# Patient Record
Sex: MALE | Race: WHITE | Employment: OTHER | URBAN - METROPOLITAN AREA
[De-identification: names, ages, dates, MRNs, and addresses within clinical notes are randomized per-mention and may not be internally consistent; named-entity substitution may affect disease eponyms.]

---

## 2020-12-21 ENCOUNTER — OFFICE VISIT (OUTPATIENT)
Dept: FAMILY MEDICINE CLINIC | Age: 45
End: 2020-12-21
Payer: MEDICARE

## 2020-12-21 VITALS
TEMPERATURE: 97.1 F | WEIGHT: 191 LBS | HEART RATE: 66 BPM | SYSTOLIC BLOOD PRESSURE: 122 MMHG | HEIGHT: 68 IN | BODY MASS INDEX: 28.95 KG/M2 | DIASTOLIC BLOOD PRESSURE: 70 MMHG | OXYGEN SATURATION: 99 %

## 2020-12-21 DIAGNOSIS — G89.29 CHRONIC BILATERAL LOW BACK PAIN WITH BILATERAL SCIATICA: ICD-10-CM

## 2020-12-21 DIAGNOSIS — F39 MOOD DISORDER (HCC): ICD-10-CM

## 2020-12-21 DIAGNOSIS — M54.41 CHRONIC BILATERAL LOW BACK PAIN WITH BILATERAL SCIATICA: ICD-10-CM

## 2020-12-21 DIAGNOSIS — E10.69 TYPE 1 DIABETES MELLITUS WITH OTHER SPECIFIED COMPLICATION (HCC): Primary | ICD-10-CM

## 2020-12-21 DIAGNOSIS — Z99.2 ESRD (END STAGE RENAL DISEASE) ON DIALYSIS (HCC): ICD-10-CM

## 2020-12-21 DIAGNOSIS — G47.9 SLEEP DISTURBANCE: ICD-10-CM

## 2020-12-21 DIAGNOSIS — M54.42 CHRONIC BILATERAL LOW BACK PAIN WITH BILATERAL SCIATICA: ICD-10-CM

## 2020-12-21 DIAGNOSIS — I10 ESSENTIAL HYPERTENSION: ICD-10-CM

## 2020-12-21 DIAGNOSIS — M21.372 FOOT DROP, BILATERAL: ICD-10-CM

## 2020-12-21 DIAGNOSIS — M21.371 FOOT DROP, BILATERAL: ICD-10-CM

## 2020-12-21 DIAGNOSIS — N18.6 ESRD (END STAGE RENAL DISEASE) ON DIALYSIS (HCC): ICD-10-CM

## 2020-12-21 DIAGNOSIS — M62.81 MUSCLE WEAKNESS: ICD-10-CM

## 2020-12-21 PROCEDURE — G8427 DOCREV CUR MEDS BY ELIG CLIN: HCPCS | Performed by: INTERNAL MEDICINE

## 2020-12-21 PROCEDURE — 3046F HEMOGLOBIN A1C LEVEL >9.0%: CPT | Performed by: INTERNAL MEDICINE

## 2020-12-21 PROCEDURE — G8419 CALC BMI OUT NRM PARAM NOF/U: HCPCS | Performed by: INTERNAL MEDICINE

## 2020-12-21 PROCEDURE — 1036F TOBACCO NON-USER: CPT | Performed by: INTERNAL MEDICINE

## 2020-12-21 PROCEDURE — G8484 FLU IMMUNIZE NO ADMIN: HCPCS | Performed by: INTERNAL MEDICINE

## 2020-12-21 PROCEDURE — 2022F DILAT RTA XM EVC RTNOPTHY: CPT | Performed by: INTERNAL MEDICINE

## 2020-12-21 PROCEDURE — 99203 OFFICE O/P NEW LOW 30 MIN: CPT | Performed by: INTERNAL MEDICINE

## 2020-12-21 RX ORDER — AMLODIPINE BESYLATE 10 MG/1
10 TABLET ORAL DAILY
Status: ON HOLD | COMMUNITY
End: 2022-05-27 | Stop reason: HOSPADM

## 2020-12-21 RX ORDER — ASPIRIN 81 MG/1
81 TABLET ORAL DAILY
COMMUNITY

## 2020-12-21 RX ORDER — CALCIUM ACETATE 667 MG/1
1334 TABLET ORAL
COMMUNITY
End: 2022-07-26 | Stop reason: SDUPTHER

## 2020-12-21 RX ORDER — BUPROPION HYDROCHLORIDE 150 MG/1
150 TABLET ORAL EVERY MORNING
COMMUNITY
End: 2021-03-25 | Stop reason: SDUPTHER

## 2020-12-21 RX ORDER — OMEPRAZOLE 40 MG/1
40 CAPSULE, DELAYED RELEASE ORAL DAILY
Status: ON HOLD | COMMUNITY
End: 2022-06-02 | Stop reason: HOSPADM

## 2020-12-21 RX ORDER — FUROSEMIDE 20 MG/1
20 TABLET ORAL DAILY
COMMUNITY
End: 2021-04-27 | Stop reason: SDUPTHER

## 2020-12-21 RX ORDER — MAGNESIUM OXIDE 400 MG/1
400 TABLET ORAL DAILY
Status: ON HOLD | COMMUNITY
End: 2022-04-13

## 2020-12-21 RX ORDER — FLUOXETINE HYDROCHLORIDE 20 MG/1
40 CAPSULE ORAL DAILY
COMMUNITY
End: 2021-05-10 | Stop reason: SDUPTHER

## 2020-12-21 RX ORDER — LORATADINE 10 MG/1
10 CAPSULE, LIQUID FILLED ORAL DAILY
COMMUNITY
End: 2022-01-02

## 2020-12-21 RX ORDER — EZETIMIBE 10 MG/1
10 TABLET ORAL DAILY
COMMUNITY

## 2020-12-21 RX ORDER — TRAMADOL HYDROCHLORIDE 50 MG/1
50 TABLET ORAL DAILY
COMMUNITY
End: 2021-03-29

## 2020-12-21 RX ORDER — LISINOPRIL 20 MG/1
10 TABLET ORAL DAILY
Status: ON HOLD | COMMUNITY
End: 2022-04-14 | Stop reason: HOSPADM

## 2020-12-21 SDOH — ECONOMIC STABILITY: FOOD INSECURITY: WITHIN THE PAST 12 MONTHS, THE FOOD YOU BOUGHT JUST DIDN'T LAST AND YOU DIDN'T HAVE MONEY TO GET MORE.: NEVER TRUE

## 2020-12-21 SDOH — ECONOMIC STABILITY: INCOME INSECURITY: HOW HARD IS IT FOR YOU TO PAY FOR THE VERY BASICS LIKE FOOD, HOUSING, MEDICAL CARE, AND HEATING?: NOT HARD AT ALL

## 2020-12-21 SDOH — ECONOMIC STABILITY: TRANSPORTATION INSECURITY
IN THE PAST 12 MONTHS, HAS THE LACK OF TRANSPORTATION KEPT YOU FROM MEDICAL APPOINTMENTS OR FROM GETTING MEDICATIONS?: NOT ASKED

## 2020-12-21 SDOH — ECONOMIC STABILITY: TRANSPORTATION INSECURITY
IN THE PAST 12 MONTHS, HAS LACK OF TRANSPORTATION KEPT YOU FROM MEETINGS, WORK, OR FROM GETTING THINGS NEEDED FOR DAILY LIVING?: NOT ASKED

## 2020-12-21 ASSESSMENT — PATIENT HEALTH QUESTIONNAIRE - PHQ9
SUM OF ALL RESPONSES TO PHQ QUESTIONS 1-9: 0
2. FEELING DOWN, DEPRESSED OR HOPELESS: 0

## 2020-12-21 NOTE — PROGRESS NOTES
Visit Information    Have you changed or started any medications since your last visit including any over-the-counter medicines, vitamins, or herbal medicines? no   Are you having any side effects from any of your medications? -  no  Have you stopped taking any of your medications? Is so, why? -  no    Have you seen any other physician or provider since your last visit? Yes, Dr. Silvestre Benjamin   Have you had any other diagnostic tests since your last visit? No  Have you been seen in the emergency room and/or had an admission to a hospital since we last saw you? No  Have you had your routine dental cleaning in the past 6 months? no    Have you activated your [employer] account? If not, what are your barriers?  No:      Patient Care Team:  Jenny Velasquez DO as PCP - General (Family Medicine)    Medical History Review  Past Medical, Family, and Social History reviewed and does contribute to the patient presenting condition    Health Maintenance   Topic Date Due    HIV screen  05/11/1989    DTaP/Tdap/Td vaccine (1 - Tdap) 05/11/1993    Lipid screen  05/11/2014    Diabetes screen  05/11/2014    Flu vaccine (1) 09/01/2020    Hepatitis A vaccine  Aged Out    Hepatitis B vaccine  Aged Out    Hib vaccine  Aged Out    Meningococcal (ACWY) vaccine  Aged Out    Pneumococcal 0-64 years Vaccine  Aged Out

## 2020-12-31 ENCOUNTER — HOSPITAL ENCOUNTER (EMERGENCY)
Age: 45
Discharge: HOME | End: 2020-12-31
Attending: EMERGENCY MEDICINE
Payer: MEDICARE

## 2020-12-31 VITALS
BODY MASS INDEX: 28.79 KG/M2 | WEIGHT: 190 LBS | OXYGEN SATURATION: 95 % | SYSTOLIC BLOOD PRESSURE: 133 MMHG | RESPIRATION RATE: 16 BRPM | DIASTOLIC BLOOD PRESSURE: 64 MMHG | HEART RATE: 58 BPM | HEIGHT: 68 IN | TEMPERATURE: 98.1 F

## 2020-12-31 DIAGNOSIS — T38.3X1A INSULIN OVERDOSE, ACCIDENTAL OR UNINTENTIONAL, INITIAL ENCOUNTER: Primary | ICD-10-CM

## 2020-12-31 LAB
ABSOLUTE EOS #: 0.2 K/UL (ref 0–0.44)
ABSOLUTE IMMATURE GRANULOCYTE: 0.02 K/UL (ref 0–0.3)
ABSOLUTE LYMPH #: 0.88 K/UL (ref 1.1–3.7)
ABSOLUTE MONO #: 0.56 K/UL (ref 0.1–1.2)
ANION GAP SERPL CALCULATED.3IONS-SCNC: 12 MMOL/L (ref 9–17)
BASOPHILS # BLD: 1 % (ref 0–2)
BASOPHILS ABSOLUTE: 0.05 K/UL (ref 0–0.2)
BUN BLDV-MCNC: 44 MG/DL (ref 6–20)
BUN/CREAT BLD: 7 (ref 9–20)
CALCIUM SERPL-MCNC: 9.6 MG/DL (ref 8.6–10.4)
CHLORIDE BLD-SCNC: 94 MMOL/L (ref 98–107)
CHP ED QC CHECK: NORMAL
CHP ED QC CHECK: YES
CO2: 27 MMOL/L (ref 20–31)
CREAT SERPL-MCNC: 6.64 MG/DL (ref 0.7–1.2)
DIFFERENTIAL TYPE: ABNORMAL
EOSINOPHILS RELATIVE PERCENT: 2 % (ref 1–4)
GFR AFRICAN AMERICAN: 11 ML/MIN
GFR NON-AFRICAN AMERICAN: 9 ML/MIN
GFR SERPL CREATININE-BSD FRML MDRD: ABNORMAL ML/MIN/{1.73_M2}
GFR SERPL CREATININE-BSD FRML MDRD: ABNORMAL ML/MIN/{1.73_M2}
GLUCOSE BLD-MCNC: 114 MG/DL
GLUCOSE BLD-MCNC: 114 MG/DL (ref 75–110)
GLUCOSE BLD-MCNC: 120 MG/DL
GLUCOSE BLD-MCNC: 120 MG/DL (ref 75–110)
GLUCOSE BLD-MCNC: 40 MG/DL
GLUCOSE BLD-MCNC: 40 MG/DL (ref 75–110)
GLUCOSE BLD-MCNC: 78 MG/DL (ref 70–99)
GLUCOSE BLD-MCNC: 94 MG/DL (ref 75–110)
GLUCOSE BLD-MCNC: 97 MG/DL
GLUCOSE BLD-MCNC: 97 MG/DL (ref 75–110)
HCT VFR BLD CALC: 32.7 % (ref 40.7–50.3)
HEMOGLOBIN: 10.5 G/DL (ref 13–17)
IMMATURE GRANULOCYTES: 0 %
LYMPHOCYTES # BLD: 10 % (ref 24–43)
MCH RBC QN AUTO: 27.3 PG (ref 25.2–33.5)
MCHC RBC AUTO-ENTMCNC: 32.1 G/DL (ref 28.4–34.8)
MCV RBC AUTO: 84.9 FL (ref 82.6–102.9)
MONOCYTES # BLD: 7 % (ref 3–12)
NRBC AUTOMATED: 0 PER 100 WBC
PDW BLD-RTO: 13.2 % (ref 11.8–14.4)
PLATELET # BLD: 210 K/UL (ref 138–453)
PLATELET ESTIMATE: ABNORMAL
PMV BLD AUTO: 10.9 FL (ref 8.1–13.5)
POTASSIUM SERPL-SCNC: 3.7 MMOL/L (ref 3.7–5.3)
RBC # BLD: 3.85 M/UL (ref 4.21–5.77)
RBC # BLD: ABNORMAL 10*6/UL
SEG NEUTROPHILS: 80 % (ref 36–65)
SEGMENTED NEUTROPHILS ABSOLUTE COUNT: 6.75 K/UL (ref 1.5–8.1)
SODIUM BLD-SCNC: 133 MMOL/L (ref 135–144)
WBC # BLD: 8.5 K/UL (ref 3.5–11.3)
WBC # BLD: ABNORMAL 10*3/UL

## 2020-12-31 PROCEDURE — 80048 BASIC METABOLIC PNL TOTAL CA: CPT

## 2020-12-31 PROCEDURE — 85025 COMPLETE CBC W/AUTO DIFF WBC: CPT

## 2020-12-31 PROCEDURE — 96374 THER/PROPH/DIAG INJ IV PUSH: CPT

## 2020-12-31 PROCEDURE — 2580000003 HC RX 258: Performed by: EMERGENCY MEDICINE

## 2020-12-31 PROCEDURE — 6360000002 HC RX W HCPCS: Performed by: EMERGENCY MEDICINE

## 2020-12-31 PROCEDURE — 99282 EMERGENCY DEPT VISIT SF MDM: CPT

## 2020-12-31 PROCEDURE — 96375 TX/PRO/DX INJ NEW DRUG ADDON: CPT

## 2020-12-31 PROCEDURE — 2500000003 HC RX 250 WO HCPCS: Performed by: EMERGENCY MEDICINE

## 2020-12-31 PROCEDURE — 96376 TX/PRO/DX INJ SAME DRUG ADON: CPT

## 2020-12-31 PROCEDURE — 82947 ASSAY GLUCOSE BLOOD QUANT: CPT

## 2020-12-31 RX ORDER — DEXTROSE AND SODIUM CHLORIDE 5; .9 G/100ML; G/100ML
INJECTION, SOLUTION INTRAVENOUS CONTINUOUS
Status: DISCONTINUED | OUTPATIENT
Start: 2020-12-31 | End: 2020-12-31 | Stop reason: HOSPADM

## 2020-12-31 RX ORDER — PROMETHAZINE HYDROCHLORIDE 25 MG/ML
12.5 INJECTION, SOLUTION INTRAMUSCULAR; INTRAVENOUS ONCE
Status: COMPLETED | OUTPATIENT
Start: 2020-12-31 | End: 2020-12-31

## 2020-12-31 RX ORDER — DEXTROSE MONOHYDRATE 25 G/50ML
12.5 INJECTION, SOLUTION INTRAVENOUS ONCE
Status: COMPLETED | OUTPATIENT
Start: 2020-12-31 | End: 2020-12-31

## 2020-12-31 RX ORDER — ONDANSETRON 2 MG/ML
4 INJECTION INTRAMUSCULAR; INTRAVENOUS ONCE
Status: COMPLETED | OUTPATIENT
Start: 2020-12-31 | End: 2020-12-31

## 2020-12-31 RX ORDER — INSULIN GLARGINE 100 [IU]/ML
40 INJECTION, SOLUTION SUBCUTANEOUS EVERY MORNING
COMMUNITY
End: 2021-02-17 | Stop reason: SDUPTHER

## 2020-12-31 RX ORDER — DEXTROSE MONOHYDRATE 25 G/50ML
25 INJECTION, SOLUTION INTRAVENOUS ONCE
Status: COMPLETED | OUTPATIENT
Start: 2020-12-31 | End: 2020-12-31

## 2020-12-31 RX ADMIN — PROMETHAZINE HYDROCHLORIDE 12.5 MG: 25 INJECTION INTRAMUSCULAR; INTRAVENOUS at 06:45

## 2020-12-31 RX ADMIN — DEXTROSE MONOHYDRATE 12.5 G: 25 INJECTION, SOLUTION INTRAVENOUS at 06:19

## 2020-12-31 RX ADMIN — ONDANSETRON 4 MG: 2 INJECTION INTRAMUSCULAR; INTRAVENOUS at 06:19

## 2020-12-31 RX ADMIN — DEXTROSE AND SODIUM CHLORIDE: 5; 900 INJECTION, SOLUTION INTRAVENOUS at 06:24

## 2020-12-31 RX ADMIN — DEXTROSE MONOHYDRATE 25 G: 25 INJECTION, SOLUTION INTRAVENOUS at 08:34

## 2020-12-31 RX ADMIN — ONDANSETRON 4 MG: 2 INJECTION INTRAMUSCULAR; INTRAVENOUS at 06:45

## 2020-12-31 RX ADMIN — GLUCAGON HYDROCHLORIDE 1 MG: KIT at 06:19

## 2020-12-31 NOTE — ED NOTES
Spoke with Deveron Nyhan RN at [facility] at this time, states they are unable to get him in for dialysis today, states they are closed tomorrow, and would like the patient's care taker to call her when he gets home to set up for possible dialysis on Saturday. Dr. Emil Ho notified at this time and is okay with the plan.      Marky Henning6, RN  12/31/20 0928

## 2020-12-31 NOTE — ED NOTES
Pt called out to [employer]. Pt taken via w/c. Ameibofety maintained.       Derek Cee RN  12/31/20 2916

## 2020-12-31 NOTE — ED PROVIDER NOTES
[address] [place] [address] ED  eMERGENCY dEPARTMENT eNCOUnter      Pt Name: Claven Schirmer  MRN: [id number]  [place] 1975  Date of evaluation: 12/31/2020  Provider: Jeffery Greco MD    CHIEF COMPLAINT       Chief Complaint   Patient presents with    Fatigue    Medication Problem     took wrong insuliln this morning         HISTORY OF PRESENT ILLNESS  (Location/Symptom, Timing/Onset, Context/Setting, Quality, Duration, Modifying Factors, Severity.)   Claven Schirmer is a 54 y.o. male who presents to the emergency department due to accidental missed dosing of his insulin. Patient took 40 units of Humalog this morning instead of 40 units of Lantus. He presents immediately after dosing. Accu-Chek on arrival is 90. He states he does have mild nausea and fatigue. He has no other complaints      Nursing Notes were reviewed. ALLERGIES     Patient has no known allergies.     CURRENT MEDICATIONS       Previous Medications    AMLODIPINE (NORVASC) 10 MG TABLET    Take 10 mg by mouth daily    ASPIRIN 81 MG EC TABLET    Take 81 mg by mouth daily    BUPROPION (WELLBUTRIN XL) 150 MG EXTENDED RELEASE TABLET    Take 150 mg by mouth every morning    CALCIUM ACETATE 667 MG TABS    Take 3 tablets by mouth daily    EZETIMIBE (ZETIA) 10 MG TABLET    Take 10 mg by mouth daily    FLUOXETINE (PROZAC) 20 MG CAPSULE    Take 40 mg by mouth daily    FUROSEMIDE (LASIX) 20 MG TABLET    Take 20 mg by mouth daily    INSULIN GLARGINE (LANTUS) 100 UNIT/ML INJECTION VIAL    Inject 40 Units into the skin every morning    INSULIN LISPRO (HUMALOG) 100 UNIT/ML INJECTION VIAL    Inject into the skin 3 times daily (before meals)    LISINOPRIL (PRINIVIL;ZESTRIL) 20 MG TABLET    Take 40 mg by mouth daily    LORATADINE (CLARITIN) 10 MG CAPSULE    Take 10 mg by mouth daily    MAGNESIUM OXIDE (MAG-OX) 400 MG TABLET    Take 400 mg by mouth daily    METOPROLOL SUCCINATE 200 MG CS24    Take 200 mg by mouth daily Non-plain film images such as CT, Ultrasound and MRI are read by the radiologist. Plain radiographic images are visualized and preliminarily interpreted by the emergency physician with the below findings:      Interpretation per the Radiologist below, if available at the time of this note:    No orders to display           LABS:  Labs Reviewed   CBC WITH AUTO DIFFERENTIAL - Abnormal; Notable for the following components:       Result Value    RBC 3.85 (*)     Hemoglobin 10.5 (*)     Hematocrit 32.7 (*)     Seg Neutrophils 80 (*)     Lymphocytes 10 (*)     Absolute Lymph # 0.88 (*)     All other components within normal limits   POCT GLUCOSE - Normal   BASIC METABOLIC PANEL   POC GLUCOSE FINGERSTICK   POCT GLUCOSE   POCT GLUCOSE   POCT GLUCOSE   POCT GLUCOSE   POCT GLUCOSE     Pending at shift change    All other labs were within normal range or not returned as of this dictation. EMERGENCY DEPARTMENT COURSE and DIFFERENTIAL DIAGNOSIS/MDM:   Vitals:    Vitals:    12/31/20 0555 12/31/20 0558   BP:  138/66   Pulse:  61   Resp:  16   Temp:  98.1 °F (36.7 °C)   SpO2:  100%   Weight: 190 lb (86.2 kg)    Height: 5' 7.5\" (1.715 m)      Patient is evaluated on arrival.  IV access is established. He presents shortly after taking 40 units of Humalog. In anticipation of drop in blood glucose he is placed on D5 0.9. He did receive half amp of D50 as well as 1 of glucagon. Zofran given for nausea. Serial Accu-Chek will be followed every 30 minutes and supportive treatment given as required. Care is turned over to Dr. Molly Carlton at shift change for reevaluation of patient status, review of investigations, additional care and ultimate disposition    CONSULTS:  None    PROCEDURES:  None    FINAL IMPRESSION      1.  Insulin overdose, accidental or unintentional, initial encounter          DISPOSITION/PLAN   DISPOSITION Ed Observation 12/31/2020 06:39:55 AM  Final Disposition per Dr Cotto Query: No follow-up provider specified.     DISCHARGE MEDICATIONS:     New Prescriptions    No medications on file           (Please note that portions of this note were completed with a voice recognition program.  Efforts were made to edit the dictations but occasionally words are mis-transcribed.)    William Pichardo MD  Attending Emergency Physician          William Pichardo MD  12/31/20 7628

## 2020-12-31 NOTE — ED NOTES
Pt returned room via w/c. Pt c/o being clammy and cold. POCT Glucose 40. Pt given 4oz oj. Dr Vasiliy Fields notified. N.O. 1 amp D50.       Conrad Sanchez RN  12/31/20 8608

## 2021-01-08 ENCOUNTER — HOSPITAL ENCOUNTER (OUTPATIENT)
Age: 46
Setting detail: SPECIMEN
Discharge: HOME | End: 2021-01-08
Payer: COMMERCIAL

## 2021-01-08 DIAGNOSIS — E10.69 TYPE 1 DIABETES MELLITUS WITH OTHER SPECIFIED COMPLICATION (HCC): ICD-10-CM

## 2021-01-08 DIAGNOSIS — N18.6 ESRD (END STAGE RENAL DISEASE) ON DIALYSIS (HCC): ICD-10-CM

## 2021-01-08 DIAGNOSIS — Z99.2 ESRD (END STAGE RENAL DISEASE) ON DIALYSIS (HCC): ICD-10-CM

## 2021-01-08 DIAGNOSIS — G47.9 SLEEP DISTURBANCE: ICD-10-CM

## 2021-01-08 LAB
ABSOLUTE EOS #: 0.14 K/UL (ref 0–0.44)
ABSOLUTE IMMATURE GRANULOCYTE: <0.03 K/UL (ref 0–0.3)
ABSOLUTE LYMPH #: 1.04 K/UL (ref 1.1–3.7)
ABSOLUTE MONO #: 0.63 K/UL (ref 0.1–1.2)
ALBUMIN SERPL-MCNC: 4.1 G/DL (ref 3.5–5.2)
ALBUMIN/GLOBULIN RATIO: 1.7 (ref 1–2.5)
ALP BLD-CCNC: 313 U/L (ref 40–129)
ALT SERPL-CCNC: 135 U/L (ref 5–41)
ANION GAP SERPL CALCULATED.3IONS-SCNC: 18 MMOL/L (ref 9–17)
AST SERPL-CCNC: 127 U/L
BASOPHILS # BLD: 1 % (ref 0–2)
BASOPHILS ABSOLUTE: 0.03 K/UL (ref 0–0.2)
BILIRUB SERPL-MCNC: 0.38 MG/DL (ref 0.3–1.2)
BUN BLDV-MCNC: 27 MG/DL (ref 6–20)
BUN/CREAT BLD: ABNORMAL (ref 9–20)
CALCIUM SERPL-MCNC: 9 MG/DL (ref 8.6–10.4)
CHLORIDE BLD-SCNC: 98 MMOL/L (ref 98–107)
CO2: 24 MMOL/L (ref 20–31)
CREAT SERPL-MCNC: 5.34 MG/DL (ref 0.7–1.2)
DIFFERENTIAL TYPE: ABNORMAL
EOSINOPHILS RELATIVE PERCENT: 2 % (ref 1–4)
ESTIMATED AVERAGE GLUCOSE: 174 MG/DL
FOLATE: 12.7 NG/ML
GFR AFRICAN AMERICAN: 14 ML/MIN
GFR NON-AFRICAN AMERICAN: 12 ML/MIN
GFR SERPL CREATININE-BSD FRML MDRD: ABNORMAL ML/MIN/{1.73_M2}
GFR SERPL CREATININE-BSD FRML MDRD: ABNORMAL ML/MIN/{1.73_M2}
GLUCOSE BLD-MCNC: 139 MG/DL (ref 70–99)
HBA1C MFR BLD: 7.7 % (ref 4–6)
HCT VFR BLD CALC: 33.9 % (ref 40.7–50.3)
HEMOGLOBIN: 10.7 G/DL (ref 13–17)
IMMATURE GRANULOCYTES: 0 %
LYMPHOCYTES # BLD: 18 % (ref 24–43)
MCH RBC QN AUTO: 27.7 PG (ref 25.2–33.5)
MCHC RBC AUTO-ENTMCNC: 31.6 G/DL (ref 28.4–34.8)
MCV RBC AUTO: 87.8 FL (ref 82.6–102.9)
MONOCYTES # BLD: 11 % (ref 3–12)
NRBC AUTOMATED: 0 PER 100 WBC
PDW BLD-RTO: 13.6 % (ref 11.8–14.4)
PLATELET # BLD: 184 K/UL (ref 138–453)
PLATELET ESTIMATE: ABNORMAL
PMV BLD AUTO: 11.5 FL (ref 8.1–13.5)
POTASSIUM SERPL-SCNC: 4.2 MMOL/L (ref 3.7–5.3)
RBC # BLD: 3.86 M/UL (ref 4.21–5.77)
RBC # BLD: ABNORMAL 10*6/UL
SEG NEUTROPHILS: 68 % (ref 36–65)
SEGMENTED NEUTROPHILS ABSOLUTE COUNT: 4.02 K/UL (ref 1.5–8.1)
SODIUM BLD-SCNC: 140 MMOL/L (ref 135–144)
TOTAL PROTEIN: 6.5 G/DL (ref 6.4–8.3)
VITAMIN B-12: 1630 PG/ML (ref 232–1245)
VITAMIN D 25-HYDROXY: 12.3 NG/ML (ref 30–100)
WBC # BLD: 5.9 K/UL (ref 3.5–11.3)
WBC # BLD: ABNORMAL 10*3/UL

## 2021-01-10 ASSESSMENT — ENCOUNTER SYMPTOMS
STRIDOR: 0
BLURRED VISION: 0
SHORTNESS OF BREATH: 0
SORE THROAT: 0
SWOLLEN GLANDS: 0
CHEST TIGHTNESS: 0
NAUSEA: 0
CONSTIPATION: 0
COUGH: 0
ABDOMINAL PAIN: 0
VISUAL CHANGE: 0
WHEEZING: 0
DIARRHEA: 0
BACK PAIN: 0
CHOKING: 0

## 2021-01-10 NOTE — PROGRESS NOTES
[facility]  [address] [address]  Dept: [phone]  Dept Fax: [phone]    Rob Gomez a 54 y.o. male who presents today for his medical conditions/complaints as notedbelow. Muna Aguilar is c/o of   Chief Complaint   Patient presents with    New Patient         HPI:     Here to establish care   [place] live with [place]  Had just recently got out of a rehba up there was well previously   Has hx of poorly controlled type 1 DM /brittle diabetic   ESRD on dialysis   Is going t/th/sat currently to [facility] does not know the exact name and is already set up with nephro   They do labs occasionally there per pt and GF who brought pt today but is okay with us checking as unclear what they have done besides cmp , lytes   Does not need refill on medications right now is set on those  Does carb counting and sliding scale humalog as well as long acting insulin   Will need referral to endo       has chronic weakness related to foot drop  Wears a brace when up and about   Helps with swelling as well   Would like to try some therapy , was gettign this when at rehab as well and did help with overall strength and gait   Seemed to get around better after course of PT/OT        Diabetes  He presents for his follow-up diabetic visit. He has type 2 diabetes mellitus. No MedicAlert identification noted. His disease course has been fluctuating. Hypoglycemia symptoms include nervousness/anxiousness. Pertinent negatives for hypoglycemia include no dizziness, headaches, seizures or speech difficulty. Associated symptoms include fatigue, foot paresthesias and weakness. Pertinent negatives for diabetes include no blurred vision, no chest pain, no foot ulcerations, no polydipsia, no polyphagia, no polyuria and no visual change. There are no hypoglycemic complications.  Risk Shari Oliveira was in the clinic today to see EDEN Mcgee for   Chief Complaint   Patient presents with   • Convey Results   .    Please note, her blood pressures were elevated x2 while in the clinic.    BP Readings from Last 1 Encounters:   06/11/19 0847 148/86   06/11/19 0801 159/89       Please review with PCP and follow up with patient as appropriate.     factors for coronary artery disease include diabetes mellitus, dyslipidemia, family history, sedentary lifestyle, stress, obesity and male sex. Current diabetic treatment includes intensive insulin program. He is compliant with treatment most of the time. He is following a generally healthy diet. Meal planning includes avoidance of concentrated sweets. He has not had a previous visit with a dietitian. He rarely participates in exercise. His home blood glucose trend is fluctuating minimally. His breakfast blood glucose range is generally 180-200 mg/dl. His lunch blood glucose range is generally >200 mg/dl. His dinner blood glucose range is generally 180-200 mg/dl. He does not see a podiatrist.Eye exam is not current. Flank Pain  Associated symptoms include numbness and weakness. Pertinent negatives include no abdominal pain, chest pain, dysuria, fever or headaches. Extremity Weakness  This is a chronic problem. The current episode started more than 1 year ago. The problem occurs constantly. The problem has been gradually worsening. Associated symptoms include arthralgias, fatigue, myalgias, numbness and weakness. Pertinent negatives include no abdominal pain, chest pain, chills, congestion, coughing, diaphoresis, fever, headaches, joint swelling, nausea, neck pain, rash, sore throat, swollen glands or visual change. The symptoms are aggravated by exertion and walking. He has tried sleep, walking, rest and relaxation for the symptoms. The treatment provided moderate relief.        Hemoglobin A1C (%)   Date Value   01/08/2021 7.7 (H)         ( goal A1C is < 7)   No results found for: LABMICR  No results found for: LDLCHOLESTEROL, LDLCALC    (goal LDL is <100)   AST (U/L)   Date Value   01/08/2021 127 (H)     ALT (U/L)   Date Value   01/08/2021 135 (H)     BUN (mg/dL)   Date Value   01/08/2021 27 (H)     BP Readings from Last 3 Encounters:   12/31/20 133/64   12/21/20 122/70          (goal 120/80)    Past Medical History:   Diagnosis Date    Kidney disease     On Dialysis    Type 1 diabetes mellitus ([facility] [address]       History reviewed. No pertinent surgical history. Family History   Problem Relation Age of Onset    Diabetes Mother     Diabetes Father     Heart Disease Maternal Great Grandfather        Social History     Tobacco Use    Smoking status: Never Smoker    Smokeless tobacco: Never Used   Substance Use Topics    Alcohol use: Never     Frequency: Never     Binge frequency: Never      Current Outpatient Medications   Medication Sig Dispense Refill    lisinopril (PRINIVIL;ZESTRIL) 20 MG tablet Take 40 mg by mouth daily      Rosuvastatin Calcium 40 MG CPSP Take 40 mg by mouth daily      ezetimibe (ZETIA) 10 MG tablet Take 10 mg by mouth daily      FLUoxetine (PROZAC) 20 MG capsule Take 40 mg by mouth daily      Metoprolol Succinate 200 MG CS24 Take 200 mg by mouth daily      buPROPion (WELLBUTRIN XL) 150 MG extended release tablet Take 150 mg by mouth every morning      omeprazole (PRILOSEC) 40 MG delayed release capsule Take 40 mg by mouth daily      loratadine (CLARITIN) 10 MG capsule Take 10 mg by mouth daily      furosemide (LASIX) 20 MG tablet Take 20 mg by mouth daily      amLODIPine (NORVASC) 10 MG tablet Take 10 mg by mouth daily      aspirin 81 MG EC tablet Take 81 mg by mouth daily      traMADol (ULTRAM) 50 MG tablet Take 50 mg by mouth daily.  magnesium oxide (MAG-OX) 400 MG tablet Take 400 mg by mouth daily      calcium acetate 667 MG TABS Take 3 tablets by mouth daily      vitamin B-12 (CYANOCOBALAMIN) 500 MCG tablet Take 500 mcg by mouth daily      insulin glargine (LANTUS) 100 UNIT/ML injection vial Inject 40 Units into the skin every morning      insulin lispro (HUMALOG) 100 UNIT/ML injection vial Inject into the skin 3 times daily (before meals)       No current facility-administered medications for this visit.       No Known Allergies       Health Maintenance   Topic Date Due note reviewed. Constitutional:       General: He is not in acute distress. Appearance: He is well-developed. He is ill-appearing. He is not toxic-appearing or diaphoretic. Comments: Chronically ill  Thin    HENT:      Head: Normocephalic and atraumatic. Right Ear: Tympanic membrane, ear canal and external ear normal.      Left Ear: Tympanic membrane, ear canal and external ear normal.      Mouth/Throat:      Mouth: Mucous membranes are moist.   Neck:      Musculoskeletal: Normal range of motion and neck supple. No neck rigidity. Thyroid: No thyroid mass or thyroid tenderness. Vascular: No carotid bruit. Cardiovascular:      Rate and Rhythm: Regular rhythm. Bradycardia present. No extrasystoles are present. Pulses: Normal pulses. Heart sounds: S1 normal and S2 normal. Heart sounds not distant. Murmur present. Pulmonary:      Effort: Pulmonary effort is normal. No bradypnea, accessory muscle usage, prolonged expiration, respiratory distress or retractions. Breath sounds: Normal breath sounds and air entry. No stridor, decreased air movement or transmitted upper airway sounds. No decreased breath sounds, wheezing, rhonchi or rales. Abdominal:      General: Bowel sounds are normal.      Palpations: Abdomen is soft. There is no hepatomegaly or splenomegaly. Tenderness: There is no abdominal tenderness. Musculoskeletal:      Right shoulder: He exhibits no tenderness, no bony tenderness, no pain, no spasm, normal pulse and normal strength. Left knee: He exhibits normal range of motion, no swelling, no effusion, no ecchymosis, no deformity, no laceration and no erythema. No tenderness found. Lumbar back: He exhibits spasm. He exhibits normal range of motion, no tenderness, no bony tenderness, no swelling, no edema, no deformity, no pain and normal pulse. Right lower leg: No edema. Left lower leg: No edema.    Lymphadenopathy:      Cervical: No cervical adenopathy. Skin:     General: Skin is warm and dry. Capillary Refill: Capillary refill takes less than 2 seconds. Coloration: Skin is pale. Findings: No rash. Neurological:      General: No focal deficit present. Mental Status: He is alert and oriented to person, place, and time. Cranial Nerves: Cranial nerves are intact. No cranial nerve deficit. Sensory: Sensory deficit present. Motor: Weakness and atrophy present. No abnormal muscle tone. Coordination: Coordination normal.      Gait: Gait abnormal.      Deep Tendon Reflexes: Reflexes are normal and symmetric. Psychiatric:         Attention and Perception: Attention normal.         Mood and Affect: Mood is anxious and depressed. Speech: Speech is delayed. Behavior: Behavior normal.         Thought Content: Thought content normal.         Cognition and Memory: Cognition normal.         Judgment: Judgment normal.       /70 (Site: Right Upper Arm, Position: Sitting, Cuff Size: Medium Adult)   Pulse 66   Temp 97.1 °F (36.2 °C) (Temporal)   Ht 5' 7.5\" (1.715 m)   Wt 191 lb (86.6 kg)   SpO2 99%   BMI 29.47 kg/m²     Assessment:       Diagnosis Orders   1. Type 1 diabetes mellitus with other specified complication (HCC)  Carmen Kaur MD, Endocrinology, [facility]    Hemoglobin A1C    Comprehensive Metabolic Panel    CBC With Auto Differential    DME Order for Bath/Shower Seat as OP   2. Sleep disturbance  CBC With Auto Differential   3. ESRD (end stage renal disease) on dialysis ([facility] [address]  Vitamin D 25 Hydroxy    Vitamin B12 & Folate    DME Order for Bath/Shower Seat as OP   4. Essential hypertension     5. Mood disorder ([facility] [address]     6. Foot drop, bilateral  [facility]    DME Order for Bath/Shower Seat as OP   7. Muscle weakness  [facility]    DME Order for Bath/Shower Seat as OP   8.  Chronic bilateral low back pain with bilateral sciatica [facility]    DME Order for Bath/Shower Seat as OP             Plan:       Return if symptoms worsen or fail to improve. Orders Placed This Encounter   Procedures    Hemoglobin A1C     Standing Status:   Future     Number of Occurrences:   1     Standing Expiration Date:   12/21/2021    Comprehensive Metabolic Panel     Standing Status:   Future     Number of Occurrences:   1     Standing Expiration Date:   12/21/2021    CBC With Auto Differential     Standing Status:   Future     Number of Occurrences:   1     Standing Expiration Date:   12/21/2021    Vitamin D 25 Hydroxy     Standing Status:   Future     Number of Occurrences:   1     Standing Expiration Date:   12/21/2021    Vitamin B12 & Folate     Standing Status:   Future     Number of Occurrences:   1     Standing Expiration Date:   12/21/2021   Toyin Adorno MD, Endocrinology, [facility]     Referral Priority:   Routine     Referral Type:   Eval and Treat     Referral Reason:   Specialty Services Required     Referred to Provider:   Disha Hearn     Requested Specialty:   Endocrinology     Number of Visits Requested:   [facility]     Referral Priority:   Routine     Referral Type:   Eval and Treat     Referral Reason:   Specialty Services Required     Requested Specialty:   Physical Therapy     Number of Visits Requested:   1    DME Order for Bath/Shower Seat as OP     Bath/Shower Bench with back    Current patient weight: Weight: 191 lb (86.6 kg)   Current patient height: Height: 5' 7.5\" (171.5 cm)  Diagnosis: debility, weakness, foot drop , type 1 diabetes   Duration: Purchase     No orders of the defined types were placed in this encounter. continue with specialists as scheduled ie nephro / dialysis.    Will refer to endo   Denies needing refills at this time  DME order for shower chair , also givne written script for handicap placard     Up date blood work   Seek immediate medical attention for any chest pain , severe trouble breathing and/or visual changes. Referral to PT for strength training , foot drop , debility   Call with q/c   Seek immediate medical attention for any chest pain , severe trouble breathing and/or visual changes. F/u in 6 months or as needed. Patientgiven educational materials - see patient instructions. Discussed use, benefit,and side effects of prescribed medications. All patient questions answered. Ptvoiced understanding. Reviewed health maintenance. Instructed to continue currentmedications, diet and exercise. Patient agreed with treatment plan. Follow up asdirected.      Electronically signed by Rosaura Harada, DO on 1/10/2021 at 3:55 PM

## 2021-01-13 ENCOUNTER — HOSPITAL ENCOUNTER (OUTPATIENT)
Dept: PHYSICAL THERAPY | Facility: CLINIC | Age: 46
Setting detail: THERAPIES SERIES
Discharge: HOME | End: 2021-01-13
Payer: MEDICARE

## 2021-01-13 PROCEDURE — 97110 THERAPEUTIC EXERCISES: CPT

## 2021-01-13 PROCEDURE — 97162 PT EVAL MOD COMPLEX 30 MIN: CPT

## 2021-01-13 NOTE — CONSULTS
[] [facility]) - [facility] &  Therapy  [address]  [phone]  F: [phone] [x] [address]  [facility]   [address]  P: [phone]  F: [phone] [] [facility] &  Therapy  [address] [facility]  P: [phone]  F: [phone] [] [address] [facility]  P: [phone]  F: [phone] [] [address]  [facility]   [address]   [place]: [phone]  F: [phone]      Physical Therapy Lower Extremity Evaluation    Date:  2021  Patient: Hui Rodríguez  : 1975  MRN: [id number]  Physician: Dr. Dennie Sobers,    Insurance: Medicare, [facility] Medicare Supp  Medical Diagnosis: M21.371, M21.372 - Foot drop, bilateral; M62.81 - Muscle weakness; M54.42, M54.41 - Chronic bilateral low back pain with bilateral sciatica    Rehab Codes: M21.371, M21.372, M54.42, M54.41, M62.81, R26.2  Onset date: 2020 referral  Next 's appt.:     Subjective:   CC: Hermes Alejandre is a 54year old male presenting with impaired LE strength, bilateral foot drop and increased instances of falls. HPI: Pt reports impaired LE strength and instances of falls that has increased over the past year. Pt reports insidious onset of bilateral foot drop beginning approximately 3 years ago. Pt reports impaired strength from dialysis that he started approximately 1.5 years ago. Additionally, pt reports low back pain and history of low back pain since feeling a pop during gym class when he was 11years old. Pt reports history of neuropathy and states that diabetes was uncontrolled for a period of time. Constant bilateral foot numbness and loss of sensation to front of lower legs. Pt arrives ambulating with bilateral AFOs and rollator walker. Pt reports that he also has a single point cane, but has been using the rollator more over the past 6 months. Pt reports wearing his AFOs when he is walking for longer periods of times. Pt reports increased instances of falls, approximately 3x/wk, often due to his R knee buckling and giving way. Pt unable to bend forward to pick objects up off the floor without loosing his balance.       PMHx: [x] Diabetes [x] HTN              [x] Refer to full medical chart  In EPIC       Comorbidities:   [] Obesity [x] Dialysis  [] N/A   [] Asthma/COPD [] Dementia [x] Other: Kidney disease   [] Stroke [] Sleep apnea [] Other:   [] Vascular disease [] Rheumatic disease [] Other:     Tests: [x] X-Ray: when fractured leg [x] MRI: back - pt reports that it did not show anything   [x] Other: previously had EMG ordered but never able to get test done     Medications: [x] Refer to full medical record  Allergies: [x] None    Function:  Hand Dominance  [x] Right  Patient lives with: Grandparents   In what type of home [x]  One story    Number of stairs to enter 0   With handrail on the []  Right to enter   [] Left to enter   Bathroom has a [x] Walk in shower       Washing machine is on [x]  Main level   Employer    Job Status [x] Disability   Work activities/duties Fishing ADL/IADL Previous level of function Current level of function Who currently assists the patient with task   Dress/grooming [x] Independent  [] Assist [x] Independent  [] Assist    Transfer/mobility [x] Independent  [] Assist [x] Independent  [] Assist    Driving [x] Independent  [] Assist [] Independent  [x] Assist Has not been driving the past year   Housekeeping [x] Independent  [] Assist [x] Independent  [] Assist Increased time required when vacuuming in certain places   Grocery shop/meal prep [] Independent  [x] Assist [] Independent  [x] Assist Grandparents doing majority of shopping, previously ordering online when living alone     Gait Prior level of function Current level of function    [] Independent  [x] Assist [] Independent  [x] Assist   Device: [] Independent [] Independent    [x] Straight Cane  [x] Straight Cane      [x] 4 wheeled walker - uses more frequently last 6 months     Pain:  [x] Yes  [] No Location: low back, knees Pain Rating: (0-10 scale) 6-7/10  Pain altered Tx:  [] Yes  [x] No  Action:    Symptoms:[x] Worsening  Better: [x] Lying    Worse:   [x] Rise/Sit    [x]Stand    [x] Walk    [x] Bend   [x] Other: ascending stairs  Sleep: [x] Disturbed    Objective:    ROM  ° A/P STRENGTH    Left Right Left Right   Hip Flex   3 3+   Ext       ER       IR       ABD   4 3   ADD       Knee Flex   5 5   Ext   5 5   Ankle DF 0 passive 0 passive 3- 3   PF   4 4-   INV       EVER              Quad strength: 4-/5 R, 4/5 L, significant fatigue and quad lag with repetitive SLRs    Lumbar AROM:  Flexion - WFL, however significant unsteadiness and knee hyperextension  Extension - 10% limited, increased pain and unsteadiness     (-) Slump Test bilaterally  Bilateral calf/HS tightness - R greater than L  Increased pain with hooklying and slight increase in LBP with DKTC    OBSERVATION No Deficit Deficit Not Tested Comments Posture    Standing posture with significant bilateral knee hyperextension, unsteadiness when up on feet and with transfers   Genu Valgus [x] [] []    Genu Varus [] [x] []    Genu Recurvatum [] [x] []    Pronation [x] [] []    Supination [] [x] []    Leg Length Discrp [] [] [x]    Slumped Sitting [] [x] []    Palpation [] [x] [] Tender to palpation with PA mob to R-sided L5, pt reports that it \"feels bruised\"   Sensation [] [x] [] Impaired sensation bilaterally to light touch for anterior and posterior calf region  Pt with minimal sensation bilaterally   Edema [x] [] []    Neurological [] [x] [] Bilateral patellar reflexes 2+, unable to elicit achilles reflex bilaterally   Patellar Mobility [x] [] []    Patellar Orientation [x] [] []    Gait [] [x] [] Analysis: unsteadiness on feet, pt ambulating with rolling walker, pt tends to snap knees into hyperextension during gait (R greater than L)         FUNCTION Normal Difficult Unable   Sitting [] [x] []   Standing [] [x] []   Ambulation [] [x] []   Groom/Dress [] [x] []   Lift/Carry [] [] [x]   Stairs [] [x] []   Bending [] [] [x]   Squat [] [x] []   Kneel [] [x] []     BALANCE/PROPRIOCEPTION              [] Not tested   Single leg stance       R                     L                                PAIN   Eyes open                    0         Sec.        0          Sec                  . []    Eyes closed                          Sec. Sec                  . []    Upper extremity support required with bilateral single leg stance    Functional Test: Oswestry Low Back Pain Scale; LEMA Balance Test Score: Oswestry = 72% functionally impaired  LEMA = 30/56, indicating increased risk of falls     Comments: Assessment:  Pt presents with impaired LE strength, bilateral foot drop and increased instances of falls. Pt reports symptoms of insidious onset of approximately 3 years duration with increasing symptoms over last year. Pt reports that there was a period of time when his diabetes was uncontrolled and this is when increased neuropathy symptoms occurred. Foot drop/LE numbness present and unchanged with repetitive testing throughout today's initial evaluation. Pt does experience increased low back pain that appeared to be more with flexion based activities at this date. Pt reports falling approximately 3x per week, currently ambulating with AFOs and front wheeled walker. Significant hyperextension and unsteadiness during gait. Pt reports that he is unable to bend over to pick objects up off the floor due to balance impairments. Increased pain and difficulty with ambulation and stair climbing. Patient would benefit from skilled physical therapy services in order to: increase LE strength and balance in order to decrease assist required with ADL's and decrease risk of falls. Problems:    [x] ? Pain: low back, knees 6-7/10  [x] ? ROM: impaired LE ROM  [x] ? Strength: global LE weakness  [x] ? Function: Oswestry = 72% functional impairment; LEMA = 30/56  [x] Other: gait abnormalities, balance impairments      STG: (to be met in 8 treatments)  1. ? Pain: Pt will report decreased pain levels to 3-4/10 on average with prolonged walking and standing in order to improve tolerance to ADL's.  2. ? ROM: Pt will increase bilateral DF AROM to 0 degrees in order to improve gait pattern and stair climbing ability. 3. ? Strength: Pt will increase bilateral quad strength to 4+/5 globally in order to decrease hyperextension of LE's during gait. 4. ? Function: Pt will demonstrate decreased risk of falls as evident by an improved score on the LEMA by 7 points. [id number]   [] Biofeedback, first 15 minutes   95844  [] Biofeedback, additional 15 minutes   77561 [] Dry Needling, 3 or more muscles  20561     []  Medication allergies reviewed for use of    Dexamethasone Sodium Phosphate 4mg/ml     with iontophoresis treatments. Pt is not allergic. Frequency:  2 x/week for 16 visits        Todays Treatment:  Modalities:   Precautions: lack of sensation to bilateral calves/anterior shins  Exercises:  Exercise Reps/ Time Weight/ Level Comments   Long sitting quad sets 10x5\" ea  Towel roll under heel and cues to decrease hip compensation   SLR 10x ea  Cues for quad set   Supine HS + DF stretch 3x30\" ea strap    Tandem stance x  Bilateral UE support         Other:    Specific Instructions for next treatment: LE stretching, LE strengthening, balance activities, review falls handout      Evaluation Complexity:  History (Personal factors, comorbidities) [] 0 [] 1-2 [x] 3+   Exam (limitations, restrictions) [] 1-2 [x] 3 [] 4+   Clinical presentation (progression) [] Stable [x] Evolving  [] Unstable   Decision Making [] Low [x] Moderate [] High    [] Low Complexity [x] Moderate Complexity [] High Complexity       Treatment Charges: Mins Units   [x] Evaluation       []  Low       [x]  Moderate       []  High 40 1   []  Modalities     [x]  Ther Exercise 15 1   []  Manual Therapy     []  Ther Activities     []  Aquatics     []  Vasocompression     []  Other     Estimated Medicare cost as of 1/13/2021: $106.04  TOTAL TREATMENT TIME: 55    Time in: 4:00 pm   Time Out: 5:00 pm    Electronically signed by: Lilia Hanson PT        Physician Signature:________________________________Date:__________________  By signing above or cosigning this note, I have reviewed this plan of care and certify a need for medically necessary rehabilitation services.      *PLEASE SIGN ABOVE AND FAX BACK ALL PAGES*

## 2021-01-13 NOTE — FLOWSHEET NOTE
Todd Fall Risk Assessment    Patient Name:  Meir Alvarado  : 1975        Risk Factor Scale  Score   History of Falls [x] Yes  [] No 25  0 25   Secondary Diagnosis [] Yes  [x] No 15  0 0   Ambulatory Aid [] Furniture  [x] Crutches/cane/walker  [] None/bedrest/wheelchair/nurse 30  15  0 15   IV/Heparin Lock [] Yes  [x] No 20  0 0   Gait/Transferring [] Impaired  [x] Weak  [] Normal/bedrest/immobile 20  10  0 10   Mental Status [] Forgets limitations  [] Oriented to own ability 15  0 0      Total: 50     Based on the Assessment score: check the appropriate box.     []  No intervention needed   Low =   Score of 0-24    []  Use standard prevention interventions Moderate =  Score of 24-44   [] Give patient handout and discuss fall prevention strategies   [] Establish goal of education for patient/family RE: fall prevention strategies    [x]  Use high risk prevention interventions High = Score of 45 and higher   [x] Give patient handout and discuss fall prevention strategies   [x] Establish goal of education for patient/family Re: fall prevention strategies   [x] Discuss lifeline / other resources    Electronically signed by:   Cosette Runner, PT  Date: 2021

## 2021-01-20 ENCOUNTER — HOSPITAL ENCOUNTER (OUTPATIENT)
Dept: PHYSICAL THERAPY | Facility: CLINIC | Age: 46
Setting detail: THERAPIES SERIES
Discharge: HOME | End: 2021-01-20
Payer: MEDICARE

## 2021-01-20 PROCEDURE — 97112 NEUROMUSCULAR REEDUCATION: CPT

## 2021-01-20 PROCEDURE — 97110 THERAPEUTIC EXERCISES: CPT

## 2021-01-20 NOTE — FLOWSHEET NOTE
[] [facility]) - [facility] &  Therapy  [address]  [phone]  F: [phone] [x] [address]  [facility]   [address]  P: [phone]  F: [phone] [] [address] &  Therapy  [address]  P: [phone]  F: [phone] [] [address]  P: [phone]  F: [phone] [] [address]  [facility]   [address]   [place]: [phone]  F: [phone]      Physical Therapy Daily Treatment Note    Date:  2021  Patient Name:  Louie Mariano    :  1975  MRN: [id number]  Physician: Dr. Marty Barclay, DO                             Insurance: Medicare, [facility] Medicare Supp  Medical Diagnosis: M21.371, M21.372 - Foot drop, bilateral; M62.81 - Muscle weakness; M54.42, M54.41 - Chronic bilateral low back pain with bilateral sciatica                         Rehab Codes: M21.371, M21.372, M54.42, M54.41, M62.81, R26.2  Onset date: 2020 referral                   Next 's appt. :   Visit# / total visits:     Cancels/No Shows: 0/0    Subjective:    Pain:  [x] Yes  [] No Location: low back, bilateral knees Pain Rating: (0-10 scale) \"a little bit\"/10  Pain altered Tx:  [] No  [] Yes  Action:  Comments: Pt arrives noting he has not fallen over the past week. He reports that he has been performing his HEP. Pt arrives noting some low back and bilateral knee pain.      Objective:  Modalities:   Precautions: lack of sensation to bilateral calves/anterior shins  Exercises:  Exercise Reps/ Time Weight/ Level Comments   Sci Fit 10 min           Long sitting quad sets 10x5\" ea   Towel roll under heel and cues to decrease hip compensation   SLR 10x ea   Cues for quad set   Supine HS + DF stretch 3x30\" ea strap   Transverse abdominus (TrA) activation  10x5\"     Bridge + TrA 10x3\"           LAQ 10x3\"           Slant board stretch 3x30\"     Tandem stance x   Bilateral UE support   3 way hip 10x ea 2# Stationary LE on 2\" step for ext   Marching 10x ea 2#    Mini squats 10x           Tandem stance 2x30\" ea     Single leg stance 4x15\" ea     WBOS  2x30\" foam     Other:     Specific Instructions for next treatment: LE stretching, LE strengthening, balance activities        Treatment Charges: Mins Units   []  Modalities     [x]  Ther Exercise 32 2   []  Manual Therapy     []  Ther Activities     []  Aquatics     []  Vasocompression     [x]  Other: neuro 10 1   Total Treatment time 42 3   Estimated Medicare cost as of 1/20/21: $186.58    Assessment: [x] Progressing toward goals. Initiated treatment session at this date with overall good tolerance. Pt complaining of some L sided low back discomfort by end of treatment session, specifically with single leg stance on L LE. Pt requires upper extremity support in order to perform single leg stance and requires one UE support with tandem standing and standing on foam. Cues provided throughout treatment session to try and improve awareness of patient holding his feet in excess PF and inversion with fair carry over. Slight improvements in gait mechanics since initial evaluation with improved balance and decreased snap into knee extension during midstance phase of gait on stance leg. [] No change. [] Other:  [x] Patient would continue to benefit from skilled physical therapy services in order to: increase LE strength and balance in order to decrease assist required with ADL's and decrease risk of falls. STG: (to be met in 8 treatments)  1. ? Pain: Pt will report decreased pain levels to 3-4/10 on average with prolonged walking and standing in order to improve tolerance to ADL's. 2. ? ROM: Pt will increase bilateral DF AROM to 0 degrees in order to improve gait pattern and stair climbing ability. 3. ? Strength: Pt will increase bilateral quad strength to 4+/5 globally in order to decrease hyperextension of LE's during gait. 4. ? Function: Pt will demonstrate decreased risk of falls as evident by an improved score on the LEMA by 7 points. 5. Patient to be independent with home exercise program as demonstrated by performance with correct form without cues. 6. Demonstrate Knowledge of fall prevention - Handout given 1/13/2021  LTG: (to be met in 16 treatments)  1. Pt will increase bilateral hip flexion strength and DF strength to 4-/5 in order to improve foot clearance ability during stair climbing and gait. 2. Pt will demonstrate improved gait mechanics while ambulating x200ft with least restrictive device and with no loss of balance in order to ensure safe community ambulation. 3. Pt will demonstrate improved functional activity tolerance as evident by an improved score on the Oswestry to less than 50% functional impairment.                    Patient goals: \"to start walking better\"       Pt. Education:  [x]? Yes  []? No  [x]? Reviewed Prior HEP/Ed   Method of Education: [x]? Verbal  [x]? Demo  []? Written  1/13 HEP for charted exercises; [address] [place] [address] = [id number]  Comprehension of Education:  [x]? Verbalizes understanding. [x]? Demonstrates understanding. []? Needs Review. [x]? Demonstrates/verbalizes understanding of HEP/Ed previously given.        Plan: [x] Continue current frequency toward long and short term goals.     [x] Specific Instructions for subsequent treatments: progress as tolerable      Time In: 3:00 pm            Time Out: 3:52 pm    Electronically signed by:  Beverly Rose PT

## 2021-01-22 ENCOUNTER — HOSPITAL ENCOUNTER (OUTPATIENT)
Dept: PHYSICAL THERAPY | Facility: CLINIC | Age: 46
Setting detail: THERAPIES SERIES
Discharge: HOME | End: 2021-01-22
Payer: MEDICARE

## 2021-01-22 PROCEDURE — 97110 THERAPEUTIC EXERCISES: CPT

## 2021-01-22 PROCEDURE — 97112 NEUROMUSCULAR REEDUCATION: CPT

## 2021-01-22 NOTE — FLOWSHEET NOTE
+ TrA 10x3\" x2     Hip add 20x3\" Ball    Hip abd 20x Blue          Sidelying clams 20x lime          LAQ 10x3\"           Slant board stretch 3x30\"     Tandem stance x   Bilateral UE support   3 way hip 15x ea 2# Stationary LE on 2\" step for ext   Marching 15x ea 2#    HS curls 15x ea 2#    Mini squats 10x2           Tandem stance 2x30\" ea     Single leg stance 4x15\" ea     WBOS  2x30\" foam     Reaching for cones on the ground 2x30\"   1 UE A ea   Reaching for cone one 6\" step 1x30\"  No UE. Increased LB pain. Other:     Specific Instructions for next treatment: LE stretching, LE strengthening, balance activities        Treatment Charges: Mins Units   []  Modalities     [x]  Ther Exercise 35 2   []  Manual Therapy     []  Ther Activities     []  Aquatics     []  Vasocompression     [x]  Other: neuro 12 1   Total Treatment time 46 3   Estimated Medicare cost as of 1/22/21: $255.05    Assessment: [x] Progressing toward goals. Continued with charted above with the addition of new interventions. Pt with overall good tolerance to treatment however did have increased LB pain with balance activities that required bending. Pt required sitting rest break due to the LB pain. Able to progress patient with out incident. Pt with intermittent LOB during tandem stance. Will continue to progress as able in upcoming sessions. [] No change. [] Other:  [x] Patient would continue to benefit from skilled physical therapy services in order to: increase LE strength and balance in order to decrease assist required with ADL's and decrease risk of falls. STG: (to be met in 8 treatments)  1. ? Pain: Pt will report decreased pain levels to 3-4/10 on average with prolonged walking and standing in order to improve tolerance to ADL's.  2. ? ROM: Pt will increase bilateral DF AROM to 0 degrees in order to improve gait pattern and stair climbing ability.   3. ? Strength: Pt will increase bilateral quad strength to 4+/5 globally in order to decrease hyperextension of LE's during gait. 4. ? Function: Pt will demonstrate decreased risk of falls as evident by an improved score on the LEMA by 7 points. 5. Patient to be independent with home exercise program as demonstrated by performance with correct form without cues. 6. Demonstrate Knowledge of fall prevention - Handout given 1/13/2021  LTG: (to be met in 16 treatments)  1. Pt will increase bilateral hip flexion strength and DF strength to 4-/5 in order to improve foot clearance ability during stair climbing and gait. 2. Pt will demonstrate improved gait mechanics while ambulating x200ft with least restrictive device and with no loss of balance in order to ensure safe community ambulation. 3. Pt will demonstrate improved functional activity tolerance as evident by an improved score on the Oswestry to less than 50% functional impairment.                    Patient goals: \"to start walking better\"       Pt. Education:  [x]? Yes  []? No  [x]? Reviewed Prior HEP/Ed   Method of Education: [x]? Verbal  [x]? Demo  []? Written  1/13 HEP for charted exercises; [address] [place] [address] = [id number]  Comprehension of Education:  [x]? Verbalizes understanding. [x]? Demonstrates understanding. []? Needs Review. [x]? Demonstrates/verbalizes understanding of HEP/Ed previously given.        Plan: [x] Continue current frequency toward long and short term goals.     [x] Specific Instructions for subsequent treatments: progress as tolerable      Time In: 1002 am            Time Out: 1101 am    Electronically signed by:  Sid Kurtz PTA

## 2021-01-25 ENCOUNTER — HOSPITAL ENCOUNTER (OUTPATIENT)
Dept: PHYSICAL THERAPY | Facility: CLINIC | Age: 46
Setting detail: THERAPIES SERIES
Discharge: HOME | End: 2021-01-25
Payer: MEDICARE

## 2021-01-25 ENCOUNTER — TELEPHONE (OUTPATIENT)
Dept: FAMILY MEDICINE CLINIC | Age: 46
End: 2021-01-25

## 2021-01-25 PROCEDURE — 97112 NEUROMUSCULAR REEDUCATION: CPT

## 2021-01-25 PROCEDURE — 97110 THERAPEUTIC EXERCISES: CPT

## 2021-01-25 NOTE — TELEPHONE ENCOUNTER
If he is established with nephro and endocrinology only has to see me every 6 months unless something comes up

## 2021-01-25 NOTE — FLOWSHEET NOTE
[] [facility]) [facility] &  Therapy  [address]  [phone]  F: [phone] [x] [address]  [facility]   [address]  P: [phone]  F: [phone] [] [address] &  Therapy  [address]  P: [phone]  F: [phone] [] [address]  P: [phone]  F: [phone] [] [address]  [facility]   [address]   [place]: [phone]  F: [phone]      Physical Therapy Daily Treatment Note    Date:  2021  Patient Name:  Jojo Bailey    :  1975  MRN: [id number]  Physician: Dr. Jamila Andres, DO                             Insurance: Medicare, [facility] Medicare Supp  Medical Diagnosis: M21.371, M21.372 - Foot drop, bilateral; M62.81 - Muscle weakness; M54.42, M54.41 - Chronic bilateral low back pain with bilateral sciatica                         Rehab Codes: M21.371, M21.372, M54.42, M54.41, M62.81, R26.2  Onset date: 2020 referral                   Next 's appt. :   Visit# / total visits:     Cancels/No Shows: 0/0    Subjective:    Pain:  [x] Yes  [] No Location: low back, bilateral knees Pain Rating: (0-10 scale) 5/10 L knee  Pain altered Tx:  [x] No  [] Yes  Action:  Comments: Pt arrives noting pain in L Knee 5/10. Denies falls over the weekend.      Objective:  Modalities:   Precautions: lack of sensation to bilateral calves/anterior shins  Exercises:  Exercise Reps/ Time Weight/ Level Comments   Sci Fit 9 min Lv 2          Long sitting quad sets 10x5\" ea   Towel roll under heel and cues to decrease hip compensation   SLR 10x ea   Cues for quad set   Supine HS + DF stretch 3x30\" ea strap     Transverse abdominus (TrA) activation  10x5\"     Bridge + TrA 10x3\" x2 Lime Added resistance  Hip add 20x3\" Ball    Hip abd 20x Blue          Sidelying clams 20x lime          Prone lying 2 min     Prone on elbows 2 min     Prone hip ext      LAQ 10x3\"           Slant board stretch 3x30\"     Tandem stance x   Bilateral UE support   3 way hip 20x ea 2# Stationary LE on 2\" step for ext   Marching 20x ea 2#    HS curls 20x ea 2#    Mini squats 10x2     Side step in // bars 5x ea lime UE assist         Tandem stance 2x30\" ea     Single leg stance 4x15\" ea     WBOS  2x30\" foam     NBOS  2x30\"  foam    Reaching for cones  Outside of JASIEL 2x ea UE 10 cones No UE use    Reaching for cones on the ground 2x30\"   1 UE A ea   Reaching for cone one 6\" step 1x30\"  No UE. Increased LB pain. Other:     Specific Instructions for next treatment: LE stretching, LE strengthening, balance activities        Treatment Charges: Mins Units   []  Modalities     [x]  Ther Exercise 35 2   []  Manual Therapy     []  Ther Activities     []  Aquatics     []  Vasocompression     [x]  Other: neuro 15 1   Total Treatment time 50 3   Estimated Medicare cost as of 1/25/21: $323.52    Assessment: [x] Progressing toward goals. Pt with good tolerance to treatment, able to progress with out incident this date of increased LB pain. Intermittent use of UE during balance activities to prevent LOB. Max cueing of all forms to ensure proper form with squats and prevent anterior tibial translation. Will continue to progress as able in upcoming sessions. [] No change. [] Other:  [x] Patient would continue to benefit from skilled physical therapy services in order to: increase LE strength and balance in order to decrease assist required with ADL's and decrease risk of falls. STG: (to be met in 8 treatments)  1. ? Pain: Pt will report decreased pain levels to 3-4/10 on average with prolonged walking and standing in order to improve tolerance to ADL's. 2. ? ROM: Pt will increase bilateral DF AROM to 0 degrees in order to improve gait pattern and stair climbing ability. 3. ? Strength: Pt will increase bilateral quad strength to 4+/5 globally in order to decrease hyperextension of LE's during gait. 4. ? Function: Pt will demonstrate decreased risk of falls as evident by an improved score on the LEMA by 7 points. 5. Patient to be independent with home exercise program as demonstrated by performance with correct form without cues. 6. Demonstrate Knowledge of fall prevention - Handout given 1/13/2021  LTG: (to be met in 16 treatments)  1. Pt will increase bilateral hip flexion strength and DF strength to 4-/5 in order to improve foot clearance ability during stair climbing and gait. 2. Pt will demonstrate improved gait mechanics while ambulating x200ft with least restrictive device and with no loss of balance in order to ensure safe community ambulation. 3. Pt will demonstrate improved functional activity tolerance as evident by an improved score on the Oswestry to less than 50% functional impairment.                    Patient goals: \"to start walking better\"       Pt. Education:  [x]? Yes  []? No  [x]? Reviewed Prior HEP/Ed   Method of Education: [x]? Verbal  [x]? Demo  []? Written  1/13 HEP for charted exercises; Marc Oliver = [id number]  Comprehension of Education:  [x]? Verbalizes understanding. [x]? Demonstrates understanding. []? Needs Review. [x]? Demonstrates/verbalizes understanding of HEP/Ed previously given.        Plan: [x] Continue current frequency toward long and short term goals.     [x] Specific Instructions for subsequent treatments: progress as tolerable      Time In: 255 pm            Time Out: 400 pm    Electronically signed by:  Asa Bennett PTA

## 2021-01-26 NOTE — TELEPHONE ENCOUNTER
Okay yea that is fine , if he cannot get in even in 6 months no worries as long as he is seeing the specialists

## 2021-01-27 ENCOUNTER — HOSPITAL ENCOUNTER (OUTPATIENT)
Dept: PHYSICAL THERAPY | Facility: CLINIC | Age: 46
Setting detail: THERAPIES SERIES
Discharge: HOME | End: 2021-01-27
Payer: MEDICARE

## 2021-01-27 PROCEDURE — 97110 THERAPEUTIC EXERCISES: CPT

## 2021-01-27 NOTE — FLOWSHEET NOTE
[] [facility]) - [facility] &  Therapy  [address]  [phone]  F: [phone] [x] [address]  [facility]   [address]  P: [phone]  F: [phone] [] [address] &  Therapy  [address]  P: [phone]  F: [phone] [] [address]  P: [phone]  F: [phone] [] [address]  [facility]   [address]   [place]: [phone]  F: [phone]      Physical Therapy Daily Treatment Note    Date:  2021  Patient Name:  [place]    :  1975  MRN: [id number]  Physician: Dr. Shon Temple,                              Insurance: Medicare, [facility] Medicare Supp  Medical Diagnosis: M21.371, M21.372 - Foot drop, bilateral; M62.81 - Muscle weakness; M54.42, M54.41 - Chronic bilateral low back pain with bilateral sciatica                         Rehab Codes: M21.371, M21.372, M54.42, M54.41, M62.81, R26.2  Onset date: 2020 referral                   Next 's appt. :   Visit# / total visits:     Cancels/No Shows: 0/0    Subjective:    Pain:  [] Yes  [x] No Location: low back, bilateral knees Pain Rating: (0-10 scale) 0/10  Pain altered Tx:  [x] No  [] Yes  Action:  Comments: Pt arrives without c/o pain this session. Pt reports that he has been feeling better lately, but continues to have instances of tripping that have not resulted in any falls.      Objective:  Modalities:   Precautions: lack of sensation to bilateral calves/anterior shins  Exercises:  Exercise Reps/ Time Weight/ Level Comments   Sci Fit 8 min Lv 2          Long sitting quad sets 10x5\" ea   Towel roll under heel and cues to decrease hip compensation   SLR 15x ea   Cues for quad set, increased reps  Supine HS + DF stretch 3x30\" ea strap     Transverse abdominus (TrA) activation  10x5\"     Bridge + TrA 10x3\" x2 Blue Increased resistance 1/27   Hip add 20x3\" Ball    Hip abd 20x Blue          Sidelying clams 20x lime          Prone lying 2 min     Prone on elbows 2 min     Prone hip ext      LAQ 10x3\"x2 2# Increased weight, 1/27         Slant board stretch 3x30\"     Tandem stance x   Bilateral UE support   3 way hip 20x ea 2# Stationary LE on 2\" step for ext   Marching 20x ea 2#    HS curls 20x ea 2#    Mini squats 10x2     Side step in // bars 5x ea lime UE assist         Tandem stance 2x30\" ea     Single leg stance 4x15\" ea     WBOS  2x30\" foam     NBOS  2x30\"  foam    Reaching for cones  Outside of JASIEL 2x ea UE 10 cones No UE use    Reaching for cones on the ground 2x30\"   1 UE A ea   Reaching for cone one 6\" step 1x30\"  No UE. Increased LB pain. Other:     Specific Instructions for next treatment: LE stretching, LE strengthening, balance activities        Treatment Charges: Mins Units   []  Modalities     [x]  Ther Exercise 42 3   []  Manual Therapy     []  Ther Activities     []  Aquatics     []  Vasocompression     []  Other: neuro     Total Treatment time 42 3   Estimated Medicare cost as of 1/25/21: $399.68     Assessment: [x] Progressing toward goals. Pt reports general fatigue this session, demonstrating more compensations and need for rest breaks during standing activity. Pt was able to tolerate progressions made during mat table exercises, cues needed to maintain quad control during SLR with visible extensor lag with fatigue. Verbal cues given to take frequent rest breaks to maintain appropriate technique. Pt needed moderate tactile and verbal cues during mini squats with tendency towards excessive anterior tibial translation. Pt was able to perform all exercises without an increase in low back or lower extremity pain. [] No change.      [] Other: [x] Patient would continue to benefit from skilled physical therapy services in order to: increase LE strength and balance in order to decrease assist required with ADL's and decrease risk of falls. STG: (to be met in 8 treatments)  1. ? Pain: Pt will report decreased pain levels to 3-4/10 on average with prolonged walking and standing in order to improve tolerance to ADL's.  2. ? ROM: Pt will increase bilateral DF AROM to 0 degrees in order to improve gait pattern and stair climbing ability. 3. ? Strength: Pt will increase bilateral quad strength to 4+/5 globally in order to decrease hyperextension of LE's during gait. 4. ? Function: Pt will demonstrate decreased risk of falls as evident by an improved score on the LEMA by 7 points. 5. Patient to be independent with home exercise program as demonstrated by performance with correct form without cues. 6. Demonstrate Knowledge of fall prevention - Handout given 1/13/2021  LTG: (to be met in 16 treatments)  1. Pt will increase bilateral hip flexion strength and DF strength to 4-/5 in order to improve foot clearance ability during stair climbing and gait. 2. Pt will demonstrate improved gait mechanics while ambulating x200ft with least restrictive device and with no loss of balance in order to ensure safe community ambulation. 3. Pt will demonstrate improved functional activity tolerance as evident by an improved score on the Oswestry to less than 50% functional impairment.                    Patient goals: \"to start walking better\"       Pt. Education:  [x]? Yes  []? No  [x]? Reviewed Prior HEP/Ed   Method of Education: [x]? Verbal  [x]? Demo  []? Written  1/13 HEP for charted exercises; Luis Migueldina Mourau = [id number]  Comprehension of Education:  [x]? Verbalizes understanding. [x]? Demonstrates understanding. []? Needs Review. [x]?  Demonstrates/verbalizes understanding of HEP/Ed previously given.    Plan: [x] Continue current frequency toward long and short term goals. [x] Specific Instructions for subsequent treatments: progress as tolerable      Time In: 3:00 pm            Time Out: 3:50 pm    Electronically signed by:   Lance KATZ  This documentation has been reviewed and entirety of treatment session with direct supervision by clinical instructor, Beverly Rose PT, DPT

## 2021-02-03 ENCOUNTER — HOSPITAL ENCOUNTER (OUTPATIENT)
Dept: PHYSICAL THERAPY | Facility: CLINIC | Age: 46
Setting detail: THERAPIES SERIES
Discharge: HOME | End: 2021-02-03
Payer: MEDICARE

## 2021-02-03 PROCEDURE — 97110 THERAPEUTIC EXERCISES: CPT

## 2021-02-03 NOTE — FLOWSHEET NOTE
[] [facility]  [address]  [phone]  F: [phone] [x] [address]  [place] [address]   [address]  P: [phone]  F: [phone] [] [place]  [address]  P: [phone]  F: [phone] [] [address]  P: [phone]  F: [phone] [] [address]  [facility]   [address]   [place]: [phone]  F: [phone]      Physical Therapy Daily Treatment Note    Date:  2/3/2021  Patient Name:  Hui Rodríguez    :  1975  MRN: [id number]  Physician: Dr. Dennie Sobers, DO                             Insurance: Medicare, [facility] Medicare Supp  Medical Diagnosis: M21.371, M21.372 - Foot drop, bilateral; M62.81 - Muscle weakness; M54.42, M54.41 - Chronic bilateral low back pain with bilateral sciatica                         Rehab Codes: M21.371, M21.372, M54.42, M54.41, M62.81, R26.2  Onset date: 6020 referral                   Next 's appt. :   Visit# / total visits:     Cancels/No Shows: 0/0    Subjective:    Pain:  [] Yes  [x] No Location: low back, bilateral knees Pain Rating: (0-10 scale) 0/10  Pain altered Tx:  [x] No  [] Yes  Action:  Comments: Pt arrives without c/o pain in low back or knees.      Objective:  Modalities:   Precautions: lack of sensation to bilateral calves/anterior shins  Exercises:  Exercise Reps/ Time Weight/ Level Comments   Sci Fit 8 min Lv 2          Long sitting quad sets 10x5\" ea   Towel roll under heel and cues to decrease hip compensation   SLR 20x ea   Cues for quad set, increased reps 2/3   Supine HS + DF stretch 3x30\" ea strap     Transverse abdominus (TrA) activation  10x5\"     Bridge + TrA 10x3\" x2 Blue Increased resistance  Hip add 20x3\" Ball    Hip abd 20x Blue          Sidelying clams 20x Blue Increased resistance 2/3         Prone lying 2 min     Prone on elbows 2 min     Prone hip ext      LAQ 10x3\"x2 2# Increased weight, 1/27         Slant board stretch 3x30\"     Tandem stance x   Bilateral UE support   3 way hip 20x ea 2# Stationary LE on 2\" step for ext   Marching 20x ea 2#    HS curls 20x ea 2#    Mini squats 10x2     Side step in // bars 5x ea lime UE assist         Tandem stance 2x30\" ea     Single leg stance 4x15\" ea     WBOS  2x30\" foam     NBOS  2x30\"  foam    Reaching for cones  Outside of JASIEL 2x ea UE 10 cones No UE use    Reaching for cones on the ground 2x30\"   1 UE A ea   Reaching for cone one 6\" step 1x30\"  No UE. Increased LB pain. Other:     Specific Instructions for next treatment: LE stretching, LE strengthening, balance activities        Treatment Charges: Mins Units   []  Modalities     [x]  Ther Exercise 42 3   []  Manual Therapy     []  Ther Activities     []  Aquatics     []  Vasocompression     []  Other: neuro     Total Treatment time 42 3   Estimated Medicare cost as of 1/25/21: $464.42     Assessment: [x] Progressing toward goals. Pt family expressed concerns prior to session regarding excessive ankle inversion during gait. Assessed foot position and gait with and without AFOs at beginning of session. Recommended that patient look into getting a wider based, more rigid shoe to help eliminate the excessive inversion while wearing AFOs as well as reaching back out to orthotist to see if it is possible to add varus correcting strap to improve stability at ankle. Pt demonstrates improved tolerance to all exercises and progressions charted above with moderate cueing to maintain quadriceps contraction during SLR and to maintain appropriate technique during mini squats. Pt demonstrates improved fatigue from last session, needing only one sitting rest break between standing exercises. [] No change. [] Other:  [x] Patient would continue to benefit from skilled physical therapy services in order to: increase LE strength and balance in order to decrease assist required with ADL's and decrease risk of falls. STG: (to be met in 8 treatments)  1. ? Pain: Pt will report decreased pain levels to 3-4/10 on average with prolonged walking and standing in order to improve tolerance to ADL's.  2. ? ROM: Pt will increase bilateral DF AROM to 0 degrees in order to improve gait pattern and stair climbing ability. 3. ? Strength: Pt will increase bilateral quad strength to 4+/5 globally in order to decrease hyperextension of LE's during gait. 4. ? Function: Pt will demonstrate decreased risk of falls as evident by an improved score on the LEMA by 7 points. 5. Patient to be independent with home exercise program as demonstrated by performance with correct form without cues. 6. Demonstrate Knowledge of fall prevention - Handout given 1/13/2021  LTG: (to be met in 16 treatments)  1. Pt will increase bilateral hip flexion strength and DF strength to 4-/5 in order to improve foot clearance ability during stair climbing and gait. 2. Pt will demonstrate improved gait mechanics while ambulating x200ft with least restrictive device and with no loss of balance in order to ensure safe community ambulation. 3. Pt will demonstrate improved functional activity tolerance as evident by an improved score on the Oswestry to less than 50% functional impairment.                    Patient goals: \"to start walking better\"       Pt. Education:  [x]? Yes  []? No  [x]? Reviewed Prior HEP/Ed   Method of Education: [x]? Verbal  [x]? Demo  []? Written  1/13 HEP for charted exercises; [address] [place] [address] = [id number]  Comprehension of Education:  [x]? Verbalizes understanding. [x]? Demonstrates understanding. []? Needs Review. [x]?  Demonstrates/verbalizes understanding of HEP/Ed previously given.    Plan: [x] Continue current frequency toward long and short term goals. [x] Specific Instructions for subsequent treatments: progress as tolerable      Time In: 3:00 pm            Time Out: 3:55 pm    Electronically signed by:   Awilda KATZ  This documentation has been reviewed and entirety of treatment session with direct supervision by clinical instructor, Silas Closs PT, DPT

## 2021-02-05 ENCOUNTER — HOSPITAL ENCOUNTER (OUTPATIENT)
Dept: PHYSICAL THERAPY | Facility: CLINIC | Age: 46
Setting detail: THERAPIES SERIES
Discharge: HOME | End: 2021-02-05
Payer: MEDICARE

## 2021-02-05 PROCEDURE — 97110 THERAPEUTIC EXERCISES: CPT

## 2021-02-05 NOTE — FLOWSHEET NOTE
[] [facility]) - [facility] &  Therapy  [address]  [phone]  F: [phone] [x] [address]  [place] [address]   [address]  P: [phone]  F: [phone] [] [address] &  Therapy  [address] [facility] [address] [address]  P: [phone]  F: [phone] [] [address]  P: [phone]  F: [phone] [] [address]  [facility]   [address]   [place]: [phone]  F: [phone]      Physical Therapy Daily Treatment Note    Date:  2021  Patient Name:  Betina Britton    :  1975  MRN: [id number]  Physician: Dr. Kishor Shields, DO                             Insurance: Medicare, [facility] Medicare Supp  Medical Diagnosis: M21.371, M21.372 - Foot drop, bilateral; M62.81 - Muscle weakness; M54.42, M54.41 - Chronic bilateral low back pain with bilateral sciatica                         Rehab Codes: M21.371, M21.372, M54.42, M54.41, M62.81, R26.2  Onset date: 6020 referral                   Next 's appt. :   Visit# / total visits:     Cancels/No Shows: 0/0    Subjective:    Pain:  [] Yes  [x] No Location: low back, bilateral knees Pain Rating: (0-10 scale) 0/10  Pain altered Tx:  [x] No  [] Yes  Action:  Comments: Pt arrives without any complaints this date, no pain noted.      Objective:  Modalities:   Precautions: lack of sensation to bilateral calves/anterior shins  Exercises:  Exercise Reps/ Time Weight/ Level Comments   Sci Fit 8 min Lv 2          Long sitting quad sets 10x5\" ea   Towel roll under heel and cues to decrease hip compensation   SLR 20x ea   Cues for quad set, increased reps 2/3   Supine HS + DF stretch 3x30\" ea strap     Transverse abdominus (TrA) activation  10x5\"     Bridge + TrA 10x3\" x2 Blue Increased resistance 1/27   Hip add 20x3\" Ball    Hip abd 20x Blue          Sidelying clams 20x Blue Increased resistance 2/3         Prone lying 2 min     Prone on elbows 2 min     Prone hip ext      LAQ 10x3\"x2 2# Increased weight, 1/27         Slant board stretch 3x30\"     Tandem stance x   Bilateral UE support   3 way hip 20x ea 2# Stationary LE on 2\" step for ext   Marching 20x ea 2#    HS curls 20x ea 2#    Mini squats 10x2     Side step in // bars 5x ea lime UE assist   Tandem amb in // bars  5x   Added 2/5/21   Tandem stance 2x30\" ea     Single leg stance 4x15\" ea     WBOS  2x30\" foam     NBOS  2x30\"  foam    Reaching for cones  Outside of JASIEL 2x ea UE 10 cones No UE use    Reaching for cones on the ground 2x30\"   1 UE A ea   Reaching for cone one 6\" step 1x30\"  No UE. Increased LB pain. Stepping over round cone in // bars  10x ea  Added 2/5/21   Squat with UE tap to cone on 6\" step in // bars  15x 6\" Added 2/5/21; 1 UE A   Foam walking in // bars  6x  Added 2/5/21   180 turns in // bars  5x ea  Added 2/5/21; gait belt donned   Other:     Specific Instructions for next treatment: LE stretching, LE strengthening, balance activities        Treatment Charges: Mins Units   []  Modalities     [x]  Ther Exercise 50 3   []  Manual Therapy     []  Ther Activities     []  Aquatics     []  Vasocompression     []  Other: neuro     Total Treatment time 50 3   Estimated Medicare cost as of 2/5/21: $529.16    Assessment: [x] Progressing toward goals. Pt with some discomfort in L knee with 2# hs curls this date. Addition of foam walking and tandem walking in // bars to further challenge balance. Attempted stepping over anushka in // bars, however, pt demonstrated increased circumduction regardless of cueing and demo's, so reduced size to smaller round cone with improved hip/knee flexion noted. Pt able to complete UE taps to cone on 6\" step with 1 UE support and required cues to ensure proper squat mechanics, pt with fair carryover.  Added 180 degree turns with gait belt donned for safety. Turns completed slowly and with 1 UE assist to maintain balance. Increased back pain to 7/10 post treatment, pt reports he did not take pain pills prior to therapy this date. Instructed to take pain pill at home following therapy session and apply HP to low back to help alleviate pain. [] No change. [] Other:  [x] Patient would continue to benefit from skilled physical therapy services in order to: increase LE strength and balance in order to decrease assist required with ADL's and decrease risk of falls. STG: (to be met in 8 treatments)  1. ? Pain: Pt will report decreased pain levels to 3-4/10 on average with prolonged walking and standing in order to improve tolerance to ADL's.  2. ? ROM: Pt will increase bilateral DF AROM to 0 degrees in order to improve gait pattern and stair climbing ability. 3. ? Strength: Pt will increase bilateral quad strength to 4+/5 globally in order to decrease hyperextension of LE's during gait. 4. ? Function: Pt will demonstrate decreased risk of falls as evident by an improved score on the LEMA by 7 points. 5. Patient to be independent with home exercise program as demonstrated by performance with correct form without cues. 6. Demonstrate Knowledge of fall prevention - Handout given 1/13/2021  LTG: (to be met in 16 treatments)  1. Pt will increase bilateral hip flexion strength and DF strength to 4-/5 in order to improve foot clearance ability during stair climbing and gait. 2. Pt will demonstrate improved gait mechanics while ambulating x200ft with least restrictive device and with no loss of balance in order to ensure safe community ambulation. 3. Pt will demonstrate improved functional activity tolerance as evident by an improved score on the Oswestry to less than 50% functional impairment.                    Patient goals: \"to start walking better\"       Pt. Education:  [x]? Yes  []? No  [x]?  Reviewed Prior HEP/Ed   Method of Education: [x]? Verbal positioning [x]? Demo'd squats, 180 turns, foam walking, cone stepping, tandem amb  []? Written  1/13 HEP for charted exercises; Rebeka Dieter = [id number]  Comprehension of Education:  [x]? Verbalizes understanding. [x]? Demonstrates understanding. []? Needs Review. [x]? Demonstrates/verbalizes understanding of HEP/Ed previously given.        Plan: [x] Continue current frequency toward long and short term goals.     [x] Specific Instructions for subsequent treatments: progress as tolerable      Time In: 3:00 pm     Time Out: 3:58 pm    Electronically signed by:  Taye Anderson PTA

## 2021-02-08 ENCOUNTER — HOSPITAL ENCOUNTER (OUTPATIENT)
Dept: PHYSICAL THERAPY | Facility: CLINIC | Age: 46
Setting detail: THERAPIES SERIES
Discharge: HOME | End: 2021-02-08
Payer: MEDICARE

## 2021-02-08 NOTE — FLOWSHEET NOTE
[] [facility]) - [facility] &  Therapy  [address]    [phone]  F: [phone]   [x] [address]  [facility]   [address]  P: [phone]  F: [phone]  [] [place]  [address]  P: [phone]  F: [phone] [] [address]  P: [phone]  F: [phone]  [] [address]  [address] [facility] [address]   [address]   [place]: [phone]  F: [phone]   [] [facility]  [address] [facility] [address]  [place]: [phone]   F: [phone]     Physical Therapy Cancel/No Show note    Date: 2021  Patient: Shantelle Olivas  : 1975  MRN: [id number]    Cancels/No Shows to date:     For today's appointment patient:    [x]  Cancelled    [] Rescheduled appointment    [] No-show     Reason given by patient:    []  Patient ill    []  Conflicting appointment    [] No transportation      [] Conflict with work    [] No reason given    [] Weather related    [] COVID-19    [x] Other:      Comments:  Pt complaining of leg swelling.        [x] Next appointment was confirmed    Electronically signed by: ISIDORO Qureshi

## 2021-02-10 ENCOUNTER — HOSPITAL ENCOUNTER (OUTPATIENT)
Dept: PHYSICAL THERAPY | Facility: CLINIC | Age: 46
Setting detail: THERAPIES SERIES
Discharge: HOME | End: 2021-02-10
Payer: MEDICARE

## 2021-02-10 PROCEDURE — 97110 THERAPEUTIC EXERCISES: CPT

## 2021-02-10 NOTE — FLOWSHEET NOTE
[] [facility]) - [facility] &  Therapy  [address]  [phone]  F: [phone] [x] [address]  [employer] [address]   [address]  P: [phone]  F: [phone] [] [address] &  Therapy  [address]  P: [phone]  F: [phone] [] [address]  P: [phone]  F: [phone] [] [address]  [facility]   [address]   [place]: [phone]  F: [phone]      Physical Therapy Daily Treatment Note    Date:  2/10/2021  Patient Name:  Hui Rodríguez    :  1975  MRN: [id number]  Physician: Dr. Dennie Sobers,                              Insurance: Medicare, [employer] Medicare Supp  Medical Diagnosis: M21.371, M21.372 - Foot drop, bilateral; M62.81 - Muscle weakness; M54.42, M54.41 - Chronic bilateral low back pain with bilateral sciatica                         Rehab Codes: M21.371, M21.372, M54.42, M54.41, M62.81, R26.2  Onset date: 6020 referral                   Next 's appt. :   Visit# / total visits:     Cancels/No Shows: 1/0    Subjective:    Pain:  [] Yes  [x] No Location: low back, bilateral knees Pain Rating: (0-10 scale) 0/10  Pain altered Tx:  [x] No  [] Yes  Action:  Comments: Pt reports onset of leg swelling earlier this week that is beginning to resolve, no pain in bilateral knees this date.      Objective:  Modalities:   Precautions: lack of sensation to bilateral calves/anterior shins  Exercises:  Exercise Reps/ Time Weight/ Level Comments   Sci Fit 6 min Lv 2          Long sitting quad sets 10x5\" ea   Towel roll under heel and cues to decrease hip compensation   SLR 20x ea   Cues for quad set, increased reps 2/3   Supine HS + DF stretch 3x30\" ea strap     Transverse abdominus (TrA) activation  10x5\" Bridge + TrA 10x3\" x2 Blue Increased resistance 1/27   Hip add 20x3\" Ball    Hip abd 20x Blue          Sidelying clams 20x Blue Increased resistance 2/3         Prone lying 2 min     Prone on elbows 2 min     Prone hip ext      LAQ 10x3\"x2 2# Increased weight, 1/27         Slant board stretch 3x30\"     Tandem stance x   Bilateral UE support   3 way hip 20x ea 2# Stationary LE on 2\" step for ext   Marching 20x ea 2#    HS curls 20x ea 2#    Mini squats 10x2     Side step in // bars 5x ea lime UE assist   Tandem amb in // bars  5x   Added 2/5/21   Tandem stance 2x30\" ea     Single leg stance 4x15\" ea     WBOS  2x30\" foam     NBOS  2x30\"  foam    Reaching for cones  Outside of JASIEL 2x ea UE 10 cones No UE use    Reaching for cones on the ground 2x30\"   1 UE A ea   Reaching for cone one 6\" step 1x30\"  No UE. Increased LB pain.     Stepping over round cone in // bars  10x ea  Added 2/5/21   Squat with UE tap to cone on 6\" step in // bars  15x 6\" Added 2/5/21; 1 UE A   Foam walking in // bars  6x  Added 2/5/21   180 turns in // bars  5x ea  Added 2/5/21; gait belt donned   Other:  Performed LEMA- score 34/56    Specific Instructions for next treatment: LE stretching, LE strengthening, balance activities    Completed by primary PT 2/10/21 ROM  ° A/P STRENGTH     Left Right Left Right   Hip Flex     3+ 3+   Ext           ER           IR           ABD     4+ 3+   ADD           Knee Flex     5 5   Ext     5 5   Ankle DF 0 passive 0 passive 3+ 3+   PF     4 4   INV           EVER                       Quad strength: improved strength during MMT, quad lag with present with fatigue during repetitive SLRs     Lumbar AROM:  Flexion - WFL, however significant unsteadiness and knee hyperextension  Extension - 10% limited, increased pain and unsteadiness      Treatment Charges: Mins Units   []  Modalities     [x]  Ther Exercise 50 3   []  Manual Therapy     []  Ther Activities     []  Aquatics     []  Vasocompression     []  Other: neuro     Total Treatment time 50 3   Estimated Medicare cost as of 2/5/21: $539.90    Assessment: [x] Progressing toward goals. Began session on SciFit to warm up. Reassessed STGs this session, pt demonstrates improvements in LE strength grossly and function demonstrated by improved LEMA score by 4 points. Pt continued with exercises per chart with reduced need for verbal and tactile cues, and decreased fatigue/need for rest breaks between exercises. [] No change. [] Other:  [x] Patient would continue to benefit from skilled physical therapy services in order to: increase LE strength and balance in order to decrease assist required with ADL's and decrease risk of falls. STG: (to be met in 8 treatments)  1. ? Pain: Pt will report decreased pain levels to 3-4/10 on average with prolonged walking and standing in order to improve tolerance to ADL's. MET- 2/10/21  2. ? ROM: Pt will increase bilateral DF AROM to 0 degrees in order to improve gait pattern and stair climbing ability. Ongoing 2/10/21- active DF lacking 16 degrees R lacking 28  3. ? Strength: Pt will increase bilateral quad strength to 4+/5 globally in order to decrease hyperextension of LE's during gait. MET 2/10/21   4. ? Function: Pt will demonstrate decreased risk of falls as evident by an improved score on the LEMA by 7 points. - Ongoing 2/10/21- score improved by 4 points this date   5. Patient to be independent with home exercise program as demonstrated by performance with correct form without cues. MET- 2/10/21  6. Demonstrate Knowledge of fall prevention - Handout given 1/13/2021  LTG: (to be met in 16 treatments)  1. Pt will increase bilateral hip flexion strength and DF strength to 4-/5 in order to improve foot clearance ability during stair climbing and gait. 2. Pt will demonstrate improved gait mechanics while ambulating x200ft with least restrictive device and with no loss of balance in order to ensure safe community ambulation.   3. Pt will demonstrate improved functional activity tolerance as evident by an improved score on the Oswestry to less than 50% functional impairment.                    Patient goals: \"to start walking better\"       Pt. Education:  [x]? Yes  []? No  [x]? Reviewed Prior HEP/Ed   Method of Education: [x]? Verbal positioning []? Demo'd squats, 180 turns, foam walking, cone stepping, tandem amb  []? Written  1/13 HEP for charted exercises; [address] [place] [address] = [id number]  Comprehension of Education:  [x]? Verbalizes understanding. [x]? Demonstrates understanding. []? Needs Review. [x]? Demonstrates/verbalizes understanding of HEP/Ed previously given.        Plan: [x] Continue current frequency toward long and short term goals. [x] Specific Instructions for subsequent treatments: progress as tolerable      Time In: 3:00 pm     Time Out: 3:56 pm    Electronically signed by:   Roselyn KATZ  This documentation has been reviewed and entirety of treatment session with direct supervision by clinical instructor, Shannan Parker PT, DPT

## 2021-02-15 ENCOUNTER — HOSPITAL ENCOUNTER (OUTPATIENT)
Dept: PHYSICAL THERAPY | Facility: CLINIC | Age: 46
Setting detail: THERAPIES SERIES
Discharge: HOME | End: 2021-02-15
Payer: MEDICARE

## 2021-02-15 ENCOUNTER — TELEPHONE (OUTPATIENT)
Dept: FAMILY MEDICINE CLINIC | Age: 46
End: 2021-02-15

## 2021-02-15 PROCEDURE — 97110 THERAPEUTIC EXERCISES: CPT

## 2021-02-15 PROCEDURE — 97112 NEUROMUSCULAR REEDUCATION: CPT

## 2021-02-15 NOTE — TELEPHONE ENCOUNTER
Patient called asking if Dr. Horacio Landa can place a referral to ortho for both of his ankles. They are still swollen and PT is stating that Horacio Landa can place a referral. Patient is also asking if Horacio Landa can place an order for diabetic shoes as well.      Please advise

## 2021-02-15 NOTE — FLOWSHEET NOTE
Sidelying clams 20x Blue Increased resistance 2/3         Prone lying 2 min     Prone on elbows 2 min     Prone hip ext      LAQ 10x3\"x2 2# Increased weight, 1/27         Slant board stretch 3x30\"     Tandem stance x   Bilateral UE support   3 way hip 20x ea 2# Stationary LE on 2\" step for ext   Marching 20x ea 2#    HS curls 20x ea 2#    Mini squats 10x2     Side step in // bars 5x ea lime UE assist   Tandem amb in // bars  5x   Added 2/5/21   Tandem stance 2x30\" ea     Single leg stance 4x15\" ea     WBOS  2x30\" foam  1x EO 1x EC   NBOS  2x30\"  foam    Reaching for cones  Outside of JASIEL 2x ea UE 10 cones No UE use    Reaching for cones on the ground 2x30\"   1 UE A ea   Reaching for cone one 6\" step 1x30\"  No UE. Increased LB pain.     Stepping over round cone in // bars  10x ea  Added 2/5/21   Squat with UE tap to cone on 6\" step in // bars  15x 6\" Added 2/5/21; 1 UE A   Foam walking in // bars  6x  Added 2/5/21   180 turns in // bars  5x ea  Added 2/5/21; gait belt donned   Other:  Performed LEMA- score 34/56    Specific Instructions for next treatment: LE stretching, LE strengthening, balance activities    Completed by primary PT 2/10/21 ROM  ° A/P STRENGTH     Left Right Left Right   Hip Flex     3+ 3+   Ext           ER           IR           ABD     4+ 3+   ADD           Knee Flex     5 5   Ext     5 5   Ankle DF 0 passive 0 passive 3+ 3+   PF     4 4   INV           EVER                       Quad strength: improved strength during MMT, quad lag with present with fatigue during repetitive SLRs     Lumbar AROM:  Flexion - WFL, however significant unsteadiness and knee hyperextension  Extension - 10% limited, increased pain and unsteadiness      Treatment Charges: Mins Units   []  Modalities     [x]  Ther Exercise 30 2   []  Manual Therapy     []  Ther Activities     []  Aquatics     []  Vasocompression     []  Other: neuro 20 1   Total Treatment time 50 3   Estimated Medicare cost as of 2/5/21: $104.22 Assessment: [x] Progressing toward goals. Pt with overall good tolerance to treatment. Able to resume previously held interventions with out incident. Continues to have increased balance deficits noted when completing dynamic interventions. SBA for all balance interventions for safety. [] No change. [] Other:  [x] Patient would continue to benefit from skilled physical therapy services in order to: increase LE strength and balance in order to decrease assist required with ADL's and decrease risk of falls. STG: (to be met in 8 treatments)  1. ? Pain: Pt will report decreased pain levels to 3-4/10 on average with prolonged walking and standing in order to improve tolerance to ADL's. MET- 2/10/21  2. ? ROM: Pt will increase bilateral DF AROM to 0 degrees in order to improve gait pattern and stair climbing ability. Ongoing 2/10/21- active DF lacking 16 degrees R lacking 28  3. ? Strength: Pt will increase bilateral quad strength to 4+/5 globally in order to decrease hyperextension of LE's during gait. MET 2/10/21   4. ? Function: Pt will demonstrate decreased risk of falls as evident by an improved score on the LEMA by 7 points. - Ongoing 2/10/21- score improved by 4 points this date   5. Patient to be independent with home exercise program as demonstrated by performance with correct form without cues. MET- 2/10/21  6. Demonstrate Knowledge of fall prevention - Handout given 1/13/2021  LTG: (to be met in 16 treatments)  1. Pt will increase bilateral hip flexion strength and DF strength to 4-/5 in order to improve foot clearance ability during stair climbing and gait. 2. Pt will demonstrate improved gait mechanics while ambulating x200ft with least restrictive device and with no loss of balance in order to ensure safe community ambulation.   3. Pt will demonstrate improved functional activity tolerance as evident by an improved score on the Oswestry to less than 50% functional impairment.    Patient goals: \"to start walking better\"       Pt. Education:  [x]? Yes  []? No  [x]? Reviewed Prior HEP/Ed   Method of Education: [x]? Verbal positioning []? Demo'd squats, 180 turns, foam walking, cone stepping, tandem amb  []? Written  1/13 HEP for charted exercises; Brigette Gutierrez = [id number]  Comprehension of Education:  [x]? Verbalizes understanding. [x]? Demonstrates understanding. []? Needs Review. [x]? Demonstrates/verbalizes understanding of HEP/Ed previously given.        Plan: [x] Continue current frequency toward long and short term goals.     [x] Specific Instructions for subsequent treatments: progress as tolerable      Time In: 3:00 pm     Time Out: 400 pm    Electronically signed by:  Irvin Vargas PTA

## 2021-02-17 ENCOUNTER — HOSPITAL ENCOUNTER (OUTPATIENT)
Dept: PHYSICAL THERAPY | Facility: CLINIC | Age: 46
Setting detail: THERAPIES SERIES
Discharge: HOME | End: 2021-02-17
Payer: MEDICARE

## 2021-02-17 ENCOUNTER — TELEPHONE (OUTPATIENT)
Dept: FAMILY MEDICINE CLINIC | Age: 46
End: 2021-02-17

## 2021-02-17 PROCEDURE — 97110 THERAPEUTIC EXERCISES: CPT

## 2021-02-17 PROCEDURE — 97112 NEUROMUSCULAR REEDUCATION: CPT

## 2021-02-17 RX ORDER — METOPROLOL SUCCINATE 200 MG/1
200 TABLET, EXTENDED RELEASE ORAL DAILY
Qty: 90 TABLET | Refills: 1 | Status: SHIPPED | OUTPATIENT
Start: 2021-02-17 | End: 2021-02-18 | Stop reason: SDUPTHER

## 2021-02-17 RX ORDER — INSULIN GLARGINE 100 [IU]/ML
40 INJECTION, SOLUTION SUBCUTANEOUS EVERY MORNING
Qty: 5 VIAL | Refills: 5 | Status: SHIPPED | OUTPATIENT
Start: 2021-02-17 | End: 2021-03-29 | Stop reason: SDUPTHER

## 2021-02-17 NOTE — TELEPHONE ENCOUNTER
Patient called stating that the metoprolo that was sent today is not covered by insurance and that [facility] needs to place a generic brand to get approved.      Please advise   Thank you

## 2021-02-17 NOTE — FLOWSHEET NOTE
[] [facility]) - [facility] &  Therapy  [address]  [phone]  F: [phone] [x] [address]  [employer] [address]   [address]  P: [phone]  F: [phone] [] [address] &  Therapy  [address]  P: [phone]  F: [phone] [] [address]  P: [phone]  F: [phone] [] [address]  [facility]   [address]   [place]: [phone]  F: [phone]      Physical Therapy Daily Treatment Note    Date:  2021  Patient Name:  Natalia Moeller    :  1975  MRN: [id number]  Physician: Dr. Tricia Cottrell,                              Insurance: Medicare, [employer] Medicare Supp  Medical Diagnosis: M21.371, M21.372 - Foot drop, bilateral; M62.81 - Muscle weakness; M54.42, M54.41 - Chronic bilateral low back pain with bilateral sciatica                         Rehab Codes: M21.371, M21.372, M54.42, M54.41, M62.81, R26.2  Onset date: 6020 referral                   Next 's appt. :   Visit# / total visits: 10/16    Cancels/No Shows: 1/0    Subjective:    Pain:  [x] Yes  [] No Location: low back, bilateral knees Pain Rating: (0-10 scale) 7/10  Pain altered Tx:  [x] No  [] Yes  Action:  Comments: Pt arrives with significant pain to R knee today, otherwise has been \"feeling good\". Pt reports insidious onset of knee pain that began yesterday.      Objective:  Modalities:   Precautions: lack of sensation to bilateral calves/anterior shins  Exercises:  Exercise Reps/ Time Weight/ Level Comments   Sci Fit 6 min Lv 2          Long sitting quad sets 10x5\" ea   Towel roll under heel and cues to decrease hip compensation   SLR 20x ea   Cues for quad set, increased reps 2/3   Supine HS + DF stretch 3x30\" ea strap     Transverse abdominus (TrA) activation  10x5\"     Bridge + TrA 10x3\" x2 Blue Increased resistance 1/27   Hip add 20x3\" Ball    Hip abd 20x Blue          Sidelying clams 20x Blue Increased resistance 2/3   SL hip abd 15x  Added 2/17   Prone lying 2 min     Prone on elbows 2 min     Prone hip ext 2x10  Added 2/17   LAQ 10x3\"x2 2# Increased weight, 1/27         Slant board stretch 3x30\"     Tandem stance x   Bilateral UE support   3 way hip 20x ea 2# Stationary LE on 2\" step for ext   Marching 20x ea 2#    HS curls 20x ea 2#    Mini squats 10x2     Step ups  10x ea 2\"    Side step in // bars 5x ea lime UE assist   Tandem amb in // bars  5x   Added 2/5/21   Tandem stance 2x30\" ea     Single leg stance 4x15\" ea     WBOS  2x30\" foam  1x EO 1x EC   NBOS  2x30\"  foam    Reaching for cones  Outside of JASIEL 2x ea UE 10 cones No UE use    Reaching for cones on the ground 2x30\"   1 UE A ea   Reaching for cone one 6\" step 1x30\"  No UE. Increased LB pain.     Stepping over round cone in // bars  10x ea  Added 2/5/21   Squat with UE tap to cone on 6\" step in // bars  15x 6\" Added 2/5/21; 1 UE A   Foam walking in // bars  6x  Added 2/5/21   180 turns in // bars  5x ea  Added 2/5/21; gait belt donned   Other:  Performed LEMA- score 34/56    Specific Instructions for next treatment: LE stretching, LE strengthening, balance activities    Completed by primary PT 2/10/21 ROM  ° A/P STRENGTH     Left Right Left Right   Hip Flex     3+ 3+   Ext           ER           IR           ABD     4+ 3+   ADD           Knee Flex     5 5   Ext     5 5   Ankle DF 0 passive 0 passive 3+ 3+   PF     4 4   INV           EVER                       Quad strength: improved strength during MMT, quad lag with present with fatigue during repetitive SLRs     Lumbar AROM:  Flexion - WFL, however significant unsteadiness and knee hyperextension  Extension - 10% limited, increased pain and unsteadiness      Treatment Charges: Mins Units   []  Modalities     [x]  Ther Exercise 34 2   [] Manual Therapy     []  Ther Activities     []  Aquatics     []  Vasocompression     []  Other: neuro 16 1   Total Treatment time 50 3   Estimated Medicare cost as of 2/5/21: $688.91    Assessment: [x] Progressing toward goals. Initiated session with Nustep to warm up. Progressed mat table exercises this date with prone hip extension and SL hip abduction, cues given to avoid compensating with hip flexors during SL exercises. Pt with difficulty activating L glute muscles with L prone hip extension. Progressed standing/dynamic exercises with step ups using 2\" step. Good tolerance to all other exercises charted above. CGA during all balance exercises this session for safety. [] No change. [] Other:  [x] Patient would continue to benefit from skilled physical therapy services in order to: increase LE strength and balance in order to decrease assist required with ADL's and decrease risk of falls. STG: (to be met in 8 treatments)  1. ? Pain: Pt will report decreased pain levels to 3-4/10 on average with prolonged walking and standing in order to improve tolerance to ADL's. MET- 2/10/21  2. ? ROM: Pt will increase bilateral DF AROM to 0 degrees in order to improve gait pattern and stair climbing ability. Ongoing 2/10/21- active DF lacking 16 degrees R lacking 28  3. ? Strength: Pt will increase bilateral quad strength to 4+/5 globally in order to decrease hyperextension of LE's during gait. MET 2/10/21   4. ? Function: Pt will demonstrate decreased risk of falls as evident by an improved score on the LEMA by 7 points. - Ongoing 2/10/21- score improved by 4 points this date   5. Patient to be independent with home exercise program as demonstrated by performance with correct form without cues. MET- 2/10/21  6. Demonstrate Knowledge of fall prevention - Handout given 1/13/2021  LTG: (to be met in 16 treatments)  1.  Pt will increase bilateral hip flexion strength and DF strength to 4-/5 in order to improve foot

## 2021-02-18 RX ORDER — METOPROLOL SUCCINATE 200 MG/1
200 TABLET, EXTENDED RELEASE ORAL DAILY
Qty: 90 TABLET | Refills: 1 | Status: ON HOLD | OUTPATIENT
Start: 2021-02-18 | End: 2022-04-13 | Stop reason: DRUGHIGH

## 2021-02-22 ENCOUNTER — HOSPITAL ENCOUNTER (OUTPATIENT)
Dept: PHYSICAL THERAPY | Facility: CLINIC | Age: 46
Setting detail: THERAPIES SERIES
Discharge: HOME | End: 2021-02-22
Payer: MEDICARE

## 2021-02-22 PROCEDURE — 97110 THERAPEUTIC EXERCISES: CPT

## 2021-02-22 NOTE — FLOWSHEET NOTE
[] [facility] &  Therapy  [address]  [phone]  F: [phone] [x] [address]  [facility] 36   [address]  P: [phone]  F: [phone] [] [address] &  Therapy  [address]  P: [phone]  F: [phone] [] [address]  P: [phone]  F: [phone] [] [address]  [facility]   [address]   [place]: [phone]  F: [phone]      Physical Therapy Daily Treatment Note    Date:  2021  Patient Name:  Jennifer Lima    :  1975  MRN: [id number]  Physician: Dr. Townsend Cushing, DO                             Insurance: Medicare, [facility] [place] [employer] [place]  Medical Diagnosis: M21.371, M21.372 - Foot drop, bilateral; M62.81 - Muscle weakness; M54.42, M54.41 - Chronic bilateral low back pain with bilateral sciatica                         Rehab Codes: M21.371, M21.372, M54.42, M54.41, M62.81, R26.2  Onset date: 6020 referral                   Next Dr's appt. :   Visit# / total visits:     Cancels/No Shows: 1/0    Subjective:    Pain:  [] Yes  [x] No Location: low back, bilateral knees Pain Rating: (0-10 scale) 0/10  Pain altered Tx:  [x] No  [] Yes  Action:  Comments: Patient reports feeling 50% better since starting therapy, but still experiencing radicular symptoms. Patient denies any recent falls.      Objective:  Modalities:   Precautions: lack of sensation to bilateral calves/anterior shins  Exercises:  Exercise Reps/ Time Weight/ Level Comments   Sci Fit 6 min Lv 2          Long sitting quad sets 10x5\" ea   Towel roll under heel and cues to decrease hip compensation   SLR 20x ea   Cues for quad set, increased reps 2/3   Supine HS + DF stretch 3x30\" robb shorep   Transverse abdominus (TrA) activation  10x5\"     Bridge + TrA 10x3\" x2 Blue Increased resistance 1/27   Hip add 20x3\" Ball    Hip abd 20x Purple  Increased resistance 2/22         Sidelying clams 20x Purple Increased resistance 2/22   SL hip abd 15x Purple  Added 2/17   Prone lying 2 min     Prone on elbows 2 min     Prone hip ext 2x10  Added 2/17   LAQ 10x3\"x2 3# Increased weight, 2/22         Slant board stretch 3x30\"     Tandem stance x   Bilateral UE support Increased weight 2/22   3 way hip 20x ea 3# Stationary LE on 2\" step for ext   Marching 20x ea 3#    HS curls 20x ea 3#    Mini squats 10x2     Step ups  10x ea 2\"    Side step in // bars 5x ea lime UE assist   Tandem amb in // ba0rs  5x   Added 2/5/21   Tandem stance 2x30\" ea     Single leg stance 4x15\" ea     WBOS  2x30\" foam  1x EO 1x EC   NBOS  2x30\"  foam    Reaching for cones  Outside of JASIEL 2x ea UE 10 cones No UE use    Reaching for cones on the ground 2x30\"   1 UE A ea   Reaching for cone one 6\" step 1x30\"  No UE. Increased LB pain.     Stepping over round cone in // bars  10x ea  Added 2/5/21   Squat with UE tap to cone on 6\" step in // bars  15x 6\" Added 2/5/21; 1 UE A   Foam walking in // bars  6x  Added 2/5/21   180 turns in // bars  5x ea  Added 2/5/21; gait belt donned   Other:  Performed LEMA- score 34/56    Specific Instructions for next treatment: LE stretching, LE strengthening, balance activities    Completed by primary PT 2/10/21 ROM  ° A/P STRENGTH     Left Right Left Right   Hip Flex     3+ 3+   Ext           ER           IR           ABD     4+ 3+   ADD           Knee Flex     5 5   Ext     5 5   Ankle DF 0 passive 0 passive 3+ 3+   PF     4 4   INV           EVER                       Quad strength: improved strength during MMT, quad lag with present with fatigue during repetitive SLRs     Lumbar AROM:  Flexion - WFL, however significant unsteadiness and knee hyperextension  Extension - 10% limited, increased pain and unsteadiness Treatment Charges: Mins Units   []  Modalities     [x]  Ther Exercise 50 3   []  Manual Therapy     []  Ther Activities     []  Aquatics     []  Vasocompression     []  Other: neuro     Total Treatment time 50 3   Estimated Medicare cost as of 2/22/21: $753.65      Assessment: [x] Progressing toward goals. Verbal cues during hamstring stretch to fully straighten knee. Progressed to blue theraband for mat LE exercises, fatigue noted at the end of clamshells noted. Patient required tactile cues during SLR into extension in order to stabilize pelvis. 3 way hip performed on 2 inch step so patient would be able to clear the floor. Balance exercise did pose a challenge as patient experienced LOB several times CGA for safety. [] No change. [] Other:  [x] Patient would continue to benefit from skilled physical therapy services in order to: increase LE strength and balance in order to decrease assist required with ADL's and decrease risk of falls. STG: (to be met in 8 treatments)  1. ? Pain: Pt will report decreased pain levels to 3-4/10 on average with prolonged walking and standing in order to improve tolerance to ADL's. MET- 2/10/21  2. ? ROM: Pt will increase bilateral DF AROM to 0 degrees in order to improve gait pattern and stair climbing ability. Ongoing 2/10/21- active DF lacking 16 degrees R lacking 28  3. ? Strength: Pt will increase bilateral quad strength to 4+/5 globally in order to decrease hyperextension of LE's during gait. MET 2/10/21   4. ? Function: Pt will demonstrate decreased risk of falls as evident by an improved score on the LEMA by 7 points. - Ongoing 2/10/21- score improved by 4 points this date   5. Patient to be independent with home exercise program as demonstrated by performance with correct form without cues. MET- 2/10/21  6.  Demonstrate Knowledge of fall prevention - Handout given 1/13/2021  LTG: (to be met in 16 treatments) 1. Pt will increase bilateral hip flexion strength and DF strength to 4-/5 in order to improve foot clearance ability during stair climbing and gait. 2. Pt will demonstrate improved gait mechanics while ambulating x200ft with least restrictive device and with no loss of balance in order to ensure safe community ambulation. 3. Pt will demonstrate improved functional activity tolerance as evident by an improved score on the Oswestry to less than 50% functional impairment.                    Patient goals: \"to start walking better\"       Pt. Education:  []? Yes  [x]? No  [x]? Reviewed Prior HEP/Ed   Method of Education: []? Verbal  []? Demo  []? Written  1/13 HEP for charted exercises; Jenelle Rape = [id number]  Comprehension of Education:  [x]? Verbalizes understanding. [x]? Demonstrates understanding. []? Needs Review. [x]? Demonstrates/verbalizes understanding of HEP/Ed previously given.        Plan: [x] Continue current frequency toward long and short term goals.     [x] Specific Instructions for subsequent treatments: progress as tolerable      Time In: 300 pm     Time Out: 400 pm    Electronically Signed by ISIDORO Leong and directly supervised for entirety of treatment by Sharonda Carver PTA

## 2021-02-24 ENCOUNTER — HOSPITAL ENCOUNTER (OUTPATIENT)
Dept: PHYSICAL THERAPY | Facility: CLINIC | Age: 46
Setting detail: THERAPIES SERIES
Discharge: HOME | End: 2021-02-24
Payer: MEDICARE

## 2021-02-24 PROCEDURE — 97110 THERAPEUTIC EXERCISES: CPT

## 2021-02-24 NOTE — FLOWSHEET NOTE
[] [facility]) - [facility] &  Therapy  [address]  [phone]  F: [phone] [x] [address]  [facility] 36   [address]  P: [phone]  F: [phone] [] [address] &  Therapy  [address]  P: [phone]  F: [phone] [] [address]  P: [phone]  F: [phone] [] [address]  [facility]   [address]   [place]: [phone]  F: [phone]      Physical Therapy Daily Treatment Note    Date:  2021  Patient Name:  Sy Tavares    :  1975  MRN: [id number]  Physician: Dr. Andrea Castellanos DO                             Insurance: Medicare, [facility] [place] [employer] Medicare Supp  Medical Diagnosis: M21.371, M21.372 - Foot drop, bilateral; M62.81 - Muscle weakness; M54.42, M54.41 - Chronic bilateral low back pain with bilateral sciatica                         Rehab Codes: M21.371, M21.372, M54.42, M54.41, M62.81, R26.2  Onset date: 6020 referral                   Next 's appt. :   Visit# / total visits:     Cancels/No Shows: 1/0    Subjective:    Pain:  [] Yes  [x] No Location: low back, bilateral knees Pain Rating: (0-10 scale) 0/10  Pain altered Tx:  [x] No  [] Yes  Action:  Comments: Patient arrives without any pain today, notes that he is \"feeling pretty good\"    Objective:  Modalities:   Precautions: lack of sensation to bilateral calves/anterior shins  Exercises:  Exercise Reps/ Time Weight/ Level Comments   Sci Fit 6 min Lv 2          Long sitting quad sets 10x5\" ea   Towel roll under heel and cues to decrease hip compensation   SLR 20x ea   Cues for quad set, increased reps 2/3   Supine HS + DF stretch 3x30\" ea strap     Transverse abdominus (TrA) activation  10x5\" Bridge + TrA 10x3\" x2 Purple Increased resistance 2/24   Hip add 20x3\" Ball    Hip abd 20x Purple  Increased resistance 2/22         Sidelying clams 20x Purple Increased resistance 2/22   SL hip abd 15x Purple  Added 2/17   Prone lying 2 min     Prone on elbows 2 min     Prone hip ext 2x10  Added 2/17   LAQ 10x3\"x2 3# Increased weight, 2/22         Slant board stretch 3x30\"     Tandem stance x   Bilateral UE support Increased weight 2/22   3 way hip 20x ea 3# Stationary LE on 2\" step for ext   Marching 20x ea 3#    HS curls 20x ea 3#    Mini squats 10x2     Step ups  10x ea 2\"    Side step in // bars 5x ea lime UE assist   Tandem amb in // ba0rs  5x   Added 2/5/21   Tandem stance 2x30\" ea     Single leg stance 4x15\" ea     WBOS  2x30\" foam  1x EO 1x EC   NBOS  2x30\"  foam    Reaching for cones  Outside of JASIEL 2x ea UE 10 cones No UE use    Reaching for cones on the ground 2x30\"   1 UE A ea   Reaching for cone one 6\" step 1x30\"  No UE. Increased LB pain.     Stepping over round cone in // bars  10x ea  Added 2/5/21   Squat with UE tap to cone on 6\" step in // bars  15x 6\" Added 2/5/21; 1 UE A   Foam walking in // bars  6x  Added 2/5/21   180 turns in // bars  5x ea  Added 2/5/21; gait belt donned   Other:      Specific Instructions for next treatment: LE stretching, LE strengthening, balance activities    Completed by primary PT 2/10/21 ROM  ° A/P STRENGTH     Left Right Left Right   Hip Flex     3+ 3+   Ext           ER           IR           ABD     4+ 3+   ADD           Knee Flex     5 5   Ext     5 5   Ankle DF 0 passive 0 passive 3+ 3+   PF     4 4   INV           EVER                       Quad strength: improved strength during MMT, quad lag with present with fatigue during repetitive SLRs     Lumbar AROM:  Flexion - WFL, however significant unsteadiness and knee hyperextension  Extension - 10% limited, increased pain and unsteadiness      Treatment Charges: Mins Units   []  Modalities [x]  Ther Exercise 30 2   []  Manual Therapy     []  Ther Activities     []  Aquatics     []  Vasocompression     []  Other: neuro     Total Treatment time 30 2   Estimated Medicare cost as of 2/22/21: $806.59      Assessment: [x] Progressing toward goals. Initiated session with nustep warm up x 6 minutes. Continued with exercises per chart, verbal and tactile cues needed for quad activation during SLR and glute activation during prone hip extensions. Improvements noted in motor control with mat table exercises this session. Pt reports that he is feeling lightheaded like his sugar is dropping or that he was too hot upon sitting after supine exercises. Pt took his glucose tablets and sat to rest until he felt better, held standing exercises today. [] No change. [] Other:  [x] Patient would continue to benefit from skilled physical therapy services in order to: increase LE strength and balance in order to decrease assist required with ADL's and decrease risk of falls. STG: (to be met in 8 treatments)  1. ? Pain: Pt will report decreased pain levels to 3-4/10 on average with prolonged walking and standing in order to improve tolerance to ADL's. MET- 2/10/21  2. ? ROM: Pt will increase bilateral DF AROM to 0 degrees in order to improve gait pattern and stair climbing ability. Ongoing 2/10/21- active DF lacking 16 degrees R lacking 28  3. ? Strength: Pt will increase bilateral quad strength to 4+/5 globally in order to decrease hyperextension of LE's during gait. MET 2/10/21   4. ? Function: Pt will demonstrate decreased risk of falls as evident by an improved score on the LEMA by 7 points. - Ongoing 2/10/21- score improved by 4 points this date   5. Patient to be independent with home exercise program as demonstrated by performance with correct form without cues. MET- 2/10/21  6.  Demonstrate Knowledge of fall prevention - Handout given 1/13/2021  LTG: (to be met in 16 treatments) 1. Pt will increase bilateral hip flexion strength and DF strength to 4-/5 in order to improve foot clearance ability during stair climbing and gait. 2. Pt will demonstrate improved gait mechanics while ambulating x200ft with least restrictive device and with no loss of balance in order to ensure safe community ambulation. 3. Pt will demonstrate improved functional activity tolerance as evident by an improved score on the Oswestry to less than 50% functional impairment.                    Patient goals: \"to start walking better\"       Pt. Education:  []? Yes  [x]? No  [x]? Reviewed Prior HEP/Ed   Method of Education: []? Verbal  []? Demo  []? Written  1/13 HEP for charted exercises; [address] [place] [address] = [id number]  Comprehension of Education:  [x]? Verbalizes understanding. [x]? Demonstrates understanding. []? Needs Review. [x]? Demonstrates/verbalizes understanding of HEP/Ed previously given.        Plan: [x] Continue current frequency toward long and short term goals.     [x] Specific Instructions for subsequent treatments: progress as tolerable      Time In: 3:00 pm     Time Out:  3:48 pm    Electronically Signed by GIANNA Lucas   This documentation has been reviewed and entirety of treatment session with direct supervision by clinical instructor, Letty Taylor PT, DPT

## 2021-03-01 ENCOUNTER — HOSPITAL ENCOUNTER (OUTPATIENT)
Dept: PHYSICAL THERAPY | Facility: CLINIC | Age: 46
Setting detail: THERAPIES SERIES
Discharge: HOME | End: 2021-03-01
Payer: MEDICARE

## 2021-03-01 PROCEDURE — 97110 THERAPEUTIC EXERCISES: CPT

## 2021-03-01 NOTE — FLOWSHEET NOTE
[] [facility]) [facility] &  Therapy  [address]  [phone]  F: [phone] [x] [address]  [facility]   [address]  P: [phone]  F: [phone] [] [address] &  Therapy  [address]  P: [phone]  F: [phone] [] [address]  P: [phone]  F: [phone] [] [address]  [facility]   [address]   [place]: [phone]  F: [phone]      Physical Therapy Daily Treatment Note    Date:  3/1/2021  Patient Name:  Hermes Anguiano    :  1975  MRN: [id number]  Physician: Dr. Jose L Parker,                              Insurance: Medicare, [facility] Medicare Supp  Medical Diagnosis: M21.371, M21.372 - Foot drop, bilateral; M62.81 - Muscle weakness; M54.42, M54.41 - Chronic bilateral low back pain with bilateral sciatica                         Rehab Codes: M21.371, M21.372, M54.42, M54.41, M62.81, R26.2  Onset date: 6020 referral                   Next 's appt. :   Visit# / total visits:     Cancels/No Shows: 1/0    Subjective:    Pain:  [] Yes  [x] No Location: low back, bilateral knees Pain Rating: (0-10 scale) 0/10  Pain altered Tx:  [x] No  [] Yes  Action:  Comments: Patient arrives amb with rollator, denies any pain this date.      Objective:  Modalities:   Precautions: lack of sensation to bilateral calves/anterior shins  Exercises:  Exercise Reps/ Time Weight/ Level Comments   Sci Fit 6 min Lv 2          Long sitting quad sets 10x5\" ea   Towel roll under heel and cues to decrease hip compensation   SLR 20x ea   Cues for quad set, increased reps 2/3   Supine HS + DF stretch 3x30\" ea strap     Transverse abdominus (TrA) activation  10x5\"     Bridge + TrA 10x3\" x2 Purple Increased resistance  Hip add 20x3\" Ball    Hip abd 20x Purple  Increased resistance 2/22         Sidelying clams 20x Purple Increased resistance 2/22   SL hip abd 20x Purple  Added 2/17; increased resistance 3/1   Prone lying 2 min     Prone on elbows 2 min     Prone press ups on forearms  10x  Added 3/1 cues to maintain hips on mat    Prone hip ext 2x10  Added 2/17   LAQ 10x3\"x2 3# Increased weight, 2/22         Slant board stretch 3x30\"     Tandem stance x   Bilateral UE support Increased weight 2/22   3 way hip 20x ea 3# Stationary LE on 2\" step for ext   Marching 20x ea 3#    HS curls 20x ea 3#    Mini squats 10x2     Step ups  10x ea 2\"    Side step in // bars 5x ea lime UE assist   Tandem amb in // bars  5x   Added 2/5/21   Tandem stance 2x30\" ea     Single leg stance 4x15\" ea     WBOS  2x30\" foam  1x EO 1x EC   NBOS  2x30\"  foam    Reaching for cones  Outside of JASIEL 2x ea UE 10 cones No UE use    Reaching for cones on the ground 2x30\"   1 UE A ea; completed with x5 cones with each UE on 3/1   Reaching for cone one 6\" step 1x30\"  No UE. Increased LB pain.     Stepping over round cone in // bars  10x ea  Added 2/5/21   Squat with UE tap to cone on 6\" step in // bars  15x 6\" Added 2/5/21; 1 UE A   Foam walking in // bars  6x  Added 2/5/21   Foam marches  10x ea  Added 3/1/21   180 turns in // bars  5x ea  Added 2/5/21; gait belt donned   360 degree turns in // bars  5x ea  Added 3/1/21; gait belt donned    Other:      Specific Instructions for next treatment: LE stretching, LE strengthening, balance activities    Completed by primary PT 2/10/21 ROM  ° A/P STRENGTH     Left Right Left Right   Hip Flex     3+ 3+   Ext           ER           IR           ABD     4+ 3+   ADD           Knee Flex     5 5   Ext     5 5   Ankle DF 0 passive 0 passive 3+ 3+   PF     4 4   INV           EVER                       Quad strength: improved strength during MMT, quad lag with present with fatigue during repetitive SLRs     Lumbar AROM: Flexion - WFL, however significant unsteadiness and knee hyperextension  Extension - 10% limited, increased pain and unsteadiness      Treatment Charges: Mins Units   []  Modalities     [x]  Ther Exercise 56 4   []  Manual Therapy     []  Ther Activities     []  Aquatics     []  Vasocompression     []  Other: neuro     Total Treatment time 56 4   Estimated Medicare cost as of 3/1/21: $891.07      Assessment: [x] Progressing toward goals. Addition of prone press ups to forearms, pt with difficulty maintaining hips on mat d/t decreased lumbar extension. Cueing to ensure quad set to start with SLR and for proper glute activation with prone hip extension. Pt tends to bear weight more on heel and lateral aspect of foot with B SLS. Improved hip/knee flexion and increased control following first few reps of stepping over round cone. Added 360 turns in // bars with UE assist for balance as needed. One seated water/rest break between standing exercises d/t fatigue. Completed marches on foam this date. Pt with slight irritation in LB post treatment. [] No change. [] Other:  [x] Patient would continue to benefit from skilled physical therapy services in order to: increase LE strength and balance in order to decrease assist required with ADL's and decrease risk of falls. STG: (to be met in 8 treatments)  1. ? Pain: Pt will report decreased pain levels to 3-4/10 on average with prolonged walking and standing in order to improve tolerance to ADL's. MET- 2/10/21  2. ? ROM: Pt will increase bilateral DF AROM to 0 degrees in order to improve gait pattern and stair climbing ability. Ongoing 2/10/21- active DF lacking 16 degrees R lacking 28  3. ? Strength: Pt will increase bilateral quad strength to 4+/5 globally in order to decrease hyperextension of LE's during gait.  MET 2/10/21 4. ? Function: Pt will demonstrate decreased risk of falls as evident by an improved score on the LEMA by 7 points. - Ongoing 2/10/21- score improved by 4 points this date   5. Patient to be independent with home exercise program as demonstrated by performance with correct form without cues. MET- 2/10/21  6. Demonstrate Knowledge of fall prevention - Handout given 1/13/2021  LTG: (to be met in 16 treatments)  1. Pt will increase bilateral hip flexion strength and DF strength to 4-/5 in order to improve foot clearance ability during stair climbing and gait. 2. Pt will demonstrate improved gait mechanics while ambulating x200ft with least restrictive device and with no loss of balance in order to ensure safe community ambulation. 3. Pt will demonstrate improved functional activity tolerance as evident by an improved score on the Oswestry to less than 50% functional impairment.                    Patient goals: \"to start walking better\"       Pt. Education:  []? Yes  [x]? No  [x]? Reviewed Prior HEP/Ed   Method of Education: [x]? Verbal  [x]? Demo  []? Written  1/13 HEP for charted exercises; [address] [place] [address] = [id number]  Comprehension of Education:  [x]? Verbalizes understanding. [x]? Demonstrates understanding. []? Needs Review. [x]? Demonstrates/verbalizes understanding of HEP/Ed previously given.        Plan: [x] Continue current frequency toward long and short term goals.     [x] Specific Instructions for subsequent treatments: progress as tolerable      Time In: 2:55 pm   Time Out:  3:57 pm      Electronically signed by: Gi Bullock PTA

## 2021-03-03 ENCOUNTER — HOSPITAL ENCOUNTER (OUTPATIENT)
Dept: PHYSICAL THERAPY | Facility: CLINIC | Age: 46
Setting detail: THERAPIES SERIES
Discharge: HOME | End: 2021-03-03
Payer: MEDICARE

## 2021-03-03 PROCEDURE — 97110 THERAPEUTIC EXERCISES: CPT

## 2021-03-03 NOTE — FLOWSHEET NOTE
[] [facility]) - [facility] &  Therapy  [address]  [phone]  F: [phone] [x] [address]  [place] [address]   [address]  P: [phone]  F: [phone] [] [address] &  Therapy  [address]  P: [phone]  F: [phone] [] [address]  P: [phone]  F: [phone] [] [address]  [facility]   [address]   [place]: [phone]  F: [phone]      Physical Therapy Daily Treatment Note    Date:  3/3/2021  Patient Name:  Prudence Francis    :  1975  MRN: [id number]  Physician: Dr. Genoveva Mendoza DO                             Insurance: Medicare, [facility] Medicare Supp  Medical Diagnosis: M21.371, M21.372 - Foot drop, bilateral; M62.81 - Muscle weakness; M54.42, M54.41 - Chronic bilateral low back pain with bilateral sciatica                         Rehab Codes: M21.371, M21.372, M54.42, M54.41, M62.81, R26.2  Onset date: 6020 referral                   Next 's appt. :   Visit# / total visits:     Cancels/No Shows: 1/0    Subjective:    Pain:  [] Yes  [x] No Location: low back, bilateral knees Pain Rating: (0-10 scale) 0/10  Pain altered Tx:  [x] No  [] Yes  Action:  Comments: Patient continues to report improvement, no reports of increased pain. Pt reports that he has not fallen recently but that he does not feel much steadier than previously which he attributes to his ankles. He does note improvement in his knees and the control while walking.      Objective:  Modalities:   Precautions: lack of sensation to bilateral calves/anterior shins  Exercises:  Exercise Reps/ Time Weight/ Level Comments   Sci Fit 6 min Lv 2 Long sitting quad sets 10x5\" ea   Towel roll under heel and cues to decrease hip compensation   SLR 20x ea 1#  Cues for quad set, increased reps 2/3  Added weight 3/3   Supine HS + DF stretch 3x30\" ea strap     Transverse abdominus (TrA) activation  10x5\"     Bridge + TrA 10x3\" x2 Purple Increased resistance 2/24   Hip add 20x3\" Ball    Hip abd 20x Purple  Increased resistance 2/22         Sidelying clams 20x Purple Increased resistance 2/22   SL hip abd 20x Purple  Added 2/17; increased resistance 3/1   Prone lying 2 min     Prone on elbows 2 min     Prone press ups on forearms  10x  Added 3/1 cues to maintain hips on mat    Prone hip ext 2x10  Added 2/17   LAQ 10x3\"x2 3# Increased weight, 2/22         Slant board stretch 3x30\"     Tandem stance x   Bilateral UE support Increased weight 2/22   3 way hip 20x ea 3# Stationary LE on 2\" step for ext   Marching 20x ea 3#    HS curls 20x ea 3#    Mini squats 10x2     Step ups  10x ea Aerobic step Increased step height 3/3   Side step in // bars 5x ea lime UE assist   Tandem amb in // bars  5x   Added 2/5/21   Tandem stance 2x30\" ea     Single leg stance 4x15\" ea     WBOS  2x30\" foam  1x EO 1x EC   NBOS  2x30\"  foam    Reaching for cones  Outside of JASIEL 2x ea UE 10 cones No UE use    Reaching for cones on the ground 2x30\"   1 UE A ea; completed with x5 cones with each UE on 3/1   Reaching for cone one 6\" step 1x30\"  No UE. Increased LB pain.     Stepping over round cone in // bars  10x ea  Added 2/5/21   Squat with UE tap to cone on 6\" step in // bars  15x 6\" Added 2/5/21; 1 UE A   Foam walking in // bars  6x  Added 2/5/21   Foam marches  10x ea  Added 3/1/21   180 turns in // bars  5x ea  Added 2/5/21; gait belt donned   360 degree turns in // bars  5x ea  Added 3/1/21; gait belt donned    Other:      Specific Instructions for next treatment: LE stretching, LE strengthening, balance activities    Completed by primary PT 2/10/21 ROM  ° A/P STRENGTH 4. ? Function: Pt will demonstrate decreased risk of falls as evident by an improved score on the LEMA by 7 points. - Ongoing 2/10/21- score improved by 4 points this date   5. Patient to be independent with home exercise program as demonstrated by performance with correct form without cues. MET- 2/10/21  6. Demonstrate Knowledge of fall prevention - Handout given 1/13/2021  LTG: (to be met in 16 treatments)  1. Pt will increase bilateral hip flexion strength and DF strength to 4-/5 in order to improve foot clearance ability during stair climbing and gait. 2. Pt will demonstrate improved gait mechanics while ambulating x200ft with least restrictive device and with no loss of balance in order to ensure safe community ambulation. 3. Pt will demonstrate improved functional activity tolerance as evident by an improved score on the Oswestry to less than 50% functional impairment.                    Patient goals: \"to start walking better\"       Pt. Education:  []? Yes  [x]? No  [x]? Reviewed Prior HEP/Ed   Method of Education: [x]? Verbal  [x]? Demo  []? Written  1/13 HEP for charted exercises; Yolanda Antu = [id number]  Comprehension of Education:  [x]? Verbalizes understanding. [x]? Demonstrates understanding. []? Needs Review. [x]? Demonstrates/verbalizes understanding of HEP/Ed previously given.        Plan: [x] Continue current frequency toward long and short term goals.     [x] Specific Instructions for subsequent treatments: progress as tolerable      Time In: 2:44 pm   Time Out:  3:47 pm      Electronically signed by: Yvonne Arteaga PT

## 2021-03-08 ENCOUNTER — HOSPITAL ENCOUNTER (OUTPATIENT)
Dept: PHYSICAL THERAPY | Facility: CLINIC | Age: 46
Setting detail: THERAPIES SERIES
Discharge: HOME | End: 2021-03-08
Payer: MEDICARE

## 2021-03-08 PROCEDURE — 97110 THERAPEUTIC EXERCISES: CPT

## 2021-03-08 NOTE — FLOWSHEET NOTE
[] [facility]) - [facility] &  Therapy  [address]  [phone]  F: [phone] [x] [address]  [facility]   [address]  P: [phone]  F: [phone] [] [address] &  Therapy  [address]  P: [phone]  F: [phone] [] [address]  P: [phone]  F: [phone] [] [address]  [facility]   [address]   [place]: [phone]  F: [phone]      Physical Therapy Daily Treatment Note    Date:  3/8/2021  Patient Name:  Kayley Parra    :  1975  MRN: [id number]  Physician: Dr. Rubi Mendes,                              Insurance: Medicare, [employer] Medicare Supp  Medical Diagnosis: M21.371, M21.372 - Foot drop, bilateral; M62.81 - Muscle weakness; M54.42, M54.41 - Chronic bilateral low back pain with bilateral sciatica                         Rehab Codes: M21.371, M21.372, M54.42, M54.41, M62.81, R26.2  Onset date: 6020 referral                   Next 's appt. :   Visit# / total visits: 15    Cancels/No Shows: 1/0    Subjective:    Pain:  [] Yes  [x] No Location: low back, bilateral knees Pain Rating: (0-10 scale) 0/10  Pain altered Tx:  [x] No  [] Yes  Action:  Comments: Patient arrives amb with rollator, no complains this date. Mentions at times he feels comfortable amb with cane, however, increased balance and safety noted with rollator.      Objective:  Modalities:   Precautions: lack of sensation to bilateral calves/anterior shins  Exercises:  Exercise Reps/ Time Weight/ Level Comments   Sci Fit 6 min Lv 2.5          Long sitting quad sets 10x5\" ea   Towel roll under heel and cues to decrease hip compensation   SLR 20x ea 1#  Cues for quad set, increased reps 2/3  Added weight 3/3   Supine HS + DF Flex     3+ 3+   Ext           ER           IR           ABD     4+ 3+   ADD           Knee Flex     5 5   Ext     5 5   Ankle DF 0 passive 0 passive 3+ 3+   PF     4 4   INV           EVER                       Quad strength: improved strength during MMT, quad lag with present with fatigue during repetitive SLRs     Lumbar AROM:  Flexion - WFL, however significant unsteadiness and knee hyperextension  Extension - 10% limited, increased pain and unsteadiness      Treatment Charges: Mins Units   []  Modalities     [x]  Ther Exercise 55 4   []  Manual Therapy     []  Ther Activities     []  Aquatics     []  Vasocompression     []  Other: neuro     Total Treatment time 55 4   Estimated Medicare cost as of 3/8/21: $1051.71      Assessment: [x] Progressing toward goals. Initiated session with increased resistance on sci fit followed by supine mat exercises. Able to complete SLR with 1# weight, decreased range noted following 1st set. Cues with side lying exercises for proper pelvic alignment. Rest/water break care home through standing exercises this date d/t fatigue. Slight dizziness with 360 turns, however, mentions he felt some sx's of dizziness upon arrival. Addition of weaving in/out of cones in // bars with cues to take smaller steps around cone vs stepping over cone, pt with good carryover following cues. Ended with 1 lap around clinic with rollator. [] No change. [] Other:  [x] Patient would continue to benefit from skilled physical therapy services in order to: increase LE strength and balance in order to decrease assist required with ADL's and decrease risk of falls. STG: (to be met in 8 treatments)  1. ? Pain: Pt will report decreased pain levels to 3-4/10 on average with prolonged walking and standing in order to improve tolerance to ADL's. MET- 2/10/21  2. ? ROM: Pt will increase bilateral DF AROM to 0 degrees in order to improve gait pattern and stair climbing ability.  Ongoing 2/10/21- active DF lacking 16 degrees R lacking 28  3. ? Strength: Pt will increase bilateral quad strength to 4+/5 globally in order to decrease hyperextension of LE's during gait. MET 2/10/21   4. ? Function: Pt will demonstrate decreased risk of falls as evident by an improved score on the LEMA by 7 points. - Ongoing 2/10/21- score improved by 4 points this date   5. Patient to be independent with home exercise program as demonstrated by performance with correct form without cues. MET- 2/10/21  6. Demonstrate Knowledge of fall prevention - Handout given 1/13/2021  LTG: (to be met in 16 treatments)  1. Pt will increase bilateral hip flexion strength and DF strength to 4-/5 in order to improve foot clearance ability during stair climbing and gait. 2. Pt will demonstrate improved gait mechanics while ambulating x200ft with least restrictive device and with no loss of balance in order to ensure safe community ambulation. 3. Pt will demonstrate improved functional activity tolerance as evident by an improved score on the Oswestry to less than 50% functional impairment.                    Patient goals: \"to start walking better\"       Pt. Education:  []? Yes  [x]? No  [x]? Reviewed Prior HEP/Ed   Method of Education: [x]? Verbal positioning   [x]? Demo cone weaving  []? Written  1/13 HEP for charted exercises; Izaiah Rebollar = [id number]  Comprehension of Education:  [x]? Verbalizes understanding. [x]? Demonstrates understanding. []? Needs Review. [x]? Demonstrates/verbalizes understanding of HEP/Ed previously given.        Plan: [x] Continue current frequency toward long and short term goals.     [x] Specific Instructions for subsequent treatments: progress as tolerable      Time In: 1:59 pm    Time Out:  3:00 pm      Electronically signed by: Soraya Zurita PTA

## 2021-03-10 ENCOUNTER — HOSPITAL ENCOUNTER (OUTPATIENT)
Dept: PHYSICAL THERAPY | Facility: CLINIC | Age: 46
Setting detail: THERAPIES SERIES
Discharge: HOME | End: 2021-03-10
Payer: MEDICARE

## 2021-03-10 PROCEDURE — 97110 THERAPEUTIC EXERCISES: CPT

## 2021-03-10 NOTE — DISCHARGE SUMMARY
[] [facility]) - [facility] &  Therapy  [address]  [phone]  F: [phone] [x] [address]  [facility]   [address]  P: [phone]  F: [phone] [] [address] &  Therapy  [address]  P: [phone]  F: [phone] [] [address]  P: [phone]  F: [phone] [] [address]  [facility]   [address]   [place]: [phone]  F: [phone]      Physical Therapy Discharge Note    Date: 3/10/2021      Patient: Jojo Bialey  : 1975  MRN: [id number]    Physician: Dr. Jamila Andres,                              Insurance: Medicare, [facility] Medicare Supp  Medical Diagnosis: M21.371, M21.372 - Foot drop, bilateral; M62.81 - Muscle weakness; M54.42, M54.41 - Chronic bilateral low back pain with bilateral sciatica                         Rehab Codes: M21.371, M21.372, M54.42, M54.41, M62.81, R26.2  Onset date: 6020 referral                   Next 's appt. :   Visit# / total visits:     Cancels/No Shows:   Date of initial visit: 21                Date of final visit: 3/10/21    Subjective:    Pain:  [] Yes  [x] No Location: low back, bilateral knees Pain Rating: (0-10 scale) 0/10  Pain altered Tx:  [x] No  [] Yes  Action:  Comments: Patient arrives amb with rollator, no complains this date. Pt reports that he purchased new stability shoes and has been wearing them for a few days. Pt says they feel lighter which has helped with ambulation. Pt demonstrates significant progress towards goals. Pt reports minimal to no pain in low back or LEs with activity. Pt increased bilateral hip strength and DF strength, but continues to be limited in active ankle DF strength and range of motion.  Pt demonstrates improved gait mechanics and increased balance while ambulating with rollator, bilateral AFOs, and stability shoes. Pt to be discharged from physical therapy services due to significant progress towards goals and having met prescribed number of visits. Fair prognosis with continued HEP to further improve BLE strength and mobility. Objective:  Test Measurements:  Completed by primary PT 3/10/21 ROM  ° A/P STRENGTH     Left Right Left Right   Hip Flex     4+ 4+   Ext           ER           IR           ABD     4+ 4+   ADD           Knee Flex     5 5   Ext     5 5   Ankle DF 0 passive 0 passive 4 4   PF     4 4   INV           EVER                         Function:    LEMA- 40/50  Oswestry- 22%    Assessment:     STG: (to be met in 8 treatments)  1. ? Pain: Pt will report decreased pain levels to 3-4/10 on average with prolonged walking and standing in order to improve tolerance to ADL's. MET- 2/10/21  2. ? ROM: Pt will increase bilateral DF AROM to 0 degrees in order to improve gait pattern and stair climbing ability. Ongoing 3/10/21- active DF lacking 16 degrees R lacking 20 L  3. ? Strength: Pt will increase bilateral quad strength to 4+/5 globally in order to decrease hyperextension of LE's during gait. MET 2/10/21   4. ? Function: Pt will demonstrate decreased risk of falls as evident by an improved score on the LEMA by 7 points. - MET 3/10/21 LEMA improved by 10 points this date  5. Patient to be independent with home exercise program as demonstrated by performance with correct form without cues. MET- 2/10/21  6. Demonstrate Knowledge of fall prevention - Handout given 1/13/2021  LTG: (to be met in 16 treatments)  1. Pt will increase bilateral hip flexion strength and DF strength to 4-/5 in order to improve foot clearance ability during stair climbing and gait. MET 3/10/21  2.  Pt will demonstrate improved gait mechanics while ambulating x200ft with least restrictive device and with no loss of balance in order to ensure safe community ambulation. MET 3/10/21  3. Pt will demonstrate improved functional activity tolerance as evident by an improved score on the Oswestry to less than 50% functional impairment. MET 3/10/21- 22% impaired this date                    Patient goals: \"to start walking better\"     Treatment to Date:  [x] Therapeutic Exercise    [] Modalities:  [] Therapeutic Activity    [] Ultrasound  [] Electrical Stimulation  [x] Gait Training     [] Massage       [] Lumbar/Cervical Traction  [x] Neuromuscular Re-education [] Cold/hotpack [] Iontophoresis: 4 mg/mL  [x] Instruction in Home Exercise Program                     Dexamethasone Sodium  [] Manual Therapy             Phosphate 40-80 mAmin  [] Aquatic Therapy                   [] Vasocompression/    [] Other:             Game Ready    Discharge Status:     [] Pt recovered from conditions. Treatment goals were met. [x] Pt received maximum benefit. No further therapy indicated at this time. [x] Pt to continue exercise/home instructions independently. [] Therapy interrupted due to:    [] Pt has 2 or more no shows/cancels, is discontinued per our policy. [x] Pt has completed prescribed number of treatment sessions. [] Other:         Electronically signed by Perez KATZ on 3/10/2021 at 12:02 PM   This documentation has been reviewed and entirety of treatment session with direct supervision by clinical instructor, Lucia Esteban PT DPT      If you have any questions or concerns, please don't hesitate to call.   Thank you for your referral.

## 2021-03-10 NOTE — FLOWSHEET NOTE
[] [facility]) [facility] &  Therapy  [address]  [phone]  F: [phone] [x] [address]  [facility]   [address]  P: [phone]  F: [phone] [] [address] &  Therapy  [address] [facility]  P: [phone]  F: [phone] [] [address]  P: [phone]  F: [phone] [] [address]  [facility]   [address]   [place]: [phone]  F: [phone]      Physical Therapy Daily Treatment Note    Date:  3/10/2021  Patient Name:  Ayden Joy    :  1975  MRN: [id number]  Physician: Dr. Kaleb Turcios, DO                             Insurance: Medicare, [facility] Medicare Supp  Medical Diagnosis: M21.371, M21.372 - Foot drop, bilateral; M62.81 - Muscle weakness; M54.42, M54.41 - Chronic bilateral low back pain with bilateral sciatica                         Rehab Codes: M21.371, M21.372, M54.42, M54.41, M62.81, R26.2  Onset date: 6020 referral                   Next 's appt. :   Visit# / total visits:     Cancels/No Shows: 1/0    Subjective:    Pain:  [] Yes  [x] No Location: low back, bilateral knees Pain Rating: (0-10 scale) 0/10  Pain altered Tx:  [x] No  [] Yes  Action:  Comments: Patient arrives amb with rollator, no complains this date. Pt reports that he purchased new stability shoes and has been wearing them for a few days. Pt says they feel lighter which has helped with ambulation.      Objective:  Modalities:   Precautions: lack of sensation to bilateral calves/anterior shins  Exercises:  Exercise Reps/ Time Weight/ Level Comments   Sci Fit 6 min Lv 2.5          Long sitting quad sets 10x5\" ea   Towel roll under heel and cues to decrease hip compensation   SLR 20x ea 1#  Cues for quad set, increased reps 2/3 Added weight 3/3   Supine HS + DF stretch 3x30\" ea strap     Transverse abdominus (TrA) activation  10x5\"     Bridge + TrA 10x3\" x2 Purple Increased resistance 2/24   Hip add 20x3\" Ball    Hip abd 20x Purple  Increased resistance 2/22         Sidelying clams 20x Purple Increased resistance 2/22   SL hip abd 20x Purple  Added 2/17; increased resistance 3/1   Prone lying 2 min     Prone on elbows 2 min     Prone press ups on forearms  10x  Added 3/1 cues to maintain hips on mat    Prone hip ext 2x10  Added 2/17   LAQ 10x3\"x2 3# Increased weight, 2/22         Slant board stretch 3x30\"     Tandem stance x   Bilateral UE support Increased weight 2/22   3 way hip 20x ea 3# Stationary LE on 2\" step for ext   Marching 20x ea 3#    HS curls 20x ea 3#    Mini squats 10x2     Step ups (fwd, lateral) 10x ea 4\" Increased step height 3/3; completed at 4\" step on 3/8   Side step in // bars 5x ea lime UE assist   Tandem amb in // bars  5x   Added 2/5/21   Tandem stance 2x30\" ea     Single leg stance 3x20\" ea     WBOS  2x30\" foam  1x EO 1x EC   NBOS  2x30\"  foam    Reaching for cones  Outside of JASIEL 2x ea UE 10 cones No UE use    Reaching for cones on the ground 2x30\"   1 UE A ea; completed with x5 cones with each UE on 3/1   Reaching for cone one 6\" step 1x30\"  No UE. Increased LB pain.     Stepping over round cone in // bars  10x ea  Added 2/5/21   Squat with UE tap to cone on 6\" step in // bars  15x 6\" Added 2/5/21; 1 UE A   Foam walking in // bars  6x  Added 2/5/21   Cone weaving   -fwd  x10 // bars  Added 3/8; 3 cones    Foam marches  10x ea  Added 3/1/21   180 turns in // bars  5x ea  Added 2/5/21; gait belt donned   360 degree turns in // bars  5x ea  Added 3/1/21; gait belt donned    Toe raises 5x ea  Completed with seated rest break    Gait training  1 lap   With rollator    Mini lunges  10x  //bars    Other:      Specific Instructions for next treatment: LE stretching, LE strengthening, balance activities    Completed by primary PT 3/10/21 ROM  ° A/P STRENGTH     Left Right Left Right   Hip Flex     4+ 4+   Ext           ER           IR           ABD     4+ 4+   ADD           Knee Flex     5 5   Ext     5 5   Ankle DF 0 passive 0 passive 4 4   PF     4 4   INV           EVER                       Quad strength: improved strength during MMT, quad lag with present with fatigue during repetitive SLRs     Lumbar AROM:  Flexion - WFL, however significant unsteadiness and knee hyperextension  Extension - 10% limited, increased pain and unsteadiness      Treatment Charges: Mins Units   []  Modalities     [x]  Ther Exercise 54 4   []  Manual Therapy     []  Ther Activities     []  Aquatics     []  Vasocompression     []  Other: neuro     Total Treatment time 54 4   Estimated Medicare cost as of 3/8/21: $1,136.17      Assessment: [x] Progressing toward goals. Initiated session with increased resistance on sci fit followed by supine mat exercises. Continued with exercises per chart, added lateral step ups and mini lunges this session. Pt demonstrates improved tolerance to exercises this session without need for sitting rest break. Reassessed goals, pt demonstrates improvements in bilateral LE strength globally and improved balance/function. Pt continues to be limited by bilateral ankle weakness and reduced range of motion. Overall significant progress toward all short term and long term goals. Pt to be discharged from therapy today, pt to continue HEP independently. [] No change. [] Other:  [] Patient would continue to benefit from skilled physical therapy services in order to: increase LE strength and balance in order to decrease assist required with ADL's and decrease risk of falls. STG: (to be met in 8 treatments)  1. ? Pain: Pt will report decreased pain levels to 3-4/10 on average with prolonged walking and standing in order to improve tolerance to ADL's.  MET- 2/10/21  2. ? ROM: Pt will increase bilateral DF AROM to 0 degrees in order to improve gait pattern and stair climbing ability. Ongoing 3/10/21- active DF lacking 16 degrees R lacking 20 L  3. ? Strength: Pt will increase bilateral quad strength to 4+/5 globally in order to decrease hyperextension of LE's during gait. MET 2/10/21   4. ? Function: Pt will demonstrate decreased risk of falls as evident by an improved score on the LEMA by 7 points. - MET 3/10/21 LEMA improved by 10 points this date  5. Patient to be independent with home exercise program as demonstrated by performance with correct form without cues. MET- 2/10/21  6. Demonstrate Knowledge of fall prevention - Handout given 1/13/2021  LTG: (to be met in 16 treatments)  1. Pt will increase bilateral hip flexion strength and DF strength to 4-/5 in order to improve foot clearance ability during stair climbing and gait. MET 3/10/21  2. Pt will demonstrate improved gait mechanics while ambulating x200ft with least restrictive device and with no loss of balance in order to ensure safe community ambulation. MET 3/10/21  3. Pt will demonstrate improved functional activity tolerance as evident by an improved score on the Oswestry to less than 50% functional impairment. MET 3/10/21- 22% impaired this date                     Patient goals: \"to start walking better\"       Pt. Education:  []? Yes  [x]? No  [x]? Reviewed Prior HEP/Ed   Method of Education: [x]? Verbal positioning   [x]? Demo cone weaving  []? Written  1/13 HEP for charted exercises; Katarzyna Jacobson = [id number]  Comprehension of Education:  [x]? Verbalizes understanding. [x]? Demonstrates understanding. []? Needs Review. [x]? Demonstrates/verbalizes understanding of HEP/Ed previously given.        Plan: [] Continue current frequency toward long and short term goals. [x] Specific Instructions for subsequent treatments: pt to be discharged, continue HEP independently. Time In: 12:00 pm    Time Out:  1:00 pm      Electronically signed by:  Qiana KATZ This documentation has been reviewed and entirety of treatment session with direct supervision by clinical instructor, Gina Huddleston PT, DPT

## 2021-03-25 RX ORDER — BUPROPION HYDROCHLORIDE 150 MG/1
150 TABLET ORAL EVERY MORNING
Qty: 15 TABLET | Refills: 1 | Status: SHIPPED | OUTPATIENT
Start: 2021-03-25

## 2021-03-29 ENCOUNTER — OFFICE VISIT (OUTPATIENT)
Dept: FAMILY MEDICINE CLINIC | Age: 46
End: 2021-03-29
Payer: MEDICARE

## 2021-03-29 VITALS
DIASTOLIC BLOOD PRESSURE: 88 MMHG | TEMPERATURE: 97.3 F | RESPIRATION RATE: 20 BRPM | WEIGHT: 193 LBS | SYSTOLIC BLOOD PRESSURE: 150 MMHG | OXYGEN SATURATION: 98 % | HEIGHT: 68 IN | BODY MASS INDEX: 29.25 KG/M2 | HEART RATE: 57 BPM

## 2021-03-29 DIAGNOSIS — M62.81 MUSCLE WEAKNESS: ICD-10-CM

## 2021-03-29 DIAGNOSIS — G47.9 SLEEP DISTURBANCE: ICD-10-CM

## 2021-03-29 DIAGNOSIS — N18.6 ESRD (END STAGE RENAL DISEASE) ON DIALYSIS (HCC): ICD-10-CM

## 2021-03-29 DIAGNOSIS — E10.69 TYPE 1 DIABETES MELLITUS WITH OTHER SPECIFIED COMPLICATION (HCC): Primary | ICD-10-CM

## 2021-03-29 DIAGNOSIS — M21.371 FOOT DROP, BILATERAL: ICD-10-CM

## 2021-03-29 DIAGNOSIS — Z99.2 ESRD (END STAGE RENAL DISEASE) ON DIALYSIS (HCC): ICD-10-CM

## 2021-03-29 DIAGNOSIS — Z13.220 SCREENING FOR HYPERLIPIDEMIA: ICD-10-CM

## 2021-03-29 DIAGNOSIS — M21.372 FOOT DROP, BILATERAL: ICD-10-CM

## 2021-03-29 PROCEDURE — 99213 OFFICE O/P EST LOW 20 MIN: CPT | Performed by: INTERNAL MEDICINE

## 2021-03-29 PROCEDURE — 3051F HG A1C>EQUAL 7.0%<8.0%: CPT | Performed by: INTERNAL MEDICINE

## 2021-03-29 PROCEDURE — 1036F TOBACCO NON-USER: CPT | Performed by: INTERNAL MEDICINE

## 2021-03-29 PROCEDURE — G8484 FLU IMMUNIZE NO ADMIN: HCPCS | Performed by: INTERNAL MEDICINE

## 2021-03-29 PROCEDURE — G8427 DOCREV CUR MEDS BY ELIG CLIN: HCPCS | Performed by: INTERNAL MEDICINE

## 2021-03-29 PROCEDURE — G8419 CALC BMI OUT NRM PARAM NOF/U: HCPCS | Performed by: INTERNAL MEDICINE

## 2021-03-29 PROCEDURE — 2022F DILAT RTA XM EVC RTNOPTHY: CPT | Performed by: INTERNAL MEDICINE

## 2021-03-29 RX ORDER — INSULIN GLARGINE 100 [IU]/ML
INJECTION, SOLUTION SUBCUTANEOUS
Qty: 5 VIAL | Refills: 5 | Status: ON HOLD | OUTPATIENT
Start: 2021-03-29 | End: 2022-04-14 | Stop reason: HOSPADM

## 2021-03-29 RX ORDER — TRAZODONE HYDROCHLORIDE 100 MG/1
100 TABLET ORAL NIGHTLY PRN
Qty: 30 TABLET | Refills: 5 | Status: SHIPPED | OUTPATIENT
Start: 2021-03-29 | End: 2022-01-04 | Stop reason: ALTCHOICE

## 2021-03-29 ASSESSMENT — PATIENT HEALTH QUESTIONNAIRE - PHQ9
SUM OF ALL RESPONSES TO PHQ QUESTIONS 1-9: 0
1. LITTLE INTEREST OR PLEASURE IN DOING THINGS: 0

## 2021-03-29 NOTE — PROGRESS NOTES
Visit Information    Have you changed or started any medications since your last visit including any over-the-counter medicines, vitamins, or herbal medicines? no   Are you having any side effects from any of your medications? -  no  Have you stopped taking any of your medications? Is so, why? -  no    Have you seen any other physician or provider since your last visit? No  Have you had any other diagnostic tests since your last visit? No  Have you been seen in the emergency room and/or had an admission to a hospital since we last saw you? No  Have you had your routine dental cleaning in the past 6 months? no    Have you activated your [employer] account? If not, what are your barriers?  Yes     Patient Care Team:  Jeris Nissen, DO as PCP - General (Family Medicine)  Jeris Nissen, DO as PCP - [facility] Provider    Medical History Review  Past Medical, Family, and Social History reviewed and does contribute to the patient presenting condition    Health Maintenance   Topic Date Due    Hepatitis C screen  Never done    Pneumococcal 0-64 years Vaccine (1 of 3 - PCV13) 05/11/1980    Diabetic foot exam  Never done    Diabetic retinal exam  Never done    Lipid screen  Never done    Diabetic microalbuminuria test  Never done    Hepatitis B vaccine (1 of 3 - Risk Recombivax 3-dose series) Never done    COVID-19 Vaccine (2 - Moderna 2-dose series) 04/13/2021    A1C test (Diabetic or Prediabetic)  01/08/2022    Potassium monitoring  01/08/2022    Creatinine monitoring  01/08/2022    DTaP/Tdap/Td vaccine (2 - Td) 11/21/2030    Flu vaccine  Completed    Hepatitis A vaccine  Aged Out    Hib vaccine  Aged Out    Meningococcal (ACWY) vaccine  Aged Out    HIV screen  Discontinued

## 2021-04-16 DIAGNOSIS — M79.672 BILATERAL FOOT PAIN: Primary | ICD-10-CM

## 2021-04-16 DIAGNOSIS — M79.671 BILATERAL FOOT PAIN: Primary | ICD-10-CM

## 2021-04-19 ASSESSMENT — ENCOUNTER SYMPTOMS
DIARRHEA: 0
CONSTIPATION: 0
ABDOMINAL PAIN: 0

## 2021-04-19 NOTE — PROGRESS NOTES
[address] [facility]  [address] [place] [address]  Dept: [phone]  Dept Fax: [phone]    Linda lAan a 54 y.o. male who presents today for his medical conditions/complaints as notedbelow. Nahum Freeman is c/o of   Chief Complaint   Patient presents with    Diabetes     not due for a1c until 4/8/21         HPI:     HPI    Hemoglobin A1C (%)   Date Value   01/08/2021 7.7 (H)         ( goal A1C is < 7)   No results found for: LABMICR  No results found for: LDLCHOLESTEROL, LDLCALC    (goal LDL is <100)   AST (U/L)   Date Value   01/08/2021 127 (H)     ALT (U/L)   Date Value   01/08/2021 135 (H)     BUN (mg/dL)   Date Value   01/08/2021 27 (H)     BP Readings from Last 3 Encounters:   03/29/21 (!) 150/88   12/31/20 133/64   12/21/20 122/70          (goal 120/80)    Past Medical History:   Diagnosis Date    Kidney disease     On Dialysis    Type 1 diabetes mellitus ([address]       No past surgical history on file.     Family History   Problem Relation Age of Onset    Diabetes Mother     Diabetes Father     Heart Disease Maternal Great Grandfather        Social History     Tobacco Use    Smoking status: Never Smoker    Smokeless tobacco: Never Used   Substance Use Topics    Alcohol use: Never     Frequency: Never     Binge frequency: Never      Current Outpatient Medications   Medication Sig Dispense Refill    insulin glargine (LANTUS) 100 UNIT/ML injection vial Inject 45 units in the morning and 5 units SQ at  night 5 vial 5    traZODone (DESYREL) 100 MG tablet Take 1 tablet by mouth nightly as needed for Sleep 30 tablet 5    buPROPion (WELLBUTRIN XL) 150 MG extended release tablet Take 1 tablet by mouth every morning 15 tablet 1    metoprolol succinate (TOPROL XL) 200 MG extended release tablet Take 1 tablet by mouth daily 90 tablet 1    insulin lispro (HUMALOG) 100 UNIT/ML injection vial Inject into the skin 3 times daily (before meals)      lisinopril (PRINIVIL;ZESTRIL) 20 MG tablet Take 40 mg by mouth daily      Rosuvastatin Calcium 40 MG CPSP Take 40 mg by mouth daily      ezetimibe (ZETIA) 10 MG tablet Take 10 mg by mouth daily      FLUoxetine (PROZAC) 20 MG capsule Take 40 mg by mouth daily      omeprazole (PRILOSEC) 40 MG delayed release capsule Take 40 mg by mouth daily      loratadine (CLARITIN) 10 MG capsule Take 10 mg by mouth daily      furosemide (LASIX) 20 MG tablet Take 20 mg by mouth daily      amLODIPine (NORVASC) 10 MG tablet Take 10 mg by mouth daily      aspirin 81 MG EC tablet Take 81 mg by mouth daily      magnesium oxide (MAG-OX) 400 MG tablet Take 400 mg by mouth daily      calcium acetate 667 MG TABS Take 3 tablets by mouth daily      vitamin B-12 (CYANOCOBALAMIN) 500 MCG tablet Take 500 mcg by mouth daily       No current facility-administered medications for this visit. No Known Allergies       Health Maintenance   Topic Date Due    Hepatitis C screen  Never done    Pneumococcal 0-64 years Vaccine (1 of 3 - PCV13) 05/11/1980    Diabetic retinal exam  Never done    Lipid screen  Never done    Diabetic microalbuminuria test  Never done    Hepatitis B vaccine (1 of 3 - Risk Recombivax 3-dose series) Never done    COVID-19 Vaccine (2 - Moderna 2-dose series) 04/13/2021    A1C test (Diabetic or Prediabetic)  01/08/2022    Potassium monitoring  01/08/2022    Creatinine monitoring  01/08/2022    Diabetic foot exam  03/29/2022    DTaP/Tdap/Td vaccine (2 - Td) 11/21/2030    Flu vaccine  Completed    Hepatitis A vaccine  Aged Out    Hib vaccine  Aged Out    Meningococcal (ACWY) vaccine  Aged Out    HIV screen  Discontinued       Subjective:     Review of Systems   Constitutional: Negative for fatigue, fever and unexpected weight change. Eyes: Negative for visual disturbance. Cardiovascular: Negative for chest pain.    Gastrointestinal: Negative for abdominal pain, constipation and diarrhea. Musculoskeletal: Negative for arthralgias. Skin: Negative for rash. Neurological: Negative for headaches. Psychiatric/Behavioral: Negative for sleep disturbance. Objective:      Physical Exam  Vitals signs and nursing note reviewed. Constitutional:       General: He is not in acute distress. Appearance: Normal appearance. He is well-developed. He is obese. He is not ill-appearing, toxic-appearing or diaphoretic. Comments: Chronically ill  Thin    HENT:      Head: Normocephalic and atraumatic. Right Ear: Tympanic membrane, ear canal and external ear normal.      Left Ear: Tympanic membrane, ear canal and external ear normal.      Mouth/Throat:      Mouth: Mucous membranes are moist.   Neck:      Musculoskeletal: Normal range of motion and neck supple. No neck rigidity. Thyroid: No thyroid mass or thyroid tenderness. Vascular: No carotid bruit. Cardiovascular:      Rate and Rhythm: Normal rate and regular rhythm. No extrasystoles are present. Pulses: Normal pulses. Heart sounds: Normal heart sounds, S1 normal and S2 normal. Heart sounds not distant. No murmur. Pulmonary:      Effort: Pulmonary effort is normal. No bradypnea, accessory muscle usage, prolonged expiration, respiratory distress or retractions. Breath sounds: Normal breath sounds and air entry. No stridor, decreased air movement or transmitted upper airway sounds. No decreased breath sounds, wheezing, rhonchi or rales. Abdominal:      General: Bowel sounds are normal.      Palpations: Abdomen is soft. There is no hepatomegaly or splenomegaly. Tenderness: There is no abdominal tenderness. Musculoskeletal:      Right shoulder: He exhibits no tenderness, no bony tenderness, no pain, no spasm, normal pulse and normal strength. Left knee: He exhibits normal range of motion, no swelling, no effusion, no ecchymosis, no deformity, no laceration and no erythema. No tenderness found.  Lipid, Fasting     Standing Status:   Future     Standing Expiration Date:   3/29/2022    Hemoglobin A1C     Standing Status:   Future     Standing Expiration Date:   3/29/2022   Tim Martinez MD, Orthopedic Surgery, [place]     Referral Priority:   Routine     Referral Type:   Eval and Treat     Referral Reason:   Specialty Services Required     Referred to Provider:   Luh Jara MD     Requested Specialty:   Orthopedic Surgery     Number of Visits Requested:   1     DIABETES FOOT EXAM     Orders Placed This Encounter   Medications    insulin glargine (LANTUS) 100 UNIT/ML injection vial     Sig: Inject 45 units in the morning and 5 units SQ at  night     Dispense:  5 vial     Refill:  5    traZODone (DESYREL) 100 MG tablet     Sig: Take 1 tablet by mouth nightly as needed for Sleep     Dispense:  30 tablet     Refill:  5       Patientgiven educational materials - see patient instructions. Discussed use, benefit,and side effects of prescribed medications. All patient questions answered. Ptvoiced understanding. Reviewed health maintenance. Instructed to continue currentmedications, diet and exercise. Patient agreed with treatment plan. Follow up asdirected.      Electronically signed by Rosa Cifuentes DO on 4/19/2021 at 12:09 PM

## 2021-04-23 ENCOUNTER — OFFICE VISIT (OUTPATIENT)
Dept: ORTHOPEDIC SURGERY | Age: 46
End: 2021-04-23
Payer: MEDICARE

## 2021-04-23 VITALS
RESPIRATION RATE: 12 BRPM | HEART RATE: 59 BPM | BODY MASS INDEX: 29.25 KG/M2 | HEIGHT: 68 IN | WEIGHT: 193 LBS | OXYGEN SATURATION: 99 %

## 2021-04-23 DIAGNOSIS — M21.371 BILATERAL FOOT-DROP: Primary | ICD-10-CM

## 2021-04-23 DIAGNOSIS — M21.372 BILATERAL FOOT-DROP: Primary | ICD-10-CM

## 2021-04-23 PROCEDURE — 1036F TOBACCO NON-USER: CPT | Performed by: ORTHOPAEDIC SURGERY

## 2021-04-23 PROCEDURE — G8419 CALC BMI OUT NRM PARAM NOF/U: HCPCS | Performed by: ORTHOPAEDIC SURGERY

## 2021-04-23 PROCEDURE — G8427 DOCREV CUR MEDS BY ELIG CLIN: HCPCS | Performed by: ORTHOPAEDIC SURGERY

## 2021-04-23 PROCEDURE — 99203 OFFICE O/P NEW LOW 30 MIN: CPT | Performed by: ORTHOPAEDIC SURGERY

## 2021-04-23 NOTE — LETTER
Dr. Maile Walton  [address]  [phone]        4/23/2021     Patient: Homer Braden  YOB: 1975    Dear Sergio Lima DO,    I had the pleasure of seeing one of your patients, Homer Braden, recently in the office. Below are the relevant portions of my assessment and plan of care. ASSESSMENT AND PLAN:  He has bilateral lower extremity weakness with bilateral foot drop. He has bilateral supple feet that get to neutral.    Notably, he has a complex past medical history as above. He has a history of bipolar disorder, coronary artery disease, end-stage renal disease on dialysis, and insulin-dependent diabetes with neuropathy. We had a discussion today about the likely diagnosis and its natural history, physical exam and imaging findings, as well as various treatment options in detail. Surgically, we discussed a possible tendon transfer or ankle fusion, however I did not recommend either of these procedures at this time, as I do not believe they would offer him a benefit. We discussed that he is not a candidate for tendon transfer, and I do not believe he would benefit from an ankle fusion, as his AFOs seem to work well, and he does not have a rigid deformity. Orders/referrals were placed as below at today's visit. The patient was referred to prosthetics and orthotics with an order to replace/obtain replacement parts/materials for his AFOs, as needed. I also believe that the patient may potentially benefit from a neurology consult--we discussed the Dr. Siomara Vela may want to send the patient to a specific neurologist, if not I am happy to provide a referral.    All questions were answered and the above plan was agreed upon. The patient will return to clinic PRN . Thank you for allowing me to participate in the care of this patient.  I look forward to serving you and your patients again in the future. Please don't hesitate to contact me at my mobile number .         Kareem Combs MD  Orthopedic Surgery

## 2021-04-23 NOTE — PROGRESS NOTES
[facility] [address] AND SPORTS MEDICINE  [facility]  [address] [address]  Dept: [phone]    Ambulatory Orthopedic Consult      CHIEF COMPLAINT:    Chief Complaint   Patient presents with    Extremity Weakness     Bilateral lower extremities       HISTORY OF PRESENT ILLNESS:      The patient is a 54 y.o. male who is being seen for evaluation of the above, which began atraumatically about 4 to 5 years ago. The patient reports a progressive course. The patient has tried:      [x]  rest/activity modification          []  NSAIDs      []  opiates      []  orthotics        []  change in shoes   []  home exercises  []  physical therapy      []  CAM boot     [x]  brace:    []  injection:       []  surgery:      He is ambulating today with bilateral AFOs (prescribed by an orthopedic surgeon for diagnosis of foot drop), and a rolling walker. He is seen here today with his grandfather, who also provides history. REVIEW OF SYSTEMS:  Constitutional: Negative for fever. HENT: Negative for tinnitus. Eyes: Negative for pain. Respiratory: Negative for shortness of breath. Cardiovascular: Negative for chest pain. Gastrointestinal: Negative for abdominal pain. Genitourinary: Negative for dysuria. Skin: Negative for rash. Neurological: Negative for headaches. Hematological: Does not bruise/bleed easily. Musculoskeletal: See HPI for pertinent positives     Past Medical History:    He  has a past medical history of Kidney disease and Type 1 diabetes mellitus ([address] Past Surgical History:    He  has no past surgical history on file.      Current Medications:     Current Outpatient Medications:     insulin glargine (LANTUS) 100 UNIT/ML injection vial, Inject 45 units in the morning and 5 units SQ at  night, Disp: 5 vial, Rfl: 5    traZODone (DESYREL) 100 MG tablet, Take 1 tablet by mouth nightly as needed for Sleep, Disp: 30 tablet, Rfl: 5   buPROPion (WELLBUTRIN XL) 150 MG extended release tablet, Take 1 tablet by mouth every morning, Disp: 15 tablet, Rfl: 1    metoprolol succinate (TOPROL XL) 200 MG extended release tablet, Take 1 tablet by mouth daily, Disp: 90 tablet, Rfl: 1    insulin lispro (HUMALOG) 100 UNIT/ML injection vial, Inject into the skin 3 times daily (before meals), Disp: , Rfl:     lisinopril (PRINIVIL;ZESTRIL) 20 MG tablet, Take 40 mg by mouth daily, Disp: , Rfl:     Rosuvastatin Calcium 40 MG CPSP, Take 40 mg by mouth daily, Disp: , Rfl:     ezetimibe (ZETIA) 10 MG tablet, Take 10 mg by mouth daily, Disp: , Rfl:     FLUoxetine (PROZAC) 20 MG capsule, Take 40 mg by mouth daily, Disp: , Rfl:     omeprazole (PRILOSEC) 40 MG delayed release capsule, Take 40 mg by mouth daily, Disp: , Rfl:     loratadine (CLARITIN) 10 MG capsule, Take 10 mg by mouth daily, Disp: , Rfl:     furosemide (LASIX) 20 MG tablet, Take 20 mg by mouth daily, Disp: , Rfl:     amLODIPine (NORVASC) 10 MG tablet, Take 10 mg by mouth daily, Disp: , Rfl:     aspirin 81 MG EC tablet, Take 81 mg by mouth daily, Disp: , Rfl:     magnesium oxide (MAG-OX) 400 MG tablet, Take 400 mg by mouth daily, Disp: , Rfl:     calcium acetate 667 MG TABS, Take 3 tablets by mouth daily, Disp: , Rfl:     vitamin B-12 (CYANOCOBALAMIN) 500 MCG tablet, Take 500 mcg by mouth daily, Disp: , Rfl:      Allergies:    Patient has no known allergies. Family History:  family history includes Diabetes in his father and mother; Heart Disease in his maternal great grandfather.     Social History:   Social History     Occupational History    Not on file   Tobacco Use    Smoking status: Never Smoker    Smokeless tobacco: Never Used   Substance and Sexual Activity    Alcohol use: Never     Frequency: Never     Binge frequency: Never    Drug use: Never    Sexual activity: Not on file     Occupation: On disability    OBJECTIVE:  Pulse 59   Resp 12   Ht 5' 7.5\" (1.715 m)   Wt 193 lb (87.5 kg)   SpO2 99%   BMI 29.78 kg/m²    Psych: alert and oriented to person, time, and place  Cardio:  well perfused extremities  Resp:  normal respiratory effort  Skin:  no cyanosis  Neuro:  sensation to light touch grossly diminished bilaterally throughout the lower extremities  Musculoskeletal:    RLE:  Vascular: Toes warm and well perfused, compartments soft/compressible. No significant swelling of foot. Skin: Intact. No erythema/ulcers. Strength: Decreased globally throughout  Motion: Grossly diminished ankle/foot motion throughout          Tenderness to Palpation: None      LLE:  Vascular: Toes warm and well perfused, compartments soft/compressible. No significant swelling of foot. Skin: Intact. No erythema/ulcers. Strength: Decreased globally throughout  Motion: Grossly diminished ankle/foot motion throughout          Tenderness to Palpation: None    -Mild slap foot gait bilaterally    RADIOLOGY:   4/23/2021 FINDINGS:  Three weightbearing views (AP, Mortise, and Lateral) of the bilateral ankle and three weightbearing views (AP, Oblique, Lateral) of the bilateral foot were obtained in the office today and reviewed, revealing no acute fracture, dislocation, or radioopaque foreign body/tumor. The ankle mortise is maintained with no widening of the clear spaces. Diffuse osteopenia. Calcified vessels. IMPRESSION:  No acute fracture/dislocation. Electronically signed by Kathleen Jamison MD      No results found. ASSESSMENT AND PLAN:  Body mass index is 29.78 kg/m². He has bilateral lower extremity weakness with bilateral foot drop. He has bilateral supple feet that get to neutral.    Notably, he has a complex past medical history as above. He has a history of bipolar disorder, coronary artery disease, end-stage renal disease on dialysis, and insulin-dependent diabetes with neuropathy.     We had a discussion today about the likely diagnosis and its natural history, physical exam and recognition software. While intending to generate a document that actually reflects the content of the visit, the document can still have some errors including those of syntax and sound-a-like substitutions which may escape proof reading. In such instances, actual meaning can be extrapolated by context.

## 2021-04-27 RX ORDER — FUROSEMIDE 20 MG/1
20 TABLET ORAL DAILY
Qty: 60 TABLET | Refills: 5 | Status: ON HOLD | OUTPATIENT
Start: 2021-04-27 | End: 2022-06-02 | Stop reason: HOSPADM

## 2021-05-10 RX ORDER — FLUOXETINE HYDROCHLORIDE 20 MG/1
40 CAPSULE ORAL DAILY
Qty: 30 CAPSULE | Refills: 5 | Status: SHIPPED | OUTPATIENT
Start: 2021-05-10 | End: 2021-12-10

## 2021-06-07 ENCOUNTER — OFFICE VISIT (OUTPATIENT)
Dept: FAMILY MEDICINE CLINIC | Age: 46
End: 2021-06-07
Payer: MEDICARE

## 2021-06-07 VITALS
RESPIRATION RATE: 18 BRPM | OXYGEN SATURATION: 99 % | DIASTOLIC BLOOD PRESSURE: 74 MMHG | HEIGHT: 68 IN | BODY MASS INDEX: 28.64 KG/M2 | HEART RATE: 57 BPM | SYSTOLIC BLOOD PRESSURE: 110 MMHG | WEIGHT: 189 LBS

## 2021-06-07 DIAGNOSIS — E78.5 HYPERLIPIDEMIA, UNSPECIFIED HYPERLIPIDEMIA TYPE: ICD-10-CM

## 2021-06-07 DIAGNOSIS — I10 HYPERTENSION, UNSPECIFIED TYPE: Primary | ICD-10-CM

## 2021-06-07 DIAGNOSIS — Z99.2 ESRD (END STAGE RENAL DISEASE) ON DIALYSIS (HCC): ICD-10-CM

## 2021-06-07 DIAGNOSIS — M25.562 ACUTE PAIN OF LEFT KNEE: ICD-10-CM

## 2021-06-07 DIAGNOSIS — N18.6 ESRD (END STAGE RENAL DISEASE) ON DIALYSIS (HCC): ICD-10-CM

## 2021-06-07 DIAGNOSIS — E10.8 DM (DIABETES MELLITUS), TYPE 1 WITH COMPLICATIONS (HCC): ICD-10-CM

## 2021-06-07 PROCEDURE — 99214 OFFICE O/P EST MOD 30 MIN: CPT | Performed by: NURSE PRACTITIONER

## 2021-06-07 PROCEDURE — G8427 DOCREV CUR MEDS BY ELIG CLIN: HCPCS | Performed by: NURSE PRACTITIONER

## 2021-06-07 PROCEDURE — G8419 CALC BMI OUT NRM PARAM NOF/U: HCPCS | Performed by: NURSE PRACTITIONER

## 2021-06-07 PROCEDURE — 2022F DILAT RTA XM EVC RTNOPTHY: CPT | Performed by: NURSE PRACTITIONER

## 2021-06-07 PROCEDURE — 1036F TOBACCO NON-USER: CPT | Performed by: NURSE PRACTITIONER

## 2021-06-07 PROCEDURE — 3051F HG A1C>EQUAL 7.0%<8.0%: CPT | Performed by: NURSE PRACTITIONER

## 2021-06-07 RX ORDER — HYDRALAZINE HYDROCHLORIDE 50 MG/1
TABLET, FILM COATED ORAL
COMMUNITY
Start: 2021-05-25 | End: 2022-01-04 | Stop reason: ALTCHOICE

## 2021-06-07 RX ORDER — TRAMADOL HYDROCHLORIDE 50 MG/1
50 TABLET ORAL 2 TIMES DAILY
Status: ON HOLD | COMMUNITY
Start: 2021-04-09 | End: 2022-04-13 | Stop reason: ALTCHOICE

## 2021-06-07 ASSESSMENT — ENCOUNTER SYMPTOMS
EYE PAIN: 0
SHORTNESS OF BREATH: 0
VOMITING: 0
ABDOMINAL PAIN: 0
NAUSEA: 0
COUGH: 0
DIARRHEA: 0
BACK PAIN: 0
SINUS PAIN: 0
SORE THROAT: 0

## 2021-06-07 NOTE — PROGRESS NOTES
Visit Information    Have you changed or started any medications since your last visit including any over-the-counter medicines, vitamins, or herbal medicines? no   Are you having any side effects from any of your medications? -  no  Have you stopped taking any of your medications? Is so, why? -  no    Have you seen any other physician or provider since your last visit? No  Have you had any other diagnostic tests since your last visit? No  Have you been seen in the emergency room and/or had an admission to a hospital since we last saw you? No  Have you had your routine dental cleaning in the past 6 months? no    Have you activated your [employer] account? If not, what are your barriers?  No:      Patient Care Team:  DANIEL Beck - CNP as PCP - General (Certified Nurse Practitioner)  Kristie Lam DO as PCP - [facility]    Medical History Review  Past Medical, Family, and Social History reviewed and does contribute to the patient presenting condition    Health Maintenance   Topic Date Due    Hepatitis C screen  Never done    Pneumococcal 0-64 years Vaccine (1 of 4 - PCV13) 05/11/1980    Diabetic retinal exam  Never done    Lipid screen  Never done    Diabetic microalbuminuria test  Never done    Hepatitis B vaccine (1 of 3 - Risk Recombivax 3-dose series) Never done    COVID-19 Vaccine (2 - Moderna 2-dose series) 04/13/2021    A1C test (Diabetic or Prediabetic)  01/08/2022    Potassium monitoring  01/08/2022    Creatinine monitoring  01/08/2022    Diabetic foot exam  03/29/2022    DTaP/Tdap/Td vaccine (2 - Td or Tdap) 11/21/2030    Flu vaccine  Completed    Hepatitis A vaccine  Aged Out    Hib vaccine  Aged Out    Meningococcal (ACWY) vaccine  Aged Out    HIV screen  Discontinued

## 2021-06-07 NOTE — PATIENT INSTRUCTIONS
Patient Education        Joint Pain: Care Instructions  Your Care Instructions     Many people have small aches and pains from overuse or injury to muscles and joints. Joint injuries often happen during sports or recreation, work tasks, or projects around the home. An overuse injury can happen when you put too much stress on a joint or when you do an activity that stresses the joint over and over, such as using the computer or rowing a boat. You can take action at home to help your muscles and joints get better. You should feel better in 1 to 2 weeks, but it can take 3 months or more to heal completely. Follow-up care is a key part of your treatment and safety. Be sure to make and go to all appointments, and call your doctor if you are having problems. It's also a good idea to know your test results and keep a list of the medicines you take. How can you care for yourself at home? · Do not put weight on the injured joint for at least a day or two. · For the first day or two after an injury, do not take hot showers or baths, and do not use hot packs. The heat could make swelling worse. · Put ice or a cold pack on the sore joint for 10 to 20 minutes at a time. Try to do this every 1 to 2 hours for the next 3 days (when you are awake) or until the swelling goes down. Put a thin cloth between the ice and your skin. · Wrap the injury in an elastic bandage. Do not wrap it too tightly because this can cause more swelling. · Prop up the sore joint on a pillow when you ice it or anytime you sit or lie down during the next 3 days. Try to keep it above the level of your heart. This will help reduce swelling. · Take an over-the-counter pain medicine, such as acetaminophen (Tylenol), ibuprofen (Advil, Motrin), or naproxen (Aleve). Read and follow all instructions on the label. · After 1 or 2 days of rest, begin moving the joint gently.  While the joint is still healing, you can begin to exercise using activities that do not strain or hurt the painful joint. When should you call for help? Call your doctor now or seek immediate medical care if:    · You have signs of infection, such as:  ? Increased pain, swelling, warmth, and redness. ? Red streaks leading from the joint. ? A fever. Watch closely for changes in your health, and be sure to contact your doctor if:    · Your movement or symptoms are not getting better after 1 to 2 weeks of home treatment. Where can you learn more? Go to [email or web address] org and sign in to your [employer] account. Enter P205 in the OkCopay box to learn more about \"Joint Pain: Care Instructions. \"     If you do not have an account, please click on the \"Sign Up Now\" link. Current as of: November 16, 2020               Content Version: 12.8  © 9220-9808 Healthwise, Incorporated. Care instructions adapted under license by [facility] ([facility]). If you have questions about a medical condition or this instruction, always ask your healthcare professional. [address] any warranty or liability for your use of this information.

## 2021-06-07 NOTE — PROGRESS NOTES
tablet 5    insulin glargine (LANTUS) 100 UNIT/ML injection vial Inject 45 units in the morning and 5 units SQ at  night 5 vial 5    traZODone (DESYREL) 100 MG tablet Take 1 tablet by mouth nightly as needed for Sleep 30 tablet 5    buPROPion (WELLBUTRIN XL) 150 MG extended release tablet Take 1 tablet by mouth every morning 15 tablet 1    metoprolol succinate (TOPROL XL) 200 MG extended release tablet Take 1 tablet by mouth daily 90 tablet 1    insulin lispro (HUMALOG) 100 UNIT/ML injection vial Inject into the skin 3 times daily (before meals)      lisinopril (PRINIVIL;ZESTRIL) 20 MG tablet Take 40 mg by mouth daily      Rosuvastatin Calcium 40 MG CPSP Take 40 mg by mouth daily      ezetimibe (ZETIA) 10 MG tablet Take 10 mg by mouth daily      omeprazole (PRILOSEC) 40 MG delayed release capsule Take 40 mg by mouth daily      loratadine (CLARITIN) 10 MG capsule Take 10 mg by mouth daily      amLODIPine (NORVASC) 10 MG tablet Take 10 mg by mouth daily      aspirin 81 MG EC tablet Take 81 mg by mouth daily      magnesium oxide (MAG-OX) 400 MG tablet Take 400 mg by mouth daily      calcium acetate 667 MG TABS Take 3 tablets by mouth daily      vitamin B-12 (CYANOCOBALAMIN) 500 MCG tablet Take 500 mcg by mouth daily      hydrALAZINE (APRESOLINE) 50 MG tablet  (Patient not taking: Reported on 6/7/2021)       No current facility-administered medications for this visit. No Known Allergies    Subjective:      Review of Systems   Constitutional: Negative for chills and fatigue. HENT: Negative for congestion, ear pain, sinus pain and sore throat. Eyes: Negative for pain and visual disturbance. Respiratory: Negative for cough and shortness of breath. Cardiovascular: Negative for chest pain and palpitations. Gastrointestinal: Negative for abdominal pain, diarrhea, nausea and vomiting. Genitourinary: Negative for penile pain and testicular pain.    Musculoskeletal: Positive for arthralgias (left knee). Negative for back pain, joint swelling and neck pain. Skin: Negative for rash. Neurological: Negative for dizziness and light-headedness. Hematological: Does not bruise/bleed easily. All other systems reviewed and are negative.      :Objective     Physical Exam  Vitals and nursing note reviewed. Constitutional:       General: He is not in acute distress. Appearance: Normal appearance. He is not toxic-appearing. HENT:      Mouth/Throat:      Mouth: Mucous membranes are moist.   Cardiovascular:      Rate and Rhythm: Normal rate. Pulmonary:      Effort: Pulmonary effort is normal. No respiratory distress. Breath sounds: Normal breath sounds. Musculoskeletal:        Legs:    Skin:     General: Skin is warm and dry. Neurological:      General: No focal deficit present. Mental Status: He is alert and oriented to person, place, and time.        /74   Pulse 57   Resp 18   Ht 5' 7.5\" (1.715 m)   Wt 189 lb (85.7 kg)   SpO2 99%   BMI 29.16 kg/m²     Lab Review   Hospital Outpatient Visit on 01/08/2021   Component Date Value    Vitamin B-12 01/08/2021 1630*    Folate 01/08/2021 12.7     Vit D, 25-Hydroxy 01/08/2021 12.3*    WBC 01/08/2021 5.9     RBC 01/08/2021 3.86*    Hemoglobin 01/08/2021 10.7*    Hematocrit 01/08/2021 33.9*    MCV 01/08/2021 87.8     MCH 01/08/2021 27.7     MCHC 01/08/2021 31.6     RDW 01/08/2021 13.6     Platelets 23/75/7080 184     MPV 01/08/2021 11.5     NRBC Automated 01/08/2021 0.0     Differential Type 01/08/2021 NOT REPORTED     Seg Neutrophils 01/08/2021 68*    Lymphocytes 01/08/2021 18*    Monocytes 01/08/2021 11     Eosinophils % 01/08/2021 2     Basophils 01/08/2021 1     Immature Granulocytes 01/08/2021 0     Segs Absolute 01/08/2021 4.02     Absolute Lymph # 01/08/2021 1.04*    Absolute Mono # 01/08/2021 0.63     Absolute Eos # 01/08/2021 0.14     Basophils Absolute 01/08/2021 0.03     Absolute Immature Granul* 01/08/2021 <0.03     WBC Morphology 01/08/2021 NOT REPORTED     RBC Morphology 01/08/2021 NOT REPORTED     Platelet Estimate 07/52/7934 NOT REPORTED     Glucose 01/08/2021 139*    BUN 01/08/2021 27*    CREATININE 01/08/2021 5.34*    Bun/Cre Ratio 01/08/2021 NOT REPORTED     Calcium 01/08/2021 9.0     Sodium 01/08/2021 140     Potassium 01/08/2021 4.2     Chloride 01/08/2021 98     CO2 01/08/2021 24     Anion Gap 01/08/2021 18*    Alkaline Phosphatase 01/08/2021 313*    ALT 01/08/2021 135*    AST 01/08/2021 127*    Total Bilirubin 01/08/2021 0.38     Total Protein 01/08/2021 6.5     Albumin 01/08/2021 4.1     Albumin/Globulin Ratio 01/08/2021 1.7     GFR Non- 01/08/2021 12*    GFR  01/08/2021 14*    GFR Comment 01/08/2021          GFR Staging 01/08/2021 NOT REPORTED     Hemoglobin A1C 01/08/2021 7.7*    Estimated Avg Glucose 01/08/2021 174    Admission on 12/31/2020, Discharged on 12/31/2020   Component Date Value    POC Glucose 12/31/2020 94     Glucose 12/31/2020 78     BUN 12/31/2020 44*    CREATININE 12/31/2020 6.64*    Bun/Cre Ratio 12/31/2020 7*    Calcium 12/31/2020 9.6     Sodium 12/31/2020 133*    Potassium 12/31/2020 3.7     Chloride 12/31/2020 94*    CO2 12/31/2020 27     Anion Gap 12/31/2020 12     GFR Non- 12/31/2020 9*    GFR  12/31/2020 11*    GFR Comment 12/31/2020          GFR Staging 12/31/2020 NOT REPORTED     WBC 12/31/2020 8.5     RBC 12/31/2020 3.85*    Hemoglobin 12/31/2020 10.5*    Hematocrit 12/31/2020 32.7*    MCV 12/31/2020 84.9     MCH 12/31/2020 27.3     MCHC 12/31/2020 32.1     RDW 12/31/2020 13.2     Platelets 77/20/2180 210     MPV 12/31/2020 10.9     NRBC Automated 12/31/2020 0.0     Differential Type 12/31/2020 NOT REPORTED     Seg Neutrophils 12/31/2020 80*    Lymphocytes 12/31/2020 10*    Monocytes 12/31/2020 7     Eosinophils % 12/31/2020 2     Basophils 12/31/2020 1     Immature Granulocytes 12/31/2020 0     Segs Absolute 12/31/2020 6.75     Absolute Lymph # 12/31/2020 0.88*    Absolute Mono # 12/31/2020 0.56     Absolute Eos # 12/31/2020 0.20     Basophils Absolute 12/31/2020 0.05     Absolute Immature Granul* 12/31/2020 0.02     WBC Morphology 12/31/2020 NOT REPORTED     RBC Morphology 12/31/2020 NOT REPORTED     Platelet Estimate 27/30/8669 NOT REPORTED     Glucose 12/31/2020 120     QC OK? 12/31/2020 ok     Glucose 12/31/2020 40     QC OK? 12/31/2020 yes     Glucose 12/31/2020 97     Glucose 12/31/2020 114     POC Glucose 12/31/2020 120*    POC Glucose 12/31/2020 40*    POC Glucose 12/31/2020 97     POC Glucose 12/31/2020 114*       Assessment and Plan      1. Hypertension, unspecified type  Assessment & Plan:   Well-controlled, continue current medications  Orders:  -     CBC With Auto Differential; Future  2. DM (diabetes mellitus), type 1 with complications ([facility] [address]  Assessment & Plan:  Continue meds, follow with endo  Orders:  -     Hemoglobin A1C; Future  3. ESRD (end stage renal disease) on dialysis [facility])  Assessment & Plan:   Continue current regimen, nephro maintenance  Orders:  -     Comprehensive Metabolic Panel; Future  4. Hyperlipidemia, unspecified hyperlipidemia type  -     Lipid Panel; Future  5. Acute pain of left knee  Assessment & Plan: Will eval results  Possible mri/ ortho  Orders:  -     XR KNEE LEFT (3 VIEWS); Future                 No results found for this visit on 06/07/21. Return in about 3 months (around 9/7/2021), or if symptoms worsen or fail to improve. No orders of the defined types were placed in this encounter. Patient given educational materials - see patient instructions. Discussed use, benefit, and side effects of prescribed medications. All patientquestions answered. Pt voiced understanding. Patient given educational materials - see patient instructions. Discussed use, benefit, and side effects of prescribed medications. All patientquestions answered. Pt voiced understanding. This note was transcribed using dictation with Dragon services. Efforts were made to correct any errors but some words may be misinterpreted.     Electronically signed by DANIEL Snyder CNP on 6/7/2021at 12:48 PM

## 2021-06-08 ENCOUNTER — HOSPITAL ENCOUNTER (OUTPATIENT)
Dept: GENERAL RADIOLOGY | Age: 46
Discharge: HOME | End: 2021-06-10
Payer: MEDICARE

## 2021-06-08 ENCOUNTER — HOSPITAL ENCOUNTER (OUTPATIENT)
Age: 46
Discharge: HOME | End: 2021-06-10
Payer: MEDICARE

## 2021-06-08 DIAGNOSIS — M25.562 ACUTE PAIN OF LEFT KNEE: ICD-10-CM

## 2021-06-08 PROCEDURE — 73562 X-RAY EXAM OF KNEE 3: CPT

## 2021-06-10 ENCOUNTER — TELEPHONE (OUTPATIENT)
Dept: FAMILY MEDICINE CLINIC | Age: 46
End: 2021-06-10

## 2021-06-10 DIAGNOSIS — M25.562 ACUTE PAIN OF LEFT KNEE: Primary | ICD-10-CM

## 2021-06-10 NOTE — TELEPHONE ENCOUNTER
Patient called requesting an MRI to be placed so patient can get that done. He states that it has to be of his knee and below.

## 2021-06-28 ENCOUNTER — TELEPHONE (OUTPATIENT)
Dept: FAMILY MEDICINE CLINIC | Age: 46
End: 2021-06-28

## 2021-06-28 NOTE — TELEPHONE ENCOUNTER
----- Message from Mandasanjuana Mittal sent at 6/28/2021 10:54 AM EDT -----  Subject: Appointment Request    Reason for Call: Urgent Chest Pain    QUESTIONS  Type of Appointment? Established Patient  Reason for appointment request? No appointments available during search  Additional Information for Provider? Patient grandfather who lives with   him called forhim he was sleeping. Khadra Kasper goes to dialysis and they said   bp low cannot go back to dialysis. Heart beat running slow. grandfather   trying to explain but we will need to call back Khadra Kasper about all this. [phone] call them back today to get an appt asap. Grandfather didn't   know alot and pt was sleeping.   ---------------------------------------------------------------------------  --------------  CALL BACK INFO  What is the best way for the office to contact you? OK to leave message on   voicemail  Preferred Call Back Phone Number? [phone]  ---------------------------------------------------------------------------  --------------  SCRIPT ANSWERS  Relationship to Patient? Other  Representative Name? Raffaele Corcoran  Additional information verified (besides Name and Date of Birth)? Address  Appointment reason? Symptomatic  Select script based on patient symptoms? Adult Chest Pain [Angina, [place],   Heart Attack]  Are you currently having chest pain? No  Has your pain started or worsened within the past 3 days? No  Are you having difficulty breathing? No  Have you previously seen a provider for this pain? No  Have you been diagnosed with, awaiting test results for, or told that you   are suspected of having COVID-19 (Coronavirus)? (If patient has tested   negative or was tested as a requirement for work, school, or travel and   not based on symptoms, answer no)? No  Do you currently have flu-like symptoms including fever or chills, cough,   shortness of breath, difficulty breathing, or new loss of taste or smell?    No  Have you had close contact with someone with COVID-19 in the last 14 days? No  (Service Expert  click yes below to proceed with [employer] As Usual   Scheduling)?  Yes

## 2021-07-13 ENCOUNTER — HOSPITAL ENCOUNTER (EMERGENCY)
Age: 46
Discharge: HOME | End: 2021-07-13
Attending: EMERGENCY MEDICINE
Payer: MEDICARE

## 2021-07-13 VITALS
HEIGHT: 67 IN | DIASTOLIC BLOOD PRESSURE: 82 MMHG | HEART RATE: 64 BPM | TEMPERATURE: 98 F | WEIGHT: 187.1 LBS | RESPIRATION RATE: 16 BRPM | OXYGEN SATURATION: 100 % | BODY MASS INDEX: 29.37 KG/M2 | SYSTOLIC BLOOD PRESSURE: 162 MMHG

## 2021-07-13 DIAGNOSIS — E16.2 HYPOGLYCEMIA: Primary | ICD-10-CM

## 2021-07-13 LAB
ABSOLUTE EOS #: 0.49 K/UL (ref 0–0.44)
ABSOLUTE IMMATURE GRANULOCYTE: 0.03 K/UL (ref 0–0.3)
ABSOLUTE LYMPH #: 2.22 K/UL (ref 1.1–3.7)
ABSOLUTE MONO #: 1.08 K/UL (ref 0.1–1.2)
ANION GAP SERPL CALCULATED.3IONS-SCNC: 16 MMOL/L (ref 9–17)
BASOPHILS # BLD: 1 % (ref 0–2)
BASOPHILS ABSOLUTE: 0.08 K/UL (ref 0–0.2)
BUN BLDV-MCNC: 17 MG/DL (ref 6–20)
BUN/CREAT BLD: 3 (ref 9–20)
CALCIUM SERPL-MCNC: 10.2 MG/DL (ref 8.6–10.4)
CHLORIDE BLD-SCNC: 95 MMOL/L (ref 98–107)
CHP ED QC CHECK: YES
CHP ED QC CHECK: YES
CO2: 26 MMOL/L (ref 20–31)
CREAT SERPL-MCNC: 4.96 MG/DL (ref 0.7–1.2)
DIFFERENTIAL TYPE: ABNORMAL
EOSINOPHILS RELATIVE PERCENT: 5 % (ref 1–4)
GFR AFRICAN AMERICAN: 15 ML/MIN
GFR NON-AFRICAN AMERICAN: 13 ML/MIN
GFR SERPL CREATININE-BSD FRML MDRD: ABNORMAL ML/MIN/{1.73_M2}
GFR SERPL CREATININE-BSD FRML MDRD: ABNORMAL ML/MIN/{1.73_M2}
GLUCOSE BLD-MCNC: 125 MG/DL (ref 75–110)
GLUCOSE BLD-MCNC: 135 MG/DL
GLUCOSE BLD-MCNC: 135 MG/DL (ref 75–110)
GLUCOSE BLD-MCNC: 43 MG/DL (ref 70–99)
GLUCOSE BLD-MCNC: 45 MG/DL
GLUCOSE BLD-MCNC: 45 MG/DL (ref 75–110)
HCT VFR BLD CALC: 39.2 % (ref 40.7–50.3)
HEMOGLOBIN: 13.3 G/DL (ref 13–17)
IMMATURE GRANULOCYTES: 0 %
LYMPHOCYTES # BLD: 24 % (ref 24–43)
MCH RBC QN AUTO: 30.1 PG (ref 25.2–33.5)
MCHC RBC AUTO-ENTMCNC: 33.9 G/DL (ref 28.4–34.8)
MCV RBC AUTO: 88.7 FL (ref 82.6–102.9)
MONOCYTES # BLD: 12 % (ref 3–12)
NRBC AUTOMATED: 0 PER 100 WBC
PDW BLD-RTO: 12.1 % (ref 11.8–14.4)
PLATELET # BLD: 249 K/UL (ref 138–453)
PLATELET ESTIMATE: ABNORMAL
PMV BLD AUTO: 9.2 FL (ref 8.1–13.5)
POTASSIUM SERPL-SCNC: 3.9 MMOL/L (ref 3.7–5.3)
RBC # BLD: 4.42 M/UL (ref 4.21–5.77)
RBC # BLD: ABNORMAL 10*6/UL
SEG NEUTROPHILS: 58 % (ref 36–65)
SEGMENTED NEUTROPHILS ABSOLUTE COUNT: 5.25 K/UL (ref 1.5–8.1)
SODIUM BLD-SCNC: 137 MMOL/L (ref 135–144)
WBC # BLD: 9.2 K/UL (ref 3.5–11.3)
WBC # BLD: ABNORMAL 10*3/UL

## 2021-07-13 PROCEDURE — 2500000003 HC RX 250 WO HCPCS: Performed by: NURSE PRACTITIONER

## 2021-07-13 PROCEDURE — 80048 BASIC METABOLIC PNL TOTAL CA: CPT

## 2021-07-13 PROCEDURE — 99282 EMERGENCY DEPT VISIT SF MDM: CPT

## 2021-07-13 PROCEDURE — 82947 ASSAY GLUCOSE BLOOD QUANT: CPT

## 2021-07-13 PROCEDURE — 85025 COMPLETE CBC W/AUTO DIFF WBC: CPT

## 2021-07-13 RX ORDER — DEXTROSE MONOHYDRATE 25 G/50ML
25 INJECTION, SOLUTION INTRAVENOUS ONCE
Status: COMPLETED | OUTPATIENT
Start: 2021-07-13 | End: 2021-07-13

## 2021-07-13 RX ADMIN — DEXTROSE MONOHYDRATE 25 G: 25 INJECTION, SOLUTION INTRAVENOUS at 15:04

## 2021-07-13 ASSESSMENT — ENCOUNTER SYMPTOMS
DIARRHEA: 0
NAUSEA: 0
COLOR CHANGE: 0
VOMITING: 0
COUGH: 0
SHORTNESS OF BREATH: 0
ABDOMINAL PAIN: 0

## 2021-07-13 NOTE — ED NOTES
Pt to room by wheelchair. Pt states he had low blood sugar reading at home today after his dialysis. Pt states his blood sugar was in normal range this morning before dialysis. Pt given lunch box and juice.      Austin Brand RN  07/13/21 [id number]

## 2021-07-13 NOTE — ED PROVIDER NOTES
[facility] ED  eMERGENCY dEPARTMENT eNCOUnter      Pt Name: Evelia Chen  MRN: [id number]  [place] 1975  Date of evaluation: 7/13/2021  Provider: DANIEL Palomares CNP    CHIEF COMPLAINT       Chief Complaint   Patient presents with    Hypoglycemia     states was 80 at home after dialysis, states feels it dropping still         HISTORY OF PRESENT ILLNESS  (Location/Symptom, Timing/Onset, Context/Setting, Quality, Duration, Modifying Factors, Severity.)   Evelia Chen is a 51 y.o. male who presents to the emergency department via private auto for hypoglycemia. Reports he started having issues with his blood sugar during dialysis today. He took his Lantus this morning. Reports eating. Denies fever, chills, cough, wound, N/V/D. Denies pain. Nursing Notes were reviewed. ALLERGIES     Patient has no known allergies.     CURRENT MEDICATIONS       Discharge Medication List as of 7/13/2021  5:00 PM      CONTINUE these medications which have NOT CHANGED    Details   traMADol (ULTRAM) 50 MG tablet Historical Med      hydrALAZINE (APRESOLINE) 50 MG tablet Historical Med      FLUoxetine (PROZAC) 20 MG capsule Take 2 capsules by mouth daily, Disp-30 capsule, R-5Normal      furosemide (LASIX) 20 MG tablet Take 1 tablet by mouth daily, Disp-60 tablet, R-5Normal      insulin glargine (LANTUS) 100 UNIT/ML injection vial Inject 45 units in the morning and 5 units SQ at  night, Disp-5 vial, R-5Normal      traZODone (DESYREL) 100 MG tablet Take 1 tablet by mouth nightly as needed for Sleep, Disp-30 tablet, R-5Normal      buPROPion (WELLBUTRIN XL) 150 MG extended release tablet Take 1 tablet by mouth every morning, Disp-15 tablet, R-1Normal      metoprolol succinate (TOPROL XL) 200 MG extended release tablet Take 1 tablet by mouth daily, Disp-90 tablet, R-1Cancel previous scriptNormal      insulin lispro (HUMALOG) 100 UNIT/ML injection vial Inject into the skin 3 times daily (before meals)Historical Med      lisinopril (PRINIVIL;ZESTRIL) 20 MG tablet Take 40 mg by mouth dailyHistorical Med      Rosuvastatin Calcium 40 MG CPSP Take 40 mg by mouth dailyHistorical Med      ezetimibe (ZETIA) 10 MG tablet Take 10 mg by mouth dailyHistorical Med      omeprazole (PRILOSEC) 40 MG delayed release capsule Take 40 mg by mouth dailyHistorical Med      loratadine (CLARITIN) 10 MG capsule Take 10 mg by mouth dailyHistorical Med      amLODIPine (NORVASC) 10 MG tablet Take 10 mg by mouth dailyHistorical Med      aspirin 81 MG EC tablet Take 81 mg by mouth dailyHistorical Med      magnesium oxide (MAG-OX) 400 MG tablet Take 400 mg by mouth dailyHistorical Med      calcium acetate 667 MG TABS Take 3 tablets by mouth dailyHistorical Med      vitamin B-12 (CYANOCOBALAMIN) 500 MCG tablet Take 500 mcg by mouth dailyHistorical Med             PAST MEDICAL HISTORY         Diagnosis Date    Dialysis patient [facility])     Kidney disease     On Dialysis    Type 1 diabetes mellitus ([facility] [address]        SURGICAL HISTORY     History reviewed. No pertinent surgical history. FAMILY HISTORY           Problem Relation Age of Onset    Diabetes Mother     Diabetes Father     Heart Disease Maternal Great Grandfather      Family Status   Relation Name Status    Mother  (Not Specified)    Father  (Not Specified)    MGGF  (Not Specified)        SOCIAL HISTORY      reports that he has never smoked. He has never used smokeless tobacco. He reports that he does not drink alcohol and does not use drugs. REVIEW OF SYSTEMS    (2-9 systems for level 4, 10 or more for level 5)     Review of Systems   Constitutional: Positive for fatigue. Negative for chills, diaphoresis and fever. Respiratory: Negative for cough and shortness of breath. Cardiovascular: Negative for chest pain. Gastrointestinal: Negative for abdominal pain, diarrhea, nausea and vomiting. Musculoskeletal: Negative for arthralgias and myalgias.    Skin: Negative for color change, rash and wound. Neurological: Negative for dizziness, weakness, light-headedness and headaches. Except as noted above the remainder of the review of systems was reviewed and negative. PHYSICAL EXAM    (up to 7 for level 4, 8 or more for level 5)     ED Triage Vitals [07/13/21 1446]   BP Temp Temp src Pulse Resp SpO2 Height Weight   (!) 167/75 98 °F (36.7 °C) -- 65 16 100 % 5' 6.5\" (1.689 m) 187 lb 1.6 oz (84.9 kg)     Physical Exam  Vitals reviewed. Constitutional:       General: He is not in acute distress. Appearance: He is well-developed. He is not diaphoretic. HENT:      Right Ear: External ear normal.      Left Ear: External ear normal.   Eyes:      General: No scleral icterus. Conjunctiva/sclera: Conjunctivae normal.   Neck:      Vascular: No JVD. Cardiovascular:      Rate and Rhythm: Normal rate. Pulmonary:      Effort: Pulmonary effort is normal. No respiratory distress. Breath sounds: No stridor. Abdominal:      Palpations: Abdomen is soft. Tenderness: There is no abdominal tenderness. Skin:     General: Skin is warm and dry. Findings: No rash. Neurological:      Mental Status: He is alert and oriented to person, place, and time. GCS: GCS eye subscore is 4. GCS verbal subscore is 5. GCS motor subscore is 6. Motor: Motor function is intact. Coordination: Coordination is intact.    Psychiatric:         Behavior: Behavior normal.         DIAGNOSTIC RESULTS     LABS:  Labs Reviewed   CBC WITH AUTO DIFFERENTIAL - Abnormal; Notable for the following components:       Result Value    Hematocrit 39.2 (*)     Eosinophils % 5 (*)     Absolute Eos # 0.49 (*)     All other components within normal limits   BASIC METABOLIC PANEL - Abnormal; Notable for the following components:    Glucose 43 (*)     CREATININE 4.96 (*)     Bun/Cre Ratio 3 (*)     Chloride 95 (*)     GFR Non- 13 (*)     GFR  15 (*)     All other components within normal limits   POC GLUCOSE FINGERSTICK - Abnormal; Notable for the following components:    POC Glucose 45 (*)     All other components within normal limits   POC GLUCOSE FINGERSTICK - Abnormal; Notable for the following components:    POC Glucose 135 (*)     All other components within normal limits   POC GLUCOSE FINGERSTICK - Abnormal; Notable for the following components:    POC Glucose 125 (*)     All other components within normal limits   POCT GLUCOSE - Normal   POCT GLUCOSE - Normal       All other labs were within normal range or not returned as of this dictation. EMERGENCY DEPARTMENT COURSE and DIFFERENTIAL DIAGNOSIS/MDM:   Vitals:    Vitals:    07/13/21 1446 07/13/21 1500 07/13/21 1600 07/13/21 1700   BP: (!) 167/75 (!) 161/78 (!) 169/75 (!) 162/82   Pulse: 65  61 64   Resp: 16      Temp: 98 °F (36.7 °C)      SpO2: 100% 100% 100% 100%   Weight: 187 lb 1.6 oz (84.9 kg)      Height: 5' 6.5\" (1.689 m)            MEDICATIONS GIVEN IN THE ED:  Medications   dextrose 50 % IV solution (25 g Intravenous Given 7/13/21 1504)       CLINICAL DECISION MAKING:  The patient presented alert with a nontoxic appearance. He was given dextrose and a boxed lunch here with improvement. He was observed here for a period of time. Follow up with pcp for further evaluation and treatment, return to ED if condition worsens. FINAL IMPRESSION      1.  Hypoglycemia            Problem List  Patient Active Problem List   Diagnosis Code    DM (diabetes mellitus), type 1 with complications ([facility] [address] F93.2    ESRD (end stage renal disease) on dialysis ([facility] [address] N18.6, Z99.2    Hypertension I10    Hyperlipidemia E78.5    Acute pain of left knee M25.562         DISPOSITION/PLAN   DISPOSITION  DISCHARGE      PATIENT REFERRED TO:   DANIEL Malcolm - CNP  [address]  [phone]    Schedule an appointment as soon as possible for a visit       [facility] ED  [place] [address] [address] [facility]  [phone]    If symptoms worsen, As needed      DISCHARGE MEDICATIONS:     Discharge Medication List as of 7/13/2021  5:00 PM              (Please note that portions of this note were completed with a voice recognition program.  Efforts were made to edit the dictations but occasionally words are mis-transcribed.)    DANIEL Mccallum [facility] [address], APRN - CNP  80/46/00 [id number]       Caitlin White MD  71/16/06 9053

## 2021-07-16 NOTE — ED PROVIDER NOTES
for the following components:    POC Glucose 125 (*)     All other components within normal limits   POCT GLUCOSE - Normal   POCT GLUCOSE - Normal     CONSULTS:  None  FINAL IMPRESSION      1. Hypoglycemia            PASTMEDICAL HISTORY     Past Medical History:   Diagnosis Date    Dialysis patient [facility])     Kidney disease     On Dialysis    Type 1 diabetes mellitus ([facility] [address]      SURGICAL HISTORY     History reviewed. No pertinent surgical history.   CURRENT MEDICATIONS       Discharge Medication List as of 7/13/2021  5:00 PM      CONTINUE these medications which have NOT CHANGED    Details   traMADol (ULTRAM) 50 MG tablet Historical Med      hydrALAZINE (APRESOLINE) 50 MG tablet Historical Med      FLUoxetine (PROZAC) 20 MG capsule Take 2 capsules by mouth daily, Disp-30 capsule, R-5Normal      furosemide (LASIX) 20 MG tablet Take 1 tablet by mouth daily, Disp-60 tablet, R-5Normal      insulin glargine (LANTUS) 100 UNIT/ML injection vial Inject 45 units in the morning and 5 units SQ at  night, Disp-5 vial, R-5Normal      traZODone (DESYREL) 100 MG tablet Take 1 tablet by mouth nightly as needed for Sleep, Disp-30 tablet, R-5Normal      buPROPion (WELLBUTRIN XL) 150 MG extended release tablet Take 1 tablet by mouth every morning, Disp-15 tablet, R-1Normal      metoprolol succinate (TOPROL XL) 200 MG extended release tablet Take 1 tablet by mouth daily, Disp-90 tablet, R-1Cancel previous scriptNormal      insulin lispro (HUMALOG) 100 UNIT/ML injection vial Inject into the skin 3 times daily (before meals)Historical Med      lisinopril (PRINIVIL;ZESTRIL) 20 MG tablet Take 40 mg by mouth dailyHistorical Med      Rosuvastatin Calcium 40 MG CPSP Take 40 mg by mouth dailyHistorical Med      ezetimibe (ZETIA) 10 MG tablet Take 10 mg by mouth dailyHistorical Med      omeprazole (PRILOSEC) 40 MG delayed release capsule Take 40 mg by mouth dailyHistorical Med      loratadine (CLARITIN) 10 MG capsule Take 10 mg by mouth dailyHistorical Med      amLODIPine (NORVASC) 10 MG tablet Take 10 mg by mouth dailyHistorical Med      aspirin 81 MG EC tablet Take 81 mg by mouth dailyHistorical Med      magnesium oxide (MAG-OX) 400 MG tablet Take 400 mg by mouth dailyHistorical Med      calcium acetate 667 MG TABS Take 3 tablets by mouth dailyHistorical Med      vitamin B-12 (CYANOCOBALAMIN) 500 MCG tablet Take 500 mcg by mouth dailyHistorical Med           ALLERGIES     has No Known Allergies. FAMILY HISTORY     He indicated that the status of his mother is unknown. He indicated that the status of his father is unknown. He indicated that the status of his maternal great grandfather is unknown. SOCIAL HISTORY       Social History     Tobacco Use    Smoking status: Never Smoker    Smokeless tobacco: Never Used   Vaping Use    Vaping Use: Never used   Substance Use Topics    Alcohol use: Never    Drug use: Never       I personally evaluated and examined the patient in conjunction with the APC and agree with the assessment, treatment plan, and disposition of the patient as recorded by the APC.    Kirsten Winter MD  Attending Emergency Physician         Kirsten Winter MD  67/67/76 [id number]

## 2021-07-19 ENCOUNTER — HOSPITAL ENCOUNTER (OUTPATIENT)
Age: 46
Setting detail: SPECIMEN
Discharge: HOME | End: 2021-07-19
Payer: MEDICARE

## 2021-07-19 ENCOUNTER — OFFICE VISIT (OUTPATIENT)
Dept: FAMILY MEDICINE CLINIC | Age: 46
End: 2021-07-19
Payer: MEDICARE

## 2021-07-19 VITALS
RESPIRATION RATE: 18 BRPM | HEIGHT: 67 IN | BODY MASS INDEX: 29.82 KG/M2 | SYSTOLIC BLOOD PRESSURE: 144 MMHG | DIASTOLIC BLOOD PRESSURE: 86 MMHG | HEART RATE: 85 BPM | OXYGEN SATURATION: 97 % | WEIGHT: 190 LBS

## 2021-07-19 DIAGNOSIS — Z13.220 LIPID SCREENING: ICD-10-CM

## 2021-07-19 DIAGNOSIS — E10.8 DM (DIABETES MELLITUS), TYPE 1 WITH COMPLICATIONS (HCC): ICD-10-CM

## 2021-07-19 DIAGNOSIS — I10 HYPERTENSION, UNSPECIFIED TYPE: Primary | ICD-10-CM

## 2021-07-19 LAB
CHOLESTEROL/HDL RATIO: 2
CHOLESTEROL: 103 MG/DL
ESTIMATED AVERAGE GLUCOSE: 169 MG/DL
HBA1C MFR BLD: 7.5 % (ref 4–6)
HDLC SERPL-MCNC: 52 MG/DL
LDL CHOLESTEROL: 38 MG/DL (ref 0–130)
TRIGL SERPL-MCNC: 64 MG/DL
VLDLC SERPL CALC-MCNC: NORMAL MG/DL (ref 1–30)

## 2021-07-19 PROCEDURE — 99213 OFFICE O/P EST LOW 20 MIN: CPT | Performed by: NURSE PRACTITIONER

## 2021-07-19 PROCEDURE — 1036F TOBACCO NON-USER: CPT | Performed by: NURSE PRACTITIONER

## 2021-07-19 PROCEDURE — 3051F HG A1C>EQUAL 7.0%<8.0%: CPT | Performed by: NURSE PRACTITIONER

## 2021-07-19 PROCEDURE — G8427 DOCREV CUR MEDS BY ELIG CLIN: HCPCS | Performed by: NURSE PRACTITIONER

## 2021-07-19 PROCEDURE — 2022F DILAT RTA XM EVC RTNOPTHY: CPT | Performed by: NURSE PRACTITIONER

## 2021-07-19 PROCEDURE — G8417 CALC BMI ABV UP PARAM F/U: HCPCS | Performed by: NURSE PRACTITIONER

## 2021-07-19 RX ORDER — ONDANSETRON 4 MG/1
TABLET, ORALLY DISINTEGRATING ORAL
COMMUNITY
Start: 2021-06-18 | End: 2022-01-04

## 2021-07-19 ASSESSMENT — ENCOUNTER SYMPTOMS
NAUSEA: 0
DIARRHEA: 0
SORE THROAT: 0
SHORTNESS OF BREATH: 0
SINUS PAIN: 0
BACK PAIN: 0
COUGH: 0
ABDOMINAL PAIN: 0
VOMITING: 0
EYE PAIN: 0

## 2021-07-19 NOTE — PROGRESS NOTES
[address]  [address]  [address] [facility] [address]  Dept: [phone]  Dept Fax: [phone]    Darrell Fierro is a 51 y.o. male who presents today for his medicalconditions/complaints as noted below. Darrell Fierro is c/o of ED Follow-up (Hypoglycemia), Dizziness, Other (pt is fasting will get labs done when he leaves office ), and Hypertension      HPI:         70-year-old male patient presents with complaint of hospital follow-up. Patient reports that for the past several weeks he has been experiencing lightheadedness. Patient reports that it feels like his blood sugar is low at times and typically is in the 160s. He did have a hypoglycemic episode was seen in the emergency department glucose was 45 given glucose had improvement. Patient did recently have his blood pressure medication adjusted by his nephrologist.  Currently taking metoprolol, lisinopril, amlodipine, hydralazine was discontinued due to getting a low blood pressure dialysis days. Patient describes that the lightheadedness has began around the time of this medication adjustment. Past Medical History:   Diagnosis Date    Dialysis patient [facility])     Kidney disease     On Dialysis    Type 1 diabetes mellitus ([facility] [address]         Current Outpatient Medications   Medication Sig Dispense Refill    traMADol (ULTRAM) 50 MG tablet Take 50 mg by mouth daily.        FLUoxetine (PROZAC) 20 MG capsule Take 2 capsules by mouth daily 30 capsule 5    furosemide (LASIX) 20 MG tablet Take 1 tablet by mouth daily 60 tablet 5    insulin glargine (LANTUS) 100 UNIT/ML injection vial Inject 45 units in the morning and 5 units SQ at  night 5 vial 5    traZODone (DESYREL) 100 MG tablet Take 1 tablet by mouth nightly as needed for Sleep 30 tablet 5    buPROPion (WELLBUTRIN XL) 150 MG extended release tablet Take 1 tablet by mouth every morning 15 tablet 1    metoprolol succinate (TOPROL XL) 200 MG extended release tablet Take 1 tablet by mouth daily 90 tablet 1    insulin lispro (HUMALOG) 100 UNIT/ML injection vial Inject into the skin 3 times daily (before meals)      lisinopril (PRINIVIL;ZESTRIL) 20 MG tablet Take 40 mg by mouth daily      Rosuvastatin Calcium 40 MG CPSP Take 40 mg by mouth daily      ezetimibe (ZETIA) 10 MG tablet Take 10 mg by mouth daily      omeprazole (PRILOSEC) 40 MG delayed release capsule Take 40 mg by mouth daily      loratadine (CLARITIN) 10 MG capsule Take 10 mg by mouth daily      amLODIPine (NORVASC) 10 MG tablet Take 10 mg by mouth daily      aspirin 81 MG EC tablet Take 81 mg by mouth daily      magnesium oxide (MAG-OX) 400 MG tablet Take 400 mg by mouth daily      calcium acetate 667 MG TABS Take 3 tablets by mouth daily      vitamin B-12 (CYANOCOBALAMIN) 500 MCG tablet Take 500 mcg by mouth daily      ondansetron (ZOFRAN-ODT) 4 MG disintegrating tablet  (Patient not taking: Reported on 7/19/2021)      hydrALAZINE (APRESOLINE) 50 MG tablet  (Patient not taking: Reported on 6/7/2021)       No current facility-administered medications for this visit. No Known Allergies    Subjective:      Review of Systems   Constitutional: Negative for chills and fatigue. HENT: Negative for congestion, ear pain, sinus pain and sore throat. Eyes: Negative for pain and visual disturbance. Respiratory: Negative for cough and shortness of breath. Cardiovascular: Negative for chest pain and palpitations. Gastrointestinal: Negative for abdominal pain, diarrhea, nausea and vomiting. Genitourinary: Negative for penile pain and testicular pain. Musculoskeletal: Negative for back pain, joint swelling and neck pain. Skin: Negative for rash. Neurological: Positive for light-headedness. Negative for dizziness. Hematological: Does not bruise/bleed easily.    All other systems reviewed and are negative.      :Objective     Physical Exam  Vitals reviewed. Constitutional:       General: He is not in acute distress. Appearance: Normal appearance. He is not toxic-appearing. HENT:      Mouth/Throat:      Mouth: Mucous membranes are moist.   Cardiovascular:      Rate and Rhythm: Normal rate. Pulmonary:      Effort: Pulmonary effort is normal. No respiratory distress. Breath sounds: Normal breath sounds. Skin:     General: Skin is warm and dry. Neurological:      General: No focal deficit present. Mental Status: He is alert and oriented to person, place, and time.        BP (!) 144/86   Pulse 85   Resp 18   Ht 5' 6.5\" (1.689 m)   Wt 190 lb (86.2 kg)   SpO2 97%   BMI 30.21 kg/m²     Lab Review   Admission on 07/13/2021, Discharged on 07/13/2021   Component Date Value    POC Glucose 07/13/2021 45*    WBC 07/13/2021 9.2     RBC 07/13/2021 4.42     Hemoglobin 07/13/2021 13.3     Hematocrit 07/13/2021 39.2*    MCV 07/13/2021 88.7     MCH 07/13/2021 30.1     MCHC 07/13/2021 33.9     RDW 07/13/2021 12.1     Platelets 95/06/5279 249     MPV 07/13/2021 9.2     NRBC Automated 07/13/2021 0.0     Differential Type 07/13/2021 NOT REPORTED     Seg Neutrophils 07/13/2021 58     Lymphocytes 07/13/2021 24     Monocytes 07/13/2021 12     Eosinophils % 07/13/2021 5*    Basophils 07/13/2021 1     Immature Granulocytes 07/13/2021 0     Segs Absolute 07/13/2021 5.25     Absolute Lymph # 07/13/2021 2.22     Absolute Mono # 07/13/2021 1.08     Absolute Eos # 07/13/2021 0.49*    Basophils Absolute 07/13/2021 0.08     Absolute Immature Granul* 07/13/2021 0.03     WBC Morphology 07/13/2021 NOT REPORTED     RBC Morphology 07/13/2021 NOT REPORTED     Platelet Estimate 30/30/9242 NOT REPORTED     Glucose 07/13/2021 43*    BUN 07/13/2021 17     CREATININE 07/13/2021 4.96*    Bun/Cre Ratio 07/13/2021 3*    Calcium 07/13/2021 10.2     Sodium 07/13/2021 137     Potassium 07/13/2021 3.9     Chloride 07/13/2021 95*    CO2 07/13/2021 26     Anion Gap 07/13/2021 16     GFR Non- 07/13/2021 13*    GFR  07/13/2021 15*    GFR Comment 07/13/2021          GFR Staging 07/13/2021 NOT REPORTED     Glucose 07/13/2021 45     QC OK? 07/13/2021 yes     Glucose 07/13/2021 135     QC OK? 07/13/2021 yes     POC Glucose 07/13/2021 135*    POC Glucose 07/13/2021 125*       Assessment and Plan      1. Hypertension, unspecified type  2. Lipid screening  -     Lipid Panel; Future  3. DM (diabetes mellitus), type 1 with complications (HCC)  -     Hemoglobin A1C; Future      Discussed to resume hydralazine on nondialysis days and monitor for symptom improvement  Lipids and A1c reordered, CBC and BMP from hospitalization reviewed  Follow-up in 6 weeks, recheck sooner as needed. No results found for this visit on 07/19/21. Return if symptoms worsen or fail to improve. No orders of the defined types were placed in this encounter. Patient given educational materials - see patient instructions. Discussed use, benefit, and side effects of prescribed medications. All patientquestions answered. Pt voiced understanding. Patient given educational materials - see patient instructions. Discussed use, benefit, and side effects of prescribed medications. All patientquestions answered. Pt voiced understanding. This note was transcribed using dictation with Dragon services. Efforts were made to correct any errors but some words may be misinterpreted.     Electronically signed by DANIEL Abraham CNP on 7/19/2021at 10:29 AM

## 2021-07-19 NOTE — PROGRESS NOTES
Visit Information    Have you changed or started any medications since your last visit including any over-the-counter medicines, vitamins, or herbal medicines? no   Are you having any side effects from any of your medications? -  no  Have you stopped taking any of your medications? Is so, why? -  no    Have you seen any other physician or provider since your last visit? Yes - Records Obtained  Have you had any other diagnostic tests since your last visit? Yes - Records Obtained  Have you been seen in the emergency room and/or had an admission to a hospital since we last saw you? Yes - Records Obtained  Have you had your routine dental cleaning in the past 6 months? no    Have you activated your [employer] account? If not, what are your barriers?  Yes     Patient Care Team:  DANIEL Bunch CNP as PCP - General (Certified Nurse Practitioner)  DANIEL Bunch CNP as PCP - [facility] Provider    Medical History Review  Past Medical, Family, and Social History reviewed and does contribute to the patient presenting condition    Health Maintenance   Topic Date Due    Hepatitis C screen  Never done    Pneumococcal 0-64 years Vaccine (1 of 4 - PCV13) 05/11/1980    Diabetic retinal exam  Never done    Lipid screen  Never done    Hepatitis B vaccine (1 of 3 - Risk Recombivax 3-dose series) Never done    Colon cancer screen colonoscopy  Never done    COVID-19 Vaccine (2 - Moderna 2-dose series) 04/13/2021    Flu vaccine (1) 09/01/2021    A1C test (Diabetic or Prediabetic)  01/08/2022    Diabetic foot exam  03/29/2022    Potassium monitoring  07/13/2022    Creatinine monitoring  07/13/2022    DTaP/Tdap/Td vaccine (2 - Td or Tdap) 11/21/2030    Hepatitis A vaccine  Aged Out    Hib vaccine  Aged Out    Meningococcal (ACWY) vaccine  Aged Out    HIV screen  Discontinued

## 2021-07-19 NOTE — PATIENT INSTRUCTIONS

## 2021-07-30 ENCOUNTER — HOSPITAL ENCOUNTER (OUTPATIENT)
Dept: MRI IMAGING | Age: 46
Discharge: HOME | End: 2021-08-01
Payer: MEDICARE

## 2021-07-30 DIAGNOSIS — M25.562 ACUTE PAIN OF LEFT KNEE: ICD-10-CM

## 2021-07-30 PROCEDURE — 73721 MRI JNT OF LWR EXTRE W/O DYE: CPT

## 2021-08-02 ENCOUNTER — TELEPHONE (OUTPATIENT)
Dept: FAMILY MEDICINE CLINIC | Age: 46
End: 2021-08-02

## 2021-08-06 ENCOUNTER — OFFICE VISIT (OUTPATIENT)
Dept: FAMILY MEDICINE CLINIC | Age: 46
End: 2021-08-06
Payer: MEDICARE

## 2021-08-06 VITALS
OXYGEN SATURATION: 97 % | SYSTOLIC BLOOD PRESSURE: 132 MMHG | RESPIRATION RATE: 18 BRPM | HEIGHT: 67 IN | HEART RATE: 78 BPM | DIASTOLIC BLOOD PRESSURE: 78 MMHG | BODY MASS INDEX: 29.35 KG/M2 | WEIGHT: 187 LBS

## 2021-08-06 DIAGNOSIS — E10.8 DM (DIABETES MELLITUS), TYPE 1 WITH COMPLICATIONS (HCC): ICD-10-CM

## 2021-08-06 DIAGNOSIS — L84 CALLUS OF FOOT: Primary | ICD-10-CM

## 2021-08-06 PROCEDURE — 3051F HG A1C>EQUAL 7.0%<8.0%: CPT | Performed by: NURSE PRACTITIONER

## 2021-08-06 PROCEDURE — G8417 CALC BMI ABV UP PARAM F/U: HCPCS | Performed by: NURSE PRACTITIONER

## 2021-08-06 PROCEDURE — 99213 OFFICE O/P EST LOW 20 MIN: CPT | Performed by: NURSE PRACTITIONER

## 2021-08-06 PROCEDURE — 1036F TOBACCO NON-USER: CPT | Performed by: NURSE PRACTITIONER

## 2021-08-06 PROCEDURE — 2022F DILAT RTA XM EVC RTNOPTHY: CPT | Performed by: NURSE PRACTITIONER

## 2021-08-06 PROCEDURE — G8427 DOCREV CUR MEDS BY ELIG CLIN: HCPCS | Performed by: NURSE PRACTITIONER

## 2021-08-06 ASSESSMENT — ENCOUNTER SYMPTOMS
DIARRHEA: 0
VOMITING: 0
BACK PAIN: 0
SINUS PAIN: 0
ABDOMINAL PAIN: 0
COUGH: 0
SORE THROAT: 0
EYE PAIN: 0
SHORTNESS OF BREATH: 0
NAUSEA: 0

## 2021-08-06 NOTE — PATIENT INSTRUCTIONS
Patient Education        Diabetes Foot Health: Care Instructions  Your Care Instructions     When you have diabetes, your feet need extra care and attention. Diabetes can damage the nerve endings and blood vessels in your feet, making you less likely to notice when your feet are injured. Diabetes also limits your body's ability to fight infection and get blood to areas that need it. If you get a minor foot injury, it could become an ulcer or a serious infection. With good foot care, you can prevent most of these problems. Caring for your feet can be quick and easy. Most of the care can be done when you are bathing or getting ready for bed. Follow-up care is a key part of your treatment and safety. Be sure to make and go to all appointments, and call your doctor if you are having problems. It's also a good idea to know your test results and keep a list of the medicines you take. How can you care for yourself at home? · Keep your blood sugar close to normal by watching what and how much you eat, monitoring blood sugar, taking medicines if prescribed, and getting regular exercise. · Do not smoke. Smoking affects blood flow and can make foot problems worse. If you need help quitting, talk to your doctor about stop-smoking programs and medicines. These can increase your chances of quitting for good. · Eat a diet that is low in fats. High fat intake can cause fat to build up in your blood vessels and decrease blood flow. · Inspect your feet daily for blisters, cuts, cracks, or sores. If you cannot see well, use a mirror or have someone help you. · Take care of your feet:  ? Wash your feet every day. Use warm (not hot) water. Check the water temperature with your wrists or other part of your body, not your feet. ? Dry your feet well. Pat them dry. Do not rub the skin on your feet too hard. Dry well between your toes.  If the skin on your feet stays moist, bacteria or a fungus can grow, which can lead to infection. ? Keep your skin soft. Use moisturizing skin cream to keep the skin on your feet soft and prevent calluses and cracks. But do not put the cream between your toes, and stop using any cream that causes a rash. ? Clean underneath your toenails carefully. Do not use a sharp object to clean underneath your toenails. Use the blunt end of a nail file or other rounded tool. ? Trim and file your toenails straight across to prevent ingrown toenails. Use a nail clipper, not scissors. Use an emery board to smooth the edges. · Change socks daily. Socks without seams are best, because seams often rub the feet. You can find socks for people with diabetes from specialty catalogs. · Look inside your shoes every day for things like gravel or torn linings, which could cause blisters or sores. · Buy shoes that fit well:  ? Look for shoes that have plenty of space around the toes. This helps prevent bunions and blisters. ? Try on shoes while wearing the kind of socks you will usually wear with the shoes. ? Avoid plastic shoes. They may rub your feet and cause blisters. Good shoes should be made of materials that are flexible and breathable, such as leather or cloth. ? Break in new shoes slowly by wearing them for no more than an hour a day for several days. Take extra time to check your feet for red areas, blisters, or other problems after you wear new shoes. · Do not go barefoot. Do not wear sandals, and do not wear shoes with very thin soles. Thin soles are easy to puncture. They also do not protect your feet from hot pavement or cold weather. · Have your doctor check your feet during each visit. If you have a foot problem, see your doctor. Do not try to treat an early foot problem at home. Home remedies or treatments that you can buy without a prescription (such as corn removers) can be harmful. · Always get early treatment for foot problems.  A minor irritation can lead to a major problem if not properly cared for early. When should you call for help? Call your doctor now or seek immediate medical care if:    · You have a foot sore, an ulcer or break in the skin that is not healing after 4 days, bleeding corns or calluses, or an ingrown toenail.     · You have blue or black areas, which can mean bruising or blood flow problems.     · You have peeling skin or tiny blisters between your toes or cracking or oozing of the skin.     · You have a fever for more than 24 hours and a foot sore.     · You have new numbness or tingling in your feet that does not go away after you move your feet or change positions.     · You have unexplained or unusual swelling of the foot or ankle. Watch closely for changes in your health, and be sure to contact your doctor if:    · You cannot do proper foot care. Where can you learn more? Go to https://[email or web address]. org and sign in to your [employer] account. Enter A739 in the [employer] box to learn more about \"Diabetes Foot Health: Care Instructions. \"     If you do not have an account, please click on the \"Sign Up Now\" link. Current as of: August 31, 2020               Content Version: 12.9  © 2006-2021 Healthwise, Incorporated. Care instructions adapted under license by [facility] ([facility]). If you have questions about a medical condition or this instruction, always ask your healthcare professional. [place] [address] any warranty or liability for your use of this information.

## 2021-08-06 NOTE — PROGRESS NOTES
[facility]  [address] [address]  Dept: [phone]  Dept Fax: [phone]    Merlin Downer is a 51 y.o. male who presents today for his medicalconditions/complaints as noted below. Merlin Downer is c/o of Other (sore on left foot. had spot for years. used to see podiatry for it. mildly painful )      HPI:         54-year-old male patient presents with complaints of a sore to the left foot. Patient known history of type 1 diabetes. Patient was previously following with a podiatrist in [place] however has moved to this area and does not have a podiatrist here. Patient's has orthopedic including bilateral foot drop, chronic left knee fracture. This caused patient to compensates how he walks and has developed calluses and sores to the left foot, base of the 5th toe at the plantar aspect. Today patient has significant skin overgrowth with callus formation to this area. No surrounding erythema or warmth. No drainage from the area. Past Medical History:   Diagnosis Date    Dialysis patient [facility])     Kidney disease     On Dialysis    Type 1 diabetes mellitus ([facility] [address]         Current Outpatient Medications   Medication Sig Dispense Refill    traMADol (ULTRAM) 50 MG tablet Take 50 mg by mouth daily.        FLUoxetine (PROZAC) 20 MG capsule Take 2 capsules by mouth daily 30 capsule 5    furosemide (LASIX) 20 MG tablet Take 1 tablet by mouth daily 60 tablet 5    insulin glargine (LANTUS) 100 UNIT/ML injection vial Inject 45 units in the morning and 5 units SQ at  night 5 vial 5    traZODone (DESYREL) 100 MG tablet Take 1 tablet by mouth nightly as needed for Sleep 30 tablet 5    buPROPion (WELLBUTRIN XL) 150 MG extended release tablet Take 1 tablet by mouth every morning 15 tablet 1    metoprolol succinate (TOPROL XL) 200 MG extended release tablet Take 1 tablet by mouth daily 90 tablet 1    insulin lispro (HUMALOG) 100 UNIT/ML injection vial Inject into the skin 3 times daily (before meals)      lisinopril (PRINIVIL;ZESTRIL) 20 MG tablet Take 40 mg by mouth daily      Rosuvastatin Calcium 40 MG CPSP Take 40 mg by mouth daily      ezetimibe (ZETIA) 10 MG tablet Take 10 mg by mouth daily      omeprazole (PRILOSEC) 40 MG delayed release capsule Take 40 mg by mouth daily      loratadine (CLARITIN) 10 MG capsule Take 10 mg by mouth daily      amLODIPine (NORVASC) 10 MG tablet Take 10 mg by mouth daily      aspirin 81 MG EC tablet Take 81 mg by mouth daily      magnesium oxide (MAG-OX) 400 MG tablet Take 400 mg by mouth daily      calcium acetate 667 MG TABS Take 3 tablets by mouth daily      vitamin B-12 (CYANOCOBALAMIN) 500 MCG tablet Take 500 mcg by mouth daily      ondansetron (ZOFRAN-ODT) 4 MG disintegrating tablet  (Patient not taking: Reported on 8/6/2021)      hydrALAZINE (APRESOLINE) 50 MG tablet  (Patient not taking: Reported on 6/7/2021)       No current facility-administered medications for this visit. No Known Allergies    Subjective:      Review of Systems   Constitutional: Negative for chills and fatigue. HENT: Negative for congestion, ear pain, sinus pain and sore throat. Eyes: Negative for pain and visual disturbance. Respiratory: Negative for cough and shortness of breath. Cardiovascular: Negative for chest pain and palpitations. Gastrointestinal: Negative for abdominal pain, diarrhea, nausea and vomiting. Genitourinary: Negative for penile pain and testicular pain. Musculoskeletal: Negative for back pain, joint swelling and neck pain. Skin: Positive for wound. Negative for rash. Neurological: Negative for dizziness and light-headedness. Hematological: Does not bruise/bleed easily. All other systems reviewed and are negative.      :Objective     Physical Exam  Vitals and nursing note reviewed. Cardiovascular:      Rate and Rhythm: Normal rate.    Pulmonary: Effort: Pulmonary effort is normal. No respiratory distress. Breath sounds: Normal breath sounds. Musculoskeletal:        Feet:    Skin:     General: Skin is warm and dry. Neurological:      General: No focal deficit present. Mental Status: He is oriented to person, place, and time.        /78   Pulse 78   Resp 18   Ht 5' 6.5\" (1.689 m)   Wt 187 lb (84.8 kg)   SpO2 97%   BMI 29.73 kg/m²     Lab Review   Hospital Outpatient Visit on 07/19/2021   Component Date Value    Hemoglobin A1C 07/19/2021 7.5*    Estimated Avg Glucose 07/19/2021 169     Cholesterol 07/19/2021 103     HDL 07/19/2021 52     LDL Cholesterol 07/19/2021 38     Chol/HDL Ratio 07/19/2021 2.0     Triglycerides 07/19/2021 64     VLDL 07/19/2021 NOT REPORTED    Admission on 07/13/2021, Discharged on 07/13/2021   Component Date Value    POC Glucose 07/13/2021 45*    WBC 07/13/2021 9.2     RBC 07/13/2021 4.42     Hemoglobin 07/13/2021 13.3     Hematocrit 07/13/2021 39.2*    MCV 07/13/2021 88.7     MCH 07/13/2021 30.1     MCHC 07/13/2021 33.9     RDW 07/13/2021 12.1     Platelets 69/09/9225 249     MPV 07/13/2021 9.2     NRBC Automated 07/13/2021 0.0     Differential Type 07/13/2021 NOT REPORTED     Seg Neutrophils 07/13/2021 58     Lymphocytes 07/13/2021 24     Monocytes 07/13/2021 12     Eosinophils % 07/13/2021 5*    Basophils 07/13/2021 1     Immature Granulocytes 07/13/2021 0     Segs Absolute 07/13/2021 5.25     Absolute Lymph # 07/13/2021 2.22     Absolute Mono # 07/13/2021 1.08     Absolute Eos # 07/13/2021 0.49*    Basophils Absolute 07/13/2021 0.08     Absolute Immature Granul* 07/13/2021 0.03     WBC Morphology 07/13/2021 NOT REPORTED     RBC Morphology 07/13/2021 NOT REPORTED     Platelet Estimate 76/15/4192 NOT REPORTED     Glucose 07/13/2021 43*    BUN 07/13/2021 17     CREATININE 07/13/2021 4.96*    Bun/Cre Ratio 07/13/2021 3*    Calcium 07/13/2021 10.2     Sodium 07/13/2021 137     Potassium 07/13/2021 3.9     Chloride 07/13/2021 95*    CO2 07/13/2021 26     Anion Gap 07/13/2021 16     GFR Non- 07/13/2021 13*    GFR  07/13/2021 15*    GFR Comment 07/13/2021          GFR Staging 07/13/2021 NOT REPORTED     Glucose 07/13/2021 45     QC OK? 07/13/2021 yes     Glucose 07/13/2021 135     QC OK? 07/13/2021 yes     POC Glucose 07/13/2021 135*    POC Glucose 07/13/2021 125*       Assessment and Plan      1. Callus of foot  -     [facility], [address], Podiatry, [facility]  2. DM (diabetes mellitus), type 1 with complications [facility])  -     [facility], [address], Podiatry, [employer] to podiatry regarding callus management and routine diabetic foot care. Did review results of patient's recent MRI of the left knee, recommend to contact his orthopedic surgeon with whom he has been seen in this area, if needed will place additional referral.                  No results found for this visit on 08/06/21. Return if symptoms worsen or fail to improve. No orders of the defined types were placed in this encounter. Patient given educational materials - see patient instructions. Discussed use, benefit, and side effects of prescribed medications. All patientquestions answered. Pt voiced understanding. Patient given educational materials - see patient instructions. Discussed use, benefit, and side effects of prescribed medications. All patientquestions answered. Pt voiced understanding. This note was transcribed using dictation with Dragon services. Efforts were made to correct any errors but some words may be misinterpreted.     Electronically signed by DANIEL Restrepo CNP on 8/6/2021at 11:28 AM

## 2021-08-06 NOTE — PROGRESS NOTES
Visit Information    Have you changed or started any medications since your last visit including any over-the-counter medicines, vitamins, or herbal medicines? no   Are you having any side effects from any of your medications? -  no  Have you stopped taking any of your medications? Is so, why? -  no    Have you seen any other physician or provider since your last visit? No  Have you had any other diagnostic tests since your last visit? No  Have you been seen in the emergency room and/or had an admission to a hospital since we last saw you? No  Have you had your routine dental cleaning in the past 6 months? no    Have you activated your [employer] account? If not, what are your barriers?  No:      Patient Care Team:  DANIEL Abraham CNP as PCP - General (Certified Nurse Practitioner)  DANIEL Abraham CNP as PCP - [facility] Provider    Medical History Review  Past Medical, Family, and Social History reviewed and does contribute to the patient presenting condition    Health Maintenance   Topic Date Due    Hepatitis C screen  Never done    Pneumococcal 0-64 years Vaccine (1 of 4 - PCV13) 05/11/1980    Diabetic retinal exam  Never done    Hepatitis B vaccine (1 of 3 - Risk Recombivax 3-dose series) Never done    Colon cancer screen colonoscopy  Never done    Annual Wellness Visit (AWV)  Never done    Flu vaccine (1) 09/01/2021    Diabetic foot exam  03/29/2022    Potassium monitoring  07/13/2022    Creatinine monitoring  07/13/2022    A1C test (Diabetic or Prediabetic)  07/19/2022    Lipid screen  07/19/2022    DTaP/Tdap/Td vaccine (2 - Td or Tdap) 11/21/2030    COVID-19 Vaccine  Completed    Hepatitis A vaccine  Aged Out    Hib vaccine  Aged Out    Meningococcal (ACWY) vaccine  Aged Out    HIV screen  Discontinued

## 2021-08-30 ENCOUNTER — OFFICE VISIT (OUTPATIENT)
Dept: PODIATRY | Age: 46
End: 2021-08-30
Payer: MEDICARE

## 2021-08-30 VITALS — HEIGHT: 67 IN | BODY MASS INDEX: 29.35 KG/M2 | WEIGHT: 187 LBS

## 2021-08-30 DIAGNOSIS — M21.371 BILATERAL FOOT-DROP: ICD-10-CM

## 2021-08-30 DIAGNOSIS — M21.372 BILATERAL FOOT-DROP: ICD-10-CM

## 2021-08-30 DIAGNOSIS — I73.9 PERIPHERAL VASCULAR DISORDER (HCC): ICD-10-CM

## 2021-08-30 DIAGNOSIS — D23.72 BENIGN NEOPLASM OF SKIN OF LOWER LIMB, INCLUDING HIP, LEFT: ICD-10-CM

## 2021-08-30 DIAGNOSIS — E10.49 OTHER DIABETIC NEUROLOGICAL COMPLICATION ASSOCIATED WITH TYPE 1 DIABETES MELLITUS (HCC): ICD-10-CM

## 2021-08-30 DIAGNOSIS — M79.604 PAIN IN BOTH LOWER EXTREMITIES: ICD-10-CM

## 2021-08-30 DIAGNOSIS — B35.1 DERMATOPHYTOSIS OF NAIL: Primary | ICD-10-CM

## 2021-08-30 DIAGNOSIS — M79.605 PAIN IN BOTH LOWER EXTREMITIES: ICD-10-CM

## 2021-08-30 PROBLEM — I25.10 CORONARY ATHEROSCLEROSIS: Status: ACTIVE | Noted: 2021-08-30

## 2021-08-30 PROBLEM — F31.9 BIPOLAR AFFECTIVE DISORDER (HCC): Status: ACTIVE | Noted: 2021-08-30

## 2021-08-30 PROCEDURE — G8427 DOCREV CUR MEDS BY ELIG CLIN: HCPCS | Performed by: PODIATRIST

## 2021-08-30 PROCEDURE — 1036F TOBACCO NON-USER: CPT | Performed by: PODIATRIST

## 2021-08-30 PROCEDURE — 17110 DESTRUCTION B9 LES UP TO 14: CPT | Performed by: PODIATRIST

## 2021-08-30 PROCEDURE — G8417 CALC BMI ABV UP PARAM F/U: HCPCS | Performed by: PODIATRIST

## 2021-08-30 PROCEDURE — 3051F HG A1C>EQUAL 7.0%<8.0%: CPT | Performed by: PODIATRIST

## 2021-08-30 PROCEDURE — 2022F DILAT RTA XM EVC RTNOPTHY: CPT | Performed by: PODIATRIST

## 2021-08-30 PROCEDURE — 99204 OFFICE O/P NEW MOD 45 MIN: CPT | Performed by: PODIATRIST

## 2021-08-30 PROCEDURE — 11721 DEBRIDE NAIL 6 OR MORE: CPT | Performed by: PODIATRIST

## 2021-08-30 NOTE — PROGRESS NOTES
[facility] PHYSICIANS  [facility]  [address] [place] [address]  Dept: [phone]  Dept Fax: [phone]    NEW PATIENT DIABETIC PROGRESS NOTE  Date of patient's visit: 8/30/2021  Patient's Name:  Carlos Hsu YOB: 1975            Patient Care Team:  Atha Bumpers, APRN - CNP as PCP - General (Certified Nurse Practitioner)  Atha Bumpers, APRN - CNP as PCP - [facility]  Raoul Mario DPM as Physician (Podiatry)          Chief Complaint   Patient presents with    New Patient    Diabetes     [facility] [place]     Peripheral Neuropathy    Benign Neoplasm     left foot       Subjective:   Carlos Hsu comes to clinic for New Patient, Diabetes [facility] [place] ), Peripheral Neuropathy, and Benign Neoplasm (left foot)    he is a diabetic and states that needs toenails and painful skin lesion, left foot. Pt currently has complaint of thickened, elongated nails that they cannot manage by themselves. Pt's primary care physician is Atha Bumpers, APRN - CNP last seen 8/6/21   Pt's last blood sugar was 164- today. He has history of danielle foot drop. Lab Results   Component Value Date    LABA1C 7.5 (H) 07/19/2021      Complains of numbness in the feet bilat. Past Medical History:   Diagnosis Date    Dialysis patient [facility])     Kidney disease     On Dialysis    Type 1 diabetes mellitus ([facility] [address]        No Known Allergies  Current Outpatient Medications on File Prior to Visit   Medication Sig Dispense Refill    ondansetron (ZOFRAN-ODT) 4 MG disintegrating tablet       traMADol (ULTRAM) 50 MG tablet Take 50 mg by mouth daily.        hydrALAZINE (APRESOLINE) 50 MG tablet       FLUoxetine (PROZAC) 20 MG capsule Take 2 capsules by mouth daily 30 capsule 5    furosemide (LASIX) 20 MG tablet Take 1 tablet by mouth daily 60 tablet 5    insulin glargine (LANTUS) 100 UNIT/ML injection vial Inject 45 units in the morning and 5 units SQ at  night 5 vial 5    traZODone (DESYREL) 100 MG tablet Take 1 tablet by mouth nightly as needed for Sleep 30 tablet 5    buPROPion (WELLBUTRIN XL) 150 MG extended release tablet Take 1 tablet by mouth every morning 15 tablet 1    metoprolol succinate (TOPROL XL) 200 MG extended release tablet Take 1 tablet by mouth daily 90 tablet 1    insulin lispro (HUMALOG) 100 UNIT/ML injection vial Inject into the skin 3 times daily (before meals)      lisinopril (PRINIVIL;ZESTRIL) 20 MG tablet Take 40 mg by mouth daily      Rosuvastatin Calcium 40 MG CPSP Take 40 mg by mouth daily      ezetimibe (ZETIA) 10 MG tablet Take 10 mg by mouth daily      omeprazole (PRILOSEC) 40 MG delayed release capsule Take 40 mg by mouth daily      loratadine (CLARITIN) 10 MG capsule Take 10 mg by mouth daily      amLODIPine (NORVASC) 10 MG tablet Take 10 mg by mouth daily      aspirin 81 MG EC tablet Take 81 mg by mouth daily      magnesium oxide (MAG-OX) 400 MG tablet Take 400 mg by mouth daily      calcium acetate 667 MG TABS Take 3 tablets by mouth daily      vitamin B-12 (CYANOCOBALAMIN) 500 MCG tablet Take 500 mcg by mouth daily       No current facility-administered medications on file prior to visit. Review of Systems    Review of Systems:   History obtained from chart review and the patient  General ROS: negative for - chills, fatigue, fever, night sweats or weight gain  Constitutional: Negative for chills, diaphoresis, fatigue, fever and unexpected weight change. Musculoskeletal: Positive for arthralgias, gait problem and joint swelling. Neurological ROS: negative for - behavioral changes, confusion, headaches or seizures. Negative for weakness and numbness. Dermatological ROS: negative for - mole changes, rash  Cardiovascular: Negative for leg swelling. Gastrointestinal: Negative for constipation, diarrhea, nausea and vomiting.         Objective:  Dermatologic Exam:  Soft tissue lesion to the plantar left sub 5th MTH with central core and petechiae. Pain on palpation of lesion. Skin No rashes or nodules noted. .   Skin is thin, with flaky sloughing skin as well as decreased hair growth to the lower leg  Small red hemosiderin deposits seen dorsal foot   Musculoskeletal:     1st MPJ ROM decreased, Bilateral. Decreased ankle dorsiflexion, danielle  Muscle strength 3/5, Bilateral.  Pain present upon palpation of toenails 1-5, Bilateral. decreased medial longitudinal arch, Bilateral.  Ankle ROM decreased,Bilateral.    Dorsally contracted digits present digits 2, Bilateral.     Vascular: DP pulses 1/4 bilateral.  PT pulses 0/4 bilateral.   CFT <5 seconds, Bilateral.  Hair growth absent to the level of the digits, Bilateral.  Edema present, Bilateral.  Varicosities absent, Bilateral. Erythema absent, Bilateral    Neurological: Sensation diminshed to light touch to level of digits, Bilateral.  Protective sensation intact 6/10 sites via 5.07/10g [place]-Michael Monofilament, Bilateral.  negative Tinel's, Bilateral.  negative Valleix sign, Bilateral.      Integument: Warm, dry, supple, Bilateral.  Open lesion absent, Bilateral.  Interdigital maceration absent to web spaces 4, Bilateral.  Nails 1-5 left and 1-5 right thickened > 3.0 mm, dystrophic and crumbly, discolored with subungual debris. Fissures absent, Bilateral.   General: AAO x 3 in NAD.     Derm  Toenail Description  Sites of Onychomycosis Involvement (Check affected area)  [x] [x] [x] [x] [x] [x] [x] [x] [x] [x]  5 4 3 2 1 1 2 3 4 5                          Right                                        Left    Thickness  [x] [x] [x] [x] [x] [x] [x] [x] [x] [x]  5 4 3 2 1 1 2 3 4 5                         Right                                        Left    Dystrophic Changes   [x] [x] [x] [x] [x] [x] [x] [x] [x] [x]  5 4 3 2 1 1 2 3 4 5                         Right                                        Left    Color   [x] [x] [x] [x] [x] [x] [x] [x] [x] [x]  5 4 3 2 1 1 2 3 4 5 Right                                        Left    Incurvation/Ingrowin   [] [] [] [] [] [] [] [] [] []  5 4 3 2 1 1 2 3 4 5                         Right                                        Left    Inflammation/Pain   [x] [x] [x] [x] [x] [x] [x] [x] [x] [x]  5 4 3 2 1 1 2 3 4 5                         Right                                        Left        Visual inspection:  Deformity: hammertoe deformity danielle feet  amputation: absent  Skin lesions: as above  Edema: right- 2+ pitting edema, left- 2+ pitting edema    Sensory exam:  Monofilament sensation: abnormal - 6/10 via SW 5.07/10g monofilament to the plantar foot bilateral feet    Pulses: abnormal - 1/4 dorsalis pedis pulse and 1/4 Posterior tibial pulse,   Pinprick: Impaired  Proprioception: Impaired  Vibration (128 Hz): Impaired       DM with PVD       [x]Yes    []No      Assessment:  51 y.o. male with:   Diagnosis Orders   1. Dermatophytosis of nail  47974 - [place] DEBRIDEMENT OF NAILS, 6 OR MORE    HM DIABETES FOOT EXAM   2. Peripheral vascular disorder (HCC)  90172 - [place] DEBRIDEMENT OF NAILS, 6 OR MORE    HM DIABETES FOOT EXAM   3. Other diabetic neurological complication associated with type 1 diabetes mellitus ([facility] [address]  80915 - [place] DEBRIDEMENT OF NAILS, 6 OR MORE    HM DIABETES FOOT EXAM   4. Benign neoplasm of skin of lower limb, including hip, left  26665 - [place] DESTRUCTION BENIGN LESIONS UP TO 14    HM DIABETES FOOT EXAM   5. Bilateral foot-drop  HM DIABETES FOOT EXAM   6. Pain in both lower extremities  51500 - [place] DEBRIDEMENT OF NAILS, 6 OR MORE    72093 - [place] DESTRUCTION BENIGN LESIONS UP TO 14    HM DIABETES FOOT EXAM           Q7   []Yes    []No                Q8   [x]Yes    []No                     Q9   []Yes    []No    Plan:   Pt was evaluated and examined. Patient was given personalized discharge instructions. .The lesions were partially excised via 15 blade and silver nitrate was applied under occlusion.   The patient tolerated the procedure well and without complication. Advised patient to use vasoline to the area after tomorrow to prevent surrounding tissue irritation. RX: AFO brace to address danielle foot drop. Nails 1-10 were debrided sharply in length and thickness with a nipper and , without incident. All labs were reviewed and all imagining including the above findings were reviewed PRIOR to the patients arrival and with the patient today. Previous patient encounter was reviewed. Encounters from the patients other medical providers were reviewed and noted. Time was spent educating the patient and their families/caregivers on proper care of the feet and ankles. All the above diagnosis were addressed at todays visit and all questions were answered. A total of 45 minutes was spent with this patients encounter which included charting after the patients visit    Pt will follow up in 9 weeks or sooner if any problems arise. Diagnosis was discussed with the pt and all of their questions were answered in detail. Proper foot hygiene and care was discussed with the pt. Informed patient on proper diabetic foot care and importance of tight glycemic control. Patient to check feet daily and contact the office with any questions/problems/concerns.    Other comorbidity noted and will be managed by PCP.  8/30/2021    Electronically signed by Chris Mohan DPM on 8/30/2021 at 9:15 AM  8/30/2021

## 2021-09-07 ENCOUNTER — TELEPHONE (OUTPATIENT)
Dept: FAMILY MEDICINE CLINIC | Age: 46
End: 2021-09-07

## 2021-09-07 NOTE — TELEPHONE ENCOUNTER
Dr. Rosi Herring office is calling they need office notes sent to them along with surgery clearance he is coming in Friday.

## 2021-09-09 ENCOUNTER — OFFICE VISIT (OUTPATIENT)
Dept: ORTHOPEDIC SURGERY | Age: 46
End: 2021-09-09
Payer: MEDICARE

## 2021-09-09 VITALS — RESPIRATION RATE: 12 BRPM | WEIGHT: 187 LBS | TEMPERATURE: 98.2 F | BODY MASS INDEX: 30.05 KG/M2 | HEIGHT: 66 IN

## 2021-09-09 DIAGNOSIS — S82.102S CLOSED FRACTURE OF PROXIMAL END OF LEFT TIBIA, UNSPECIFIED FRACTURE MORPHOLOGY, SEQUELA: Primary | ICD-10-CM

## 2021-09-09 PROCEDURE — G8427 DOCREV CUR MEDS BY ELIG CLIN: HCPCS | Performed by: ORTHOPAEDIC SURGERY

## 2021-09-09 PROCEDURE — 1036F TOBACCO NON-USER: CPT | Performed by: ORTHOPAEDIC SURGERY

## 2021-09-09 PROCEDURE — 99213 OFFICE O/P EST LOW 20 MIN: CPT | Performed by: ORTHOPAEDIC SURGERY

## 2021-09-09 PROCEDURE — G8417 CALC BMI ABV UP PARAM F/U: HCPCS | Performed by: ORTHOPAEDIC SURGERY

## 2021-09-09 NOTE — PROGRESS NOTES
Medical History:    He  has a past medical history of Closed fracture of bone of right foot (March - April 2020), Dialysis patient [facility]), Kidney disease, and [facility] Past Surgical History:    He  has no past surgical history on file. Current Medications:     Current Outpatient Medications:     ondansetron (ZOFRAN-ODT) 4 MG disintegrating tablet, , Disp: , Rfl:     traMADol (ULTRAM) 50 MG tablet, Take 50 mg by mouth daily.  , Disp: , Rfl:     hydrALAZINE (APRESOLINE) 50 MG tablet, , Disp: , Rfl:     FLUoxetine (PROZAC) 20 MG capsule, Take 2 capsules by mouth daily, Disp: 30 capsule, Rfl: 5    furosemide (LASIX) 20 MG tablet, Take 1 tablet by mouth daily, Disp: 60 tablet, Rfl: 5    insulin glargine (LANTUS) 100 UNIT/ML injection vial, Inject 45 units in the morning and 5 units SQ at  night, Disp: 5 vial, Rfl: 5    traZODone (DESYREL) 100 MG tablet, Take 1 tablet by mouth nightly as needed for Sleep, Disp: 30 tablet, Rfl: 5    buPROPion (WELLBUTRIN XL) 150 MG extended release tablet, Take 1 tablet by mouth every morning, Disp: 15 tablet, Rfl: 1    metoprolol succinate (TOPROL XL) 200 MG extended release tablet, Take 1 tablet by mouth daily, Disp: 90 tablet, Rfl: 1    insulin lispro (HUMALOG) 100 UNIT/ML injection vial, Inject into the skin 3 times daily (before meals), Disp: , Rfl:     lisinopril (PRINIVIL;ZESTRIL) 20 MG tablet, Take 40 mg by mouth daily, Disp: , Rfl:     Rosuvastatin Calcium 40 MG CPSP, Take 40 mg by mouth daily, Disp: , Rfl:     ezetimibe (ZETIA) 10 MG tablet, Take 10 mg by mouth daily, Disp: , Rfl:     omeprazole (PRILOSEC) 40 MG delayed release capsule, Take 40 mg by mouth daily, Disp: , Rfl:     loratadine (CLARITIN) 10 MG capsule, Take 10 mg by mouth daily, Disp: , Rfl:     amLODIPine (NORVASC) 10 MG tablet, Take 10 mg by mouth daily, Disp: , Rfl:     aspirin 81 MG EC tablet, Take 81 mg by mouth daily, Disp: , Rfl:     magnesium oxide (MAG-OX) 400 MG tablet, Take 400 mg by mouth daily, Disp: , Rfl:     calcium acetate 667 MG TABS, Take 3 tablets by mouth daily, Disp: , Rfl:     vitamin B-12 (CYANOCOBALAMIN) 500 MCG tablet, Take 500 mcg by mouth daily, Disp: , Rfl:      Allergies:    Patient has no known allergies. Family History:  family history includes Diabetes in his father and mother; Heart Disease in his maternal great grandfather. Social History:   Social History     Occupational History    Not on file   Tobacco Use    Smoking status: Never Smoker    Smokeless tobacco: Never Used   Vaping Use    Vaping Use: Never used   Substance and Sexual Activity    Alcohol use: Never    Drug use: Never    Sexual activity: Not on file     Occupation: On disability    OBJECTIVE:  Temp 98.2 °F (36.8 °C)   Resp 12   Ht 5' 6\" (1.676 m)   Wt 187 lb (84.8 kg)   BMI 30.18 kg/m²    Psych: alert and oriented to person, time, and place  Cardio:  well perfused extremities  Resp:  normal respiratory effort  Skin:  no cyanosis  Neuro:  sensation to light touch grossly diminished bilaterally throughout the lower extremities  Musculoskeletal:      LLE:  Vascular: Toes warm and well perfused, compartments soft/compressible. No significant swelling of foot. Skin: Intact. No erythema/ulcers. Strength: Decreased globally throughout  Motion: Grossly diminished ankle/foot motion throughout          Tenderness to Palpation: Knee joint line posteriorly  -Negative posterior drawer, stable ligamentous exam    -Mild slap foot gait bilaterally          (Previous exam)  RLE:  Vascular: Toes warm and well perfused, compartments soft/compressible. No significant swelling of foot. Skin: Intact. No erythema/ulcers. Strength: Decreased globally throughout  Motion: Grossly diminished ankle/foot motion throughout          Tenderness to Palpation: None            RADIOLOGY:   9/9/2021 Prior images reviewed for reference. MRI images and radiology report reviewed, as below:    1. Chronic appearing, mildly displaced and comminuted avulsion fracture   involving the posterior margin of the tibia, at the distal PCL insertion.  No   associated marrow edema. 2. The PCL is intact but abnormally thickened and intermediate in signal   intensity compatible with chronic sprain. 3. No evidence of meniscal tear.                 FINDINGS:  Three weightbearing views (AP, Mortise, and Lateral) of the bilateral ankle and three weightbearing views (AP, Oblique, Lateral) of the bilateral foot were obtained in the office today and reviewed, revealing no acute fracture, dislocation, or radioopaque foreign body/tumor. The ankle mortise is maintained with no widening of the clear spaces. Diffuse osteopenia. Calcified vessels. IMPRESSION:  No acute fracture/dislocation. Electronically signed by Maria Del Rosario Novak MD      No results found. ASSESSMENT AND PLAN:  Body mass index is 30.18 kg/m². He has bilateral lower extremity weakness with bilateral foot drop. He has bilateral supple feet that get to neutral.  He also has a history of left knee pain, secondary to a proximal tibia fracture at the PCL insertion, sustained around March 2020. Notably, he has a complex past medical history as above. He has a history of bipolar disorder, coronary artery disease, end-stage renal disease on dialysis, and insulin-dependent diabetes with neuropathy. We had a discussion today about the likely diagnosis and its natural history, physical exam and imaging findings, as well as various treatment options in detail. Surgically, we did discuss possible knee surgery, however after a thorough discussion of surgical and nonsurgical options, he wished to proceed with conservative management at this time. He does report that he can [place] with it\", as the pain does not bother him too significantly, and really only affects him when he is on stairs.   He does not have any clinical laxity noted today on physical exam, but we did discuss that he might have some small amount of laxity that could potentially be compensated for with quadriceps strengthening. Surgically, we have previously discussed a possible tendon transfer or ankle fusion, however I did not recommend either of these procedures at this time, as I do not believe they would offer him a benefit. We discussed that he is not a candidate for tendon transfer, and I do not believe he would benefit from an ankle fusion, as his AFOs seem to work well, and he does not have a rigid deformity. Orders/referrals were placed as below at today's visit. The patient was referred to physical therapy for his knee, specifically to work on quadricep strengthening. Previously, the patient has been referred to prosthetics and orthotics with an order to replace/obtain replacement parts/materials for his AFOs, as needed. I also believe that the patient may potentially benefit from a neurology consult. All questions were answered and the above plan was agreed upon. The patient will return to clinic PRN . At the patient's next visit, depending on how the patient is doing and/or new imaging/labs results, we may consider the following options:    []  Orthotic (OTC)     []  Orthotic (custom)          []  Rocker bottom shoes     []  Brace (OTC)        []  Brace (custom)             []  CAM boot        []  Night splint         []  Heel cups        []  Strap      []  Toe sleeves/splints    []  PT:                     []  Wean out of immobilization   []  Advance activity       []  Topical               []  NSAIDs          []  Rudi         []  Referral:         []  Stress xrays       []  CT         []  MRI        []  Injection:         []  Consider OR      []  Pick OR date    No follow-ups on file. No orders of the defined types were placed in this encounter.     Orders Placed This Encounter   Procedures    Ambulatory referral to Physical Therapy     Referral Priority:   Routine     Referral Type:   Eval and Treat     Referral Reason:   Specialty Services Required     Requested Specialty:   Physical Therapy     Number of Visits Requested:   1         Cristin Marshall MD  Orthopedic Surgery        Please excuse any typos/errors, as this note was created with the assistance of voice recognition software. While intending to generate a document that actually reflects the content of the visit, the document can still have some errors including those of syntax and sound-a-like substitutions which may escape proof reading. In such instances, actual meaning can be extrapolated by context.

## 2021-09-10 ENCOUNTER — OFFICE VISIT (OUTPATIENT)
Dept: FAMILY MEDICINE CLINIC | Age: 46
End: 2021-09-10
Payer: MEDICARE

## 2021-09-10 VITALS
HEART RATE: 81 BPM | RESPIRATION RATE: 18 BRPM | OXYGEN SATURATION: 96 % | HEIGHT: 66 IN | DIASTOLIC BLOOD PRESSURE: 78 MMHG | WEIGHT: 190 LBS | BODY MASS INDEX: 30.53 KG/M2 | SYSTOLIC BLOOD PRESSURE: 138 MMHG

## 2021-09-10 DIAGNOSIS — Z00.00 ROUTINE GENERAL MEDICAL EXAMINATION AT A HEALTH CARE FACILITY: Primary | ICD-10-CM

## 2021-09-10 DIAGNOSIS — E10.8 DM (DIABETES MELLITUS), TYPE 1 WITH COMPLICATIONS (HCC): ICD-10-CM

## 2021-09-10 DIAGNOSIS — Z12.11 COLON CANCER SCREENING: ICD-10-CM

## 2021-09-10 DIAGNOSIS — S82.102S CLOSED FRACTURE OF PROXIMAL END OF LEFT TIBIA, UNSPECIFIED FRACTURE MORPHOLOGY, SEQUELA: ICD-10-CM

## 2021-09-10 DIAGNOSIS — R53.83 FATIGUE, UNSPECIFIED TYPE: ICD-10-CM

## 2021-09-10 PROCEDURE — 3051F HG A1C>EQUAL 7.0%<8.0%: CPT | Performed by: NURSE PRACTITIONER

## 2021-09-10 PROCEDURE — 90674 CCIIV4 VAC NO PRSV 0.5 ML IM: CPT | Performed by: NURSE PRACTITIONER

## 2021-09-10 PROCEDURE — G0008 ADMIN INFLUENZA VIRUS VAC: HCPCS | Performed by: NURSE PRACTITIONER

## 2021-09-10 PROCEDURE — G0438 PPPS, INITIAL VISIT: HCPCS | Performed by: NURSE PRACTITIONER

## 2021-09-10 ASSESSMENT — PATIENT HEALTH QUESTIONNAIRE - PHQ9
1. LITTLE INTEREST OR PLEASURE IN DOING THINGS: 0
SUM OF ALL RESPONSES TO PHQ QUESTIONS 1-9: 0
SUM OF ALL RESPONSES TO PHQ9 QUESTIONS 1 & 2: 0
2. FEELING DOWN, DEPRESSED OR HOPELESS: 0
SUM OF ALL RESPONSES TO PHQ QUESTIONS 1-9: 0
SUM OF ALL RESPONSES TO PHQ QUESTIONS 1-9: 0

## 2021-09-10 ASSESSMENT — ENCOUNTER SYMPTOMS
ABDOMINAL PAIN: 0
COUGH: 0
SHORTNESS OF BREATH: 0
DIARRHEA: 0

## 2021-09-10 ASSESSMENT — LIFESTYLE VARIABLES: HOW OFTEN DO YOU HAVE A DRINK CONTAINING ALCOHOL: 0

## 2021-09-10 NOTE — PATIENT INSTRUCTIONS
Personalized Preventive Plan for Franco Alcazar - 9/10/2021  Medicare offers a range of preventive health benefits. Some of the tests and screenings are paid in full while other may be subject to a deductible, co-insurance, and/or copay. Some of these benefits include a comprehensive review of your medical history including lifestyle, illnesses that may run in your family, and various assessments and screenings as appropriate. After reviewing your medical record and screening and assessments performed today your provider may have ordered immunizations, labs, imaging, and/or referrals for you. A list of these orders (if applicable) as well as your Preventive Care list are included within your After Visit Summary for your review. Other Preventive Recommendations:    · A preventive eye exam performed by an eye specialist is recommended every 1-2 years to screen for glaucoma; cataracts, macular degeneration, and other eye disorders. · A preventive dental visit is recommended every 6 months. · Try to get at least 150 minutes of exercise per week or 10,000 steps per day on a pedometer . · Order or download the FREE \"Exercise & Physical Activity: Your Everyday Guide\" from [employer]. Call 9-896.710.1181 or search [employer] online. · You need 8618-9826 mg of calcium and 4323-4992 IU of vitamin D per day. It is possible to meet your calcium requirement with diet alone, but a vitamin D supplement is usually necessary to meet this goal.  · When exposed to the sun, use a sunscreen that protects against both UVA and UVB radiation with an SPF of 30 or greater. Reapply every 2 to 3 hours or after sweating, drying off with a towel, or swimming. · Always wear a seat belt when traveling in a car. Always wear a helmet when riding a bicycle or motorcycle.

## 2021-09-10 NOTE — PROGRESS NOTES
[facility]  [address] [place] [address]  Dept: [phone]  Dept Fax: [phone]    Hall Skiff is a 51 y.o. male who presents today for his medicalconditions/complaints as noted below. Hall Skiff is c/o of 3 Month Follow-Up, Diabetes (running 88 to 250. not due for a1c), and Medicare AWV      HPI:         58-year-old male patient presents with complaints of follow-up, Medicare annual wellness    Patient had chronic fracture to the left tibia. Was referred to orthopedics. Patient is deemed not a surgical candidate/would not have significant benefit from surgery and recommended management with physical therapy    Type I diabetic reports blood sugars running 88-2 50 intermittently. Does have some concerns with fatigue. Reportedly has not missed any dialysis days. Pt needs a dental f.u    Upcoming vitrectomy regarding vitreous hemorrhage      Past Medical History:   Diagnosis Date    Closed fracture of bone of right foot March - April 2020    Dialysis patient [facility])     Kidney disease     On Dialysis    Type 1 diabetes mellitus (HCC)         Current Outpatient Medications   Medication Sig Dispense Refill    ondansetron (ZOFRAN-ODT) 4 MG disintegrating tablet       traMADol (ULTRAM) 50 MG tablet Take 50 mg by mouth daily.        hydrALAZINE (APRESOLINE) 50 MG tablet       FLUoxetine (PROZAC) 20 MG capsule Take 2 capsules by mouth daily 30 capsule 5    furosemide (LASIX) 20 MG tablet Take 1 tablet by mouth daily 60 tablet 5    insulin glargine (LANTUS) 100 UNIT/ML injection vial Inject 45 units in the morning and 5 units SQ at  night 5 vial 5    traZODone (DESYREL) 100 MG tablet Take 1 tablet by mouth nightly as needed for Sleep 30 tablet 5    buPROPion (WELLBUTRIN XL) 150 MG extended release tablet Take 1 tablet by mouth every morning 15 tablet 1    metoprolol succinate (TOPROL XL) 200 MG extended release tablet Take 1 tablet by mouth daily 90 tablet 1    insulin lispro (HUMALOG) 100 UNIT/ML injection vial Inject into the skin 3 times daily (before meals)      lisinopril (PRINIVIL;ZESTRIL) 20 MG tablet Take 40 mg by mouth daily      Rosuvastatin Calcium 40 MG CPSP Take 40 mg by mouth daily      ezetimibe (ZETIA) 10 MG tablet Take 10 mg by mouth daily      omeprazole (PRILOSEC) 40 MG delayed release capsule Take 40 mg by mouth daily      loratadine (CLARITIN) 10 MG capsule Take 10 mg by mouth daily      amLODIPine (NORVASC) 10 MG tablet Take 10 mg by mouth daily      aspirin 81 MG EC tablet Take 81 mg by mouth daily      magnesium oxide (MAG-OX) 400 MG tablet Take 400 mg by mouth daily      calcium acetate 667 MG TABS Take 3 tablets by mouth daily      vitamin B-12 (CYANOCOBALAMIN) 500 MCG tablet Take 500 mcg by mouth daily       No current facility-administered medications for this visit. No Known Allergies    Subjective:      Review of Systems   Constitutional: Positive for fatigue. Negative for chills and fever. Respiratory: Negative for cough and shortness of breath. Cardiovascular: Negative for chest pain and palpitations. Gastrointestinal: Negative for abdominal pain and diarrhea. All other systems reviewed and are negative.      :Objective     Physical Exam  Vitals and nursing note reviewed. Constitutional:       General: He is not in acute distress. Appearance: Normal appearance. He is not toxic-appearing. HENT:      Mouth/Throat:      Mouth: Mucous membranes are moist.   Cardiovascular:      Rate and Rhythm: Normal rate. Pulmonary:      Effort: Pulmonary effort is normal.      Breath sounds: Normal breath sounds. Skin:     General: Skin is warm and dry. Neurological:      General: No focal deficit present. Mental Status: He is alert and oriented to person, place, and time.        /78   Pulse 81   Resp 18   Ht 5' 6\" (1.676 m)   Wt 190 lb (86.2 kg)   SpO2 96% BMI 30.67 kg/m²     Lab Review   Hospital Outpatient Visit on 07/19/2021   Component Date Value    Hemoglobin A1C 07/19/2021 7.5*    Estimated Avg Glucose 07/19/2021 169     Cholesterol 07/19/2021 103     HDL 07/19/2021 52     LDL Cholesterol 07/19/2021 38     Chol/HDL Ratio 07/19/2021 2.0     Triglycerides 07/19/2021 64     VLDL 07/19/2021 NOT REPORTED    Admission on 07/13/2021, Discharged on 07/13/2021   Component Date Value    POC Glucose 07/13/2021 45*    WBC 07/13/2021 9.2     RBC 07/13/2021 4.42     Hemoglobin 07/13/2021 13.3     Hematocrit 07/13/2021 39.2*    MCV 07/13/2021 88.7     MCH 07/13/2021 30.1     MCHC 07/13/2021 33.9     RDW 07/13/2021 12.1     Platelets 69/31/0938 249     MPV 07/13/2021 9.2     NRBC Automated 07/13/2021 0.0     Differential Type 07/13/2021 NOT REPORTED     Seg Neutrophils 07/13/2021 58     Lymphocytes 07/13/2021 24     Monocytes 07/13/2021 12     Eosinophils % 07/13/2021 5*    Basophils 07/13/2021 1     Immature Granulocytes 07/13/2021 0     Segs Absolute 07/13/2021 5.25     Absolute Lymph # 07/13/2021 2.22     Absolute Mono # 07/13/2021 1.08     Absolute Eos # 07/13/2021 0.49*    Basophils Absolute 07/13/2021 0.08     Absolute Immature Granul* 07/13/2021 0.03     WBC Morphology 07/13/2021 NOT REPORTED     RBC Morphology 07/13/2021 NOT REPORTED     Platelet Estimate 02/35/3925 NOT REPORTED     Glucose 07/13/2021 43*    BUN 07/13/2021 17     CREATININE 07/13/2021 4.96*    Bun/Cre Ratio 07/13/2021 3*    Calcium 07/13/2021 10.2     Sodium 07/13/2021 137     Potassium 07/13/2021 3.9     Chloride 07/13/2021 95*    CO2 07/13/2021 26     Anion Gap 07/13/2021 16     GFR Non- 07/13/2021 13*    GFR  07/13/2021 15*    GFR Comment 07/13/2021          GFR Staging 07/13/2021 NOT REPORTED     Glucose 07/13/2021 45     QC OK? 07/13/2021 yes     Glucose 07/13/2021 135     QC OK? 07/13/2021 yes     POC Glucose 2021 135*    POC Glucose 2021 125*       Assessment and Plan      1. Routine general medical examination at a health care facility  2. Fatigue, unspecified type  -     Vitamin D 25 Hydroxy; Future  -     Vitamin B12; Future  -     TSH With Reflex Ft4; Future  3. Colon cancer screening  -     Cologuard; Future  4. DM (diabetes mellitus), type 1 with complications (HCC)  -     Hemoglobin A1C; Future  5. Closed fracture of proximal end of left tibia, unspecified fracture morphology, sequela    We will check labs to include vitamin D, B12, thyroid    Cologuard sent    A1c recheck after 3 months    Continue plan for PT follow-up    Average risk surgical candidate for eye surgery. Return in 3 months sooner as needed              No results found for this visit on 09/10/21. Return in 6 months (on 3/10/2022), or if symptoms worsen or fail to improve, for Medicare Annual Wellness Visit in 1 year. No orders of the defined types were placed in this encounter. Patient given educational materials - see patient instructions. Discussed use, benefit, and side effects of prescribed medications. All patientquestions answered. Pt voiced understanding. Patient given educational materials - see patient instructions. Discussed use, benefit, and side effects of prescribed medications. All patientquestions answered. Pt voiced understanding. This note was transcribed using dictation with Dragon services. Efforts were made to correct any errors but some words may be misinterpreted. Electronically signed by DANIEL Mcbride CNP on 9/10/2021at 10:03 AM          Medicare Annual Wellness Visit  Name: Dori Watts Date: 9/10/2021   MRN: [id number] Sex: Male   Age: 51 y.o. Ethnicity: Non- / Non    : 1975 Race: White (non-)      Ranjith Kessler is here for 3 Month Follow-Up, Diabetes (running 88 to 250.  not due for a1c), and Medicare AWV    Screenings for behavioral, psychosocial and functional/safety risks, and cognitive dysfunction are all negative except as indicated below. These results, as well as other patient data from the [address] form, are documented in Flowsheets linked to this Encounter. No Known Allergies      Prior to Visit Medications    Medication Sig Taking? Authorizing Provider   ondansetron (ZOFRAN-ODT) 4 MG disintegrating tablet  Yes Historical Provider, MD   traMADol (ULTRAM) 50 MG tablet Take 50 mg by mouth daily.   Yes Historical Provider, MD   hydrALAZINE (APRESOLINE) 50 MG tablet  Yes Historical Provider, MD   FLUoxetine (PROZAC) 20 MG capsule Take 2 capsules by mouth daily Yes Angelito Sun DO   furosemide (LASIX) 20 MG tablet Take 1 tablet by mouth daily Yes Angelito Sun DO   insulin glargine (LANTUS) 100 UNIT/ML injection vial Inject 45 units in the morning and 5 units SQ at  night Yes Angelito Sun DO   traZODone (DESYREL) 100 MG tablet Take 1 tablet by mouth nightly as needed for Sleep Yes Angelito Sun DO   buPROPion (WELLBUTRIN XL) 150 MG extended release tablet Take 1 tablet by mouth every morning Yes Angelito Sun DO   metoprolol succinate (TOPROL XL) 200 MG extended release tablet Take 1 tablet by mouth daily Yes Angelito Sun DO   insulin lispro (HUMALOG) 100 UNIT/ML injection vial Inject into the skin 3 times daily (before meals) Yes Historical Provider, MD   lisinopril (PRINIVIL;ZESTRIL) 20 MG tablet Take 40 mg by mouth daily Yes Historical Provider, MD   Rosuvastatin Calcium 40 MG CPSP Take 40 mg by mouth daily Yes Historical Provider, MD   ezetimibe (ZETIA) 10 MG tablet Take 10 mg by mouth daily Yes Historical Provider, MD   omeprazole (PRILOSEC) 40 MG delayed release capsule Take 40 mg by mouth daily Yes Historical Provider, MD   loratadine (CLARITIN) 10 MG capsule Take 10 mg by mouth daily Yes Historical Provider, MD   amLODIPine (NORVASC) 10 MG tablet Take 10 mg by mouth daily Yes Historical Provider, MD   aspirin 81 MG EC tablet Take 81 mg by mouth daily Yes Historical Provider, MD   magnesium oxide (MAG-OX) 400 MG tablet Take 400 mg by mouth daily Yes Historical Provider, MD   calcium acetate 667 MG TABS Take 3 tablets by mouth daily Yes Historical Provider, MD   vitamin B-12 (CYANOCOBALAMIN) 500 MCG tablet Take 500 mcg by mouth daily Yes Historical Provider, MD         Past Medical History:   Diagnosis Date    Closed fracture of bone of right foot March - April 2020    Dialysis patient [facility])     Kidney disease     On Dialysis    Type 1 diabetes mellitus ([address]        History reviewed. No pertinent surgical history. Family History   Problem Relation Age of Onset    Diabetes Mother     Diabetes Father     Heart Disease Maternal Great Grandfather        CareTeam (Including outside providers/suppliers regularly involved in providing care):   Patient Care Team:  DANIEL Galdamez CNP as PCP - General (Certified Nurse Practitioner)  DANIEL Galdamez CNP as PCP - [facility] Empaneled Provider  Nacho Caldera DPM as Physician (Podiatry)    Wt Readings from Last 3 Encounters:   09/10/21 190 lb (86.2 kg)   09/09/21 187 lb (84.8 kg)   08/30/21 187 lb (84.8 kg)     Vitals:    09/10/21 0913   BP: 138/78   Pulse: 81   Resp: 18   SpO2: 96%   Weight: 190 lb (86.2 kg)   Height: 5' 6\" (1.676 m)     Body mass index is 30.67 kg/m². Based upon direct observation of the patient, evaluation of cognition reveals recent and remote memory intact. Patient's complete Health Risk Assessment and screening values have been reviewed and are found in Flowsheets. The following problems were reviewed today and where indicated follow up appointments were made and/or referrals ordered. Positive Risk Factor Screenings with Interventions:            General Health and ACP:  General  In general, how would you say your health is?: Fair  In the past 7 days, have you experienced any of the following? New or Increased Pain, New or Increased Fatigue, Loneliness, Social Isolation, Stress or Anger?: (!) New or Increased Fatigue  Do you get the social and emotional support that you need?: Yes  Do you have a Living Will?: Yes  Advance Directives     Power of  Living Will ACP-Advance Directive ACP-Power of     Not on File Not on File Not on File Not on File      General Health Risk Interventions:  · No Living Will:      Health Habits/Nutrition:  Health Habits/Nutrition  Do you exercise for at least 20 minutes 2-3 times per week?: Yes  Have you lost any weight without trying in the past 3 months?: No  Do you eat only one meal per day?: No  Have you seen the dentist within the past year?: (!) No  Body mass index: (!) 30.66  Health Habits/Nutrition Interventions:  · Dental exam overdue:  patient encouraged to make appointment with his/her dentist     Safety:  Safety  Do you have working smoke detectors?: Yes  Have all throw rugs been removed or fastened?: (!) No  Do you have non-slip mats or surfaces in all bathtubs/showers?: Yes  Do all of your stairways have a railing or banister?: Yes  Are your doorways, halls and stairs free of clutter?: Yes  Do you always fasten your seatbelt when you are in a car?: Yes  Safety Interventions:  · Home safety tips provided    ADL:  ADLs  In the past 7 days, did you need help from others to take care of any of the following?  Laundry, housekeeping, banking/finances, shopping, telephone use, food preparation, transportation, or taking medications?: (!) Transportation, Shopping  ADL Interventions:  · family assists with these activities    Personalized Preventive Plan   Current Health Maintenance Status  Immunization History   Administered Date(s) Administered    COVID-19, Moderna, PF, 100mcg/0.5mL 03/16/2021, 04/13/2021    Influenza Virus Vaccine 10/05/2020, 10/21/2020    PPD Test 01/12/2021, 01/21/2021    Pneumococcal Vaccine 02/14/2020    Tdap (Boostrix, Adacel) 11/21/2020        Health Maintenance   Topic Date Due    Hepatitis C screen  Never done    Pneumococcal 0-64 years Vaccine (1 of 4 - PCV13) 05/11/1980    Diabetic retinal exam  Never done    Hepatitis B vaccine (1 of 3 - Risk Recombivax 3-dose series) Never done    Colon cancer screen colonoscopy  Never done    Annual Wellness Visit (AWV)  Never done    Flu vaccine (1) 09/01/2021    Potassium monitoring  07/13/2022    Creatinine monitoring  07/13/2022    A1C test (Diabetic or Prediabetic)  07/19/2022    Lipid screen  07/19/2022    Diabetic foot exam  09/02/2022    DTaP/Tdap/Td vaccine (2 - Td or Tdap) 11/21/2030    COVID-19 Vaccine  Completed    Hepatitis A vaccine  Aged Out    Hib vaccine  Aged Out    Meningococcal (ACWY) vaccine  Aged Out    HIV screen  Discontinued     Recommendations for [place] Due: see orders and patient instructions/AVS.  . Recommended screening schedule for the next 5-10 years is provided to the patient in written form: see Patient Instructions/AVS.    Clay Vasquez was seen today for 3 month follow-up, diabetes and medicare awv. Diagnoses and all orders for this visit:    Routine general medical examination at a health care facility    Fatigue, unspecified type  -     Vitamin D 25 Hydroxy; Future  -     Vitamin B12; Future  -     TSH With Reflex Ft4; Future    Colon cancer screening  -     Cologuard;  Future    DM (diabetes mellitus), type 1 with complications (HCC)  -     Hemoglobin A1C; Future    Closed fracture of proximal end of left tibia, unspecified fracture morphology, sequela

## 2021-09-10 NOTE — PROGRESS NOTES
Vaccine Information Sheet, \"Influenza - Inactivated\"  given to [employer], or parent/legal guardian of  [employer] and verbalized understanding. Patient responses:    Have you ever had a reaction to a flu vaccine? No  Are you able to eat eggs without adverse effects? No  Do you have any current illness? No  Have you ever had [place] Syndrome? No    Flu vaccine given per order. Please see immunization tab. After obtaining consent, and per orders of Dr. Trey Lau, injection of Flu given in Left deltoid by Rao Boston MA. Patient instructed to remain in clinic for 20 minutes afterwards, and to report any adverse reaction to me immediately.

## 2021-09-13 ENCOUNTER — TELEPHONE (OUTPATIENT)
Dept: FAMILY MEDICINE CLINIC | Age: 46
End: 2021-09-13

## 2021-09-13 NOTE — TELEPHONE ENCOUNTER
Called and spoke with Vanessa Spann, he is seeing [facility] on [facility]. Called to get paperwork sent. The person I spoke with didn't see anything on their end. She is sending a message and will fax over any paperwork needed if any.

## 2021-09-13 NOTE — TELEPHONE ENCOUNTER
----- Message from [facility] sent at 9/13/2021  1:54 PM EDT -----  Subject: Message to Provider    QUESTIONS  Information for Provider? Chay palmer like to know if the medical   clearance paperwork has been receieved and sent back to eye institute in   [facility].  ---------------------------------------------------------------------------  --------------  [address]  What is the best way for the office to contact you? OK to leave message on   voicemail  Preferred Call Back Phone Number? [phone]  ---------------------------------------------------------------------------  --------------  SCRIPT ANSWERS  Relationship to Patient?  Third Party  Representative Name? Chay allen

## 2021-10-25 ENCOUNTER — HOSPITAL ENCOUNTER (OUTPATIENT)
Dept: PHYSICAL THERAPY | Facility: CLINIC | Age: 46
Setting detail: THERAPIES SERIES
Discharge: HOME | End: 2021-10-25
Payer: MEDICARE

## 2021-10-25 PROCEDURE — 97162 PT EVAL MOD COMPLEX 30 MIN: CPT

## 2021-10-25 NOTE — PLAN OF CARE
[] [facility]) - [facility] &  Therapy  [address]  [phone]  F: [phone] [x] [address]  [address]   [address]  P: [phone]  F: [phone] [] [address] &  Therapy  [address]  P: [phone]  F: [phone] [] [address]  P: [phone]  F: [phone] [] [address]  [facility]   [address]   [place]: [phone]  F: [phone]        Physical Therapy Plan of Care    Date:  10/25/2021  Patient: Bob Conklin  : 1975  MRN: [id number]  Physician: Dr. Sirisha Young: Medicare ([facility])  Medical Diagnosis: Closed fracture of proximal end of left tibia, unspecified fracture morphology, sequela               Rehab Codes: M25.662, R53.1, R26.9, R27.9  Onset date: 21                 Next Dr's appt.: 21    Subjective:   CC: Pt arrives for physical therapy evaluation of L knee pain, anteriorly per pt; onset ~1.5 yrs ago after a fracture after falling onto cement tae while in dialysis. Recent MRI reveals still poor healing and avulsed fracture of PCL with tibia. Pain with any weightbearing but worsening with ascending inclines and standing; laying and sitting improves the pain. Using rollator for ~1 year, then got B AFOs.      HPI: \"Half the muscle in each foot is gone\", numbness in B feet potentially secondary to DM I (pt unsure) - present for ~1 yr, wears B AFOs.  Going to dialysis T/Th/Sat, for the past 3 years.         Prior Level of Function: driving, independence with all ADL/IADLs     Current Level of Function: limited to ~30 min standing/walking time, difficulty bending down to pick things up off the ground, floor transfers very difficult,  Grandparents have to help him get in/out of shower d/t pain/imbalance, difficulty with LE dressing and needs to sit, grandparents driving for him and having to get groceries         Assessment: Micha Handy is a 52 yo male who presents with impaired gait mechanics, standing balance, and L knee pain secondary to non-healing tibal fracture from 1.5 yrs ago with comminuted avulsion fx and chronic PCL strain. Pt case complicated with bilat dropfoot d/t neuropathy. Neurological exam completed and noted to have all symptoms consistent with peripheral neuropathy and no other signs indicating other pathology, pt denies progressing symptoms. Pt will benefit from skilled physical therapy to address B quad/hip strength (L>R), gait train, and balance training to improve safety and access in home/community and less reliance on caregivers.      Problems:    [x]? ? Pain:                      [x]? ? ROM:                     [x]? ? Strength:                [x]? ? Function:                []? ? Balance  []? Edema:  []? Postural Deviations  []? Gait Deviations  []? Other:                             STG: (to be met in 8 treatments)  1. ? Pain: No more than 5/10 max pain noted in L knee with prolonged standing, ADLs  2. ? ROM: R knee ROM to +5 - 130deg to indicate symmetrical ROM and restored joint mobility post chronic pain from injury  3. ? Strength: Pt to demo ability to complete 10x mini squat with even weightbearing demonstrated and no UE assist to indicate improving functional quad strength  4. ? Balance: Pt able to demo appropriate amplitude of stepping strategies in response to posterior perturbations with good balance noted. 5. Function:   a.  Pt able to complete half kneel to stand transfer with unilat UE assist and CGA/min VCs  from therapist  b. Pt able to ambulate 150 ft on level ground with AFOs donned while demonstrating more typical JASIEL vs narrow, improved consistency of stepping, and more appropriate trunk/arm swing movement vs excessive for improved balance to indicate overall improved safety and efficiency of gait. 6. Independent with Home Exercise Programs  7. Demonstrate Knowledge of fall prevention     LTG: (to be met in 16 treatments)  1. 5/5 L knee extension strength to indicate improving joint stability and allow for improved control with walking, stair climbing  2. Able to ambulate 300 ft on level ground with AFOs donned while demonstrating consistently appropriate JASIEL, no more than 10% incidence inconsistent stepping or deviation from straight path to indicate improving gait mechanics and safety  3. Pt able to ascend/descend stairs reciprocally with unilat UE assist and demonstrating good speed/balance  4. Pt able to squat to  objects off of floor without difficulty  5. Pt able to complete full floor<>stand transfer without need for assist and safe technique.   6. At least 40% fxn reported per LEFI to indicate improvement in subjective LLE ability                    Patient goals:  \"be able to do stuff like bending down, going up stairs\"        Outcome Measure on Initial Eval:  LEFI: 20/80, 25% function           Additional Outcome Measures Used: 10MWT - see functional tests above       Treatment Plan:  [x] Therapeutic Exercise   75050  [] Iontophoresis: 4 mg/mL Dexamethasone Sodium Phosphate  mAmin  57678   [x] Therapeutic Activity  54766 [] Vasopneumatic cold with compression  04276    [x] Gait Training   76465 [] Ultrasound   [id number]   [x] Neuromuscular Re-education  92068 [] Electrical Stimulation Unattended  63999   [x] Manual Therapy  60245 [] Electrical Stimulation Attended  49185   [x] Instruction in HEP  [] Lumbar/Cervical Traction  [id number]   [] Aquatic Therapy   69805 [x] Cold/hotpack    [] Massage   22937      [] Dry Needling, 1 or 2 muscles  72339   [] Biofeedback, first 15 minutes   63582  [] Biofeedback, additional 15 minutes   12046 [] Dry Needling, 3 or more muscles  39243     []  Medication allergies reviewed for use of    Dexamethasone Sodium Phosphate 4mg/ml     with iontophoresis treatments. Pt is not allergic. Frequency:  2 x/week for 16 visits    Electronically signed by: Isacc Ingram PT        Physician Signature:________________________________Date:__________________  By signing above or cosigning this note, I have reviewed this plan of care and certify a need for medically necessary rehabilitation services.      *PLEASE SIGN ABOVE AND FAX BACK ALL PAGES*

## 2021-11-01 ENCOUNTER — OFFICE VISIT (OUTPATIENT)
Dept: PODIATRY | Age: 46
End: 2021-11-01
Payer: MEDICARE

## 2021-11-01 VITALS — BODY MASS INDEX: 30.53 KG/M2 | WEIGHT: 190 LBS | HEIGHT: 66 IN | RESPIRATION RATE: 16 BRPM

## 2021-11-01 DIAGNOSIS — B35.1 DERMATOPHYTOSIS OF NAIL: Primary | ICD-10-CM

## 2021-11-01 DIAGNOSIS — I73.9 PERIPHERAL VASCULAR DISORDER (HCC): ICD-10-CM

## 2021-11-01 DIAGNOSIS — E10.49 OTHER DIABETIC NEUROLOGICAL COMPLICATION ASSOCIATED WITH TYPE 1 DIABETES MELLITUS (HCC): ICD-10-CM

## 2021-11-01 DIAGNOSIS — L85.3 XEROSIS CUTIS: ICD-10-CM

## 2021-11-01 PROCEDURE — 99213 OFFICE O/P EST LOW 20 MIN: CPT | Performed by: PODIATRIST

## 2021-11-01 PROCEDURE — 3051F HG A1C>EQUAL 7.0%<8.0%: CPT | Performed by: PODIATRIST

## 2021-11-01 PROCEDURE — G8482 FLU IMMUNIZE ORDER/ADMIN: HCPCS | Performed by: PODIATRIST

## 2021-11-01 PROCEDURE — G8427 DOCREV CUR MEDS BY ELIG CLIN: HCPCS | Performed by: PODIATRIST

## 2021-11-01 PROCEDURE — 11721 DEBRIDE NAIL 6 OR MORE: CPT | Performed by: PODIATRIST

## 2021-11-01 PROCEDURE — G8417 CALC BMI ABV UP PARAM F/U: HCPCS | Performed by: PODIATRIST

## 2021-11-01 PROCEDURE — 1036F TOBACCO NON-USER: CPT | Performed by: PODIATRIST

## 2021-11-01 PROCEDURE — 2022F DILAT RTA XM EVC RTNOPTHY: CPT | Performed by: PODIATRIST

## 2021-11-01 RX ORDER — AMMONIUM LACTATE 12 G/100G
LOTION TOPICAL
Qty: 1 EACH | Refills: 3 | Status: ON HOLD | OUTPATIENT
Start: 2021-11-01 | End: 2022-07-14

## 2021-11-01 NOTE — PROGRESS NOTES
[facility]YSICIANS  [facility]  [address] [address] [address] [place] [address]  Dept: [phone]  Dept Fax: [phone]    DIABETIC PROGRESS NOTE  Date of patient's visit: 11/1/2021  Patient's Name:  Steven Alvarez YOB: 1975            Patient Care Team:  DANIEL Greenberg CNP as PCP - General (Certified Nurse Practitioner)  DANIEL Greenberg CNP as PCP - [facility] Empaneled Provider  Cherie Faulkner DPM as Physician (Podiatry)          Chief Complaint   Patient presents with    Diabetes    Peripheral Neuropathy    Nail Problem    Benign Neoplasm       Subjective:   Steven Alvarez comes to clinic for Diabetes, Peripheral Neuropathy, Nail Problem, and Benign Neoplasm    he is a diabetic and states that he is doing well. Pt currently has complaint of thickened, elongated nails that they cannot manage by themselves. Pt's primary care physician is DANIEL Greenberg CNP last seen 9/1/2021   Pt's last blood sugar was 288 this morning . Pt has a new complaint of increased dry skin to danielle LE. Lab Results   Component Value Date    LABA1C 7.5 (H) 07/19/2021      Complains of numbness in the feet bilat. Past Medical History:   Diagnosis Date    Closed fracture of bone of right foot March - April 2020    Dialysis patient [facility])     Kidney disease     On Dialysis    Type 1 diabetes mellitus ([facility] [address] [facility]        No Known Allergies  Current Outpatient Medications on File Prior to Visit   Medication Sig Dispense Refill    ondansetron (ZOFRAN-ODT) 4 MG disintegrating tablet       traMADol (ULTRAM) 50 MG tablet Take 50 mg by mouth daily.        hydrALAZINE (APRESOLINE) 50 MG tablet       FLUoxetine (PROZAC) 20 MG capsule Take 2 capsules by mouth daily 30 capsule 5    furosemide (LASIX) 20 MG tablet Take 1 tablet by mouth daily 60 tablet 5    insulin glargine (LANTUS) 100 UNIT/ML injection vial Inject 45 units in the morning and 5 units SQ at  night 5 vial 5  traZODone (DESYREL) 100 MG tablet Take 1 tablet by mouth nightly as needed for Sleep 30 tablet 5    buPROPion (WELLBUTRIN XL) 150 MG extended release tablet Take 1 tablet by mouth every morning 15 tablet 1    metoprolol succinate (TOPROL XL) 200 MG extended release tablet Take 1 tablet by mouth daily 90 tablet 1    insulin lispro (HUMALOG) 100 UNIT/ML injection vial Inject into the skin 3 times daily (before meals)      lisinopril (PRINIVIL;ZESTRIL) 20 MG tablet Take 40 mg by mouth daily      Rosuvastatin Calcium 40 MG CPSP Take 40 mg by mouth daily      ezetimibe (ZETIA) 10 MG tablet Take 10 mg by mouth daily      omeprazole (PRILOSEC) 40 MG delayed release capsule Take 40 mg by mouth daily      loratadine (CLARITIN) 10 MG capsule Take 10 mg by mouth daily      amLODIPine (NORVASC) 10 MG tablet Take 10 mg by mouth daily      aspirin 81 MG EC tablet Take 81 mg by mouth daily      magnesium oxide (MAG-OX) 400 MG tablet Take 400 mg by mouth daily      calcium acetate 667 MG TABS Take 3 tablets by mouth daily      vitamin B-12 (CYANOCOBALAMIN) 500 MCG tablet Take 500 mcg by mouth daily       No current facility-administered medications on file prior to visit. Review of Systems    Review of Systems:   History obtained from chart review and the patient  General ROS: negative for - chills, fatigue, fever, night sweats or weight gain  Constitutional: Negative for chills, diaphoresis, fatigue, fever and unexpected weight change. Musculoskeletal: Positive for arthralgias, gait problem and joint swelling. Neurological ROS: negative for - behavioral changes, confusion, headaches or seizures. Negative for weakness and numbness. Dermatological ROS: negative for - mole changes, rash  Cardiovascular: Negative for leg swelling. Gastrointestinal: Negative for constipation, diarrhea, nausea and vomiting. Objective:  Dermatologic Exam:  Skin lesion/ulceration Absent .    Skin No rashes or nodules noted..   Skin is thin, with flaky sloughing skin as well as decreased hair growth to the lower leg  Small red hemosiderin deposits seen dorsal foot   Musculoskeletal:     1st MPJ ROM decreased, Bilateral.  Muscle strength 5/5, Bilateral.  Pain present upon palpation of toenails 1-5, Bilateral. decreased medial longitudinal arch, Bilateral.  Ankle ROM decreased,Bilateral.    Dorsally contracted digits present digits 2, Bilateral.     Vascular: DP pulses 1/4 bilateral.  PT pulses 0/4 bilateral.   CFT <5 seconds, Bilateral.  Hair growth absent to the level of the digits, Bilateral.  Edema present, Bilateral.  Varicosities absent, Bilateral. Erythema absent, Bilateral    Neurological: Sensation diminshed to light touch to level of digits, Bilateral.  Protective sensation intact 6/10 sites via 5.07/10g [place] Monofilament, Bilateral.  negative Tinel's, Bilateral.  negative Valleix sign, Bilateral.      Integument: Warm, dry, supple, Bilateral.  Open lesion absent, Bilateral.  Interdigital maceration absent to web spaces 4, Bilateral.  Nails 1-5 left and 1-5 right thickened > 3.0 mm, dystrophic and crumbly, discolored with subungual debris. Fissures absent, Bilateral.   General: AAO x 3 in NAD.     Derm  Toenail Description  Sites of Onychomycosis Involvement (Check affected area)  [x] [x] [x] [x] [x] [x] [x] [x] [x] [x]  5 4 3 2 1 1 2 3 4 5                          Right                                        Left    Thickness  [x] [x] [x] [x] [x] [x] [x] [x] [x] [x]  5 4 3 2 1 1 2 3 4 5                         Right                                        Left    Dystrophic Changes   [x] [x] [x] [x] [x] [x] [x] [x] [x] [x]  5 4 3 2 1 1 2 3 4 5                         Right                                        Left    Color   [x] [x] [x] [x] [x] [x] [x] [x] [x] [x]  5 4 3 2 1 1 2 3 4 5                          Right                                        Left    Incurvation/Ingrowin [] [] [] [] [] [] [] [] [] []  5 4 3 2 1 1 2 3 4 5                         Right                                        Left    Inflammation/Pain   [x] [x] [x] [x] [x] [x] [x] [x] [x] [x]  5 4 3 2 1 1 2 3 4 5                         Right                                        Left        Visual inspection:  Deformity: hammertoe deformity danielle feet  amputation: absent  Skin lesions: absent  Edema: right- 2+ pitting edema, left- 2+ pitting edema    Sensory exam:  Monofilament sensation: abnormal - 6/10 via SW 5.07/10g monofilament to the plantar foot bilateral feet    Pulses: abnormal - 1/4 dorsalis pedis pulse and 0/4 Posterior tibial pulse,   Pinprick: Impaired  Proprioception: Impaired  Vibration (128 Hz): Impaired       DM with PVD       [x]Yes    []No      Assessment:  51 y.o. male with:   Diagnosis Orders   1. Dermatophytosis of nail  95984 - [place] DEBRIDEMENT OF NAILS, 6 OR MORE   2. Peripheral vascular disorder (HCC)  15590 - [place] DEBRIDEMENT OF NAILS, 6 OR MORE   3. Other diabetic neurological complication associated with type 1 diabetes mellitus ([facility]  43234 - [place] DEBRIDEMENT OF NAILS, 6 OR MORE   4. Xerosis cutis             Q7   []Yes    []No                Q8   [x]Yes    []No                     Q9   []Yes    []No    Plan:   Pt was evaluated and examined. Patient was given personalized discharge instructions. To address xerosis, patient to apply lachydrin cream to feet daily. Pt to monitor for fissures due to dryness. Advised pt to contact office is there are any open lesions. Nails 1-10 were debrided sharply in length and thickness with a nipper and , without incident. Pt will follow up in 10 weeks or sooner if any problems arise. Diagnosis was discussed with the pt and all of their questions were answered in detail. Proper foot hygiene and care was discussed with the pt. Informed patient on proper diabetic foot care and importance of tight glycemic control.   Patient to check feet daily and contact the office with any questions/problems/concerns. Other comorbidity noted and will be managed by PCP.   11/1/2021    Electronically signed by Zurdo Iglesias DPM on 11/1/2021 at 1:01 PM  11/1/2021

## 2021-11-03 ENCOUNTER — HOSPITAL ENCOUNTER (OUTPATIENT)
Dept: PHYSICAL THERAPY | Facility: CLINIC | Age: 46
Setting detail: THERAPIES SERIES
Discharge: HOME | End: 2021-11-03
Payer: MEDICARE

## 2021-11-03 PROCEDURE — 97110 THERAPEUTIC EXERCISES: CPT

## 2021-11-03 NOTE — FLOWSHEET NOTE
stretch, seated knee ext stretch, hamstring stretching  - CKC quad strengthening - LLE biased sit to stands from elevated mat table w/ 2\" block under RLE, Total gym DL/SL squats, leg press  - ankle DF strengthening - AROM to light resistance  - Balance training: SLS holds with UE assist, step taps to 4\" step w/ UE assist      Treatment Charges: Mins Units   []  Modalities     [x]  Ther Exercise 45 3   []  Manual Therapy     []  Ther Activities     []  Aquatics     []  Vasocompression     []  Other     Total Treatment time 45 3     Estimated medicare cost as of 10/15: $172.90    Assessment: [x] Progressing toward goals. Attempted to initiate treatment with recumbent bike but pt had difficulty with maintaining feet inside pedals but was able to tolerate the upright bike this date. Added seated and supine stretches to reduce tightness with mild relief noted after stretches. Pt most challenged by SLR on RLE>LLE this date. Mild numbness reported throughout treatment. Provided written HEP for ankle ROM exercises this date. Will continue to progress as tolerated. [] No change. [] Other:  [x] Patient would continue to benefit from skilled physical therapy services in order to: address B quad/hip strength (L>R), gait train, and balance training to improve safety and access in home/community and less reliance on caregivers. STG/LTG  STG: (to be met in 8 treatments)  1. ? Pain: No more than 5/10 max pain noted in L knee with prolonged standing, ADLs  2. ? ROM: R knee ROM to +5 - 130deg to indicate symmetrical ROM and restored joint mobility post chronic pain from injury  3. ? Strength: Pt to demo ability to complete 10x mini squat with even weightbearing demonstrated and no UE assist to indicate improving functional quad strength  4. ? Balance: Pt able to demo appropriate amplitude of stepping strategies in response to posterior perturbations with good balance noted. 5. Function:   a.  Pt able to complete half kneel to stand transfer with unilat UE assist and CGA/min VCs  from therapist  b. Pt able to ambulate 150 ft on level ground with AFOs donned while demonstrating more typical JASIEL vs narrow, improved consistency of stepping, and more appropriate trunk/arm swing movement vs excessive for improved balance to indicate overall improved safety and efficiency of gait. 6. Independent with Home Exercise Programs  7. Demonstrate Knowledge of fall prevention     LTG: (to be met in 16 treatments)  1. 5/5 L knee extension strength to indicate improving joint stability and allow for improved control with walking, stair climbing  2. Able to ambulate 300 ft on level ground with AFOs donned while demonstrating consistently appropriate JASIEL, no more than 10% incidence inconsistent stepping or deviation from straight path to indicate improving gait mechanics and safety  3. Pt able to ascend/descend stairs reciprocally with unilat UE assist and demonstrating good speed/balance  4. Pt able to squat to  objects off of floor without difficulty  5. Pt able to complete full floor<>stand transfer without need for assist and safe technique. 6. At least 40% fxn reported per LEFI to indicate improvement in subjective LLE ability                    Patient goals:  \"be able to do stuff like bending down, going up stairs\"    Pt. Education:  [x] Yes  [] No  [x] Reviewed Prior HEP/Ed  Method of Education: [x] Verbal  [x] Demo  [x] Written  Comprehension of Education:  [x] Verbalizes understanding. [] Demonstrates understanding. [x] Needs review. [] Demonstrates/verbalizes HEP/Ed previously given. Access Code: [email or web address]  URL: [email or web address]  Date: 11/03/2021  Prepared by: Donna Newman    Exercises  Supine Ankle Inversion Eversion AROM - 1 x daily - 7 x weekly - 3 sets - 10 reps  Supine Ankle Pumps - 1 x daily - 7 x weekly - 3 sets - 10 reps       Plan: [x] Continue current frequency toward long and short term goals. [x] Specific Instructions for subsequent treatments:       Time In: 10:00am            Time Out: 10:50am    Electronically signed by:  Mary Motta PTA

## 2021-11-05 ENCOUNTER — HOSPITAL ENCOUNTER (OUTPATIENT)
Dept: PHYSICAL THERAPY | Facility: CLINIC | Age: 46
Setting detail: THERAPIES SERIES
Discharge: HOME | End: 2021-11-05
Payer: MEDICARE

## 2021-11-05 PROCEDURE — 97110 THERAPEUTIC EXERCISES: CPT

## 2021-11-05 NOTE — FLOWSHEET NOTE
[] [facility]) [facility] &  Therapy  [address]  [phone]  F: [phone] [x] [address]  [facility]   [address]  P: [phone]  F: [phone] [] [address] &  Therapy  [address]  P: [phone]  F: [phone] [] [address]  P: [phone]  F: [phone] [] [address]  [facility]   [address]   [place]: [phone]  F: [phone]      Physical Therapy Daily Treatment Note    Date:  2021  Patient Name:  Shannan Dexter    :  1975  MRN: [id number]  Physician: Dr. Ambriz Cuff: Medicare ([address] [place] [address])  Medical Diagnosis: Closed fracture of proximal end of left tibia, unspecified fracture morphology, sequela               Rehab Codes: M25.662, R53.1, R26.9, R27.9  Onset date: 21                 Next Dr's appt.: 21  Visit# / total visits: 3/16; Progress note for Medicare patient due at visit 10     Cancels/No Shows: 0/0    Subjective:    Pain:  [x] Yes  [] No Location: L knee Pain Rating: (0-10 scale) 0/10  Pain altered Tx:  [x] No  [] Yes  Action:  Comments: Pt arrives to therapy ambulating with rollator and B AFOs - no pain this date.     Objective:  Modalities:   Precautions:  Exercises: Bolded completed 2021:  Exercise Reps/ Time Weight/ Level Comments   SciFit upright bike  5' L4          Mat      Seated hamstring stret 2x30\" ea     Hamstring stretch 3x30\"     SLR 2xq0 ea 3# AA by PTA on RLE  A on LLE   SL bridge 2x6 ea     Prone quad stretch 3x30\" ea     Ankle DF AROM 2x20  First set supine, second set seated with more visual feedback         Standing      Heel raises      marches      SLS    UE support   Step taps 2x10 ea 4in UE support   Sit to stands 3x10 Added to HEP 11/5 - discussed and demod safe set up to complete independently   Squat w/ ball 2x5  Min A to prevent posterior LOB         Other:     seated recumbent bike, prone quad stretch, seated knee ext stretch, hamstring stretching  - CKC quad strengthening - LLE biased sit to stands from elevated mat table w/ 2\" block under RLE, Total gym DL/SL squats, leg press  - ankle DF strengthening - AROM to light resistance  - Balance training: SLS holds with UE assist, step taps to 4\" step w/ UE assist      Treatment Charges: Mins Units   []  Modalities     [x]  Ther Exercise 50 3   []  Manual Therapy     []  Ther Activities     []  Aquatics     []  Vasocompression     []  Other     Total Treatment time 50 3   Estimated medicare cost as of 10/15: $247.79    Assessment: [x] Progressing toward goals. Pt with significant quad weakness in standing, with poor neuromuscular control as evidenced by frequent knee hyperext bilat. Able to improve some with max tactile/verbal cuing throughout session with standing exercise, although should be noted that anterior shell AFO may be impacting this. Will continue to focus on CKC strengthening and anterior weight translation with transfers. [] No change. [] Other:  [x] Patient would continue to benefit from skilled physical therapy services in order to: address B quad/hip strength (L>R), gait train, and balance training to improve safety and access in home/community and less reliance on caregivers.      STG/LTG  STG: (to be met in 8 treatments)  1. ? Pain: No more than 5/10 max pain noted in L knee with prolonged standing, ADLs  2. ? ROM: R knee ROM to +5 - 130deg to indicate symmetrical ROM and restored joint mobility post chronic pain from injury  3. ? Strength: Pt to demo ability to complete 10x mini squat with even weightbearing demonstrated and no UE assist to indicate improving functional quad strength  4. ? Balance: Pt able to demo appropriate amplitude of stepping strategies in response to posterior perturbations with good balance noted. 5. Function:   a. Pt able to complete half kneel to stand transfer with unilat UE assist and CGA/min VCs  from therapist  b. Pt able to ambulate 150 ft on level ground with AFOs donned while demonstrating more typical JASIEL vs narrow, improved consistency of stepping, and more appropriate trunk/arm swing movement vs excessive for improved balance to indicate overall improved safety and efficiency of gait. 6. Independent with Home Exercise Programs  7. Demonstrate Knowledge of fall prevention     LTG: (to be met in 16 treatments)  1. 5/5 L knee extension strength to indicate improving joint stability and allow for improved control with walking, stair climbing  2. Able to ambulate 300 ft on level ground with AFOs donned while demonstrating consistently appropriate JASIEL, no more than 10% incidence inconsistent stepping or deviation from straight path to indicate improving gait mechanics and safety  3. Pt able to ascend/descend stairs reciprocally with unilat UE assist and demonstrating good speed/balance  4. Pt able to squat to  objects off of floor without difficulty  5. Pt able to complete full floor<>stand transfer without need for assist and safe technique. 6. At least 40% fxn reported per LEFI to indicate improvement in subjective LLE ability                    Patient goals:  \"be able to do stuff like bending down, going up stairs\"    Pt. Education:  [x] Yes  [] No  [x] Reviewed Prior HEP/Ed  Method of Education: [x] Verbal  [x] Demo  [x] Written  Comprehension of Education:  [x] Verbalizes understanding. [] Demonstrates understanding. [x] Needs review. [] Demonstrates/verbalizes HEP/Ed previously given. Access Code: [email or web address]  URL: [email or web address]  Date: 11/03/2021  Prepared by: Raymundo Cadena    Exercises  Supine Ankle Inversion Eversion AROM - 1 x daily - 7 x weekly - 3 sets - 10 reps  Supine Ankle Pumps - 1

## 2021-11-08 NOTE — FLOWSHEET NOTE
[] [facility]) - [facility] &  Therapy  [address]    [phone]  F: [phone]   [x] [address]  [facility]   [address]  P: [phone]  F: [phone]  [] [address]  [address]  P: [phone]  F: [phone] [] [address]  P: [phone]  F: [phone]  [] [address]  [facility]   [address]   [place]: [phone]  F: [phone]   [] [place] [address] [facility] [address]  [place]: [phone]   F: [phone]     Physical Therapy Cancel/No Show note    Date: 2021  Patient: Baldo Hernandez  : 1975  MRN: [id number]    Cancels/No Shows to date: 0 - NOT COUNTED AGAINST PT D/T EARLY IN ADVANCE    For 11/10 and  appointments patient:    [x]  Cancelled    [] Rescheduled appointment    [] No-show     Reason given by patient:    []  Patient ill    []  Conflicting appointment    [] No transportation      [] Conflict with work    [] No reason given    [] Weather related    [] COVID-19    [x] Other:      Comments:  Pt cancelled these appts d/t organ transplant orientation being a conflicting appt.       [x] Next appointment at  was confirmed    Electronically signed by: Charles Swanson, PT

## 2021-11-10 ENCOUNTER — HOSPITAL ENCOUNTER (OUTPATIENT)
Dept: PHYSICAL THERAPY | Facility: CLINIC | Age: 46
Setting detail: THERAPIES SERIES
Discharge: HOME | End: 2021-11-10
Payer: MEDICARE

## 2021-11-12 ENCOUNTER — HOSPITAL ENCOUNTER (OUTPATIENT)
Dept: PHYSICAL THERAPY | Facility: CLINIC | Age: 46
Setting detail: THERAPIES SERIES
Discharge: HOME | End: 2021-11-12
Payer: MEDICARE

## 2021-11-12 PROCEDURE — 97110 THERAPEUTIC EXERCISES: CPT

## 2021-11-12 NOTE — FLOWSHEET NOTE
[] [facility]) - [facility]  [facility]  [address]  [phone]  F: [phone] [x] [address]  [place] [address]   [address]  P: [phone]  F: [phone] [] [address] &  Therapy  [address]  P: [phone]  F: [phone] [] [address]  P: [phone]  F: [phone] [] [address]  [facility]   [address]   [place]: [phone]  F: [phone]      Physical Therapy Daily Treatment Note    Date:  2021  Patient Name:  Josiah Ron    :  1975  MRN: [id number]  Physician: Dr. Palafox Jay: Medicare ([address] [place] [address])  Medical Diagnosis: Closed fracture of proximal end of left tibia, unspecified fracture morphology, sequela               Rehab Codes: M25.662, R53.1, R26.9, R27.9  Onset date: 21                 Next Dr's appt.: 21  Visit# / total visits: ; Progress note for Medicare patient due at visit 10     Cancels/No Shows: 0/0    Subjective:    Pain:  [x] Yes  [] No Location: L knee Pain Rating: (0-10 scale) 0/10  Pain altered Tx:  [x] No  [] Yes  Action:  Comments: Pt notes he is to likely be getting a new kidney and pancreas soon within the next 2-4 months - after waiting 3-4 years. Has been meeting with transplant team, getting tests done.      Objective:  Modalities:   Precautions: Dialysis T/Thrs/Sat  Exercises: Bolded completed 2021:  Exercise Reps/ Time Weight/ Level Comments   Nu-step 6' L4          Mat      Hamstring stretch 3x30\"     SLR 2x10 ea 3# AA by PTA on RLE  A on LLE   Single leg bridge 3x8 L  3x10 R     Sidelying hip abd x  Attempted, too much hip flexion even with tactile cuing   Sidelying clams 2x15 ea blue    Prone quad stretch      Ankle DF AROM First set supine, second set seated with more visual feedback   Bilateral ER 3x10 blue    Seated core twist 2x10 8#    Seated overhead press 2x10 8#          Standing      Heel raises      marches      SLS    UE support   Step taps  4in UE support   Sit to stands 3x15  W/ ball for forward to touch rollator lean external cue   Squat w/ ball 2x10  1x3  Min A to prevent posterior LOB               Gait train 100'   50'  Rollator first set, no AD last set  - B AFOs donned throughout         Other:     PLAN: stepping strategies, half kneel to stand transfers      Treatment Charges: Mins Units   []  Modalities     [x]  Ther Exercise 51 4   []  Manual Therapy     []  Ther Activities     []  Aquatics     []  Vasocompression     [x]  Other: Gait 3 --   Total Treatment time 54 4   Estimated medicare cost as of 11/12/2021: $345.52    Assessment: [x] Progressing toward goals. Good sit<>stand mechanics with external cue of reaching ball fwd to rollator to encourage anterior tibial translation - fair carryover of this with other squatting/standing exercises, but still noted to be difficult. Pt reporting shaking in ankles and fatigue in quads limiting completion of these exercises, requires min A by therapist to prevent LOB by end of sets. Intermittent core strengthening for improved postural control, performed in between standing quad strength to allow for appropriate BLE rest - pt notes good challenge with exercises. Pt able to complete short gait distances with short step length, reduced single limb stance time, and genurecurvatum in stance bilat - low endurance d/t prior workout. Will consider gait training earlier in session to avoid fatigue next visit. [] No change. [] Other:  [x] Patient would continue to benefit from skilled physical therapy services in order to: address B quad/hip strength (L>R), gait train, and balance training to improve safety and access in home/community and less reliance on caregivers. STG/LTG  STG: (to be met in 8 treatments)  1. ? Pain: No more than 5/10 max pain noted in L knee with prolonged standing, ADLs  2. ? ROM: R knee ROM to +5 - 130deg to indicate symmetrical ROM and restored joint mobility post chronic pain from injury  3. ? Strength: Pt to demo ability to complete 10x mini squat with even weightbearing demonstrated and no UE assist to indicate improving functional quad strength  4. ? Balance: Pt able to demo appropriate amplitude of stepping strategies in response to posterior perturbations with good balance noted. 5. Function:   a. Pt able to complete half kneel to stand transfer with unilat UE assist and CGA/min VCs  from therapist  b. Pt able to ambulate 150 ft on level ground with AFOs donned while demonstrating more typical JASIEL vs narrow, improved consistency of stepping, and more appropriate trunk/arm swing movement vs excessive for improved balance to indicate overall improved safety and efficiency of gait. 6. Independent with Home Exercise Programs  7. Demonstrate Knowledge of fall prevention     LTG: (to be met in 16 treatments)  1. 5/5 L knee extension strength to indicate improving joint stability and allow for improved control with walking, stair climbing  2. Able to ambulate 300 ft on level ground with AFOs donned while demonstrating consistently appropriate JASIEL, no more than 10% incidence inconsistent stepping or deviation from straight path to indicate improving gait mechanics and safety  3. Pt able to ascend/descend stairs reciprocally with unilat UE assist and demonstrating good speed/balance  4. Pt able to squat to  objects off of floor without difficulty  5. Pt able to complete full floor<>stand transfer without need for assist and safe technique. 6. At least 40% fxn reported per LEFI to indicate improvement in subjective LLE ability                    Patient goals:  \"be able to do stuff like bending down, going up stairs\"    Pt.  Education:  [x] Yes [] No  [x] Reviewed Prior HEP/Ed  Method of Education: [x] Verbal  [x] Demo  [x] Written  Comprehension of Education:  [x] Verbalizes understanding. [] Demonstrates understanding. [x] Needs review. [] Demonstrates/verbalizes HEP/Ed previously given. Access Code: [email or web address]  URL: [email or web address]  Date: 11/03/2021  Prepared by: Ritchie Sun    Exercises  Supine Ankle Inversion Eversion AROM - 1 x daily - 7 x weekly - 3 sets - 10 reps  Supine Ankle Pumps - 1 x daily - 7 x weekly - 3 sets - 10 reps  Sit<>stand with fwd reach - 3x10  Squats w/ chair support - 3x10       Plan: [x] Continue current frequency toward long and short term goals.     [x] Specific Instructions for subsequent treatments: progress standing quad/hip strength with some UE assisted tasks, gait training      Time In: 9:50 am            Time Out: 10:50 am    Electronically signed by:  Tammy Mcfarlane PT

## 2021-11-17 ENCOUNTER — APPOINTMENT (OUTPATIENT)
Dept: PHYSICAL THERAPY | Facility: CLINIC | Age: 46
End: 2021-11-17
Payer: MEDICARE

## 2021-11-19 ENCOUNTER — HOSPITAL ENCOUNTER (OUTPATIENT)
Dept: PHYSICAL THERAPY | Facility: CLINIC | Age: 46
Setting detail: THERAPIES SERIES
Discharge: HOME | End: 2021-11-19
Payer: MEDICARE

## 2021-11-19 PROCEDURE — 97110 THERAPEUTIC EXERCISES: CPT

## 2021-11-19 NOTE — FLOWSHEET NOTE
[] [facility]) - [facility] &  Therapy  [address]  [phone]  F: [phone] [x] [address]  [facility]   [address]  P: [phone]  F: [phone] [] [address] &  Therapy  [address]  P: [phone]  F: [phone] [] [address]  P: [phone]  F: [phone] [] [address]  [facility]   [address]   [place]: [phone]  F: [phone]      Physical Therapy Daily Treatment Note    Date:  2021  Patient Name:  Pablo Robison    :  1975  MRN: [id number]  Physician: Dr. Primo Chowdary: Medicare ([address] [place] [address])  Medical Diagnosis: Closed fracture of proximal end of left tibia, unspecified fracture morphology, sequela               Rehab Codes: M25.662, R53.1, R26.9, R27.9  Onset date: 21                 Next Dr's appt.: 21  Visit# / total visits: ; Progress note for Medicare patient due at visit 10     Cancels/No Shows: 0/0    Subjective:    Pain:  [x] Yes  [] No Location: L knee Pain Rating: (0-10 scale) 0/10  Pain altered Tx:  [x] No  [] Yes  Action:  Comments: Pt notes he's been fighting some mild low back pain on and off since yesterday at dialysis - not present to begin session. Notes he may or may not be getting the pancreas transplant as well as the kidney - potentially more risks with this, per pt.      Objective:  Modalities:   Precautions: Dialysis T/Thrs/Sat  Exercises: Bolded completed 2021:  Exercise Reps/ Time Weight/ Level Comments   Nu-step 6' L4          Mat      Hamstring stretch      SLR  3# AA by PTA on RLE  A on LLE   Single leg bridge      Sidelying hip abd   Attempted, too much hip flexion even with tactile cuing   Sidelying clams  blue Prone quad stretch      Ankle DF AROM   First set supine, second set seated with more visual feedback   Bilateral ER 3x15 blue    Seated core twist 2x10 8#    Seated overhead press 2x10 8#    Armchair push ups 3x10           Standing      Heel raises      marches      SLS    UE support   Step taps  4in UE support   Sit to stands 3x15  W/ ball for forward to touch rollator lean external cue   Squat w/ ball 2x10  1x3  Min A to prevent posterior LOB   Half kneel to stand 2x  Max A x2 to return to stand on second rep d/t fatigue in arms, LLE  - HOLD until improved strength in BUEs         Gait train 2x150 ft  No rollator, AFOs donned; SBA         Other:     PLAN: stepping strategies, half kneel to stand transfers      Treatment Charges: Mins Units   []  Modalities     [x]  Ther Exercise 44 3   []  Manual Therapy     []  Ther Activities     []  Aquatics     []  Vasocompression     [x]  Other: Gait 6 --   Total Treatment time 50 3   Estimated medicare cost as of 11/19/2021: $420.41    Assessment: [x] Progressing toward goals. Still benefits from min-mod therapist blocking at knees to prevent hyperextension with squatting/sit<>stand training for quad strengthening; limited with anterior translation d/t bracing. Better tolerance to gait training with increased distance performed this date - still with inconsistent foot placement and intermittent instability, consistently in genurecurvatum in stance bilat. [] No change. [x] Other: Attempted half kneel to stand transfers to address safety concerns after fall and further leg strengthening - pt able to complete 1 rep with min A from therapist, but next rep unable to return to stand and with attempt to stand, LLE twists slightly underneath him. Requires max A x2 to return to stand after this attempt, with limited to no UE assist from pt.  Reports some pain in L knee from the twisting, but reports this is improved after some gentle movement of knee and after some time later in session. Denies need for heat/ice. [x] Patient would continue to benefit from skilled physical therapy services in order to: address B quad/hip strength (L>R), gait train, and balance training to improve safety and access in home/community and less reliance on caregivers. STG/LTG  STG: (to be met in 8 treatments)  1. ? Pain: No more than 5/10 max pain noted in L knee with prolonged standing, ADLs  2. ? ROM: R knee ROM to +5 - 130deg to indicate symmetrical ROM and restored joint mobility post chronic pain from injury  3. ? Strength: Pt to demo ability to complete 10x mini squat with even weightbearing demonstrated and no UE assist to indicate improving functional quad strength  4. ? Balance: Pt able to demo appropriate amplitude of stepping strategies in response to posterior perturbations with good balance noted. 5. Function:   a. Pt able to complete half kneel to stand transfer with unilat UE assist and CGA/min VCs  from therapist  b. Pt able to ambulate 150 ft on level ground with AFOs donned while demonstrating more typical JASIEL vs narrow, improved consistency of stepping, and more appropriate trunk/arm swing movement vs excessive for improved balance to indicate overall improved safety and efficiency of gait. 6. Independent with Home Exercise Programs  7. Demonstrate Knowledge of fall prevention     LTG: (to be met in 16 treatments)  1. 5/5 L knee extension strength to indicate improving joint stability and allow for improved control with walking, stair climbing  2. Able to ambulate 300 ft on level ground with AFOs donned while demonstrating consistently appropriate JASIEL, no more than 10% incidence inconsistent stepping or deviation from straight path to indicate improving gait mechanics and safety  3. Pt able to ascend/descend stairs reciprocally with unilat UE assist and demonstrating good speed/balance  4. Pt able to squat to  objects off of floor without difficulty  5.  Pt able to complete full floor<>stand transfer without need for assist and safe technique. 6. At least 40% fxn reported per LEFI to indicate improvement in subjective LLE ability                    Patient goals:  \"be able to do stuff like bending down, going up stairs\"    Pt. Education:  [x] Yes  [] No  [x] Reviewed Prior HEP/Ed  Method of Education: [x] Verbal  [x] Demo  [x] Written  Comprehension of Education:  [x] Verbalizes understanding. [] Demonstrates understanding. [x] Needs review. [] Demonstrates/verbalizes HEP/Ed previously given. Access Code: [email or web address]  URL: [email or web address]  Date: 11/03/2021  Prepared by: Jamey Chacon    Exercises  Supine Ankle Inversion Eversion AROM - 1 x daily - 7 x weekly - 3 sets - 10 reps  Supine Ankle Pumps - 1 x daily - 7 x weekly - 3 sets - 10 reps  Sit<>stand with fwd reach - 3x10  Squats w/ chair support - 3x10       Plan: [x] Continue current frequency toward long and short term goals.     [x] Specific Instructions for subsequent treatments: progress standing quad/hip strength with some UE assisted tasks, gait training      Time In: 10:00 am            Time Out: 10:57 am    Electronically signed by:  Gurwinder Cardoso PT

## 2021-12-01 ENCOUNTER — HOSPITAL ENCOUNTER (OUTPATIENT)
Dept: PHYSICAL THERAPY | Facility: CLINIC | Age: 46
Setting detail: THERAPIES SERIES
Discharge: HOME | End: 2021-12-01
Payer: MEDICARE

## 2021-12-01 PROCEDURE — 97110 THERAPEUTIC EXERCISES: CPT

## 2021-12-01 PROCEDURE — 97116 GAIT TRAINING THERAPY: CPT

## 2021-12-01 NOTE — FLOWSHEET NOTE
AA by PTA on RLE  A on LLE   Ball roll up crunch 2x10     Ball oblique crunch 2x10 ea     DKTC crunch 3x10     Bridge 15x  easy   Bridge w/ march 3x6     Sidelying hip abd   Attempted, too much hip flexion even with tactile cuing   Sidelying clams  blue    Prone quad stretch      Ankle DF AROM   First set supine, second set seated with more visual feedback   Bilateral ER 3x15 blue    Seated core twist 2x10 8#    Seated overhead press 2x15 8#    Armchair push ups 3x10           Standing      Heel raises      marches      SLS    UE support   Step taps  4in UE support   Sit to stands 3x15  W/ ball for forward to touch rollator lean external cue   Squat w/ ball 2x10  1x3  Min A to prevent posterior LOB   Half kneel to stand 2x  Max A x2 to return to stand on second rep d/t fatigue in arms, LLE  - HOLD until improved strength in BUEs         Other:         Treatment Charges: Mins Units   []  Modalities     [x]  Ther Exercise 41 2   []  Manual Therapy     []  Ther Activities     []  Aquatics     []  Vasocompression     [x]  Other: Gait 8 1   Total Treatment time 49 3   Estimated medicare cost as of 12/1/2021: $495.40    Assessment: [x] Progressing toward goals. Improving tolerance to increased gait training this date over level ground, no AD - gait mechanics worsen as fatigue evident at ~500 ft, specifically increased genurecurvatum and lateral trunk deviation. Focused on core/BLE/arm strengthening to provide improved strength required for transfers, reaching, and other IADLs with good tolerance, good challenge noted with current progressions above. Mild instability noted with head turns with gait, requires CGA for safety d/t intermittent mild R knee buckling. [] No change.      [] Other:   [x] Patient would continue to benefit from skilled physical therapy services in order to: address B quad/hip strength (L>R), gait train, and balance training to improve safety and access in home/community and less reliance on caregivers. STG/LTG  STG: (to be met in 8 treatments)  1. ? Pain: No more than 5/10 max pain noted in L knee with prolonged standing, ADLs  2. ? ROM: R knee ROM to +5 - 130deg to indicate symmetrical ROM and restored joint mobility post chronic pain from injury  3. ? Strength: Pt to demo ability to complete 10x mini squat with even weightbearing demonstrated and no UE assist to indicate improving functional quad strength  4. ? Balance: Pt able to demo appropriate amplitude of stepping strategies in response to posterior perturbations with good balance noted. 5. Function:   a. Pt able to complete half kneel to stand transfer with unilat UE assist and CGA/min VCs  from therapist  b. Pt able to ambulate 150 ft on level ground with AFOs donned while demonstrating more typical JASIEL vs narrow, improved consistency of stepping, and more appropriate trunk/arm swing movement vs excessive for improved balance to indicate overall improved safety and efficiency of gait. 6. Independent with Home Exercise Programs  7. Demonstrate Knowledge of fall prevention     LTG: (to be met in 16 treatments)  1. 5/5 L knee extension strength to indicate improving joint stability and allow for improved control with walking, stair climbing  2. Able to ambulate 300 ft on level ground with AFOs donned while demonstrating consistently appropriate JASIEL, no more than 10% incidence inconsistent stepping or deviation from straight path to indicate improving gait mechanics and safety  3. Pt able to ascend/descend stairs reciprocally with unilat UE assist and demonstrating good speed/balance  4. Pt able to squat to  objects off of floor without difficulty  5. Pt able to complete full floor<>stand transfer without need for assist and safe technique.   6. At least 40% fxn reported per LEFI to indicate improvement in subjective LLE ability                    Patient goals:  \"be able to do stuff like bending down, going up stairs\"    Pt.

## 2021-12-03 ENCOUNTER — HOSPITAL ENCOUNTER (OUTPATIENT)
Dept: PHYSICAL THERAPY | Facility: CLINIC | Age: 46
Setting detail: THERAPIES SERIES
Discharge: HOME | End: 2021-12-03
Payer: MEDICARE

## 2021-12-03 PROCEDURE — 97110 THERAPEUTIC EXERCISES: CPT

## 2021-12-03 PROCEDURE — 97116 GAIT TRAINING THERAPY: CPT

## 2021-12-03 NOTE — FLOWSHEET NOTE
[] [facility]) - [facility]  [address]  [phone]  F: [phone] [x] [address]  [facility]   [address]  P: [phone]  F: [phone] [] [address] &  Therapy  [address] [facility]  P: [phone]  F: [phone] [] [address]  P: [phone]  F: [phone] [] [address]  [facility]   [address]   [place]: [phone]  F: [phone]      Physical Therapy Daily Treatment Note    Date:  12/3/2021  Patient Name:  Lino Byrne    :  1975  MRN: [id number]  Physician: Dr. Joseph Brower: Medicare ([address] [place] [address])  Medical Diagnosis: Closed fracture of proximal end of left tibia, unspecified fracture morphology, sequela               Rehab Codes: M25.662, R53.1, R26.9, R27.9  Onset date: 21                 Next Dr's appt.: 21  Visit# / total visits: ; Progress note for Medicare patient due at visit 10    Cancels/No Shows: 0/0    Subjective:    Pain:  [x] Yes  [] No Location: R shoulder/arm Pain Rating: (0-10 scale) \"muscle soreness\"/10  Pain altered Tx:  [x] No  [] Yes  Action:  Comments: Pt notes he has some R-sided shoulder/arm soreness, but believes it to be from last session and \"not used to working out as [employer]".      Objective:  Modalities:   Precautions: Dialysis T/Thrs/Sat  Exercises: Bolded completed 12/3/2021:  Exercise Reps/ Time Weight/ Level Comments   Nu-step 6' L4          Gait train no  ft  Cues for increased step length, speed; CGA-SBA   Gait train w/ head turns 150 ft  CGA   Gait w/ carrying 300 ft  Grey box with items in it (~5 lbs)   Gait w/ cog task 300 ft  Verbal fluency tasks          Mat      Hamstring stretch      SLR  3# AA by PTA on RLE  A on LLE   Ball roll up crunch 3x10     Ball oblique crunch 3x10 ea     DKTC crunch 2x15     Table top toe taps 3x20     Bridge 15x  easy   Bridge w/ march 3x6     Sidelying hip abd   Attempted, too much hip flexion even with tactile cuing   Sidelying clams  blue    Prone quad stretch      Ankle DF AROM   First set supine, second set seated with more visual feedback   Bilateral ER 3x15 blue    Seated core twist 2x10 8#    Seated overhead press 2x15 8#    Armchair push ups 3x10           Standing      Heel raises      marches      SLS    UE support   Step taps  4in UE support   Sit to stands 3x15  W/ ball for forward to touch rollator lean external cue   Squat w/ ball 2x10  1x3  Min A to prevent posterior LOB   Half kneel to stand 2x  Max A x2 to return to stand on second rep d/t fatigue in arms, LLE  - HOLD until improved strength in BUEs         Other:         Treatment Charges: Mins Units   []  Modalities     [x]  Ther Exercise 32 2   []  Manual Therapy     []  Ther Activities     []  Aquatics     []  Vasocompression     [x]  Other: Gait 14 1   Total Treatment time 46 3   Estimated medicare cost as of 12/3/2021: $571.40    Assessment: [x] Progressing toward goals. Progressed gait distance and dual task training - again mild instability noted with cognitive task, with more cost noted to success of dual task especially in verbal fluency. Will add more functional reaching/lifting/carrying of objects within gait training in upcoming sessions, as pt tends to have more difficulty with release/lift of carrying box than actually carrying. Continues to be challenged with current core/UE strengthening as well; frequently noted pushing to failure on last reps. [] No change. [] Other:   [x] Patient would continue to benefit from skilled physical therapy services in order to: address B quad/hip strength (L>R), gait train, and balance training to improve safety and access in home/community and less reliance on caregivers. STG/LTG  STG: (to be met in 8 treatments)  1. ? Pain: No more than 5/10 max pain noted in L knee with prolonged standing, ADLs  2. ? ROM: R knee ROM to +5 - 130deg to indicate symmetrical ROM and restored joint mobility post chronic pain from injury  3. ? Strength: Pt to demo ability to complete 10x mini squat with even weightbearing demonstrated and no UE assist to indicate improving functional quad strength  4. ? Balance: Pt able to demo appropriate amplitude of stepping strategies in response to posterior perturbations with good balance noted. 5. Function:   a. Pt able to complete half kneel to stand transfer with unilat UE assist and CGA/min VCs  from therapist  b. Pt able to ambulate 150 ft on level ground with AFOs donned while demonstrating more typical JASIEL vs narrow, improved consistency of stepping, and more appropriate trunk/arm swing movement vs excessive for improved balance to indicate overall improved safety and efficiency of gait. 6. Independent with Home Exercise Programs  7. Demonstrate Knowledge of fall prevention     LTG: (to be met in 16 treatments)  1. 5/5 L knee extension strength to indicate improving joint stability and allow for improved control with walking, stair climbing  2. Able to ambulate 300 ft on level ground with AFOs donned while demonstrating consistently appropriate JASIEL, no more than 10% incidence inconsistent stepping or deviation from straight path to indicate improving gait mechanics and safety  3. Pt able to ascend/descend stairs reciprocally with unilat UE assist and demonstrating good speed/balance  4. Pt able to squat to  objects off of floor without difficulty  5. Pt able to complete full floor<>stand transfer without need for assist and safe technique. 6. At least 40% fxn reported per LEFI to indicate improvement in subjective LLE ability                    Patient goals:  \"be able to do stuff like bending down, going up stairs\"    Pt.  Education:  [x] Yes [] No  [x] Reviewed Prior HEP/Ed  Method of Education: [x] Verbal  [x] Demo  [x] Written  Comprehension of Education:  [x] Verbalizes understanding. [] Demonstrates understanding. [x] Needs review. [] Demonstrates/verbalizes HEP/Ed previously given. Access Code: [email or web address]  URL: [email or web address]  Date: 11/03/2021  Prepared by: Johann Carvajal    Exercises  Supine Ankle Inversion Eversion AROM - 1 x daily - 7 x weekly - 3 sets - 10 reps  Supine Ankle Pumps - 1 x daily - 7 x weekly - 3 sets - 10 reps  Sit<>stand with fwd reach - 3x10  Squats w/ chair support - 3x10       Plan: [x] Continue current frequency toward long and short term goals.     [x] Specific Instructions for subsequent treatments: progress standing quad/hip strength with some UE assisted tasks, gait training      Time In: 10:08 am            Time Out: 11:00 am    Electronically signed by:  Nevin Romero PT

## 2021-12-08 ENCOUNTER — HOSPITAL ENCOUNTER (OUTPATIENT)
Dept: PHYSICAL THERAPY | Facility: CLINIC | Age: 46
Setting detail: THERAPIES SERIES
Discharge: HOME | End: 2021-12-08
Payer: MEDICARE

## 2021-12-08 PROCEDURE — 97110 THERAPEUTIC EXERCISES: CPT

## 2021-12-08 PROCEDURE — 97116 GAIT TRAINING THERAPY: CPT

## 2021-12-08 NOTE — FLOWSHEET NOTE
[] [facility]) - [facility]  [address]  [phone]  F: [phone] [x] [address]  [place] [address]   [address]  P: [phone]  F: [phone] [] [address] &  Therapy  [address]  P: [phone]  F: [phone] [] [address]  P: [phone]  F: [phone] [] [address]  [facility]   [address]   [place]: [phone]  F: [phone]      Physical Therapy Daily Treatment Note    Date:  2021  Patient Name:  Baldo Hernandez    :  1975  MRN: [id number]  Physician: Dr. Johnston Fly: Medicare ([address] [place] [address])  Medical Diagnosis: Closed fracture of proximal end of left tibia, unspecified fracture morphology, sequela               Rehab Codes: M25.662, R53.1, R26.9, R27.9  Onset date: 21                 Next Dr's appt.: 21  Visit# / total visits: ; Progress note for Medicare patient due at visit 10    Cancels/No Shows: 0/0    Subjective:    Pain:  [x] Yes  [] No Location: R shoulder/arm Pain Rating: (0-10 scale) \"muscle soreness\"/10  Pain altered Tx:  [x] No  [] Yes  Action:  Comments: Pt notes he has some teeth pain from recent teeth cleaning, but otherwise no pain. Feels that knee is making good progress.     Objective:  Modalities:   Precautions: Dialysis T/Thrs/Sat  Exercises: Bolded completed 2021:  Exercise Reps/ Time Weight/ Level Comments   Nu-step 6' L4          Gait train no AD 1050 ft  Cues for increased step length, speed; CGA-SBA   Gait train w/ head turns 150 ft  CGA   Gait w/ carrying 300 ft  Grey box with items in it (~5 lbs)   Gait w/ cog task 300 ft  Verbal fluency tasks          Mat      Hamstring stretch      SLR  3# AA by PTA on RLE  A on LLE Fwd reach crunch 3x10     Ball oblique crunch 3x10 ea     DKTC crunch 2x15     Table top toe taps 3x10     Bridge 15x  easy   Bridge w/ march 3x6     Sidelying hip abd   Attempted, too much hip flexion even with tactile cuing   Sidelying clams  blue    Prone quad stretch      Ankle DF AROM   First set supine, second set seated with more visual feedback   Bilateral ER 3x15 blue    Seated core twist 2x10 8#    Seated overhead press 2x15 8#    Armchair push ups 3x10           Standing      Heel raises      marches      SLS    UE support   Step taps  4in UE support   Mini squats 2x15  No UE assist   Sit to stands 3x15  W/ ball for forward to touch rollator lean external cue   Squat w/ ball 2x10  1x3  Min A to prevent posterior LOB   Half kneel to stand 2x  Max A x2 to return to stand on second rep d/t fatigue in arms, LLE  - HOLD until improved strength in BUEs         Other:         Treatment Charges: Mins Units   []  Modalities     [x]  Ther Exercise 33 2   []  Manual Therapy     []  Ther Activities     []  Aquatics     []  Vasocompression     [x]  Other: Gait 7 1   Total Treatment time 44 3   Estimated medicare cost as of 12/8/2021: $646.29    Assessment: [x] Progressing toward goals. STG assessment today - making good progress with L knee ROM but still with pain at end range knee flexion; not present with knee ext. Fxnal mobility tolerance and safety with increasing gait distances and gait challenges - plan to progress to step overs/curb steps in upcoming visits. Working on BLE/core/UE strength required to complete half kneel to stand transfers to allow independence and safety with floor transfers in case of fall, however needs additional balance/standing training before attempting again to improve safety. [] No change.      [] Other:   [x] Patient would continue to benefit from skilled physical therapy services in order to: address B quad/hip strength (L>R), gait train, and balance training to improve safety and access in home/community and less reliance on caregivers. STG/LTG  STG: (to be met in 8 treatments) - Assessed on 12/8 by James Scherer PT:  1. ? Pain: No more than 5/10 max pain noted in L knee with prolonged standing, ADLs - NOT MET, 7-8/10 at worst with prolonged recumbent biking  2. ? ROM: L knee ROM to +5 - 130deg to indicate symmetrical ROM and restored joint mobility post chronic pain from injury - Made progress, 0-125 with pain at end range  3. ? Strength: Pt to demo ability to complete 10x mini squat with even weightbearing demonstrated and no UE assist to indicate improving functional quad strength  4. ? Balance: Pt able to demo appropriate amplitude of stepping strategies in response to posterior perturbations with good balance noted. - Making progress, still decreased amplitude  5. Function:   a. Pt able to complete half kneel to stand transfer with unilat UE assist and CGA/min VCs  from therapist - NOT MET, unsafe at this time d/t LE/UE weakness  b. Pt able to ambulate 150 ft on level ground with AFOs donned while demonstrating more typical JASIEL vs narrow, improved consistency of stepping, and more appropriate trunk/arm swing movement vs excessive for improved balance to indicate overall improved safety and efficiency of gait. - MET, all improving but do demo worsening after ~800-900 ft  6. Independent with Home Exercise Programs - MET  7. Demonstrate Knowledge of fall prevention - MET, issued and discussed with pt on 12/8     LTG: (to be met in 16 treatments)  1. 5/5 L knee extension strength to indicate improving joint stability and allow for improved control with walking, stair climbing  2. Able to ambulate 300 ft on level ground with AFOs donned while demonstrating consistently appropriate JASIEL, no more than 10% incidence inconsistent stepping or deviation from straight path to indicate improving gait mechanics and safety  3.  Pt able to ascend/descend stairs reciprocally with unilat UE assist and demonstrating good speed/balance  4. Pt able to squat to  objects off of floor without difficulty  5. Pt able to complete full floor<>stand transfer without need for assist and safe technique. 6. At least 40% fxn reported per LEFI to indicate improvement in subjective LLE ability                    Patient goals:  \"be able to do stuff like bending down, going up stairs\"    Pt. Education:  [x] Yes  [] No  [x] Reviewed Prior HEP/Ed  Method of Education: [x] Verbal  [x] Demo  [x] Written  Comprehension of Education:  [x] Verbalizes understanding. [] Demonstrates understanding. [x] Needs review. [] Demonstrates/verbalizes HEP/Ed previously given. Access Code: [email or web address]  URL: [email or web address]  Date: 11/03/2021  Prepared by: Dante Bicker    Exercises  Supine Ankle Inversion Eversion AROM - 1 x daily - 7 x weekly - 3 sets - 10 reps  Supine Ankle Pumps - 1 x daily - 7 x weekly - 3 sets - 10 reps  Sit<>stand with fwd reach - 3x10  Squats w/ chair support - 3x10       Plan: [x] Continue current frequency toward long and short term goals.     [x] Specific Instructions for subsequent treatments: progress standing quad/hip strength with some UE assisted tasks, gait training      Time In: 10:05 am            Time Out: 10:55 am    Electronically signed by:  Cheo Bonds PT

## 2021-12-10 ENCOUNTER — HOSPITAL ENCOUNTER (OUTPATIENT)
Dept: PHYSICAL THERAPY | Facility: CLINIC | Age: 46
Setting detail: THERAPIES SERIES
Discharge: HOME | End: 2021-12-10
Payer: MEDICARE

## 2021-12-10 ENCOUNTER — OFFICE VISIT (OUTPATIENT)
Dept: FAMILY MEDICINE CLINIC | Age: 46
End: 2021-12-10
Payer: MEDICARE

## 2021-12-10 VITALS
HEART RATE: 66 BPM | WEIGHT: 190 LBS | OXYGEN SATURATION: 98 % | SYSTOLIC BLOOD PRESSURE: 136 MMHG | RESPIRATION RATE: 18 BRPM | DIASTOLIC BLOOD PRESSURE: 78 MMHG | BODY MASS INDEX: 30.53 KG/M2 | HEIGHT: 66 IN

## 2021-12-10 DIAGNOSIS — G89.29 CHRONIC BILATERAL LOW BACK PAIN WITH BILATERAL SCIATICA: ICD-10-CM

## 2021-12-10 DIAGNOSIS — I10 HYPERTENSION, UNSPECIFIED TYPE: ICD-10-CM

## 2021-12-10 DIAGNOSIS — Z99.2 ESRD (END STAGE RENAL DISEASE) ON DIALYSIS (HCC): ICD-10-CM

## 2021-12-10 DIAGNOSIS — M54.42 CHRONIC BILATERAL LOW BACK PAIN WITH BILATERAL SCIATICA: ICD-10-CM

## 2021-12-10 DIAGNOSIS — N18.6 ESRD (END STAGE RENAL DISEASE) ON DIALYSIS (HCC): ICD-10-CM

## 2021-12-10 DIAGNOSIS — F39 MOOD DISORDER (HCC): ICD-10-CM

## 2021-12-10 DIAGNOSIS — M54.41 CHRONIC BILATERAL LOW BACK PAIN WITH BILATERAL SCIATICA: ICD-10-CM

## 2021-12-10 DIAGNOSIS — E10.8 DM (DIABETES MELLITUS), TYPE 1 WITH COMPLICATIONS (HCC): Primary | ICD-10-CM

## 2021-12-10 DIAGNOSIS — E78.5 HYPERLIPIDEMIA, UNSPECIFIED HYPERLIPIDEMIA TYPE: ICD-10-CM

## 2021-12-10 PROCEDURE — 2022F DILAT RTA XM EVC RTNOPTHY: CPT | Performed by: NURSE PRACTITIONER

## 2021-12-10 PROCEDURE — G8427 DOCREV CUR MEDS BY ELIG CLIN: HCPCS | Performed by: NURSE PRACTITIONER

## 2021-12-10 PROCEDURE — G8482 FLU IMMUNIZE ORDER/ADMIN: HCPCS | Performed by: NURSE PRACTITIONER

## 2021-12-10 PROCEDURE — 99214 OFFICE O/P EST MOD 30 MIN: CPT | Performed by: NURSE PRACTITIONER

## 2021-12-10 PROCEDURE — G8417 CALC BMI ABV UP PARAM F/U: HCPCS | Performed by: NURSE PRACTITIONER

## 2021-12-10 PROCEDURE — 3051F HG A1C>EQUAL 7.0%<8.0%: CPT | Performed by: NURSE PRACTITIONER

## 2021-12-10 PROCEDURE — 1036F TOBACCO NON-USER: CPT | Performed by: NURSE PRACTITIONER

## 2021-12-10 RX ORDER — FLUOXETINE HYDROCHLORIDE 40 MG/1
80 CAPSULE ORAL
COMMUNITY
End: 2022-01-04 | Stop reason: CLARIF

## 2021-12-10 ASSESSMENT — ENCOUNTER SYMPTOMS
ABDOMINAL PAIN: 0
BACK PAIN: 1
NAUSEA: 0
SORE THROAT: 0
COUGH: 0
EYE PAIN: 0
DIARRHEA: 0
SHORTNESS OF BREATH: 0
VOMITING: 0
SINUS PAIN: 0

## 2021-12-10 NOTE — PATIENT INSTRUCTIONS
Patient Education        Learning About Type 2 Diabetes  What is type 2 diabetes? Type 2 diabetes is a condition in which you have too much sugar (glucose) in your blood. Glucose is a type of sugar produced in your body when carbohydrates and other foods are digested. It provides energy to cells throughout the body. Normally, blood sugar levels increase after you eat a meal. When blood sugar rises, cells in the pancreas release insulin, which causes the body to absorb sugar from the blood and lowers the blood sugar level to normal.  When you have type 2 diabetes, sugar stays in the blood rather than entering the body's cells to be used for energy. This results in high blood sugar. It happens when your body can't use insulin the right way. Over time, high blood sugar can harm many parts of the body, such as your eyes, heart, blood vessels, nerves, and kidneys. It can also increase your risk for other health problems (complications). What can you expect with type 2 diabetes? Teo Pickering keep hearing about how important it is to keep your blood sugar within a target range. That's because over time, high blood sugar can lead to serious problems. It can:  · Harm your eyes, nerves, and kidneys. · Damage your blood vessels, leading to heart disease and stroke. · Reduce blood flow and cause nerve damage to parts of your body, especially your feet. This can cause slow healing and pain when you walk. · Make your immune system weak and less able to fight infections. When people hear the word \"diabetes,\" they often think of problems like these. But daily care and treatment can help prevent or delay these problems. The goal is to keep your blood sugar in a target range. That's the best way to reduce your chance of having more problems from diabetes. What are the symptoms? Some people who have type 2 diabetes may not have any symptoms early on.  Many people with the disease don't even know they have it at first. But with time, diabetes starts to cause symptoms. You have most symptoms of type 2 diabetes when your blood sugar is either too high or too low. The most common symptoms of high blood sugar include:  · Thirst.  · Needing to urinate often. · Weight loss. · Blurry vision. The symptoms of low blood sugar include:  · Sweating. · Shakiness. · Weakness. · Hunger. · Confusion. You're not likely to get symptoms of low blood sugar unless you take insulin or use certain diabetes medicines that lower blood sugar. How can you help prevent type 2 diabetes? There are things you can do to help prevent type 2 diabetes. Stay at a healthy weight. Exercise regularly, and eat healthy foods. Even small changes can make a difference. If you have prediabetes, the medicine metformin can help prevent type 2 diabetes. How is type 2 diabetes treated? Treatment for type 2 diabetes will change over time to meet your needs. But the focus of your treatment will usually be to keep your blood sugar levels in your target range. This will help prevent problems such as eye, kidney, heart, blood vessel, and nerve disease. Some people may need medicines to help their bodies make insulin or decrease insulin resistance. Some medicines slow down how quickly the body absorbs carbohydrates. Treatment to manage type 2 diabetes includes:  · Making healthy food choices and being active. · Losing weight, if you need to. · Seeing your doctor regularly. · Keeping your blood sugar in your target range. · Taking medicines, if you need them. · Quitting smoking, if you smoke. · Keeping your blood pressure and cholesterol under control. Follow-up care is a key part of your treatment and safety. Be sure to make and go to all appointments, and call your doctor if you are having problems. It's also a good idea to know your test results and keep a list of the medicines you take. Where can you learn more? Go to [email or web address] and sign

## 2021-12-10 NOTE — PROGRESS NOTES
[address] [facility]  [address] [facility] [address] [address]  Dept: [phone]  Dept Fax: [phone]    Liam Du is a 51 y.o. male who presents today for his medicalconditions/complaints as noted below. Liam Du is c/o of 3 Month Follow-Up      HPI:     51 y.o presents for follow up    Complains of left knee pain. Treatments tried include none. MRI was positive for subacute fracture going to physical therapy, taking tramadol. Chronic low back pain, been taking tramadol. Following with pain management. Had xrays in the past that were negative in [facility], ongoing for 30 yrs.     Significant history of end-stage renal disease, currently on dialysis follows with nephrologist Dr. Andrea Stroud. Does take Lasix 20 mg once daily. Compliant with dialysis therapies. , TRS. Doing ongoing visits with transplant clinic at [facility].      Type 1 diabetes takes Humalog sliding scale, Lantus 45 units in a.m., 5 units nightly follows with endocrinology.     History of hypertension takes amlodipine 10 mg, lisinopril 20 mg, metoprolol 200 mg, BP well controlled today.     History of hyperlipidemia, coronary artery disease takes Crestor, Zetia, last lipids stable.     History of GERD takes omeprazole daily, working well.      History of anxiety depression takes Prozac, Wellbutrin, mood stable.     History of insomnia takes trazodone for sleep, sleep stable      Past Medical History:   Diagnosis Date    Closed fracture of bone of right foot March - April 2020    Dialysis patient [facility])     Kidney disease     On Dialysis    Type 1 diabetes mellitus (HCC)         Current Outpatient Medications   Medication Sig Dispense Refill    FLUoxetine (PROZAC) 40 MG capsule Take 80 mg by mouth      ammonium lactate (LAC-HYDRIN) 12 % lotion Apply topically daily.  1 each 3    ondansetron (ZOFRAN-ODT) 4 MG disintegrating tablet       traMADol (ULTRAM) 50 MG tablet Take 50 mg by mouth daily.  hydrALAZINE (APRESOLINE) 50 MG tablet       furosemide (LASIX) 20 MG tablet Take 1 tablet by mouth daily 60 tablet 5    insulin glargine (LANTUS) 100 UNIT/ML injection vial Inject 45 units in the morning and 5 units SQ at  night 5 vial 5    traZODone (DESYREL) 100 MG tablet Take 1 tablet by mouth nightly as needed for Sleep 30 tablet 5    buPROPion (WELLBUTRIN XL) 150 MG extended release tablet Take 1 tablet by mouth every morning 15 tablet 1    metoprolol succinate (TOPROL XL) 200 MG extended release tablet Take 1 tablet by mouth daily 90 tablet 1    insulin lispro (HUMALOG) 100 UNIT/ML injection vial Inject into the skin 3 times daily (before meals)      lisinopril (PRINIVIL;ZESTRIL) 20 MG tablet Take 40 mg by mouth daily      Rosuvastatin Calcium 40 MG CPSP Take 40 mg by mouth daily      ezetimibe (ZETIA) 10 MG tablet Take 10 mg by mouth daily      omeprazole (PRILOSEC) 40 MG delayed release capsule Take 40 mg by mouth daily      loratadine (CLARITIN) 10 MG capsule Take 10 mg by mouth daily      amLODIPine (NORVASC) 10 MG tablet Take 10 mg by mouth daily      aspirin 81 MG EC tablet Take 81 mg by mouth daily      magnesium oxide (MAG-OX) 400 MG tablet Take 400 mg by mouth daily      calcium acetate 667 MG TABS Take 3 tablets by mouth daily      vitamin B-12 (CYANOCOBALAMIN) 500 MCG tablet Take 500 mcg by mouth daily       No current facility-administered medications for this visit. No Known Allergies    Subjective:      Review of Systems   Constitutional: Negative for chills and fatigue. HENT: Negative for congestion, ear pain, sinus pain and sore throat. Eyes: Negative for pain and visual disturbance. Respiratory: Negative for cough and shortness of breath. Cardiovascular: Negative for chest pain and palpitations. Gastrointestinal: Negative for abdominal pain, diarrhea, nausea and vomiting.    Genitourinary: Negative for penile pain and testicular pain. Musculoskeletal: Positive for arthralgias and back pain. Negative for joint swelling and neck pain. Skin: Negative for rash. Neurological: Negative for dizziness and light-headedness. Hematological: Does not bruise/bleed easily. All other systems reviewed and are negative.      :Objective     Physical Exam  Vitals and nursing note reviewed. Constitutional:       General: He is not in acute distress. Appearance: Normal appearance. He is not toxic-appearing. Cardiovascular:      Rate and Rhythm: Normal rate. Pulmonary:      Effort: Pulmonary effort is normal.      Breath sounds: Normal breath sounds. Musculoskeletal:      Lumbar back: Tenderness present. Skin:     General: Skin is warm and dry. Neurological:      Mental Status: He is alert and oriented to person, place, and time. Mental status is at baseline.        /78   Pulse 66   Resp 18   Ht 5' 6\" (1.676 m)   Wt 190 lb (86.2 kg)   SpO2 98%   BMI 30.67 kg/m²     Lab Review   Hospital Outpatient Visit on 07/19/2021   Component Date Value    Hemoglobin A1C 07/19/2021 7.5*    Estimated Avg Glucose 07/19/2021 169     Cholesterol 07/19/2021 103     HDL 07/19/2021 52     LDL Cholesterol 07/19/2021 38     Chol/HDL Ratio 07/19/2021 2.0     Triglycerides 07/19/2021 64     VLDL 07/19/2021 NOT REPORTED    Admission on 07/13/2021, Discharged on 07/13/2021   Component Date Value    POC Glucose 07/13/2021 45*    WBC 07/13/2021 9.2     RBC 07/13/2021 4.42     Hemoglobin 07/13/2021 13.3     Hematocrit 07/13/2021 39.2*    MCV 07/13/2021 88.7     MCH 07/13/2021 30.1     MCHC 07/13/2021 33.9     RDW 07/13/2021 12.1     Platelets 86/18/0831 249     MPV 07/13/2021 9.2     NRBC Automated 07/13/2021 0.0     Differential Type 07/13/2021 NOT REPORTED     Seg Neutrophils 07/13/2021 58     Lymphocytes 07/13/2021 24     Monocytes 07/13/2021 12     Eosinophils % 07/13/2021 5*    Basophils 07/13/2021 1     Immature Granulocytes 07/13/2021 0     Segs Absolute 07/13/2021 5.25     Absolute Lymph # 07/13/2021 2.22     Absolute Mono # 07/13/2021 1.08     Absolute Eos # 07/13/2021 0.49*    Basophils Absolute 07/13/2021 0.08     Absolute Immature Granul* 07/13/2021 0.03     WBC Morphology 07/13/2021 NOT REPORTED     RBC Morphology 07/13/2021 NOT REPORTED     Platelet Estimate 65/38/3860 NOT REPORTED     Glucose 07/13/2021 43*    BUN 07/13/2021 17     CREATININE 07/13/2021 4.96*    Bun/Cre Ratio 07/13/2021 3*    Calcium 07/13/2021 10.2     Sodium 07/13/2021 137     Potassium 07/13/2021 3.9     Chloride 07/13/2021 95*    CO2 07/13/2021 26     Anion Gap 07/13/2021 16     GFR Non- 07/13/2021 13*    GFR  07/13/2021 15*    GFR Comment 07/13/2021          GFR Staging 07/13/2021 NOT REPORTED     Glucose 07/13/2021 45     QC OK? 07/13/2021 yes     Glucose 07/13/2021 135     QC OK? 07/13/2021 yes     POC Glucose 07/13/2021 135*    POC Glucose 07/13/2021 125*       EXAMINATION:   MRI OF THE LEFT KNEE WITHOUT CONTRAST, 7/30/2021 11:45 am       TECHNIQUE:   Multiplanar multisequence MRI of the left knee was performed without the   administration of intravenous contrast.       COMPARISON:   Radiographs 06/08/2021       HISTORY:   ORDERING SYSTEM PROVIDED HISTORY: Acute pain of left knee   TECHNOLOGIST PROVIDED HISTORY:   x-ray normal   Reason for Exam: L knee injury about a year ago. Pain in the medial L knee   with some instability.    Acuity: Chronic   Type of Exam: Subsequent/Follow-up       FINDINGS:   MENISCI: Medial meniscus and the lateral meniscus are intact.  There is no   evidence of meniscal tear or parameniscal cyst.       CRUCIATE LIGAMENTS: The ACL and the PCL are intact.  The PCL is abnormally   thickened and intermediate in signal intensity. Jolayne Cancel is a chronic   appearing, minimally displaced avulsion fracture involving the posterior   margin of the tibia, at the distal PCL insertion.       EXTENSOR MECHANISM: The extensor mechanism is intact.       LATERAL COLLATERAL LIGAMENT COMPLEX: The iliotibial band and lateral   collateral ligament complex are intact.       MEDIAL COLLATERAL LIGAMENT COMPLEX: The medial collateral ligament is normal.       KNEE JOINT: No significant joint effusion or Baker's cyst.  The joint   alignment and joint spaces are maintained.       The articular cartilage is well maintained.       BONE MARROW: There is no evidence of acute marrow edema.  As noted, there is   a minimally displaced and comminuted fracture involving the posterior margin   of the tibia.  The lack of associated marrow edema is consistent with a   chronic process.       No evidence of osteochondral lesion.           Impression   1. Chronic appearing, mildly displaced and comminuted avulsion fracture   involving the posterior margin of the tibia, at the distal PCL insertion.  No   associated marrow edema. 2. The PCL is intact but abnormally thickened and intermediate in signal   intensity compatible with chronic sprain. 3. No evidence of meniscal tear. Assessment and Plan      1. DM (diabetes mellitus), type 1 with complications ([address]  2. Mood disorder ([address]  3. ESRD (end stage renal disease) on dialysis ([address]  4. Hypertension, unspecified type  5. Hyperlipidemia, unspecified hyperlipidemia type  6. Chronic bilateral low back pain with bilateral sciatica  -     MRI LUMBAR SPINE WO CONTRAST; Future       Labs up to date    Plan for MRI lumbar    F/u in 3 months sooner prn. No results found for this visit on 12/10/21. Return in about 3 months (around 3/10/2022), or if symptoms worsen or fail to improve. No orders of the defined types were placed in this encounter. Patient given educational materials - see patient instructions. Discussed use, benefit, and side effects of prescribed medications. All patientquestions answered.   Pt voiced understanding. Patient given educational materials - see patient instructions. Discussed use, benefit, and side effects of prescribed medications. All patientquestions answered. Pt voiced understanding. This note was transcribed using dictation with Dragon services. Efforts were made to correct any errors but some words may be misinterpreted.     Electronically signed by DANIEL Gomez CNP on 12/10/2021at 2:45 PM

## 2021-12-15 ENCOUNTER — HOSPITAL ENCOUNTER (OUTPATIENT)
Dept: PHYSICAL THERAPY | Facility: CLINIC | Age: 46
Setting detail: THERAPIES SERIES
Discharge: HOME | End: 2021-12-15
Payer: MEDICARE

## 2021-12-15 PROCEDURE — 97116 GAIT TRAINING THERAPY: CPT

## 2021-12-15 PROCEDURE — 97112 NEUROMUSCULAR REEDUCATION: CPT

## 2021-12-15 NOTE — FLOWSHEET NOTE
[] [facility]) - [facility] &  Therapy  [address]  [phone]  F: [phone] [x] [address]  [address]   [address]  P: [phone]  F: [phone] [] [address] &  Therapy  [address] [facility] [address]  P: [phone]  F: [phone] [] [address]  P: [phone]  F: [phone] [] [address]  [facility]   [address]   [place]: [phone]  F: [phone]      Physical Therapy Daily Treatment Note    Date:  12/15/2021  Patient Name:  Barry Hong    :  1975  MRN: [id number]  Physician: Dr. Breanne Eden: Medicare ([address] [place] [address])  Medical Diagnosis: Closed fracture of proximal end of left tibia, unspecified fracture morphology, sequela               Rehab Codes: M25.662, R53.1, R26.9, R27.9  Onset date: 21                 Next Dr's appt.: 21  Visit# / total visits: ; Progress note for Medicare patient due at visit 10    Cancels/No Shows: 0/0    Subjective:    Pain:  [x] Yes  [] No Location: R shoulder/arm Pain Rating: (0-10 scale) \"muscle soreness\"/10  Pain altered Tx:  [x] No  [] Yes  Action:  Comments: Pt notes he's still awaiting calls to schedule more testing for transplant planning. Notes when looking up/down with walking, he feels off balance.     Objective:  Modalities:   Precautions: Dialysis T/Thrs/Sat  Exercises: Bolded completed 12/15/2021:  Exercise Reps/ Time Weight/ Level Comments   Nu-step 6' L4          Gait train no AD 1050 ft  Cues for increased step length, speed; CGA-SBA   Gait train w/ head turns and up/down 3x300   CGA   Gait w/ step overs 4 min  Short hurdles (~1in) and taller hurdles (~4in) - with cane and CGA   Gait w/ step up 6 min  2\" curb step, with cane and CGA - mod A x1 incidence of near fall posteriorly with first attempt   Gait with step up/off of foam 5x30 ft  Short side of foam   Gait w/ walk on foam 4 min total  Long side of foam, walking length         Gait w/ cog task 300 ft  Verbal fluency tasks          Mat      Hamstring stretch      SLR  3# AA by PTA on RLE  A on LLE   Fwd reach crunch 3x10     Ball oblique crunch 3x10 ea     DKTC crunch 2x15     Table top toe taps 3x10     Bridge 15x  easy   Bridge w/ march 3x6     Sidelying hip abd   Attempted, too much hip flexion even with tactile cuing   Sidelying clams  blue    Prone quad stretch      Ankle DF AROM   First set supine, second set seated with more visual feedback   Bilateral ER 3x15 blue    Seated core twist 2x10 8#    Seated overhead press 2x15 8#    Armchair push ups 3x10           Standing      Narrow JASIEL x  Attempted, too difficuklt without UE assist   Wide JASIEL, head turns 2x10 ea     Wide JASIEL, head up/down 2x10 ea     Heel raises      marches      SLS    UE support   Step taps  4in UE support   Mini squats 2x15  No UE assist   Sit to stands 3x15  W/ ball for forward to touch rollator lean external cue   Squat w/ ball 2x10  1x3  Min A to prevent posterior LOB   Half kneel to stand 2x  Max A x2 to return to stand on second rep d/t fatigue in arms, LLE  - HOLD until improved strength in BUEs         Other:         Treatment Charges: Mins Units   []  Modalities     []  Ther Exercise     []  Manual Therapy     []  Ther Activities     []  Aquatics     []  Vasocompression     [x]  Other: Neuro 10 1   [x]  Other: Gait 44 3   Total Treatment time 54 4   Estimated medicare cost as of 12/15/2021: $748.63    Assessment: [x] Progressing toward goals. Added more standing gait challenges this date - requires CGA with all, mod A to prevent fall when first attempting 2\" step up with cane, losing balance posteriorly and unable to unlock knees or elicit hip strategies to correct.  Overall still has extremely poor 12/8     LTG: (to be met in 16 treatments)  1. 5/5 L knee extension strength to indicate improving joint stability and allow for improved control with walking, stair climbing  2. Able to ambulate 300 ft on level ground with AFOs donned while demonstrating consistently appropriate JASIEL, no more than 10% incidence inconsistent stepping or deviation from straight path to indicate improving gait mechanics and safety  3. Pt able to ascend/descend stairs reciprocally with unilat UE assist and demonstrating good speed/balance  4. Pt able to squat to  objects off of floor without difficulty  5. Pt able to complete full floor<>stand transfer without need for assist and safe technique. 6. At least 40% fxn reported per LEFI to indicate improvement in subjective LLE ability                    Patient goals:  \"be able to do stuff like bending down, going up stairs\"    Pt. Education:  [x] Yes  [] No  [x] Reviewed Prior HEP/Ed  Method of Education: [x] Verbal  [x] Demo  [x] Written  Comprehension of Education:  [x] Verbalizes understanding. [] Demonstrates understanding. [x] Needs review. [] Demonstrates/verbalizes HEP/Ed previously given. Access Code: [email or web address]  URL: [email or web address]  Date: 11/03/2021  Prepared by: Kiki Moser    Exercises  Supine Ankle Inversion Eversion AROM - 1 x daily - 7 x weekly - 3 sets - 10 reps  Supine Ankle Pumps - 1 x daily - 7 x weekly - 3 sets - 10 reps  Sit<>stand with fwd reach - 3x10  Squats w/ chair support - 3x10       Plan: [x] Continue current frequency toward long and short term goals.     [x] Specific Instructions for subsequent treatments: progress standing quad/hip strength with some UE assisted tasks, gait training      Time In: 12:00 pm            Time Out: 1:00 am    Electronically signed by:  Ridge Muller PT

## 2021-12-17 ENCOUNTER — HOSPITAL ENCOUNTER (OUTPATIENT)
Dept: PHYSICAL THERAPY | Facility: CLINIC | Age: 46
Setting detail: THERAPIES SERIES
Discharge: HOME | End: 2021-12-17
Payer: MEDICARE

## 2021-12-17 PROCEDURE — 97116 GAIT TRAINING THERAPY: CPT

## 2021-12-17 PROCEDURE — 97112 NEUROMUSCULAR REEDUCATION: CPT

## 2021-12-17 NOTE — FLOWSHEET NOTE
[] [facility]) - [facility]  [address]  [phone]  F: [phone] [x] [address]  [facility]   [address]  P: [phone]  F: [phone] [] [address] &  Therapy  [address] [facility] [facility] [address]  P: [phone]  F: [phone] [] [address]  P: [phone]  F: [phone] [] [address]  [facility]   [address]   [place]: [phone]  F: [phone]      Physical Therapy Daily Treatment Note    Date:  2021  Patient Name:  Baldo Hernandez    :  1975  MRN: [id number]  Physician: Dr. Johnston Fly: Medicare ([address] [place] [address])  Medical Diagnosis: Closed fracture of proximal end of left tibia, unspecified fracture morphology, sequela               Rehab Codes: M25.662, R53.1, R26.9, R27.9  Onset date: 21                 Next Dr's appt.: 21  Visit# / total visits: 10/16; Progress note for Medicare patient due at visit 16   Cancels/No Shows: 0/0    Subjective:    Pain:  [x] Yes  [] No Location: n/a  Pain Rating: (0-10 scale) 0/10  Pain altered Tx:  [x] No  [] Yes  Action:     Comments: Pt denies pain. States he feels \"off\" with his balance, stating he started noticing it last night. Objective:  Modalities:   Precautions: Dialysis T/Thrs/Sat, gait belt   Exercises: Bolded completed 2021:  Exercise Reps/ Time Weight/ Level Comments   Nu-step 6' L4          Gait train no AD 300ft  Cues for increased step length, speed; SBA- distance limited d/t pt fatigue   Gait train w/ head turns and up/down 3x300   CGA   Gait w/ step overs 4 min  Short hurdles (~1in) and taller hurdles (~4in) - with cane and CGA   Gait w/ step up 4 min  2\" curb step, and 4\" step with cane and CGA - mod A x1.  Pt with multiple episodes of loss of balance requiring assistance to recover    Gait with step up/off of foam 6x30 ft  Short side of foam x 2- using cane and CGAx1   Gait w/ walk on foam 4 min total  Long side of foam, walking length- using cane and CGAx1         Gait w/ cog task 300 ft  Verbal fluency tasks          Mat      Hamstring stretch      SLR  3# AA by PTA on RLE  A on LLE   Fwd reach crunch 3x10     Ball oblique crunch 3x10 ea     DKTC crunch 2x15     Table top toe taps 3x10     Bridge 15x  easy   Bridge w/ march 3x6     Sidelying hip abd   Attempted, too much hip flexion even with tactile cuing   Sidelying clams  blue    Prone quad stretch      Ankle DF AROM   First set supine, second set seated with more visual feedback   Bilateral ER 3x15 blue    Seated core twist 2x10 8#    Seated overhead press 2x15 8#    Armchair push ups 3x10           Standing      Narrow JASIEL 2x 30\" No UE assist   Tandem balance  3x ea 15\" hold Added 12/17   Wide JASIEL, head turns 2x10 ea     Wide JASIEL, head up/down 2x10 ea     Heel raises      marches      SLS    UE support   Step taps  4in UE support   Mini squats 2x15  No UE assist   Sit to stands 3x15  W/ ball for forward to touch rollator lean external cue   Squat w/ ball 2x10  1x3  Min A to prevent posterior LOB   Squats on decline wedge 10x  Added 12/17, attempted 2 sets however could not complete second set d/t fatigue   3 way hip bilaterally  10x ea orange Added 12/17   Half kneel to stand 2x  Max A x2 to return to stand on second rep d/t fatigue in arms, LLE  - HOLD until improved strength in BUEs         Other:         Treatment Charges: Mins Units   []  Modalities     []  Ther Exercise     []  Manual Therapy     []  Ther Activities     []  Aquatics     []  Vasocompression     [x]  Other: Neuro 16 1   [x]  Other: Gait 32 2   Total Treatment time 48 3   Estimated medicare cost as of 12/17/2021: $828.13    Assessment: [] Progressing toward goals. [] No change.      [x] Other: pt verbalizes little and gives little feedback throughout session. Started with warm up on [employer] then performed balance exercises in // bars. Pt with improved completion of NBOS, being able to complete without UE assist. Added tandem balance this this date with 1 side finger tip support. Pt was quick to fatigue during gait exercises this date. Started out well with no loss of balance, however once pt became fatigued lost his balance more frequently, often requiring mod assist to recover. Added squats on declined wedge this date for quad strength, pt tolerated fair, but could only complete 1 set d/t fatigue, also added 3 way hip for hip strengthening. [x] Patient would continue to benefit from skilled physical therapy services in order to: address B quad/hip strength (L>R), gait train, and balance training to improve safety and access in home/community and less reliance on caregivers. STG/LTG  STG: (to be met in 8 treatments) - Assessed on 12/8 by Carolyn Varghese, PT:  1. ? Pain: No more than 5/10 max pain noted in L knee with prolonged standing, ADLs - NOT MET, 7-8/10 at worst with prolonged recumbent biking  2. ? ROM: L knee ROM to +5 - 130deg to indicate symmetrical ROM and restored joint mobility post chronic pain from injury - Made progress, 0-125 with pain at end range  3. ? Strength: Pt to demo ability to complete 10x mini squat with even weightbearing demonstrated and no UE assist to indicate improving functional quad strength  4. ? Balance: Pt able to demo appropriate amplitude of stepping strategies in response to posterior perturbations with good balance noted. - Making progress, still decreased amplitude  5. Function:   a. Pt able to complete half kneel to stand transfer with unilat UE assist and CGA/min VCs  from therapist - NOT MET, unsafe at this time d/t LE/UE weakness  b.  Pt able to ambulate 150 ft on level ground with AFOs donned while demonstrating more typical JASIEL vs narrow, improved consistency of stepping, and more appropriate trunk/arm swing movement vs excessive for improved balance to indicate overall improved safety and efficiency of gait. - MET, all improving but do demo worsening after ~800-900 ft  6. Independent with Home Exercise Programs - MET  7. Demonstrate Knowledge of fall prevention - MET, issued and discussed with pt on 12/8     LTG: (to be met in 16 treatments)  1. 5/5 L knee extension strength to indicate improving joint stability and allow for improved control with walking, stair climbing  2. Able to ambulate 300 ft on level ground with AFOs donned while demonstrating consistently appropriate JASIEL, no more than 10% incidence inconsistent stepping or deviation from straight path to indicate improving gait mechanics and safety  3. Pt able to ascend/descend stairs reciprocally with unilat UE assist and demonstrating good speed/balance  4. Pt able to squat to  objects off of floor without difficulty  5. Pt able to complete full floor<>stand transfer without need for assist and safe technique. 6. At least 40% fxn reported per LEFI to indicate improvement in subjective LLE ability                    Patient goals:  \"be able to do stuff like bending down, going up stairs\"    Pt. Education:  [] Yes  [x] No  [] Reviewed Prior HEP/Ed  Method of Education: [] Verbal  [] Demo  [] Written  Comprehension of Education:  [] Verbalizes understanding. [] Demonstrates understanding. [x] Needs review. [] Demonstrates/verbalizes HEP/Ed previously given. Access Code: [email or web address]  URL: [email or web address]  Date: 11/03/2021  Prepared by: Joe Rea    Exercises  Supine Ankle Inversion Eversion AROM - 1 x daily - 7 x weekly - 3 sets - 10 reps  Supine Ankle Pumps - 1 x daily - 7 x weekly - 3 sets - 10 reps  Sit<>stand with fwd reach - 3x10  Squats w/ chair support - 3x10       Plan: [x] Continue current frequency toward long and short term goals.     [x] Specific Instructions for subsequent treatments: progress standing quad/hip strength with some UE assisted tasks, gait training      Time In: 9:37am            Time Out: 10:30am    Electronically signed by:  Rosita Blanchard PTA

## 2021-12-20 ENCOUNTER — HOSPITAL ENCOUNTER (OUTPATIENT)
Dept: PHYSICAL THERAPY | Facility: CLINIC | Age: 46
Setting detail: THERAPIES SERIES
Discharge: HOME | End: 2021-12-20
Payer: MEDICARE

## 2021-12-20 PROCEDURE — 97112 NEUROMUSCULAR REEDUCATION: CPT

## 2021-12-20 PROCEDURE — 97116 GAIT TRAINING THERAPY: CPT

## 2021-12-20 NOTE — FLOWSHEET NOTE
[] [facility]) - [facility] &  Therapy  [address]  [phone]  F: [phone] [x] [address]  [place] [address]   [address]  P: [phone]  F: [phone] [] [address] &  Therapy  [address]  P: [phone]  F: [phone] [] [address]  P: [phone]  F: [phone] [] [address]  [facility]   [address]   [place]: [phone]  F: [phone]      Physical Therapy Daily Treatment Note    Date:  2021  Patient Name:  Baldo Hernandez    :  1975  MRN: [id number]  Physician: Dr. Johnston Fly: Medicare ([address] [place] [address])  Medical Diagnosis: Closed fracture of proximal end of left tibia, unspecified fracture morphology, sequela               Rehab Codes: M25.662, R53.1, R26.9, R27.9  Onset date: 21                 Next Dr's appt.: 21  Visit# / total visits: ; Progress note for Medicare patient due at visit 16   Cancels/No Shows: 0/0    Subjective:    Pain:  [x] Yes  [] No Location: n/a  Pain Rating: (0-10 scale) 0/10  Pain altered Tx:  [x] No  [] Yes  Action:     Comments: Pt denies pain  But reports stubbing his toe really badly. He also states that it makes standing difficult right now. Objective:  Modalities:   Precautions: Dialysis T/Thrs/Sat, gait belt   Exercises: Bolded completed 2021:  Exercise Reps/ Time Weight/ Level Comments   Nu-step 6' L4          Gait train no AD 300ft  Cues for increased step length, speed; SBA- distance limited d/t pt fatigue   Gait train w/ head turns and up/down 3x300   CGA   Gait w/ step overs 4 min  Short hurdles (~1in) and taller hurdles (~4in) - with cane and CGA  See below 12/20-   Gait w/ step up 4 min  2\" curb step with 1 UE. Gait with step up/off of foam 6x30 ft  Short side of foam x 2- using cane and CGAx1   Gait w/ walk on foam 4 min total  Long side of foam, walking length- using cane and CGAx1         Gait w/ cog task 300 ft  Verbal fluency tasks          Mat      Hamstring stretch      SLR  3# AA by PTA on RLE  A on LLE   Fwd reach crunch 3x10     Ball oblique crunch 3x10 ea     DKTC crunch 2x15     Table top toe taps 3x10     Bridge 15x  easy   Bridge w/ march 3x6     Sidelying hip abd   Attempted, too much hip flexion even with tactile cuing   Sidelying clams  blue    Prone quad stretch      Ankle DF AROM   First set supine, second set seated with more visual feedback   Bilateral ER 3x15 blue    Seated core twist 2x10 8#    Seated overhead press 2x15 8#    Armchair push ups 3x10           Standing      Narrow JASIEL 2x 30\" No UE assist   Tandem balance  3x ea 15\" hold Added 12/17   Wide JASIEL, head turns 2x10 ea     Wide JASIEL, head up/down 2x10 ea     Heel raises      marches      SLS    UE support   Step taps  4in UE support   Mini squats 2x15  No UE assist   Sit to stands 3x15  W/ ball for forward to touch rollator lean external cue   Squat w/ ball 2x10  1x3  Min A to prevent posterior LOB   Squats on decline wedge 10x  Added 12/17, attempted 2 sets however could not complete second set d/t fatigue   3 way hip bilaterally  20x ea orange Added 12/17 Added pres 12/20  2\" step used for exyt   Half kneel to stand 2x  Max A x2 to return to stand on second rep d/t fatigue in arms, LLE  - HOLD until improved strength in BUEs         Other:         Treatment Charges: Mins Units   []  Modalities     []  Ther Exercise     []  Manual Therapy     []  Ther Activities     []  Aquatics     []  Vasocompression     [x]  Other: Neuro 17 1   [x]  Other: Gait 31 2   Total Treatment time 48 3   Estimated medicare cost as of 12/20/2021: $903.02    Assessment: [] Progressing toward goals. [] No change.      [x] Other: Patient did well with charted exercises but fatigues with gait training exercises. During step overs patient tended to rush through and PTA encouraged to slow down. Shortly after patient completing step overs again without slowing down he stepped on a anushka. Patient at this time lost his balance but was able to be lowered to the floor without injury with max assist from PTA. At this time assisted patient to chair and helped him into half kneeling and max assist to stand using chair. Patient did state that his left knee hurt a bit after kneeling on it (past fracture reported). At this time, only 3 way hip was completed at remainder of treatment. Pt required a step in order to properly complete hip ext with resisted band due to foot drop causing excessive back ext. [x] Patient would continue to benefit from skilled physical therapy services in order to: address B quad/hip strength (L>R), gait train, and balance training to improve safety and access in home/community and less reliance on caregivers. STG/LTG  STG: (to be met in 8 treatments) - Assessed on 12/8 by Thalia Murray, PT:  1. ? Pain: No more than 5/10 max pain noted in L knee with prolonged standing, ADLs - NOT MET, 7-8/10 at worst with prolonged recumbent biking  2. ? ROM: L knee ROM to +5 - 130deg to indicate symmetrical ROM and restored joint mobility post chronic pain from injury - Made progress, 0-125 with pain at end range  3. ? Strength: Pt to demo ability to complete 10x mini squat with even weightbearing demonstrated and no UE assist to indicate improving functional quad strength  4. ? Balance: Pt able to demo appropriate amplitude of stepping strategies in response to posterior perturbations with good balance noted. - Making progress, still decreased amplitude  5. Function:   a. Pt able to complete half kneel to stand transfer with unilat UE assist and CGA/min VCs  from therapist - NOT MET, unsafe at this time d/t LE/UE weakness  b.  Pt able to ambulate 150 ft on level ground with AFOs donned while demonstrating more typical JASIEL vs narrow, improved consistency of stepping, and more appropriate trunk/arm swing movement vs excessive for improved balance to indicate overall improved safety and efficiency of gait. - MET, all improving but do demo worsening after ~800-900 ft  6. Independent with Home Exercise Programs - MET  7. Demonstrate Knowledge of fall prevention - MET, issued and discussed with pt on 12/8     LTG: (to be met in 16 treatments)  1. 5/5 L knee extension strength to indicate improving joint stability and allow for improved control with walking, stair climbing  2. Able to ambulate 300 ft on level ground with AFOs donned while demonstrating consistently appropriate JASIEL, no more than 10% incidence inconsistent stepping or deviation from straight path to indicate improving gait mechanics and safety  3. Pt able to ascend/descend stairs reciprocally with unilat UE assist and demonstrating good speed/balance  4. Pt able to squat to  objects off of floor without difficulty  5. Pt able to complete full floor<>stand transfer without need for assist and safe technique. 6. At least 40% fxn reported per LEFI to indicate improvement in subjective LLE ability                    Patient goals:  \"be able to do stuff like bending down, going up stairs\"    Pt. Education:  [] Yes  [x] No  [] Reviewed Prior HEP/Ed  Method of Education: [] Verbal  [] Demo  [] Written  Comprehension of Education:  [] Verbalizes understanding. [] Demonstrates understanding. [x] Needs review. [] Demonstrates/verbalizes HEP/Ed previously given. Access Code: [email or web address]  URL: [employer]. com/  Date: 11/03/2021  Prepared by: Donna Newman    Exercises  Supine Ankle Inversion Eversion AROM - 1 x daily - 7 x weekly - 3 sets - 10 reps  Supine Ankle Pumps - 1 x daily - 7 x weekly - 3 sets - 10 reps  Sit<>stand with fwd reach - 3x10  Squats w/ chair support - 3x10       Plan: [x] Continue current frequency toward long and short term goals.     [x] Specific Instructions for subsequent treatments: progress standing quad/hip strength with some UE assisted tasks, gait training      Time In: 9:54 am            Time Out: 10:50 am    Electronically signed by:  Jaxon Horvath PTA

## 2021-12-21 NOTE — FLOWSHEET NOTE
[] [address]  [address]    [phone]  F: [phone]   [] [address]  [facility]   [address]  P: [phone]  F: [phone]  [] [address]  P: [phone]  F: [phone] [] [address]  P: [phone]  F: [phone]  [] [address]  [facility]   [address]   [place]: [phone]  F: [phone]   [] [facility]  [address] [facility] [address]  [place]: [phone]   F: [phone]     Physical Therapy Cancel/No Show note    Date: 2021  Patient: Baldo Hernandez  : 1975  MRN: [id number]    Cancels/No Shows to date: 2cx/0ns >corrected 21. For 2021 appointment patient:    [x]  Cancelled    [] Rescheduled appointment    [] No-show     Reason given by patient:    []  Patient ill    [x]  Conflicting appointment  (post op cataract surgery)    [] No transportation      [] Conflict with work    [] No reason given    [] Weather related    [] [id number]    [] Other:      Comments:        [x] Next appointment was confirmed, 21 @12 noon.     Electronically signed by: Kelly Weeks

## 2021-12-22 ENCOUNTER — HOSPITAL ENCOUNTER (OUTPATIENT)
Dept: PHYSICAL THERAPY | Facility: CLINIC | Age: 46
Setting detail: THERAPIES SERIES
Discharge: HOME | End: 2021-12-22
Payer: MEDICARE

## 2021-12-27 ENCOUNTER — HOSPITAL ENCOUNTER (OUTPATIENT)
Dept: PHYSICAL THERAPY | Facility: CLINIC | Age: 46
Setting detail: THERAPIES SERIES
Discharge: HOME | End: 2021-12-27
Payer: MEDICARE

## 2021-12-27 PROCEDURE — 97110 THERAPEUTIC EXERCISES: CPT

## 2021-12-27 PROCEDURE — 97112 NEUROMUSCULAR REEDUCATION: CPT

## 2021-12-27 PROCEDURE — 97116 GAIT TRAINING THERAPY: CPT

## 2021-12-27 NOTE — FLOWSHEET NOTE
[] [facility]) - [facility] &  Therapy  [address]  [phone]  F: [phone] [x] [address]  [facility]   [address]  P: [phone]  F: [phone] [] [address] &  Therapy  [address] [facility] [address]  P: [phone]  F: [phone] [] [address]  P: [phone]  F: [phone] [] [address]  [facility]   [address]   [place]: [phone]  F: [phone]      Physical Therapy Daily Treatment Note    Date:  2021  Patient Name:  Raj Bowser    :  1975  MRN: [id number]  Physician: Dr. Brianna Marie: Medicare ([address] [place] [address])  Medical Diagnosis: Closed fracture of proximal end of left tibia, unspecified fracture morphology, sequela               Rehab Codes: M25.662, R53.1, R26.9, R27.9  Onset date: 21                 Next 's appt.: 21  Visit# / total visits: ; Progress note for Medicare patient due at visit 16   Cancels/No Shows: 2/0    Subjective:    Pain:  [x] Yes  [] No Location: R knee   Pain Rating: (0-10 scale) 3/10  Pain altered Tx:  [x] No  [] Yes  Action:     Comments: Pt arrives stating he fell on ice yesterday morning landing on his R knee and L elbow. Objective:  Modalities:   Precautions: Dialysis T/Thrs/Sat, gait belt   Exercises: Bolded completed 2021:  Exercise Reps/ Time Weight/ Level Comments   Nu-step 6' L4          Gait train no AD 300ft  Cues for increased step length, speed; SBA- distance limited d/t pt fatigue   Gait train w/ head turns and up/down 3x300   CGA   Gait w/ step overs 4 min  Short hurdles (~1in) and taller hurdles (~4in) - with cane and CGA  See below -   Gait w/ step up 4 min  2\" curb step with 1 UE.    Gait with step up/off of foam 6x30 ft  Short side of foam x 2- using cane and CGAx1   Gait w/ walk on foam 4 min total  Long side of foam, walking length- using cane and CGAx1         Gait w/ cog task 300 ft  Verbal fluency tasks          Mat      Hamstring stretch      SLR  3# AA by PTA on RLE  A on LLE   Fwd reach crunch 3x10     Ball oblique crunch 3x10 ea     DKTC crunch 2x15     Table top toe taps 3x10     Bridge 15x  easy   Bridge w/ march 3x6     Sidelying hip abd   Attempted, too much hip flexion even with tactile cuing   Sidelying clams  blue    Prone quad stretch      Ankle DF AROM   First set supine, second set seated with more visual feedback   Bilateral ER 3x15 blue    Seated core twist 2x10 8#    Seated overhead press 2x15 8#    Armchair push ups 3x10           Standing      Narrow JASIEL 2x 30\" No UE assist   NBOS on foam 2x 30\" Added 12/27   Tandem balance  3x ea 15\" hold Added 12/17   Wide JASIEL, head turns 2x10 ea     Wide JASIEL, head up/down 2x10 ea     Heel raises      Marches on foam 10x2  Added 12/27; 1UE A   Foam step ups  10x ea  Added 12/27; 1 UE A   Foam ambulation  2 min  1 UE A   SLS    UE support   Step taps  4in UE support   Step ups reciprocal pattern 5x  Added 12/27; stationary steps 6\" x2 + 4\" x2; down/back counts as x1   Mini squats x15  No UE assist   Sit to stands 3x15  W/ ball for forward to touch rollator lean external cue   Squats to step w/med ball 2x10 4#  13.5\" Min A to prevent posterior LOB   Squats on decline wedge 10x  Added 12/17, attempted 2 sets however could not complete second set d/t fatigue   3 way hip bilaterally  20x ea orange Added 12/17   2\" step used for ext   Half kneel to stand 2x  Max A x2 to return to stand on second rep d/t fatigue in arms, LLE  - HOLD until improved strength in BUEs   L LE TKE 10x3\" Blue     Other:         Treatment Charges: Mins Units   []  Modalities     [x]  Ther Exercise 20 1   []  Manual Therapy     []  Ther Activities     []  Aquatics     []  Vasocompression [x]  Other: Neuro 20 1   [x]  Other: Gait 10 1   Total Treatment time 50 3   Estimated medicare cost as of 12/27/2021: $982.52    Assessment: [] Progressing toward goals. [] No change. [x] Other: Progressed balance activities with addition of foam to NBOS, marches, and step ups. Cues for reduced UE reliance throughout session to further challenge balance. Some posterior sway noticed with balance activities this date. Addition of stationary stairs using reciprocal stepping pattern and 1 UE A to improve functional strength. Incorporated squats with medicine ball taps to step and resisted TKE to progress LE strengthening. Pt notes feeling dizzy and lightheaded the last 10 minutes of treatment d/t low sugar. Took BP at this time resulting in 150/72. No instances of LOB this date. Will monitor sx's and progress as tolerated. [x] Patient would continue to benefit from skilled physical therapy services in order to: address B quad/hip strength (L>R), gait train, and balance training to improve safety and access in home/community and less reliance on caregivers. STG/LTG  STG: (to be met in 8 treatments) - Assessed on 12/8 by Deepak Randhawa, PT:  1. ? Pain: No more than 5/10 max pain noted in L knee with prolonged standing, ADLs - NOT MET, 7-8/10 at worst with prolonged recumbent biking  2. ? ROM: L knee ROM to +5 - 130deg to indicate symmetrical ROM and restored joint mobility post chronic pain from injury - Made progress, 0-125 with pain at end range  3. ? Strength: Pt to demo ability to complete 10x mini squat with even weightbearing demonstrated and no UE assist to indicate improving functional quad strength  4. ? Balance: Pt able to demo appropriate amplitude of stepping strategies in response to posterior perturbations with good balance noted. - Making progress, still decreased amplitude  5. Function:   a.  Pt able to complete half kneel to stand transfer with unilat UE assist and CGA/min VCs  from - 10 reps  Sit<>stand with fwd reach - 3x10  Squats w/ chair support - 3x10       Plan: [x] Continue current frequency toward long and short term goals.     [x] Specific Instructions for subsequent treatments: progress standing quad/hip strength with some UE assisted tasks, gait training      Time In:  12:04 pm         Time Out: 1:00 pm    Electronically signed by:  Mikael Montanez PTA

## 2021-12-29 ENCOUNTER — HOSPITAL ENCOUNTER (OUTPATIENT)
Dept: PHYSICAL THERAPY | Facility: CLINIC | Age: 46
Setting detail: THERAPIES SERIES
Discharge: HOME | End: 2021-12-29
Payer: MEDICARE

## 2021-12-29 PROCEDURE — 97112 NEUROMUSCULAR REEDUCATION: CPT

## 2021-12-29 PROCEDURE — 97116 GAIT TRAINING THERAPY: CPT

## 2021-12-29 PROCEDURE — 97110 THERAPEUTIC EXERCISES: CPT

## 2021-12-30 ENCOUNTER — HOSPITAL ENCOUNTER (OUTPATIENT)
Dept: MRI IMAGING | Age: 46
Discharge: HOME | End: 2022-01-01
Payer: MEDICARE

## 2021-12-30 DIAGNOSIS — G89.29 CHRONIC BILATERAL LOW BACK PAIN WITH BILATERAL SCIATICA: ICD-10-CM

## 2021-12-30 DIAGNOSIS — M54.41 CHRONIC BILATERAL LOW BACK PAIN WITH BILATERAL SCIATICA: ICD-10-CM

## 2021-12-30 DIAGNOSIS — M54.42 CHRONIC BILATERAL LOW BACK PAIN WITH BILATERAL SCIATICA: ICD-10-CM

## 2021-12-30 PROCEDURE — 72148 MRI LUMBAR SPINE W/O DYE: CPT

## 2022-01-01 DIAGNOSIS — M51.26 LUMBAR HERNIATED DISC: Primary | ICD-10-CM

## 2022-01-02 ENCOUNTER — APPOINTMENT (OUTPATIENT)
Dept: GENERAL RADIOLOGY | Age: 47
DRG: 871 | End: 2022-01-02
Payer: MEDICARE

## 2022-01-02 ENCOUNTER — HOSPITAL ENCOUNTER (EMERGENCY)
Age: 47
Discharge: HOME | DRG: 871 | End: 2022-01-02
Attending: EMERGENCY MEDICINE
Payer: MEDICARE

## 2022-01-02 VITALS
SYSTOLIC BLOOD PRESSURE: 151 MMHG | HEART RATE: 69 BPM | HEIGHT: 68 IN | OXYGEN SATURATION: 100 % | WEIGHT: 194 LBS | TEMPERATURE: 98.6 F | BODY MASS INDEX: 29.4 KG/M2 | RESPIRATION RATE: 16 BRPM | DIASTOLIC BLOOD PRESSURE: 70 MMHG

## 2022-01-02 DIAGNOSIS — J06.9 VIRAL URI WITH COUGH: Primary | ICD-10-CM

## 2022-01-02 LAB
ABSOLUTE EOS #: 0 K/UL (ref 0–0.4)
ABSOLUTE IMMATURE GRANULOCYTE: 0 K/UL (ref 0–0.3)
ABSOLUTE LYMPH #: 0.85 K/UL (ref 1–4.8)
ABSOLUTE MONO #: 0.85 K/UL (ref 0.2–0.8)
ANION GAP SERPL CALCULATED.3IONS-SCNC: 12 MMOL/L (ref 9–17)
BASOPHILS # BLD: 1 %
BASOPHILS ABSOLUTE: 0.05 K/UL (ref 0–0.2)
BUN BLDV-MCNC: 21 MG/DL (ref 6–20)
BUN/CREAT BLD: 4 (ref 9–20)
CALCIUM SERPL-MCNC: 8.6 MG/DL (ref 8.6–10.4)
CHLORIDE BLD-SCNC: 91 MMOL/L (ref 98–107)
CO2: 27 MMOL/L (ref 20–31)
CREAT SERPL-MCNC: 5.81 MG/DL (ref 0.7–1.2)
DIFFERENTIAL TYPE: ABNORMAL
DIRECT EXAM: NORMAL
EOSINOPHILS RELATIVE PERCENT: 0 % (ref 1–4)
GFR AFRICAN AMERICAN: 13 ML/MIN
GFR NON-AFRICAN AMERICAN: 11 ML/MIN
GFR SERPL CREATININE-BSD FRML MDRD: ABNORMAL ML/MIN/{1.73_M2}
GFR SERPL CREATININE-BSD FRML MDRD: ABNORMAL ML/MIN/{1.73_M2}
GLUCOSE BLD-MCNC: 207 MG/DL (ref 70–99)
HCT VFR BLD CALC: 37.1 % (ref 40.7–50.3)
HEMOGLOBIN: 12.5 G/DL (ref 13–17)
IMMATURE GRANULOCYTES: 0 %
LYMPHOCYTES # BLD: 17 % (ref 24–44)
Lab: NORMAL
MCH RBC QN AUTO: 31 PG (ref 25.2–33.5)
MCHC RBC AUTO-ENTMCNC: 33.7 G/DL (ref 28.4–34.8)
MCV RBC AUTO: 92.1 FL (ref 82.6–102.9)
MONOCYTES # BLD: 17 % (ref 1–7)
NRBC AUTOMATED: 0 PER 100 WBC
PDW BLD-RTO: 12.3 % (ref 11.8–14.4)
PLATELET # BLD: 141 K/UL (ref 138–453)
PLATELET ESTIMATE: ABNORMAL
PMV BLD AUTO: 10 FL (ref 8.1–13.5)
POTASSIUM SERPL-SCNC: 4.4 MMOL/L (ref 3.7–5.3)
RBC # BLD: 4.03 M/UL (ref 4.21–5.77)
RBC # BLD: ABNORMAL 10*6/UL
SEG NEUTROPHILS: 65 % (ref 36–66)
SEGMENTED NEUTROPHILS ABSOLUTE COUNT: 3.25 K/UL (ref 1.8–7.7)
SODIUM BLD-SCNC: 130 MMOL/L (ref 135–144)
SPECIMEN DESCRIPTION: NORMAL
WBC # BLD: 5 K/UL (ref 3.5–11.3)
WBC # BLD: ABNORMAL 10*3/UL

## 2022-01-02 PROCEDURE — 71045 X-RAY EXAM CHEST 1 VIEW: CPT

## 2022-01-02 PROCEDURE — 80048 BASIC METABOLIC PNL TOTAL CA: CPT

## 2022-01-02 PROCEDURE — 99282 EMERGENCY DEPT VISIT SF MDM: CPT

## 2022-01-02 PROCEDURE — 85025 COMPLETE CBC W/AUTO DIFF WBC: CPT

## 2022-01-02 PROCEDURE — 87880 STREP A ASSAY W/OPTIC: CPT

## 2022-01-02 RX ORDER — FLUTICASONE PROPIONATE 50 MCG
1 SPRAY, SUSPENSION (ML) NASAL DAILY
Qty: 1 EACH | Refills: 0 | Status: ON HOLD | OUTPATIENT
Start: 2022-01-02 | End: 2022-04-13

## 2022-01-02 RX ORDER — PROMETHAZINE HYDROCHLORIDE 25 MG/1
25 TABLET ORAL EVERY 6 HOURS PRN
Qty: 15 TABLET | Refills: 0 | Status: ON HOLD | OUTPATIENT
Start: 2022-01-02 | End: 2022-04-13 | Stop reason: ALTCHOICE

## 2022-01-02 RX ORDER — BENZONATATE 100 MG/1
100 CAPSULE ORAL 3 TIMES DAILY PRN
Qty: 21 CAPSULE | Refills: 0 | Status: ON HOLD | OUTPATIENT
Start: 2022-01-02 | End: 2022-01-11 | Stop reason: HOSPADM

## 2022-01-02 RX ORDER — LORATADINE 10 MG/1
10 TABLET ORAL DAILY
Qty: 10 TABLET | Refills: 0 | Status: SHIPPED | OUTPATIENT
Start: 2022-01-02 | End: 2022-01-12

## 2022-01-02 ASSESSMENT — ENCOUNTER SYMPTOMS
COUGH: 1
VOMITING: 1
RHINORRHEA: 1
SORE THROAT: 1
DIARRHEA: 0
SHORTNESS OF BREATH: 0
ABDOMINAL PAIN: 0
TROUBLE SWALLOWING: 0
NAUSEA: 1

## 2022-01-03 ENCOUNTER — HOSPITAL ENCOUNTER (OUTPATIENT)
Dept: PHYSICAL THERAPY | Facility: CLINIC | Age: 47
Setting detail: THERAPIES SERIES
Discharge: HOME | End: 2022-01-03
Payer: MEDICARE

## 2022-01-03 ENCOUNTER — TELEPHONE (OUTPATIENT)
Dept: FAMILY MEDICINE CLINIC | Age: 47
End: 2022-01-03

## 2022-01-03 DIAGNOSIS — M51.26 LUMBAR HERNIATED DISC: Primary | ICD-10-CM

## 2022-01-03 NOTE — FLOWSHEET NOTE
[] [facility]) [facility] &  Therapy  [address]    [phone]  F: [phone]   [x] [address] [facility] [address]   [address]  P: [phone]  F: [phone]  [] [place]  [address]  P: [phone]  F: [phone] [] [address]  P: [phone]  F: [phone]  [] [address]  [address] [facility] [address]   [address]   [place]: [phone]  F: [phone]   [] [facility]  [address] [facility] [address]  [place]: [phone]   F: [phone]     Physical Therapy Cancel/No Show note    Date: 1/3/2022  Patient: Walt Mcintyre  : 1975  MRN: [id number]    Cancels/No Shows to date: 3 cx / 0 ns    For today's appointment patient:    [x]  Cancelled    [] Rescheduled appointment    [] No-show     Reason given by patient:    []  Patient ill    []  Conflicting appointment    [] No transportation      [] Conflict with work    [] No reason given    [] Weather related    [] COVID-19    [x] Other:      Comments:  Pt hospitalized. Will call to reschedule.       [] Next appointment was confirmed    Electronically signed by: Seth Calvo PT

## 2022-01-03 NOTE — ED PROVIDER NOTES
[address] [place] [address] ED  EMERGENCY DEPARTMENT ENCOUNTER      Pt Name: Cande Greenberg  MRN: [id number]  [place] 1975  Date of evaluation: 1/2/2022  Provider: DANIEL Singer CNP    CHIEF COMPLAINT       Chief Complaint   Patient presents with    Cough    Emesis         HISTORY OFPRESENT ILLNESS  (Location/Symptom, Timing/Onset, Context/Setting, Quality, Duration, Modifying Factors, Severity.)   Cande Greenberg is a 51 y.o. male who presents to the emergency department by private auto for evaluation of cough, sore throat, rhinorrhea nasal congestion, and several episodes of emesis the past 2 days. Rates his throat pain a 5 out of 10. Described as a sharp sensation. No difficulty swallowing. Patient states he has been around recently have been sick with similar symptoms. Patient denies chest pain, abdominal pain, fever or chills. History of ESRD on dialysis. Patient states he had a full dialysis treatment today. Afebrile upon arrival.  SPO2 100%. Patient states he has had his first 2 Covid vaccinations. Has history of diabetes. No history of asthma or COPD. Nursing Notes were reviewed. PASTMEDICAL HISTORY     Past Medical History:   Diagnosis Date    Closed fracture of bone of right foot March - April 2020    [facility]ialysis patient [facility])     Kidney disease     On Dialysis    Type 1 diabetes mellitus ([facility] [address]          SURGICAL HISTORY     History reviewed. No pertinent surgical history. CURRENT MEDICATIONS     Discharge Medication List as of 1/2/2022  9:06 PM      CONTINUE these medications which have NOT CHANGED    Details   FLUoxetine (PROZAC) 40 MG capsule Take 80 mg by mouthHistorical Med      ammonium lactate (LAC-HYDRIN) 12 % lotion Apply topically daily. , Disp-1 each, R-3, Normal      ondansetron (ZOFRAN-ODT) 4 MG disintegrating tablet Historical Med      traMADol (ULTRAM) 50 MG tablet Take 50 mg by mouth daily.  Historical Med      hydrALAZINE (APRESOLINE) 50 MG tablet Historical Med      furosemide (LASIX) 20 MG tablet Take 1 tablet by mouth daily, Disp-60 tablet, R-5Normal      insulin glargine (LANTUS) 100 UNIT/ML injection vial Inject 45 units in the morning and 5 units SQ at  night, Disp-5 vial, R-5Normal      traZODone (DESYREL) 100 MG tablet Take 1 tablet by mouth nightly as needed for Sleep, Disp-30 tablet, R-5Normal      buPROPion (WELLBUTRIN XL) 150 MG extended release tablet Take 1 tablet by mouth every morning, Disp-15 tablet, R-1Normal      metoprolol succinate (TOPROL XL) 200 MG extended release tablet Take 1 tablet by mouth daily, Disp-90 tablet, R-1Cancel previous scriptNormal      insulin lispro (HUMALOG) 100 UNIT/ML injection vial Inject into the skin 3 times daily (before meals)Historical Med      lisinopril (PRINIVIL;ZESTRIL) 20 MG tablet Take 40 mg by mouth dailyHistorical Med      Rosuvastatin Calcium 40 MG CPSP Take 40 mg by mouth dailyHistorical Med      ezetimibe (ZETIA) 10 MG tablet Take 10 mg by mouth dailyHistorical Med      omeprazole (PRILOSEC) 40 MG delayed release capsule Take 40 mg by mouth dailyHistorical Med      amLODIPine (NORVASC) 10 MG tablet Take 10 mg by mouth dailyHistorical Med      aspirin 81 MG EC tablet Take 81 mg by mouth dailyHistorical Med      magnesium oxide (MAG-OX) 400 MG tablet Take 400 mg by mouth dailyHistorical Med      calcium acetate 667 MG TABS Take 3 tablets by mouth dailyHistorical Med      vitamin B-12 (CYANOCOBALAMIN) 500 MCG tablet Take 500 mcg by mouth dailyHistorical Med             ALLERGIES     Patient has no known allergies.     FAMILY HISTORY       Family History   Problem Relation Age of Onset    Diabetes Mother     Diabetes Father     Heart Disease Maternal Great Grandfather           SOCIAL HISTORY       Social History     Socioeconomic History    Marital status: Single     Spouse name: None    Number of children: None    Years of education: None    Highest education level: None   Occupational History    None Tobacco Use    Smoking status: Never Smoker    Smokeless tobacco: Never Used   Vaping Use    Vaping Use: Never used   Substance and Sexual Activity    Alcohol use: Never    Drug use: Never    Sexual activity: None   Other Topics Concern    None   Social History Narrative    None     Social Determinants of Health     Financial Resource Strain:     Difficulty of Paying Living Expenses: Not on file   Food Insecurity:     Worried About Running Out of Food in the Last Year: Not on file    Dinh of Food in the Last Year: Not on file   Transportation Needs:     Lack of Transportation (Medical): Not on file    Lack of Transportation (Non-Medical): Not on file   Physical Activity:     Days of Exercise per Week: Not on file    Minutes of Exercise per Session: Not on file   Stress:     Feeling of Stress : Not on file   Social Connections:     Frequency of Communication with Friends and Family: Not on file    Frequency of Social Gatherings with Friends and Family: Not on file    Attends [employer] Services: Not on file    Active Member of [address] [place] [address] [employer] or Organizations: Not on file    Attends Club or Organization Meetings: Not on file    Marital Status: Not on file   Intimate Partner Violence:     Fear of Current or Ex-Partner: Not on file    Emotionally Abused: Not on file    Physically Abused: Not on file    Sexually Abused: Not on file   Housing Stability:     Unable to Pay for Housing in the Last Year: Not on file    Number of [place] in the Last Year: Not on file    Unstable Housing in the Last Year: Not on file         REVIEW OF SYSTEMS    (2-9 systems for level 4, 10 or more for level 5)     Review of Systems   Constitutional: Positive for appetite change and fatigue. Negative for activity change. HENT: Positive for congestion, postnasal drip, rhinorrhea and sore throat. Negative for trouble swallowing. Respiratory: Positive for cough. Negative for shortness of breath.     Cardiovascular: Negative for chest pain. Gastrointestinal: Positive for nausea and vomiting. Negative for abdominal pain and diarrhea. Neurological: Negative for dizziness, light-headedness and headaches. All other systems reviewed and are negative. Except as noted above the remainder of the review of systems was reviewed and negative. PHYSICAL EXAM    (up to 7 for level 4, 8 or more for level 5)     ED Triage Vitals [01/02/22 1757]   BP Temp Temp Source Pulse Resp SpO2 Height Weight   (!) 151/70 98.6 °F (37 °C) Oral 69 16 100 % 5' 7.5\" (1.715 m) 194 lb (88 kg)       Physical Exam  Constitutional:       General: He is not in acute distress. Appearance: Normal appearance. He is well-developed, well-groomed and normal weight. He is not toxic-appearing. HENT:      Head: Normocephalic. Right Ear: Hearing, tympanic membrane, ear canal and external ear normal.      Left Ear: Hearing, tympanic membrane, ear canal and external ear normal.      Nose: Congestion and rhinorrhea present. Mouth/Throat:      Lips: Pink. Mouth: Mucous membranes are moist.      Pharynx: Oropharynx is clear. Uvula midline. Posterior oropharyngeal erythema present. No pharyngeal swelling or uvula swelling. Tonsils: No tonsillar exudate. 1+ on the right. 1+ on the left. Eyes:      Extraocular Movements: Extraocular movements intact. Conjunctiva/sclera: Conjunctivae normal.      Pupils: Pupils are equal, round, and reactive to light. Cardiovascular:      Rate and Rhythm: Regular rhythm. Pulses: Normal pulses. Heart sounds: Normal heart sounds. Pulmonary:      Effort: Pulmonary effort is normal.      Breath sounds: Normal breath sounds. No decreased breath sounds, wheezing, rhonchi or rales. Abdominal:      General: Bowel sounds are normal.      Palpations: Abdomen is soft. Tenderness: There is no abdominal tenderness. Musculoskeletal:         General: Normal range of motion.       Cervical back: Normal range of motion. Skin:     General: Skin is warm and dry. Capillary Refill: Capillary refill takes less than 2 seconds. Neurological:      Mental Status: He is alert and oriented to person, place, and time. Psychiatric:         Mood and Affect: Mood normal.         Behavior: Behavior normal.         Thought Content: Thought content normal.         Judgment: Judgment normal.           DIAGNOSTIC RESULTS     EKG:All EKG's are interpreted by the Emergency Department Physician who either signs or Co-signs this chart in the absence of a cardiologist.        RADIOLOGY:   Non-plain film images such as CT, Ultrasound and MRI are read by theradiologist. Plain radiographic images are visualized and preliminarily interpreted by the emergency physician with the below findings:    MRI LUMBAR SPINE WO CONTRAST    Result Date: 12/30/2021  EXAMINATION: MRI OF THE LUMBAR SPINE WITHOUT CONTRAST, 12/30/2021 12:55 pm TECHNIQUE: Multiplanar multisequence MRI of the lumbar spine was performed without the administration of intravenous contrast. COMPARISON: None. HISTORY: ORDERING SYSTEM PROVIDED HISTORY: Chronic bilateral low back pain with bilateral sciatica TECHNOLOGIST PROVIDED HISTORY: 30 yrs pain, prev neg xrays Reason for Exam: chronic low back pain for 30 years Additional signs and symptoms: bilateral leg weakness, right foot drop Relevant Medical/Surgical History: injury as a teenager FINDINGS: BONES/ALIGNMENT: There is normal alignment of the lumbar spine. There is no acute fracture or listhesis. Bone marrow signal intensity is normal.  There is disc desiccation and mild disc space narrowing at L5-S1. Intervertebral disc height and signal is otherwise normal. SPINAL CORD: The conus medullaris is normal in size and signal intensities and terminates normally. SOFT TISSUES: There is no paraspinal mass identified. L1-L2: There is no disc bulge or protrusion present.   There is no significant spinal canal stenosis or neural foraminal narrowing present. L2-L3: There is no disc bulge or protrusion present. There is no significant spinal canal stenosis or neural foraminal narrowing present. L3-L4: There is no disc bulge or protrusion present. There is no significant spinal canal stenosis or neural foraminal narrowing present. L4-L5: There is no disc bulge or protrusion present. There is no significant spinal canal stenosis or neural foraminal narrowing present. There is bilateral facet arthropathy. L5-S1: There is a 3 mm central disc protrusion mildly effacing the lateral recesses. There is mild spinal canal stenosis. No significant neural foraminal narrowing is present. 3 mm central disc protrusion at L5-S1 mildly effacing the lateral recesses and mildly narrowing the spinal canal.     XR CHEST PORTABLE    Result Date: 1/2/2022  EXAMINATION: ONE XRAY VIEW OF THE CHEST 1/2/2022 3:09 pm COMPARISON: None. HISTORY: ORDERING SYSTEM PROVIDED HISTORY: cough TECHNOLOGIST PROVIDED HISTORY: cough Reason for Exam: Pt states cough x 2 days FINDINGS: Marginal inspiration is present. No focal area of consolidation or pneumothorax is present. No pleural effusion is present. Heart size is prominent, exaggerated by the poor inspiratory effort. Vascular markings are distinct. Surgical clips are present within the left upper arm. No acute osseous abnormality is present.      Marginal inspiration, without evidence of acute cardiopulmonary disease     Interpretation per the Radiologist below, if available at the time of this note:    XR CHEST PORTABLE   Final Result   Marginal inspiration, without evidence of acute cardiopulmonary disease               EDBEDSIDE ULTRASOUND:   Performed by EDPhysician - none    LABS:  Labs Reviewed   CBC WITH AUTO DIFFERENTIAL - Abnormal; Notable for the following components:       Result Value    RBC 4.03 (*)     Hemoglobin 12.5 (*)     Hematocrit 37.1 (*)     All other components within normal limits   BASIC METABOLIC PANEL - Abnormal; Notable for the following components:    Glucose 207 (*)     BUN 21 (*)     CREATININE 5.81 (*)     Bun/Cre Ratio 4 (*)     Sodium 130 (*)     Chloride 91 (*)     GFR Non- 11 (*)     GFR  13 (*)     All other components within normal limits   STREP SCREEN GROUP A THROAT       All other labs were within normal range or not returned as of this dictation. EMERGENCY DEPARTMENT COURSE andDIFFERENTIAL DIAGNOSIS/MDM:   Patient evaluated in conjunction attending physician. Labs reviewed. Strep screen negative. Chest x-ray shows no acute cardiopulmonary disease. SPO2 100% on room air. Patient is afebrile. Lung sounds clear to auscultation. No emesis while in the ED. Abdomen soft and nontender. Patient likely has a viral URI. Prescriptions written for Phenergan, Flonase, Claritin, and Tessalon Perles. Instructed to follow-up with his primary care provider. Return precautions provided. Vitals:    Vitals:    01/02/22 1757   BP: (!) 151/70   Pulse: 69   Resp: 16   Temp: 98.6 °F (37 °C)   TempSrc: Oral   SpO2: 100%   Weight: 194 lb (88 kg)   Height: 5' 7.5\" (1.715 m)         CONSULTS:  None    RES:  Procedures    FINAL IMPRESSION      1.  Viral URI with cough          DISPOSITION/PLAN   DISPOSITION Decision To Discharge 01/02/2022 09:03:53 PM      PATIENT REFERRED TO:   DANIEL Stokes CNP  [place] [address]  [phone]    Schedule an appointment as soon as possible for a visit       [facility] ED  [address] [facility]  [phone]    If symptoms worsen      DISCHARGE MEDICATIONS:     Discharge Medication List as of 1/2/2022  9:06 PM      START taking these medications    Details   fluticasone (FLONASE) 50 MCG/ACT nasal spray 1 spray by Each Nostril route daily for 10 days, Disp-1 each, R-0Print      loratadine (CLARITIN) 10 MG tablet Take 1 tablet by mouth daily for 10 days, Disp-10 tablet, R-0Print      benzonatate (TESSALON PERLES) 100 MG capsule Take 1 capsule by mouth 3 times daily as needed for Cough, Disp-21 capsule, R-0Print      promethazine (PHENERGAN) 25 MG tablet Take 1 tablet by mouth every 6 hours as needed for Nausea, Disp-15 tablet, R-0Print           Electronically signed by DANIEL Alvarez 1/2/2022 at 9:58 PM           DANIEL Alvarez CNP  01/02/22 3960

## 2022-01-03 NOTE — ED PROVIDER NOTES
eMERGENCY dEPARTMENT eNCOUnter   [address] [place] Name: Jannet Holland  MRN: [id number]  [place] 1975  Date of evaluation: 1/2/22     Jannet Holland is a 51 y.o. male with CC: Cough and Emesis    This visit was performed by both a physician and an APC. I performed all aspects of the MDM as documented. Based on the medical record the care appears appropriate. I was personally available for consultation in the Emergency Department. The care is provided during an unprecedented national emergency due to the novel coronavirus, COVID 19.     Gabby Dinero MD  Attending Emergency Physician                  Flash Landin MD  01/02/22 1583

## 2022-01-04 ENCOUNTER — APPOINTMENT (OUTPATIENT)
Dept: NUCLEAR MEDICINE | Age: 47
DRG: 871 | End: 2022-01-04
Payer: MEDICARE

## 2022-01-04 ENCOUNTER — APPOINTMENT (OUTPATIENT)
Dept: GENERAL RADIOLOGY | Age: 47
DRG: 871 | End: 2022-01-04
Payer: MEDICARE

## 2022-01-04 ENCOUNTER — HOSPITAL ENCOUNTER (INPATIENT)
Age: 47
LOS: 7 days | Discharge: HOME | DRG: 871 | End: 2022-01-11
Attending: EMERGENCY MEDICINE | Admitting: INTERNAL MEDICINE
Payer: MEDICARE

## 2022-01-04 DIAGNOSIS — E87.1 HYPONATREMIA: ICD-10-CM

## 2022-01-04 DIAGNOSIS — E87.3 RESPIRATORY ALKALOSIS: ICD-10-CM

## 2022-01-04 DIAGNOSIS — J12.82 PNEUMONIA DUE TO COVID-19 VIRUS: ICD-10-CM

## 2022-01-04 DIAGNOSIS — U07.1 COVID-19: Primary | ICD-10-CM

## 2022-01-04 DIAGNOSIS — U07.1 PNEUMONIA DUE TO COVID-19 VIRUS: ICD-10-CM

## 2022-01-04 DIAGNOSIS — J96.01 ACUTE RESPIRATORY FAILURE WITH HYPOXIA (HCC): ICD-10-CM

## 2022-01-04 LAB
-: NORMAL
ABSOLUTE EOS #: <0.03 K/UL (ref 0–0.44)
ABSOLUTE IMMATURE GRANULOCYTE: 0.05 K/UL (ref 0–0.3)
ABSOLUTE LYMPH #: 0.72 K/UL (ref 1.1–3.7)
ABSOLUTE MONO #: 1 K/UL (ref 0.1–1.2)
ALBUMIN SERPL-MCNC: 3.8 G/DL (ref 3.5–5.2)
ALBUMIN/GLOBULIN RATIO: ABNORMAL (ref 1–2.5)
ALLEN TEST: ABNORMAL
ALP BLD-CCNC: 144 U/L (ref 40–129)
ALT SERPL-CCNC: 49 U/L (ref 5–41)
ANION GAP SERPL CALCULATED.3IONS-SCNC: 19 MMOL/L (ref 9–17)
AST SERPL-CCNC: 33 U/L
BASOPHILS # BLD: 0 % (ref 0–2)
BASOPHILS ABSOLUTE: <0.03 K/UL (ref 0–0.2)
BILIRUB SERPL-MCNC: 0.38 MG/DL (ref 0.3–1.2)
BUN BLDV-MCNC: 37 MG/DL (ref 6–20)
BUN/CREAT BLD: 4 (ref 9–20)
C-REACTIVE PROTEIN: 42.2 MG/L (ref 0–5)
CALCIUM SERPL-MCNC: 8.5 MG/DL (ref 8.6–10.4)
CHLORIDE BLD-SCNC: 88 MMOL/L (ref 98–107)
CO2: 21 MMOL/L (ref 20–31)
CREAT SERPL-MCNC: 8.75 MG/DL (ref 0.7–1.2)
D-DIMER QUANTITATIVE: 0.83 MG/L FEU (ref 0–0.59)
DIFFERENTIAL TYPE: ABNORMAL
EOSINOPHILS RELATIVE PERCENT: 0 % (ref 1–4)
FIO2: ABNORMAL
GFR AFRICAN AMERICAN: 8 ML/MIN
GFR NON-AFRICAN AMERICAN: 7 ML/MIN
GFR SERPL CREATININE-BSD FRML MDRD: ABNORMAL ML/MIN/{1.73_M2}
GFR SERPL CREATININE-BSD FRML MDRD: ABNORMAL ML/MIN/{1.73_M2}
GLUCOSE BLD-MCNC: 103 MG/DL (ref 70–99)
GLUCOSE BLD-MCNC: 94 MG/DL (ref 75–110)
HCO3 VENOUS: 19 MMOL/L (ref 22–29)
HCT VFR BLD CALC: 41.8 % (ref 40.7–50.3)
HEMOGLOBIN: 14.2 G/DL (ref 13–17)
IMMATURE GRANULOCYTES: 1 %
LACTATE DEHYDROGENASE: 272 U/L (ref 135–225)
LYMPHOCYTES # BLD: 7 % (ref 24–43)
MCH RBC QN AUTO: 30.5 PG (ref 25.2–33.5)
MCHC RBC AUTO-ENTMCNC: 34 G/DL (ref 28.4–34.8)
MCV RBC AUTO: 89.9 FL (ref 82.6–102.9)
MODE: ABNORMAL
MONOCYTES # BLD: 9 % (ref 3–12)
MYOGLOBIN: 1023 NG/ML (ref 28–72)
NEGATIVE BASE EXCESS, VEN: 2 (ref 0–2)
NRBC AUTOMATED: 0 PER 100 WBC
O2 DEVICE/FLOW/%: ABNORMAL
O2 SAT, VEN: 99 % (ref 60–85)
PATIENT TEMP: ABNORMAL
PCO2, VEN: 22.8 MM HG (ref 41–51)
PDW BLD-RTO: 12.3 % (ref 11.8–14.4)
PH VENOUS: 7.53 (ref 7.32–7.43)
PLATELET # BLD: 141 K/UL (ref 138–453)
PLATELET ESTIMATE: ABNORMAL
PMV BLD AUTO: 10.3 FL (ref 8.1–13.5)
PO2, VEN: 136.2 MM HG (ref 30–50)
POC PCO2 TEMP: ABNORMAL MM HG
POC PH TEMP: ABNORMAL
POC PO2 TEMP: ABNORMAL MM HG
POSITIVE BASE EXCESS, VEN: ABNORMAL (ref 0–3)
POTASSIUM SERPL-SCNC: 5 MMOL/L (ref 3.7–5.3)
PROCALCITONIN: 0.49 NG/ML
RBC # BLD: 4.65 M/UL (ref 4.21–5.77)
RBC # BLD: ABNORMAL 10*6/UL
REASON FOR REJECTION: NORMAL
SAMPLE SITE: ABNORMAL
SARS-COV-2, RAPID: DETECTED
SEDIMENTATION RATE, ERYTHROCYTE: 30 MM (ref 0–15)
SEG NEUTROPHILS: 83 % (ref 36–65)
SEGMENTED NEUTROPHILS ABSOLUTE COUNT: 8.8 K/UL (ref 1.5–8.1)
SODIUM BLD-SCNC: 128 MMOL/L (ref 135–144)
SPECIMEN DESCRIPTION: ABNORMAL
TOTAL CO2, VENOUS: ABNORMAL MMOL/L (ref 23–30)
TOTAL PROTEIN: 6.6 G/DL (ref 6.4–8.3)
TROPONIN INTERP: ABNORMAL
TROPONIN INTERP: ABNORMAL
TROPONIN T: ABNORMAL NG/ML
TROPONIN T: ABNORMAL NG/ML
TROPONIN, HIGH SENSITIVITY: 464 NG/L (ref 0–22)
TROPONIN, HIGH SENSITIVITY: 467 NG/L (ref 0–22)
WBC # BLD: 10.6 K/UL (ref 3.5–11.3)
WBC # BLD: ABNORMAL 10*3/UL
ZZ NTE CLEAN UP: ORDERED TEST: NORMAL
ZZ NTE WITH NAME CLEAN UP: SPECIMEN SOURCE: NORMAL

## 2022-01-04 PROCEDURE — 93005 ELECTROCARDIOGRAM TRACING: CPT | Performed by: EMERGENCY MEDICINE

## 2022-01-04 PROCEDURE — 83615 LACTATE (LD) (LDH) ENZYME: CPT

## 2022-01-04 PROCEDURE — 2700000000 HC OXYGEN THERAPY PER DAY

## 2022-01-04 PROCEDURE — 84484 ASSAY OF TROPONIN QUANT: CPT

## 2022-01-04 PROCEDURE — 6360000002 HC RX W HCPCS: Performed by: EMERGENCY MEDICINE

## 2022-01-04 PROCEDURE — A9540 TC99M MAA: HCPCS | Performed by: PHYSICIAN ASSISTANT

## 2022-01-04 PROCEDURE — 85379 FIBRIN DEGRADATION QUANT: CPT

## 2022-01-04 PROCEDURE — 82803 BLOOD GASES ANY COMBINATION: CPT

## 2022-01-04 PROCEDURE — 82947 ASSAY GLUCOSE BLOOD QUANT: CPT

## 2022-01-04 PROCEDURE — 84145 PROCALCITONIN (PCT): CPT

## 2022-01-04 PROCEDURE — 87635 SARS-COV-2 COVID-19 AMP PRB: CPT

## 2022-01-04 PROCEDURE — 2060000000 HC ICU INTERMEDIATE R&B

## 2022-01-04 PROCEDURE — APPSS45 APP SPLIT SHARED TIME 31-45 MINUTES: Performed by: NURSE PRACTITIONER

## 2022-01-04 PROCEDURE — 6360000002 HC RX W HCPCS: Performed by: NURSE PRACTITIONER

## 2022-01-04 PROCEDURE — 2580000003 HC RX 258: Performed by: NURSE PRACTITIONER

## 2022-01-04 PROCEDURE — 6360000002 HC RX W HCPCS: Performed by: PHYSICIAN ASSISTANT

## 2022-01-04 PROCEDURE — 90935 HEMODIALYSIS ONE EVALUATION: CPT

## 2022-01-04 PROCEDURE — 99284 EMERGENCY DEPT VISIT MOD MDM: CPT

## 2022-01-04 PROCEDURE — 2580000003 HC RX 258: Performed by: PHYSICIAN ASSISTANT

## 2022-01-04 PROCEDURE — 94761 N-INVAS EAR/PLS OXIMETRY MLT: CPT

## 2022-01-04 PROCEDURE — 71045 X-RAY EXAM CHEST 1 VIEW: CPT

## 2022-01-04 PROCEDURE — 6370000000 HC RX 637 (ALT 250 FOR IP): Performed by: NURSE PRACTITIONER

## 2022-01-04 PROCEDURE — 86140 C-REACTIVE PROTEIN: CPT

## 2022-01-04 PROCEDURE — 2580000003 HC RX 258: Performed by: EMERGENCY MEDICINE

## 2022-01-04 PROCEDURE — 3430000000 HC RX DIAGNOSTIC RADIOPHARMACEUTICAL: Performed by: PHYSICIAN ASSISTANT

## 2022-01-04 PROCEDURE — 85652 RBC SED RATE AUTOMATED: CPT

## 2022-01-04 PROCEDURE — 78580 LUNG PERFUSION IMAGING: CPT

## 2022-01-04 PROCEDURE — 87040 BLOOD CULTURE FOR BACTERIA: CPT

## 2022-01-04 PROCEDURE — 99222 1ST HOSP IP/OBS MODERATE 55: CPT | Performed by: NURSE PRACTITIONER

## 2022-01-04 PROCEDURE — 83874 ASSAY OF MYOGLOBIN: CPT

## 2022-01-04 PROCEDURE — 85025 COMPLETE CBC W/AUTO DIFF WBC: CPT

## 2022-01-04 PROCEDURE — 96375 TX/PRO/DX INJ NEW DRUG ADDON: CPT

## 2022-01-04 PROCEDURE — APPNB60 APP NON BILLABLE TIME 46-60 MINS: Performed by: NURSE PRACTITIONER

## 2022-01-04 PROCEDURE — 96365 THER/PROPH/DIAG IV INF INIT: CPT

## 2022-01-04 PROCEDURE — 80053 COMPREHEN METABOLIC PANEL: CPT

## 2022-01-04 RX ORDER — SODIUM CHLORIDE 9 MG/ML
INJECTION, SOLUTION INTRAVENOUS CONTINUOUS
Status: DISCONTINUED | OUTPATIENT
Start: 2022-01-04 | End: 2022-01-04

## 2022-01-04 RX ORDER — HYDRALAZINE HYDROCHLORIDE 50 MG/1
50 TABLET, FILM COATED ORAL EVERY 8 HOURS SCHEDULED
Status: DISCONTINUED | OUTPATIENT
Start: 2022-01-04 | End: 2022-01-04 | Stop reason: CLARIF

## 2022-01-04 RX ORDER — FLUOXETINE 20 MG/1
20 TABLET, FILM COATED ORAL 2 TIMES DAILY
COMMUNITY

## 2022-01-04 RX ORDER — AMLODIPINE BESYLATE 10 MG/1
10 TABLET ORAL DAILY
Status: DISCONTINUED | OUTPATIENT
Start: 2022-01-04 | End: 2022-01-11 | Stop reason: HOSPADM

## 2022-01-04 RX ORDER — LISINOPRIL 20 MG/1
20 TABLET ORAL DAILY
Status: DISCONTINUED | OUTPATIENT
Start: 2022-01-05 | End: 2022-01-11 | Stop reason: HOSPADM

## 2022-01-04 RX ORDER — PANTOPRAZOLE SODIUM 40 MG/1
40 TABLET, DELAYED RELEASE ORAL
Status: DISCONTINUED | OUTPATIENT
Start: 2022-01-05 | End: 2022-01-11 | Stop reason: HOSPADM

## 2022-01-04 RX ORDER — ASPIRIN 81 MG/1
81 TABLET ORAL DAILY
Status: DISCONTINUED | OUTPATIENT
Start: 2022-01-04 | End: 2022-01-11 | Stop reason: HOSPADM

## 2022-01-04 RX ORDER — SODIUM CHLORIDE 0.9 % (FLUSH) 0.9 %
5-40 SYRINGE (ML) INJECTION EVERY 12 HOURS SCHEDULED
Status: DISCONTINUED | OUTPATIENT
Start: 2022-01-04 | End: 2022-01-11 | Stop reason: HOSPADM

## 2022-01-04 RX ORDER — ACETAMINOPHEN 325 MG/1
650 TABLET ORAL EVERY 6 HOURS PRN
Status: DISCONTINUED | OUTPATIENT
Start: 2022-01-04 | End: 2022-01-11 | Stop reason: HOSPADM

## 2022-01-04 RX ORDER — INSULIN GLARGINE 100 [IU]/ML
45 INJECTION, SOLUTION SUBCUTANEOUS EVERY MORNING
Status: DISCONTINUED | OUTPATIENT
Start: 2022-01-05 | End: 2022-01-07

## 2022-01-04 RX ORDER — DEXAMETHASONE SODIUM PHOSPHATE 10 MG/ML
10 INJECTION, SOLUTION INTRAMUSCULAR; INTRAVENOUS EVERY 24 HOURS
Status: DISCONTINUED | OUTPATIENT
Start: 2022-01-09 | End: 2022-01-11

## 2022-01-04 RX ORDER — VITAMIN B COMPLEX
6000 TABLET ORAL DAILY
Status: DISPENSED | OUTPATIENT
Start: 2022-01-04 | End: 2022-01-11

## 2022-01-04 RX ORDER — SODIUM CHLORIDE 0.9 % (FLUSH) 0.9 %
5-40 SYRINGE (ML) INJECTION PRN
Status: DISCONTINUED | OUTPATIENT
Start: 2022-01-04 | End: 2022-01-11 | Stop reason: HOSPADM

## 2022-01-04 RX ORDER — ACETAMINOPHEN 650 MG/1
650 SUPPOSITORY RECTAL EVERY 6 HOURS PRN
Status: DISCONTINUED | OUTPATIENT
Start: 2022-01-04 | End: 2022-01-11 | Stop reason: HOSPADM

## 2022-01-04 RX ORDER — POLYETHYLENE GLYCOL 3350 17 G/17G
17 POWDER, FOR SOLUTION ORAL DAILY PRN
Status: DISCONTINUED | OUTPATIENT
Start: 2022-01-04 | End: 2022-01-11 | Stop reason: HOSPADM

## 2022-01-04 RX ORDER — FLUOXETINE HYDROCHLORIDE 20 MG/1
80 CAPSULE ORAL DAILY
Status: DISCONTINUED | OUTPATIENT
Start: 2022-01-04 | End: 2022-01-04

## 2022-01-04 RX ORDER — VITAMIN B COMPLEX
2000 TABLET ORAL DAILY
Status: DISCONTINUED | OUTPATIENT
Start: 2022-01-11 | End: 2022-01-11 | Stop reason: HOSPADM

## 2022-01-04 RX ORDER — GUAIFENESIN/DEXTROMETHORPHAN 100-10MG/5
5 SYRUP ORAL EVERY 4 HOURS PRN
Status: DISCONTINUED | OUTPATIENT
Start: 2022-01-04 | End: 2022-01-11 | Stop reason: HOSPADM

## 2022-01-04 RX ORDER — LORAZEPAM 1 MG/1
1 TABLET ORAL EVERY 4 HOURS PRN
Status: DISCONTINUED | OUTPATIENT
Start: 2022-01-04 | End: 2022-01-11 | Stop reason: HOSPADM

## 2022-01-04 RX ORDER — TRAMADOL HYDROCHLORIDE 50 MG/1
50 TABLET ORAL DAILY
Status: DISCONTINUED | OUTPATIENT
Start: 2022-01-04 | End: 2022-01-11 | Stop reason: HOSPADM

## 2022-01-04 RX ORDER — METOPROLOL SUCCINATE 50 MG/1
200 TABLET, EXTENDED RELEASE ORAL DAILY
Status: DISCONTINUED | OUTPATIENT
Start: 2022-01-04 | End: 2022-01-11 | Stop reason: HOSPADM

## 2022-01-04 RX ORDER — PROMETHAZINE HYDROCHLORIDE 25 MG/ML
12.5 INJECTION, SOLUTION INTRAMUSCULAR; INTRAVENOUS EVERY 6 HOURS PRN
Status: DISCONTINUED | OUTPATIENT
Start: 2022-01-04 | End: 2022-01-11 | Stop reason: HOSPADM

## 2022-01-04 RX ORDER — PROMETHAZINE HYDROCHLORIDE 25 MG/1
25 TABLET ORAL EVERY 6 HOURS PRN
Status: DISCONTINUED | OUTPATIENT
Start: 2022-01-04 | End: 2022-01-11 | Stop reason: HOSPADM

## 2022-01-04 RX ORDER — EZETIMIBE 10 MG/1
10 TABLET ORAL DAILY
Status: DISCONTINUED | OUTPATIENT
Start: 2022-01-04 | End: 2022-01-11 | Stop reason: HOSPADM

## 2022-01-04 RX ORDER — ONDANSETRON 4 MG/1
4 TABLET, ORALLY DISINTEGRATING ORAL EVERY 8 HOURS PRN
Status: DISCONTINUED | OUTPATIENT
Start: 2022-01-04 | End: 2022-01-11 | Stop reason: HOSPADM

## 2022-01-04 RX ORDER — FUROSEMIDE 20 MG/1
20 TABLET ORAL DAILY
Status: DISCONTINUED | OUTPATIENT
Start: 2022-01-04 | End: 2022-01-11 | Stop reason: HOSPADM

## 2022-01-04 RX ORDER — ONDANSETRON 2 MG/ML
4 INJECTION INTRAMUSCULAR; INTRAVENOUS EVERY 6 HOURS PRN
Status: DISCONTINUED | OUTPATIENT
Start: 2022-01-04 | End: 2022-01-11 | Stop reason: HOSPADM

## 2022-01-04 RX ORDER — HEPARIN SODIUM 5000 [USP'U]/ML
5000 INJECTION, SOLUTION INTRAVENOUS; SUBCUTANEOUS EVERY 8 HOURS SCHEDULED
Status: DISCONTINUED | OUTPATIENT
Start: 2022-01-04 | End: 2022-01-11 | Stop reason: HOSPADM

## 2022-01-04 RX ORDER — SODIUM CHLORIDE 9 MG/ML
25 INJECTION, SOLUTION INTRAVENOUS PRN
Status: DISCONTINUED | OUTPATIENT
Start: 2022-01-04 | End: 2022-01-11 | Stop reason: HOSPADM

## 2022-01-04 RX ORDER — FLUOXETINE HYDROCHLORIDE 20 MG/1
40 CAPSULE ORAL DAILY
Status: DISCONTINUED | OUTPATIENT
Start: 2022-01-05 | End: 2022-01-11 | Stop reason: HOSPADM

## 2022-01-04 RX ORDER — DEXAMETHASONE SODIUM PHOSPHATE 10 MG/ML
10 INJECTION, SOLUTION INTRAMUSCULAR; INTRAVENOUS ONCE
Status: DISCONTINUED | OUTPATIENT
Start: 2022-01-04 | End: 2022-01-04

## 2022-01-04 RX ORDER — BUPROPION HYDROCHLORIDE 150 MG/1
150 TABLET ORAL EVERY MORNING
Status: DISCONTINUED | OUTPATIENT
Start: 2022-01-05 | End: 2022-01-11 | Stop reason: HOSPADM

## 2022-01-04 RX ORDER — LISINOPRIL 10 MG/1
40 TABLET ORAL DAILY
Status: DISCONTINUED | OUTPATIENT
Start: 2022-01-04 | End: 2022-01-04

## 2022-01-04 RX ORDER — DEXAMETHASONE SODIUM PHOSPHATE 10 MG/ML
6 INJECTION, SOLUTION INTRAMUSCULAR; INTRAVENOUS ONCE
Status: COMPLETED | OUTPATIENT
Start: 2022-01-04 | End: 2022-01-04

## 2022-01-04 RX ORDER — CETIRIZINE HYDROCHLORIDE 10 MG/1
10 TABLET ORAL DAILY
Status: DISCONTINUED | OUTPATIENT
Start: 2022-01-04 | End: 2022-01-11 | Stop reason: HOSPADM

## 2022-01-04 RX ORDER — BENZONATATE 100 MG/1
100 CAPSULE ORAL 3 TIMES DAILY PRN
Status: DISCONTINUED | OUTPATIENT
Start: 2022-01-04 | End: 2022-01-11 | Stop reason: HOSPADM

## 2022-01-04 RX ADMIN — HEPARIN SODIUM 5000 UNITS: 5000 INJECTION INTRAVENOUS; SUBCUTANEOUS at 22:49

## 2022-01-04 RX ADMIN — Medication 8.8 MILLICURIE: at 13:35

## 2022-01-04 RX ADMIN — SODIUM CHLORIDE, PRESERVATIVE FREE 10 ML: 5 INJECTION INTRAVENOUS at 22:50

## 2022-01-04 RX ADMIN — DEXAMETHASONE SODIUM PHOSPHATE 6 MG: 10 INJECTION, SOLUTION INTRAMUSCULAR; INTRAVENOUS at 12:19

## 2022-01-04 RX ADMIN — BENZONATATE 100 MG: 100 CAPSULE ORAL at 19:09

## 2022-01-04 RX ADMIN — AZITHROMYCIN MONOHYDRATE 500 MG: 500 INJECTION, POWDER, LYOPHILIZED, FOR SOLUTION INTRAVENOUS at 14:45

## 2022-01-04 RX ADMIN — CEFTRIAXONE SODIUM 1000 MG: 1 INJECTION, POWDER, FOR SOLUTION INTRAMUSCULAR; INTRAVENOUS at 12:22

## 2022-01-04 RX ADMIN — DEXAMETHASONE SODIUM PHOSPHATE 20 MG: 10 INJECTION, SOLUTION INTRAMUSCULAR; INTRAVENOUS at 16:08

## 2022-01-04 RX ADMIN — ONDANSETRON 4 MG: 2 INJECTION INTRAMUSCULAR; INTRAVENOUS at 15:12

## 2022-01-04 RX ADMIN — SODIUM CHLORIDE: 9 INJECTION, SOLUTION INTRAVENOUS at 11:55

## 2022-01-04 ASSESSMENT — ENCOUNTER SYMPTOMS
WHEEZING: 0
RHINORRHEA: 0
PHOTOPHOBIA: 0
CHEST TIGHTNESS: 1
CONSTIPATION: 0
NAUSEA: 1
ABDOMINAL PAIN: 0
ABDOMINAL DISTENTION: 0
BACK PAIN: 0
DIARRHEA: 0
SHORTNESS OF BREATH: 1
COUGH: 1
SINUS PAIN: 0
NAUSEA: 0
VOMITING: 1
BLOOD IN STOOL: 0
SINUS PRESSURE: 0
COLOR CHANGE: 0
SORE THROAT: 0
VOMITING: 0

## 2022-01-04 NOTE — ED NOTES
Pt transported via stretcher to nuclear medicine for perfusion scan.       Aleksander Bruno RN  01/04/22 8657

## 2022-01-04 NOTE — ED NOTES
Pt reports onset of headache, congestion, mild SOB and fever on Friday. States symptoms have worsened since that times and he is certain he is dehydrated now. Pt appears to not feel well - pale, SOB with any exertion, audible adventitious breath sounds present. SpO2 84% on RA - nasal cannula replaced into pt's nares from eye level. SpO2 increases to 89%.       Colletta Rowan, RN  01/04/22 3104

## 2022-01-04 NOTE — ED PROVIDER NOTES
eMERGENCY dEPARTMENT eNCOUnter   [address] [place] Name: Gus Crowder  MRN: [id number]  [place] 1975  Date of evaluation: 1/4/22     Gus Crowder is a 51 y.o. male with CC: Cough and Fever      This visit was performed by both a physician and an APC. I performed all aspects of the MDM as documented. I was personally available for consultation. Per review of the medical chart care appears to be appropriate. The care is provided during an unprecedented national emergency due to the novel coronavirus, COVID 19.     Romario Torres DO  Attending Emergency Physician                    Mesfin Villa DO  01/04/22 5897

## 2022-01-04 NOTE — ED PROVIDER NOTES
[address] [facility]  eMERGENCY dEPARTMENT eNCOUnter      Pt Name: Rufino Ag  MRN: [id number]  [place] 1975  Date of evaluation: 1/4/2022  Provider: Zuly SALDAÑA PA-C    CHIEF COMPLAINT       Chief Complaint   Patient presents with    Cough    Fever         HISTORY OF PRESENT ILLNESS  (Location/Symptom, Timing/Onset, Context/Setting, Quality, Duration, Modifying Factors, Severity.)   Rufino Ag is a 51 y.o. male with history of hypertension, diabetes and asthma who presents to the emergency department with 4 days of sinus congestion, cough, body aches, chills. Patient has had increased shortness of breath without significant chest pain. He denies any nausea vomiting or diarrhea. Nursing Notes were reviewed. ALLERGIES     Patient has no known allergies. CURRENT MEDICATIONS       Current Discharge Medication List      CONTINUE these medications which have NOT CHANGED    Details   FLUoxetine (PROZAC) 20 MG capsule Take 40 mg by mouth daily      fluticasone (FLONASE) 50 MCG/ACT nasal spray 1 spray by Each Nostril route daily for 10 days  Qty: 1 each, Refills: 0      loratadine (CLARITIN) 10 MG tablet Take 1 tablet by mouth daily for 10 days  Qty: 10 tablet, Refills: 0      benzonatate (TESSALON PERLES) 100 MG capsule Take 1 capsule by mouth 3 times daily as needed for Cough  Qty: 21 capsule, Refills: 0      promethazine (PHENERGAN) 25 MG tablet Take 1 tablet by mouth every 6 hours as needed for Nausea  Qty: 15 tablet, Refills: 0      ammonium lactate (LAC-HYDRIN) 12 % lotion Apply topically daily. Qty: 1 each, Refills: 3      traMADol (ULTRAM) 50 MG tablet Take 50 mg by mouth 2 times daily.        furosemide (LASIX) 20 MG tablet Take 1 tablet by mouth daily  Qty: 60 tablet, Refills: 5      insulin glargine (LANTUS) 100 UNIT/ML injection vial Inject 45 units in the morning and 5 units SQ at  night  Qty: 5 vial, Refills: 5      buPROPion (WELLBUTRIN XL) 150 MG extended release tablet Take 1 tablet by mouth every morning  Qty: 15 tablet, Refills: 1      metoprolol succinate (TOPROL XL) 200 MG extended release tablet Take 1 tablet by mouth daily  Qty: 90 tablet, Refills: 1    Comments: Cancel previous script      insulin lispro (HUMALOG) 100 UNIT/ML injection vial Inject into the skin 3 times daily (before meals) Per sliding scale, by 2s      lisinopril (PRINIVIL;ZESTRIL) 20 MG tablet Take 20 mg by mouth daily       Rosuvastatin Calcium 40 MG CPSP Take 40 mg by mouth daily      ezetimibe (ZETIA) 10 MG tablet Take 10 mg by mouth daily      omeprazole (PRILOSEC) 40 MG delayed release capsule Take 40 mg by mouth daily      amLODIPine (NORVASC) 10 MG tablet Take 10 mg by mouth daily      aspirin 81 MG EC tablet Take 81 mg by mouth daily      magnesium oxide (MAG-OX) 400 MG tablet Take 400 mg by mouth daily      calcium acetate 667 MG TABS Take 667 mg by mouth 3 times daily (with meals)       vitamin B-12 (CYANOCOBALAMIN) 500 MCG tablet Take 500 mcg by mouth daily             PAST MEDICAL HISTORY         Diagnosis Date    Closed fracture of bone of right foot March - April 2020    Dialysis patient [facility])     Kidney disease     On Dialysis    Type 1 diabetes mellitus ([facility] [address]        SURGICAL HISTORY     History reviewed. No pertinent surgical history. FAMILY HISTORY           Problem Relation Age of Onset    Diabetes Mother     Diabetes Father     Heart Disease Maternal Great Grandfather      Family Status   Relation Name Status    Mother  (Not Specified)    Father  (Not Specified)    MGGF  (Not Specified)        SOCIAL HISTORY      reports that he has never smoked. He has never used smokeless tobacco. He reports that he does not drink alcohol and does not use drugs. REVIEW OF SYSTEMS    (2-9 systems for level 4, 10 or more for level 5)     Review of Systems   Constitutional: Positive for chills, fatigue and fever.  Negative for appetite change, diaphoresis and unexpected weight change. HENT: Positive for congestion. Negative for ear pain, postnasal drip, rhinorrhea, sinus pressure, sinus pain and sore throat. Respiratory: Positive for cough, chest tightness and shortness of breath. Negative for wheezing. Cardiovascular: Negative for chest pain, palpitations and leg swelling. Gastrointestinal: Negative for abdominal pain, blood in stool, constipation, diarrhea, nausea and vomiting. Genitourinary: Negative for decreased urine volume, difficulty urinating, dysuria, frequency and urgency. Musculoskeletal: Negative for back pain and myalgias. Neurological: Negative for dizziness, syncope, weakness, light-headedness and headaches. Except as noted above the remainder of the review of systems was reviewed and negative. PHYSICAL EXAM    (up to 7 for level 4, 8 or more for level 5)     ED Triage Vitals [01/04/22 0915]   BP Temp Temp Source Pulse Resp SpO2 Height Weight   (!) 149/77 98.5 °F (36.9 °C) Oral 78 20 (!) 89 % 5' 7\" (1.702 m) 194 lb (88 kg)       Physical Exam  Vitals and nursing note reviewed. Constitutional:       General: He is not in acute distress. Appearance: Normal appearance. He is well-developed. He is ill-appearing and diaphoretic. He is not toxic-appearing. HENT:      Head: Normocephalic and atraumatic. Eyes:      General: No scleral icterus. Right eye: No discharge. Left eye: No discharge. Conjunctiva/sclera: Conjunctivae normal.   Neck:      Thyroid: No thyroid mass, thyromegaly or thyroid tenderness. Cardiovascular:      Rate and Rhythm: Normal rate and regular rhythm. Heart sounds: Normal heart sounds. No murmur heard. No friction rub. No gallop. Pulmonary:      Effort: Pulmonary effort is normal. No respiratory distress. Breath sounds: No stridor. Rhonchi present. No wheezing or rales. Chest:      Chest wall: No tenderness. Abdominal:      General: Abdomen is flat.  Bowel sounds are normal. There is no distension. Palpations: Abdomen is soft. Tenderness: There is no abdominal tenderness. There is no guarding or rebound. Musculoskeletal:         General: No tenderness. Normal range of motion. Cervical back: Normal range of motion and neck supple. Skin:     General: Skin is warm. Neurological:      General: No focal deficit present. Mental Status: He is alert and oriented to person, place, and time. Psychiatric:         Attention and Perception: Attention and perception normal.         Mood and Affect: Mood and affect normal.         Speech: Speech normal.         Behavior: Behavior normal. Behavior is cooperative. Thought Content: Thought content normal.         Cognition and Memory: Cognition and memory normal.         Judgment: Judgment normal.           DIAGNOSTIC RESULTS     EKG: All EKG's are interpreted by the Emergency Department Physician who either signs or Co-signs this chart in the absence of a cardiologist.        RADIOLOGY:   Non-plain film images such as CT, Ultrasound and MRI are read by the radiologist. Plain radiographic images are visualized and preliminarily interpreted by the emergency physician with the below findings:    New left perihilar infiltrate as described above.  Stable right perihilar   infiltrate.  Otherwise stable             Interpretation per the Radiologist below, if available at the time of this note:        ED BEDSIDE ULTRASOUND:   Performed by ED Physician - none    LABS:  Results for orders placed or performed during the hospital encounter of 01/04/22   COVID-19, Rapid    Specimen: Nasopharyngeal Swab   Result Value Ref Range    Specimen Description . NASOPHARYNGEAL SWAB     SARS-CoV-2, Rapid DETECTED (A) Not Detected   Culture, Blood 1    Specimen: Blood   Result Value Ref Range    Specimen Description . BLOOD     Special Requests RAC,6ML     Culture NO GROWTH 2 DAYS    Culture, Blood 1    Specimen: Blood   Result Value Ref Range Specimen Description . BLOOD     Special Requests RIGHT WRIST,6ML     Culture NO GROWTH 2 DAYS    Comprehensive Metabolic Panel   Result Value Ref Range    Glucose 103 (H) 70 - 99 mg/dL    BUN 37 (H) 6 - 20 mg/dL    CREATININE 8.75 (HH) 0.70 - 1.20 mg/dL    Bun/Cre Ratio 4 (L) 9 - 20    Calcium 8.5 (L) 8.6 - 10.4 mg/dL    Sodium 128 (L) 135 - 144 mmol/L    Potassium 5.0 3.7 - 5.3 mmol/L    Chloride 88 (L) 98 - 107 mmol/L    CO2 21 20 - 31 mmol/L    Anion Gap 19 (H) 9 - 17 mmol/L    Alkaline Phosphatase 144 (H) 40 - 129 U/L    ALT 49 (H) 5 - 41 U/L    AST 33 <40 U/L    Total Bilirubin 0.38 0.3 - 1.2 mg/dL    Total Protein 6.6 6.4 - 8.3 g/dL    Albumin 3.8 3.5 - 5.2 g/dL    Albumin/Globulin Ratio NOT REPORTED 1.0 - 2.5    GFR Non-African American 7 (L) >60 mL/min    GFR  8 (L) >60 mL/min    GFR Comment          GFR Staging NOT REPORTED    TROP/MYOGLOBIN   Result Value Ref Range    Troponin, High Sensitivity 464 (HH) 0 - 22 ng/L    Troponin T NOT REPORTED <0.03 ng/mL    Troponin Interp NOT REPORTED     Myoglobin 1,023 (H) 28 - 72 ng/mL   C-Reactive Protein   Result Value Ref Range    CRP 42.2 (H) 0.0 - 5.0 mg/L   Procalcitonin   Result Value Ref Range    Procalcitonin 0.49 (H) <0.09 ng/mL   LACTATE DEHYDROGENASE   Result Value Ref Range     (H) 135 - 225 U/L   D-Dimer, Quantitative   Result Value Ref Range    D-Dimer, Quant 0.83 (H) 0.00 - 0.59 mg/L FEU   SPECIMEN REJECTION   Result Value Ref Range    Specimen Source BLOOD     Ordered Test CDP,SED     Reason for Rejection Unable to perform testing: Specimen clotted.      - NOT REPORTED    CBC Auto Differential   Result Value Ref Range    WBC 10.6 3.5 - 11.3 k/uL    RBC 4.65 4.21 - 5.77 m/uL    Hemoglobin 14.2 13.0 - 17.0 g/dL    Hematocrit 41.8 40.7 - 50.3 %    MCV 89.9 82.6 - 102.9 fL    MCH 30.5 25.2 - 33.5 pg    MCHC 34.0 28.4 - 34.8 g/dL    RDW 12.3 11.8 - 14.4 %    Platelets 052 330 - 991 k/uL    MPV 10.3 8.1 - 13.5 fL    NRBC Automated 0.0 0.0 per 100 WBC    Differential Type NOT REPORTED     WBC Morphology NOT REPORTED     RBC Morphology NOT REPORTED     Platelet Estimate NOT REPORTED     Seg Neutrophils 83 (H) 36 - 65 %    Lymphocytes 7 (L) 24 - 43 %    Monocytes 9 3 - 12 %    Eosinophils % 0 (L) 1 - 4 %    Basophils 0 0 - 2 %    Immature Granulocytes 1 (H) 0 %    Segs Absolute 8.80 (H) 1.50 - 8.10 k/uL    Absolute Lymph # 0.72 (L) 1.10 - 3.70 k/uL    Absolute Mono # 1.00 0.10 - 1.20 k/uL    Absolute Eos # <0.03 0.00 - 0.44 k/uL    Basophils Absolute <0.03 0.00 - 0.20 k/uL    Absolute Immature Granulocyte 0.05 0.00 - 0.30 k/uL   Sedimentation Rate   Result Value Ref Range    Sed Rate 30 (H) 0 - 15 mm   Troponin   Result Value Ref Range    Troponin, High Sensitivity 467 (HH) 0 - 22 ng/L    Troponin T NOT REPORTED <0.03 ng/mL    Troponin Interp NOT REPORTED    SPECIMEN REJECTION   Result Value Ref Range    Specimen Source . BLOOD     Ordered Test CDP CMPX LD MG KAUSHAL CRP     Reason for Rejection Unable to perform testing: Specimen clotted.      - NOT REPORTED    CBC Auto Differential   Result Value Ref Range    WBC 11.7 (H) 3.5 - 11.3 k/uL    RBC 4.22 4.21 - 5.77 m/uL    Hemoglobin 12.7 (L) 13.0 - 17.0 g/dL    Hematocrit 41.0 40.7 - 50.3 %    MCV 97.2 82.6 - 102.9 fL    MCH 30.1 25.2 - 33.5 pg    MCHC 31.0 28.0 - 38.0 g/dL    RDW 12.8 11.8 - 14.4 %    Platelets 851 (L) 793 - 453 k/uL    MPV 11.0 8.1 - 13.5 fL    NRBC Automated NOT REPORTED 0.0 per 100 WBC    Differential Type NOT REPORTED     WBC Morphology NOT REPORTED     RBC Morphology NOT REPORTED     Platelet Estimate NOT REPORTED     Seg Neutrophils 88 (H) 36 - 66 %    Lymphocytes 6 (L) 24 - 44 %    Monocytes 6 1 - 7 %    Eosinophils % 0 (L) 1 - 4 %    Basophils 0 %    Immature Granulocytes 0 0 %    Segs Absolute 10.30 (H) 1.8 - 7.7 k/uL    Absolute Lymph # 0.70 (L) 1.0 - 4.8 k/uL    Absolute Mono # 0.70 0.2 - 0.8 k/uL    Absolute Eos # 0.00 0.0 - 0.4 k/uL    Basophils Absolute 0.00 0.0 - 0.2 k/uL Absolute Immature Granulocyte 0.00 0.00 - 0.30 k/uL   Comprehensive Metabolic Panel w/ Reflex to MG   Result Value Ref Range    Glucose 226 (H) 70 - 99 mg/dL    BUN 42 (H) 6 - 20 mg/dL    CREATININE 8.11 (HH) 0.70 - 1.20 mg/dL    Bun/Cre Ratio 5 (L) 9 - 20    Calcium 8.4 (L) 8.6 - 10.4 mg/dL    Sodium 128 (L) 135 - 144 mmol/L    Potassium 5.5 (H) 3.7 - 5.3 mmol/L    Chloride 91 (L) 98 - 107 mmol/L    CO2 16 (L) 20 - 31 mmol/L    Anion Gap 21 (H) 9 - 17 mmol/L    Alkaline Phosphatase 110 40 - 129 U/L    ALT 38 5 - 41 U/L    AST 36 <40 U/L    Total Bilirubin 0.43 0.3 - 1.2 mg/dL    Total Protein 6.0 (L) 6.4 - 8.3 g/dL    Albumin 2.9 (L) 3.5 - 5.2 g/dL    Albumin/Globulin Ratio NOT REPORTED 1.0 - 2.5    GFR Non-African American 7 (L) >60 mL/min    GFR  9 (L) >60 mL/min    GFR Comment          GFR Staging NOT REPORTED    C-Reactive Protein   Result Value Ref Range    .2 (H) 0.0 - 5.0 mg/L   Lactate Dehydrogenase   Result Value Ref Range     (H) 135 - 225 U/L   Magnesium   Result Value Ref Range    Magnesium 2.2 1.6 - 2.6 mg/dL   Phosphorus   Result Value Ref Range    Phosphorus 5.4 (H) 2.5 - 4.5 mg/dL   Basic Metabolic Panel   Result Value Ref Range    Glucose 119 (H) 70 - 99 mg/dL    BUN 37 (H) 6 - 20 mg/dL    CREATININE 5.85 (HH) 0.70 - 1.20 mg/dL    Bun/Cre Ratio 6 (L) 9 - 20    Calcium 8.6 8.6 - 10.4 mg/dL    Sodium 135 135 - 144 mmol/L    Potassium 4.6 3.7 - 5.3 mmol/L    Chloride 95 (L) 98 - 107 mmol/L    CO2 26 20 - 31 mmol/L    Anion Gap 14 9 - 17 mmol/L    GFR Non-African American 10 (L) >60 mL/min    GFR  13 (L) >60 mL/min    GFR Comment          GFR Staging NOT REPORTED    CBC Auto Differential   Result Value Ref Range    WBC 9.8 3.5 - 11.3 k/uL    RBC 4.06 (L) 4.21 - 5.77 m/uL    Hemoglobin 12.3 (L) 13.0 - 17.0 g/dL    Hematocrit 37.7 (L) 40.7 - 50.3 %    MCV 92.9 82.6 - 102.9 fL    MCH 30.3 25.2 - 33.5 pg    MCHC 32.6 28.4 - 34.8 g/dL    RDW 12.7 11.8 - 14.4 % Platelets 569 952 - 793 k/uL    MPV 10.9 8.1 - 13.5 fL    NRBC Automated 0.0 0.0 per 100 WBC    Differential Type NOT REPORTED     WBC Morphology NOT REPORTED     RBC Morphology NOT REPORTED     Platelet Estimate NOT REPORTED     Seg Neutrophils 89 (H) 36 - 65 %    Lymphocytes 6 (L) 24 - 43 %    Monocytes 5 3 - 12 %    Eosinophils % 0 (L) 1 - 4 %    Basophils 0 0 - 2 %    Immature Granulocytes 0 0 %    Segs Absolute 8.72 (H) 1.50 - 8.10 k/uL    Absolute Lymph # 0.59 (L) 1.10 - 3.70 k/uL    Absolute Mono # 0.49 0.10 - 1.20 k/uL    Absolute Eos # 0.00 0.00 - 0.44 k/uL    Basophils Absolute 0.00 0.00 - 0.20 k/uL    Absolute Immature Granulocyte 0.00 0.00 - 0.30 k/uL   Magnesium   Result Value Ref Range    Magnesium 2.2 1.6 - 2.6 mg/dL   Phosphorus   Result Value Ref Range    Phosphorus 5.0 (H) 2.5 - 4.5 mg/dL   C-Reactive Protein   Result Value Ref Range    .8 (H) 0.0 - 5.0 mg/L   Venous Blood Gas, POC   Result Value Ref Range    pH, Kaiser 7.528 (H) 7.320 - 7.430    pCO2, Kaiser 22.8 (L) 41.0 - 51.0 mm Hg    pO2, Kaiser 136.2 (H) 30.0 - 50.0 mm Hg    HCO3, Venous 19.0 (L) 22.0 - 29.0 mmol/L    Total CO2, Venous NOT REPORTED 23.0 - 30.0 mmol/L    Negative Base Excess, Kaiser 2 0.0 - 2.0    Positive Base Excess, Kaiser NOT REPORTED 0.0 - 3.0    O2 Sat, Kaiser 99 (H) 60.0 - 85.0 %    O2 Device/Flow/% NOT REPORTED     Andrew Test NOT REPORTED     Sample Site NOT REPORTED     Mode NOT REPORTED     FIO2 NOT REPORTED     Pt Temp NOT REPORTED     POC pH Temp NOT REPORTED     POC pCO2 Temp NOT REPORTED mm Hg    POC pO2 Temp NOT REPORTED mm Hg   POC Glucose Fingerstick   Result Value Ref Range    POC Glucose 94 75 - 110 mg/dL   POC Glucose Fingerstick   Result Value Ref Range    POC Glucose 373 (H) 75 - 110 mg/dL   POC Glucose Fingerstick   Result Value Ref Range    POC Glucose 187 (H) 75 - 110 mg/dL   POC Glucose Fingerstick   Result Value Ref Range    POC Glucose 114 (H) 75 - 110 mg/dL   EKG 12 Lead   Result Value Ref Range Ventricular Rate 80 BPM    Atrial Rate 80 BPM    P-R Interval 146 ms    QRS Duration 106 ms    Q-T Interval 412 ms    QTc Calculation (Bazett) 475 ms    P Axis 67 degrees    R Axis -16 degrees    T Axis 57 degrees       All other labs were within normal range or not returned as of this dictation. EMERGENCY DEPARTMENT COURSE and DIFFERENTIAL DIAGNOSIS/MDM:   Vitals:    Vitals:    01/06/22 0045 01/06/22 0427 01/06/22 0825 01/06/22 1001   BP:  (!) 117/52 139/67    Pulse:  60 65    Resp:  17 18    Temp:  97.9 °F (36.6 °C) 98.2 °F (36.8 °C)    TempSrc:  Oral Oral    SpO2: 93% 97% 97% 96%   Weight:       Height:               Medical Decision Making patient is a 48-year-old male with multiple medical problems who is COVID-positive and hypoxic. Patient with chest x-ray with infiltrate. Patient placed on nonrebreather due to hypoxia. Dexamethasone 6 mg administered IV. Patient with multiple abnormal labs as noted. Patient admitted to the hospital and serious condition    CONSULTS:  IP CONSULT TO INTERNAL MEDICINE  IP CONSULT TO NEPHROLOGY  IP CONSULT TO CARDIOLOGY  IP CONSULT TO PHARMACY    PROCEDURES:  None    FINAL IMPRESSION      1. COVID-19    2. Pneumonia due to COVID-19 virus    3. Acute respiratory failure with hypoxia (HCC)    4. Respiratory alkalosis    5. Hyponatremia          DISPOSITION/PLAN   DISPOSITION Admitted 01/04/2022 01:50:43 PM      PATIENT REFERRED TO:   No follow-up provider specified.     DISCHARGE MEDICATIONS:     Current Discharge Medication List            (Please note that portions of this note were completed with a voice recognition program.  Efforts were made to edit the dictations but occasionally words are mis-transcribed.)    Keerthi SALDAÑA PA-C  Attending Emergency Physician       Kateryna Randhawa PA-C  01/06/22 5854

## 2022-01-04 NOTE — CONSULTS
Reason for Consult:  End stage renal disease. Requesting Physician:  Nitesh Herrera DO    HISTORY OF PRESENT ILLNESS:    The patient is a 51 y.o. male who normally undergoes dialysis at [facility] on TTS under our care presents with fever, cough, shortness of breath and vomiting over the last 4 days. His chest x ray shows right sided pneumonia. His COVID test is positive. He is on high flow O2. His last hemodialysis was on Sunday. Today his potassium level is 5.0. Review Of Systems:   Unable to obtain. Past Medical History:   Diagnosis Date    Closed fracture of bone of right foot March - April 2020    Dialysis patient [facility])     Kidney disease     On Dialysis    Type 1 diabetes mellitus ([facility] [address]        History reviewed. No pertinent surgical history. Prior to Admission medications    Medication Sig Start Date End Date Taking? Authorizing Provider   fluticasone (FLONASE) 50 MCG/ACT nasal spray 1 spray by Each Nostril route daily for 10 days 1/2/22 1/12/22  Luisa Waggoner APRN - CNP   loratadine (CLARITIN) 10 MG tablet Take 1 tablet by mouth daily for 10 days 1/2/22 1/12/22  Luisa Waggoner, APRN - CNP   benzonatate (TESSALON PERLES) 100 MG capsule Take 1 capsule by mouth 3 times daily as needed for Cough 1/2/22 1/9/22  Luisa Waggoner APRN - CNP   promethazine (PHENERGAN) 25 MG tablet Take 1 tablet by mouth every 6 hours as needed for Nausea 1/2/22   Luisa Waggoner APRN - CNP   FLUoxetine (PROZAC) 40 MG capsule Take 80 mg by mouth    Historical Provider, MD   ammonium lactate (LAC-HYDRIN) 12 % lotion Apply topically daily. 11/1/21   Krunal Navarro DPM   ondansetron (ZOFRAN-ODT) 4 MG disintegrating tablet  6/18/21   Historical Provider, MD   traMADol (ULTRAM) 50 MG tablet Take 50 mg by mouth daily.   4/9/21   Historical Provider, MD   hydrALAZINE (APRESOLINE) 50 MG tablet  5/25/21   Historical Provider, MD   furosemide (LASIX) 20 MG tablet Take 1 tablet by mouth daily 4/27/21   Lamar Lagos DO   insulin glargine (LANTUS) 100 UNIT/ML injection vial Inject 45 units in the morning and 5 units SQ at  night 3/29/21   Dedra Ortiz,    traZODone (DESYREL) 100 MG tablet Take 1 tablet by mouth nightly as needed for Sleep 3/29/21   Dedra Ortiz DO   buPROPion (WELLBUTRIN XL) 150 MG extended release tablet Take 1 tablet by mouth every morning 3/25/21   Dedra Ortiz,    metoprolol succinate (TOPROL XL) 200 MG extended release tablet Take 1 tablet by mouth daily 2/18/21   Dedra Ortiz DO   insulin lispro (HUMALOG) 100 UNIT/ML injection vial Inject into the skin 3 times daily (before meals)    Historical Provider, MD   lisinopril (PRINIVIL;ZESTRIL) 20 MG tablet Take 40 mg by mouth daily    Historical Provider, MD   Rosuvastatin Calcium 40 MG CPSP Take 40 mg by mouth daily    Historical Provider, MD   ezetimibe (ZETIA) 10 MG tablet Take 10 mg by mouth daily    Historical Provider, MD   omeprazole (PRILOSEC) 40 MG delayed release capsule Take 40 mg by mouth daily    Historical Provider, MD   amLODIPine (NORVASC) 10 MG tablet Take 10 mg by mouth daily    Historical Provider, MD   aspirin 81 MG EC tablet Take 81 mg by mouth daily    Historical Provider, MD   magnesium oxide (MAG-OX) 400 MG tablet Take 400 mg by mouth daily    Historical Provider, MD   calcium acetate 667 MG TABS Take 3 tablets by mouth daily    Historical Provider, MD   vitamin B-12 (CYANOCOBALAMIN) 500 MCG tablet Take 500 mcg by mouth daily    Historical Provider, MD       Scheduled Meds:   azithromycin  500 mg IntraVENous Once     Continuous Infusions:  PRN Meds:    No Known Allergies    Social History     Socioeconomic History    Marital status: Single     Spouse name: Not on file    Number of children: Not on file    Years of education: Not on file    Highest education level: Not on file   Occupational History    Not on file   Tobacco Use    Smoking status: Never Smoker    Smokeless tobacco: Never Used   Vaping Use    Vaping Use: Never used   Substance and Sexual Activity    Alcohol use: Never    Drug use: Never    Sexual activity: Not on file   Other Topics Concern    Not on file   Social History Narrative    Not on file     Social Determinants of Health     Financial Resource Strain:     Difficulty of Paying Living Expenses: Not on file   Food Insecurity:     Worried About Running Out of Food in the Last Year: Not on file    Dinh of Food in the Last Year: Not on file   Transportation Needs:     Lack of Transportation (Medical): Not on file    Lack of Transportation (Non-Medical): Not on file   Physical Activity:     Days of Exercise per Week: Not on file    Minutes of Exercise per Session: Not on file   Stress:     Feeling of Stress : Not on file   Social Connections:     Frequency of Communication with Friends and Family: Not on file    Frequency of Social Gatherings with Friends and Family: Not on file    Attends Jainism Services: Not on file    Active Member of [address] [place] [address] [employer] or Organizations: Not on file    Attends Club or Organization Meetings: Not on file    Marital Status: Not on file   Intimate Partner Violence:     Fear of Current or Ex-Partner: Not on file    Emotionally Abused: Not on file    Physically Abused: Not on file    Sexually Abused: Not on file   Housing Stability:     Unable to Pay for Housing in the Last Year: Not on file    Number of [place] in the Last Year: Not on file    Unstable Housing in the Last Year: Not on file       Family History   Problem Relation Age of Onset    Diabetes Mother     Diabetes Father     Heart Disease Maternal Misha Ki          Physical Exam:  Vitals:    01/04/22 0915 01/04/22 1232 01/04/22 1300   BP: (!) 149/77     Pulse: 78 79 79   Resp: 20 26 18   Temp: 98.5 °F (36.9 °C)     TempSrc: Oral     SpO2: (!) 89% 93% 96%   Weight: 194 lb (88 kg)     Height: 5' 7\" (1.702 m)       No intake/output data recorded. Deferred because of COVID status.     Data:  CBC:   Lab Results Component Value Date    WBC 10.6 01/04/2022    HGB 14.2 01/04/2022    HCT 41.8 01/04/2022    MCV 89.9 01/04/2022     01/04/2022     BMP:    Lab Results   Component Value Date     (L) 01/04/2022     (L) 01/02/2022     07/13/2021    K 5.0 01/04/2022    K 4.4 01/02/2022    K 3.9 07/13/2021    CL 88 (L) 01/04/2022    CL 91 (L) 01/02/2022    CL 95 (L) 07/13/2021    CO2 21 01/04/2022    CO2 27 01/02/2022    CO2 26 07/13/2021    BUN 37 (H) 01/04/2022    BUN 21 (H) 01/02/2022    BUN 17 07/13/2021    CREATININE 8.75 (HH) 01/04/2022    CREATININE 5.81 (HH) 01/02/2022    CREATININE 4.96 (H) 07/13/2021    GLUCOSE 103 (H) 01/04/2022    GLUCOSE 207 (H) 01/02/2022    GLUCOSE 135 07/13/2021     CMP:   Lab Results   Component Value Date     01/04/2022    K 5.0 01/04/2022    CL 88 01/04/2022    CO2 21 01/04/2022    BUN 37 01/04/2022    CREATININE 8.75 01/04/2022    GLUCOSE 103 01/04/2022    CALCIUM 8.5 01/04/2022    PROT 6.6 01/04/2022    LABALBU 3.8 01/04/2022    BILITOT 0.38 01/04/2022    ALKPHOS 144 01/04/2022    AST 33 01/04/2022    ALT 49 01/04/2022      Hepatic:   Lab Results   Component Value Date    AST 33 01/04/2022     (H) 01/08/2021    ALT 49 (H) 01/04/2022     (H) 01/08/2021    BILITOT 0.38 01/04/2022    BILITOT 0.38 01/08/2021    ALKPHOS 144 (H) 01/04/2022    ALKPHOS 313 (H) 01/08/2021     BNP: No results found for: BNP  Lipids:   Lab Results   Component Value Date    CHOL 103 07/19/2021    HDL 52 07/19/2021     INR: No results found for: INR  PTH: No results found for: PTH  Phosphorus:  No results found for: PHOS  Ionized Calcium: No results found for: IONCA  Magnesium: No results found for: MG  Albumin:   Lab Results   Component Value Date    LABALBU 3.8 01/04/2022     Last 3 CK, CKMB, Troponin: @LABRCNT(CKTOTAL:3,CKMB:3,TROPONINI:3)       URINE:)No results found for: Yang Rubalcava    Radiology:   Reviewed.     Assessment:  End stage renal disease. Hyperkalemia. Hyponatremia. Hypertension. COVID pneumonia. Plan: Will discontinue IVF for now. We will follow phosphorus level and adjust binders as needed. We will follow H&H and start erythropoeitin stimulating agent as needed. Place on renal diet with fluid restriction of 1000 ml/24 hours once allowed to eat. Acid Base status stable and at target. We will follow up chemistries. Avoid Lovenox and Fleets enema. We will continue dialysis on TTS schedule. Thank you for the consultation. Please do not hesitate to contact us for any further questions/concerns. We will continue to follow along with you. Electronically signed by Vivian Aguiar MD  on 1/4/2022 at 1:47 PM  [facility].   Ph: [phone]

## 2022-01-04 NOTE — H&P
[facility]  Office: [address], DO, Eduardo Chinchilla, DO, Stephanie Bird, DO, Claudia Luciano Emmanuel, DO, Kei Swenson MD, Flor Joya MD, Yulia Yu MD, Mckenzie Guzmán MD, Carmenza Blood MD, Jackie Chandler MD, Skye Dan MD, Sebastian Monroy, DO, Osmany Rincon, DO, Carrillo Fermin MD,  Ok Funez, DO, Romana Begin, MD, Yaima Salmon MD, Charles Collier MD, Patti Aragon MD, Amparo Branham MD, Lita Perdomo MD, Sunita Hanks MD, Cathy Velasquez, [facility], [facility], CNP, Guillermo [facility], CNP, Surjit Souza, CNS, Clem Mcghee, CNP, Gadiel Regan, CNP, Jessica Olmos, CNP, Ana Kaplan, CNP, Jerson Cates, CNP, Otoniel Shay PA-C, Nidia Atkins, [facility], Haydee Tineo, [facility], Nahed Adam, CNP, Jennifer Horton, CNP, Holden Rogers, CNP, Steffi Chu, CNP, Nile Shanks, CNP, [facility]    HISTORY AND PHYSICAL EXAMINATION            Date:   1/4/2022  Patient name:  Edwardo Ball  Date of admission:  1/4/2022  9:25 AM  MRN:   [id number]  Account:  [de-identified]  YOB: 1975  PCP:    DANIEL Donnelly CNP  Room:   [place] [address]  Code Status:    No Order    Chief Complaint:     Chief Complaint   Patient presents with    Cough    Fever       History Obtained From:     patient    History of Present Illness:     Edwardo Ball is a 51 y.o. Non- / non  male who presents with Cough and Fever   and is admitted to the hospital for the management of Acute hypoxemic respiratory failure due to COVID-19 [facility]). Patient reports to the emergency department with fever, chills, cough and exertional dyspnea. Patient has a known history of type 1 diabetes with ESRD. Patient is a TTS dialysis patient and was scheduled for dialysis today. Patient did not make it to his dialysis appointment due to extreme fatigue and severe dyspnea while attempting to ambulate in his own home.   In the emergency FLUoxetine (PROZAC) 40 MG capsule Take 80 mg by mouth    Historical Provider, MD   ammonium lactate (LAC-HYDRIN) 12 % lotion Apply topically daily. 11/1/21   Sebastián Ann DPM   ondansetron (ZOFRAN-ODT) 4 MG disintegrating tablet  6/18/21   Historical Provider, MD   traMADol (ULTRAM) 50 MG tablet Take 50 mg by mouth daily.   4/9/21   Historical Provider, MD   hydrALAZINE (APRESOLINE) 50 MG tablet  5/25/21   Historical Provider, MD   furosemide (LASIX) 20 MG tablet Take 1 tablet by mouth daily 4/27/21   Niko Holland,    insulin glargine (LANTUS) 100 UNIT/ML injection vial Inject 45 units in the morning and 5 units SQ at  night 3/29/21   Niko Holland, DO   traZODone (DESYREL) 100 MG tablet Take 1 tablet by mouth nightly as needed for Sleep 3/29/21   Niko Holland,    buPROPion (WELLBUTRIN XL) 150 MG extended release tablet Take 1 tablet by mouth every morning 3/25/21   Niko Holland, DO   metoprolol succinate (TOPROL XL) 200 MG extended release tablet Take 1 tablet by mouth daily 2/18/21   Niko Holland,    insulin lispro (HUMALOG) 100 UNIT/ML injection vial Inject into the skin 3 times daily (before meals)    Historical Provider, MD   lisinopril (PRINIVIL;ZESTRIL) 20 MG tablet Take 40 mg by mouth daily    Historical Provider, MD   Rosuvastatin Calcium 40 MG CPSP Take 40 mg by mouth daily    Historical Provider, MD   ezetimibe (ZETIA) 10 MG tablet Take 10 mg by mouth daily    Historical Provider, MD   omeprazole (PRILOSEC) 40 MG delayed release capsule Take 40 mg by mouth daily    Historical Provider, MD   amLODIPine (NORVASC) 10 MG tablet Take 10 mg by mouth daily    Historical Provider, MD   aspirin 81 MG EC tablet Take 81 mg by mouth daily    Historical Provider, MD   magnesium oxide (MAG-OX) 400 MG tablet Take 400 mg by mouth daily    Historical Provider, MD   calcium acetate 667 MG TABS Take 3 tablets by mouth daily    Historical Provider, MD   vitamin B-12 (CYANOCOBALAMIN) 500 MCG tablet Take 500 mcg by mouth daily    Historical Provider, MD        Allergies:     Patient has no known allergies. Social History:     Tobacco:    reports that he has never smoked. He has never used smokeless tobacco.  Alcohol:      reports no history of alcohol use. Drug Use:  reports no history of drug use. Family History:     Family History   Problem Relation Age of Onset    Diabetes Mother     Diabetes Father     Heart Disease Maternal Great Grandfather        Review of Systems:     Positive and Negative as described in HPI. Review of Systems   Constitutional: Positive for activity change, fatigue and fever. HENT: Negative for sinus pressure, sinus pain and sore throat. Eyes: Negative for photophobia and visual disturbance. Respiratory: Positive for cough, chest tightness and shortness of breath. Cardiovascular: Negative for chest pain and palpitations. Gastrointestinal: Positive for nausea and vomiting. Negative for abdominal distention and abdominal pain. Endocrine: Negative for cold intolerance and heat intolerance. Genitourinary: Negative for flank pain and urgency. Musculoskeletal: Negative for arthralgias and myalgias. Skin: Positive for pallor. Negative for color change and rash. Allergic/Immunologic: Negative for environmental allergies and immunocompromised state. Neurological: Negative for dizziness, seizures and weakness. Hematological: Negative for adenopathy. Does not bruise/bleed easily. Psychiatric/Behavioral: Negative for agitation, confusion and suicidal ideas. The patient is not hyperactive.         Physical Exam:   BP (!) 146/68   Pulse 78   Temp 98.5 °F (36.9 °C) (Oral)   Resp 24   Ht 5' 7\" (1.702 m)   Wt 194 lb (88 kg)   SpO2 97%   BMI 30.38 kg/m²   Temp (24hrs), Av.5 °F (36.9 °C), Min:98.5 °F (36.9 °C), Max:98.5 °F (36.9 °C)    Recent Labs     22  1405   POCGLU 94       Intake/Output Summary (Last 24 hours) at 2022 8214  Last data filed at 2022 1350  Gross per 24 hour   Intake 56.09 ml   Output --   Net 56.09 ml       Physical Exam  Constitutional:       General: He is in acute distress. Appearance: He is ill-appearing. HENT:      Head: Normocephalic and atraumatic. Nose: Nose normal.      Mouth/Throat:      Mouth: Mucous membranes are moist.   Eyes:      Extraocular Movements: Extraocular movements intact. Pupils: Pupils are equal, round, and reactive to light. Cardiovascular:      Rate and Rhythm: Normal rate and regular rhythm. Pulses: Normal pulses. Heart sounds: Normal heart sounds. No murmur heard. Pulmonary:      Effort: Respiratory distress present. Breath sounds: Rhonchi and rales present. Abdominal:      General: Abdomen is flat. There is no distension. Tenderness: There is abdominal tenderness. There is no guarding. Musculoskeletal:         General: No swelling or deformity. Normal range of motion. Cervical back: Normal range of motion and neck supple. No rigidity. Left lower leg: No edema. Skin:     Capillary Refill: Capillary refill takes 2 to 3 seconds. Coloration: Skin is pale. Skin is not jaundiced. Neurological:      General: No focal deficit present. Mental Status: He is alert and oriented to person, place, and time. Psychiatric:         Mood and Affect: Mood normal.         Behavior: Behavior normal.         Investigations:      Laboratory Testing:  Recent Results (from the past 24 hour(s))   COVID-19, Rapid    Collection Time: 01/04/22  9:53 AM    Specimen: Nasopharyngeal Swab   Result Value Ref Range    Specimen Description . NASOPHARYNGEAL SWAB     SARS-CoV-2, Rapid DETECTED (A) Not Detected   EKG 12 Lead    Collection Time: 01/04/22 10:43 AM   Result Value Ref Range    Ventricular Rate 80 BPM    Atrial Rate 80 BPM    P-R Interval 146 ms    QRS Duration 106 ms    Q-T Interval 412 ms    QTc Calculation (Bazett) 475 ms    P Axis 67 degrees    R Axis -16 degrees POC    Collection Time: 01/04/22 11:49 AM   Result Value Ref Range    pH, Kaiser 7.528 (H) 7.320 - 7.430    pCO2, Kaiser 22.8 (L) 41.0 - 51.0 mm Hg    pO2, Kaiser 136.2 (H) 30.0 - 50.0 mm Hg    HCO3, Venous 19.0 (L) 22.0 - 29.0 mmol/L    Total CO2, Venous NOT REPORTED 23.0 - 30.0 mmol/L    Negative Base Excess, Kaiser 2 0.0 - 2.0    Positive Base Excess, Kaiser NOT REPORTED 0.0 - 3.0    O2 Sat, Kaiser 99 (H) 60.0 - 85.0 %    O2 Device/Flow/% NOT REPORTED     Andrew Test NOT REPORTED     Sample Site NOT REPORTED     Mode NOT REPORTED     FIO2 NOT REPORTED     Pt Temp NOT REPORTED     POC pH Temp NOT REPORTED     POC pCO2 Temp NOT REPORTED mm Hg    POC pO2 Temp NOT REPORTED mm Hg   CBC Auto Differential    Collection Time: 01/04/22 12:19 PM   Result Value Ref Range    WBC 10.6 3.5 - 11.3 k/uL    RBC 4.65 4.21 - 5.77 m/uL    Hemoglobin 14.2 13.0 - 17.0 g/dL    Hematocrit 41.8 40.7 - 50.3 %    MCV 89.9 82.6 - 102.9 fL    MCH 30.5 25.2 - 33.5 pg    MCHC 34.0 28.4 - 34.8 g/dL    RDW 12.3 11.8 - 14.4 %    Platelets 807 111 - 077 k/uL    MPV 10.3 8.1 - 13.5 fL    NRBC Automated 0.0 0.0 per 100 WBC    Differential Type NOT REPORTED     WBC Morphology NOT REPORTED     RBC Morphology NOT REPORTED     Platelet Estimate NOT REPORTED     Seg Neutrophils 83 (H) 36 - 65 %    Lymphocytes 7 (L) 24 - 43 %    Monocytes 9 3 - 12 %    Eosinophils % 0 (L) 1 - 4 %    Basophils 0 0 - 2 %    Immature Granulocytes 1 (H) 0 %    Segs Absolute 8.80 (H) 1.50 - 8.10 k/uL    Absolute Lymph # 0.72 (L) 1.10 - 3.70 k/uL    Absolute Mono # 1.00 0.10 - 1.20 k/uL    Absolute Eos # <0.03 0.00 - 0.44 k/uL    Basophils Absolute <0.03 0.00 - 0.20 k/uL    Absolute Immature Granulocyte 0.05 0.00 - 0.30 k/uL   Sedimentation Rate    Collection Time: 01/04/22 12:19 PM   Result Value Ref Range    Sed Rate 30 (H) 0 - 15 mm   Troponin    Collection Time: 01/04/22  2:00 PM   Result Value Ref Range    Troponin, High Sensitivity 467 (HH) 0 - 22 ng/L    Troponin T NOT REPORTED <0.03 ng/mL Troponin Interp NOT REPORTED    POC Glucose Fingerstick    Collection Time: 01/04/22  2:05 PM   Result Value Ref Range    POC Glucose 94 75 - 110 mg/dL       Imaging/Diagnostics:  MRI LUMBAR SPINE WO CONTRAST    Result Date: 12/30/2021  3 mm central disc protrusion at L5-S1 mildly effacing the lateral recesses and mildly narrowing the spinal canal.     NM LUNG SCAN PERFUSION ONLY    Result Date: 1/4/2022  Very low probability for pulmonary embolism. XR CHEST PORTABLE    Result Date: 1/4/2022  Right lung infiltrate with suggests developing pneumonia in the right clinical setting. Recommend follow-up to resolution. XR CHEST PORTABLE    Result Date: 1/2/2022  Marginal inspiration, without evidence of acute cardiopulmonary disease       Assessment :      Hospital Problems           Last Modified POA    * (Principal) Acute hypoxemic respiratory failure due to COVID-19 [facility]) 1/4/2022 Yes    DM (diabetes mellitus), type 1 with complications ([facility] [address] 6/9/1578 Yes    ESRD (end stage renal disease) on dialysis ([facility] [address] 1/4/2022 Yes    Hypertension 1/4/2022 Yes    Hyperlipidemia 1/4/2022 Yes    Bipolar affective disorder ([facility] [address] 1/4/2022 Yes          Plan:     Patient status inpatient in the  Progressive Unit/Step down    1. Acute hypoxic respiratory failure secondary to COVID-19  1. Initiate heated high flow oxygen support now  2. High intensity steroids of 20 mg Decadron per day for 5 days followed by 10 mg Decadron for 10 days  3. Not a candidate for Barcitinib due to renal failure  4. Await CRP and patient may benefit from Actemra. 2. ESRD on dialysis  1. Nephrology consultation and anticipate/request dialysis today  3. Diabetes type 1  1. Lantus 45 units daily  2. Corrective sliding scale insulin as ordered  4. Hypertension with hyperlipidemia  1. Home medication regiment as ordered  5. Bipolar  1. Home medication regiment  2. Emotional support  3.  As needed Ativan for anxiety      Consultations:   IP CONSULT TO INTERNAL MEDICINE  IP CONSULT TO NEPHROLOGY  IP CONSULT TO CARDIOLOGY  IP CONSULT TO PHARMACY    Patient is admitted as inpatient status because of co-morbidities listed above, severity of signs and symptoms as outlined, requirement for current medical therapies and most importantly because of direct risk to patient if care not provided in a hospital setting. Expected length of stay > 48 hours.     DANIEL Peña NP  1/4/2022  3:14 PM    Copy sent to Dr. Inessa Rossi, APRN - CNP

## 2022-01-04 NOTE — PROGRESS NOTES
Transitions of Care Pharmacy Service   Medication Review    The patient's list of current home medications has been reviewed. Unable to interview patient due to his acute illness. Medications have been verified with [employer] and Epic electronic prescribing records. Source(s) of information: [facility]/ EPIC    Based on information provided by the above source(s), I have updated the patient's home med list as described below. Please review the ACTION REQUESTED section of this note below for any discrepancies on current hospital orders. I changed or updated the following medications on the patient's home medication list:  Removed Zofran ODT 4mg  Hydralazine 50mg (no dose or directions)  Trazodone 100mg qhs prn     Added none     Adjusted   Prozac from 80mg daily to 40mg daily  Lisinopril from 40mg to 20mg daily  Calcium acetate 667mg from 3 daily to 1 TID with meals   Other Notes Unable to ascertain patient use of OTC medications or current dosing of insulins. PROVIDER ACTION REQUESTED  Medications that need to be addressed by a physician/nurse practitioner:    Medication Action Requested   Hydralazine  lisinopril  Prozac   Please DC as not a current home med  Please adjust dose to 20mg daily  Please adjust dose to 40mg daily         Please feel free to call me with any questions about this encounter. Thank you. Kim Kilpatrick [facility]   Transitions of Care Pharmacy Service  Phone:  [phone]  Fax: [phone]      Electronically signed by Kim Kilpatrick [facility] on 1/4/2022 at 5:31 PM         Not in a hospital admission.

## 2022-01-05 ENCOUNTER — APPOINTMENT (OUTPATIENT)
Dept: GENERAL RADIOLOGY | Age: 47
DRG: 871 | End: 2022-01-05
Payer: MEDICARE

## 2022-01-05 LAB
-: NORMAL
ABSOLUTE EOS #: 0 K/UL (ref 0–0.4)
ABSOLUTE IMMATURE GRANULOCYTE: 0 K/UL (ref 0–0.3)
ABSOLUTE LYMPH #: 0.7 K/UL (ref 1–4.8)
ABSOLUTE MONO #: 0.7 K/UL (ref 0.2–0.8)
ALBUMIN SERPL-MCNC: 2.9 G/DL (ref 3.5–5.2)
ALBUMIN/GLOBULIN RATIO: ABNORMAL (ref 1–2.5)
ALP BLD-CCNC: 110 U/L (ref 40–129)
ALT SERPL-CCNC: 38 U/L (ref 5–41)
ANION GAP SERPL CALCULATED.3IONS-SCNC: 21 MMOL/L (ref 9–17)
AST SERPL-CCNC: 36 U/L
BASOPHILS # BLD: 0 %
BASOPHILS ABSOLUTE: 0 K/UL (ref 0–0.2)
BILIRUB SERPL-MCNC: 0.43 MG/DL (ref 0.3–1.2)
BUN BLDV-MCNC: 42 MG/DL (ref 6–20)
BUN/CREAT BLD: 5 (ref 9–20)
C-REACTIVE PROTEIN: 213.2 MG/L (ref 0–5)
CALCIUM SERPL-MCNC: 8.4 MG/DL (ref 8.6–10.4)
CHLORIDE BLD-SCNC: 91 MMOL/L (ref 98–107)
CO2: 16 MMOL/L (ref 20–31)
CREAT SERPL-MCNC: 8.11 MG/DL (ref 0.7–1.2)
DIFFERENTIAL TYPE: ABNORMAL
EKG ATRIAL RATE: 80 BPM
EKG P AXIS: 67 DEGREES
EKG P-R INTERVAL: 146 MS
EKG Q-T INTERVAL: 412 MS
EKG QRS DURATION: 106 MS
EKG QTC CALCULATION (BAZETT): 475 MS
EKG R AXIS: -16 DEGREES
EKG T AXIS: 57 DEGREES
EKG VENTRICULAR RATE: 80 BPM
EOSINOPHILS RELATIVE PERCENT: 0 % (ref 1–4)
GFR AFRICAN AMERICAN: 9 ML/MIN
GFR NON-AFRICAN AMERICAN: 7 ML/MIN
GFR SERPL CREATININE-BSD FRML MDRD: ABNORMAL ML/MIN/{1.73_M2}
GFR SERPL CREATININE-BSD FRML MDRD: ABNORMAL ML/MIN/{1.73_M2}
GLUCOSE BLD-MCNC: 187 MG/DL (ref 75–110)
GLUCOSE BLD-MCNC: 226 MG/DL (ref 70–99)
GLUCOSE BLD-MCNC: 373 MG/DL (ref 75–110)
HCT VFR BLD CALC: 41 % (ref 40.7–50.3)
HEMOGLOBIN: 12.7 G/DL (ref 13–17)
IMMATURE GRANULOCYTES: 0 %
LACTATE DEHYDROGENASE: 330 U/L (ref 135–225)
LYMPHOCYTES # BLD: 6 % (ref 24–44)
MAGNESIUM: 2.2 MG/DL (ref 1.6–2.6)
MCH RBC QN AUTO: 30.1 PG (ref 25.2–33.5)
MCHC RBC AUTO-ENTMCNC: 31 G/DL (ref 28–38)
MCV RBC AUTO: 97.2 FL (ref 82.6–102.9)
MONOCYTES # BLD: 6 % (ref 1–7)
NRBC AUTOMATED: ABNORMAL PER 100 WBC
PDW BLD-RTO: 12.8 % (ref 11.8–14.4)
PHOSPHORUS: 5.4 MG/DL (ref 2.5–4.5)
PLATELET # BLD: 137 K/UL (ref 138–453)
PLATELET ESTIMATE: ABNORMAL
PMV BLD AUTO: 11 FL (ref 8.1–13.5)
POTASSIUM SERPL-SCNC: 5.5 MMOL/L (ref 3.7–5.3)
RBC # BLD: 4.22 M/UL (ref 4.21–5.77)
RBC # BLD: ABNORMAL 10*6/UL
REASON FOR REJECTION: NORMAL
SEG NEUTROPHILS: 88 % (ref 36–66)
SEGMENTED NEUTROPHILS ABSOLUTE COUNT: 10.3 K/UL (ref 1.8–7.7)
SODIUM BLD-SCNC: 128 MMOL/L (ref 135–144)
TOTAL PROTEIN: 6 G/DL (ref 6.4–8.3)
WBC # BLD: 11.7 K/UL (ref 3.5–11.3)
WBC # BLD: ABNORMAL 10*3/UL
ZZ NTE CLEAN UP: ORDERED TEST: NORMAL
ZZ NTE WITH NAME CLEAN UP: SPECIMEN SOURCE: NORMAL

## 2022-01-05 PROCEDURE — APPSS45 APP SPLIT SHARED TIME 31-45 MINUTES: Performed by: NURSE PRACTITIONER

## 2022-01-05 PROCEDURE — 2700000000 HC OXYGEN THERAPY PER DAY

## 2022-01-05 PROCEDURE — 6360000002 HC RX W HCPCS: Performed by: INTERNAL MEDICINE

## 2022-01-05 PROCEDURE — 2580000003 HC RX 258: Performed by: NURSE PRACTITIONER

## 2022-01-05 PROCEDURE — 85025 COMPLETE CBC W/AUTO DIFF WBC: CPT

## 2022-01-05 PROCEDURE — 6370000000 HC RX 637 (ALT 250 FOR IP): Performed by: NURSE PRACTITIONER

## 2022-01-05 PROCEDURE — 97162 PT EVAL MOD COMPLEX 30 MIN: CPT

## 2022-01-05 PROCEDURE — 97166 OT EVAL MOD COMPLEX 45 MIN: CPT

## 2022-01-05 PROCEDURE — 83735 ASSAY OF MAGNESIUM: CPT

## 2022-01-05 PROCEDURE — 2580000003 HC RX 258: Performed by: INTERNAL MEDICINE

## 2022-01-05 PROCEDURE — 93010 ELECTROCARDIOGRAM REPORT: CPT | Performed by: INTERNAL MEDICINE

## 2022-01-05 PROCEDURE — 84100 ASSAY OF PHOSPHORUS: CPT

## 2022-01-05 PROCEDURE — 90935 HEMODIALYSIS ONE EVALUATION: CPT

## 2022-01-05 PROCEDURE — 71045 X-RAY EXAM CHEST 1 VIEW: CPT

## 2022-01-05 PROCEDURE — 94761 N-INVAS EAR/PLS OXIMETRY MLT: CPT

## 2022-01-05 PROCEDURE — 2500000003 HC RX 250 WO HCPCS: Performed by: NURSE PRACTITIONER

## 2022-01-05 PROCEDURE — APPNB60 APP NON BILLABLE TIME 46-60 MINS: Performed by: NURSE PRACTITIONER

## 2022-01-05 PROCEDURE — 97530 THERAPEUTIC ACTIVITIES: CPT

## 2022-01-05 PROCEDURE — 2060000000 HC ICU INTERMEDIATE R&B

## 2022-01-05 PROCEDURE — 6360000002 HC RX W HCPCS: Performed by: NURSE PRACTITIONER

## 2022-01-05 PROCEDURE — 83615 LACTATE (LD) (LDH) ENZYME: CPT

## 2022-01-05 PROCEDURE — 82947 ASSAY GLUCOSE BLOOD QUANT: CPT

## 2022-01-05 PROCEDURE — 86140 C-REACTIVE PROTEIN: CPT

## 2022-01-05 PROCEDURE — 36415 COLL VENOUS BLD VENIPUNCTURE: CPT

## 2022-01-05 PROCEDURE — 99232 SBSQ HOSP IP/OBS MODERATE 35: CPT | Performed by: NURSE PRACTITIONER

## 2022-01-05 PROCEDURE — 80053 COMPREHEN METABOLIC PANEL: CPT

## 2022-01-05 PROCEDURE — 97535 SELF CARE MNGMENT TRAINING: CPT

## 2022-01-05 PROCEDURE — 97116 GAIT TRAINING THERAPY: CPT

## 2022-01-05 RX ORDER — DEXTROSE MONOHYDRATE 25 G/50ML
12.5 INJECTION, SOLUTION INTRAVENOUS PRN
Status: DISCONTINUED | OUTPATIENT
Start: 2022-01-05 | End: 2022-01-11 | Stop reason: HOSPADM

## 2022-01-05 RX ORDER — DEXTROSE MONOHYDRATE 50 MG/ML
100 INJECTION, SOLUTION INTRAVENOUS PRN
Status: DISCONTINUED | OUTPATIENT
Start: 2022-01-05 | End: 2022-01-11 | Stop reason: HOSPADM

## 2022-01-05 RX ORDER — CALCIUM ACETATE 667 MG/1
667 CAPSULE ORAL
Status: DISCONTINUED | OUTPATIENT
Start: 2022-01-05 | End: 2022-01-11

## 2022-01-05 RX ORDER — NICOTINE POLACRILEX 4 MG
15 LOZENGE BUCCAL PRN
Status: DISCONTINUED | OUTPATIENT
Start: 2022-01-05 | End: 2022-01-11 | Stop reason: HOSPADM

## 2022-01-05 RX ADMIN — AMLODIPINE BESYLATE 10 MG: 10 TABLET ORAL at 11:16

## 2022-01-05 RX ADMIN — AMLODIPINE BESYLATE 10 MG: 10 TABLET ORAL at 00:33

## 2022-01-05 RX ADMIN — FLUOXETINE 40 MG: 20 CAPSULE ORAL at 11:19

## 2022-01-05 RX ADMIN — HEPARIN SODIUM 5000 UNITS: 5000 INJECTION INTRAVENOUS; SUBCUTANEOUS at 06:33

## 2022-01-05 RX ADMIN — INSULIN LISPRO 1 UNITS: 100 INJECTION, SOLUTION INTRAVENOUS; SUBCUTANEOUS at 20:14

## 2022-01-05 RX ADMIN — SODIUM CHLORIDE, PRESERVATIVE FREE 10 ML: 5 INJECTION INTRAVENOUS at 10:00

## 2022-01-05 RX ADMIN — HEPARIN SODIUM 5000 UNITS: 5000 INJECTION INTRAVENOUS; SUBCUTANEOUS at 20:22

## 2022-01-05 RX ADMIN — INSULIN LISPRO 10 UNITS: 100 INJECTION, SOLUTION INTRAVENOUS; SUBCUTANEOUS at 17:54

## 2022-01-05 RX ADMIN — ASPIRIN 81 MG: 81 TABLET, COATED ORAL at 11:15

## 2022-01-05 RX ADMIN — PANTOPRAZOLE SODIUM 40 MG: 40 TABLET, DELAYED RELEASE ORAL at 06:34

## 2022-01-05 RX ADMIN — HEPARIN SODIUM 5000 UNITS: 5000 INJECTION INTRAVENOUS; SUBCUTANEOUS at 15:21

## 2022-01-05 RX ADMIN — METOPROLOL SUCCINATE 200 MG: 50 TABLET, EXTENDED RELEASE ORAL at 00:33

## 2022-01-05 RX ADMIN — LISINOPRIL 20 MG: 20 TABLET ORAL at 11:19

## 2022-01-05 RX ADMIN — TOCILIZUMAB 600 MG: 20 INJECTION, SOLUTION, CONCENTRATE INTRAVENOUS at 13:19

## 2022-01-05 RX ADMIN — EZETIMIBE 10 MG: 10 TABLET ORAL at 11:18

## 2022-01-05 RX ADMIN — DEXAMETHASONE SODIUM PHOSPHATE 20 MG: 10 INJECTION, SOLUTION INTRAMUSCULAR; INTRAVENOUS at 15:23

## 2022-01-05 RX ADMIN — INSULIN GLARGINE 45 UNITS: 100 INJECTION, SOLUTION SUBCUTANEOUS at 11:06

## 2022-01-05 RX ADMIN — CETIRIZINE HYDROCHLORIDE 10 MG: 10 TABLET, FILM COATED ORAL at 11:15

## 2022-01-05 RX ADMIN — CALCIUM ACETATE 667 MG: 667 CAPSULE ORAL at 17:54

## 2022-01-05 RX ADMIN — METOPROLOL SUCCINATE 200 MG: 50 TABLET, EXTENDED RELEASE ORAL at 11:20

## 2022-01-05 RX ADMIN — SODIUM CHLORIDE, PRESERVATIVE FREE 10 ML: 5 INJECTION INTRAVENOUS at 20:22

## 2022-01-05 RX ADMIN — FUROSEMIDE 20 MG: 20 TABLET ORAL at 11:14

## 2022-01-05 RX ADMIN — TRAMADOL HYDROCHLORIDE 50 MG: 50 TABLET, COATED ORAL at 13:23

## 2022-01-05 RX ADMIN — BUPROPION HYDROCHLORIDE 150 MG: 150 TABLET, EXTENDED RELEASE ORAL at 11:17

## 2022-01-05 ASSESSMENT — PAIN SCALES - GENERAL
PAINLEVEL_OUTOF10: 0

## 2022-01-05 ASSESSMENT — ENCOUNTER SYMPTOMS
ABDOMINAL PAIN: 0
SINUS PRESSURE: 0
SINUS PAIN: 0
NAUSEA: 1
ABDOMINAL DISTENTION: 0
COUGH: 1
COLOR CHANGE: 0
SHORTNESS OF BREATH: 1
PHOTOPHOBIA: 0

## 2022-01-05 NOTE — PROGRESS NOTES
HEMODIALYSIS PRE-TREATMENT NOTE    Patient Identifiers prior to treatment: Name, , MRN    Isolation Required: Yes                      Isolation Type: Droplet Plus, COVID Positive       (please document if patient is being managed as a PUI/COVID-19 patient)        Hepatitis status:                           Date Drawn                             Result  Hepatitis B Surface Antigen 10/28/21     Negative                     Hepatitis B Surface Antibody 10/28/21 104        Hepatitis B Core Antibody            How was Hepatitis Status verified: 102 [facility] Records. Immune status     Was a copy of the labs you documented provided to facility for the patient's chart: Yes    Hemodialysis orders verified: Yes, Dr. Coco Girard orders    Access Within normal limits ( I.e. s/s of infection,...): AVF to DENIA WNL.  Positive thrill and bruit noted     Pre-Assessment completed: Yes     Pre-dialysis report received from: Eyal Fountain RN                    Time: 0687

## 2022-01-05 NOTE — PROGRESS NOTES
Physical Therapy    Facility/Department: [address] [place] [address] ED  Initial Assessment    NAME: Eric Mccarthy  : 1975  MRN: [id number]    Date of Service: 2022    Discharge Recommendations:  Continue to assess pending progress,Patient would benefit from continued therapy after discharge      PER HPI:  Sree Goldberg a 51 y.o. Non- / non  male who presents with Cough and Fever   and is admitted to the hospital for the management of Acute hypoxemic respiratory failure due to COVID-19 [facility]).    Patient reports to the emergency department with fever, chills, cough and exertional dyspnea.  Patient has a known history of type 1 diabetes with ESRD.  Patient is a TTS dialysis patient and was scheduled for dialysis today.  Patient did not make it to his dialysis appointment due to extreme fatigue and severe dyspnea while attempting to ambulate in his own home.  In the emergency department patient was evaluated and was found to have significant pulmonary infiltrates to the right lung consistent with viral pneumonia.  Patient subsequently tested positive for COVID-19.  Patient is found to be hypoxic and tachycardic and required nonrebreather mask oxygen supplementation at 15 L to stabilize his SPO2 greater than 90%.   Due to the patient's ESRD CTA of the chest was not possible and a VQ scan was completed which was low probability for PE.  At the time my exam patient is resting in bed.  Patient has severe nausea with recurrent vomiting.  Patient has a persistent productive cough with yellow phlegm.  During simple communication with the patient he will desaturate to 83% on 15 L nonrebreather mask.  Patient reports that he is double vaccinated but has not yet received his booster.  Additionally patient is found to have elevated troponin.  Cardiology was notified.  Patient's clinical impression is suspicious for further deterioration                         Assessment   Body structures, Functions, Activity limitations: Decreased functional mobility ; Decreased balance;Decreased endurance;Decreased strength  Assessment: Skilled therapy needed to maximize independence with functional mobility as well as improve endurance, strength and balance. Patient would benefit from further therapy following discharge. Specific instructions for Next Treatment: take COVID education  Prognosis: Good  Decision Making: Medium Complexity  Exam: AROM, strength,endurance, mobility, balance, AM-PAC  Clinical Presentation: evolving  PT Education: Goals;PT Role;Plan of Care;Gait Training;Transfer Training;Disease Specific Education; Energy Conservation  REQUIRES PT FOLLOW UP: Yes  Activity Tolerance  Activity Tolerance: Patient limited by fatigue;Patient limited by endurance  Activity Tolerance: O2 sat remained above 90% entire assessment, did educate on pursed lip breathing       Patient Diagnosis(es): The primary encounter diagnosis was COVID-19. Diagnoses of Pneumonia due to COVID-19 virus, Acute respiratory failure with hypoxia ([facility] [address] Respiratory alkalosis, and Hyponatremia were also pertinent to this visit. has a past medical history of Closed fracture of bone of right foot, Dialysis patient ([facility] [address] Kidney disease, and Type 1 diabetes mellitus ([facility] [address] has no past surgical history on file. Restrictions  Restrictions/Precautions  Restrictions/Precautions: General Precautions,Contact Precautions,Isolation,Fall Risk  Required Braces or Orthoses?: Yes  Required Braces or Orthoses  Right Lower Extremity Brace: Ankle Foot Orthotics  Left Lower Extremity Brace:  Yakelin Foot Orthotics  Position Activity Restriction  Other position/activity restrictions: Covid +, 60L HFNC, up as alejandro, RUE IV  Vision/Hearing  Vision: Impaired (Pt denies any recent visual changes)  Vision Exceptions: Wears glasses for reading  Hearing: Within functional limits     Subjective  General  Chart Reviewed: Yes  Patient assessed for rehabilitation services?: Yes  Family / Caregiver Present: No  General Comment  Comments: RN states patient appropriate for therapy  Subjective  Subjective: pt agreeable to work with therapy  Pain Screening  Patient Currently in Pain: Denies          Orientation  Orientation  Overall Orientation Status: Within Functional Limits  Social/Functional History  Social/Functional History  Lives With: Family (grandparents)  Type of Home: House  Home Layout: One level  Home Access: Ramped entrance  Entrance Stairs - Rails: Both  Bathroom Shower/Tub: Walk-in shower  Bathroom Toilet: Handicap height  Bathroom Equipment: Shower chair,Grab bars in shower  Home Equipment: Rolling walker,Cane (B Leg braces)  ADL Assistance: Independent  Homemaking Assistance: Independent (pts grandparents take care of most household chores)  Homemaking Responsibilities: Yes  Ambulation Assistance: Independent (uses RW for community distances)  Transfer Assistance: Independent  Active : No  Occupation: Retired  Leisure & Hobbies: walking, fishing, steelers fan  Additional Comments: Pt denies any recent falls  Cognition   Cognition  Overall Cognitive Status: Exceptions  Arousal/Alertness: Appropriate responses to stimuli  Following Commands: Follows multistep commands with increased time; Follows multistep commands with repitition  Attention Span: Appears intact  Memory: Appears intact  Safety Judgement: Decreased awareness of need for safety;Decreased awareness of need for assistance  Problem Solving: Decreased awareness of errors  Insights: Decreased awareness of deficits  Initiation: Does not require cues  Sequencing: Does not require cues    Objective     Observation/Palpation  Observation: resting in bed, HF NC 60L, B AFOs on    AROM RLE (degrees)  RLE General AROM: ankle DF to neutral, knee and hip WFL  AROM LLE (degrees)  LLE General AROM: ankle DF to neutral, knee and hip WFL  AROM RUE (degrees)  RUE General AROM: refer to OT assessment  AROM LUE (degrees)  LUE General AROM: refer to OT assessment  Strength RLE  Comment: ankle 2/5, knee and hip 4/5  Strength LLE  Comment: ankle 2/5, knee and hip 4/5  Motor Control  Gross Motor?: WFL  Sensation  Overall Sensation Status: Impaired (neuropathy in B feet)  Bed mobility  Supine to Sit: Moderate assistance;Minimal assistance;2 Person assistance  Sit to Supine: Contact guard assistance  Scooting: Stand by assistance  Comment: cues needed to use bed rail and assist with lines. Patient seemed unaware that he was laying on high flow O2 tubing. Cues needed for patietn to scoot himself up in bed  Transfers  Sit to Stand: Minimal Assistance  Stand to sit: Minimal Assistance  Comment: cues for correct hand placement  Ambulation  Ambulation?: Yes  More Ambulation?: No  Ambulation 1  Surface: level tile  Device: Rolling Walker  Other Apparatus: O2;AFO; Left;Right  Assistance: Minimal assistance  Gait Deviations: Slow Danica;Decreased step length  Distance: 10 steps (laterally and forward/backward)  Comments: decreased distance due to high flow tubing limitation. Patient slightly shaky.      Balance  Posture: Fair  Sitting - Static: Good  Sitting - Dynamic: Fair;+  Standing - Static: Fair  Standing - Dynamic: [address]  Times per week: 1-2x/day for 5-6 days/week  Specific instructions for Next Treatment: take COVID education  Current Treatment Recommendations: Chris Waters Re-education,Patient/Caregiver Education & Training,Balance Training,Home Exercise Program,Gait Training,Safety Education & Training,Positioning  Safety Devices  Type of devices: Gait belt,Call light within reach,Left in bed      AM-PAC Score  AM-PAC Inpatient Mobility Raw Score : 16 (01/05/22 1559)  AM-PAC Inpatient T-Scale Score : 40.78 (01/05/22 1559)  Mobility Inpatient CMS 0-100% Score: 54.16 (01/05/22 1559)  Mobility Inpatient CMS G-Code Modifier : CK (01/05/22 1559)          Goals  Short term goals  Time Frame for Short term goals: 12 visits  Short term goal 1: Independent bed mobility and transfers  Short term goal 2: Patient to ambulate 40 feet with roll walker SBA  Short term goal 3: Patient to perform 25 minutes ther act to facilitate improving strength, endurance and balance  Patient Goals   Patient goals : to go home       Therapy Time   Individual Concurrent Group Co-treatment   Time In 1433         Time Out 1501 (additional 10 minutes for chart review)         Minutes 28+10=38           Co-treatment with OT warranted secondary to decreased safety and independence requiring 2 skilled therapy professionals to address individual discipline's goals. PT addressing transfer training.      Treatment time: 23 minutes    Maria A Edgar, PT

## 2022-01-05 NOTE — PROGRESS NOTES
Occupational Therapy   Occupational Therapy Initial Assessment  Date: 2022   Patient Name: Sadi Campbell  MRN: [id number]     : 1975    RN CHRIS reports patient is medically stable for therapy treatment this date. Chart reviewed prior to treatment and patient is agreeable for therapy. All lines intact and patient positioned comfortably at end of treatment. All patient needs addressed prior to ending therapy session. Date of Service: 2022    Discharge Recommendations:  Patient would benefit from continued therapy after discharge     OT Equipment Recommendations  Equipment Needed: Yes  Mobility Devices: ADL Assistive Devices  ADL Assistive Devices: Long-handled Shoe Horn;Long-handled Sponge;Reacher;Sock-Aid Hard    Assessment   Performance deficits / Impairments: Decreased functional mobility ; Decreased ADL status; Decreased safe awareness;Decreased strength;Decreased cognition;Decreased sensation;Decreased endurance;Decreased high-level IADLs;Decreased fine motor control;Decreased balance  Assessment: Skilled OT services are indicated to increase safety, endurance and Indep during functional tasks to return home at prior level of function as able. Prognosis: Good  Decision Making: Medium Complexity  OT Education: OT Role;Transfer Training;Energy Conservation;Plan of Care;ADL Adaptive Strategies;Precautions  Patient Education: safety in function, fall prevention/call light use, benefits of being oob, pursed lip breathing, recommendations for continued therapy  REQUIRES OT FOLLOW UP: Yes  Activity Tolerance  Activity Tolerance: Patient Tolerated treatment well;Patient limited by fatigue  Activity Tolerance: fair  Safety Devices  Safety Devices in place: Yes  Type of devices: Nurse notified;Gait belt;Bed alarm in place; Patient at risk for falls;Call light within reach; Left in bed           Patient Diagnosis(es): The primary encounter diagnosis was COVID-19.  Diagnoses of Pneumonia due to COVID-19 deterioration      Restrictions  Restrictions/Precautions  Restrictions/Precautions: General Precautions,Contact Precautions,Isolation,Fall Risk  Position Activity Restriction  Other position/activity restrictions: Covid +, 60L HFNC, up as alejandro, RUE IV    Subjective   General  Chart Reviewed: Yes  Patient assessed for rehabilitation services?: Yes  Family / Caregiver Present: No  Subjective  Subjective: \"I'm feeling fine\"  General Comment  Comments: Pt resting supine in bed in ER, agreeable to OT eval  Patient Currently in Pain: Denies    Social/Functional History  Social/Functional History  Lives With: Family (grandparents)  Type of Home: House  Home Layout: One level  Home Access: Ramped entrance  Entrance Stairs - Rails: Both  Bathroom Shower/Tub: Walk-in shower  Bathroom Toilet: Handicap height  Bathroom Equipment: Shower chair,Grab bars in shower  Home Equipment: Rolling walker,Cane (B Leg braces)  ADL Assistance: Independent  Homemaking Assistance: Independent (pts grandparents take care of most household chores)  Homemaking Responsibilities: Yes  Ambulation Assistance: Independent (uses RW for community distances)  Transfer Assistance: Independent  Active : No  Occupation: Retired  Leisure & Hobbies: walking, fishing, steelers fan  Additional Comments: Pt denies any recent falls       Objective   Vision: Impaired (Pt denies any recent visual changes)  Vision Exceptions: Wears glasses for reading  Hearing: Within functional limits    Orientation  Overall Orientation Status: Within Functional Limits  Observation/Palpation  Observation: resting in bed, HF NC 60L, B LE braces on  Edema: none  Balance  Sitting Balance: Stand by assistance  Standing Balance: Minimal assistance (with RW, pt had slight posterior lean)  Standing Balance  Time: standing ~ 1-2 min  Activity: functional mobility  Comment: SpO2 stayed >90% thoughout functional mobility.   Functional Mobility  Functional - Mobility Device: Rolling Walker  Activity:  (3 lateral steps up and down EOB x3 on step forward and backward)  Assist Level: Minimal assistance  Functional Mobility Comments: Pt reporting feeling unsteady and shakey. Pt required Mod verbal cues/tactile assist for upright posture, RW safety, pacing self, pursed lip breathing, and line mgmt all to increase safety. ADL  Feeding: Setup  Grooming: Setup;Stand by assistance (seated)  UE Bathing: Setup;Minimal assistance  LE Bathing: Setup; Moderate assistance  UE Dressing: Setup;Minimal assistance (to tie/adjust hosp gown while seated on EOB)  LE Dressing: Setup; Moderate assistance  Toileting: Moderate assistance  Additional Comments: Pt currently limited by fatigue and respritory status. Tone RUE  RUE Tone: Normotonic  Tone LUE  LUE Tone: Normotonic  Coordination  Movements Are Fluid And Coordinated: Yes  Coordination and Movement description: Fine motor impairments; Left UE;Right UE;Decreased accuracy; Decreased speed (Pt appears to have claw hand deformity in L hand difficulty extending last two digits)        Bed mobility  Supine to Sit: Moderate assistance;Minimal assistance;2 Person assistance  Sit to Supine: Contact guard assistance  Scooting: Stand by assistance  Comment: Pt required Min verbal cues for use of bedrail, proper bed mobility, awareness/assist with lines, pursed lip breathing all to increase ease/safety. Transfers  Sit to stand: Minimal assistance (with RW)  Stand to sit: Minimal assistance  Transfer Comments: Pt given Mod verbal cues for RW safety, upright posture, controlled stand to sit, squaring self/AD up to surface and reaching back prior to sitting, pursed lip breathing and slowing down movements all to increase safety. Cognition  Overall Cognitive Status: Exceptions  Arousal/Alertness: Appropriate responses to stimuli  Following Commands: Follows multistep commands with increased time; Follows multistep commands with repitition  Attention Span: 2 skilled therapy professionals to address individual discipline's goals.         Livan Hawley, OT

## 2022-01-05 NOTE — PROGRESS NOTES
Reason for Consult:  End stage renal disease. Requesting Physician:  Marcello Ralph MD    Interval history:   Had short dialysis treatment yesterday 2 hours and 20-minute  2 L removed  Labs today showed hyperkalemia and metabolic acidosis    HISTORY OF PRESENT ILLNESS:    The patient is a 51 y.o. male who normally undergoes dialysis at [facility] on TTS under our care presents with fever, cough, shortness of breath and vomiting over the last 4 days. His chest x ray shows right sided pneumonia. His COVID test is positive. He is on high flow O2. His last hemodialysis was on Sunday. Today his potassium level is 5.0. Prior to Admission medications    Medication Sig Start Date End Date Taking? Authorizing Provider   FLUoxetine (PROZAC) 20 MG capsule Take 40 mg by mouth daily   Yes Historical Provider, MD   fluticasone (FLONASE) 50 MCG/ACT nasal spray 1 spray by Each Nostril route daily for 10 days 1/2/22 1/12/22  Luisa Waggoner APRN - CNP   loratadine (CLARITIN) 10 MG tablet Take 1 tablet by mouth daily for 10 days 1/2/22 1/12/22  Sidonie Labella, APRN - CNP   benzonatate (TESSALON PERLES) 100 MG capsule Take 1 capsule by mouth 3 times daily as needed for Cough 1/2/22 1/9/22  Erine Rosaline APRN - CNP   promethazine (PHENERGAN) 25 MG tablet Take 1 tablet by mouth every 6 hours as needed for Nausea 1/2/22   Sidonie Rosaline APRN - CNP   ammonium lactate (LAC-HYDRIN) 12 % lotion Apply topically daily. 11/1/21   Krunal Navarro DPM   traMADol (ULTRAM) 50 MG tablet Take 50 mg by mouth 2 times daily.   4/9/21   Historical Provider, MD   furosemide (LASIX) 20 MG tablet Take 1 tablet by mouth daily 4/27/21   Lamar Lagos,    insulin glargine (LANTUS) 100 UNIT/ML injection vial Inject 45 units in the morning and 5 units SQ at  night 3/29/21   Lamar Lagos, DO   buPROPion (WELLBUTRIN XL) 150 MG extended release tablet Take 1 tablet by mouth every morning 3/25/21   Lamar Lagos, DO   metoprolol succinate (TOPROL XL) 200 MG extended release tablet Take 1 tablet by mouth daily 2/18/21   Lamar Lagos DO   insulin lispro (HUMALOG) 100 UNIT/ML injection vial Inject into the skin 3 times daily (before meals)    Historical Provider, MD   lisinopril (PRINIVIL;ZESTRIL) 20 MG tablet Take 20 mg by mouth daily     Historical Provider, MD   Rosuvastatin Calcium 40 MG CPSP Take 40 mg by mouth daily    Historical Provider, MD   ezetimibe (ZETIA) 10 MG tablet Take 10 mg by mouth daily    Historical Provider, MD   omeprazole (PRILOSEC) 40 MG delayed release capsule Take 40 mg by mouth daily    Historical Provider, MD   amLODIPine (NORVASC) 10 MG tablet Take 10 mg by mouth daily    Historical Provider, MD   aspirin 81 MG EC tablet Take 81 mg by mouth daily    Historical Provider, MD   magnesium oxide (MAG-OX) 400 MG tablet Take 400 mg by mouth daily    Historical Provider, MD   calcium acetate 667 MG TABS Take 667 mg by mouth 3 times daily (with meals)     Historical Provider, MD   vitamin B-12 (CYANOCOBALAMIN) 500 MCG tablet Take 500 mcg by mouth daily    Historical Provider, MD       Scheduled Meds:   amLODIPine  10 mg Oral Daily    aspirin  81 mg Oral Daily    buPROPion  150 mg Oral QAM    ezetimibe  10 mg Oral Daily    furosemide  20 mg Oral Daily    insulin glargine  45 Units SubCUTAneous QAM    cetirizine  10 mg Oral Daily    metoprolol succinate  200 mg Oral Daily    pantoprazole  40 mg Oral QAM AC    traMADol  50 mg Oral Daily    sodium chloride flush  5-40 mL IntraVENous 2 times per day    heparin (porcine)  5,000 Units SubCUTAneous 3 times per day    dexamethasone  20 mg IntraVENous Q24H    Followed by   Nubia Edwards ON 1/9/2022] dexamethasone  10 mg IntraVENous Q24H    Vitamin D  6,000 Units Oral Daily    Followed by   Nubia Edwards ON 1/11/2022] Vitamin D  2,000 Units Oral Daily    FLUoxetine  40 mg Oral Daily    lisinopril  20 mg Oral Daily     Continuous Infusions:   sodium chloride        Physical Exam:  Vitals:    01/05/22 0400 01/05/22 0425 01/05/22 0600 01/05/22 0700   BP: (!) 175/92  (!) 144/77    Pulse: 76  72    Resp:       Temp:       TempSrc:       SpO2: 100% 95% 98% 99%   Weight:       Height:         I/O last 3 completed shifts: In: 116.1 [I.V.:66.1; IV Piggyback:50]  Out: -     Deferred because of COVID status.     Data:  CBC:   Lab Results   Component Value Date    WBC 11.7 (H) 01/05/2022    HGB 12.7 (L) 01/05/2022    HCT 41.0 01/05/2022    MCV 97.2 01/05/2022     (L) 01/05/2022     BMP:    Lab Results   Component Value Date     (L) 01/05/2022     (L) 01/04/2022     (L) 01/02/2022    K 5.5 (H) 01/05/2022    K 5.0 01/04/2022    K 4.4 01/02/2022    CL 91 (L) 01/05/2022    CL 88 (L) 01/04/2022    CL 91 (L) 01/02/2022    CO2 16 (L) 01/05/2022    CO2 21 01/04/2022    CO2 27 01/02/2022    BUN 42 (H) 01/05/2022    BUN 37 (H) 01/04/2022    BUN 21 (H) 01/02/2022    CREATININE 8.11 (HH) 01/05/2022    CREATININE 8.75 (HH) 01/04/2022    CREATININE 5.81 (HH) 01/02/2022    GLUCOSE 226 (H) 01/05/2022    GLUCOSE 103 (H) 01/04/2022    GLUCOSE 207 (H) 01/02/2022     CMP:   Lab Results   Component Value Date     01/05/2022    K 5.5 01/05/2022    CL 91 01/05/2022    CO2 16 01/05/2022    BUN 42 01/05/2022    CREATININE 8.11 01/05/2022    GLUCOSE 226 01/05/2022    CALCIUM 8.4 01/05/2022    PROT 6.0 01/05/2022    LABALBU 2.9 01/05/2022    BILITOT 0.43 01/05/2022    ALKPHOS 110 01/05/2022    AST 36 01/05/2022    ALT 38 01/05/2022      Hepatic:   Lab Results   Component Value Date    AST 36 01/05/2022    AST 33 01/04/2022     (H) 01/08/2021    ALT 38 01/05/2022    ALT 49 (H) 01/04/2022     (H) 01/08/2021    BILITOT 0.43 01/05/2022    BILITOT 0.38 01/04/2022    BILITOT 0.38 01/08/2021    ALKPHOS 110 01/05/2022    ALKPHOS 144 (H) 01/04/2022    ALKPHOS 313 (H) 01/08/2021     BNP: No results found for: BNP  Lipids:   Lab Results   Component Value Date    CHOL 103 07/19/2021    HDL 52 07/19/2021     INR: No results found for: INR  PTH: No results found for: PTH  Phosphorus:    Lab Results   Component Value Date    PHOS 5.4 01/05/2022     Ionized Calcium: No results found for: IONCA  Magnesium:   Lab Results   Component Value Date    MG 2.2 01/05/2022     Albumin:   Lab Results   Component Value Date    LABALBU 2.9 01/05/2022     Last 3 CK, CKMB, Troponin: @LABRCNT(CKTOTAL:3,CKMB:3,TROPONINI:3)       URINE:)No results found for: Jonah De La Vega    Radiology:   Reviewed. Assessment:  End stage renal disease. Metabolic acidosis  Hyperkalemia. Hyponatremia. Hypertension. COVID pneumonia. Plan: We will arrange for additional dialysis treatment today , will need to be transferred to special isolation dialysis room during the treatment   Plan next dialysis tomorrow per schedule   we will follow phosphorus level and adjust binders as needed. We will follow H&H and start erythropoeitin stimulating agent as needed. Place on renal diet with fluid restriction of 1000 ml/24 hours once allowed to eat. We will follow up chemistries. Avoid Lovenox and Fleets enema. We will continue dialysis on TTS schedule. Please do not hesitate to contact us for any further questions/concerns. We will continue to follow along with you. Electronically signed by Joanie Montes MD  on 1/5/2022 at [address] [place] [address] [address] [facility].   Ph: [phone]

## 2022-01-05 NOTE — PROGRESS NOTES
Writer spoke to Dr. Falguni Mckeon in regards to hemodialysis orders for today. Orders given for treatment (see Epic) and patient ready to be set up however, patient needs to be moved to ER room 24 to accommodate dialysis machine for treatment. ER staff informed and to return call to dialysis unit about room transfer.

## 2022-01-05 NOTE — PROGRESS NOTES
Writer updated Dr. Thompson Browne regarding drop in O2 sats after arrival to dialysis. New orders received, 2-3 mL fluid removal and will run patient as long as he will tolerate.

## 2022-01-05 NOTE — FLOWSHEET NOTE
Arrived to room 1016 via bed, awake/alert, telemetry applied, vitals obtained, oriented to room, data base completed and admission assessment initiated

## 2022-01-05 NOTE — FLOWSHEET NOTE
Patient is COVID. Writer provided a silent prayer of healing, comfort, and rest during his stay.     Spiritual care will follow up as needed or requested     01/05/22 9327   Encounter Summary   Services provided to: Patient   Referral/Consult From: [facility]   (1/5/2022 COVID-19)   Routine   Type Initial   Intervention Prayer

## 2022-01-05 NOTE — ED NOTES
Pt transported to PCU for dialysis. Pt placed on portable pulse ox.   William Foster RN  01/04/22 2019       William Foster RN  01/04/22 2022

## 2022-01-05 NOTE — PROGRESS NOTES
HEMODIALYSIS POST TREATMENT NOTE    Treatment time ordered: 2-3Hrs AT    Actual treatment time: 2hrs & 20Minutes    UltraFiltration Goal: 2-3Kgs AT  UltraFiltration Removed: 2Kgs      Pre Treatment weight: 88Kgs  Post Treatment weight: 86Kgs  Estimated Dry Weight: 85Kgs    Access used:     Central Venous Catheter:          Tunneled or Non-tunneled: N/A           Site: N/A          Access Flow: N/A      Internal Access:       AV Fistula or AV Graft: AVF         Site: L upper arm       Access Flow: Good       Sign and symptoms of infection: No       If YES: N/A    Medications or blood products given: N/A    Chronic outpatient schedule: TTS    Chronic outpatient unit: [address]    Summary of response to treatment: Tolerated fairly well with patient continually needing to reposition for breathing comfort, at 2hrs & 20 Minutes patient repositioned himself with a small infiltrate to the venous site and also system clotting at the same time. Patient was able to have blood returned through the arterial site and  notified    Explain if orders NOT met, was physician notified:N/A      ACES flowsheet faxed to patient unit/ placed in patient chart: yes    Post assessment completed: yes by Arnol Monroy RN    Report given to: Odin Martinez RN      * Intra-treatment documented Safety Checks include the followin) Access and face visible at all times. 2) All connections and blood lines are secure with no kinks. 3) NVL alarm engaged. 4) Hemosafe device applied (if applicable). 5) No collapse of Arterial or Venous blood chambers. 6) All blood lines / pump segments in the air detectors.

## 2022-01-05 NOTE — PROGRESS NOTES
[facility]  Office: [address], DO, Renée Villarreal, DO, Kg Malhotra, DO, Marlo Kanner [facility], DO, Jorge Raymond MD, Xenia Tran MD, Warren Gooden MD, Lina Sanchez MD, Elsy Guevara MD, Effie Tracy MD, Luis Antonio Torres MD, Pinky Dodson, DO, Cindy Devries, DO, Zacarias Hines MD,  Shea Rodríguez DO, Vi Galdamez MD, John Waterman MD, Pamela Eid MD, Kristel Silveira MD, Cassie Sky MD, Fly Rodriguez MD, Jay Prabhakar MD, Jason Whitfield [facility], [facility], CNP, Jose Alvarado, CNP, David Ames, CNS, Lorena Rowland, CNP, Lisa Harvey, CNP, Mary Patricio, CNP, Benson Partida, CNP, Judson Recinos, CNP, Bosotn Vee PA-C, Noel Alexander, VASILE, Fletcher Pressley DNP, Kenny Mays, CNP, Aline Hanley, CNP, Satinder Marin, CNP, Aniyah Rosen, CNP, Shay Pepe CNP, [facility]    Progress Note    1/5/2022    11:39 AM    Name:   Cande Greenberg  MRN:     [id number]     Acct:      [de-identified]   Room:   [address] Day:  1  Admit Date:  1/4/2022  9:25 AM    PCP:   DANIEL Magallon CNP  Code Status:  No Order    Subjective:     C/C:   Chief Complaint   Patient presents with    Cough    Fever     Interval History Status: not changed. Condition unchanged versus worsening. Patient continues to require 60 L of heated high flow oxygen support. Patient inflammatory markers are rapidly rising and new infiltrates are identified in the left lung. Patient received dialysis yesterday nephrology remains on board. Patient will reportedly receive dialysis again today. Patient will be administered Actemra due to rapidly increasing inflammatory markers and increasing oxygen demand. Brief History:     1/4 - Patient reports to the emergency department with fever, chills, cough and exertional dyspnea. Patient has a known history of type 1 diabetes with ESRD.   Patient is a TTS dialysis patient who missed his dialysis appointment today. In the emergency department patient was evaluated and was found to have significant pulmonary infiltrates to the right lung consistent with viral pneumonia. Patient subsequently tested positive for COVID-19. Patient is found to be hypoxic and tachycardic and required nonrebreather mask oxygen supplementation at 15 L to stabilize his SPO2 greater than 90%. Due to the patient's ESRD CTA of the chest was not possible and a VQ scan was completed which was low probability for PE. Patient has a persistent productive cough with yellow phlegm. During simple communication with the patient he will desaturate to 83% on 15 L nonrebreather mask. Placed on heated high flow oxygen emergently. 1/5 - Patient continues to require 60 L of heated high flow oxygen support. Patient inflammatory markers are rapidly rising and new infiltrates are identified in the left lung. Patient received dialysis yesterday nephrology remains on board. Patient will reportedly receive dialysis again today. Patient will be administered Actemra due to rapidly increasing inflammatory markers and increasing oxygen demand. Review of Systems:     Review of Systems   Constitutional: Positive for activity change, fatigue and fever. HENT: Negative for sinus pressure and sinus pain. Eyes: Negative for photophobia and visual disturbance. Respiratory: Positive for cough and shortness of breath. Cardiovascular: Negative for chest pain and palpitations. Gastrointestinal: Positive for nausea. Negative for abdominal distention and abdominal pain. Endocrine: Negative for polyphagia and polyuria. Genitourinary: Negative for urgency. Musculoskeletal: Negative for arthralgias and myalgias. Skin: Negative for color change, pallor and rash. Neurological: Negative for tremors, syncope and weakness. Hematological: Negative for adenopathy. Does not bruise/bleed easily.    Psychiatric/Behavioral: Negative for agitation and confusion. Medications: Allergies:  No Known Allergies    Current Meds:   Scheduled Meds:    amLODIPine  10 mg Oral Daily    aspirin  81 mg Oral Daily    buPROPion  150 mg Oral QAM    ezetimibe  10 mg Oral Daily    furosemide  20 mg Oral Daily    insulin glargine  45 Units SubCUTAneous QAM    cetirizine  10 mg Oral Daily    metoprolol succinate  200 mg Oral Daily    pantoprazole  40 mg Oral QAM AC    traMADol  50 mg Oral Daily    sodium chloride flush  5-40 mL IntraVENous 2 times per day    heparin (porcine)  5,000 Units SubCUTAneous 3 times per day    dexamethasone  20 mg IntraVENous Q24H    Followed by   Jonny Fay ON 2022] dexamethasone  10 mg IntraVENous Q24H    Vitamin D  6,000 Units Oral Daily    Followed by   Jonny Fay ON 2022] Vitamin D  2,000 Units Oral Daily    FLUoxetine  40 mg Oral Daily    lisinopril  20 mg Oral Daily     Continuous Infusions:    sodium chloride       PRN Meds: benzonatate, promethazine, sodium chloride flush, sodium chloride, ondansetron **OR** ondansetron, polyethylene glycol, acetaminophen **OR** acetaminophen, guaiFENesin-dextromethorphan, promethazine, LORazepam    Data:     Past Medical History:   has a past medical history of Closed fracture of bone of right foot, Dialysis patient ([facility] [address] Kidney disease, and [facility] Social History:   reports that he has never smoked. He has never used smokeless tobacco. He reports that he does not drink alcohol and does not use drugs.      Family History:   Family History   Problem Relation Age of Onset    Diabetes Mother     Diabetes Father     Heart Disease Maternal Great Grandfather        Vitals:  BP (!) 150/104   Pulse 68   Temp 98.4 °F (36.9 °C)   Resp 23   Ht 5' 7\" (1.702 m)   Wt 189 lb 9.5 oz (86 kg)   SpO2 96%   BMI 29.69 kg/m²   Temp (24hrs), Av.5 °F (36.9 °C), Min:98.4 °F (36.9 °C), Max:98.5 °F (36.9 °C)    Recent Labs     22  1405 POCGLU 94       I/O (24Hr):     Intake/Output Summary (Last 24 hours) at 1/5/2022 1139  Last data filed at 1/4/2022 2250  Gross per 24 hour   Intake 116.08 ml   Output --   Net 116.08 ml       Labs:  Hematology:  Recent Labs     01/02/22 2029 01/04/22 1115 01/04/22  1145 01/04/22  1219 01/05/22  0608   WBC 5.0  --   --  10.6 11.7*   RBC 4.03*  --   --  4.65 4.22   HGB 12.5*  --   --  14.2 12.7*   HCT 37.1*  --   --  41.8 41.0   MCV 92.1  --   --  89.9 97.2   MCH 31.0  --   --  30.5 30.1   MCHC 33.7  --   --  34.0 31.0   RDW 12.3  --   --  12.3 12.8     --   --  141 137*   MPV 10.0  --   --  10.3 11.0   SEDRATE  --   --   --  30*  --    CRP  --  42.2*  --   --  213.2*   DDIMER  --   --  0.83*  --   --      Chemistry:  Recent Labs     01/02/22 2029 01/04/22 1115 01/04/22  1400 01/05/22  0608   * 128*  --  128*   K 4.4 5.0  --  5.5*   CL 91* 88*  --  91*   CO2 27 21  --  16*   GLUCOSE 207* 103*  --  226*   BUN 21* 37*  --  42*   CREATININE 5.81* 8.75*  --  8.11*   MG  --   --   --  2.2   ANIONGAP 12 19*  --  21*   LABGLOM 11* 7*  --  7*   GFRAA 13* 8*  --  9*   CALCIUM 8.6 8.5*  --  8.4*   PHOS  --   --   --  5.4*   TROPHS  --  464* 467*  --    MYOGLOBIN  --  1,023*  --   --      Recent Labs     01/04/22 1115 01/04/22  1405 01/05/22  0608   PROT 6.6  --  6.0*   LABALBU 3.8  --  2.9*   AST 33  --  36   ALT 49*  --  38   *  --  330*   ALKPHOS 144*  --  110   BILITOT 0.38  --  0.43   POCGLU  --  94  --      ABG:  Lab Results   Component Value Date    FIO2 NOT REPORTED 01/04/2022     Lab Results   Component Value Date/Time    SPECIAL RAC,6ML 01/04/2022 11:35 AM     Lab Results   Component Value Date/Time    CULTURE NO GROWTH 12 HOURS 01/04/2022 11:35 AM       Radiology:  MRI LUMBAR SPINE WO CONTRAST    Result Date: 12/30/2021  3 mm central disc protrusion at L5-S1 mildly effacing the lateral recesses and mildly narrowing the spinal canal.     NM LUNG SCAN PERFUSION ONLY    Result Date: 1/4/2022  Very low probability for pulmonary embolism. XR CHEST PORTABLE    Result Date: 1/5/2022  New left perihilar infiltrate as described above. Stable right perihilar infiltrate. Otherwise stable     XR CHEST PORTABLE    Result Date: 1/4/2022  Right lung infiltrate with suggests developing pneumonia in the right clinical setting. Recommend follow-up to resolution. XR CHEST PORTABLE    Result Date: 1/2/2022  Marginal inspiration, without evidence of acute cardiopulmonary disease       Physical Examination:        Physical Exam  Constitutional:       General: He is in acute distress. Appearance: He is obese. He is ill-appearing. HENT:      Head: Normocephalic and atraumatic. Nose: Nose normal.      Mouth/Throat:      Mouth: Mucous membranes are dry. Eyes:      Extraocular Movements: Extraocular movements intact. Pupils: Pupils are equal, round, and reactive to light. Cardiovascular:      Rate and Rhythm: Tachycardia present. Pulses: Normal pulses. Heart sounds: No murmur heard. Pulmonary:      Effort: Respiratory distress present. Breath sounds: Rales present. Musculoskeletal:         General: No swelling or tenderness. Normal range of motion. Cervical back: Normal range of motion. No rigidity. Skin:     General: Skin is warm and dry. Capillary Refill: Capillary refill takes 2 to 3 seconds. Coloration: Skin is not jaundiced or pale. Neurological:      General: No focal deficit present. Mental Status: He is alert and oriented to person, place, and time.    Psychiatric:         Mood and Affect: Mood normal.         Behavior: Behavior normal.         Assessment:        Hospital Problems           Last Modified POA    * (Principal) Acute hypoxemic respiratory failure due to COVID-19 ([facility] [address] 1/4/2022 Yes    DM (diabetes mellitus), type 1 with complications ([facility] [address] 9/6/2594 Yes    ESRD (end stage renal disease) on dialysis ([facility] [address] 1/4/2022 Yes Hypertension 1/4/2022 Yes    Hyperlipidemia 1/4/2022 Yes    Bipolar affective disorder ([facility] [address] 1/4/2022 Yes          Plan:        1. Acute hypoxic respiratory failure secondary to COVID-19  1. Continue heated high flow oxygen support, on 60L  2. High intensity steroids of 20 mg Decadron per day for 5 days followed by 10 mg Decadron for 10 days  1. Day 2  3. Not a candidate for Barcitinib due to renal failure  4. Actemra today. 2. ESRD on dialysis  1. Nephrology consultation and anticipate dialysis again today  3. Diabetes type 1  1. Lantus 45 units daily  2. Corrective sliding scale insulin as ordered  4. Hypertension with hyperlipidemia  1.  Home medication regiment as ordered        DANIEL Almeida NP  1/5/2022  11:39 AM

## 2022-01-05 NOTE — PROGRESS NOTES
HEMODIALYSIS PRE-TREATMENT NOTE    Patient Identifiers prior to treatment: Name//MRN    Isolation Required:  Yes                      Isolation Type: Covid19+       (please document if patient is being managed as a PUI/COVID-19 patient)        Hepatitis status:                           Date Drawn                             Result  Hepatitis B Surface Antigen 10/28/2021     NEG                     Hepatitis B Surface Antibody 10/28/2021 POS     104   Hepatitis B Core Antibody 10/28/2021 NEG          How was Hepatitis Status verified: Outpatient labs (Care Everywhere)     Was a copy of the labs you documented provided to facility for the patient's chart: Yes    Hemodialysis orders verified: yes    Access Within normal limits ( I.e. s/s of infection,...): yes     Pre-Assessment completed: yes by Tom Councilman, RN    Pre-dialysis report received from: Hung                      Time: 19:35

## 2022-01-06 LAB
ABSOLUTE EOS #: 0 K/UL (ref 0–0.44)
ABSOLUTE IMMATURE GRANULOCYTE: 0 K/UL (ref 0–0.3)
ABSOLUTE LYMPH #: 0.59 K/UL (ref 1.1–3.7)
ABSOLUTE MONO #: 0.49 K/UL (ref 0.1–1.2)
ANION GAP SERPL CALCULATED.3IONS-SCNC: 14 MMOL/L (ref 9–17)
BASOPHILS # BLD: 0 % (ref 0–2)
BASOPHILS ABSOLUTE: 0 K/UL (ref 0–0.2)
BUN BLDV-MCNC: 37 MG/DL (ref 6–20)
BUN/CREAT BLD: 6 (ref 9–20)
C-REACTIVE PROTEIN: 131.8 MG/L (ref 0–5)
CALCIUM SERPL-MCNC: 8.6 MG/DL (ref 8.6–10.4)
CHLORIDE BLD-SCNC: 95 MMOL/L (ref 98–107)
CO2: 26 MMOL/L (ref 20–31)
CREAT SERPL-MCNC: 5.85 MG/DL (ref 0.7–1.2)
DIFFERENTIAL TYPE: ABNORMAL
EOSINOPHILS RELATIVE PERCENT: 0 % (ref 1–4)
GFR AFRICAN AMERICAN: 13 ML/MIN
GFR NON-AFRICAN AMERICAN: 10 ML/MIN
GFR SERPL CREATININE-BSD FRML MDRD: ABNORMAL ML/MIN/{1.73_M2}
GFR SERPL CREATININE-BSD FRML MDRD: ABNORMAL ML/MIN/{1.73_M2}
GLUCOSE BLD-MCNC: 114 MG/DL (ref 75–110)
GLUCOSE BLD-MCNC: 119 MG/DL (ref 70–99)
GLUCOSE BLD-MCNC: 204 MG/DL (ref 75–110)
GLUCOSE BLD-MCNC: 290 MG/DL (ref 75–110)
GLUCOSE BLD-MCNC: 328 MG/DL (ref 75–110)
HCT VFR BLD CALC: 37.7 % (ref 40.7–50.3)
HEMOGLOBIN: 12.3 G/DL (ref 13–17)
IMMATURE GRANULOCYTES: 0 %
LYMPHOCYTES # BLD: 6 % (ref 24–43)
MAGNESIUM: 2.2 MG/DL (ref 1.6–2.6)
MCH RBC QN AUTO: 30.3 PG (ref 25.2–33.5)
MCHC RBC AUTO-ENTMCNC: 32.6 G/DL (ref 28.4–34.8)
MCV RBC AUTO: 92.9 FL (ref 82.6–102.9)
MONOCYTES # BLD: 5 % (ref 3–12)
NRBC AUTOMATED: 0 PER 100 WBC
PDW BLD-RTO: 12.7 % (ref 11.8–14.4)
PHOSPHORUS: 5 MG/DL (ref 2.5–4.5)
PLATELET # BLD: 167 K/UL (ref 138–453)
PLATELET ESTIMATE: ABNORMAL
PMV BLD AUTO: 10.9 FL (ref 8.1–13.5)
POTASSIUM SERPL-SCNC: 4.6 MMOL/L (ref 3.7–5.3)
RBC # BLD: 4.06 M/UL (ref 4.21–5.77)
RBC # BLD: ABNORMAL 10*6/UL
SEG NEUTROPHILS: 89 % (ref 36–65)
SEGMENTED NEUTROPHILS ABSOLUTE COUNT: 8.72 K/UL (ref 1.5–8.1)
SODIUM BLD-SCNC: 135 MMOL/L (ref 135–144)
WBC # BLD: 9.8 K/UL (ref 3.5–11.3)
WBC # BLD: ABNORMAL 10*3/UL

## 2022-01-06 PROCEDURE — 83735 ASSAY OF MAGNESIUM: CPT

## 2022-01-06 PROCEDURE — 82947 ASSAY GLUCOSE BLOOD QUANT: CPT

## 2022-01-06 PROCEDURE — 6370000000 HC RX 637 (ALT 250 FOR IP): Performed by: NURSE PRACTITIONER

## 2022-01-06 PROCEDURE — 86140 C-REACTIVE PROTEIN: CPT

## 2022-01-06 PROCEDURE — 2060000000 HC ICU INTERMEDIATE R&B

## 2022-01-06 PROCEDURE — 99232 SBSQ HOSP IP/OBS MODERATE 35: CPT | Performed by: INTERNAL MEDICINE

## 2022-01-06 PROCEDURE — 2700000000 HC OXYGEN THERAPY PER DAY

## 2022-01-06 PROCEDURE — 85025 COMPLETE CBC W/AUTO DIFF WBC: CPT

## 2022-01-06 PROCEDURE — 36415 COLL VENOUS BLD VENIPUNCTURE: CPT

## 2022-01-06 PROCEDURE — 6360000002 HC RX W HCPCS: Performed by: NURSE PRACTITIONER

## 2022-01-06 PROCEDURE — 2500000003 HC RX 250 WO HCPCS: Performed by: NURSE PRACTITIONER

## 2022-01-06 PROCEDURE — 2580000003 HC RX 258: Performed by: NURSE PRACTITIONER

## 2022-01-06 PROCEDURE — 80048 BASIC METABOLIC PNL TOTAL CA: CPT

## 2022-01-06 PROCEDURE — 84100 ASSAY OF PHOSPHORUS: CPT

## 2022-01-06 PROCEDURE — 97530 THERAPEUTIC ACTIVITIES: CPT

## 2022-01-06 PROCEDURE — 94761 N-INVAS EAR/PLS OXIMETRY MLT: CPT

## 2022-01-06 RX ADMIN — AMLODIPINE BESYLATE 10 MG: 10 TABLET ORAL at 09:01

## 2022-01-06 RX ADMIN — BUPROPION HYDROCHLORIDE 150 MG: 150 TABLET, EXTENDED RELEASE ORAL at 09:00

## 2022-01-06 RX ADMIN — CALCIUM ACETATE 667 MG: 667 CAPSULE ORAL at 09:00

## 2022-01-06 RX ADMIN — CALCIUM ACETATE 667 MG: 667 CAPSULE ORAL at 15:02

## 2022-01-06 RX ADMIN — EZETIMIBE 10 MG: 10 TABLET ORAL at 09:00

## 2022-01-06 RX ADMIN — INSULIN LISPRO 4 UNITS: 100 INJECTION, SOLUTION INTRAVENOUS; SUBCUTANEOUS at 14:51

## 2022-01-06 RX ADMIN — FUROSEMIDE 20 MG: 20 TABLET ORAL at 09:01

## 2022-01-06 RX ADMIN — HEPARIN SODIUM 5000 UNITS: 5000 INJECTION INTRAVENOUS; SUBCUTANEOUS at 20:39

## 2022-01-06 RX ADMIN — SODIUM CHLORIDE, PRESERVATIVE FREE 10 ML: 5 INJECTION INTRAVENOUS at 09:02

## 2022-01-06 RX ADMIN — METOPROLOL SUCCINATE 200 MG: 50 TABLET, EXTENDED RELEASE ORAL at 08:59

## 2022-01-06 RX ADMIN — HEPARIN SODIUM 5000 UNITS: 5000 INJECTION INTRAVENOUS; SUBCUTANEOUS at 06:10

## 2022-01-06 RX ADMIN — CETIRIZINE HYDROCHLORIDE 10 MG: 10 TABLET, FILM COATED ORAL at 09:00

## 2022-01-06 RX ADMIN — TRAMADOL HYDROCHLORIDE 50 MG: 50 TABLET, COATED ORAL at 09:01

## 2022-01-06 RX ADMIN — PANTOPRAZOLE SODIUM 40 MG: 40 TABLET, DELAYED RELEASE ORAL at 06:10

## 2022-01-06 RX ADMIN — INSULIN LISPRO 3 UNITS: 100 INJECTION, SOLUTION INTRAVENOUS; SUBCUTANEOUS at 20:34

## 2022-01-06 RX ADMIN — INSULIN GLARGINE 45 UNITS: 100 INJECTION, SOLUTION SUBCUTANEOUS at 08:44

## 2022-01-06 RX ADMIN — Medication 6000 UNITS: at 09:00

## 2022-01-06 RX ADMIN — LISINOPRIL 20 MG: 20 TABLET ORAL at 09:01

## 2022-01-06 RX ADMIN — DEXAMETHASONE SODIUM PHOSPHATE 20 MG: 10 INJECTION, SOLUTION INTRAMUSCULAR; INTRAVENOUS at 17:25

## 2022-01-06 RX ADMIN — HEPARIN SODIUM 5000 UNITS: 5000 INJECTION INTRAVENOUS; SUBCUTANEOUS at 15:02

## 2022-01-06 RX ADMIN — INSULIN LISPRO 8 UNITS: 100 INJECTION, SOLUTION INTRAVENOUS; SUBCUTANEOUS at 17:14

## 2022-01-06 RX ADMIN — FLUOXETINE 40 MG: 20 CAPSULE ORAL at 09:00

## 2022-01-06 RX ADMIN — ASPIRIN 81 MG: 81 TABLET, COATED ORAL at 09:01

## 2022-01-06 RX ADMIN — CALCIUM ACETATE 667 MG: 667 CAPSULE ORAL at 17:19

## 2022-01-06 RX ADMIN — SODIUM CHLORIDE, PRESERVATIVE FREE 10 ML: 5 INJECTION INTRAVENOUS at 20:39

## 2022-01-06 ASSESSMENT — ENCOUNTER SYMPTOMS
SHORTNESS OF BREATH: 1
SINUS PAIN: 0
COLOR CHANGE: 0
ABDOMINAL DISTENTION: 0
ABDOMINAL PAIN: 0
SINUS PRESSURE: 0
PHOTOPHOBIA: 0
COUGH: 1
NAUSEA: 1

## 2022-01-06 ASSESSMENT — PAIN SCALES - GENERAL: PAINLEVEL_OUTOF10: 3

## 2022-01-06 NOTE — CARE COORDINATION
Social Work-Patient receives dialysis at [facility]. Patient has tested positive for COVID. Left message for [facility]. Phone call to [facility]. They will need a new referral submitted to them.  Sent referral. Jaskaran Summers

## 2022-01-06 NOTE — PROGRESS NOTES
[facility]  Office: [address], DO, Joanne Garcia, DO, Carissa Dumont, DO, Ash Emmanuel, DO, Raimundo Calero MD, Juan José De La Rosa MD, Ambar Walter MD, Zaid Rodriguez MD, Loy Triana MD, Nataliya Bella MD, Jasson Mantilla MD, Jaelyn Yeung, DO, Manolo Nettles, DO, Maylin Edwards MD,  Kirsten Soria, DO, Nahun Lyn MD, Gaye Sandoval MD, Gabrielle Alfonso MD, Sang Villegas MD, Sarah Daniels MD, Purvi Mcintyre MD, Leon Montejo MD, Bridgette Louise [facility], [facility] Castro, CNP, Ludy Gutierrez, CNP, Ed Su, CNS, Callum Martin, CNP, Titus Sanchez, CNP, Suzette Torres, CNP, Zhanna Coe, CNP, Piter Magallanes, CNP, Jorge Ryan PA-C, Yanet Glasgow, [facility], Rocío Feliz, VASILE, Saroj Calvin, CNP, Brandi Stein, CNP, Garry Nair, CNP, Hayden Ohara, CNP, Adrian Garnica, CNP, Bonita Henderson, [facility]    Progress Note    1/6/2022    11:51 AM    Name:   Riley Duarte  MRN:     [id number]     [place]:      [de-identified]   Room:   [address] [place] [address] Day:  2  Admit Date:  1/4/2022  9:25 AM    PCP:   DANIEL Larson CNP  Code Status:  Full Code    Subjective:     C/C:   Chief Complaint   Patient presents with    Cough    Fever     Interval History Status: not changed. Condition unchanged. Patient continues to require 45 % fio2 at 40 L of heated high flow oxygen support. Patient will be administered Actemra due to rapidly increasing inflammatory markers and increasing oxygen demand. Brief History:     1/4 - Patient reports to the emergency department with fever, chills, cough and exertional dyspnea. Patient has a known history of type 1 diabetes with ESRD. Patient is a TTS dialysis patient who missed his dialysis appointment today. In the emergency department patient was evaluated and was found to have significant pulmonary infiltrates to the right lung consistent with viral pneumonia.   Patient subsequently tested positive for COVID-19. Patient is found to be hypoxic and tachycardic and required nonrebreather mask oxygen supplementation at 15 L to stabilize his SPO2 greater than 90%. Due to the patient's ESRD CTA of the chest was not possible and a VQ scan was completed which was low probability for PE. Patient has a persistent productive cough with yellow phlegm. During simple communication with the patient he will desaturate to 83% on 15 L nonrebreather mask. Placed on heated high flow oxygen emergently. 1/5 - Patient continues to require 60 L of heated high flow oxygen support. Patient inflammatory markers are rapidly rising and new infiltrates are identified in the left lung. Patient received dialysis yesterday nephrology remains on board. Patient will reportedly receive dialysis again today. Patient will be administered Actemra due to rapidly increasing inflammatory markers and increasing oxygen demand. Review of Systems:     Review of Systems   Constitutional: Positive for activity change, fatigue and fever. HENT: Negative for sinus pressure and sinus pain. Eyes: Negative for photophobia and visual disturbance. Respiratory: Positive for cough and shortness of breath. Cardiovascular: Negative for chest pain and palpitations. Gastrointestinal: Positive for nausea. Negative for abdominal distention and abdominal pain. Endocrine: Negative for polyphagia and polyuria. Genitourinary: Negative for urgency. Musculoskeletal: Negative for arthralgias and myalgias. Skin: Negative for color change, pallor and rash. Neurological: Negative for tremors, syncope and weakness. Hematological: Negative for adenopathy. Does not bruise/bleed easily. Psychiatric/Behavioral: Negative for agitation and confusion. Medications:      Allergies:  No Known Allergies    Current Meds:   Scheduled Meds:    insulin lispro  0-12 Units SubCUTAneous TID WC    insulin lispro  0-6 Units SubCUTAneous Nightly  calcium acetate  667 mg Oral TID WC    amLODIPine  10 mg Oral Daily    aspirin  81 mg Oral Daily    buPROPion  150 mg Oral QAM    ezetimibe  10 mg Oral Daily    furosemide  20 mg Oral Daily    insulin glargine  45 Units SubCUTAneous QAM    cetirizine  10 mg Oral Daily    metoprolol succinate  200 mg Oral Daily    pantoprazole  40 mg Oral QAM AC    traMADol  50 mg Oral Daily    sodium chloride flush  5-40 mL IntraVENous 2 times per day    heparin (porcine)  5,000 Units SubCUTAneous 3 times per day    dexamethasone  20 mg IntraVENous Q24H    Followed by   Liv Mae ON 2022] dexamethasone  10 mg IntraVENous Q24H    Vitamin D  6,000 Units Oral Daily    Followed by   Liv Mae ON 2022] Vitamin D  2,000 Units Oral Daily    FLUoxetine  40 mg Oral Daily    lisinopril  20 mg Oral Daily     Continuous Infusions:    dextrose      sodium chloride       PRN Meds: glucose, dextrose, glucagon (rDNA), dextrose, benzonatate, promethazine, sodium chloride flush, sodium chloride, ondansetron **OR** ondansetron, polyethylene glycol, acetaminophen **OR** acetaminophen, guaiFENesin-dextromethorphan, promethazine, LORazepam    Data:     Past Medical History:   has a past medical history of Closed fracture of bone of right foot, Dialysis patient ([facility] [address] Kidney disease, and [facility] Social History:   reports that he has never smoked. He has never used smokeless tobacco. He reports that he does not drink alcohol and does not use drugs.      Family History:   Family History   Problem Relation Age of Onset    Diabetes Mother     Diabetes Father     Heart Disease Maternal Great Grandfather        Vitals:  BP (!) 140/77   Pulse 70   Temp 98.2 °F (36.8 °C) (Oral)   Resp 16   Ht 5' 7\" (1.702 m)   Wt 175 lb 7.8 oz (79.6 kg)   SpO2 97%   BMI 27.49 kg/m²   Temp (24hrs), Av.9 °F (36.6 °C), Min:97.7 °F (36.5 °C), Max:98.2 °F (36.8 °C)    Recent Labs     22  1405 22  1641 01/05/22  1937 01/06/22  0823   POCGLU 94 373* 187* 114*       I/O (24Hr):     Intake/Output Summary (Last 24 hours) at 1/6/2022 1151  Last data filed at 1/5/2022 2035  Gross per 24 hour   Intake 850 ml   Output 3000 ml   Net -2150 ml       Labs:  Hematology:  Recent Labs     01/04/22  1115 01/04/22  1145 01/04/22  1219 01/05/22  0608 01/06/22  0607   WBC  --   --  10.6 11.7* 9.8   RBC  --   --  4.65 4.22 4.06*   HGB  --   --  14.2 12.7* 12.3*   HCT  --   --  41.8 41.0 37.7*   MCV  --   --  89.9 97.2 92.9   MCH  --   --  30.5 30.1 30.3   MCHC  --   --  34.0 31.0 32.6   RDW  --   --  12.3 12.8 12.7   PLT  --   --  141 137* 167   MPV  --   --  10.3 11.0 10.9   SEDRATE  --   --  30*  --   --    CRP 42.2*  --   --  213.2* 131.8*   DDIMER  --  0.83*  --   --   --      Chemistry:  Recent Labs     01/04/22  1115 01/04/22  1400 01/05/22  0608 01/06/22  0607   *  --  128* 135   K 5.0  --  5.5* 4.6   CL 88*  --  91* 95*   CO2 21  --  16* 26   GLUCOSE 103*  --  226* 119*   BUN 37*  --  42* 37*   CREATININE 8.75*  --  8.11* 5.85*   MG  --   --  2.2 2.2   ANIONGAP 19*  --  21* 14   LABGLOM 7*  --  7* 10*   GFRAA 8*  --  9* 13*   CALCIUM 8.5*  --  8.4* 8.6   PHOS  --   --  5.4* 5.0*   TROPHS 464* 467*  --   --    MYOGLOBIN 1,023*  --   --   --      Recent Labs     01/04/22  1115 01/04/22  1405 01/05/22  0608 01/05/22  1641 01/05/22  1937 01/06/22  0823   PROT 6.6  --  6.0*  --   --   --    LABALBU 3.8  --  2.9*  --   --   --    AST 33  --  36  --   --   --    ALT 49*  --  38  --   --   --    *  --  330*  --   --   --    ALKPHOS 144*  --  110  --   --   --    BILITOT 0.38  --  0.43  --   --   --    POCGLU  --  94  --  373* 187* 114*     ABG:  Lab Results   Component Value Date    FIO2 NOT REPORTED 01/04/2022     Lab Results   Component Value Date/Time    SPECIAL RAC,6ML 01/04/2022 11:35 AM     Lab Results   Component Value Date/Time    CULTURE NO GROWTH 2 DAYS 01/04/2022 11:35 AM       Radiology:  MRI LUMBAR SPINE WO CONTRAST    Result Date: 12/30/2021  3 mm central disc protrusion at L5-S1 mildly effacing the lateral recesses and mildly narrowing the spinal canal.     NM LUNG SCAN PERFUSION ONLY    Result Date: 1/4/2022  Very low probability for pulmonary embolism. XR CHEST PORTABLE    Result Date: 1/5/2022  New left perihilar infiltrate as described above. Stable right perihilar infiltrate. Otherwise stable     XR CHEST PORTABLE    Result Date: 1/4/2022  Right lung infiltrate with suggests developing pneumonia in the right clinical setting. Recommend follow-up to resolution. XR CHEST PORTABLE    Result Date: 1/2/2022  Marginal inspiration, without evidence of acute cardiopulmonary disease       Physical Examination:        Physical Exam  Constitutional:       General: He is in acute distress. Appearance: He is obese. He is ill-appearing. HENT:      Head: Normocephalic and atraumatic. Nose: Nose normal.      Mouth/Throat:      Mouth: Mucous membranes are dry. Eyes:      Extraocular Movements: Extraocular movements intact. Pupils: Pupils are equal, round, and reactive to light. Cardiovascular:      Rate and Rhythm: Tachycardia present. Pulses: Normal pulses. Heart sounds: No murmur heard. Pulmonary:      Effort: Respiratory distress present. Breath sounds: Rales present. Musculoskeletal:         General: No swelling or tenderness. Normal range of motion. Cervical back: Normal range of motion. No rigidity. Skin:     General: Skin is warm and dry. Capillary Refill: Capillary refill takes 2 to 3 seconds. Coloration: Skin is not jaundiced or pale. Neurological:      General: No focal deficit present. Mental Status: He is alert and oriented to person, place, and time.    Psychiatric:         Mood and Affect: Mood normal.         Behavior: Behavior normal.         Assessment:        Hospital Problems           Last Modified POA    * (Principal) Acute hypoxemic respiratory failure due to COVID-19 [facility]) 1/4/2022 Yes    DM (diabetes mellitus), type 1 with complications ([facility] [address] 7/0/6578 Yes    ESRD (end stage renal disease) on dialysis ([facility] 1/4/2022 Yes    Hypertension 1/4/2022 Yes    Hyperlipidemia 1/4/2022 Yes    Bipolar affective disorder ([facility] [address] 1/4/2022 Yes          Plan:        1. Acute hypoxic respiratory failure secondary to COVID-19  1. Continue heated high flow oxygen support, on 40L  2. High intensity steroids of 20 mg Decadron per day for 5 days followed by 10 mg Decadron for 10 days  1. Day 2  3. Not a candidate for Barcitinib due to renal failure  4. Status post Actemra  2. ESRD on dialysis  1. Nephrology following  3. Diabetes type 1  1. Lantus 45 units daily  2. Corrective sliding scale insulin as ordered  4. Hypertension with hyperlipidemia  1.  Home medication regiment as ordered        Roz Astudillo MD  1/6/2022  11:51 AM

## 2022-01-06 NOTE — PLAN OF CARE
Problem: Skin Integrity:  Goal: Will show no infection signs and symptoms  Description: Will show no infection signs and symptoms  Outcome: Ongoing  Note: Monitor for signs & symptoms of infection. Utilize standard precautions & proper handwashing. Communicate referral to infection control. Goal: Absence of new skin breakdown  Description: Absence of new skin breakdown  Outcome: Ongoing  Note: Continuing to monitor for skin integrity risks. Patient independent with turning/repositioning. Turning/repositioning encouraged at least once every 2 hrs, and prn basis. Hygiene care being completed independently per patient; assistance provided when deemed necessary. Problem: Airway Clearance - Ineffective  Goal: Achieve or maintain patent airway  Outcome: Ongoing  Note: Assessed ability to cough & breathe without laboring. Assessed need for suctioning if needed. Encouraged cough & deep breathing. Problem: Gas Exchange - Impaired  Goal: Absence of hypoxia  Outcome: Ongoing  Note: Assess breath sounds every shift and as needed. Assess oxygenation level & respiration rate. Encourage coughing & deep breathing. Encourage use of incentive spirometer. Assess cough & sputum. Administer oxygen as needed. Goal: Promote optimal lung function  Outcome: Ongoing     Problem: Breathing Pattern - Ineffective  Goal: Ability to achieve and maintain a regular respiratory rate  Outcome: Ongoing  Note: Assess for adventitious breath sounds. Monitored SaO2 > 90%. Applied 02 per nasal cannula as needed. Elevated HOB to improve breathing as needed. Problem: Body Temperature -  Risk of, Imbalanced  Goal: Ability to maintain a body temperature within defined limits  Outcome: Ongoing  Note: Assessed for fever. Provided appropriate intervention to regulate body temperature.    Goal: Will regain or maintain usual level of consciousness  Outcome: Ongoing  Goal: Complications related to the disease process, condition or treatment will be avoided or minimized  Outcome: Ongoing     Problem: Isolation Precautions - Risk of Spread of Infection  Goal: Prevent transmission of infection  Outcome: Ongoing  Note: Monitor for signs & symptoms of infection. Utilize standard precautions & proper handwashing. Communicate referral to infection control. Problem: Nutrition Deficits  Goal: Optimize nutritional status  Outcome: Ongoing  Note: Review diet daily and as needed with pt. Provide supplement nutrition as ordered by physician & educate pt. Review nutritional guidelines with pt. Problem: Risk for Fluid Volume Deficit  Goal: Maintain normal heart rhythm  Outcome: Ongoing  Note: Monitor vital signs at least every shift & as needed. Monitor intake & output at least every shift. Goal: Maintain absence of muscle cramping  Outcome: Ongoing  Goal: Maintain normal serum potassium, sodium, calcium, phosphorus, and pH  Outcome: Ongoing     Problem: Loneliness or Risk for Loneliness  Goal: Demonstrate positive use of time alone when socialization is not possible  Outcome: Ongoing  Note: Assess mood. Provide support & reassurance, & coping skills to aid with mood. Promote level of activity as pt is tolerable.       Problem: Fatigue  Goal: Verbalize increase energy and improved vitality  Outcome: Ongoing     Problem: Patient Education: Go to Patient Education Activity  Goal: Patient/Family Education  Outcome: Ongoing

## 2022-01-06 NOTE — PROGRESS NOTES
Spoke with Dr. Shelley Condon , patient will dialyze this week Friday 1/8/22 and Sat 1/09/22 and then [facility]

## 2022-01-06 NOTE — PROGRESS NOTES
Physical Therapy  [facility]  Daily Treatment Note  NAME: Walt Mcintyre  : 1975  MRN: [id number]    Date of Service: 2022    Discharge Recommendations:  Continue to assess pending progress,Patient would benefit from continued therapy after discharge      Pt currently functioning below baseline. Would suggest additional therapy at time of discharge to maximize long term outcomes and prevent re-admission. Please refer to AM-PAC score for current level of function. Assessment   Body structures, Functions, Activity limitations: Decreased functional mobility ; Decreased balance;Decreased endurance;Decreased strength  Assessment: Patient fatigues easily and c/o AYDE LE weakness. Specific instructions for Next Treatment: take COVID education  Prognosis: Good  Decision Making: Medium Complexity  Exam: AROM, strength,endurance, mobility, balance, AM-PAC  Clinical Presentation: evolving  PT Education: Goals;PT Role;Plan of Care;Gait Training;Transfer Training;Disease Specific Education; Energy Conservation  REQUIRES PT FOLLOW UP: Yes  Activity Tolerance  Activity Tolerance: Patient limited by fatigue;Patient limited by endurance  Activity Tolerance: O2 sat remained above 90% entire assessment, did educate on pursed lip breathing     Patient Diagnosis(es): The primary encounter diagnosis was COVID-19. Diagnoses of Pneumonia due to COVID-19 virus, Acute respiratory failure with hypoxia ([facility] [address] Respiratory alkalosis, and Hyponatremia were also pertinent to this visit. has a past medical history of Closed fracture of bone of right foot, Dialysis patient ([facility] [address] Kidney disease, and Type 1 diabetes mellitus ([facility] [address] has no past surgical history on file. Restrictions  Restrictions/Precautions  Restrictions/Precautions: General Precautions,Contact Precautions,Isolation,Fall Risk  Required Braces or Orthoses?: Yes  Required Braces or Orthoses  Right Lower Extremity Brace:  Ankle Foot Orthotics  Left Lower Extremity Brace: Yakelin Foot Orthotics  Position Activity Restriction  Other position/activity restrictions: Covid +, 60L HFNC, up as alejandro, RUE IV  Subjective   General  Chart Reviewed: Yes  Family / Caregiver Present: No  Subjective  Subjective: pt agreeable to work with therapy  General Comment  Comments: RN states patient appropriate for therapy          Orientation  Orientation  Overall Orientation Status: Within Functional Limits  Cognition      Objective   Bed mobility  Rolling to Left: Stand by assistance  Rolling to Right: Stand by assistance  Supine to Sit: Stand by assistance  Sit to Supine: Stand by assistance  Scooting: Stand by assistance  Comment: Patient with good bed mobility technique this date. Patient requires MIN assist for line management this date. Patient with SpO2 in High 90's throughout treatment this date. Transfers  Sit to Stand: Contact guard assistance  Stand to sit: Contact guard assistance  Bed to Chair: Contact guard assistance  Lateral Transfers: Contact guard assistance  Comment: Patient requires MIN vc's for hand placement and progression techniques upon initial stance patient requires a seated restbreak in <1 minute due to AYDE LE weakness per patient. Ambulation  Ambulation?: Yes  More Ambulation?: No  Ambulation 1  Surface: level tile  Device: Rolling Walker  Other Apparatus: O2;AFO; Left;Right  Assistance: Minimal assistance  Gait Deviations: Slow Danica;Decreased step length  Distance: 5 feet forward and back  Comments: decreased distance due to high flow tubing limitation. Patient slightly unsteady  Stairs/Curb  Stairs?: No  Neuromuscular Education  NDT Treatment: Gait ;Lower extremity; Sitting;Standing  Balance  Posture: Fair  Sitting - Static: Good  Sitting - Dynamic: Good  Standing - Static: Good;-  Standing - Dynamic: Fair;+  Exercises  Comments: Educated patient on circulation exercises and AROM in seated posture to promote fluid exchange and maintain joint congruency.      AM-PAC Score  AM-PAC Inpatient Mobility Raw Score : 16 (01/06/22 1527)  AM-PAC Inpatient T-Scale Score : 40.78 (01/06/22 1527)  Mobility Inpatient CMS 0-100% Score: 54.16 (01/06/22 1527)  Mobility Inpatient CMS G-Code Modifier : CK (01/06/22 1527)          Goals  Short term goals  Time Frame for Short term goals: 12 visits  Short term goal 1: Independent bed mobility and transfers  Short term goal 2: Patient to ambulate 40 feet with roll walker SBA  Short term goal 3: Patient to perform 25 minutes ther act to facilitate improving strength, endurance and balance  Patient Goals   Patient goals : to go home    Plan    Plan  Times per week: 1-2x/day for 5-6 days/week  Specific instructions for Next Treatment: take COVID education  Current Treatment Recommendations: Mariza [place] Re-education,Patient/Caregiver Education & Training,Balance Training,Home Exercise Program,Gait Training,Safety Education & Training,Positioning  Safety Devices  Type of devices: Gait belt,Call light within reach,Left in bed     Therapy Time   Individual Concurrent Group Co-treatment   Time In 1426         Time Out 1451         Minutes [address], [place]

## 2022-01-06 NOTE — PLAN OF CARE
Problem: Skin Integrity:  Goal: Will show no infection signs and symptoms  Description: Will show no infection signs and symptoms  Outcome: Ongoing  Goal: Absence of new skin breakdown  Description: Absence of new skin breakdown  Outcome: Ongoing     Problem: Airway Clearance - Ineffective  Goal: Achieve or maintain patent airway  Outcome: Ongoing     Problem: Gas Exchange - Impaired  Goal: Absence of hypoxia  Outcome: Ongoing

## 2022-01-06 NOTE — CONSULTS
[facility] Cardiology   Consult Note             Date:   1/6/2022  Patient name: Gayle Enamorado  Date of admission:  1/4/2022  9:25 AM  MRN:   [id number]  YOB: 1975    Reason for Admission: covid 19 pneumonia    CHIEF COMPLAINT:  Shortness of breath     History Obtained From:  electronic medical record    HISTORY OF PRESENT ILLNESS:     Patient reports to the emergency department with fever, chills, cough and exertional dyspnea.  Patient has a known history of type 1 diabetes with ESRD.  Patient is a TTS dialysis patient who missed his dialysis appointment today. In the emergency department patient was evaluated and was found to have significant pulmonary infiltrates to the right lung consistent with viral pneumonia.  Patient subsequently tested positive for COVID-19.  Patient is found to be hypoxic and tachycardic and required nonrebreather mask oxygen supplementation at 15 L to stabilize his SPO2 greater than 90%. Cardiology was consulted to see the patient due to markedly elevated troponin of 400. Patient did not have any chest pain. Since he had a history of chronic renal failure on dialysis, I thought that this could be due to renal failure. Repeat troponin about 3h later was the same. This signified chronic elevation from renal failure and not acute coronary syndrome. Past Medical History:   has a past medical history of Closed fracture of bone of right foot, Dialysis patient ([facility] [address] Kidney disease, and Type 1 diabetes mellitus ([facility] [address] Past Surgical History:   has no past surgical history on file. Home Medications:    Prior to Admission medications    Medication Sig Start Date End Date Taking?  Authorizing Provider   FLUoxetine (PROZAC) 20 MG capsule Take 40 mg by mouth daily   Yes Historical Provider, MD   fluticasone (FLONASE) 50 MCG/ACT nasal spray 1 spray by Each Nostril route daily for 10 days 1/2/22 1/12/22  Michelle García APRN - CNP   loratadine (CLARITIN) 10 MG tablet Take 1 tablet by mouth daily for 10 days 1/2/22 1/12/22  Kushal Webb APRN - CNP   benzonatate (TESSALON PERLES) 100 MG capsule Take 1 capsule by mouth 3 times daily as needed for Cough 1/2/22 1/9/22  Kushal Webb, APRN - CNP   promethazine (PHENERGAN) 25 MG tablet Take 1 tablet by mouth every 6 hours as needed for Nausea 1/2/22   Kushal Webb APRN - CNP   ammonium lactate (LAC-HYDRIN) 12 % lotion Apply topically daily. Patient taking differently: Apply topically daily to BLE 11/1/21   Telly Holm DPM   traMADol (ULTRAM) 50 MG tablet Take 50 mg by mouth 2 times daily.   4/9/21   Historical Provider, MD   furosemide (LASIX) 20 MG tablet Take 1 tablet by mouth daily 4/27/21   Chuck Dress, DO   insulin glargine (LANTUS) 100 UNIT/ML injection vial Inject 45 units in the morning and 5 units SQ at  night 3/29/21   Chuck Dress, DO   buPROPion (WELLBUTRIN XL) 150 MG extended release tablet Take 1 tablet by mouth every morning 3/25/21   Chuck Dress, DO   metoprolol succinate (TOPROL XL) 200 MG extended release tablet Take 1 tablet by mouth daily 2/18/21   Chuck Dress, DO   insulin lispro (HUMALOG) 100 UNIT/ML injection vial Inject into the skin 3 times daily (before meals) Per sliding scale, by 2s    Historical Provider, MD   lisinopril (PRINIVIL;ZESTRIL) 20 MG tablet Take 20 mg by mouth daily     Historical Provider, MD   Rosuvastatin Calcium 40 MG CPSP Take 40 mg by mouth daily    Historical Provider, MD   ezetimibe (ZETIA) 10 MG tablet Take 10 mg by mouth daily    Historical Provider, MD   omeprazole (PRILOSEC) 40 MG delayed release capsule Take 40 mg by mouth daily    Historical Provider, MD   amLODIPine (NORVASC) 10 MG tablet Take 10 mg by mouth daily    Historical Provider, MD   aspirin 81 MG EC tablet Take 81 mg by mouth daily    Historical Provider, MD   magnesium oxide (MAG-OX) 400 MG tablet Take 400 mg by mouth daily    Historical Provider, MD   calcium acetate 667 MG TABS Take 667 mg by mouth 3 times daily (with meals) Historical Provider, MD   vitamin B-12 (CYANOCOBALAMIN) 500 MCG tablet Take 500 mcg by mouth daily    Historical Provider, MD       Allergies:  Patient has no known allergies. Social History:   reports that he has never smoked. He has never used smokeless tobacco. He reports that he does not drink alcohol and does not use drugs. Family History: family history includes Diabetes in his father and mother; Heart Disease in his maternal great grandfather. REVIEW OF SYSTEMS:    · Unable to do review of systems    PHYSICAL EXAM:    Physical Examination:    /67   Pulse 65   Temp 98.2 °F (36.8 °C) (Oral)   Resp 18   Ht 5' 7\" (1.702 m)   Wt 175 lb 7.8 oz (79.6 kg)   SpO2 97%   BMI 27.49 kg/m²    Constitutional and General Appearance: alert, cooperative, no distress and appears stated age  HEENT: PERRL, no cervical lymphadenopathy. No masses palpable. Normal oral mucosa  Respiratory:  · Normal excursion and expansion without use of accessory muscles  · Resp Auscultation: Good respiratory effort. No for increased work of breathing. On auscultation: clear to auscultation bilaterally  Cardiovascular:  · The apical impulse is not displaced  · Heart tones are crisp and normal. regular S1 and S2.  · Jugular venous pulsation Normal  · The carotid upstroke is normal in amplitude and contour without delay or bruit  · Peripheral pulses are symmetrical and full   Abdomen:  · No masses or tenderness  · Bowel sounds present  Extremities:  ·  No Cyanosis or Clubbing  ·  Lower extremity edema: No  ·  Skin: Warm and dry  Neurological:  · Alert and oriented.   · Moves all extremities well  · No abnormalities of mood, affect, memory, mentation, or behavior are noted    DATA:    Diagnostics:      EKG: normal sinus rhythm  none  Labs:     CBC:   Recent Labs     01/05/22  0608 01/06/22  0607   WBC 11.7* 9.8   HGB 12.7* 12.3*   HCT 41.0 37.7*   * 167     BMP:   Recent Labs     01/05/22  0608 01/06/22  0607   * 135 K 5.5* 4.6   CO2 16* 26   BUN 42* 37*   CREATININE 8.11* 5.85*   LABGLOM 7* 10*   GLUCOSE 226* 119*     BNP: No results for input(s): BNP in the last 72 hours. PT/INR: No results for input(s): PROTIME, INR in the last 72 hours. APTT:No results for input(s): APTT in the last 72 hours. CARDIAC ENZYMES:No results for input(s): CKTOTAL, CKMB, CKMBINDEX, TROPONINI in the last 72 hours. FASTING LIPID PANEL:  Lab Results   Component Value Date    HDL 52 07/19/2021    TRIG 64 07/19/2021     LIVER PROFILE:  Recent Labs     01/04/22  1115 01/05/22  0608   AST 33 36   ALT 49* 38   LABALBU 3.8 2.9*         IMPRESSION:    Patient Active Problem List   Diagnosis    DM (diabetes mellitus), type 1 with complications ([facility] [address]    ESRD (end stage renal disease) on dialysis ([facility] [address]    Hypertension    Hyperlipidemia    Acute pain of left knee    Bipolar affective disorder ([facility] [address]    Coronary atherosclerosis    Acute hypoxemic respiratory failure due to COVID-19 [facility])       RECOMMENDATIONS:  1. Abnormal troponin  2. covid 19 pneumonia  3. Chronic renal failure  4. Type 1 diabetes  Patient has abnormal troponin but no signs and symptoms of acute coronary syndrome. Troponin elevation is due to renal failure. I will recommend no further cardiac testing. He has covid pneumonia and is being treated for that. Continue supportive therapy    Discussed with patient and nursing.     Tere Eldridge MD, MD  [facility] cardiology

## 2022-01-06 NOTE — PROGRESS NOTES
Reason for Consult:  End stage renal disease. Requesting Physician:  Tanya Roman MD    Interval history:   No new complaints   Still requiring 50% 40 L high flow oxygen   Labs show significant improvement after dialysis treatment yesterday    HISTORY OF PRESENT ILLNESS:    The patient is a 51 y.o. male who normally undergoes dialysis at [facility] on TTS under our care presents with fever, cough, shortness of breath and vomiting over the last 4 days. His chest x ray shows right sided pneumonia. His COVID test is positive. He is on high flow O2. His last hemodialysis was on Sunday. Today his potassium level is 5.0. Prior to Admission medications    Medication Sig Start Date End Date Taking? Authorizing Provider   FLUoxetine (PROZAC) 20 MG capsule Take 40 mg by mouth daily   Yes Historical Provider, MD   fluticasone (FLONASE) 50 MCG/ACT nasal spray 1 spray by Each Nostril route daily for 10 days 1/2/22 1/12/22  DANIEL Thomas CNP   loratadine (CLARITIN) 10 MG tablet Take 1 tablet by mouth daily for 10 days 1/2/22 1/12/22  DANIEL Thomas CNP   benzonatate (TESSALON PERLES) 100 MG capsule Take 1 capsule by mouth 3 times daily as needed for Cough 1/2/22 1/9/22  DANIEL Thomas CNP   promethazine (PHENERGAN) 25 MG tablet Take 1 tablet by mouth every 6 hours as needed for Nausea 1/2/22   DANIEL Thomas CNP   ammonium lactate (LAC-HYDRIN) 12 % lotion Apply topically daily. Patient taking differently: Apply topically daily to BLE 11/1/21   Cherry Salinas DPM   traMADol (ULTRAM) 50 MG tablet Take 50 mg by mouth 2 times daily.   4/9/21   Historical Provider, MD   furosemide (LASIX) 20 MG tablet Take 1 tablet by mouth daily 4/27/21   Bryce Almaguer DO   insulin glargine (LANTUS) 100 UNIT/ML injection vial Inject 45 units in the morning and 5 units SQ at  night 3/29/21   Bryce Almaguer DO   buPROPion (WELLBUTRIN XL) 150 MG extended release tablet Take 1 tablet by mouth every morning 3/25/21   Jovita Tommy,    metoprolol succinate (TOPROL XL) 200 MG extended release tablet Take 1 tablet by mouth daily 2/18/21   Laya Gonzalez,    insulin lispro (HUMALOG) 100 UNIT/ML injection vial Inject into the skin 3 times daily (before meals) Per sliding scale, by 2s    Historical Provider, MD   lisinopril (PRINIVIL;ZESTRIL) 20 MG tablet Take 20 mg by mouth daily     Historical Provider, MD   Rosuvastatin Calcium 40 MG CPSP Take 40 mg by mouth daily    Historical Provider, MD   ezetimibe (ZETIA) 10 MG tablet Take 10 mg by mouth daily    Historical Provider, MD   omeprazole (PRILOSEC) 40 MG delayed release capsule Take 40 mg by mouth daily    Historical Provider, MD   amLODIPine (NORVASC) 10 MG tablet Take 10 mg by mouth daily    Historical Provider, MD   aspirin 81 MG EC tablet Take 81 mg by mouth daily    Historical Provider, MD   magnesium oxide (MAG-OX) 400 MG tablet Take 400 mg by mouth daily    Historical Provider, MD   calcium acetate 667 MG TABS Take 667 mg by mouth 3 times daily (with meals)     Historical Provider, MD   vitamin B-12 (CYANOCOBALAMIN) 500 MCG tablet Take 500 mcg by mouth daily    Historical Provider, MD       Scheduled Meds:   insulin lispro  0-12 Units SubCUTAneous TID WC    insulin lispro  0-6 Units SubCUTAneous Nightly    calcium acetate  667 mg Oral TID WC    amLODIPine  10 mg Oral Daily    aspirin  81 mg Oral Daily    buPROPion  150 mg Oral QAM    ezetimibe  10 mg Oral Daily    furosemide  20 mg Oral Daily    insulin glargine  45 Units SubCUTAneous QAM    cetirizine  10 mg Oral Daily    metoprolol succinate  200 mg Oral Daily    pantoprazole  40 mg Oral QAM AC    traMADol  50 mg Oral Daily    sodium chloride flush  5-40 mL IntraVENous 2 times per day    heparin (porcine)  5,000 Units SubCUTAneous 3 times per day    dexamethasone  20 mg IntraVENous Q24H    Followed by   Allison Montana ON 1/9/2022] dexamethasone  10 mg IntraVENous Q24H    Vitamin D  6,000 Units Oral Daily    Followed by   Oscar Yan ON 1/11/2022] Vitamin D  2,000 Units Oral Daily    FLUoxetine  40 mg Oral Daily    lisinopril  20 mg Oral Daily     Continuous Infusions:   dextrose      sodium chloride        Physical Exam:  Vitals:    01/06/22 0825 01/06/22 1001 01/06/22 1150 01/06/22 1424   BP: 139/67  (!) 140/77    Pulse: 65  70    Resp: 18  16    Temp: 98.2 °F (36.8 °C)  98.2 °F (36.8 °C)    TempSrc: Oral  Oral    SpO2: 97% 96% 97% 96%   Weight:       Height:         I/O last 3 completed shifts: In: 745 [P.O.:250; I.V.:20; IV Piggyback:100]  Out: 3000     Deferred because of COVID status.     Data:  CBC:   Lab Results   Component Value Date    WBC 9.8 01/06/2022    HGB 12.3 (L) 01/06/2022    HCT 37.7 (L) 01/06/2022    MCV 92.9 01/06/2022     01/06/2022     BMP:    Lab Results   Component Value Date     01/06/2022     (L) 01/05/2022     (L) 01/04/2022    K 4.6 01/06/2022    K 5.5 (H) 01/05/2022    K 5.0 01/04/2022    CL 95 (L) 01/06/2022    CL 91 (L) 01/05/2022    CL 88 (L) 01/04/2022    CO2 26 01/06/2022    CO2 16 (L) 01/05/2022    CO2 21 01/04/2022    BUN 37 (H) 01/06/2022    BUN 42 (H) 01/05/2022    BUN 37 (H) 01/04/2022    CREATININE 5.85 (HH) 01/06/2022    CREATININE 8.11 (HH) 01/05/2022    CREATININE 8.75 (HH) 01/04/2022    GLUCOSE 119 (H) 01/06/2022    GLUCOSE 226 (H) 01/05/2022    GLUCOSE 103 (H) 01/04/2022     CMP:   Lab Results   Component Value Date     01/06/2022    K 4.6 01/06/2022    CL 95 01/06/2022    CO2 26 01/06/2022    BUN 37 01/06/2022    CREATININE 5.85 01/06/2022    GLUCOSE 119 01/06/2022    CALCIUM 8.6 01/06/2022    PROT 6.0 01/05/2022    LABALBU 2.9 01/05/2022    BILITOT 0.43 01/05/2022    ALKPHOS 110 01/05/2022    AST 36 01/05/2022    ALT 38 01/05/2022      Hepatic:   Lab Results   Component Value Date    AST 36 01/05/2022    AST 33 01/04/2022     (H) 01/08/2021    ALT 38 01/05/2022    ALT 49 (H) 01/04/2022     (H) 01/08/2021    BILITOT 0.43 01/05/2022 BILITOT 0.38 01/04/2022    BILITOT 0.38 01/08/2021    ALKPHOS 110 01/05/2022    ALKPHOS 144 (H) 01/04/2022    ALKPHOS 313 (H) 01/08/2021     BNP: No results found for: BNP  Lipids:   Lab Results   Component Value Date    CHOL 103 07/19/2021    HDL 52 07/19/2021     INR: No results found for: INR  PTH: No results found for: PTH  Phosphorus:    Lab Results   Component Value Date    PHOS 5.0 01/06/2022     Ionized Calcium: No results found for: IONCA  Magnesium:   Lab Results   Component Value Date    MG 2.2 01/06/2022     Albumin:   Lab Results   Component Value Date    LABALBU 2.9 01/05/2022     Last 3 CK, CKMB, Troponin: @LABRCNT(CKTOTAL:3,CKMB:3,TROPONINI:3)       URINE:)No results found for: Roberto Colltonny    Radiology:   Reviewed. Assessment:  End stage renal disease. Metabolic acidosis  Hyperkalemia. Hyponatremia. Hypertension. COVID pneumonia. Plan: Will hold dialysis today since the patient did receive 2 treatments in neuro  We will plan dialysis treatment on Friday then on Saturday to maintain the Tuesday Thursday Saturday schedule  we will follow phosphorus level and adjust binders as needed. We will follow H&H and start erythropoeitin stimulating agent as needed. Place on renal diet with fluid restriction of 1000 ml/24 hours once allowed to eat. We will follow up chemistries. Avoid Lovenox and Fleets enema. Please do not hesitate to contact us for any further questions/concerns. We will continue to follow along with you. Electronically signed by Goldy Kiser MD  on 1/6/2022 at 3:04 PM  [facility].   Ph: [phone]

## 2022-01-06 NOTE — CARE COORDINATION
Case Management Initial Discharge Plan  Julia Heladio,         Readmission Risk              Risk of Unplanned Readmission:  27             Phone call to pt's grandfather to discuss discharge plans. Information verified: address, contacts, phone number, , insurance Yes  PCP: DANIEL Vigil CNP  Date of last visit: Dec 2021    Insurance Provider: Medicare/ [employer] Medicare Supplement    Discharge Planning  Current Residence:  Private home  Living Arrangements:  Family Members   Home has 1 stories/ramp in place   Support Systems:  Family Members  Current Services PTA:  Dialysis T-Th-Sat Agency: [address]  Patient able to perform ADL's:Assisted. Pt does not drive  DME in home:  Walker, handicap height toilet, shower chair, cane. Bilateral leg braces. Dexa scan. DME used to aid ambulation prior to admission:   walker  DME used during admission:  Hi flow oxygen    Potential Assistance Needed:  N/A    Pharmacy: [facility] in [place]. Pt lived here one year ago and he is a [place] ([facility]) and gets better coverage through them. Potential Assistance Purchasing Medications:  No  Does patient want to participate in local refill/ meds to beds program?       Patient agreeable to home care: possible need  Freedom of choice provided:  no      Type of Home Care Services:  None  Patient expects to be discharged to:       Prior SNF/Rehab Placement and Facility: yes in [address] to SNF/Rehab: possibly if needed  Freedom of choice provided: no   Evaluation: no    Expected Discharge date:  22  Follow Up Appointment: Best Day/ Time: Monday AM    Transportation provider: grandfather if possible  Transportation arrangements needed for discharge: possible need    Discharge Plan:   Unable to speak to pt at this time due to SOB and being on hi flow oxygen. Phone call to pt's grandfather Tyler Juarez whom he lives with.   Pt lived in [facility] one year ago and was having health problems so his grandfather brought him to [place] to live with them. Pt's grandparents have both tested positive for Covid  Pt does not drive but his grandfather drives him to dialysis T-Th-Sat at [facility] on [address]. Pt was going to outpt PT at [facility] for knee pain. Discussed possible need for SNF, LTAC or home care. Pt is currently on hi flow oxygen. Grandfather can not make any decision yet will need to follow pt's progress. Phone call to Eladio Pedraza to inform her of pt's outpt dialysis and being positive for Covid.         Electronically signed by Adolfo Tellez on 1/6/22 at 3:08 PM EST

## 2022-01-07 LAB
ABSOLUTE EOS #: 0 K/UL (ref 0–0.4)
ABSOLUTE IMMATURE GRANULOCYTE: 0.09 K/UL (ref 0–0.3)
ABSOLUTE LYMPH #: 0.35 K/UL (ref 1–4.8)
ABSOLUTE MONO #: 0.26 K/UL (ref 0.2–0.8)
ANION GAP SERPL CALCULATED.3IONS-SCNC: 16 MMOL/L (ref 9–17)
BASOPHILS # BLD: 0 %
BASOPHILS ABSOLUTE: 0 K/UL (ref 0–0.2)
BUN BLDV-MCNC: 75 MG/DL (ref 6–20)
BUN/CREAT BLD: 9 (ref 9–20)
C-REACTIVE PROTEIN: 64.4 MG/L (ref 0–5)
CALCIUM SERPL-MCNC: 8.3 MG/DL (ref 8.6–10.4)
CHLORIDE BLD-SCNC: 95 MMOL/L (ref 98–107)
CO2: 22 MMOL/L (ref 20–31)
CREAT SERPL-MCNC: 8.29 MG/DL (ref 0.7–1.2)
DIFFERENTIAL TYPE: ABNORMAL
EOSINOPHILS RELATIVE PERCENT: 0 % (ref 1–4)
GFR AFRICAN AMERICAN: 8 ML/MIN
GFR NON-AFRICAN AMERICAN: 7 ML/MIN
GFR SERPL CREATININE-BSD FRML MDRD: ABNORMAL ML/MIN/{1.73_M2}
GFR SERPL CREATININE-BSD FRML MDRD: ABNORMAL ML/MIN/{1.73_M2}
GLUCOSE BLD-MCNC: 206 MG/DL (ref 70–99)
GLUCOSE BLD-MCNC: 211 MG/DL (ref 75–110)
GLUCOSE BLD-MCNC: 212 MG/DL (ref 75–110)
GLUCOSE BLD-MCNC: 239 MG/DL (ref 75–110)
GLUCOSE BLD-MCNC: 312 MG/DL (ref 75–110)
HCT VFR BLD CALC: 35.7 % (ref 40.7–50.3)
HEMOGLOBIN: 11.8 G/DL (ref 13–17)
HEPATITIS B CORE TOTAL ANTIBODY: NONREACTIVE
HEPATITIS B SURFACE ANTIGEN: NONREACTIVE
IMMATURE GRANULOCYTES: 1 %
LYMPHOCYTES # BLD: 4 % (ref 24–44)
MAGNESIUM: 2.5 MG/DL (ref 1.6–2.6)
MCH RBC QN AUTO: 30.5 PG (ref 25.2–33.5)
MCHC RBC AUTO-ENTMCNC: 33.1 G/DL (ref 28.4–34.8)
MCV RBC AUTO: 92.2 FL (ref 82.6–102.9)
MONOCYTES # BLD: 3 % (ref 1–7)
NRBC AUTOMATED: 0 PER 100 WBC
PDW BLD-RTO: 12.2 % (ref 11.8–14.4)
PHOSPHORUS: 5.3 MG/DL (ref 2.5–4.5)
PLATELET # BLD: 207 K/UL (ref 138–453)
PLATELET ESTIMATE: ABNORMAL
PMV BLD AUTO: 10.8 FL (ref 8.1–13.5)
POTASSIUM SERPL-SCNC: 4.5 MMOL/L (ref 3.7–5.3)
RBC # BLD: 3.87 M/UL (ref 4.21–5.77)
RBC # BLD: ABNORMAL 10*6/UL
SEG NEUTROPHILS: 92 % (ref 36–66)
SEGMENTED NEUTROPHILS ABSOLUTE COUNT: 8 K/UL (ref 1.8–7.7)
SODIUM BLD-SCNC: 133 MMOL/L (ref 135–144)
WBC # BLD: 8.7 K/UL (ref 3.5–11.3)
WBC # BLD: ABNORMAL 10*3/UL

## 2022-01-07 PROCEDURE — 86704 HEP B CORE ANTIBODY TOTAL: CPT

## 2022-01-07 PROCEDURE — 85025 COMPLETE CBC W/AUTO DIFF WBC: CPT

## 2022-01-07 PROCEDURE — 6360000002 HC RX W HCPCS: Performed by: NURSE PRACTITIONER

## 2022-01-07 PROCEDURE — 2060000000 HC ICU INTERMEDIATE R&B

## 2022-01-07 PROCEDURE — 87340 HEPATITIS B SURFACE AG IA: CPT

## 2022-01-07 PROCEDURE — 2700000000 HC OXYGEN THERAPY PER DAY

## 2022-01-07 PROCEDURE — 36415 COLL VENOUS BLD VENIPUNCTURE: CPT

## 2022-01-07 PROCEDURE — 94761 N-INVAS EAR/PLS OXIMETRY MLT: CPT

## 2022-01-07 PROCEDURE — 86140 C-REACTIVE PROTEIN: CPT

## 2022-01-07 PROCEDURE — 80048 BASIC METABOLIC PNL TOTAL CA: CPT

## 2022-01-07 PROCEDURE — 82947 ASSAY GLUCOSE BLOOD QUANT: CPT

## 2022-01-07 PROCEDURE — 6370000000 HC RX 637 (ALT 250 FOR IP): Performed by: NURSE PRACTITIONER

## 2022-01-07 PROCEDURE — 2580000003 HC RX 258: Performed by: NURSE PRACTITIONER

## 2022-01-07 PROCEDURE — 99232 SBSQ HOSP IP/OBS MODERATE 35: CPT | Performed by: INTERNAL MEDICINE

## 2022-01-07 PROCEDURE — 84100 ASSAY OF PHOSPHORUS: CPT

## 2022-01-07 PROCEDURE — 83735 ASSAY OF MAGNESIUM: CPT

## 2022-01-07 PROCEDURE — 90935 HEMODIALYSIS ONE EVALUATION: CPT

## 2022-01-07 RX ORDER — INSULIN GLARGINE 100 [IU]/ML
52 INJECTION, SOLUTION SUBCUTANEOUS EVERY MORNING
Status: DISCONTINUED | OUTPATIENT
Start: 2022-01-08 | End: 2022-01-11

## 2022-01-07 RX ADMIN — INSULIN GLARGINE 45 UNITS: 100 INJECTION, SOLUTION SUBCUTANEOUS at 09:40

## 2022-01-07 RX ADMIN — SODIUM CHLORIDE, PRESERVATIVE FREE 10 ML: 5 INJECTION INTRAVENOUS at 21:29

## 2022-01-07 RX ADMIN — INSULIN LISPRO 4 UNITS: 100 INJECTION, SOLUTION INTRAVENOUS; SUBCUTANEOUS at 21:20

## 2022-01-07 RX ADMIN — HEPARIN SODIUM 5000 UNITS: 5000 INJECTION INTRAVENOUS; SUBCUTANEOUS at 06:07

## 2022-01-07 RX ADMIN — BUPROPION HYDROCHLORIDE 150 MG: 150 TABLET, EXTENDED RELEASE ORAL at 10:00

## 2022-01-07 RX ADMIN — PANTOPRAZOLE SODIUM 40 MG: 40 TABLET, DELAYED RELEASE ORAL at 06:07

## 2022-01-07 RX ADMIN — ASPIRIN 81 MG: 81 TABLET, COATED ORAL at 10:01

## 2022-01-07 RX ADMIN — LISINOPRIL 20 MG: 20 TABLET ORAL at 10:01

## 2022-01-07 RX ADMIN — AMLODIPINE BESYLATE 10 MG: 10 TABLET ORAL at 10:01

## 2022-01-07 RX ADMIN — CETIRIZINE HYDROCHLORIDE 10 MG: 10 TABLET, FILM COATED ORAL at 10:01

## 2022-01-07 RX ADMIN — HEPARIN SODIUM 5000 UNITS: 5000 INJECTION INTRAVENOUS; SUBCUTANEOUS at 21:21

## 2022-01-07 RX ADMIN — EZETIMIBE 10 MG: 10 TABLET ORAL at 10:00

## 2022-01-07 RX ADMIN — SODIUM CHLORIDE, PRESERVATIVE FREE 10 ML: 5 INJECTION INTRAVENOUS at 10:01

## 2022-01-07 RX ADMIN — TRAMADOL HYDROCHLORIDE 50 MG: 50 TABLET, COATED ORAL at 10:01

## 2022-01-07 RX ADMIN — INSULIN LISPRO 4 UNITS: 100 INJECTION, SOLUTION INTRAVENOUS; SUBCUTANEOUS at 09:34

## 2022-01-07 RX ADMIN — FLUOXETINE 40 MG: 20 CAPSULE ORAL at 10:00

## 2022-01-07 RX ADMIN — INSULIN LISPRO 4 UNITS: 100 INJECTION, SOLUTION INTRAVENOUS; SUBCUTANEOUS at 13:08

## 2022-01-07 RX ADMIN — Medication 6000 UNITS: at 09:59

## 2022-01-07 RX ADMIN — DEXAMETHASONE SODIUM PHOSPHATE 20 MG: 10 INJECTION, SOLUTION INTRAMUSCULAR; INTRAVENOUS at 21:23

## 2022-01-07 RX ADMIN — FUROSEMIDE 20 MG: 20 TABLET ORAL at 10:01

## 2022-01-07 RX ADMIN — METOPROLOL SUCCINATE 200 MG: 50 TABLET, EXTENDED RELEASE ORAL at 09:59

## 2022-01-07 ASSESSMENT — PAIN SCALES - GENERAL
PAINLEVEL_OUTOF10: 3
PAINLEVEL_OUTOF10: 0

## 2022-01-07 ASSESSMENT — ENCOUNTER SYMPTOMS
ABDOMINAL PAIN: 0
COUGH: 1
SINUS PRESSURE: 0
SHORTNESS OF BREATH: 1
SINUS PAIN: 0
NAUSEA: 1
ABDOMINAL DISTENTION: 0
PHOTOPHOBIA: 0
COLOR CHANGE: 0

## 2022-01-07 NOTE — PROGRESS NOTES
Reason for Consult:  End stage renal disease. Requesting Physician:  Yaima Salmon MD    Interval history:   Receiving hemodialysis treatment   No new complaint    HISTORY OF PRESENT ILLNESS:    The patient is a 51 y.o. male who normally undergoes dialysis at [facility] on TTS under our care presents with fever, cough, shortness of breath and vomiting over the last 4 days. His chest x ray shows right sided pneumonia. His COVID test is positive. He is on high flow O2. His last hemodialysis was on Sunday. Today his potassium level is 5.0. Prior to Admission medications    Medication Sig Start Date End Date Taking? Authorizing Provider   FLUoxetine (PROZAC) 20 MG capsule Take 40 mg by mouth daily   Yes Historical Provider, MD   fluticasone (FLONASE) 50 MCG/ACT nasal spray 1 spray by Each Nostril route daily for 10 days 1/2/22 1/12/22  DANIEL John CNP   loratadine (CLARITIN) 10 MG tablet Take 1 tablet by mouth daily for 10 days 1/2/22 1/12/22  DANIEL John CNP   benzonatate (TESSALON PERLES) 100 MG capsule Take 1 capsule by mouth 3 times daily as needed for Cough 1/2/22 1/9/22  DANIEL John CNP   promethazine (PHENERGAN) 25 MG tablet Take 1 tablet by mouth every 6 hours as needed for Nausea 1/2/22   DANIEL John CNP   ammonium lactate (LAC-HYDRIN) 12 % lotion Apply topically daily. Patient taking differently: Apply topically daily to BLE 11/1/21   Maylin Colbert DPM   traMADol (ULTRAM) 50 MG tablet Take 50 mg by mouth 2 times daily.   4/9/21   Historical Provider, MD   furosemide (LASIX) 20 MG tablet Take 1 tablet by mouth daily 4/27/21   eCm Chew DO   insulin glargine (LANTUS) 100 UNIT/ML injection vial Inject 45 units in the morning and 5 units SQ at  night 3/29/21   Cem Chew DO   buPROPion (WELLBUTRIN XL) 150 MG extended release tablet Take 1 tablet by mouth every morning 3/25/21   Cem Chew DO   metoprolol succinate (TOPROL XL) 200 MG extended release tablet Take 1 tablet by mouth daily 2/18/21   Ekta Sanchez DO   insulin lispro (HUMALOG) 100 UNIT/ML injection vial Inject into the skin 3 times daily (before meals) Per sliding scale, by 2s    Historical Provider, MD   lisinopril (PRINIVIL;ZESTRIL) 20 MG tablet Take 20 mg by mouth daily     Historical Provider, MD   Rosuvastatin Calcium 40 MG CPSP Take 40 mg by mouth daily    Historical Provider, MD   ezetimibe (ZETIA) 10 MG tablet Take 10 mg by mouth daily    Historical Provider, MD   omeprazole (PRILOSEC) 40 MG delayed release capsule Take 40 mg by mouth daily    Historical Provider, MD   amLODIPine (NORVASC) 10 MG tablet Take 10 mg by mouth daily    Historical Provider, MD   aspirin 81 MG EC tablet Take 81 mg by mouth daily    Historical Provider, MD   magnesium oxide (MAG-OX) 400 MG tablet Take 400 mg by mouth daily    Historical Provider, MD   calcium acetate 667 MG TABS Take 667 mg by mouth 3 times daily (with meals)     Historical Provider, MD   vitamin B-12 (CYANOCOBALAMIN) 500 MCG tablet Take 500 mcg by mouth daily    Historical Provider, MD       Scheduled Meds:   insulin lispro  0-12 Units SubCUTAneous TID WC    insulin lispro  0-6 Units SubCUTAneous Nightly    calcium acetate  667 mg Oral TID WC    amLODIPine  10 mg Oral Daily    aspirin  81 mg Oral Daily    buPROPion  150 mg Oral QAM    ezetimibe  10 mg Oral Daily    furosemide  20 mg Oral Daily    insulin glargine  45 Units SubCUTAneous QAM    cetirizine  10 mg Oral Daily    metoprolol succinate  200 mg Oral Daily    pantoprazole  40 mg Oral QAM AC    traMADol  50 mg Oral Daily    sodium chloride flush  5-40 mL IntraVENous 2 times per day    heparin (porcine)  5,000 Units SubCUTAneous 3 times per day    dexamethasone  20 mg IntraVENous Q24H    Followed by   Jonny Aponte ON 1/9/2022] dexamethasone  10 mg IntraVENous Q24H    Vitamin D  6,000 Units Oral Daily    Followed by   Jonny Aponte ON 1/11/2022] Vitamin D  2,000 Units Oral Daily    FLUoxetine  40 mg Oral Daily    lisinopril  20 mg Oral Daily     Continuous Infusions:   dextrose      sodium chloride        Physical Exam:  Vitals:    01/07/22 1100 01/07/22 1130 01/07/22 1141 01/07/22 1200   BP: (!) 153/80 (!) 149/80  (!) 156/86   Pulse: 69 68  68   Resp:       Temp:       TempSrc:       SpO2:   91%    Weight:       Height:         I/O last 3 completed shifts: In: 500   Out: 3000     Deferred because of COVID status.     Data:  CBC:   Lab Results   Component Value Date    WBC 8.7 01/07/2022    HGB 11.8 (L) 01/07/2022    HCT 35.7 (L) 01/07/2022    MCV 92.2 01/07/2022     01/07/2022     BMP:    Lab Results   Component Value Date     (L) 01/07/2022     01/06/2022     (L) 01/05/2022    K 4.5 01/07/2022    K 4.6 01/06/2022    K 5.5 (H) 01/05/2022    CL 95 (L) 01/07/2022    CL 95 (L) 01/06/2022    CL 91 (L) 01/05/2022    CO2 22 01/07/2022    CO2 26 01/06/2022    CO2 16 (L) 01/05/2022    BUN 75 (H) 01/07/2022    BUN 37 (H) 01/06/2022    BUN 42 (H) 01/05/2022    CREATININE 8.29 (HH) 01/07/2022    CREATININE 5.85 (HH) 01/06/2022    CREATININE 8.11 (HH) 01/05/2022    GLUCOSE 206 (H) 01/07/2022    GLUCOSE 119 (H) 01/06/2022    GLUCOSE 226 (H) 01/05/2022     CMP:   Lab Results   Component Value Date     01/07/2022    K 4.5 01/07/2022    CL 95 01/07/2022    CO2 22 01/07/2022    BUN 75 01/07/2022    CREATININE 8.29 01/07/2022    GLUCOSE 206 01/07/2022    CALCIUM 8.3 01/07/2022    PROT 6.0 01/05/2022    LABALBU 2.9 01/05/2022    BILITOT 0.43 01/05/2022    ALKPHOS 110 01/05/2022    AST 36 01/05/2022    ALT 38 01/05/2022      Hepatic:   Lab Results   Component Value Date    AST 36 01/05/2022    AST 33 01/04/2022     (H) 01/08/2021    ALT 38 01/05/2022    ALT 49 (H) 01/04/2022     (H) 01/08/2021    BILITOT 0.43 01/05/2022    BILITOT 0.38 01/04/2022    BILITOT 0.38 01/08/2021    ALKPHOS 110 01/05/2022    ALKPHOS 144 (H) 01/04/2022    ALKPHOS 313 (H) 01/08/2021     BNP: No results found for: BNP  Lipids:   Lab Results   Component Value Date    CHOL 103 07/19/2021    HDL 52 07/19/2021     INR: No results found for: INR  PTH: No results found for: PTH  Phosphorus:    Lab Results   Component Value Date    PHOS 5.3 01/07/2022     Ionized Calcium: No results found for: IONCA  Magnesium:   Lab Results   Component Value Date    MG 2.5 01/07/2022     Albumin:   Lab Results   Component Value Date    LABALBU 2.9 01/05/2022     Last 3 CK, CKMB, Troponin: @LABRCNT(CKTOTAL:3,CKMB:3,TROPONINI:3)       URINE:)No results found for: Shruthi Wells    Radiology:   Reviewed. Assessment:  End stage renal disease. Metabolic acidosis  Hyperkalemia. Hyponatremia. Hypertension. COVID pneumonia. Plan:  Planning dialysis treatment today and tomorrow then will resume Tuesday Thursday Saturday schedule   pwe will follow phosphorus level and adjust binders as needed, most recent was 5.3  We will follow H&H and start erythropoeitin stimulating agent as needed, hemoglobin at goal  Place on renal diet with fluid restriction of 1000 ml/24 hours once allowed to eat. We will follow up chemistries. Avoid Lovenox and Fleets enema. We will continue dialysis on TTS schedule. Please do not hesitate to contact us for any further questions/concerns. We will continue to follow along with you. Electronically signed by Emily Nguyễn MD  on 1/7/2022 at 12:57 PM  [facility].   Ph: [phone]

## 2022-01-07 NOTE — PLAN OF CARE
Problem: Skin Integrity:  Goal: Will show no infection signs and symptoms  Description: Will show no infection signs and symptoms  1/6/2022 2144 by Nadeem Davila RN  Outcome: Ongoing  Note: Monitor for signs & symptoms of infection. Utilize standard precautions & proper handwashing. Communicate referral to infection control. 1/6/2022 1554 by Flako Denton RN  Outcome: Ongoing  Goal: Absence of new skin breakdown  Description: Absence of new skin breakdown  1/6/2022 2144 by Nadeem Davila RN  Outcome: Ongoing  Note: Continuing to monitor for skin integrity risks. Patient independent with turning/repositioning. Turning/repositioning encouraged at least once every 2 hrs, and prn basis. Hygiene care being completed independently per patient; assistance provided when deemed necessary. 1/6/2022 1554 by Flako Denton RN  Outcome: Ongoing     Problem: Airway Clearance - Ineffective  Goal: Achieve or maintain patent airway  1/6/2022 2144 by Nadeem Davila RN  Outcome: Ongoing  Note: Assessed ability to cough & breathe without laboring. Assessed need for suctioning if needed. Encouraged cough & deep breathing. 1/6/2022 1554 by Flako Denton RN  Outcome: Ongoing     Problem: Gas Exchange - Impaired  Goal: Absence of hypoxia  1/6/2022 2144 by Nadeem Davila RN  Outcome: Ongoing  Note: Assess breath sounds every shift and as needed. Assess oxygenation level & respiration rate. Encourage coughing & deep breathing. Encourage use of incentive spirometer. Assess cough & sputum. Administer oxygen as needed. 1/6/2022 1554 by Flako Denton RN  Outcome: Ongoing  Goal: Promote optimal lung function  Outcome: Ongoing     Problem: Breathing Pattern - Ineffective  Goal: Ability to achieve and maintain a regular respiratory rate  Outcome: Ongoing  Note: Assess for adventitious breath sounds. Monitored SaO2 > 90%. Applied 02 per nasal cannula as needed. Elevated HOB to improve breathing as needed. Problem: Body Temperature -  Risk of, Imbalanced  Goal: Ability to maintain a body temperature within defined limits  Outcome: Ongoing  Note: Assessed for fever. Provided appropriate intervention to regulate body temperature. Goal: Will regain or maintain usual level of consciousness  Outcome: Ongoing  Goal: Complications related to the disease process, condition or treatment will be avoided or minimized  Outcome: Ongoing     Problem: Isolation Precautions - Risk of Spread of Infection  Goal: Prevent transmission of infection  Outcome: Ongoing  Note: Monitor for signs & symptoms of infection. Utilize standard precautions & proper handwashing. Communicate referral to infection control. Problem: Nutrition Deficits  Goal: Optimize nutritional status  Outcome: Ongoing  Note: Review diet daily and as needed with pt. Provide supplement nutrition as ordered by physician & educate pt. Review nutritional guidelines with pt. Problem: Risk for Fluid Volume Deficit  Goal: Maintain normal heart rhythm  Outcome: Ongoing  Note: Monitor vital signs at least every shift & as needed. Monitor intake & output at least every shift. Goal: Maintain absence of muscle cramping  Outcome: Ongoing  Goal: Maintain normal serum potassium, sodium, calcium, phosphorus, and pH  Outcome: Ongoing     Problem: Loneliness or Risk for Loneliness  Goal: Demonstrate positive use of time alone when socialization is not possible  Outcome: Ongoing  Note: Assess mood. Provide support & reassurance, & coping skills to aid with mood. Promote level of activity as pt is tolerable. Problem: Fatigue  Goal: Verbalize increase energy and improved vitality  Outcome: Ongoing     Problem: Patient Education: Go to Patient Education Activity  Goal: Patient/Family Education  Outcome: Ongoing  Note: Educated pt regarding reason for admission, treatment, medication. Provided oral and/or written instructions to treatment.   Assessed level of verbal understanding of pt and/or family.

## 2022-01-07 NOTE — PROGRESS NOTES
[facility]   Section of Cardiology  Progress Note      Date:  1/7/2022  Patient: Bernardo Ramey  Admission:  1/4/2022  9:25 AM  Admit DX: Respiratory alkalosis [E87.3]  Hyponatremia [E87.1]  Acute respiratory failure with hypoxia (HCC) [J96.01]  Acute hypoxemic respiratory failure due to COVID-19 (HCC) [U07.1, J96.01]  Pneumonia due to COVID-19 virus [U07.1, J12.82]  COVID-19 [U07.1]  Age:  51 y.o., 1975                           LOS: 3 days     Reason for evaluation:   Abnormal troponin  Renal failure on dialysis  covid 19 pneumonia      SUBJECTIVE:     The patient was seen and examined. Notes and labs reviewed. There were not complications over night. OBJECTIVE:    BP (!) 169/82   Pulse 72   Temp 98.1 °F (36.7 °C) (Oral)   Resp 18   Ht 5' 7\" (1.702 m)   Wt 183 lb 3.2 oz (83.1 kg)   SpO2 95%   BMI 28.69 kg/m²     Intake/Output Summary (Last 24 hours) at 1/7/2022 1743  Last data filed at 1/7/2022 1344  Gross per 24 hour   Intake --   Output 1500 ml   Net -1500 ml       EXAM:   Full physical exam was not done due to active covid 19 infection and to preserve PPE.     Current Inpatient Medications:   [START ON 1/8/2022] insulin glargine  52 Units SubCUTAneous QAM    insulin lispro  0-12 Units SubCUTAneous TID WC    insulin lispro  0-6 Units SubCUTAneous Nightly    calcium acetate  667 mg Oral TID WC    amLODIPine  10 mg Oral Daily    aspirin  81 mg Oral Daily    buPROPion  150 mg Oral QAM    ezetimibe  10 mg Oral Daily    furosemide  20 mg Oral Daily    cetirizine  10 mg Oral Daily    metoprolol succinate  200 mg Oral Daily    pantoprazole  40 mg Oral QAM AC    traMADol  50 mg Oral Daily    sodium chloride flush  5-40 mL IntraVENous 2 times per day    heparin (porcine)  5,000 Units SubCUTAneous 3 times per day    dexamethasone  20 mg IntraVENous Q24H    Followed by   Tala Lopez ON 1/9/2022] dexamethasone  10 mg IntraVENous Q24H    Vitamin D  6,000 Units Oral Daily Followed by   Creola Felty ON 1/11/2022] Vitamin D  2,000 Units Oral Daily    FLUoxetine  40 mg Oral Daily    lisinopril  20 mg Oral Daily       IV Infusions (if any):   dextrose      sodium chloride         Diagnostics:       Labs:   CBC:   Recent Labs     01/06/22  0607 01/07/22  0621   WBC 9.8 8.7   HGB 12.3* 11.8*   HCT 37.7* 35.7*    207     BMP:   Recent Labs     01/06/22  0607 01/07/22  0621    133*   K 4.6 4.5   CO2 26 22   BUN 37* 75*   CREATININE 5.85* 8.29*   LABGLOM 10* 7*   GLUCOSE 119* 206*     BNP: No results for input(s): BNP in the last 72 hours. PT/INR: No results for input(s): PROTIME, INR in the last 72 hours. APTT:No results for input(s): APTT in the last 72 hours. CARDIAC ENZYMES:No results for input(s): CKTOTAL, CKMB, CKMBINDEX, TROPONINI in the last 72 hours. FASTING LIPID PANEL:  Lab Results   Component Value Date    HDL 52 07/19/2021    TRIG 64 07/19/2021     LIVER PROFILE:  Recent Labs     01/05/22  0608   AST 36   ALT 38   LABALBU 2.9*       ASSESSMENT:    Patient Active Problem List   Diagnosis    DM (diabetes mellitus), type 1 with complications ([facility]    ESRD (end stage renal disease) on dialysis ([facility]    Hypertension    Hyperlipidemia    Acute pain of left knee    Bipolar affective disorder ([facility]    Coronary atherosclerosis    Acute hypoxemic respiratory failure due to COVID-19 ([facility]       PLAN:    1. Abnormal troponin  2. Covid 19 pneumonia  3. Chronic kidney disease on hemodialysis  Continue supportive therapy  No aggressive cardiac intervention indicated at this time. Please see orders. Discussed with nursing.     Hira Graham MD, MD

## 2022-01-07 NOTE — PROGRESS NOTES
[facility]  Office: [address], DO, Rinku Marie, DO, Senait Maher, DO, Chemo Horse Blood, DO, Natacha Crawley MD, Anthony Daniels MD, Whitney Hurd MD, Trino Crocker MD, Orion Alexander MD, Rodolfo Herman MD, Raphael Dennis MD, John Kolb, DO, Carlin Landis, DO, Cordelia Barthel, MD,  Grabiel Teresa, DO, Dona Wood MD, Farshad Medellin MD, Juan Cheema MD, Jaye Sesay MD, Kirk Winters MD, Meghana Bella MD, Litzy Rodrigues MD, Tho Olson, [facility], [facility] Castro, CNP, Aditi Ayala, CNP, Katya Manzo, CNS, Zaina Taylor, CNP, Javan Mahan, CNP, Mimi Pires, CNP, Harlan Fuentes, CNP, Chanel Rodriguez, CNP, Kaye Pineda PA-C, [place], [facility], Jatin Jackson, [facility], Rolando Fabian, CNP, Faye Talbot, CNP, Patria Bush, CNP, Kim Hartman, CNP, Jey Estrada, CNP, Elvin Chong    Progress Note    1/7/2022    3:02 PM    Name:   Walt Mcintyre  MRN:     [id number]     [place]:      [de-identified]   Room:   [address] [place] [address] Day:  3  Admit Date:  1/4/2022  9:25 AM    PCP:   DANIEL Vegas CNP  Code Status:  Full Code    Subjective:     C/C:   Chief Complaint   Patient presents with   Nancy Rhodes Cough    Fever     Interval History Status: Improved    Patient was feeling much better this morning. He is on room air. Patient denies any complaints. Brief History:     1/4 - Patient reports to the emergency department with fever, chills, cough and exertional dyspnea. Patient has a known history of type 1 diabetes with ESRD. Patient is a TTS dialysis patient who missed his dialysis appointment today. In the emergency department patient was evaluated and was found to have significant pulmonary infiltrates to the right lung consistent with viral pneumonia. Patient subsequently tested positive for COVID-19.   Patient is found to be hypoxic and tachycardic and required nonrebreather mask oxygen supplementation at QAM    ezetimibe  10 mg Oral Daily    furosemide  20 mg Oral Daily    insulin glargine  45 Units SubCUTAneous QAM    cetirizine  10 mg Oral Daily    metoprolol succinate  200 mg Oral Daily    pantoprazole  40 mg Oral QAM AC    traMADol  50 mg Oral Daily    sodium chloride flush  5-40 mL IntraVENous 2 times per day    heparin (porcine)  5,000 Units SubCUTAneous 3 times per day    dexamethasone  20 mg IntraVENous Q24H    Followed by   Emerson Gates ON 2022] dexamethasone  10 mg IntraVENous Q24H    Vitamin D  6,000 Units Oral Daily    Followed by   Emerson Gates ON 2022] Vitamin D  2,000 Units Oral Daily    FLUoxetine  40 mg Oral Daily    lisinopril  20 mg Oral Daily     Continuous Infusions:    dextrose      sodium chloride       PRN Meds: glucose, dextrose, glucagon (rDNA), dextrose, benzonatate, promethazine, sodium chloride flush, sodium chloride, ondansetron **OR** ondansetron, polyethylene glycol, acetaminophen **OR** acetaminophen, guaiFENesin-dextromethorphan, promethazine, LORazepam    Data:     Past Medical History:   has a past medical history of Closed fracture of bone of right foot, Dialysis patient ([facility] Kidney disease, and [facility] Social History:   reports that he has never smoked. He has never used smokeless tobacco. He reports that he does not drink alcohol and does not use drugs. Family History:   Family History   Problem Relation Age of Onset    Diabetes Mother     Diabetes Father     Heart Disease Maternal Great Grandfather        Vitals:  BP (!) 178/81   Pulse 69   Temp 97.7 °F (36.5 °C)   Resp 16   Ht 5' 7\" (1.702 m)   Wt 183 lb 3.2 oz (83.1 kg)   SpO2 91%   BMI 28.69 kg/m²   Temp (24hrs), Av °F (36.7 °C), Min:97.7 °F (36.5 °C), Max:98.4 °F (36.9 °C)    Recent Labs     22  1641 22  1915 22  0823 22  1221   POCGLU 328* 290* 212* 211*       I/O (24Hr):     Intake/Output Summary (Last 24 hours) at 2022 1502  Last data filed at 1/7/2022 1344  Gross per 24 hour   Intake --   Output 1500 ml   Net -1500 ml       Labs:  Hematology:  Recent Labs     01/05/22 0608 01/06/22 0607 01/07/22 0621   WBC 11.7* 9.8 8.7   RBC 4.22 4.06* 3.87*   HGB 12.7* 12.3* 11.8*   HCT 41.0 37.7* 35.7*   MCV 97.2 92.9 92.2   MCH 30.1 30.3 30.5   MCHC 31.0 32.6 33.1   RDW 12.8 12.7 12.2   * 167 207   MPV 11.0 10.9 10.8   .2* 131.8* 64.4*     Chemistry:  Recent Labs     01/05/22 0608 01/06/22 0607 01/07/22 0621   * 135 133*   K 5.5* 4.6 4.5   CL 91* 95* 95*   CO2 16* 26 22   GLUCOSE 226* 119* 206*   BUN 42* 37* 75*   CREATININE 8.11* 5.85* 8.29*   MG 2.2 2.2 2.5   ANIONGAP 21* 14 16   LABGLOM 7* 10* 7*   GFRAA 9* 13* 8*   CALCIUM 8.4* 8.6 8.3*   PHOS 5.4* 5.0* 5.3*     Recent Labs     01/05/22 0608 01/05/22  1641 01/06/22 0823 01/06/22  1145 01/06/22  1641 01/06/22  1915 01/07/22  0823 01/07/22  1221   PROT 6.0*  --   --   --   --   --   --   --    LABALBU 2.9*  --   --   --   --   --   --   --    AST 36  --   --   --   --   --   --   --    ALT 38  --   --   --   --   --   --   --    *  --   --   --   --   --   --   --    ALKPHOS 110  --   --   --   --   --   --   --    BILITOT 0.43  --   --   --   --   --   --   --    POCGLU  --    < > 114* 204* 328* 290* 212* 211*    < > = values in this interval not displayed. ABG:  Lab Results   Component Value Date    FIO2 NOT REPORTED 01/04/2022     Lab Results   Component Value Date/Time    SPECIAL RAC,6ML 01/04/2022 11:35 AM     Lab Results   Component Value Date/Time    CULTURE NO GROWTH 3 DAYS 01/04/2022 11:35 AM       Radiology:  MRI LUMBAR SPINE WO CONTRAST    Result Date: 12/30/2021  3 mm central disc protrusion at L5-S1 mildly effacing the lateral recesses and mildly narrowing the spinal canal.     NM LUNG SCAN PERFUSION ONLY    Result Date: 1/4/2022  Very low probability for pulmonary embolism.      XR CHEST PORTABLE    Result Date: 1/5/2022  New left perihilar infiltrate as described above. Stable right perihilar infiltrate. Otherwise stable     XR CHEST PORTABLE    Result Date: 1/4/2022  Right lung infiltrate with suggests developing pneumonia in the right clinical setting. Recommend follow-up to resolution. XR CHEST PORTABLE    Result Date: 1/2/2022  Marginal inspiration, without evidence of acute cardiopulmonary disease       Physical Examination:        Physical Exam  Constitutional:       General: He is in acute distress. Appearance: He is obese. He is ill-appearing. HENT:      Head: Normocephalic and atraumatic. Nose: Nose normal.      Mouth/Throat:      Mouth: Mucous membranes are dry. Eyes:      Extraocular Movements: Extraocular movements intact. Pupils: Pupils are equal, round, and reactive to light. Cardiovascular:      Rate and Rhythm: Tachycardia present. Pulses: Normal pulses. Heart sounds: No murmur heard. Pulmonary:      Effort: Respiratory distress present. Breath sounds: Rales present. Musculoskeletal:         General: No swelling or tenderness. Normal range of motion. Cervical back: Normal range of motion. No rigidity. Skin:     General: Skin is warm and dry. Capillary Refill: Capillary refill takes 2 to 3 seconds. Coloration: Skin is not jaundiced or pale. Neurological:      General: No focal deficit present. Mental Status: He is alert and oriented to person, place, and time.    Psychiatric:         Mood and Affect: Mood normal.         Behavior: Behavior normal.         Assessment:        Hospital Problems           Last Modified POA    * (Principal) Acute hypoxemic respiratory failure due to COVID-19 [facility]) 1/4/2022 Yes    DM (diabetes mellitus), type 1 with complications ([facility] 5/4/8468 Yes    ESRD (end stage renal disease) on dialysis ([facility] 1/4/2022 Yes    Hypertension 1/4/2022 Yes    Hyperlipidemia 1/4/2022 Yes    Bipolar affective disorder ([facility] 1/4/2022 Yes          Plan: 1. Acute hypoxic respiratory failure secondary to COVID-19  1. Patient on room air this morning. 2. High intensity steroids of 20 mg Decadron per day for 5 days followed by 10 mg Decadron for 5 days  3. Not a candidate for Barcitinib due to renal failure  4. Status post Actemra  2. ESRD on dialysis  1. Nephrology following  3. Diabetes type 1  1. Increase Lantus to 52 units daily  2. Corrective sliding scale insulin as ordered  4. Hypertension with hyperlipidemia  1.  Home medication regiment as ordered        Rama Hicks MD  1/7/2022  3:02 PM

## 2022-01-07 NOTE — CARE COORDINATION
Social Work-Spoke with [facility]. They are looking for chair time at [facility]. They will need HEP Panel.  Tawnya Knighth

## 2022-01-07 NOTE — CARE COORDINATION
Social Work-Phone call to [facility]. They have chair time available for COVID on T/R/S. Once HEP Panel is completed. It will need to be faxed to [facility] Int[facility] and a time will be assigned.  Lola Hilliard

## 2022-01-07 NOTE — CARE COORDINATION
Discharge planning    Patient chart reviewed. Appreciate prior CM and SS assessment. SS following as patient was at HD at [facility] and now covid +. Referral was faxed to central intake with arina. Therapy did evaluate and will continue to monitor progress. LTAC vs SNF vs HC     Patient lives with grandfather Tavon Ashby. DME Walker, handicap height toilet, shower chair, cane. Bilateral leg braces. Dexa scan.     COVID   HF 40L/40%  Testing 1/4  Admitted as PCU since 1/5

## 2022-01-07 NOTE — PROGRESS NOTES
[facility] Safety Checks:    Patient ID Verified (Y/N) YES     -Hepatitis Test                   Date      Result  Hepatitis B Surface Antigen   21 NEG     Hepatitis B Surface Antibody 21 324     Hepatitis B Core Antibody        -Treatment Initiation  Blood Vol Processed Goal (Liters)  Pump Speed x Treatment Hours x 60 Minutes    Target Fluid Removal 1500     * Intra-treatment documented Safety Checks include the followin) Access and face visible at all times. 2) All connections and blood lines are secure with no kinks. 3) NVL alarm engaged. 4) Hemosafe device applied (if applicable). 5) No collapse of Arterial or Venous blood chambers. 6) All blood lines / pump segments in the air detectors. --------------------------------------------------------------------------------  Denies complaints VSS.

## 2022-01-07 NOTE — PROGRESS NOTES
Dialysis Post Treatment Report:     -Access Assessment  WNL     -Ultrafiltration   UF Goal 2000   Prime (-) 500   NS Flush (-)    Other (-/+)    Total UF Removed 1500     -Medications / Blood Administration  Medications Given (Y/N) NO   Blood Products (Y/N)      Narrative: Tolerated tx well. Denies complaints. VSS.

## 2022-01-08 LAB
ABSOLUTE EOS #: 0 K/UL (ref 0–0.44)
ABSOLUTE IMMATURE GRANULOCYTE: 0.07 K/UL (ref 0–0.3)
ABSOLUTE LYMPH #: 0.5 K/UL (ref 1.1–3.7)
ABSOLUTE MONO #: 0.29 K/UL (ref 0.1–1.2)
ANION GAP SERPL CALCULATED.3IONS-SCNC: 13 MMOL/L (ref 9–17)
BASOPHILS # BLD: 0 % (ref 0–2)
BASOPHILS ABSOLUTE: 0 K/UL (ref 0–0.2)
BUN BLDV-MCNC: 65 MG/DL (ref 6–20)
BUN/CREAT BLD: 10 (ref 9–20)
CALCIUM SERPL-MCNC: 8.6 MG/DL (ref 8.6–10.4)
CHLORIDE BLD-SCNC: 96 MMOL/L (ref 98–107)
CO2: 23 MMOL/L (ref 20–31)
CREAT SERPL-MCNC: 6.69 MG/DL (ref 0.7–1.2)
DIFFERENTIAL TYPE: ABNORMAL
EOSINOPHILS RELATIVE PERCENT: 0 % (ref 1–4)
GFR AFRICAN AMERICAN: 11 ML/MIN
GFR NON-AFRICAN AMERICAN: 9 ML/MIN
GFR SERPL CREATININE-BSD FRML MDRD: ABNORMAL ML/MIN/{1.73_M2}
GFR SERPL CREATININE-BSD FRML MDRD: ABNORMAL ML/MIN/{1.73_M2}
GLUCOSE BLD-MCNC: 104 MG/DL (ref 75–110)
GLUCOSE BLD-MCNC: 345 MG/DL (ref 75–110)
GLUCOSE BLD-MCNC: 352 MG/DL (ref 75–110)
GLUCOSE BLD-MCNC: 357 MG/DL (ref 75–110)
GLUCOSE BLD-MCNC: 360 MG/DL (ref 70–99)
HCT VFR BLD CALC: 35.9 % (ref 40.7–50.3)
HEMOGLOBIN: 11.9 G/DL (ref 13–17)
IMMATURE GRANULOCYTES: 1 %
LYMPHOCYTES # BLD: 7 % (ref 24–43)
MAGNESIUM: 2.3 MG/DL (ref 1.6–2.6)
MCH RBC QN AUTO: 30.2 PG (ref 25.2–33.5)
MCHC RBC AUTO-ENTMCNC: 33.1 G/DL (ref 28.4–34.8)
MCV RBC AUTO: 91.1 FL (ref 82.6–102.9)
MONOCYTES # BLD: 4 % (ref 3–12)
NRBC AUTOMATED: 0 PER 100 WBC
PDW BLD-RTO: 11.9 % (ref 11.8–14.4)
PHOSPHORUS: 4.9 MG/DL (ref 2.5–4.5)
PLATELET # BLD: 203 K/UL (ref 138–453)
PLATELET ESTIMATE: ABNORMAL
PMV BLD AUTO: 10.4 FL (ref 8.1–13.5)
POTASSIUM SERPL-SCNC: 5.1 MMOL/L (ref 3.7–5.3)
RBC # BLD: 3.94 M/UL (ref 4.21–5.77)
RBC # BLD: ABNORMAL 10*6/UL
SEG NEUTROPHILS: 88 % (ref 36–65)
SEGMENTED NEUTROPHILS ABSOLUTE COUNT: 6.34 K/UL (ref 1.5–8.1)
SODIUM BLD-SCNC: 132 MMOL/L (ref 135–144)
WBC # BLD: 7.2 K/UL (ref 3.5–11.3)
WBC # BLD: ABNORMAL 10*3/UL

## 2022-01-08 PROCEDURE — 83735 ASSAY OF MAGNESIUM: CPT

## 2022-01-08 PROCEDURE — 90935 HEMODIALYSIS ONE EVALUATION: CPT

## 2022-01-08 PROCEDURE — 82947 ASSAY GLUCOSE BLOOD QUANT: CPT

## 2022-01-08 PROCEDURE — 2580000003 HC RX 258: Performed by: NURSE PRACTITIONER

## 2022-01-08 PROCEDURE — 6360000002 HC RX W HCPCS: Performed by: NURSE PRACTITIONER

## 2022-01-08 PROCEDURE — 80048 BASIC METABOLIC PNL TOTAL CA: CPT

## 2022-01-08 PROCEDURE — 36415 COLL VENOUS BLD VENIPUNCTURE: CPT

## 2022-01-08 PROCEDURE — 6370000000 HC RX 637 (ALT 250 FOR IP): Performed by: INTERNAL MEDICINE

## 2022-01-08 PROCEDURE — 85025 COMPLETE CBC W/AUTO DIFF WBC: CPT

## 2022-01-08 PROCEDURE — 84100 ASSAY OF PHOSPHORUS: CPT

## 2022-01-08 PROCEDURE — 6370000000 HC RX 637 (ALT 250 FOR IP): Performed by: NURSE PRACTITIONER

## 2022-01-08 PROCEDURE — 2500000003 HC RX 250 WO HCPCS: Performed by: NURSE PRACTITIONER

## 2022-01-08 PROCEDURE — 2060000000 HC ICU INTERMEDIATE R&B

## 2022-01-08 PROCEDURE — 99232 SBSQ HOSP IP/OBS MODERATE 35: CPT | Performed by: INTERNAL MEDICINE

## 2022-01-08 RX ADMIN — METOPROLOL SUCCINATE 200 MG: 50 TABLET, EXTENDED RELEASE ORAL at 09:42

## 2022-01-08 RX ADMIN — INSULIN LISPRO 10 UNITS: 100 INJECTION, SOLUTION INTRAVENOUS; SUBCUTANEOUS at 17:29

## 2022-01-08 RX ADMIN — ASPIRIN 81 MG: 81 TABLET, COATED ORAL at 09:43

## 2022-01-08 RX ADMIN — FLUOXETINE 40 MG: 20 CAPSULE ORAL at 09:43

## 2022-01-08 RX ADMIN — INSULIN LISPRO 10 UNITS: 100 INJECTION, SOLUTION INTRAVENOUS; SUBCUTANEOUS at 09:44

## 2022-01-08 RX ADMIN — CETIRIZINE HYDROCHLORIDE 10 MG: 10 TABLET, FILM COATED ORAL at 09:43

## 2022-01-08 RX ADMIN — BUPROPION HYDROCHLORIDE 150 MG: 150 TABLET, EXTENDED RELEASE ORAL at 09:43

## 2022-01-08 RX ADMIN — SODIUM CHLORIDE, PRESERVATIVE FREE 10 ML: 5 INJECTION INTRAVENOUS at 09:42

## 2022-01-08 RX ADMIN — EZETIMIBE 10 MG: 10 TABLET ORAL at 09:43

## 2022-01-08 RX ADMIN — INSULIN GLARGINE 52 UNITS: 100 INJECTION, SOLUTION SUBCUTANEOUS at 09:44

## 2022-01-08 RX ADMIN — CALCIUM ACETATE 667 MG: 667 CAPSULE ORAL at 09:42

## 2022-01-08 RX ADMIN — SODIUM CHLORIDE 25 ML: 9 INJECTION, SOLUTION INTRAVENOUS at 17:27

## 2022-01-08 RX ADMIN — BENZONATATE 100 MG: 100 CAPSULE ORAL at 09:43

## 2022-01-08 RX ADMIN — FUROSEMIDE 20 MG: 20 TABLET ORAL at 09:43

## 2022-01-08 RX ADMIN — AMLODIPINE BESYLATE 10 MG: 10 TABLET ORAL at 09:43

## 2022-01-08 RX ADMIN — TRAMADOL HYDROCHLORIDE 50 MG: 50 TABLET, COATED ORAL at 09:43

## 2022-01-08 RX ADMIN — HEPARIN SODIUM 5000 UNITS: 5000 INJECTION INTRAVENOUS; SUBCUTANEOUS at 21:47

## 2022-01-08 RX ADMIN — CALCIUM ACETATE 667 MG: 667 CAPSULE ORAL at 17:26

## 2022-01-08 RX ADMIN — CALCIUM ACETATE 667 MG: 667 CAPSULE ORAL at 13:00

## 2022-01-08 RX ADMIN — HEPARIN SODIUM 5000 UNITS: 5000 INJECTION INTRAVENOUS; SUBCUTANEOUS at 05:45

## 2022-01-08 RX ADMIN — HEPARIN SODIUM 5000 UNITS: 5000 INJECTION INTRAVENOUS; SUBCUTANEOUS at 13:00

## 2022-01-08 RX ADMIN — Medication 6000 UNITS: at 09:43

## 2022-01-08 RX ADMIN — LISINOPRIL 20 MG: 20 TABLET ORAL at 09:43

## 2022-01-08 RX ADMIN — INSULIN LISPRO 10 UNITS: 100 INJECTION, SOLUTION INTRAVENOUS; SUBCUTANEOUS at 13:00

## 2022-01-08 RX ADMIN — DEXAMETHASONE SODIUM PHOSPHATE 20 MG: 10 INJECTION, SOLUTION INTRAMUSCULAR; INTRAVENOUS at 17:28

## 2022-01-08 RX ADMIN — PANTOPRAZOLE SODIUM 40 MG: 40 TABLET, DELAYED RELEASE ORAL at 05:45

## 2022-01-08 ASSESSMENT — PAIN DESCRIPTION - DESCRIPTORS: DESCRIPTORS: ACHING

## 2022-01-08 ASSESSMENT — PAIN SCALES - GENERAL
PAINLEVEL_OUTOF10: 0
PAINLEVEL_OUTOF10: 1
PAINLEVEL_OUTOF10: 0
PAINLEVEL_OUTOF10: 4
PAINLEVEL_OUTOF10: 0

## 2022-01-08 ASSESSMENT — ENCOUNTER SYMPTOMS
COLOR CHANGE: 0
PHOTOPHOBIA: 0
SHORTNESS OF BREATH: 1
ABDOMINAL PAIN: 0
COUGH: 1
ABDOMINAL DISTENTION: 0
SINUS PRESSURE: 0
NAUSEA: 1
SINUS PAIN: 0

## 2022-01-08 ASSESSMENT — PAIN DESCRIPTION - LOCATION: LOCATION: GENERALIZED

## 2022-01-08 ASSESSMENT — PAIN DESCRIPTION - FREQUENCY: FREQUENCY: INTERMITTENT

## 2022-01-08 ASSESSMENT — PAIN DESCRIPTION - PAIN TYPE: TYPE: CHRONIC PAIN

## 2022-01-08 NOTE — PROGRESS NOTES
[facility]   Section of Cardiology  Progress Note      Date:  1/8/2022  Patient: Nic Lucero  Admission:  1/4/2022  9:25 AM  Admit DX: Respiratory alkalosis [E87.3]  Hyponatremia [E87.1]  Acute respiratory failure with hypoxia (HCC) [J96.01]  Acute hypoxemic respiratory failure due to COVID-19 (HCC) [U07.1, J96.01]  Pneumonia due to COVID-19 virus [U07.1, J12.82]  COVID-19 [U07.1]  Age:  51 y.o., 1975                           LOS: 4 days     Reason for evaluation:   Abnormal troponin  Renal failure on dialysis  covid 19 pneumonia      SUBJECTIVE:     The patient was seen and examined. Notes and labs reviewed. There were not complications over night. No new issues. OBJECTIVE:    BP (!) 152/76   Pulse 62   Temp 97.7 °F (36.5 °C) (Oral)   Resp 16   Ht 5' 7\" (1.702 m)   Wt 183 lb 3.2 oz (83.1 kg)   SpO2 99%   BMI 28.69 kg/m²     Intake/Output Summary (Last 24 hours) at 1/8/2022 1515  Last data filed at 1/8/2022 1325  Gross per 24 hour   Intake 725 ml   Output --   Net 725 ml       EXAM:   Full physical exam was not done due to active covid 19 infection and to preserve PPE.     Current Inpatient Medications:   insulin glargine  52 Units SubCUTAneous QAM    insulin lispro  0-12 Units SubCUTAneous TID WC    insulin lispro  0-6 Units SubCUTAneous Nightly    calcium acetate  667 mg Oral TID WC    amLODIPine  10 mg Oral Daily    aspirin  81 mg Oral Daily    buPROPion  150 mg Oral QAM    ezetimibe  10 mg Oral Daily    furosemide  20 mg Oral Daily    cetirizine  10 mg Oral Daily    metoprolol succinate  200 mg Oral Daily    pantoprazole  40 mg Oral QAM AC    traMADol  50 mg Oral Daily    sodium chloride flush  5-40 mL IntraVENous 2 times per day    heparin (porcine)  5,000 Units SubCUTAneous 3 times per day    dexamethasone  20 mg IntraVENous Q24H    Followed by   Riley Pichardo ON 1/9/2022] dexamethasone  10 mg IntraVENous Q24H    Vitamin D  6,000 Units Oral Daily    Followed by  [START ON 1/11/2022] Vitamin D  2,000 Units Oral Daily    FLUoxetine  40 mg Oral Daily    lisinopril  20 mg Oral Daily       IV Infusions (if any):   dextrose      sodium chloride         Diagnostics:       Labs:   CBC:   Recent Labs     01/07/22  0621 01/08/22  0618   WBC 8.7 7.2   HGB 11.8* 11.9*   HCT 35.7* 35.9*    203     BMP:   Recent Labs     01/07/22  0621 01/08/22  0618   * 132*   K 4.5 5.1   CO2 22 23   BUN 75* 65*   CREATININE 8.29* 6.69*   LABGLOM 7* 9*   GLUCOSE 206* 360*     BNP: No results for input(s): BNP in the last 72 hours. PT/INR: No results for input(s): PROTIME, INR in the last 72 hours. APTT:No results for input(s): APTT in the last 72 hours. CARDIAC ENZYMES:No results for input(s): CKTOTAL, CKMB, CKMBINDEX, TROPONINI in the last 72 hours. FASTING LIPID PANEL:  Lab Results   Component Value Date    HDL 52 07/19/2021    TRIG 64 07/19/2021     LIVER PROFILE:  No results for input(s): AST, ALT, LABALBU in the last 72 hours. ASSESSMENT:    Patient Active Problem List   Diagnosis    DM [facility]diabetes mellitus), type 1 with complications ([facility] [address]    ESRD (end stage renal disease) on dialysis ([facility] [address] [facility]    Hypertension    Hyperlipidemia    Acute pain of left knee    Bipolar affective disorder ([facility] [address]    Coronary atherosclerosis    Acute hypoxemic respiratory failure due to COVID-19 ([facility] [address]       PLAN:    1. Abnormal troponin  2. Covid 19 pneumonia  3. Chronic kidney disease on hemodialysis  Continue supportive therapy. Abnormal troponin is due to renal failure. No aggressive cardiac intervention indicated at this time. Please see orders. Discussed with nursing.     Nadeem Worrell MD, MD

## 2022-01-08 NOTE — PROGRESS NOTES
Faxed Hepatitis pannel results to [id number] intake per Starr Terrazas, social work request, with face sheet and fax cover.

## 2022-01-08 NOTE — PLAN OF CARE
Problem: Skin Integrity:  Goal: Will show no infection signs and symptoms  Description: Will show no infection signs and symptoms  1/8/2022 0327 by Latasha Lyn RN  Outcome: Ongoing  1/7/2022 1544 by Cristal Garg RN  Outcome: Ongoing     Problem: Skin Integrity:  Goal: Absence of new skin breakdown  Description: Absence of new skin breakdown  1/8/2022 0327 by Latasha Lyn RN  Outcome: Ongoing  1/7/2022 1544 by Cristal Garg RN  Outcome: Ongoing     Problem: Airway Clearance - Ineffective  Goal: Achieve or maintain patent airway  1/8/2022 0327 by Latasha Lyn RN  Outcome: Ongoing  1/7/2022 1544 by Cristal Garg RN  Outcome: Ongoing     Problem: Isolation Precautions - Risk of Spread of Infection  Goal: Prevent transmission of infection  Outcome: Ongoing

## 2022-01-08 NOTE — PROGRESS NOTES
[facility]  Office: [address], DO, Christina Kaleigh, DO, Jacky Carpenter, DO, Lexi Laraapplelisa Emmanuel, DO, Piter Meléndez MD, To Ruiz MD, Meet Traylor MD, Misael Ureña MD, Geneva Rodriguez MD, Luis Fernando Rico MD, Lui Estrada MD, Brayan Hicks, DO, Maria Dolores Marino, DO, Fiona Bauman MD,  Kinjal Lyn, DO, Spenser Jean Baptiste MD, Anibal Melgar MD, Quique oSler MD, He Jo MD, Eleuterio Sharma MD, Turner Krueger MD, Werner Young MD, Delisa Mcfarlane [facility], [facility], CNP, Bri Miranda, CNP, Cindy Almazan, CNS, Kate Walton, CNP, Jasvir Marie, CNP, Blanca Ames, CNP, Ángel Frausto, CNP, Quique Santos, CNP, RICHIE ZhuC, Kevin Velazquez, [facility], Felicitas Muhammad, [facility], Good Scott, CNP, Allison Rodríguez, CNP, Gifty Kilpatrick, CNP, Estefany Bubsy, CNP, Marilin Serna, [facility], Yohannes Basilio [facility]    Progress Note    1/8/2022    5:42 PM    Name:   Fabian Hare  MRN:     [id number]     Acct:      [de-identified]   Room:   [address] [place] [address] Day:  4  Admit Date:  1/4/2022  9:25 AM    PCP:   DANIEL Guillory CNP  Code Status:  Full Code    Subjective:     C/C:   Chief Complaint   Patient presents with   Tomie Grater Cough    Fever     Interval History Status: Improved    Patient was feeling much better this morning. He is on room air. Patient denies any complaints. Brief History:     1/4 - Patient reports to the emergency department with fever, chills, cough and exertional dyspnea. Patient has a known history of type 1 diabetes with ESRD. Patient is a TTS dialysis patient who missed his dialysis appointment today. In the emergency department patient was evaluated and was found to have significant pulmonary infiltrates to the right lung consistent with viral pneumonia. Patient subsequently tested positive for COVID-19.   Patient is found to be hypoxic and tachycardic and required nonrebreather mask oxygen supplementation at 15 L to stabilize his SPO2 greater than 90%. Due to the patient's ESRD CTA of the chest was not possible and a VQ scan was completed which was low probability for PE. Patient has a persistent productive cough with yellow phlegm. During simple communication with the patient he will desaturate to 83% on 15 L nonrebreather mask. Placed on heated high flow oxygen emergently. 1/5 - Patient continues to require 60 L of heated high flow oxygen support. Patient inflammatory markers are rapidly rising and new infiltrates are identified in the left lung. Patient received dialysis yesterday nephrology remains on board. Patient will reportedly receive dialysis again today. Patient will be administered Actemra due to rapidly increasing inflammatory markers and increasing oxygen demand. Review of Systems:     Review of Systems   Constitutional: Positive for activity change, fatigue and fever. HENT: Negative for sinus pressure and sinus pain. Eyes: Negative for photophobia and visual disturbance. Respiratory: Positive for cough and shortness of breath. Cardiovascular: Negative for chest pain and palpitations. Gastrointestinal: Positive for nausea. Negative for abdominal distention and abdominal pain. Endocrine: Negative for polyphagia and polyuria. Genitourinary: Negative for urgency. Musculoskeletal: Negative for arthralgias and myalgias. Skin: Negative for color change, pallor and rash. Neurological: Negative for tremors, syncope and weakness. Hematological: Negative for adenopathy. Does not bruise/bleed easily. Psychiatric/Behavioral: Negative for agitation and confusion. Medications:      Allergies:  No Known Allergies    Current Meds:   Scheduled Meds:    insulin lispro  0-12 Units SubCUTAneous Q4H    insulin glargine  52 Units SubCUTAneous QAM    calcium acetate  667 mg Oral TID     amLODIPine  10 mg Oral Daily    aspirin  81 mg Oral Daily    buPROPion  150 mg Oral QAM  ezetimibe  10 mg Oral Daily    furosemide  20 mg Oral Daily    cetirizine  10 mg Oral Daily    metoprolol succinate  200 mg Oral Daily    pantoprazole  40 mg Oral QAM AC    traMADol  50 mg Oral Daily    sodium chloride flush  5-40 mL IntraVENous 2 times per day    heparin (porcine)  5,000 Units SubCUTAneous 3 times per day    dexamethasone  20 mg IntraVENous Q24H    Followed by   Carlos A Melania ON 2022] dexamethasone  10 mg IntraVENous Q24H    Vitamin D  6,000 Units Oral Daily    Followed by   Carlos A Melania ON 2022] Vitamin D  2,000 Units Oral Daily    FLUoxetine  40 mg Oral Daily    lisinopril  20 mg Oral Daily     Continuous Infusions:    dextrose      sodium chloride 25 mL (22 1727)     PRN Meds: glucose, dextrose, glucagon (rDNA), dextrose, benzonatate, promethazine, sodium chloride flush, sodium chloride, ondansetron **OR** ondansetron, polyethylene glycol, acetaminophen **OR** acetaminophen, guaiFENesin-dextromethorphan, promethazine, LORazepam    Data:     Past Medical History:   has a past medical history of Closed fracture of bone of right foot, Dialysis patient ([facility] [address] Kidney disease, and Type 1 diabetes mellitus ([facility] [address] Social History:   reports that he has never smoked. He has never used smokeless tobacco. He reports that he does not drink alcohol and does not use drugs. Family History:   Family History   Problem Relation Age of Onset    Diabetes Mother     Diabetes Father     Heart Disease Maternal Great Grandfather        Vitals:  /78   Pulse 59   Temp 97.9 °F (36.6 °C)   Resp 16   Ht 5' 7\" (1.702 m)   Wt 184 lb 11.9 oz (83.8 kg)   SpO2 99%   BMI 28.94 kg/m²   Temp (24hrs), Av.9 °F (36.6 °C), Min:97.7 °F (36.5 °C), Max:98.4 °F (36.9 °C)    Recent Labs     22  1936 22  0811 22  1151 22  1604   POCGLU 312* 352* 345* 357*       I/O (24Hr):     Intake/Output Summary (Last 24 hours) at 2022 1742  Last data filed at 2022 1325  Gross per 24 hour   Intake 725 ml   Output --   Net 725 ml       Labs:  Hematology:  Recent Labs     01/06/22  0607 01/07/22 0621 01/08/22 0618   WBC 9.8 8.7 7.2   RBC 4.06* 3.87* 3.94*   HGB 12.3* 11.8* 11.9*   HCT 37.7* 35.7* 35.9*   MCV 92.9 92.2 91.1   MCH 30.3 30.5 30.2   MCHC 32.6 33.1 33.1   RDW 12.7 12.2 11.9    207 203   MPV 10.9 10.8 10.4   .8* 64.4*  --      Chemistry:  Recent Labs     01/06/22  0607 01/07/22  0621 01/08/22 0618    133* 132*   K 4.6 4.5 5.1   CL 95* 95* 96*   CO2 26 22 23   GLUCOSE 119* 206* 360*   BUN 37* 75* 65*   CREATININE 5.85* 8.29* 6.69*   MG 2.2 2.5 2.3   ANIONGAP 14 16 13   LABGLOM 10* 7* 9*   GFRAA 13* 8* 11*   CALCIUM 8.6 8.3* 8.6   PHOS 5.0* 5.3* 4.9*     Recent Labs     01/07/22  1221 01/07/22  1654 01/07/22  1936 01/08/22  0811 01/08/22  1151 01/08/22  1604   POCGLU 211* 239* 312* 352* 345* 357*     ABG:  Lab Results   Component Value Date    FIO2 NOT REPORTED 01/04/2022     Lab Results   Component Value Date/Time    SPECIAL RAC,6ML 01/04/2022 11:35 AM     Lab Results   Component Value Date/Time    CULTURE NO GROWTH 4 DAYS 01/04/2022 11:35 AM       Radiology:  MRI LUMBAR SPINE WO CONTRAST    Result Date: 12/30/2021  3 mm central disc protrusion at L5-S1 mildly effacing the lateral recesses and mildly narrowing the spinal canal.     NM LUNG SCAN PERFUSION ONLY    Result Date: 1/4/2022  Very low probability for pulmonary embolism. XR CHEST PORTABLE    Result Date: 1/5/2022  New left perihilar infiltrate as described above. Stable right perihilar infiltrate. Otherwise stable     XR CHEST PORTABLE    Result Date: 1/4/2022  Right lung infiltrate with suggests developing pneumonia in the right clinical setting. Recommend follow-up to resolution.      XR CHEST PORTABLE    Result Date: 1/2/2022  Marginal inspiration, without evidence of acute cardiopulmonary disease       Physical Examination:        Physical Exam  Constitutional:       General: He is in acute distress. Appearance: He is obese. He is ill-appearing. HENT:      Head: Normocephalic and atraumatic. Nose: Nose normal.      Mouth/Throat:      Mouth: Mucous membranes are dry. Eyes:      Extraocular Movements: Extraocular movements intact. Pupils: Pupils are equal, round, and reactive to light. Cardiovascular:      Rate and Rhythm: Tachycardia present. Pulses: Normal pulses. Heart sounds: No murmur heard. Pulmonary:      Effort: Respiratory distress present. Breath sounds: Rales present. Musculoskeletal:         General: No swelling or tenderness. Normal range of motion. Cervical back: Normal range of motion. No rigidity. Skin:     General: Skin is warm and dry. Capillary Refill: Capillary refill takes 2 to 3 seconds. Coloration: Skin is not jaundiced or pale. Neurological:      General: No focal deficit present. Mental Status: He is alert and oriented to person, place, and time. Psychiatric:         Mood and Affect: Mood normal.         Behavior: Behavior normal.         Assessment:        Hospital Problems           Last Modified POA    * (Principal) Acute hypoxemic respiratory failure due to COVID-19 ([facility] [address] 1/4/2022 Yes    DM (diabetes mellitus), type 1 with complications ([facility] [address] 2/9/2764 Yes    ESRD (end stage renal disease) on dialysis ([facility] [address] 1/4/2022 Yes    Hypertension 1/4/2022 Yes    Hyperlipidemia 1/4/2022 Yes    Bipolar affective disorder ([facility] [address] 1/4/2022 Yes          Plan:        1. Acute hypoxic respiratory failure secondary to COVID-19  1. Patient on room air this morning. 2. High intensity steroids of 20 mg Decadron per day for 5 days followed by 10 mg Decadron for 5 days  3. Not a candidate for Barcitinib due to renal failure  4. Status post Actemra  2. ESRD on dialysis  1. Nephrology following  3. Diabetes type 1  1. Increase Lantus to 52 units daily  2. Corrective sliding scale insulin as ordered  4.  Hypertension with hyperlipidemia  1.  Home medication regiment as ordered        Yaima Salmon MD  1/8/2022  5:42 PM

## 2022-01-08 NOTE — DISCHARGE INSTR - DIET
Good nutrition is important when healing from an illness, injury, or surgery. Follow any nutrition recommendations given to you during your hospital stay. If you were given an oral nutrition supplement while in the hospital, continue to take this supplement at home. You can take it with meals, in-between meals, and/or before bedtime. These supplements can be purchased at most local grocery stores, pharmacies, and chain [employer]-stores. If you have any questions about your diet or nutrition, call the hospital and ask for the dietitian. Carb Control 4, renal diet  1 Liter/day fluid restriction.

## 2022-01-08 NOTE — PROGRESS NOTES
Reason for Consult:  End stage renal disease. Requesting Physician:  Quincy Rico MD    Interval history:   HD yesterday. No issues. AM labs pending. BP is better this am.   Attempted to call into the room. He did not answer the phone. HISTORY OF PRESENT ILLNESS:    The patient is a 51 y.o. male who normally undergoes dialysis at [facility] on TTS under our care presents with fever, cough, shortness of breath and vomiting over the last 4 days. His chest x ray shows right sided pneumonia. His COVID test is positive. He is on high flow O2. His last hemodialysis was on Sunday. Today his potassium level is 5.0. Prior to Admission medications    Medication Sig Start Date End Date Taking? Authorizing Provider   FLUoxetine (PROZAC) 20 MG capsule Take 40 mg by mouth daily   Yes Historical Provider, MD   fluticasone (FLONASE) 50 MCG/ACT nasal spray 1 spray by Each Nostril route daily for 10 days 1/2/22 1/12/22  DANIEL Francisco CNP   loratadine (CLARITIN) 10 MG tablet Take 1 tablet by mouth daily for 10 days 1/2/22 1/12/22  DANIEL Francisco CNP   benzonatate (TESSALON PERLES) 100 MG capsule Take 1 capsule by mouth 3 times daily as needed for Cough 1/2/22 1/9/22  DANIEL Francisco CNP   promethazine (PHENERGAN) 25 MG tablet Take 1 tablet by mouth every 6 hours as needed for Nausea 1/2/22   DANIEL Francisco CNP   ammonium lactate (LAC-HYDRIN) 12 % lotion Apply topically daily. Patient taking differently: Apply topically daily to BLE 11/1/21   John Cameron DPM   traMADol (ULTRAM) 50 MG tablet Take 50 mg by mouth 2 times daily.   4/9/21   Historical Provider, MD   furosemide (LASIX) 20 MG tablet Take 1 tablet by mouth daily 4/27/21   Sd Kurtz DO   insulin glargine (LANTUS) 100 UNIT/ML injection vial Inject 45 units in the morning and 5 units SQ at  night 3/29/21   Sd Kurtz DO   buPROPion (WELLBUTRIN XL) 150 MG extended release tablet Take 1 tablet by mouth every morning 3/25/21   Sd Kurtz DO   metoprolol succinate (TOPROL XL) 200 MG extended release tablet Take 1 tablet by mouth daily 2/18/21   Sd Kurtz,    insulin lispro (HUMALOG) 100 UNIT/ML injection vial Inject into the skin 3 times daily (before meals) Per sliding scale, by 2s    Historical Provider, MD   lisinopril (PRINIVIL;ZESTRIL) 20 MG tablet Take 20 mg by mouth daily     Historical Provider, MD   Rosuvastatin Calcium 40 MG CPSP Take 40 mg by mouth daily    Historical Provider, MD   ezetimibe (ZETIA) 10 MG tablet Take 10 mg by mouth daily    Historical Provider, MD   omeprazole (PRILOSEC) 40 MG delayed release capsule Take 40 mg by mouth daily    Historical Provider, MD   amLODIPine (NORVASC) 10 MG tablet Take 10 mg by mouth daily    Historical Provider, MD   aspirin 81 MG EC tablet Take 81 mg by mouth daily    Historical Provider, MD   magnesium oxide (MAG-OX) 400 MG tablet Take 400 mg by mouth daily    Historical Provider, MD   calcium acetate 667 MG TABS Take 667 mg by mouth 3 times daily (with meals)     Historical Provider, MD   vitamin B-12 (CYANOCOBALAMIN) 500 MCG tablet Take 500 mcg by mouth daily    Historical Provider, MD       Scheduled Meds:   insulin glargine  52 Units SubCUTAneous QAM    insulin lispro  0-12 Units SubCUTAneous TID WC    insulin lispro  0-6 Units SubCUTAneous Nightly    calcium acetate  667 mg Oral TID WC    amLODIPine  10 mg Oral Daily    aspirin  81 mg Oral Daily    buPROPion  150 mg Oral QAM    ezetimibe  10 mg Oral Daily    furosemide  20 mg Oral Daily    cetirizine  10 mg Oral Daily    metoprolol succinate  200 mg Oral Daily    pantoprazole  40 mg Oral QAM AC    traMADol  50 mg Oral Daily    sodium chloride flush  5-40 mL IntraVENous 2 times per day    heparin (porcine)  5,000 Units SubCUTAneous 3 times per day    dexamethasone  20 mg IntraVENous Q24H    Followed by   Jonatan Lee ON 1/9/2022] dexamethasone  10 mg IntraVENous Q24H    Vitamin D  6,000 Units Oral Daily    Followed by   [facility] [START ON 1/11/2022] Vitamin D  2,000 Units Oral Daily    FLUoxetine  40 mg Oral Daily    lisinopril  20 mg Oral Daily     Continuous Infusions:   dextrose      sodium chloride        Physical Exam:  Vitals:    01/07/22 1533 01/07/22 1935 01/08/22 0001 01/08/22 0424   BP: (!) 169/82 138/63 (!) 144/66 (!) 138/56   Pulse: 72 69 61 55   Resp: 18 16 18 16   Temp: 98.1 °F (36.7 °C) 98.4 °F (36.9 °C) 98.2 °F (36.8 °C) 97.7 °F (36.5 °C)   TempSrc: Oral Oral Oral Oral   SpO2: 95% 99% 100% 99%   Weight:       Height:         I/O last 3 completed shifts:  In: -   Out: 1500     Deferred because of COVID status.     Data:  CBC:   Lab Results   Component Value Date    WBC 8.7 01/07/2022    HGB 11.8 (L) 01/07/2022    HCT 35.7 (L) 01/07/2022    MCV 92.2 01/07/2022     01/07/2022     BMP:    Lab Results   Component Value Date     (L) 01/07/2022     01/06/2022     (L) 01/05/2022    K 4.5 01/07/2022    K 4.6 01/06/2022    K 5.5 (H) 01/05/2022    CL 95 (L) 01/07/2022    CL 95 (L) 01/06/2022    CL 91 (L) 01/05/2022    CO2 22 01/07/2022    CO2 26 01/06/2022    CO2 16 (L) 01/05/2022    BUN 75 (H) 01/07/2022    BUN 37 (H) 01/06/2022    BUN 42 (H) 01/05/2022    CREATININE 8.29 (HH) 01/07/2022    CREATININE 5.85 (HH) 01/06/2022    CREATININE 8.11 (HH) 01/05/2022    GLUCOSE 206 (H) 01/07/2022    GLUCOSE 119 (H) 01/06/2022    GLUCOSE 226 (H) 01/05/2022     CMP:   Lab Results   Component Value Date     01/07/2022    K 4.5 01/07/2022    CL 95 01/07/2022    CO2 22 01/07/2022    BUN 75 01/07/2022    CREATININE 8.29 01/07/2022    GLUCOSE 206 01/07/2022    CALCIUM 8.3 01/07/2022    PROT 6.0 01/05/2022    LABALBU 2.9 01/05/2022    BILITOT 0.43 01/05/2022    ALKPHOS 110 01/05/2022    AST 36 01/05/2022    ALT 38 01/05/2022      Hepatic:   Lab Results   Component Value Date    AST 36 01/05/2022    AST 33 01/04/2022     (H) 01/08/2021    ALT 38 01/05/2022    ALT 49 (H) 01/04/2022     (H) 01/08/2021 BILITOT 0.43 01/05/2022    BILITOT 0.38 01/04/2022    BILITOT 0.38 01/08/2021    ALKPHOS 110 01/05/2022    ALKPHOS 144 (H) 01/04/2022    ALKPHOS 313 (H) 01/08/2021     BNP: No results found for: BNP  Lipids:   Lab Results   Component Value Date    CHOL 103 07/19/2021    HDL 52 07/19/2021     INR: No results found for: INR  PTH: No results found for: PTH  Phosphorus:    Lab Results   Component Value Date    PHOS 5.3 01/07/2022     Ionized Calcium: No results found for: IONCA  Magnesium:   Lab Results   Component Value Date    MG 2.5 01/07/2022     Albumin:   Lab Results   Component Value Date    LABALBU 2.9 01/05/2022     Last 3 CK, CKMB, Troponin: @LABRCNT(CKTOTAL:3,CKMB:3,TROPONINI:3)       URINE:)No results found for: Margie Kidd    Radiology:   Reviewed. Assessment:  End stage renal disease. Metabolic acidosis  Hyperkalemia. Hyponatremia. Hypertension. COVID pneumonia. Plan:  HD today. Continue Tuesday Thursday Saturday schedule   Follow up phosphorus level and adjust binders as needed. Phos at goal.  We will follow H&H and start erythropoeitin stimulating agent as needed. Hemoglobin at goal  Renal diet with fluid restriction of 1000 ml/24 hours. We will follow up chemistries. DM per primary. Avoid Lovenox and Fleets enema. Please do not hesitate to contact us for any further questions/concerns. We will continue to follow along with you. Pt seen in collaboration with Dr. Maddi Vines. Electronically signed by DANIEL Reyes CNP  on 1/8/2022 at 6:25 AM  [facility].   Ph: [phone]    Physician Addendum  I evaluated the patient with CNP   Agree with above assessment and plan      Electronically signed by Tammy Hernandez MD on 01/08/22 4:33 PM

## 2022-01-08 NOTE — PROGRESS NOTES
HEMODIALYSIS PRE-TREATMENT NOTE    Patient Identifiers prior to treatment: Name, , MRN    Isolation Required: Yes                      Isolation Type: Droplet Plus, COVID Positive       (please document if patient is being managed as a PUI/COVID-19 patient)        Hepatitis status:                           Date Drawn                             Result  Hepatitis B Surface Antigen 22     Negative                     Hepatitis B Surface Antibody          Hepatitis B Core Antibody 22 Negative          How was Hepatitis Status verified: Epic, Susceptible status     Was a copy of the labs you documented provided to facility for the patient's chart:     Hemodialysis orders verified: Yes, Dr. Jania Mcgrath Within normal limits ( I.e. s/s of infection,...): AVF DENIA WNL.  Positive thrill and bruit noted     Pre-Assessment completed: Yes    Pre-dialysis report received from: Rosy Skinner RN                      Time: 0650

## 2022-01-09 LAB
ABSOLUTE EOS #: 0 K/UL (ref 0–0.44)
ABSOLUTE IMMATURE GRANULOCYTE: 0.1 K/UL (ref 0–0.3)
ABSOLUTE LYMPH #: 0.69 K/UL (ref 1.1–3.7)
ABSOLUTE MONO #: 0.69 K/UL (ref 0.1–1.2)
ANION GAP SERPL CALCULATED.3IONS-SCNC: 14 MMOL/L (ref 9–17)
BASOPHILS # BLD: 0 % (ref 0–2)
BASOPHILS ABSOLUTE: 0 K/UL (ref 0–0.2)
BUN BLDV-MCNC: 48 MG/DL (ref 6–20)
BUN/CREAT BLD: 9 (ref 9–20)
CALCIUM SERPL-MCNC: 8.8 MG/DL (ref 8.6–10.4)
CHLORIDE BLD-SCNC: 100 MMOL/L (ref 98–107)
CO2: 21 MMOL/L (ref 20–31)
CREAT SERPL-MCNC: 5.15 MG/DL (ref 0.7–1.2)
CULTURE: NORMAL
CULTURE: NORMAL
DIFFERENTIAL TYPE: ABNORMAL
EOSINOPHILS RELATIVE PERCENT: 0 % (ref 1–4)
GFR AFRICAN AMERICAN: 15 ML/MIN
GFR NON-AFRICAN AMERICAN: 12 ML/MIN
GFR SERPL CREATININE-BSD FRML MDRD: ABNORMAL ML/MIN/{1.73_M2}
GFR SERPL CREATININE-BSD FRML MDRD: ABNORMAL ML/MIN/{1.73_M2}
GLUCOSE BLD-MCNC: 109 MG/DL (ref 75–110)
GLUCOSE BLD-MCNC: 160 MG/DL (ref 75–110)
GLUCOSE BLD-MCNC: 192 MG/DL (ref 75–110)
GLUCOSE BLD-MCNC: 227 MG/DL (ref 75–110)
GLUCOSE BLD-MCNC: 236 MG/DL (ref 70–99)
GLUCOSE BLD-MCNC: 272 MG/DL (ref 75–110)
GLUCOSE BLD-MCNC: 282 MG/DL (ref 75–110)
HCT VFR BLD CALC: 37.2 % (ref 40.7–50.3)
HEMOGLOBIN: 12.8 G/DL (ref 13–17)
IMMATURE GRANULOCYTES: 1 %
LYMPHOCYTES # BLD: 7 % (ref 24–43)
Lab: NORMAL
Lab: NORMAL
MAGNESIUM: 2.2 MG/DL (ref 1.6–2.6)
MCH RBC QN AUTO: 30.7 PG (ref 25.2–33.5)
MCHC RBC AUTO-ENTMCNC: 34.4 G/DL (ref 28.4–34.8)
MCV RBC AUTO: 89.2 FL (ref 82.6–102.9)
MONOCYTES # BLD: 7 % (ref 3–12)
NRBC AUTOMATED: 0 PER 100 WBC
PDW BLD-RTO: 11.9 % (ref 11.8–14.4)
PHOSPHORUS: 4.2 MG/DL (ref 2.5–4.5)
PLATELET # BLD: 219 K/UL (ref 138–453)
PLATELET ESTIMATE: ABNORMAL
PMV BLD AUTO: 10.4 FL (ref 8.1–13.5)
POTASSIUM SERPL-SCNC: 4.7 MMOL/L (ref 3.7–5.3)
RBC # BLD: 4.17 M/UL (ref 4.21–5.77)
RBC # BLD: ABNORMAL 10*6/UL
SEG NEUTROPHILS: 85 % (ref 36–65)
SEGMENTED NEUTROPHILS ABSOLUTE COUNT: 8.42 K/UL (ref 1.5–8.1)
SODIUM BLD-SCNC: 135 MMOL/L (ref 135–144)
SPECIMEN DESCRIPTION: NORMAL
SPECIMEN DESCRIPTION: NORMAL
WBC # BLD: 9.9 K/UL (ref 3.5–11.3)
WBC # BLD: ABNORMAL 10*3/UL

## 2022-01-09 PROCEDURE — 6370000000 HC RX 637 (ALT 250 FOR IP): Performed by: NURSE PRACTITIONER

## 2022-01-09 PROCEDURE — 97116 GAIT TRAINING THERAPY: CPT

## 2022-01-09 PROCEDURE — 2580000003 HC RX 258: Performed by: NURSE PRACTITIONER

## 2022-01-09 PROCEDURE — 2060000000 HC ICU INTERMEDIATE R&B

## 2022-01-09 PROCEDURE — 85025 COMPLETE CBC W/AUTO DIFF WBC: CPT

## 2022-01-09 PROCEDURE — 6370000000 HC RX 637 (ALT 250 FOR IP): Performed by: INTERNAL MEDICINE

## 2022-01-09 PROCEDURE — 2500000003 HC RX 250 WO HCPCS: Performed by: NURSE PRACTITIONER

## 2022-01-09 PROCEDURE — 6360000002 HC RX W HCPCS: Performed by: NURSE PRACTITIONER

## 2022-01-09 PROCEDURE — 97110 THERAPEUTIC EXERCISES: CPT

## 2022-01-09 PROCEDURE — 80048 BASIC METABOLIC PNL TOTAL CA: CPT

## 2022-01-09 PROCEDURE — 97530 THERAPEUTIC ACTIVITIES: CPT

## 2022-01-09 PROCEDURE — 83735 ASSAY OF MAGNESIUM: CPT

## 2022-01-09 PROCEDURE — 84100 ASSAY OF PHOSPHORUS: CPT

## 2022-01-09 PROCEDURE — 36415 COLL VENOUS BLD VENIPUNCTURE: CPT

## 2022-01-09 PROCEDURE — 99232 SBSQ HOSP IP/OBS MODERATE 35: CPT | Performed by: INTERNAL MEDICINE

## 2022-01-09 PROCEDURE — 82947 ASSAY GLUCOSE BLOOD QUANT: CPT

## 2022-01-09 PROCEDURE — 97112 NEUROMUSCULAR REEDUCATION: CPT

## 2022-01-09 RX ADMIN — SODIUM CHLORIDE, PRESERVATIVE FREE 10 ML: 5 INJECTION INTRAVENOUS at 16:53

## 2022-01-09 RX ADMIN — INSULIN LISPRO 6 UNITS: 100 INJECTION, SOLUTION INTRAVENOUS; SUBCUTANEOUS at 14:07

## 2022-01-09 RX ADMIN — CALCIUM ACETATE 667 MG: 667 CAPSULE ORAL at 16:53

## 2022-01-09 RX ADMIN — INSULIN LISPRO 2 UNITS: 100 INJECTION, SOLUTION INTRAVENOUS; SUBCUTANEOUS at 18:24

## 2022-01-09 RX ADMIN — PANTOPRAZOLE SODIUM 40 MG: 40 TABLET, DELAYED RELEASE ORAL at 05:54

## 2022-01-09 RX ADMIN — HEPARIN SODIUM 5000 UNITS: 5000 INJECTION INTRAVENOUS; SUBCUTANEOUS at 05:54

## 2022-01-09 RX ADMIN — INSULIN LISPRO 6 UNITS: 100 INJECTION, SOLUTION INTRAVENOUS; SUBCUTANEOUS at 01:59

## 2022-01-09 RX ADMIN — CALCIUM ACETATE 667 MG: 667 CAPSULE ORAL at 08:11

## 2022-01-09 RX ADMIN — BUPROPION HYDROCHLORIDE 150 MG: 150 TABLET, EXTENDED RELEASE ORAL at 08:11

## 2022-01-09 RX ADMIN — ASPIRIN 81 MG: 81 TABLET, COATED ORAL at 08:11

## 2022-01-09 RX ADMIN — FUROSEMIDE 20 MG: 20 TABLET ORAL at 08:11

## 2022-01-09 RX ADMIN — INSULIN LISPRO 2 UNITS: 100 INJECTION, SOLUTION INTRAVENOUS; SUBCUTANEOUS at 10:43

## 2022-01-09 RX ADMIN — TRAMADOL HYDROCHLORIDE 50 MG: 50 TABLET, COATED ORAL at 08:11

## 2022-01-09 RX ADMIN — HEPARIN SODIUM 5000 UNITS: 5000 INJECTION INTRAVENOUS; SUBCUTANEOUS at 22:15

## 2022-01-09 RX ADMIN — LISINOPRIL 20 MG: 20 TABLET ORAL at 08:11

## 2022-01-09 RX ADMIN — HEPARIN SODIUM 5000 UNITS: 5000 INJECTION INTRAVENOUS; SUBCUTANEOUS at 14:07

## 2022-01-09 RX ADMIN — INSULIN GLARGINE 52 UNITS: 100 INJECTION, SOLUTION SUBCUTANEOUS at 09:00

## 2022-01-09 RX ADMIN — SODIUM CHLORIDE, PRESERVATIVE FREE 10 ML: 5 INJECTION INTRAVENOUS at 22:15

## 2022-01-09 RX ADMIN — INSULIN LISPRO 4 UNITS: 100 INJECTION, SOLUTION INTRAVENOUS; SUBCUTANEOUS at 05:47

## 2022-01-09 RX ADMIN — AMLODIPINE BESYLATE 10 MG: 10 TABLET ORAL at 08:11

## 2022-01-09 RX ADMIN — FLUOXETINE 40 MG: 20 CAPSULE ORAL at 08:11

## 2022-01-09 RX ADMIN — METOPROLOL SUCCINATE 200 MG: 50 TABLET, EXTENDED RELEASE ORAL at 08:11

## 2022-01-09 RX ADMIN — DEXAMETHASONE SODIUM PHOSPHATE 10 MG: 10 INJECTION, SOLUTION INTRAMUSCULAR; INTRAVENOUS at 16:53

## 2022-01-09 RX ADMIN — CETIRIZINE HYDROCHLORIDE 10 MG: 10 TABLET, FILM COATED ORAL at 08:11

## 2022-01-09 RX ADMIN — EZETIMIBE 10 MG: 10 TABLET ORAL at 08:11

## 2022-01-09 RX ADMIN — Medication 6000 UNITS: at 08:53

## 2022-01-09 RX ADMIN — CALCIUM ACETATE 667 MG: 667 CAPSULE ORAL at 11:44

## 2022-01-09 RX ADMIN — SODIUM CHLORIDE, PRESERVATIVE FREE 10 ML: 5 INJECTION INTRAVENOUS at 08:12

## 2022-01-09 ASSESSMENT — ENCOUNTER SYMPTOMS
COLOR CHANGE: 0
SINUS PAIN: 0
ABDOMINAL PAIN: 0
NAUSEA: 1
PHOTOPHOBIA: 0
COUGH: 1
ABDOMINAL DISTENTION: 0
SHORTNESS OF BREATH: 1
SINUS PRESSURE: 0

## 2022-01-09 ASSESSMENT — PAIN SCALES - WONG BAKER

## 2022-01-09 ASSESSMENT — PAIN SCALES - GENERAL
PAINLEVEL_OUTOF10: 0

## 2022-01-09 NOTE — PROGRESS NOTES
[facility]  Office: [address], DO, Lazaro Kennedynathaniel, DO, Garrick Simpson, DO, Tomasz Emmanuel, DO, Eva Cortez MD, Kashif Hernandez MD, Orly Chilel MD, Jamel Ahuja MD, Deja Shah MD, Rashawn Calvin MD, Zander Blanton MD, Aniket Harry, DO, Rodney Gamez DO, Chesley Saint, MD,  Glynda Fothergill, DO, Ankit Wang MD, Quincy Rico MD, Emperatriz Love MD, Renee Buenrostro MD, Atilio Salcido MD, Samuel Foster MD, Keila Garcia MD, Devika Blakely [facility], [facility], CNP, Dennie Hageman, CNP, Nahed Sandoval, CNS, Preeti Lucero, CNP, Sree Flores, CNP, Catrachito Foster, CNP, Layton Perez, CNP, Margarita Esquivel CNP, Lexx Ramos PA-C, Suresh Tony, VASILE, Amee Mcguire, VASILE, Preethi Cunha, CNP, Misha Andrade, CNP, Jcarlos Aguilar, CNP, Gerald Kessler CNP, Neida Ernst [facility], Elvin Colbert    Progress Note    1/9/2022    11:22 AM    Name:   Patricia Prince  MRN:     [id number]     Acct:      [de-identified]   Room:   [address] [place] [address] Day:  5  Admit Date:  1/4/2022  9:25 AM    PCP:   DANIEL Yusuf CNP  Code Status:  Full Code    Subjective:     C/C:   Chief Complaint   Patient presents with   [facility] Cough    Fever     Interval History Status: Improved    Patient was feeling much better this morning. He is on room air. Patient denies any complaints. Brief History:     1/4 - Patient reports to the emergency department with fever, chills, cough and exertional dyspnea. Patient has a known history of type 1 diabetes with ESRD. Patient is a TTS dialysis patient who missed his dialysis appointment today. In the emergency department patient was evaluated and was found to have significant pulmonary infiltrates to the right lung consistent with viral pneumonia. Patient subsequently tested positive for COVID-19.   Patient is found to be hypoxic and tachycardic and required nonrebreather mask oxygen supplementation at 15 L to stabilize his SPO2 greater than 90%. Due to the patient's ESRD CTA of the chest was not possible and a VQ scan was completed which was low probability for PE. Patient has a persistent productive cough with yellow phlegm. During simple communication with the patient he will desaturate to 83% on 15 L nonrebreather mask. Placed on heated high flow oxygen emergently. 1/5 - Patient continues to require 60 L of heated high flow oxygen support. Patient inflammatory markers are rapidly rising and new infiltrates are identified in the left lung. Patient received dialysis yesterday nephrology remains on board. Patient will reportedly receive dialysis again today. Patient will be administered Actemra due to rapidly increasing inflammatory markers and increasing oxygen demand. Review of Systems:     Review of Systems   Constitutional: Positive for activity change, fatigue and fever. HENT: Negative for sinus pressure and sinus pain. Eyes: Negative for photophobia and visual disturbance. Respiratory: Positive for cough and shortness of breath. Cardiovascular: Negative for chest pain and palpitations. Gastrointestinal: Positive for nausea. Negative for abdominal distention and abdominal pain. Endocrine: Negative for polyphagia and polyuria. Genitourinary: Negative for urgency. Musculoskeletal: Negative for arthralgias and myalgias. Skin: Negative for color change, pallor and rash. Neurological: Negative for tremors, syncope and weakness. Hematological: Negative for adenopathy. Does not bruise/bleed easily. Psychiatric/Behavioral: Negative for agitation and confusion. Medications:      Allergies:  No Known Allergies    Current Meds:   Scheduled Meds:    insulin lispro  0-12 Units SubCUTAneous Q4H    insulin glargine  52 Units SubCUTAneous QAM    calcium acetate  667 mg Oral TID     amLODIPine  10 mg Oral Daily    aspirin  81 mg Oral Daily    buPROPion  150 mg Oral QAM    ezetimibe  10 mg Oral Daily    furosemide  20 mg Oral Daily    cetirizine  10 mg Oral Daily    metoprolol succinate  200 mg Oral Daily    pantoprazole  40 mg Oral QAM AC    traMADol  50 mg Oral Daily    sodium chloride flush  5-40 mL IntraVENous 2 times per day    heparin (porcine)  5,000 Units SubCUTAneous 3 times per day    dexamethasone  10 mg IntraVENous Q24H    Vitamin D  6,000 Units Oral Daily    Followed by   Flako Kelley ON 2022] Vitamin D  2,000 Units Oral Daily    FLUoxetine  40 mg Oral Daily    lisinopril  20 mg Oral Daily     Continuous Infusions:    dextrose      sodium chloride Stopped (22 1805)     PRN Meds: glucose, dextrose, glucagon (rDNA), dextrose, benzonatate, promethazine, sodium chloride flush, sodium chloride, ondansetron **OR** ondansetron, polyethylene glycol, acetaminophen **OR** acetaminophen, guaiFENesin-dextromethorphan, promethazine, LORazepam    Data:     Past Medical History:   has a past medical history of Closed fracture of bone of right foot, Dialysis patient ([facility] [address] Kidney disease, and Type 1 diabetes mellitus ([facility] [address] Social History:   reports that he has never smoked. He has never used smokeless tobacco. He reports that he does not drink alcohol and does not use drugs. Family History:   Family History   Problem Relation Age of Onset    Diabetes Mother     Diabetes Father     Heart Disease Maternal Great Grandfather        Vitals:  BP (!) 142/68   Pulse 56   Temp 97.6 °F (36.4 °C) (Oral)   Resp 17   Ht 5' 7\" (1.702 m)   Wt 180 lb 5.4 oz (81.8 kg)   SpO2 99%   BMI 28.24 kg/m²   Temp (24hrs), Av.8 °F (36.6 °C), Min:97.5 °F (36.4 °C), Max:98.2 °F (36.8 °C)    Recent Labs     22  0155 22  0543 22  0955   POCGLU 104 282* 227* 192*       I/O (24Hr):     Intake/Output Summary (Last 24 hours) at 2022 1122  Last data filed at 2022 1022  Gross per 24 hour   Intake 1427.98 ml   Output 2500 ml   Net -1072.02 ml       Labs:  Hematology:  Recent Labs     01/07/22  0621 01/08/22  0618 01/09/22  0610   WBC 8.7 7.2 9.9   RBC 3.87* 3.94* 4.17*   HGB 11.8* 11.9* 12.8*   HCT 35.7* 35.9* 37.2*   MCV 92.2 91.1 89.2   MCH 30.5 30.2 30.7   MCHC 33.1 33.1 34.4   RDW 12.2 11.9 11.9    203 219   MPV 10.8 10.4 10.4   CRP 64.4*  --   --      Chemistry:  Recent Labs     01/07/22  0621 01/08/22  0618 01/09/22  0610   * 132* 135   K 4.5 5.1 4.7   CL 95* 96* 100   CO2 22 23 21   GLUCOSE 206* 360* 236*   BUN 75* 65* 48*   CREATININE 8.29* 6.69* 5.15*   MG 2.5 2.3 2.2   ANIONGAP 16 13 14   LABGLOM 7* 9* 12*   GFRAA 8* 11* 15*   CALCIUM 8.3* 8.6 8.8   PHOS 5.3* 4.9* 4.2     Recent Labs     01/08/22  1151 01/08/22  1604 01/08/22 2026 01/09/22  0155 01/09/22  0543 01/09/22  0955   POCGLU 345* 357* 104 282* 227* 192*     ABG:  Lab Results   Component Value Date    FIO2 NOT REPORTED 01/04/2022     Lab Results   Component Value Date/Time    SPECIAL RAC,6ML 01/04/2022 11:35 AM     Lab Results   Component Value Date/Time    CULTURE NO GROWTH 4 DAYS 01/04/2022 11:35 AM       Radiology:  MRI LUMBAR SPINE WO CONTRAST    Result Date: 12/30/2021  3 mm central disc protrusion at L5-S1 mildly effacing the lateral recesses and mildly narrowing the spinal canal.     NM LUNG SCAN PERFUSION ONLY    Result Date: 1/4/2022  Very low probability for pulmonary embolism. XR CHEST PORTABLE    Result Date: 1/5/2022  New left perihilar infiltrate as described above. Stable right perihilar infiltrate. Otherwise stable     XR CHEST PORTABLE    Result Date: 1/4/2022  Right lung infiltrate with suggests developing pneumonia in the right clinical setting. Recommend follow-up to resolution. XR CHEST PORTABLE    Result Date: 1/2/2022  Marginal inspiration, without evidence of acute cardiopulmonary disease       Physical Examination:        Physical Exam  Constitutional:       General: He is in acute distress. Appearance: He is obese.  He is ill-appearing. HENT:      Head: Normocephalic and atraumatic. Nose: Nose normal.      Mouth/Throat:      Mouth: Mucous membranes are dry. Eyes:      Extraocular Movements: Extraocular movements intact. Pupils: Pupils are equal, round, and reactive to light. Cardiovascular:      Rate and Rhythm: Tachycardia present. Pulses: Normal pulses. Heart sounds: No murmur heard. Pulmonary:      Effort: Respiratory distress present. Breath sounds: Rales present. Musculoskeletal:         General: No swelling or tenderness. Normal range of motion. Cervical back: Normal range of motion. No rigidity. Skin:     General: Skin is warm and dry. Capillary Refill: Capillary refill takes 2 to 3 seconds. Coloration: Skin is not jaundiced or pale. Neurological:      General: No focal deficit present. Mental Status: He is alert and oriented to person, place, and time. Psychiatric:         Mood and Affect: Mood normal.         Behavior: Behavior normal.         Assessment:        Hospital Problems           Last Modified POA    * (Principal) Acute hypoxemic respiratory failure due to COVID-19 ([facility] [address] 1/4/2022 Yes    DM (diabetes mellitus), type 1 with complications ([facility] [address] 5/7/1717 Yes    ESRD (end stage renal disease) on dialysis ([facility] [address] 1/4/2022 Yes    Hypertension 1/4/2022 Yes    Hyperlipidemia 1/4/2022 Yes    Bipolar affective disorder ([facility] [address] 1/4/2022 Yes          Plan:        1. Acute hypoxic respiratory failure secondary to COVID-19  1. Patient on room air this morning. 2. High intensity steroids of 20 mg Decadron per day for 5 days followed by 10 mg Decadron for 5 days  3. Not a candidate for Barcitinib due to renal failure  4. Status post Actemra  2. ESRD on dialysis  1. Nephrology following  3. Diabetes type 1  1. Increase Lantus to 52 units daily  2. Corrective sliding scale insulin as ordered  4. Hypertension with hyperlipidemia  1.  Home medication regiment as ordered    Awaiting for chair time at dialysis center        Mony Mota MD  1/9/2022  11:22 AM

## 2022-01-09 NOTE — PROGRESS NOTES
[address]  Occupational Therapy Not Seen    DATE: 2022    NAME: Nuris Wilson  MRN: [id number]   : 1975    Patient not seen this date for Occupational Therapy due to:      [] Cancel by RN or physician due to:    [] Hemodialysis    [] Critical Lab Value Level     [] Blood transfusion in progress    [] Acute or unstable cardiovascular status   _MAP < 55 or more than >115  _HR < 40 or > 130    [] Acute or unstable pulmonary status   -FiO2 > 60%   _RR < 5 or >40    _O2 sats < 85%    [] Strict Bedrest    [] Off Unit for surgery or procedure    [] Off Unit for testing       [] Pending imaging to R/O fracture    [x] Refusal by Patient. Pt reported he is too tired and does not want to participate in therapy at this time. [] Other      [] OT being discontinued at this time. Patient independent. No further needs. [] OT being discontinued at this time as the patient has been transferred to hospice care. No further needs.       Keerthi Montejo, DIEGO

## 2022-01-09 NOTE — PROGRESS NOTES
HEMODIALYSIS POST TREATMENT NOTE    Treatment time ordered: 3.5 Hours    Actual treatment time: 3.5 Hours    UltraFiltration Goal: 2000 ml  UltraFiltration Removed: 2000 ml      Pre Treatment weight: 83.8 kg  Post Treatment weight: 81.8 kg  Estimated Dry Weight: 85 kg    Access used:     Central Venous Catheter:      Internal Access:        Access Flow:      Sign and symptoms of infection:        If YES:     Medications or blood products given: None    Regular outpatient schedule: TTS [facility] [employer] however, will be going to [facility] after discharge due to [place]. Summary of response to treatment: Treatment ran uneventful. Patient tolerated well. No interventions required for BP support. Vitals remained stable. Access flow tolerated at prescribed BFR. Target fluid goal met without discomfort voiced by patient. Explain if orders NOT met, was physician notified:      ACES flowsheet faxed to patient unit/ placed in patient chart: Yes    Post assessment completed: Yes    Report given to: Chapo Delgado RN    * Intra-treatment documented Safety Checks include the followin) Access and face visible at all times. 2) All connections and blood lines are secure with no kinks. 3) NVL alarm engaged. 4) Hemosafe device applied (if applicable). 5) No collapse of Arterial or Venous blood chambers. 6) All blood lines / pump segments in the air detectors.

## 2022-01-09 NOTE — PROGRESS NOTES
Reason for Consult:  End stage renal disease. Requesting Physician:  Madeleine Bush MD    Interval history:   HD yesterday. No issues. Decreasing edw as tolerated. SBP trending 130-160s. Attempted to call into the room. He did not answer the phone. HISTORY OF PRESENT ILLNESS:    The patient is a 51 y.o. male who normally undergoes dialysis at [facility] on TTS under our care presents with fever, cough, shortness of breath and vomiting over the last 4 days. His chest x ray shows right sided pneumonia. His COVID test is positive. He is on high flow O2. His last hemodialysis was on Sunday. Today his potassium level is 5.0. Prior to Admission medications    Medication Sig Start Date End Date Taking? Authorizing Provider   FLUoxetine (PROZAC) 20 MG capsule Take 40 mg by mouth daily   Yes Historical Provider, MD   fluticasone (FLONASE) 50 MCG/ACT nasal spray 1 spray by Each Nostril route daily for 10 days 1/2/22 1/12/22  Misty Copper, APRN - CNP   loratadine (CLARITIN) 10 MG tablet Take 1 tablet by mouth daily for 10 days 1/2/22 1/12/22  Misty Copper, APRN - CNP   benzonatate (TESSALON PERLES) 100 MG capsule Take 1 capsule by mouth 3 times daily as needed for Cough 1/2/22 1/9/22  Misty Copper, APRN - CNP   promethazine (PHENERGAN) 25 MG tablet Take 1 tablet by mouth every 6 hours as needed for Nausea 1/2/22   Misty Copper, APRN - CNP   ammonium lactate (LAC-HYDRIN) 12 % lotion Apply topically daily. Patient taking differently: Apply topically daily to BLE 11/1/21   Verneita Lab, DPM   traMADol (ULTRAM) 50 MG tablet Take 50 mg by mouth 2 times daily.   4/9/21   Historical Provider, MD   furosemide (LASIX) 20 MG tablet Take 1 tablet by mouth daily 4/27/21   Jeet Abide, DO   insulin glargine (LANTUS) 100 UNIT/ML injection vial Inject 45 units in the morning and 5 units SQ at  night 3/29/21   Jeet Abide, DO   buPROPion (WELLBUTRIN XL) 150 MG extended release tablet Take 1 tablet by mouth every morning 3/25/21 Adali Walls DO   metoprolol succinate (TOPROL XL) 200 MG extended release tablet Take 1 tablet by mouth daily 2/18/21   Adali Walls DO   insulin lispro (HUMALOG) 100 UNIT/ML injection vial Inject into the skin 3 times daily (before meals) Per sliding scale, by 2s    Historical Provider, MD   lisinopril (PRINIVIL;ZESTRIL) 20 MG tablet Take 20 mg by mouth daily     Historical Provider, MD   Rosuvastatin Calcium 40 MG CPSP Take 40 mg by mouth daily    Historical Provider, MD   ezetimibe (ZETIA) 10 MG tablet Take 10 mg by mouth daily    Historical Provider, MD   omeprazole (PRILOSEC) 40 MG delayed release capsule Take 40 mg by mouth daily    Historical Provider, MD   amLODIPine (NORVASC) 10 MG tablet Take 10 mg by mouth daily    Historical Provider, MD   aspirin 81 MG EC tablet Take 81 mg by mouth daily    Historical Provider, MD   magnesium oxide (MAG-OX) 400 MG tablet Take 400 mg by mouth daily    Historical Provider, MD   calcium acetate 667 MG TABS Take 667 mg by mouth 3 times daily (with meals)     Historical Provider, MD   vitamin B-12 (CYANOCOBALAMIN) 500 MCG tablet Take 500 mcg by mouth daily    Historical Provider, MD       Scheduled Meds:   insulin lispro  0-12 Units SubCUTAneous Q4H    insulin glargine  52 Units SubCUTAneous QAM    calcium acetate  667 mg Oral TID WC    amLODIPine  10 mg Oral Daily    aspirin  81 mg Oral Daily    buPROPion  150 mg Oral QAM    ezetimibe  10 mg Oral Daily    furosemide  20 mg Oral Daily    cetirizine  10 mg Oral Daily    metoprolol succinate  200 mg Oral Daily    pantoprazole  40 mg Oral QAM AC    traMADol  50 mg Oral Daily    sodium chloride flush  5-40 mL IntraVENous 2 times per day    heparin (porcine)  5,000 Units SubCUTAneous 3 times per day    dexamethasone  10 mg IntraVENous Q24H    Vitamin D  6,000 Units Oral Daily    Followed by   Babar Carmichael ON 1/11/2022] Vitamin D  2,000 Units Oral Daily    FLUoxetine  40 mg Oral Daily    lisinopril  20 mg Oral Daily     Continuous Infusions:   dextrose      sodium chloride Stopped (01/08/22 1805)      Physical Exam:  Vitals:    01/08/22 2030 01/08/22 2344 01/09/22 0445 01/09/22 0800   BP: (!) 153/78 138/65 (!) 161/76 (!) 142/68   Pulse: 64 60 57 56   Resp: 20 20 18 17   Temp: 98.2 °F (36.8 °C) 98.1 °F (36.7 °C) 97.5 °F (36.4 °C) 97.6 °F (36.4 °C)   TempSrc: Oral Oral Oral Oral   SpO2: 99% 97% 100% 99%   Weight:       Height:         I/O last 3 completed shifts: In: 5998 [P.O.:975; I.V.:6.4; IV Piggyback:176.6]  Out: 2500     Deferred because of COVID status.     Data:  CBC:   Lab Results   Component Value Date    WBC 9.9 01/09/2022    HGB 12.8 (L) 01/09/2022    HCT 37.2 (L) 01/09/2022    MCV 89.2 01/09/2022     01/09/2022     BMP:    Lab Results   Component Value Date     01/09/2022     (L) 01/08/2022     (L) 01/07/2022    K 4.7 01/09/2022    K 5.1 01/08/2022    K 4.5 01/07/2022     01/09/2022    CL 96 (L) 01/08/2022    CL 95 (L) 01/07/2022    CO2 21 01/09/2022    CO2 23 01/08/2022    CO2 22 01/07/2022    BUN 48 (H) 01/09/2022    BUN 65 (H) 01/08/2022    BUN 75 (H) 01/07/2022    CREATININE 5.15 (HH) 01/09/2022    CREATININE 6.69 (HH) 01/08/2022    CREATININE 8.29 (HH) 01/07/2022    GLUCOSE 236 (H) 01/09/2022    GLUCOSE 360 (H) 01/08/2022    GLUCOSE 206 (H) 01/07/2022     CMP:   Lab Results   Component Value Date     01/09/2022    K 4.7 01/09/2022     01/09/2022    CO2 21 01/09/2022    BUN 48 01/09/2022    CREATININE 5.15 01/09/2022    GLUCOSE 236 01/09/2022    CALCIUM 8.8 01/09/2022    PROT 6.0 01/05/2022    LABALBU 2.9 01/05/2022    BILITOT 0.43 01/05/2022    ALKPHOS 110 01/05/2022    AST 36 01/05/2022    ALT 38 01/05/2022      Hepatic:   Lab Results   Component Value Date    AST 36 01/05/2022    AST 33 01/04/2022     (H) 01/08/2021    ALT 38 01/05/2022    ALT 49 (H) 01/04/2022     (H) 01/08/2021    BILITOT 0.43 01/05/2022    BILITOT 0.38 01/04/2022    BILITOT 0.38 01/08/2021    ALKPHOS 110 01/05/2022    ALKPHOS 144 (H) 01/04/2022    ALKPHOS 313 (H) 01/08/2021     BNP: No results found for: BNP  Lipids:   Lab Results   Component Value Date    CHOL 103 07/19/2021    HDL 52 07/19/2021     INR: No results found for: INR  PTH: No results found for: PTH  Phosphorus:    Lab Results   Component Value Date    PHOS 4.2 01/09/2022     Ionized Calcium: No results found for: IONCA  Magnesium:   Lab Results   Component Value Date    MG 2.2 01/09/2022     Albumin:   Lab Results   Component Value Date    LABALBU 2.9 01/05/2022     Last 3 CK, CKMB, Troponin: @LABRCNT(CKTOTAL:3,CKMB:3,TROPONINI:3)       URINE:)No results found for: Christiano Denney    Radiology:   Reviewed. Assessment:  End stage renal disease. Metabolic acidosis  Hyperkalemia. Hyponatremia. Hypertension. COVID pneumonia. Plan:  HD today. Continue Tuesday Thursday Saturday schedule   Follow up phosphorus level and adjust binders as needed. Phos at goal.  We will follow H&H and start erythropoeitin stimulating agent as needed. Hemoglobin at goal  Renal diet with fluid restriction of 1000 ml/24 hours. We will follow up chemistries. DM per primary. Glucose improving. Avoid Lovenox and Fleets enema. Please do not hesitate to contact us for any further questions/concerns. We will continue to follow along with you. Pt seen in collaboration with Dr. Kel Benitez. Electronically signed by DANIEL Peoples CNP  on 1/9/2022 at 8:34 AM  [facility].   Ph: [phone]     Physician Addendum  I evaluated the patient with CNP   Agree with above assessment and plan      Electronically signed by Ila Gottron MD on 01/09/22 3:34 PM

## 2022-01-09 NOTE — CARE COORDINATION
Discharge planning    Patient pending HD chair time to be finalized  . Have days of T/H/S at [facility] but need actual chair time to be confirmed before they can be discharged. Updated DR Miriam Antony   Patient has been on RA since 1/7 at 0700. sats  last 24 hours.

## 2022-01-09 NOTE — PROGRESS NOTES
[facility]   Section of Cardiology  Progress Note      Date:  1/9/2022  Patient: Nuris Wilson  Admission:  1/4/2022  9:25 AM  Admit DX: Respiratory alkalosis [E87.3]  Hyponatremia [E87.1]  Acute respiratory failure with hypoxia (HCC) [J96.01]  Acute hypoxemic respiratory failure due to COVID-19 (HCC) [U07.1, J96.01]  Pneumonia due to COVID-19 virus [U07.1, J12.82]  COVID-19 [U07.1]  Age:  51 y.o., 1975                           LOS: 5 days     Reason for evaluation:   Abnormal troponin  Renal failure on dialysis  covid 19 pneumonia      SUBJECTIVE:     The patient was seen and examined. Notes and labs reviewed. There were not complications over night. He continues to be on high flow oxygen. No new cardiac issues. OBJECTIVE:    BP (!) 142/68   Pulse 56   Temp 97.6 °F (36.4 °C) (Oral)   Resp 17   Ht 5' 7\" (1.702 m)   Wt 180 lb 5.4 oz (81.8 kg)   SpO2 99%   BMI 28.24 kg/m²     Intake/Output Summary (Last 24 hours) at 1/9/2022 0915  Last data filed at 1/9/2022 0617  Gross per 24 hour   Intake 1657.98 ml   Output 2500 ml   Net -842.02 ml       EXAM:   Full physical exam was not done due to active covid 19 infection and to preserve PPE.     Current Inpatient Medications:   insulin lispro  0-12 Units SubCUTAneous Q4H    insulin glargine  52 Units SubCUTAneous QAM    calcium acetate  667 mg Oral TID WC    amLODIPine  10 mg Oral Daily    aspirin  81 mg Oral Daily    buPROPion  150 mg Oral QAM    ezetimibe  10 mg Oral Daily    furosemide  20 mg Oral Daily    cetirizine  10 mg Oral Daily    metoprolol succinate  200 mg Oral Daily    pantoprazole  40 mg Oral QAM AC    traMADol  50 mg Oral Daily    sodium chloride flush  5-40 mL IntraVENous 2 times per day    heparin (porcine)  5,000 Units SubCUTAneous 3 times per day    dexamethasone  10 mg IntraVENous Q24H    Vitamin D  6,000 Units Oral Daily    Followed by   Oscar Yan ON 1/11/2022] Vitamin D  2,000 Units Oral Daily    FLUoxetine  40 mg Oral Daily    lisinopril  20 mg Oral Daily       IV Infusions (if any):   dextrose      sodium chloride Stopped (01/08/22 1805)       Diagnostics:       Labs:   CBC:   Recent Labs     01/08/22  0618 01/09/22  0610   WBC 7.2 9.9   HGB 11.9* 12.8*   HCT 35.9* 37.2*    219     BMP:   Recent Labs     01/08/22  0618 01/09/22  0610   * 135   K 5.1 4.7   CO2 23 21   BUN 65* 48*   CREATININE 6.69* 5.15*   LABGLOM 9* 12*   GLUCOSE 360* 236*     BNP: No results for input(s): BNP in the last 72 hours. PT/INR: No results for input(s): PROTIME, INR in the last 72 hours. APTT:No results for input(s): APTT in the last 72 hours. CARDIAC ENZYMES:No results for input(s): CKTOTAL, CKMB, CKMBINDEX, TROPONINI in the last 72 hours. FASTING LIPID PANEL:  Lab Results   Component Value Date    HDL 52 07/19/2021    TRIG 64 07/19/2021     LIVER PROFILE:  No results for input(s): AST, ALT, LABALBU in the last 72 hours. ASSESSMENT:    Patient Active Problem List   Diagnosis    DM (diabetes mellitus), type 1 with complications ([facility]    ESRD (end stage renal disease) on dialysis ([facility]    Hypertension    Hyperlipidemia    Acute pain of left knee    Bipolar affective disorder ([facility]    Coronary atherosclerosis    Acute hypoxemic respiratory failure due to COVID-19 ([facility]       PLAN:    1. Abnormal troponin  2. Covid 19 pneumonia  3. Chronic kidney disease on hemodialysis  Continue supportive therapy. Abnormal troponin is due to renal failure. No aggressive cardiac intervention indicated at this time. Cardiology will sign off today. Please call with issues. Please see orders. Discussed with nursing.     Virgen Alva MD, MD

## 2022-01-09 NOTE — PROGRESS NOTES
Physical Therapy  Facility/Department: [facility] PROGRESSIVE CARE  Daily Treatment Note  NAME: Buffy Nascimento  : 1975  MRN: [id number]    Date of Service: 2022    Discharge Recommendations:  Patient would benefit from continued therapy after discharge     Pt currently functioning below baseline. Would suggest additional therapy at time of discharge to maximize long term outcomes and prevent re-admission. Please refer to AM-PAC score for current level of function. Assessment   Body structures, Functions, Activity limitations: Decreased functional mobility ; Decreased balance;Decreased endurance;Decreased strength  Assessment: Patient fatigues easily with c/o AYDE LE weakness. Patient is progressing towards his PLOF but would benefit from continued skilled PT services. Prognosis: Good  Decision Making: Medium Complexity  Exam: AROM, strength,endurance, mobility, balance, AM-PAC  PT Education: Goals;PT Role;Plan of Care;Gait Training;Transfer Training;Disease Specific Education; Energy Conservation  REQUIRES PT FOLLOW UP: Yes  Activity Tolerance  Activity Tolerance: Patient limited by fatigue;Patient limited by endurance  Activity Tolerance: O2 sat remained above 90% entire assessment, did educate on pursed lip breathing     Patient Diagnosis(es): The primary encounter diagnosis was COVID-19. Diagnoses of Pneumonia due to COVID-19 virus, Acute respiratory failure with hypoxia ([facility] [address] Respiratory alkalosis, and Hyponatremia were also pertinent to this visit. has a past medical history of Closed fracture of bone of right foot, Dialysis patient ([facility] [address] Kidney disease, and Type 1 diabetes mellitus ([facility] [address] has no past surgical history on file. Restrictions  Restrictions/Precautions  Restrictions/Precautions: General Precautions,Contact Precautions,Isolation,Fall Risk  Required Braces or Orthoses?: Yes  Required Braces or Orthoses  Right Lower Extremity Brace:  Ankle Foot Orthotics  Left Lower Extremity Brace: Yakelin Foot Orthotics  Position Activity Restriction  Other position/activity restrictions: Covid +, 60L HFNC, up as alejandro, RUE IV  Subjective   General  Chart Reviewed: Yes  Response To Previous Treatment: Patient with no complaints from previous session. Family / Caregiver Present: No  Subjective  Subjective: pt agreeable to work with therapy  General Comment  Comments: RN states patient appropriate for therapy          Orientation     Cognition   Cognition  Overall Cognitive Status: Exceptions  Arousal/Alertness: Appropriate responses to stimuli  Following Commands: Follows multistep commands with increased time; Follows multistep commands with repitition  Attention Span: Appears intact  Memory: Appears intact  Safety Judgement: Decreased awareness of need for safety;Decreased awareness of need for assistance  Problem Solving: Decreased awareness of errors  Insights: Decreased awareness of deficits  Initiation: Does not require cues  Sequencing: Does not require cues  Objective   Bed mobility  Rolling to Left: Stand by assistance  Rolling to Right: Stand by assistance  Supine to Sit: Stand by assistance  Sit to Supine: Stand by assistance  Scooting: Stand by assistance  Comment: required HOB elevated and use of bed rails. Good safety but min assist for line mgmt. Transfers  Sit to Stand: Contact guard assistance  Stand to sit: Contact guard assistance  Bed to Chair: Contact guard assistance  Stand Pivot Transfers: Contact guard assistance  Lateral Transfers: Contact guard assistance  Comment: Patient requires MIN vc's for hand placement and progression techniques upon initial stance patient requires a seated restbreak in <1 minute due to AYDE LE weakness per patient. Ambulation  Ambulation?: Yes  More Ambulation?: No  Ambulation 1  Device: Rolling Walker  Other Apparatus: O2;AFO; Left;Right  Assistance: Minimal assistance  Gait Deviations: Slow Danica;Decreased step length  Distance: 5' x 2  Comments: Limited distance d/t HF tubing and low endurance tolerance. Stairs/Curb  Stairs?: No  Neuromuscular Education  NDT Treatment: Gait ;Lower extremity; Sitting;Standing  Balance  Posture: Fair  Sitting - Static: Good  Sitting - Dynamic: Good  Standing - Static: Good;-  Standing - Dynamic: Fair;+  Comments: w/RW  Exercises  Comments: Educated patient on circulation exercises and AROM in seated posture to promote fluid exchange and maintain joint congruency.      AM-PAC Score  AM-PAC Inpatient Mobility Raw Score : 16 (01/09/22 1655)  AM-PAC Inpatient T-Scale Score : 40.78 (01/09/22 1655)  Mobility Inpatient CMS 0-100% Score: 54.16 (01/09/22 1655)  Mobility Inpatient CMS G-Code Modifier : CK (01/09/22 1655)          Goals  Short term goals  Time Frame for Short term goals: 12 visits  Short term goal 1: Independent bed mobility and transfers  Short term goal 2: Patient to ambulate 40 feet with roll walker SBA  Short term goal 3: Patient to perform 25 minutes ther act to facilitate improving strength, endurance and balance  Patient Goals   Patient goals : to go home    Plan    Plan  Times per week: 1-2x/day for 5-6 days/week  Specific instructions for Next Treatment: take COVID education  Current Treatment Recommendations: Abel Rizvi Re-education,Patient/Caregiver Education & Training,Balance Training,Home Exercise Program,Gait Training,Safety Education & Training,Positioning  Safety Devices  Type of devices: Gait belt,Call light within reach,Left in bed     Therapy Time   Individual Concurrent Group Co-treatment   Time In 1210         Time Out 1306         Minutes [facility] [address] [place] [address]

## 2022-01-10 LAB
ABSOLUTE EOS #: <0.03 K/UL (ref 0–0.44)
ABSOLUTE IMMATURE GRANULOCYTE: 0.09 K/UL (ref 0–0.3)
ABSOLUTE LYMPH #: 1.03 K/UL (ref 1.1–3.7)
ABSOLUTE MONO #: 0.95 K/UL (ref 0.1–1.2)
ANION GAP SERPL CALCULATED.3IONS-SCNC: 16 MMOL/L (ref 9–17)
BASOPHILS # BLD: 0 % (ref 0–2)
BASOPHILS ABSOLUTE: <0.03 K/UL (ref 0–0.2)
BUN BLDV-MCNC: 82 MG/DL (ref 6–20)
BUN/CREAT BLD: 11 (ref 9–20)
CALCIUM SERPL-MCNC: 9 MG/DL (ref 8.6–10.4)
CHLORIDE BLD-SCNC: 100 MMOL/L (ref 98–107)
CO2: 20 MMOL/L (ref 20–31)
CREAT SERPL-MCNC: 7.53 MG/DL (ref 0.7–1.2)
DIFFERENTIAL TYPE: ABNORMAL
EOSINOPHILS RELATIVE PERCENT: 0 % (ref 1–4)
GFR AFRICAN AMERICAN: 9 ML/MIN
GFR NON-AFRICAN AMERICAN: 8 ML/MIN
GFR SERPL CREATININE-BSD FRML MDRD: ABNORMAL ML/MIN/{1.73_M2}
GFR SERPL CREATININE-BSD FRML MDRD: ABNORMAL ML/MIN/{1.73_M2}
GLUCOSE BLD-MCNC: 108 MG/DL (ref 75–110)
GLUCOSE BLD-MCNC: 112 MG/DL (ref 75–110)
GLUCOSE BLD-MCNC: 129 MG/DL (ref 75–110)
GLUCOSE BLD-MCNC: 180 MG/DL (ref 75–110)
GLUCOSE BLD-MCNC: 86 MG/DL (ref 75–110)
GLUCOSE BLD-MCNC: 95 MG/DL (ref 70–99)
GLUCOSE BLD-MCNC: 99 MG/DL (ref 75–110)
HCT VFR BLD CALC: 37 % (ref 40.7–50.3)
HEMOGLOBIN: 12.6 G/DL (ref 13–17)
IMMATURE GRANULOCYTES: 1 %
LYMPHOCYTES # BLD: 7 % (ref 24–43)
MAGNESIUM: 2.3 MG/DL (ref 1.6–2.6)
MCH RBC QN AUTO: 30.3 PG (ref 25.2–33.5)
MCHC RBC AUTO-ENTMCNC: 34.1 G/DL (ref 28.4–34.8)
MCV RBC AUTO: 88.9 FL (ref 82.6–102.9)
MONOCYTES # BLD: 7 % (ref 3–12)
NRBC AUTOMATED: 0 PER 100 WBC
PDW BLD-RTO: 11.9 % (ref 11.8–14.4)
PHOSPHORUS: 5 MG/DL (ref 2.5–4.5)
PLATELET # BLD: 236 K/UL (ref 138–453)
PLATELET ESTIMATE: ABNORMAL
PMV BLD AUTO: 10.3 FL (ref 8.1–13.5)
POTASSIUM SERPL-SCNC: 5.1 MMOL/L (ref 3.7–5.3)
RBC # BLD: 4.16 M/UL (ref 4.21–5.77)
RBC # BLD: ABNORMAL 10*6/UL
SEG NEUTROPHILS: 85 % (ref 36–65)
SEGMENTED NEUTROPHILS ABSOLUTE COUNT: 11.94 K/UL (ref 1.5–8.1)
SODIUM BLD-SCNC: 136 MMOL/L (ref 135–144)
WBC # BLD: 14 K/UL (ref 3.5–11.3)
WBC # BLD: ABNORMAL 10*3/UL

## 2022-01-10 PROCEDURE — 84100 ASSAY OF PHOSPHORUS: CPT

## 2022-01-10 PROCEDURE — 2500000003 HC RX 250 WO HCPCS: Performed by: NURSE PRACTITIONER

## 2022-01-10 PROCEDURE — 82947 ASSAY GLUCOSE BLOOD QUANT: CPT

## 2022-01-10 PROCEDURE — 83735 ASSAY OF MAGNESIUM: CPT

## 2022-01-10 PROCEDURE — 6360000002 HC RX W HCPCS: Performed by: NURSE PRACTITIONER

## 2022-01-10 PROCEDURE — 97110 THERAPEUTIC EXERCISES: CPT

## 2022-01-10 PROCEDURE — 36415 COLL VENOUS BLD VENIPUNCTURE: CPT

## 2022-01-10 PROCEDURE — 2580000003 HC RX 258: Performed by: NURSE PRACTITIONER

## 2022-01-10 PROCEDURE — 97530 THERAPEUTIC ACTIVITIES: CPT

## 2022-01-10 PROCEDURE — 6370000000 HC RX 637 (ALT 250 FOR IP): Performed by: NURSE PRACTITIONER

## 2022-01-10 PROCEDURE — 99232 SBSQ HOSP IP/OBS MODERATE 35: CPT | Performed by: INTERNAL MEDICINE

## 2022-01-10 PROCEDURE — 85025 COMPLETE CBC W/AUTO DIFF WBC: CPT

## 2022-01-10 PROCEDURE — 2060000000 HC ICU INTERMEDIATE R&B

## 2022-01-10 PROCEDURE — 6370000000 HC RX 637 (ALT 250 FOR IP): Performed by: INTERNAL MEDICINE

## 2022-01-10 PROCEDURE — 80048 BASIC METABOLIC PNL TOTAL CA: CPT

## 2022-01-10 PROCEDURE — 5A1D70Z PERFORMANCE OF URINARY FILTRATION, INTERMITTENT, LESS THAN 6 HOURS PER DAY: ICD-10-PCS | Performed by: INTERNAL MEDICINE

## 2022-01-10 RX ADMIN — BUPROPION HYDROCHLORIDE 150 MG: 150 TABLET, EXTENDED RELEASE ORAL at 09:26

## 2022-01-10 RX ADMIN — METOPROLOL SUCCINATE 200 MG: 50 TABLET, EXTENDED RELEASE ORAL at 09:31

## 2022-01-10 RX ADMIN — HEPARIN SODIUM 5000 UNITS: 5000 INJECTION INTRAVENOUS; SUBCUTANEOUS at 06:20

## 2022-01-10 RX ADMIN — Medication 6000 UNITS: at 09:26

## 2022-01-10 RX ADMIN — SODIUM CHLORIDE, PRESERVATIVE FREE 10 ML: 5 INJECTION INTRAVENOUS at 09:29

## 2022-01-10 RX ADMIN — INSULIN GLARGINE 52 UNITS: 100 INJECTION, SOLUTION SUBCUTANEOUS at 10:13

## 2022-01-10 RX ADMIN — SODIUM CHLORIDE, PRESERVATIVE FREE 10 ML: 5 INJECTION INTRAVENOUS at 20:41

## 2022-01-10 RX ADMIN — CALCIUM ACETATE 667 MG: 667 CAPSULE ORAL at 09:26

## 2022-01-10 RX ADMIN — ASPIRIN 81 MG: 81 TABLET, COATED ORAL at 09:27

## 2022-01-10 RX ADMIN — HEPARIN SODIUM 5000 UNITS: 5000 INJECTION INTRAVENOUS; SUBCUTANEOUS at 20:41

## 2022-01-10 RX ADMIN — CALCIUM ACETATE 667 MG: 667 CAPSULE ORAL at 12:10

## 2022-01-10 RX ADMIN — EZETIMIBE 10 MG: 10 TABLET ORAL at 09:26

## 2022-01-10 RX ADMIN — POLYETHYLENE GLYCOL 3350 17 G: 17 POWDER, FOR SOLUTION ORAL at 21:27

## 2022-01-10 RX ADMIN — LISINOPRIL 20 MG: 20 TABLET ORAL at 09:28

## 2022-01-10 RX ADMIN — HEPARIN SODIUM 5000 UNITS: 5000 INJECTION INTRAVENOUS; SUBCUTANEOUS at 14:51

## 2022-01-10 RX ADMIN — CALCIUM ACETATE 667 MG: 667 CAPSULE ORAL at 16:48

## 2022-01-10 RX ADMIN — DEXAMETHASONE SODIUM PHOSPHATE 10 MG: 10 INJECTION, SOLUTION INTRAMUSCULAR; INTRAVENOUS at 16:48

## 2022-01-10 RX ADMIN — INSULIN LISPRO 2 UNITS: 100 INJECTION, SOLUTION INTRAVENOUS; SUBCUTANEOUS at 14:51

## 2022-01-10 RX ADMIN — FUROSEMIDE 20 MG: 20 TABLET ORAL at 09:27

## 2022-01-10 RX ADMIN — TRAMADOL HYDROCHLORIDE 50 MG: 50 TABLET, COATED ORAL at 09:26

## 2022-01-10 RX ADMIN — AMLODIPINE BESYLATE 10 MG: 10 TABLET ORAL at 09:27

## 2022-01-10 RX ADMIN — FLUOXETINE 40 MG: 20 CAPSULE ORAL at 09:25

## 2022-01-10 RX ADMIN — CETIRIZINE HYDROCHLORIDE 10 MG: 10 TABLET, FILM COATED ORAL at 09:28

## 2022-01-10 RX ADMIN — PANTOPRAZOLE SODIUM 40 MG: 40 TABLET, DELAYED RELEASE ORAL at 06:20

## 2022-01-10 ASSESSMENT — PAIN SCALES - WONG BAKER

## 2022-01-10 ASSESSMENT — ENCOUNTER SYMPTOMS
PHOTOPHOBIA: 0
COUGH: 1
NAUSEA: 1
ABDOMINAL PAIN: 0
SHORTNESS OF BREATH: 1
COLOR CHANGE: 0
SINUS PRESSURE: 0
SINUS PAIN: 0
ABDOMINAL DISTENTION: 0

## 2022-01-10 ASSESSMENT — PAIN SCALES - GENERAL
PAINLEVEL_OUTOF10: 0
PAINLEVEL_OUTOF10: 3
PAINLEVEL_OUTOF10: 0

## 2022-01-10 NOTE — PROGRESS NOTES
Occupational Therapy  Facility/Department: [facility] PROGRESSIVE CARE  Daily Treatment Note  NAME: Rashid De La Cruz  : 1975  MRN: [id number]    Date of Service: 1/10/2022   SOFIYA Wray reports patient is medically stable for therapy treatment this date. Chart reviewed prior to treatment and patient is agreeable for therapy. All lines intact and patient positioned comfortably at end of treatment. All patient needs addressed prior to ending therapy session. Discharge Recommendations:  Patient would benefit from continued therapy after discharge  OT Equipment Recommendations  ADL Assistive Devices: Long-handled Shoe Horn;Long-handled Sponge;Reacher;Sock-Aid Hard   Due to recent hospitalization and medical condition, pt would benefit from additional therapy at time of discharge to ensure safety. Please refer to the AM-PAC score for current functional status. Assessment   Performance deficits / Impairments: Decreased functional mobility ; Decreased ADL status; Decreased safe awareness;Decreased strength;Decreased cognition;Decreased sensation;Decreased endurance;Decreased high-level IADLs;Decreased fine motor control;Decreased balance  Assessment: Skilled OT services are indicated to increase safety, endurance and Indep during functional tasks to return home at prior level of function as able. Prognosis: Good  OT Education: OT Role;Transfer Training;Energy Conservation;Plan of Care;ADL Adaptive Strategies;Precautions  Patient Education: safety in function, fall prevention/call light use, benefits of being oob, pursed lip breathing, recommendations for continued therapy, benefits of UB HEP and completion on own  REQUIRES OT FOLLOW UP: Yes  Activity Tolerance  Activity Tolerance: Patient Tolerated treatment well;Patient limited by fatigue  Activity Tolerance: fair  Safety Devices  Safety Devices in place: Yes  Type of devices: Nurse notified;Gait belt;Patient at risk for falls;Call light within reach; Left in assist with SpO2 monitor for increased safety/decreased risk of falls. Bed mobility  Comment: N/T pt sitting EOB upon arrival  Transfers  Sit to stand: Contact guard assistance  Stand to sit: Contact guard assistance  Transfer Comments: Min vc's for pushing up from surface when standing, upright posture, squaring self/AD and reaching back prior to sitting, pursed lip breathing, slow/controlled sit/stand, and RW safety/mgmt for increased safety/decreased risk of falls. Cognition  Overall Cognitive Status: Exceptions  Arousal/Alertness: Appropriate responses to stimuli  Following Commands: Follows multistep commands with increased time; Follows multistep commands with repitition  Attention Span: Appears intact  Memory: Appears intact  Safety Judgement: Decreased awareness of need for safety;Decreased awareness of need for assistance  Problem Solving: Decreased awareness of errors  Insights: Decreased awareness of deficits  Initiation: Does not require cues  Sequencing: Does not require cues                    Type of ROM/Therapeutic Exercise  Type of ROM/Therapeutic Exercise: Resistive Bands  Comment: Pt introduced to theraband HEP to maintain/increase strength needed for increased IND in ADLs. Mod vc's for proper tech, pacing, and pursed lip breathing.   Exercises  Scapular Protraction: x10  Scapular Retraction: x10  Shoulder Flexion: x10  Shoulder Extension: x10  Horizontal ABduction: x10  Horizontal ADduction: x10  Elbow Flexion: x10  Elbow Extension: x10  Other: PNF D2 x10                    Plan   Plan  Times per week: 4-5x/wk 1x/day as alejandro  Current Treatment Recommendations: Strengthening,Endurance Training,Balance Training,Functional Mobility Training,Safety Education & Training,Home Management Training,Self-Care / ADL,Equipment Evaluation, Education, & procurement                                                  AM-PAC Score        AM-PAC Inpatient Daily Activity Raw Score: 16 (01/10/22 1401)  AM-PAC Inpatient ADL T-Scale Score : 35.96 (01/10/22 1401)  ADL Inpatient CMS 0-100% Score: 53.32 (01/10/22 1401)  ADL Inpatient CMS G-Code Modifier : CK (01/10/22 1401)    Goals  Short term goals  Time Frame for Short term goals: By discharge, pt to demo  Short term goal 1: ADL transfers and functional mobility to SBA with use of AD as needed. Short term goal 2: increased B UE strength by 1/2 grade to assist with functional tasks/I with B UE HEP with use of handouts as needed. Short term goal 3: toileting to SBA with use of AD/BSC/grab bars as needed. Short term goal 4: bed mobility to SBA with use of bedrails as needed. Short term goal 5: UB ADLs to Set up and LB ADLs to SBA with use of AD/AE as needed. Long term goals  Long term goal 1: Pt to be I with fall prevention education, EC/WS tech, recommendations for AE, and covid specific education with use of handouts as needed. Patient Goals   Patient goals : To go home! Therapy Time   Individual Concurrent Group Co-treatment   Time In 1230         Time Out 6332         Minutes 25                 Upon writer exit, call light within reach, pt retired to chair. All lines intact and patient positioned comfortably. All patient needs addressed prior to ending therapy session. Chart reviewed prior to treatment and patient is agreeable for therapy. RN reports patient is medically stable for therapy treatment this date.       DIEGO Humphries

## 2022-01-10 NOTE — CARE COORDINATION
Social Work-Faxed Hep Panel ot [facility].  [facility]

## 2022-01-10 NOTE — PLAN OF CARE
Problem: Falls - Risk of:  Goal: Will remain free from falls  Description: Will remain free from falls  Outcome: Ongoing  Note: Bed in lowest position, call light within reach, side rail x 2, bed alarm, hourly rounding, patient instructed to call out

## 2022-01-10 NOTE — PLAN OF CARE
Problem: Discharge Planning:  Goal: Discharged to appropriate level of care  Description: Discharged to appropriate level of care  Outcome: Ongoing     Problem: Serum Glucose Level - Abnormal:  Goal: Ability to maintain appropriate glucose levels will improve  Description: Ability to maintain appropriate glucose levels will improve  Outcome: Ongoing     BS every 4 hour/Type 1 DM with ERSD

## 2022-01-10 NOTE — PROGRESS NOTES
Reason for Consult:  End stage renal disease. Requesting Physician:  Dalia Rubalcava MD    Interval history:   HD Saturday. Decreasing edw as tolerated. SBP trending 110-130s. Attempted to call into the room. He did not answer the phone. HISTORY OF PRESENT ILLNESS:    The patient is a 51 y.o. male who normally undergoes dialysis at [facility] on TTS under our care presents with fever, cough, shortness of breath and vomiting over the last 4 days. His chest x ray shows right sided pneumonia. His COVID test is positive. He is on high flow O2. His last hemodialysis was on Sunday. Today his potassium level is 5.0. Prior to Admission medications    Medication Sig Start Date End Date Taking? Authorizing Provider   FLUoxetine (PROZAC) 20 MG capsule Take 40 mg by mouth daily   Yes Historical Provider, MD   fluticasone (FLONASE) 50 MCG/ACT nasal spray 1 spray by Each Nostril route daily for 10 days 1/2/22 1/12/22  DANIEL Mauricio CNP   loratadine (CLARITIN) 10 MG tablet Take 1 tablet by mouth daily for 10 days 1/2/22 1/12/22  DANIEL Mauricio CNP   promethazine (PHENERGAN) 25 MG tablet Take 1 tablet by mouth every 6 hours as needed for Nausea 1/2/22   DANIEL Mauricio CNP   ammonium lactate (LAC-HYDRIN) 12 % lotion Apply topically daily. Patient taking differently: Apply topically daily to BLE 11/1/21   Mickey Head, DPM   traMADol (ULTRAM) 50 MG tablet Take 50 mg by mouth 2 times daily.   4/9/21   Historical Provider, MD   furosemide (LASIX) 20 MG tablet Take 1 tablet by mouth daily 4/27/21   Laya Ace, DO   insulin glargine (LANTUS) 100 UNIT/ML injection vial Inject 45 units in the morning and 5 units SQ at  night 3/29/21   Laya Ace, DO   buPROPion (WELLBUTRIN XL) 150 MG extended release tablet Take 1 tablet by mouth every morning 3/25/21   Laya Ace, DO   metoprolol succinate (TOPROL XL) 200 MG extended release tablet Take 1 tablet by mouth daily 2/18/21   Laya Ace, DO   insulin lispro (HUMALOG) 100 UNIT/ML injection vial Inject into the skin 3 times daily (before meals) Per sliding scale, by 2s    Historical Provider, MD   lisinopril (PRINIVIL;ZESTRIL) 20 MG tablet Take 20 mg by mouth daily     Historical Provider, MD   Rosuvastatin Calcium 40 MG CPSP Take 40 mg by mouth daily    Historical Provider, MD   ezetimibe (ZETIA) 10 MG tablet Take 10 mg by mouth daily    Historical Provider, MD   omeprazole (PRILOSEC) 40 MG delayed release capsule Take 40 mg by mouth daily    Historical Provider, MD   amLODIPine (NORVASC) 10 MG tablet Take 10 mg by mouth daily    Historical Provider, MD   aspirin 81 MG EC tablet Take 81 mg by mouth daily    Historical Provider, MD   magnesium oxide (MAG-OX) 400 MG tablet Take 400 mg by mouth daily    Historical Provider, MD   calcium acetate 667 MG TABS Take 667 mg by mouth 3 times daily (with meals)     Historical Provider, MD   vitamin B-12 (CYANOCOBALAMIN) 500 MCG tablet Take 500 mcg by mouth daily    Historical Provider, MD       Scheduled Meds:   insulin lispro  0-12 Units SubCUTAneous Q4H    insulin glargine  52 Units SubCUTAneous QAM    calcium acetate  667 mg Oral TID WC    amLODIPine  10 mg Oral Daily    aspirin  81 mg Oral Daily    buPROPion  150 mg Oral QAM    ezetimibe  10 mg Oral Daily    furosemide  20 mg Oral Daily    cetirizine  10 mg Oral Daily    metoprolol succinate  200 mg Oral Daily    pantoprazole  40 mg Oral QAM AC    traMADol  50 mg Oral Daily    sodium chloride flush  5-40 mL IntraVENous 2 times per day    heparin (porcine)  5,000 Units SubCUTAneous 3 times per day    dexamethasone  10 mg IntraVENous Q24H    Vitamin D  6,000 Units Oral Daily    Followed by   Liv Mae ON 1/11/2022] Vitamin D  2,000 Units Oral Daily    FLUoxetine  40 mg Oral Daily    lisinopril  20 mg Oral Daily     Continuous Infusions:   dextrose      sodium chloride Stopped (01/08/22 1805)      Physical Exam:  Vitals:    01/09/22 2015 01/10/22 0000 01/10/22 0750 01/10/22 0931   BP: (!) 147/62 119/62 134/60    Pulse: 56 62 54 61   Resp: 18 18 18    Temp: 97.8 °F (36.6 °C) 97.7 °F (36.5 °C) 97.5 °F (36.4 °C)    TempSrc: Oral Oral Oral    SpO2: 99%  97%    Weight:       Height:         I/O last 3 completed shifts: In: 46 [P.O.:820; I.V.:6.4; IV Piggyback:176.6]  Out: 2600 [Urine:100]    Deferred because of COVID status.     Data:  CBC:   Lab Results   Component Value Date    WBC 14.0 (H) 01/10/2022    HGB 12.6 (L) 01/10/2022    HCT 37.0 (L) 01/10/2022    MCV 88.9 01/10/2022     01/10/2022     BMP:    Lab Results   Component Value Date     01/10/2022     01/09/2022     (L) 01/08/2022    K 5.1 01/10/2022    K 4.7 01/09/2022    K 5.1 01/08/2022     01/10/2022     01/09/2022    CL 96 (L) 01/08/2022    CO2 20 01/10/2022    CO2 21 01/09/2022    CO2 23 01/08/2022    BUN 82 (H) 01/10/2022    BUN 48 (H) 01/09/2022    BUN 65 (H) 01/08/2022    CREATININE 7.53 (HH) 01/10/2022    CREATININE 5.15 (HH) 01/09/2022    CREATININE 6.69 (HH) 01/08/2022    GLUCOSE 95 01/10/2022    GLUCOSE 236 (H) 01/09/2022    GLUCOSE 360 (H) 01/08/2022     CMP:   Lab Results   Component Value Date     01/10/2022    K 5.1 01/10/2022     01/10/2022    CO2 20 01/10/2022    BUN 82 01/10/2022    CREATININE 7.53 01/10/2022    GLUCOSE 95 01/10/2022    CALCIUM 9.0 01/10/2022    PROT 6.0 01/05/2022    LABALBU 2.9 01/05/2022    BILITOT 0.43 01/05/2022    ALKPHOS 110 01/05/2022    AST 36 01/05/2022    ALT 38 01/05/2022      Hepatic:   Lab Results   Component Value Date    AST 36 01/05/2022    AST 33 01/04/2022     (H) 01/08/2021    ALT 38 01/05/2022    ALT 49 (H) 01/04/2022     (H) 01/08/2021    BILITOT 0.43 01/05/2022    BILITOT 0.38 01/04/2022    BILITOT 0.38 01/08/2021    ALKPHOS 110 01/05/2022    ALKPHOS 144 (H) 01/04/2022    ALKPHOS 313 (H) 01/08/2021     BNP: No results found for: BNP  Lipids:   Lab Results   Component Value Date    CHOL 103 07/19/2021    HDL 52 07/19/2021     INR: No results found for: INR  PTH: No results found for: PTH  Phosphorus:    Lab Results   Component Value Date    PHOS 5.0 01/10/2022     Ionized Calcium: No results found for: IONCA  Magnesium:   Lab Results   Component Value Date    MG 2.3 01/10/2022     Albumin:   Lab Results   Component Value Date    LABALBU 2.9 01/05/2022     Last 3 CK, CKMB, Troponin: @LABRCNT(CKTOTAL:3,CKMB:3,TROPONINI:3)       URINE:)No results found for: Natividad Baumgarten    Radiology:   Reviewed. Assessment:  End stage renal disease. Metabolic acidosis. Hyperkalemia. Hyponatremia. Hypertension. COVID pneumonia. Plan:  HD on a Tuesday, Thursday and Saturday schedule   Follow up phosphorus level and adjust binders as needed. Phos at goal.  We will follow H&H and start erythropoeitin stimulating agent as needed. Hemoglobin at goal  Renal diet with fluid restriction of 1000 ml/24 hours. We will follow up chemistries. DM per primary. Glucose improving. Avoid Lovenox and Fleets enema. Please do not hesitate to contact us for any further questions/concerns. We will continue to follow along with you. Electronically signed by Krishan Power MD  on 1/10/2022 at 9:44 AM  [facility].   Ph: [phone]

## 2022-01-10 NOTE — PROGRESS NOTES
[facility]  Office: [address], DO, Aidee Reyes, DO, Tati Vail, DO, Alex Ramon Blood, DO, Leodan Campbell MD, Kaitlyn Schafer MD, Keenan Quan MD, Thania Guerra MD, Bonnie Hodgkin, MD, Alva Ba MD, González Hernandez MD, Rox Cardoso, DO, Pascual Holley, DO, Kushal Edgar MD,  Gen Morris, DO, Kvng Flores MD, Monse Santana MD, Itzel Johnston MD, Vaibhav Hidalgo MD, Argenis Ramos MD, Marcos Kaplan MD, Anton Lambert MD, Nick Boles [facility], [facility], CNP, Loida Hussein, CNP, Georgette Wells, CNS, Khloe Monson, CNP, Kirt Skinner, CNP, Giuliano Sharma, CNP, Fatuma Rojas, CNP, Garry Cervantes CNP, Radonna Aschoff, PA-C, Concepción Guzman [facility], Nam Gonzales [facility], Delfina Blakely, CNP, Colleen Colón CNP, True Almendarez CNP, Héctor Bella CNP, Rivka Pardo CNP, Carmelo Ambrose, [place]    Progress Note    1/10/2022    2:20 PM    Name:   Jhonnie Rubinstein  MRN:     [id number]     [place]:      [de-identified]   Room:   [address] [place] [address] Day:  6  Admit Date:  1/4/2022  9:25 AM    PCP:   DANIEL Lei CNP  Code Status:  Full Code    Subjective:     C/C:   Chief Complaint   Patient presents with   [place] Al[place] Cough    Fever     Interval History Status: Improved    Patient was feeling much better this morning. He is on room air. Patient denies any complaints. Brief History:     1/4 - Patient reports to the emergency department with fever, chills, cough and exertional dyspnea. Patient has a known history of type 1 diabetes with ESRD. Patient is a TTS dialysis patient who missed his dialysis appointment today. In the emergency department patient was evaluated and was found to have significant pulmonary infiltrates to the right lung consistent with viral pneumonia. Patient subsequently tested positive for COVID-19.   Patient is found to be hypoxic and tachycardic and required nonrebreather mask oxygen supplementation at 15 L to stabilize his SPO2 greater than 90%. Due to the patient's ESRD CTA of the chest was not possible and a VQ scan was completed which was low probability for PE. Patient has a persistent productive cough with yellow phlegm. During simple communication with the patient he will desaturate to 83% on 15 L nonrebreather mask. Placed on heated high flow oxygen emergently. 1/5 - Patient continues to require 60 L of heated high flow oxygen support. Patient inflammatory markers are rapidly rising and new infiltrates are identified in the left lung. Patient received dialysis yesterday nephrology remains on board. Patient will reportedly receive dialysis again today. Patient will be administered Actemra due to rapidly increasing inflammatory markers and increasing oxygen demand. Review of Systems:     Review of Systems   Constitutional: Positive for activity change, fatigue and fever. HENT: Negative for sinus pressure and sinus pain. Eyes: Negative for photophobia and visual disturbance. Respiratory: Positive for cough and shortness of breath. Cardiovascular: Negative for chest pain and palpitations. Gastrointestinal: Positive for nausea. Negative for abdominal distention and abdominal pain. Endocrine: Negative for polyphagia and polyuria. Genitourinary: Negative for urgency. Musculoskeletal: Negative for arthralgias and myalgias. Skin: Negative for color change, pallor and rash. Neurological: Negative for tremors, syncope and weakness. Hematological: Negative for adenopathy. Does not bruise/bleed easily. Psychiatric/Behavioral: Negative for agitation and confusion. Medications:      Allergies:  No Known Allergies    Current Meds:   Scheduled Meds:    insulin lispro  0-12 Units SubCUTAneous Q4H    insulin glargine  52 Units SubCUTAneous QAM    calcium acetate  667 mg Oral TID     amLODIPine  10 mg Oral Daily    aspirin  81 mg Oral Daily    buPROPion  150 mg Oral QAM    ezetimibe  10 mg Oral Daily    furosemide  20 mg Oral Daily    cetirizine  10 mg Oral Daily    metoprolol succinate  200 mg Oral Daily    pantoprazole  40 mg Oral QAM AC    traMADol  50 mg Oral Daily    sodium chloride flush  5-40 mL IntraVENous 2 times per day    heparin (porcine)  5,000 Units SubCUTAneous 3 times per day    dexamethasone  10 mg IntraVENous Q24H    Vitamin D  6,000 Units Oral Daily    Followed by   Pawan Mccullough ON 2022] Vitamin D  2,000 Units Oral Daily    FLUoxetine  40 mg Oral Daily    lisinopril  20 mg Oral Daily     Continuous Infusions:    dextrose      sodium chloride Stopped (22 1805)     PRN Meds: glucose, dextrose, glucagon (rDNA), dextrose, benzonatate, promethazine, sodium chloride flush, sodium chloride, ondansetron **OR** ondansetron, polyethylene glycol, acetaminophen **OR** acetaminophen, guaiFENesin-dextromethorphan, promethazine, LORazepam    Data:     Past Medical History:   has a past medical history of Closed fracture of bone of right foot, Dialysis patient ([facility] [address] Kidney disease, and Type 1 diabetes mellitus ([facility] [address] Social History:   reports that he has never smoked. He has never used smokeless tobacco. He reports that he does not drink alcohol and does not use drugs. Family History:   Family History   Problem Relation Age of Onset    Diabetes Mother     Diabetes Father     Heart Disease Maternal Great Grandfather        Vitals:  BP (!) 158/82   Pulse 63   Temp 97.7 °F (36.5 °C)   Resp 18   Ht 5' 7\" (1.702 m)   Wt 180 lb 5.4 oz (81.8 kg)   SpO2 98%   BMI 28.24 kg/m²   Temp (24hrs), Av.8 °F (36.6 °C), Min:97.5 °F (36.4 °C), Max:98.1 °F (36.7 °C)    Recent Labs     22  2156 01/10/22  0239 01/10/22  0611 01/10/22  1001   POCGLU 109 99 86 129*       I/O (24Hr):     Intake/Output Summary (Last 24 hours) at 1/10/2022 1420  Last data filed at 1/10/2022 0931  Gross per 24 hour   Intake 820 ml   Output 150 ml   Net 670 ml Labs:  Hematology:  Recent Labs     01/08/22  0618 01/09/22  0610 01/10/22  0520   WBC 7.2 9.9 14.0*   RBC 3.94* 4.17* 4.16*   HGB 11.9* 12.8* 12.6*   HCT 35.9* 37.2* 37.0*   MCV 91.1 89.2 88.9   MCH 30.2 30.7 30.3   MCHC 33.1 34.4 34.1   RDW 11.9 11.9 11.9    219 236   MPV 10.4 10.4 10.3     Chemistry:  Recent Labs     01/08/22  0618 01/09/22  0610 01/10/22  0520   * 135 136   K 5.1 4.7 5.1   CL 96* 100 100   CO2 23 21 20   GLUCOSE 360* 236* 95   BUN 65* 48* 82*   CREATININE 6.69* 5.15* 7.53*   MG 2.3 2.2 2.3   ANIONGAP 13 14 16   LABGLOM 9* 12* 8*   GFRAA 11* 15* 9*   CALCIUM 8.6 8.8 9.0   PHOS 4.9* 4.2 5.0*     Recent Labs     01/09/22  1403 01/09/22  1823 01/09/22  2156 01/10/22  0239 01/10/22  0611 01/10/22  1001   POCGLU 272* 160* 109 99 86 129*     ABG:  Lab Results   Component Value Date    FIO2 NOT REPORTED 01/04/2022     Lab Results   Component Value Date/Time    SPECIAL RAC,6ML 01/04/2022 11:35 AM     Lab Results   Component Value Date/Time    CULTURE NO GROWTH 5 DAYS 01/04/2022 11:35 AM       Radiology:  MRI LUMBAR SPINE WO CONTRAST    Result Date: 12/30/2021  3 mm central disc protrusion at L5-S1 mildly effacing the lateral recesses and mildly narrowing the spinal canal.     NM LUNG SCAN PERFUSION ONLY    Result Date: 1/4/2022  Very low probability for pulmonary embolism. XR CHEST PORTABLE    Result Date: 1/5/2022  New left perihilar infiltrate as described above. Stable right perihilar infiltrate. Otherwise stable     XR CHEST PORTABLE    Result Date: 1/4/2022  Right lung infiltrate with suggests developing pneumonia in the right clinical setting. Recommend follow-up to resolution. XR CHEST PORTABLE    Result Date: 1/2/2022  Marginal inspiration, without evidence of acute cardiopulmonary disease       Physical Examination:        Physical Exam  Constitutional:       General: He is in acute distress. Appearance: He is obese. He is ill-appearing.    HENT:      Head: Normocephalic and atraumatic. Nose: Nose normal.      Mouth/Throat:      Mouth: Mucous membranes are dry. Eyes:      Extraocular Movements: Extraocular movements intact. Pupils: Pupils are equal, round, and reactive to light. Cardiovascular:      Rate and Rhythm: Tachycardia present. Pulses: Normal pulses. Heart sounds: No murmur heard. Pulmonary:      Effort: Respiratory distress present. Breath sounds: Rales present. Musculoskeletal:         General: No swelling or tenderness. Normal range of motion. Cervical back: Normal range of motion. No rigidity. Skin:     General: Skin is warm and dry. Capillary Refill: Capillary refill takes 2 to 3 seconds. Coloration: Skin is not jaundiced or pale. Neurological:      General: No focal deficit present. Mental Status: He is alert and oriented to person, place, and time. Psychiatric:         Mood and Affect: Mood normal.         Behavior: Behavior normal.         Assessment:        Hospital Problems           Last Modified POA    * (Principal) Acute hypoxemic respiratory failure due to COVID-19 ([facility] [address] 1/4/2022 Yes    DM (diabetes mellitus), type 1 with complications ([facility] [address] 2/9/9243 Yes    ESRD (end stage renal disease) on dialysis ([facility] [address] 1/4/2022 Yes    Hypertension 1/4/2022 Yes    Hyperlipidemia 1/4/2022 Yes    Bipolar affective disorder ([facility] [address] 1/4/2022 Yes          Plan:        1. Acute hypoxic respiratory failure secondary to COVID-19  1. Patient on room air this morning. 2. High intensity steroids of 20 mg Decadron per day for 5 days followed by 10 mg Decadron for 5 days  3. Not a candidate for Barcitinib due to renal failure  4. Status post Actemra  2. ESRD on dialysis  1. Nephrology following  3. Diabetes type 1  1. Increase Lantus to 52 units daily  2. Corrective sliding scale insulin as ordered  4. Hypertension with hyperlipidemia  1.  Home medication regiment as ordered    Awaiting for chair time at dialysis [facility]        Staci Cherry MD  1/10/2022  2:20 PM

## 2022-01-11 VITALS
SYSTOLIC BLOOD PRESSURE: 134 MMHG | DIASTOLIC BLOOD PRESSURE: 77 MMHG | HEIGHT: 67 IN | OXYGEN SATURATION: 97 % | BODY MASS INDEX: 27.96 KG/M2 | HEART RATE: 74 BPM | TEMPERATURE: 98.1 F | WEIGHT: 178.13 LBS | RESPIRATION RATE: 16 BRPM

## 2022-01-11 LAB
ABSOLUTE EOS #: <0.03 K/UL (ref 0–0.44)
ABSOLUTE IMMATURE GRANULOCYTE: 0.14 K/UL (ref 0–0.3)
ABSOLUTE LYMPH #: 0.96 K/UL (ref 1.1–3.7)
ABSOLUTE MONO #: 0.54 K/UL (ref 0.1–1.2)
ANION GAP SERPL CALCULATED.3IONS-SCNC: 18 MMOL/L (ref 9–17)
BASOPHILS # BLD: 0 % (ref 0–2)
BASOPHILS ABSOLUTE: <0.03 K/UL (ref 0–0.2)
BUN BLDV-MCNC: 114 MG/DL (ref 6–20)
BUN/CREAT BLD: 12 (ref 9–20)
CALCIUM SERPL-MCNC: 8.5 MG/DL (ref 8.6–10.4)
CHLORIDE BLD-SCNC: 91 MMOL/L (ref 98–107)
CO2: 18 MMOL/L (ref 20–31)
CREAT SERPL-MCNC: 9.24 MG/DL (ref 0.7–1.2)
DIFFERENTIAL TYPE: ABNORMAL
EOSINOPHILS RELATIVE PERCENT: 0 % (ref 1–4)
GFR AFRICAN AMERICAN: 7 ML/MIN
GFR NON-AFRICAN AMERICAN: 6 ML/MIN
GFR SERPL CREATININE-BSD FRML MDRD: ABNORMAL ML/MIN/{1.73_M2}
GFR SERPL CREATININE-BSD FRML MDRD: ABNORMAL ML/MIN/{1.73_M2}
GLUCOSE BLD-MCNC: 158 MG/DL (ref 75–110)
GLUCOSE BLD-MCNC: 284 MG/DL (ref 75–110)
GLUCOSE BLD-MCNC: 91 MG/DL (ref 70–99)
GLUCOSE BLD-MCNC: 92 MG/DL (ref 75–110)
GLUCOSE BLD-MCNC: 93 MG/DL (ref 75–110)
HCT VFR BLD CALC: 35.8 % (ref 40.7–50.3)
HEMOGLOBIN: 12.2 G/DL (ref 13–17)
IMMATURE GRANULOCYTES: 1 %
LYMPHOCYTES # BLD: 7 % (ref 24–43)
MAGNESIUM: 2.2 MG/DL (ref 1.6–2.6)
MCH RBC QN AUTO: 30.2 PG (ref 25.2–33.5)
MCHC RBC AUTO-ENTMCNC: 34.1 G/DL (ref 28.4–34.8)
MCV RBC AUTO: 88.6 FL (ref 82.6–102.9)
MONOCYTES # BLD: 4 % (ref 3–12)
NRBC AUTOMATED: 0 PER 100 WBC
PDW BLD-RTO: 11.8 % (ref 11.8–14.4)
PHOSPHORUS: 6.1 MG/DL (ref 2.5–4.5)
PLATELET # BLD: 238 K/UL (ref 138–453)
PLATELET ESTIMATE: ABNORMAL
PMV BLD AUTO: 10.3 FL (ref 8.1–13.5)
POTASSIUM SERPL-SCNC: 5.7 MMOL/L (ref 3.7–5.3)
RBC # BLD: 4.04 M/UL (ref 4.21–5.77)
RBC # BLD: ABNORMAL 10*6/UL
SEG NEUTROPHILS: 88 % (ref 36–65)
SEGMENTED NEUTROPHILS ABSOLUTE COUNT: 11.29 K/UL (ref 1.5–8.1)
SODIUM BLD-SCNC: 127 MMOL/L (ref 135–144)
WBC # BLD: 12.9 K/UL (ref 3.5–11.3)
WBC # BLD: ABNORMAL 10*3/UL

## 2022-01-11 PROCEDURE — 99239 HOSP IP/OBS DSCHRG MGMT >30: CPT | Performed by: INTERNAL MEDICINE

## 2022-01-11 PROCEDURE — 36415 COLL VENOUS BLD VENIPUNCTURE: CPT

## 2022-01-11 PROCEDURE — 2580000003 HC RX 258: Performed by: NURSE PRACTITIONER

## 2022-01-11 PROCEDURE — 82947 ASSAY GLUCOSE BLOOD QUANT: CPT

## 2022-01-11 PROCEDURE — 84100 ASSAY OF PHOSPHORUS: CPT

## 2022-01-11 PROCEDURE — 90935 HEMODIALYSIS ONE EVALUATION: CPT

## 2022-01-11 PROCEDURE — 6360000002 HC RX W HCPCS: Performed by: NURSE PRACTITIONER

## 2022-01-11 PROCEDURE — 83735 ASSAY OF MAGNESIUM: CPT

## 2022-01-11 PROCEDURE — 85025 COMPLETE CBC W/AUTO DIFF WBC: CPT

## 2022-01-11 PROCEDURE — 6370000000 HC RX 637 (ALT 250 FOR IP): Performed by: NURSE PRACTITIONER

## 2022-01-11 PROCEDURE — 80048 BASIC METABOLIC PNL TOTAL CA: CPT

## 2022-01-11 RX ORDER — DEXAMETHASONE 6 MG/1
6 TABLET ORAL
Qty: 2 TABLET | Refills: 0 | Status: SHIPPED | OUTPATIENT
Start: 2022-01-11 | End: 2022-01-13

## 2022-01-11 RX ORDER — INSULIN GLARGINE 100 [IU]/ML
45 INJECTION, SOLUTION SUBCUTANEOUS EVERY MORNING
Status: DISCONTINUED | OUTPATIENT
Start: 2022-01-12 | End: 2022-01-11 | Stop reason: HOSPADM

## 2022-01-11 RX ORDER — CALCIUM ACETATE 667 MG/1
2 CAPSULE ORAL
Status: DISCONTINUED | OUTPATIENT
Start: 2022-01-11 | End: 2022-01-11 | Stop reason: HOSPADM

## 2022-01-11 RX ORDER — DEXAMETHASONE SODIUM PHOSPHATE 10 MG/ML
6 INJECTION, SOLUTION INTRAMUSCULAR; INTRAVENOUS DAILY
Status: DISCONTINUED | OUTPATIENT
Start: 2022-01-12 | End: 2022-01-11 | Stop reason: HOSPADM

## 2022-01-11 RX ADMIN — PANTOPRAZOLE SODIUM 40 MG: 40 TABLET, DELAYED RELEASE ORAL at 06:11

## 2022-01-11 RX ADMIN — HEPARIN SODIUM 5000 UNITS: 5000 INJECTION INTRAVENOUS; SUBCUTANEOUS at 15:32

## 2022-01-11 RX ADMIN — METOPROLOL SUCCINATE 200 MG: 50 TABLET, EXTENDED RELEASE ORAL at 15:31

## 2022-01-11 RX ADMIN — FUROSEMIDE 20 MG: 20 TABLET ORAL at 15:31

## 2022-01-11 RX ADMIN — HEPARIN SODIUM 5000 UNITS: 5000 INJECTION INTRAVENOUS; SUBCUTANEOUS at 06:11

## 2022-01-11 RX ADMIN — Medication 2000 UNITS: at 15:30

## 2022-01-11 RX ADMIN — TRAMADOL HYDROCHLORIDE 50 MG: 50 TABLET, COATED ORAL at 15:31

## 2022-01-11 RX ADMIN — ASPIRIN 81 MG: 81 TABLET, COATED ORAL at 15:30

## 2022-01-11 RX ADMIN — BUPROPION HYDROCHLORIDE 150 MG: 150 TABLET, EXTENDED RELEASE ORAL at 15:30

## 2022-01-11 RX ADMIN — LISINOPRIL 20 MG: 20 TABLET ORAL at 15:31

## 2022-01-11 RX ADMIN — FLUOXETINE 40 MG: 20 CAPSULE ORAL at 15:31

## 2022-01-11 RX ADMIN — SODIUM CHLORIDE, PRESERVATIVE FREE 10 ML: 5 INJECTION INTRAVENOUS at 09:06

## 2022-01-11 RX ADMIN — EZETIMIBE 10 MG: 10 TABLET ORAL at 15:30

## 2022-01-11 RX ADMIN — AMLODIPINE BESYLATE 10 MG: 10 TABLET ORAL at 15:30

## 2022-01-11 RX ADMIN — CETIRIZINE HYDROCHLORIDE 10 MG: 10 TABLET, FILM COATED ORAL at 15:31

## 2022-01-11 ASSESSMENT — PAIN SCALES - WONG BAKER

## 2022-01-11 ASSESSMENT — ENCOUNTER SYMPTOMS
ABDOMINAL DISTENTION: 0
NAUSEA: 1
COUGH: 1
PHOTOPHOBIA: 0
ABDOMINAL PAIN: 0
SINUS PRESSURE: 0
COLOR CHANGE: 0
SHORTNESS OF BREATH: 1
SINUS PAIN: 0

## 2022-01-11 ASSESSMENT — PAIN SCALES - GENERAL: PAINLEVEL_OUTOF10: 3

## 2022-01-11 NOTE — DISCHARGE SUMMARY
[facility]  Office: [address], , Ena Garcia, DO, Luciana Matson DO, [place] [facility], , Cris Armijo MD, Carlitos Puentes MD, Colin Roman MD, Seema Grace MD, Veronica Dodson MD, Laurel Brink MD, Ana Rosa Perez MD, Charley Donahue DO, Allison Art DO, Amarilis Grady MD,  Marissa George DO, Lindsey Aquino MD, Vivian Parker MD, Chadd Rand MD, Chapo Pollard MD, Liliana Zendejas MD, Daleen Lennox, MD, Chintan Juarez MD, Nicolas Yuen [facility], [facility], [facility], Peri Nuñez, CNP, Paulino Graham, CNS, Nighat Purdy, CNP, Neela Miner, CNP, Byron Ivey CNP, Danica House CNP, Hildy Shone, [facility], [place] ZULEIKA Smith, Tiffany Hinson, [facility], Partha Lopez, [facility], Tete Byrne, CNP, Irvin Fuentes, CNP, Jeni Govea, CNP, Marquis Melgar, CNP, Faisal Whitten, [facility], Keila Camacho, [address] [place] [address]    Discharge Summary     Patient ID: Gwynda Smooth  :  1975   MRN: [id number]     ACCOUNT:  [de-identified]   Patient's PCP: DANIEL Love CNP  Admit Date: 2022   Discharge Date: 2022     Length of Stay: 7  Code Status:  Full Code  Admitting Physician: Vivian Parker MD  Discharge Physician: Allison Art DO     Active Discharge Diagnoses:     Hospital Problem Lists:  Principal Problem:    Acute hypoxemic respiratory failure due to COVID-19 [facility])  Active Problems:    DM (diabetes mellitus), type 1 with complications ([facility]    ESRD (end stage renal disease) [facility]    Hypertension    Hyperlipidemia    Bipolar affective disorder ([facility]  Resolved Problems:    * No resolved hospital problems.  *      Admission Condition:  good     Discharged Condition: good    Hospital Stay:     Hospital Course:   - Patient reports to the emergency department with fever, chills, cough and exertional dyspnea.  Patient has a known history of type 1 diabetes with ESRD.  Patient is a TTS dialysis patient who missed his dialysis appointment today. In the emergency department patient was evaluated and was found to have significant pulmonary infiltrates to the right lung consistent with viral pneumonia.  Patient subsequently tested positive for COVID-19.  Patient is found to be hypoxic and tachycardic and required nonrebreather mask oxygen supplementation at 15 L to stabilize his SPO2 greater than 90%.  Due to the patient's ESRD CTA of the chest was not possible and a VQ scan was completed which was low probability for PE.  Patient has a persistent productive cough with yellow phlegm.  During simple communication with the patient he will desaturate to 83% on 15 L nonrebreather mask.    Placed on heated high flow oxygen emergently.     1/5 - Patient continues to require 60 L of heated high flow oxygen support. Patient inflammatory markers are rapidly rising and new infiltrates are identified in the left lung. Patient received dialysis yesterday nephrology remains on board. Patient will reportedly receive dialysis again today. Patient will be administered Actemra due to rapidly increasing inflammatory markers and increasing oxygen demand. Patient tolerated therapy well, after dialysis patient is oxygen requirements quickly decreased. Patient was eventually weaned to room air, completed dialysis and patient was discharged home to follow-up outpatient with PCP. Patient's blood pressures medications were adjusted, will continue care with PCP.       Significant therapeutic interventions: none    Significant Diagnostic Studies:   Labs / Micro:  CBC:   Lab Results   Component Value Date    WBC 12.9 01/11/2022    RBC 4.04 01/11/2022    HGB 12.2 01/11/2022    HCT 35.8 01/11/2022    MCV 88.6 01/11/2022    MCH 30.2 01/11/2022    MCHC 34.1 01/11/2022    RDW 11.8 01/11/2022     01/11/2022     BMP:    Lab Results   Component Value Date    GLUCOSE 91 01/11/2022     01/11/2022    K 5.7 01/11/2022    CL 91 01/11/2022 CO2 18 01/11/2022    ANIONGAP 18 01/11/2022     01/11/2022    CREATININE 9.24 01/11/2022    BUNCRER 12 01/11/2022    CALCIUM 8.5 01/11/2022    LABGLOM 6 01/11/2022    GFRAA 7 01/11/2022    GFR      01/11/2022    GFR NOT REPORTED 01/11/2022     HFP:    Lab Results   Component Value Date    PROT 6.0 01/05/2022     CMP:    Lab Results   Component Value Date    GLUCOSE 91 01/11/2022     01/11/2022    K 5.7 01/11/2022    CL 91 01/11/2022    CO2 18 01/11/2022     01/11/2022    CREATININE 9.24 01/11/2022    ANIONGAP 18 01/11/2022    ALKPHOS 110 01/05/2022    ALT 38 01/05/2022    AST 36 01/05/2022    BILITOT 0.43 01/05/2022    LABALBU 2.9 01/05/2022    ALBUMIN NOT REPORTED 01/05/2022    LABGLOM 6 01/11/2022    GFRAA 7 01/11/2022    GFR      01/11/2022    GFR NOT REPORTED 01/11/2022    PROT 6.0 01/05/2022    CALCIUM 8.5 01/11/2022     PT/INR:  No results found for: PTINR, PROTIME, INR  PTT: No results found for: APTT     Radiology:  NM LUNG SCAN PERFUSION ONLY    Result Date: 1/4/2022  Very low probability for pulmonary embolism. XR CHEST PORTABLE    Result Date: 1/5/2022  New left perihilar infiltrate as described above. Stable right perihilar infiltrate. Otherwise stable       Consultations:    Consults:     Final Specialist Recommendations/Findings:   IP CONSULT TO INTERNAL MEDICINE  IP CONSULT TO NEPHROLOGY  IP CONSULT TO CARDIOLOGY  IP CONSULT TO PHARMACY      The patient was seen and examined on day of discharge and this discharge summary is in conjunction with any daily progress note from day of discharge. Discharge plan:     Disposition: Home    Physician Follow Up:     DANIEL Powell CNP [phone] [employer] [address] [place] [address], [place]    In 1 week      [facility]  [address]  [address] [address] [phone]    M/W/F at 430 PM.Arrive 1/12/21 at 415.        Requiring Further Evaluation/Follow Up POST HOSPITALIZATION/Incidental Findings: none    Diet: renal diet    Activity: As tolerated    Instructions to Patient: none    Discharge Medications:      Medication List      START taking these medications    dexamethasone 6 MG tablet  Commonly known as: DECADRON  Take 1 tablet by mouth daily (with breakfast) for 2 days        CHANGE how you take these medications    ammonium lactate 12 % lotion  Commonly known as: Lac-Hydrin  Apply topically daily.   What changed: additional instructions        CONTINUE taking these medications    amLODIPine 10 MG tablet  Commonly known as: NORVASC     aspirin 81 MG EC tablet     buPROPion 150 MG extended release tablet  Commonly known as: WELLBUTRIN XL  Take 1 tablet by mouth every morning     calcium acetate 667 MG Tabs     ezetimibe 10 MG tablet  Commonly known as: ZETIA     FLUoxetine 20 MG capsule  Commonly known as: PROZAC     fluticasone 50 MCG/ACT nasal spray  Commonly known as: Flonase  1 spray by Each Nostril route daily for 10 days     furosemide 20 MG tablet  Commonly known as: LASIX  Take 1 tablet by mouth daily     insulin glargine 100 UNIT/ML injection vial  Commonly known as: LANTUS  Inject 45 units in the morning and 5 units SQ at  night     insulin lispro 100 UNIT/ML injection vial  Commonly known as: HUMALOG     lisinopril 20 MG tablet  Commonly known as: PRINIVIL;ZESTRIL     loratadine 10 MG tablet  Commonly known as: Claritin  Take 1 tablet by mouth daily for 10 days     magnesium oxide 400 MG tablet  Commonly known as: MAG-OX     metoprolol succinate 200 MG extended release tablet  Commonly known as: TOPROL XL  Take 1 tablet by mouth daily     omeprazole 40 MG delayed release capsule  Commonly known as: PRILOSEC     promethazine 25 MG tablet  Commonly known as: PHENERGAN  Take 1 tablet by mouth every 6 hours as needed for Nausea     Rosuvastatin Calcium 40 MG Cpsp     traMADol 50 MG tablet  Commonly known as: ULTRAM     vitamin B-12 500 MCG tablet  Commonly known as: CYANOCOBALAMIN        STOP

## 2022-01-11 NOTE — PROGRESS NOTES
Pt blood sugar 92 at 0600. Orders for 52 units of Lantus to be given. Dr. Apolinar Narvaez notified, orders discontinued for Lantus.

## 2022-01-11 NOTE — PLAN OF CARE
Nutrition Problem #1: Increased nutrient needs  Intervention: Food and/or Nutrient Delivery: Continue Current Diet  Nutritional Goals: PO intakes to meet >75% of estimated nutritional needs

## 2022-01-11 NOTE — PROGRESS NOTES
HEMODIALYSIS POST TREATMENT NOTE    Treatment time ordered: 3.5Hrs    Actual treatment time: 3.5Hrs    UltraFiltration Goal: .5-1Kgs  UltraFiltration Removed: 1Kg      Pre Treatment weight: 81.8kgs  Post Treatment weight: 80.8kgs  Estimated Dry Weight: 85Kgs    Access used:     Central Venous Catheter:          Tunneled or Non-tunneled: N/A           Site: N/A          Access Flow: N/A      Internal Access:       AV Fistula or AV Graft: AVF         Site: L upper arm       Access Flow: Good       Sign and symptoms of infection: No       If YES: N/A    Medications or blood products given: N/A    Chronic outpatient schedule: [facility]    Chronic outpatient unit: [facility] starting 2022 @ 16:15    Summary of response to treatment: Tolerated well with no issues throughout trx and bleed time less than 10minutes per site    Explain if orders NOT met, was physician notified:N/A      ACES flowsheet faxed to patient unit/ placed in patient chart: yes    Post assessment completed: yes by Areli May RN    Report given to: Cindy Henry documented Safety Checks include the followin) Access and face visible at all times. 2) All connections and blood lines are secure with no kinks. 3) NVL alarm engaged. 4) Hemosafe device applied (if applicable). 5) No collapse of Arterial or Venous blood chambers. 6) All blood lines / pump segments in the air detectors.

## 2022-01-11 NOTE — PROGRESS NOTES
[facility]  Office: [address], DO, Joannemonty Garcia, DO, Carissa Dumont, DO, Ash Malin Cholo, DO, Raimundo Calero MD, Juan José De La Rosa MD, Ambar Walter MD, Zaid Rodriguez MD, Loy Triana MD, Nataliya Bella MD, Jasson Mantilla MD, Jaelyn Yeung, DO, Manolo Nettles, DO, Maylin Edwards MD,  Kirsten Soria, DO, Nahun Lyn MD, Gaye Sandoval MD, Gabrielle Alfonso MD, Sang Villegas MD, Sarah Daniels MD, Purvi Mcintyre MD, Leon Montejo MD, [facility], [facility], CNP, Ludy Gutierrez, CNP, Ed Su, CNS, Callum Martin, CNP, Titus Sanchez, CNP, Suzette Torres, CNP, Zhanna Coe, CNP, Piter Magallanes CNP, Jorge Ryan PA-C, Yanet Glasgow, VASILE, Rocío Feliz DNP, Saroj Calvin, CNP, Brandi Stein, CNP, Garry Nair, CNP, Hayden Ohara, CNP, Adrian Garnica, CNP, Bonita Henderson, [place] [facility]    Progress Note    1/11/2022    11:28 AM    Name:   Riley Duarte  MRN:     [id number]     [place]:      [de-identified]   Room:   [address] [place] [address] Day:  7  Admit Date:  1/4/2022  9:25 AM    PCP:   DANIEL Larson CNP  Code Status:  Full Code    Subjective:     C/C:   Chief Complaint   Patient presents with    Cough    Fever     Interval History Status: Improved    Patient was feeling much better this morning. He is on room air. Patient denies any complaints. Brief History:     1/4 - Patient reports to the emergency department with fever, chills, cough and exertional dyspnea. Patient has a known history of type 1 diabetes with ESRD. Patient is a TTS dialysis patient who missed his dialysis appointment today. In the emergency department patient was evaluated and was found to have significant pulmonary infiltrates to the right lung consistent with viral pneumonia. Patient subsequently tested positive for COVID-19.   Patient is found to be hypoxic and tachycardic and required nonrebreather mask oxygen supplementation at 15 L to stabilize his SPO2 greater than 90%. Due to the patient's ESRD CTA of the chest was not possible and a VQ scan was completed which was low probability for PE. Patient has a persistent productive cough with yellow phlegm. During simple communication with the patient he will desaturate to 83% on 15 L nonrebreather mask. Placed on heated high flow oxygen emergently. 1/5 - Patient continues to require 60 L of heated high flow oxygen support. Patient inflammatory markers are rapidly rising and new infiltrates are identified in the left lung. Patient received dialysis yesterday nephrology remains on board. Patient will reportedly receive dialysis again today. Patient will be administered Actemra due to rapidly increasing inflammatory markers and increasing oxygen demand. Review of Systems:     Review of Systems   Constitutional: Positive for activity change, fatigue and fever. HENT: Negative for sinus pressure and sinus pain. Eyes: Negative for photophobia and visual disturbance. Respiratory: Positive for cough and shortness of breath. Cardiovascular: Negative for chest pain and palpitations. Gastrointestinal: Positive for nausea. Negative for abdominal distention and abdominal pain. Endocrine: Negative for polyphagia and polyuria. Genitourinary: Negative for urgency. Musculoskeletal: Negative for arthralgias and myalgias. Skin: Negative for color change, pallor and rash. Neurological: Negative for tremors, syncope and weakness. Hematological: Negative for adenopathy. Does not bruise/bleed easily. Psychiatric/Behavioral: Negative for agitation and confusion. Medications:      Allergies:  No Known Allergies    Current Meds:   Scheduled Meds:    calcium acetate  2 capsule Oral TID WC    [START ON 1/12/2022] insulin glargine  45 Units SubCUTAneous QAM    [START ON 1/12/2022] dexamethasone  6 mg IntraVENous Daily    insulin lispro  0-12 Units SubCUTAneous Q4H    amLODIPine  10 mg Oral Daily    aspirin  81 mg Oral Daily    buPROPion  150 mg Oral QAM    ezetimibe  10 mg Oral Daily    furosemide  20 mg Oral Daily    cetirizine  10 mg Oral Daily    metoprolol succinate  200 mg Oral Daily    pantoprazole  40 mg Oral QAM AC    traMADol  50 mg Oral Daily    sodium chloride flush  5-40 mL IntraVENous 2 times per day    heparin (porcine)  5,000 Units SubCUTAneous 3 times per day    Vitamin D  2,000 Units Oral Daily    FLUoxetine  40 mg Oral Daily    lisinopril  20 mg Oral Daily     Continuous Infusions:    dextrose      sodium chloride Stopped (22 1805)     PRN Meds: glucose, dextrose, glucagon (rDNA), dextrose, benzonatate, promethazine, sodium chloride flush, sodium chloride, ondansetron **OR** ondansetron, polyethylene glycol, acetaminophen **OR** acetaminophen, guaiFENesin-dextromethorphan, promethazine, LORazepam    Data:     Past Medical History:   has a past medical history of Closed fracture of bone of right foot, Dialysis patient ([facility] Kidney disease, and [facility] mellitus [facility] Social History:   reports that he has never smoked. He has never used smokeless tobacco. He reports that he does not drink alcohol and does not use drugs. Family History:   Family History   Problem Relation Age of Onset    Diabetes Mother     Diabetes Father     Heart Disease Maternal Great Grandfather        Vitals:  /64   Pulse 52   Temp 97.9 °F (36.6 °C) (Oral)   Resp 14   Ht 5' 7\" (1.702 m)   Wt 180 lb 5.4 oz (81.8 kg)   SpO2 98%   BMI 28.24 kg/m²   Temp (24hrs), Av °F (36.7 °C), Min:97.7 °F (36.5 °C), Max:98.2 °F (36.8 °C)    Recent Labs     01/10/22  2150 22  0205 22  0546 22  1024   POCGLU 108 93 92 158*       I/O (24Hr):     Intake/Output Summary (Last 24 hours) at 2022 1128  Last data filed at 1/10/2022 1853  Gross per 24 hour   Intake 600 ml   Output --   Net 600 ml       Labs:  Hematology:  Recent Labs 01/09/22  0610 01/10/22  0520 01/11/22  0521   WBC 9.9 14.0* 12.9*   RBC 4.17* 4.16* 4.04*   HGB 12.8* 12.6* 12.2*   HCT 37.2* 37.0* 35.8*   MCV 89.2 88.9 88.6   MCH 30.7 30.3 30.2   MCHC 34.4 34.1 34.1   RDW 11.9 11.9 11.8    236 238   MPV 10.4 10.3 10.3     Chemistry:  Recent Labs     01/09/22  0610 01/10/22  0520 01/11/22  0521    136 127*   K 4.7 5.1 5.7*    100 91*   CO2 21 20 18*   GLUCOSE 236* 95 91   BUN 48* 82* 114*   CREATININE 5.15* 7.53* 9.24*   MG 2.2 2.3 2.2   ANIONGAP 14 16 18*   LABGLOM 12* 8* 6*   GFRAA 15* 9* 7*   CALCIUM 8.8 9.0 8.5*   PHOS 4.2 5.0* 6.1*     Recent Labs     01/10/22  1443 01/10/22  1853 01/10/22  2150 01/11/22  0205 01/11/22  0546 01/11/22  1024   POCGLU 180* 112* 108 93 92 158*     ABG:  Lab Results   Component Value Date    FIO2 NOT REPORTED 01/04/2022     Lab Results   Component Value Date/Time    SPECIAL RAC,6ML 01/04/2022 11:35 AM     Lab Results   Component Value Date/Time    CULTURE NO GROWTH 5 DAYS 01/04/2022 11:35 AM       Radiology:  MRI LUMBAR SPINE WO CONTRAST    Result Date: 12/30/2021  3 mm central disc protrusion at L5-S1 mildly effacing the lateral recesses and mildly narrowing the spinal canal.     NM LUNG SCAN PERFUSION ONLY    Result Date: 1/4/2022  Very low probability for pulmonary embolism. XR CHEST PORTABLE    Result Date: 1/5/2022  New left perihilar infiltrate as described above. Stable right perihilar infiltrate. Otherwise stable     XR CHEST PORTABLE    Result Date: 1/4/2022  Right lung infiltrate with suggests developing pneumonia in the right clinical setting. Recommend follow-up to resolution. XR CHEST PORTABLE    Result Date: 1/2/2022  Marginal inspiration, without evidence of acute cardiopulmonary disease       Physical Examination:        Physical Exam  Constitutional:       General: He is in acute distress. Appearance: He is obese. He is ill-appearing. HENT:      Head: Normocephalic and atraumatic.       Nose: Nose normal.      Mouth/Throat:      Mouth: Mucous membranes are dry. Eyes:      Extraocular Movements: Extraocular movements intact. Pupils: Pupils are equal, round, and reactive to light. Cardiovascular:      Rate and Rhythm: Tachycardia present. Pulses: Normal pulses. Heart sounds: No murmur heard. Pulmonary:      Effort: Respiratory distress present. Breath sounds: Rales present. Musculoskeletal:         General: No swelling or tenderness. Normal range of motion. Cervical back: Normal range of motion. No rigidity. Skin:     General: Skin is warm and dry. Capillary Refill: Capillary refill takes 2 to 3 seconds. Coloration: Skin is not jaundiced or pale. Neurological:      General: No focal deficit present. Mental Status: He is alert and oriented to person, place, and time. Psychiatric:         Mood and Affect: Mood normal.         Behavior: Behavior normal.         Assessment:        Hospital Problems           Last Modified POA    * (Principal) Acute hypoxemic respiratory failure due to COVID-19 ([facility] 1/4/2022 Yes    DM (diabetes mellitus), type 1 with complications ([facility] 4/1/2629 Yes    ESRD (end stage renal disease) on dialysis ([facility] 1/4/2022 Yes    Hypertension 1/4/2022 Yes    Hyperlipidemia 1/4/2022 Yes    Bipolar affective disorder ([facility] 1/4/2022 Yes          Plan:        1. Acute hypoxic respiratory failure secondary to COVID-19  1. Patient on room air this morning. 2. High intensity steroids of 20 mg Decadron per day for 5 days followed by 10 mg Decadron for 5 days  3. Not a candidate for Barcitinib due to renal failure  4. Status post Actemra  2. ESRD on dialysis  1. Nephrology following  3. Diabetes type 1  1. Increase Lantus to 52 units daily  2. Corrective sliding scale insulin as ordered  4. Hypertension with hyperlipidemia  1. Home medication regiment as ordered    Discharge home today, adjusted bp medications. Adjusted decadron.  DC home with 45 units lantus.          Maria Dolores Marino DO  1/11/2022  11:28 AM

## 2022-01-11 NOTE — PROGRESS NOTES
Physical Therapy  DATE: 2022    NAME: Eric Mccarthy  MRN: [id number]   : 1975    Patient not seen this date for Physical Therapy due to:      [] Cancel by RN or physician due to:    [x] Hemodialysis    [] Critical Lab Value Level     [] Blood transfusion in progress    [] Acute or unstable cardiovascular status   _MAP < 55 or more than >115  _HR < 40 or > 130    [] Acute or unstable pulmonary status   -FiO2 > 60%   _RR < 5 or >40    _O2 sats < 85%    [] Strict Bedrest    [] Off Unit for surgery or procedure    [] Off Unit for testing       [] Pending imaging to R/O fracture    [] Refusal by Patient      [] Other      [] PT being discontinued at this time. Patient independent. No further needs. [] PT being discontinued at this time as the patient has been transferred to hospice care. No further needs.       Aury Hopkins, PT  10:12

## 2022-01-11 NOTE — CARE COORDINATION
Discharge planning    Call into patient room up to discuss discharge plan of care. Patient verbalized understanding of his new HD locations , days and time. He declines need for any further DME and home care.

## 2022-01-11 NOTE — PLAN OF CARE
Problem: Discharge Planning:  Goal: Discharged to appropriate level of care  Description: Discharged to appropriate level of care  Outcome: Ongoing     Isabel castaneda pt.  Referral and should be d/c tomorrow

## 2022-01-11 NOTE — FLOWSHEET NOTE
Isolated patient.  attempts to make contact by phone. No contact was made. Silent prayer shared . Follow up as needed.      01/11/22 0054   Encounter Summary   Services provided to: Patient not available   Referral/Consult From: Rounding   Continue Visiting   (1-11-22 isolated pt, no contact made by phone)   Complexity of Encounter Low   Length of Encounter 15 minutes   Routine   Type Follow up   Assessment Unable to respond   Intervention Prayer   Outcome Did not respond

## 2022-01-11 NOTE — PROGRESS NOTES
[address] [employer]  Occupational Therapy Not Seen    DATE: 2022    NAME: Dora Moser  MRN: [id number]   : 1975    Patient not seen this date for Occupational Therapy due to:      [] Cancel by RN or physician due to:    [x] Hemodialysis    [] Critical Lab Value Level     [] Blood transfusion in progress    [] Acute or unstable cardiovascular status   _MAP < 55 or more than >115  _HR < 40 or > 130    [] Acute or unstable pulmonary status   -FiO2 > 60%   _RR < 5 or >40    _O2 sats < 85%    [] Strict Bedrest    [] Off Unit for surgery or procedure    [] Off Unit for testing       [] Pending imaging to R/O fracture    [] Refusal by Patient      [] Other      [] OT being discontinued at this time. Patient independent. No further needs. [] OT being discontinued at this time as the patient has been transferred to hospice care. No further needs.       DIEGO Farfan

## 2022-01-11 NOTE — PROGRESS NOTES
HEMODIALYSIS PRE-TREATMENT NOTE    Patient Identifiers prior to treatment: Name//MRN    Isolation Required:  Yes                      Isolation Type: Droplet+       (please document if patient is being managed as a PUI/COVID-19 patient)        Hepatitis status:                           Date Drawn                             Result  Hepatitis B Surface Antigen 2021     NEG                     Hepatitis B Surface Antibody N/A N/A        Hepatitis B Core Antibody 2021 NEG          How was Hepatitis Status verified: Epic chart     Was a copy of the labs you documented provided to facility for the patient's chart: yes    Hemodialysis orders verified: yes    Access Within normal limits ( I.e. s/s of infection,...): yes     Pre-Assessment completed: yes by Marcelino Smith RN    Pre-dialysis report received from: [facility]                      Time: 10:10

## 2022-01-11 NOTE — PROGRESS NOTES
Comprehensive Nutrition Assessment    Type and Reason for Visit:  Initial,RD Nutrition Re-Screen/LOS    Nutrition Recommendations/Plan:   1. Continue current ADULT DIET; Regular; 4 carb choices (60 gm/meal); Low Potassium (Less than 3000 mg/day); Low Phosphorus (Less than 1000 mg); 1000 ml  2. Monitor po intakes, need for ONS, and labs    Nutrition Assessment:  Patient admitted for +COVID-19 virus and placed in droplet plus precautions. Hx: T1DM with ESRD on dialysis M/W/F. UZIEL weights due to fluid fluctuation from dialysis during admission-- wts per EHR show 4.8% wt loss x 6 months- non significant at this time. PO intakes: %. Malnutrition Assessment:  Malnutrition Status:  Insufficient data    Context:  Acute Illness     Findings of the 6 clinical characteristics of malnutrition:  Energy Intake:  Mild decrease in energy intake (Comment)  Weight Loss:  No significant weight loss     Body Fat Loss:  Unable to assess     Muscle Mass Loss:  Unable to assess    Fluid Accumulation:  No significant fluid accumulation     Strength:  Not Performed    Estimated Daily Nutrient Needs:  Energy (kcal):  9238-2109 kcal (23-25 kcal/kg); Weight Used for Energy Requirements:        Protein (g):   gm protein (1.3-1.5 g/kg); Weight Used for Protein Requirements:  Ideal        Fluid (ml/day):  1000 mL fluid restriction per physician; Method Used for Fluid Requirements:  Other (Comment)      Nutrition Related Findings:  Hemodialysis. Constipation. T1DM. Wounds:  None       Current Nutrition Therapies:    ADULT DIET; Regular; 4 carb choices (60 gm/meal); Low Potassium (Less than 3000 mg/day);  Low Phosphorus (Less than 1000 mg); 1000 ml    Anthropometric Measures:  · Height: 5' 7\" (170.2 cm)  · Current Body Weight: 178 lb 2.1 oz (80.8 kg)   · Admission Body Weight: 194 lb (88 kg) (stated)    · Usual Body Weight: 187 lb 1.6 oz (84.9 kg) (7/13/21 Actual weight)     · Ideal Body Weight: 148 lbs; % Ideal Body Weight 120.4 %   · BMI: 27.9  · Adjusted Body Weight:  ; No Adjustment   · BMI Categories: Overweight (BMI 25.0-29. 9)       Nutrition Diagnosis:   · Increased nutrient needs related to increase demand for energy/nutrients as evidenced by dialysis    Nutrition Interventions:   Food and/or Nutrient Delivery:  Continue Current Diet  Nutrition Education/Counseling:  Education not indicated   Coordination of Nutrition Care:  Continue to monitor while inpatient    Goals:  PO intakes to meet >75% of estimated nutritional needs       Nutrition Monitoring and Evaluation:   Behavioral-Environmental Outcomes:  None Identified   Food/Nutrient Intake Outcomes:  Food and Nutrient Intake  Physical Signs/Symptoms Outcomes:  Biochemical Data,Constipation,Fluid Status or Edema,Skin,Weight     Discharge Planning:    Continue current diet     Alexei Fountain, [address] [place] [address]  Office Number: [phone]

## 2022-01-11 NOTE — PROGRESS NOTES
Reason for Consult:  End stage renal disease. Requesting Physician:  Héctor Medina DO    Interval history:   HD planned for today. Decreasing edw as tolerated. SBP trending 110-130s. Attempted to call into the room. He did not answer the phone. HISTORY OF PRESENT ILLNESS:    The patient is a 51 y.o. male who normally undergoes dialysis at [facility] on TTS under our care presents with fever, cough, shortness of breath and vomiting over the last 4 days. His chest x ray shows right sided pneumonia. His COVID test is positive. He is on high flow O2. His last hemodialysis was on Sunday. Today his potassium level is 5.0. Prior to Admission medications    Medication Sig Start Date End Date Taking? Authorizing Provider   FLUoxetine (PROZAC) 20 MG capsule Take 40 mg by mouth daily   Yes Historical Provider, MD   fluticasone (FLONASE) 50 MCG/ACT nasal spray 1 spray by Each Nostril route daily for 10 days 1/2/22 1/12/22  DANIEL Barragan CNP   loratadine (CLARITIN) 10 MG tablet Take 1 tablet by mouth daily for 10 days 1/2/22 1/12/22  DANIEL Barragan CNP   promethazine (PHENERGAN) 25 MG tablet Take 1 tablet by mouth every 6 hours as needed for Nausea 1/2/22   DANIEL Barragan CNP   ammonium lactate (LAC-HYDRIN) 12 % lotion Apply topically daily. Patient taking differently: Apply topically daily to BLE 11/1/21   Cas Chin DPM   traMADol (ULTRAM) 50 MG tablet Take 50 mg by mouth 2 times daily.   4/9/21   Historical Provider, MD   furosemide (LASIX) 20 MG tablet Take 1 tablet by mouth daily 4/27/21   Tricia Tate,    insulin glargine (LANTUS) 100 UNIT/ML injection vial Inject 45 units in the morning and 5 units SQ at  night 3/29/21   Tricia Tate DO   buPROPion (WELLBUTRIN XL) 150 MG extended release tablet Take 1 tablet by mouth every morning 3/25/21   Tricia Tate DO   metoprolol succinate (TOPROL XL) 200 MG extended release tablet Take 1 tablet by mouth daily 2/18/21   Tricia Tate DO   insulin lispro (HUMALOG) 100 UNIT/ML injection vial Inject into the skin 3 times daily (before meals) Per sliding scale, by 2s    Historical Provider, MD   lisinopril (PRINIVIL;ZESTRIL) 20 MG tablet Take 20 mg by mouth daily     Historical Provider, MD   Rosuvastatin Calcium 40 MG CPSP Take 40 mg by mouth daily    Historical Provider, MD   ezetimibe (ZETIA) 10 MG tablet Take 10 mg by mouth daily    Historical Provider, MD   omeprazole (PRILOSEC) 40 MG delayed release capsule Take 40 mg by mouth daily    Historical Provider, MD   amLODIPine (NORVASC) 10 MG tablet Take 10 mg by mouth daily    Historical Provider, MD   aspirin 81 MG EC tablet Take 81 mg by mouth daily    Historical Provider, MD   magnesium oxide (MAG-OX) 400 MG tablet Take 400 mg by mouth daily    Historical Provider, MD   calcium acetate 667 MG TABS Take 667 mg by mouth 3 times daily (with meals)     Historical Provider, MD   vitamin B-12 (CYANOCOBALAMIN) 500 MCG tablet Take 500 mcg by mouth daily    Historical Provider, MD       Scheduled Meds:   insulin lispro  0-12 Units SubCUTAneous Q4H    insulin glargine  52 Units SubCUTAneous QAM    calcium acetate  667 mg Oral TID WC    amLODIPine  10 mg Oral Daily    aspirin  81 mg Oral Daily    buPROPion  150 mg Oral QAM    ezetimibe  10 mg Oral Daily    furosemide  20 mg Oral Daily    cetirizine  10 mg Oral Daily    metoprolol succinate  200 mg Oral Daily    pantoprazole  40 mg Oral QAM AC    traMADol  50 mg Oral Daily    sodium chloride flush  5-40 mL IntraVENous 2 times per day    heparin (porcine)  5,000 Units SubCUTAneous 3 times per day    dexamethasone  10 mg IntraVENous Q24H    Vitamin D  2,000 Units Oral Daily    FLUoxetine  40 mg Oral Daily    lisinopril  20 mg Oral Daily     Continuous Infusions:   dextrose      sodium chloride Stopped (01/08/22 1805)      Physical Exam:  Vitals:    01/10/22 1927 01/10/22 2321 01/11/22 0348 01/11/22 0739   BP: (!) 156/76 (!) 144/86 (!) 144/78 126/64 Pulse: 65 62 59 52   Resp: 16 16 16 14   Temp: 98.2 °F (36.8 °C) 98.2 °F (36.8 °C) 98.1 °F (36.7 °C) 97.9 °F (36.6 °C)   TempSrc: Oral Oral Oral Oral   SpO2: 99% 97% 98% 98%   Weight:       Height:         I/O last 3 completed shifts: In: 1520 [P.O.:1520]  Out: 150 [Urine:150]    Deferred because of COVID status.     Data:  CBC:   Lab Results   Component Value Date    WBC 12.9 (H) 01/11/2022    HGB 12.2 (L) 01/11/2022    HCT 35.8 (L) 01/11/2022    MCV 88.6 01/11/2022     01/11/2022     BMP:    Lab Results   Component Value Date     (L) 01/11/2022     01/10/2022     01/09/2022    K 5.7 (H) 01/11/2022    K 5.1 01/10/2022    K 4.7 01/09/2022    CL 91 (L) 01/11/2022     01/10/2022     01/09/2022    CO2 18 (L) 01/11/2022    CO2 20 01/10/2022    CO2 21 01/09/2022     (HH) 01/11/2022    BUN 82 (H) 01/10/2022    BUN 48 (H) 01/09/2022    CREATININE 9.24 (HH) 01/11/2022    CREATININE 7.53 (HH) 01/10/2022    CREATININE 5.15 (HH) 01/09/2022    GLUCOSE 91 01/11/2022    GLUCOSE 95 01/10/2022    GLUCOSE 236 (H) 01/09/2022     CMP:   Lab Results   Component Value Date     01/11/2022    K 5.7 01/11/2022    CL 91 01/11/2022    CO2 18 01/11/2022     01/11/2022    CREATININE 9.24 01/11/2022    GLUCOSE 91 01/11/2022    CALCIUM 8.5 01/11/2022    PROT 6.0 01/05/2022    LABALBU 2.9 01/05/2022    BILITOT 0.43 01/05/2022    ALKPHOS 110 01/05/2022    AST 36 01/05/2022    ALT 38 01/05/2022      Hepatic:   Lab Results   Component Value Date    AST 36 01/05/2022    AST 33 01/04/2022     (H) 01/08/2021    ALT 38 01/05/2022    ALT 49 (H) 01/04/2022     (H) 01/08/2021    BILITOT 0.43 01/05/2022    BILITOT 0.38 01/04/2022    BILITOT 0.38 01/08/2021    ALKPHOS 110 01/05/2022    ALKPHOS 144 (H) 01/04/2022    ALKPHOS 313 (H) 01/08/2021     BNP: No results found for: BNP  Lipids:   Lab Results   Component Value Date    CHOL 103 07/19/2021    HDL 52 07/19/2021     INR: No results found for: INR  PTH: No results found for: PTH  Phosphorus:    Lab Results   Component Value Date    PHOS 6.1 01/11/2022     Ionized Calcium: No results found for: IONCA  Magnesium:   Lab Results   Component Value Date    MG 2.2 01/11/2022     Albumin:   Lab Results   Component Value Date    LABALBU 2.9 01/05/2022     Last 3 CK, CKMB, Troponin: @LABRCNT(CKTOTAL:3,CKMB:3,TROPONINI:3)       URINE:)No results found for: Lucila Luna    Radiology:   Reviewed. Assessment:  End stage renal disease. Azotemia. Metabolic acidosis. Hyperkalemia. Hyponatremia. Hypertension. COVID pneumonia. Plan:  HD today, then MWF as per new outpatient schedule. Will increase Phoslo to 2 tab TID with meals. Follow up phosphorus level and adjust binders as needed. We will follow H&H and start erythropoeitin stimulating agent as needed. Hemoglobin at goal  Renal diet with fluid restriction of 1000 ml/24 hours. We will follow up chemistries. DM per primary. Glucose improving. Avoid Lovenox and Fleets enema. Please do not hesitate to contact us for any further questions/concerns. We will continue to follow along with you. Electronically signed by Preston Lott MD  on 1/11/2022 at 10:54 AM  [facility].   Ph: [phone]

## 2022-01-12 ENCOUNTER — CARE COORDINATION (OUTPATIENT)
Dept: CASE MANAGEMENT | Age: 47
End: 2022-01-12

## 2022-01-14 ENCOUNTER — VIRTUAL VISIT (OUTPATIENT)
Dept: FAMILY MEDICINE CLINIC | Age: 47
End: 2022-01-14
Payer: MEDICARE

## 2022-01-14 DIAGNOSIS — Z99.2 ESRD (END STAGE RENAL DISEASE) ON DIALYSIS (HCC): ICD-10-CM

## 2022-01-14 DIAGNOSIS — N18.6 ESRD (END STAGE RENAL DISEASE) ON DIALYSIS (HCC): ICD-10-CM

## 2022-01-14 DIAGNOSIS — U07.1 COVID-19: Primary | ICD-10-CM

## 2022-01-14 PROBLEM — E10.8 DM (DIABETES MELLITUS), TYPE 1 WITH COMPLICATIONS (HCC): Status: RESOLVED | Noted: 2021-06-07 | Resolved: 2022-01-14

## 2022-01-14 PROCEDURE — 1111F DSCHRG MED/CURRENT MED MERGE: CPT | Performed by: NURSE PRACTITIONER

## 2022-01-14 PROCEDURE — G8427 DOCREV CUR MEDS BY ELIG CLIN: HCPCS | Performed by: NURSE PRACTITIONER

## 2022-01-14 PROCEDURE — 99213 OFFICE O/P EST LOW 20 MIN: CPT | Performed by: NURSE PRACTITIONER

## 2022-01-14 NOTE — PROGRESS NOTES
Visit Information    Have you changed or started any medications since your last visit including any over-the-counter medicines, vitamins, or herbal medicines? no   Have you stopped taking any of your medications? Is so, why? -  no  Are you having any side effects from any of your medications? - no    Have you seen any other physician or provider since your last visit?  no   Have you had any other diagnostic tests since your last visit?  no   Have you been seen in the emergency room and/or had an admission in a hospital since we last saw you?  no   Have you had your routine dental cleaning in the past 6 months?  no     Do you have an active MyChart account? If no, what is the barrier?   No: na    Patient Care Team:  DANIEL Love CNP as PCP - General (Certified Nurse Practitioner)  DANIEL Love CNP as PCP - [facility] Empaneled Provider  Omkar Kerns DPM as Physician (Podiatry)  Sofi Morales RN as Care Transitions Nurse    Medical History Review  Past Medical, Family, and Social History reviewed and  contribute to the patient presenting condition    Health Maintenance   Topic Date Due    Hepatitis C screen  Never done    Pneumococcal 0-64 years Vaccine (1 of 4 - PCV13) 05/11/1980    Diabetic retinal exam  Never done    Hepatitis B vaccine (1 of 3 - Risk Recombivax 3-dose series) Never done    Colon cancer screen colonoscopy  Never done    COVID-19 Vaccine (3 - Booster for Moderna series) 10/13/2021    A1C test (Diabetic or Prediabetic)  07/19/2022    Lipid screen  07/19/2022    Diabetic foot exam  09/02/2022    Depression Screen  09/10/2022    Potassium monitoring  01/11/2023    Creatinine monitoring  01/11/2023    DTaP/Tdap/Td vaccine (2 - Td or Tdap) 11/21/2030    Flu vaccine  Completed    Hepatitis A vaccine  Aged Out    Hib vaccine  Aged Out    Meningococcal (ACWY) vaccine  Aged Out    HIV screen  Discontinued

## 2022-01-14 NOTE — PROGRESS NOTES
[address] (:  1975) is a 51 y.o. male,Established patient, here for evaluation of the following chief complaint(s): Follow-Up from Hospital ( [facility]) and Breathing Problem (covid,feeling fine. father states pt was at dialysis yesterday and they heard a wheeze in rt lung)         ASSESSMENT/PLAN:  1. COVID-19  Mostly resolved, only has lingering cough, ok for mucinex if ok with renal MD and tessalon rx already given  Cough and deep breathing exercises encouraged  Continue tummy time if able  Fluids as able    2. ESRD (end stage renal disease) on dialysis [facility])  Continue with renal and dialysis    Chemistry        Component Value Date/Time     (L) 2022    K 5.7 (H) 2022    CL 91 (L) 2022    CO2 18 (L) 2022     (HH) 2022    CREATININE 9.24 (HH) 2022        Component Value Date/Time    CALCIUM 8.5 (L) 202221    ALKPHOS 110 2022 0608    AST 36 2022 0608    ALT 38 2022 0608    BILITOT 0.43 2022 0608        Declines colon cancer screening    No follow-ups on file. SUBJECTIVE/OBJECTIVE:  HPI  Patient calling in today for hospital follow-up for recent COVID-19 pneumonia. States he developed symptoms on 21 was sent to the hospital by squad on 1/3/2021. States he was just discharged 3 days ago to home. States he is doing much better and overall feels good except for a mild lingering cough with clear mucus production. States he does have Tessalon Perles at home and wonders if he can take Mucinex. Denies fever, chills, nausea vomiting or diarrhea or shortness of breath. States he thinks he got this from his dialysis center as multiple other people are out with this. He has not had his COVID booster at this time. He completed his series of Decadron tablets. Nurse at dialysis center yesterday said she noticed a slight wheeze in his lung.   He denies any chest tightness, chest pain at this time. Denies any needs for me today        Review of Systems    Patient-Reported Vitals 1/14/2022   Patient-Reported Weight 178   Patient-Reported Height 5'7        Physical Exam    [INSTRUCTIONS:  \"[x]\" Indicates a positive item  \"[]\" Indicates a negative item  -- DELETE ALL ITEMS NOT EXAMINED]    Constitutional: [x] Appears well-developed and well-nourished [x] No apparent distress      [] Abnormal -     Mental status: [x] Alert and awake  [x] Oriented to person/place/time [x] Able to follow commands    [] Abnormal -     Eyes:   EOM    [x]  Normal    [] Abnormal -   Sclera  [x]  Normal    [] Abnormal -          Discharge [x]  None visible   [] Abnormal -     HENT: [x] Normocephalic, atraumatic  [] Abnormal -   [x] Mouth/Throat: Mucous membranes are moist    External Ears [x] Normal  [] Abnormal -    Neck: [x] No visualized mass [] Abnormal -     Pulmonary/Chest: [x] Respiratory effort normal   [x] No visualized signs of difficulty breathing or respiratory distress        [] Abnormal -      Musculoskeletal:   [x] Normal gait with no signs of ataxia         [x] Normal range of motion of neck        [] Abnormal -     Neurological:        [x] No Facial Asymmetry (Cranial nerve 7 motor function) (limited exam due to video visit)          [x] No gaze palsy        [] Abnormal -          Skin:        [x] No significant exanthematous lesions or discoloration noted on facial skin         [] Abnormal -            Psychiatric:       [x] Normal Affect [] Abnormal -        [x] No Hallucinations    Other pertinent observable physical exam findings:-      Bernardo Ramey, was evaluated through a synchronous (real-time) audio-video encounter. The patient (or guardian if applicable) is aware that this is a billable service. Verbal consent to proceed has been obtained within the past 12 months.  The visit was conducted pursuant to the emergency declaration under the [address], [address] waiver authority and the [employer] General Act. Patient identification was verified, and a caregiver was present when appropriate. The patient was located in a state where the provider was credentialed to provide care. An electronic signature was used to authenticate this note.     --DANIEL Vargas - CNP

## 2022-01-19 ENCOUNTER — CARE COORDINATION (OUTPATIENT)
Dept: CASE MANAGEMENT | Age: 47
End: 2022-01-19

## 2022-01-19 NOTE — CARE COORDINATION
[facility] Transitions Follow Up Call    2022    Patient: Dami Braden  Patient : 1975   MRN: [id number]  Reason for Admission:  Covid-19 +  Discharge Date: 22 RARS: Readmission Risk Score: 20.6 ( )         Attempt to reach patient for Covid-19 monitoring. [facility] requesting return call. Contact information provided. [phone]    Care Transitions Subsequent and Final Call    Subsequent and Final Calls  Care Transitions Interventions  Other Interventions:            Follow Up  Future Appointments   Date Time Provider Gary Carcamo   [facility]   3/14/2022  2:15 PM DANIEL Espinal - SHIMA Pollock [facility]   3/23/2022  1:30 PM Ann Lo DPM [facility] Toya Gibbons RN

## 2022-01-20 ENCOUNTER — HOSPITAL ENCOUNTER (OUTPATIENT)
Dept: PHYSICAL THERAPY | Facility: CLINIC | Age: 47
Setting detail: THERAPIES SERIES
Discharge: HOME | End: 2022-01-20
Payer: MEDICARE

## 2022-01-20 PROCEDURE — 97112 NEUROMUSCULAR REEDUCATION: CPT

## 2022-01-20 PROCEDURE — 97110 THERAPEUTIC EXERCISES: CPT

## 2022-01-20 NOTE — FLOWSHEET NOTE
[] [facility]) - [facility]  [address]  [phone]  F: [phone] [x] [address]  [facility]   [address]  P: [phone]  F: [phone] [] [address] &  Therapy  [address]  P: [phone]  F: [phone] [] [address]  P: [phone]  F: [phone] [] [address]  [facility]   [address]   [place]: [phone]  F: [phone]      Physical Therapy Daily Treatment Note    Date:  2022  Patient Name:  Bertha Jara    :  1975  MRN: [id number]  Physician: Dr. Anthony Prabhakar: Medicare ([address] [place] [address])  Medical Diagnosis: Closed fracture of proximal end of left tibia, unspecified fracture morphology, sequela               Rehab Codes: M25.662, R53.1, R26.9, R27.9  Onset date: 21                 Next Dr's appt.: 21  Visit# / total visits: 15/16; Progress note for Medicare patient due at visit 16   Cancels/No Shows: 3/0    Subjective:    Pain:  [x] Yes  [] No Location: R knee   Pain Rating: (0-10 scale) 2/10  Pain altered Tx:  [x] No  [] Yes  Action:     Comments: Pt first treatment back from being sick with Covid. Pt states he had fallen yesterday in his home when his R knee \"gave out\"  and fell forward onto his L LE. Pt believes to not have hurt himself significantly.              Objective:  Modalities:   Precautions: Dialysis T/Thrs/Sat, gait belt   Exercises: Bolded completed 2022:  Exercise Reps/ Time Weight/ Level Comments   Nu-step 6' L4          Gait train no AD 300ft  Cues for increased step length, speed; SBA- distance limited d/t pt fatigue   Gait train w/ head turns and up/down 3x300   CGA   Gait w/ step overs 4 min  Short hurdles (~1in) and taller hurdles (~4in) - with cane and CGA  See below 12/20-   Gait w/ step up 4 min  2\" curb step with 1 UE.    Gait with step up/off of foam 6x30 ft  Short side of foam x 2- using cane and CGAx1   Gait w/ walk on foam 4 min total  Long side of foam, walking length- using cane and CGAx1         Gait w/ cog task 300 ft  Verbal fluency tasks          Mat      Hamstring stretch      SLR  3# AA by PTA on RLE  A on LLE   Fwd reach crunch 3x10     Ball oblique crunch 3x10 ea     DKTC crunch 2x15     Table top toe taps 3x10     Bridge 15x  easy   Bridge w/ march 3x6     Sidelying hip abd   Attempted, too much hip flexion even with tactile cuing   Sidelying clams  blue    Prone quad stretch      Ankle DF AROM   First set supine, second set seated with more visual feedback   Bilateral ER 3x15 blue    Seated core twist 2x10 8#    Seated overhead press 2x15 8#/3 (LUE)    Armchair push ups 3x10     Seated core with press out 10x2 8# Added 12/29   HS curls  20xea  Blue  Added 1/20    LAQ  20xea  2# Added 1/20         Standing      Narrow JASIEL 2x 30\" No UE assist   NBOS on foam 2x 30\" Added 12/27   Tandem balance  3x ea 15\" hold Added 12/17   Wide JASIEL, head turns 2x10 ea     Wide JASIEL, head up/down 2x10 ea     Heel raises      Marches on foam 10x2  Added 12/27; 1UE A   Foam step ups  10x ea  Added 12/27; 1 UE A   Foam ambulation  2 min  1 UE A   SLS    UE support   Step taps  4in UE support   Step ups reciprocal pattern 5x  Added 12/27; stationary steps 6\" x2 + 4\" x2; down/back counts as x1   Mini squats x15  No UE assist   Sit to stands 3x15  W/ ball for forward to touch rollator lean external cue   Squats to step w/med ball 2x10 4#  13.5\" Min A to prevent posterior LOB   Squats on decline wedge 10x  Added 12/17   3 way hip bilaterally  20x ea orange Added 12/17   2\" step used for ext     Half kneel to stand 2x  Max A x2 to return to stand on second rep d/t fatigue in arms, LLE  - HOLD until improved strength in BUEs   L LE TKE 20x3\" Blue Added reps 12/29   Other:         Treatment Charges: Mins Units   []  Modalities     [x]  Ther Exercise 25 2   []  Manual Therapy     []  Ther Activities     []  Aquatics     []  Vasocompression     [x]  Other: Neuro 15 1   []  Other: Gait     Total Treatment time 40 3   Estimated medicare cost as of 1/20/2022: $1136.91    Assessment: [] Progressing toward goals. [] No change. [x] Other: Initiated treatment with Nustep warm up with some dicomfort in the R knee noted. Pt's R LE \"gave out\" during ambulation requiring UE assist to regain stability and to prevent a fall. Pt reports he is not experiencing pain however, more \"weakness\". Implemented seated HS curls and LAQ's to work on leg strength with moderate muscular fatigue noted. Able to progress balance exercises with NBOS on foam with eyes closed requiring intermittent UE assist. Pt states his MD sent a referral for therapy regarding LB pain. Pt's last scheduled appointment is next visit. Primary PT Lang to check goals and address referral from MD.    [x] Patient would continue to benefit from skilled physical therapy services in order to: address B quad/hip strength (L>R), gait train, and balance training to improve safety and access in home/community and less reliance on caregivers.      STG/LTG  STG: (to be met in 8 treatments) - Assessed on 12/8 by Helena Benton PT:  1. ? Pain: No more than 5/10 max pain noted in L knee with prolonged standing, ADLs - NOT MET, 7-8/10 at worst with prolonged recumbent biking  2. ? ROM: L knee ROM to +5 - 130deg to indicate symmetrical ROM and restored joint mobility post chronic pain from injury - Made progress, 0-125 with pain at end range  3. ? Strength: Pt to demo ability to complete 10x mini squat with even weightbearing demonstrated and no UE assist to indicate improving functional quad strength  4. ? Balance: Pt able to demo appropriate amplitude of stepping strategies in response to posterior perturbations with good balance noted. - Making progress, still decreased amplitude  5. Function:   a. Pt able to complete half kneel to stand transfer with unilat UE assist and CGA/min VCs  from therapist - NOT MET, unsafe at this time d/t LE/UE weakness  b. Pt able to ambulate 150 ft on level ground with AFOs donned while demonstrating more typical JASIEL vs narrow, improved consistency of stepping, and more appropriate trunk/arm swing movement vs excessive for improved balance to indicate overall improved safety and efficiency of gait. - MET, all improving but do demo worsening after ~800-900 ft  6. Independent with Home Exercise Programs - MET  7. Demonstrate Knowledge of fall prevention - MET, issued and discussed with pt on 12/8     LTG: (to be met in 16 treatments)  1. 5/5 L knee extension strength to indicate improving joint stability and allow for improved control with walking, stair climbing  2. Able to ambulate 300 ft on level ground with AFOs donned while demonstrating consistently appropriate JASIEL, no more than 10% incidence inconsistent stepping or deviation from straight path to indicate improving gait mechanics and safety  3. Pt able to ascend/descend stairs reciprocally with unilat UE assist and demonstrating good speed/balance  4. Pt able to squat to  objects off of floor without difficulty  5. Pt able to complete full floor<>stand transfer without need for assist and safe technique. 6. At least 40% fxn reported per LEFI to indicate improvement in subjective LLE ability                    Patient goals:  \"be able to do stuff like bending down, going up stairs\"    Pt. Education:  [] Yes  [x] No  [] Reviewed Prior HEP/Ed  Method of Education: [] Verbal  [] Demo  [] Written  Comprehension of Education:  [] Verbalizes understanding. [] Demonstrates understanding. [x] Needs review. [] Demonstrates/verbalizes HEP/Ed previously given. Access Code: [email or web address]  URL: [email or web address]  Date: 11/03/2021  Prepared by: Sherif Otto    Exercises  Supine Ankle Inversion Eversion AROM - 1 x daily - 7 x weekly - 3 sets - 10 reps  Supine Ankle Pumps - 1 x daily - 7 x weekly - 3 sets - 10 reps  Sit<>stand with fwd reach - 3x10  Squats w/ chair support - 3x10       Plan: [x] Continue current frequency toward long and short term goals.     [x] Specific Instructions for subsequent treatments: progress standing quad/hip strength with some UE assisted tasks, gait training      Time In:  2:00 pm          Time Out: 2:45 pm    Electronically signed by:  Tosin Ricketts PTA

## 2022-01-25 ENCOUNTER — CARE COORDINATION (OUTPATIENT)
Dept: CASE MANAGEMENT | Age: 47
End: 2022-01-25

## 2022-01-25 NOTE — CARE COORDINATION
[facility] Transitions Follow Up Call    2022    Patient: Anastasiia Shoulder Severance  Patient : 1975   MRN: [id number]  Reason for Admission: Covid-19 +  Discharge Date: 22 RARS: Readmission Risk Score: 20.6 ( )         Spoke with: Aniyah Morales    Attempted to contact Barry Morrissey, but his father, Aniyah Morales answered the phone. He stated that Barry Morrissey continues with the cough, especially at night and is still fatigues. He said that his strength was returning, is eating more and is not short of breath. Aniyah Morales was concerned about the cough and explained that it will take some time, but if no improvement after a while, to contact PCP. JAMES. He had no further questions or concerns. Informed of final outreach. Follow Up Call    Challenges to be reviewed by the provider   Additional needs identified to be addressed with provider: No  none                 Encounter was not routed to provider for escalation. Method of communication with provider:  none. Contacted the family by telephone to follow up after hospital visit. Status: significantly improved  Interventions to address identified needs: Obtained and reviewed discharge summary and/or continuity of care documents    [facility] follow up appointment(s):   Future Appointments   Date Time Provider Gary Carcamo   2022  3:00 PM Silvia Horowitz, PT [facility]   2022 10:30 AM Bryan Triana, PTA [facility]   2022  3:00 PM Silvia Horowitz, PT [facility]   2022  3:00 PM Louie Alexis, PTA [facility]   3/14/2022  2:15 PM DANIEL Treviño - SHIMA Argueta [facility]   3/23/2022  1:30 PM ANTHONY [facility]     Non-[facility] follow up appointment(s):    Follow up appointment completed? Yes. Provided contact information for future needs. No further follow-up call indicated based on severity of symptoms and risk factors.   Plan for next call: final call, symptoms improving    KAELYN SERRATO Laurie Lovelace RN            Care Transitions Subsequent and Final Call    Subsequent and Final Calls  Care Transitions Interventions  Other Interventions:            Follow Up  Future Appointments   Date Time Provider Gary Carcamo   1/26/2022  3:00 PM Concepcion Craig, PT [facility]   1/28/2022 10:30 AM Amrik Atwood, PTA [facility]   1/31/2022  3:00 PM Concepcion Craig, PT [facility] PT [facility]   2/2/2022  3:00 PM Casey English, JOSE MIGUEL [facility] PT [facility]   3/14/2022  2:15 PM [facility]   3/23/2022  1:30 PM Telly Holm DPM [facility] Rosey Jeffries RN

## 2022-01-26 ENCOUNTER — HOSPITAL ENCOUNTER (OUTPATIENT)
Dept: PHYSICAL THERAPY | Facility: CLINIC | Age: 47
Setting detail: THERAPIES SERIES
Discharge: HOME | End: 2022-01-26
Payer: MEDICARE

## 2022-01-26 PROCEDURE — 97110 THERAPEUTIC EXERCISES: CPT

## 2022-01-26 PROCEDURE — 97116 GAIT TRAINING THERAPY: CPT

## 2022-01-26 NOTE — PROGRESS NOTES
[] [facility]) - [facility]  [facility]  [address]  [phone]  F: [phone] [x] [address]  [employer] [address]   [address]  P: [phone]  F: [phone] [] [address] &  Therapy  [address]  P: [phone]  F: [phone] [] [address]  P: [phone]  F: [phone] [] [address]  [facility]   [address]   [place]: [phone]  F: [phone]      Physical Therapy Progress Note    Date: 2022      Patient: [address]  : 1975  MRN: [id number]    Physician: Dr. Claudene Inches: Medicare ([facility])  Medical Diagnosis: Closed fracture of proximal end of left tibia, unspecified fracture morphology, sequela               Rehab Codes: M25.662, R53.1, R26.9, R27.9  Onset date: 21                 Next Dr's appt.: 21  Visit# / total visits: 16; Progress note for Medicare patient due at visit 16          Cancels/No Shows: 3/0  Date range of services: 10/25/21 to 22      Subjective:  Pain:  [x]? Yes  []? No   Location: R knee                     Pain Rating: (0-10 scale) 2/10  Pain altered Tx:  [x]? No  []? Yes  Action:   Comments: Pt notes he feels that his overall endurance is below his baseline. Reports decreased strength in UEs>LEs lately, noting he fell out of a rolling chair recently and felt unable to push himself back up to stand through his arms. Assessment:  [x]? Progressing toward goals. []? No change. [x]? Other: LTGs assessed today - pt demos still difficulty with gait stability but more so functional mobility tolerance.  Reactive balance strategies very minimal and remains a high fall risk - this entire therapy POC has been limited by pt going through COVID-related hospitalization in the past two weeks. Pt would benefit from additional skilled therapy visits to progress safety with functional mobility, increase functional mobility tolerance, and allow improved independence/safety in home and in community. [x]? Patient would continue to benefit from skilled physical therapy services in order to: address B quad/hip strength (L>R), gait train, and balance training to improve safety and access in home/community and less reliance on caregivers.      STG/LTG  STG: (to be met in 8 treatments) - Assessed on 12/8 by Simone Heimlich, PT:  1. ? Pain: No more than 5/10 max pain noted in L knee with prolonged standing, ADLs - NOT MET, 7-8/10 at worst with prolonged recumbent biking  2. ? ROM: L knee ROM to +5 - 130deg to indicate symmetrical ROM and restored joint mobility post chronic pain from injury - Made progress, 0-125 with pain at end range  3. ? Strength: Pt to demo ability to complete 10x mini squat with even weightbearing demonstrated and no UE assist to indicate improving functional quad strength  4. ? Balance: Pt able to demo appropriate amplitude of stepping strategies in response to posterior perturbations with good balance noted. - Making progress, still decreased amplitude  5. Function:   a. Pt able to complete half kneel to stand transfer with unilat UE assist and CGA/min VCs  from therapist - NOT MET, unsafe at this time d/t LE/UE weakness  b. Pt able to ambulate 150 ft on level ground with AFOs donned while demonstrating more typical JASIEL vs narrow, improved consistency of stepping, and more appropriate trunk/arm swing movement vs excessive for improved balance to indicate overall improved safety and efficiency of gait.  - MET, all improving but do demo worsening after ~800-900 ft  6. Independent with Home Exercise Programs - MET  7.  Demonstrate Knowledge of fall prevention - MET, issued and discussed with pt on 12/8     LTG: (to be met in 16 treatments) - Assessed on 1/26 by Fernando Bhardwaj, PT:  1. 5/5 L knee extension strength to indicate improving joint stability and allow for improved control with walking, stair climbing - Partially met, 5/5 L, 5-/5 R  2. Able to ambulate 300 ft on level ground with AFOs donned while demonstrating consistently appropriate JASIEL, no more than 10% incidence inconsistent stepping or deviation from straight path to indicate improving gait mechanics and safety - MET though does need AD to achieve this and is now very fatigued after 300 ft which is new since COVID   3. Pt able to ascend/descend stairs reciprocally with unilat UE assist and demonstrating good speed/balance - NOT MET, lack of eccentric control thus instability with descent when not having BUE assist  4. Pt able to squat to  objects off of floor without difficulty - Making progress, able to complete but needs CGA for safety  5. Pt able to complete full floor<>stand transfer without need for assist and safe technique.- NOT MET, unsafe at this time  6. At least 40% fxn reported per LEFI to indicate improvement in subjective LLE ability - MET, 54% fxn reported        New goals to be met at visit 24, created 1/26/22:             1. Pt to demo gait speed of at least .66m/s to indicate clinically significant difference in gait speed and progress towards reduced fall risk. 2. Pt to demo ability to complete half kneel to stand transfer with BUE assist and no therapist assist to improve ability to get up off of the floor. 3. 5/5 B knee strength with no more than 1-2x minor buckling noted with ambulation of 600 ft.       4. Pt able to demo at least 750ft ambulation consecutively without needing seated rest to indicate improving functional mobility tolerance and increased community access. 5. Pt to report at least 67% function per LEFI.                      Patient goals:  \"be able to do stuff like bending down, going up stairs\" - Making progress    Treatment Plan:  [x] Therapeutic Exercise   23972  [] Iontophoresis: 4 mg/mL Dexamethasone Sodium Phosphate  mAmin  35513   [x] Therapeutic Activity  76898 [] Vasopneumatic cold with compression  99337    [x] Gait Training   01062 [] Ultrasound   61741   [x] Neuromuscular Re-education  32653 [] Electrical Stimulation Unattended  43333   [x] Manual Therapy  62080 [] Electrical Stimulation Attended  12693   [x] Instruction in HEP  [] Lumbar/Cervical Traction  68548   [] Aquatic Therapy   64565 [x] Cold/hotpack    [] Massage   54797      [] Dry Needling, 1 or 2 muscles  02381   [] Biofeedback, first 15 minutes   88512  [] Biofeedback, additional 15 minutes   80842 [] Dry Needling, 3 or more muscles  02028       Patient Status:     [] Continue per initial plan of care. [x] Additional visits necessary. [x] Other: Pt recently released from hospital with COVID, has worsened functional mobility tolerance, continued impaired balance reactions impacting gait safety and stability, and overall LE/UE reduced strength. Pt would benefit from additional skilled therapy visits to progress safety with functional mobility, increase functional mobility tolerance, and allow improved independence/safety in home and in community. Requested Frequency/Duration: 2 times per week for 8 treatments. Electronically signed by Marianela Shi PT on 1/26/2022 at 4:06 PM      If you have any questions or concerns, please don't hesitate to call. Thank you for your referral.    Physician Signature:________________________________Date:__________________  By signing above or cosigning this note, I have reviewed this plan of care and certify a need for medically necessary rehabilitation services.      *PLEASE SIGN ABOVE AND FAX BACK ALL PAGES*

## 2022-01-26 NOTE — FLOWSHEET NOTE
[] [facility]) - [facility] &  Therapy  [address]  [phone]  F: [phone] [x] [address]  [facility]   [address]  P: [phone]  F: [phone] [] [address] &  Therapy  [address] [facility]  P: [phone]  F: [phone] [] [address]  P: [phone]  F: [phone] [] [address]  [facility]   [address]   [place]: [phone]  F: [phone]      Physical Therapy Daily Treatment Note    Date:  2022  Patient Name:  Patricia Prince    :  1975  MRN: [id number]  Physician: Dr. Edie Bernheim: Medicare ([address] [place] [address])  Medical Diagnosis: Closed fracture of proximal end of left tibia, unspecified fracture morphology, sequela               Rehab Codes: M25.662, R53.1, R26.9, R27.9  Onset date: 21                 Next Dr's appt.: 21  Visit# / total visits: ; Progress note for Medicare patient due at visit 16   Cancels/No Shows: 3/0    Subjective:    Pain:  [x] Yes  [] No Location: R knee   Pain Rating: (0-10 scale) 2/10  Pain altered Tx:  [x] No  [] Yes  Action:   Comments: Pt notes he feels that his overall endurance is below his baseline. Reports decreased strength in UEs>LEs lately, noting he fell out of a rolling chair recently and felt unable to push himself back up to stand through his arms.             Objective:  Modalities:   Precautions: Dialysis T/Thrs/Sat, gait belt   Exercises: Bolded completed 2022:  Exercise Reps/ Time Weight/ Level Comments   Nu-step 8' L3          Gait train with AD 300ft  Cues for increased step length, speed; SBA- distance limited d/t pt fatigue  - regressed to with AD d/t low level endurance and intermittent buckling reported   Gait train w/ head turns and up/down 3x300   CGA   Gait w/ step overs 4 min  Short hurdles (~1in) and taller hurdles (~4in) - with cane and CGA  See below 12/20-   Gait w/ step up 4 min  2\" curb step with 1 UE.    Gait with step up/off of foam 6x30 ft  Short side of foam x 2- using cane and CGAx1   Gait w/ walk on foam 4 min total  Long side of foam, walking length- using cane and CGAx1         Gait w/ cog task 300 ft  Verbal fluency tasks          Mat      Hamstring stretch      SLR  3# AA by PTA on RLE  A on LLE   Fwd reach crunch 3x10     Ball oblique crunch 3x10 ea     DKTC crunch 2x15     Table top toe taps 3x10     Bridge 15x  easy   Bridge w/ march 3x6     Sidelying hip abd   Attempted, too much hip flexion even with tactile cuing   Sidelying clams  blue    Prone quad stretch      Ankle DF AROM   First set supine, second set seated with more visual feedback   Bilateral ER 3x15 blue    Seated core twist 2x10 8#    Seated overhead press 3x15 5# ea    Armchair push ups 3x10     Seated reclined chest press 3x10 5# ea    Seated core with press out 10x2 8# Added 12/29   HS curls  20xea  Blue  Added 1/20    LAQ  20xea  2# Added 1/20         Standing      Narrow JASIEL 2x 30\" No UE assist   NBOS on foam 2x 30\" Added 12/27   Fwd/retro lean 15x ea     Fwd/retro perturbations 2 min ea     Tandem balance  3x ea 15\" hold Added 12/17   Wide JASIEL, head turns 2x10 ea     Wide JASIEL, head up/down 2x10 ea     Heel raises      Marches on foam 10x2  Added 12/27; 1UE A   Foam step ups  10x ea  Added 12/27; 1 UE A   Foam ambulation  2 min  1 UE A   SLS    UE support   Step taps  4in UE support   Step ups reciprocal pattern 5x  Added 12/27; stationary steps 6\" x2 + 4\" x2; down/back counts as x1   Mini squats x15  No UE assist   Sit to stands 3x15  W/ ball for forward to touch rollator lean external cue   Squats to step w/med ball 2x10 4#  13.5\" Min A to prevent posterior LOB   Squats on decline wedge 10x  Added 12/17   3 way hip bilaterally  20x ea orange Added 12/17   2\" step used for ext     Half kneel to stand 2x  Max A x2 to return to stand on second rep d/t fatigue in arms, LLE  - HOLD until improved strength in BUEs   L LE TKE 20x3\" Blue  Added reps 12/29   Other:         Treatment Charges: Mins Units   []  Modalities     [x]  Ther Exercise 30 2   []  Manual Therapy     []  Ther Activities     []  Aquatics     []  Vasocompression     [x]  Other: Neuro 7 --   [x]  Other: Gait 10 1   Total Treatment time 47 3   Estimated medicare cost as of 1/26/2022: $159.00    Assessment: [x] Progressing toward goals. [] No change. [x] Other: STGs assessed today - pt demos still difficulty with gait stability but more so functional mobility tolerance. Reactive balance strategies very minimal and remains a high fall risk - this entire therapy POC has been limited by pt going through COVID-related hospitalization in the past two weeks. Pt would benefit from additional skilled therapy visits to progress safety with functional mobility, increase functional mobility tolerance, and allow improved independence/safety in home and in community. [x] Patient would continue to benefit from skilled physical therapy services in order to: address B quad/hip strength (L>R), gait train, and balance training to improve safety and access in home/community and less reliance on caregivers.      STG/LTG  STG: (to be met in 8 treatments) - Assessed on 12/8 by Joy Jane, PT:  1. ? Pain: No more than 5/10 max pain noted in L knee with prolonged standing, ADLs - NOT MET, 7-8/10 at worst with prolonged recumbent biking  2. ? ROM: L knee ROM to +5 - 130deg to indicate symmetrical ROM and restored joint mobility post chronic pain from injury - Made progress, 0-125 with pain at end range  3. ? Strength: Pt to demo ability to complete 10x mini squat with even weightbearing demonstrated and no UE assist to indicate improving functional quad strength  4. ? Balance: Pt able to demo appropriate amplitude of stepping strategies in response to posterior perturbations with good balance noted. - Making progress, still decreased amplitude  5. Function:   a. Pt able to complete half kneel to stand transfer with unilat UE assist and CGA/min VCs  from therapist - NOT MET, unsafe at this time d/t LE/UE weakness  b. Pt able to ambulate 150 ft on level ground with AFOs donned while demonstrating more typical JASIEL vs narrow, improved consistency of stepping, and more appropriate trunk/arm swing movement vs excessive for improved balance to indicate overall improved safety and efficiency of gait. - MET, all improving but do demo worsening after ~800-900 ft  6. Independent with Home Exercise Programs - MET  7. Demonstrate Knowledge of fall prevention - MET, issued and discussed with pt on 12/8     LTG: (to be met in 16 treatments) - Assessed on 1/26 by Helena Benton, PT:  1. 5/5 L knee extension strength to indicate improving joint stability and allow for improved control with walking, stair climbing - Partially met, 5/5 L, 5-/5 R  2. Able to ambulate 300 ft on level ground with AFOs donned while demonstrating consistently appropriate JASIEL, no more than 10% incidence inconsistent stepping or deviation from straight path to indicate improving gait mechanics and safety - MET though does need AD to achieve this and is now very fatigued after 300 ft which is new since COVID   3. Pt able to ascend/descend stairs reciprocally with unilat UE assist and demonstrating good speed/balance - NOT MET, lack of eccentric control thus instability with descent when not having BUE assist  4. Pt able to squat to  objects off of floor without difficulty - Making progress, able to complete but needs CGA for safety  5. Pt able to complete full floor<>stand transfer without need for assist and safe technique.- NOT MET, unsafe at this time  6.  At least 40% fxn reported per LEFI to indicate improvement in subjective LLE ability - MET, 54% fxn reported      New goals to be met at visit 24, created 1/26/22:    1. Pt to demo gait speed of at least .66m/s to indicate clinically significant difference in gait speed and progress towards reduced fall risk. 2. Pt to demo ability to complete half kneel to stand transfer with BUE assist and no therapist assist to improve ability to get up off of the floor. 3. 5/5 B knee strength with no more than 1-2x minor buckling noted with ambulation of 600 ft.   4. Pt able to demo at least 750ft ambulation consecutively without needing seated rest to indicate improving functional mobility tolerance and increased community access. 5. Pt to report at least 67% function per LEFI.                    Patient goals:  Pamela Arroyo able to do stuff like bending down, going up stairs\" - Making progress    Pt. Education:  [x] Yes  [] No  [] Reviewed Prior HEP/Ed  Method of Education: [x] Verbal  [] Demo  [] Written  Comprehension of Education:  [x] Verbalizes understanding. [] Demonstrates understanding. [] Needs review. [] Demonstrates/verbalizes HEP/Ed previously given. Access Code: [email or web address]  URL: [email or web address]  Date: 11/03/2021  Prepared by: Case Mae    Exercises  Supine Ankle Inversion Eversion AROM - 1 x daily - 7 x weekly - 3 sets - 10 reps  Supine Ankle Pumps - 1 x daily - 7 x weekly - 3 sets - 10 reps  Sit<>stand with fwd reach - 3x10  Squats w/ chair support - 3x10       Plan: [x] Continue current frequency toward long and short term goals.     [x] Specific Instructions for subsequent treatments: progress standing quad/hip strength with some UE assisted tasks, gait training      Time In:  3:04 pm          Time Out: 4:00 pm    Electronically signed by:  Silvia Horowitz PT

## 2022-01-28 ENCOUNTER — HOSPITAL ENCOUNTER (OUTPATIENT)
Dept: PHYSICAL THERAPY | Facility: CLINIC | Age: 47
Setting detail: THERAPIES SERIES
Discharge: HOME | End: 2022-01-28
Payer: MEDICARE

## 2022-01-28 NOTE — FLOWSHEET NOTE
[] [facility]) [facility] &  Therapy  [address]    [phone]  F: [phone]   [x] [address]  [facility]   [address]  P: [phone]  F: [phone]  [] [address]  [address]  P: [phone]  F: [phone] [] [email or web address]  P: [phone]  F: [phone]  [] [address]  [address] [facility] [address]   [address]   [place]: [phone]  F: [phone]   [] [facility]  [address] [facility] [address]  [place]: [phone]   F: [phone]     Physical Therapy Cancel/No Show note    Date: 2022  Patient: Sadi Campbell  : 1975  MRN: [id number]    Cancels/No Shows to date: 0    For today's appointment patient:    [x]  Cancelled    [] Rescheduled appointment    [] No-show     Reason given by patient:    []  Patient ill    []  Conflicting appointment    [] No transportation      [] Conflict with work    [] No reason given    [] Weather related    [] COVID-19    [x] Other: dialysis     Comments:        [x] Next appointment was confirmed    Electronically signed by: Reginaldo Vidal PTA

## 2022-01-31 ENCOUNTER — HOSPITAL ENCOUNTER (OUTPATIENT)
Dept: PHYSICAL THERAPY | Facility: CLINIC | Age: 47
Setting detail: THERAPIES SERIES
Discharge: HOME | End: 2022-01-31
Payer: MEDICARE

## 2022-01-31 NOTE — FLOWSHEET NOTE
[] [facility]) [facility] &  Therapy  [address]    [phone]  F: [phone]   [x] [address]  [facility]   [address]  P: [phone]  F: [phone]  [] [address]  [address]  P: [phone]  F: [phone] [] [address]  P: [phone]  F: [phone]  [] [address]  [address] [facility] [address]   [address]   [place]: [phone]  F: [phone]   [] [facility]  [address] [facility] [address]  [place]: [phone]   F: [phone]     Physical Therapy Cancel/No Show note    Date: 2022  Patient: Dami Braden  : 1975  MRN: [id number]    Cancels/No Shows to date:     For today's appointment patient:    [x]  Cancelled    [] Rescheduled appointment    [] No-show     Reason given by patient:    []  Patient ill    []  Conflicting appointment    [] No transportation      [] Conflict with work    [] No reason given    [] Weather related    [] COVID-19    [x] Other:   Comments:  Went to ER and was told to hold off on therapy today.       [x] Next appointment was confirmed    Electronically signed by: Jamal Cooper PT

## 2022-02-02 ENCOUNTER — HOSPITAL ENCOUNTER (OUTPATIENT)
Dept: PHYSICAL THERAPY | Facility: CLINIC | Age: 47
Setting detail: THERAPIES SERIES
Discharge: HOME | End: 2022-02-02
Payer: MEDICARE

## 2022-02-02 PROCEDURE — 97116 GAIT TRAINING THERAPY: CPT

## 2022-02-02 PROCEDURE — 97110 THERAPEUTIC EXERCISES: CPT

## 2022-02-02 NOTE — FLOWSHEET NOTE
[] [facility]) - [facility] &  Therapy  [address]  [phone]  F: [phone] [x] [address]  [facility]   [address]  P: [phone]  F: [phone] [] [address] &  Therapy  [address] [facility] [address]  P: [phone]  F: [phone] [] [address]  P: [phone]  F: [phone] [] [address]  [facility]   [address]   [place]: [phone]  F: [phone]      Physical Therapy Daily Treatment Note    Date:  2022  Patient Name:  Werner Rebollar    :  1975  MRN: [id number]  Physician: Dr. Kasi Gorman: Medicare ([address] [place] [address])  Medical Diagnosis: Closed fracture of proximal end of left tibia, unspecified fracture morphology, sequela               Rehab Codes: M25.662, R53.1, R26.9, R27.9  Onset date: 21                 Next Dr's appt.: 21  Visit# / total visits: ; Progress note for Medicare patient due at visit 24  (signed script received for 22 appt)   Cancels/No Shows: 5/0    Subjective:    Pain:  [x] Yes  [] No Location: R knee   Pain Rating: (0-10 scale) 2/10  Pain altered Tx:  [x] No  [] Yes  Action:   Comments: pt had spine injections in lumbar spine (6 total) on 21. Pt was sore the first couple of days after the injection but now his back feels good.            Objective:  Modalities:   Precautions: Dialysis T/Thrs/Sat, gait belt   Exercises: Bolded completed 2022:  Exercise Reps/ Time Weight/ Level Comments   Nu-step 8' L3          Gait train with AD 300ft  Cues for increased step length, speed; SBA- distance limited d/t pt fatigue  - regressed to with AD d/t low level endurance and intermittent buckling reported   Gait train w/ head turns and up/down 3x300   CGA Gait w/ step overs 4 min  Short hurdles (~1in) and taller hurdles (~4in) - with cane and CGA  See below 12/20-   Gait w/ step up 4 min  2\" curb step with 1 UE.    Gait with step up/off of foam 6x30 ft  Short side of foam x 2- using cane and CGAx1               Gait w/ cog task 300 ft  Verbal fluency tasks          Mat      Hamstring stretch      SLR  3# AA by PTA on RLE  A on LLE   Fwd reach crunch 3x10     Ball oblique crunch 3x10 ea     DKTC crunch 2x15     Table top toe taps 3x10     Bridge 15x  easy   Bridge w/ march 3x6     Sidelying hip abd   Attempted, too much hip flexion even with tactile cuing   Sidelying clams  blue    Prone quad stretch      Ankle DF AROM   First set supine, second set seated with more visual feedback   Bilateral ER 3x15 blue Did 1.5 sets only 2/2/22   Seated core twist 2x10 8#    Seated overhead press 3x15 5# ea Able to do 2x15,5 on 2/2/22   Armchair push ups 3x10  Only 2 sets on 2/2/22   Seated reclined chest press 3x10 5# ea    Seated core with press out 10x2 8# Added 12/29   HS curls  20xea  Blue  Added 1/20    LAQ B 20xea  2# Added 1/20         Standing      Narrow JASIEL 2x 30\" No UE assist   NBOS on foam 2x 30\" Added 12/27   Fwd/retro lean 15x ea     Fwd/retro perturbations 2 min ea     Tandem balance  1 1 min total Progressed 2/2/22   Wide JASIEL, head turns 2x10 ea  Added arm reach at same time 2/2/22   Wide JASIEL, head up/down 2x10 ea     B shoulder flexion and look up at ceiling 10 A Started 2/2/22   HS curls 10 ea A Hold with 1 UE; started 2/2/22   Heel raises      Marches on foam 10x2  Added 12/27; 1UE A   Foam step ups  10x ea  Added 12/27; 1 UE A   Foam ambulation  2 min  1 UE A   SLS  1 20 sec UE support   Step taps  4in UE support   Step ups reciprocal pattern 5x  Added 12/27; stationary steps 6\" x2 + 4\" x2; down/back counts as x1   Mini squats x15  No UE assist; needed arm support on 2/2/22   Sit to stands 3x15  W/ ball for forward to touch rollator lean external cue   Squats to step w/med ball 2x10 4#  13.5\" Min A to prevent posterior LOB   Squats on decline wedge 10x  Added 12/17   3 way hip bilaterally  20x ea orange Added 12/17   2\" step used for ext     Half kneel to stand 2x  Max A x2 to return to stand on second rep d/t fatigue in arms, LLE  - HOLD until improved strength in BUEs   L LE TKE 20x3\" Blue  Added reps 12/29   Other:         Treatment Charges: Mins Units   []  Modalities     [x]  Ther Exercise 30 2   []  Manual Therapy     []  Ther Activities     []  Aquatics     []  Vasocompression     [x]  Other: Neuro 7 --   [x]  Other: Gait 7 1   Total Treatment time 44 3   Estimated medicare cost as of 2/2/2022: $233.89    Assessment: [x] Progressing toward goals. [] No change. [x] Other: pt with LOB with standing activities but is able to self correct with UE or body. The patient also has hyperextension R knee during exercises and reports it has been doing this the last few weeks. He still has limited physical endurance, requiring short breaks. The patient has good cognitive recall of his exercises and ability to do counting/word search during walking activities. [x] Patient would continue to benefit from skilled physical therapy services in order to: address B quad/hip strength (L>R), gait train, and balance training to improve safety and access in home/community and less reliance on caregivers.      STG/LTG  STG: (to be met in 8 treatments) - Assessed on 12/8 by Fernando Bhardwaj, PT:  1. ? Pain: No more than 5/10 max pain noted in L knee with prolonged standing, ADLs - NOT MET, 7-8/10 at worst with prolonged recumbent biking  2. ? ROM: L knee ROM to +5 - 130deg to indicate symmetrical ROM and restored joint mobility post chronic pain from injury - Made progress, 0-125 with pain at end range  3. ? Strength: Pt to demo ability to complete 10x mini squat with even weightbearing demonstrated and no UE assist to indicate improving functional quad strength  4. ? Balance: Pt able to demo appropriate amplitude of stepping strategies in response to posterior perturbations with good balance noted. - Making progress, still decreased amplitude  5. Function:   a. Pt able to complete half kneel to stand transfer with unilat UE assist and CGA/min VCs  from therapist - NOT MET, unsafe at this time d/t LE/UE weakness  b. Pt able to ambulate 150 ft on level ground with AFOs donned while demonstrating more typical JASIEL vs narrow, improved consistency of stepping, and more appropriate trunk/arm swing movement vs excessive for improved balance to indicate overall improved safety and efficiency of gait. - MET, all improving but do demo worsening after ~800-900 ft  6. Independent with Home Exercise Programs - MET  7. Demonstrate Knowledge of fall prevention - MET, issued and discussed with pt on 12/8     LTG: (to be met in 16 treatments) - Assessed on 1/26 by Missael Pitt, PT:  1. 5/5 L knee extension strength to indicate improving joint stability and allow for improved control with walking, stair climbing - Partially met, 5/5 L, 5-/5 R  2. Able to ambulate 300 ft on level ground with AFOs donned while demonstrating consistently appropriate JASIEL, no more than 10% incidence inconsistent stepping or deviation from straight path to indicate improving gait mechanics and safety - MET though does need AD to achieve this and is now very fatigued after 300 ft which is new since COVID   3. Pt able to ascend/descend stairs reciprocally with unilat UE assist and demonstrating good speed/balance - NOT MET, lack of eccentric control thus instability with descent when not having BUE assist  4. Pt able to squat to  objects off of floor without difficulty - Making progress, able to complete but needs CGA for safety  5. Pt able to complete full floor<>stand transfer without need for assist and safe technique.- NOT MET, unsafe at this time  6.  At least 40% fxn reported per LEFI to indicate improvement in subjective LLE ability - MET, 54% fxn reported      New goals to be met at visit 24, created 1/26/22:    1. Pt to demo gait speed of at least .66m/s to indicate clinically significant difference in gait speed and progress towards reduced fall risk. 2. Pt to demo ability to complete half kneel to stand transfer with BUE assist and no therapist assist to improve ability to get up off of the floor. 3. 5/5 B knee strength with no more than 1-2x minor buckling noted with ambulation of 600 ft.   4. Pt able to demo at least 750ft ambulation consecutively without needing seated rest to indicate improving functional mobility tolerance and increased community access. 5. Pt to report at least 67% function per LEFI.                    Patient goals:  Sabinespencer Danker able to do stuff like bending down, going up stairs\" - Making progress    Pt. Education:  [] Yes  [] No  [x] Reviewed Prior HEP/Ed  Method of Education: [x] Verbal  [x] Demo: 2/2/22: clinic ex progression  [] Written  Comprehension of Education:  [x] Verbalizes understanding. [x] Demonstrates understanding. [x] Needs review. [] Demonstrates/verbalizes HEP/Ed previously given. Access Code: [email or web address]  URL: [email or web address]  Date: 11/03/2021  Prepared by: Jonathon Parkinson    Exercises  Supine Ankle Inversion Eversion AROM - 1 x daily - 7 x weekly - 3 sets - 10 reps  Supine Ankle Pumps - 1 x daily - 7 x weekly - 3 sets - 10 reps  Sit<>stand with fwd reach - 3x10  Squats w/ chair support - 3x10       Plan: [x] Continue current frequency toward long and short term goals.     [x] Specific Instructions for subsequent treatments: progress standing quad/hip strength with some UE assisted tasks, gait training      Time In:  3:01 pm          Time Out: 3:57 pm    Electronically signed by:  Jeanine Clark PT

## 2022-02-04 ENCOUNTER — HOSPITAL ENCOUNTER (OUTPATIENT)
Dept: PHYSICAL THERAPY | Facility: CLINIC | Age: 47
Setting detail: THERAPIES SERIES
Discharge: HOME | End: 2022-02-04
Payer: MEDICARE

## 2022-02-07 ENCOUNTER — HOSPITAL ENCOUNTER (OUTPATIENT)
Dept: PHYSICAL THERAPY | Facility: CLINIC | Age: 47
Setting detail: THERAPIES SERIES
Discharge: HOME | End: 2022-02-07
Payer: MEDICARE

## 2022-02-07 PROCEDURE — 97110 THERAPEUTIC EXERCISES: CPT

## 2022-02-07 PROCEDURE — 97116 GAIT TRAINING THERAPY: CPT

## 2022-02-07 NOTE — FLOWSHEET NOTE
[] [facility]) - [facility] &  Therapy  [address]  [phone]  F: [phone] [x] [address]  [employer] [address]   [address]  P: [phone]  F: [phone] [] [address] &  Therapy  [address]  P: [phone]  F: [phone] [] [address]  P: [phone]  F: [phone] [] [address]  [facility]   [address]   [place]: [phone]  F: [phone]      Physical Therapy Daily Treatment Note    Date:  2022  Patient Name:  Robina Sainz    :  1975  MRN: [id number]  Physician: Dr. Sheryle [place]: Medicare ([address] [place] [address])  Medical Diagnosis: Closed fracture of proximal end of left tibia, unspecified fracture morphology, sequela               Rehab Codes: M25.662, R53.1, R26.9, R27.9  Onset date: 21                 Next Dr's appt.: 21  Visit# / total visits: ; Progress note for Medicare patient due at visit 24  (signed script received for 22 appt)   Cancels/No Shows: 5/0    Subjective:    Pain:  [x] Yes  [] No Location: R knee   Pain Rating: (0-10 scale) 2/10  Pain altered Tx:  [x] No  [] Yes  Action:   Comments: pt continues to note minimal relief from spinal injections.          Objective:  Modalities:   Precautions: Dialysis T/Thrs/Sat, gait belt   Exercises: Bolded completed 2022:  Exercise Reps/ Time Weight/ Level Comments   Nu-step 8' L3          Gait train with AD 300ft  Cues for increased step length, speed; SBA- distance limited d/t pt fatigue  - regressed to with AD d/t low level endurance and intermittent buckling reported   Gait train w/ head turns and up/down 3x300   CGA   Gait w/ step overs 4 min  Short hurdles (~1in) and taller hurdles (~4in) - with cane and CGA  See below 12/20-   Gait w/ step up 4 min  2\" curb step with 1 UE.    Gait with step up/off of foam 6x30 ft  Short side of foam x 2- using cane and CGAx1         Gait with out  ft     Gait w/ cog task with out  ft  Verbal fluency tasks all things with letter B         Mat      Hamstring stretch      SLR  3# AA by PTA on RLE  A on LLE   Fwd reach crunch 3x10     Ball oblique crunch 3x10 ea     DKTC crunch 2x15     Table top toe taps 3x10     Bridge 15x  easy   Bridge w/ march 3x6     Sidelying hip abd   Attempted, too much hip flexion even with tactile cuing   Sidelying clams  blue    Prone quad stretch      Ankle DF AROM   First set supine, second set seated with more visual feedback   Bilateral ER 3x15 blue Did 1.5 sets only 2/2/22   Seated core twist 2x10 8#    Seated overhead press 3x15 5# ea Able to do 2x15,5 on 2/2/22   Armchair push ups 3x10  Only 2 sets on 2/2/22   Seated reclined chest press 3x10 5# ea    Seated core with press out 10x2 8# Added 12/29   HS curls  20xea  Blue  Added 1/20    LAQ B 20xea  2# Added 1/20         Standing      Narrow JASIEL 2x 30\" No UE assist   NBOS on foam 2x 30\" Added 12/27   Fwd/retro lean 15x ea     Fwd/retro perturbations 2 min ea     Tandem balance  1 1 min total Progressed 2/2/22   Wide JASIEL, head turns 2x10 ea  Added arm reach at same time 2/2/22   Wide JASIEL, head up/down 2x10 ea     B shoulder flexion and look up at ceiling 10 A Started 2/2/22   HS curls 10 ea A Hold with 1 UE; started 2/2/22   Heel raises      Marches on foam 10x2  Added 12/27; 1UE A   Foam step ups  10x ea  Added 12/27; 1 UE A   Foam ambulation  2 min  1 UE A   SLS  x2 20 sec UE support   Step taps  4in UE support   Step ups reciprocal pattern 5x  Added 12/27; stationary steps 6\" x2 + 4\" x2; down/back counts as x1   Mini squats x15  No UE assist; needed arm support on 2/2/22   Sit to stands 3x15  W/ ball for forward to touch rollator lean external cue   Squats to step w/med ball 2x10 4#  13.5\" Min A to prevent posterior LOB   Squats on decline wedge 10x  Added 12/17   3 way hip bilaterally  20x ea orange Added 12/17   2\" step used for ext     Half kneel to stand 2x  Max A x2 to return to stand on second rep d/t fatigue in arms, LLE  - HOLD until improved strength in BUEs   L LE TKE 20x3\" Blue  Added reps 12/29   Other:         Treatment Charges: Mins Units   []  Modalities     [x]  Ther Exercise 30 2   []  Manual Therapy     []  Ther Activities     []  Aquatics     []  Vasocompression     [x]  Other: Neuro 7 --   [x]  Other: Gait 15 1   Total Treatment time 52 3   Estimated medicare cost as of 2/7/2022: $336.23    Assessment: [x] Progressing toward goals. Pt with only one incident of LOB with ambulation with out an AD. Pt was able to self correct with out assistance from clinician. Continued to implement dual tasking with out incident but required cueing to improve step length. [] No change. [x] Other:   [x] Patient would continue to benefit from skilled physical therapy services in order to: address B quad/hip strength (L>R), gait train, and balance training to improve safety and access in home/community and less reliance on caregivers. STG/LTG  STG: (to be met in 8 treatments) - Assessed on 12/8 by Shanti Mortensen, PT:  1. ? Pain: No more than 5/10 max pain noted in L knee with prolonged standing, ADLs - NOT MET, 7-8/10 at worst with prolonged recumbent biking  2. ? ROM: L knee ROM to +5 - 130deg to indicate symmetrical ROM and restored joint mobility post chronic pain from injury - Made progress, 0-125 with pain at end range  3. ? Strength: Pt to demo ability to complete 10x mini squat with even weightbearing demonstrated and no UE assist to indicate improving functional quad strength  4. ? Balance: Pt able to demo appropriate amplitude of stepping strategies in response to posterior perturbations with good balance noted. - Making progress, still decreased amplitude  5.  Function: a. Pt able to complete half kneel to stand transfer with unilat UE assist and CGA/min VCs  from therapist - NOT MET, unsafe at this time d/t LE/UE weakness  b. Pt able to ambulate 150 ft on level ground with AFOs donned while demonstrating more typical JASIEL vs narrow, improved consistency of stepping, and more appropriate trunk/arm swing movement vs excessive for improved balance to indicate overall improved safety and efficiency of gait. - MET, all improving but do demo worsening after ~800-900 ft  6. Independent with Home Exercise Programs - MET  7. Demonstrate Knowledge of fall prevention - MET, issued and discussed with pt on 12/8     LTG: (to be met in 16 treatments) - Assessed on 1/26 by Jimmy Hartman, PT:  1. 5/5 L knee extension strength to indicate improving joint stability and allow for improved control with walking, stair climbing - Partially met, 5/5 L, 5-/5 R  2. Able to ambulate 300 ft on level ground with AFOs donned while demonstrating consistently appropriate JASIEL, no more than 10% incidence inconsistent stepping or deviation from straight path to indicate improving gait mechanics and safety - MET though does need AD to achieve this and is now very fatigued after 300 ft which is new since COVID   3. Pt able to ascend/descend stairs reciprocally with unilat UE assist and demonstrating good speed/balance - NOT MET, lack of eccentric control thus instability with descent when not having BUE assist  4. Pt able to squat to  objects off of floor without difficulty - Making progress, able to complete but needs CGA for safety  5. Pt able to complete full floor<>stand transfer without need for assist and safe technique.- NOT MET, unsafe at this time  6. At least 40% fxn reported per LEFI to indicate improvement in subjective LLE ability - MET, 54% fxn reported      New goals to be met at visit 24, created 1/26/22:    1.  Pt to demo gait speed of at least .66m/s to indicate clinically significant difference in gait speed and progress towards reduced fall risk. 2. Pt to demo ability to complete half kneel to stand transfer with BUE assist and no therapist assist to improve ability to get up off of the floor. 3. 5/5 B knee strength with no more than 1-2x minor buckling noted with ambulation of 600 ft.   4. Pt able to demo at least 750ft ambulation consecutively without needing seated rest to indicate improving functional mobility tolerance and increased community access. 5. Pt to report at least 67% function per LEFI.                    Patient goals:  Amber Turcios able to do stuff like bending down, going up stairs\" - Making progress    Pt. Education:  [] Yes  [] No  [x] Reviewed Prior HEP/Ed  Method of Education: [x] Verbal  [x] Demo: 2/2/22: clinic ex progression  [] Written  Comprehension of Education:  [x] Verbalizes understanding. [x] Demonstrates understanding. [x] Needs review. [] Demonstrates/verbalizes HEP/Ed previously given. Access Code: [email or web address]  URL: [email or web address]  Date: 11/03/2021  Prepared by: Roberto Cleaning    Exercises  Supine Ankle Inversion Eversion AROM - 1 x daily - 7 x weekly - 3 sets - 10 reps  Supine Ankle Pumps - 1 x daily - 7 x weekly - 3 sets - 10 reps  Sit<>stand with fwd reach - 3x10  Squats w/ chair support - 3x10       Plan: [x] Continue current frequency toward long and short term goals.     [x] Specific Instructions for subsequent treatments: progress standing quad/hip strength with some UE assisted tasks, gait training      Time In:  200 pm          Time Out: 300 pm    Electronically signed by:  Mai Nichols PTA

## 2022-02-09 ENCOUNTER — HOSPITAL ENCOUNTER (OUTPATIENT)
Dept: PHYSICAL THERAPY | Facility: CLINIC | Age: 47
Setting detail: THERAPIES SERIES
Discharge: HOME | End: 2022-02-09
Payer: MEDICARE

## 2022-02-09 PROCEDURE — 97112 NEUROMUSCULAR REEDUCATION: CPT

## 2022-02-09 PROCEDURE — 97110 THERAPEUTIC EXERCISES: CPT

## 2022-02-09 NOTE — FLOWSHEET NOTE
[] [address] [facility]EL[facility] &  Therapy  [address]  [phone]  F: [phone] [x] [address]  [employer]   [address]  P: [phone]  F: [phone] [] [address] &  Therapy  [address]  P: [phone]  F: [phone] [] [address]  P: [phone]  F: [phone] [] [address]  [facility]   [address]   [place]: [phone]  F: [phone]      Physical Therapy Daily Treatment Note    Date:  2022  Patient Name:  Sadi Campbell    :  1975  MRN: [id number]  Physician: Dr. Delatorre Dus: Medicare ([address] [place] [address])  Medical Diagnosis: Closed fracture of proximal end of left tibia, unspecified fracture morphology, sequela               Rehab Codes: M25.662, R53.1, R26.9, R27.9  Onset date: 21                 Next Dr's appt.: 21  Visit# / total visits: ; Progress note for Medicare patient due at visit 24  (signed script received for 22 appt)   Cancels/No Shows: 5/0    Subjective:    Pain:  [x] Yes  [] No Location: R knee   Pain Rating: (0-10 scale) 2/10  Pain altered Tx:  [x] No  [] Yes  Action:   Comments: Pt notes he has been awake since 4am so is very tired today. Objective:  Modalities:   Precautions: Dialysis T/Thrs/Sat, gait belt   Exercises: Bolded completed 2022:  Exercise Reps/ Time Weight/ Level Comments   Nu-step 6' L3          Gait train with AD 300ft  Cues for increased step length, speed; SBA- distance limited d/t pt fatigue   Gait train w/ head turns and up/down 3x300   CGA   Gait w/ step overs 4 min  Short hurdles (~1in) and taller hurdles (~4in) - with cane and CGA  See below 12/20-   Gait w/ step up 4 min  2\" curb step with 1 UE.    Gait with step up/off of foam 6x30 ft  Short side of foam x 2- using cane and CGAx1         Gait with out  ft  CGA   Gait w/ cog task with out  ft  Verbal fluency tasks all things with letter B         Mat      Hamstring stretch      SLR  3# AA by PTA on RLE  A on LLE   Fwd reach crunch 3x10     Ball oblique crunch 3x10 ea     DKTC crunch 2x15     Table top toe taps 3x10     Bridge 15x  easy   Bridge w/ march 3x6     Sidelying hip abd   Attempted, too much hip flexion even with tactile cuing   Sidelying clams  blue    Prone quad stretch      Ankle DF AROM   First set supine, second set seated with more visual feedback   Bilateral ER 3x15 blue Did 1.5 sets only 2/2/22   Seated core twist 2x10 8#    Seated overhead press 3x15 5# ea Able to do 2x15,5 on 2/2/22   Armchair push ups 3x10  Only 2 sets on 2/2/22   Seated reclined chest press 3x10 5# ea    Seated core with press out 10x2 8# Added 12/29   HS curls  20xea  Blue  Added 1/20    LAQ B 20xea  2# Added 1/20         Standing      Narrow JASIEL 2x 30\" No UE assist   NBOS on foam 2x 30\" Added 12/27   Fwd/retro lean 15x ea     Fwd/retro perturbations 2 min ea     Tandem balance  1x 1 min total Progressed 2/2/22   Wide JASIEL, head turns 2x10 ea  Added arm reach at same time 2/2/22   Wide JASIEL, head up/down 2x10 ea     B shoulder flexion and look up at ceiling 10 A Started 2/2/22   HS curls 10 ea A Hold with 1 UE; started 2/2/22   Heel raises      Marches on foam 10x2  Added 12/27; 1UE A   Foam step ups  10x ea  Added 12/27; 1 UE A   Foam ambulation  2 min  1 UE A   SLS  x2 20 sec UE support   Step taps  4in UE support   Step ups  10x ea 4\"    Mini squats x15  No UE assist; needed arm support on 2/2/22   Lunge 2x10     Sit to stands 3x15  W/ ball for forward to touch rollator lean external cue   Squats to step w/med ball 2x10 4#  13.5\" Min A to prevent posterior LOB   Squats on decline wedge 10x  Added 12/17   3 way hip bilaterally  20x ea orange Added 12/17   2\" step used for ext     Half kneel to stand 2x  Max A x2 to return to stand on second rep d/t fatigue in arms, LLE  - HOLD until improved strength in BUEs   L LE TKE 20x3\" Blue  Added reps 12/29   Other:         Treatment Charges: Mins Units   []  Modalities     [x]  Ther Exercise 22 2   []  Manual Therapy     []  Ther Activities     []  Aquatics     []  Vasocompression     [x]  Other: Neuro 18 1   [x]  Other: Gait 5 --   Total Treatment time 45 3   Estimated medicare cost as of 2/9/2022: $415.73    Assessment: [x] Progressing toward goals. Able to ambulate 300 ft without AD today, CGA and rollator follow by clinician - does demo increasing instability (RLE>LLE) as progressing after ~200 ft). Progressed tandem stance to only a few finger support, does need min A x3 to prevent LOB laterally. Continues to demo reduced standing balance/endurance and would benefit from additional PT. UE noted to be improving, which will assist with transfers and ADLs, however LUE still much weaker than RUE. Had to hold exercise in last few minutes of treatment d/t pt \"feeling weird\" - possible low blood sugar issue. Pt denies any snack in clinic, prefers to go home and have meal. Ended session. [] No change. [x] Other:   [x] Patient would continue to benefit from skilled physical therapy services in order to: address B quad/hip strength (L>R), gait train, and balance training to improve safety and access in home/community and less reliance on caregivers.      STG/LTG  STG: (to be met in 8 treatments) - Assessed on 12/8 by Dinesh Moore, PT:  1. ? Pain: No more than 5/10 max pain noted in L knee with prolonged standing, ADLs - NOT MET, 7-8/10 at worst with prolonged recumbent biking  2. ? ROM: L knee ROM to +5 - 130deg to indicate symmetrical ROM and restored joint mobility post chronic pain from injury - Made progress, 0-125 with pain at end range  3. ? Strength: Pt to demo ability to complete 10x mini squat with even weightbearing demonstrated and no UE assist to indicate improving functional quad strength  4. ? Balance: Pt able to demo appropriate amplitude of stepping strategies in response to posterior perturbations with good balance noted. - Making progress, still decreased amplitude  5. Function:   a. Pt able to complete half kneel to stand transfer with unilat UE assist and CGA/min VCs  from therapist - NOT MET, unsafe at this time d/t LE/UE weakness  b. Pt able to ambulate 150 ft on level ground with AFOs donned while demonstrating more typical JASIEL vs narrow, improved consistency of stepping, and more appropriate trunk/arm swing movement vs excessive for improved balance to indicate overall improved safety and efficiency of gait. - MET, all improving but do demo worsening after ~800-900 ft  6. Independent with Home Exercise Programs - MET  7. Demonstrate Knowledge of fall prevention - MET, issued and discussed with pt on 12/8     LTG: (to be met in 16 treatments) - Assessed on 1/26 by Urmila Souza, PT:  1. 5/5 L knee extension strength to indicate improving joint stability and allow for improved control with walking, stair climbing - Partially met, 5/5 L, 5-/5 R  2. Able to ambulate 300 ft on level ground with AFOs donned while demonstrating consistently appropriate JASIEL, no more than 10% incidence inconsistent stepping or deviation from straight path to indicate improving gait mechanics and safety - MET though does need AD to achieve this and is now very fatigued after 300 ft which is new since COVID   3. Pt able to ascend/descend stairs reciprocally with unilat UE assist and demonstrating good speed/balance - NOT MET, lack of eccentric control thus instability with descent when not having BUE assist  4. Pt able to squat to  objects off of floor without difficulty - Making progress, able to complete but needs CGA for safety  5.  Pt able to complete full floor<>stand transfer without need for assist and safe technique.- NOT

## 2022-02-14 ENCOUNTER — HOSPITAL ENCOUNTER (OUTPATIENT)
Dept: PHYSICAL THERAPY | Facility: CLINIC | Age: 47
Setting detail: THERAPIES SERIES
Discharge: HOME | End: 2022-02-14
Payer: MEDICARE

## 2022-02-16 ENCOUNTER — HOSPITAL ENCOUNTER (OUTPATIENT)
Dept: PHYSICAL THERAPY | Facility: CLINIC | Age: 47
Setting detail: THERAPIES SERIES
Discharge: HOME | End: 2022-02-16
Payer: MEDICARE

## 2022-02-16 PROCEDURE — 97116 GAIT TRAINING THERAPY: CPT

## 2022-02-16 PROCEDURE — 97110 THERAPEUTIC EXERCISES: CPT

## 2022-02-16 NOTE — FLOWSHEET NOTE
[] [facility]) - [facility] &  Therapy  [address]  [phone]  F: [phone] [x] [address]  [facility]   [address]  P: [phone]  F: [phone] [] [address] &  Therapy  [address] [facility]  P: [phone]  F: [phone] [] [address]  P: [phone]  F: [phone] [] [address]  [facility]   [address]   [place]: [phone]  F: [phone]      Physical Therapy Daily Treatment Note    Date:  2022  Patient Name:  Darin Slaughter    :  1975  MRN: [id number]  Physician: Dr. Partha Mock: Medicare ([address] [place] [address])  Medical Diagnosis: Closed fracture of proximal end of left tibia, unspecified fracture morphology, sequela               Rehab Codes: M25.662, R53.1, R26.9, R27.9  Onset date: 21                 Next Dr's appt.: 21  Visit# / total visits: ; Progress note for Medicare patient due at visit 24  (signed script received for 22 appt)   Cancels/No Shows: 5/0    Subjective:    Pain:  [x] Yes  [] No Location: R knee   Pain Rating: (0-10 scale) 2/10  Pain altered Tx:  [x] No  [] Yes  Action:   Comments: Pt notes he had cataract surgery yesterday for L eye - notes his vision is already significantly improved and feels that it's helped his walking as well.          Objective:  Modalities:   Precautions: Dialysis T/Thrs/Sat, gait belt   Exercises: Bolded completed 2022:  Exercise Reps/ Time Weight/ Level Comments   Nu-step 6' L3          Gait train with AD 6 min walk test  750 ft (norms for 61yo are ~1800 ft)   Gait train w/ head turns and up/down 3x300   CGA   Gait w/ step overs 4 min  Short hurdles (~1in) and taller hurdles (~4in) - with cane and CGA  See below 12/20-   Gait w/ step up 4 min  2\" curb step with 1 UE.    Gait with step up/off of foam 6x30 ft  Short side of foam x 2- using cane and CGAx1         Gait with out  ft  CGA   Gait w/ cog task with out  ft  Verbal fluency tasks all things with letter B         Mat      Hamstring stretch      SLR  3# AA by PTA on RLE  A on LLE   Fwd reach crunch 3x10     Ball oblique crunch 3x10 ea     DKTC crunch 2x15     Table top toe taps 3x10     Bridge 15x  easy   Bridge w/ march 3x6     Sidelying hip abd   Attempted, too much hip flexion even with tactile cuing   Sidelying clams  blue    Prone quad stretch      Ankle DF AROM   First set supine, second set seated with more visual feedback   Bilateral ER 3x15 blue Did 1.5 sets only 2/2/22   Seated core twist 3x10 8#    Seated overhead press 3x15 5# ea Able to do 2x15,5 on 2/2/22   Armchair push ups 3x10  Only 2 sets on 2/2/22   Seated reclined chest press 3x10 5# ea    Seated core with press out 3x10 8# Added 12/29   HS curls  20xea  Blue  Added 1/20    LAQ B 20xea  2# Added 1/20         Standing      Narrow JASIEL 2x 30\" No UE assist   NBOS on foam 2x 30\" Added 12/27   Fwd/retro lean 15x ea     Fwd/retro perturbations 2 min ea     Tandem balance  1x 1 min total Progressed 2/2/22   Wide JASIEL, head turns 2x10 ea  Added arm reach at same time 2/2/22   Wide JASIEL, head up/down 2x10 ea     B shoulder flexion and look up at ceiling 10 A Started 2/2/22   HS curls 10 ea A Hold with 1 UE; started 2/2/22   Heel raises      Marches on foam 10x2  Added 12/27; 1UE A   Foam step ups  10x ea  Added 12/27; 1 UE A   Foam ambulation  2 min  1 UE A   SLS  x2 20 sec UE support   Step taps  4in UE support   Step ups  10x ea 4\"    Mini squats x15  BUE assist on rollator and min A from therapist to increase depth   Eccentric controlled sit 2x10     Lunge 2x10     Sit to stands 3x15  W/ ball for forward to touch rollator lean external cue   Squats to step w/med ball 2x10 4#  13.5\" Min A to prevent posterior LOB   Squats on decline wedge 10x  Added 12/17   3 way hip bilaterally  20x ea orange Added 12/17   2\" step used for ext     Half kneel to stand 2x  Max A x2 to return to stand on second rep d/t fatigue in arms, LLE  - HOLD until improved strength in BUEs   L LE TKE 20x3\" Blue  Added reps 12/29   Other:         Treatment Charges: Mins Units   []  Modalities     [x]  Ther Exercise 38 2   []  Manual Therapy     []  Ther Activities     []  Aquatics     []  Vasocompression     []  Other: Neuro     [x]  Other: Gait 10 1   Total Treatment time 48 3   Estimated medicare cost as of 2/16/2022: $486.63    Assessment: [x] Progressing toward goals. Assessed functional mobility tolerance with 6-minute walk test today - requires 1 seated rest at ~400 ft, but able to continue after to full 6 min. Min cues/CGA for safety near 300-400ft d/t getting fatigued and walker getting too far out in front of pt, excessive trunk fwd lean. Continued to work on UE/LE strengthening to improve functional mobility tolerance and standing ADL completion at home with less rest breaks/fatigue - pt does well with all weight/resistance levels, muscular fatigue noted especially with overhead strengthening. [] No change. [] Other:   [x] Patient would continue to benefit from skilled physical therapy services in order to: address B quad/hip strength (L>R), gait train, and balance training to improve safety and access in home/community and less reliance on caregivers.      STG/LTG  STG: (to be met in 8 treatments) - Assessed on 12/8 by Antonio Mosley, PT:  1. ? Pain: No more than 5/10 max pain noted in L knee with prolonged standing, ADLs - NOT MET, 7-8/10 at worst with prolonged recumbent biking  2. ? ROM: L knee ROM to +5 - 130deg to indicate symmetrical ROM and restored joint mobility post chronic pain from injury - Made progress, 0-125 with pain at end range  3. ? Strength: Pt to demo ability to complete 10x mini squat with even weightbearing demonstrated and no UE assist to indicate improving functional quad strength  4. ? Balance: Pt able to demo appropriate amplitude of stepping strategies in response to posterior perturbations with good balance noted. - Making progress, still decreased amplitude  5. Function:   a. Pt able to complete half kneel to stand transfer with unilat UE assist and CGA/min VCs  from therapist - NOT MET, unsafe at this time d/t LE/UE weakness  b. Pt able to ambulate 150 ft on level ground with AFOs donned while demonstrating more typical JASIEL vs narrow, improved consistency of stepping, and more appropriate trunk/arm swing movement vs excessive for improved balance to indicate overall improved safety and efficiency of gait. - MET, all improving but do demo worsening after ~800-900 ft  6. Independent with Home Exercise Programs - MET  7. Demonstrate Knowledge of fall prevention - MET, issued and discussed with pt on 12/8     LTG: (to be met in 16 treatments) - Assessed on 1/26 by Prakash Escalante, PT:  1. 5/5 L knee extension strength to indicate improving joint stability and allow for improved control with walking, stair climbing - Partially met, 5/5 L, 5-/5 R  2. Able to ambulate 300 ft on level ground with AFOs donned while demonstrating consistently appropriate JASIEL, no more than 10% incidence inconsistent stepping or deviation from straight path to indicate improving gait mechanics and safety - MET though does need AD to achieve this and is now very fatigued after 300 ft which is new since COVID   3. Pt able to ascend/descend stairs reciprocally with unilat UE assist and demonstrating good speed/balance - NOT MET, lack of eccentric control thus instability with descent when not having BUE assist  4. Pt able to squat to  objects off of floor without difficulty - Making progress, able to complete but needs CGA for safety  5.  Pt able to complete full floor<>stand transfer without need for assist and safe technique.- NOT MET, unsafe at this time  6. At least 40% fxn reported per LEFI to indicate improvement in subjective LLE ability - MET, 54% fxn reported      New goals to be met at visit 24, created 1/26/22:    1. Pt to demo gait speed of at least .66m/s to indicate clinically significant difference in gait speed and progress towards reduced fall risk. 2. Pt to demo ability to complete half kneel to stand transfer with BUE assist and no therapist assist to improve ability to get up off of the floor. 3. 5/5 B knee strength with no more than 1-2x minor buckling noted with ambulation of 600 ft.   4. Pt able to demo at least 750ft ambulation consecutively without needing seated rest to indicate improving functional mobility tolerance and increased community access. 5. Pt to report at least 67% function per LEFI.                    Patient goals:  Paul Wheatley able to do stuff like bending down, going up stairs\" - Making progress    Pt. Education:  [] Yes  [] No  [x] Reviewed Prior HEP/Ed  Method of Education: [x] Verbal  [x] Demo: 2/2/22: clinic ex progression  [] Written  Comprehension of Education:  [x] Verbalizes understanding. [x] Demonstrates understanding. [x] Needs review. [] Demonstrates/verbalizes HEP/Ed previously given. Access Code: [email or web address]  URL: [email or web address]  Date: 11/03/2021  Prepared by: Deidra Yanez    Exercises  Supine Ankle Inversion Eversion AROM - 1 x daily - 7 x weekly - 3 sets - 10 reps  Supine Ankle Pumps - 1 x daily - 7 x weekly - 3 sets - 10 reps  Sit<>stand with fwd reach - 3x10  Squats w/ chair support - 3x10       Plan: [x] Continue current frequency toward long and short term goals.     [x] Specific Instructions for subsequent treatments: progress standing quad/hip strength with some UE assisted tasks, gait training      Time In:  2:02 pm          Time Out: 3:00 pm    Electronically signed by:  Karsten Maravilla, PT

## 2022-02-21 ENCOUNTER — HOSPITAL ENCOUNTER (OUTPATIENT)
Dept: PHYSICAL THERAPY | Facility: CLINIC | Age: 47
Setting detail: THERAPIES SERIES
Discharge: HOME | End: 2022-02-21
Payer: MEDICARE

## 2022-02-21 NOTE — FLOWSHEET NOTE
[] [facility]) - [facility] &  Therapy  [address]    [phone]  F: [phone]   [x] [address]  [facility] 36   [address]  P: [phone]  F: [phone]  [] [address] &  Therapy  [address] [facility]  P: [phone]  F: [phone] [] [address]  P: [phone]  F: [phone]  [] [address]  [address] [address] [facility]   [address]   [place]: [phone]  F: [phone]   [] [place] [address] [facility] [address]  [place]: [phone]   F: [phone]     Physical Therapy Cancel/No Show note    Date: 2022  Patient: Dora Moser  : 1975  MRN: [id number]    Cancels/No Shows to date:     For today's appointment patient:    [x]  Cancelled    [] Rescheduled appointment    [] No-show     Reason given by patient:    []  Patient ill    [x]  Conflicting appointment    [] No transportation      [] Conflict with work    [] No reason given    [] Weather related    [] [id number]    [] Other:      Comments:        [x] Next appointment was confirmed    Electronically signed by: Ambar Montana PTA

## 2022-02-23 ENCOUNTER — HOSPITAL ENCOUNTER (OUTPATIENT)
Dept: PHYSICAL THERAPY | Facility: CLINIC | Age: 47
Setting detail: THERAPIES SERIES
Discharge: HOME | End: 2022-02-23
Payer: MEDICARE

## 2022-02-23 PROCEDURE — 97116 GAIT TRAINING THERAPY: CPT

## 2022-02-23 PROCEDURE — 97110 THERAPEUTIC EXERCISES: CPT

## 2022-02-23 NOTE — FLOWSHEET NOTE
[] [facility]) - [facility] &  Therapy  [address]  [phone]  F: [phone] [x] [address]  [facility]"" 36   [address]  P: [phone]  F: [phone] [] [address] &  Therapy  [address] [facility] [address]  P: [phone]  F: [phone] [] [address]  P: [phone]  F: [phone] [] [address]  [facility]   [address]   [place]: [phone]  F: [phone]      Physical Therapy Daily Treatment Note    Date:  2022  Patient Name:  Jannet Holland    :  1975  MRN: [id number]  Physician: Dr. Radha Blood: Medicare ([address] [place] [address])  Medical Diagnosis: Closed fracture of proximal end of left tibia, unspecified fracture morphology, sequela               Rehab Codes: M25.662, R53.1, R26.9, R27.9  Onset date: 21                 Next Dr's appt.: 21  Visit# / total visits: ; Progress note for Medicare patient due at visit 24  (signed script received for 22 appt)   Cancels/No Shows: 7/0    Subjective:    Pain:  [x] Yes  [] No Location: R knee   Pain Rating: (0-10 scale) 2/10  Pain altered Tx:  [x] No  [] Yes  Action:   Comments: Pt notes he feels good today. Objective:  Modalities:   Precautions: Dialysis T/Thrs/Sat, gait belt   Exercises: Bolded completed 2022:  Exercise Reps/ Time Weight/ Level Comments   Nu-step 6' L3          Gait train with rollator 6 min walk test  750 ft best (norms for 59yo are ~1800 ft)  - break at ~400 ft, reached 700 ft this date   Gait train w/ head turns and up/down 3x300   CGA   Gait w/ step overs 4 min  Short hurdles (~1in) and taller hurdles (~4in) - with cane and CGA  See below -   Gait w/ step up 4 min  2\" curb step with 1 UE.    Gait with step up/off of foam 6x30 ft  Short side of foam x 2- using cane and CGAx1         Gait with out  ft  CGA   Gait w/ cog task with out  ft  Verbal fluency tasks all things with letter B   Retro gait w/ rollator 120 ft  CGA         Mat      Hamstring stretch      SLR  3# AA by PTA on RLE  A on LLE   Fwd reach crunch 3x10     Ball oblique crunch 3x10 ea     DKTC crunch 2x15     Table top toe taps 3x10     Bridge 15x  easy   Bridge w/ march 3x6     Sidelying hip abd   Attempted, too much hip flexion even with tactile cuing   Sidelying clams  blue    Prone quad stretch      Ankle DF AROM   First set supine, second set seated with more visual feedback   Bilateral ER 3x15 blue Did 1.5 sets only 2/2/22   Seated core twist 3x10 8#    Seated overhead press 3x15 5# ea Able to do 2x15,5 on 2/2/22   Armchair push ups 3x10  Only 2 sets on 2/2/22   Seated reclined chest press 3x10 5# ea    Seated core with press out 3x15 8# Added 12/29   HS curls  20xea  Blue  Added 1/20    LAQ B 20xea  2# Added 1/20         Standing      Narrow JASIEL 2x 30\" No UE assist   NBOS on foam 2x 30\" Added 12/27   Fwd/retro lean 15x ea     Fwd/retro perturbations 2 min ea     Retro stepping, no AD 2x10     Retro step w/ posterior perturbation 2x10  Expected/count down perturbations   Tandem balance  1x 1 min total Progressed 2/2/22   Wide JASIEL, head turns 2x10 ea  Added arm reach at same time 2/2/22   Wide JASIEL, head up/down 2x10 ea     B shoulder flexion and look up at ceiling 10 A Started 2/2/22   HS curls 10 ea A Hold with 1 UE; started 2/2/22   Heel raises      Marches on foam 10x2  Added 12/27; 1UE A   Foam step ups  10x ea  Added 12/27; 1 UE A   Foam ambulation  2 min  1 UE A   SLS  x2 20 sec UE support   Step taps  4in UE support   Step ups  10x ea 4\"    Mini squats x15  BUE assist on rollator and min A from therapist to increase depth   Eccentric controlled sit 2x10     Lunge 2x10     Sit to stands 3x15  W/ ball for forward to touch rollator lean external cue   Squats to step w/med ball 2x10 4#  13.5\" Min A to prevent posterior LOB   Squats on decline wedge 10x  Added 12/17   3 way hip bilaterally  20x ea orange Added 12/17   2\" step used for ext     Half kneel to stand 2x  Max A x2 to return to stand on second rep d/t fatigue in arms, LLE  - HOLD until improved strength in BUEs   L LE TKE 20x3\" Blue  Added reps 12/29   Other:         Treatment Charges: Mins Units   []  Modalities     [x]  Ther Exercise 38 2   []  Manual Therapy     []  Ther Activities     []  Aquatics     []  Vasocompression     []  Other: Neuro     [x]  Other: Gait 10 1   Total Treatment time 48 3   Estimated medicare cost as of 2/23/2022: $561.52    Assessment: [x] Progressing toward goals. Still with difficulty with longer gait functional mobility - requires seated rest still at ~400 ft evidenced by excessive UE use on walker and inability to keep walker close to feet/trunk. Still with very poor reactive postural strategies with posterior perturbations with all verbal/tactile cues and requires min A. Eccentric sitting control still very difficult - external cue to keep hands on rollator seat as descending allowed improved flexed fwd trunk and thus increased demand on quads. Fatigue evident with this method, encouraged pt to complete this at home instead of general sit to stands for further quad strengthening. Pt verbalizes and demos understanding. [] No change. [] Other:   [x] Patient would continue to benefit from skilled physical therapy services in order to: address B quad/hip strength (L>R), gait train, and balance training to improve safety and access in home/community and less reliance on caregivers.      STG/LTG  STG: (to be met in 8 treatments) - Assessed on 12/8 by Fernando Bhardwaj, PT:  1. ? Pain: No more than 5/10 max pain noted in L knee with prolonged standing, ADLs - NOT MET, 7-8/10 at worst with prolonged recumbent biking  2. ? ROM: L knee ROM to +5 - 130deg to indicate symmetrical ROM and restored joint mobility post chronic pain from injury - Made progress, 0-125 with pain at end range  3. ? Strength: Pt to demo ability to complete 10x mini squat with even weightbearing demonstrated and no UE assist to indicate improving functional quad strength  4. ? Balance: Pt able to demo appropriate amplitude of stepping strategies in response to posterior perturbations with good balance noted. - Making progress, still decreased amplitude  5. Function:   a. Pt able to complete half kneel to stand transfer with unilat UE assist and CGA/min VCs  from therapist - NOT MET, unsafe at this time d/t LE/UE weakness  b. Pt able to ambulate 150 ft on level ground with AFOs donned while demonstrating more typical JASIEL vs narrow, improved consistency of stepping, and more appropriate trunk/arm swing movement vs excessive for improved balance to indicate overall improved safety and efficiency of gait. - MET, all improving but do demo worsening after ~800-900 ft  6. Independent with Home Exercise Programs - MET  7. Demonstrate Knowledge of fall prevention - MET, issued and discussed with pt on 12/8     LTG: (to be met in 16 treatments) - Assessed on 1/26 by Porsha Whittington, PT:  1. 5/5 L knee extension strength to indicate improving joint stability and allow for improved control with walking, stair climbing - Partially met, 5/5 L, 5-/5 R  2. Able to ambulate 300 ft on level ground with AFOs donned while demonstrating consistently appropriate JASIEL, no more than 10% incidence inconsistent stepping or deviation from straight path to indicate improving gait mechanics and safety - MET though does need AD to achieve this and is now very fatigued after 300 ft which is new since COVID   3. Pt able to ascend/descend stairs reciprocally with unilat UE assist and demonstrating good speed/balance - NOT MET, lack of eccentric control thus instability with descent when not having BUE assist  4.  Pt able to squat to  objects off of floor without difficulty - Making progress, able to complete but needs CGA for safety  5. Pt able to complete full floor<>stand transfer without need for assist and safe technique.- NOT MET, unsafe at this time  6. At least 40% fxn reported per LEFI to indicate improvement in subjective LLE ability - MET, 54% fxn reported      New goals to be met at visit 24, created 1/26/22:    1. Pt to demo gait speed of at least .66m/s to indicate clinically significant difference in gait speed and progress towards reduced fall risk. 2. Pt to demo ability to complete half kneel to stand transfer with BUE assist and no therapist assist to improve ability to get up off of the floor. 3. 5/5 B knee strength with no more than 1-2x minor buckling noted with ambulation of 600 ft.   4. Pt able to demo at least 750ft ambulation consecutively without needing seated rest to indicate improving functional mobility tolerance and increased community access. 5. Pt to report at least 67% function per LEFI.                    Patient goals:  Grace Nunez able to do stuff like bending down, going up stairs\" - Making progress    Pt. Education:  [] Yes  [] No  [x] Reviewed Prior HEP/Ed  Method of Education: [x] Verbal  [x] Demo: 2/2/22: clinic ex progression  [] Written  Comprehension of Education:  [x] Verbalizes understanding. [x] Demonstrates understanding. [x] Needs review. [] Demonstrates/verbalizes HEP/Ed previously given. Access Code: [email or web address]  URL: [email or web address]  Date: 11/03/2021  Prepared by: Pollo Obregon    Exercises  Supine Ankle Inversion Eversion AROM - 1 x daily - 7 x weekly - 3 sets - 10 reps  Supine Ankle Pumps - 1 x daily - 7 x weekly - 3 sets - 10 reps  Sit<>stand with fwd reach - 3x10  Squats w/ chair support - 3x10       Plan: [x] Continue current frequency toward long and short term goals.     [x] Specific Instructions for subsequent treatments: progress standing quad/hip strength with some UE assisted tasks, gait training      Time In:  2:00 pm          Time Out: 2:56 pm    Electronically signed by:  Zonia Aquino PT

## 2022-02-28 ENCOUNTER — HOSPITAL ENCOUNTER (OUTPATIENT)
Dept: PHYSICAL THERAPY | Facility: CLINIC | Age: 47
Setting detail: THERAPIES SERIES
Discharge: HOME | End: 2022-02-28
Payer: MEDICARE

## 2022-02-28 PROCEDURE — 97110 THERAPEUTIC EXERCISES: CPT

## 2022-02-28 PROCEDURE — 97116 GAIT TRAINING THERAPY: CPT

## 2022-02-28 NOTE — FLOWSHEET NOTE
[] [facility]) - [facility] &  Therapy  [address]  [phone]  F: [phone] [x] [address]  [facility]   [address]  P: [phone]  F: [phone] [] [address] &  Therapy  [address]  P: [phone]  F: [phone] [] [address]  P: [phone]  F: [phone] [] [address]  [facility]   [address]   [place]: [phone]  F: [phone]      Physical Therapy Daily Treatment Note    Date:  2022  Patient Name:  Bernardo Ramey    :  1975  MRN: [id number]  Physician: Dr. Lara Mays: Medicare ([address] [place] [address])  Medical Diagnosis: Closed fracture of proximal end of left tibia, unspecified fracture morphology, sequela               Rehab Codes: M25.662, R53.1, R26.9, R27.9  Onset date: 21                 Next Dr's appt.: 21  Visit# / total visits: ; Progress note for Medicare patient due at visit 24  (signed script received for 22 appt)   Cancels/No Shows: 7/0    Subjective:    Pain:  [x] Yes  [] No Location: R knee   Pain Rating: (0-10 scale) 2/10  Pain altered Tx:  [x] No  [] Yes  Action:   Comments: Pt arrive denying changes    Pt arrives 20 min late        Objective:  Modalities:   Precautions: Dialysis T/Thrs/Sat, gait belt   Exercises: Bolded completed 2022:  Exercise Reps/ Time Weight/ Level Comments   Nu-step 5' L3          Gait train with rollator 2.5 laps  750 ft best (norms for 59yo are ~1800 ft)  - break at ~400 ft, reached 700 ft this date   Gait train w/ head turns and up/down 3x300   CGA   Gait w/ step overs 4 min  Short hurdles (~1in) and taller hurdles (~4in) - with cane and CGA  See below 12/20-   Gait w/ step up 4 min  2\" curb step with 1 UE. increase depth   Eccentric controlled sit 2x10     Lunge 2x10     Sit to stands 3x15  W/ ball for forward to touch rollator lean external cue   Squats to step w/med ball 2x10 4#  13.5\" Min A to prevent posterior LOB   Squats on decline wedge 10x  Added 12/17   3 way hip bilaterally  20x ea orange Added 12/17   2\" step used for ext     Half kneel to stand 2x  Max A x2 to return to stand on second rep d/t fatigue in arms, LLE  - HOLD until improved strength in BUEs   L LE TKE 20x3\" Blue  Added reps 12/29   Other:         Treatment Charges: Mins Units   []  Modalities     [x]  Ther Exercise 20 1   []  Manual Therapy     []  Ther Activities     []  Aquatics     []  Vasocompression     []  Other: Neuro 5 -   [x]  Other: Gait 10 1   Total Treatment time 35 2   Estimated medicare cost as of 2/28/2022: $613.57    Assessment: [x] Progressing toward goals. Due to pt late arrival decreased interventions completed this date. Pt continues to require rest breaks with longer ambulation. Completed core interventions to start treatment with out incident however rest breaks again provided for fatigue recovery. Continues to lack eccentric control of bilateral quads. Will continue to progress as able in up coming sessions. [] No change. [] Other:   [x] Patient would continue to benefit from skilled physical therapy services in order to: address B quad/hip strength (L>R), gait train, and balance training to improve safety and access in home/community and less reliance on caregivers.      STG/LTG  STG: (to be met in 8 treatments) - Assessed on 12/8 by Jenna Beltre, PT:  1. ? Pain: No more than 5/10 max pain noted in L knee with prolonged standing, ADLs - NOT MET, 7-8/10 at worst with prolonged recumbent biking  2. ? ROM: L knee ROM to +5 - 130deg to indicate symmetrical ROM and restored joint mobility post chronic pain from injury - Made progress, 0-125 with pain at end range  3. ? Strength: Pt to demo ability to complete 10x mini squat with even weightbearing demonstrated and no UE assist to indicate improving functional quad strength  4. ? Balance: Pt able to demo appropriate amplitude of stepping strategies in response to posterior perturbations with good balance noted. - Making progress, still decreased amplitude  5. Function:   a. Pt able to complete half kneel to stand transfer with unilat UE assist and CGA/min VCs  from therapist - NOT MET, unsafe at this time d/t LE/UE weakness  b. Pt able to ambulate 150 ft on level ground with AFOs donned while demonstrating more typical JASIEL vs narrow, improved consistency of stepping, and more appropriate trunk/arm swing movement vs excessive for improved balance to indicate overall improved safety and efficiency of gait. - MET, all improving but do demo worsening after ~800-900 ft  6. Independent with Home Exercise Programs - MET  7. Demonstrate Knowledge of fall prevention - MET, issued and discussed with pt on 12/8     LTG: (to be met in 16 treatments) - Assessed on 1/26 by Prakash Escalante, PT:  1. 5/5 L knee extension strength to indicate improving joint stability and allow for improved control with walking, stair climbing - Partially met, 5/5 L, 5-/5 R  2. Able to ambulate 300 ft on level ground with AFOs donned while demonstrating consistently appropriate JASIEL, no more than 10% incidence inconsistent stepping or deviation from straight path to indicate improving gait mechanics and safety - MET though does need AD to achieve this and is now very fatigued after 300 ft which is new since COVID   3. Pt able to ascend/descend stairs reciprocally with unilat UE assist and demonstrating good speed/balance - NOT MET, lack of eccentric control thus instability with descent when not having BUE assist  4. Pt able to squat to  objects off of floor without difficulty - Making progress, able to complete but needs CGA for safety  5.  Pt able to complete full floor<>stand transfer without need for assist and safe technique.- NOT MET, unsafe at this time  6. At least 40% fxn reported per LEFI to indicate improvement in subjective LLE ability - MET, 54% fxn reported      New goals to be met at visit 24, created 1/26/22:    1. Pt to demo gait speed of at least .66m/s to indicate clinically significant difference in gait speed and progress towards reduced fall risk. 2. Pt to demo ability to complete half kneel to stand transfer with BUE assist and no therapist assist to improve ability to get up off of the floor. 3. 5/5 B knee strength with no more than 1-2x minor buckling noted with ambulation of 600 ft.   4. Pt able to demo at least 750ft ambulation consecutively without needing seated rest to indicate improving functional mobility tolerance and increased community access. 5. Pt to report at least 67% function per LEFI.                    Patient goals:  Alex Hawley able to do stuff like bending down, going up stairs\" - Making progress    Pt. Education:  [] Yes  [] No  [x] Reviewed Prior HEP/Ed  Method of Education: [x] Verbal  [] Demo: [] Written  Comprehension of Education:  [x] Verbalizes understanding. [x] Demonstrates understanding. [x] Needs review. [] Demonstrates/verbalizes HEP/Ed previously given. Access Code: [email or web address]  URL: [email or web address]  Date: 11/03/2021  Prepared by: Brittnee Mcconnell    Exercises  Supine Ankle Inversion Eversion AROM - 1 x daily - 7 x weekly - 3 sets - 10 reps  Supine Ankle Pumps - 1 x daily - 7 x weekly - 3 sets - 10 reps  Sit<>stand with fwd reach - 3x10  Squats w/ chair support - 3x10       Plan: [x] Continue current frequency toward long and short term goals.     [x] Specific Instructions for subsequent treatments: progress standing quad/hip strength with some UE assisted tasks, gait training      Time In:  2:19 pm          Time Out: 305 pm    Electronically signed by:  Rajinder Mayfield PTA

## 2022-03-02 ENCOUNTER — HOSPITAL ENCOUNTER (OUTPATIENT)
Dept: PHYSICAL THERAPY | Facility: CLINIC | Age: 47
Setting detail: THERAPIES SERIES
Discharge: HOME | End: 2022-03-02
Payer: MEDICARE

## 2022-03-02 ENCOUNTER — OFFICE VISIT (OUTPATIENT)
Dept: PODIATRY | Age: 47
End: 2022-03-02
Payer: MEDICARE

## 2022-03-02 VITALS — WEIGHT: 178 LBS | RESPIRATION RATE: 16 BRPM | BODY MASS INDEX: 27.94 KG/M2 | HEIGHT: 67 IN

## 2022-03-02 DIAGNOSIS — B35.1 DERMATOPHYTOSIS OF NAIL: Primary | ICD-10-CM

## 2022-03-02 DIAGNOSIS — L97.522 ULCER OF LEFT FOOT, WITH FAT LAYER EXPOSED (HCC): ICD-10-CM

## 2022-03-02 DIAGNOSIS — M79.605 PAIN IN BOTH LOWER EXTREMITIES: ICD-10-CM

## 2022-03-02 DIAGNOSIS — M79.604 PAIN IN BOTH LOWER EXTREMITIES: ICD-10-CM

## 2022-03-02 DIAGNOSIS — E10.49 OTHER DIABETIC NEUROLOGICAL COMPLICATION ASSOCIATED WITH TYPE 1 DIABETES MELLITUS (HCC): ICD-10-CM

## 2022-03-02 DIAGNOSIS — I73.9 PERIPHERAL VASCULAR DISORDER (HCC): ICD-10-CM

## 2022-03-02 PROCEDURE — 2022F DILAT RTA XM EVC RTNOPTHY: CPT | Performed by: PODIATRIST

## 2022-03-02 PROCEDURE — 97112 NEUROMUSCULAR REEDUCATION: CPT

## 2022-03-02 PROCEDURE — 97110 THERAPEUTIC EXERCISES: CPT

## 2022-03-02 PROCEDURE — 97116 GAIT TRAINING THERAPY: CPT

## 2022-03-02 PROCEDURE — 11721 DEBRIDE NAIL 6 OR MORE: CPT | Performed by: PODIATRIST

## 2022-03-02 PROCEDURE — G8417 CALC BMI ABV UP PARAM F/U: HCPCS | Performed by: PODIATRIST

## 2022-03-02 PROCEDURE — 3046F HEMOGLOBIN A1C LEVEL >9.0%: CPT | Performed by: PODIATRIST

## 2022-03-02 PROCEDURE — 1036F TOBACCO NON-USER: CPT | Performed by: PODIATRIST

## 2022-03-02 PROCEDURE — 99213 OFFICE O/P EST LOW 20 MIN: CPT | Performed by: PODIATRIST

## 2022-03-02 PROCEDURE — G8482 FLU IMMUNIZE ORDER/ADMIN: HCPCS | Performed by: PODIATRIST

## 2022-03-02 PROCEDURE — G8427 DOCREV CUR MEDS BY ELIG CLIN: HCPCS | Performed by: PODIATRIST

## 2022-03-02 PROCEDURE — 11042 DBRDMT SUBQ TIS 1ST 20SQCM/<: CPT | Performed by: PODIATRIST

## 2022-03-02 RX ORDER — DEXAMETHASONE 2 MG/1
TABLET ORAL
Status: ON HOLD | COMMUNITY
End: 2022-04-13 | Stop reason: ALTCHOICE

## 2022-03-02 RX ORDER — GABAPENTIN 300 MG/1
300 CAPSULE ORAL 3 TIMES DAILY
Status: ON HOLD | COMMUNITY
End: 2022-05-27

## 2022-03-02 NOTE — FLOWSHEET NOTE
[] [facility]) - [facility] &  Therapy  [address]  [phone]  F: [phone] [x] [address]  [facility]   [address]  P: [phone]  F: [phone] [] [address] &  Therapy  [address] [facility]  P: [phone]  F: [phone] [] [address]  P: [phone]  F: [phone] [] [address]  [facility]   [address]   [place]: [phone]  F: [phone]      Physical Therapy Daily Treatment Note    Date:  3/2/2022  Patient Name:  Patricia Prince    :  1975  MRN: [id number]  Physician: Dr. Edie Bernheim: Medicare ([address] [place] [address])  Medical Diagnosis: Closed fracture of proximal end of left tibia, unspecified fracture morphology, sequela               Rehab Codes: M25.662, R53.1, R26.9, R27.9  Onset date: 21                 Next 's appt.: 21  Visit# / total visits: ; Progress note for Medicare patient due at visit 24  (signed script received for 22 appt)   Cancels/No Shows: 7/0    Subjective:    Pain:  [x] Yes  [] No Location: R knee   Pain Rating: (0-10 scale) 7-8/10  Pain altered Tx:  [x] No  [] Yes  Action:   Comments: Pt arrives reporting neck pain this date with no known cause.       Objective:  Modalities:   Precautions: Dialysis T/Thrs/Sat, gait belt   Exercises: Bolded completed 3/2/2022:  Exercise Reps/ Time Weight/ Level Comments   Nu-step 5' L3          Gait train with rollator 3 laps  750 ft best (norms for 61yo are ~1800 ft)  - break at ~400 ft, reached 700 ft this date   Gait train w/ head turns and up/down 3x300   CGA   Gait w/ step overs 4 min  Short hurdles (~1in) and taller hurdles (~4in) - with cane and CGA  See below -   Gait w/ step up 4 min  2\" curb step with 1 UE.   Gait with step up/off of foam 6x30 ft  Short side of foam x 2- using cane and CGAx1         Gait with out  ft  CGA   Gait w/ cog task with out  ft  Verbal fluency tasks all things with letter B   Retro gait w/ rollator 160 ft  CGA increased distance 3/2         Mat      Hamstring stretch      SLR  3# AA by PTA on RLE  A on LLE   Fwd reach crunch 3x10     Ball oblique crunch 3x10 ea     DKTC crunch 2x15     Table top toe taps 3x10     Bridge 15x  easy   Bridge w/ march 3x6     Sidelying hip abd   Attempted, too much hip flexion even with tactile cuing   Sidelying clams  blue    Prone quad stretch      Ankle DF AROM   First set supine, second set seated with more visual feedback   Bilateral ER 1x15 blue    Seated core twist 3x10 8#    Seated overhead press 3x15 5# ea Able to do 2x15,5 on 2/2/22   Armchair push ups 3x10  Only 2 sets on 2/2/22   Seated reclined chest press 3x10 5# ea    Seated core with press out 3x15 8# Added 12/29   HS curls  20xea  Blue  Added 1/20    LAQ B 20xea  2# Added 1/20         Standing      Reactive color taps with cones and pods 3x1 min ea 5 pods Added 2/28  2 time on level ground  1 time on foam   Narrow JASIEL 2x 30\" No UE assist   NBOS on foam 2x 30\" Added 12/27   Fwd/retro lean 15x ea     Fwd/retro perturbations 2 min ea     Retro stepping, no AD 2x10     Retro step w/ posterior perturbation 2x10  Expected/count down perturbations   Tandem balance  1x 1 min total Progressed 2/2/22   Wide JASIEL, head turns 2x10 ea  Added arm reach at same time 2/2/22   Wide JASIEL, head up/down 2x10 ea     B shoulder flexion and look up at ceiling 10 A Started 2/2/22   HS curls 10 ea A Hold with 1 UE; started 2/2/22   Heel raises      Marches on foam 10x2  Added 12/27; 1UE A   Foam step ups  10x 2 ea  Added 12/27; 1 UE A   Foam ambulation  2 min  1 UE A   SLS  x2 20 sec UE support   Step taps  4in UE support   Step ups  10x ea 4\"    Mini squats x15  BUE assist on rollator and min A from therapist to increase depth   Eccentric controlled sit 2x10     Lunge 2x10     Sit to stands 2x15  W/ ball for forward to touch rollator lean external cue   Squats to step w/med ball 2x10 4#  13.5\" Min A to prevent posterior LOB   Squats on decline wedge 10x  Added 12/17   3 way hip bilaterally  20x ea orange Added 12/17   2\" step used for ext     Half kneel to stand 2x  Max A x2 to return to stand on second rep d/t fatigue in arms, LLE  - HOLD until improved strength in BUEs   L LE TKE 20x3\" Blue  Added reps 12/29   Other:         Treatment Charges: Mins Units   []  Modalities     [x]  Ther Exercise 30 2   []  Manual Therapy     []  Ther Activities     []  Aquatics     []  Vasocompression     [x]  Other: Neuro 15 1   [x]  Other: Gait 10 1   Total Treatment time 55 4   Estimated medicare cost as of 3/2/2022: $702.67    Assessment: [x] Progressing toward goals: With sit to stands a foam pad was required to be added to the chair to assist in this exercises. Continued struggle with sit to stands this date despite multiple attempts and different techniques. Loss of balance continues to be posteriorly with min assist for manual correction during palma exercises. [] No change. [] Other:   [x] Patient would continue to benefit from skilled physical therapy services in order to: address B quad/hip strength (L>R), gait train, and balance training to improve safety and access in home/community and less reliance on caregivers.      STG/LTG  STG: (to be met in 8 treatments) - Assessed on 12/8 by Helena Benton, PT:  1. ? Pain: No more than 5/10 max pain noted in L knee with prolonged standing, ADLs - NOT MET, 7-8/10 at worst with prolonged recumbent biking  2. ? ROM: L knee ROM to +5 - 130deg to indicate symmetrical ROM and restored joint mobility post chronic pain from injury - Made progress, 0-125 with pain at end range  3. ? Strength: Pt to demo ability to complete 10x mini squat with even weightbearing demonstrated and no UE assist to indicate improving functional quad strength  4. ? Balance: Pt able to demo appropriate amplitude of stepping strategies in response to posterior perturbations with good balance noted. - Making progress, still decreased amplitude  5. Function:   a. Pt able to complete half kneel to stand transfer with unilat UE assist and CGA/min VCs  from therapist - NOT MET, unsafe at this time d/t LE/UE weakness  b. Pt able to ambulate 150 ft on level ground with AFOs donned while demonstrating more typical JASIEL vs narrow, improved consistency of stepping, and more appropriate trunk/arm swing movement vs excessive for improved balance to indicate overall improved safety and efficiency of gait. - MET, all improving but do demo worsening after ~800-900 ft  6. Independent with Home Exercise Programs - MET  7. Demonstrate Knowledge of fall prevention - MET, issued and discussed with pt on 12/8     LTG: (to be met in 16 treatments) - Assessed on 1/26 by Pham Mccall, PT:  1. 5/5 L knee extension strength to indicate improving joint stability and allow for improved control with walking, stair climbing - Partially met, 5/5 L, 5-/5 R  2. Able to ambulate 300 ft on level ground with AFOs donned while demonstrating consistently appropriate JASIEL, no more than 10% incidence inconsistent stepping or deviation from straight path to indicate improving gait mechanics and safety - MET though does need AD to achieve this and is now very fatigued after 300 ft which is new since COVID   3. Pt able to ascend/descend stairs reciprocally with unilat UE assist and demonstrating good speed/balance - NOT MET, lack of eccentric control thus instability with descent when not having BUE assist  4. Pt able to squat to  objects off of floor without difficulty - Making progress, able to complete but needs CGA for safety  5.  Pt able to complete full floor<>stand transfer without need for assist and safe technique.- NOT MET, unsafe at this time  6. At least 40% fxn reported per LEFI to indicate improvement in subjective LLE ability - MET, 54% fxn reported      New goals to be met at visit 24, created 1/26/22:    1. Pt to demo gait speed of at least .66m/s to indicate clinically significant difference in gait speed and progress towards reduced fall risk. 2. Pt to demo ability to complete half kneel to stand transfer with BUE assist and no therapist assist to improve ability to get up off of the floor. 3. 5/5 B knee strength with no more than 1-2x minor buckling noted with ambulation of 600 ft.   4. Pt able to demo at least 750ft ambulation consecutively without needing seated rest to indicate improving functional mobility tolerance and increased community access. 5. Pt to report at least 67% function per LEFI.                    Patient goals:  Areta Andreas able to do stuff like bending down, going up stairs\" - Making progress    Pt. Education:  [] Yes  [] No  [x] Reviewed Prior HEP/Ed  Method of Education: [x] Verbal  [] Demo: [] Written  Comprehension of Education:  [x] Verbalizes understanding. [x] Demonstrates understanding. [x] Needs review. [] Demonstrates/verbalizes HEP/Ed previously given. Access Code: [email or web address]  URL: [email or web address]  Date: 11/03/2021  Prepared by: Danie Romano    Exercises  Supine Ankle Inversion Eversion AROM - 1 x daily - 7 x weekly - 3 sets - 10 reps  Supine Ankle Pumps - 1 x daily - 7 x weekly - 3 sets - 10 reps  Sit<>stand with fwd reach - 3x10  Squats w/ chair support - 3x10       Plan: [x] Continue current frequency toward long and short term goals.     [x] Specific Instructions for subsequent treatments: progress standing quad/hip strength with some UE assisted tasks, gait training      Time In:  2:00 pm          Time Out: 300 pm    Electronically signed by:  Ana Paula Mota PTA

## 2022-03-02 NOTE — PROGRESS NOTES
[address] [facility] PODIATRY [facility]  [address] [facility] [address] [facility] [address]  Dept: [phone]  Dept Fax: [phone]    DIABETIC PROGRESS NOTE  Date of patient's visit: 3/2/2022  Patient's Name:  Robina Sainz YOB: 1975            Patient Care Team:  DANIEL Ervin CNP as PCP - General (Certified Nurse Practitioner)  DANIEL Ervin CNP as PCP - [facility]  Denise Mirza DPM as Physician (Podiatry)          Chief Complaint   Patient presents with    Diabetes    Peripheral Neuropathy    Nail Problem    Benign Neoplasm       Subjective:   Robina Sainz comes to clinic for Diabetes, Peripheral Neuropathy, Nail Problem, and Benign Neoplasm    he is a diabetic and states that he is doing well. Pt currently has complaint of thickened, elongated nails that they cannot manage by themselves. Pt's primary care physician is DANIEL Ervin CNP last seen 01/14/2022   Pt's last blood sugar was 153 this morning. Pt has a new complaint of open sore to bottom of his left foot. Pt wears danielle AFO braces. Lab Results   Component Value Date    LABA1C 7.5 (H) 07/19/2021      Complains of numbness in the feet bilat.   Past Medical History:   Diagnosis Date    Closed fracture of bone of right foot March - April 2020    Dialysis patient [facility])     Kidney disease     On Dialysis    Type 1 diabetes mellitus ([facility] [address]        No Known Allergies  Current Outpatient Medications on File Prior to Visit   Medication Sig Dispense Refill    dexamethasone (DECADRON) 2 MG tablet dexamethasone 2 mg tablet      gabapentin (NEURONTIN) 300 MG capsule gabapentin 300 mg capsule      FLUoxetine (PROZAC) 20 MG capsule Take 40 mg by mouth daily      promethazine (PHENERGAN) 25 MG tablet Take 1 tablet by mouth every 6 hours as needed for Nausea 15 tablet 0    ammonium lactate (LAC-HYDRIN) 12 % lotion Apply topically daily. (Patient taking differently: Apply topically daily to BLE) 1 each 3    traMADol (ULTRAM) 50 MG tablet Take 50 mg by mouth 2 times daily.  furosemide (LASIX) 20 MG tablet Take 1 tablet by mouth daily 60 tablet 5    insulin glargine (LANTUS) 100 UNIT/ML injection vial Inject 45 units in the morning and 5 units SQ at  night 5 vial 5    buPROPion (WELLBUTRIN XL) 150 MG extended release tablet Take 1 tablet by mouth every morning 15 tablet 1    metoprolol succinate (TOPROL XL) 200 MG extended release tablet Take 1 tablet by mouth daily 90 tablet 1    insulin lispro (HUMALOG) 100 UNIT/ML injection vial Inject into the skin 3 times daily (before meals) Per sliding scale, by 2s      lisinopril (PRINIVIL;ZESTRIL) 20 MG tablet Take 10 mg by mouth daily       Rosuvastatin Calcium 40 MG CPSP Take 40 mg by mouth daily      ezetimibe (ZETIA) 10 MG tablet Take 10 mg by mouth daily      omeprazole (PRILOSEC) 40 MG delayed release capsule Take 40 mg by mouth daily      amLODIPine (NORVASC) 10 MG tablet Take 10 mg by mouth daily      aspirin 81 MG EC tablet Take 81 mg by mouth daily      magnesium oxide (MAG-OX) 400 MG tablet Take 400 mg by mouth daily      calcium acetate 667 MG TABS Take 667 mg by mouth 3 times daily (with meals)       vitamin B-12 (CYANOCOBALAMIN) 500 MCG tablet Take 500 mcg by mouth daily      fluticasone (FLONASE) 50 MCG/ACT nasal spray 1 spray by Each Nostril route daily for 10 days 1 each 0     No current facility-administered medications on file prior to visit. Review of Systems    Review of Systems:   History obtained from chart review and the patient  General ROS: negative for - chills, fatigue, fever, night sweats or weight gain  Constitutional: Negative for chills, diaphoresis, fatigue, fever and unexpected weight change. Musculoskeletal: Positive for arthralgias, gait problem and joint swelling.   Neurological ROS: negative for - behavioral changes, confusion, headaches Right                                        Left    Dystrophic Changes   [x] [x] [x] [x] [x] [x] [x] [x] [x] [x]  5 4 3 2 1 1 2 3 4 5                         Right                                        Left    Color   [x] [x] [x] [x] [x] [x] [x] [x] [x] [x]  5 4 3 2 1 1 2 3 4 5                          Right                                        Left    Incurvation/Ingrowin   [] [] [] [] [] [] [] [] [] []  5 4 3 2 1 1 2 3 4 5                         Right                                        Left    Inflammation/Pain   [x] [x] [x] [x] [x] [x] [x] [x] [x] [x]  5 4 3 2 1 1 2 3 4 5                         Right                                        Left        Visual inspection:  Deformity: hammertoe deformity danielle feet  amputation: absent  Skin lesions: as above  Edema: right- 2+ pitting edema, left- 2+ pitting edema    Sensory exam:  Monofilament sensation: abnormal - 6/10 via SW 5.07/10g monofilament to the plantar foot bilateral feet    Pulses: abnormal - 1/4 dorsalis pedis pulse and 0/4 Posterior tibial pulse,   Pinprick: Impaired  Proprioception: Impaired  Vibration (128 Hz): Impaired       DM with PVD       [x]Yes    []No      Assessment:  51 y.o. male with:   Diagnosis Orders   1. Dermatophytosis of nail   DIABETES FOOT EXAM    07447 - IN DEBRIDEMENT OF NAILS, 6 OR MORE   2. Peripheral vascular disorder (HCC)   DIABETES FOOT EXAM    68224 - IN DEBRIDEMENT OF NAILS, 6 OR MORE   3. Other diabetic neurological complication associated with type 1 diabetes mellitus (HCC)   DIABETES FOOT EXAM    45841 - IN DEBRIDEMENT OF NAILS, 6 OR MORE   4. Pain in both lower extremities   DIABETES FOOT EXAM    74137 - IN DEBRIDEMENT OF NAILS, 6 OR MORE    IN DEBRIDEMENT, SKIN, SUB-Q TISSUE,=<20 SQ CM   5.  Ulcer of left foot, with fat layer exposed ([facility] [address]   DIABETES FOOT EXAM    IN DEBRIDEMENT, SKIN, SUB-Q TISSUE,=<20 SQ CM           Q7   []Yes    []No                Q8   [x]Yes    []No                     Q9 []Yes    []No    Plan:   Pt was evaluated and examined. Patient was given personalized discharge instructions. Sharp excisional debridement down thru and including subcutaneous tissue was done after topical EMLA cream was applied with a currett and tissue nippers. .  All necrotic tissue was removed. Hemostasis was achieved via direct pressure. Sterile dressings were placed on the patient. 0/10 post procedural pain. Keep ulcer clean and dry. Apply bandages once daily and offload. Nails 1-10 were debrided sharply in length and thickness with a nipper and , without incident. Pt will follow up in 9 weeks or sooner if any problems arise. Diagnosis was discussed with the pt and all of their questions were answered in detail. Proper foot hygiene and care was discussed with the pt. Informed patient on proper diabetic foot care and importance of tight glycemic control. Patient to check feet daily and contact the office with any questions/problems/concerns.    Other comorbidity noted and will be managed by PCP.  3/2/2022    Electronically signed by Melina Muro DPM on 3/2/2022 at 9:36 AM  3/2/2022

## 2022-03-03 ENCOUNTER — APPOINTMENT (OUTPATIENT)
Dept: PHYSICAL THERAPY | Facility: CLINIC | Age: 47
End: 2022-03-03
Payer: MEDICARE

## 2022-03-07 ENCOUNTER — HOSPITAL ENCOUNTER (OUTPATIENT)
Dept: PHYSICAL THERAPY | Facility: CLINIC | Age: 47
Setting detail: THERAPIES SERIES
Discharge: HOME | End: 2022-03-07
Payer: MEDICARE

## 2022-03-07 PROCEDURE — 97112 NEUROMUSCULAR REEDUCATION: CPT

## 2022-03-07 PROCEDURE — 97110 THERAPEUTIC EXERCISES: CPT

## 2022-03-07 NOTE — FLOWSHEET NOTE
[] [facility]) [facility] &  Therapy  [address]  [phone]  F: [phone] [x] [email or web address]  [facility] [address]   [address]  P: [phone]  F: [phone] [] [address] &  Therapy  [address]  P: [phone]  F: [phone] [] [address]  P: [phone]  F: [phone] [] [address]  [facility]   [address]   [place]: [phone]  F: [phone]      Physical Therapy Daily Treatment Note    Date:  3/7/2022  Patient Name:  Fabian Hare    :  1975  MRN: [id number]  Physician: Dr. Willy Reed: Medicare ([address] [place] [address])  Medical Diagnosis: Closed fracture of proximal end of left tibia, unspecified fracture morphology, sequela               Rehab Codes: M25.662, R53.1, R26.9, R27.9  Onset date: 21                 Next 's appt.: 21  Visit# / total visits: ; Progress note for Medicare patient due at visit 24  (signed script received for 22 appt)   Cancels/No Shows: 7/0    Subjective:    Pain:  [x] Yes  [] No Location: R knee   Pain Rating: (0-10 scale) 0/10  Pain altered Tx:  [x] No  [] Yes  Action:   Comments: Pt arrives stating his R knee will \"give out\" 1-2 times a week due to weakness. Pt states he has not experienced a fall from the knee \"giving out\".             Objective:  Modalities:   Precautions: Dialysis T/Thrs/Sat, gait belt   Exercises: Bolded completed 3/7/2022:  Exercise Reps/ Time Weight/ Level Comments   Nu-step 5' L3          Gait train with rollator 3 laps  750 ft best (norms for 58yo are ~1800 ft)  - break at ~400 ft, reached 700 ft this date   Gait train w/ head turns and up/down 3x300   CGA   Gait w/ step overs 4 min  Short hurdles (~1in) and taller hurdles (~4in) - with cane and CGA  See below 12/20-   Gait w/ step up 4 min  2\" curb step with 1 UE.    Gait with step up/off of foam 6x30 ft  Short side of foam x 2- using cane and CGAx1         Gait with out  ft  CGA   Gait w/ cog task with out  ft  Verbal fluency tasks all things with letter B   Retro gait w/ rollator 160 ft  CGA increased distance 3/2         Mat      Hamstring stretch      SLR  3# AA by PTA on RLE  A on LLE   Fwd reach crunch 3x10     Ball oblique crunch 3x10 ea     DKTC crunch 2x15     Table top toe taps 3x10     Bridge 15x  easy   Bridge w/ march 3x6     Sidelying hip abd   Attempted, too much hip flexion even with tactile cuing   Sidelying clams  blue    Prone quad stretch      Ankle DF AROM   First set supine, second set seated with more visual feedback   Bilateral ER 1x15 blue    Seated core twist 3x10 8#    Seated overhead press 3x15 5# ea Able to do 2x15,5 on 2/2/22    25x total 3/7    Armchair push ups 3x10  Only 2 sets on 2/2/22   Seated reclined chest press 3x10 5# ea    Seated core with press out 3x15 8# Added 12/29   HS curls  20xea  Blue  Added 1/20    LAQ B 20xea  2# Added 1/20         Standing      Reactive color taps with cones and pods 3x1 min ea 5 pods Added 2/28  2 time on level ground  1 time on foam   Narrow JASIEL 2x 30\" No UE assist   NBOS on foam 2x 30\" Added 12/27   Fwd/retro lean 15x ea     Fwd/retro perturbations 2 min ea     Retro stepping, no AD 2x10     Retro step w/ posterior perturbation 2x10  Expected/count down perturbations   Tandem balance  1x 1 min total Progressed 2/2/22   Wide JASIEL, head turns 2x10 ea  Added arm reach at same time 2/2/22   Wide JASIEL, head up/down 2x10 ea     B shoulder flexion and look up at ceiling 10 A Started 2/2/22   HS curls 10 ea A Hold with 1 UE; started 2/2/22   Heel raises      Marches on foam 10x2  Added 12/27; 1UE A   Foam step ups  10x 2 ea  Added 12/27; 1 UE A   Foam ambulation  2 min  1 UE A   SLS  x2 20 sec UE support   Step taps  4in UE support   Step ups  10x ea 4\"    Mini squats x15  BUE assist on rollator and min A from therapist to increase depth   Eccentric controlled sit 2x10  Challenging 3/7    Lunge 2x10     Sit to stands 2x15  W/ ball for forward to touch rollator lean external cue   Squats to step w/med ball 2x10 4#  13.5\" Min A to prevent posterior LOB   Squats on decline wedge 10x  Added 12/17   3 way hip bilaterally  20x ea orange Added 12/17   2\" step used for ext    No resistance 2/7      Half kneel to stand 2x  Max A x2 to return to stand on second rep d/t fatigue in arms, LLE  - HOLD until improved strength in BUEs   L LE TKE 20x3\" Blue  Added reps 12/29   Other:         Treatment Charges: Mins Units   []  Modalities     [x]  Ther Exercise 35 2   []  Manual Therapy     []  Ther Activities     []  Aquatics     []  Vasocompression     [x]  Other: Neuro 15 1   []  Other: Gait     Total Treatment time 50 3   Estimated medicare cost as of 3/7/2022: $782.17    Assessment: [] Progressing toward goals:     [x] No change. Initiated treatment with Nustep warm up followed seated core exercises. Pt presents with rapid muscular fatigue requiring intermittent seated rest breaks this date. Time spent practicing eccentric control from sit to stand with mod- max challenge. Pt is only able to complete 25 reps of overhead press due to UE fatigue. Pt also reports increase back pain with standing interventions. Pt is scheduled with primary PT next visit to assess LTG's.      [] Other:   [x] Patient would continue to benefit from skilled physical therapy services in order to: address B quad/hip strength (L>R), gait train, and balance training to improve safety and access in home/community and less reliance on caregivers.      STG/LTG  STG: (to be met in 8 treatments) - Assessed on 12/8 by Gilmer Pastrana, PT:  1. ? Pain: No more than 5/10 max pain noted in L knee with prolonged standing, ADLs - NOT MET, 7-8/10 at worst with prolonged recumbent biking  2. ? ROM: L knee ROM to +5 - 130deg to indicate symmetrical ROM and restored joint mobility post chronic pain from injury - Made progress, 0-125 with pain at end range  3. ? Strength: Pt to demo ability to complete 10x mini squat with even weightbearing demonstrated and no UE assist to indicate improving functional quad strength  4. ? Balance: Pt able to demo appropriate amplitude of stepping strategies in response to posterior perturbations with good balance noted. - Making progress, still decreased amplitude  5. Function:   a. Pt able to complete half kneel to stand transfer with unilat UE assist and CGA/min VCs  from therapist - NOT MET, unsafe at this time d/t LE/UE weakness  b. Pt able to ambulate 150 ft on level ground with AFOs donned while demonstrating more typical JASIEL vs narrow, improved consistency of stepping, and more appropriate trunk/arm swing movement vs excessive for improved balance to indicate overall improved safety and efficiency of gait. - MET, all improving but do demo worsening after ~800-900 ft  6. Independent with Home Exercise Programs - MET  7. Demonstrate Knowledge of fall prevention - MET, issued and discussed with pt on 12/8     LTG: (to be met in 16 treatments) - Assessed on 1/26 by Goyo Pena, PT:  1. 5/5 L knee extension strength to indicate improving joint stability and allow for improved control with walking, stair climbing - Partially met, 5/5 L, 5-/5 R  2. Able to ambulate 300 ft on level ground with AFOs donned while demonstrating consistently appropriate JASIEL, no more than 10% incidence inconsistent stepping or deviation from straight path to indicate improving gait mechanics and safety - MET though does need AD to achieve this and is now very fatigued after 300 ft which is new since COVID   3.  Pt able to ascend/descend stairs reciprocally with unilat UE assist and demonstrating good speed/balance - NOT MET, lack of eccentric control thus instability with descent when not having BUE assist  4. Pt able to squat to  objects off of floor without difficulty - Making progress, able to complete but needs CGA for safety  5. Pt able to complete full floor<>stand transfer without need for assist and safe technique.- NOT MET, unsafe at this time  6. At least 40% fxn reported per LEFI to indicate improvement in subjective LLE ability - MET, 54% fxn reported      New goals to be met at visit 24, created 1/26/22:    1. Pt to demo gait speed of at least .66m/s to indicate clinically significant difference in gait speed and progress towards reduced fall risk. 2. Pt to demo ability to complete half kneel to stand transfer with BUE assist and no therapist assist to improve ability to get up off of the floor. 3. 5/5 B knee strength with no more than 1-2x minor buckling noted with ambulation of 600 ft.   4. Pt able to demo at least 750ft ambulation consecutively without needing seated rest to indicate improving functional mobility tolerance and increased community access. 5. Pt to report at least 67% function per LEFI.                    Patient goals:  Estefanía Amaya able to do stuff like bending down, going up stairs\" - Making progress    Pt. Education:  [] Yes  [x] No  [] Reviewed Prior HEP/Ed  Method of Education: [] Verbal  [] Demo: [] Written  Comprehension of Education:  [] Verbalizes understanding. [] Demonstrates understanding. [] Needs review. [] Demonstrates/verbalizes HEP/Ed previously given. Access Code: [email or web address]  URL: [email or web address]  Date: 11/03/2021  Prepared by: Judyth Gitelman    Exercises  Supine Ankle Inversion Eversion AROM - 1 x daily - 7 x weekly - 3 sets - 10 reps  Supine Ankle Pumps - 1 x daily - 7 x weekly - 3 sets - 10 reps  Sit<>stand with fwd reach - 3x10  Squats w/ chair support - 3x10       Plan: [x] Continue current frequency toward long and short term goals.     [x] Specific Instructions for subsequent treatments: progress standing quad/hip strength with some UE assisted tasks, gait training      Time In:  2:00 pm          Time Out: 2:55 pm    Electronically signed by:  Cyril Cunha PTA

## 2022-03-09 ENCOUNTER — HOSPITAL ENCOUNTER (OUTPATIENT)
Dept: PHYSICAL THERAPY | Facility: CLINIC | Age: 47
Setting detail: THERAPIES SERIES
Discharge: HOME | End: 2022-03-09
Payer: MEDICARE

## 2022-03-09 PROCEDURE — 97110 THERAPEUTIC EXERCISES: CPT

## 2022-03-09 PROCEDURE — 97112 NEUROMUSCULAR REEDUCATION: CPT

## 2022-03-09 NOTE — DISCHARGE SUMMARY
Funcitonal mobility tolerance is dependent on knee pain and buckling, which continues to occur, but often requires seated rest before 500 ft. Pt is independent in current exercise program and good challenge is noted - appropriate for d/c at this time. Provided pt with thorough written HEP with current program updates, and pt demos understanding and good form. Recommend f/u with ortho d/t continued R knee buckling. [x]? Discharge         STG: (to be met in 8 treatments) - Assessed on 12/8 by Rico Lazo, PT:  1. ? Pain: No more than 5/10 max pain noted in L knee with prolonged standing, ADLs - NOT MET, 7-8/10 at worst with prolonged recumbent biking  2. ? ROM: L knee ROM to +5 - 130deg to indicate symmetrical ROM and restored joint mobility post chronic pain from injury - Made progress, 0-125 with pain at end range  3. ? Strength: Pt to demo ability to complete 10x mini squat with even weightbearing demonstrated and no UE assist to indicate improving functional quad strength  4. ? Balance: Pt able to demo appropriate amplitude of stepping strategies in response to posterior perturbations with good balance noted. - Making progress, still decreased amplitude  5. Function:   a. Pt able to complete half kneel to stand transfer with unilat UE assist and CGA/min VCs  from therapist - NOT MET, unsafe at this time d/t LE/UE weakness  b. Pt able to ambulate 150 ft on level ground with AFOs donned while demonstrating more typical JASIEL vs narrow, improved consistency of stepping, and more appropriate trunk/arm swing movement vs excessive for improved balance to indicate overall improved safety and efficiency of gait.  - MET, all improving but do demo worsening after ~800-900 ft  6. Independent with Home Exercise Programs - MET  7.  Demonstrate Knowledge of fall prevention - MET, issued and discussed with pt on 12/8     LTG: (to be met in 16 treatments) - Assessed on 1/26 by Rico Lazo, PT:  1. 5/5 L knee extension strength to indicate improving joint stability and allow for improved control with walking, stair climbing - Partially met, 5/5 L, 5-/5 R  2. Able to ambulate 300 ft on level ground with AFOs donned while demonstrating consistently appropriate JASIEL, no more than 10% incidence inconsistent stepping or deviation from straight path to indicate improving gait mechanics and safety - MET though does need AD to achieve this and is now very fatigued after 300 ft which is new since COVID   3. Pt able to ascend/descend stairs reciprocally with unilat UE assist and demonstrating good speed/balance - NOT MET, lack of eccentric control thus instability with descent when not having BUE assist  4. Pt able to squat to  objects off of floor without difficulty - Making progress, able to complete but needs CGA for safety  5. Pt able to complete full floor<>stand transfer without need for assist and safe technique.- NOT MET, unsafe at this time  6. At least 40% fxn reported per LEFI to indicate improvement in subjective LLE ability - MET, 54% fxn reported        New goals to be met at visit 24, created 1/26/22: - Assessed on 3/9/22 by Pham Mccall, PT                  1. Pt to demo gait speed of at least .66m/s to indicate clinically significant difference in gait speed and progress towards reduced fall risk - MET. .81 m/s rollator, .92m/s rollator fast; .79 m/s no AD, . 85 m/s fast no AD       2. Pt to demo ability to complete half kneel to stand transfer with BUE assist and no therapist assist to improve ability to get up off of the floor. - HELD d/t significant quad weakness making transfer practice unsafe        3. 5/5 B knee strength with no more than 1-2x minor buckling noted with ambulation of 600 ft. - Making progress, 5-/5 bilat knee ext, 5/5 knee flexion       4.  Pt able to demo at least 750ft ambulation consecutively without needing seated rest to indicate improving functional mobility tolerance and increased community access. - NOT MET, needs seated rest after ~450 ft       5. Pt to report at least 67% function per LEFI. - NOT MET, 56%                     Patient goals:  \"be able to do stuff like bending down, going up stairs\" - Making progress       Treatment to Date:  [x] Therapeutic Exercise    [] Modalities:  [x] Therapeutic Activity    [] Ultrasound  [] Electrical Stimulation  [x] Gait Training     [] Massage       [] Lumbar/Cervical Traction  [x] Neuromuscular Re-education [] Cold/hotpack [] Iontophoresis: 4 mg/mL  [x] Instruction in Home Exercise Program                     Dexamethasone Sodium  [x] Manual Therapy             Phosphate 40-80 mAmin  [] Aquatic Therapy                   [] Vasocompression/    [] Other:             Game Ready    Discharge Status:     [] Pt recovered from conditions. Treatment goals were met. [x] Pt received maximum benefit. No further therapy indicated at this time. [x] Pt to continue exercise/home instructions independently. [] Therapy interrupted due to:    [] Pt has 2 or more no shows/cancels, is discontinued per our policy. [x] Pt has completed prescribed number of treatment sessions. [] Other:         Electronically signed by Karmen Perez PT on 3/9/2022 at 4:06 PM      If you have any questions or concerns, please don't hesitate to call.   Thank you for your referral.

## 2022-03-09 NOTE — FLOWSHEET NOTE
[] [address] - [address] TWELVE-STEP [facility] &  Therapy  [address]  [phone]  F: [phone] [x] [address]  [facility] [address]   [address]  P: [phone]  F: [phone] [] [address] &  Therapy  [address]  P: [phone]  F: [phone] [] [address]  P: [phone]  F: [phone] [] [address]  [facility]   [address]   [place]: [phone]  F: [phone]      Physical Therapy Daily Treatment Note    Date:  3/9/2022  Patient Name:  Sultana Mace    :  1975  MRN: [id number]  Physician: Dr. Brown Plane: Medicare ([address] [place] [address])  Medical Diagnosis: Closed fracture of proximal end of left tibia, unspecified fracture morphology, sequela               Rehab Codes: M25.662, R53.1, R26.9, R27.9  Onset date: 21                 Next Dr's appt.: 21  Visit# / total visits: ; Progress note for Medicare patient due at visit 24  (signed script received for 22 appt)   Cancels/No Shows: 7/0    Subjective:    Pain:  [x] Yes  [] No Location: R knee   Pain Rating: (0-10 scale) 0/10  Pain altered Tx:  [x] No  [] Yes  Action:   Comments: Pt arrives noting R knee continues to give out, almost seemingly moreso per pt. Objective:  Modalities:   Precautions: Dialysis T/Thrs/Sat, gait belt   Exercises: Bolded completed 3/9/2022:  HELD MOST TREATMENT D/T LTG ASSESS, HEP REVIEW:  Exercise Reps/ Time Weight/ Level Comments   Nu-step 5' L3          Gait train with rollator 2x3 laps  750 ft best (norms for 60yo are ~1800 ft)  - break at ~400 ft, reached 700 ft this date   Gait train w/ head turns and up/down 3x300   CGA   Gait w/ step overs 4 min  Short hurdles (~1in) and taller hurdles (~4in) - with cane and Mini squats 2x15  BUE assist on rollator and min A from therapist to increase depth   Eccentric controlled sit 2x10  Challenging 3/7    Lunge 2x10     Sit to stands 2x15  W/ ball for forward to touch rollator lean external cue   Squats to step w/med ball 2x10 4#  13.5\" Min A to prevent posterior LOB   Squats on decline wedge 10x  Added 12/17   3 way hip bilaterally  20x ea orange Added 12/17   2\" step used for ext    No resistance 2/7      Half kneel to stand 2x  Max A x2 to return to stand on second rep d/t fatigue in arms, LLE  - HOLD until improved strength in BUEs   L LE TKE 20x3\" Blue  Added reps 12/29   Other: Time spent in LTG assessment, HEP review with updates and pt teach-back demo, education re: continued activity levels to maintain gains made in therapy - billed with therex        Treatment Charges: Mins Units   []  Modalities     [x]  Ther Exercise 38 2   []  Manual Therapy     []  Ther Activities     []  Aquatics     []  Vasocompression     [x]  Other: Neuro 12 1   []  Other: Gait     Total Treatment time 50 3   Estimated medicare cost as of 3/9/2022: $782.17    Assessment: [] Progressing toward goals:     []      [x] Other: Pt has made fair progress with LTGs including improved gait speed, though continued poor anticipatory/reactive balance deficits have persisted and unable to improve with therapy. Funcitonal mobility tolerance is dependent on knee pain and buckling, which continues to occur, but often requires seated rest before 500 ft. Pt is independent in current exercise program and good challenge is noted - appropriate for d/c at this time. Provided pt with thorough written HEP with current program updates, and pt demos understanding and good form. Recommend f/u with ortho d/t continued R knee buckling.   [x] Discharge    STG/LTG  STG: (to be met in 8 treatments) - Assessed on 12/8 by Shanti Mortensen, PT:  1. ? Pain: No more than 5/10 max pain noted in L knee with prolonged standing, ADLs - NOT MET, 7-8/10 at worst with prolonged recumbent biking  2. ? ROM: L knee ROM to +5 - 130deg to indicate symmetrical ROM and restored joint mobility post chronic pain from injury - Made progress, 0-125 with pain at end range  3. ? Strength: Pt to demo ability to complete 10x mini squat with even weightbearing demonstrated and no UE assist to indicate improving functional quad strength  4. ? Balance: Pt able to demo appropriate amplitude of stepping strategies in response to posterior perturbations with good balance noted. - Making progress, still decreased amplitude  5. Function:   a. Pt able to complete half kneel to stand transfer with unilat UE assist and CGA/min VCs  from therapist - NOT MET, unsafe at this time d/t LE/UE weakness  b. Pt able to ambulate 150 ft on level ground with AFOs donned while demonstrating more typical JASIEL vs narrow, improved consistency of stepping, and more appropriate trunk/arm swing movement vs excessive for improved balance to indicate overall improved safety and efficiency of gait. - MET, all improving but do demo worsening after ~800-900 ft  6. Independent with Home Exercise Programs - MET  7. Demonstrate Knowledge of fall prevention - MET, issued and discussed with pt on 12/8     LTG: (to be met in 16 treatments) - Assessed on 1/26 by Sulma Holloway, PT:  1. 5/5 L knee extension strength to indicate improving joint stability and allow for improved control with walking, stair climbing - Partially met, 5/5 L, 5-/5 R  2. Able to ambulate 300 ft on level ground with AFOs donned while demonstrating consistently appropriate JASIEL, no more than 10% incidence inconsistent stepping or deviation from straight path to indicate improving gait mechanics and safety - MET though does need AD to achieve this and is now very fatigued after 300 ft which is new since COVID   3.  Pt able to ascend/descend stairs reciprocally with unilat UE assist and demonstrating good speed/balance - NOT MET, lack of eccentric control thus instability with descent when not having BUE assist  4. Pt able to squat to  objects off of floor without difficulty - Making progress, able to complete but needs CGA for safety  5. Pt able to complete full floor<>stand transfer without need for assist and safe technique.- NOT MET, unsafe at this time  6. At least 40% fxn reported per LEFI to indicate improvement in subjective LLE ability - MET, 54% fxn reported      New goals to be met at visit 24, created 1/26/22: - Assessed on 3/9/22 by Devin Mayfield PT    1. Pt to demo gait speed of at least .66m/s to indicate clinically significant difference in gait speed and progress towards reduced fall risk - MET. .81 m/s rollator, .92m/s rollator fast; .79 m/s no AD, . 85 m/s fast no AD   2. Pt to demo ability to complete half kneel to stand transfer with BUE assist and no therapist assist to improve ability to get up off of the floor. - HELD d/t significant quad weakness making transfer practice unsafe    3. 5/5 B knee strength with no more than 1-2x minor buckling noted with ambulation of 600 ft. - Making progress, 5-/5 bilat knee ext, 5/5 knee flexion   4. Pt able to demo at least 750ft ambulation consecutively without needing seated rest to indicate improving functional mobility tolerance and increased community access. - NOT MET, needs seated rest after ~450 ft   5. Pt to report at least 67% function per LEFI. - NOT MET, 56%                     Patient goals:  \"be able to do stuff like bending down, going up stairs\" - Making progress    Pt. Education:  [x] Yes  [] No  [x] Reviewed Prior HEP/Ed  Method of Education: [x] Verbal  [x] Demo: [x] Written  Comprehension of Education:  [x] Verbalizes understanding. [x] Demonstrates understanding. [] Needs review. [] Demonstrates/verbalizes HEP/Ed previously given. Access Code: [email or web address]  URL: [email or web address]  Date: 03/09/2022  Prepared by: Liv Rodriguez Bonamigo    Exercises  Mini Squat - 1 x daily - 7 x weekly - 3 sets - 15 reps  Single Leg Stance with Support - 1 x daily - 7 x weekly - 5 reps - 30 sec hold  Standing Tandem Balance with Counter Support - 1 x daily - 7 x weekly - 5 reps - 30 sec hold  Forward Backward Weight Shift with Counter Support - 1 x daily - 7 x weekly - 3 sets - 10 reps  Shoulder Overhead Press in Abduction with Dumbbells - 1 x daily - 3-4 x weekly - 3 sets - 10-15 reps  Shoulder External Rotation and Scapular Retraction with Resistance - 1 x daily - 7 x weekly - 3 sets - 10-20 reps  Seated Chair Push Ups - 1 x daily - 4-5 x weekly - 3 sets - 10 reps         Plan: [x] Discharge      Time In:  3:00 pm          Time Out: 3:58 pm    Electronically signed by:  Seth Calvo PT

## 2022-03-10 ENCOUNTER — APPOINTMENT (OUTPATIENT)
Dept: PHYSICAL THERAPY | Facility: CLINIC | Age: 47
End: 2022-03-10
Payer: MEDICARE

## 2022-04-06 ENCOUNTER — OFFICE VISIT (OUTPATIENT)
Dept: PODIATRY | Age: 47
End: 2022-04-06
Payer: MEDICARE

## 2022-04-06 VITALS — HEIGHT: 70 IN | BODY MASS INDEX: 25.48 KG/M2 | RESPIRATION RATE: 16 BRPM | WEIGHT: 178 LBS

## 2022-04-06 DIAGNOSIS — M79.605 PAIN IN BOTH LOWER EXTREMITIES: ICD-10-CM

## 2022-04-06 DIAGNOSIS — M79.604 PAIN IN BOTH LOWER EXTREMITIES: ICD-10-CM

## 2022-04-06 DIAGNOSIS — D23.72 BENIGN NEOPLASM OF SKIN OF LOWER LIMB, INCLUDING HIP, LEFT: Primary | ICD-10-CM

## 2022-04-06 PROCEDURE — 17110 DESTRUCTION B9 LES UP TO 14: CPT | Performed by: PODIATRIST

## 2022-04-06 RX ORDER — POLYETHYLENE GLYCOL 3350, SODIUM SULFATE ANHYDROUS, SODIUM BICARBONATE, SODIUM CHLORIDE, POTASSIUM CHLORIDE 236; 22.74; 6.74; 5.86; 2.97 G/4L; G/4L; G/4L; G/4L; G/4L
POWDER, FOR SOLUTION ORAL
Status: ON HOLD | COMMUNITY
Start: 2022-03-10 | End: 2022-04-13

## 2022-04-06 NOTE — PROGRESS NOTES
[address] [facility] PODIATRY [facility]  [address] [address] [address]  Dept: [phone]  Dept Fax: [phone]     FOOT PAIN & BENIGN NEOPLASM PROGRESS NOTE  Date of patient's visit: 4/6/2022  Patient's Name:  Ranjith Kessler YOB: 1975            Patient Care Team:  DANIEL Mcbride CNP as PCP - General (Certified Nurse Practitioner)  DANIEL Mcbride CNP as PCP - [facility] Empaneled Provider  Ирина Savage DPM as Physician (Podiatry)          Chief Complaint   Patient presents with    Benign Neoplasm    Foot Swelling    Foot Pain       Subjective: This Ranjith Kessler comes to clinic for foot and nail care. Pt's primary care physician is DANIEL Mcbride CNPlasmichelle seen 01/14/2022. Past Medical History:   Diagnosis Date    Closed fracture of bone of right foot March - April 2020    Dialysis patient [facility])     Kidney disease     On Dialysis    Type 1 diabetes mellitus ([facility] [address]      Pt has a new complaint of increased painful skin lesion to left foot. Pt has tried changing shoes but it has not helped the pain      No Known Allergies  Current Outpatient Medications on File Prior to Visit   Medication Sig Dispense Refill    PEG 3350-KCl-NaBcb-NaCl-NaSulf (PEG-3350/ELECTROLYTES) 236 g SOLR       dexamethasone (DECADRON) 2 MG tablet dexamethasone 2 mg tablet      gabapentin (NEURONTIN) 300 MG capsule gabapentin 300 mg capsule      FLUoxetine (PROZAC) 20 MG capsule Take 40 mg by mouth daily      promethazine (PHENERGAN) 25 MG tablet Take 1 tablet by mouth every 6 hours as needed for Nausea 15 tablet 0    ammonium lactate (LAC-HYDRIN) 12 % lotion Apply topically daily. (Patient taking differently: Apply topically daily to BLE) 1 each 3    traMADol (ULTRAM) 50 MG tablet Take 50 mg by mouth 2 times daily.        furosemide (LASIX) 20 MG tablet Take 1 tablet by mouth daily 60 tablet 5    insulin glargine (LANTUS) 100 UNIT/ML injection vial Inject 45 units in the morning and 5 units SQ at  night 5 vial 5    buPROPion (WELLBUTRIN XL) 150 MG extended release tablet Take 1 tablet by mouth every morning 15 tablet 1    metoprolol succinate (TOPROL XL) 200 MG extended release tablet Take 1 tablet by mouth daily 90 tablet 1    insulin lispro (HUMALOG) 100 UNIT/ML injection vial Inject into the skin 3 times daily (before meals) Per sliding scale, by 2s      lisinopril (PRINIVIL;ZESTRIL) 20 MG tablet Take 10 mg by mouth daily       Rosuvastatin Calcium 40 MG CPSP Take 40 mg by mouth daily      ezetimibe (ZETIA) 10 MG tablet Take 10 mg by mouth daily      omeprazole (PRILOSEC) 40 MG delayed release capsule Take 40 mg by mouth daily      amLODIPine (NORVASC) 10 MG tablet Take 10 mg by mouth daily      aspirin 81 MG EC tablet Take 81 mg by mouth daily      magnesium oxide (MAG-OX) 400 MG tablet Take 400 mg by mouth daily      calcium acetate 667 MG TABS Take 667 mg by mouth 3 times daily (with meals)       vitamin B-12 (CYANOCOBALAMIN) 500 MCG tablet Take 500 mcg by mouth daily      fluticasone (FLONASE) 50 MCG/ACT nasal spray 1 spray by Each Nostril route daily for 10 days 1 each 0     No current facility-administered medications on file prior to visit. Review of Systems    Review of Systems:   History obtained from chart review and the patient  General ROS: negative for - chills, fatigue, fever, night sweats or weight gain  Constitutional: Negative for chills, diaphoresis, fatigue, fever and unexpected weight change. Musculoskeletal: Positive for arthralgias, gait problem and joint swelling. Neurological ROS: negative for - behavioral changes, confusion, headaches or seizures. Negative for weakness and numbness. Dermatological ROS: negative for - mole changes, rash  Cardiovascular: Negative for leg swelling. Gastrointestinal: Negative for constipation, diarrhea, nausea and vomiting. Objective:  Dermatologic Exam:  Soft tissue lesion to the plantar left foot with central core and petechiae. Pain on palpation of lesion. Skin No rashes or nodules noted. .   Skin is thin, with flaky sloughing skin as well as decreased hair growth to the lower leg  Small red hemosiderin deposits seen dorsal foot   Musculoskeletal:     1st MPJ ROM decreased, Bilateral.  Muscle strength 5/5, Bilateral.  Pain present upon palpation of toenails 1-5, Bilateral. decreased medial longitudinal arch, Bilateral.  Ankle ROM decreased,Bilateral.    Dorsally contracted digits present digits 2, Bilateral.     Vascular: DP pulses 1/4 bilateral.  PT pulses 0/4 bilateral.   CFT <5 seconds, Bilateral.  Hair growth absent to the level of the digits, Bilateral.  Edema present, Bilateral.  Varicosities absent, Bilateral. Erythema absent, Bilateral    Neurological: Sensation diminshed to light touch to level of digits, Bilateral.  Protective sensation intact 6/10 sites via 5.07/10g [place]-Michael Monofilament, Bilateral.  negative Tinel's, Bilateral.  negative Valleix sign, Bilateral.      Integument: Warm, dry, supple, Bilateral.  Open lesion absent, Bilateral.  Interdigital maceration absent to web spaces 4, Bilateral.  Nails 1-5 left and 1-5 right thickened > 3.0 mm, dystrophic and crumbly, discolored with subungual debris. Fissures absent, Bilateral.   General: AAO x 3 in NAD.     Derm  Toenail Description  Sites of Onychomycosis Involvement (Check affected area)  [x] [x] [x] [x] [x] [x] [x] [x] [x] [x]  5 4 3 2 1 1 2 3 4 5                          Right                                        Left    Thickness  [x] [x] [x] [x] [x] [x] [x] [x] [x] [x]  5 4 3 2 1 1 2 3 4 5                         Right                                        Left    Dystrophic Changes   [x] [x] [x] [x] [x] [x] [x] [x] [x] [x]  5 4 3 2 1 1 2 3 4 5                         Right Left    Color  [x] [x] [x] [x] [x] [x] [x] [x] [x] [x]  5 4 3 2 1 1 2 3 4 5                          Right                                        Left    Incurvation/Ingrowin   [] [] [] [] [] [] [] [] [] []  5 4 3 2 1 1 2 3 4 5                         Right                                        Left    Inflammation/Pain   [x] [x] [x] [x] [x] [x] [x] [x] [x] [x]  5 4 3  2 1 1 2 3 4 5                         Right                                        Left       Nails are painful 1-10 with direct palpation. Q7   []Yes  []No                Q8   [x]Yes  []No                     Q9   []Yes    []No    Assessment:  51 y.o. male with:    Diagnosis Orders   1. Benign neoplasm of skin of lower limb, including hip, left  65368 - NC DESTRUCTION BENIGN LESIONS UP TO 14   2. Pain in both lower extremities  54181 - NC DESTRUCTION BENIGN LESIONS UP TO 14         Plan:   Pt was evaluated and examined. Patient was given personalized discharge instructions. Diagnosis was discussed with the pt and all of their questions were answered in detail. Proper foot hygiene and care was discussed with the pt. Patient to check feet daily and contact the office with any questions/problems/concerns. Other comorbidity noted and will be managed by PCP. Pain waiver discussed with patient and confirmed. 4/6/2022    The lesion was partially debrided and silver nitrate was applied with an apperature pad under occlusion. The patient will leave in place for 24-48 hours and than remove. The patient tolerated the procedure well and without complication.    Pt will follow up in 9 weeks         Electronically signed by Magdaleno Lopez DPM on 4/6/2022 at 9:43 AM  4/6/2022

## 2022-04-12 ENCOUNTER — HOSPITAL ENCOUNTER (INPATIENT)
Age: 47
LOS: 2 days | Discharge: NURSING, REHAB OR LONG-TERM CARE | DRG: 064 | End: 2022-04-14
Attending: EMERGENCY MEDICINE | Admitting: FAMILY MEDICINE
Payer: MEDICARE

## 2022-04-12 ENCOUNTER — APPOINTMENT (OUTPATIENT)
Dept: CT IMAGING | Age: 47
DRG: 064 | End: 2022-04-12
Payer: MEDICARE

## 2022-04-12 ENCOUNTER — APPOINTMENT (OUTPATIENT)
Dept: MRI IMAGING | Age: 47
DRG: 064 | End: 2022-04-12
Payer: MEDICARE

## 2022-04-12 ENCOUNTER — NURSE TRIAGE (OUTPATIENT)
Dept: OTHER | Facility: CLINIC | Age: 47
End: 2022-04-12

## 2022-04-12 DIAGNOSIS — I63.9 CEREBROVASCULAR ACCIDENT (CVA), UNSPECIFIED MECHANISM (HCC): Primary | ICD-10-CM

## 2022-04-12 DIAGNOSIS — R53.1 RIGHT SIDED WEAKNESS: ICD-10-CM

## 2022-04-12 PROBLEM — E11.29 TYPE 2 DIABETES MELLITUS WITH KIDNEY COMPLICATION, WITH LONG-TERM CURRENT USE OF INSULIN (HCC): Status: ACTIVE | Noted: 2022-04-12

## 2022-04-12 PROBLEM — K21.9 GERD (GASTROESOPHAGEAL REFLUX DISEASE): Status: ACTIVE | Noted: 2022-04-12

## 2022-04-12 PROBLEM — G57.93 NEUROPATHY OF BOTH FEET: Status: ACTIVE | Noted: 2022-04-12

## 2022-04-12 PROBLEM — Z79.4 TYPE 2 DIABETES MELLITUS WITH KIDNEY COMPLICATION, WITH LONG-TERM CURRENT USE OF INSULIN (HCC): Status: ACTIVE | Noted: 2022-04-12

## 2022-04-12 LAB
ABSOLUTE EOS #: 0.43 K/UL (ref 0–0.44)
ABSOLUTE IMMATURE GRANULOCYTE: 0.03 K/UL (ref 0–0.3)
ABSOLUTE LYMPH #: 1.07 K/UL (ref 1.1–3.7)
ABSOLUTE MONO #: 0.89 K/UL (ref 0.1–1.2)
ANION GAP SERPL CALCULATED.3IONS-SCNC: 14 MMOL/L (ref 9–17)
BASOPHILS # BLD: 1 % (ref 0–2)
BASOPHILS ABSOLUTE: 0.06 K/UL (ref 0–0.2)
BUN BLDV-MCNC: 19 MG/DL (ref 6–20)
BUN/CREAT BLD: 4 (ref 9–20)
CALCIUM SERPL-MCNC: 9.4 MG/DL (ref 8.6–10.4)
CHLORIDE BLD-SCNC: 102 MMOL/L (ref 98–107)
CHOLESTEROL/HDL RATIO: 1.9
CHOLESTEROL: 118 MG/DL
CHP ED QC CHECK: YES
CO2: 24 MMOL/L (ref 20–31)
CREAT SERPL-MCNC: 4.25 MG/DL (ref 0.7–1.2)
EOSINOPHILS RELATIVE PERCENT: 5 % (ref 1–4)
GFR AFRICAN AMERICAN: 18 ML/MIN
GFR NON-AFRICAN AMERICAN: 15 ML/MIN
GFR SERPL CREATININE-BSD FRML MDRD: ABNORMAL ML/MIN/{1.73_M2}
GLUCOSE BLD-MCNC: 136 MG/DL (ref 70–99)
GLUCOSE BLD-MCNC: 61 MG/DL
GLUCOSE BLD-MCNC: 61 MG/DL (ref 75–110)
HCT VFR BLD CALC: 36.8 % (ref 40.7–50.3)
HDLC SERPL-MCNC: 61 MG/DL
HEMOGLOBIN: 12.1 G/DL (ref 13–17)
IMMATURE GRANULOCYTES: 0 %
INR BLD: 0.9
LDL CHOLESTEROL: 44 MG/DL (ref 0–130)
LYMPHOCYTES # BLD: 12 % (ref 24–43)
MAGNESIUM: 2.1 MG/DL (ref 1.6–2.6)
MCH RBC QN AUTO: 30.6 PG (ref 25.2–33.5)
MCHC RBC AUTO-ENTMCNC: 32.9 G/DL (ref 28.4–34.8)
MCV RBC AUTO: 92.9 FL (ref 82.6–102.9)
MONOCYTES # BLD: 10 % (ref 3–12)
NRBC AUTOMATED: 0 PER 100 WBC
PARTIAL THROMBOPLASTIN TIME: 31.7 SEC (ref 23.9–33.8)
PDW BLD-RTO: 12.6 % (ref 11.8–14.4)
PHOSPHORUS: 2.9 MG/DL (ref 2.5–4.5)
PLATELET # BLD: 231 K/UL (ref 138–453)
PMV BLD AUTO: 9.9 FL (ref 8.1–13.5)
POTASSIUM SERPL-SCNC: 3.5 MMOL/L (ref 3.7–5.3)
PROTHROMBIN TIME: 12.6 SEC (ref 11.5–14.2)
RBC # BLD: 3.96 M/UL (ref 4.21–5.77)
SEG NEUTROPHILS: 72 % (ref 36–65)
SEGMENTED NEUTROPHILS ABSOLUTE COUNT: 6.44 K/UL (ref 1.5–8.1)
SODIUM BLD-SCNC: 140 MMOL/L (ref 135–144)
TRIGL SERPL-MCNC: 66 MG/DL
WBC # BLD: 8.9 K/UL (ref 3.5–11.3)

## 2022-04-12 PROCEDURE — 85025 COMPLETE CBC W/AUTO DIFF WBC: CPT

## 2022-04-12 PROCEDURE — 83735 ASSAY OF MAGNESIUM: CPT

## 2022-04-12 PROCEDURE — 6370000000 HC RX 637 (ALT 250 FOR IP): Performed by: PSYCHIATRY & NEUROLOGY

## 2022-04-12 PROCEDURE — 84100 ASSAY OF PHOSPHORUS: CPT

## 2022-04-12 PROCEDURE — 70544 MR ANGIOGRAPHY HEAD W/O DYE: CPT

## 2022-04-12 PROCEDURE — 82947 ASSAY GLUCOSE BLOOD QUANT: CPT

## 2022-04-12 PROCEDURE — 80048 BASIC METABOLIC PNL TOTAL CA: CPT

## 2022-04-12 PROCEDURE — APPSS60 APP SPLIT SHARED TIME 46-60 MINUTES: Performed by: NURSE PRACTITIONER

## 2022-04-12 PROCEDURE — 99223 1ST HOSP IP/OBS HIGH 75: CPT | Performed by: NURSE PRACTITIONER

## 2022-04-12 PROCEDURE — 2580000003 HC RX 258: Performed by: NURSE PRACTITIONER

## 2022-04-12 PROCEDURE — 85610 PROTHROMBIN TIME: CPT

## 2022-04-12 PROCEDURE — 80061 LIPID PANEL: CPT

## 2022-04-12 PROCEDURE — 83036 HEMOGLOBIN GLYCOSYLATED A1C: CPT

## 2022-04-12 PROCEDURE — 85730 THROMBOPLASTIN TIME PARTIAL: CPT

## 2022-04-12 PROCEDURE — 70547 MR ANGIOGRAPHY NECK W/O DYE: CPT

## 2022-04-12 PROCEDURE — 93005 ELECTROCARDIOGRAM TRACING: CPT | Performed by: EMERGENCY MEDICINE

## 2022-04-12 PROCEDURE — 70450 CT HEAD/BRAIN W/O DYE: CPT

## 2022-04-12 PROCEDURE — 6370000000 HC RX 637 (ALT 250 FOR IP): Performed by: EMERGENCY MEDICINE

## 2022-04-12 PROCEDURE — 99285 EMERGENCY DEPT VISIT HI MDM: CPT

## 2022-04-12 PROCEDURE — APPSS180 APP SPLIT SHARED TIME > 60 MINUTES: Performed by: FAMILY MEDICINE

## 2022-04-12 PROCEDURE — 70551 MRI BRAIN STEM W/O DYE: CPT

## 2022-04-12 PROCEDURE — 1200000000 HC SEMI PRIVATE

## 2022-04-12 PROCEDURE — 6370000000 HC RX 637 (ALT 250 FOR IP): Performed by: NURSE PRACTITIONER

## 2022-04-12 PROCEDURE — G0426 INPT/ED TELECONSULT50: HCPCS | Performed by: PSYCHIATRY & NEUROLOGY

## 2022-04-12 PROCEDURE — 6360000002 HC RX W HCPCS: Performed by: NURSE PRACTITIONER

## 2022-04-12 RX ORDER — ONDANSETRON 2 MG/ML
4 INJECTION INTRAMUSCULAR; INTRAVENOUS EVERY 6 HOURS PRN
Status: DISCONTINUED | OUTPATIENT
Start: 2022-04-12 | End: 2022-04-14 | Stop reason: HOSPADM

## 2022-04-12 RX ORDER — ASPIRIN 300 MG/1
300 SUPPOSITORY RECTAL DAILY
Status: DISCONTINUED | OUTPATIENT
Start: 2022-04-13 | End: 2022-04-13

## 2022-04-12 RX ORDER — ONDANSETRON 4 MG/1
4 TABLET, ORALLY DISINTEGRATING ORAL EVERY 8 HOURS PRN
Status: DISCONTINUED | OUTPATIENT
Start: 2022-04-12 | End: 2022-04-14 | Stop reason: HOSPADM

## 2022-04-12 RX ORDER — SODIUM CHLORIDE 9 MG/ML
INJECTION, SOLUTION INTRAVENOUS PRN
Status: DISCONTINUED | OUTPATIENT
Start: 2022-04-12 | End: 2022-04-14 | Stop reason: HOSPADM

## 2022-04-12 RX ORDER — FLUOXETINE HYDROCHLORIDE 20 MG/1
40 CAPSULE ORAL DAILY
Status: DISCONTINUED | OUTPATIENT
Start: 2022-04-13 | End: 2022-04-14 | Stop reason: HOSPADM

## 2022-04-12 RX ORDER — LISINOPRIL 10 MG/1
10 TABLET ORAL DAILY
Status: DISCONTINUED | OUTPATIENT
Start: 2022-04-12 | End: 2022-04-14

## 2022-04-12 RX ORDER — CALCIUM ACETATE 667 MG/1
667 CAPSULE ORAL
Status: DISCONTINUED | OUTPATIENT
Start: 2022-04-13 | End: 2022-04-14 | Stop reason: HOSPADM

## 2022-04-12 RX ORDER — ASPIRIN 81 MG/1
81 TABLET ORAL DAILY
Status: DISCONTINUED | OUTPATIENT
Start: 2022-04-13 | End: 2022-04-14 | Stop reason: HOSPADM

## 2022-04-12 RX ORDER — CLOPIDOGREL BISULFATE 75 MG/1
75 TABLET ORAL DAILY
Status: CANCELLED | OUTPATIENT
Start: 2022-04-13

## 2022-04-12 RX ORDER — CLOPIDOGREL BISULFATE 75 MG/1
300 TABLET ORAL ONCE
Status: COMPLETED | OUTPATIENT
Start: 2022-04-12 | End: 2022-04-12

## 2022-04-12 RX ORDER — INSULIN GLARGINE 100 [IU]/ML
45 INJECTION, SOLUTION SUBCUTANEOUS EVERY MORNING
Status: DISCONTINUED | OUTPATIENT
Start: 2022-04-13 | End: 2022-04-13

## 2022-04-12 RX ORDER — AMMONIUM LACTATE 12 G/100G
LOTION TOPICAL DAILY
Status: DISCONTINUED | OUTPATIENT
Start: 2022-04-13 | End: 2022-04-14 | Stop reason: HOSPADM

## 2022-04-12 RX ORDER — BUPROPION HYDROCHLORIDE 150 MG/1
150 TABLET ORAL EVERY MORNING
Status: DISCONTINUED | OUTPATIENT
Start: 2022-04-13 | End: 2022-04-14 | Stop reason: HOSPADM

## 2022-04-12 RX ORDER — HEPARIN SODIUM 5000 [USP'U]/ML
5000 INJECTION, SOLUTION INTRAVENOUS; SUBCUTANEOUS EVERY 8 HOURS SCHEDULED
Status: DISCONTINUED | OUTPATIENT
Start: 2022-04-12 | End: 2022-04-14 | Stop reason: HOSPADM

## 2022-04-12 RX ORDER — CLOPIDOGREL BISULFATE 75 MG/1
75 TABLET ORAL DAILY
Status: DISCONTINUED | OUTPATIENT
Start: 2022-04-12 | End: 2022-04-12

## 2022-04-12 RX ORDER — EZETIMIBE 10 MG/1
10 TABLET ORAL DAILY
Status: DISCONTINUED | OUTPATIENT
Start: 2022-04-13 | End: 2022-04-14 | Stop reason: HOSPADM

## 2022-04-12 RX ORDER — ASPIRIN 81 MG/1
324 TABLET, CHEWABLE ORAL ONCE
Status: COMPLETED | OUTPATIENT
Start: 2022-04-12 | End: 2022-04-12

## 2022-04-12 RX ORDER — ROSUVASTATIN CALCIUM 40 MG/1
40 TABLET, COATED ORAL DAILY
Status: DISCONTINUED | OUTPATIENT
Start: 2022-04-13 | End: 2022-04-14 | Stop reason: HOSPADM

## 2022-04-12 RX ORDER — ATORVASTATIN CALCIUM 80 MG/1
80 TABLET, FILM COATED ORAL NIGHTLY
Status: DISCONTINUED | OUTPATIENT
Start: 2022-04-12 | End: 2022-04-12

## 2022-04-12 RX ORDER — SODIUM CHLORIDE 0.9 % (FLUSH) 0.9 %
10 SYRINGE (ML) INJECTION PRN
Status: DISCONTINUED | OUTPATIENT
Start: 2022-04-12 | End: 2022-04-14 | Stop reason: HOSPADM

## 2022-04-12 RX ORDER — SODIUM CHLORIDE 0.9 % (FLUSH) 0.9 %
10 SYRINGE (ML) INJECTION EVERY 12 HOURS SCHEDULED
Status: DISCONTINUED | OUTPATIENT
Start: 2022-04-12 | End: 2022-04-14 | Stop reason: HOSPADM

## 2022-04-12 RX ORDER — TRAMADOL HYDROCHLORIDE 50 MG/1
50 TABLET ORAL 2 TIMES DAILY
Status: DISCONTINUED | OUTPATIENT
Start: 2022-04-12 | End: 2022-04-14 | Stop reason: HOSPADM

## 2022-04-12 RX ADMIN — HEPARIN SODIUM 5000 UNITS: 5000 INJECTION INTRAVENOUS; SUBCUTANEOUS at 21:31

## 2022-04-12 RX ADMIN — LISINOPRIL 10 MG: 10 TABLET ORAL at 18:55

## 2022-04-12 RX ADMIN — TRAMADOL HYDROCHLORIDE 50 MG: 50 TABLET, COATED ORAL at 21:31

## 2022-04-12 RX ADMIN — SODIUM CHLORIDE, PRESERVATIVE FREE 10 ML: 5 INJECTION INTRAVENOUS at 22:00

## 2022-04-12 RX ADMIN — CLOPIDOGREL BISULFATE 300 MG: 75 TABLET ORAL at 15:06

## 2022-04-12 RX ADMIN — ASPIRIN 81 MG 324 MG: 81 TABLET ORAL at 15:06

## 2022-04-12 ASSESSMENT — PAIN SCALES - GENERAL
PAINLEVEL_OUTOF10: 2
PAINLEVEL_OUTOF10: 4

## 2022-04-12 ASSESSMENT — ENCOUNTER SYMPTOMS
COLOR CHANGE: 0
EYE DISCHARGE: 0
EYE REDNESS: 0
VOMITING: 0
SORE THROAT: 0
COUGH: 0
RHINORRHEA: 0
SHORTNESS OF BREATH: 0
DIARRHEA: 0
NAUSEA: 0

## 2022-04-12 ASSESSMENT — PAIN - FUNCTIONAL ASSESSMENT: PAIN_FUNCTIONAL_ASSESSMENT: 0-10

## 2022-04-12 NOTE — VIRTUAL HEALTH
Consults  Patient Location:  [facility] ED    Provider Location ([facility]/State): [place], [place]    This virtual visit was conducted via interactive/real-time audio/video. [facility] Stroke and Vascular Neurology Consult for  [facility] Stroke Alert through [address] @ 2:28pm  4/12/2022 2:44 PM  Pt Name: Sayda Ralph  MRN: [id number]  YOB: 1975  Date of evaluation: 4/12/2022  Primary Care Physician: DANIEL Shay CNP  Reason for Evaluation: Stroke Evaluation with Discussion with Ed or primary team with Telemedicine and stroke evaluation with Review of imaging and labs    Sayda Ralph is a 51 y.o. male with ESRD on HD, uncontrolled HTN, woke up yesterday morning c/o right side weakness and numbness, he didn't think it was a stroke and waited. Today, patient didn't get any better and decided to come in. In the ED, CT head shows a small left corona radiata stroke. LKW: 2 days ago  NIH:  3  - right arm drift 1  - right leg drift 1  - right side numbness 1      Allergies  has No Known Allergies. Medications  Prior to Admission medications    Medication Sig Start Date End Date Taking? Authorizing Provider   PEG 3350-KCl-NaBcb-NaCl-NaSulf (PEG-3350/ELECTROLYTES) 236 g SOLR  3/10/22   Historical Provider, MD   dexamethasone (DECADRON) 2 MG tablet dexamethasone 2 mg tablet    Historical Provider, MD   gabapentin (NEURONTIN) 300 MG capsule gabapentin 300 mg capsule    Historical Provider, MD   FLUoxetine (PROZAC) 20 MG capsule Take 40 mg by mouth daily    Historical Provider, MD   fluticasone (FLONASE) 50 MCG/ACT nasal spray 1 spray by Each Nostril route daily for 10 days 1/2/22 1/12/22  DANIEL Eubanks CNP   promethazine (PHENERGAN) 25 MG tablet Take 1 tablet by mouth every 6 hours as needed for Nausea 1/2/22   DANIEL Eubanks CNP   ammonium lactate (LAC-HYDRIN) 12 % lotion Apply topically daily.   Patient taking differently: Apply topically daily to BLE 11/1/21   Kalina French, ELDONM   traMADol (ULTRAM) 50 MG tablet Take 50 mg by mouth 2 times daily. 4/9/21   Historical Provider, MD   furosemide (LASIX) 20 MG tablet Take 1 tablet by mouth daily 4/27/21   Siva Creed, DO   insulin glargine (LANTUS) 100 UNIT/ML injection vial Inject 45 units in the morning and 5 units SQ at  night 3/29/21   Siva Creed, DO   buPROPion (WELLBUTRIN XL) 150 MG extended release tablet Take 1 tablet by mouth every morning 3/25/21   Siva Creed, DO   metoprolol succinate (TOPROL XL) 200 MG extended release tablet Take 1 tablet by mouth daily 2/18/21   Siva Creed, DO   insulin lispro (HUMALOG) 100 UNIT/ML injection vial Inject into the skin 3 times daily (before meals) Per sliding scale, by 2s    Historical Provider, MD   lisinopril (PRINIVIL;ZESTRIL) 20 MG tablet Take 10 mg by mouth daily     Historical Provider, MD   Rosuvastatin Calcium 40 MG CPSP Take 40 mg by mouth daily    Historical Provider, MD   ezetimibe (ZETIA) 10 MG tablet Take 10 mg by mouth daily    Historical Provider, MD   omeprazole (PRILOSEC) 40 MG delayed release capsule Take 40 mg by mouth daily    Historical Provider, MD   amLODIPine (NORVASC) 10 MG tablet Take 10 mg by mouth daily    Historical Provider, MD   aspirin 81 MG EC tablet Take 81 mg by mouth daily    Historical Provider, MD   magnesium oxide (MAG-OX) 400 MG tablet Take 400 mg by mouth daily    Historical Provider, MD   calcium acetate 667 MG TABS Take 667 mg by mouth 3 times daily (with meals)     Historical Provider, MD   vitamin B-12 (CYANOCOBALAMIN) 500 MCG tablet Take 500 mcg by mouth daily    Historical Provider, MD    Scheduled Meds:   clopidogrel  75 mg Oral Daily    aspirin  324 mg Oral Once     Continuous Infusions:  PRN Meds:.  Past Medical History   has a past medical history of Closed fracture of bone of right foot, Dialysis patient ([facility] [address] Kidney disease, and Type 1 diabetes mellitus ([facility] [address]   Social History  Social History     Socioeconomic History    Marital status: Single     Spouse name: Not on file    Number of children: Not on file    Years of education: Not on file    Highest education level: Not on file   Occupational History    Not on file   Tobacco Use    Smoking status: Never Smoker    Smokeless tobacco: Never Used   Vaping Use    Vaping Use: Never used   Substance and Sexual Activity    Alcohol use: Never    Drug use: Never    Sexual activity: Not on file   Other Topics Concern    Not on file   Social History Narrative    Not on file     Social Determinants of Health     Financial Resource Strain:     Difficulty of Paying Living Expenses: Not on file   Food Insecurity:     Worried About [address] [employer] in the Last Year: Not on file    Dinh of Food in the Last Year: Not on file   Transportation Needs:     Lack of Transportation (Medical): Not on file    Lack of Transportation (Non-Medical):  Not on file   Physical Activity:     Days of Exercise per Week: Not on file    Minutes of Exercise per Session: Not on file   Stress:     Feeling of Stress : Not on file   Social Connections:     Frequency of Communication with Friends and Family: Not on file    Frequency of Social Gatherings with Friends and Family: Not on file    Attends Tenriism Services: Not on file    Active Member of [address] [place] [address] [employer] or Organizations: Not on file    Attends Club or Organization Meetings: Not on file    Marital Status: Not on file   Intimate Partner Violence:     Fear of Current or Ex-Partner: Not on file    Emotionally Abused: Not on file    Physically Abused: Not on file    Sexually Abused: Not on file   Housing Stability:     Unable to Pay for Housing in the Last Year: Not on file    Number of [place] in the Last Year: Not on file    Unstable Housing in the Last Year: Not on file     Family History      Problem Relation Age of Onset    Diabetes Mother     Diabetes Father     Heart Disease Maternal Great Grandfather        OBJECTIVE  BP (!) 161/70   Pulse 59   Temp 97.9 °F (36.6 °C) (Oral)   Resp 14   Ht 5' 6.5\" (1.689 m)   SpO2 100%   BMI 28.30 kg/m²        Pre-Morbid mRS:     Imaging:  Images were personally reviewed with KEY PERRY used to review images including:  CT brain without contrast: left CR acute stroke  CTA imaging: n/a    Assessment    51year old man with ESRD on HD, HTN, c/o 2 days right hemiparesis, CT head showed an acute right corona radiata stroke      Recommendations:  1. NIH 3  2. Recommend Inpatient Neurology Consult for further assessment and evaluation   3.  consider . [id number]  4. Not a tPA candidate due to out of window  5. Load with aspirin 325mg x 1 follow by 81mg daily lifelong  6. Load with plavix 300mg x 1 follow by 75mg daily for 21 days only  7. lipitor 80mg daily with goal LDL less than 70  8. Due to ESRD, will get him MRA head and neck w/o contrast while getting MRI brain w/o contrast  9. PT/OT and rehab consult        Discussed with ED Physician Dr Darling Ends    At least 54 min of Telemedicine and time in conversation directly with ED staff and physician for the patient who is in imminent and life threatening deterioration without further treatment and evaluation. This Virtual Visit was conducted with patient's (and/or legal guardian's) consent, to provide telestroke consultation and necessary medical care.   Time spent examining patient, reviewing the images personally, reviewing the chart, perform high complexity decision making and speaking with the nursing staff regarding recommendations        Salud Art MD   Stroke, Neurocritical Care And/or [address] [place] [address] Stroke [facility]   Electronically signed 4/12/2022 at 2:44 PM

## 2022-04-12 NOTE — ED NOTES
Pt reports onset yesterday morning of R arm and R leg numbness/tingling. Reports hx of neuropathy, but denies hx of neuropathy pain. ROM present to BUE and BLE. Denies injury. No obvious trauma present. No obvious abnormality present to either extremity. Also c/o midsternal chest pain which began approx same time as extremity numbness/tingling. Family at bedside reports pt was riding his stationary bicycle last evening with no apparent difficulty. Pt reports his right leg intermittently is not functioning properly since the numbness/tingling began. Pt speaking clearly in full sentences without difficulty. Denies expressive or receptive aphasia. Family denies abnormal behavior at home.       Ezequiel Simon RN  04/12/22 0054

## 2022-04-12 NOTE — ED NOTES
Pt medicated as ordered and documented. Aware of pending admission. Eating boxed lunch without difficulty. Awaiting bed placement; duration unknown.       Herminia Clark RN  04/12/22 3459

## 2022-04-12 NOTE — ED NOTES
Pt transported to CT scan via stretcher. Accompanied by CT tech.       Noel Bolton RN  04/12/22 1300

## 2022-04-12 NOTE — ED NOTES
Pt returned to room 18 via stretcher from MRI. Accompanied by transport ryley.       Miguel Grier RN  04/12/22 5098

## 2022-04-12 NOTE — TELEPHONE ENCOUNTER
Received call from LYDIA ANTON at [facility] with [employer]. Subjective: Caller states \"yesterday morning he said he was just setting on a sandy and he started writing and his right arm went numb and then his right leg he doesn't have control of his right leg \"     Current Symptoms: R sided weakness, slurred speech (yesterday, believed slurred speech has subsided now)      Onset: 1 day ago; sudden    Associated Symptoms: NA    Pain Severity: Denies     Temperature: n/a     What has been tried: Nothing     Recommended disposition: Call  Now, father o the phone, sates he lives down the street from the hospital and he can take him, writer explained the benefits of calling 911 and encouraged/ reinforced to call ems, father gave verbal understanding and wishes to transport himself. Care advice provided, patient verbalizes understanding; denies any other questions or concerns; instructed to call back for any new or worsening symptoms. Patient/caller agrees to proceed to [facility]  Emergency Department     Attention Provider: Thank you for allowing me to participate in the care of your patient. The patient was connected to triage in response to information provided to the ECC/PSC. Please do not respond through this encounter as the response is not directed to a shared pool.     Reason for Disposition   New neurologic deficit that is present NOW, sudden onset of ANY of the following: * Weakness of the face, arm, or leg on one side of the body* Numbness of the face, arm, or leg on one side of the body* Loss of speech or garbled speech    Protocols used: NEUROLOGIC DEFICIT-ADULT-OH

## 2022-04-12 NOTE — H&P
[facility]  Office: [address], DO, Levellida Ebbs, DO, Julio List, DO, Edwin Branch Blood, DO, Rakesh Slaughter MD, Gary Rios MD, Segundo Rogers MD, Any Kelly MD, Jacobo Cobos MD, Alva Best MD, Jennifer Dukes MD, Jose Barber, DO, Zane Mejia, DO, Brooke Buckley MD,  Star Jeffries, DO, Kait Mendoza MD, Raenette Felty, MD, Ofelia Sun MD, Eve Roblero DO, Zane Shine MD, Maryellen Yuan MD, Erlinda Sal, [facility], [facility], [facility], Ziyad Mckeon, CNP, Sd Coffey, CNP, Rick Butts, CNP, Ayaka Dawkins, CNP, Faustino Salmon, PA-C, Yin Reese, DNP, Alcon Asters, CNP, Dagmar Spray, CNP, Quyen Garcia, CNP, Sher Addison, CNS, Amy Noova, [facility], Dalia Montgomery, CNP, Jadon Limb, CNP, Loy Vail, [facility]    HISTORY AND PHYSICAL EXAMINATION            Date:   4/12/2022  Patient name:  Darrell Fierro  Date of admission:  4/12/2022 11:14 AM  MRN:   [id number]  Account:  [de-identified]  YOB: 1975  PCP:    DANIEL Escamilla CNP  Room:   [place] [address]  Code Status:    Full    Chief Complaint:     Chief Complaint   Patient presents with    Numbness     Right side leg and arm numbness since yesterday morning at 0530     History Obtained From:     patient, family member - Grandfather    History of Present Illness:     Patient presents to the emergency room today following his morning dialysis treatment with complaints of right hand, arm (up to the elbow), and leg numbness and tingling with slight midsternal chest pressure. The patient states that these symptoms started yesterday morning at 0530. The weakness, numbness and tingling continue, however the chest pressure has subsided. He states that he has never experienced these symptoms before yesterday.  The patient will use a walker and cane to assist with ambulation, and his right leg was causing him to become more unstable. Patient takes all of his medications regularaly and continues to follow with [address] of [address] [place] [address] and is being evaluated for a kidney transplant. The patient has been on HD (T, Th, S schedule) for the past 3 years using his left upper arm fistula. Patient denies any fever, chills, cough, shortness of breath, nausea and vomiting. CT Head:   There is a small hypodensity involving the left corona radiata, and extending   inferiorly into the left basal ganglia.  This could wrist present a infarct. No evidence of any hemorrhage or mass-effect. Creat 4.25    Past Medical History:     Past Medical History:   Diagnosis Date    Closed fracture of bone of right foot March - April 2020    Dialysis patient [facility])     Kidney disease     On Dialysis    Type 1 diabetes mellitus ([facility] [address]         Past Surgical History:     Past Surgical History:   Procedure Laterality Date    AV FISTULA CREATION Left         Medications Prior to Admission:     Prior to Admission medications    Medication Sig Start Date End Date Taking? Authorizing Provider   PEG 3350-KCl-NaBcb-NaCl-NaSulf (PEG-3350/ELECTROLYTES) 236 g SOLR  3/10/22   Historical Provider, MD   dexamethasone (DECADRON) 2 MG tablet dexamethasone 2 mg tablet    Historical Provider, MD   gabapentin (NEURONTIN) 300 MG capsule gabapentin 300 mg capsule    Historical Provider, MD   FLUoxetine (PROZAC) 20 MG capsule Take 40 mg by mouth daily    Historical Provider, MD   fluticasone (FLONASE) 50 MCG/ACT nasal spray 1 spray by Each Nostril route daily for 10 days 1/2/22 1/12/22  DANIEL Anguiano CNP   promethazine (PHENERGAN) 25 MG tablet Take 1 tablet by mouth every 6 hours as needed for Nausea 1/2/22   DANIEL Anguiano - CNP   ammonium lactate (LAC-HYDRIN) 12 % lotion Apply topically daily.   Patient taking differently: Apply topically daily to BLE 11/1/21   Daniella Ro DPM   traMADol (ULTRAM) 50 MG tablet Take 50 mg by mouth 2 times daily. 4/9/21   Historical Provider, MD   furosemide (LASIX) 20 MG tablet Take 1 tablet by mouth daily 4/27/21   Vineet Peguero,    insulin glargine (LANTUS) 100 UNIT/ML injection vial Inject 45 units in the morning and 5 units SQ at  night 3/29/21   Vineet Peguero, DO   buPROPion (WELLBUTRIN XL) 150 MG extended release tablet Take 1 tablet by mouth every morning 3/25/21   Vineet Peguero, DO   metoprolol succinate (TOPROL XL) 200 MG extended release tablet Take 1 tablet by mouth daily 2/18/21   Vineet Peguero, DO   insulin lispro (HUMALOG) 100 UNIT/ML injection vial Inject into the skin 3 times daily (before meals) Per sliding scale, by 2s    Historical Provider, MD   lisinopril (PRINIVIL;ZESTRIL) 20 MG tablet Take 10 mg by mouth daily     Historical Provider, MD   Rosuvastatin Calcium 40 MG CPSP Take 40 mg by mouth daily    Historical Provider, MD   ezetimibe (ZETIA) 10 MG tablet Take 10 mg by mouth daily    Historical Provider, MD   omeprazole (PRILOSEC) 40 MG delayed release capsule Take 40 mg by mouth daily    Historical Provider, MD   amLODIPine (NORVASC) 10 MG tablet Take 10 mg by mouth daily    Historical Provider, MD   aspirin 81 MG EC tablet Take 81 mg by mouth daily    Historical Provider, MD   magnesium oxide (MAG-OX) 400 MG tablet Take 400 mg by mouth daily    Historical Provider, MD   calcium acetate 667 MG TABS Take 667 mg by mouth 3 times daily (with meals)     Historical Provider, MD   vitamin B-12 (CYANOCOBALAMIN) 500 MCG tablet Take 500 mcg by mouth daily    Historical Provider, MD        Allergies:     Patient has no known allergies. Social History:     Tobacco:    reports that he has never smoked. He has never used smokeless tobacco.  Alcohol:      reports no history of alcohol use. Drug Use:  reports no history of drug use.     Family History:     Family History   Problem Relation Age of Onset    Diabetes Mother     Heart Disease Father     Diabetes Father     Cancer Paternal Grandmother     Heart Disease Maternal Great Grandfather        Review of Systems:     Positive and Negative as described in HPI. CONSTITUTIONAL:  negative for fevers, chills, sweats, fatigue, weight loss  HEENT:  negative for vision, hearing changes, runny nose, throat pain  RESPIRATORY:  negative for shortness of breath, cough, congestion, wheezing  CARDIOVASCULAR:  negative for chest pain, palpitations  GASTROINTESTINAL:  negative for nausea, vomiting, diarrhea, constipation, change in bowel habits, abdominal pain   GENITOURINARY:  negative for difficulty of urination, burning with urination, frequency. Oliguric- Urinating 1-2/ daily   INTEGUMENT:  negative for rash, skin lesions, easy bruising   HEMATOLOGIC/LYMPHATIC:  negative for swelling/edema   ALLERGIC/IMMUNOLOGIC:  negative for urticaria , itching  ENDOCRINE:  negative increase in drinking, increase in urination, hot or cold intolerance  MUSCULOSKELETAL:  negative joint pains, muscle aches, swelling of joints  NEUROLOGICAL:  negative for headaches, dizziness, lightheadedness, pain.  Positive for weakness, numbness, tingling extremities  BEHAVIOR/PSYCH:  negative for anxiety Positive for depression    Physical Exam:   BP (!) 161/70   Pulse 59   Temp 97.9 °F (36.6 °C) (Oral)   Resp 14   Ht 5' 6.5\" (1.689 m)   SpO2 100%   BMI 28.30 kg/m²   Temp (24hrs), Av.9 °F (36.6 °C), Min:97.9 °F (36.6 °C), Max:97.9 °F (36.6 °C)    Recent Labs     22  1439   POCGLU 61*     No intake or output data in the 24 hours ending 22 1558    General Appearance:  alert, well appearing, and in no acute distress  Mental status: oriented to person, place, and time  Head:  normocephalic, atraumatic  Eye: no icterus, redness, pupils equal and reactive, extraocular eye movements intact, conjunctiva clear  Ear: normal external ear, no discharge, hearing intact  Nose:  no drainage noted  Mouth: mucous membranes moist  Neck: supple, no carotid bruits, thyroid not palpable  Lungs: Bilateral equal air entry, clear to auscultation, no wheezing, rales or rhonchi, normal effort  Cardiovascular: normal rate, regular rhythm, no murmur, gallop, rub. Abdomen: Soft, nontender, nondistended, normal bowel sounds, no hepatomegaly or splenomegaly  Neurologic: Mild weakness of right arm, hand and leg.  normal muscle tone and bulk, no abnormal sensation, normal speech, cranial nerves II through XII grossly intact  Skin: No gross lesions, rashes, bruising or bleeding on exposed skin area  Extremities:  peripheral pulses palpable, no pedal edema or calf pain with palpation  Psych: normal affect     Investigations:      Laboratory Testing:  Recent Results (from the past 24 hour(s))   Basic Metabolic Panel    Collection Time: 04/12/22 12:17 PM   Result Value Ref Range    Glucose 136 (H) 70 - 99 mg/dL    BUN 19 6 - 20 mg/dL    CREATININE 4.25 (H) 0.70 - 1.20 mg/dL    Bun/Cre Ratio 4 (L) 9 - 20    Calcium 9.4 8.6 - 10.4 mg/dL    Sodium 140 135 - 144 mmol/L    Potassium 3.5 (L) 3.7 - 5.3 mmol/L    Chloride 102 98 - 107 mmol/L    CO2 24 20 - 31 mmol/L    Anion Gap 14 9 - 17 mmol/L    GFR Non-African American 15 (L) >60 mL/min    GFR  18 (L) >60 mL/min    GFR Comment         CBC with Auto Differential    Collection Time: 04/12/22 12:17 PM   Result Value Ref Range    WBC 8.9 3.5 - 11.3 k/uL    RBC 3.96 (L) 4.21 - 5.77 m/uL    Hemoglobin 12.1 (L) 13.0 - 17.0 g/dL    Hematocrit 36.8 (L) 40.7 - 50.3 %    MCV 92.9 82.6 - 102.9 fL    MCH 30.6 25.2 - 33.5 pg    MCHC 32.9 28.4 - 34.8 g/dL    RDW 12.6 11.8 - 14.4 %    Platelets 909 690 - 202 k/uL    MPV 9.9 8.1 - 13.5 fL    NRBC Automated 0.0 0.0 per 100 WBC    Seg Neutrophils 72 (H) 36 - 65 %    Lymphocytes 12 (L) 24 - 43 %    Monocytes 10 3 - 12 %    Eosinophils % 5 (H) 1 - 4 %    Basophils 1 0 - 2 %    Immature Granulocytes 0 0 %    Segs Absolute 6.44 1.50 - 8.10 k/uL    Absolute Lymph # 1.07 (L) 1.10 - 3.70 k/uL    Absolute Mono # 0.89 0.10 - 1.20 k/uL    Absolute Eos # 0.43 0.00 - 0.44 k/uL    Basophils Absolute 0.06 0.00 - 0.20 k/uL    Absolute Immature Granulocyte 0.03 0.00 - 0.30 k/uL   Protime-INR    Collection Time: 04/12/22 12:17 PM   Result Value Ref Range    Protime 12.6 11.5 - 14.2 sec    INR 0.9    APTT    Collection Time: 04/12/22 12:17 PM   Result Value Ref Range    PTT 31.7 23.9 - 33.8 sec   Magnesium    Collection Time: 04/12/22 12:17 PM   Result Value Ref Range    Magnesium 2.1 1.6 - 2.6 mg/dL   Phosphorus    Collection Time: 04/12/22 12:17 PM   Result Value Ref Range    Phosphorus 2.9 2.5 - 4.5 mg/dL   EKG 12 Lead    Collection Time: 04/12/22 12:30 PM   Result Value Ref Range    Ventricular Rate 58 BPM    Atrial Rate 58 BPM    P-R Interval 156 ms    QRS Duration 108 ms    Q-T Interval 464 ms    QTc Calculation (Bazett) 455 ms    P Axis 30 degrees    R Axis 2 degrees    T Axis 2 degrees   POC Glucose Fingerstick    Collection Time: 04/12/22  2:39 PM   Result Value Ref Range    POC Glucose 61 (L) 75 - 110 mg/dL   POCT Glucose    Collection Time: 04/12/22  2:40 PM   Result Value Ref Range    Glucose 61 mg/dL    QC OK? yes        Imaging/Diagnostics:  CT HEAD WO CONTRAST    Result Date: 4/12/2022  There is a small hypodensity involving the left corona radiata, and extending inferiorly into the left basal ganglia. This could wrist present a infarct. No evidence of any hemorrhage or mass-effect.        Assessment :      Hospital Problems           Last Modified POA    * (Principal) Acute cerebrovascular accident (CVA) ([facility] [address] 4/12/2022 Yes    ESRD (end stage renal disease) on dialysis ([address] 4/12/2022 Yes    Hypertension 4/12/2022 Yes    Hyperlipidemia 4/12/2022 Yes    Bipolar affective disorder ([facility] [address] 4/12/2022 Yes    Type 2 diabetes mellitus with kidney complication, with long-term current use of insulin ([facility] [address] 4/12/2022 Yes    Neuropathy of both feet 4/12/2022 Yes    GERD (gastroesophageal reflux disease) 4/12/2022 Yes          Plan: Patient status inpatient in the Med/Surge    1. Acute cerebrovascular accident  1. Consult neurology  2. MRI brain  3. MRA Head/Neck  4. ASA/Plavix  5. Passed swallow study- will start diet  6. NIHSS assessments to continue  7. ECHO ordered  8. Stroke education  2. ESRD  1. HD T, Th, Sat  2. Consult nephrology  3. HTN  1. Allow for permissive hypertension for 48 hours of symptom onset. Will start Lisinopril now and restart other antihypertensives slowly, Keep SBP > or = 160 unless otherwise indicated by Neurology  4. Hyperlipidemia  1. Continue home medicaitons  5. Bipolar affective disorder  1. Continue home medicaitons  6. Diabetes mellitus  1. Monitor glucose AC/HS  2. Restarting home insulin  7. Neuropathy  1. Continue home medicaitons  8. GERD  1. Continue home medications  9. PT/OT/Speech  10. DVT prophylaxis  1. Heparin SQ Q 8 hours  11. Monitor AM labs  12. Continuous telemetry  13. Plan discussed with patient and staff     Consultations:   IP CONSULT TO NEUROLOGY  IP CONSULT TO HOSPITALIST  IP CONSULT TO NEPHROLOGY    Patient is admitted as inpatient status because of co-morbidities listed above, severity of signs and symptoms as outlined, requirement for current medical therapies and most importantly because of direct risk to patient if care not provided in a hospital setting. Expected length of stay > 48 hours.     DANIEL Yee CNP  4/12/2022  3:58 PM    Copy sent to DANIEL Gabriel - CNP

## 2022-04-13 LAB
ALBUMIN SERPL-MCNC: 3.4 G/DL (ref 3.5–5.2)
ALP BLD-CCNC: 142 U/L (ref 40–129)
ALT SERPL-CCNC: 42 U/L (ref 5–41)
ANION GAP SERPL CALCULATED.3IONS-SCNC: 12 MMOL/L (ref 9–17)
AST SERPL-CCNC: 27 U/L
BILIRUB SERPL-MCNC: 0.28 MG/DL (ref 0.3–1.2)
BUN BLDV-MCNC: 27 MG/DL (ref 6–20)
BUN/CREAT BLD: 5 (ref 9–20)
CALCIUM SERPL-MCNC: 8.6 MG/DL (ref 8.6–10.4)
CHLORIDE BLD-SCNC: 102 MMOL/L (ref 98–107)
CO2: 24 MMOL/L (ref 20–31)
CREAT SERPL-MCNC: 5.91 MG/DL (ref 0.7–1.2)
EKG ATRIAL RATE: 58 BPM
EKG P AXIS: 30 DEGREES
EKG P-R INTERVAL: 156 MS
EKG Q-T INTERVAL: 464 MS
EKG QRS DURATION: 108 MS
EKG QTC CALCULATION (BAZETT): 455 MS
EKG R AXIS: 2 DEGREES
EKG T AXIS: 2 DEGREES
EKG VENTRICULAR RATE: 58 BPM
ESTIMATED AVERAGE GLUCOSE: 183 MG/DL
GFR AFRICAN AMERICAN: 12 ML/MIN
GFR NON-AFRICAN AMERICAN: 10 ML/MIN
GFR SERPL CREATININE-BSD FRML MDRD: ABNORMAL ML/MIN/{1.73_M2}
GLUCOSE BLD-MCNC: 129 MG/DL (ref 75–110)
GLUCOSE BLD-MCNC: 162 MG/DL (ref 70–99)
GLUCOSE BLD-MCNC: 180 MG/DL (ref 75–110)
GLUCOSE BLD-MCNC: 233 MG/DL (ref 75–110)
GLUCOSE BLD-MCNC: 87 MG/DL (ref 75–110)
HBA1C MFR BLD: 8 % (ref 4–6)
HCT VFR BLD CALC: 34.9 % (ref 40.7–50.3)
HEMOGLOBIN: 11.4 G/DL (ref 13–17)
LV EF: 65 %
LVEF MODALITY: NORMAL
MAGNESIUM: 2 MG/DL (ref 1.6–2.6)
MCH RBC QN AUTO: 30.8 PG (ref 25.2–33.5)
MCHC RBC AUTO-ENTMCNC: 32.7 G/DL (ref 28.4–34.8)
MCV RBC AUTO: 94.3 FL (ref 82.6–102.9)
NRBC AUTOMATED: 0 PER 100 WBC
PDW BLD-RTO: 12.5 % (ref 11.8–14.4)
PLATELET # BLD: 182 K/UL (ref 138–453)
PMV BLD AUTO: 9.9 FL (ref 8.1–13.5)
POTASSIUM SERPL-SCNC: 3.4 MMOL/L (ref 3.7–5.3)
RBC # BLD: 3.7 M/UL (ref 4.21–5.77)
SODIUM BLD-SCNC: 138 MMOL/L (ref 135–144)
TOTAL PROTEIN: 5.5 G/DL (ref 6.4–8.3)
WBC # BLD: 6.6 K/UL (ref 3.5–11.3)

## 2022-04-13 PROCEDURE — 97116 GAIT TRAINING THERAPY: CPT

## 2022-04-13 PROCEDURE — 93010 ELECTROCARDIOGRAM REPORT: CPT | Performed by: INTERNAL MEDICINE

## 2022-04-13 PROCEDURE — 6370000000 HC RX 637 (ALT 250 FOR IP): Performed by: NURSE PRACTITIONER

## 2022-04-13 PROCEDURE — 2580000003 HC RX 258: Performed by: NURSE PRACTITIONER

## 2022-04-13 PROCEDURE — 2500000003 HC RX 250 WO HCPCS: Performed by: NURSE PRACTITIONER

## 2022-04-13 PROCEDURE — 97530 THERAPEUTIC ACTIVITIES: CPT

## 2022-04-13 PROCEDURE — 99222 1ST HOSP IP/OBS MODERATE 55: CPT | Performed by: PSYCHIATRY & NEUROLOGY

## 2022-04-13 PROCEDURE — 93306 TTE W/DOPPLER COMPLETE: CPT

## 2022-04-13 PROCEDURE — 6370000000 HC RX 637 (ALT 250 FOR IP): Performed by: INTERNAL MEDICINE

## 2022-04-13 PROCEDURE — 97162 PT EVAL MOD COMPLEX 30 MIN: CPT

## 2022-04-13 PROCEDURE — 6370000000 HC RX 637 (ALT 250 FOR IP): Performed by: PSYCHIATRY & NEUROLOGY

## 2022-04-13 PROCEDURE — 99222 1ST HOSP IP/OBS MODERATE 55: CPT | Performed by: PHYSICAL MEDICINE & REHABILITATION

## 2022-04-13 PROCEDURE — 36415 COLL VENOUS BLD VENIPUNCTURE: CPT

## 2022-04-13 PROCEDURE — 80053 COMPREHEN METABOLIC PANEL: CPT

## 2022-04-13 PROCEDURE — 6370000000 HC RX 637 (ALT 250 FOR IP): Performed by: FAMILY MEDICINE

## 2022-04-13 PROCEDURE — 1200000000 HC SEMI PRIVATE

## 2022-04-13 PROCEDURE — 85027 COMPLETE CBC AUTOMATED: CPT

## 2022-04-13 PROCEDURE — 83735 ASSAY OF MAGNESIUM: CPT

## 2022-04-13 PROCEDURE — 6360000002 HC RX W HCPCS: Performed by: NURSE PRACTITIONER

## 2022-04-13 PROCEDURE — 99232 SBSQ HOSP IP/OBS MODERATE 35: CPT | Performed by: FAMILY MEDICINE

## 2022-04-13 PROCEDURE — 82947 ASSAY GLUCOSE BLOOD QUANT: CPT

## 2022-04-13 PROCEDURE — 92523 SPEECH SOUND LANG COMPREHEN: CPT

## 2022-04-13 RX ORDER — INSULIN GLARGINE 100 [IU]/ML
40 INJECTION, SOLUTION SUBCUTANEOUS EVERY MORNING
Status: DISCONTINUED | OUTPATIENT
Start: 2022-04-13 | End: 2022-04-14

## 2022-04-13 RX ORDER — METOPROLOL SUCCINATE 50 MG/1
50 TABLET, EXTENDED RELEASE ORAL DAILY
Status: ON HOLD | COMMUNITY
End: 2022-04-14 | Stop reason: HOSPADM

## 2022-04-13 RX ORDER — AMLODIPINE BESYLATE 10 MG/1
10 TABLET ORAL DAILY
Status: DISCONTINUED | OUTPATIENT
Start: 2022-04-13 | End: 2022-04-14 | Stop reason: HOSPADM

## 2022-04-13 RX ORDER — CLOPIDOGREL BISULFATE 75 MG/1
75 TABLET ORAL DAILY
Status: DISCONTINUED | OUTPATIENT
Start: 2022-04-13 | End: 2022-04-14 | Stop reason: HOSPADM

## 2022-04-13 RX ORDER — POTASSIUM CHLORIDE 20 MEQ/1
10 TABLET, EXTENDED RELEASE ORAL ONCE
Status: COMPLETED | OUTPATIENT
Start: 2022-04-13 | End: 2022-04-13

## 2022-04-13 RX ORDER — DEXTROSE MONOHYDRATE 25 G/50ML
12.5 INJECTION, SOLUTION INTRAVENOUS PRN
Status: DISCONTINUED | OUTPATIENT
Start: 2022-04-13 | End: 2022-04-13 | Stop reason: RX

## 2022-04-13 RX ORDER — DEXTROSE MONOHYDRATE 50 MG/ML
100 INJECTION, SOLUTION INTRAVENOUS PRN
Status: DISCONTINUED | OUTPATIENT
Start: 2022-04-13 | End: 2022-04-14 | Stop reason: HOSPADM

## 2022-04-13 RX ORDER — INSULIN GLARGINE 100 [IU]/ML
40 INJECTION, SOLUTION SUBCUTANEOUS EVERY MORNING
Status: DISCONTINUED | OUTPATIENT
Start: 2022-04-14 | End: 2022-04-13

## 2022-04-13 RX ORDER — MAGNESIUM OXIDE 400 MG/1
400 TABLET ORAL DAILY
Status: ON HOLD | COMMUNITY
End: 2022-07-14

## 2022-04-13 RX ADMIN — ASPIRIN 81 MG: 81 TABLET, COATED ORAL at 10:08

## 2022-04-13 RX ADMIN — BUPROPION HYDROCHLORIDE 150 MG: 150 TABLET, EXTENDED RELEASE ORAL at 10:09

## 2022-04-13 RX ADMIN — EZETIMIBE 10 MG: 10 TABLET ORAL at 10:10

## 2022-04-13 RX ADMIN — SODIUM CHLORIDE, PRESERVATIVE FREE 10 ML: 5 INJECTION INTRAVENOUS at 21:56

## 2022-04-13 RX ADMIN — BUPROPION HYDROCHLORIDE 150 MG: 150 TABLET, EXTENDED RELEASE ORAL at 10:08

## 2022-04-13 RX ADMIN — CLOPIDOGREL BISULFATE 75 MG: 75 TABLET ORAL at 12:38

## 2022-04-13 RX ADMIN — LISINOPRIL 10 MG: 10 TABLET ORAL at 10:10

## 2022-04-13 RX ADMIN — FLUOXETINE 40 MG: 20 CAPSULE ORAL at 10:10

## 2022-04-13 RX ADMIN — CALCIUM ACETATE 667 MG: 667 CAPSULE ORAL at 18:37

## 2022-04-13 RX ADMIN — SODIUM CHLORIDE, PRESERVATIVE FREE 10 ML: 5 INJECTION INTRAVENOUS at 10:11

## 2022-04-13 RX ADMIN — POTASSIUM CHLORIDE 10 MEQ: 20 TABLET, EXTENDED RELEASE ORAL at 18:37

## 2022-04-13 RX ADMIN — CALCIUM ACETATE 667 MG: 667 CAPSULE ORAL at 12:38

## 2022-04-13 RX ADMIN — HEPARIN SODIUM 5000 UNITS: 5000 INJECTION INTRAVENOUS; SUBCUTANEOUS at 16:02

## 2022-04-13 RX ADMIN — AMLODIPINE BESYLATE 10 MG: 10 TABLET ORAL at 10:10

## 2022-04-13 RX ADMIN — HEPARIN SODIUM 5000 UNITS: 5000 INJECTION INTRAVENOUS; SUBCUTANEOUS at 21:56

## 2022-04-13 RX ADMIN — TRAMADOL HYDROCHLORIDE 50 MG: 50 TABLET, COATED ORAL at 10:09

## 2022-04-13 RX ADMIN — TRAMADOL HYDROCHLORIDE 50 MG: 50 TABLET, COATED ORAL at 19:56

## 2022-04-13 RX ADMIN — HEPARIN SODIUM 5000 UNITS: 5000 INJECTION INTRAVENOUS; SUBCUTANEOUS at 06:17

## 2022-04-13 RX ADMIN — Medication: at 10:11

## 2022-04-13 RX ADMIN — INSULIN GLARGINE 40 UNITS: 100 INJECTION, SOLUTION SUBCUTANEOUS at 10:21

## 2022-04-13 RX ADMIN — ROSUVASTATIN 40 MG: 40 TABLET, FILM COATED ORAL at 10:10

## 2022-04-13 ASSESSMENT — ENCOUNTER SYMPTOMS
BLOOD IN STOOL: 0
DIARRHEA: 0
SHORTNESS OF BREATH: 0
COUGH: 0
NAUSEA: 0
CHEST TIGHTNESS: 0
ABDOMINAL PAIN: 0
VOMITING: 0
CONSTIPATION: 0
WHEEZING: 0

## 2022-04-13 ASSESSMENT — PAIN SCALES - GENERAL
PAINLEVEL_OUTOF10: 5
PAINLEVEL_OUTOF10: 5
PAINLEVEL_OUTOF10: 0
PAINLEVEL_OUTOF10: 5

## 2022-04-13 NOTE — PLAN OF CARE
Problem: Falls - Risk of:  Goal: Will remain free from falls  Description: Will remain free from falls  Outcome: Ongoing     Patient is fall risk per fall scale. Falling star on door. Fall sticker on armband. Hourly rounding performed. Personal belongings and call light within reach. Bed in low position.

## 2022-04-13 NOTE — PROGRESS NOTES
Message sent to med rec pharmacist to verify patient's home meds. Morning meds reviewed with Eric Goodrich Pharmacist via telephone to see which meds to give and if he's recently had filled in the meantime. Also Dr. Jazmin Goldberg updated and on lower blood sugars. 87 this a.m. See order of decreased Lantus dose.  Patient ate 100% of breakfast.

## 2022-04-13 NOTE — FLOWSHEET NOTE
[facility]  PROGRESS NOTE    Room # 1021/1021-01   Name: Jessica Ramhan            Adventism: Unknown     Reason for visit: Routine    I visited the patient. Admit Date & Time: 4/12/2022 11:14 AM    Assessment:  Jessica Rahman is a 51 y.o. male. Upon entering the (dark) room patient was laying in bed sleep but was awaken by writer. No family members were present. Intervention:  I introduced myself and my title as spiritual care provider and apologized for waking the patient. Writer stated he will allow the patient to continue to rest, and will pray for continued healing, comfort, and rest during his stay. Outcome: The patient was thankful for the visit and continued prayers. Plan:  Chaplains will remain available to offer spiritual and emotional support as needed. Electronically signed by Lizz Lopez on 4/13/2022 at [address] [place] [address]     04/13/22 0870   Encounter Summary   Services provided to: Patient   Referral/Consult From: Carter   Continue Visiting   (4/13/2022)   Complexity of Encounter Low   Length of Encounter 15 minutes   Spiritual Assessment Completed Yes   Routine   Type Initial   Assessment Calm; Approachable;Coping   Intervention Active listening;Sustaining presence/ Ministry of presence;Nurtured hope   Outcome Expressed gratitude;Engaged in conversation

## 2022-04-13 NOTE — PROGRESS NOTES
Notified SA  via vm that Dr Simone Mohan has initiated chart review, and will complete complete full consult after therapy evals.

## 2022-04-13 NOTE — PROGRESS NOTES
[facility]  Office: [address], DO, Jessica Ornelas, DO, Nito Casiee, DO, Maren Haleynathaniel Blood, DO, Tay Bauer MD, Deborah Leyva MD, Antonio Gaspar MD, Penny Mayer MD, North Will MD, Kenya Victor MD, Rico Antoine MD, Jane Redding, DO, Cherise Garland, DO, Pepito Payne MD,  Dee Rota, DO, Laura Owen MD, Feliciano Sneed MD, Anton Lutz MD, Ricci Lundborg, DO, Sanya Flannery MD, Ivan Mcgrath MD, Jaskaran Murguia, CNP, [facility] Center[facility]IRLEN[facility] Valera, CNP, Long Avilez CNP, Hortensia Gunter, CNP, Jono Yee, CNP, Aicha Bustos, CNP, RICHIE DuC, Jennifer Cantrell, DNP, Navjot Farrell, CNP, Dania Watkins, CNP, Cris Li, CNP, Yoli Carrasco, CNS, Misti Fink, VASILE, Shelley Alvarez, CNP, Brianda Feliciano, CNP, Marianne Cuevas, CNP         [facility]    Progress Note    4/13/2022    7:53 AM    Name:   Dana Polanco  MRN:     [id number]     [place]:      [de-identified]   Room:   [address] [place] [address] Day:  1  Admit Date:  4/12/2022 11:14 AM    PCP:   DANIEL Sanchez CNP  Code Status:  Full Code    Subjective:     C/C:   Chief Complaint   Patient presents with    Numbness     Right side leg and arm numbness since yesterday morning at 0530     Interval History Status: not changed. Patient seen and examined at bedside, no acute events overnight. Continue to improve overall with better movement in his R sided exremeties  Worked with PT  Patient denies any chest pain, shortness of breath, chills, fevers, nausea or vomiting.   Patient vitals, labs and all providers notes were reviewed,from overnight shift and morning updates were noted and discussed with the nurse    Brief History:        Patient with known H/O DM and ESRD on HD for 3 years who is actively working on getting on transplant list who started to have R sided weakness more than 24 hours prior to presentation that persisted and he felt it might be due to needing dialysis ,, he had HD today and was directed to go to ER to get evaluated in ER . In the ER he had CT head and was found to have acute stroke ( small hypodensity involving the left corona radiata, and extending inferiorly into the left basal ganglia) . Patient was evaluated by tele stroke , given ASA and Plavix   Plan for MRI brain and MRA H&N  Restart home meds  Restart Ace and monitor BP to avoid Hypotension especially the patient had issues with hypotension recently. Continue to monitor very closely    Review of Systems:     Review of Systems   Constitutional: Positive for activity change and fatigue. Negative for chills, diaphoresis and fever. HENT: Negative for congestion. Eyes: Negative for visual disturbance. Respiratory: Negative for cough, chest tightness, shortness of breath and wheezing. Cardiovascular: Negative for chest pain, palpitations and leg swelling. Gastrointestinal: Negative for abdominal pain, blood in stool, constipation, diarrhea, nausea and vomiting. Genitourinary: Positive for decreased urine volume. Negative for difficulty urinating. Neurological: Positive for weakness (improving). Negative for dizziness, light-headedness, numbness and headaches. All other systems reviewed and are negative. Medications:      Allergies:  No Known Allergies    Current Meds:   Scheduled Meds:    sodium chloride flush  10 mL IntraVENous 2 times per day    aspirin  81 mg Oral Daily    Or    aspirin  300 mg Rectal Daily    heparin (porcine)  5,000 Units SubCUTAneous 3 times per day    ammonium lactate   Topical Daily    buPROPion  150 mg Oral QAM    calcium acetate  667 mg Oral TID WC    ezetimibe  10 mg Oral Daily    FLUoxetine  40 mg Oral Daily    insulin glargine  45 Units SubCUTAneous QAM    rosuvastatin  40 mg Oral Daily    traMADol  50 mg Oral BID    lisinopril  10 mg Oral Daily     Continuous Infusions:    sodium chloride       PRN Meds: sodium chloride flush, sodium chloride, ondansetron **OR** ondansetron, perflutren lipid microspheres    Data:     Past Medical History:   has a past medical history of Closed fracture of bone of right foot, Dialysis patient ([facility] [address] Kidney disease, and Type 1 diabetes mellitus ([facility] Social History:   reports that he has never smoked. He has never used smokeless tobacco. He reports that he does not drink alcohol and does not use drugs. Family History:   Family History   Problem Relation Age of Onset    Diabetes Mother     Heart Disease Father     Diabetes Father     Cancer Paternal Grandmother     Heart Disease Maternal Great Grandfather        Vitals:  BP (!) 174/79   Pulse 63   Temp 97.7 °F (36.5 °C) (Oral)   Resp 18   Ht 5' 6.5\" (1.689 m)   Wt 178 lb (80.7 kg)   SpO2 98%   BMI 28.30 kg/m²   Temp (24hrs), Av.8 °F (36.6 °C), Min:97.7 °F (36.5 °C), Max:97.9 °F (36.6 °C)    Recent Labs     22  1439   POCGLU 61*       I/O (24Hr):     Intake/Output Summary (Last 24 hours) at 2022 0753  Last data filed at 2022 2200  Gross per 24 hour   Intake 10 ml   Output --   Net 10 ml       Labs:  Hematology:  Recent Labs     227 22  0530   WBC 8.9 6.6   RBC 3.96* 3.70*   HGB 12.1* 11.4*   HCT 36.8* 34.9*   MCV 92.9 94.3   MCH 30.6 30.8   MCHC 32.9 32.7   RDW 12.6 12.5    182   MPV 9.9 9.9   INR 0.9  --      Chemistry:  Recent Labs     22  1217 22  1440 22  0530     --  138   K 3.5*  --  3.4*     --  102   CO2 24  --  24   GLUCOSE 136* 61 162*   BUN 19  --  27*   CREATININE 4.25*  --  5.91*   MG 2.1  --  2.0   ANIONGAP 14  --  12   LABGLOM 15*  --  10*   GFRAA 18*  --  12*   CALCIUM 9.4  --  8.6   PHOS 2.9  --   --      Recent Labs     22  1217 22  1439 22  0530   PROT  --   --  5.5*   LABALBU  --   --  3.4*   AST  --   --  27   ALT  --   --  42*   ALKPHOS  --   --  142*   BILITOT  --   --  0.28*   CHOL 118  --   --    HDL 61  --   -- LDLCHOLESTEROL 44  --   --    CHOLHDLRATIO 1.9  --   --    TRIG 66  --   --    POCGLU  --  61*  --      ABG:  Lab Results   Component Value Date    FIO2 NOT REPORTED 01/04/2022     Lab Results   Component Value Date/Time    SPECIAL JULIEN6ML 01/04/2022 11:35 AM     Lab Results   Component Value Date/Time    CULTURE NO GROWTH 5 DAYS 01/04/2022 11:35 AM       Radiology:  CT HEAD WO CONTRAST    Result Date: 4/12/2022  There is a small hypodensity involving the left corona radiata, and extending inferiorly into the left basal ganglia. This could wrist present a infarct. No evidence of any hemorrhage or mass-effect. MRA head without contrast    Result Date: 4/12/2022  1. Small acute lacunar type infarct in the posterior left basal ganglia. No acute intracranial hemorrhage. 2.  Chronic small vessel ischemic disease. Small bilateral old lacunar infarcts. 3.  Asymmetric signal in the left transverse and sigmoid dural venous sinuses may relate to slow flow. Underlying thrombosis is difficult to exclude. 4.  Unremarkable MRA head. 5.  Unremarkable MRA neck. MRA neck without contrast    Result Date: 4/12/2022  1. Small acute lacunar type infarct in the posterior left basal ganglia. No acute intracranial hemorrhage. 2.  Chronic small vessel ischemic disease. Small bilateral old lacunar infarcts. 3.  Asymmetric signal in the left transverse and sigmoid dural venous sinuses may relate to slow flow. Underlying thrombosis is difficult to exclude. 4.  Unremarkable MRA head. 5.  Unremarkable MRA neck. MRI brain without contrast    Result Date: 4/12/2022  1. Small acute lacunar type infarct in the posterior left basal ganglia. No acute intracranial hemorrhage. 2.  Chronic small vessel ischemic disease. Small bilateral old lacunar infarcts. 3.  Asymmetric signal in the left transverse and sigmoid dural venous sinuses may relate to slow flow. Underlying thrombosis is difficult to exclude.  4.  Unremarkable MRA plavix, continue both, can stop lavix after total 21 days     ESRD on HD MWF : nephrology managing     HTN: continue home medications    HPL: continue home medications    DM2: Continue diabetes home medications , insulin sliding scale, hypoglycemia protocol    DVT prophylaxis    PT/OT, consult PM&R    Discussed with the patient and the nurse and with BETTYE Blackburn MD  4/13/2022  7:53 AM

## 2022-04-13 NOTE — PROGRESS NOTES
Physical Therapy    Facility/Department: [facility]  Initial Assessment    NAME: Tommy Heath  : 1975  MRN: [id number]    Date of Service: 2022    Discharge Recommendations:  Patient would benefit from continued therapy after discharge        Assessment   Body structures, Functions, Activity limitations: Decreased functional mobility ; Decreased endurance;Decreased strength;Decreased balance;Decreased safe awareness; Increased pain  Assessment: Skilled therapy needed to maximize independence with functional mobility as well as improve endurance, strength, balance and overall safety to ensure safe discharge. Recommend further therapy following discharge. Prognosis: Good  Decision Making: Medium Complexity  Exam: AROM, strength, endurance, balance, mobility, AM-PAC  Clinical Presentation: evolving  PT Education: Goals;Transfer Training;PT Role;Energy Conservation;Plan of Care;General Safety;Gait Training  REQUIRES PT FOLLOW UP: Yes  Activity Tolerance  Activity Tolerance: Patient limited by fatigue;Patient limited by endurance       Patient Diagnosis(es): The primary encounter diagnosis was Cerebrovascular accident (CVA), unspecified mechanism ([facility] [address] A diagnosis of Right sided weakness was also pertinent to this visit. has a past medical history of Closed fracture of bone of right foot, Dialysis patient ([facility] [address] Kidney disease, and Type 1 diabetes mellitus ([facility] [address] has a past surgical history that includes AV fistula creation (Left). Restrictions  Restrictions/Precautions  Restrictions/Precautions: General Precautions,Up as Tolerated  Required Braces or Orthoses?: Yes  Required Braces or Orthoses  Right Lower Extremity Brace: Ankle Foot Orthotics  Left Lower Extremity Brace:  Yakelin Foot Orthotics  Vision/Hearing  Vision: Impaired  Vision Exceptions: Wears glasses for reading  Hearing: Within functional limits     Subjective  General  Chart Reviewed: Yes  Patient assessed for rehabilitation services?: Yes  Family / Caregiver Present: No  General Comment  Comments: RN states patient appropriate for therapy  Subjective  Subjective: patient pleasant and agreeable to work with therapy  Pain Screening  Patient Currently in Pain: No          Orientation  Orientation  Overall Orientation Status: Within Functional Limits  Social/Functional History  Social/Functional History  Lives With: Family (grandparents)  Type of Home: House  Home Layout: One level  Home Access: Ramped entrance  Bathroom Shower/Tub: Walk-in shower  Bathroom Toilet: Handicap height  Bathroom Equipment: Shower chair,Grab bars in [address] [place]: Cane,4 wheeled walker  ADL Assistance: Independent  Homemaking Responsibilities: No (grandparents)  Ambulation Assistance: Independent (no device inside the house,  longer distance use walker, wears B AFOs)  Transfer Assistance: Independent  Active : No  Patient's  Info: grandparents take patient  Occupation: On disability  Type of occupation: [employer]  Leisure & Hobbies: video emiliano, board games  Additional Comments: no falls  Cognition   Cognition  Overall Cognitive Status: WFL    Objective     Observation/Palpation  Posture: Fair  Observation: resting in bed    AROM RLE (degrees)  RLE General AROM: ankle DF unable to get to neutral, knee and hip WFL  AROM LLE (degrees)  LLE General AROM: ankle DF unable to get to neutral, knee and hip WFL  AROM RUE (degrees)  RUE General AROM: refer to OT assessment  AROM LUE (degrees)  LUE General AROM: refer to OT assessment  Strength RLE  Comment: ankle 2-/5, knee 4-/5, hip 4/5  Strength LLE  Comment: ankle 2-/5, knee/hip 4/5  Strength RUE  Comment: refer to OT assessment  Strength LUE  Comment: refer to OT assessment  Coordination  Finger to Nose: Dysmetric (difficulty controlling R UE, hit self in face)  Heel to Hill: Abnormal (due to foot drop decreased coordination)  Sensation  Overall Sensation Status: Impaired (R hand tingling, neuropathy B LEs)  Bed mobility  Supine to Sit: Stand by assistance (with increased effort)  Comment: sat EOB with SBA, increased effort needed to push self up to sitting. Min A to don AFOs/shoes  Transfers  Sit to Stand: Contact guard assistance  Stand to sit: Minimal Assistance  Comment: cues for correct hand placement, poor walker safety with stand>sit, did not back up all the way to chair and demonstrated poor eccentric control when lowering self, per patient this is how he always sits down  Ambulation  Ambulation?: Yes  More Ambulation?: No  Ambulation 1  Surface: level tile  Device: Rolling Walker  Other Apparatus: AFO; Left;Right  Assistance: Minimal assistance  Quality of Gait: R steppage gait  Gait Deviations: Slow Danica  Distance: 50' x1, 20'x1  Comments: patient ambulated with AFOs on, R LE weakness more evident with walking, demonstrates steppage gait. Cues for walker safety. Balance  Sitting - Static: Good  Sitting - Dynamic: Fair;+  Standing - Static: Fair;+  Standing - Dynamic: Fair  Comments: standing walker assessed with roll walker        Plan   Plan  Times per week: 1-2x/day for 5-6 days/week  Current Treatment Recommendations: Theora Hammock Training,Patient/Caregiver Education & Training,Balance Training,Gait Training,Safety Education & Training  Safety Devices  Type of devices: Left in chair,Chair alarm in place,Gait belt,Call light within reach      AM-PAC Score  AM-PAC Inpatient Mobility Raw Score : 18 (04/13/22 1113)  AM-PAC Inpatient T-Scale Score : 43.63 (04/13/22 1113)  Mobility Inpatient CMS 0-100% Score: 46.58 (04/13/22 1113)  Mobility Inpatient CMS G-Code Modifier : CK (04/13/22 1113)          Goals  Short term goals  Time Frame for Short term goals: 12 visits  Short term goal 1: Independent bed mobility  Short term goal 2:  Independent sit<>stand with improved eccentric control  Short term goal 3: Patient to ambulate 100 feet with roll walker modified independent  Short term goal 4: Patient to perform 25 minutes ther act to facilitate improving strength, balance, and endurance  Patient Goals   Patient goals : to get stronger       Therapy Time   Individual Concurrent Group Co-treatment   Time In 0844         Time Out 0913 (additional 10 minutes for chart review)         Minutes 29+10=39             Treatment time: 24 minutes    Katarina Dennis, PT

## 2022-04-13 NOTE — PROGRESS NOTES
Pt admitted to room 1021. Patient is alert and oriented. Bedside table within reach. Bed in lowest position and bed alarm on. Patient skin assessment done with two RNs. Patient knows to call out appropriately.

## 2022-04-13 NOTE — PLAN OF CARE
Nutrition Problem #1: Increased nutrient needs  Intervention: Food and/or Nutrient Delivery: Continue Current Diet,Start Oral Nutrition Supplement  Nutritional Goals: PO intakes are greater than 75% at meals

## 2022-04-13 NOTE — ED NOTES
Pt reports numbness and tingling on right side is starting to subside. Pt reports tingling in the right hand.       Faustino Rendon RN  04/12/22 2201

## 2022-04-13 NOTE — CARE COORDINATION
Case Management Initial Discharge Plan  Kalpesh Charlton,         Readmission Risk              Risk of Unplanned Readmission:  21             Met with:patient to discuss discharge plans. Information verified: address, contacts, phone number, , insurance Yes  PCP: DANIEL Ramos CNP  Date of last visit: virtual visit few months ago ( epic shows )    Insurance Provider: medicare and Jaime Gallagher     Discharge Planning  Current Residence:  Private home   Living Arrangements:  Family Members (grandparents)       Home has 1 stories/0 stairs to climb. Patient has ramp   Support Systems:  Family Members. Both grandparents     Current Services PTA:     Hemodialysis [facility] at [facility]      Patient able to perform ADL's:Independent  DME in home:  [place], handicap, shower chair, cane. B/l leg braces. sexa scan   DME used to aid ambulation prior to admission:   Braces and walker outside the home. Inside the home does not need anything   DME used during admission:  Walker     Potential Assistance Needed:  Outpatient PT/OT,Home Care    Pharmacy: [facility] [address] Medications:  No  Does patient want to participate in local refill/ meds to beds program?  Yes    Patient agreeable to home care: not sure  Freedom of choice not yet     Type of Home Care Services:  None  Patient expects to be discharged to:   [facility] vs home     Prior SNF/Rehab Placement and Facility: in [place] unsure of where   Agreeable to SNF/Rehab: unsure   Check of choice provided: yes   Evaluation: yes    Expected Discharge date:  04/15/22  Follow Up Appointment: Best Day/ Time: Tuesday AM    Transportation provider: per family  Transportation arrangements needed for discharge: Yes    Discharge Plan:   Met with patient to complete discharge assessment. Patient lives at home with his grandparents. His grandfather is very supportive and drives him to his hemo appointments .  His chair time is 0600 on TThS.     Patient is admitted with right sided weakness and MRI is showing + CVA. Patient to have therapy evaluation and neurology to see. Will follow. Patient does want to go home and go to outpatient therapy if possible.  He has gone to [facility] in past.     Electronically signed by Jamil French RN on 4/13/22 at 2:36 PM EDT

## 2022-04-13 NOTE — FLOWSHEET NOTE
04/13/22 0200   Cardiac Monitor   Telemetry Monitor On Yes   Telemetry Audible Yes   Telemetry Alarms Set Yes   Treatment Team Notification   Reason for Communication Abnormal vitals  (/84)   Team Member Name Dr. Heavenly Pace Provider   Method of Communication Call   Response No new orders  (notify of systolic greater than 385)   Notification Time 6375

## 2022-04-13 NOTE — PROGRESS NOTES
Comprehensive Nutrition Assessment    Type and Reason for Visit:  Positive Nutrition Screen (Pressure ulcer or non-healing wound)    Nutrition Recommendations/Plan:   1. Continue ADULT DIET; Regular; 4 carb choices (60 gm/meal); Low Potassium (Less than 3000 mg/day); Low Phosphorus (Less than 1000 mg); 1800 ml  2. Start Ensure High Protein 2x/ay  3. Monitor p.o intakes and labs    Nutrition Assessment:  Patient admission is related to right sided weakness and acute CVA. Patient has a medical history for ESRD and receives hemodialysis on Monday, Wednesday and Friday. Patient has an abrasion and callous on left foot. Patient is eating well at meals. Patient ate 100% of breakfast. Will start Ensure High Protein 2x/day. Will monitor p.o intaks and labs. Malnutrition Assessment:  Malnutrition Status: At risk for malnutrition (Comment)      Estimated Daily Nutrient Needs:  Energy (kcal):  4865-2186 kcal (20-23 kcal/kg); Weight Used for Energy Requirements:  Current     Protein (g):  79-92 gm (1.2-1.4 gm/kg); Weight Used for Protein Requirements:  Ideal            Nutrition Related Findings:  No edema. ESRD; HD- M,W,F      Wounds:   (left foot callous)       Current Nutrition Therapies:    ADULT DIET; Regular; 4 carb choices (60 gm/meal); Low Potassium (Less than 3000 mg/day); Low Phosphorus (Less than 1000 mg); 1800 ml  ADULT ORAL NUTRITION SUPPLEMENT; Breakfast, Dinner; Low Calorie/High Protein Oral Supplement    Anthropometric Measures:  · Height: 5' 6.5\" (168.9 cm)  · Current Body Weight: 178 lb (80.7 kg)   · Admission Body Weight: 178 lb (80.7 kg)     · Ideal Body Weight: 145 lbs; % Ideal Body Weight 122.8 %   · BMI: 28.3  · BMI Categories: Overweight (BMI 25.0-29. 9)       Nutrition Diagnosis:   · Increased nutrient needs related to renal dysfunction,increase demand for energy/nutrients as evidenced by wounds,dialysis      Nutrition Interventions:   Food and/or Nutrient Delivery:  Continue Current Diet,Start

## 2022-04-13 NOTE — CONSULTS
Reason for Consult:  End stage renal disease. Requesting Physician:  Any Kelly MD    HISTORY OF PRESENT ILLNESS:    The patient is a 51 y.o. male who normally undergoes dialysis at [facility] place per Tuesday Thursday Saturday schedule under the care of Dr. Eros Keating presented with right-sided weakness, work-up was remarkable for small acute lacunar type infarct in the posterior left basal ganglia. Symptoms started Monday morning  Reported some improvement in his leg weakness. Patient has a history of COVID infection back and January    Review Of Systems:   Constitutional: No fever, chills, lethargy, weakness or wt loss. HEENT:  No headache, nasal discharge or sore throat. Cardiac:  No chest pain, dyspnea, orthopnea or PND. Chest:              No cough, phlegm or wheezing. Abdomen:  No abdominal pain, nausea, vomiting or diarrhea. Neuro:   Right-sided upper and lower extremities weakness  Skin:   No rashes or itching. :   No hematuria, pyuria, dysuria or flank pain. Extremities:  No swelling or joint pains. Psych:             No depression or anxiety     Past Medical History:   Diagnosis Date    Closed fracture of bone of right foot March - April 2020    [facility]ialysis patient [facility])     Kidney disease     On Dialysis    Type 1 diabetes mellitus ([facility] [address]        Past Surgical History:   Procedure Laterality Date    AV FISTULA CREATION Left        Prior to Admission medications    Medication Sig Start Date End Date Taking?  Authorizing Provider   metoprolol succinate (TOPROL XL) 50 MG extended release tablet Take 50 mg by mouth daily   Yes Historical Provider, MD   magnesium oxide (MAG-OX) 400 MG tablet Take 400 mg by mouth daily   Yes Historical Provider, MD   magnesium hydroxide (MILK OF MAGNESIA) 400 MG/5ML suspension Take 15 mLs by mouth daily as needed for Constipation   Yes Historical Provider, MD   insulin aspart (NOVOLOG) 100 UNIT/ML injection vial Inject into the skin 3 times daily (before meals) SLIDING SCALE   Yes Historical Provider, MD   gabapentin (NEURONTIN) 300 MG capsule Take 300 mg by mouth daily. Historical Provider, MD   FLUoxetine (PROZAC) 20 MG capsule Take 40 mg by mouth daily    Historical Provider, MD   ammonium lactate (LAC-HYDRIN) 12 % lotion Apply topically daily.   Patient taking differently: Apply topically daily to BLE 11/1/21   Pedrito Bell DPM   furosemide (LASIX) 20 MG tablet Take 1 tablet by mouth daily 4/27/21   Marbin Rubalcavaa, DO   insulin glargine (LANTUS) 100 UNIT/ML injection vial Inject 45 units in the morning and 5 units SQ at  night  Patient taking differently: Inject 45 Units into the skin every morning (before breakfast)  3/29/21   Marbin Rubalcavaa, DO   buPROPion (WELLBUTRIN XL) 150 MG extended release tablet Take 1 tablet by mouth every morning 3/25/21   Eboniola Coca, DO   lisinopril (PRINIVIL;ZESTRIL) 20 MG tablet Take 10 mg by mouth daily     Historical Provider, MD   Rosuvastatin Calcium 40 MG CPSP Take 40 mg by mouth daily    Historical Provider, MD   ezetimibe (ZETIA) 10 MG tablet Take 10 mg by mouth daily    Historical Provider, MD   omeprazole (PRILOSEC) 40 MG delayed release capsule Take 40 mg by mouth daily    Historical Provider, MD   amLODIPine (NORVASC) 10 MG tablet Take 10 mg by mouth daily    Historical Provider, MD   aspirin 81 MG EC tablet Take 81 mg by mouth daily    Historical Provider, MD   calcium acetate 667 MG TABS Take 667 mg by mouth 3 times daily (with meals)     Historical Provider, MD   vitamin B-12 (CYANOCOBALAMIN) 500 MCG tablet Take 500 mcg by mouth daily    Historical Provider, MD       Scheduled Meds:   amLODIPine  10 mg Oral Daily    insulin glargine  40 Units SubCUTAneous QAM    clopidogrel  75 mg Oral Daily    potassium chloride  10 mEq Oral Once    sodium chloride flush  10 mL IntraVENous 2 times per day    aspirin  81 mg Oral Daily    heparin (porcine)  5,000 Units SubCUTAneous 3 times per day    ammonium lactate   Topical Daily    buPROPion  150 mg Oral QAM    calcium acetate  667 mg Oral TID WC    ezetimibe  10 mg Oral Daily    FLUoxetine  40 mg Oral Daily    rosuvastatin  40 mg Oral Daily    traMADol  50 mg Oral BID    lisinopril  10 mg Oral Daily     Continuous Infusions:   dextrose      sodium chloride       PRN Meds:glucose, glucagon (rDNA), dextrose, dextrose bolus (hypoglycemia) **OR** dextrose bolus (hypoglycemia), sodium chloride flush, sodium chloride, ondansetron **OR** ondansetron, perflutren lipid microspheres    No Known Allergies    Social History     Socioeconomic History    Marital status: Single     Spouse name: Not on file    Number of children: Not on file    Years of education: Not on file    Highest education level: Not on file   Occupational History    Not on file   Tobacco Use    Smoking status: Never Smoker    Smokeless tobacco: Never Used   Vaping Use    Vaping Use: Never used   Substance and Sexual Activity    Alcohol use: Never    Drug use: Never    Sexual activity: Not on file   Other Topics Concern    Not on file   Social History Narrative    Not on file     Social Determinants of Health     Financial Resource Strain:     Difficulty of Paying Living Expenses: Not on file   Food Insecurity:     Worried About Running Out of Food in the Last Year: Not on file    Dinh of Food in the Last Year: Not on file   Transportation Needs:     Lack of Transportation (Medical): Not on file    Lack of Transportation (Non-Medical):  Not on file   Physical Activity:     Days of Exercise per Week: Not on file    Minutes of Exercise per Session: Not on file   Stress:     Feeling of Stress : Not on file   Social Connections:     Frequency of Communication with Friends and Family: Not on file    Frequency of Social Gatherings with Friends and Family: Not on file    Attends Zoroastrian Services: Not on file    Active Member of Clubs or Organizations: Not on file    Attends Club or Organization Meetings: Not on file    Marital Status: Not on file   Intimate Partner Violence:     Fear of Current or Ex-Partner: Not on file    Emotionally Abused: Not on file    Physically Abused: Not on file    Sexually Abused: Not on file   Housing Stability:     Unable to Pay for Housing in the Last Year: Not on file    Number of [place] in the Last Year: Not on file    Unstable Housing in the Last Year: Not on file       Family History   Problem Relation Age of Onset    Diabetes Mother     Heart Disease Father     Diabetes Father     Cancer Paternal Blake Arriaza Heart Disease Maternal Great Grandfather          Physical Exam:  Vitals:    04/13/22 0827 04/13/22 1145 04/13/22 1348 04/13/22 1551   BP: (!) 172/81 (!) 155/63  (!) 141/59   Pulse: 70 63  66   Resp: 16 16  16   Temp: 98.1 °F (36.7 °C) 97.7 °F (36.5 °C)  98.2 °F (36.8 °C)   TempSrc: Oral Oral  Oral   SpO2: 100% 99%  97%   Weight:       Height:   5' 6.5\" (1.689 m)      I/O last 3 completed shifts: In: 10 [I.V.:10]  Out: -     General:  Awake, alert, not in distress. Appears to be stated age. HEENT: Atraumatic, normocephalic. Anicteric sclera. Pink and moist oral mucosa. Neck supple. No JVD. Chest: Bilateral air entry, clear to auscultation, no wheezing, rhonchi or rales. Cardiovascular: RRR, S1S2, no murmur, rub or gallop. No lower extremity edema. Abdomen: Soft, non tender to palpation. Musculoskeletal: No cyanosis or clubbing. Integumentary: Pink, warm and dry. Free from rash or lesions. CNS: Oriented to person, place and time. Speech clear. Right upper and lower extremities weakness  Psych:  Answering questions appropriately, appropriate mood and affect      Data:  CBC:   Lab Results   Component Value Date    WBC 6.6 04/13/2022    HGB 11.4 (L) 04/13/2022    HCT 34.9 (L) 04/13/2022    MCV 94.3 04/13/2022     04/13/2022     BMP:    Lab Results   Component Value Date     04/13/2022     04/12/2022     (L) 01/11/2022    K 3.4 (L) 04/13/2022    K 3.5 (L) 04/12/2022    K 5.7 (H) 01/11/2022     04/13/2022     04/12/2022    CL 91 (L) 01/11/2022    CO2 24 04/13/2022    CO2 24 04/12/2022    CO2 18 (L) 01/11/2022    BUN 27 (H) 04/13/2022    BUN 19 04/12/2022     (HH) 01/11/2022    CREATININE 5.91 (HH) 04/13/2022    CREATININE 4.25 (H) 04/12/2022    CREATININE 9.24 (HH) 01/11/2022    GLUCOSE 162 (H) 04/13/2022    GLUCOSE 61 04/12/2022    GLUCOSE 136 (H) 04/12/2022     CMP:   Lab Results   Component Value Date     04/13/2022    K 3.4 04/13/2022     04/13/2022    CO2 24 04/13/2022    BUN 27 04/13/2022    CREATININE 5.91 04/13/2022    GLUCOSE 162 04/13/2022    CALCIUM 8.6 04/13/2022    PROT 5.5 04/13/2022    LABALBU 3.4 04/13/2022    BILITOT 0.28 04/13/2022    ALKPHOS 142 04/13/2022    AST 27 04/13/2022    ALT 42 04/13/2022      Hepatic:   Lab Results   Component Value Date    AST 27 04/13/2022    AST 36 01/05/2022    AST 33 01/04/2022    ALT 42 (H) 04/13/2022    ALT 38 01/05/2022    ALT 49 (H) 01/04/2022    BILITOT 0.28 (L) 04/13/2022    BILITOT 0.43 01/05/2022    BILITOT 0.38 01/04/2022    ALKPHOS 142 (H) 04/13/2022    ALKPHOS 110 01/05/2022    ALKPHOS 144 (H) 01/04/2022     BNP: No results found for: BNP  Lipids:   Lab Results   Component Value Date    CHOL 118 04/12/2022    HDL 61 04/12/2022     INR:   Lab Results   Component Value Date    INR 0.9 04/12/2022     PTH: No results found for: PTH  Phosphorus:    Lab Results   Component Value Date    PHOS 2.9 04/12/2022     Ionized Calcium: No results found for: IONCA  Magnesium:   Lab Results   Component Value Date    MG 2.0 04/13/2022     Albumin:   Lab Results   Component Value Date    LABALBU 3.4 04/13/2022       Radiology:   Brain MRI  1.  Small acute lacunar type infarct in the posterior left basal ganglia.  No   acute intracranial hemorrhage.       2.  Chronic small vessel ischemic disease.  Small bilateral old lacunar   infarcts.       3.  Asymmetric signal in the left transverse and sigmoid dural venous sinuses   may relate to slow flow.  Underlying thrombosis is difficult to exclude.       4.  Unremarkable MRA head.       5.  Unremarkable MRA neck.           Assessment:  1. End stage renal disease. 2.  Anemia of ESRD. 3.  Small acute lacunar infarct involving the posterior left basal ganglia  4. Hypertension  5. Hypokalemia      Plan:  Plan hemodialysis treatment tomorrow per schedule  Avoid overcorrection of blood pressure  Metoprolol is currently on hold  Replace potassium with 10 mEq of potassium chloride  Hemoglobin at goal  Continue dialysis on TTS schedule. Thank you for the consultation. Please do not hesitate to contact us for any further questions/concerns. We will continue to follow along with you.        Electronically signed by Denise Fuentes MD  on 4/13/2022 at 5:19 PM

## 2022-04-13 NOTE — PROGRESS NOTES
units SQ at  night (Patient taking differently: Inject 45 Units into the skin every morning (before breakfast) )  buPROPion (WELLBUTRIN XL) 150 MG extended release tablet, Take 1 tablet by mouth every morning  lisinopril (PRINIVIL;ZESTRIL) 20 MG tablet, Take 10 mg by mouth daily   Rosuvastatin Calcium 40 MG CPSP, Take 40 mg by mouth daily  ezetimibe (ZETIA) 10 MG tablet, Take 10 mg by mouth daily  omeprazole (PRILOSEC) 40 MG delayed release capsule, Take 40 mg by mouth daily  amLODIPine (NORVASC) 10 MG tablet, Take 10 mg by mouth daily  aspirin 81 MG EC tablet, Take 81 mg by mouth daily  calcium acetate 667 MG TABS, Take 667 mg by mouth 3 times daily (with meals)   vitamin B-12 (CYANOCOBALAMIN) 500 MCG tablet, Take 500 mcg by mouth daily

## 2022-04-13 NOTE — CONSULTS
Physical Medicine & Rehabilitation  Consult Note      Admitting Physician:   Taryn Saenz MD    Primary Care Provider:   DANIEL Chakraborty CNP     Reason for Consult:  Acute Inpatient Rehabilitation    Chief Complaint: Numbness R leg and arm    History of Present Illness:  Referring Provider is requesting an evaluation for appropriate placement upon discharge from acute care. History from chart review and patient. Debo Cummins is a 51 y.o. RHD male admitted to [address] [place] [address],[address] on 4/12/2022. Patient was admitted to the ED after dialysis with c/o R arm and leg numbness/tingling and chest pressure. Symptoms began the day prior. He has ESRD and receives dialysis T, TH, SA but is being evaluated for renal transplant. Diagnostic studies confirmed small L corona radiata infarct. Initial NIHSS 3.     Persistent R sided weakness today. Review of Systems:  Constitutional: negative for anorexia, chills, fatigue, fevers, sweats and weight loss  Eyes: negative for redness and visual disturbance  Ears, nose, mouth, throat, and face: negative for earaches, sore throat and tinnitus  Respiratory: negative for cough and shortness of breath  Cardiovascular: negative for chest pain, dyspnea and palpitations  Gastrointestinal: negative for abdominal pain, change in bowel habits, constipation, nausea and vomiting  Genitourinary:negative for dysuria, frequency, hesitancy and urinary incontinence  Integument/breast: negative for pruritus and rash  Musculoskeletal:negative for stiff joints  Neurological: negative for dizziness, headaches   Behavioral/Psych: negative for decreased appetite, depression        Premorbid function:  Modified Independent    Current function:    PT:  Restrictions/Precautions: General Precautions,Up as Tolerated  Required Braces or Orthoses  Right Lower Extremity Brace: Ankle Foot Orthotics  Left Lower Extremity Brace:  Yakelin Foot Orthotics   Transfers  Sit to Stand: Contact guard assistance  Stand to sit: Minimal Assistance  Comment: cues for correct hand placement, poor walker safety with stand>sit, did not back up all the way to chair and demonstrated poor eccentric control when lowering self, per patient this is how he always sits down  Ambulation 1  Surface: level tile  Device: Rolling Walker  Other Apparatus: AFO,Left,Right  Assistance: Minimal assistance  Quality of Gait: R steppage gait  Gait Deviations: Slow Danica  Distance: 50' x1, 20'x1  Comments: patient ambulated with AFOs on, R LE weakness more evident with walking, demonstrates steppage gait. Cues for walker safety. Transfers  Sit to Stand: Contact guard assistance  Stand to sit: Minimal Assistance  Comment: cues for correct hand placement, poor walker safety with stand>sit, did not back up all the way to chair and demonstrated poor eccentric control when lowering self, per patient this is how he always sits down  Ambulation  Ambulation?: Yes  More Ambulation?: No  Ambulation 1  Surface: level tile  Device: Rolling Walker  Other Apparatus: AFO,Left,Right  Assistance: Minimal assistance  Quality of Gait: R steppage gait  Gait Deviations: Slow Danica  Distance: 50' x1, 20'x1  Comments: patient ambulated with AFOs on, R LE weakness more evident with walking, demonstrates steppage gait. Cues for walker safety. Surface: level tile  Ambulation 1  Surface: level tile  Device: Rolling Walker  Other Apparatus: AFO,Left,Right  Assistance: Minimal assistance  Quality of Gait: R steppage gait  Gait Deviations: Slow Danica  Distance: 50' x1, 20'x1  Comments: patient ambulated with AFOs on, R LE weakness more evident with walking, demonstrates steppage gait. Cues for walker safety. OT:                          Bed mobility  Supine to Sit: Stand by assistance (with increased effort)  Comment: sat EOB with SBA, increased effort needed to push self up to sitting.  Min A to don AFOs/shoes                    SLP:  Pt presents with mild cognitive deficits characterized by difficulties with inductive reasoning and immediate recall of 5 units. Pt. Presents with no dysarthria, no O/M deficits at this time. Pt reports cognition at baseline, no concerns w/ memory/cognition at this time. No further ST is recommended at this time. Verbal education provided.       Past Medical History:        Diagnosis Date    Closed fracture of bone of right foot March - April 2020    Dialysis patient [facility])     Kidney disease     On Dialysis    Type 1 diabetes mellitus ([address]        Past Surgical History:        Procedure Laterality Date    AV FISTULA CREATION Left        Allergies:    No Known Allergies     Current Medications:   Current Facility-Administered Medications: amLODIPine (NORVASC) tablet 10 mg, 10 mg, Oral, Daily  glucose chewable tablet 4 each, 4 tablet, Oral, PRN  glucagon (rDNA) injection 1 mg, 1 mg, IntraMUSCular, PRN  dextrose 5 % solution, 100 mL/hr, IntraVENous, PRN  dextrose bolus (hypoglycemia) 10% 125 mL, 125 mL, IntraVENous, PRN **OR** dextrose bolus (hypoglycemia) 10% 250 mL, 250 mL, IntraVENous, PRN  insulin glargine (LANTUS) injection vial 40 Units, 40 Units, SubCUTAneous, QAM  clopidogrel (PLAVIX) tablet 75 mg, 75 mg, Oral, Daily  potassium chloride (KLOR-CON M) extended release tablet 10 mEq, 10 mEq, Oral, Once  sodium chloride flush 0.9 % injection 10 mL, 10 mL, IntraVENous, 2 times per day  sodium chloride flush 0.9 % injection 10 mL, 10 mL, IntraVENous, PRN  0.9 % sodium chloride infusion, , IntraVENous, PRN  ondansetron (ZOFRAN-ODT) disintegrating tablet 4 mg, 4 mg, Oral, Q8H PRN **OR** ondansetron (ZOFRAN) injection 4 mg, 4 mg, IntraVENous, Q6H PRN  aspirin EC tablet 81 mg, 81 mg, Oral, Daily **OR** [DISCONTINUED] aspirin suppository 300 mg, 300 mg, Rectal, Daily  perflutren lipid microspheres (DEFINITY) injection 1.65 mg, 1.5 mL, IntraVENous, ONCE PRN  heparin (porcine) injection 5,000 Units, 5,000 Units, SubCUTAneous, 3 times per day  ammonium lactate (LAC-HYDRIN) 12 % lotion, , Topical, Daily  buPROPion (WELLBUTRIN XL) extended release tablet 150 mg, 150 mg, Oral, QAM  calcium acetate (PHOSLO) capsule 667 mg, 667 mg, Oral, TID WC  ezetimibe (ZETIA) tablet 10 mg, 10 mg, Oral, Daily  FLUoxetine (PROZAC) capsule 40 mg, 40 mg, Oral, Daily  rosuvastatin (CRESTOR) tablet 40 mg, 40 mg, Oral, Daily  traMADol (ULTRAM) tablet 50 mg, 50 mg, Oral, BID  lisinopril (PRINIVIL;ZESTRIL) tablet 10 mg, 10 mg, Oral, Daily    Social History:  Lives With: Family (grandparents)  Type of Home: House  Home Layout: One level  Home Access: Ramped entrance  Bathroom Shower/Tub: Walk-in shower  Bathroom Toilet: Handicap height  Bathroom Equipment: Shower chair,Grab bars in [address] [place] [address] [place]: Cane,4 wheeled walker  ADL Assistance: [facility] Responsibilities: No (grandparents)  Ambulation Assistance: Independent (no device inside the house,  longer distance use walker, wears B AFOs)  Transfer Assistance: Independent  Active : No  Patient's  Info: grandparents take patient  Occupation: On disability  Type of occupation: [employer] medical [employer]  Leisure & Hobbies: video emiliano, board games  Additional Comments: no falls  Social History     Socioeconomic History    Marital status: Single     Spouse name: None    Number of children: None    Years of education: None    Highest education level: None   Occupational History    None   Tobacco Use    Smoking status: Never Smoker    Smokeless tobacco: Never Used   Vaping Use    Vaping Use: Never used   Substance and Sexual Activity    Alcohol use: Never    Drug use: Never    Sexual activity: None   Other Topics Concern    None   Social History Narrative    None     Social Determinants of Health     Financial Resource Strain:     Difficulty of Paying Living Expenses: Not on file   Food Insecurity:     Worried About Running Out of Food in the Last Year: Not on file    Dinh schmidt Food in the Last Year: Not on file   Transportation Needs:     Lack of Transportation (Medical): Not on file    Lack of Transportation (Non-Medical): Not on file   Physical Activity:     Days of Exercise per Week: Not on file    Minutes of Exercise per Session: Not on file   Stress:     Feeling of Stress : Not on file   Social Connections:     Frequency of Communication with Friends and Family: Not on file    Frequency of Social Gatherings with Friends and Family: Not on file    Attends Jew Services: Not on file    Active Member of Clubs or Organizations: Not on file    Attends Club or Organization Meetings: Not on file    Marital Status: Not on file   Intimate Partner Violence:     Fear of Current or Ex-Partner: Not on file    Emotionally Abused: Not on file    Physically Abused: Not on file    Sexually Abused: Not on file   Housing Stability:     Unable to Pay for Housing in the Last Year: Not on file    Number of Places Lived in the Last Year: Not on file    Unstable Housing in the Last Year: Not on file       Family History:       Problem Relation Age of Onset    Diabetes Mother     Heart Disease Father     Diabetes Father     Cancer Paternal Grandmother     Heart Disease Maternal Great Grandfather        Diagnostics:    CBC:   Recent Labs     04/12/22  1217 04/13/22  0530   WBC 8.9 6.6   RBC 3.96* 3.70*   HGB 12.1* 11.4*   HCT 36.8* 34.9*   MCV 92.9 94.3   RDW 12.6 12.5    182     BMP:    Recent Labs     04/12/22  1217 04/12/22  1440 04/13/22  0530   GLUCOSE 136* 61 162*   BUN 19  --  27*   CREATININE 4.25*  --  5.91*   CALCIUM 9.4  --  8.6     --  138   K 3.5*  --  3.4*     --  102   CO2 24  --  24   ANIONGAP 14  --  12   LABGLOM 15*  --  10*   GFRAA 18*  --  12*   GFR       --           HbA1c:   Lab Results   Component Value Date    LABA1C 8.0 (H) 04/12/2022     BNP: No results for input(s): BNP in the last 72 hours.   PT/INR:   Recent Labs     04/12/22  1217 PROTIME 12.6   INR 0.9     APTT:   Recent Labs     04/12/22  1217   APTT 31.7     CARDIAC ENZYMES: No results for input(s): CKMB, CKMBINDEX, TROPONINT, TROPHS, TROPII in the last 72 hours. Invalid input(s): CKTOTAL;3   FASTING LIPID PANEL:  Lab Results   Component Value Date    CHOL 118 04/12/2022    HDL 61 04/12/2022    TRIG 66 04/12/2022     LIVER PROFILE:   Recent Labs     04/13/22  0530   AST 27   ALT 42*   BILITOT 0.28*   ALKPHOS 142*        Radiology:    CT HEAD 4/12/22  FINDINGS:   BRAIN/VENTRICLES: There is a small hypodensity noted in the the left corona   radiata, and extending inferiorly into the left basal ganglia.  This could be   a acute infarct. Zada Jensen is no acute intracranial hemorrhage, mass effect or   midline shift.  No abnormal extra-axial fluid collection.  The gray-white   differentiation is maintained.  There is no evidence of hydrocephalus.       ORBITS: The visualized portion of the orbits demonstrate no acute abnormality.       SINUSES: The visualized paranasal sinuses and mastoid air cells demonstrate   no acute abnormality.       SOFT TISSUES/SKULL:  No acute abnormality of the visualized skull or soft   tissues.           Impression   There is a small hypodensity involving the left corona radiata, and extending   inferiorly into the left basal ganglia.  This could wrist present a infarct. No evidence of any hemorrhage or mass-effect. MRI MRA HEAD 4/13/22  MRI BRAIN:       INTRACRANIAL STRUCTURES/VENTRICLES: Small acute lacunar type infarct in the   posterior left basal ganglia.  No mass effect or midline shift.  No evidence   of an acute intracranial hemorrhage.  Symmetric generalized cerebral and   cerebellar volume loss.  No anisha hydrocephalus.  The sellar/suprasellar   regions appear unremarkable.  Asymmetric signal in the left transverse and   sigmoid dural venous sinuses may relate to slow flow.  The axial FLAIR images   demonstrate pontine, bilateral periventricular and deep white matter signal   hyperintensities, commonly seen in the setting of chronic small vessel   ischemic disease.  Small old lacunar-type infarcts in the bilateral thalami   and left central octaviano.  Punctate focus of gradient blooming in the just left   of midline central octaviano may represent remote chronic microhemorrhage.       ORBITS: The visualized portion of the orbits demonstrate no acute abnormality.       SINUSES: The visualized paranasal sinuses and mastoid air cells are well   aerated.       BONES/SOFT TISSUES: The bone marrow signal intensity appears normal. The soft   tissues demonstrate no acute abnormality.           MRA NECK:       Image quality is degraded by motion artifact.       AORTIC ARCH/ARCH VESSELS: No dissection or arterial injury.  No significant   stenosis of the brachiocephalic or subclavian arteries.       CAROTID ARTERIES: No dissection, arterial injury, or hemodynamically   significant stenosis by NASCET criteria.       VERTEBRAL ARTERIES: No dissection, arterial injury, or significant stenosis.           MRA HEAD:       ANTERIOR CIRCULATION: No significant stenosis of the intracranial internal   carotid, anterior cerebral, or middle cerebral arteries.       POSTERIOR CIRCULATION: No significant stenosis of the vertebral, basilar, or   posterior cerebral arteries.           Impression   1.  Small acute lacunar type infarct in the posterior left basal ganglia.  No   acute intracranial hemorrhage.       2.  Chronic small vessel ischemic disease.  Small bilateral old lacunar   infarcts.       3.  Asymmetric signal in the left transverse and sigmoid dural venous sinuses   may relate to slow flow.  Underlying thrombosis is difficult to exclude.       4.  Unremarkable MRA head.       5.  Unremarkable MRA neck.          Physical Exam:  BP (!) 141/59   Pulse 66   Temp 98.2 °F (36.8 °C) (Oral)   Resp 16   Ht 5' 6.5\" (1.689 m)   Wt 178 lb (80.7 kg)   SpO2 97%   BMI 28.30 kg/m² GEN: Well developed, well nourished, no acute distress. HEENT: NCAT, PERRL, EOMI, mucous membranes pink and moist.  RESP: Lungs clear to auscultation. No rales or rhonchi. Respirations WNL and unlabored. CV: Regular rate rhythm. No murmurs, rubs, or gallops. ABD: soft, non-distended, non-tender. BS+ and equal.  NEURO: A&O x 3. Sensation intact to light touch. DTRs 2+  MSK: Functional ROM. Muscle tone and bulk are normal bilaterally. Strength RUE key muscles 4-/5 with some ataxia of RUE and impaired coordination. RLE key muscles 4/5. Key muscles LUE and LLE 5/5. EXT: No calf tenderness to palpation or edema BLEs. SKIN: Warm dry and intact with good turgor. PSYCH: Mood WNL. Affect WNL. Appropriately interactive. Impression:  1. Ischemic CVA with R dominant paresthesia  2. HTN/HLD  3. ESRD  4. DM II with nephropathy and neuropathy  5. Bipolar affective disorder  6. GERD    Recommendations:    1. Diagnosis:  Ischemic CVA  2. Therapy: Has PT/SLP needs. Await full OT eval  3. Medical Necessity: As above  4. Support: Has supportive grandparents  11. Rehab Recommendation: The patient will benefit from acute inpatient rehabilitation once medically stable per primary and consulting services. Anticipate he will be able to tolerate 3 hours of therapy per day or 900 minutes per week in rehabilitation. The patient requires multidisciplinary rehabilitation treatment including medical management by a PM&R physician, 24 hour rehabilitation nursing, Physical/Occupational therapy, speech therapy, rehabilitation social work, and nutrition services. Patient and family also require education in post-hospital precautions and home exercise routine, adaptive techniques and deficit compensation strategies, strengthening and conditioning, equipment prescription and instructions in use. 6. DVT Prophylaxis: on heparin TID    It was my pleasure to evaluate [facility] today. Please call with questions.     LAI RAMOS, MD        This note is created with the assistance of a speech recognition program.  While intending to generate a document that actually reflects the content of the visit, the document can still have some errors including those of syntax and sound a like substitutions which may escape proof reading. In such instances, actual meaning can be extrapolated by contextual diversion.

## 2022-04-13 NOTE — CONSULTS
[facility]   IN-PATIENT SERVICE      NEUROLOGY CONSULT  NOTE            Date:   4/13/2022  Patient name:  Ramona Dumont  Date of admission:  4/12/2022  YOB: 1975      Chief Complaint:     Chief Complaint   Patient presents with    Numbness     Right side leg and arm numbness since yesterday morning at 0530       Reason for Consult:      Acute stroke left posterior basal ganglia    History of Present Illness: The patient is a 51 y.o. male who presents with Numbness (Right side leg and arm numbness since yesterday morning at 0530)  . The patient was seen and examined and the chart was reviewed. Patient is right handed. Information obtained from patient. Patient's grandfather is in the room. Also chart review. Patient noted onset of right arm and leg weakness and numbness. This was noted approximately 3 days ago or 2 days prior to admission. He presented to the emergency room for evaluation. He delayed being evaluated because he did not think that he had had a stroke. Telestroke evaluation on 4/12/2022 at 2:44 PM indicated NIH 3 level stroke based on positive CT, positive MRI and MRA posterior left basal ganglia infarct. .  Additional impression on MRA suggest asymmetric flow in the left transverse and sigmoid dural venous sinuses thought to be perhaps related to slow flow. Underlying thrombus was difficult to exclude. Should be noted clinical presentation does not support underlying thrombus at this time. Patient is a diabetic and has end-stage renal disease. He is on hemodialysis. He has known hypertension. MRI also demonstrated multiple small old lacunar infarcts and small vessel cerebrovascular disease. Patient was loaded with Plavix and aspirin. He is currently taking Crestor. Patient also notes that he has diabetic peripheral neuropathy.       Past Medical History:     Past Medical History:   Diagnosis Date    Closed fracture of bone of right foot March - April 2020    Dialysis patient [facility])     Kidney disease     On Dialysis    Type 1 diabetes mellitus ([facility] [address]         Past Surgical History:     Past Surgical History:   Procedure Laterality Date    AV FISTULA CREATION Left         Medications Prior to Admission:     Prior to Admission medications    Medication Sig Start Date End Date Taking? Authorizing Provider   PEG 3350-KCl-NaBcb-NaCl-NaSulf (PEG-3350/ELECTROLYTES) 236 g SOLR  3/10/22   Historical Provider, MD   dexamethasone (DECADRON) 2 MG tablet dexamethasone 2 mg tablet    Historical Provider, MD   gabapentin (NEURONTIN) 300 MG capsule gabapentin 300 mg capsule    Historical Provider, MD   FLUoxetine (PROZAC) 20 MG capsule Take 40 mg by mouth daily    Historical Provider, MD   fluticasone (FLONASE) 50 MCG/ACT nasal spray 1 spray by Each Nostril route daily for 10 days 1/2/22 1/12/22  DANIEL Marley CNP   promethazine (PHENERGAN) 25 MG tablet Take 1 tablet by mouth every 6 hours as needed for Nausea 1/2/22   DANIEL Marley CNP   ammonium lactate (LAC-HYDRIN) 12 % lotion Apply topically daily. Patient taking differently: Apply topically daily to BLE 11/1/21   Azucena Thomason, DPM   traMADol (ULTRAM) 50 MG tablet Take 50 mg by mouth 2 times daily.   4/9/21   Historical Provider, MD   furosemide (LASIX) 20 MG tablet Take 1 tablet by mouth daily 4/27/21   Vineet Peguero,    insulin glargine (LANTUS) 100 UNIT/ML injection vial Inject 45 units in the morning and 5 units SQ at  night 3/29/21   Vineet Peguero, DO   buPROPion (WELLBUTRIN XL) 150 MG extended release tablet Take 1 tablet by mouth every morning 3/25/21   Vineet Peguero, DO   metoprolol succinate (TOPROL XL) 200 MG extended release tablet Take 1 tablet by mouth daily 2/18/21   Vineet Peguero, DO   insulin lispro (HUMALOG) 100 UNIT/ML injection vial Inject into the skin 3 times daily (before meals) Per sliding scale, by 2s    Historical Provider, MD   lisinopril (PRINIVIL;ZESTRIL) 20 MG tablet Take 10 mg by mouth daily     Historical Provider, MD   Rosuvastatin Calcium 40 MG CPSP Take 40 mg by mouth daily    Historical Provider, MD   ezetimibe (ZETIA) 10 MG tablet Take 10 mg by mouth daily    Historical Provider, MD   omeprazole (PRILOSEC) 40 MG delayed release capsule Take 40 mg by mouth daily    Historical Provider, MD   amLODIPine (NORVASC) 10 MG tablet Take 10 mg by mouth daily    Historical Provider, MD   aspirin 81 MG EC tablet Take 81 mg by mouth daily    Historical Provider, MD   magnesium oxide (MAG-OX) 400 MG tablet Take 400 mg by mouth daily  Patient not taking: Reported on 4/13/2022    Historical Provider, MD   calcium acetate 667 MG TABS Take 667 mg by mouth 3 times daily (with meals)     Historical Provider, MD   vitamin B-12 (CYANOCOBALAMIN) 500 MCG tablet Take 500 mcg by mouth daily    Historical Provider, MD        Allergies:     Patient has no known allergies. Social History:     Tobacco:    reports that he has never smoked. He has never used smokeless tobacco.  Alcohol:      reports no history of alcohol use. Drug Use:  reports no history of drug use. Family History:     Family History   Problem Relation Age of Onset    Diabetes Mother     Heart Disease Father     Diabetes Father     Cancer Paternal Grandmother     Heart Disease Maternal Great Grandfather        Review of Systems:       Constitutional Negative for fever and chills   HEENT Negative for ear discharge, ear pain, nosebleed. Negative for photophobia, headache. Musculoskeletal Negative for joint pain, negative for myalgia   Respiratory Negative for cough, dyspnea. Negative for hemoptysis and sputum. Cardiovascular Negative for palpitations, chest pain. Negative for orthopnea, claudication. Gastrointestinal Negative for nausea, vomiting. Negative for abdominal pain, diarrhea, blood in stool   Genitourinary  Negative for dysuria, hematuria. Negative for suprapubic pain. Negative for bladder incontinence.     Skin Negative for rash or itching   Hematology Negative for ecchymosis, anemia   Psychiatric Negative for anxiety, depression. Negative for suicidal ideation, hallucinations         Physical Exam:   BP (!) 172/81   Pulse 70   Temp 98.1 °F (36.7 °C) (Oral)   Resp 16   Ht 5' 6.5\" (1.689 m)   Wt 178 lb (80.7 kg)   SpO2 100%   BMI 28.30 kg/m²   Temp (24hrs), Av.8 °F (36.6 °C), Min:97.7 °F (36.5 °C), Max:98.1 °F (36.7 °C)        General examination:      General Appearance:  alert and in no acute distress  HEENT: Normocephalic, atraumatic  Neck: supple  Lungs:  Respirations unlabored, chest wall no deformity  Cardiovascular: normal rate, regular rhythm  Abdomen: Soft, nontender, nondistended  Skin: No gross lesions, rashes, bruising or bleeding on exposed skin area  Extremities:  peripheral pulses palpable, no cyanosis, clubbing or edema  Psych: normal affect      Neurological examination:    Mental status   Alert and oriented x 3; following all commands; speech is fluent, no dysarthria, aphasia. Memory is 1/3. Insight and judgment appears to be adequate. Cranial nerves   II - visual fields intact to confrontation; pupils reactive. Pupil 4 mm  III, IV, VI - extraocular muscles intact; no MEDINA; no nystagmus; no ptosis   V - normal facial sensation                                                               VII -mild right central 7th nerve facial palsy with drooping corner of mouth. VIII - intact hearing                                                                             IX, X -normal phonation                                              XI -normal head turning                                                       XII - midline tongue      Motor function  Strength: 4/5 RUE, 4/5 RLE, 5/5 LUE, 5/5  LLE   marked atrophy of left first dorsal interossei which is old.   Patient does indicate that he has had some neck pain in the past.  He did not particularly indicate an ulnar nerve problem at this time. No sensory changes associated with changes. Additional findings include the fact that the patient has bilateral foot drop and wears bilateral AFOs for that. This is a longstanding problem related to diabetic peripheral neuropathy. Sensory function Intact to touch vibration in upper extremities. Lower extremities have decreased tactile vibratory sense. Cerebellar Intact finger-nose-finger testing. No ataxia noted   Reflex function  suppressed symmetric throughout . Neutral plantar response bilaterally. Gait                   right leg weakness evident. Diagnostics:      Laboratory Testing:  CBC:   Recent Labs     04/12/22  1217 04/13/22  0530   WBC 8.9 6.6   HGB 12.1* 11.4*    182     BMP:    Recent Labs     04/12/22  1217 04/12/22  1440 04/13/22  0530     --  138   K 3.5*  --  3.4*     --  102   CO2 24  --  24   BUN 19  --  27*   CREATININE 4.25*  --  5.91*   GLUCOSE 136* 61 162*         Lab Results   Component Value Date    CHOL 118 04/12/2022    LDLCHOLESTEROL 44 04/12/2022    HDL 61 04/12/2022    TRIG 66 04/12/2022    ALT 42 (H) 04/13/2022    AST 27 04/13/2022    INR 0.9 04/12/2022    LABA1C 7.5 (H) 07/19/2021    [id number] 1630 (H) 01/08/2021       No results found for: PHENYTOIN, PHENYTOIN, VALPROATE, CBMZ      Impression:      1. Acute stroke, MCA distribution, posterior left basal ganglia. 2. Multiple old small lacunar strokes also noted. 3. Cerebral microvascular disease noted. 4. Clinical manifestation is right mild hemiparesis face arm leg. 5. Diabetic peripheral neuropathy, sensory changes in feet and patient wears bilateral AFOs for bilateral foot drop. 6. Atrophy of left first dorsal interosseous muscle. Possible cervical DJD versus left ulnar nerve compromise.   7. Patient may be a fall risk due to right parapsoas    Plan:      Continue aspirin 81 mg p.o. daily   Plavix 75 mg daily for 21 days.  Patient on Crestor already.  PT OT to assist in gait rehab. Already in progress.  I will follow this case with you. Thank you for this very interesting consultation.       Electronically signed by Mariah Woodson MD on 4/13/2022 at 10:27 AM      Mariah Woodson MD  [facility]  Neurology

## 2022-04-13 NOTE — CARE COORDINATION
Discharge planning    Spoke with patient regarding therapy evaluation of ARU and he is declining both ARU and SNF> explained if changes his mind that both [facility] and [facility] can accommodate his HD    1400  Father out to desk inquiring about the ARU. Explained difference between ARU and SNF. Father will speak with son and asked writer to follow up with patient tomorrow. They discuss outpatient therapy at [facility] vs ARU.

## 2022-04-13 NOTE — PROGRESS NOTES
Speech Language Pathology  Facility/Department: [facility] PROGRESSIVE CARE  Initial Speech/Language/Cognitive Assessment    NAME: Colletta Flavors  : 1975   MRN: [id number]  ADMISSION DATE: 2022  ADMITTING DIAGNOSIS: has ESRD (end stage renal disease) on dialysis ([facility] [address] Hypertension; Hyperlipidemia; Acute pain of left knee; Bipolar affective disorder ([facility] [address] Coronary atherosclerosis; COVID-19; Cerebrovascular accident (CVA) ([facility] Type 2 diabetes mellitus with kidney complication, with long[facility]term [facility] Neuropathy of both feet; GERD (gastroesophageal reflux disease); and Right sided weakness on their problem list.    Date of Eval: 2022   Evaluating Therapist: NICK Izquierdo    RECENT RESULTS  CT OF HEAD/MRI: 2022    Impression   1.  Small acute lacunar type infarct in the posterior left basal ganglia.  No   acute intracranial hemorrhage.       2.  Chronic small vessel ischemic disease.  Small bilateral old lacunar   infarcts.       3.  Asymmetric signal in the left transverse and sigmoid dural venous sinuses   may relate to slow flow.  Underlying thrombosis is difficult to exclude.       4.  Unremarkable MRA head.       5.  Unremarkable MRA neck. Primary Complaint: Patient presents to the emergency room today following his morning dialysis treatment with complaints of right hand, arm (up to the elbow), and leg numbness and tingling with slight midsternal chest pressure. The patient states that these symptoms started yesterday morning at 0530. The weakness, numbness and tingling continue, however the chest pressure has subsided. He states that he has never experienced these symptoms before yesterday. The patient will use a walker and cane to assist with ambulation, and his right leg was causing him to become more unstable.   Patient takes all of his medications regularaly and continues to follow with Lazar[facility]on of [facility] and is being evaluated for a kidney transplant. The patient has been on HD (T, Th, S schedule) for the past 3 years using his left upper arm fistula. Patient denies any fever, chills, cough, shortness of breath, nausea and vomiting. Pain:  Pain Assessment  Pain Assessment: 0-10  Pain Level: 5    Assessment:  Pt presents with mild cognitive deficits characterized by difficulties with inductive reasoning and immediate recall of 5 units. Pt. Presents with no dysarthria, no O/M deficits at this time. Pt reports cognition at baseline, no concerns w/ memory/cognition at this time. No further ST is recommended at this time. Verbal education provided. Recommendations:  Requires SLP Intervention: No  D/C Recommendations: No therapy recommended at discharge. Patient/family involved in developing goals and treatment plan: yes    Subjective:   Previous level of function and limitations:   General  Chart Reviewed: Yes  Family / Caregiver Present: Yes     Vision  Vision: Impaired  Vision Exceptions: Wears glasses at all times  Hearing  Hearing: Within functional limits    Objective:  Oral/Motor  Oral Motor: Within functional limits    Expression  Primary Mode of Expression: Verbal    Motor Speech  Motor Speech: Within Functional Limits    Cognition:   Orientation  Overall Orientation Status: Within Normal Limits  Memory  Memory: Exceptions to [facility]  Short-term Memory:  (Recall w/ Distractions 3 units: 3/3, 3/3)  Working Memory: Mild (Immediate recall 3 units: 3/3, 3/3; 5 units: 4/5)  Problem Solving  Problem Solving: Exceptions to [facility]  Verbal Reasoning Skills: Mild (Inductive reasonin/6; Compare/Contrast: 4/4; Deductive reasonin/4)  Abstract Reasoning  Abstract Reasoning: Within Functional Limits  Safety/Judgement  Safety/Judgement: Within Functional Limits  Verbal Sequencing: WFL  Word Associations: WFL    Prognosis:  Individuals consulted  Consulted and agree with results and recommendations: Patient; Family member    Education:  Patient Education: yes  Patient Education Response: Verbalizes understanding          Therapy Time:   Individual Concurrent Group Co-treatment   Time In [address] [place] [address]         Time Out 0953         Minutes [facility], [facility]  4/13/2022 9:52 AM

## 2022-04-13 NOTE — PLAN OF CARE
Problem: ACTIVITY INTOLERANCE/IMPAIRED MOBILITY  Goal: Mobility/activity is maintained at optimum level for patient  Outcome: Ongoing   Patient admitted with CVA. PT/OT on board. Some right sided deficit post stroke. Speech clear. No issues noted swallowing. Neuro consulted. Patient has chronic danielle foot drop (not new) braces BLE in place.

## 2022-04-14 VITALS
SYSTOLIC BLOOD PRESSURE: 170 MMHG | TEMPERATURE: 97.3 F | HEART RATE: 75 BPM | BODY MASS INDEX: 31.86 KG/M2 | RESPIRATION RATE: 18 BRPM | WEIGHT: 203 LBS | OXYGEN SATURATION: 100 % | HEIGHT: 67 IN | DIASTOLIC BLOOD PRESSURE: 88 MMHG

## 2022-04-14 LAB
ALBUMIN SERPL-MCNC: 3.4 G/DL (ref 3.5–5.2)
ALP BLD-CCNC: 137 U/L (ref 40–129)
ALT SERPL-CCNC: 44 U/L (ref 5–41)
ANION GAP SERPL CALCULATED.3IONS-SCNC: 15 MMOL/L (ref 9–17)
AST SERPL-CCNC: 38 U/L
BILIRUB SERPL-MCNC: 0.34 MG/DL (ref 0.3–1.2)
BUN BLDV-MCNC: 40 MG/DL (ref 6–20)
BUN/CREAT BLD: 5 (ref 9–20)
CALCIUM SERPL-MCNC: 8.5 MG/DL (ref 8.6–10.4)
CHLORIDE BLD-SCNC: 101 MMOL/L (ref 98–107)
CO2: 21 MMOL/L (ref 20–31)
CREAT SERPL-MCNC: 7.64 MG/DL (ref 0.7–1.2)
GFR AFRICAN AMERICAN: 9 ML/MIN
GFR NON-AFRICAN AMERICAN: 8 ML/MIN
GFR SERPL CREATININE-BSD FRML MDRD: ABNORMAL ML/MIN/{1.73_M2}
GLUCOSE BLD-MCNC: 121 MG/DL (ref 75–110)
GLUCOSE BLD-MCNC: 150 MG/DL (ref 70–99)
GLUCOSE BLD-MCNC: 170 MG/DL (ref 75–110)
GLUCOSE BLD-MCNC: 245 MG/DL (ref 75–110)
GLUCOSE BLD-MCNC: 60 MG/DL (ref 75–110)
GLUCOSE BLD-MCNC: 69 MG/DL (ref 75–110)
HCT VFR BLD CALC: 33.3 % (ref 40.7–50.3)
HEMOGLOBIN: 10.8 G/DL (ref 13–17)
MCH RBC QN AUTO: 30.8 PG (ref 25.2–33.5)
MCHC RBC AUTO-ENTMCNC: 32.4 G/DL (ref 28.4–34.8)
MCV RBC AUTO: 94.9 FL (ref 82.6–102.9)
NRBC AUTOMATED: 0 PER 100 WBC
PDW BLD-RTO: 12.4 % (ref 11.8–14.4)
PLATELET # BLD: 208 K/UL (ref 138–453)
PMV BLD AUTO: 10 FL (ref 8.1–13.5)
POTASSIUM SERPL-SCNC: 4.1 MMOL/L (ref 3.7–5.3)
RBC # BLD: 3.51 M/UL (ref 4.21–5.77)
SODIUM BLD-SCNC: 137 MMOL/L (ref 135–144)
TOTAL PROTEIN: 5.3 G/DL (ref 6.4–8.3)
WBC # BLD: 7.4 K/UL (ref 3.5–11.3)

## 2022-04-14 PROCEDURE — 36415 COLL VENOUS BLD VENIPUNCTURE: CPT

## 2022-04-14 PROCEDURE — 2500000003 HC RX 250 WO HCPCS: Performed by: NURSE PRACTITIONER

## 2022-04-14 PROCEDURE — 6370000000 HC RX 637 (ALT 250 FOR IP): Performed by: PSYCHIATRY & NEUROLOGY

## 2022-04-14 PROCEDURE — 6370000000 HC RX 637 (ALT 250 FOR IP): Performed by: NURSE PRACTITIONER

## 2022-04-14 PROCEDURE — 5A1D70Z PERFORMANCE OF URINARY FILTRATION, INTERMITTENT, LESS THAN 6 HOURS PER DAY: ICD-10-PCS | Performed by: INTERNAL MEDICINE

## 2022-04-14 PROCEDURE — 80053 COMPREHEN METABOLIC PANEL: CPT

## 2022-04-14 PROCEDURE — 6360000002 HC RX W HCPCS: Performed by: NURSE PRACTITIONER

## 2022-04-14 PROCEDURE — 82947 ASSAY GLUCOSE BLOOD QUANT: CPT

## 2022-04-14 PROCEDURE — 2580000003 HC RX 258: Performed by: NURSE PRACTITIONER

## 2022-04-14 PROCEDURE — 6370000000 HC RX 637 (ALT 250 FOR IP): Performed by: FAMILY MEDICINE

## 2022-04-14 PROCEDURE — 85027 COMPLETE CBC AUTOMATED: CPT

## 2022-04-14 PROCEDURE — 90935 HEMODIALYSIS ONE EVALUATION: CPT

## 2022-04-14 PROCEDURE — 99239 HOSP IP/OBS DSCHRG MGMT >30: CPT | Performed by: FAMILY MEDICINE

## 2022-04-14 PROCEDURE — 99232 SBSQ HOSP IP/OBS MODERATE 35: CPT | Performed by: PSYCHIATRY & NEUROLOGY

## 2022-04-14 RX ORDER — INSULIN GLARGINE 100 [IU]/ML
30 INJECTION, SOLUTION SUBCUTANEOUS EVERY MORNING
Qty: 10 ML | Refills: 3 | Status: ON HOLD | DISCHARGE
Start: 2022-04-15 | End: 2022-05-26 | Stop reason: DRUGHIGH

## 2022-04-14 RX ORDER — LISINOPRIL 20 MG/1
20 TABLET ORAL DAILY
Qty: 30 TABLET | Refills: 3 | Status: ON HOLD | DISCHARGE
Start: 2022-04-15 | End: 2022-05-26 | Stop reason: DRUGHIGH

## 2022-04-14 RX ORDER — LISINOPRIL 20 MG/1
20 TABLET ORAL DAILY
Status: DISCONTINUED | OUTPATIENT
Start: 2022-04-14 | End: 2022-04-14 | Stop reason: HOSPADM

## 2022-04-14 RX ORDER — INSULIN GLARGINE 100 [IU]/ML
30 INJECTION, SOLUTION SUBCUTANEOUS EVERY MORNING
Status: DISCONTINUED | OUTPATIENT
Start: 2022-04-14 | End: 2022-04-14 | Stop reason: HOSPADM

## 2022-04-14 RX ORDER — CLOPIDOGREL BISULFATE 75 MG/1
75 TABLET ORAL DAILY
Qty: 30 TABLET | Refills: 3 | Status: ON HOLD
Start: 2022-04-15 | End: 2022-05-27 | Stop reason: HOSPADM

## 2022-04-14 RX ADMIN — ASPIRIN 81 MG: 81 TABLET, COATED ORAL at 09:30

## 2022-04-14 RX ADMIN — LISINOPRIL 20 MG: 20 TABLET ORAL at 15:58

## 2022-04-14 RX ADMIN — FLUOXETINE 40 MG: 20 CAPSULE ORAL at 09:30

## 2022-04-14 RX ADMIN — EZETIMIBE 10 MG: 10 TABLET ORAL at 09:30

## 2022-04-14 RX ADMIN — INSULIN GLARGINE 30 UNITS: 100 INJECTION, SOLUTION SUBCUTANEOUS at 09:32

## 2022-04-14 RX ADMIN — AMLODIPINE BESYLATE 10 MG: 10 TABLET ORAL at 15:57

## 2022-04-14 RX ADMIN — ROSUVASTATIN 40 MG: 40 TABLET, FILM COATED ORAL at 09:30

## 2022-04-14 RX ADMIN — HEPARIN SODIUM 5000 UNITS: 5000 INJECTION INTRAVENOUS; SUBCUTANEOUS at 05:19

## 2022-04-14 RX ADMIN — CALCIUM ACETATE 667 MG: 667 CAPSULE ORAL at 09:30

## 2022-04-14 RX ADMIN — Medication: at 09:29

## 2022-04-14 RX ADMIN — TRAMADOL HYDROCHLORIDE 50 MG: 50 TABLET, COATED ORAL at 09:30

## 2022-04-14 RX ADMIN — Medication 4 EACH: at 03:22

## 2022-04-14 RX ADMIN — CLOPIDOGREL BISULFATE 75 MG: 75 TABLET ORAL at 09:30

## 2022-04-14 RX ADMIN — BUPROPION HYDROCHLORIDE 150 MG: 150 TABLET, EXTENDED RELEASE ORAL at 09:30

## 2022-04-14 RX ADMIN — SODIUM CHLORIDE, PRESERVATIVE FREE 10 ML: 5 INJECTION INTRAVENOUS at 09:31

## 2022-04-14 ASSESSMENT — ENCOUNTER SYMPTOMS
BLOOD IN STOOL: 0
SHORTNESS OF BREATH: 0
ABDOMINAL PAIN: 0
NAUSEA: 0
COUGH: 0
DIARRHEA: 0
CHEST TIGHTNESS: 0
CONSTIPATION: 0
WHEEZING: 0
VOMITING: 0

## 2022-04-14 ASSESSMENT — PAIN SCALES - GENERAL: PAINLEVEL_OUTOF10: 1

## 2022-04-14 NOTE — PROGRESS NOTES
Treatment time: 210 minutes    Net UF: 1500 mL    Pre weight: 92.1 kg  Post weight: n/a  EDW: n/a    Access used: AVF L UE  Access function: good    Medications or blood products given: none    Regular outpatient schedule: [facility]    Summary of response to treatment: Patient tolerated treatment well, blood returned to patient, bleeding time <10 minutes, Dr. Jennifer Christy evaluated patient during dialysis, no signs or symptoms of distress noted during treatment, report given to [facility]. Copy of dialysis treatment record placed in chart, to be scanned into EMR.

## 2022-04-14 NOTE — PLAN OF CARE
Problem: Fluid Volume - Imbalance:  Goal: Absence of imbalanced fluid volume signs and symptoms  Description: Absence of imbalanced fluid volume signs and symptoms  Outcome: Ongoing   Plans for dialysis today. Vitals as charted. Labs ordered. I&O's. Fluid restriction in place. Nephrology on board.

## 2022-04-14 NOTE — CARE COORDINATION
Social work: Patient to [employer] to [facility] via [facility] at 4:00pm.  # for RN report: [phone]. Completed CARISSA faxed to [phone]. Informed RN, pt, and facility of dc time, agreeable to plan.

## 2022-04-14 NOTE — PROGRESS NOTES
Reason for Consult:  End stage renal disease. Requesting Physician:  Yoli Sierra MD    Seen while receiving HD treatment   BP medication held before treatment   No new complaints    HISTORY OF PRESENT ILLNESS:    The patient is a 51 y.o. male who normally undergoes dialysis at [facility] place per Tuesday Thursday Saturday schedule under the care of Dr. Gallo Baker presented with right-sided weakness, work-up was remarkable for small acute lacunar type infarct in the posterior left basal ganglia. Symptoms started Monday morning  Reported some improvement in his leg weakness. Patient has a history of COVID infection back and January       Prior to Admission medications    Medication Sig Start Date End Date Taking? Authorizing Provider   insulin glargine (LANTUS) 100 UNIT/ML injection vial Inject 30 Units into the skin every morning 4/15/22  Yes Yoli Sierra MD   lisinopril (PRINIVIL;ZESTRIL) 20 MG tablet Take 1 tablet by mouth daily 4/15/22  Yes Yoli Sierra MD   clopidogrel (PLAVIX) 75 MG tablet Take 1 tablet by mouth daily for 19 doses 4/15/22 5/4/22 Yes Yoli Sierra MD   magnesium oxide (MAG-OX) 400 MG tablet Take 400 mg by mouth daily   Yes Historical Provider, MD   magnesium hydroxide (MILK OF MAGNESIA) 400 MG/5ML suspension Take 15 mLs by mouth daily as needed for Constipation   Yes Historical Provider, MD   insulin aspart (NOVOLOG) 100 UNIT/ML injection vial Inject into the skin 3 times daily (before meals) SLIDING SCALE   Yes Historical Provider, MD   gabapentin (NEURONTIN) 300 MG capsule Take 300 mg by mouth daily. Historical Provider, MD   FLUoxetine (PROZAC) 20 MG capsule Take 40 mg by mouth daily    Historical Provider, MD   ammonium lactate (LAC-HYDRIN) 12 % lotion Apply topically daily.   Patient taking differently: Apply topically daily to BLE 11/1/21   Raymondo Galeazzi, DPM   furosemide (LASIX) 20 MG tablet Take 1 tablet by mouth daily 4/27/21   Angelito Sun DO   buPROPion (WELLBUTRIN XL) 150 MG extended release tablet Take 1 tablet by mouth every morning 3/25/21   Warden Aase,    Rosuvastatin Calcium 40 MG CPSP Take 40 mg by mouth daily    Historical Provider, MD   ezetimibe (ZETIA) 10 MG tablet Take 10 mg by mouth daily    Historical Provider, MD   omeprazole (PRILOSEC) 40 MG delayed release capsule Take 40 mg by mouth daily    Historical Provider, MD   amLODIPine (NORVASC) 10 MG tablet Take 10 mg by mouth daily    Historical Provider, MD   aspirin 81 MG EC tablet Take 81 mg by mouth daily    Historical Provider, MD   calcium acetate 667 MG TABS Take 667 mg by mouth 3 times daily (with meals)     Historical Provider, MD   vitamin B-12 (CYANOCOBALAMIN) 500 MCG tablet Take 500 mcg by mouth daily    Historical Provider, MD       Scheduled Meds:   lisinopril  20 mg Oral Daily    insulin glargine  30 Units SubCUTAneous QAM    amLODIPine  10 mg Oral Daily    clopidogrel  75 mg Oral Daily    sodium chloride flush  10 mL IntraVENous 2 times per day    aspirin  81 mg Oral Daily    heparin (porcine)  5,000 Units SubCUTAneous 3 times per day    ammonium lactate   Topical Daily    buPROPion  150 mg Oral QAM    calcium acetate  667 mg Oral TID WC    ezetimibe  10 mg Oral Daily    FLUoxetine  40 mg Oral Daily    rosuvastatin  40 mg Oral Daily    traMADol  50 mg Oral BID     Continuous Infusions:   dextrose      sodium chloride          Physical Exam:  Vitals:    04/14/22 1230 04/14/22 1300 04/14/22 1330 04/14/22 1400   BP: (!) 166/86 (!) 164/85 (!) 170/85 (!) 171/83   Pulse: 69 69 70 72   Resp:       Temp:       TempSrc:       SpO2:       Weight:       Height:         I/O last 3 completed shifts: In: 746 [P.O.:965; I.V.:10]  Out: -     General:  Awake, alert, not in distress. Appears to be stated age. HEENT: Atraumatic, normocephalic. Anicteric sclera. Pink and moist oral mucosa. Neck supple. No JVD.   Chest: Bilateral air entry, clear to auscultation, no wheezing, rhonchi or rales. Cardiovascular: RRR, S1S2, no murmur, rub or gallop. No lower extremity edema. Abdomen: Soft, non tender to palpation. Musculoskeletal: No cyanosis or clubbing. Integumentary: Pink, warm and dry. Free from rash or lesions. CNS: Oriented to person, place and time. Speech clear. Right upper and lower extremities weakness  Psych:  Answering questions appropriately, appropriate mood and affect      Data:  CBC:   Lab Results   Component Value Date    WBC 7.4 04/14/2022    HGB 10.8 (L) 04/14/2022    HCT 33.3 (L) 04/14/2022    MCV 94.9 04/14/2022     04/14/2022     BMP:    Lab Results   Component Value Date     04/14/2022     04/13/2022     04/12/2022    K 4.1 04/14/2022    K 3.4 (L) 04/13/2022    K 3.5 (L) 04/12/2022     04/14/2022     04/13/2022     04/12/2022    CO2 21 04/14/2022    CO2 24 04/13/2022    CO2 24 04/12/2022    BUN 40 (H) 04/14/2022    BUN 27 (H) 04/13/2022    BUN 19 04/12/2022    CREATININE 7.64 (HH) 04/14/2022    CREATININE 5.91 (HH) 04/13/2022    CREATININE 4.25 (H) 04/12/2022    GLUCOSE 150 (H) 04/14/2022    GLUCOSE 162 (H) 04/13/2022    GLUCOSE 61 04/12/2022     CMP:   Lab Results   Component Value Date     04/14/2022    K 4.1 04/14/2022     04/14/2022    CO2 21 04/14/2022    BUN 40 04/14/2022    CREATININE 7.64 04/14/2022    GLUCOSE 150 04/14/2022    CALCIUM 8.5 04/14/2022    PROT 5.3 04/14/2022    LABALBU 3.4 04/14/2022    BILITOT 0.34 04/14/2022    ALKPHOS 137 04/14/2022    AST 38 04/14/2022    ALT 44 04/14/2022      Hepatic:   Lab Results   Component Value Date    AST 38 04/14/2022    AST 27 04/13/2022    AST 36 01/05/2022    ALT 44 (H) 04/14/2022    ALT 42 (H) 04/13/2022    ALT 38 01/05/2022    BILITOT 0.34 04/14/2022    BILITOT 0.28 (L) 04/13/2022    BILITOT 0.43 01/05/2022    ALKPHOS 137 (H) 04/14/2022    ALKPHOS 142 (H) 04/13/2022    ALKPHOS 110 01/05/2022     BNP: No results found for: BNP  Lipids:   Lab Results   Component Value Date    CHOL 118 04/12/2022    HDL 61 04/12/2022     INR:   Lab Results   Component Value Date    INR 0.9 04/12/2022     PTH: No results found for: PTH  Phosphorus:    Lab Results   Component Value Date    PHOS 2.9 04/12/2022     Ionized Calcium: No results found for: IONCA  Magnesium:   Lab Results   Component Value Date    MG 2.0 04/13/2022     Albumin:   Lab Results   Component Value Date    LABALBU 3.4 04/14/2022       Radiology:   Brain MRI  1.  Small acute lacunar type infarct in the posterior left basal ganglia.  No   acute intracranial hemorrhage.       2.  Chronic small vessel ischemic disease.  Small bilateral old lacunar   infarcts.       3.  Asymmetric signal in the left transverse and sigmoid dural venous sinuses   may relate to slow flow.  Underlying thrombosis is difficult to exclude.       4.  Unremarkable MRA head.       5.  Unremarkable MRA neck.           Assessment:  1. End stage renal disease. 2.  Anemia of ESRD. 3.  Small acute lacunar infarct involving the posterior left basal ganglia  4. Hypertension  5. Hypokalemia      Plan:  Seen while receiving HD treatment   Tolerating HD treatment, maintaining good BP during treatment  Continue holding BP meds before dialysis   Avoid overcorrection of blood pressure  Hemoglobin at goal  Will be receiving HD per MWF at the facility  Please do not hesitate to contact us for any further questions/concerns. We will continue to follow along with you.        Electronically signed by Jodie Ng MD  on 4/14/2022 at 2:21 PM

## 2022-04-14 NOTE — PROGRESS NOTES
Dr. Eva Walter rounded and okay with discharge after dialysis. If SBP greater than 160 give a.m. bp meds if not hold off and give tomorrow.

## 2022-04-14 NOTE — PROGRESS NOTES
HEMODIALYSIS PRE-TREATMENT NOTE    Patient Identifiers prior to treatment: Name,     Isolation Required: No                      Isolation Type: N/A       (please document if patient is being managed as a PUI/COVID-19 patient)        Hepatitis status:                           Date Drawn                             Result  Hepatitis B Surface Antigen 22     neg                     Hepatitis B Surface Antibody 22 Immune        Hepatitis B Core Antibody 22 neg          How was Hepatitis Status verified: [address]     Was a copy of the labs you documented provided to facility for the patient's chart: Epic    Hemodialysis orders verified: Yes, with Dr. Milagros Monk Within normal limits ( I.e. s/s of infection,...): WNL, jordon garrison present     Pre-Assessment completed: Yes    Pre-dialysis report received from: Rosy Higginbotham RN                      Time: 11:30

## 2022-04-14 NOTE — DISCHARGE INSTR - COC
Continuity of Care Form    Patient Name: Meli Mcintyre   :  1975  MRN:  [id number]    [facility] date:  2022  Discharge date:  2022    Code Status Order: Full Code   Advance Directives:      Admitting Physician:  Karon Alcaraz MD  PCP: DANIEL Caba CNP    Discharging Nurse: [facility] Unit/Room#: 1021/1021-01  Discharging Unit Phone Number: [phone]    Emergency Contact:   Extended Emergency Contact Information  Primary Emergency Contact: Greg Huitron  Home Phone: [phone]  Mobile Phone: [phone]  Relation: Grandparent    Past Surgical History:  Past Surgical History:   Procedure Laterality Date    AV FISTULA CREATION Left        Immunization History:   Immunization History   Administered Date(s) Administered    COVID-19, Clem Swartz, Primary or Immunocompromised, PF, 100mcg/0.5mL 2021, 2021    Influenza Virus Vaccine 10/05/2020, 10/21/2020, 09/10/2021    Influenza, MDCK Quadv, IM, PF (Flucelvax 2 yrs and older) 09/10/2021    PPD Test 2021, 2021, 06/15/2021    Pneumococcal Vaccine 2020    Tdap (Boostrix, Adacel) 2020       Active Problems:  Patient Active Problem List   Diagnosis Code    ESRD (end stage renal disease) on dialysis ([facility] [address] N18.6, Z99.2    Hypertension I10    Hyperlipidemia E78.5    Acute pain of left knee M25.562    Bipolar affective disorder (HCC) F31.9    Coronary atherosclerosis I25.10    COVID-19 U07.1    Cerebrovascular accident (CVA) ([facility] [address] I63.9    Type 2 diabetes mellitus with kidney complication, with long-term current use of insulin (HCC) E11.29, Z79.4    Neuropathy of both feet G57.93    GERD (gastroesophageal reflux disease) K21.9    Right sided weakness R53.1       Isolation/Infection:   Isolation            No Isolation          Patient Infection Status       Infection Onset Added Last Indicated Last Indicated By Review Planned Expiration Resolved Resolved By    None active    Resolved    COVID-19 22 COVID-19, Rapid   22     S/s     COVID-19 (Rule Out) 22 COVID-19, Rapid (Ordered)   22 Rule-Out Test Resulted            Nurse Assessment:  Last Vital Signs: BP (!) 172/80   Pulse 70   Temp 98.2 °F (36.8 °C) (Oral)   Resp 16   Ht 5' 6.5\" (1.689 m)   Wt 203 lb (92.1 kg)   SpO2 96%   BMI 32.27 kg/m²     Last documented pain score (0-10 scale): Pain Level: 0  Last Weight:   Wt Readings from Last 1 Encounters:   22 203 lb (92.1 kg)     Mental Status:  oriented and alert    IV Access:  - None    Nursing Mobility/ADLs:  Walking   Assisted  Transfer  Assisted  Bathing  Assisted  Dressing  Assisted  Toileting  Assisted  Feeding  Independent  Med Admin  Independent  Med Delivery   whole    Wound Care Documentation and Therapy:        Elimination:  Continence: Bowel: Yes  Bladder: Yes  Urinary Catheter: None   Colostomy/Ileostomy/Ileal Conduit: No       Date of Last BM: 2022    Intake/Output Summary (Last 24 hours) at 2022 0917  Last data filed at 2022 0118  Gross per 24 hour   Intake 965 ml   Output --   Net 965 ml     I/O last 3 completed shifts: In: 685 [P.O.:965; I.V.:10]  Out: -     Safety Concerns: At Risk for Falls    Impairments/Disabilities:      Right sided weakness post stroke  Chronic bilateral foot drop (braces)  Nutrition Therapy:  Current Nutrition Therapy:   - Oral Diet:  Carb Control 4 carbs/meal (1800kcals/day) and Renal    Routes of Feeding: Oral  Liquids: No Restrictions  Daily Fluid Restriction: yes - amount 1800 ml  Last Modified Barium Swallow with Video (Video Swallowing Test): not done    Treatments at the Time of Hospital Discharge:   Respiratory Treatments: none  Oxygen Therapy:  is not on home oxygen therapy.   Ventilator:    - No ventilator support    Rehab Therapies: Physical Therapy and Occupational Therapy, Speech therapy  Weight Bearing Status/Restrictions: No weight bearing restrictions  Other Medical Equipment (for information only, NOT a DME order):  cane, walker, and braces   Other Treatments: Skilled nurse assessment. HD as ordered. HD T/TH/SAT    Patient's personal belongings (please select all that are sent with patient):  Glasses    RN SIGNATURE:  Electronically signed by Tara Buerger, RN on 4/14/22 at 2:11 PM EDT    CASE MANAGEMENT/SOCIAL WORK SECTION    Inpatient Status Date: ***    Readmission Risk Assessment Score:  Readmission Risk              Risk of Unplanned Readmission:  24           Discharging to Facility/ Agency   Name: Name: [facility]  Address: [address] [place] [address], [address] [place] [address]  Phone: [phone]/DGYUQT: [phone]  Fax: [phone]       / signature: Electronically signed by MACKENZIE Monreal on 4/14/22 at 9:17 AM EDT    PHYSICIAN SECTION    Prognosis: Fair    Condition at Discharge: Stable    Rehab Potential (if transferring to Rehab): Fair    Recommended Labs or Other Treatments After Discharge:     Continue PT/OT    Physician Certification: I certify the above information and transfer of Eleazar Mejia  is necessary for the continuing treatment of the diagnosis listed and that he requires Acute Rehab for greater 30 days.      Update Admission H&P: Changes in H&P as follows - as in DC summary     PHYSICIAN SIGNATURE:  Electronically signed by Chiqui Falcon MD on 4/14/22 at 2:21 PM EDT

## 2022-04-14 NOTE — PLAN OF CARE
Problem: Skin Integrity:  Goal: Will show no infection signs and symptoms  Outcome: Ongoing     Problem: Falls - Risk of:  Goal: Will remain free from falls  Outcome: Ongoing  Note: Pt is a fall risk. Bed alarm on, call light in reach, bedside table and personal belongings in reach. Hourly rounding continues.       Problem: Pain:  Goal: Pain level will decrease  Outcome: Ongoing     Problem: HEMODYNAMIC STATUS  Goal: Patient has stable vital signs and fluid balance  Outcome: Ongoing

## 2022-04-14 NOTE — CARE COORDINATION
Discharge planning    Met with patient, attending and patient grandfather. Attending did discuss ARU and patient is now agreeable. [facility].     Notified ss and they will put in referral.

## 2022-04-14 NOTE — DISCHARGE SUMMARY
[facility]  Office: [address], DO, Ash Israelr, DO, Guero Narrow, DO, Callahan Pea Blood, DO, Robi Govea MD, Alessio Roque MD, Kurt Hunt MD, Marsha Fletcher MD, Pawel Chaidez MD, Vignesh Nguyen MD, Yuliana Grider MD, Penny aTvarez, DO, Jalen Srinivasan, DO, Ziyad Berkowitz MD,  Alex Tapia, DO, Nahomi Elizondo MD, Leslie Torres MD, Eduarda Kurtz MD, Haris Coffey, DO, Gunner Baker MD, Jenny Nuñez MD, Eusebia Salinas, [facility], [facility] Castro, CNP, Jimy Harley, CNP, Sussy Pickett, CNP, Noah Junior, CNP, Guzman Beavers, CNP, Darci Brown PA-C, Kermit Rubin, [facility], Rickey Moreno, CNP, Elisa Hayes, CNP, Jennifer Mckeon, CNP, Laura Councilman, CNS, Stacey Child, [facility], Kiran Muller, CNP, Johann Jefferson, CNP, Isabel Tavares, [facility]    Discharge Summary     Patient ID: Amada Feliz  :  1975   MRN: [id number]     ACCOUNT:  [de-identified]   Patient's PCP: DANIEL Doss CNP  Admit Date: 2022   Discharge Date: 2022     Length of Stay: 2  Code Status:  Full Code  Admitting Physician: Marsha Fletcher MD  Discharge Physician: Marsha Fletcher MD     Active Discharge Diagnoses:     Hospital Problem Lists:  Principal Problem:    Cerebrovascular accident (CVA) ([facility] [address]  Active Problems:    ESRD (end stage renal disease) on dialysis ([facility] [address]    Hypertension    Hyperlipidemia    Bipolar affective disorder ([facility] [address]    Type 2 diabetes mellitus with kidney complication, with long-term current use of insulin ([facility] [address]    Neuropathy of both feet    GERD (gastroesophageal reflux disease)  Resolved Problems:    * No resolved hospital problems.  *      Admission Condition:  poor     Discharged Condition: fair    Hospital Stay:     Hospital Course:    Patient with known H/O DM and ESRD on HD for 3 years who is actively working on getting on transplant list who started to have R sided weakness more than 24 hours prior to presentation that persisted and he felt it might be due to needing dialysis ,, he had HD today and was directed to go to ER to get evaluated in ER .   In the ER he had CT head and was found to have acute stroke ( small hypodensity involving the left corona radiata, and extending inferiorly into the left basal ganglia) .   Patient was evaluated by tele stroke , given ASA and Plavix   Plan for MRI brain and MRA H&N  Restart home meds  Restart Ace and monitor BP to avoid Hypotension especially the patient had issues with hypotension recently.   Continue to monitor very closely      During the admission patient was managed as follow    Acute CVA: L post basal ganglia , received ASA and plavix, continue both, can stop plavix after total 21 days      ESRD on HD MWF : nephrology managing      HTN: continue home medications     HPL: continue home medications     DM2: Continue diabetes home medications , insulin sliding scale, hypoglycemia protocol     DVT prophylaxis     PT/OT, accepted to acute rehab     Med rec done  Scripts added   CARISSA signed  30+ minutes spent       Significant therapeutic interventions:     Significant Diagnostic Studies:   Labs / Micro:  CBC:   Lab Results   Component Value Date    WBC 7.4 04/14/2022    RBC 3.51 04/14/2022    HGB 10.8 04/14/2022    HCT 33.3 04/14/2022    MCV 94.9 04/14/2022    MCH 30.8 04/14/2022    MCHC 32.4 04/14/2022    RDW 12.4 04/14/2022     04/14/2022     BMP:    Lab Results   Component Value Date    GLUCOSE 150 04/14/2022     04/14/2022    K 4.1 04/14/2022     04/14/2022    CO2 21 04/14/2022    ANIONGAP 15 04/14/2022    BUN 40 04/14/2022    CREATININE 7.64 04/14/2022    BUNCRER 5 04/14/2022    CALCIUM 8.5 04/14/2022    LABGLOM 8 04/14/2022    GFRAA 9 04/14/2022    GFR      04/14/2022     HFP:    Lab Results   Component Value Date    PROT 5.3 04/14/2022     CMP:    Lab Results   Component Value Date    GLUCOSE 150 04/14/2022     04/14/2022    K 4.1 04/14/2022     04/14/2022    CO2 21 04/14/2022    BUN 40 04/14/2022    CREATININE 7.64 04/14/2022    ANIONGAP 15 04/14/2022    ALKPHOS 137 04/14/2022    ALT 44 04/14/2022    AST 38 04/14/2022    BILITOT 0.34 04/14/2022    LABALBU 3.4 04/14/2022    ALBUMIN NOT REPORTED 01/05/2022    LABGLOM 8 04/14/2022    GFRAA 9 04/14/2022    GFR      04/14/2022    PROT 5.3 04/14/2022    CALCIUM 8.5 04/14/2022     PT/INR:    Lab Results   Component Value Date    PROTIME 12.6 04/12/2022    INR 0.9 04/12/2022     PTT:   Lab Results   Component Value Date    APTT 31.7 04/12/2022     FLP:    Lab Results   Component Value Date    CHOL 118 04/12/2022    TRIG 66 04/12/2022    HDL 61 04/12/2022     U/A:  No results found for: Peggye Canavan, SPECGRAV, HGBUR, PHUR, PROTEINU, GLUCOSEU, KETUA, BILIRUBINUR, UROBILINOGEN, NITRU, LEUKOCYTESUR  TSH:  No results found for: TSH    Radiology:  CT HEAD WO CONTRAST    Result Date: 4/12/2022  There is a small hypodensity involving the left corona radiata, and extending inferiorly into the left basal ganglia. This could wrist present a infarct. No evidence of any hemorrhage or mass-effect. MRA head without contrast    Result Date: 4/12/2022  1. Small acute lacunar type infarct in the posterior left basal ganglia. No acute intracranial hemorrhage. 2.  Chronic small vessel ischemic disease. Small bilateral old lacunar infarcts. 3.  Asymmetric signal in the left transverse and sigmoid dural venous sinuses may relate to slow flow. Underlying thrombosis is difficult to exclude. 4.  Unremarkable MRA head. 5.  Unremarkable MRA neck. MRA neck without contrast    Result Date: 4/12/2022  1. Small acute lacunar type infarct in the posterior left basal ganglia. No acute intracranial hemorrhage. 2.  Chronic small vessel ischemic disease. Small bilateral old lacunar infarcts. 3.  Asymmetric signal in the left transverse and sigmoid dural venous sinuses may relate to slow flow.   Underlying thrombosis is difficult to exclude. 4.  Unremarkable MRA head. 5.  Unremarkable MRA neck. MRI brain without contrast    Result Date: 4/12/2022  1. Small acute lacunar type infarct in the posterior left basal ganglia. No acute intracranial hemorrhage. 2.  Chronic small vessel ischemic disease. Small bilateral old lacunar infarcts. 3.  Asymmetric signal in the left transverse and sigmoid dural venous sinuses may relate to slow flow. Underlying thrombosis is difficult to exclude. 4.  Unremarkable MRA head. 5.  Unremarkable MRA neck. Consultations:    Consults:     Final Specialist Recommendations/Findings:   IP CONSULT TO NEUROLOGY  IP CONSULT TO HOSPITALIST  IP CONSULT TO NEPHROLOGY  IP CONSULT TO PHYSICAL MEDICINE REHAB      The patient was seen and examined on day of discharge and this discharge summary is in conjunction with any daily progress note from day of discharge. Discharge plan:     Disposition: IP rehab    Physician Follow Up:     Sebastián Galindo APRN - CNP  [id number] [address]  [phone]    In 1 week      Jeancarlos Pratt MD  [address]  [phone]    In 4 weeks         Requiring Further Evaluation/Follow Up POST HOSPITALIZATION/Incidental Findings:     Diet: diabetic diet and renal diet    Activity: As tolerated    Instructions to Patient:     Discharge Medications:      Medication List      START taking these medications    clopidogrel 75 MG tablet  Commonly known as: PLAVIX  Take 1 tablet by mouth daily for 19 doses  Start taking on: April 15, 2022        CHANGE how you take these medications    ammonium lactate 12 % lotion  Commonly known as: Lac-Hydrin  Apply topically daily.   What changed: additional instructions     insulin glargine 100 UNIT/ML injection vial  Commonly known as: LANTUS  Inject 30 Units into the skin every morning  Start taking on: April 15, 2022  What changed:   · how much to take  · how to take this  · when to take this  · additional instructions     lisinopril 20 MG tablet  Commonly known as: PRINIVIL;ZESTRIL  Take 1 tablet by mouth daily  Start taking on: April 15, 2022  What changed: how much to take        CONTINUE taking these medications    amLODIPine 10 MG tablet  Commonly known as: NORVASC     aspirin 81 MG EC tablet     buPROPion 150 MG extended release tablet  Commonly known as: WELLBUTRIN XL  Take 1 tablet by mouth every morning     calcium acetate 667 MG Tabs     ezetimibe 10 MG tablet  Commonly known as: ZETIA     FLUoxetine 20 MG capsule  Commonly known as: PROZAC     furosemide 20 MG tablet  Commonly known as: LASIX  Take 1 tablet by mouth daily     gabapentin 300 MG capsule  Commonly known as: NEURONTIN     magnesium hydroxide 400 MG/5ML suspension  Commonly known as: MILK OF MAGNESIA     magnesium oxide 400 MG tablet  Commonly known as: MAG-OX     NovoLOG 100 UNIT/ML injection vial  Generic drug: insulin aspart     omeprazole 40 MG delayed release capsule  Commonly known as: PRILOSEC     Rosuvastatin Calcium 40 MG Cpsp     vitamin B-12 500 MCG tablet  Commonly known as: CYANOCOBALAMIN        STOP taking these medications    dexamethasone 2 MG tablet  Commonly known as: DECADRON     metoprolol succinate 50 MG extended release tablet  Commonly known as: TOPROL XL     promethazine 25 MG tablet  Commonly known as: PHENERGAN     traMADol 50 MG tablet  Commonly known as: ULTRAM           Where to Get Your Medications      These medications were sent to [facility] [address] [place] [address]    Phone: [phone]   · clopidogrel 75 MG tablet     Information about where to get these medications is not yet available    Ask your nurse or doctor about these medications  · insulin glargine 100 UNIT/ML injection vial  · lisinopril 20 MG tablet         No discharge procedures on file.     Time Spent on discharge is  35 mins in patient examination, evaluation, counseling as well as medication reconciliation, prescriptions for required medications, discharge plan and follow up. Electronically signed by   Marcus Art MD  4/14/2022  12:23 PM      Thank you DANIEL Tong - SHIMA for the opportunity to be involved in this patient's care.

## 2022-04-14 NOTE — PROGRESS NOTES
[facility] Neurology   IN-PATIENT SERVICE      NEUROLOGY PROGRESS  NOTE            Date:   4/14/2022  Patient name:  Heavenly Briceno  Date of admission:  4/12/2022  YOB: 1975      Interval History:     Since yesterday patient has been improving. He states that he was able to stand. He has better strength in his right leg. Facial droop is less obvious. History of Present Illness: The patient is a 51 y.o. male who presents with Numbness (Right side leg and arm numbness since yesterday morning at 0530)  . The patient was seen and examined and the chart was reviewed. Grandfather is present in room for evaluation. This patient was admitted 4/12/2022 with acute stroke left MCA with right face arm and leg weakness and numbness. Patient also has multiple peripheral neuropathy findings which include bilateral foot drop requiring AFOs and what appears to be probable left ulnar nerve neuropathy with atrophy of intrinsic muscles of hands and presence of a mild Dupuytren's contracture left palm. Patient indicates that strength in the left hand is \"normal\". He states that his right hand is weak and he has difficulty with grasp.     Past Medical History:     Past Medical History:   Diagnosis Date    Closed fracture of bone of right foot March - April 2020    Dialysis patient [facility])     Kidney disease     On Dialysis    Type 1 diabetes mellitus ([address]         Past Surgical History:     Past Surgical History:   Procedure Laterality Date    AV FISTULA CREATION Left         Medications during admission:      lisinopril  20 mg Oral Daily    amLODIPine  10 mg Oral Daily    insulin glargine  40 Units SubCUTAneous QAM    clopidogrel  75 mg Oral Daily    sodium chloride flush  10 mL IntraVENous 2 times per day    aspirin  81 mg Oral Daily    heparin (porcine)  5,000 Units SubCUTAneous 3 times per day    ammonium lactate   Topical Daily    buPROPion  150 mg Oral QAM    calcium acetate  667 mg Oral TID WC    ezetimibe  10 mg Oral Daily    FLUoxetine  40 mg Oral Daily    rosuvastatin  40 mg Oral Daily    traMADol  50 mg Oral BID         Physical Exam:   BP (!) 172/80   Pulse 70   Temp 98.2 °F (36.8 °C) (Oral)   Resp 16   Ht 5' 6.5\" (1.689 m)   Wt 203 lb (92.1 kg)   SpO2 96%   BMI 32.27 kg/m²   Temp (24hrs), Av.1 °F (36.7 °C), Min:97.7 °F (36.5 °C), Max:98.2 °F (36.8 °C)    Patient is resting comfortably in room. His grandfather is present at this time. Neurological examination:    Mental status   Alert and oriented x 3; following all commands; speech is fluent, no dysarthria, aphasia. Cranial nerves   II - visual fields intact; pupils reactive  III, IV, VI - extraocular muscles intact;   V - normal facial sensation                                                               VII -very subtle droop to right corner of mouth                                     VIII - intact hearing                                                                             IX, X -normal phonation                                               XI -normal head turning                                                     XII - midline tongue      Motor function  Strength: 4/5 RUE, 4/5 RLE, 5/5 LUE, 5/5  LLE this is in regard to lifting arms and legs off bed. With regard to his bilateral foot drop he has 0 foot dorsiflexion and 0 extensor houses longus extension. This is bilateral.  With regard to his hands he does have a left hand  \"ulnar nerve weakness\" including atrophy of first dorsal interossei and atrophy of many of the interossei of the hand and mild Dupuytren contracture of the left palm. With regard to the right hand he does have decreased grasp and has difficulty manipulating cups and forks/knife. He is thinly built  No tremors                      Sensory function Intact to touch,vibration except for distal right lower extremity from about mid shin to toes consistent with a stroke.   In spite of his multiple neuropathies I have some difficulty verifying sensory loss due to the neuropathies. His right hand and face have normal sensation     Cerebellar  no cogwheeling rigidity or tremor. Reflex function 0/4 symmetric throughout .  0 response to plantar stimulation   Gait                   gait safety is currently being evaluated by PT             Diagnostics:      Laboratory Testing:  CBC:   Recent Labs     04/12/22  1217 04/13/22  0530 04/14/22  0532   WBC 8.9 6.6 7.4   HGB 12.1* 11.4* 10.8*    182 208     BMP:    Recent Labs     04/12/22  1217 04/12/22  1217 04/12/22  1440 04/13/22  0530 04/14/22  0532     --   --  138 137   K 3.5*  --   --  3.4* 4.1     --   --  102 101   CO2 24  --   --  24 21   BUN 19  --   --  27* 40*   CREATININE 4.25*  --   --  5.91* 7.64*   GLUCOSE 136*   < > 61 162* 150*    < > = values in this interval not displayed. Lab Results   Component Value Date    CHOL 118 04/12/2022    LDLCHOLESTEROL 44 04/12/2022    HDL 61 04/12/2022    TRIG 66 04/12/2022    ALT 44 (H) 04/14/2022    AST 38 04/14/2022    INR 0.9 04/12/2022    LABA1C 8.0 (H) 04/12/2022    [id number] 1630 (H) 01/08/2021       No results found for: PHENYTOIN, PHENYTOIN, VALPROATE, CBMZ      Impression:      1. Acute small vessel stroke left posterior basal ganglia. 2. Right face arm and leg weakness with numbness of the distal leg attributable to patient stroke  3. Diabetic peripheral neuropathy involving left hand ulnar nerve and both feet with bilateral foot drop  4. Gait abnormality  5. Small vessel cerebrovascular disease    Plan:      Continue current course of aspirin and Plavix. Patient currently on Crestor.  Monitor blood pressure. Patient is due for dialysis. I will reevaluate him tomorrow to note his tolerance of dialysis with respect to his cerebrovascular disease.         Electronically signed by Matilde Madison MD on 4/14/2022 at 8:40 AM      Matilde Madison MD [address]  Neurology

## 2022-04-14 NOTE — PROGRESS NOTES
Occupational Therapy    DATE: 2022    NAME: Estrellita Kline  MRN: [id number]   : 1975    Patient not seen this date for Occupational Therapy due to:      [] Cancel by RN or physician due to:    [x] Hemodialysis. SOFIYA Salazar reports pt is in HD and will be discharging from the hospital after HD. Will continue to follow.      [] Critical Lab Value Level     [] Blood transfusion in progress    [] Acute or unstable cardiovascular status   _MAP < 55 or more than >115  _HR < 40 or > 130    [] Acute or unstable pulmonary status   -FiO2 > 60%   _RR < 5 or >40    _O2 sats < 85%    [] Strict Bedrest    [] Off Unit for surgery or procedure    [] Off Unit for testing       [] Pending imaging to R/O fracture    [] Refusal by Patient      [] Other      Breanne Souza OTR/L

## 2022-04-14 NOTE — DISCHARGE INSTR - DIET
Good nutrition is important when healing from an illness, injury, or surgery. Follow any nutrition recommendations given to you during your hospital stay. If you were given an oral nutrition supplement while in the hospital, continue to take this supplement at home. You can take it with meals, in-between meals, and/or before bedtime. These supplements can be purchased at most local grocery stores, pharmacies, and chain [employer]-stores. If you have any questions about your diet or nutrition, call the hospital and ask for the dietitian. Carb control/renal diet. Fluid restriction 1800 ml.

## 2022-04-14 NOTE — PROGRESS NOTES
Patient discharged via ambulete to [facility]. Patient's grandfather at bedside. Patient finished dialysis treatment with no issues noted. No acute distress noted. /88 post dialysis and Norvasc and Lisinopril given per Dr. Nani Adams orders.

## 2022-04-14 NOTE — PROGRESS NOTES
[facility]  Office: [address], DO, Remykaitlin Toribio, DO, Apurva Roach, DO, Nasir Lala Blood, DO, Tabby Stewart MD, Jose Luis Colbert MD, Ann Michaud MD, Zak Marie MD, Susie Elizondo MD, Rhys Francois MD, Jaleel Caldwell MD, Demetria Rivas, DO, Erasmo Morrissey, DO, Katheryn Shirley MD,  Maxi Vallejo, DO, Kristen Cerda MD, Kimberly Herrera MD, Deni Rodriguez MD, Carlene Bazan DO, Kirt Guadalupe MD, Pamela Caceres MD, Yariel Bright, [facility], [facility], [facility], Guero Bazzi, CNP, Mary Kirkpatrick, CNP, Dominga Mock, CNP, Esteban Alcaraz, CNP, RICHIE DodsonC, Etta Crowder, DNP, Ines Scott, CNP, Steve Craig, CNP, Con Distance, CNP, Bianca Carrillo, CNS, Lieutenant Gunn, [facility], Salena Giles, [facility], [place], CNP, Christa Cabrera, [facility]    Progress Note    4/14/2022    8:04 AM    Name:   Merlin Downer  MRN:     [id number]     [place]:      [de-identified]   Room:   [address] [place] [address] Day:  2  Admit Date:  4/12/2022 11:14 AM    PCP:   DANIEL Galdamez CNP  Code Status:  Full Code    Subjective:     C/C:   Chief Complaint   Patient presents with    Numbness     Right side leg and arm numbness since yesterday morning at 0530     Interval History Status: not changed. Patient seen and examined at bedside, no acute events overnight. Continue to improve overall with better movement in his R side  Worked with PT   Agreed to acute rehab  Patient denies any chest pain, shortness of breath, chills, fevers, nausea or vomiting.   Patient vitals, labs and all providers notes were reviewed,from overnight shift and morning updates were noted and discussed with the nurse    Brief History:        Patient with known H/O DM and ESRD on HD for 3 years who is actively working on getting on transplant list who started to have R sided weakness more than 24 hours prior to presentation that persisted and he felt it might be due to needing dialysis ,, he had HD today and was directed to go to ER to get evaluated in ER . In the ER he had CT head and was found to have acute stroke ( small hypodensity involving the left corona radiata, and extending inferiorly into the left basal ganglia) . Patient was evaluated by tele stroke , given ASA and Plavix   Plan for MRI brain and MRA H&N  Restart home meds  Restart Ace and monitor BP to avoid Hypotension especially the patient had issues with hypotension recently. Continue to monitor very closely    Review of Systems:     Review of Systems   Constitutional: Positive for activity change and fatigue. Negative for chills, diaphoresis and fever. HENT: Negative for congestion. Eyes: Negative for visual disturbance. Respiratory: Negative for cough, chest tightness, shortness of breath and wheezing. Cardiovascular: Negative for chest pain, palpitations and leg swelling. Gastrointestinal: Negative for abdominal pain, blood in stool, constipation, diarrhea, nausea and vomiting. Genitourinary: Positive for decreased urine volume. Negative for difficulty urinating. Neurological: Positive for weakness (improving). Negative for dizziness, light-headedness, numbness and headaches. All other systems reviewed and are negative. Medications:      Allergies:  No Known Allergies    Current Meds:   Scheduled Meds:    lisinopril  20 mg Oral Daily    amLODIPine  10 mg Oral Daily    insulin glargine  40 Units SubCUTAneous QAM    clopidogrel  75 mg Oral Daily    sodium chloride flush  10 mL IntraVENous 2 times per day    aspirin  81 mg Oral Daily    heparin (porcine)  5,000 Units SubCUTAneous 3 times per day    ammonium lactate   Topical Daily    buPROPion  150 mg Oral QAM    calcium acetate  667 mg Oral TID WC    ezetimibe  10 mg Oral Daily    FLUoxetine  40 mg Oral Daily    rosuvastatin  40 mg Oral Daily    traMADol  50 mg Oral BID     Continuous Infusions:    dextrose      sodium chloride       PRN Meds: glucose, glucagon (rDNA), dextrose, dextrose bolus (hypoglycemia) **OR** dextrose bolus (hypoglycemia), sodium chloride flush, sodium chloride, ondansetron **OR** ondansetron, perflutren lipid microspheres    Data:     Past Medical History:   has a past medical history of Closed fracture of bone of right foot, Dialysis patient ([facility] [address] Kidney disease, and [facility] Social History:   reports that he has never smoked. He has never used smokeless tobacco. He reports that he does not drink alcohol and does not use drugs. Family History:   Family History   Problem Relation Age of Onset    Diabetes Mother     Heart Disease Father     Diabetes Father     Cancer Paternal Grandmother     Heart Disease Maternal Great Grandfather        Vitals:  BP (!) 172/80   Pulse 70   Temp 98.2 °F (36.8 °C) (Oral)   Resp 16   Ht 5' 6.5\" (1.689 m)   Wt 203 lb (92.1 kg)   SpO2 96%   BMI 32.27 kg/m²   Temp (24hrs), Av.1 °F (36.7 °C), Min:97.7 °F (36.5 °C), Max:98.2 °F (36.8 °C)    Recent Labs     22  0316 22  0353 22  0429 22  0716   POCGLU 60* 69* 121* 170*       I/O (24Hr):     Intake/Output Summary (Last 24 hours) at 2022 0804  Last data filed at 2022 0118  Gross per 24 hour   Intake 965 ml   Output --   Net 965 ml       Labs:  Hematology:  Recent Labs     22  1217 22  0530 22  0532   WBC 8.9 6.6 7.4   RBC 3.96* 3.70* 3.51*   HGB 12.1* 11.4* 10.8*   HCT 36.8* 34.9* 33.3*   MCV 92.9 94.3 94.9   MCH 30.6 30.8 30.8   MCHC 32.9 32.7 32.4   RDW 12.6 12.5 12.4    182 208   MPV 9.9 9.9 10.0   INR 0.9  --   --      Chemistry:  Recent Labs     22  1217 22  1217 22  1440 22  0530 22  0532     --   --  138 137   K 3.5*  --   --  3.4* 4.1     --   --  102 101   CO2 24  --   --  24 21   GLUCOSE 136*   < > 61 162* 150*   BUN 19  --   --  27* 40*   CREATININE 4.25*  --   --  5.91* 7.64*   MG 2.1  --   --  2.0  --    ANIONGAP 14  --   --  12 15   LABGLOM 15*  --   --  10* 8*   GFRAA 18*  --   --  12* 9*   CALCIUM 9.4  --   --  8.6 8.5*   PHOS 2.9  --   --   --   --     < > = values in this interval not displayed. Recent Labs     04/12/22  1217 04/12/22  1439 04/13/22  0530 04/13/22  0904 04/13/22  1711 04/13/22 2017 04/14/22  0316 04/14/22  0353 04/14/22  0429 04/14/22  0532 04/14/22  0716   PROT  --   --  5.5*  --   --   --   --   --   --  5.3*  --    LABALBU  --   --  3.4*  --   --   --   --   --   --  3.4*  --    LABA1C 8.0*  --   --   --   --   --   --   --   --   --   --    AST  --   --  27  --   --   --   --   --   --  38  --    ALT  --   --  42*  --   --   --   --   --   --  44*  --    ALKPHOS  --   --  142*  --   --   --   --   --   --  137*  --    BILITOT  --   --  0.28*  --   --   --   --   --   --  0.34  --    CHOL 118  --   --   --   --   --   --   --   --   --   --    HDL 61  --   --   --   --   --   --   --   --   --   --    LDLCHOLESTEROL 44  --   --   --   --   --   --   --   --   --   --    CHOLHDLRATIO 1.9  --   --   --   --   --   --   --   --   --   --    TRIG 66  --   --   --   --   --   --   --   --   --   --    POCGLU  --    < >  --    < > 180* 129* 60* 69* 121*  --  170*    < > = values in this interval not displayed. ABG:  Lab Results   Component Value Date    FIO2 NOT REPORTED 01/04/2022     Lab Results   Component Value Date/Time    SPECIAL RAC,6ML 01/04/2022 11:35 AM     Lab Results   Component Value Date/Time    CULTURE NO GROWTH 5 DAYS 01/04/2022 11:35 AM       Radiology:  CT HEAD WO CONTRAST    Result Date: 4/12/2022  There is a small hypodensity involving the left corona radiata, and extending inferiorly into the left basal ganglia. This could wrist present a infarct. No evidence of any hemorrhage or mass-effect. MRA head without contrast    Result Date: 4/12/2022  1. Small acute lacunar type infarct in the posterior left basal ganglia.   No acute intracranial hemorrhage. 2.  Chronic small vessel ischemic disease. Small bilateral old lacunar infarcts. 3.  Asymmetric signal in the left transverse and sigmoid dural venous sinuses may relate to slow flow. Underlying thrombosis is difficult to exclude. 4.  Unremarkable MRA head. 5.  Unremarkable MRA neck. MRA neck without contrast    Result Date: 4/12/2022  1. Small acute lacunar type infarct in the posterior left basal ganglia. No acute intracranial hemorrhage. 2.  Chronic small vessel ischemic disease. Small bilateral old lacunar infarcts. 3.  Asymmetric signal in the left transverse and sigmoid dural venous sinuses may relate to slow flow. Underlying thrombosis is difficult to exclude. 4.  Unremarkable MRA head. 5.  Unremarkable MRA neck. MRI brain without contrast    Result Date: 4/12/2022  1. Small acute lacunar type infarct in the posterior left basal ganglia. No acute intracranial hemorrhage. 2.  Chronic small vessel ischemic disease. Small bilateral old lacunar infarcts. 3.  Asymmetric signal in the left transverse and sigmoid dural venous sinuses may relate to slow flow. Underlying thrombosis is difficult to exclude. 4.  Unremarkable MRA head. 5.  Unremarkable MRA neck. Physical Examination:        Physical Exam  Vitals and nursing note reviewed. Constitutional:       General: He is not in acute distress. HENT:      Head: Normocephalic and atraumatic. Eyes:      Conjunctiva/sclera: Conjunctivae normal.      Pupils: Pupils are equal, round, and reactive to light. Cardiovascular:      Rate and Rhythm: Normal rate and regular rhythm. Heart sounds: No murmur heard. Pulmonary:      Effort: Pulmonary effort is normal. No accessory muscle usage or respiratory distress. Breath sounds: No stridor. Decreased breath sounds present. No wheezing, rhonchi or rales. Abdominal:      General: Bowel sounds are normal. There is no distension. Palpations: Abdomen is soft. Abdomen is not rigid. Tenderness: There is no abdominal tenderness. There is no guarding. Musculoskeletal:         General: No tenderness. Comments: LUE fistula   Skin:     General: Skin is warm and dry. Findings: No erythema, lesion or rash. Neurological:      Mental Status: He is alert and oriented to person, place, and time. Cranial Nerves: No cranial nerve deficit. Motor: Weakness present. No seizure activity. Gait: Gait abnormal.   Psychiatric:         Speech: Speech normal.         Behavior: Behavior normal. Behavior is cooperative.          Assessment:        Hospital Problems           Last Modified POA    * (Principal) Cerebrovascular accident (CVA) ([facility] [address] 4/12/2022 Yes    ESRD (end stage renal disease) on dialysis ([facility] [address] 4/12/2022 Yes    Hypertension 4/12/2022 Yes    Hyperlipidemia 4/12/2022 Yes    Bipolar affective disorder ([facility] [address] 4/12/2022 Yes    Type 2 diabetes mellitus with kidney complication, with long-term current use of insulin ([facility] [address] 4/12/2022 Yes    Neuropathy of both feet 4/12/2022 Yes    GERD (gastroesophageal reflux disease) 4/12/2022 Yes          Plan:        Acute CVA: L post basal ganglia , received ASA and plavix, continue both, can stop lavix after total 21 days     ESRD on HD MWF : nephrology managing     HTN: continue home medications    HPL: continue home medications    DM2: Continue diabetes home medications , insulin sliding scale, hypoglycemia protocol    DVT prophylaxis    PT/OT, accepted to acute rehab    Med rec done  Scripts added   CARISSA signed  30+ minutes spent      Discussed with the patient and the nurse and with BETTYE Phillips MD  4/14/2022  8:04 AM

## 2022-04-14 NOTE — CARE COORDINATION
Social work: Writer informed patient is agreeable to ARU. [facility] is able to accept. Anticipate dc today- 4pm transport requested.

## 2022-04-15 ENCOUNTER — HOSPITAL ENCOUNTER (OUTPATIENT)
Age: 47
Setting detail: SPECIMEN
Discharge: HOME | End: 2022-04-15

## 2022-04-15 LAB
ANION GAP SERPL CALCULATED.3IONS-SCNC: 14 MMOL/L (ref 9–17)
BUN BLDV-MCNC: 21 MG/DL (ref 6–20)
CALCIUM SERPL-MCNC: 9.2 MG/DL (ref 8.6–10.4)
CHLORIDE BLD-SCNC: 98 MMOL/L (ref 98–107)
CO2: 22 MMOL/L (ref 20–31)
CREAT SERPL-MCNC: 5.33 MG/DL (ref 0.7–1.2)
GFR AFRICAN AMERICAN: 14 ML/MIN
GFR NON-AFRICAN AMERICAN: 12 ML/MIN
GFR SERPL CREATININE-BSD FRML MDRD: ABNORMAL ML/MIN/{1.73_M2}
GLUCOSE BLD-MCNC: 111 MG/DL (ref 70–99)
HBV SURFACE AB TITR SER: 21.08 MIU/ML
HCT VFR BLD CALC: 34 % (ref 40.7–50.3)
HEMOGLOBIN: 11.2 G/DL (ref 13–17)
MAGNESIUM: 1.8 MG/DL (ref 1.6–2.6)
MCH RBC QN AUTO: 30.8 PG (ref 25.2–33.5)
MCHC RBC AUTO-ENTMCNC: 32.9 G/DL (ref 28.4–34.8)
MCV RBC AUTO: 93.4 FL (ref 82.6–102.9)
NRBC AUTOMATED: 0 PER 100 WBC
PDW BLD-RTO: 12.4 % (ref 11.8–14.4)
PHOSPHORUS: 3.7 MG/DL (ref 2.5–4.5)
PLATELET # BLD: 222 K/UL (ref 138–453)
PMV BLD AUTO: 9.9 FL (ref 8.1–13.5)
POTASSIUM SERPL-SCNC: 4.2 MMOL/L (ref 3.7–5.3)
RBC # BLD: 3.64 M/UL (ref 4.21–5.77)
SODIUM BLD-SCNC: 134 MMOL/L (ref 135–144)
WBC # BLD: 6.1 K/UL (ref 3.5–11.3)

## 2022-04-15 PROCEDURE — 36415 COLL VENOUS BLD VENIPUNCTURE: CPT

## 2022-04-15 PROCEDURE — 80048 BASIC METABOLIC PNL TOTAL CA: CPT

## 2022-04-15 PROCEDURE — 83735 ASSAY OF MAGNESIUM: CPT

## 2022-04-15 PROCEDURE — P9603 ONE-WAY ALLOW PRORATED MILES: HCPCS

## 2022-04-15 PROCEDURE — 84100 ASSAY OF PHOSPHORUS: CPT

## 2022-04-15 PROCEDURE — 85027 COMPLETE CBC AUTOMATED: CPT

## 2022-04-15 PROCEDURE — 86317 IMMUNOASSAY INFECTIOUS AGENT: CPT

## 2022-04-18 ENCOUNTER — HOSPITAL ENCOUNTER (OUTPATIENT)
Age: 47
Setting detail: SPECIMEN
Discharge: HOME | End: 2022-04-18

## 2022-04-18 LAB
ANION GAP SERPL CALCULATED.3IONS-SCNC: 14 MMOL/L (ref 9–17)
BUN BLDV-MCNC: 36 MG/DL (ref 6–20)
CALCIUM SERPL-MCNC: 9.8 MG/DL (ref 8.6–10.4)
CHLORIDE BLD-SCNC: 93 MMOL/L (ref 98–107)
CO2: 23 MMOL/L (ref 20–31)
CREAT SERPL-MCNC: 8.14 MG/DL (ref 0.7–1.2)
GFR AFRICAN AMERICAN: 9 ML/MIN
GFR NON-AFRICAN AMERICAN: 7 ML/MIN
GFR SERPL CREATININE-BSD FRML MDRD: ABNORMAL ML/MIN/{1.73_M2}
GLUCOSE BLD-MCNC: 148 MG/DL (ref 70–99)
HCT VFR BLD CALC: 35.6 % (ref 40.7–50.3)
HEMOGLOBIN: 11.8 G/DL (ref 13–17)
HEPATITIS B SURFACE ANTIGEN: NONREACTIVE
MAGNESIUM: 2 MG/DL (ref 1.6–2.6)
MCH RBC QN AUTO: 30.6 PG (ref 25.2–33.5)
MCHC RBC AUTO-ENTMCNC: 33.1 G/DL (ref 28.4–34.8)
MCV RBC AUTO: 92.2 FL (ref 82.6–102.9)
NRBC AUTOMATED: 0 PER 100 WBC
PDW BLD-RTO: 12.5 % (ref 11.8–14.4)
PHOSPHORUS: 3.8 MG/DL (ref 2.5–4.5)
PLATELET # BLD: 218 K/UL (ref 138–453)
PMV BLD AUTO: 9.8 FL (ref 8.1–13.5)
POTASSIUM SERPL-SCNC: 4.6 MMOL/L (ref 3.7–5.3)
RBC # BLD: 3.86 M/UL (ref 4.21–5.77)
SODIUM BLD-SCNC: 130 MMOL/L (ref 135–144)
WBC # BLD: 8.2 K/UL (ref 3.5–11.3)

## 2022-04-18 PROCEDURE — 84100 ASSAY OF PHOSPHORUS: CPT

## 2022-04-18 PROCEDURE — 80048 BASIC METABOLIC PNL TOTAL CA: CPT

## 2022-04-18 PROCEDURE — P9603 ONE-WAY ALLOW PRORATED MILES: HCPCS

## 2022-04-18 PROCEDURE — 85027 COMPLETE CBC AUTOMATED: CPT

## 2022-04-18 PROCEDURE — 83735 ASSAY OF MAGNESIUM: CPT

## 2022-04-18 PROCEDURE — 36415 COLL VENOUS BLD VENIPUNCTURE: CPT

## 2022-04-18 PROCEDURE — 87340 HEPATITIS B SURFACE AG IA: CPT

## 2022-04-20 ENCOUNTER — HOSPITAL ENCOUNTER (OUTPATIENT)
Age: 47
Setting detail: SPECIMEN
Discharge: HOME | End: 2022-04-20

## 2022-04-20 LAB
ANION GAP SERPL CALCULATED.3IONS-SCNC: 18 MMOL/L (ref 9–17)
BUN BLDV-MCNC: 42 MG/DL (ref 6–20)
CALCIUM SERPL-MCNC: 10.1 MG/DL (ref 8.6–10.4)
CHLORIDE BLD-SCNC: 96 MMOL/L (ref 98–107)
CO2: 22 MMOL/L (ref 20–31)
CREAT SERPL-MCNC: 7.82 MG/DL (ref 0.7–1.2)
GFR AFRICAN AMERICAN: 9 ML/MIN
GFR NON-AFRICAN AMERICAN: 7 ML/MIN
GFR SERPL CREATININE-BSD FRML MDRD: ABNORMAL ML/MIN/{1.73_M2}
GLUCOSE BLD-MCNC: 189 MG/DL (ref 70–99)
HCT VFR BLD CALC: 37.2 % (ref 40.7–50.3)
HEMOGLOBIN: 11.9 G/DL (ref 13–17)
MAGNESIUM: 1.9 MG/DL (ref 1.6–2.6)
MCH RBC QN AUTO: 30.4 PG (ref 25.2–33.5)
MCHC RBC AUTO-ENTMCNC: 32 G/DL (ref 28.4–34.8)
MCV RBC AUTO: 95.1 FL (ref 82.6–102.9)
NRBC AUTOMATED: 0 PER 100 WBC
PDW BLD-RTO: 12.5 % (ref 11.8–14.4)
PHOSPHORUS: 4.2 MG/DL (ref 2.5–4.5)
PLATELET # BLD: 230 K/UL (ref 138–453)
PMV BLD AUTO: 10.3 FL (ref 8.1–13.5)
POTASSIUM SERPL-SCNC: 4.9 MMOL/L (ref 3.7–5.3)
RBC # BLD: 3.91 M/UL (ref 4.21–5.77)
SODIUM BLD-SCNC: 136 MMOL/L (ref 135–144)
WBC # BLD: 6.5 K/UL (ref 3.5–11.3)

## 2022-04-20 PROCEDURE — 80048 BASIC METABOLIC PNL TOTAL CA: CPT

## 2022-04-20 PROCEDURE — 85027 COMPLETE CBC AUTOMATED: CPT

## 2022-04-20 PROCEDURE — P9603 ONE-WAY ALLOW PRORATED MILES: HCPCS

## 2022-04-20 PROCEDURE — 36415 COLL VENOUS BLD VENIPUNCTURE: CPT

## 2022-04-20 PROCEDURE — 83735 ASSAY OF MAGNESIUM: CPT

## 2022-04-20 PROCEDURE — 84100 ASSAY OF PHOSPHORUS: CPT

## 2022-04-22 ENCOUNTER — HOSPITAL ENCOUNTER (OUTPATIENT)
Age: 47
Setting detail: SPECIMEN
Discharge: HOME | End: 2022-04-22

## 2022-04-22 LAB
ANION GAP SERPL CALCULATED.3IONS-SCNC: 15 MMOL/L (ref 9–17)
BUN BLDV-MCNC: 38 MG/DL (ref 6–20)
CALCIUM SERPL-MCNC: 9.7 MG/DL (ref 8.6–10.4)
CHLORIDE BLD-SCNC: 95 MMOL/L (ref 98–107)
CO2: 26 MMOL/L (ref 20–31)
CREAT SERPL-MCNC: 7.3 MG/DL (ref 0.7–1.2)
GFR AFRICAN AMERICAN: 10 ML/MIN
GFR NON-AFRICAN AMERICAN: 8 ML/MIN
GFR SERPL CREATININE-BSD FRML MDRD: ABNORMAL ML/MIN/{1.73_M2}
GLUCOSE BLD-MCNC: 120 MG/DL (ref 70–99)
HCT VFR BLD CALC: 34.9 % (ref 40.7–50.3)
HEMOGLOBIN: 11 G/DL (ref 13–17)
MAGNESIUM: 1.8 MG/DL (ref 1.6–2.6)
MCH RBC QN AUTO: 30.7 PG (ref 25.2–33.5)
MCHC RBC AUTO-ENTMCNC: 31.5 G/DL (ref 28.4–34.8)
MCV RBC AUTO: 97.5 FL (ref 82.6–102.9)
NRBC AUTOMATED: 0 PER 100 WBC
PDW BLD-RTO: 12.1 % (ref 11.8–14.4)
PHOSPHORUS: 3.7 MG/DL (ref 2.5–4.5)
PLATELET # BLD: 210 K/UL (ref 138–453)
PMV BLD AUTO: 10.2 FL (ref 8.1–13.5)
POTASSIUM SERPL-SCNC: 4.2 MMOL/L (ref 3.7–5.3)
RBC # BLD: 3.58 M/UL (ref 4.21–5.77)
SODIUM BLD-SCNC: 136 MMOL/L (ref 135–144)
WBC # BLD: 7.1 K/UL (ref 3.5–11.3)

## 2022-04-22 PROCEDURE — 36415 COLL VENOUS BLD VENIPUNCTURE: CPT

## 2022-04-22 PROCEDURE — 83735 ASSAY OF MAGNESIUM: CPT

## 2022-04-22 PROCEDURE — 85027 COMPLETE CBC AUTOMATED: CPT

## 2022-04-22 PROCEDURE — 84100 ASSAY OF PHOSPHORUS: CPT

## 2022-04-22 PROCEDURE — P9603 ONE-WAY ALLOW PRORATED MILES: HCPCS

## 2022-04-22 PROCEDURE — 80048 BASIC METABOLIC PNL TOTAL CA: CPT

## 2022-04-25 ENCOUNTER — TELEPHONE (OUTPATIENT)
Dept: FAMILY MEDICINE CLINIC | Age: 47
End: 2022-04-25

## 2022-04-25 ENCOUNTER — HOSPITAL ENCOUNTER (OUTPATIENT)
Age: 47
Setting detail: SPECIMEN
Discharge: HOME | End: 2022-04-25

## 2022-04-25 DIAGNOSIS — I63.9 CEREBROVASCULAR ACCIDENT (CVA), UNSPECIFIED MECHANISM (HCC): Primary | ICD-10-CM

## 2022-04-25 LAB
ANION GAP SERPL CALCULATED.3IONS-SCNC: 14 MMOL/L (ref 9–17)
BUN BLDV-MCNC: 50 MG/DL (ref 6–20)
CALCIUM SERPL-MCNC: 9.5 MG/DL (ref 8.6–10.4)
CHLORIDE BLD-SCNC: 94 MMOL/L (ref 98–107)
CO2: 25 MMOL/L (ref 20–31)
CREAT SERPL-MCNC: 9.26 MG/DL (ref 0.7–1.2)
GFR AFRICAN AMERICAN: 7 ML/MIN
GFR NON-AFRICAN AMERICAN: 6 ML/MIN
GFR SERPL CREATININE-BSD FRML MDRD: ABNORMAL ML/MIN/{1.73_M2}
GLUCOSE BLD-MCNC: 109 MG/DL (ref 70–99)
HCT VFR BLD CALC: 32.4 % (ref 40.7–50.3)
HEMOGLOBIN: 10.5 G/DL (ref 13–17)
MAGNESIUM: 1.8 MG/DL (ref 1.6–2.6)
MCH RBC QN AUTO: 30.6 PG (ref 25.2–33.5)
MCHC RBC AUTO-ENTMCNC: 32.4 G/DL (ref 28.4–34.8)
MCV RBC AUTO: 94.5 FL (ref 82.6–102.9)
NRBC AUTOMATED: 0 PER 100 WBC
PDW BLD-RTO: 11.9 % (ref 11.8–14.4)
PHOSPHORUS: 3.9 MG/DL (ref 2.5–4.5)
PLATELET # BLD: 222 K/UL (ref 138–453)
PMV BLD AUTO: 10.1 FL (ref 8.1–13.5)
POTASSIUM SERPL-SCNC: 4.8 MMOL/L (ref 3.7–5.3)
RBC # BLD: 3.43 M/UL (ref 4.21–5.77)
SODIUM BLD-SCNC: 133 MMOL/L (ref 135–144)
WBC # BLD: 5.5 K/UL (ref 3.5–11.3)

## 2022-04-25 PROCEDURE — P9603 ONE-WAY ALLOW PRORATED MILES: HCPCS

## 2022-04-25 PROCEDURE — 36415 COLL VENOUS BLD VENIPUNCTURE: CPT

## 2022-04-25 PROCEDURE — 84100 ASSAY OF PHOSPHORUS: CPT

## 2022-04-25 PROCEDURE — 80048 BASIC METABOLIC PNL TOTAL CA: CPT

## 2022-04-25 PROCEDURE — 85027 COMPLETE CBC AUTOMATED: CPT

## 2022-04-25 PROCEDURE — 83735 ASSAY OF MAGNESIUM: CPT

## 2022-04-25 NOTE — TELEPHONE ENCOUNTER
Patient's grandfather called stating that the patient had a mild stroke a couple weeks ago and was seen at [facility]. Patient is currently at [facility] for rehabilitation to help him to walk and use his arm again. Patient's grandfather states that he is being released from there on Wednesday 4/27/22 and that the patient will need further rehabilitation and wanted to know if Atilio Bianchi could place a referral for the patient for the [facility] rehab at [facility]. .     Please advise.

## 2022-04-27 ENCOUNTER — HOSPITAL ENCOUNTER (OUTPATIENT)
Age: 47
Setting detail: SPECIMEN
Discharge: HOME | End: 2022-04-27

## 2022-04-27 LAB
ANION GAP SERPL CALCULATED.3IONS-SCNC: 14 MMOL/L (ref 9–17)
BUN BLDV-MCNC: 48 MG/DL (ref 6–20)
BUN/CREAT BLD: 6 (ref 9–20)
CALCIUM SERPL-MCNC: 9.3 MG/DL (ref 8.6–10.4)
CHLORIDE BLD-SCNC: 93 MMOL/L (ref 98–107)
CO2: 27 MMOL/L (ref 20–31)
CREAT SERPL-MCNC: 8.44 MG/DL (ref 0.7–1.2)
GFR AFRICAN AMERICAN: 8 ML/MIN
GFR NON-AFRICAN AMERICAN: 7 ML/MIN
GFR SERPL CREATININE-BSD FRML MDRD: ABNORMAL ML/MIN/{1.73_M2}
GLUCOSE BLD-MCNC: 120 MG/DL (ref 70–99)
HCT VFR BLD CALC: 32.8 % (ref 40.7–50.3)
HEMOGLOBIN: 10.6 G/DL (ref 13–17)
MAGNESIUM: 1.8 MG/DL (ref 1.6–2.6)
MCH RBC QN AUTO: 30.3 PG (ref 25.2–33.5)
MCHC RBC AUTO-ENTMCNC: 32.3 G/DL (ref 28.4–34.8)
MCV RBC AUTO: 93.7 FL (ref 82.6–102.9)
NRBC AUTOMATED: 0 PER 100 WBC
PDW BLD-RTO: 11.6 % (ref 11.8–14.4)
PHOSPHORUS: 4.1 MG/DL (ref 2.5–4.5)
PLATELET # BLD: 213 K/UL (ref 138–453)
PMV BLD AUTO: 9.7 FL (ref 8.1–13.5)
POTASSIUM SERPL-SCNC: 5 MMOL/L (ref 3.7–5.3)
RBC # BLD: 3.5 M/UL (ref 4.21–5.77)
SODIUM BLD-SCNC: 134 MMOL/L (ref 135–144)
WBC # BLD: 5.3 K/UL (ref 3.5–11.3)

## 2022-04-27 PROCEDURE — 80048 BASIC METABOLIC PNL TOTAL CA: CPT

## 2022-04-27 PROCEDURE — 83735 ASSAY OF MAGNESIUM: CPT

## 2022-04-27 PROCEDURE — 84100 ASSAY OF PHOSPHORUS: CPT

## 2022-04-27 PROCEDURE — 36415 COLL VENOUS BLD VENIPUNCTURE: CPT

## 2022-04-27 PROCEDURE — P9603 ONE-WAY ALLOW PRORATED MILES: HCPCS

## 2022-04-27 PROCEDURE — 85027 COMPLETE CBC AUTOMATED: CPT

## 2022-04-29 ENCOUNTER — OFFICE VISIT (OUTPATIENT)
Dept: FAMILY MEDICINE CLINIC | Age: 47
End: 2022-04-29
Payer: MEDICARE

## 2022-04-29 VITALS
TEMPERATURE: 97.7 F | HEIGHT: 67 IN | DIASTOLIC BLOOD PRESSURE: 78 MMHG | HEART RATE: 88 BPM | SYSTOLIC BLOOD PRESSURE: 138 MMHG | WEIGHT: 188 LBS | OXYGEN SATURATION: 97 % | BODY MASS INDEX: 29.51 KG/M2

## 2022-04-29 DIAGNOSIS — Z99.2 ESRD (END STAGE RENAL DISEASE) ON DIALYSIS (HCC): ICD-10-CM

## 2022-04-29 DIAGNOSIS — Z09 HOSPITAL DISCHARGE FOLLOW-UP: ICD-10-CM

## 2022-04-29 DIAGNOSIS — E10.8 DM (DIABETES MELLITUS), TYPE 1 WITH COMPLICATIONS (HCC): ICD-10-CM

## 2022-04-29 DIAGNOSIS — F39 MOOD DISORDER (HCC): ICD-10-CM

## 2022-04-29 DIAGNOSIS — M54.42 CHRONIC BILATERAL LOW BACK PAIN WITH BILATERAL SCIATICA: ICD-10-CM

## 2022-04-29 DIAGNOSIS — N18.6 ESRD (END STAGE RENAL DISEASE) ON DIALYSIS (HCC): ICD-10-CM

## 2022-04-29 DIAGNOSIS — R11.0 NAUSEA: ICD-10-CM

## 2022-04-29 DIAGNOSIS — I10 HYPERTENSION, UNSPECIFIED TYPE: ICD-10-CM

## 2022-04-29 DIAGNOSIS — M51.26 LUMBAR HERNIATED DISC: ICD-10-CM

## 2022-04-29 DIAGNOSIS — E78.5 HYPERLIPIDEMIA, UNSPECIFIED HYPERLIPIDEMIA TYPE: ICD-10-CM

## 2022-04-29 DIAGNOSIS — G89.29 CHRONIC BILATERAL LOW BACK PAIN WITH BILATERAL SCIATICA: ICD-10-CM

## 2022-04-29 DIAGNOSIS — G57.93 NEUROPATHY OF BOTH FEET: ICD-10-CM

## 2022-04-29 DIAGNOSIS — I63.9 CEREBROVASCULAR ACCIDENT (CVA), UNSPECIFIED MECHANISM (HCC): Primary | ICD-10-CM

## 2022-04-29 DIAGNOSIS — M54.41 CHRONIC BILATERAL LOW BACK PAIN WITH BILATERAL SCIATICA: ICD-10-CM

## 2022-04-29 PROCEDURE — 1111F DSCHRG MED/CURRENT MED MERGE: CPT | Performed by: NURSE PRACTITIONER

## 2022-04-29 PROCEDURE — 99495 TRANSJ CARE MGMT MOD F2F 14D: CPT | Performed by: NURSE PRACTITIONER

## 2022-04-29 RX ORDER — ONDANSETRON 4 MG/1
4 TABLET, FILM COATED ORAL 3 TIMES DAILY PRN
Qty: 30 TABLET | Refills: 1 | Status: ON HOLD | OUTPATIENT
Start: 2022-04-29 | End: 2022-07-14

## 2022-04-29 ASSESSMENT — PATIENT HEALTH QUESTIONNAIRE - PHQ9
SUM OF ALL RESPONSES TO PHQ QUESTIONS 1-9: 0
5. POOR APPETITE OR OVEREATING: 0
10. IF YOU CHECKED OFF ANY PROBLEMS, HOW DIFFICULT HAVE THESE PROBLEMS MADE IT FOR YOU TO DO YOUR WORK, TAKE CARE OF THINGS AT HOME, OR GET ALONG WITH OTHER PEOPLE: 0
4. FEELING TIRED OR HAVING LITTLE ENERGY: 0
7. TROUBLE CONCENTRATING ON THINGS, SUCH AS READING THE NEWSPAPER OR WATCHING TELEVISION: 0
SUM OF ALL RESPONSES TO PHQ QUESTIONS 1-9: 0
1. LITTLE INTEREST OR PLEASURE IN DOING THINGS: 0
9. THOUGHTS THAT YOU WOULD BE BETTER OFF DEAD, OR OF HURTING YOURSELF: 0
SUM OF ALL RESPONSES TO PHQ9 QUESTIONS 1 & 2: 0
6. FEELING BAD ABOUT YOURSELF - OR THAT YOU ARE A FAILURE OR HAVE LET YOURSELF OR YOUR FAMILY DOWN: 0
2. FEELING DOWN, DEPRESSED OR HOPELESS: 0
3. TROUBLE FALLING OR STAYING ASLEEP: 0
8. MOVING OR SPEAKING SO SLOWLY THAT OTHER PEOPLE COULD HAVE NOTICED. OR THE OPPOSITE, BEING SO FIGETY OR RESTLESS THAT YOU HAVE BEEN MOVING AROUND A LOT MORE THAN USUAL: 0

## 2022-04-29 ASSESSMENT — ENCOUNTER SYMPTOMS
EYE PAIN: 0
SORE THROAT: 0
BACK PAIN: 0
COUGH: 0
VOMITING: 0
DIARRHEA: 0
NAUSEA: 0
SINUS PAIN: 0
SHORTNESS OF BREATH: 0
ABDOMINAL PAIN: 0

## 2022-04-29 NOTE — PROGRESS NOTES
[address] [address] [facility]  [address]  [address] [facility] [address]  Dept: [phone]  Dept Fax: [phone]    Ranjith Kessler is a 51 y.o. male who presents today for his medicalconditions/complaints as noted below. Ranjith Kessler is c/o of Heat Exposure (pt was at [facility] for a stroke. )      HPI:     51 y.o presents for follow up    Hospitalzied from covid [place], was on high dose oxygen, never needed bipap or intubation. Stroke hospitaliztion [facility] 12-14, [facility], doing physical therapy [facility], right upper and lower extremity deficits, plavix and aspirin initiated.      Complains of left knee pain. Treatments tried include none. MRI was positive for subacute fracture going to physical therapy, tramadol stopped.      Chronic low back pain, been taking tramadol. Following with pain management. Had xrays in the past that were negative in [facility], ongoing for 30 yrs. Did see pain management and was started on gabapentin. Has seen podiatry regarding foot lesions, ongoing neuropathy and DM foot exams. Does have callus and dark lesion to left lateral plantar aspect.      Significant history of end-stage renal disease, currently on dialysis follows with nephrologist Dr. Ronaldo Couch. Does take Lasix 20 mg once daily, still producing urine. Compliant with dialysis therapies. , TRS. Doing ongoing visits with transplant clinic at [facility].      Type 1 diabetes takes Humalog sliding scale, Lantus 45 units in a.m., 5 units nightly follows with endocrinology.  8.0     History of hypertension takes amlodipine 10 mg, lisinopril 20 mg, BP well controlled today.     History of hyperlipidemia, coronary artery disease takes Crestor 40, Zetia 10, last lipids stable.      History of GERD takes omeprazole daily, working well.      History of anxiety depression takes Prozac, Wellbutrin, mood stable.     History of insomnia takes trazodone for sleep, sleep stable      Past Medical History:   Diagnosis Date    Closed fracture of bone of right foot March - April 2020    Dialysis patient [facility])     Kidney disease     On Dialysis    Type 1 diabetes mellitus (HCC)         Current Outpatient Medications   Medication Sig Dispense Refill    ondansetron (ZOFRAN) 4 MG tablet Take 1 tablet by mouth 3 times daily as needed for Nausea or Vomiting 30 tablet 1    insulin glargine (LANTUS) 100 UNIT/ML injection vial Inject 30 Units into the skin every morning 10 mL 3    lisinopril (PRINIVIL;ZESTRIL) 20 MG tablet Take 1 tablet by mouth daily 30 tablet 3    clopidogrel (PLAVIX) 75 MG tablet Take 1 tablet by mouth daily for 19 doses 30 tablet 3    magnesium oxide (MAG-OX) 400 MG tablet Take 400 mg by mouth daily      magnesium hydroxide (MILK OF MAGNESIA) 400 MG/5ML suspension Take 15 mLs by mouth daily as needed for Constipation      insulin aspart (NOVOLOG) 100 UNIT/ML injection vial Inject into the skin 3 times daily (before meals) SLIDING SCALE      gabapentin (NEURONTIN) 300 MG capsule Take 300 mg by mouth daily.  FLUoxetine (PROZAC) 20 MG capsule Take 40 mg by mouth daily      ammonium lactate (LAC-HYDRIN) 12 % lotion Apply topically daily.  (Patient taking differently: Apply topically daily to BLE) 1 each 3    furosemide (LASIX) 20 MG tablet Take 1 tablet by mouth daily 60 tablet 5    buPROPion (WELLBUTRIN XL) 150 MG extended release tablet Take 1 tablet by mouth every morning 15 tablet 1    Rosuvastatin Calcium 40 MG CPSP Take 40 mg by mouth daily      ezetimibe (ZETIA) 10 MG tablet Take 10 mg by mouth daily      omeprazole (PRILOSEC) 40 MG delayed release capsule Take 40 mg by mouth daily      amLODIPine (NORVASC) 10 MG tablet Take 10 mg by mouth daily      aspirin 81 MG EC tablet Take 81 mg by mouth daily      calcium acetate 667 MG TABS Take 667 mg by mouth 3 times daily (with meals)       vitamin B-12 (CYANOCOBALAMIN) 500 MCG tablet Take 500 mcg by mouth daily       No current facility-administered medications for this visit. No Known Allergies    Subjective:      Review of Systems   Constitutional: Negative for chills and fatigue. HENT: Negative for congestion, ear pain, sinus pain and sore throat. Eyes: Negative for pain and visual disturbance. Respiratory: Negative for cough and shortness of breath. Cardiovascular: Negative for chest pain and palpitations. Gastrointestinal: Negative for abdominal pain, diarrhea, nausea and vomiting. Genitourinary: Negative for penile pain and testicular pain. Musculoskeletal: Negative for back pain, joint swelling and neck pain. Skin: Positive for rash. Neurological: Positive for weakness. Negative for dizziness and light-headedness. Hematological: Does not bruise/bleed easily. All other systems reviewed and are negative.      :Objective     Physical Exam  Vitals and nursing note reviewed. Constitutional:       General: He is not in acute distress. Appearance: Normal appearance. He is not toxic-appearing. HENT:      Mouth/Throat:      Mouth: Mucous membranes are moist.   Cardiovascular:      Rate and Rhythm: Normal rate. Pulmonary:      Effort: Pulmonary effort is normal.      Breath sounds: Normal breath sounds. Musculoskeletal:        Feet:    Neurological:      Mental Status: He is alert and oriented to person, place, and time. Sensory: Sensory deficit (right sided) present. Motor: Weakness present.       Gait: Gait abnormal.   Psychiatric:         Mood and Affect: Mood normal.       /78 (Site: Right Upper Arm, Position: Sitting, Cuff Size: Medium Adult)   Pulse 88   Temp 97.7 °F (36.5 °C) (Tympanic)   Ht 5' 6.5\" (1.689 m)   Wt 188 lb (85.3 kg)   SpO2 97%   BMI 29.89 kg/m²     Lab Review   Hospital Outpatient Visit on 04/27/2022   Component Date Value    Glucose 04/27/2022 120*    BUN 04/27/2022 48*    CREATININE 04/27/2022 8.44*    Bun/Cre Ratio 04/27/2022 6*    Calcium 04/27/2022 9.3     Sodium 04/27/2022 134*    Potassium 04/27/2022 5.0     Chloride 04/27/2022 93*    CO2 04/27/2022 27     Anion Gap 04/27/2022 14     GFR Non- 04/27/2022 7*    GFR  04/27/2022 8*    GFR Comment 04/27/2022          WBC 04/27/2022 5.3     RBC 04/27/2022 3.50*    Hemoglobin 04/27/2022 10.6*    Hematocrit 04/27/2022 32.8*    MCV 04/27/2022 93.7     MCH 04/27/2022 30.3     MCHC 04/27/2022 32.3     RDW 04/27/2022 11.6*    Platelets 96/14/2556 213     MPV 04/27/2022 9.7     NRBC Automated 04/27/2022 0.0     Magnesium 04/27/2022 1.8     Phosphorus 04/27/2022 4.1    Hospital Outpatient Visit on 04/25/2022   Component Date Value    Glucose 04/25/2022 109*    BUN 04/25/2022 50*    CREATININE 04/25/2022 9.26*    Calcium 04/25/2022 9.5     Sodium 04/25/2022 133*    Potassium 04/25/2022 4.8     Chloride 04/25/2022 94*    CO2 04/25/2022 25     Anion Gap 04/25/2022 14     GFR Non- 04/25/2022 6*    GFR  04/25/2022 7*    GFR Comment 04/25/2022          WBC 04/25/2022 5.5     RBC 04/25/2022 3.43*    Hemoglobin 04/25/2022 10.5*    Hematocrit 04/25/2022 32.4*    MCV 04/25/2022 94.5     MCH 04/25/2022 30.6     MCHC 04/25/2022 32.4     RDW 04/25/2022 11.9     Platelets 18/54/5933 222     MPV 04/25/2022 10.1     NRBC Automated 04/25/2022 0.0     Magnesium 04/25/2022 1.8     Phosphorus 04/25/2022 3.9    Hospital Outpatient Visit on 04/22/2022   Component Date Value    Glucose 04/22/2022 120*    BUN 04/22/2022 38*    CREATININE 04/22/2022 7.30*    Calcium 04/22/2022 9.7     Sodium 04/22/2022 136     Potassium 04/22/2022 4.2     Chloride 04/22/2022 95*    CO2 04/22/2022 26     Anion Gap 04/22/2022 15     GFR Non- 04/22/2022 8*    GFR  04/22/2022 10*    GFR Comment 04/22/2022          WBC 04/22/2022 7.1     RBC 04/22/2022 3.58*    Hemoglobin 04/22/2022 11.0*    Hematocrit 04/22/2022 34.9*    MCV 04/22/2022 97.5     MCH 04/22/2022 30.7     MCHC 04/22/2022 31.5     RDW 04/22/2022 12.1     Platelets 09/48/0349 210     MPV 04/22/2022 10.2     NRBC Automated 04/22/2022 0.0     Magnesium 04/22/2022 1.8     Phosphorus 04/22/2022 3.7    Hospital Outpatient Visit on 04/20/2022   Component Date Value    Glucose 04/20/2022 189*    BUN 04/20/2022 42*    CREATININE 04/20/2022 7.82*    Calcium 04/20/2022 10.1     Sodium 04/20/2022 136     Potassium 04/20/2022 4.9     Chloride 04/20/2022 96*    CO2 04/20/2022 22     Anion Gap 04/20/2022 18*    GFR Non- 04/20/2022 7*    GFR  04/20/2022 9*    GFR Comment 04/20/2022          WBC 04/20/2022 6.5     RBC 04/20/2022 3.91*    Hemoglobin 04/20/2022 11.9*    Hematocrit 04/20/2022 37.2*    MCV 04/20/2022 95.1     MCH 04/20/2022 30.4     MCHC 04/20/2022 32.0     RDW 04/20/2022 12.5     Platelets 95/11/4522 230     MPV 04/20/2022 10.3     NRBC Automated 04/20/2022 0.0     Magnesium 04/20/2022 1.9     Phosphorus 04/20/2022 4.2    Hospital Outpatient Visit on 04/18/2022   Component Date Value    Glucose 04/18/2022 148*    BUN 04/18/2022 36*    CREATININE 04/18/2022 8.14*    Calcium 04/18/2022 9.8     Sodium 04/18/2022 130*    Potassium 04/18/2022 4.6     Chloride 04/18/2022 93*    CO2 04/18/2022 23     Anion Gap 04/18/2022 14     GFR Non- 04/18/2022 7*    GFR  04/18/2022 9*    GFR Comment 04/18/2022          WBC 04/18/2022 8.2     RBC 04/18/2022 3.86*    Hemoglobin 04/18/2022 11.8*    Hematocrit 04/18/2022 35.6*    MCV 04/18/2022 92.2     MCH 04/18/2022 30.6     MCHC 04/18/2022 33.1     RDW 04/18/2022 12.5     Platelets 84/84/2343 218     MPV 04/18/2022 9.8     NRBC Automated 04/18/2022 0.0     Hepatitis B Surface Ag 04/18/2022 NONREACTIVE     Magnesium 04/18/2022 2.0     Phosphorus 04/18/2022 3.8    Hospital Outpatient Visit on 04/15/2022   Component Date Value    Hep B S Ab 04/15/2022 21.08*    Glucose 04/15/2022 111*    BUN 04/15/2022 21*    CREATININE 04/15/2022 5.33*    Calcium 04/15/2022 9.2     Sodium 04/15/2022 134*    Potassium 04/15/2022 4.2     Chloride 04/15/2022 98     CO2 04/15/2022 22     Anion Gap 04/15/2022 14     GFR Non- 04/15/2022 12*    GFR  04/15/2022 14*    GFR Comment 04/15/2022          WBC 04/15/2022 6.1     RBC 04/15/2022 3.64*    Hemoglobin 04/15/2022 11.2*    Hematocrit 04/15/2022 34.0*    MCV 04/15/2022 93.4     MCH 04/15/2022 30.8     MCHC 04/15/2022 32.9     RDW 04/15/2022 12.4     Platelets 83/62/7790 222     MPV 04/15/2022 9.9     NRBC Automated 04/15/2022 0.0     Magnesium 04/15/2022 1.8     Phosphorus 04/15/2022 3.7    Admission on 04/12/2022, Discharged on 04/14/2022   Component Date Value    Glucose 04/12/2022 136*    BUN 04/12/2022 19     CREATININE 04/12/2022 4.25*    Bun/Cre Ratio 04/12/2022 4*    Calcium 04/12/2022 9.4     Sodium 04/12/2022 140     Potassium 04/12/2022 3.5*    Chloride 04/12/2022 102     CO2 04/12/2022 24     Anion Gap 04/12/2022 14     GFR Non- 04/12/2022 15*    GFR  04/12/2022 18*    GFR Comment 04/12/2022          WBC 04/12/2022 8.9     RBC 04/12/2022 3.96*    Hemoglobin 04/12/2022 12.1*    Hematocrit 04/12/2022 36.8*    MCV 04/12/2022 92.9     MCH 04/12/2022 30.6     MCHC 04/12/2022 32.9     RDW 04/12/2022 12.6     Platelets 78/12/8585 231     MPV 04/12/2022 9.9     NRBC Automated 04/12/2022 0.0     Seg Neutrophils 04/12/2022 72*    Lymphocytes 04/12/2022 12*    Monocytes 04/12/2022 10     Eosinophils % 04/12/2022 5*    Basophils 04/12/2022 1     Immature Granulocytes 04/12/2022 0     Segs Absolute 04/12/2022 6.44     Absolute Lymph # 04/12/2022 1.07*    Absolute Mono # 04/12/2022 0.89     Absolute Eos # 04/12/2022 0.43     Basophils Absolute 04/12/2022 0.06     Absolute Immature Granul* 04/12/2022 0.03     Ventricular Rate 04/12/2022 58     Atrial Rate 04/12/2022 58     P-R Interval 04/12/2022 156     QRS Duration 04/12/2022 108     Q-T Interval 04/12/2022 464     QTc Calculation (Bazett) 04/12/2022 455     P Axis 04/12/2022 30     R Axis 04/12/2022 2     T Axis 04/12/2022 2     Protime 04/12/2022 12.6     INR 04/12/2022 0.9     PTT 04/12/2022 31.7     Magnesium 04/12/2022 2.1     Phosphorus 04/12/2022 2.9     Glucose 04/12/2022 61     QC OK? 04/12/2022 yes     POC Glucose 04/12/2022 61*    Hemoglobin A1C 04/12/2022 8.0*    Estimated Avg Glucose 04/12/2022 183     Cholesterol 04/12/2022 118     HDL 04/12/2022 61     LDL Cholesterol 04/12/2022 44     Chol/HDL Ratio 04/12/2022 1.9     Triglycerides 04/12/2022 66     Glucose 04/13/2022 162*    BUN 04/13/2022 27*    CREATININE 04/13/2022 5.91*    Bun/Cre Ratio 04/13/2022 5*    Calcium 04/13/2022 8.6     Sodium 04/13/2022 138     Potassium 04/13/2022 3.4*    Chloride 04/13/2022 102     CO2 04/13/2022 24     Anion Gap 04/13/2022 12     Alkaline Phosphatase 04/13/2022 142*    ALT 04/13/2022 42*    AST 04/13/2022 27     Total Bilirubin 04/13/2022 0.28*    Total Protein 04/13/2022 5.5*    Albumin 04/13/2022 3.4*    GFR Non- 04/13/2022 10*    GFR  04/13/2022 12*    GFR Comment 04/13/2022          WBC 04/13/2022 6.6     RBC 04/13/2022 3.70*    Hemoglobin 04/13/2022 11.4*    Hematocrit 04/13/2022 34.9*    MCV 04/13/2022 94.3     MCH 04/13/2022 30.8     MCHC 04/13/2022 32.7     RDW 04/13/2022 12.5     Platelets 83/10/7546 182     MPV 04/13/2022 9.9     NRBC Automated 04/13/2022 0.0     Magnesium 04/13/2022 2.0     Left Ventricular Ejectio* 04/13/2022 65     LVEF MODALITY 04/13/2022 ECHO     POC Glucose 04/13/2022 87     POC Glucose 04/13/2022 233*    POC Glucose 04/13/2022 180*    Glucose 04/14/2022 150*    BUN 04/14/2022 40*    CREATININE 04/14/2022 7.64*    Bun/Cre Ratio 04/14/2022 5*    Calcium 04/14/2022 8.5*    Sodium 04/14/2022 137     Potassium 04/14/2022 4.1     Chloride 04/14/2022 101     CO2 04/14/2022 21     Anion Gap 04/14/2022 15     Alkaline Phosphatase 04/14/2022 137*    ALT 04/14/2022 44*    AST 04/14/2022 38     Total Bilirubin 04/14/2022 0.34     Total Protein 04/14/2022 5.3*    Albumin 04/14/2022 3.4*    GFR Non- 04/14/2022 8*    GFR  04/14/2022 9*    GFR Comment 04/14/2022          WBC 04/14/2022 7.4     RBC 04/14/2022 3.51*    Hemoglobin 04/14/2022 10.8*    Hematocrit 04/14/2022 33.3*    MCV 04/14/2022 94.9     MCH 04/14/2022 30.8     MCHC 04/14/2022 32.4     RDW 04/14/2022 12.4     Platelets 99/01/4265 208     MPV 04/14/2022 10.0     NRBC Automated 04/14/2022 0.0     POC Glucose 04/13/2022 129*    POC Glucose 04/14/2022 60*    POC Glucose 04/14/2022 69*    POC Glucose 04/14/2022 121*    POC Glucose 04/14/2022 170*    POC Glucose 04/14/2022 245*   No results displayed because visit has over 200 results. Admission on 01/02/2022, Discharged on 01/02/2022   Component Date Value    Specimen Description 01/02/2022 . THROAT     Special Requests 01/02/2022 NOT REPORTED     Direct Exam 01/02/2022 Rapid Strep A negative. A negative Rapid Group A Strep Screen result does not rule out the possibility of Group A Streptococci in the specimen.  A Group A Strep DNA test is available upon request.     WBC 01/02/2022 5.0     RBC 01/02/2022 4.03*    Hemoglobin 01/02/2022 12.5*    Hematocrit 01/02/2022 37.1*    MCV 01/02/2022 92.1     MCH 01/02/2022 31.0     MCHC 01/02/2022 33.7     RDW 01/02/2022 12.3     Platelets 41/54/2035 141     MPV 01/02/2022 10.0     NRBC Automated 01/02/2022 0.0     Differential Type 01/02/2022 NOT REPORTED     WBC Morphology 01/02/2022 NOT REPORTED     RBC Morphology 01/02/2022 NOT REPORTED     Platelet Estimate 01/02/2022 NOT REPORTED     Seg Neutrophils 01/02/2022 65     Lymphocytes 01/02/2022 17*    Monocytes 01/02/2022 17*    Eosinophils % 01/02/2022 0*    Basophils 01/02/2022 1     Immature Granulocytes 01/02/2022 0     Segs Absolute 01/02/2022 3.25     Absolute Lymph # 01/02/2022 0.85*    Absolute Mono # 01/02/2022 0.85*    Absolute Eos # 01/02/2022 0.00     Basophils Absolute 01/02/2022 0.05     Absolute Immature Granul* 01/02/2022 0.00     Glucose 01/02/2022 207*    BUN 01/02/2022 21*    CREATININE 01/02/2022 5.81*    Bun/Cre Ratio 01/02/2022 4*    Calcium 01/02/2022 8.6     Sodium 01/02/2022 130*    Potassium 01/02/2022 4.4     Chloride 01/02/2022 91*    CO2 01/02/2022 27     Anion Gap 01/02/2022 12     GFR Non- 01/02/2022 11*    GFR  01/02/2022 13*    GFR Comment 01/02/2022          GFR Staging 01/02/2022 NOT REPORTED        EXAMINATION:   MRA OF THE HEAD WITHOUT CONTRAST; MRA OF THE NECK WITHOUT CONTRAST; MRI OF   THE BRAIN WITHOUT CONTRAST 4/12/2022 5:04 pm; 4/12/2022 5:05 pm:       TECHNIQUE:   MRA of the head was performed utilizing time-of-flight imaging with MIP   images.  No intravenous contrast was administered.; Multiplanar multisequence   MRA of the neck was performed without the administration of intravenous   contrast. Stenosis of the internal carotid arteries measured using NASCET   criteria.; Multiplanar multisequence MRI of the brain was performed without   the administration of intravenous contrast.       COMPARISON:   Noncontrast CT head done earlier same day.       HISTORY:   ORDERING SYSTEM PROVIDED HISTORY: CVA   TECHNOLOGIST PROVIDED HISTORY:   CVA   What is the sedation requirement?->None   Decision Support Exception - unselect if not a suspected or confirmed   emergency medical condition->Emergency Medical Condition (MA)   Reason for Exam: Pt to ER today for RUE and RLE weakness since yesterday   morning with a headache. ; ORDERING SYSTEM PROVIDED HISTORY: cva   TECHNOLOGIST PROVIDED HISTORY:   cva   What is the sedation requirement?->None   Decision Support Exception - unselect if not a suspected or confirmed   emergency medical condition->Emergency Medical Condition (MA)   Reason for Exam: Pt to ER today for RUE and RLE weakness since yesterday   morning with a headache. ; ORDERING SYSTEM PROVIDED HISTORY: CVA   TECHNOLOGIST PROVIDED HISTORY:   CVA   Reason for Exam: Pt to ER today for RUE and RLE weakness since yesterday   morning with a headache.       FINDINGS:       MRI BRAIN:       INTRACRANIAL STRUCTURES/VENTRICLES: Small acute lacunar type infarct in the   posterior left basal ganglia.  No mass effect or midline shift.  No evidence   of an acute intracranial hemorrhage.  Symmetric generalized cerebral and   cerebellar volume loss.  No anisha hydrocephalus.  The sellar/suprasellar   regions appear unremarkable.  Asymmetric signal in the left transverse and   sigmoid dural venous sinuses may relate to slow flow.  The axial FLAIR images   demonstrate pontine, bilateral periventricular and deep white matter signal   hyperintensities, commonly seen in the setting of chronic small vessel   ischemic disease.  Small old lacunar-type infarcts in the bilateral thalami   and left central octaviano.  Punctate focus of gradient blooming in the just left   of midline central octaviano may represent remote chronic microhemorrhage.       ORBITS: The visualized portion of the orbits demonstrate no acute abnormality.       SINUSES: The visualized paranasal sinuses and mastoid air cells are well   aerated.       BONES/SOFT TISSUES: The bone marrow signal intensity appears normal. The soft   tissues demonstrate no acute abnormality.           MRA NECK:       Image quality is degraded by motion artifact.       AORTIC ARCH/ARCH VESSELS: No dissection or arterial injury.  No significant   stenosis of the brachiocephalic or subclavian arteries.       CAROTID ARTERIES: No dissection, arterial injury, or hemodynamically   significant stenosis by NASCET criteria.       VERTEBRAL ARTERIES: No dissection, arterial injury, or significant stenosis.           MRA HEAD:       ANTERIOR CIRCULATION: No significant stenosis of the intracranial internal   carotid, anterior cerebral, or middle cerebral arteries.       POSTERIOR CIRCULATION: No significant stenosis of the vertebral, basilar, or   posterior cerebral arteries.           Impression   1.  Small acute lacunar type infarct in the posterior left basal ganglia.  No   acute intracranial hemorrhage.       2.  Chronic small vessel ischemic disease.  Small bilateral old lacunar   infarcts.       3.  Asymmetric signal in the left transverse and sigmoid dural venous sinuses   may relate to slow flow.  Underlying thrombosis is difficult to exclude.       4.  Unremarkable MRA head.       5.  Unremarkable MRA neck. EXAMINATION:   CT OF THE HEAD WITHOUT CONTRAST  4/12/2022 1:00 pm       TECHNIQUE:   CT of the head was performed without the administration of intravenous   contrast. Dose modulation, iterative reconstruction, and/or weight based   adjustment of the mA/kV was utilized to reduce the radiation dose to as low   as reasonably achievable.       COMPARISON:   None.       HISTORY:   ORDERING SYSTEM PROVIDED HISTORY: Stroke   TECHNOLOGIST PROVIDED HISTORY:       Stroke   Decision Support Exception - unselect if not a suspected or confirmed   emergency medical condition->Emergency Medical Condition (MA)   Reason for Exam: Right arm and leg weakness since yesterday, slight headache   yesterday.  No prior CVA       FINDINGS:   BRAIN/VENTRICLES: There is a small hypodensity noted in the the left corona   radiata, and extending inferiorly into the left basal ganglia.  This could be   a acute infarct. [place] is no acute intracranial hemorrhage, mass effect or   midline shift.  No abnormal extra-axial fluid collection.  The gray-white   differentiation is maintained.  There is no evidence of hydrocephalus.       ORBITS: The visualized portion of the orbits demonstrate no acute abnormality.       SINUSES: The visualized paranasal sinuses and mastoid air cells demonstrate   no acute abnormality.       SOFT TISSUES/SKULL:  No acute abnormality of the visualized skull or soft   tissues.           Impression   There is a small hypodensity involving the left corona radiata, and extending   inferiorly into the left basal ganglia.  This could wrist present a infarct.    No evidence of any hemorrhage or mass-effect.         EXAMINATION:   MRI OF THE LUMBAR SPINE WITHOUT CONTRAST, 12/30/2021 12:55 pm       TECHNIQUE:   Multiplanar multisequence MRI of the lumbar spine was performed without the   administration of intravenous contrast.       COMPARISON:   None.       HISTORY:   ORDERING SYSTEM PROVIDED HISTORY: Chronic bilateral low back pain with   bilateral sciatica   TECHNOLOGIST PROVIDED HISTORY:   30 yrs pain, prev neg xrays   Reason for Exam: chronic low back pain for 30 years   Additional signs and symptoms: bilateral leg weakness, right foot drop   Relevant Medical/Surgical History: injury as a teenager       FINDINGS:   BONES/ALIGNMENT: There is normal alignment of the lumbar spine.  There is no   acute fracture or listhesis.  Bone marrow signal intensity is normal.  There   is disc desiccation and mild disc space narrowing at L5-S1.  Intervertebral   disc height and signal is otherwise normal.       SPINAL CORD: The conus medullaris is normal in size and signal intensities   and terminates normally.       SOFT TISSUES: There is no paraspinal mass identified.       L1-L2: There is no disc bulge or protrusion present.  There is no significant   spinal canal stenosis or neural foraminal narrowing present.       L2-L3: There is no disc bulge or protrusion present.  There is no significant   spinal canal stenosis or neural foraminal narrowing present.       L3-L4: There is no disc bulge or protrusion present.  There is no significant   spinal canal stenosis or neural foraminal narrowing present.       L4-L5: There is no disc bulge or protrusion present.  There is no significant   spinal canal stenosis or neural foraminal narrowing present.  There is   bilateral facet arthropathy.       L5-S1: There is a 3 mm central disc protrusion mildly effacing the lateral   recesses.  There is mild spinal canal stenosis.  No significant neural   foraminal narrowing is present.           Impression   3 mm central disc protrusion at L5-S1 mildly effacing the lateral recesses   and mildly narrowing the spinal canal.           Assessment and Plan      1. Cerebrovascular accident (CVA), unspecified mechanism ([address]  -     Jessica Nguyen MD, Neurology, [facility] [address]  2. Neuropathy of both feet  -     XR FOOT LEFT (MIN 3 VIEWS); Future  3. ESRD (end stage renal disease) on dialysis ([address]  4. Lumbar herniated disc  5. DM (diabetes mellitus), type 1 with complications ([address]  6. Mood disorder ([address]  7. Hypertension, unspecified type  8. Hyperlipidemia, unspecified hyperlipidemia type  9. Chronic bilateral low back pain with bilateral sciatica  10. Nausea  -     ondansetron (ZOFRAN) 4 MG tablet; Take 1 tablet by mouth 3 times daily as needed for Nausea or Vomiting, Disp-30 tablet, R-1Normal  11. Hospital discharge follow-up  -     MS DISCHARGE MEDS RECONCILED W/ CURRENT OUTPATIENT MED LIST         referral to neuro  zofran prn  Xray left foot  Continue PT  Continued follow up with endo as last a1c 8.0  Encouraged to follow up with pain specialist, podiatrist, nephrologist, neuro, transplant team as well  F/u 3 months      No results found for this visit on 04/29/22. Return in about 3 months (around 7/29/2022), or if symptoms worsen or fail to improve.         Orders Placed This Encounter   Medications    ondansetron (ZOFRAN) 4 MG tablet     Sig: Take 1 tablet by mouth 3 times daily as needed for Nausea or Vomiting     Dispense:  30 tablet     Refill:  1        Patient given educational materials - see patient instructions. Discussed use, benefit, and side effects of prescribed medications. All patientquestions answered. Pt voiced understanding. Patient given educational materials - see patient instructions. Discussed use, benefit, and side effects of prescribed medications. All patientquestions answered. Pt voiced understanding. This note was transcribed using dictation with Dragon services. Efforts were made to correct any errors but some words may be misinterpreted.     Patient assumes risks associated with failure to complete recommended testing and treatments in a timely manner    Electronically signed by DANIEL Mcbride CNP on 4/29/2022at 2:26 PM

## 2022-04-29 NOTE — PATIENT INSTRUCTIONS
Patient Education        High Blood Pressure: Care Instructions  Overview     It's normal for blood pressure to go up and down throughout the day. But if it stays up, you have high blood pressure. Another name for high blood pressure ishypertension. Despite what a lot of people think, high blood pressure usually doesn't cause headaches or make you feel dizzy or lightheaded. It usually has no symptoms. But it does increase your risk of stroke, heart attack, and other problems. You and your doctor will talk about your risks of these problems based on yourblood pressure. Your doctor will give you a goal for your blood pressure. Your goal will bebased on your health and your age. Lifestyle changes, such as eating healthy and being active, are always important to help lower blood pressure. You might also take medicine to reachyour blood pressure goal.  Follow-up care is a key part of your treatment and safety. Be sure to make and go to all appointments, and call your doctor if you are having problems. It's also a good idea to know your test results and keep alist of the medicines you take. How can you care for yourself at home? Medical treatment   If you stop taking your medicine, your blood pressure will go back up. You may take one or more types of medicine to lower your blood pressure. Be safe with medicines. Take your medicine exactly as prescribed. Call your doctor if you think you are having a problem with your medicine.  Talk to your doctor before you start taking aspirin every day. Aspirin can help certain people lower their risk of a heart attack or stroke. But taking aspirin isn't right for everyone, because it can cause serious bleeding.  See your doctor regularly. You may need to see the doctor more often at first or until your blood pressure comes down.  If you are taking blood pressure medicine, talk to your doctor before you take decongestants or anti-inflammatory medicine, such as ibuprofen. heartbeat.      You have symptoms of a stroke. These may include:  ? Sudden numbness, tingling, weakness, or loss of movement in your face, arm, or leg, especially on only one side of your body. ? Sudden vision changes. ? Sudden trouble speaking. ? Sudden confusion or trouble understanding simple statements. ? Sudden problems with walking or balance. ? A sudden, severe headache that is different from past headaches.      You have severe back or belly pain. Do not wait until your blood pressure comes down on its own. Get help right away. Call your doctor now or seek immediate care if:     Your blood pressure is much higher than normal (such as 180/120 or higher), but you don't have symptoms.      You think high blood pressure is causing symptoms, such as:  ? Severe headache.  ? Blurry vision. Watch closely for changes in your health, and be sure to contact your doctor if:     Your blood pressure measures higher than your doctor recommends at least 2 times. That means the top number is higher or the bottom number is higher, or both.      You think you may be having side effects from your blood pressure medicine. Where can you learn more? Go to [email or web address] org and sign in to your [employer] account. Enter G696 in the [employer] box to learn more about \"High Blood Pressure: Care Instructions. \"     If you do not have an account, please click on the \"Sign Up Now\" link. Current as of: January 10, 2022               Content Version: 13.2  © 3874-9911 [employer], [employer]. Care instructions adapted under license by [facility] ([facility]). If you have questions about a medical condition or this instruction, always ask your healthcare professional. [place] [address] any warranty or liability for your use of this information.

## 2022-05-04 ENCOUNTER — TELEPHONE (OUTPATIENT)
Dept: FAMILY MEDICINE CLINIC | Age: 47
End: 2022-05-04

## 2022-05-04 DIAGNOSIS — M54.12 CERVICAL RADICULOPATHY: Primary | ICD-10-CM

## 2022-05-04 NOTE — TELEPHONE ENCOUNTER
[facility] contacted the office for Femi Gutiérrez to inform office that patient is having left shoulder pain and issues moving fingers. Patient was complaining of this in hospital as well and they told him to request MRI of the neck. Patient believes that he has a pinched nerve in neck causing this. Please advise.

## 2022-05-06 ENCOUNTER — NURSE ONLY (OUTPATIENT)
Dept: PRIMARY CARE CLINIC | Age: 47
End: 2022-05-06

## 2022-05-06 DIAGNOSIS — G57.93 NEUROPATHY OF BOTH FEET: Primary | ICD-10-CM

## 2022-05-09 ENCOUNTER — TELEPHONE (OUTPATIENT)
Dept: FAMILY MEDICINE CLINIC | Age: 47
End: 2022-05-09

## 2022-05-09 ENCOUNTER — HOSPITAL ENCOUNTER (OUTPATIENT)
Dept: PHYSICAL THERAPY | Facility: CLINIC | Age: 47
Setting detail: THERAPIES SERIES
Discharge: HOME | End: 2022-05-09
Payer: MEDICARE

## 2022-05-09 DIAGNOSIS — G57.93 NEUROPATHY OF BOTH FEET: Primary | ICD-10-CM

## 2022-05-09 DIAGNOSIS — E10.8 DM (DIABETES MELLITUS), TYPE 1 WITH COMPLICATIONS (HCC): ICD-10-CM

## 2022-05-09 PROCEDURE — 97162 PT EVAL MOD COMPLEX 30 MIN: CPT

## 2022-05-09 NOTE — FLOWSHEET NOTE
LEMA BALANCE SCALE 14-Item Long Form Original Version    Patient Name:  Tommy Heath  Date:  5/9/2022    1. SITTING TO STANDING  INSTRUCTIONS: Please stand up. Try not to use your hands for support. (4) able to stand without using hands and stabilize independently  (3) able to stand independently using hands  (2) able to stand using hands after several tries  (1) needs minimal aid to stand or to stabilize  (0) needs moderate or maximal assist to stand  Score: 3    2. STANDING UNSUPPORTED  INSTRUCTIONS: Please stand for two minutes without holding. (4) able to stand safely 2 minutes  (3) able to stand 2 minutes with supervision  (2) able to stand 30 seconds unsupported  (1) needs several tries to stand 30 seconds unsupported  (0) unable to stand 30 seconds unassisted If a subject is able to stand 2  minutes unsupported, score full points for sitting unsupported. Proceed to  item #4. Score: 2    3. SITTING WITH BACK UNSUPPORTED BUT FEET SUPPORTED  ON FLOOR OR ON A STOOL  INSTRUCTIONS: Please sit with arms folded for 2 minutes. (4) able to sit safely and securely 2 minutes  (3) able to sit 2 minutes under supervision  (2) able to sit 30 seconds  (1) able to sit 10 seconds  (0) unable to sit without support 10 seconds  Score: 4    4. STANDING TO SITTING  INSTRUCTIONS: Please sit down. (4) sits safely with minimal use of hands  (3) controls descent by using hands  (2) uses back of legs against chair to control descent  (1) sits independently but has uncontrolled descent  (0) needs assistance to sit  Score: 3    5. TRANSFERS  INSTRUCTIONS: Arrange chairs(s) for a pivot transfer. Ask subject to  transfer one way toward a seat with armrests and one way toward a seat  without armrests. You may use two chairs (one with and one without  armrests) or a bed and a chair.   (4) able to transfer safely with minor use of hands  (3) able to transfer safely definite need of hands  (2) able to transfer with verbal cueing and/or supervision  (1) needs one person to assist  (0) needs two people to assist or supervise to be safe  Score: 3    6. STANDING UNSUPPORTED WITH EYES CLOSED  INSTRUCTIONS: Please close your eyes and stand still for 10 seconds. (4) able to stand 10 seconds safely  (3) able to stand 10 seconds with supervision  (2) able to stand 3 seconds  (1) unable to keep eyes closed 3 seconds but stays steady  (0) needs help to keep from falling  Score: 3    7. STANDING UNSUPPORTED WITH FEET TOGETHER  INSTRUCTIONS: Place your feet together and stand without holding. (4) able to place feet together independently and stand 1 minute safely  (3) able to place feet together independently and stand for 1 minute with  supervision  (2) able to place feet together independently but unable to hold for 30 seconds  (1) needs help to attain position but able to stand 15 seconds feet together  (0) needs help to attain position and unable to hold for 15 seconds  Score: 0    8. REACHING FORWARD WITH OUTSTRETCHED ARM WHILE  STANDING  INSTRUCTIONS: Lift arm to 90 degrees. Stretch out your fingers and reach  forward as far as you can. (Examiner places a ruler at end of fingertips when  arm is at 90 degrees. Fingers should not touch the ruler while reaching  forward. The recorded measure is the distance forward that the finger reaches  while the subject is in the most forward lean position. When possible, ask  subject to use both arms when reaching to avoid rotation of the trunk.). (4) can reach forward confidently >25 cm (10 inches)  (3) can reach forward >12 cm safely (5 inches)  (2) can reach forward >5 cm safely (2 inches)  (1) reaches forward but needs supervision  (0) loses balance while trying/requires external support  Score: 1    9.  OBJECT FROM FLOOR FROM A STANDING POSITION  INSTRUCTIONS:  shoe/slipper which is placed in front of your feet.   (4) able to  slipper safely and easily  (3) able to  slipper but needs supervision  (2) unable to  but reaches 2-5cm (1-2 inches) from slipper and keeps  balance independently  (1) unable to  and needs supervision while trying  (0) unable to try/needs assist to keep from losing balance or falling  Score: 0    10. TURNING TO LOOK BEHIND OVER LEFT AND RIGHT  SHOULDERS WHILE STANDING  INSTRUCTIONS: Turn to look directly behind you over toward left shoulder. Repeat to the right. Examiner may pick an object to look at directly behind the  subject to encourage a better twist turn. (4) looks behind from both sides and weight shifts well  (3) looks behind one side only other side shows less weight shift  (2) turns sideways only but maintains balance  (1) needs supervision when turning  (0) needs assist to keep from losing balance or falling  Score: 1    11. TURN 360 DEGREES  INSTRUCTIONS: Turn completely around in a full [place]. Pause. Then turn a  full [place] in the other direction. (4) able to turn 360 degrees safely in 4 seconds or less  (3) able to turn 360 degrees safely one side only in 4 seconds or less  (2) able to turn 360 degrees safely but slowly  (1) needs close supervision or verbal cueing  (0) needs assistance while turning  Score: 1    12. PLACING ALTERNATE FOOT ON STEP OR STOOL WHILE  STANDING UNSUPPORTED  INSTRUCTIONS: Place each foot alternately on the step/stool. Continue until  each foot has touched the step/stool four times. (4) able to stand independently and safely and complete 8 steps in 20 seconds  (3) able to stand independently and complete 8 steps >20 seconds  (2) able to complete 4 steps without aid with supervision  (1) able to complete >2 steps needs minimal assist  (0) needs assistance to keep from falling/unable to try  Score: 0     13. STANDING UNSUPPORTED ONE FOOT IN FRONT  INSTRUCTIONS: (DEMONSTRATE TO SUBJECT) Place one foot directly in  front of the other.  If you feel that you cannot place your foot directly in front,  try to step far enough ahead that the heel of your forward foot is ahead of the  toes of the other foot. (To score 3 points, the length of the step should exceed  the length of the other foot and the width of the stance should approximate the  subject's normal stride width). (4) able to place foot tandem independently and hold 30 seconds  (3) able to place foot ahead of other independently and hold 30 seconds  (2) able to take small step independently and hold 30 seconds  (1) needs help to step but can hold 15 seconds  (0) loses balance while stepping or standing  Score: 0    14. STANDING ON ONE LEG  INSTRUCTIONS: Stand on one leg as long as you can without holding. (4) able to lift leg independently and hold >10 seconds  (3) able to lift leg independently and hold 5-10 seconds  (2) able to lift leg independently and hold = or >3 seconds  (1) tries to lift leg unable to hold 3 seconds but remains standing  independently  (0) unable to try or needs assist to prevent fall  Score:  0    Total Score:  21/56  Max score 56,a person scoring below 45 is considered to be at risk for falling.     Completed by: Ange Skinner, PT

## 2022-05-09 NOTE — FLOWSHEET NOTE
[facility] Fall Risk Assessment    Patient Name:  Hall Skiff  : 1975    Risk Factor Scale  Score   History of Falls [x] Yes  [] No 25  0 25   Secondary Diagnosis [x] Yes  [] No 15  0 15   Ambulatory Aid [] Furniture  [x] Crutches/cane/walker  [] None/bedrest/wheelchair/nurse 30  15  0 15   IV/Heparin Lock [] Yes  [x] No 20  0 0   Gait/Transferring [] Impaired  [x] Weak  [] Normal/bedrest/immobile 20  10  0 10   Mental Status [] Forgets limitations  [x] Oriented to own ability 15  0 0      Total:65     Based on the Assessment score: check the appropriate box.     []  No intervention needed   Low =   Score of 0-24    []  Use standard prevention interventions Moderate =  Score of 24-44   [] Give patient handout and discuss fall prevention strategies   [] Establish goal of education for patient/family RE: fall prevention strategies    [x]  Use high risk prevention interventions High = Score of 45 and higher   [x] Give patient handout and discuss fall prevention strategies - WILL PROVIDE IN UPCOMING SESSION   [x] Establish goal of education for patient/family Re: fall prevention strategies   [x] Discuss lifeline / other resources    Electronically signed by:   Edson Garland PT  Date: 2022

## 2022-05-09 NOTE — CONSULTS
[] [facility]  [address]    [phone]  F: [phone] [x] [address]  [facility]   [address]  P: [phone]  F: [phone] [] [address]  [address] [facility]  P: [phone]  F: [phone] [] [address] [facility]   [address]   [place]: [phone]  F: [phone] [] [facility]) - [facility] & Therapy  [address] [facility] [address]  [place]: [phone]   F: [phone]        Physical Therapy Neurological Evaluation    Date:  2022  Patient: Triston Mercer  : 1975  MRN: [id number]  Physician: RACHAEL Pérez   Insurance: Medicare ([address] [place] [address])  Medical Diagnosis: I63.9 (ICD-10-CM) - Cerebrovascular accident (CVA), unspecified mechanism ([facility] [address]  Rehab Codes: R26.89, R53.1, M54.59, M54.2, R29.3,   Next 's appt.: 8/10 with neuro  Date of symptom onset: 22     Subjective:   CC: Pt arrives for physical therapy evaluation of impairments secondary to sustaining a left lacunar stroke in 2022 affecting the basal ganglia - resultant in R-sided weakness in UE/LE. Notes his initial symptoms were significant difficulty moving RLE/RUE - thought it was d/t excess water on his leg at first. Derrill Limbo to dialysis next day, symptoms didn't improve, so he went to [facility] [address] for treatment. Was noted to have slurred speech and dragging of LLE with gait. Did go to [facility] for 2 weeks getting intensive therapy from all disciplines. Notes the slurred speech improved, as well as the R foot drag and RLE numbness, but walking \"still off\" per pt. Notes his cognition seems to be \"same as usual\", no difficulty with vision. Notes increased sialorrhea at night.  Denies any falls since stroke onset; notes the biggest change since returning home is issues with hip/knee strength and with use of hand for daily activities. Pertinent medical hx: Numbness in B feet possibly secondary to DM I (pt unsure), wears B AFOs. Hx L knee avulsed PCL fracture with tibia d/t fall at dialysis in 2019        PLOF     Use of rollator for past several years in community and intermittently at home, intermittent balance issues/stumbles; lives with grandparents and they assist with laundry and household heavy activities   Summary of current limitations:     Unable to use right hand for writing, taking caps off bottles; slower walk with feelings of more weakness/difficulty with walking both when standing on right leg or bringing right leg forward, some difficulty with ascending ramp into house using walker, limited standing without UE assist >10 min, decreased walking tolerance this date and requires walker for all household/community ambulation   Previous physical therapy? []No       [x]Yes, date of most recent, facility: March 2022 for generalized balance/L knee pain        PMHx: [] Unremarkable [x] Diabetes [] HTN  [] Pacemaker   [] MI/Heart Problems [] Cancer [] Arthritis [x] Other: Kidney disease, on dialysis w/ left AV fistula              [x] Refer to full medical chart  In EPIC     Comorbidities:   [] Obesity [x] Dialysis  [] Other:   [] Asthma/COPD [] Dementia [] Other:   [x] Stroke [] Sleep apnea [] Other:   [] Vascular disease [] Rheumatic disease [] Other:     Tests: [] X-Ray: [x] MRI: 4/12/22:       Impression   1.  Small acute lacunar type infarct in the posterior left basal ganglia.  No   acute intracranial hemorrhage.       2.  Chronic small vessel ischemic disease.  Small bilateral old lacunar   infarcts.       3.  Asymmetric signal in the left transverse and sigmoid dural venous sinuses   may relate to slow flow.  Underlying thrombosis is difficult to exclude.       4.  Unremarkable MRA head.       5.  Unremarkable MRA neck.        [] Other:     Medications: [x] Refer to full medical record [] None [] Other:  Allergies:       [x] Refer to full medical record [] None [] Other:    Function:  Hand Dominance  [] Right  [] Left  Patient lives with: grandparents   In what type of home [x]  One story, 1st floor bedroom/bathroom   [] Two story   [] Split level   Number of stairs to enter ramps   With handrail on the []  Right to enter   [] Left to enter   Bathroom has a []  Tub only  [] Tub/shower combo   [] Walk in shower    []  Grab bars   Washing machine is on [x]  Main level - done by grandparents   [] Second level   [] Basement   Employer Disability   Job Status    [x] Disability     Work activities/duties N/A       Durable Medical Equipment:  Current DME available: JUDITH purdy      ADL/IADL Previous level of function Current level of function Who currently assists the patient with task   Bathing  [x] Independent  [] Assist [] Independent  [] Assist    Dress/grooming [x] Independent  [] Assist [] Independent  [] Assist    Transfer/mobility [x] Independent  [] Assist [] Independent  [] Assist    Feeding [x] Independent  [] Assist [] Independent  [] Assist    Toileting [x] Independent  [] Assist [] Independent  [] Assist    Driving [] Independent  [x] Assist [] Independent  [] Assist    Housekeeping [] Independent  [x] Assist [] Independent  [] Assist grandparents   Grocery shop/meal prep [] Independent  [x] Assist [] Independent  [] Assist grandparents     Gait Prior level of function Current level of function    [x] Independent  [] Assist [x] Independent  [] Assist   Device: [] Independent [] Independent    [] Straight Cane [] Quad cane [] Straight Cane [] Quad cane    [] Standard walker [] Rolling walker   [x] 4 wheeled walker [] Standard walker [] Rolling walker   [x] 4 wheeled walker    [] Wheelchair [] Wheelchair       Pain:  [x] Yes  [] No Location: posterior neck/low back Pain Rating: (0-10 scale) 8/10  Pain altered Tx:  [] Yes  [] No Action:    Symptoms:  [] Improving [] Worsening [x] Same  Better:  Sitting straight on chair, sleeping, cod/hot packs, walking  Worse: standing up straight and bending neck and laying back on dialysis chair  Sleep: [] OK    [x] Disturbed           Objective:    POSTURE No deficit Deficit Not Tested Comments   Kyphosis [x] [] []    Scoliosis [x] [] []    Forward Head [] [x] []    Rounded Shldrs [] [x] []    Slumped Sitting [] [x] []    Skin Integrity [] [x] []           NEUROLOGICAL       Reflexes [] [] [x] [] 0: absent  [] 1+: diminished  [] 2+: brisk, normal   [] 3+: brisker than average, slight hyperactive  [] 4+: very brisk, hyperactive, clonus    Special reflexes:   Babinski:   [] negative [] positive  Hoffmans: []negative  [] positive   Cranial Nerves [] [] [x]    Sensation [] [x] [] Decreased throughout B feet   Bladder/Bowel [x] [] []    Coordination [] [] [x]                                  Present         Absent                                         UE/LE           UE/LE  Dysdiadochokinesia:       [] []            [] []  Dysmetria:                     [] []            [] []   Tone [] [x] [] [] 0: no increase in muscle tone  [] 1: slight increase in muscle tone, manifested by a catch and release or by minimal resistance at end ROM  [x] 1+: slight increase in muscle tone, manifested by a catch and release or by minimal resistance throughout remainder (less than half) of ROM - R plantarflexors (but does have hx of B PF contracture)  [] 2: more marked increase in muscle tone throughout most of ROM, but affected part easily moved  [] 3: considerable increase in tone, passive movement difficult  [] 4:  rigid in flexion or extension   Clonus [x] [] []    Tremors [] [x] [] R hand   Comments:         ROM  °A/P STRENGTH    Left Right Left Right   Shoulder Flex  WNL 5- 5-   Abd  WNL 5- 5-   Elbow Flex   5- 5-   Ext   4- 4-   Wrist Flex   4+ 4+   Ext   4+ 4+   Hand    5- 5-   Hip Flex   4+ 4   Ext   4+ 4   Abd 4+ 4   Knee Flex   5- 5-   Ext   5- 4+   Ankle DF +2 +5 3+ 3+   PF WNL WNL 4+ 4-   Inv   3+ 3+   Ev   3+ 3+             FUNCTION INDEP MIN ASSIST MOD ASSIST MAX ASSIST NOT TESTED   Bed Mobility        -rolling [x] [] [] [] []   -sit to supine [x] [] [] [] []   -supine to sit [x] [] [] [] []   Transfers        -sit to stand [x] [] [] [] []   -sliding board [] [] [] [] [x]   -pivot [] [] [] [] [x]   Balance        -sitting static [x] [] [] [] []   -dynamic [x] [] [] [] []   -standing static [] [x] [] [] []   -dynamic [] [x] [] [] []   Ambulation [x] [] [] [] []   Distance/Device: rolling walker, 100 ft   Analysis: decreased single limb stance time bilat, decreased step length bilat (R>L), genurecurvatum in stance bilaterally, some intermittent difficulty with foot drag on RLE     W/C Mobility [] [] [] [] [x]   Groom/Dress [] [] [] [] [x]     Core set neurological outcome measures: [x] Hernandez Balance Scale: 21/56 (<45 indicates risk for falls)  [x] 10mWT: .50 m/s self selected; .73m/s (both with rollator) (<1.0m/s indicates fall risk, <.8m/s indicates limited community ambulator)        [] FGA:  [x] 5x STS: 0 (unable to complete without UE assist) - 26.02s with UE assist, CGA  [] 6MWT:       Activity-Specific Balance   Confidence Scale  Score: 70% self confidence in balance tasks, 30% impaired     Comments:    Assessment: Kate Oneal is a 52yo male who presents s/p left lacunar stroke on 4/12/22 post 2-week IPR stay - demonstrating continued mild R hemiparesis affecting gait and balance - pt will benefit from skilled physical therapy to address R hemibody weakness, improve gait mechanics and efficiency, and increase standing static/dynamic balance to allow improved household ADL/IADL completion and reduce fall risk. Problems:    [x] ? Pain: neck and low back  [x] ? ROM: B ankle mobility (R>L)  [x] ? Strength:  [x] ?  Function:  [x] Other: balance/gait deficits       STG: (to be met in 8 treatments)  1. ? Pain: No more than 4/10 pain reported in low back or neck post therapy sessions to indicate improving tolerance to increased activity and exercise  2. ? ROM: at least 0deg DF PROM with knee extended to indicate improving mobility in ankles to increase heel strike and reduce toe drag with prolonged ambulation  3. ? Balance:   a. Pt able to fully turn to look behind himself without instability in either direction to indicate improving postural stability  b. Pt able to complete standing reaching tasks without UE assist and no more than mild instability to indicate improving dynamic balance for safety with household IADLs  4. ? Strength:   a. At least 4+/5 grossly in R hip to allow improved pelvic stability in standing and improved swing phase control/speed during prolonged gait  b. Pt able to utilize RUE for fine grasp and release tasks with no more than minimally slowed speed evident  5. ? Function: Pt able to ambulate 450 ft with least restrictive device with no evidence of lagging RLE swing or abnormal stance time noted, with no more than 11 on RPE scale  6. Patient to be independent with home exercise program as demonstrated by performance with correct form without cues. 7. Demonstrate Knowledge of fall prevention    LTG: (to be met in 16 treatments)  8. ? Pain: No more than 2/10 pain reported in low back or neck post therapy sessions to indicate improving tolerance to increased activity and exercise  9. ? ROM: at least 5 deg DF PROM with knee extended to indicate improving mobility in ankles to increase heel strike and reduce toe drag with prolonged ambulation  10. ? Balance:   a. At least 30/56 on Hernandez to indicate significant improvement in standing static/dynamic balance and progress towards reduced fall risk  b. Able to negotiate gait obstacles using rollator without increased instability or excessively slowed gait  11. ? Strength:   a.  At least 5-/5 grossly in R hip to allow further improved pelvic stability in standing and improved swing phase control/speed during prolonged gait  b. At least 5/5 B knee ext to prevent excessive buckling with prolonged standing  12. ? Function:   a. Pt able to ambulate 600 ft with least restrictive device with appropriate RLE swing/stance phase timing, no evidence of toe drag, and no more than minimal fatigue by end of ambulation  b. Pt able to ambulate 100 ft with intermittent turning, stopping, etc without assistive device and demonstrating good stability and no evidence of B knee buckling to indicate improving functional strength and safety with household ambulation distances for ADL/IADLs  c. No more than 14% impaired on ABC scale to indicate improving subjective balance confidence        Patient goals: \"to strengthen right side back as much as I can\"    Rehab Potential:  [x] Good  [] Fair  [] Poor   Suggested Professional Referral:  [x] No  [] Yes:  Barriers to Goal Achievement[de-identified]  [] No  [x] Yes: chronic balance/walking impairment  Domestic Concerns:  [x] No  [] Yes:    Pt. Education:  [x] Plans/Goals, Risks/Benefits discussed  [] Home exercise program  Method of Education: [x] Verbal  [] Demo  [] Written  Comprehension of Education:  [x] Verbalizes understanding. [] Demonstrates understanding. [] Needs Review. [] Demonstrates/verbalizes understanding of HEP/Ed previously given.     Treatment Plan:  [x] Therapeutic Exercise   31585  [] Iontophoresis: 4 mg/mL Dexamethasone Sodium Phosphate  mAmin  12968   [x] Therapeutic Activity  52183 [] Vasopneumatic cold with compression  56402    [x] Gait Training   02766 [] Ultrasound   72804   [x] Neuromuscular Re-education  76751 [] Electrical Stimulation Unattended  36604   [x] Manual Therapy  58698 [] Electrical Stimulation Attended  74148   [x] Instruction in HEP  [] Dry Needling   [] Aquatic Therapy   27024 [] Cold/hotpack    [] Massage   44791      [] Lumbar/Cervical Traction  57810     []  Medication allergies reviewed for use of Dexamethasone Sodium Phosphate 4mg/ml     with iontophoresis treatments. Pt is not allergic. Frequency: 2 x/weeks for 16 visits    Todays Treatment:  Modalities:   Exercises:  Exercise Reps/ Time Weight/ Level Comments                                 Other: HELD treatment to allow thorough neurological evaluation    Specific Instructions for next treatment:  FALL PREVENTION HANDOUT  - resisted RLE swing phase of gait, resisted RLE step overs  - standing balance: reaching, turning to look around, stoop to  objects off step, heel taps to step, etc  - calf stretching    Evaluation Complexity:  History (Personal factors, comorbidities) [] 0 [] 1-2 [] 3+   Exam (limitations, restrictions) [] 1-2 [] 3 [] 4+   Clinical presentation (progression) [] Stable [] Evolving  [] Unstable   Decision Making [] Low [] Moderate [] High    [] Low Complexity [] Moderate Complexity [] High Complexity       Treatment Charges: Mins Units   [x] Evaluation       []  Low       [x]  Moderate       []  High 50 1   []  Modalities     []  Ther Exercise     []  Manual Therapy     []  Ther Activities     []  Aquatics     []  Vasocompression     []  Other       TOTAL TREATMENT TIME: 50 min    Time in:11:10 am      Time out: 12:00 pm    Electronically signed by: Ramila Otero PT        Physician Signature:________________________________Date:__________________  By signing above or cosigning this note, I have reviewed this plan of care and certify a need for medically necessary rehabilitation services.      *PLEASE SIGN ABOVE AND FAX BACK ALL PAGES*

## 2022-05-09 NOTE — PLAN OF CARE
[] [facility]  [address]  [phone]  F: [phone] [x] [address]  [address]   [address]  P: [phone]  F: [phone] [] [address] &  Therapy  [address]  P: [phone]  F: [phone] [] [address]  P: [phone]  F: [phone] [] [address]  [facility]   [address]   [place]: [phone]  F: [phone]        Physical Therapy Plan of Care    Date:  2022  Patient: Dony Palomino  : 1975  MRN: [id number]  Physician: DANIEL Ferrer-SHIMA                           Insurance: Medicare ([address] [place] [address])  Medical Diagnosis: I63.9 (ICD-10-CM) - Cerebrovascular accident (CVA), unspecified mechanism ([facility] [address]  Rehab Codes: R26.89, R53.1, M54.59, M54.2, R29.3,   Next 's appt.: 8/10 with neuro  Date of symptom onset: 22     Subjective:   CC: Pt arrives for physical therapy evaluation of impairments secondary to sustaining a left lacunar stroke in 2022 affecting the basal ganglia - resultant in R-sided weakness in UE/LE. Notes his initial symptoms were significant difficulty moving RLE/RUE - thought it was d/t excess water on his leg at first. Amber Ruts to dialysis next day, symptoms didn't improve, so he went [address] for treatment. Was noted to have slurred speech and dragging of LLE with gait. Did go to [facility] for 2 weeks getting intensive therapy from all disciplines. Notes the slurred speech improved, as well as the R foot drag and RLE numbness, but walking \"still off\" per pt. Notes his cognition seems to be \"same as usual\", no difficulty with vision. Notes increased sialorrhea at night.  Denies any falls since stroke onset; notes the biggest change since returning home is issues with hip/knee strength and with use of hand for daily activities.      Pertinent medical hx: Numbness in B feet possibly secondary to DM I (pt unsure), wears B AFOs. Hx L knee avulsed PCL fracture with tibia d/t fall at dialysis in 2019           PLOF       Use of rollator for past several years in community and intermittently at home, intermittent balance issues/stumbles; lives with grandparents and they assist with laundry and household heavy activities   Summary of current limitations:       Unable to use right hand for writing, taking caps off bottles; slower walk with feelings of more weakness/difficulty with walking both when standing on right leg or bringing right leg forward, some difficulty with ascending ramp into house using walker, limited standing without UE assist >10 min, decreased walking tolerance this date and requires walker for all household/community ambulation   Previous physical therapy? []? No       [x]? Yes, date of most recent, facility: March 2022 for generalized balance/L knee pain           Activity-Specific Balance   Confidence Scale  Score: 70% self confidence in balance tasks, 30% impaired      Comments:     Assessment: Tati Briceno is a 54yo male who presents s/p left lacunar stroke on 4/12/22 post 2-week IPR stay - demonstrating continued mild R hemiparesis affecting gait and balance - pt will benefit from skilled physical therapy to address R hemibody weakness, improve gait mechanics and efficiency, and increase standing static/dynamic balance to allow improved household ADL/IADL completion and reduce fall risk.     Problems:    [x]? ? Pain: neck and low back  [x]? ? ROM: B ankle mobility (R>L)  [x]? ? Strength:  [x]? ? Function:  [x]?  Other: balance/gait deficits                 STG: (to be met in 8 treatments)  1. ? Pain: No more than 4/10 pain reported in low back or neck post therapy sessions to indicate improving tolerance to increased activity and exercise  2. ? ROM: at least 0deg DF PROM with knee extended to indicate improving mobility in ankles to increase heel strike and reduce toe drag with prolonged ambulation  3. ? Balance:   a. Pt able to fully turn to look behind himself without instability in either direction to indicate improving postural stability  b. Pt able to complete standing reaching tasks without UE assist and no more than mild instability to indicate improving dynamic balance for safety with household IADLs  4. ? Strength:   a. At least 4+/5 grossly in R hip to allow improved pelvic stability in standing and improved swing phase control/speed during prolonged gait  b. Pt able to utilize RUE for fine grasp and release tasks with no more than minimally slowed speed evident  5. ? Function: Pt able to ambulate 450 ft with least restrictive device with no evidence of lagging RLE swing or abnormal stance time noted, with no more than 11 on RPE scale  6. Patient to be independent with home exercise program as demonstrated by performance with correct form without cues. 7. Demonstrate Knowledge of fall prevention     LTG: (to be met in 16 treatments)  8. ? Pain: No more than 2/10 pain reported in low back or neck post therapy sessions to indicate improving tolerance to increased activity and exercise  9. ? ROM: at least 5 deg DF PROM with knee extended to indicate improving mobility in ankles to increase heel strike and reduce toe drag with prolonged ambulation  10. ? Balance:   a. At least 30/56 on Hernandez to indicate significant improvement in standing static/dynamic balance and progress towards reduced fall risk  b. Able to negotiate gait obstacles using rollator without increased instability or excessively slowed gait  11. ? Strength:   a. At least 5-/5 grossly in R hip to allow further improved pelvic stability in standing and improved swing phase control/speed during prolonged gait  b.  At least 5/5 B knee ext to prevent excessive buckling with prolonged standing  12. ? Function:   a. Pt able to ambulate 600 ft with least restrictive device with appropriate RLE swing/stance phase timing, no evidence of toe drag, and no more than minimal fatigue by end of ambulation  b. Pt able to ambulate 100 ft with intermittent turning, stopping, etc without assistive device and demonstrating good stability and no evidence of B knee buckling to indicate improving functional strength and safety with household ambulation distances for ADL/IADLs  c. No more than 14% impaired on ABC scale to indicate improving subjective balance confidence           Patient goals: \"to strengthen right side back as much as I can\"      Treatment Plan:  [x] Therapeutic Exercise   51587  [] Iontophoresis: 4 mg/mL Dexamethasone Sodium Phosphate  mAmin  58181   [x] Therapeutic Activity  32653 [] Vasopneumatic cold with compression  65638    [x] Gait Training   59971 [] Ultrasound   34382   [x] Neuromuscular Re-education  01580 [] Electrical Stimulation Unattended  00389   [x] Manual Therapy  77826 [] Electrical Stimulation Attended  23961   [x] Instruction in HEP  [] Lumbar/Cervical Traction  46404   [] Aquatic Therapy   75675 [] Cold/hotpack    [] Massage   68344      [] Dry Needling, 1 or 2 muscles  96273   [] Biofeedback, first 15 minutes   54088  [] Biofeedback, additional 15 minutes   92246 [] Dry Needling, 3 or more muscles  19404     []  Medication allergies reviewed for use of    Dexamethasone Sodium Phosphate 4mg/ml     with iontophoresis treatments. Pt is not allergic. Frequency:  2 x/week for 16 visits    Electronically signed by: Markell Kwok PT        Physician Signature:________________________________Date:__________________  By signing above or cosigning this note, I have reviewed this plan of care and certify a need for medically necessary rehabilitation services.      *PLEASE SIGN ABOVE AND FAX BACK ALL PAGES*

## 2022-05-09 NOTE — TELEPHONE ENCOUNTER
Gracy Isaac contacted the office in regards to patient. He states that patient is having issues with his shoes cutting into the tops of feet. Patient was at therapy today and the nurse recommended patient get an order for diabetic shoes. Gracy Isaac is requesting order for diabetic shoes be sent to [place] [address].

## 2022-05-11 ENCOUNTER — OFFICE VISIT (OUTPATIENT)
Dept: PODIATRY | Age: 47
End: 2022-05-11
Payer: MEDICARE

## 2022-05-11 VITALS — RESPIRATION RATE: 16 BRPM | WEIGHT: 188 LBS | HEIGHT: 66 IN | BODY MASS INDEX: 30.22 KG/M2

## 2022-05-11 DIAGNOSIS — M79.604 PAIN IN BOTH LOWER EXTREMITIES: ICD-10-CM

## 2022-05-11 DIAGNOSIS — S90.822A BLISTER OF LEFT FOOT, INITIAL ENCOUNTER: ICD-10-CM

## 2022-05-11 DIAGNOSIS — B35.1 DERMATOPHYTOSIS OF NAIL: ICD-10-CM

## 2022-05-11 DIAGNOSIS — M21.372 BILATERAL FOOT-DROP: ICD-10-CM

## 2022-05-11 DIAGNOSIS — L97.522 ULCER OF LEFT FOOT, WITH FAT LAYER EXPOSED (HCC): ICD-10-CM

## 2022-05-11 DIAGNOSIS — I73.9 PERIPHERAL VASCULAR DISORDER (HCC): ICD-10-CM

## 2022-05-11 DIAGNOSIS — M21.371 BILATERAL FOOT-DROP: ICD-10-CM

## 2022-05-11 DIAGNOSIS — D23.72 BENIGN NEOPLASM OF SKIN OF LOWER LIMB, INCLUDING HIP, LEFT: Primary | ICD-10-CM

## 2022-05-11 DIAGNOSIS — M79.605 PAIN IN BOTH LOWER EXTREMITIES: ICD-10-CM

## 2022-05-11 PROCEDURE — 1111F DSCHRG MED/CURRENT MED MERGE: CPT | Performed by: PODIATRIST

## 2022-05-11 PROCEDURE — 1036F TOBACCO NON-USER: CPT | Performed by: PODIATRIST

## 2022-05-11 PROCEDURE — G8417 CALC BMI ABV UP PARAM F/U: HCPCS | Performed by: PODIATRIST

## 2022-05-11 PROCEDURE — 11721 DEBRIDE NAIL 6 OR MORE: CPT | Performed by: PODIATRIST

## 2022-05-11 PROCEDURE — 17110 DESTRUCTION B9 LES UP TO 14: CPT | Performed by: PODIATRIST

## 2022-05-11 PROCEDURE — 99213 OFFICE O/P EST LOW 20 MIN: CPT | Performed by: PODIATRIST

## 2022-05-11 PROCEDURE — G8427 DOCREV CUR MEDS BY ELIG CLIN: HCPCS | Performed by: PODIATRIST

## 2022-05-11 RX ORDER — TRAMADOL HYDROCHLORIDE 50 MG/1
50 TABLET ORAL 2 TIMES DAILY PRN
Status: ON HOLD | COMMUNITY
Start: 2022-05-06 | End: 2022-06-08 | Stop reason: HOSPADM

## 2022-05-11 NOTE — PROGRESS NOTES
[address] [facility] PODIATRY [facility]  [address] [address] [address]  Dept: [phone]  Dept Fax: [phone]     PAIN PROGRESS NOTE  Date of patient's visit: 5/11/2022  Patient's Name:  Julia Berrios YOB: 1975            Patient Care Team:  DANIEL Zacarias CNP as PCP - General (Certified Nurse Practitioner)  DANIEL Zacarias CNP as PCP - [facility] Empaneled Provider  Santosh Isaacs DPM as Physician (Podiatry)      Chief Complaint   Patient presents with    Benign Neoplasm     left foot    Nail Problem     bilaterally       Subjective: This Julia Berrios comes to clinic for foot and nail care. Pt currently has complaint of thickened, painful, elongated nails that he/she cannot manage by themselves. Pt. Relates pain to nails with shoe gear. Pt's primary care physician is DANIEL Zacarias CNP last seen 04/29/2022  Pt has a new complaint of blister to left foot and painful skin lesion to bottom of foot. Pt has tried changing shoes but it has not helped the pain. He believes AFO brace is causing blister.   Past Medical History:   Diagnosis Date    Closed fracture of bone of right foot March - April 2020    Dialysis patient [facility])     Kidney disease     On Dialysis    Type 1 diabetes mellitus ([facility] [address]        No Known Allergies  Current Outpatient Medications on File Prior to Visit   Medication Sig Dispense Refill    traMADol (ULTRAM) 50 MG tablet       ondansetron (ZOFRAN) 4 MG tablet Take 1 tablet by mouth 3 times daily as needed for Nausea or Vomiting 30 tablet 1    insulin glargine (LANTUS) 100 UNIT/ML injection vial Inject 30 Units into the skin every morning 10 mL 3    lisinopril (PRINIVIL;ZESTRIL) 20 MG tablet Take 1 tablet by mouth daily 30 tablet 3    magnesium oxide (MAG-OX) 400 MG tablet Take 400 mg by mouth daily      magnesium hydroxide (MILK OF MAGNESIA) 400 MG/5ML suspension Take 15 mLs by mouth daily as needed for Constipation      insulin aspart (NOVOLOG) 100 UNIT/ML injection vial Inject into the skin 3 times daily (before meals) SLIDING SCALE      gabapentin (NEURONTIN) 300 MG capsule Take 300 mg by mouth daily.  FLUoxetine (PROZAC) 20 MG capsule Take 40 mg by mouth daily      ammonium lactate (LAC-HYDRIN) 12 % lotion Apply topically daily. (Patient taking differently: Apply topically daily to BLE) 1 each 3    furosemide (LASIX) 20 MG tablet Take 1 tablet by mouth daily 60 tablet 5    buPROPion (WELLBUTRIN XL) 150 MG extended release tablet Take 1 tablet by mouth every morning 15 tablet 1    Rosuvastatin Calcium 40 MG CPSP Take 40 mg by mouth daily      ezetimibe (ZETIA) 10 MG tablet Take 10 mg by mouth daily      omeprazole (PRILOSEC) 40 MG delayed release capsule Take 40 mg by mouth daily      amLODIPine (NORVASC) 10 MG tablet Take 10 mg by mouth daily      aspirin 81 MG EC tablet Take 81 mg by mouth daily      calcium acetate 667 MG TABS Take 667 mg by mouth 3 times daily (with meals)       vitamin B-12 (CYANOCOBALAMIN) 500 MCG tablet Take 500 mcg by mouth daily      clopidogrel (PLAVIX) 75 MG tablet Take 1 tablet by mouth daily for 19 doses 30 tablet 3     No current facility-administered medications on file prior to visit. Review of Systems. Review of Systems:   History obtained from chart review and the patient  General ROS: negative for - chills, fatigue, fever, night sweats or weight gain  Constitutional: Negative for chills, diaphoresis, fatigue, fever and unexpected weight change. Musculoskeletal: Positive for arthralgias, gait problem and joint swelling. Neurological ROS: negative for - behavioral changes, confusion, headaches or seizures. Negative for weakness and numbness. Dermatological ROS: negative for - mole changes, rash  Cardiovascular: Negative for leg swelling. Gastrointestinal: Negative for constipation, diarrhea, nausea and vomiting. Objective:  Dermatologic Exam:  Soft tissue lesion to the plantar left foot with central core and petechiae. Pain on palpation of lesion. Blister noted to medial navicular, left foot. Skin No rashes or nodules noted. .   Skin is thin, with flaky sloughing skin as well as decreased hair growth to the lower leg  Small red hemosiderin deposits seen dorsal foot   Musculoskeletal:     1st MPJ ROM decreased, Bilateral.  Muscle strength 5/5, Bilateral.  Pain present upon palpation of toenails 1-5, Bilateral. decreased medial longitudinal arch, Bilateral.  Ankle ROM decreased,Bilateral.    Dorsally contracted digits present digits 2, Bilateral.     Vascular: DP pulses 1/4 bilateral.  PT pulses 0/4 bilateral.   CFT <5 seconds, Bilateral.  Hair growth absent to the level of the digits, Bilateral.  Edema present, Bilateral.  Varicosities absent, Bilateral. Erythema absent, Bilateral    Neurological: Sensation diminshed to light touch to level of digits, Bilateral.  Protective sensation intact 6/10 sites via 5.07/10g [place] Monofilament, Bilateral.  negative Tinel's, Bilateral.  negative Valleix sign, Bilateral.       Integument: Warm, dry, supple, Bilateral.  Interdigital maceration absent to web spaces 4, Bilateral.  Nails 1-5 left and 1-5 right thickened > 3.0 mm, dystrophic and crumbly, discolored with subungual debris. Fissures absent, Bilateral.   General: AAO x 3 in NAD.     Derm  Toenail Description  Sites of Onychomycosis Involvement (Check affected area)  [x] [x] [x] [x] [x] [x] [x] [x] [x] [x]  5 4 3 2 1 1 2 3 4 5                          Right                                        Left    Thickness  [x] [x] [x] [x] [x] [x] [x] [x] [x] [x]  5 4 3 2 1 1 2 3 4 5                         Right                                        Left    Dystrophic Changes   [x] [x] [x] [x] [x] [x] [x] [x] [x] [x]  5 4 3 2 1 1 2 3 4 5                         Right Left    Color  [x] [x] [x] [x] [x] [x] [x] [x] [x] [x]  5 4 3 2 1 1 2 3 4 5                          Right                                        Left    Incurvation/Ingrowin   [] [] [] [] [] [] [] [] [] []  5 4 3 2 1 1 2 3 4 5                         Right                                        Left    Inflammation/Pain   [x] [x] [x] [x] [x] [x] [x] [x] [x] [x]  5 4 3  2 1 1 2 3 4 5                         Right                                        Left       Nails are painful 1-10 with direct palpation. Q7   []Yes  []No                Q8   [x]Yes  []No                     Q9   []Yes    []No  Assessment:  49 y.o. male with:    Diagnosis Orders   1. Benign neoplasm of skin of lower limb, including hip, left  42364 - ID DESTRUCTION BENIGN LESIONS UP TO 14   2. Dermatophytosis of nail  37798 - ID DEBRIDEMENT OF NAILS, 6 OR MORE   3. Blister of left foot, initial encounter  02629 - ID DESTRUCTION BENIGN LESIONS UP TO 14   4. Pain in both lower extremities  46916 - ID DEBRIDEMENT OF NAILS, 6 OR MORE    25259 - ID DESTRUCTION BENIGN LESIONS UP TO 14   5. Ulcer of left foot, with fat layer exposed ([address]     6. Bilateral foot-drop  71261 - ID DEBRIDEMENT OF NAILS, 6 OR MORE   7. Peripheral vascular disorder ([address]  39938 - ID DEBRIDEMENT OF NAILS, 6 OR MORE         Plan:   Pt was evaluated and examined. Patient was given personalized discharge instructions. The lesions were partially excised via 15 blade and silver nitrate was applied under occlusion. The patient tolerated the procedure well and without complication. Advised patient to use vasoline to the area after tomorrow to prevent surrounding tissue irritation. Nails 1-10 were debrided in length and thickness sharply with a nail nipper and  without incident. Pt will follow up in 9 weeks or sooner if any problems arise. Diagnosis was discussed with the pt and all of their questions were answered in detail.  Proper foot hygiene and care was discussed with the pt. Patient to check feet daily and contact the office with any questions/problems/concerns. Other comorbidity noted and will be managed by PCP. Pain waiver discussed with patient and confirmed.    5/11/2022      Electronically signed by Azucena Araiza DPM on 5/11/2022 at 10:08 AM  5/11/2022

## 2022-05-13 ENCOUNTER — HOSPITAL ENCOUNTER (OUTPATIENT)
Dept: PHYSICAL THERAPY | Facility: CLINIC | Age: 47
Setting detail: THERAPIES SERIES
Discharge: HOME | End: 2022-05-13
Payer: MEDICARE

## 2022-05-13 PROCEDURE — 97112 NEUROMUSCULAR REEDUCATION: CPT

## 2022-05-13 NOTE — FLOWSHEET NOTE
[] [facility] [address]  [address]  [phone]  F: [phone] [x] [address]  [facility] [place] [facility]   [address]  P: [phone]  F: [phone] [] [address]  [facility]  P: [phone]  F: [phone] [] [address]  P: [phone]  F: [phone] [] [address]  [facility]   [address]   [place]: [phone]  F: [phone]      Physical Therapy Daily Treatment Note    Date:  2022  Patient Name:  Evelia Chen    :  1975  MRN: [id number]  Physician: RACHAEL Vital                           Insurance: Medicare ([address] [place] [address])  Medical Diagnosis: I63.9 (ICD-10-CM) - Cerebrovascular accident (CVA), unspecified mechanism ([facility] [address]  Rehab Codes: R26.89, R53.1, M54.59, M54.2, R29.3,   Next 's appt.: 8/10 with neuro  Date of symptom onset: 22   Visit# / total visits:     Cancels/No Shows: 0/0    Subjective:    Pain:  [x] Yes  [] No Location: bilateral shoulders  Pain Rating: (0-10 scale) none given/10  Pain altered Tx:  [x] No  [] Yes  Action:  Comments: Pt arrives ambulating with rollator reporting mild pain in bilateral shoulders this date. States his R knee does occasionally \"buckle\" but denies any recent falls. Objective:   Modalities:   Precautions:  Exercises:  All exercises performed with gait belt donned  Exercise Reps/ Time Weight/ Level Comments   Standing    Parallel bars   Gastroc stretch 3x30\"  UE support   Marches 15x  UE support   Weight shifting towards R 10x  UE support         Balance   Parallel bars   Trunk rotation with ball 5x ea     Shoulder press with ball 10x     NBOS 1x30\"     NBOS with EC 2x20\"     Tandem stance 2x20\"     Cone  off ground 2x5 5 cones Cone reach with LUE across body 2x5 5 cones    Cone reach with LUE to L side 2x5 5 cones    Heel taps 10x ea 4in With 1 hand for UE support   Dynamic march with retro hamstring curl 3L  No UE support   Side stepping 3L  No UE support   360 turn 2x  Clockwise more challenging         Ambulation with rollator 2L     Other:    Specific Instructions for next treatment:  FALL PREVENTION HANDOUT  - resisted RLE swing phase of gait, resisted RLE step overs  - standing balance: reaching, turning to look around, stoop to  objects off step, heel taps to step, etc  - calf stretching    Treatment Charges: Mins Units   []  Modalities     [x]  Ther Exercise 5 0   []  Manual Therapy     []  Ther Activities     []  Aquatics     []  Vasocompression     [x]  Other: Neuro-Jason 40 3   Total Treatment time 45 3       Assessment: [x] Progressing toward goals. Initiated treatment with ambulation with rollator with fair tolerance and mild fatigue noted. Focused treatment on balance interventions with overall fair tolerance and no report of increasing pain throughout treatment however fatigued noted- allowing additional time for active recovery. Added multidirectional cone reach within base of support, outside of base of support, and across midline with good tolerance and no loss of balance experienced however instability noted. Added dynamic walking in parallel bars without UE support with pt most challenged by retro hamstring curls. Also initiated trunk rotation while hold ball to challenge postural stability with fair tolerance and only 1 minor loss of balance required 1 hand on parallel bar to regain balance. [] No change. [] Other:  [x] Patient would continue to benefit from skilled physical therapy services in order to: address R hemibody weakness, improve gait mechanics and efficiency, and increase standing static/dynamic balance to allow improved household ADL/IADL completion and reduce fall risk.     STG: (to be met in 8 treatments)  1. ? Pain: No more than 4/10 pain reported in low back or neck post therapy sessions to indicate improving tolerance to increased activity and exercise  2. ? ROM: at least 0deg DF PROM with knee extended to indicate improving mobility in ankles to increase heel strike and reduce toe drag with prolonged ambulation  3. ? Balance:   a. Pt able to fully turn to look behind himself without instability in either direction to indicate improving postural stability  b. Pt able to complete standing reaching tasks without UE assist and no more than mild instability to indicate improving dynamic balance for safety with household IADLs  4. ? Strength:   a. At least 4+/5 grossly in R hip to allow improved pelvic stability in standing and improved swing phase control/speed during prolonged gait  b. Pt able to utilize RUE for fine grasp and release tasks with no more than minimally slowed speed evident  5. ? Function: Pt able to ambulate 450 ft with least restrictive device with no evidence of lagging RLE swing or abnormal stance time noted, with no more than 11 on RPE scale  6. Patient to be independent with home exercise program as demonstrated by performance with correct form without cues. 7. Demonstrate Knowledge of fall prevention     LTG: (to be met in 16 treatments)  8. ? Pain: No more than 2/10 pain reported in low back or neck post therapy sessions to indicate improving tolerance to increased activity and exercise  9. ? ROM: at least 5 deg DF PROM with knee extended to indicate improving mobility in ankles to increase heel strike and reduce toe drag with prolonged ambulation  10. ? Balance:   a. At least 30/56 on Hernandez to indicate significant improvement in standing static/dynamic balance and progress towards reduced fall risk  b. Able to negotiate gait obstacles using rollator without increased instability or excessively slowed gait  11. ? Strength:   a.  At least 5-/5 grossly in R hip to allow further improved pelvic stability in standing and improved swing phase control/speed during prolonged gait  b. At least 5/5 B knee ext to prevent excessive buckling with prolonged standing  12. ? Function:   a. Pt able to ambulate 600 ft with least restrictive device with appropriate RLE swing/stance phase timing, no evidence of toe drag, and no more than minimal fatigue by end of ambulation  b. Pt able to ambulate 100 ft with intermittent turning, stopping, etc without assistive device and demonstrating good stability and no evidence of B knee buckling to indicate improving functional strength and safety with household ambulation distances for ADL/IADLs  c. No more than 14% impaired on ABC scale to indicate improving subjective balance confidence           Patient goals: \"to strengthen right side back as much as I can\"    Pt. Education:  [x] Yes  [] No  [] Reviewed Prior HEP/Ed  Method of Education: [x] Verbal  [x] Demo  [] Written  Comprehension of Education:  [x] Verbalizes understanding. [] Demonstrates understanding. [] Needs review. [] Demonstrates/verbalizes HEP/Ed previously given. Plan: [x] Continue current frequency toward long and short term goals.     [x] Specific Instructions for subsequent treatments:  FALL PREVENTION HANDOUT, resisted RLE swing phase of gait, resisted RLE step overs, standing balance        Time In: 9:01am            Time Out: 9:48am    Electronically signed by:  Amie Hunter PTA

## 2022-05-16 ENCOUNTER — HOSPITAL ENCOUNTER (OUTPATIENT)
Dept: MRI IMAGING | Age: 47
Discharge: HOME | End: 2022-05-18
Payer: MEDICARE

## 2022-05-16 DIAGNOSIS — M54.12 CERVICAL RADICULOPATHY: ICD-10-CM

## 2022-05-16 PROCEDURE — 72141 MRI NECK SPINE W/O DYE: CPT

## 2022-05-17 DIAGNOSIS — R20.0 NUMBNESS: Primary | ICD-10-CM

## 2022-05-18 ENCOUNTER — APPOINTMENT (OUTPATIENT)
Dept: PHYSICAL THERAPY | Facility: CLINIC | Age: 47
End: 2022-05-18
Payer: MEDICARE

## 2022-05-20 ENCOUNTER — APPOINTMENT (OUTPATIENT)
Dept: PHYSICAL THERAPY | Facility: CLINIC | Age: 47
End: 2022-05-20
Payer: MEDICARE

## 2022-05-23 ENCOUNTER — APPOINTMENT (OUTPATIENT)
Dept: PHYSICAL THERAPY | Facility: CLINIC | Age: 47
End: 2022-05-23
Payer: MEDICARE

## 2022-05-25 ENCOUNTER — APPOINTMENT (OUTPATIENT)
Dept: PHYSICAL THERAPY | Facility: CLINIC | Age: 47
End: 2022-05-25
Payer: MEDICARE

## 2022-05-26 ENCOUNTER — APPOINTMENT (OUTPATIENT)
Dept: GENERAL RADIOLOGY | Age: 47
DRG: 252 | End: 2022-05-26
Payer: MEDICARE

## 2022-05-26 ENCOUNTER — HOSPITAL ENCOUNTER (OUTPATIENT)
Age: 47
Setting detail: OBSERVATION
Discharge: HOME | DRG: 252 | End: 2022-05-27
Attending: EMERGENCY MEDICINE | Admitting: INTERNAL MEDICINE
Payer: MEDICARE

## 2022-05-26 DIAGNOSIS — N18.6 ESRD (END STAGE RENAL DISEASE) (HCC): ICD-10-CM

## 2022-05-26 DIAGNOSIS — R29.6 FREQUENT FALLS: Primary | ICD-10-CM
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
## 2022-05-26 PROBLEM — R29.898 LEG WEAKNESS, BILATERAL: Status: ACTIVE | Noted: 2022-05-26

## 2022-05-26 LAB
ABSOLUTE EOS #: 0.44 K/UL (ref 0–0.44)
ABSOLUTE IMMATURE GRANULOCYTE: 0.03 K/UL (ref 0–0.3)
ABSOLUTE LYMPH #: 1.27 K/UL (ref 1.1–3.7)
ABSOLUTE MONO #: 1.24 K/UL (ref 0.1–1.2)
ANION GAP SERPL CALCULATED.3IONS-SCNC: 15 MMOL/L (ref 9–17)
BASOPHILS # BLD: 1 % (ref 0–2)
BASOPHILS ABSOLUTE: 0.07 K/UL (ref 0–0.2)
BUN BLDV-MCNC: 57 MG/DL (ref 6–20)
BUN/CREAT BLD: 5 (ref 9–20)
CALCIUM SERPL-MCNC: 9.8 MG/DL (ref 8.6–10.4)
CHLORIDE BLD-SCNC: 95 MMOL/L (ref 98–107)
CO2: 26 MMOL/L (ref 20–31)
CREAT SERPL-MCNC: 11.69 MG/DL (ref 0.7–1.2)
EOSINOPHILS RELATIVE PERCENT: 4 % (ref 1–4)
GFR AFRICAN AMERICAN: 6 ML/MIN
GFR NON-AFRICAN AMERICAN: 5 ML/MIN
GFR SERPL CREATININE-BSD FRML MDRD: ABNORMAL ML/MIN/{1.73_M2}
GLUCOSE BLD-MCNC: 107 MG/DL (ref 70–99)
GLUCOSE BLD-MCNC: 145 MG/DL (ref 75–110)
GLUCOSE BLD-MCNC: 167 MG/DL (ref 75–110)
GLUCOSE BLD-MCNC: 196 MG/DL (ref 75–110)
HCT VFR BLD CALC: 36.6 % (ref 40.7–50.3)
HEMOGLOBIN: 11.6 G/DL (ref 13–17)
IMMATURE GRANULOCYTES: 0 %
LYMPHOCYTES # BLD: 13 % (ref 24–43)
MCH RBC QN AUTO: 29.9 PG (ref 25.2–33.5)
MCHC RBC AUTO-ENTMCNC: 31.7 G/DL (ref 28.4–34.8)
MCV RBC AUTO: 94.3 FL (ref 82.6–102.9)
MONOCYTES # BLD: 13 % (ref 3–12)
NRBC AUTOMATED: 0 PER 100 WBC
PDW BLD-RTO: 11.9 % (ref 11.8–14.4)
PLATELET # BLD: 231 K/UL (ref 138–453)
PMV BLD AUTO: 9.6 FL (ref 8.1–13.5)
POTASSIUM SERPL-SCNC: 4.7 MMOL/L (ref 3.7–5.3)
RBC # BLD: 3.88 M/UL (ref 4.21–5.77)
SEG NEUTROPHILS: 69 % (ref 36–65)
SEGMENTED NEUTROPHILS ABSOLUTE COUNT: 6.84 K/UL (ref 1.5–8.1)
SODIUM BLD-SCNC: 136 MMOL/L (ref 135–144)
WBC # BLD: 9.9 K/UL (ref 3.5–11.3)

## 2022-05-26 PROCEDURE — 99222 1ST HOSP IP/OBS MODERATE 55: CPT | Performed by: INTERNAL MEDICINE

## 2022-05-26 PROCEDURE — 90935 HEMODIALYSIS ONE EVALUATION: CPT

## 2022-05-26 PROCEDURE — APPSS45 APP SPLIT SHARED TIME 31-45 MINUTES: Performed by: NURSE PRACTITIONER

## 2022-05-26 PROCEDURE — 82947 ASSAY GLUCOSE BLOOD QUANT: CPT

## 2022-05-26 PROCEDURE — 80048 BASIC METABOLIC PNL TOTAL CA: CPT

## 2022-05-26 PROCEDURE — 2580000003 HC RX 258: Performed by: NURSE PRACTITIONER

## 2022-05-26 PROCEDURE — 73562 X-RAY EXAM OF KNEE 3: CPT

## 2022-05-26 PROCEDURE — 6370000000 HC RX 637 (ALT 250 FOR IP): Performed by: INTERNAL MEDICINE

## 2022-05-26 PROCEDURE — G0257 UNSCHED DIALYSIS ESRD PT HOS: HCPCS

## 2022-05-26 PROCEDURE — G0378 HOSPITAL OBSERVATION PER HR: HCPCS

## 2022-05-26 PROCEDURE — 6360000002 HC RX W HCPCS: Performed by: NURSE PRACTITIONER

## 2022-05-26 PROCEDURE — 99285 EMERGENCY DEPT VISIT HI MDM: CPT

## 2022-05-26 PROCEDURE — 85025 COMPLETE CBC W/AUTO DIFF WBC: CPT

## 2022-05-26 PROCEDURE — 96372 THER/PROPH/DIAG INJ SC/IM: CPT

## 2022-05-26 PROCEDURE — 6370000000 HC RX 637 (ALT 250 FOR IP): Performed by: NURSE PRACTITIONER

## 2022-05-26 RX ORDER — LANOLIN ALCOHOL/MO/W.PET/CERES
400 CREAM (GRAM) TOPICAL DAILY
Status: DISCONTINUED | OUTPATIENT
Start: 2022-05-26 | End: 2022-05-27 | Stop reason: HOSPADM

## 2022-05-26 RX ORDER — DEXTROSE MONOHYDRATE 50 MG/ML
100 INJECTION, SOLUTION INTRAVENOUS PRN
Status: DISCONTINUED | OUTPATIENT
Start: 2022-05-26 | End: 2022-05-27 | Stop reason: HOSPADM

## 2022-05-26 RX ORDER — INSULIN LISPRO 100 [IU]/ML
0-12 INJECTION, SOLUTION INTRAVENOUS; SUBCUTANEOUS
Status: DISCONTINUED | OUTPATIENT
Start: 2022-05-26 | End: 2022-05-27 | Stop reason: HOSPADM

## 2022-05-26 RX ORDER — HEPARIN SODIUM 5000 [USP'U]/ML
5000 INJECTION, SOLUTION INTRAVENOUS; SUBCUTANEOUS EVERY 8 HOURS SCHEDULED
Status: DISCONTINUED | OUTPATIENT
Start: 2022-05-26 | End: 2022-05-27 | Stop reason: HOSPADM

## 2022-05-26 RX ORDER — SODIUM CHLORIDE 0.9 % (FLUSH) 0.9 %
5-40 SYRINGE (ML) INJECTION EVERY 12 HOURS SCHEDULED
Status: DISCONTINUED | OUTPATIENT
Start: 2022-05-26 | End: 2022-05-27 | Stop reason: HOSPADM

## 2022-05-26 RX ORDER — FUROSEMIDE 20 MG/1
20 TABLET ORAL DAILY
Status: DISCONTINUED | OUTPATIENT
Start: 2022-05-26 | End: 2022-05-27

## 2022-05-26 RX ORDER — CLOPIDOGREL BISULFATE 75 MG/1
75 TABLET ORAL DAILY
Status: DISCONTINUED | OUTPATIENT
Start: 2022-05-26 | End: 2022-05-26

## 2022-05-26 RX ORDER — ASPIRIN 81 MG/1
81 TABLET ORAL DAILY
Status: DISCONTINUED | OUTPATIENT
Start: 2022-05-26 | End: 2022-05-27 | Stop reason: HOSPADM

## 2022-05-26 RX ORDER — ONDANSETRON 2 MG/ML
4 INJECTION INTRAMUSCULAR; INTRAVENOUS EVERY 6 HOURS PRN
Status: DISCONTINUED | OUTPATIENT
Start: 2022-05-26 | End: 2022-05-27 | Stop reason: HOSPADM

## 2022-05-26 RX ORDER — BUPROPION HYDROCHLORIDE 150 MG/1
150 TABLET ORAL EVERY MORNING
Status: DISCONTINUED | OUTPATIENT
Start: 2022-05-26 | End: 2022-05-27 | Stop reason: HOSPADM

## 2022-05-26 RX ORDER — CALCIUM ACETATE 667 MG/1
667 CAPSULE ORAL
Status: DISCONTINUED | OUTPATIENT
Start: 2022-05-26 | End: 2022-05-27 | Stop reason: HOSPADM

## 2022-05-26 RX ORDER — POLYETHYLENE GLYCOL 3350 17 G/17G
17 POWDER, FOR SOLUTION ORAL DAILY PRN
Status: DISCONTINUED | OUTPATIENT
Start: 2022-05-26 | End: 2022-05-27 | Stop reason: HOSPADM

## 2022-05-26 RX ORDER — LISINOPRIL 40 MG/1
20 TABLET ORAL DAILY
Status: DISCONTINUED | OUTPATIENT
Start: 2022-05-26 | End: 2022-05-27 | Stop reason: HOSPADM

## 2022-05-26 RX ORDER — ACETAMINOPHEN 325 MG/1
650 TABLET ORAL EVERY 6 HOURS PRN
Status: DISCONTINUED | OUTPATIENT
Start: 2022-05-26 | End: 2022-05-27 | Stop reason: HOSPADM

## 2022-05-26 RX ORDER — AMLODIPINE BESYLATE 10 MG/1
10 TABLET ORAL DAILY
Status: DISCONTINUED | OUTPATIENT
Start: 2022-05-26 | End: 2022-05-27

## 2022-05-26 RX ORDER — EZETIMIBE 10 MG/1
10 TABLET ORAL DAILY
Status: DISCONTINUED | OUTPATIENT
Start: 2022-05-26 | End: 2022-05-27 | Stop reason: HOSPADM

## 2022-05-26 RX ORDER — INSULIN GLARGINE 100 [IU]/ML
10 INJECTION, SOLUTION SUBCUTANEOUS NIGHTLY
Status: ON HOLD | COMMUNITY
End: 2022-05-27 | Stop reason: HOSPADM

## 2022-05-26 RX ORDER — FLUOXETINE HYDROCHLORIDE 20 MG/1
40 CAPSULE ORAL DAILY
Status: DISCONTINUED | OUTPATIENT
Start: 2022-05-26 | End: 2022-05-27 | Stop reason: HOSPADM

## 2022-05-26 RX ORDER — ROSUVASTATIN CALCIUM 40 MG/1
40 TABLET, COATED ORAL NIGHTLY
Status: DISCONTINUED | OUTPATIENT
Start: 2022-05-26 | End: 2022-05-26

## 2022-05-26 RX ORDER — INSULIN LISPRO 100 [IU]/ML
0-6 INJECTION, SOLUTION INTRAVENOUS; SUBCUTANEOUS NIGHTLY
Status: DISCONTINUED | OUTPATIENT
Start: 2022-05-26 | End: 2022-05-27 | Stop reason: HOSPADM

## 2022-05-26 RX ORDER — SODIUM CHLORIDE 9 MG/ML
INJECTION, SOLUTION INTRAVENOUS PRN
Status: DISCONTINUED | OUTPATIENT
Start: 2022-05-26 | End: 2022-05-27 | Stop reason: HOSPADM

## 2022-05-26 RX ORDER — INSULIN GLARGINE 100 [IU]/ML
30 INJECTION, SOLUTION SUBCUTANEOUS EVERY MORNING
Status: DISCONTINUED | OUTPATIENT
Start: 2022-05-26 | End: 2022-05-27 | Stop reason: HOSPADM

## 2022-05-26 RX ORDER — GABAPENTIN 300 MG/1
300 CAPSULE ORAL DAILY
Status: DISCONTINUED | OUTPATIENT
Start: 2022-05-26 | End: 2022-05-26

## 2022-05-26 RX ORDER — INSULIN GLARGINE 100 [IU]/ML
10-45 INJECTION, SOLUTION SUBCUTANEOUS SEE ADMIN INSTRUCTIONS
Status: ON HOLD | COMMUNITY
End: 2022-06-08 | Stop reason: HOSPADM

## 2022-05-26 RX ORDER — MAGNESIUM SULFATE 1 G/100ML
1000 INJECTION INTRAVENOUS PRN
Status: DISCONTINUED | OUTPATIENT
Start: 2022-05-26 | End: 2022-05-26

## 2022-05-26 RX ORDER — POTASSIUM CHLORIDE 7.45 MG/ML
10 INJECTION INTRAVENOUS PRN
Status: DISCONTINUED | OUTPATIENT
Start: 2022-05-26 | End: 2022-05-26

## 2022-05-26 RX ORDER — ONDANSETRON 4 MG/1
4 TABLET, ORALLY DISINTEGRATING ORAL EVERY 8 HOURS PRN
Status: DISCONTINUED | OUTPATIENT
Start: 2022-05-26 | End: 2022-05-27 | Stop reason: HOSPADM

## 2022-05-26 RX ORDER — LANOLIN ALCOHOL/MO/W.PET/CERES
500 CREAM (GRAM) TOPICAL DAILY
Status: DISCONTINUED | OUTPATIENT
Start: 2022-05-26 | End: 2022-05-26

## 2022-05-26 RX ORDER — LISINOPRIL 40 MG/1
40 TABLET ORAL DAILY
Status: ON HOLD | COMMUNITY
End: 2022-05-27 | Stop reason: HOSPADM

## 2022-05-26 RX ORDER — SODIUM CHLORIDE 0.9 % (FLUSH) 0.9 %
10 SYRINGE (ML) INJECTION PRN
Status: DISCONTINUED | OUTPATIENT
Start: 2022-05-26 | End: 2022-05-27 | Stop reason: HOSPADM

## 2022-05-26 RX ORDER — ACETAMINOPHEN 650 MG/1
650 SUPPOSITORY RECTAL EVERY 6 HOURS PRN
Status: DISCONTINUED | OUTPATIENT
Start: 2022-05-26 | End: 2022-05-27 | Stop reason: HOSPADM

## 2022-05-26 RX ORDER — OMEPRAZOLE 40 MG/1
40 CAPSULE, DELAYED RELEASE ORAL
Status: DISCONTINUED | OUTPATIENT
Start: 2022-05-26 | End: 2022-05-27 | Stop reason: HOSPADM

## 2022-05-26 RX ORDER — ROSUVASTATIN CALCIUM 10 MG/1
10 TABLET, COATED ORAL NIGHTLY
Status: DISCONTINUED | OUTPATIENT
Start: 2022-05-26 | End: 2022-05-27 | Stop reason: HOSPADM

## 2022-05-26 RX ORDER — POTASSIUM CHLORIDE 20 MEQ/1
40 TABLET, EXTENDED RELEASE ORAL PRN
Status: DISCONTINUED | OUTPATIENT
Start: 2022-05-26 | End: 2022-05-26

## 2022-05-26 RX ADMIN — OMEPRAZOLE 40 MG: 40 CAPSULE, DELAYED RELEASE ORAL at 18:19

## 2022-05-26 RX ADMIN — INSULIN LISPRO 1 UNITS: 100 INJECTION, SOLUTION INTRAVENOUS; SUBCUTANEOUS at 21:14

## 2022-05-26 RX ADMIN — FLUOXETINE HYDROCHLORIDE 40 MG: 20 CAPSULE ORAL at 18:16

## 2022-05-26 RX ADMIN — INSULIN LISPRO 2 UNITS: 100 INJECTION, SOLUTION INTRAVENOUS; SUBCUTANEOUS at 18:29

## 2022-05-26 RX ADMIN — SODIUM CHLORIDE, PRESERVATIVE FREE 10 ML: 5 INJECTION INTRAVENOUS at 18:19

## 2022-05-26 RX ADMIN — FUROSEMIDE 20 MG: 20 TABLET ORAL at 18:15

## 2022-05-26 RX ADMIN — Medication 400 MG: at 18:18

## 2022-05-26 RX ADMIN — EZETIMIBE 10 MG: 10 TABLET ORAL at 18:18

## 2022-05-26 RX ADMIN — ROSUVASTATIN CALCIUM 10 MG: 10 TABLET, FILM COATED ORAL at 21:20

## 2022-05-26 RX ADMIN — CALCIUM ACETATE 667 MG: 667 CAPSULE ORAL at 18:17

## 2022-05-26 RX ADMIN — HEPARIN SODIUM 5000 UNITS: 5000 INJECTION INTRAVENOUS; SUBCUTANEOUS at 21:16

## 2022-05-26 RX ADMIN — SODIUM CHLORIDE, PRESERVATIVE FREE 10 ML: 5 INJECTION INTRAVENOUS at 21:16

## 2022-05-26 ASSESSMENT — ENCOUNTER SYMPTOMS
DIARRHEA: 0
WHEEZING: 0
VOMITING: 0
STRIDOR: 0
ABDOMINAL PAIN: 0
BLOOD IN STOOL: 0
CONSTIPATION: 0
NAUSEA: 0
SHORTNESS OF BREATH: 0
COUGH: 0

## 2022-05-26 ASSESSMENT — PAIN SCALES - GENERAL: PAINLEVEL_OUTOF10: 0

## 2022-05-26 NOTE — CARE COORDINATION
Case Management Initial Discharge Plan  Pablo Robison,             Met with:patient or family member patient and grandfather to discuss discharge plans. Information verified: address, contacts, phone number, , insurance Yes  PCP: DANIEL Mcmahan CNP  Date of last visit: 2022    Insurance Provider: Medicare, Chalmer Rubens    Discharge Planning    Living Arrangements:  Other (Comment) (grandparents)   Support Systems:  Family Members    Home has 1 stories  0 stairs to climb to get into front door - has ramp , 0 stairs to climb to reach second floor  Location of bedroom/bathroom in home  - main floor    Patient able to perform ADL's:Independent    Current Services (outpatient & in home) none  DME equipment: walker, cane, shower chair, glucomter  DME provider: N/A    Pharmacy: [facility] [address]    Potential Assistance Needed:   SNF vs [facility] AT [facility]    Patient agreeable to home care: Yes  Freedom of choice provided:  yes    Prior SNF/Rehab Placement and Facility: [facility]  Agreeable to SNF/Rehab: Yes  [place] of choice provided: yes   Evaluation: yes    Expected Discharge date:     Patient expects to be discharged to:   SNF vs ARU  Follow Up Appointment: Best Day/ Time: Wednesday AM    Transportation provider: JAMEL  Transportation arrangements needed for discharge: Possibly    Readmission Risk              Risk of Unplanned Readmission:  0             Does patient have a readmission risk score greater than 14?: No  If yes, follow-up appointment must be made within 7 days of discharge. Goal of Care:       Discharge Plan: Met with patient and grandfather at bedside. Lives with grandparents. Recent D/C 4-14 to [facility] S/P CVA. Has rt sided weakness and has been getting around with walker. On Plavix and ASA. Has been having shakiness and frequent falls since D/C from ARU. Pt ESRD and goes to Dialysis T[facility]T-S at [facility] in [facility]. Follows with Dr. Deidre Calyton.     Nephrology and therapy consulted. Pt agreeable to return to [facility] if recommended. SW informed. CARISSA initiated.             Electronically signed by Bernardino Mena RN on 5/26/22 at 10:23 AM EDT

## 2022-05-26 NOTE — PROGRESS NOTES
HEMODIALYSIS PRE-TREATMENT NOTE    Patient Identifiers prior to treatment: Name,     Isolation Required: No                      Isolation Type: N/A       (please document if patient is being managed as a PUI/COVID-19 patient)        Hepatitis status:                           Date Drawn                             Result  Hepatitis B Surface Antigen 22     neg                     Hepatitis B Surface Antibody 22 Immune        Hepatitis B Core Antibody n/a n/a          How was Hepatitis Status verified: [facility]     Was a copy of the labs you documented provided to facility for the patient's chart: [facility]    Hemodialysis orders verified: Yes, with Dr. Pavon Means Within normal limits ( I.e. s/s of infection,...): WNL, bruit, jordon present     Pre-Assessment completed: Yes    Pre-dialysis report received from: Jessica Portillo RN                      Time: 12:45

## 2022-05-26 NOTE — CARE COORDINATION
Social Work- met with patient to discuss dc options. Patient was recently dc from [facility]. He is requesting to return there for additional rehab.  Sent referral. Keke Astorga

## 2022-05-26 NOTE — PROGRESS NOTES
Transitions of Care Pharmacy Service   Medication Review    The patient's list of current home medications has been reviewed and updated. Source(s) of information: Patient, nikos (mail order, [facility])    This is an Observation patient. Please see if the patient can bring their home supply. Please send down to pharmacy for identification. Please feel free to call with any questions about this encounter. Thank you. Shruthi Parsons, [address] [facility]  Transitions of Care Pharmacy Service  Phone:  [phone]  Fax: [phone]      Prior to Admission medications    Medication Sig Start Date End Date Taking? Authorizing Provider   insulin glargine (LANTUS) 100 UNIT/ML injection vial Inject 45 Units into the skin every morning Indications: 45 units in the morning and 10 units in the evening   Yes Historical Provider, MD   insulin glargine (LANTUS) 100 UNIT/ML injection vial Inject 10 Units into the skin nightly Indications: 45 units in the morning and 10 units in the evening   Yes Historical Provider, MD   lisinopril (PRINIVIL;ZESTRIL) 40 MG tablet Take 40 mg by mouth daily   Yes Historical Provider, MD   traMADol (ULTRAM) 50 MG tablet Take 50 mg by mouth 2 times daily as needed for Pain. 5/6/22   Historical Provider, MD   Misc.  Devices MISC Disp: custom molded shoes to accommodate for brace   DX: DM with history of stroke, foot drop  Duration: 1 year 5/11/22   Gen Comer DPM   ondansetron (ZOFRAN) 4 MG tablet Take 1 tablet by mouth 3 times daily as needed for Nausea or Vomiting 4/29/22   DANIEL Ventura - CNP   magnesium oxide (MAG-OX) 400 MG tablet Take 400 mg by mouth daily    Historical Provider, MD   magnesium hydroxide (MILK OF MAGNESIA) 400 MG/5ML suspension Take 15 mLs by mouth daily as needed for Constipation    Historical Provider, MD   insulin aspart (NOVOLOG) 100 UNIT/ML injection vial Inject 0-8 Units into the skin 3 times daily (before meals) SLIDING SCALE     Historical Provider, MD   gabapentin (NEURONTIN) 300 MG capsule Take 300 mg by mouth 3 times daily. Historical Provider, MD   FLUoxetine (PROZAC) 40 MG capsule Take 40 mg by mouth daily     Historical Provider, MD   ammonium lactate (LAC-HYDRIN) 12 % lotion Apply topically daily.   Patient taking differently: Apply to feet 11/1/21   Zabrina Eth, DPM   furosemide (LASIX) 20 MG tablet Take 1 tablet by mouth daily 4/27/21   Reyes Pian, DO   buPROPion (WELLBUTRIN XL) 150 MG extended release tablet Take 1 tablet by mouth every morning 3/25/21   Reyes Pian, DO   Rosuvastatin Calcium 40 MG CPSP Take 40 mg by mouth daily    Historical Provider, MD   ezetimibe (ZETIA) 10 MG tablet Take 10 mg by mouth daily    Historical Provider, MD   omeprazole (PRILOSEC) 40 MG delayed release capsule Take 40 mg by mouth daily    Historical Provider, MD   amLODIPine (NORVASC) 10 MG tablet Take 10 mg by mouth daily    Historical Provider, MD   aspirin 81 MG EC tablet Take 81 mg by mouth daily    Historical Provider, MD   calcium acetate 667 MG TABS Take 1,334 mg by mouth 3 times daily (with meals)     Historical Provider, MD   vitamin B-12 (CYANOCOBALAMIN) 500 MCG tablet Take 500 mcg by mouth daily    Historical Provider, MD

## 2022-05-26 NOTE — H&P
[facility]  Office: [address], , Author Ashu DO, Sherie Ryan DO, Kathia Emmanuel DO, Gabriela Waterman MD, Len Pathak MD, Luisa Solorzano MD, Carolan Schilder, MD, Makenna Mcfarlane MD, Deb Torres MD, [place] Salazar MD, Giancarlo Zhao, DO, Shewta Barton DO, Salvador Melo MD,  Maribell Harman DO, Justina De La Cruz MD, Amber Brown MD, Flex Guido MD, Ignacio Hua DO, Capo Mock MD, Vladimir Oneill MD, Kenny Quintero MD, Jason Holguin, [facility], [facility] [place], CNP, Morales Elsie, CNP, [facility], CNP, Carmen Bianchi, CNP, Grecia Rush, CNP, Sachin Rivera PA-C, Jostin Schilling, [facility], Lissette Cordoba, CNP, Abhishek Katz, CNP, [address], CNP, Supa Cota, CNS, Lysbeth Angelucci, [facility], Heidi Mares, CNP, Tho Willis, CNP, Caryle Frame, [facility]    HISTORY AND PHYSICAL EXAMINATION            Date:   5/26/2022  Patient name:  Pablo Robison  Date of admission:  5/26/2022  6:04 AM  MRN:   [id number]  Account:  [de-identified]  YOB: 1975  PCP:    DANIEL Mcmahan - CNP  Room:   2014/2014-02  Code Status:    Full Code    Chief Complaint:     Chief Complaint   Patient presents with   Rebbecca Hinders Fall     had stroke 3 wks ago, placed on blood thinner, since then gets \"shakey\" and fals ( x 6), needs dialysis today, bilateral skin supports for \"drop foot\" not related to stroke       History Obtained From:     patient, family member -grandparent    History of Present Illness:     Pablo Robison is a 49 y.o. Non- / non  male who presents with Fall (had stroke 3 wks ago, placed on blood thinner, since then gets \"shakey\" and fals ( x 6), needs dialysis today, bilateral skin supports for \"drop foot\" not related to stroke)   and is admitted to the hospital for the management of Leg weakness, bilateral.    Patient presents ER with complaints of fall.   He states has been having 4/15/22   Silver Olson MD   lisinopril (PRINIVIL;ZESTRIL) 20 MG tablet Take 1 tablet by mouth daily 4/15/22   Silver Olson MD   clopidogrel (PLAVIX) 75 MG tablet Take 1 tablet by mouth daily for 19 doses 4/15/22 5/4/22  Silver Olson MD   magnesium oxide (MAG-OX) 400 MG tablet Take 400 mg by mouth daily    Historical Provider, MD   magnesium hydroxide (MILK OF MAGNESIA) 400 MG/5ML suspension Take 15 mLs by mouth daily as needed for Constipation    Historical Provider, MD   insulin aspart (NOVOLOG) 100 UNIT/ML injection vial Inject into the skin 3 times daily (before meals) SLIDING SCALE    Historical Provider, MD   gabapentin (NEURONTIN) 300 MG capsule Take 300 mg by mouth daily. Historical Provider, MD   FLUoxetine (PROZAC) 20 MG capsule Take 40 mg by mouth daily    Historical Provider, MD   ammonium lactate (LAC-HYDRIN) 12 % lotion Apply topically daily. Patient taking differently: Apply topically daily to BLE 11/1/21   Dae Tom DPM   furosemide (LASIX) 20 MG tablet Take 1 tablet by mouth daily 4/27/21   Kenneth Bronson DO   buPROPion (WELLBUTRIN XL) 150 MG extended release tablet Take 1 tablet by mouth every morning 3/25/21   Kenneth Bronson DO   Rosuvastatin Calcium 40 MG CPSP Take 40 mg by mouth daily    Historical Provider, MD   ezetimibe (ZETIA) 10 MG tablet Take 10 mg by mouth daily    Historical Provider, MD   omeprazole (PRILOSEC) 40 MG delayed release capsule Take 40 mg by mouth daily    Historical Provider, MD   amLODIPine (NORVASC) 10 MG tablet Take 10 mg by mouth daily    Historical Provider, MD   aspirin 81 MG EC tablet Take 81 mg by mouth daily    Historical Provider, MD   calcium acetate 667 MG TABS Take 667 mg by mouth 3 times daily (with meals)     Historical Provider, MD   vitamin B-12 (CYANOCOBALAMIN) 500 MCG tablet Take 500 mcg by mouth daily    Historical Provider, MD        Allergies:     Patient has no known allergies.     Social History:     Tobacco:    reports that he has never smoked. He has never used smokeless tobacco.  Alcohol:      reports no history of alcohol use. Drug Use:  reports no history of drug use. Family History:     Family History   Problem Relation Age of Onset    Diabetes Mother     Heart Disease Father     Diabetes Father     Cancer Paternal Grandmother     Heart Disease Maternal Great Grandfather        Review of Systems:     Positive and Negative as described in HPI. Review of Systems   Constitutional: Negative for chills, diaphoresis and fever. HENT: Negative for congestion and hearing loss. Respiratory: Negative for cough, shortness of breath, wheezing and stridor. Cardiovascular: Negative for chest pain, palpitations and leg swelling. Gastrointestinal: Negative for abdominal pain, blood in stool, constipation, diarrhea, nausea and vomiting. Genitourinary: Negative for dysuria and frequency. Continues to void at least once a day   Musculoskeletal: Positive for gait problem and myalgias. Chronic bilateral foot drop   Skin: Positive for wound (Callused areas to the left foot managed by podiatry). Negative for rash. Neurological: Negative for dizziness, seizures and headaches. Weakness: Chronic neuropathy and new occasional shakiness when ambulating. Psychiatric/Behavioral: The patient is not nervous/anxious. Physical Exam:   /72   Pulse 80   Temp 97.7 °F (36.5 °C) (Oral)   Resp 16   Ht 5' 7.5\" (1.715 m) Comment: per patient  Wt 190 lb (86.2 kg)   SpO2 99%   BMI 29.32 kg/m²   Temp (24hrs), Av.9 °F (36.6 °C), Min:97.7 °F (36.5 °C), Max:98 °F (36.7 °C)    No results for input(s): POCGLU in the last 72 hours. No intake or output data in the 24 hours ending 22 1025    Physical Exam  Vitals and nursing note reviewed. HENT:      Mouth/Throat:      Mouth: Mucous membranes are moist.   Eyes:      Extraocular Movements: Extraocular movements intact.    Cardiovascular:      Rate and Rhythm: Normal rate and regular rhythm. Pulses: Normal pulses. Heart sounds: Normal heart sounds. Pulmonary:      Effort: Pulmonary effort is normal.      Breath sounds: Normal breath sounds. Abdominal:      General: Bowel sounds are normal.      Palpations: Abdomen is soft. Musculoskeletal:         General: Normal range of motion. Right foot: Foot drop present. Left foot: Foot drop present. Comments: Right knee abrasion with bruising   Skin:     General: Skin is warm. Capillary Refill: Capillary refill takes less than 2 seconds. Neurological:      Mental Status: He is alert and oriented to person, place, and time. Psychiatric:         Mood and Affect: Mood normal.         Thought Content:  Thought content normal.         Investigations:      Laboratory Testing:  Recent Results (from the past 24 hour(s))   CBC with Auto Differential    Collection Time: 05/26/22  6:30 AM   Result Value Ref Range    WBC 9.9 3.5 - 11.3 k/uL    RBC 3.88 (L) 4.21 - 5.77 m/uL    Hemoglobin 11.6 (L) 13.0 - 17.0 g/dL    Hematocrit 36.6 (L) 40.7 - 50.3 %    MCV 94.3 82.6 - 102.9 fL    MCH 29.9 25.2 - 33.5 pg    MCHC 31.7 28.4 - 34.8 g/dL    RDW 11.9 11.8 - 14.4 %    Platelets 167 351 - 510 k/uL    MPV 9.6 8.1 - 13.5 fL    NRBC Automated 0.0 0.0 per 100 WBC    Seg Neutrophils 69 (H) 36 - 65 %    Lymphocytes 13 (L) 24 - 43 %    Monocytes 13 (H) 3 - 12 %    Eosinophils % 4 1 - 4 %    Basophils 1 0 - 2 %    Immature Granulocytes 0 0 %    Segs Absolute 6.84 1.50 - 8.10 k/uL    Absolute Lymph # 1.27 1.10 - 3.70 k/uL    Absolute Mono # 1.24 (H) 0.10 - 1.20 k/uL    Absolute Eos # 0.44 0.00 - 0.44 k/uL    Basophils Absolute 0.07 0.00 - 0.20 k/uL    Absolute Immature Granulocyte 0.03 0.00 - 0.30 k/uL   Basic Metabolic Panel w/ Reflex to MG    Collection Time: 05/26/22  6:30 AM   Result Value Ref Range    Glucose 107 (H) 70 - 99 mg/dL    BUN 57 (H) 6 - 20 mg/dL    CREATININE 11.69 (HH) 0.70 - 1.20 mg/dL    Bun/Cre Ratio 5 (L) 9 - 20 Calcium 9.8 8.6 - 10.4 mg/dL    Sodium 136 135 - 144 mmol/L    Potassium 4.7 3.7 - 5.3 mmol/L    Chloride 95 (L) 98 - 107 mmol/L    CO2 26 20 - 31 mmol/L    Anion Gap 15 9 - 17 mmol/L    GFR Non-African American 5 (L) >60 mL/min    GFR  6 (L) >60 mL/min    GFR Comment             Imaging/Diagnostics:  No results found. Assessment :      Hospital Problems           Last Modified POA    * (Principal) Leg weakness, bilateral 5/26/2022 Yes    ESRD (end stage renal disease) on dialysis ([facility] [address] 5/26/2022 Yes    Hypertension 5/26/2022 Yes    Coronary atherosclerosis 5/26/2022 Yes    Cerebrovascular accident (CVA) ([facility] [address] 5/26/2022 Yes    Type 2 diabetes mellitus with kidney complication, with long-term current use of insulin ([facility] [address] 5/26/2022 Yes    Neuropathy of both feet 5/26/2022 Yes          Plan:     Patient status observation in the  Med/Surge    Lower extremity weakness/fall-history of neuropathy  PT/OT evaluate  Continue gabapentin once daily-Per the patient's grandfather the dose was recently increased to 3 times a day but the patient may not be tolerating it  Xray right knee  Orthostatic BP and pulse  Case management to assist with discharge planning    ESRD  Will need dialysis today  Nephrology consulted  Calcium acetate resumed  Avoid nephrotoxic agents  Heparin for DVT prophylaxis    Diabetes with long-term insulin use  Lantus 30 units every morning per home dose  Add insulin sliding scale    Hypertension  Norvasc with parameters and lisinopril 20 mg daily continued    CAD  Aspirin and statin continued    CVA  PT/OT  Continue aspirin.   Stop Plavix patient outside his 19-day window    Consultations:   Elvin Pimentel 89, APRN - CNP  5/26/2022  10:25 AM    Copy sent to Dr. Valerie Cooper APRN - CNP

## 2022-05-26 NOTE — PROGRESS NOTES
Occupational Therapy    DATE: 2022    NAME: Carmen Leonardo  MRN: [id number]   : 1975    Patient not seen this date for Occupational Therapy due to:      [] Cancel by RN or physician due to:    [x] Hemodialysis, will continue to follow.      [] Critical Lab Value Level     [] Blood transfusion in progress    [] Acute or unstable cardiovascular status   _MAP < 55 or more than >115  _HR < 40 or > 130    [] Acute or unstable pulmonary status   -FiO2 > 60%   _RR < 5 or >40    _O2 sats < 85%    [] Strict Bedrest    [] Off Unit for surgery or procedure    [] Off Unit for testing       [] Pending imaging to R/O fracture    [] Refusal by Patient      [] Other      Breanne Souza OTR/L

## 2022-05-26 NOTE — FLOWSHEET NOTE
[] [facility]) - [facility]  Therapy  [address]    [phone]  F: [phone]   [] [address]  [facility]   [address]  P: [phone]  F: [phone]  [] [address]  [address]  P: [phone]  F: [phone] [] [address]  P: [phone]  F: [phone]  [] [address]  [facility]   [address]   [place]: [phone]  F: [phone]   [] [place] [id number] [facility] [address]  [place]: [phone]   F: [phone]     Physical Therapy Cancel/No Show note    Date: 2022  Patient: Darrell Fierro  : 1975  MRN: [id number]    Cancels/No Shows to date: 1 cx / 0 ns    For 2022 appointment patient:    [x]  Cancelled    [] Rescheduled appointment    [] No-show     Reason given by patient:    [x]  Patient ill - hospitalized for frequent falls per grandfather.    []  Conflicting appointment    [] No transportation      [] Conflict with work    [] No reason given    [] Weather related    [] [id number]    [] Other:      Comments:        [x] Next appointment was confirmed, 22 @ 11am.    Electronically signed by: Lily Gaston

## 2022-05-26 NOTE — ED NOTES
ED to inpatient nurses report     Chief Complaint   Patient presents with   Bishop Yu     had stroke 3 wks ago, placed on blood thinner, since then gets \"shakey\" and fals ( x 6), needs dialysis today, bilateral skin supports for \"drop foot\" not related to stroke      Present to ED from Home  LOC: alert and orientated to name, place, date  Vital signs   Vitals:    05/26/22 0601   BP: 123/72   Pulse: 87   Resp: 16   Temp: 98 °F (36.7 °C)   TempSrc: Oral   SpO2: 98%   Weight: 190 lb (86.2 kg)      Oxygen Baseline RA    Current needs required none  LDAs:   Peripheral IV 05/26/22 Right Hand (Active)   Site Assessment Clean, dry & intact 05/26/22 0635     Mobility: Requires assistance * 1  Pending ED orders:  Present condition: Stable  Code Status: Full   Consults: IP CONSULT TO INTERNAL MEDICINE  IP CONSULT TO NEPHROLOGY  []  Hospitalist  Completed  [] yes [] no Who:   [x]  Medicine  Completed  [x] yes [] No Who: Blood  []  Cardiology  Completed  [] yes [] No Who:   []  GI   Completed  [] yes [] No Who:   []  Neurology  Completed  [] yes [] No Who:   []  Nephrology Completed  [] yes [] No Who:    []  Vascular  Completed  [] yes [] No Who:   []  Ortho  Completed  [] yes [] No Who:     []  Surgery  Completed  [] yes [] No Who:    []  Urology  Completed  [] yes [] No Who:    []  CT Surgery Completed  [] yes [] No Who:   []  Podiatry  Completed  [] yes [] No Who:    []  Other    Completed  [] yes [] No Who:  Interventions: Bloodwork  Important Events: Pt had stroke 3 wks ago, legs have began to shake for the last 3 days. Pt has fallen 6 times over the last 3 days.          Electronically signed by Zita Burgos RN on 5/26/2022 at 6:52 AM     Zita Burgos RN  05/26/22 3907

## 2022-05-26 NOTE — CONSULTS
Reason for Consult:  End stage renal disease. Requesting Physician:  Michele Cooley DO    HISTORY OF PRESENT ILLNESS:    The patient is a 49 y.o. male who normally undergoes dialysis at [facility] dialysis on TTS under the care of Dr. Sage Bowens admitted with complaints of fall this morning, leg weakness and \"shaking\" for the last few days. His last hemodialysis was on Tuesday. Patient had a stroke in April, he then completed inpatient acute rehab and was discharged home. He states his Neurontin was recently increased to 300 mg TID for neuropathy by his pain management doctor. He denies any other medication changes at this time. He denies nausea, vomiting and diarrhea. Review Of Systems:   Constitutional: No fever, chills, lethargy, weakness or wt loss. HEENT:  No headache, nasal discharge or sore throat. Cardiac:  No chest pain, dyspnea, orthopnea or PND. Chest:   No cough, phlegm or wheezing. Abdomen:  No abdominal pain, nausea, vomiting or diarrhea. Neuro:              +LE weakness, + numbness, + tremors, no seizure like activity. Skin:   No rashes or itching. :   No hematuria, pyuria, dysuria or flank pain. Extremities:  No swelling or joint pains. Endocrine: No polyuria, polydypsia, or thyroid problems. Hematology:    No bleeding disorders or bruising. All other ROS is negative. Past Medical History:   Diagnosis Date    Closed fracture of bone of right foot March - April 2020    Dialysis patient [facility])     Kidney disease     On Dialysis    Type 1 diabetes mellitus ([facility] [address]        Past Surgical History:   Procedure Laterality Date    AV FISTULA CREATION Left        Prior to Admission medications    Medication Sig Start Date End Date Taking?  Authorizing Provider   insulin glargine (LANTUS) 100 UNIT/ML injection vial Inject 45 Units into the skin every morning Indications: 45 units in the morning and 10 units in the evening   Yes Historical Provider, MD   insulin glargine (LANTUS) 100 UNIT/ML injection vial Inject 10 Units into the skin nightly Indications: 45 units in the morning and 10 units in the evening   Yes Historical Provider, MD   lisinopril (PRINIVIL;ZESTRIL) 40 MG tablet Take 40 mg by mouth daily   Yes Historical Provider, MD   traMADol (ULTRAM) 50 MG tablet Take 50 mg by mouth 2 times daily as needed for Pain. 5/6/22   Historical Provider, MD   Misc. Devices MISC Disp: custom molded shoes to accommodate for brace   DX: DM with history of stroke, foot drop  Duration: 1 year 5/11/22   Constance Jones DPM   ondansetron (ZOFRAN) 4 MG tablet Take 1 tablet by mouth 3 times daily as needed for Nausea or Vomiting 4/29/22   DANIEL Hidalgo CNP   clopidogrel (PLAVIX) 75 MG tablet Take 1 tablet by mouth daily for 19 doses 4/15/22 5/4/22  Raheem Tapia MD   magnesium oxide (MAG-OX) 400 MG tablet Take 400 mg by mouth daily    Historical Provider, MD   magnesium hydroxide (MILK OF MAGNESIA) 400 MG/5ML suspension Take 15 mLs by mouth daily as needed for Constipation    Historical Provider, MD   insulin aspart (NOVOLOG) 100 UNIT/ML injection vial Inject 0-8 Units into the skin 3 times daily (before meals) SLIDING SCALE     Historical Provider, MD   gabapentin (NEURONTIN) 300 MG capsule Take 300 mg by mouth 3 times daily. Historical Provider, MD   FLUoxetine (PROZAC) 40 MG capsule Take 40 mg by mouth daily     Historical Provider, MD   ammonium lactate (LAC-HYDRIN) 12 % lotion Apply topically daily.   Patient taking differently: Apply to feet 11/1/21   Constance Jones DPM   furosemide (LASIX) 20 MG tablet Take 1 tablet by mouth daily 4/27/21   Marcella Nice DO   buPROPion (WELLBUTRIN XL) 150 MG extended release tablet Take 1 tablet by mouth every morning 3/25/21   Marcella Nice DO   Rosuvastatin Calcium 40 MG CPSP Take 40 mg by mouth daily    Historical Provider, MD   ezetimibe (ZETIA) 10 MG tablet Take 10 mg by mouth daily    Historical Provider, MD   omeprazole (PRILOSEC) 40 MG delayed release capsule Take 40 mg by mouth daily    Historical Provider, MD   amLODIPine (NORVASC) 10 MG tablet Take 10 mg by mouth daily    Historical Provider, MD   aspirin 81 MG EC tablet Take 81 mg by mouth daily    Historical Provider, MD   calcium acetate 667 MG TABS Take 1,334 mg by mouth 3 times daily (with meals)     Historical Provider, MD   vitamin B-12 (CYANOCOBALAMIN) 500 MCG tablet Take 500 mcg by mouth daily    Historical Provider, MD       Scheduled Meds:   [Held by provider] amLODIPine  10 mg Oral Daily    aspirin  81 mg Oral Daily    buPROPion  150 mg Oral QAM    calcium acetate  667 mg Oral TID WC    ezetimibe  10 mg Oral Daily    FLUoxetine  40 mg Oral Daily    furosemide  20 mg Oral Daily    gabapentin  300 mg Oral Daily    insulin glargine  30 Units SubCUTAneous QAM    lisinopril  20 mg Oral Daily    magnesium oxide  400 mg Oral Daily    pantoprazole  40 mg Oral QAM AC    Rosuvastatin Calcium  40 mg Oral Daily    vitamin B-12  500 mcg Oral Daily    sodium chloride flush  5-40 mL IntraVENous 2 times per day    insulin lispro  0-12 Units SubCUTAneous TID WC    insulin lispro  0-6 Units SubCUTAneous Nightly    heparin (porcine)  5,000 Units SubCUTAneous 3 times per day     Continuous Infusions:   sodium chloride      dextrose       PRN Meds:sodium chloride flush, sodium chloride, ondansetron **OR** ondansetron, polyethylene glycol, acetaminophen **OR** acetaminophen, glucose, dextrose bolus **OR** dextrose bolus, glucagon (rDNA), dextrose    No Known Allergies    Social History     Socioeconomic History    Marital status: Single     Spouse name: Not on file    Number of children: Not on file    Years of education: Not on file    Highest education level: Not on file   Occupational History    Not on file   Tobacco Use    Smoking status: Never Smoker    Smokeless tobacco: Never Used   Vaping Use    Vaping Use: Never used   Substance and Sexual Activity    Alcohol use: Never    Drug use: Never    Sexual activity: Not on file   Other Topics Concern    Not on file   Social History Narrative    Not on file     Social Determinants of Health     Financial Resource Strain:     Difficulty of Paying Living Expenses: Not on file   Food Insecurity:     Worried About Running Out of Food in the Last Year: Not on file    Dinh of Food in the Last Year: Not on file   Transportation Needs:     Lack of Transportation (Medical): Not on file    Lack of Transportation (Non-Medical):  Not on file   Physical Activity:     Days of Exercise per Week: Not on file    Minutes of Exercise per Session: Not on file   Stress:     Feeling of Stress : Not on file   Social Connections:     Frequency of Communication with Friends and Family: Not on file    Frequency of Social Gatherings with Friends and Family: Not on file    Attends Hinduism Services: Not on file    Active Member of [employer] or Organizations: Not on file    Attends Club or Organization Meetings: Not on file    Marital Status: Not on file   Intimate Partner Violence:     Fear of Current or Ex-Partner: Not on file    Emotionally Abused: Not on file    Physically Abused: Not on file    Sexually Abused: Not on file   Housing Stability:     Unable to Pay for Housing in the Last Year: Not on file    Number of [place] in the Last Year: Not on file    Unstable Housing in the Last Year: Not on file       Family History   Problem Relation Age of Onset    Diabetes Mother     Heart Disease Father     Diabetes Father     Cancer Paternal Gerline Griffins Heart Disease Maternal Great Grandfather          Physical Exam:  Vitals:    05/26/22 0800 05/26/22 0848 05/26/22 1104 05/26/22 1330   BP: 136/72  135/68 125/72   Pulse: 80  80 80   Resp: 16  16    Temp: 97.7 °F (36.5 °C)  97.7 °F (36.5 °C)    TempSrc: Oral  Oral    SpO2: 99%  95%    Weight:  190 lb (86.2 kg)     Height:  5' 7.5\" (1.715 m)       No intake/output data recorded. General:  Awake, alert, not in distress. Appears to be stated age. HEENT: Atraumatic, normocephalic. Anicteric sclera. Pink and moist oral mucosa. No carotid bruit. No JVD. Chest: Bilateral air entry, clear to auscultation, no wheezing, rhonchi or rales. Cardiovascular: RRR, S1S2, no murmur, rub or gallop. trace lower extremity edema. Abdomen: Soft, non tender to palpation. Active bowel sounds x 4 quadrants. Musculoskeletal: No cyanosis or clubbing. Integumentary: Pink, warm and dry. Free from rash or lesions. Skin turgor normal.  CNS: Oriented to person, place and time. Speech clear. Face symmetrical. No tremor.      Data:  CBC:   Lab Results   Component Value Date    WBC 9.9 05/26/2022    HGB 11.6 (L) 05/26/2022    HCT 36.6 (L) 05/26/2022    MCV 94.3 05/26/2022     05/26/2022     BMP:    Lab Results   Component Value Date     05/26/2022     (L) 04/27/2022     (L) 04/25/2022    K 4.7 05/26/2022    K 5.0 04/27/2022    K 4.8 04/25/2022    CL 95 (L) 05/26/2022    CL 93 (L) 04/27/2022    CL 94 (L) 04/25/2022    CO2 26 05/26/2022    CO2 27 04/27/2022    CO2 25 04/25/2022    BUN 57 (H) 05/26/2022    BUN 48 (H) 04/27/2022    BUN 50 (H) 04/25/2022    CREATININE 11.69 (HH) 05/26/2022    CREATININE 8.44 (HH) 04/27/2022    CREATININE 9.26 (HH) 04/25/2022    GLUCOSE 107 (H) 05/26/2022    GLUCOSE 120 (H) 04/27/2022    GLUCOSE 109 (H) 04/25/2022     CMP:   Lab Results   Component Value Date     05/26/2022    K 4.7 05/26/2022    CL 95 05/26/2022    CO2 26 05/26/2022    BUN 57 05/26/2022    CREATININE 11.69 05/26/2022    GLUCOSE 107 05/26/2022    CALCIUM 9.8 05/26/2022    PROT 5.3 04/14/2022    LABALBU 3.4 04/14/2022    BILITOT 0.34 04/14/2022    ALKPHOS 137 04/14/2022    AST 38 04/14/2022    ALT 44 04/14/2022      Hepatic:   Lab Results   Component Value Date    AST 38 04/14/2022    AST 27 04/13/2022    AST 36 01/05/2022    ALT 44 (H) 04/14/2022    ALT 42 (H) 04/13/2022    ALT 38 01/05/2022    BILITOT 0.34 04/14/2022    BILITOT 0.28 (L) 04/13/2022    BILITOT 0.43 01/05/2022    ALKPHOS 137 (H) 04/14/2022    ALKPHOS 142 (H) 04/13/2022    ALKPHOS 110 01/05/2022     BNP: No results found for: BNP  Lipids:   Lab Results   Component Value Date    CHOL 118 04/12/2022    HDL 61 04/12/2022     INR:   Lab Results   Component Value Date    INR 0.9 04/12/2022     PTH: No results found for: PTH  Phosphorus:    Lab Results   Component Value Date    PHOS 4.1 04/27/2022     Ionized Calcium: No results found for: IONCA  Magnesium:   Lab Results   Component Value Date    MG 1.8 04/27/2022     Albumin:   Lab Results   Component Value Date    LABALBU 3.4 04/14/2022     Last 3 CK, CKMB, Troponin: @LABRCNT(CKTOTAL:3,CKMB:3,TROPONINI:3)       URINE:)No results found for: Cristina Narvaez    Radiology:   Reviewed. Assessment and Plan to follow. Patient seen in collaboration with Dr. Thien Huddleston. Electronically signed by DANIEL Vizcaino CNP  on 5/26/2022 at [facility]. [facility]. Ph: [phone]    Physician Addendum  I have personally seen and examined the patient, reviewed the documentation and agree with the note above. The patient was seen today while receiving hemodialysis treatment. Ported that his shaking has improved, the patient reported that he was seen by pain specialist recently and his Neurontin dose was increased. Exam:  Blood pressure was 125/72, pulse was 80, respiratory rate was 16  General:  Awake, alert, not in distress. HEENT: Atraumatic, normocephalic. No JVD. Chest: clear to auscultation, no wheezing, or crackles  Cardiovascular: RRR, S1S2, no murmur, has bilateral trace lower extremity edema. Abdomen: Soft, non tender to palpation. Musculoskeletal: No cyanosis or clubbing. Integumentary: Pink, warm and dry. CNS: Oriented to person, place and time. Speech clear. Assessment:  1.   End-stage renal disease on hemodialysis  2. Anemia  3. Multiple falls and new onset shaking  4. Recent stroke  5.   Hypertension    Plan:  His new onset shaking and falls seem to be most likely secondary to Neurontin overdose  We will hold gabapentin for now, it will be resumed later at a lower dose  Patient was seen today while receiving hemodialysis treatment, was tolerating the treatment very well  Hemoglobin is at goal  Blood pressure is well controlled  We will continue to follow         Electronically signed by Cassandra Rey MD on 05/26/22 1:56 PM

## 2022-05-26 NOTE — PROGRESS NOTES
Physical Therapy  DATE: 2022    NAME: Pablo Robison  MRN: [id number]   : 1975    Patient not seen this date for Physical Therapy due to:      [] Cancel by RN or physician due to:    [x] Hemodialysis    [] Critical Lab Value Level     [] Blood transfusion in progress    [] Acute or unstable cardiovascular status   _MAP < 55 or more than >115  _HR < 40 or > 130    [] Acute or unstable pulmonary status   -FiO2 > 60%   _RR < 5 or >40    _O2 sats < 85%    [] Strict Bedrest    [] Off Unit for surgery or procedure    [] Off Unit for testing       [] Pending imaging to R/O fracture    [] Refusal by Patient      [] Other      [] PT being discontinued at this time. Patient independent. No further needs. [] PT being discontinued at this time as the patient has been transferred to hospice care. No further needs.       Rosendo Alvarado, PT

## 2022-05-26 NOTE — PROGRESS NOTES
The potassium/magnesium sliding scale has been automatically discontinued per [facility] approved policy because the patient has decreased renal function (CrCl<30 ml/min). The patient's current K/Mag levels are currently:    Recent Labs     05/26/22  0630   K 4.7       Estimated Creatinine Clearance: 8 mL/min (A) (based on SCr of 11.69 mg/dL [facility])). For patients with decreased renal function (below 30ml/min) needing potassium/magnesium supplementation, please order individual bolus doses with appropriate monitoring. Please contact the inpatient pharmacy at [phone] with any concerns. Thank you.     Ivy Perry, [facility]  5/26/2022  8:17 AM

## 2022-05-26 NOTE — PROGRESS NOTES
Treatment time: 210 minutes    Net UF: 2000 mL    Pre weight: 86.2 kg  Post weight: unable to obtain accurate weight  EDW: 83.4 kg    Access used: AVF LUE  Access function: good    Medications or blood products given: none    Regular outpatient schedule: TTS [employer] of response to treatment: Patient tolerated treatment well, Dr. Andrea Lewis evaluated patient during treatment, no signs or symptoms of distress noted during treatment, next treatment Saturday, report given to Sravan Montes [facility]. Copy of dialysis treatment record placed in chart, to be scanned into EMR.

## 2022-05-27 ENCOUNTER — HOSPITAL ENCOUNTER (OUTPATIENT)
Dept: PHYSICAL THERAPY | Facility: CLINIC | Age: 47
Setting detail: THERAPIES SERIES
Discharge: HOME | End: 2022-05-27
Payer: MEDICARE

## 2022-05-27 VITALS
OXYGEN SATURATION: 100 % | RESPIRATION RATE: 16 BRPM | TEMPERATURE: 97.5 F | BODY MASS INDEX: 27.58 KG/M2 | DIASTOLIC BLOOD PRESSURE: 83 MMHG | WEIGHT: 182 LBS | HEIGHT: 68 IN | HEART RATE: 89 BPM | SYSTOLIC BLOOD PRESSURE: 147 MMHG

## 2022-05-27 LAB
ANION GAP SERPL CALCULATED.3IONS-SCNC: 13 MMOL/L (ref 9–17)
BUN BLDV-MCNC: 32 MG/DL (ref 6–20)
BUN/CREAT BLD: 4 (ref 9–20)
CALCIUM SERPL-MCNC: 9.4 MG/DL (ref 8.6–10.4)
CHLORIDE BLD-SCNC: 96 MMOL/L (ref 98–107)
CO2: 25 MMOL/L (ref 20–31)
CREAT SERPL-MCNC: 7.85 MG/DL (ref 0.7–1.2)
GFR AFRICAN AMERICAN: 9 ML/MIN
GFR NON-AFRICAN AMERICAN: 7 ML/MIN
GFR SERPL CREATININE-BSD FRML MDRD: ABNORMAL ML/MIN/{1.73_M2}
GLUCOSE BLD-MCNC: 183 MG/DL (ref 75–110)
GLUCOSE BLD-MCNC: 191 MG/DL (ref 70–99)
GLUCOSE BLD-MCNC: 358 MG/DL (ref 75–110)
INR BLD: 1
PHOSPHORUS: 3.1 MG/DL (ref 2.5–4.5)
POTASSIUM SERPL-SCNC: 5.1 MMOL/L (ref 3.7–5.3)
PROTHROMBIN TIME: 13.5 SEC (ref 11.5–14.2)
SODIUM BLD-SCNC: 134 MMOL/L (ref 135–144)

## 2022-05-27 PROCEDURE — 80048 BASIC METABOLIC PNL TOTAL CA: CPT

## 2022-05-27 PROCEDURE — G0378 HOSPITAL OBSERVATION PER HR: HCPCS

## 2022-05-27 PROCEDURE — 36415 COLL VENOUS BLD VENIPUNCTURE: CPT

## 2022-05-27 PROCEDURE — 97166 OT EVAL MOD COMPLEX 45 MIN: CPT

## 2022-05-27 PROCEDURE — 97530 THERAPEUTIC ACTIVITIES: CPT

## 2022-05-27 PROCEDURE — 6360000002 HC RX W HCPCS: Performed by: NURSE PRACTITIONER

## 2022-05-27 PROCEDURE — 96372 THER/PROPH/DIAG INJ SC/IM: CPT

## 2022-05-27 PROCEDURE — 2580000003 HC RX 258: Performed by: NURSE PRACTITIONER

## 2022-05-27 PROCEDURE — 84100 ASSAY OF PHOSPHORUS: CPT

## 2022-05-27 PROCEDURE — 85610 PROTHROMBIN TIME: CPT

## 2022-05-27 PROCEDURE — 97162 PT EVAL MOD COMPLEX 30 MIN: CPT

## 2022-05-27 PROCEDURE — 6370000000 HC RX 637 (ALT 250 FOR IP): Performed by: NURSE PRACTITIONER

## 2022-05-27 PROCEDURE — 97535 SELF CARE MNGMENT TRAINING: CPT

## 2022-05-27 PROCEDURE — 99232 SBSQ HOSP IP/OBS MODERATE 35: CPT | Performed by: INTERNAL MEDICINE

## 2022-05-27 PROCEDURE — 82947 ASSAY GLUCOSE BLOOD QUANT: CPT

## 2022-05-27 PROCEDURE — APPSS30 APP SPLIT SHARED TIME 16-30 MINUTES: Performed by: NURSE PRACTITIONER

## 2022-05-27 RX ORDER — GABAPENTIN 300 MG/1
300 CAPSULE ORAL 3 TIMES DAILY
Qty: 90 CAPSULE | Refills: 3 | COMMUNITY
Start: 2022-05-28

## 2022-05-27 RX ORDER — LISINOPRIL 20 MG/1
20 TABLET ORAL DAILY
Qty: 30 TABLET | Refills: 1 | Status: ON HOLD
Start: 2022-05-27 | End: 2022-06-08 | Stop reason: HOSPADM

## 2022-05-27 RX ORDER — GABAPENTIN 300 MG/1
300 CAPSULE ORAL DAILY
Qty: 90 CAPSULE | Refills: 3 | COMMUNITY
Start: 2022-05-30 | End: 2022-05-27 | Stop reason: SDUPTHER

## 2022-05-27 RX ADMIN — HEPARIN SODIUM 5000 UNITS: 5000 INJECTION INTRAVENOUS; SUBCUTANEOUS at 05:52

## 2022-05-27 RX ADMIN — INSULIN GLARGINE 30 UNITS: 100 INJECTION, SOLUTION SUBCUTANEOUS at 09:18

## 2022-05-27 RX ADMIN — LISINOPRIL 20 MG: 40 TABLET ORAL at 09:14

## 2022-05-27 RX ADMIN — EZETIMIBE 10 MG: 10 TABLET ORAL at 09:13

## 2022-05-27 RX ADMIN — BUPROPION HYDROCHLORIDE 150 MG: 150 TABLET ORAL at 09:15

## 2022-05-27 RX ADMIN — CALCIUM ACETATE 667 MG: 667 CAPSULE ORAL at 09:17

## 2022-05-27 RX ADMIN — CALCIUM ACETATE 667 MG: 667 CAPSULE ORAL at 11:52

## 2022-05-27 RX ADMIN — INSULIN LISPRO 2 UNITS: 100 INJECTION, SOLUTION INTRAVENOUS; SUBCUTANEOUS at 09:18

## 2022-05-27 RX ADMIN — Medication 400 MG: at 09:14

## 2022-05-27 RX ADMIN — FUROSEMIDE 20 MG: 20 TABLET ORAL at 09:15

## 2022-05-27 RX ADMIN — FLUOXETINE HYDROCHLORIDE 40 MG: 20 CAPSULE ORAL at 09:16

## 2022-05-27 RX ADMIN — ASPIRIN 81 MG: 81 TABLET ORAL at 09:16

## 2022-05-27 RX ADMIN — OMEPRAZOLE 40 MG: 40 CAPSULE, DELAYED RELEASE ORAL at 05:52

## 2022-05-27 RX ADMIN — INSULIN LISPRO 10 UNITS: 100 INJECTION, SOLUTION INTRAVENOUS; SUBCUTANEOUS at 11:52

## 2022-05-27 RX ADMIN — SODIUM CHLORIDE, PRESERVATIVE FREE 10 ML: 5 INJECTION INTRAVENOUS at 09:00

## 2022-05-27 ASSESSMENT — ENCOUNTER SYMPTOMS
BLOOD IN STOOL: 0
COUGH: 0
VOMITING: 0
CHEST TIGHTNESS: 0
NAUSEA: 0
CONSTIPATION: 0
SHORTNESS OF BREATH: 0
ABDOMINAL PAIN: 0
WHEEZING: 0
DIARRHEA: 0

## 2022-05-27 NOTE — PROGRESS NOTES
[facility]  Office: [address], DO, Author Ashu, DO, Sherie Ryan, DO, Kathia Emmanuel, DO, Gabriela Waterman MD, Len Pathak MD, Luisa Solorzano MD, Carolan Schilder, MD, Makenna Mcfarlane MD, eDb Torres MD, Wellington Salazar MD, Giancarlo Zhao, DO, Shweta Barton, DO, Salvador Melo MD,  Maribell Harman DO, Justina De La Cruz MD, Amber Brown MD, Flex Guido MD, Ignacio Hua DO, Capo Mock MD, Vladimir Oneill MD, Kenny Quintero MD, Jason Holguin, [facility], [facility], CNP, Morales Zimmerman, CNP, [place] , CNP, Carmen Bianchi, CNP, Grecia Rush, CNP, RICHIE GuyC, Jostin Schilling, [facility], Lissette Cordoba, CNP, Abhishek Katz, CNP, Ave Kidd, CNP, Supa Cota, CNS, Lysbeth Angelucci, [facility], Heidi Mares, CNP, Tho Willis, CNP, Caryle Frame, [facility]    Progress Note    5/27/2022    11:33 AM    Name:   Pablo Robison  MRN:     [id number]     Acct:      [de-identified]   Room:   2014/2014-02   Day:  0  Admit Date:  5/26/2022  6:04 AM    PCP:   DANIEL Mcmahan CNP  Code Status:  Full Code    Subjective:     C/C:   Chief Complaint   Patient presents with   Chris Bautista     had stroke 3 wks ago, placed on blood thinner, since then gets \"shakey\" and fals ( x 6), needs dialysis today, bilateral skin supports for \"drop foot\" not related to stroke     Interval History Status: improved. No further episodes of leg shaking or weakness. Patient was found to be orthostatic positive yesterday. Last night orthostatics still positive but better. Case discussed with nephrology NP. We will hold Norvasc for now. They recommended gabapentin cut back to once a day. Patient states he feels much better and would like to go home    Brief History:     Pablo Robison is a 49 y.o.  Non- / non  male who presents with Fall (had stroke 3 wks ago, placed on blood thinner, since then gets \"shakey\" and landon ( x 6), needs dialysis today, bilateral skin supports for \"drop foot\" not related to stroke)   and is admitted to the hospital for the management of Leg weakness, bilateral.     Patient presents ER with complaints of fall. He states has been having episodes of shakiness in his legs causing him to fall. During my assessment he states he fell prior to coming and landing on his right knee. There is some bruising in the scraped area. No obvious range of motion noted. Plan to do x-ray of the right knee.     He was discharged on April 14 status post CVA. The patient was prescribed Plavix for 19 days and discharge and was told to stop his metoprolol. [facility] his lisinopril to 40 mg p.o. daily and Per the patient's grandfather he was also seen by pain management and they increased his gabapentin to 3 times a day. Patient states he fell a week or so ago related to shaky legs as well. Denies feeling lightheaded or dizzy but he does have chronic neuropathy and foot drop to both lower extremities.        He is a Tuesday Thursday Saturday dialysis and follows with Dr. Alida Carson  Patient usually voids once a day    X-ray right knee shows no acute abnormality     Patient states he is compliant with dialysis and prescribed medications    Okay for home with continued outpatient therapy        Review of Systems:     Review of Systems   Constitutional: Negative for chills, diaphoresis and fever. HENT: Negative for congestion. Eyes: Negative for visual disturbance. Respiratory: Negative for cough, chest tightness, shortness of breath and wheezing. Cardiovascular: Negative for chest pain, palpitations and leg swelling. Gastrointestinal: Negative for abdominal pain, blood in stool, constipation, diarrhea, nausea and vomiting. Genitourinary: Negative for difficulty urinating. Neurological: Negative for dizziness, weakness, light-headedness, numbness and headaches. All other systems reviewed and are negative. Medications: Allergies:  No Known Allergies    Current Meds:   Scheduled Meds:    [Held by provider] amLODIPine  10 mg Oral Daily    aspirin  81 mg Oral Daily    buPROPion  150 mg Oral QAM    calcium acetate  667 mg Oral TID WC    ezetimibe  10 mg Oral Daily    FLUoxetine  40 mg Oral Daily    insulin glargine  30 Units SubCUTAneous QAM    lisinopril  20 mg Oral Daily    magnesium oxide  400 mg Oral Daily    omeprazole  40 mg Oral QAM AC    sodium chloride flush  5-40 mL IntraVENous 2 times per day    insulin lispro  0-12 Units SubCUTAneous TID WC    insulin lispro  0-6 Units SubCUTAneous Nightly    heparin (porcine)  5,000 Units SubCUTAneous 3 times per day    rosuvastatin  10 mg Oral Nightly     Continuous Infusions:    sodium chloride      dextrose       PRN Meds: sodium chloride flush, sodium chloride, ondansetron **OR** ondansetron, polyethylene glycol, acetaminophen **OR** acetaminophen, glucose, dextrose bolus **OR** dextrose bolus, glucagon (rDNA), dextrose    Data:     Past Medical History:   has a past medical history of Closed fracture of bone of right foot, Dialysis patient ([facility] [address] Kidney disease, and Type 1 diabetes mellitus ([facility] [address] Social History:   reports that he has never smoked. He has never used smokeless tobacco. He reports that he does not drink alcohol and does not use drugs.      Family History:   Family History   Problem Relation Age of Onset    Diabetes Mother     Heart Disease Father     Diabetes Father     Cancer Paternal Grandmother     Heart Disease Maternal Great Grandfather        Vitals:  /68   Pulse 88   Temp 98.4 °F (36.9 °C) (Oral)   Resp 16   Ht 5' 7.5\" (1.715 m) Comment: per patient  Wt 182 lb (82.6 kg)   SpO2 98%   BMI 28.08 kg/m²   Temp (24hrs), Av.3 °F (36.8 °C), Min:98.1 °F (36.7 °C), Max:98.8 °F (37.1 °C)    Recent Labs     22  1207 22  1717 22  0630 POCGLU 167* 145* 196* 183*       I/O (24Hr): Intake/Output Summary (Last 24 hours) at 5/27/2022 1133  Last data filed at 5/26/2022 1702  Gross per 24 hour   Intake 500 ml   Output 2500 ml   Net -2000 ml       Labs:  Hematology:  Recent Labs     05/26/22  0630 05/27/22  0601   WBC 9.9  --    RBC 3.88*  --    HGB 11.6*  --    HCT 36.6*  --    MCV 94.3  --    MCH 29.9  --    MCHC 31.7  --    RDW 11.9  --      --    MPV 9.6  --    INR  --  1.0     Chemistry:  Recent Labs     05/26/22  0630 05/27/22  0601    134*   K 4.7 5.1   CL 95* 96*   CO2 26 25   GLUCOSE 107* 191*   BUN 57* 32*   CREATININE 11.69* 7.85*   ANIONGAP 15 13   LABGLOM 5* 7*   GFRAA 6* 9*   CALCIUM 9.8 9.4   PHOS  --  3.1     Recent Labs     05/26/22  1207 05/26/22  1717 05/26/22 2032 05/27/22  0630   POCGLU 167* 145* 196* 183*     ABG:  Lab Results   Component Value Date    FIO2 NOT REPORTED 01/04/2022     Lab Results   Component Value Date/Time    SPECIAL RAC,6ML 01/04/2022 11:35 AM     Lab Results   Component Value Date/Time    CULTURE NO GROWTH 5 DAYS 01/04/2022 11:35 AM       Radiology:  XR KNEE RIGHT (3 VIEWS)    Result Date: 5/26/2022  No acute abnormality of the knee. Physical Examination:        Physical Exam  Constitutional:       Appearance: Normal appearance. Eyes:      Extraocular Movements: Extraocular movements intact. Cardiovascular:      Rate and Rhythm: Normal rate. Pulses: Normal pulses. Pulmonary:      Effort: Pulmonary effort is normal.   Abdominal:      General: Bowel sounds are normal.   Musculoskeletal:         General: Normal range of motion. Skin:     General: Skin is warm. Capillary Refill: Capillary refill takes less than 2 seconds. Neurological:      Mental Status: He is alert and oriented to person, place, and time.          Assessment:        Hospital Problems           Last Modified POA    * (Principal) Leg weakness, bilateral 5/26/2022 Yes    Recurrent falls 5/26/2022 Yes    ESRD (end stage renal disease) on dialysis ([facility] [address] 5/26/2022 Yes    Primary hypertension 5/26/2022 Yes    Coronary atherosclerosis 5/26/2022 Yes    Cerebrovascular accident (CVA) ([facility] [address] 5/26/2022 Yes    Type 2 diabetes mellitus with kidney complication, with long-term current use of insulin ([facility] [address] (Chronic) 5/26/2022 Yes    Neuropathy of both feet 5/26/2022 Yes          Plan:        Lower extremity weakness/fall-history of neuropathy  PT/OT evaluate  Continue gabapentin once daily-Per the patient's grandfather the dose was recently increased to 3 times a day but the patient may not be tolerating it  Xray right knee  + Orthostatic BP and pulse-plan to stop Norvasc-Case discussed with nephrology  Case management to assist with discharge planning     ESRD  Resume outpatient schedule for dialysis-Saturday, Tuesday Thursday  Nephrology okay with discharge. Agree with continuing lisinopril 20 mg p.o. daily and stopping Norvasc. They would also like the gabapentin dose decreased to once daily  Calcium acetate resumed  Avoid nephrotoxic agents  Heparin for DVT prophylaxis     Diabetes with long-term insulin use  Continue home insulin regimen     Hypertension  Lisinopril     CAD  Aspirin and statin continued     CVA  PT/OT  Continue aspirin. Stop Plavix patient outside his 19-day window    Patient states he feels much better and he would like to go home.   Patient will benefit from continued outpatient PT/OT       DANIEL Baires - CNP  5/27/2022  11:33 AM

## 2022-05-27 NOTE — CARE COORDINATION
Spoke to patient and he declines rehab at [facility]. SW informed. Wants to continue OP therapy at [facility].   Has appointment 6-1 at 11am.

## 2022-05-27 NOTE — DISCHARGE SUMMARY
[facility]  Office: [address], , Chrissy Mcrae, DO, Jimy Marlow, DO, Nahomy Emmanuel, DO, Lazara Fox MD, Andrés Terrazas MD, Gagan Pardo MD, Raúl Aquino MD, Kranthi Tony MD, Porsha German MD, Neal Aquino MD, Mi Dorman, DO, Diana Reynolds DO, Nevin Eid MD,  Mic Mancuso, DO, Yesenia Evans MD, Sunshine Marlow MD, Luz Jose MD, Rory López DO, Sandie Tamez MD, Deysi Berumen MD, Severiano Border, MD, Walter Womack [facility], [facility], [facility], Matilde Reddy, CNP, Amy Levi, CNP, Laisha Watts, CNP, Aydee Perez, CNP, Katiuska Xavier PA-C, Rafael Handy [facility], Adelfo Huber, CNP, Toma Johnson, CNP, Eugenio Jefferson, CNP, Ivana Chang, CNS, oMira Peacock, [facility], Claudell Belfast, CNP, Misha Lafleur, CNP, Agustin Riggs, [facility]    Discharge Summary     Patient ID: Bob Conklin  :  1975   MRN: [id number]     ACCOUNT:  [de-identified]   Patient's PCP: DANIEL Waterman CNP  Admit Date: 2022   Discharge Date: 2022     Length of Stay: 0  Code Status:  Full Code  Admitting Physician: Angeles Segundo DO  Discharge Physician: DANIEL Braxton CNP     Active Discharge Diagnoses:     Hospital Problem Lists:  Principal Problem:    Leg weakness, bilateral  Active Problems:    Recurrent falls    ESRD (end stage renal disease) on dialysis [facility])    Primary hypertension    Coronary atherosclerosis    Cerebrovascular accident (CVA) ([facility]    Type 2 diabetes mellitus with kidney complication, with long-term current use of insulin ([facility]    Neuropathy of both feet  Resolved Problems:    * No resolved hospital problems.  *      Admission Condition:  fair     Discharged Condition: good    Hospital Stay:     Hospital Course:  Velma Lynne a 49 y.o. Non- / non  male who presents with Fall (had stroke 3 wks ago, placed on blood thinner, since then gets \"shakey\" and fals ( x 6), needs dialysis today, bilateral skin supports for \"drop foot\" not related to stroke)   and is admitted to the hospital for the management of Leg weakness, bilateral.     Patient presents ER with complaints of fall.  He states has been having episodes of shakiness in his legs causing him to fall. During my assessment he states he fell prior to coming and landing on his right knee. Gibran Daniels is some bruising in the scraped area.  No obvious range of motion noted.  Plan to do x-ray of the right knee.     He was discharged on April 14 status post CVA.  The patient was prescribed Plavix for 19 days and discharge and was told to stop his metoprolol.  [facility] his lisinopril to 40 mg p.o. daily and Per the patient's grandfather he was also seen by pain management and they increased his gabapentin to 3 times a day.  Patient states he fell a week or so ago related to shaky legs as well.  Denies feeling lightheaded or dizzy but he does have chronic neuropathy and foot drop to both lower extremities.         He is a Tuesday Thursday Saturday dialysis and follows with Dr. Maksim Monreal  Patient usually voids once a day     X-ray right knee shows no acute abnormality    She denies further shakiness or gait difficulties. Home meds discussed with nephrology.   They recommend holding gabapentin for couple of days then resuming it once daily, holding Norvasc, continue lisinopril 20 mg daily and Lasix 20 mg daily         Significant therapeutic interventions:   Dialysis  Gabapentin on hold  Norvasc discontinued  Lisinopril dose adjusted  X-ray right knee    Significant Diagnostic Studies:   Labs / Micro:  CBC:   Lab Results   Component Value Date    WBC 9.9 05/26/2022    RBC 3.88 05/26/2022    HGB 11.6 05/26/2022    HCT 36.6 05/26/2022    MCV 94.3 05/26/2022    MCH 29.9 05/26/2022    MCHC 31.7 05/26/2022    RDW 11.9 05/26/2022     05/26/2022     BMP:    Lab Results   Component Value Date    GLUCOSE 191 05/27/2022     05/27/2022    K 5.1 05/27/2022    CL 96 05/27/2022    CO2 25 05/27/2022    ANIONGAP 13 05/27/2022    BUN 32 05/27/2022    CREATININE 7.85 05/27/2022    BUNCRER 4 05/27/2022    CALCIUM 9.4 05/27/2022    LABGLOM 7 05/27/2022    GFRAA 9 05/27/2022    GFR      05/27/2022     HFP:    Lab Results   Component Value Date    PROT 5.3 04/14/2022     CMP:    Lab Results   Component Value Date    GLUCOSE 191 05/27/2022     05/27/2022    K 5.1 05/27/2022    CL 96 05/27/2022    CO2 25 05/27/2022    BUN 32 05/27/2022    CREATININE 7.85 05/27/2022    ANIONGAP 13 05/27/2022    ALKPHOS 137 04/14/2022    ALT 44 04/14/2022    AST 38 04/14/2022    BILITOT 0.34 04/14/2022    LABALBU 3.4 04/14/2022    ALBUMIN NOT REPORTED 01/05/2022    LABGLOM 7 05/27/2022    GFRAA 9 05/27/2022    GFR      05/27/2022    PROT 5.3 04/14/2022    CALCIUM 9.4 05/27/2022        Radiology:  XR KNEE RIGHT (3 VIEWS)    Result Date: 5/26/2022  No acute abnormality of the knee. Consultations:    Consults:     Final Specialist Recommendations/Findings:   IP CONSULT TO INTERNAL MEDICINE  IP CONSULT TO NEPHROLOGY  IP CONSULT TO NEPHROLOGY      The patient was seen and examined on day of discharge and this discharge summary is in conjunction with any daily progress note from day of discharge.     Discharge plan:     Disposition: Home    Physician Follow Up:     Igor Buchanan MD  [address] [place] [address]  [phone]      August 10th 4 pm--new patient appointment    [facility]ADYS JOS[facility]  [address] [facility] [address]  [phone]  Go on 6/1/2022  Appointment 6-1-2022 at 11am           Diet: diabetic diet, low fat, low cholesterol diet and renal diet    Activity: As tolerated    Instructions to Patient:   Follow up with PCP in one week  Take medications as instructed  Call 911 or return to the ER if you experience a recurrence of your symptoms or start having fever, chills, chest pain or shortness of breath. Discharge Medications:      Medication List      CHANGE how you take these medications    ammonium lactate 12 % lotion  Commonly known as: Lac-Hydrin  Apply topically daily. What changed: additional instructions     gabapentin 300 MG capsule  Commonly known as: NEURONTIN  Start taking on: May 30, 2022  What changed:   · when to take this  · These instructions start on May 30, 2022. If you are unsure what to do until then, ask your doctor or other care provider. insulin glargine 100 UNIT/ML injection vial  Commonly known as: LANTUS  What changed: Another medication with the same name was removed. Continue taking this medication, and follow the directions you see here. lisinopril 20 MG tablet  Commonly known as: PRINIVIL;ZESTRIL  Take 1 tablet by mouth daily  What changed:   · medication strength  · how much to take        CONTINUE taking these medications    aspirin 81 MG EC tablet     buPROPion 150 MG extended release tablet  Commonly known as: WELLBUTRIN XL  Take 1 tablet by mouth every morning     calcium acetate 667 MG Tabs     ezetimibe 10 mg tablet  Commonly known as: ZETIA     FLUoxetine 40 MG capsule  Commonly known as: PROZAC     furosemide 20 MG tablet  Commonly known as: LASIX  Take 1 tablet by mouth daily     magnesium hydroxide 400 MG/5ML suspension  Commonly known as: MILK OF MAGNESIA     magnesium oxide 400 MG tablet  Commonly known as: MAG-OX     Misc.  Devices Misc  Disp: custom molded shoes to accommodate for brace   DX: DM with history of stroke, foot drop  Duration: 1 year     NovoLOG 100 UNIT/ML injection vial  Generic drug: insulin aspart     omeprazole 40 MG delayed release capsule  Commonly known as: PRILOSEC     ondansetron 4 MG tablet  Commonly known as: ZOFRAN  Take 1 tablet by mouth 3 times daily as needed for Nausea or Vomiting     Rosuvastatin Calcium 40 MG Cpsp     traMADol 50 MG tablet  Commonly known as: Rosio Collins vitamin B-12 500 MCG tablet  Commonly known as: CYANOCOBALAMIN        STOP taking these medications    amLODIPine 10 MG tablet  Commonly known as: NORVASC     clopidogrel 75 MG tablet  Commonly known as: PLAVIX           Where to Get Your Medications      These medications were sent to [place] [id number] [address]  [facility], [address]    Phone: [phone]   · lisinopril 20 MG tablet         No discharge procedures on file. Time Spent on discharge is  31 mins in patient examination, evaluation, counseling as well as medication reconciliation, prescriptions for required medications, discharge plan and follow up. Electronically signed by   DANIEL Baires CNP  5/27/2022  11:49 AM      Thank you DANIEL Avery CNP for the opportunity to be involved in this patient's care.

## 2022-05-27 NOTE — DISCHARGE INSTR - COC
Continuity of Care Form    Patient Name: Khushbu Maya   :  1975  MRN:  [id number]    [facility] date:  2022  Discharge date:  ***    Code Status Order: Full Code   Advance Directives:      Admitting Physician:  Clarence Adorno DO  PCP: DANIEL Arias CNP    Discharging Nurse: [facility] Unit/Room#:   Discharging Unit Phone Number: ***    Emergency Contact:   Extended Emergency Contact Information  Primary Emergency Contact: Vashti Chavez  Home Phone: [phone]  Mobile Phone: [phone]  Relation: Grandparent    Past Surgical History:  Past Surgical History:   Procedure Laterality Date    AV FISTULA CREATION Left        Immunization History:   Immunization History   Administered Date(s) Administered    COVID-19, Moderna, Booster, PF, 50mcg/0.25ml 2022    COVID-19, Jannette Anaid, Primary or Immunocompromised, PF, 100mcg/0.5mL 2021, 2021, 2022    Influenza Virus Vaccine 2016, 2017, 10/05/2020, 10/21/2020, 09/10/2021    Influenza, MDCK Quadv, IM, PF (Flucelvax 2 yrs and older) 09/10/2021    PPD Test 2021, 2021, 06/15/2021, 2022    Pneumococcal Vaccine 2020    Tdap (Boostrix, Adacel) 10/21/2011, 2020       Active Problems:  Patient Active Problem List   Diagnosis Code    ESRD (end stage renal disease) on dialysis ([facility] N18.6, Z99.2    Primary hypertension I10    Hyperlipidemia E78.5    Acute pain of left knee M25.562    Bipolar affective disorder (HCC) F31.9    Coronary atherosclerosis I25.10    COVID-19 U07.1    Cerebrovascular accident (CVA) ([facility] I63.9    Type 2 diabetes mellitus with kidney complication, with long-term current use of insulin (HCC) E11.29, Z79.4    Neuropathy of both feet G57.93    GERD (gastroesophageal reflux disease) K21.9    Right sided weakness R53.1    Leg weakness, bilateral R29.898    Recurrent falls R29.6       Isolation/Infection:   Isolation            No Isolation Patient Infection Status       Infection Onset Added Last Indicated Last Indicated By Review Planned Expiration Resolved Resolved By    None active    Resolved    COVID-19 22 COVID-19, Rapid   22     S/s     COVID-19 (Rule Out) 22 COVID-19, Rapid (Ordered)   22 Rule-Out Test Resulted            Nurse Assessment:  Last Vital Signs: BP (!) 147/83   Pulse 89   Temp 97.5 °F (36.4 °C) (Axillary)   Resp 16   Ht 5' 7.5\" (1.715 m) Comment: per patient  Wt 182 lb (82.6 kg)   SpO2 100%   BMI 28.08 kg/m²     Last documented pain score (0-10 scale): Pain Level: 0  Last Weight:   Wt Readings from Last 1 Encounters:   22 182 lb (82.6 kg)     Mental Status:  {IP PT MENTAL STATUS:}    IV Access:  { CARISSA IV ACCESS:433919447}    Nursing Mobility/ADLs:  Walking   {CHP DME QOKR:379150265}  Transfer  {CHP DME YPRM:715001163}  Bathing  {CHP DME HGMW:460460845}  Dressing  {CHP DME DDFW:952524692}  Toileting  {CHP DME KDLK:045349981}  Feeding  {P DME WNPY:258262764}  Med Admin  {P DME ALFREDO:943298753}  Med Delivery   { CARISSA MED Delivery:228436503}    Wound Care Documentation and Therapy:        Elimination:  Continence: Bowel: {YES / FD:24667}  Bladder: {YES / ZZ:87600}  Urinary Catheter: {Urinary Catheter:207112875}   Colostomy/Ileostomy/Ileal Conduit: {YES / IY:80927}       Date of Last BM: ***    Intake/Output Summary (Last 24 hours) at 2022 1345  Last data filed at 2022 1702  Gross per 24 hour   Intake 500 ml   Output 2500 ml   Net -2000 ml     I/O last 3 completed shifts:   In: 500   Out: 2500     Safety Concerns:     [address] CARISSA Safety Concerns:054878768}    Impairments/Disabilities:      [address] FERRER Impairments/Disabilities:195509284}    Nutrition Therapy:  Current Nutrition Therapy:   [address] FERRER Diet List:205055569}    Routes of Feeding: {CHP DME Other Feedings:320669763}  Liquids: {Slp liquid thickness:21233}  Daily Fluid Restriction: {CHP DME Yes amt example:003656198}  Last Modified Barium Swallow with Video (Video Swallowing Test): {Done Not Done VPZU:420578190}    Treatments at the Time of Hospital Discharge:   Respiratory Treatments: ***  Oxygen Therapy:  {Therapy; copd oxygen:12373}  Ventilator:    {[facility] Vent QGVO:969085138}    Rehab Therapies: {THERAPEUTIC INTERVENTION:0637676997}  Weight Bearing Status/Restrictions: {[facility] Weight Bearin}  Other Medical Equipment (for information only, NOT a DME order):  {EQUIPMENT:789437821}  Other Treatments: ***    Patient's personal belongings (please select all that are sent with patient):  {[facility] DME Belongings:356512858}    RN SIGNATURE:  {Esignature:435742763}    CASE MANAGEMENT/SOCIAL WORK SECTION    Inpatient Status Date: ***    Readmission Risk Assessment Score:  Readmission Risk              Risk of Unplanned Readmission:  0           Discharging to Facility/ Agency   Name:   Address:  Phone:  Fax:    Dialysis Facility (if applicable)   Name:  Address:  Dialysis Schedule:  Phone:  Fax:    / signature: {Esignature:178011178}    PHYSICIAN SECTION    Prognosis: {Prognosis:3423645204}    Condition at Discharge: [address] [place] [address] Patient Condition:256893057}    Rehab Potential (if transferring to Rehab): {Prognosis:5041859959}    Recommended Labs or Other Treatments After Discharge: ***    Physician Certification: I certify the above information and transfer of Lucas Zuluaga  is necessary for the continuing treatment of the diagnosis listed and that he requires {Admit to Appropriate Level of Care:27766} for {GREATER/LESS:734482562} 30 days.      Update Admission H&P: {CHP DME Changes in BDBFK:566392148}    PHYSICIAN SIGNATURE:  {Esignature:634134844}

## 2022-05-27 NOTE — PROGRESS NOTES
Pt discharged to home in stable condition with belongings  Discharge instructions given  Pt denies having any further questions at this time  Personal items given to patient at discharge  Patient/family state they have everything they were admitted with.   Home meds sent home with patient

## 2022-05-27 NOTE — PROGRESS NOTES
Reason for Follow up: ESRD    HISTORY:    Patient seen and examined sitting up in chair. Patient states he has not had tremors today. K 5.1 today. SBP ranging 120s to 140s. He had positive orthostatics yesterday. Norvasc was held yesterday and this AM.     Review Of Systems:   Constitutional: No fever, chills, lethargy, weakness or wt loss. Cardiac:  No chest pain, dyspnea, orthopnea or PND. Pulmonary:  No cough, phlegm or wheezing. Abdomen:  No abdominal pain, nausea, vomiting or diarrhea. :   No hematuria, pyuria, dysuria or flank pain. Extremities:  No swelling or joint pains. All other ROS is negative. Scheduled Meds:   [Held by provider] amLODIPine  10 mg Oral Daily    aspirin  81 mg Oral Daily    buPROPion  150 mg Oral QAM    calcium acetate  667 mg Oral TID WC    ezetimibe  10 mg Oral Daily    FLUoxetine  40 mg Oral Daily    furosemide  20 mg Oral Daily    insulin glargine  30 Units SubCUTAneous QAM    lisinopril  20 mg Oral Daily    magnesium oxide  400 mg Oral Daily    omeprazole  40 mg Oral QAM AC    sodium chloride flush  5-40 mL IntraVENous 2 times per day    insulin lispro  0-12 Units SubCUTAneous TID WC    insulin lispro  0-6 Units SubCUTAneous Nightly    heparin (porcine)  5,000 Units SubCUTAneous 3 times per day    rosuvastatin  10 mg Oral Nightly   Continuous Infusions:   sodium chloride      dextrose       PRN Meds:sodium chloride flush, sodium chloride, ondansetron **OR** ondansetron, polyethylene glycol, acetaminophen **OR** acetaminophen, glucose, dextrose bolus **OR** dextrose bolus, glucagon (rDNA), dextrose    No Known Allergies    Physical Exam:  Blood pressure 127/68, pulse 88, temperature 98.4 °F (36.9 °C), temperature source Oral, resp. rate 16, height 5' 7.5\" (1.715 m), weight 182 lb (82.6 kg), SpO2 98 %. In: 500   Out: 2500   In: 500   Out: 2500     General:  Awake, alert, not in distress. Appears to be stated age. HEENT: Atraumatic, normocephalic. Anicteric sclera. Pink and moist oral mucosa. No carotid bruit. No JVD. Chest: Bilateral air entry, clear to auscultation, no wheezing, rhonchi or rales. Cardiovascular: RRR, S1S2, no murmur, rub or gallop. Has no lower extremity edema. Abdomen: Soft, non tender to palpation. Active bowel sounds x 4 quadrants. Musculoskeletal: No cyanosis or clubbing. Integumentary: Pink, warm and dry. Free from rash or lesions. Skin turgor normal.  CNS: Oriented to person, place and time. Speech clear. Face symmetrical. No tremor.      Data:  CBC:   Lab Results   Component Value Date    WBC 9.9 05/26/2022    HGB 11.6 (L) 05/26/2022    HCT 36.6 (L) 05/26/2022    MCV 94.3 05/26/2022     05/26/2022     BMP:    Lab Results   Component Value Date     (L) 05/27/2022    K 5.1 05/27/2022    CL 96 (L) 05/27/2022    CO2 25 05/27/2022    BUN 32 (H) 05/27/2022    CREATININE 7.85 (HH) 05/27/2022    GLUCOSE 191 (H) 05/27/2022     CMP:   Lab Results   Component Value Date     (L) 05/27/2022    K 5.1 05/27/2022    CL 96 (L) 05/27/2022    CO2 25 05/27/2022    BUN 32 (H) 05/27/2022    CREATININE 7.85 (HH) 05/27/2022    GLUCOSE 191 (H) 05/27/2022    CALCIUM 9.4 05/27/2022    PROT 5.3 (L) 04/14/2022    LABALBU 3.4 (L) 04/14/2022    BILITOT 0.34 04/14/2022    ALKPHOS 137 (H) 04/14/2022    AST 38 04/14/2022    ALT 44 (H) 04/14/2022      Hepatic:   Lab Results   Component Value Date    AST 38 04/14/2022    ALT 44 (H) 04/14/2022    BILITOT 0.34 04/14/2022    ALKPHOS 137 (H) 04/14/2022     BNP: No results found for: BNP  Lipids:   Lab Results   Component Value Date    CHOL 118 04/12/2022    HDL 61 04/12/2022     INR:   Lab Results   Component Value Date    INR 1.0 05/27/2022     PTH: No results found for: PTH  Phosphorus:    Lab Results   Component Value Date    PHOS 3.1 05/27/2022     Ionized Calcium: No results found for: IONCA  Magnesium:   Lab Results   Component Value Date    MG 1.8 04/27/2022     Albumin:   Lab Results Component Value Date    LABALBU 3.4 (L) 04/14/2022     Last 3 CK, CKMB, Troponin: @LABRCNT(CKTOTAL:3,CKMB:3,TROPONINI:3)       URINE:)No results found for: Aldo Kocher    Radiology:   Reviewed. Assessment and Plan to follow    Will discontinue Lasix. Continue holding Norvasc on discharge. OK to continue lisinopril 20 mg daily. Low K diet    Patient seen in collaboration with Dr. Yara Womack. Electronically signed by DANIEL Sims CNP  on 5/27/2022 at 10:03 AM  [facility]. Ph: [phone]      Physician Addendum  I have personally seen and examined the patient, reviewed the documentation and agree with the note above. Tolerated hemodialysis treatment yesterday  Continues to be orthostatic   No more issues with shaking     exam:  Blood pressure was 125/72, pulse was 80, respiratory rate was 16  General:  Awake, alert, not in distress. HEENT: Atraumatic, normocephalic. No JVD. Chest: clear to auscultation, no wheezing, or crackles  Cardiovascular: RRR, S1S2, no murmur, has bilateral trace lower extremity edema. Abdomen: Soft, non tender to palpation. Musculoskeletal: No cyanosis or clubbing. Integumentary: Pink, warm and dry. CNS: Oriented to person, place and time. Speech clear.     Assessment:  1. End-stage renal disease on hemodialysis  2. Anemia  3. Multiple falls and new onset shaking  4. Recent stroke  5. Hypertension  6.   Static hypotension     Plan:  Okay for discharge from nephrology perspective   Resume Neurontin 300 mg p.o. daily starting tomorrow, should not be placed back on 3 times daily dosing  Change lisinopril to 20 mg p.o. daily   Continue Norvasc   Plan Next dialysis tomorrow per schedule

## 2022-05-27 NOTE — PLAN OF CARE
Problem: Discharge Planning  Goal: Discharge to home or other facility with appropriate resources  Outcome: Progressing     Problem: Chronic Conditions and Co-morbidities  Goal: Patient's chronic conditions and co-morbidity symptoms are monitored and maintained or improved  Outcome: Progressing     Problem: Safety - Adult  Goal: Free from fall injury  Outcome: Progressing     Problem: ABCDS Injury Assessment  Goal: Absence of physical injury  Outcome: Progressing     Problem: Pain  Goal: Verbalizes/displays adequate comfort level or baseline comfort level  Outcome: Progressing

## 2022-05-27 NOTE — PROGRESS NOTES
lower extremities. OT Equipment Recommendations  Equipment Needed: Yes  Mobility Devices: ADL Assistive Devices  ADL Assistive Devices: Sock-Aid Hard;Reacher;Long-handled Shoe Horn       Patient Diagnosis(es): The primary encounter diagnosis was Frequent falls. A diagnosis of ESRD (end stage renal disease) ([facility] was also pertinent to this visit. Past Medical History:  has a past medical history of Closed fracture of bone of right foot, Dialysis patient ([facility] Kidney disease, and Type 1 diabetes mellitus ([facility] Past Surgical History:  has a past surgical history that includes AV fistula creation (Left). Assessment    Skilled OT is indicated to increase overall ROM, strength, balance, act alejandro as well as I/safety awareness in function to return home with assist as needed. Performance deficits / Impairments: Decreased functional mobility ; Decreased ADL status; Decreased strength;Decreased safe awareness;Decreased balance;Decreased sensation;Decreased endurance;Decreased coordination;Decreased fine motor control  Prognosis: Good  Decision Making: Medium Complexity  REQUIRES OT FOLLOW-UP: Yes  Activity Tolerance  Activity Tolerance: Patient Tolerated treatment well  Activity Tolerance Comments: Pt tolerated >5 mins of standing. Plan   Plan  Times per Week: 4-5x a week, 1-2x a day  Current Treatment Recommendations: Strengthening,Balance training,Functional mobility training,Endurance training,Positioning,Equipment evaluation, education, & procurement,Safety education & training,Patient/Caregiver education & training,Self-Care / ADL,Coordination training     Restrictions  Restrictions/Precautions  Restrictions/Precautions: General Precautions,Fall Risk  Required Braces or Orthoses?: Yes  Required Braces or Orthoses  Right Lower Extremity Brace: Ankle Foot Orthotics  Left Lower Extremity Brace:  Yakelin Foot Orthotics  Position Activity Restriction  Other position/activity restrictions: Up w/ assist, AMEENA IV, B foot drop    Subjective   General  Chart Reviewed: Yes  Patient assessed for rehabilitation services?: Yes  Family / Caregiver Present: No     Social/Functional History  Social/Functional History  Lives With: Family (grandparents)  Type of Home: House  Home Layout: One level  Home Access: Ramped entrance  Bathroom Shower/Tub: Walk-in shower  Bathroom Toilet: Handicap height  Bathroom Equipment: Grab bars in shower,Shower chair (Pt reports vanity is close to toilet for support)  Home Equipment: Cane,Rollator (B AFOs)  Has the patient had two or more falls in the past year or any fall with injury in the past year?: Yes (Pt reports 6 falls within last year (4 within the last 4-5 days); reports his legs get weak and shaky - also later states he did not have his RW w/ him when he has fallen)  Receives Help From: Outpatient therapy (Pt has been going to OP PT 3x/week at [facility] since D/C from [facility])  ADL Assistance: Needs assistance (Pt reports getting assistance w/ donning/doffing socks)  Homemaking Assistance: Needs assistance (Pt reports \"they won't let me help\")  Ambulation Assistance: Independent (w/ rollator)  Transfer Assistance: Needs assistance (Pt reports difficulty standing from low surfaces)  Active : No  Patient's  Info: grandparents  Occupation: On disability  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  Leisure & Hobbies: \"not as much anymore\" prev- fishing/hunting, video games, tv, card games  Additional Comments: Pt reports residual R sided weakness from CVA       Objective   Heart Rate: 88  Heart Rate Source: Monitor  BP: 127/68  MAP (Calculated): 87.67  Resp: 16  SpO2: 98 %  O2 Device: None (Room air)  Vision Exceptions: Wears glasses for reading  Hearing: Within functional limits (\"it's alright\")       Observation/Palpation  Posture: Good  Observation: B foot orthotics; supine in bed, agreeable to therapy  Safety Devices  Type of Devices: Call light within reach; Chair alarm in place; Left in chair;Gait belt;Nurse notified  Restraints  Restraints Initially in Place: No  Bed Mobility Training  Bed Mobility Training: Yes  Balance  Sitting: Intact  Standing: High guard  Transfer Training  Transfer Training: Yes  Gait  Overall Level of Assistance: Contact-guard assistance (Pt walked from bed to door, then to bathroom to stand at sink for grooming task, then back to bedside chair, all with RW and B AFOs. MIN VCs for pacing and assist/mgmt of lines.)  Wheelchair Management  Wheelchair Management: No     AROM: Within functional limits  Strength: Within functional limits  Coordination: Generally decreased, functional  Tone: Normal  Sensation: Impaired (BLE neuropathy)     ADL  Feeding: Independent;Setup  Grooming: Stand by assistance;Setup (Pt stood at sink with RW to brush teeth. IND with managing toothpaste.)  UE Bathing: Setup;Stand by assistance  LE Bathing: Setup;Minimal assistance;Contact guard assistance  UE Dressing: Stand by assistance;Setup  LE Dressing: Setup;Minimal assistance;Contact guard assistance (Pt can bend over to tonia B AFOs with MIN A. Pt states he often needs assist with donning B socks.)  Toileting: Minimal assistance;Contact guard assistance  Additional Comments: Pt requires MIN A with RW for standing portions of ADLs. Activity Tolerance  Activity Tolerance: Patient tolerated evaluation without incident  Bed mobility  Supine to Sit: Minimal assistance  Sit to Supine: Unable to assess  Scooting: Stand by assistance  Bed Mobility Comments: Pt with some difficulty with sitting up this date during supine to sit. MIN/MOD verbal/tactile cues for use of bed rails for BUE to assist with sitting up with good return demo. Pt with some lightheadedness in standing that imporved over a short time.   Transfers  Sit to stand: Minimal assistance  Stand to sit: Minimal assistance  Transfer Comments: MIN/MOD VCs for B hand placement on bed to push up to stand, reaching B hands back for chair arms to sit, squaring self/AD up to surface prior to sitting, pacing and assist/mgmt of lines with good return demo. Cognition  Overall Cognitive Status: [facility]  Safety Judgement: Decreased awareness of need for safety  Problem Solving: Decreased awareness of errors;Assistance required to identify errors made;Assistance required to correct errors made  Insights: Decreased awareness of deficits  Initiation: Does not require cues  Sequencing: Does not require cues  Perception  Overall Perceptual Status: [facility]     Sensation  Overall Sensation Status: Impaired (Pt reports numbness/tingling in first three digits of R hand & numbness in B feet)         Education Given To: Patient  Education Provided: Role of Therapy;Plan of Care;Equipment; Energy Conservation;Transfer Training  Education Method: Demonstration;Verbal  Barriers to Learning: None  Education Outcome: Verbalized understanding;Demonstrated understanding;Continued education needed     Educated patient on not leaning on walker during transfers and safe place for hand placement during transfers. Also keeping walker close to patient and using walker bag vs placing items onto walker or attempting to unsafely carry items while using walker. Patient verbalize and/or demonstrate good engagement and understanding of edu provided. LUE AROM (degrees)  LUE AROM : WFL  RUE AROM (degrees)  RUE AROM : WFL       AM-PAC Score        AM-PAC Inpatient Daily Activity Raw Score: 19 (05/27/22 1150)  AM-PAC Inpatient ADL T-Scale Score : 40.22 (05/27/22 1150)  ADL Inpatient CMS 0-100% Score: 42.8 (05/27/22 1150)  ADL Inpatient CMS G-Code Modifier : CK (05/27/22 1150)    Goals  Short Term Goals  Time Frame for Short term goals: By discharge, pt to demo  Short Term Goal 1: Sup with functional mob with good safety, pacing and use of AD. Short Term Goal 2: Sup with ADL transfers with good safety, pacing and use of AD/DME as needed.   Short Term Goal 3: Sup with toileting tasks with good safety, pacing and use of AD/DME as needed. Short Term Goal 4: I with UB ADLs and Sup with LB ADLs with good safety, pacing and use of AD/AE as needed. Short Term Goal 5: I with BUE HEP to assist with functional tasks. Additional Goals?: No  Long Term Goals  Long Term Goal 1: and verb good understanding of edu with ADL compensatory techs, possible equip needs, fall prevention techs, RW safety/mgmt, BUE HEP, EC/WS techs, and discharge recommendations.   Patient Goals   Patient goals : to go home       Therapy Time   Individual Concurrent Group Co-treatment   Time In 0815         Time Out 0851         Minutes 36          Treatment mins: [id number] [place], [place]

## 2022-05-27 NOTE — PROGRESS NOTES
Physical Therapy  Facility/Department: [facility] MED SURG  Physical Therapy Initial Assessment    Name: Wilfred Graves  : 1975  MRN: [id number]  Date of Service: 2022   RN Amarilys Carson reports patient is medically stable for therapy treatment this date. Chart reviewed prior to treatment and patient is agreeable for therapy. All lines intact and patient positioned comfortably at end of treatment. All patient needs addressed prior to ending therapy session. Per H&P:Ernesto Tarn is a 49 y.o. Non- / non  male who presents with Fall (had stroke 3 wks ago, placed on blood thinner, since then gets \"shakey\" and fals ( x 6), needs dialysis today, bilateral skin supports for \"drop foot\" not related to stroke) and is admitted to the hospital for the management of Leg weakness, bilateral.  Patient presents ER with complaints of fall. He states has been having episodes of shakiness in his legs causing him to fall. During my assessment he states he fell prior to coming and landing on his right knee. There is some bruising in the scraped area. No obvious range of motion noted. Plan to do x-ray of the right knee. He was discharged on  status post CVA. The patient was prescribed Plavix for 19 days and discharge and was told to stop his metoprolol. Patient states [facility] increased his lisinopril to 40 mg p.o. daily and Per the patient's grandfather he was also seen by pain management and they increased his gabapentin to 3 times a day. Patient states he fell a week or so ago related to shaky legs as well. Denies feeling lightheaded or dizzy but he does have chronic neuropathy and foot drop to both lower extremities. Discharge Recommendations:  Pt presenting with new musculoskeletal dysfunction and would benefit from additional therapy at time of discharge. Please refer to the AM-PAC score for current functional status.    Patient would benefit from continued therapy after discharge     PT Equipment Recommendations  Equipment Needed: Yes  Mobility Devices: Nicolas Rear: Rolling (If pt were to return home this date, pt would require a RW for safe ambulation.)      Patient Diagnosis(es): The primary encounter diagnosis was Frequent falls. A diagnosis of ESRD (end stage renal disease) ([facility] was also pertinent to this visit. Past Medical History:  has a past medical history of Closed fracture of bone of right foot, Dialysis patient ([facility] Kidney disease, and Type 1 diabetes mellitus ([facility] Past Surgical History:  has a past surgical history that includes AV fistula creation (Left). Assessment   Body Structures, Functions, Activity Limitations Requiring Skilled Therapeutic Intervention: Decreased functional mobility ; Decreased ROM; Decreased balance;Decreased sensation;Decreased safe awareness;Decreased strength  Assessment: Pt tolerated PT eval well. Pt currently presenting w/ ROM, strength, balance, and gait deficits. Pt is a HIGH fall risk given mobility deficits, hx of falls, and decreased safety awareness. Recommending continued use of RW for all ambulation at this time. Pt would benefit from continued skilled PT to address deficits in order to return to PLOF. Therapy Prognosis: Excellent  Decision Making: Medium Complexity  Requires PT Follow-Up: Yes  Activity Tolerance  Activity Tolerance: Patient tolerated evaluation without incident    Education: Pt educated on: purpose of acute PT eval, importance of continued mobility throughout admission, general safety awareness, importance of continued use of RW for safety, \"move it or lose it\" principle, fall risk prevention, pursed lip breathing, benefits of continued PT, and PT POC. Pt verbalized fair understanding.     Plan   Plan  Plan: 5-7 times per week  Current Treatment Recommendations: Strengthening,ROM,Balance training,Functional mobility training,Transfer training,Neuromuscular re-education,Gait training,Endurance training,Home exercise program,Safety education & training,Patient/Caregiver education & training,Equipment evaluation, education, & procurement,Therapeutic activities  Safety Devices  Type of Devices: Call light within reach,Chair alarm in place,Left in chair,Gait belt,Nurse notified  Restraints  Restraints Initially in Place: No     Restrictions  Restrictions/Precautions  Restrictions/Precautions: General Precautions,Fall Risk  Required Braces or Orthoses?: Yes  Required Braces or Orthoses  Right Lower Extremity Brace: Ankle Foot Orthotics  Left Lower Extremity Brace: Yakelin Foot Orthotics  Position Activity Restriction  Other position/activity restrictions: Up w/ assist, RUE IV, B foot drop     Subjective   General  Patient assessed for rehabilitation services?: Yes  Response To Previous Treatment: Not applicable  Family / Caregiver Present: No  Follows Commands: Within Functional Limits  General Comment  Comments: RN and pt agreeable to therapy. Pt supine in bed upon arrival.  Pt pleasant and cooperative throughout.   Subjective  Subjective: Pt reporting he didn't sleep well last night, otherwise feeling \"okay\" at time of PT eval.         Social/Functional History  Social/Functional History  Lives With: Family (grandparents)  Type of Home: House  Home Layout: One level  Home Access: Ramped entrance  Bathroom Shower/Tub: Walk-in shower  Bathroom Toilet: Handicap height  Bathroom Equipment: Grab bars in shower,Shower chair (Pt reports vanity is close to toilet for support)  Home Equipment: Cane,Rollator (B AFOs)  Has the patient had two or more falls in the past year or any fall with injury in the past year?: Yes (Pt reports 6 falls within last year (4 within the last 4-5 days); reports his legs get weak and shaky - also later states he did not have his RW w/ him when he has fallen)  Receives Help From: Outpatient therapy (Pt has been going to OP PT 3x/week at [facility] since D[facility] from [facility])  ADL Assistance: Needs assistance (Pt reports getting assistance w/ donning/doffing socks)  Homemaking Assistance: Needs assistance (Pt reports \"they won't let me help\")  Ambulation Assistance: Independent (w/ rollator)  Transfer Assistance: Needs assistance (Pt reports difficulty standing from low surfaces)  Active : No  Patient's  Info: grandparents  Occupation: On disability  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  Leisure & Hobbies: \"not as much anymore\" prev- fishing/hunting, video games, tv, card games  Additional Comments: Pt reports residual R sided weakness from CVA  Vision/Hearing  Vision Exceptions: Wears glasses for reading  Hearing: Within functional limits (\"it's alright\")    Cognition   Orientation  Overall Orientation Status: Within Functional Limits  Cognition  Overall Cognitive Status: WFL  Safety Judgement: Decreased awareness of need for safety  Problem Solving: Decreased awareness of errors;Assistance required to identify errors made;Assistance required to correct errors made     Objective      Observation/Palpation  Posture: Good (w/ RW)        AROM RLE (degrees)  RLE General AROM: WFL at hip & knee, unable to get neutral ankle DF  AROM LLE (degrees)  LLE General AROM: WFL at hip & knee, unable to get neutral ankle DF  AROM RUE (degrees)  RUE General AROM: See OT assessment for detail  AROM LUE (degrees)  LUE General AROM: See OT assessment for detail  Strength RLE  Strength RLE: WFL  Comment: Grossly 4/5 except hip flex 3+/5 and ankle DF 2/5  Strength LLE  Strength LLE: WFL  Comment: Grossly 4+/5 except ankle DF 2/5  Strength RUE  Comment: See OT assessment for detail  Strength LUE  Comment: See OT assessment for detail  Tone RLE  RLE Tone: Normotonic  Tone LLE  LLE Tone: Normotonic  Motor Control  Gross Motor?: WFL  Sensation  Overall Sensation Status: Impaired (Pt reports numbness/tingling in first three digits of R hand & numbness in B feet)     Bed mobility  Supine to Sit: Minimal assistance  Sit to Supine:  (Not assessed this date. Pt retired in bedside chair at end of session.)  Scooting: Stand by assistance  Bed Mobility Comments: Pt demonstrating mild difficulty throughout sup>sit mobility this date requiring mod verbal and tactile cueing for use of bedrails for UE assist throughout. Upon sitting EOB, pt reporting mild lightheadedness which resolved quickly. While sitting EOB, pt donned bilateral AFOs prior to initiating OOB mobility. Transfers  Sit to Stand: Minimal Assistance  Stand to sit: Minimal Assistance  Comment: Pt demonstrating fair steadiness throughout STS transfers this date w/ RW. Pt requiring min verbal and tactile cueing for proper hand placement throughout transfers w/ RW w/ good return demo. Pt denying any dizziness/lightheadedness upon standing. Pt also demonstrating fair eccentric quad control throughout stand>sit transfers w/ min verbal cueing. Ambulation  Surface: level tile  Device: Rolling Walker  Other Apparatus: AFO; Left;Right  Assistance: Contact guard assistance  Quality of Gait: fair steadiness, quick pace, narrow JASIEL  Gait Deviations: Decreased step length;Decreased step height  Distance: 40ft  Comments: Pt ambulating within room demonstrating fair steadiness throughout. Pt requiring min verbal cueing to maintain JASIEL within RW w/ fair return demo. Pt also requiring min verbal cueing for pacing throughout w/ fair return demo.   More Ambulation?: No  Stairs/Curb  Stairs?: No     Balance  Posture: Good  Sitting - Static: Good  Sitting - Dynamic: Good;-  Standing - Static: Good;-  Standing - Dynamic: Fair;+  Single Leg Stance R Le  Single Leg Stance L Le  Comments: Standing balance assessed w/ RW           AM-PAC Score  AM-PAC Inpatient Mobility Raw Score : 17 (22 1101)  AM-PAC Inpatient T-Scale Score : 42.13 (22 1101)  Mobility Inpatient CMS 0-100% Score: 50.57 (22 1101)  Mobility Inpatient CMS G-Code Modifier : CK (05/27/22 1101)         Functional Outcome Measure-   Single Leg Stance Test:  0 sec. (<5 sec.= fall risk)    Goals  Short Term Goals  Time Frame for Short term goals: 10 visits  Short term goal 1: Pt to demonstrate bed mobility independently  Short term goal 2: Pt to perform STS transfers w/ RW Joy  Short term goal 3: Pt to ambulate at least 200ft w/ RW Joy  Short term goal 4: Pt to actively participate in at least 30 minutes of physical therapy for ther act, ther ex, balance, gait, and strength training  Patient Goals   Patient goals :  To go home & continue OP PT       Therapy Time   Individual Concurrent Group Co-treatment   Time In 0803         Time Out 0841         Minutes 38           Treatment time: 28 minutes        Chadd Duggan PT

## 2022-05-28 ENCOUNTER — APPOINTMENT (OUTPATIENT)
Dept: GENERAL RADIOLOGY | Age: 47
DRG: 252 | End: 2022-05-28
Payer: MEDICARE

## 2022-05-28 ENCOUNTER — HOSPITAL ENCOUNTER (EMERGENCY)
Age: 47
Discharge: HOME | DRG: 252 | End: 2022-05-28
Attending: EMERGENCY MEDICINE
Payer: MEDICARE

## 2022-05-28 VITALS
DIASTOLIC BLOOD PRESSURE: 81 MMHG | HEART RATE: 104 BPM | SYSTOLIC BLOOD PRESSURE: 119 MMHG | BODY MASS INDEX: 28.49 KG/M2 | TEMPERATURE: 99.4 F | RESPIRATION RATE: 16 BRPM | HEIGHT: 68 IN | WEIGHT: 188 LBS | OXYGEN SATURATION: 99 %

## 2022-05-28 DIAGNOSIS — G89.29 CHRONIC LEFT SHOULDER PAIN: Primary | ICD-10-CM

## 2022-05-28 DIAGNOSIS — M25.512 CHRONIC LEFT SHOULDER PAIN: Primary | ICD-10-CM

## 2022-05-28 PROCEDURE — 99283 EMERGENCY DEPT VISIT LOW MDM: CPT

## 2022-05-28 PROCEDURE — 73030 X-RAY EXAM OF SHOULDER: CPT

## 2022-05-28 RX ORDER — ACETAMINOPHEN AND CODEINE PHOSPHATE 300; 30 MG/1; MG/1
1 TABLET ORAL EVERY 6 HOURS PRN
Qty: 12 TABLET | Refills: 0 | Status: ON HOLD
Start: 2022-05-28 | End: 2022-06-02 | Stop reason: HOSPADM

## 2022-05-28 ASSESSMENT — ENCOUNTER SYMPTOMS
ABDOMINAL PAIN: 0
CONSTIPATION: 0
SHORTNESS OF BREATH: 0
EYE REDNESS: 0
DIARRHEA: 0
COLOR CHANGE: 0
VOMITING: 0
EYE DISCHARGE: 0
FACIAL SWELLING: 0
COUGH: 0

## 2022-05-28 NOTE — ED PROVIDER NOTES
[address] [place] [address] ED  EMERGENCY DEPARTMENT ENCOUNTER      Pt Name: Micha Handy  MRN: [id number]  [place] 1975  Date of evaluation: 5/28/2022  Provider: Castillo Sow MD    CHIEF COMPLAINT       Chief Complaint   Patient presents with    Shoulder Pain     left for three months         HISTORY OF PRESENT ILLNESS  (Location/Symptom, Timing/Onset, Context/Setting, Quality, Duration, Modifying Factors, Severity.)   Micha Handy is a 49 y.o. male who presents to the emergency department for pain in his left shoulder. He has had it for 3 months. There was no trauma but he is recently had frequent falls. He was admitted to this hospital for that issue and was discharged yesterday. He points to the top of the shoulder to indicate the area of pain. He is on tramadol at home and the pain is moderate and worse when he moves it. Nursing Notes were reviewed. ALLERGIES     Patient has no known allergies. CURRENT MEDICATIONS       Previous Medications    AMMONIUM LACTATE (LAC-HYDRIN) 12 % LOTION    Apply topically daily. ASPIRIN 81 MG EC TABLET    Take 81 mg by mouth daily    BUPROPION (WELLBUTRIN XL) 150 MG EXTENDED RELEASE TABLET    Take 1 tablet by mouth every morning    CALCIUM ACETATE 667 MG TABS    Take 1,334 mg by mouth 3 times daily (with meals)     EZETIMIBE (ZETIA) 10 MG TABLET    Take 10 mg by mouth daily    FLUOXETINE (PROZAC) 40 MG CAPSULE    Take 40 mg by mouth daily     FUROSEMIDE (LASIX) 20 MG TABLET    Take 1 tablet by mouth daily    GABAPENTIN (NEURONTIN) 300 MG CAPSULE    Take 1 capsule by mouth daily.     INSULIN ASPART (NOVOLOG) 100 UNIT/ML INJECTION VIAL    Inject 0-8 Units into the skin 3 times daily (before meals) SLIDING SCALE     INSULIN GLARGINE (LANTUS) 100 UNIT/ML INJECTION VIAL    Inject 45 Units into the skin every morning Indications: 45 units in the morning and 10 units in the evening    LISINOPRIL (PRINIVIL;ZESTRIL) 20 MG TABLET    Take 1 tablet by mouth daily MAGNESIUM HYDROXIDE (MILK OF MAGNESIA) 400 MG/5ML SUSPENSION    Take 15 mLs by mouth daily as needed for Constipation    MAGNESIUM OXIDE (MAG-OX) 400 MG TABLET    Take 400 mg by mouth daily    MISC. DEVICES MISC    Disp: custom molded shoes to accommodate for brace   DX: DM with history of stroke, foot drop  Duration: 1 year    OMEPRAZOLE (PRILOSEC) 40 MG DELAYED RELEASE CAPSULE    Take 40 mg by mouth daily    ONDANSETRON (ZOFRAN) 4 MG TABLET    Take 1 tablet by mouth 3 times daily as needed for Nausea or Vomiting    ROSUVASTATIN CALCIUM 40 MG CPSP    Take 40 mg by mouth daily    TRAMADOL (ULTRAM) 50 MG TABLET    Take 50 mg by mouth 2 times daily as needed for Pain. VITAMIN B-12 (CYANOCOBALAMIN) 500 MCG TABLET    Take 500 mcg by mouth daily       PAST MEDICAL HISTORY         Diagnosis Date    Closed fracture of bone of right foot March - 2020    Dialysis patient [facility])     Kidney disease     On Dialysis    Type 1 diabetes mellitus ([facility] [address]        SURGICAL HISTORY           Procedure Laterality Date    AV FISTULA CREATION Left          FAMILY HISTORY           Problem Relation Age of Onset    Diabetes Mother     Heart Disease Father     Diabetes Father     Cancer Paternal Grandmother     Heart Disease Maternal Great Grandfather      Family Status   Relation Name Status    Mother  (Not Specified)    Father      PGM  (Not Specified)    MGGF  (Not Specified)        SOCIAL HISTORY      reports that he has never smoked. He has never used smokeless tobacco. He reports that he does not drink alcohol and does not use drugs. REVIEW OF SYSTEMS    (2-9 systems for level 4, 10 or more for level 5)     Review of Systems   Constitutional: Negative for chills, fatigue and fever. HENT: Negative for congestion, ear discharge and facial swelling. Eyes: Negative for discharge and redness. Respiratory: Negative for cough and shortness of breath. Cardiovascular: Negative for chest pain. Psychiatric:         Behavior: Behavior normal.             DIAGNOSTIC RESULTS     EKG: All EKG's are interpreted by the Emergency Department Physician who either signs or Co-signs this chart in the absence of a cardiologist.    RADIOLOGY:   Non-plain film images such as CT, Ultrasound and MRI are read by the radiologist. Plain radiographic images are visualized and preliminarily interpreted by the emergency physician with the below findings:    Interpretation per the Radiologist below, if available at the time of this note:    XR SHOULDER LEFT (MIN 2 VIEWS)    Result Date: 5/28/2022  EXAMINATION: XRAY VIEWS OF THE LEFT SHOULDER 5/28/2022 4:48 pm COMPARISON: None. HISTORY: ORDERING SYSTEM PROVIDED HISTORY: Atraumatic pain TECHNOLOGIST PROVIDED HISTORY: Atraumatic pain Reason for Exam: pain Additional signs and symptoms: pain in superior lt shoulder FINDINGS: There is no evidence of acute fracture. There is normal alignment. No acute joint abnormality. No focal osseous lesion. No focal soft tissue abnormality. Surgical clips in the soft tissues of the left upper. Visualized cardiac silhouette appears mildly enlarged     No acute osseous abnormality. LABS:  Labs Reviewed - No data to display    All other labs were within normal range or not returned as of this dictation. EMERGENCY DEPARTMENT COURSE and DIFFERENTIAL DIAGNOSIS/MDM:   Vitals:    Vitals:    05/28/22 1853 05/28/22 1900   BP: 119/81    Pulse: (!) 104    Resp: 16    Temp: 99.4 °F (37.4 °C)    SpO2: 99%    Weight:  188 lb (85.3 kg)   Height:  5' 7.5\" (1.715 m)       Orders Placed This Encounter   Medications    acetaminophen-codeine (TYLENOL/CODEINE #3) 300-30 MG per tablet     Sig: Take 1 tablet by mouth every 6 hours as needed for Pain for up to 3 days. Dispense:  12 tablet     Refill:  0       Medical Decision Making: X-rays negative. He is given a prescription for 12 Tylenol 3.   He has seen an orthopedist previously, Dr. Scarlet Parikh, and he will follow-up with him in regard to this shoulder issue that he has had for 3 months. Treatment diagnosis and follow-up were discussed with the patient and his family. CONSULTS:  None    PROCEDURES:  None    FINAL IMPRESSION      1. Chronic left shoulder pain          DISPOSITION/PLAN   DISPOSITION Decision To Discharge 05/28/2022 08:07:32 PM      PATIENT REFERRED TO:   DANIEL Love - CNP  [address] [facility] [address]  [phone]      As needed    [place] [facility]  [address] [facility]  [phone]    If symptoms worsen    Ankush Gonzalez MD  [place] [address]  [phone]    Call in 2 days        DISCHARGE MEDICATIONS:     New Prescriptions    ACETAMINOPHEN-CODEINE (TYLENOL/CODEINE #3) 300-30 MG PER TABLET    Take 1 tablet by mouth every 6 hours as needed for Pain for up to 3 days. The care is provided during an unprecedented national emergency due to the novel coronavirus, COVID-19.     (Please note that portions of this note were completed with a voice recognition program.  Efforts were made to edit the dictations but occasionally words are mis-transcribed.)    Royce Salcido MD  Attending Emergency Physician            Royce Salcido MD  05/28/22 2010

## 2022-05-29 ENCOUNTER — HOSPITAL ENCOUNTER (INPATIENT)
Age: 47
LOS: 9 days | Discharge: OTHER INSTITUTION | DRG: 252 | End: 2022-06-08
Attending: EMERGENCY MEDICINE | Admitting: INTERNAL MEDICINE
Payer: MEDICARE

## 2022-05-29 DIAGNOSIS — R07.9 CHEST PAIN, UNSPECIFIED TYPE: Primary | ICD-10-CM

## 2022-05-29 PROCEDURE — 99285 EMERGENCY DEPT VISIT HI MDM: CPT

## 2022-05-29 PROCEDURE — 93005 ELECTROCARDIOGRAM TRACING: CPT | Performed by: EMERGENCY MEDICINE

## 2022-05-30 ENCOUNTER — APPOINTMENT (OUTPATIENT)
Dept: GENERAL RADIOLOGY | Age: 47
DRG: 252 | End: 2022-05-30
Payer: MEDICARE

## 2022-05-30 ENCOUNTER — APPOINTMENT (OUTPATIENT)
Dept: CT IMAGING | Age: 47
DRG: 252 | End: 2022-05-30
Payer: MEDICARE

## 2022-05-30 PROBLEM — R07.9 CHEST PAIN: Status: ACTIVE | Noted: 2022-05-30

## 2022-05-30 PROBLEM — I31.9 PERICARDITIS: Status: ACTIVE | Noted: 2022-05-30

## 2022-05-30 PROBLEM — I70.0 ATHEROSCLEROSIS OF AORTA (HCC): Status: ACTIVE | Noted: 2021-08-30

## 2022-05-30 PROBLEM — E87.5 HYPERKALEMIA: Status: ACTIVE | Noted: 2022-05-30

## 2022-05-30 PROBLEM — J98.6 DISORDERS OF DIAPHRAGM: Status: ACTIVE | Noted: 2021-11-10

## 2022-05-30 PROBLEM — R10.32 LEFT LOWER QUADRANT ABDOMINAL PAIN: Status: ACTIVE | Noted: 2022-05-30

## 2022-05-30 PROBLEM — E87.1 HYPONATREMIA: Status: ACTIVE | Noted: 2022-05-30

## 2022-05-30 PROBLEM — I30.0 ACUTE IDIOPATHIC PERICARDITIS: Status: ACTIVE | Noted: 2022-05-30

## 2022-05-30 PROBLEM — R79.89 ELEVATED TROPONIN: Status: ACTIVE | Noted: 2022-05-30

## 2022-05-30 PROBLEM — D64.9 ANEMIA: Status: ACTIVE | Noted: 2022-05-30

## 2022-05-30 PROBLEM — K42.9 UMBILICAL HERNIA WITHOUT OBSTRUCTION OR GANGRENE: Status: ACTIVE | Noted: 2021-11-10

## 2022-05-30 PROBLEM — I70.8 ATHEROSCLEROSIS OF OTHER ARTERIES: Status: ACTIVE | Noted: 2021-11-10

## 2022-05-30 PROBLEM — R77.8 ELEVATED TROPONIN: Status: ACTIVE | Noted: 2022-05-30

## 2022-05-30 PROBLEM — I95.9 HYPOTENSION: Status: ACTIVE | Noted: 2022-05-30

## 2022-05-30 PROBLEM — N27.1 SMALL KIDNEY, BILATERAL: Status: ACTIVE | Noted: 2021-11-10

## 2022-05-30 LAB
ABSOLUTE EOS #: 0.05 K/UL (ref 0–0.44)
ABSOLUTE IMMATURE GRANULOCYTE: 0.05 K/UL (ref 0–0.3)
ABSOLUTE LYMPH #: 1.2 K/UL (ref 1.1–3.7)
ABSOLUTE MONO #: 1.18 K/UL (ref 0.1–1.2)
ANION GAP SERPL CALCULATED.3IONS-SCNC: 14 MMOL/L (ref 9–17)
ANION GAP SERPL CALCULATED.3IONS-SCNC: 17 MMOL/L (ref 9–17)
ANTI-XA UNFRAC HEPARIN: 0.26 IU/L (ref 0.3–0.7)
ANTI-XA UNFRAC HEPARIN: 0.32 IU/L (ref 0.3–0.7)
BASOPHILS # BLD: 0 % (ref 0–2)
BASOPHILS ABSOLUTE: 0.05 K/UL (ref 0–0.2)
BUN BLDV-MCNC: 43 MG/DL (ref 6–20)
BUN BLDV-MCNC: 45 MG/DL (ref 6–20)
BUN/CREAT BLD: 5 (ref 9–20)
BUN/CREAT BLD: 5 (ref 9–20)
C-REACTIVE PROTEIN: 231.8 MG/L (ref 0–5)
CALCIUM SERPL-MCNC: 9.3 MG/DL (ref 8.6–10.4)
CALCIUM SERPL-MCNC: 9.6 MG/DL (ref 8.6–10.4)
CHLORIDE BLD-SCNC: 89 MMOL/L (ref 98–107)
CHLORIDE BLD-SCNC: 91 MMOL/L (ref 98–107)
CHP ED QC CHECK: NORMAL
CO2: 20 MMOL/L (ref 20–31)
CO2: 22 MMOL/L (ref 20–31)
CREAT SERPL-MCNC: 8.15 MG/DL (ref 0.7–1.2)
CREAT SERPL-MCNC: 8.98 MG/DL (ref 0.7–1.2)
EOSINOPHILS RELATIVE PERCENT: 0 % (ref 1–4)
FERRITIN: 3028 NG/ML (ref 30–400)
FOLATE: >20 NG/ML
GFR AFRICAN AMERICAN: 8 ML/MIN
GFR AFRICAN AMERICAN: 9 ML/MIN
GFR NON-AFRICAN AMERICAN: 6 ML/MIN
GFR NON-AFRICAN AMERICAN: 7 ML/MIN
GFR SERPL CREATININE-BSD FRML MDRD: ABNORMAL ML/MIN/{1.73_M2}
GFR SERPL CREATININE-BSD FRML MDRD: ABNORMAL ML/MIN/{1.73_M2}
GLUCOSE BLD-MCNC: 277 MG/DL (ref 70–99)
GLUCOSE BLD-MCNC: 282 MG/DL
GLUCOSE BLD-MCNC: 282 MG/DL (ref 75–110)
GLUCOSE BLD-MCNC: 304 MG/DL (ref 75–110)
GLUCOSE BLD-MCNC: 305 MG/DL (ref 75–110)
GLUCOSE BLD-MCNC: 341 MG/DL (ref 75–110)
GLUCOSE BLD-MCNC: 370 MG/DL (ref 75–110)
GLUCOSE BLD-MCNC: 385 MG/DL (ref 75–110)
GLUCOSE BLD-MCNC: 535 MG/DL (ref 70–99)
HCT VFR BLD CALC: 30.5 % (ref 40.7–50.3)
HCT VFR BLD CALC: 33.8 % (ref 40.7–50.3)
HEMOGLOBIN: 10.1 G/DL (ref 13–17)
HEMOGLOBIN: 9.4 G/DL (ref 13–17)
IMMATURE GRANULOCYTES: 0 %
IRON SATURATION: 23 % (ref 20–55)
IRON: 35 UG/DL (ref 59–158)
LACTIC ACID: 2.4 MMOL/L (ref 0.5–2.2)
LV EF: 48 %
LV EF: 55 %
LVEF MODALITY: NORMAL
LVEF MODALITY: NORMAL
LYMPHOCYTES # BLD: 10 % (ref 24–43)
MCH RBC QN AUTO: 29.8 PG (ref 25.2–33.5)
MCHC RBC AUTO-ENTMCNC: 30.8 G/DL (ref 28.4–34.8)
MCV RBC AUTO: 96.8 FL (ref 82.6–102.9)
MONOCYTES # BLD: 10 % (ref 3–12)
MYOGLOBIN: 496 NG/ML (ref 28–72)
MYOGLOBIN: 516 NG/ML (ref 28–72)
MYOGLOBIN: 529 NG/ML (ref 28–72)
NRBC AUTOMATED: 0 PER 100 WBC
PARTIAL THROMBOPLASTIN TIME: 36.6 SEC (ref 23.9–33.8)
PDW BLD-RTO: 12.5 % (ref 11.8–14.4)
PLATELET # BLD: 257 K/UL (ref 138–453)
PMV BLD AUTO: 10.3 FL (ref 8.1–13.5)
POTASSIUM SERPL-SCNC: 5.5 MMOL/L (ref 3.7–5.3)
POTASSIUM SERPL-SCNC: 5.9 MMOL/L (ref 3.7–5.3)
POTASSIUM SERPL-SCNC: 6 MMOL/L (ref 3.7–5.3)
PROCALCITONIN: 0.59 NG/ML
RBC # BLD: 3.15 M/UL (ref 4.21–5.77)
SEG NEUTROPHILS: 80 % (ref 36–65)
SEGMENTED NEUTROPHILS ABSOLUTE COUNT: 9.43 K/UL (ref 1.5–8.1)
SERUM OSMOLALITY: 301 MOSM/KG (ref 282–298)
SODIUM BLD-SCNC: 124 MMOL/L (ref 135–144)
SODIUM BLD-SCNC: 125 MMOL/L (ref 135–144)
SODIUM BLD-SCNC: 126 MMOL/L (ref 135–144)
SODIUM BLD-SCNC: 128 MMOL/L (ref 135–144)
TOTAL IRON BINDING CAPACITY: 149 UG/DL (ref 250–450)
TROPONIN, HIGH SENSITIVITY: 561 NG/L (ref 0–22)
TROPONIN, HIGH SENSITIVITY: 709 NG/L (ref 0–22)
TROPONIN, HIGH SENSITIVITY: 731 NG/L (ref 0–22)
TROPONIN, HIGH SENSITIVITY: 750 NG/L (ref 0–22)
UNSATURATED IRON BINDING CAPACITY: 114 UG/DL (ref 112–347)
VITAMIN B-12: >2000 PG/ML (ref 232–1245)
WBC # BLD: 12 K/UL (ref 3.5–11.3)

## 2022-05-30 PROCEDURE — 6370000000 HC RX 637 (ALT 250 FOR IP): Performed by: NURSE PRACTITIONER

## 2022-05-30 PROCEDURE — 71045 X-RAY EXAM CHEST 1 VIEW: CPT

## 2022-05-30 PROCEDURE — 2700000000 HC OXYGEN THERAPY PER DAY

## 2022-05-30 PROCEDURE — 80048 BASIC METABOLIC PNL TOTAL CA: CPT

## 2022-05-30 PROCEDURE — 96366 THER/PROPH/DIAG IV INF ADDON: CPT

## 2022-05-30 PROCEDURE — 6360000002 HC RX W HCPCS: Performed by: NURSE PRACTITIONER

## 2022-05-30 PROCEDURE — 0W9D3ZZ DRAINAGE OF PERICARDIAL CAVITY, PERCUTANEOUS APPROACH: ICD-10-PCS | Performed by: INTERNAL MEDICINE

## 2022-05-30 PROCEDURE — 93005 ELECTROCARDIOGRAM TRACING: CPT | Performed by: EMERGENCY MEDICINE

## 2022-05-30 PROCEDURE — 99223 1ST HOSP IP/OBS HIGH 75: CPT | Performed by: INTERNAL MEDICINE

## 2022-05-30 PROCEDURE — 86140 C-REACTIVE PROTEIN: CPT

## 2022-05-30 PROCEDURE — 85014 HEMATOCRIT: CPT

## 2022-05-30 PROCEDURE — 6360000002 HC RX W HCPCS

## 2022-05-30 PROCEDURE — 84132 ASSAY OF SERUM POTASSIUM: CPT

## 2022-05-30 PROCEDURE — 94761 N-INVAS EAR/PLS OXIMETRY MLT: CPT

## 2022-05-30 PROCEDURE — C1769 GUIDE WIRE: HCPCS

## 2022-05-30 PROCEDURE — 83986 ASSAY PH BODY FLUID NOS: CPT

## 2022-05-30 PROCEDURE — 83550 IRON BINDING TEST: CPT

## 2022-05-30 PROCEDURE — 83930 ASSAY OF BLOOD OSMOLALITY: CPT

## 2022-05-30 PROCEDURE — 84295 ASSAY OF SERUM SODIUM: CPT

## 2022-05-30 PROCEDURE — 84157 ASSAY OF PROTEIN OTHER: CPT

## 2022-05-30 PROCEDURE — 83036 HEMOGLOBIN GLYCOSYLATED A1C: CPT

## 2022-05-30 PROCEDURE — 82945 GLUCOSE OTHER FLUID: CPT

## 2022-05-30 PROCEDURE — 83874 ASSAY OF MYOGLOBIN: CPT

## 2022-05-30 PROCEDURE — 2580000003 HC RX 258: Performed by: NURSE PRACTITIONER

## 2022-05-30 PROCEDURE — 71250 CT THORAX DX C-: CPT

## 2022-05-30 PROCEDURE — 6360000002 HC RX W HCPCS: Performed by: EMERGENCY MEDICINE

## 2022-05-30 PROCEDURE — APPSS60 APP SPLIT SHARED TIME 46-60 MINUTES: Performed by: NURSE PRACTITIONER

## 2022-05-30 PROCEDURE — 2500000003 HC RX 250 WO HCPCS: Performed by: NURSE PRACTITIONER

## 2022-05-30 PROCEDURE — 82746 ASSAY OF FOLIC ACID SERUM: CPT

## 2022-05-30 PROCEDURE — 87040 BLOOD CULTURE FOR BACTERIA: CPT

## 2022-05-30 PROCEDURE — 89051 BODY FLUID CELL COUNT: CPT

## 2022-05-30 PROCEDURE — 96375 TX/PRO/DX INJ NEW DRUG ADDON: CPT

## 2022-05-30 PROCEDURE — 85520 HEPARIN ASSAY: CPT

## 2022-05-30 PROCEDURE — 82042 OTHER SOURCE ALBUMIN QUAN EA: CPT

## 2022-05-30 PROCEDURE — 74176 CT ABD & PELVIS W/O CONTRAST: CPT

## 2022-05-30 PROCEDURE — 82728 ASSAY OF FERRITIN: CPT

## 2022-05-30 PROCEDURE — 87070 CULTURE OTHR SPECIMN AEROBIC: CPT

## 2022-05-30 PROCEDURE — 2000000000 HC ICU R&B

## 2022-05-30 PROCEDURE — C1894 INTRO/SHEATH, NON-LASER: HCPCS

## 2022-05-30 PROCEDURE — 93308 TTE F-UP OR LMTD: CPT

## 2022-05-30 PROCEDURE — 88112 CYTOPATH CELL ENHANCE TECH: CPT

## 2022-05-30 PROCEDURE — 85730 THROMBOPLASTIN TIME PARTIAL: CPT

## 2022-05-30 PROCEDURE — 83605 ASSAY OF LACTIC ACID: CPT

## 2022-05-30 PROCEDURE — 84484 ASSAY OF TROPONIN QUANT: CPT

## 2022-05-30 PROCEDURE — 87075 CULTR BACTERIA EXCEPT BLOOD: CPT

## 2022-05-30 PROCEDURE — 84145 PROCALCITONIN (PCT): CPT

## 2022-05-30 PROCEDURE — 96365 THER/PROPH/DIAG IV INF INIT: CPT

## 2022-05-30 PROCEDURE — 36415 COLL VENOUS BLD VENIPUNCTURE: CPT

## 2022-05-30 PROCEDURE — 83540 ASSAY OF IRON: CPT

## 2022-05-30 PROCEDURE — 6370000000 HC RX 637 (ALT 250 FOR IP): Performed by: EMERGENCY MEDICINE

## 2022-05-30 PROCEDURE — 85018 HEMOGLOBIN: CPT

## 2022-05-30 PROCEDURE — 82947 ASSAY GLUCOSE BLOOD QUANT: CPT

## 2022-05-30 PROCEDURE — 88305 TISSUE EXAM BY PATHOLOGIST: CPT

## 2022-05-30 PROCEDURE — 96376 TX/PRO/DX INJ SAME DRUG ADON: CPT

## 2022-05-30 PROCEDURE — 2500000003 HC RX 250 WO HCPCS

## 2022-05-30 PROCEDURE — 87205 SMEAR GRAM STAIN: CPT

## 2022-05-30 PROCEDURE — 82607 VITAMIN B-12: CPT

## 2022-05-30 PROCEDURE — 83615 LACTATE (LD) (LDH) ENZYME: CPT

## 2022-05-30 PROCEDURE — 93306 TTE W/DOPPLER COMPLETE: CPT

## 2022-05-30 PROCEDURE — 85025 COMPLETE CBC W/AUTO DIFF WBC: CPT

## 2022-05-30 PROCEDURE — 93005 ELECTROCARDIOGRAM TRACING: CPT | Performed by: INTERNAL MEDICINE

## 2022-05-30 RX ORDER — POLYETHYLENE GLYCOL 3350 17 G/17G
17 POWDER, FOR SOLUTION ORAL DAILY
Status: DISCONTINUED | OUTPATIENT
Start: 2022-05-30 | End: 2022-06-03 | Stop reason: SDUPTHER

## 2022-05-30 RX ORDER — CALCIUM ACETATE 667 MG/1
1334 CAPSULE ORAL
Status: DISCONTINUED | OUTPATIENT
Start: 2022-05-30 | End: 2022-06-03

## 2022-05-30 RX ORDER — INSULIN LISPRO 100 [IU]/ML
0-12 INJECTION, SOLUTION INTRAVENOUS; SUBCUTANEOUS
Status: DISCONTINUED | OUTPATIENT
Start: 2022-05-30 | End: 2022-06-01

## 2022-05-30 RX ORDER — SODIUM CHLORIDE 9 MG/ML
INJECTION, SOLUTION INTRAVENOUS PRN
Status: DISCONTINUED | OUTPATIENT
Start: 2022-05-30 | End: 2022-06-08 | Stop reason: HOSPADM

## 2022-05-30 RX ORDER — MORPHINE SULFATE 4 MG/ML
4 INJECTION, SOLUTION INTRAMUSCULAR; INTRAVENOUS ONCE
Status: COMPLETED | OUTPATIENT
Start: 2022-05-30 | End: 2022-05-30

## 2022-05-30 RX ORDER — UBIDECARENONE 75 MG
500 CAPSULE ORAL DAILY
Status: DISCONTINUED | OUTPATIENT
Start: 2022-05-30 | End: 2022-06-08 | Stop reason: HOSPADM

## 2022-05-30 RX ORDER — FLUOXETINE HYDROCHLORIDE 20 MG/1
40 CAPSULE ORAL DAILY
Status: DISCONTINUED | OUTPATIENT
Start: 2022-05-30 | End: 2022-06-08 | Stop reason: HOSPADM

## 2022-05-30 RX ORDER — NITROGLYCERIN 0.4 MG/1
0.4 TABLET SUBLINGUAL EVERY 5 MIN PRN
Status: DISCONTINUED | OUTPATIENT
Start: 2022-05-30 | End: 2022-05-30 | Stop reason: SDUPTHER

## 2022-05-30 RX ORDER — ACETAMINOPHEN 650 MG/1
650 SUPPOSITORY RECTAL EVERY 6 HOURS PRN
Status: DISCONTINUED | OUTPATIENT
Start: 2022-05-30 | End: 2022-06-08 | Stop reason: HOSPADM

## 2022-05-30 RX ORDER — HEPARIN SODIUM 10000 [USP'U]/100ML
5-30 INJECTION, SOLUTION INTRAVENOUS CONTINUOUS
Status: DISCONTINUED | OUTPATIENT
Start: 2022-05-30 | End: 2022-05-31

## 2022-05-30 RX ORDER — ONDANSETRON 2 MG/ML
INJECTION INTRAMUSCULAR; INTRAVENOUS
Status: COMPLETED
Start: 2022-05-30 | End: 2022-05-30

## 2022-05-30 RX ORDER — DEXTROSE MONOHYDRATE 50 MG/ML
100 INJECTION, SOLUTION INTRAVENOUS PRN
Status: DISCONTINUED | OUTPATIENT
Start: 2022-05-30 | End: 2022-06-08 | Stop reason: HOSPADM

## 2022-05-30 RX ORDER — SODIUM CHLORIDE 0.9 % (FLUSH) 0.9 %
5-40 SYRINGE (ML) INJECTION EVERY 12 HOURS SCHEDULED
Status: DISCONTINUED | OUTPATIENT
Start: 2022-05-30 | End: 2022-06-08 | Stop reason: HOSPADM

## 2022-05-30 RX ORDER — EZETIMIBE 10 MG/1
10 TABLET ORAL DAILY
Status: DISCONTINUED | OUTPATIENT
Start: 2022-05-30 | End: 2022-06-08 | Stop reason: HOSPADM

## 2022-05-30 RX ORDER — SODIUM CHLORIDE 0.9 % (FLUSH) 0.9 %
5-40 SYRINGE (ML) INJECTION PRN
Status: DISCONTINUED | OUTPATIENT
Start: 2022-05-30 | End: 2022-06-08 | Stop reason: HOSPADM

## 2022-05-30 RX ORDER — HEPARIN SODIUM 1000 [USP'U]/ML
4000 INJECTION, SOLUTION INTRAVENOUS; SUBCUTANEOUS PRN
Status: DISCONTINUED | OUTPATIENT
Start: 2022-05-30 | End: 2022-05-31 | Stop reason: ALTCHOICE

## 2022-05-30 RX ORDER — BUPROPION HYDROCHLORIDE 150 MG/1
150 TABLET ORAL EVERY MORNING
Status: DISCONTINUED | OUTPATIENT
Start: 2022-05-30 | End: 2022-06-08 | Stop reason: HOSPADM

## 2022-05-30 RX ORDER — ROSUVASTATIN CALCIUM 10 MG/1
10 TABLET, COATED ORAL NIGHTLY
Status: DISCONTINUED | OUTPATIENT
Start: 2022-05-30 | End: 2022-06-08 | Stop reason: HOSPADM

## 2022-05-30 RX ORDER — HEPARIN SODIUM 10000 [USP'U]/100ML
5-30 INJECTION, SOLUTION INTRAVENOUS CONTINUOUS
Status: DISCONTINUED | OUTPATIENT
Start: 2022-05-30 | End: 2022-05-30 | Stop reason: SDUPTHER

## 2022-05-30 RX ORDER — PANTOPRAZOLE SODIUM 40 MG/1
40 TABLET, DELAYED RELEASE ORAL
Status: DISCONTINUED | OUTPATIENT
Start: 2022-05-30 | End: 2022-06-08 | Stop reason: HOSPADM

## 2022-05-30 RX ORDER — ASPIRIN 81 MG/1
81 TABLET, CHEWABLE ORAL DAILY
Status: DISCONTINUED | OUTPATIENT
Start: 2022-05-31 | End: 2022-05-30

## 2022-05-30 RX ORDER — ONDANSETRON 4 MG/1
4 TABLET, ORALLY DISINTEGRATING ORAL EVERY 8 HOURS PRN
Status: DISCONTINUED | OUTPATIENT
Start: 2022-05-30 | End: 2022-06-08 | Stop reason: HOSPADM

## 2022-05-30 RX ORDER — ONDANSETRON 2 MG/ML
4 INJECTION INTRAMUSCULAR; INTRAVENOUS EVERY 6 HOURS PRN
Status: DISCONTINUED | OUTPATIENT
Start: 2022-05-30 | End: 2022-06-08 | Stop reason: HOSPADM

## 2022-05-30 RX ORDER — ASPIRIN 81 MG/1
81 TABLET ORAL DAILY
Status: DISCONTINUED | OUTPATIENT
Start: 2022-05-30 | End: 2022-06-08 | Stop reason: HOSPADM

## 2022-05-30 RX ORDER — NITROGLYCERIN 0.4 MG/1
0.4 TABLET SUBLINGUAL EVERY 5 MIN PRN
Status: DISCONTINUED | OUTPATIENT
Start: 2022-05-30 | End: 2022-06-08 | Stop reason: HOSPADM

## 2022-05-30 RX ORDER — ACETAMINOPHEN 325 MG/1
650 TABLET ORAL EVERY 6 HOURS PRN
Status: DISCONTINUED | OUTPATIENT
Start: 2022-05-30 | End: 2022-06-08 | Stop reason: HOSPADM

## 2022-05-30 RX ORDER — MORPHINE SULFATE 4 MG/ML
4 INJECTION, SOLUTION INTRAMUSCULAR; INTRAVENOUS ONCE
Status: DISCONTINUED | OUTPATIENT
Start: 2022-05-30 | End: 2022-06-03

## 2022-05-30 RX ORDER — POLYETHYLENE GLYCOL 3350 17 G/17G
17 POWDER, FOR SOLUTION ORAL DAILY PRN
Status: DISCONTINUED | OUTPATIENT
Start: 2022-05-30 | End: 2022-05-30

## 2022-05-30 RX ORDER — INSULIN LISPRO 100 [IU]/ML
0-6 INJECTION, SOLUTION INTRAVENOUS; SUBCUTANEOUS NIGHTLY
Status: DISCONTINUED | OUTPATIENT
Start: 2022-05-30 | End: 2022-06-01

## 2022-05-30 RX ORDER — GABAPENTIN 300 MG/1
300 CAPSULE ORAL DAILY
Status: DISCONTINUED | OUTPATIENT
Start: 2022-05-30 | End: 2022-06-08 | Stop reason: HOSPADM

## 2022-05-30 RX ORDER — ATORVASTATIN CALCIUM 80 MG/1
80 TABLET, FILM COATED ORAL NIGHTLY
Status: DISCONTINUED | OUTPATIENT
Start: 2022-05-30 | End: 2022-05-30

## 2022-05-30 RX ORDER — 0.9 % SODIUM CHLORIDE 0.9 %
250 INTRAVENOUS SOLUTION INTRAVENOUS ONCE
Status: COMPLETED | OUTPATIENT
Start: 2022-05-30 | End: 2022-05-30

## 2022-05-30 RX ORDER — PREDNISONE 20 MG/1
40 TABLET ORAL DAILY
Status: DISCONTINUED | OUTPATIENT
Start: 2022-05-30 | End: 2022-06-08 | Stop reason: HOSPADM

## 2022-05-30 RX ORDER — HEPARIN SODIUM 1000 [USP'U]/ML
4000 INJECTION, SOLUTION INTRAVENOUS; SUBCUTANEOUS ONCE
Status: COMPLETED | OUTPATIENT
Start: 2022-05-30 | End: 2022-05-30

## 2022-05-30 RX ORDER — LISINOPRIL 10 MG/1
20 TABLET ORAL DAILY
Status: DISCONTINUED | OUTPATIENT
Start: 2022-05-30 | End: 2022-06-08 | Stop reason: HOSPADM

## 2022-05-30 RX ORDER — ONDANSETRON 2 MG/ML
4 INJECTION INTRAMUSCULAR; INTRAVENOUS ONCE
Status: COMPLETED | OUTPATIENT
Start: 2022-05-30 | End: 2022-05-30

## 2022-05-30 RX ORDER — MORPHINE SULFATE 2 MG/ML
2 INJECTION, SOLUTION INTRAMUSCULAR; INTRAVENOUS EVERY 6 HOURS PRN
Status: DISCONTINUED | OUTPATIENT
Start: 2022-05-30 | End: 2022-05-31

## 2022-05-30 RX ORDER — MIDODRINE HYDROCHLORIDE 10 MG/1
10 TABLET ORAL ONCE
Status: COMPLETED | OUTPATIENT
Start: 2022-05-30 | End: 2022-05-30

## 2022-05-30 RX ORDER — HEPARIN SODIUM 1000 [USP'U]/ML
2000 INJECTION, SOLUTION INTRAVENOUS; SUBCUTANEOUS PRN
Status: DISCONTINUED | OUTPATIENT
Start: 2022-05-30 | End: 2022-05-31 | Stop reason: ALTCHOICE

## 2022-05-30 RX ADMIN — ROSUVASTATIN CALCIUM 10 MG: 10 TABLET, FILM COATED ORAL at 20:34

## 2022-05-30 RX ADMIN — HEPARIN SODIUM 4000 UNITS: 1000 INJECTION INTRAVENOUS; SUBCUTANEOUS at 02:03

## 2022-05-30 RX ADMIN — ONDANSETRON 4 MG: 2 INJECTION INTRAMUSCULAR; INTRAVENOUS at 01:39

## 2022-05-30 RX ADMIN — CALCIUM ACETATE 1334 MG: 667 CAPSULE ORAL at 08:12

## 2022-05-30 RX ADMIN — INSULIN LISPRO 8 UNITS: 100 INJECTION, SOLUTION INTRAVENOUS; SUBCUTANEOUS at 08:23

## 2022-05-30 RX ADMIN — HEPARIN SODIUM AND DEXTROSE 12 UNITS/KG/HR: 10000; 5 INJECTION INTRAVENOUS at 02:11

## 2022-05-30 RX ADMIN — ONDANSETRON 4 MG: 2 INJECTION INTRAMUSCULAR; INTRAVENOUS at 05:56

## 2022-05-30 RX ADMIN — SODIUM CHLORIDE 250 ML: 9 INJECTION, SOLUTION INTRAVENOUS at 05:26

## 2022-05-30 RX ADMIN — ASPIRIN 81 MG: 81 TABLET, COATED ORAL at 08:09

## 2022-05-30 RX ADMIN — GABAPENTIN 300 MG: 300 CAPSULE ORAL at 08:09

## 2022-05-30 RX ADMIN — INSULIN LISPRO 10 UNITS: 100 INJECTION, SOLUTION INTRAVENOUS; SUBCUTANEOUS at 11:40

## 2022-05-30 RX ADMIN — INSULIN LISPRO 4 UNITS: 100 INJECTION, SOLUTION INTRAVENOUS; SUBCUTANEOUS at 20:34

## 2022-05-30 RX ADMIN — MORPHINE SULFATE 4 MG: 4 INJECTION, SOLUTION INTRAMUSCULAR; INTRAVENOUS at 01:40

## 2022-05-30 RX ADMIN — EZETIMIBE 10 MG: 10 TABLET ORAL at 08:11

## 2022-05-30 RX ADMIN — SODIUM ZIRCONIUM CYCLOSILICATE 5 G: 5 POWDER, FOR SUSPENSION ORAL at 08:12

## 2022-05-30 RX ADMIN — PANTOPRAZOLE SODIUM 40 MG: 40 TABLET, DELAYED RELEASE ORAL at 08:18

## 2022-05-30 RX ADMIN — MORPHINE SULFATE 2 MG: 2 INJECTION, SOLUTION INTRAMUSCULAR; INTRAVENOUS at 09:39

## 2022-05-30 RX ADMIN — SODIUM CHLORIDE, PRESERVATIVE FREE 10 ML: 5 INJECTION INTRAVENOUS at 08:13

## 2022-05-30 RX ADMIN — Medication 10 UNITS: at 01:40

## 2022-05-30 RX ADMIN — BUPROPION HYDROCHLORIDE 150 MG: 150 TABLET, EXTENDED RELEASE ORAL at 08:09

## 2022-05-30 RX ADMIN — ONDANSETRON 4 MG: 2 INJECTION INTRAMUSCULAR; INTRAVENOUS at 10:38

## 2022-05-30 RX ADMIN — PREDNISONE 40 MG: 20 TABLET ORAL at 06:28

## 2022-05-30 RX ADMIN — VITAM B12 500 MCG: 100 TAB at 08:18

## 2022-05-30 RX ADMIN — SODIUM ZIRCONIUM CYCLOSILICATE 5 G: 5 POWDER, FOR SUSPENSION ORAL at 05:28

## 2022-05-30 RX ADMIN — MIDODRINE HYDROCHLORIDE 10 MG: 10 TABLET ORAL at 02:31

## 2022-05-30 ASSESSMENT — LIFESTYLE VARIABLES: HOW OFTEN DO YOU HAVE A DRINK CONTAINING ALCOHOL: NEVER

## 2022-05-30 ASSESSMENT — PAIN DESCRIPTION - DESCRIPTORS
DESCRIPTORS: PRESSURE
DESCRIPTORS: STABBING

## 2022-05-30 ASSESSMENT — PAIN - FUNCTIONAL ASSESSMENT
PAIN_FUNCTIONAL_ASSESSMENT: PREVENTS OR INTERFERES SOME ACTIVE ACTIVITIES AND ADLS
PAIN_FUNCTIONAL_ASSESSMENT: PREVENTS OR INTERFERES SOME ACTIVE ACTIVITIES AND ADLS
PAIN_FUNCTIONAL_ASSESSMENT: 0-10

## 2022-05-30 ASSESSMENT — ENCOUNTER SYMPTOMS
COUGH: 0
COLOR CHANGE: 0
DIARRHEA: 0
RHINORRHEA: 0
VOMITING: 0
NAUSEA: 1
SORE THROAT: 0
VOMITING: 1
CONSTIPATION: 1
EYE REDNESS: 0
FACIAL SWELLING: 0
EYE DISCHARGE: 0
ABDOMINAL PAIN: 0
EYES NEGATIVE: 1
CONSTIPATION: 0
SHORTNESS OF BREATH: 0
ABDOMINAL PAIN: 1
RESPIRATORY NEGATIVE: 1

## 2022-05-30 ASSESSMENT — PAIN DESCRIPTION - ONSET: ONSET: PROGRESSIVE

## 2022-05-30 ASSESSMENT — PAIN DESCRIPTION - ORIENTATION
ORIENTATION: LEFT

## 2022-05-30 ASSESSMENT — PAIN DESCRIPTION - LOCATION
LOCATION: ABDOMEN
LOCATION: CHEST

## 2022-05-30 ASSESSMENT — PAIN SCALES - GENERAL
PAINLEVEL_OUTOF10: 7
PAINLEVEL_OUTOF10: 9
PAINLEVEL_OUTOF10: 7
PAINLEVEL_OUTOF10: 10
PAINLEVEL_OUTOF10: 3
PAINLEVEL_OUTOF10: 6

## 2022-05-30 ASSESSMENT — PAIN DESCRIPTION - FREQUENCY
FREQUENCY: CONTINUOUS
FREQUENCY: CONTINUOUS

## 2022-05-30 ASSESSMENT — PAIN DESCRIPTION - PAIN TYPE
TYPE: ACUTE PAIN
TYPE: ACUTE PAIN

## 2022-05-30 NOTE — ED NOTES
Pt presented to ED via EMS. Pt came from home where he lives with his grandfather. Pt reports that he uses a walker due to weakness and increase in falls recently. Grandfather reports that pt has been sick for the past few days, having episodes of emesis frequently to the point where the pt cannot eat anything. Pt is reporting chest pain/pressure in his left chest that radiates up into his jaw.  Pt is reporting nausea at this time     Manjinder Torrez RN  05/30/22 [id number]

## 2022-05-30 NOTE — PROGRESS NOTES
Consult Note    Reason for Consult:  ESRD    Requesting Physician:  Johnanna Shone, DO    HISTORY OF PRESENT ILLNESS:    The patient is a 49 y.o. male who presents with complaint of chest pain. EKG showed ST elevation. Cardiology has been consulted. Possible pericarditis. He is on heparin drip. Cardiac cath planned tomorrow. CT chest/abd/pelvis results pending. SBP trending 80s to 100s. Lisinopril does decreased and norvasc dc'd last admission (last week). He states he made these adjustments to home meds. On admission, glucose 535 with sodium 125 corrected sodium 134, potassium 6, BUN 43, creatinine 8.15. He was initiated on Lokelma and insulin. This a.m, sodium was 128, corrected sodium 132 with potassium 5.5, bicarb 20, BUN 45, creatinine 8.98, glucose improved to 277, lactic acid 2.4. Most recent sodium at 846 was 126. Glucose not checked at that time. Hemoglobin 9.4 with WBC 12. Ferritin 3028. Patient was admitted last week for weakness and falls. At that time Neurontin dose was decreased. Hx of ESRD on HD MWF via AV[facility] at [facility]. Review Of Systems:   Constitutional: No fever, chills, lethargy, weakness or wt loss  HEENT:  No headache, nasal discharge or sore throat. Cardiac:  + chest pain, no dyspnea  Chest:               No cough, phlegm or wheezing. Abdomen:  + abdominal pain, no nausea, vomiting or diarrhea. Neuro:              No gross focal weakness, numbness, abnormal movements or seizure like activity. Skin:   No rashes or itching. :   No hematuria, pyuria, dysuria or flank pain. Extremities:  No swelling or joint pains. Endocrine: No polyuria, polydypsia, or thyroid problems. Hematology:    No bleeding disorders, bruising or anemia. All other ROS is negative.     Past Medical History:   Diagnosis Date    Cerebral artery occlusion with cerebral infarction [facility])     Closed fracture of bone of right foot March - April 2020    Dialysis patient [facility])     Hemodialysis patient ([facility] [address]     Hyperlipidemia     Hypertension     Kidney disease     On Dialysis    Type 1 diabetes mellitus ([facility] [address]        Past Surgical History:   Procedure Laterality Date    AV FISTULA CREATION Left     WRIST SURGERY  1995       Prior to Admission medications    Medication Sig Start Date End Date Taking? Authorizing Provider   acetaminophen-codeine (TYLENOL/CODEINE #3) 300-30 MG per tablet Take 1 tablet by mouth every 6 hours as needed for Pain for up to 3 days. 5/28/22 5/31/22  Kayla Bernal MD   lisinopril (PRINIVIL;ZESTRIL) 20 MG tablet Take 1 tablet by mouth daily 5/27/22   DANIEL Reyes CNP   gabapentin (NEURONTIN) 300 MG capsule Take 1 capsule by mouth daily. 5/28/22   DANIEL Reyes CNP   insulin glargine (LANTUS) 100 UNIT/ML injection vial Inject 45 Units into the skin every morning Indications: 45 units in the morning and 10 units in the evening    Historical Provider, MD   traMADol (ULTRAM) 50 MG tablet Take 50 mg by mouth 2 times daily as needed for Pain. 5/6/22   Historical Provider, MD   Misc.  Devices MISC Disp: custom molded shoes to accommodate for brace   DX: DM with history of stroke, foot drop  Duration: 1 year 5/11/22   Howie Watson DPM   ondansetron (ZOFRAN) 4 MG tablet Take 1 tablet by mouth 3 times daily as needed for Nausea or Vomiting 4/29/22   DANIEL Vasques CNP   magnesium oxide (MAG-OX) 400 MG tablet Take 400 mg by mouth daily  Patient not taking: Reported on 5/28/2022    Historical Provider, MD   magnesium hydroxide (MILK OF MAGNESIA) 400 MG/5ML suspension Take 15 mLs by mouth daily as needed for Constipation    Historical Provider, MD   insulin aspart (NOVOLOG) 100 UNIT/ML injection vial Inject 0-8 Units into the skin 3 times daily (before meals) SLIDING SCALE     Historical Provider, MD   FLUoxetine (PROZAC) 40 MG capsule Take 40 mg by mouth daily     Historical Provider, MD   ammonium lactate (LAC-HYDRIN) 12 % lotion Apply topically daily.   Patient taking differently: Apply to feet 11/1/21   Yadira Olguin DPM   furosemide (LASIX) 20 MG tablet Take 1 tablet by mouth daily  Patient not taking: Reported on 5/28/2022 4/27/21   Orly Rodney DO   buPROPion (WELLBUTRIN XL) 150 MG extended release tablet Take 1 tablet by mouth every morning 3/25/21   Orly Rodney DO   Rosuvastatin Calcium 40 MG CPSP Take 40 mg by mouth daily    Historical Provider, MD   ezetimibe (ZETIA) 10 MG tablet Take 10 mg by mouth daily    Historical Provider, MD   omeprazole (PRILOSEC) 40 MG delayed release capsule Take 40 mg by mouth daily    Historical Provider, MD   aspirin 81 MG EC tablet Take 81 mg by mouth daily    Historical Provider, MD   calcium acetate 667 MG TABS Take 1,334 mg by mouth 3 times daily (with meals)     Historical Provider, MD   vitamin B-12 (CYANOCOBALAMIN) 500 MCG tablet Take 500 mcg by mouth daily    Historical Provider, MD       Scheduled Meds:   morphine  4 mg IntraVENous Once    aspirin  81 mg Oral Daily    buPROPion  150 mg Oral QAM    calcium acetate  1,334 mg Oral TID WC    ezetimibe  10 mg Oral Daily    [Held by provider] FLUoxetine  40 mg Oral Daily    gabapentin  300 mg Oral Daily    [Held by provider] lisinopril  20 mg Oral Daily    vitamin B-12  500 mcg Oral Daily    insulin lispro  0-12 Units SubCUTAneous TID WC    insulin lispro  0-6 Units SubCUTAneous Nightly    sodium chloride flush  5-40 mL IntraVENous 2 times per day    rosuvastatin  10 mg Oral Nightly    sodium zirconium cyclosilicate  5 g Oral TID    predniSONE  40 mg Oral Daily    polyethylene glycol  17 g Oral Daily    pantoprazole  40 mg Oral QAM AC     Continuous Infusions:   heparin (PORCINE) Infusion 12 Units/kg/hr (05/30/22 0211)    dextrose      sodium chloride       PRN Meds:heparin (porcine), heparin (porcine), glucose, dextrose bolus **OR** dextrose bolus, glucagon (rDNA), dextrose, sodium chloride flush, sodium chloride, ondansetron **OR** ondansetron, acetaminophen **OR** acetaminophen, nitroGLYCERIN, morphine    No Known Allergies    Social History     Socioeconomic History    Marital status: Single     Spouse name: Not on file    Number of children: Not on file    Years of education: Not on file    Highest education level: Not on file   Occupational History    Not on file   Tobacco Use    Smoking status: Never Smoker    Smokeless tobacco: Never Used   Vaping Use    Vaping Use: Never used   Substance and Sexual Activity    Alcohol use: Never    Drug use: Never    Sexual activity: Not on file   Other Topics Concern    Not on file   Social History Narrative    Not on file     Social Determinants of Health     Financial Resource Strain:     Difficulty of Paying Living Expenses: Not on file   Food Insecurity:     Worried About [address] in the Last Year: Not on file    [address] Incentive Targeting in the Last Year: Not on file   Transportation Needs:     Lack of Transportation (Medical): Not on file    Lack of Transportation (Non-Medical):  Not on file   Physical Activity:     Days of Exercise per Week: Not on file    Minutes of Exercise per Session: Not on file   Stress:     Feeling of Stress : Not on file   Social Connections:     Frequency of Communication with Friends and Family: Not on file    Frequency of Social Gatherings with Friends and Family: Not on file    Attends [employer] Services: Not on file    Active Member of [address] [place] [address] or Organizations: Not on file    Attends Club or Organization Meetings: Not on file    Marital Status: Not on file   Intimate Partner Violence:     Fear of Current or Ex-Partner: Not on file    Emotionally Abused: Not on file    Physically Abused: Not on file    Sexually Abused: Not on file   Housing Stability:     Unable to Pay for Housing in the Last Year: Not on file    Number of [place] in the Last Year: Not on file    Unstable Housing in the Last Year: Not on file       Family History   Problem Relation Age of Onset    Diabetes Mother     Heart Disease Father     Diabetes Father     Cancer Paternal Grandmother     Heart Disease Maternal Great Grandfather          Physical Exam:  Vitals:    05/30/22 0330 05/30/22 0400 05/30/22 0445 05/30/22 0630   BP: 100/61 89/71 101/73 (!) 102/91   Pulse: 72 72 68 82   Resp: 15 16 19 15   Temp:       TempSrc:       SpO2:       Weight:       Height:         No intake/output data recorded. General:  Awake, alert, not in distress. Appears to be stated age. HEENT: Atraumatic, normocephalic. Anicteric sclera. Pink and moist oral mucosa. Neck supple. Chest: Bilateral air entry, clear to auscultation, no wheezing, rhonchi or rales. Cardiovascular: RRR, S1S2, no murmur, rub or gallop. No lower extremity edema. Abdomen: Soft, non tender to palpation. Active bowel sounds x 4 quadrants. Musculoskeletal: Active ROM x 4 extremities. No cyanosis or clubbing. Integumentary: Pink, warm and dry. Free from rash or lesions. Skin turgor normal.  CNS: Oriented to person, place and time. Cranial nerves grossly intact. Speech clear. Face symmetrical. No tremor.      Data:    CBC:   Lab Results   Component Value Date    WBC 12.0 (H) 05/30/2022    HGB 9.4 (L) 05/30/2022    HCT 30.5 (L) 05/30/2022    MCV 96.8 05/30/2022     05/30/2022     BMP:    Lab Results   Component Value Date     (L) 05/30/2022     (L) 05/30/2022     (L) 05/30/2022    K 5.5 (H) 05/30/2022    K 6.0 (HH) 05/30/2022    K 5.1 05/27/2022    CL 91 (L) 05/30/2022    CL 89 (L) 05/30/2022    CL 96 (L) 05/27/2022    CO2 20 05/30/2022    CO2 22 05/30/2022    CO2 25 05/27/2022    BUN 45 (H) 05/30/2022    BUN 43 (H) 05/30/2022    BUN 32 (H) 05/27/2022    CREATININE 8.98 (HH) 05/30/2022    CREATININE 8.15 (HH) 05/30/2022    CREATININE 7.85 () 05/27/2022    GLUCOSE 277 (H) 05/30/2022    GLUCOSE 282 05/30/2022    GLUCOSE 535 (HH) 05/30/2022     CMP:   Lab Results   Component Value Date     05/30/2022    K 5.5 05/30/2022    CL 91 05/30/2022    CO2 20 05/30/2022    BUN 45 05/30/2022    CREATININE 8.98 05/30/2022    GLUCOSE 277 05/30/2022    CALCIUM 9.6 05/30/2022    PROT 5.3 04/14/2022    LABALBU 3.4 04/14/2022    BILITOT 0.34 04/14/2022    ALKPHOS 137 04/14/2022    AST 38 04/14/2022    ALT 44 04/14/2022      Hepatic:   Lab Results   Component Value Date    AST 38 04/14/2022    AST 27 04/13/2022    AST 36 01/05/2022    ALT 44 (H) 04/14/2022    ALT 42 (H) 04/13/2022    ALT 38 01/05/2022    BILITOT 0.34 04/14/2022    BILITOT 0.28 (L) 04/13/2022    BILITOT 0.43 01/05/2022    ALKPHOS 137 (H) 04/14/2022    ALKPHOS 142 (H) 04/13/2022    ALKPHOS 110 01/05/2022     BNP: No results found for: BNP  Lipids:   Lab Results   Component Value Date    CHOL 118 04/12/2022    HDL 61 04/12/2022     INR:   Lab Results   Component Value Date    INR 1.0 05/27/2022    INR 0.9 04/12/2022     PTH: No results found for: PTH  Phosphorus:    Lab Results   Component Value Date    PHOS 3.1 05/27/2022     Ionized Calcium: No results found for: IONCA  Magnesium:   Lab Results   Component Value Date    MG 1.8 04/27/2022     Albumin:   Lab Results   Component Value Date    LABALBU 3.4 04/14/2022     Last 3 CK, CKMB, Troponin: @LABRCNT(CKTOTAL:3,CKMB:3,TROPONINI:3)       URINE:)No results found for: [place]    Radiology:   Reviewed. Assessment/Plan: To follow. Pt seen in collaboration with Dr. Estiven Blake.       Electronically signed by DANIEL Rondon CNP  on 5/30/2022 at 9:42 AM

## 2022-05-30 NOTE — H&P
[facility]  Office: [address], DO, Rodolfo Cuellar, DO, Mercer Base, DO, Babak Stevens Blood, DO, Suzie King MD, Henry Husain MD, Marlon Alberto MD, Farrah Cody MD, Matty Jeffery MD, Ari Gray MD, Soraya Evans MD, Santos Timmons, DO, Narendra Davis, DO, Mouna Gaona MD,  Israel Augustine DO, Kristine Martins MD, Melani Araya MD, Cathy Coffey MD, Stanley Ochoa, DO, Charlene House MD, Heather Salinas MD, Radha Dixon MD, Garrick Chavez [facility], [facility], [facility], Elmer Stoll, CNP, Pa Young, CNP, Sam Barber, [facility], Demi Aquino, CNP, Nicole Morejon PA-C, [place] Parks, DNP, Nilam Lynch, CNP, Elio Layton, CNP, Valdemar Cat, CNP, Suresh Tamez, CNS, Billy Robb, [facility], Orion Smyth, CNP, Giancarlo Olmos, CNP, Aric Delaney, [facility]    HISTORY AND PHYSICAL EXAMINATION            Date:   5/30/2022  Patient name:  Chintan Villagran  Date of admission:  5/29/2022 11:56 PM  MRN:   [id number]  Account:  [de-identified]  YOB: 1975  PCP:    DANIEL Vargas CNP  Room:   [place] [address]  Code Status:    Prior    Chief Complaint:     Chief Complaint   Patient presents with    Chest Pain    Shortness of Breath       History Obtained From:     patient    History of Present Illness:     Chintan Villagran is a 49 y.o. Non- / non  male who presents with Chest Pain and Shortness of Breath   and is admitted to the hospital for the management of Chest pain. Patient started having chest pain a couple of days ago that started while he was watching TV. He says it was mostly left sided that radiated up his neck. He also complains of hearing loss in the left ear. He also has left sided abdominal pain, nausea, and vomiting. He isn't sure he had a fever, but he often feels cold. He feels lightheaded when sitting up or standing. Nothing made his chest pain better or worse.  He has a known PMH of DM, ESRD on HD (T, Thurs, Sat). He denies any sick contacts. He did go to and complete his HD treatment on Saturday. While in the ED, EKG showed some ST elevation in many leads. He received IV morphine and he says this helped his pain. WBC is sl elevated at 12, CRP elevated at 238.1. Na was 125. Hgb 9.4- was 11.6 May 26, 2022. Trop were in the 700s. Cardiology was consulted from the ED and plans for poss cardiac cath today. He was started on a heparin gtt. He was noted to be hypotensive in the 80s and given midodrine. K+ 6- received 10 units IV insulin. Monster ordered for 3 doses. He is being admitted for further management of chest pain. Past Medical History:     Past Medical History:   Diagnosis Date    Closed fracture of bone of right foot March - April 2020    Dialysis patient [facility])     Kidney disease     On Dialysis    Type 1 diabetes mellitus ([facility] [address]         Past Surgical History:     Past Surgical History:   Procedure Laterality Date    AV FISTULA CREATION Left         Medications Prior to Admission:     Prior to Admission medications    Medication Sig Start Date End Date Taking? Authorizing Provider   acetaminophen-codeine (TYLENOL/CODEINE #3) 300-30 MG per tablet Take 1 tablet by mouth every 6 hours as needed for Pain for up to 3 days. 5/28/22 5/31/22  Charlotet Araya MD   lisinopril (PRINIVIL;ZESTRIL) 20 MG tablet Take 1 tablet by mouth daily 5/27/22   DANIEL Lane CNP   gabapentin (NEURONTIN) 300 MG capsule Take 1 capsule by mouth daily. 5/28/22   DANIEL Lane CNP   insulin glargine (LANTUS) 100 UNIT/ML injection vial Inject 45 Units into the skin every morning Indications: 45 units in the morning and 10 units in the evening    Historical Provider, MD   traMADol (ULTRAM) 50 MG tablet Take 50 mg by mouth 2 times daily as needed for Pain. 5/6/22   Historical Provider, MD   Misc.  Devices MISC Disp: custom molded shoes to accommodate for brace   DX: DM with history of stroke, foot drop  Duration: 1 year 5/11/22   Sammuel Code, DPM   ondansetron (ZOFRAN) 4 MG tablet Take 1 tablet by mouth 3 times daily as needed for Nausea or Vomiting 4/29/22   Franck Mae APRN - CNP   magnesium oxide (MAG-OX) 400 MG tablet Take 400 mg by mouth daily  Patient not taking: Reported on 5/28/2022    Historical Provider, MD   magnesium hydroxide (MILK OF MAGNESIA) 400 MG/5ML suspension Take 15 mLs by mouth daily as needed for Constipation    Historical Provider, MD   insulin aspart (NOVOLOG) 100 UNIT/ML injection vial Inject 0-8 Units into the skin 3 times daily (before meals) SLIDING SCALE     Historical Provider, MD   FLUoxetine (PROZAC) 40 MG capsule Take 40 mg by mouth daily     Historical Provider, MD   ammonium lactate (LAC-HYDRIN) 12 % lotion Apply topically daily. Patient taking differently: Apply to feet 11/1/21   Yohannesmuel Code, DPM   furosemide (LASIX) 20 MG tablet Take 1 tablet by mouth daily  Patient not taking: Reported on 5/28/2022 4/27/21   Jaelyn Labrum, DO   buPROPion (WELLBUTRIN XL) 150 MG extended release tablet Take 1 tablet by mouth every morning 3/25/21   Jaelyn Labrum, DO   Rosuvastatin Calcium 40 MG CPSP Take 40 mg by mouth daily    Historical Provider, MD   ezetimibe (ZETIA) 10 MG tablet Take 10 mg by mouth daily    Historical Provider, MD   omeprazole (PRILOSEC) 40 MG delayed release capsule Take 40 mg by mouth daily    Historical Provider, MD   aspirin 81 MG EC tablet Take 81 mg by mouth daily    Historical Provider, MD   calcium acetate 667 MG TABS Take 1,334 mg by mouth 3 times daily (with meals)     Historical Provider, MD   vitamin B-12 (CYANOCOBALAMIN) 500 MCG tablet Take 500 mcg by mouth daily    Historical Provider, MD        Allergies:     Patient has no known allergies. Social History:     Tobacco:    reports that he has never smoked. He has never used smokeless tobacco.  Alcohol:      reports no history of alcohol use.   Drug Use:  reports no history of drug use. Family History:     Family History   Problem Relation Age of Onset    Diabetes Mother     Heart Disease Father     Diabetes Father     Cancer Paternal Grandmother     Heart Disease Maternal Great Grandfather        Review of Systems:     Positive and Negative as described in HPI. Review of Systems   Constitutional: Positive for appetite change and chills. Negative for fatigue and fever. HENT: Positive for hearing loss. Negative for ear discharge, ear pain, rhinorrhea and sore throat. Hearing loss left ear   Eyes: Negative. Respiratory: Negative. Cardiovascular: Positive for chest pain. Negative for palpitations and leg swelling. Gastrointestinal: Positive for abdominal pain, constipation, nausea and vomiting. Genitourinary: Negative. Musculoskeletal: Negative. Skin: Negative. Neurological: Positive for light-headedness. Negative for dizziness, syncope and headaches. Psychiatric/Behavioral: Negative. Physical Exam:   /73   Pulse 68   Temp 98.1 °F (36.7 °C) (Oral)   Resp 19   Ht 5' 7\" (1.702 m)   Wt 182 lb 15.7 oz (83 kg)   SpO2 95%   BMI 28.66 kg/m²   Temp (24hrs), Av.1 °F (36.7 °C), Min:98.1 °F (36.7 °C), Max:98.1 °F (36.7 °C)    Recent Labs     22  0630 22  1130 22  0246   POCGLU 183* 358* 282*     No intake or output data in the 24 hours ending 22 0622    Physical Exam  Vitals and nursing note reviewed. Constitutional:       General: He is in acute distress. Appearance: He is ill-appearing. He is not toxic-appearing or diaphoretic. HENT:      Head: Normocephalic and atraumatic. Right Ear: External ear normal.      Left Ear: External ear normal.      Nose: Nose normal. No rhinorrhea. Mouth/Throat:      Mouth: Mucous membranes are moist.   Eyes:      General: No scleral icterus. Right eye: No discharge. Left eye: No discharge.       Extraocular Movements: Extraocular movements intact. Conjunctiva/sclera: Conjunctivae normal.      Pupils: Pupils are equal, round, and reactive to light. Neck:      Comments: No JVD  Cardiovascular:      Rate and Rhythm: Normal rate and regular rhythm. Pulses: Normal pulses. Heart sounds: Normal heart sounds. No murmur heard. No friction rub. No gallop. Pulmonary:      Effort: Pulmonary effort is normal. No respiratory distress. Breath sounds: Normal breath sounds. No wheezing, rhonchi or rales. Abdominal:      General: There is no distension. Palpations: Abdomen is soft. Tenderness: There is no abdominal tenderness. There is no guarding. Hernia: No hernia is present. Comments: Bowel sounds hypoactive   Musculoskeletal:         General: Normal range of motion. Cervical back: Normal range of motion and neck supple. Right lower leg: No edema. Left lower leg: No edema. Skin:     General: Skin is warm. Coloration: Skin is pale. Skin is not jaundiced. Findings: No erythema or lesion. Neurological:      General: No focal deficit present. Mental Status: He is alert and oriented to person, place, and time. Sensory: Sensory deficit present. Comments: [facility]   Psychiatric:         Attention and Perception: Attention normal.         Mood and Affect: Mood is anxious. Affect is flat. Speech: Speech normal.         Behavior: Behavior is slowed. Behavior is cooperative.          Cognition and Memory: Cognition and memory normal.         Judgment: Judgment normal.         Investigations:      Laboratory Testing:  Recent Results (from the past 24 hour(s))   CBC with Auto Differential    Collection Time: 05/30/22 12:11 AM   Result Value Ref Range    WBC 12.0 (H) 3.5 - 11.3 k/uL    RBC 3.15 (L) 4.21 - 5.77 m/uL    Hemoglobin 9.4 (L) 13.0 - 17.0 g/dL    Hematocrit 30.5 (L) 40.7 - 50.3 %    MCV 96.8 82.6 - 102.9 fL    MCH 29.8 25.2 - 33.5 pg    MCHC 30.8 28.4 - 34.8 g/dL    RDW 12.5 11.8 - 14.4 %    Platelets 569 471 - 815 k/uL    MPV 10.3 8.1 - 13.5 fL    NRBC Automated 0.0 0.0 per 100 WBC    Seg Neutrophils 80 (H) 36 - 65 %    Lymphocytes 10 (L) 24 - 43 %    Monocytes 10 3 - 12 %    Eosinophils % 0 (L) 1 - 4 %    Basophils 0 0 - 2 %    Immature Granulocytes 0 0 %    Segs Absolute 9.43 (H) 1.50 - 8.10 k/uL    Absolute Lymph # 1.20 1.10 - 3.70 k/uL    Absolute Mono # 1.18 0.10 - 1.20 k/uL    Absolute Eos # 0.05 0.00 - 0.44 k/uL    Basophils Absolute 0.05 0.00 - 0.20 k/uL    Absolute Immature Granulocyte 0.05 0.00 - 0.30 k/uL   Basic Metabolic Panel    Collection Time: 05/30/22 12:11 AM   Result Value Ref Range    Glucose 535 (HH) 70 - 99 mg/dL    BUN 43 (H) 6 - 20 mg/dL    CREATININE 8.15 (HH) 0.70 - 1.20 mg/dL    Bun/Cre Ratio 5 (L) 9 - 20    Calcium 9.3 8.6 - 10.4 mg/dL    Sodium 125 (L) 135 - 144 mmol/L    Potassium 6.0 (HH) 3.7 - 5.3 mmol/L    Chloride 89 (L) 98 - 107 mmol/L    CO2 22 20 - 31 mmol/L    Anion Gap 14 9 - 17 mmol/L    GFR Non-African American 7 (L) >60 mL/min    GFR  9 (L) >60 mL/min    GFR Comment         TROP/MYOGLOBIN    Collection Time: 05/30/22 12:11 AM   Result Value Ref Range    Troponin, High Sensitivity 750 (HH) 0 - 22 ng/L    Myoglobin 529 (H) 28 - 72 ng/mL   APTT    Collection Time: 05/30/22 12:11 AM   Result Value Ref Range    PTT 36.6 (H) 23.9 - 33.8 sec   TROP/MYOGLOBIN    Collection Time: 05/30/22  1:11 AM   Result Value Ref Range    Troponin, High Sensitivity 709 (HH) 0 - 22 ng/L    Myoglobin 496 (H) 28 - 72 ng/mL   EKG 12 Lead    Collection Time: 05/30/22  1:17 AM   Result Value Ref Range    Ventricular Rate 84 BPM    Atrial Rate 84 BPM    P-R Interval 108 ms    QRS Duration 96 ms    Q-T Interval 376 ms    QTc Calculation (Bazett) 444 ms    P Axis 43 degrees    R Axis 9 degrees    T Axis 24 degrees   EKG 12 Lead    Collection Time: 05/30/22  1:55 AM   Result Value Ref Range    Ventricular Rate 81 BPM    Atrial Rate 81 BPM    P-R Interval 136 ms QRS Duration 100 ms    Q-T Interval 394 ms    QTc Calculation (Bazett) 457 ms    P Axis 28 degrees    R Axis 7 degrees    T Axis 39 degrees   TROP/MYOGLOBIN    Collection Time: 05/30/22  2:21 AM   Result Value Ref Range    Troponin, High Sensitivity 731 (HH) 0 - 22 ng/L    Myoglobin 516 (H) 28 - 72 ng/mL   C-Reactive Protein    Collection Time: 05/30/22  2:21 AM   Result Value Ref Range    .8 (H) 0.0 - 5.0 mg/L   POC Glucose Fingerstick    Collection Time: 05/30/22  2:46 AM   Result Value Ref Range    POC Glucose 282 (H) 75 - 110 mg/dL   POCT Glucose    Collection Time: 05/30/22  2:47 AM   Result Value Ref Range    Glucose 282 mg/dL    QC OK? ok        Imaging/Diagnostics:  XR KNEE RIGHT (3 VIEWS)    Result Date: 5/26/2022  No acute abnormality of the knee. XR SHOULDER LEFT (MIN 2 VIEWS)    Result Date: 5/28/2022  No acute osseous abnormality. XR CHEST PORTABLE    Result Date: 5/30/2022  Retrocardiac, right perihilar and basilar airspace disease which may represent pneumonia or atelectasis. Assessment :      Hospital Problems           Last Modified POA    * (Principal) Chest pain 5/30/2022 Yes    Hypotension 5/30/2022 Yes    Elevated troponin 5/30/2022 Yes    Hyponatremia 5/30/2022 Yes    Anemia 5/30/2022 Yes    Hyperkalemia 5/30/2022 Yes    ESRD (end stage renal disease) on dialysis ([address] 5/30/2022 Yes    Primary hypertension 5/30/2022 Yes    Hyperlipidemia 5/30/2022 Yes    Type 2 diabetes mellitus with kidney complication, with long-term current use of insulin ([address] (Chronic) 5/30/2022 Yes    GERD (gastroesophageal reflux disease) 5/30/2022 Yes          Plan:     Patient status inpatient in the  Cardiovascular ICU    Chest pain: eKGs reviewed. Heparin gtt per cardiology recommendations. CRP elevated- ? Pericarditis. Prednisone 40 mg PO daily. Labs reviewed. Morphine IV as needed. NPO for now for poss cardiac cath- await further recommendations from cardiology. Obtain echo.   Hypotension: 250 mL NS bolus x1. Midodrine given while in ED. Continue to closely monitor. Elevated trop: Cont to trend. Cardiology consulted. Cont telemetry- monitor for rhythm changes. Hyponatremia: Serial Na q 6 hours, check urine osmo, serum osmo, urin Na. Nephrology consult. 250 mL NS fluid bolus x1. Anemia: Iron studies, check B12 & folate. Hold Prozac for noow. ESRD: Patient usually dialyzes Tues, Thurs, Sat. Dr Ta Young consulted. Daily BMP. HD per nephro  Hyperkalemia: Insulin given in ED- [facility] ordered. Nephrology consult. Cont telemetry. BMP this AM.  HTN: Hold lisinopril. Patient noted to be hypotensive at this time  HLD: Cont statin  DM II: Monitor BG AC & HS. Medium correction SSI. Check A1c  GERD: Protonix PO daily. Consultations:   IP CONSULT TO HOSPITALIST  IP CONSULT TO NEPHROLOGY  IP CONSULT TO CARDIOLOGY     Patient is admitted as inpatient status because of co-morbidities listed above, severity of signs and symptoms as outlined, requirement for current medical therapies and most importantly because of direct risk to patient if care not provided in a hospital setting. Expected length of stay > 48 hours.     Total 50 mins spent in the completion of this H&P    DANIEL Vogt NP  5/30/2022  6:22 AM    Copy sent to DANIEL Márquez - CNP

## 2022-05-30 NOTE — CARE COORDINATION
Attempted to see pt twice for discharge planning assessment but both times sleeping soundly and not disturbed.

## 2022-05-30 NOTE — CONSULTS
Consult Note    Reason for Consult:  ESRD    Requesting Physician:  Bridger Vegas DO    HISTORY OF PRESENT ILLNESS:      Pt seen and examined at the bed side  Hx obtained from pt, EMR, CNP    The patient is a 49 y.o. male who presents with complaint of chest pain. EKG showed ST elevation. Cardiology has been consulted. Possible pericarditis. He is on heparin drip. Cardiac cath planned tomorrow. CT chest/abd/pelvis results pending. SBP trending 80s to 100s. Lisinopril does decreased and norvasc dc'd last admission (last week). He states he made these adjustments to home meds. On admission, glucose 535 with sodium 125 corrected sodium 134, potassium 6, BUN 43, creatinine 8.15. He was initiated on Lokelma and insulin. This a.m, sodium was 128, corrected sodium 132 with potassium 5.5, bicarb 20, BUN 45, creatinine 8.98, glucose improved to 277, lactic acid 2.4. Most recent sodium at 846 was 126. Glucose not checked at that time. Hemoglobin 9.4 with WBC 12. Ferritin 3028. Patient was admitted last week for weakness and falls. At that time Neurontin dose was decreased. Hx of ESRD on HD MWF via AVF at [facility]. Review Of Systems:   Constitutional: No fever, chills, lethargy, weakness or wt loss  HEENT:  No headache, nasal discharge or sore throat. Cardiac:  + chest pain, no dyspnea  Chest:               No cough, phlegm or wheezing. Abdomen:  + abdominal pain, no nausea, vomiting or diarrhea. Neuro:              No gross focal weakness, numbness, abnormal movements or seizure like activity. Skin:   No rashes or itching. :   No hematuria, pyuria, dysuria or flank pain. Extremities:  No swelling or joint pains. Endocrine: No polyuria, polydypsia, or thyroid problems. Hematology:    No bleeding disorders, bruising or anemia. All other ROS is negative.     Past Medical History:   Diagnosis Date    Cerebral artery occlusion with cerebral infarction [facility] [address]     Closed fracture of bone of right foot March - April 2020    Dialysis patient [facility])     Hemodialysis patient [facility])     Hyperlipidemia     Hypertension     Kidney disease     On Dialysis    Type 1 diabetes mellitus ([facility] [address]        Past Surgical History:   Procedure Laterality Date    AV FISTULA CREATION Left     WRIST SURGERY  1995       Prior to Admission medications    Medication Sig Start Date End Date Taking? Authorizing Provider   acetaminophen-codeine (TYLENOL/CODEINE #3) 300-30 MG per tablet Take 1 tablet by mouth every 6 hours as needed for Pain for up to 3 days. 5/28/22 5/31/22  Castillo Sow MD   lisinopril (PRINIVIL;ZESTRIL) 20 MG tablet Take 1 tablet by mouth daily 5/27/22   DANIEL Sheppard CNP   gabapentin (NEURONTIN) 300 MG capsule Take 1 capsule by mouth daily. 5/28/22   DANIEL Sheppard CNP   insulin glargine (LANTUS) 100 UNIT/ML injection vial Inject 45 Units into the skin every morning Indications: 45 units in the morning and 10 units in the evening    Historical Provider, MD   traMADol (ULTRAM) 50 MG tablet Take 50 mg by mouth 2 times daily as needed for Pain. 5/6/22   Historical Provider, MD   Misc.  Devices MISC Disp: custom molded shoes to accommodate for brace   DX: DM with history of stroke, foot drop  Duration: 1 year 5/11/22   Js Wise DPM   ondansetron (ZOFRAN) 4 MG tablet Take 1 tablet by mouth 3 times daily as needed for Nausea or Vomiting 4/29/22   DANIEL Tony CNP   magnesium oxide (MAG-OX) 400 MG tablet Take 400 mg by mouth daily  Patient not taking: Reported on 5/28/2022    Historical Provider, MD   magnesium hydroxide (MILK OF MAGNESIA) 400 MG/5ML suspension Take 15 mLs by mouth daily as needed for Constipation    Historical Provider, MD   insulin aspart (NOVOLOG) 100 UNIT/ML injection vial Inject 0-8 Units into the skin 3 times daily (before meals) SLIDING SCALE     Historical Provider, MD   FLUoxetine (PROZAC) 40 MG capsule Take 40 mg by mouth daily     Historical Provider, MD   ammonium lactate (LAC-HYDRIN) 12 % lotion Apply topically daily.   Patient taking differently: Apply to feet 11/1/21   Kyleigh Anguiano DPM   furosemide (LASIX) 20 MG tablet Take 1 tablet by mouth daily  Patient not taking: Reported on 5/28/2022 4/27/21   Amparo Rashid DO   buPROPion (WELLBUTRIN XL) 150 MG extended release tablet Take 1 tablet by mouth every morning 3/25/21   Amparo Rashid DO   Rosuvastatin Calcium 40 MG CPSP Take 40 mg by mouth daily    Historical Provider, MD   ezetimibe (ZETIA) 10 MG tablet Take 10 mg by mouth daily    Historical Provider, MD   omeprazole (PRILOSEC) 40 MG delayed release capsule Take 40 mg by mouth daily    Historical Provider, MD   aspirin 81 MG EC tablet Take 81 mg by mouth daily    Historical Provider, MD   calcium acetate 667 MG TABS Take 1,334 mg by mouth 3 times daily (with meals)     Historical Provider, MD   vitamin B-12 (CYANOCOBALAMIN) 500 MCG tablet Take 500 mcg by mouth daily    Historical Provider, MD       Scheduled Meds:   morphine  4 mg IntraVENous Once    aspirin  81 mg Oral Daily    buPROPion  150 mg Oral QAM    calcium acetate  1,334 mg Oral TID WC    ezetimibe  10 mg Oral Daily    [Held by provider] FLUoxetine  40 mg Oral Daily    gabapentin  300 mg Oral Daily    [Held by provider] lisinopril  20 mg Oral Daily    vitamin B-12  500 mcg Oral Daily    insulin lispro  0-12 Units SubCUTAneous TID WC    insulin lispro  0-6 Units SubCUTAneous Nightly    sodium chloride flush  5-40 mL IntraVENous 2 times per day    rosuvastatin  10 mg Oral Nightly    sodium zirconium cyclosilicate  5 g Oral TID    predniSONE  40 mg Oral Daily    polyethylene glycol  17 g Oral Daily    pantoprazole  40 mg Oral QAM AC     Continuous Infusions:   heparin (PORCINE) Infusion 12 Units/kg/hr (05/30/22 0211)    dextrose      sodium chloride       PRN Meds:heparin (porcine), heparin (porcine), glucose, dextrose bolus **OR** dextrose bolus, glucagon (rDNA), dextrose, sodium chloride flush, sodium chloride, ondansetron **OR** ondansetron, acetaminophen **OR** acetaminophen, nitroGLYCERIN, morphine    No Known Allergies    Social History     Socioeconomic History    Marital status: Single     Spouse name: Not on file    Number of children: Not on file    Years of education: Not on file    Highest education level: Not on file   Occupational History    Not on file   Tobacco Use    Smoking status: Never Smoker    Smokeless tobacco: Never Used   Vaping Use    Vaping Use: Never used   Substance and Sexual Activity    Alcohol use: Never    Drug use: Never    Sexual activity: Not on file   Other Topics Concern    Not on file   Social History Narrative    Not on file     Social Determinants of Health     Financial Resource Strain:     Difficulty of Paying Living Expenses: Not on file   Food Insecurity:     Worried About [address] in the Last Year: Not on file    [address] [employer] in the Last Year: Not on file   Transportation Needs:     Lack of Transportation (Medical): Not on file    Lack of Transportation (Non-Medical):  Not on file   Physical Activity:     Days of Exercise per Week: Not on file    Minutes of Exercise per Session: Not on file   Stress:     Feeling of Stress : Not on file   Social Connections:     Frequency of Communication with Friends and Family: Not on file    Frequency of Social Gatherings with Friends and Family: Not on file    Attends Shinto Services: Not on file    Active Member of Clubs or Organizations: Not on file    Attends Club or Organization Meetings: Not on file    Marital Status: Not on file   Intimate Partner Violence:     Fear of Current or Ex-Partner: Not on file    Emotionally Abused: Not on file    Physically Abused: Not on file    Sexually Abused: Not on file   Housing Stability:     Unable to Pay for Housing in the Last Year: Not on file    Number of [place] in the Last Year: Not on file    Unstable Housing in the Last Year: Not on file       Family History   Problem Relation Age of Onset    Diabetes Mother     Heart Disease Father     Diabetes Father     Cancer Paternal Grandmother     Heart Disease Maternal Great Grandfather          Physical Exam:  Vitals:    05/30/22 0400 05/30/22 0445 05/30/22 0630 05/30/22 1200   BP: 89/71 101/73 (!) 102/91    Pulse: 72 68 82    Resp: 16 19 15    Temp:    97.5 °F (36.4 °C)   TempSrc:    Temporal   SpO2:       Weight:       Height:         No intake/output data recorded. General:  Awake, alert, not in distress. Appears to be stated age. HEENT: Atraumatic, normocephalic. Anicteric sclera. Pink and moist oral mucosa. Neck supple. Chest: Bilateral air entry, clear to auscultation, no wheezing, rhonchi or rales. Cardiovascular: RRR, S1S2, no murmur, rub or gallop. No lower extremity edema. Abdomen: Soft, non tender to palpation. Active bowel sounds x 4 quadrants. Musculoskeletal: Active ROM x 4 extremities. No cyanosis or clubbing. Integumentary: Pink, warm and dry. Free from rash or lesions. Skin turgor normal.  CNS: Oriented to person, place and time. Cranial nerves grossly intact. Speech clear. Face symmetrical. No tremor.      Data:    CBC:   Lab Results   Component Value Date    WBC 12.0 (H) 05/30/2022    HGB 9.4 (L) 05/30/2022    HCT 30.5 (L) 05/30/2022    MCV 96.8 05/30/2022     05/30/2022     BMP:    Lab Results   Component Value Date     (L) 05/30/2022     (L) 05/30/2022     (L) 05/30/2022    K 5.5 (H) 05/30/2022    K 6.0 (HH) 05/30/2022    K 5.1 05/27/2022    CL 91 (L) 05/30/2022    CL 89 (L) 05/30/2022    CL 96 (L) 05/27/2022    CO2 20 05/30/2022    CO2 22 05/30/2022    CO2 25 05/27/2022    BUN 45 (H) 05/30/2022    BUN 43 (H) 05/30/2022    BUN 32 (H) 05/27/2022    CREATININE 8.98 (HH) 05/30/2022    CREATININE 8.15 () 05/30/2022    CREATININE 7.85 () 05/27/2022    GLUCOSE 277 (H) 05/30/2022    GLUCOSE 282 05/30/2022    GLUCOSE 535 (HH) 05/30/2022     CMP:   Lab Results   Component Value Date     05/30/2022    K 5.5 05/30/2022    CL 91 05/30/2022    CO2 20 05/30/2022    BUN 45 05/30/2022    CREATININE 8.98 05/30/2022    GLUCOSE 277 05/30/2022    CALCIUM 9.6 05/30/2022    PROT 5.3 04/14/2022    LABALBU 3.4 04/14/2022    BILITOT 0.34 04/14/2022    ALKPHOS 137 04/14/2022    AST 38 04/14/2022    ALT 44 04/14/2022      Hepatic:   Lab Results   Component Value Date    AST 38 04/14/2022    AST 27 04/13/2022    AST 36 01/05/2022    ALT 44 (H) 04/14/2022    ALT 42 (H) 04/13/2022    ALT 38 01/05/2022    BILITOT 0.34 04/14/2022    BILITOT 0.28 (L) 04/13/2022    BILITOT 0.43 01/05/2022    ALKPHOS 137 (H) 04/14/2022    ALKPHOS 142 (H) 04/13/2022    ALKPHOS 110 01/05/2022     BNP: No results found for: BNP  Lipids:   Lab Results   Component Value Date    CHOL 118 04/12/2022    HDL 61 04/12/2022     INR:   Lab Results   Component Value Date    INR 1.0 05/27/2022    INR 0.9 04/12/2022     PTH: No results found for: PTH  Phosphorus:    Lab Results   Component Value Date    PHOS 3.1 05/27/2022     Ionized Calcium: No results found for: IONCA  Magnesium:   Lab Results   Component Value Date    MG 1.8 04/27/2022     Albumin:   Lab Results   Component Value Date    LABALBU 3.4 04/14/2022     Last 3 CK, CKMB, Troponin: @LABRCNT(CKTOTAL:3,CKMB:3,TROPONINI:3)       URINE:)No results found for: [place]    Radiology:   Reviewed. Assessment/Plan:     ESRD  Anemia  MBD    Principal Problem:    Acute idiopathic pericarditis  Active Problems:    Hypotension    Elevated troponin    Hyponatremia    Anemia    Hyperkalemia    Left lower quadrant abdominal pain    ESRD (end stage renal disease) on dialysis ([address]    Primary hypertension    Hyperlipidemia    Type 2 diabetes mellitus with kidney complication, with long-term current use of insulin (HCC)    GERD (gastroesophageal reflux disease)  Resolved Problems:    * No resolved hospital problems. *    Plan:    HD today due to hyperkalemia, hyponatremia and metabolic acidosis. C.Cath in am  Will plan HD after C. Cath as well  NEDA  Renal diet  FU labs    Electronically signed by Abebe Moran MD  on 5/30/2022 at 12:27 PM

## 2022-05-30 NOTE — PROCEDURES
[facility]  Pericaiocentesis        Date:   5/30/2022  Patient name: Bob Conklin  Date of admission:  5/29/2022 11:56 PM  MRN:   [id number]  YOB: 1975    CARDIAC CATHETERIZATION    : Cher Dickinson MD     Indications for Pericardiocentesis: Large effusion with tamponade physiology     Pre Procedure Conscious Sedation Data:    ASA Class:    [] I [x] II [] III [] IV    Mallampati Class:  [] I [] II [x] III [] IV    Procedure:  A local anesthetic was applied locally below the xyphoid area  7 F needle was then advanced under fluoro and echo guidance followed by wire and pigtail catheter which was left in place with drainage bag     A Bloody 860 cc fluid was removed with resolution of pericardial effusion on echo and improvement of BP to 160's from low 778'R systolic.      Transthoracic echocardiogram was done to confirm the process and the success of drainage    Total amount of fluid: 860 cc    Type of fluid: Hemorraghic     Complications: Nonne     EBL: 20 cc       Plan:    Keep the pigtail catheter in overnight  Limited echocardiogram in AM  Serial Hb  Resume heparin drip if hb remains stable     Nargis Moore MD [facility] [place]

## 2022-05-30 NOTE — PROGRESS NOTES
Pt admitted from ER. After assessment, pt states that he was having left abdominal pain. Pt was given 2mg morphine. States pain increased after. 12 lead EKG obtained- showed pericarditis. Dr. Jose M Flores ordered abdominal and chest CT. Pt taken immediately.

## 2022-05-30 NOTE — SIGNIFICANT EVENT
Called by Dr Duane Hof for moderate to large pericardial effusion on CT chest. Patient had increasing hypoxia and dyspnea since afternoon. SBP dropped to 85-90. No tachycardia. Stat echo was obtained, shows large anterior and moderate posterior effusion, new as compared to last echo in 04/2022. Given borderline hypotension, hypoxia and new effusion will proceed with emergent pericardiocentesis. Risks and complication discussed with patient, he understands and willing to proceed.

## 2022-05-30 NOTE — ED NOTES
Pt resting with eye closed in bed, respirations even and unlabored. Call light in reach.       Rickie Gordillo RN  05/30/22 7971

## 2022-05-30 NOTE — ED PROVIDER NOTES
[address] [place] [address] ED  EMERGENCY DEPARTMENT ENCOUNTER      Pt Name: Lui Reagan  MRN: [id number]  [place] 1975  Date of evaluation: 5/29/2022  Provider: Carl Brenner MD    CHIEF COMPLAINT       Chief Complaint   Patient presents with    Chest Pain    Shortness of Breath         HISTORY OF PRESENT ILLNESS  (Location/Symptom, Timing/Onset, Context/Setting, Quality, Duration, Modifying Factors, Severity.)   Lui Reagan is a 49 y.o. male who presents to the emergency department chest pain. It started 16 or 18 hours ago and its been intermittent. He points to the left upper part of his chest to indicate area of pain. No injury or known fever he was given aspirin by paramedics and nitroglycerin and the patient states that the nitroglycerin made it better. He has no history of heart problems and had a stress test about 10 years ago. He was here last night for left shoulder pain that he had had for 3 months. At that time he was not having chest pain. Nursing Notes were reviewed. ALLERGIES     Patient has no known allergies. CURRENT MEDICATIONS       Previous Medications    ACETAMINOPHEN-CODEINE (TYLENOL/CODEINE #3) 300-30 MG PER TABLET    Take 1 tablet by mouth every 6 hours as needed for Pain for up to 3 days. AMMONIUM LACTATE (LAC-HYDRIN) 12 % LOTION    Apply topically daily. ASPIRIN 81 MG EC TABLET    Take 81 mg by mouth daily    BUPROPION (WELLBUTRIN XL) 150 MG EXTENDED RELEASE TABLET    Take 1 tablet by mouth every morning    CALCIUM ACETATE 667 MG TABS    Take 1,334 mg by mouth 3 times daily (with meals)     EZETIMIBE (ZETIA) 10 MG TABLET    Take 10 mg by mouth daily    FLUOXETINE (PROZAC) 40 MG CAPSULE    Take 40 mg by mouth daily     FUROSEMIDE (LASIX) 20 MG TABLET    Take 1 tablet by mouth daily    GABAPENTIN (NEURONTIN) 300 MG CAPSULE    Take 1 capsule by mouth daily.     INSULIN ASPART (NOVOLOG) 100 UNIT/ML INJECTION VIAL    Inject 0-8 Units into the skin 3 times daily (before meals) SLIDING SCALE     INSULIN GLARGINE (LANTUS) 100 UNIT/ML INJECTION VIAL    Inject 45 Units into the skin every morning Indications: 45 units in the morning and 10 units in the evening    LISINOPRIL (PRINIVIL;ZESTRIL) 20 MG TABLET    Take 1 tablet by mouth daily    MAGNESIUM HYDROXIDE (MILK OF MAGNESIA) 400 MG/5ML SUSPENSION    Take 15 mLs by mouth daily as needed for Constipation    MAGNESIUM OXIDE (MAG-OX) 400 MG TABLET    Take 400 mg by mouth daily    MISC. DEVICES MISC    Disp: custom molded shoes to accommodate for brace   DX: DM with history of stroke, foot drop  Duration: 1 year    OMEPRAZOLE (PRILOSEC) 40 MG DELAYED RELEASE CAPSULE    Take 40 mg by mouth daily    ONDANSETRON (ZOFRAN) 4 MG TABLET    Take 1 tablet by mouth 3 times daily as needed for Nausea or Vomiting    ROSUVASTATIN CALCIUM 40 MG CPSP    Take 40 mg by mouth daily    TRAMADOL (ULTRAM) 50 MG TABLET    Take 50 mg by mouth 2 times daily as needed for Pain. VITAMIN B-12 (CYANOCOBALAMIN) 500 MCG TABLET    Take 500 mcg by mouth daily       PAST MEDICAL HISTORY         Diagnosis Date    Closed fracture of bone of right foot March - 2020    Dialysis patient [facility])     Kidney disease     On Dialysis    Type 1 diabetes mellitus ([facility] [address]        SURGICAL HISTORY           Procedure Laterality Date    AV FISTULA CREATION Left          FAMILY HISTORY           Problem Relation Age of Onset    Diabetes Mother     Heart Disease Father     Diabetes Father     Cancer Paternal Grandmother     Heart Disease Maternal Great Grandfather      Family Status   Relation Name Status    Mother  (Not Specified)    Father      PGM  (Not Specified)    MGGF  (Not Specified)        SOCIAL HISTORY      reports that he has never smoked. He has never used smokeless tobacco. He reports that he does not drink alcohol and does not use drugs.     REVIEW OF SYSTEMS    (2-9 systems for level 4, 10 or more for level 5)     Review of Systems erythema or rash. Neurological:      Mental Status: He is alert and oriented to person, place, and time. Psychiatric:         Behavior: Behavior normal.             DIAGNOSTIC RESULTS     EKG: All EKG's are interpreted by the Emergency Department Physician who either signs or Co-signs this chart in the absence of a cardiologist.    EKG number 1 on my interpretation shows normal sinus rhythm. EKG #2 on my interpretation shows some questionable ST segment elevation. This was sent to Dr. Matthew Blanchard who did not feel that this was a STEMI. The third EKG was no different than the second 1 and that was sent to him as well. RADIOLOGY:   Non-plain film images such as CT, Ultrasound and MRI are read by the radiologist. Plain radiographic images are visualized and preliminarily interpreted by the emergency physician with the below findings:    Interpretation per the Radiologist below, if available at the time of this note:    XR CHEST PORTABLE    Result Date: 5/30/2022  EXAMINATION: [address] 5/30/2022 12:23 am COMPARISON: 01/05/2022 HISTORY: ORDERING SYSTEM PROVIDED HISTORY: Chest Pain TECHNOLOGIST PROVIDED HISTORY: Chest Pain FINDINGS: Cardiomegaly. Retrocardiac and right basilar as well as perihilar airspace disease. No pneumothorax. No acute osseous abnormality is identified. Retrocardiac, right perihilar and basilar airspace disease which may represent pneumonia or atelectasis.        LABS:  Labs Reviewed   CBC WITH AUTO DIFFERENTIAL - Abnormal; Notable for the following components:       Result Value    WBC 12.0 (*)     RBC 3.15 (*)     Hemoglobin 9.4 (*)     Hematocrit 30.5 (*)     Seg Neutrophils 80 (*)     Lymphocytes 10 (*)     Eosinophils % 0 (*)     Segs Absolute 9.43 (*)     All other components within normal limits   BASIC METABOLIC PANEL - Abnormal; Notable for the following components:    Glucose 535 (*)     BUN 43 (*)     CREATININE 8.15 (*)     Bun/Cre Ratio 5 (*) Sodium 125 (*)     Potassium 6.0 (*)     Chloride 89 (*)     GFR Non- 7 (*)     GFR  9 (*)     All other components within normal limits   TROP/MYOGLOBIN - Abnormal; Notable for the following components:    Troponin, High Sensitivity 750 (*)     Myoglobin 529 (*)     All other components within normal limits   TROP/MYOGLOBIN - Abnormal; Notable for the following components:    Troponin, High Sensitivity 731 (*)     Myoglobin 516 (*)     All other components within normal limits   TROP/MYOGLOBIN - Abnormal; Notable for the following components:    Troponin, High Sensitivity 709 (*)     Myoglobin 496 (*)     All other components within normal limits   APTT - Abnormal; Notable for the following components:    PTT 36.6 (*)     All other components within normal limits   POC GLUCOSE FINGERSTICK - Abnormal; Notable for the following components:    POC Glucose 282 (*)     All other components within normal limits   POCT GLUCOSE - Normal   APTT   APTT   ANTI-XA, UNFRACTIONATED HEPARIN   ANTI-XA, UNFRACTIONATED HEPARIN       All other labs were within normal range or not returned as of this dictation.     EMERGENCY DEPARTMENT COURSE and DIFFERENTIAL DIAGNOSIS/MDM:   Vitals:    Vitals:    05/30/22 0237 05/30/22 0241 05/30/22 0245 05/30/22 0315   BP: 96/63  86/68 87/64   Pulse: 82 76 76 74   Resp: 18 14 22 15   Temp:       TempSrc:       SpO2:       Weight:       Height:           Orders Placed This Encounter   Medications    nitroGLYCERIN (NITROSTAT) SL tablet 0.4 mg    insulin regular (HUMULIN R;NOVOLIN R) injection 10 Units    morphine sulfate (PF) injection 4 mg    heparin (porcine) injection 4,000 Units    heparin (porcine) injection 4,000 Units    heparin (porcine) injection 2,000 Units    DISCONTD: heparin 25,000 units in dextrose 5% 250 mL (premix) infusion    ondansetron (ZOFRAN) injection 4 mg    ondansetron (ZOFRAN) 4 MG/2ML injection     Love Wade C: cabinet override    heparin 25,000 units in dextrose 5% 250 mL (premix) infusion    midodrine (PROAMATINE) tablet 10 mg       Medical Decision Making: The case was discussed thoroughly with Dr. Ellie Garcia who does not feel that this is a STEMI. He request heparin drip and the plan is for heart catheterization the next day. Initial troponin 750 and the repeat was 709 and the third troponin was 731. He is a dialysis patient. Potassium 6.0 and his blood sugar was high so he was given IV insulin. He is being admitted and findings are discussed with the patient and his family. CRITICAL CARE TIME     Due to the high probability of sudden and clinically significant deterioration in the patient's condition he required highest level of my preparedness to intervene urgently. I provided critical care time including documentation time, medication orders and management, reevaluation, vital sign assessment, ordering and reviewing of of lab tests ordering and reviewing of x-ray studies, and admission orders. Aggregate critical care time is 40 minutes including only time during which I was engaged in work directly related to his care and did not include time spent treating other patients simultaneously. CONSULTS:  IP CONSULT TO HOSPITALIST  IP CONSULT TO PULMONOLOGY    PROCEDURES:  None    FINAL IMPRESSION      1. Chest pain, unspecified type          DISPOSITION/PLAN   DISPOSITION Decision To Admit 05/30/2022 03:34:12 AM      PATIENT REFERRED TO:   No follow-up provider specified. DISCHARGE MEDICATIONS:     New Prescriptions    No medications on file       The care is provided during an unprecedented national emergency due to the novel coronavirus, COVID-19.     (Please note that portions of this note were completed with a voice recognition program.  Efforts were made to edit the dictations but occasionally words are mis-transcribed.)    Zeinab Mendoza MD  Attending Emergency Physician           Zeinab Mendoza MD  05/30/22 [id number]

## 2022-05-30 NOTE — PLAN OF CARE
Problem: Pain  Goal: Verbalizes/displays adequate comfort level or baseline comfort level  Outcome: Progressing  Flowsheets (Taken 5/30/2022 0800)  Verbalizes/displays adequate comfort level or baseline comfort level: Encourage patient to monitor pain and request assistance   Pt to report pain  Problem: Chronic Conditions and Co-morbidities  Goal: Patient's chronic conditions and co-morbidity symptoms are monitored and maintained or improved  Outcome: Progressing  Flowsheets (Taken 5/30/2022 0730)  Care Plan - Patient's Chronic Conditions and Co-Morbidity Symptoms are Monitored and Maintained or Improved: Monitor and assess patient's chronic conditions and comorbid symptoms for stability, deterioration, or improvement     Problem: Safety - Adult  Goal: Free from fall injury  Outcome: Progressing   Remains free from falls this shift.

## 2022-05-30 NOTE — CONSULTS
[facility]  Inpatient Cardiology Consult             Date:   5/30/2022  Patient name: Flor Herron  Date of admission:  5/29/2022 11:56 PM  MRN:   [id number]  YOB: 1975      Reason for Admission:  Chest pain    CHIEF COMPLAINT:  Chest pain    History Obtained From:  Patient and medical record    HISTORY OF PRESENT ILLNESS:    The patient is a 49 y.o gentleman with h/o DM, HTN, HLP, CVA and ESRD who presented to the ER last evening with chest pain. He had recent left shoulder discomfort attributed to tendonitis, but last evening developed an intermittent sharp left-sided chest pain, He had some improvement of pain with a SL NTG administered by EMS. Serial EKG's overnight showed sinus rhythm with 0.5-1.0 mm concave ST elevations in the limb and precordial leads, with no evolution over 4 hrs, but appearing new c/w EKG from January 2022. He has chronic troponin elevation in the range of 400 ng/L, with current troponins stable at 750, 709 and 731. He was started on IV heparin and is comfortable this morning, describing pain as 6/10 intensity, worsening with movement. He has no SOB, palpitation or nausea. Past Medical History:   has a past medical history of Cerebral artery occlusion with cerebral infarction [facility]), Closed fracture of bone of right foot, Dialysis patient [facility]), Hemodialysis patient ([facility] [address] Hyperlipidemia, Hypertension, Kidney disease, and Type 1 diabetes mellitus ([facility] [address] Past Surgical History:   has a past surgical history that includes AV fistula creation (Left) and Wrist surgery (1995). Home Medications:    Prior to Admission medications    Medication Sig Start Date End Date Taking? Authorizing Provider   acetaminophen-codeine (TYLENOL/CODEINE #3) 300-30 MG per tablet Take 1 tablet by mouth every 6 hours as needed for Pain for up to 3 days.  5/28/22 5/31/22  Rebekah Roman MD   lisinopril (PRINIVIL;ZESTRIL) 20 MG tablet Take 1 tablet by mouth daily 5/27/22   DANIEL Coelho CNP   gabapentin (NEURONTIN) 300 MG capsule Take 1 capsule by mouth daily. 5/28/22   DANIEL Coelho CNP   insulin glargine (LANTUS) 100 UNIT/ML injection vial Inject 45 Units into the skin every morning Indications: 45 units in the morning and 10 units in the evening    Historical Provider, MD   traMADol (ULTRAM) 50 MG tablet Take 50 mg by mouth 2 times daily as needed for Pain. 5/6/22   Historical Provider, MD   Misc. Devices MISC Disp: custom molded shoes to accommodate for brace   DX: DM with history of stroke, foot drop  Duration: 1 year 5/11/22   Jayde Yanez DPM   ondansetron (ZOFRAN) 4 MG tablet Take 1 tablet by mouth 3 times daily as needed for Nausea or Vomiting 4/29/22   DANIEL Torres CNP   magnesium oxide (MAG-OX) 400 MG tablet Take 400 mg by mouth daily  Patient not taking: Reported on 5/28/2022    Historical Provider, MD   magnesium hydroxide (MILK OF MAGNESIA) 400 MG/5ML suspension Take 15 mLs by mouth daily as needed for Constipation    Historical Provider, MD   insulin aspart (NOVOLOG) 100 UNIT/ML injection vial Inject 0-8 Units into the skin 3 times daily (before meals) SLIDING SCALE     Historical Provider, MD   FLUoxetine (PROZAC) 40 MG capsule Take 40 mg by mouth daily     Historical Provider, MD   ammonium lactate (LAC-HYDRIN) 12 % lotion Apply topically daily.   Patient taking differently: Apply to feet 11/1/21   Jayde Yanez DPM   furosemide (LASIX) 20 MG tablet Take 1 tablet by mouth daily  Patient not taking: Reported on 5/28/2022 4/27/21   Carol Phillips DO   buPROPion (WELLBUTRIN XL) 150 MG extended release tablet Take 1 tablet by mouth every morning 3/25/21   Carol Phillips DO   Rosuvastatin Calcium 40 MG CPSP Take 40 mg by mouth daily    Historical Provider, MD   ezetimibe (ZETIA) 10 MG tablet Take 10 mg by mouth daily    Historical Provider, MD   omeprazole (PRILOSEC) 40 MG delayed release capsule Take 40 mg by mouth daily    Historical input(s): BNP in the last 72 hours. PT/INR: No results for input(s): PROTIME, INR in the last 72 hours. APTT:  Recent Labs     05/30/22  0011   APTT 36.6*     CARDIAC ENZYMES:No results for input(s): CKTOTAL, CKMB, CKMBINDEX, TROPONINI in the last 72 hours. FASTING LIPID PANEL:  Lab Results   Component Value Date    HDL 61 04/12/2022    TRIG 66 04/12/2022     LIVER PROFILE:No results for input(s): AST, ALT, LABALBU in the last 72 hours. IMPRESSION:    1. Atypical chest pain, reproducible on exam, however with diffuse ST elevation on EKG suggesting possible pericarditis. High sensitivity troponins are elevated above 700, higher than his previous baseline above 400 ng/L, but remain stable. 2. Diabetes mellitus  3. Essential HTN  4. Hyperlipidemia  5. ESRD, on chronic Tues-Thus-Sat HD  6. S/p CVA April 2022  7. Hyponatremia, stable  8. Chronic hypotension, on midodrine    Patient Active Problem List   Diagnosis    ESRD (end stage renal disease) on dialysis ([address]    Primary hypertension    Hyperlipidemia    Acute pain of left knee    Bipolar affective disorder ([address]    Atherosclerosis of aorta ([facility] [address]    COVID-19    Cerebrovascular accident (CVA) ([facility] [address]    Type 2 diabetes mellitus with kidney complication, with long-term current use of insulin (HCC)    Neuropathy of both feet    GERD (gastroesophageal reflux disease)    Right sided weakness    Leg weakness, bilateral    Recurrent falls    Chest pain    Small kidney, bilateral    Umbilical hernia without obstruction or gangrene    Disorders of diaphragm    Atherosclerosis of other arteries    Hypotension    Elevated troponin    Hyponatremia    Anemia    Hyperkalemia       RECOMMENDATIONS:  1. Continue IV heparin  2. Continue ASA, rosuvastatin and ezetimibe  3. 2D echo ordered  4. Case discussed with Dr. Mario Rogers; will plan for cardiac catheterization Tues 5/31.   The nature of the problems, procedure, risks and benefits were explained to the patient, and he is agreeable to proceed. Discussed with patient and nursing.     Electronically signed by Estuardo Quiñonez MD on 5/30/2022 at 10:27 AM.  [place] [facility]

## 2022-05-30 NOTE — PROGRESS NOTES
Spoke with Dr Reyes Eldridge via telephone regarding Heparin gtt. States to please turn off. On way for pericardiocentesis. Cath lab on way in as well. Will monitor patient.

## 2022-05-31 LAB
ALBUMIN FLUID: 3.3 G/DL
ANION GAP SERPL CALCULATED.3IONS-SCNC: 17 MMOL/L (ref 9–17)
BUN BLDV-MCNC: 63 MG/DL (ref 6–20)
BUN/CREAT BLD: 6 (ref 9–20)
CALCIUM SERPL-MCNC: 8.9 MG/DL (ref 8.6–10.4)
CASE NUMBER:: NORMAL
CHLORIDE BLD-SCNC: 89 MMOL/L (ref 98–107)
CO2: 21 MMOL/L (ref 20–31)
CREAT SERPL-MCNC: 10.28 MG/DL (ref 0.7–1.2)
EKG ATRIAL RATE: 82 BPM
EKG P AXIS: 62 DEGREES
EKG P-R INTERVAL: 154 MS
EKG Q-T INTERVAL: 412 MS
EKG QRS DURATION: 112 MS
EKG QTC CALCULATION (BAZETT): 481 MS
EKG R AXIS: 10 DEGREES
EKG T AXIS: 44 DEGREES
EKG VENTRICULAR RATE: 82 BPM
ESTIMATED AVERAGE GLUCOSE: 194 MG/DL
GFR AFRICAN AMERICAN: 7 ML/MIN
GFR NON-AFRICAN AMERICAN: 5 ML/MIN
GFR SERPL CREATININE-BSD FRML MDRD: ABNORMAL ML/MIN/{1.73_M2}
GLUCOSE BLD-MCNC: 168 MG/DL (ref 75–110)
GLUCOSE BLD-MCNC: 268 MG/DL (ref 75–110)
GLUCOSE BLD-MCNC: 289 MG/DL (ref 75–110)
GLUCOSE BLD-MCNC: 296 MG/DL (ref 75–110)
GLUCOSE BLD-MCNC: 312 MG/DL (ref 70–99)
GLUCOSE, FLUID: <2 MG/DL
HBA1C MFR BLD: 8.4 % (ref 4–6)
HCT VFR BLD CALC: 28.7 % (ref 40.7–50.3)
HCT VFR BLD CALC: 30.3 % (ref 40.7–50.3)
HEMOGLOBIN: 8.8 G/DL (ref 13–17)
HEMOGLOBIN: 9.2 G/DL (ref 13–17)
LACTATE DEHYDROGENASE, FLUID: <10 U/L
LYMPHOCYTES, BODY FLUID: 6 %
MAGNESIUM: 2.2 MG/DL (ref 1.6–2.6)
MCH RBC QN AUTO: 29.8 PG (ref 25.2–33.5)
MCHC RBC AUTO-ENTMCNC: 30.7 G/DL (ref 28.4–34.8)
MCV RBC AUTO: 97.3 FL (ref 82.6–102.9)
NEUTROPHIL, FLUID: 86 %
NRBC AUTOMATED: 0 PER 100 WBC
OTHER CELLS FLUID: NORMAL %
PDW BLD-RTO: 12.5 % (ref 11.8–14.4)
PH FLUID: 7.5
PHOSPHORUS: 4.9 MG/DL (ref 2.5–4.5)
PLATELET # BLD: 326 K/UL (ref 138–453)
PMV BLD AUTO: 10.1 FL (ref 8.1–13.5)
POTASSIUM SERPL-SCNC: 6.1 MMOL/L (ref 3.7–5.3)
RBC # BLD: 2.95 M/UL (ref 4.21–5.77)
RBC FLUID: NORMAL /MM3
SODIUM BLD-SCNC: 127 MMOL/L (ref 135–144)
SODIUM BLD-SCNC: 127 MMOL/L (ref 135–144)
SODIUM BLD-SCNC: 128 MMOL/L (ref 135–144)
SPECIMEN DESCRIPTION: NORMAL
SPECIMEN TYPE: NORMAL
TOTAL PROTEIN, BODY FLUID: <1 G/DL
TROPONIN, HIGH SENSITIVITY: 631 NG/L (ref 0–22)
TROPONIN, HIGH SENSITIVITY: 635 NG/L (ref 0–22)
TROPONIN, HIGH SENSITIVITY: 673 NG/L (ref 0–22)
WBC # BLD: 16.7 K/UL (ref 3.5–11.3)
WBC FLUID: 461 /MM3

## 2022-05-31 PROCEDURE — 93005 ELECTROCARDIOGRAM TRACING: CPT | Performed by: NURSE PRACTITIONER

## 2022-05-31 PROCEDURE — 83735 ASSAY OF MAGNESIUM: CPT

## 2022-05-31 PROCEDURE — 82947 ASSAY GLUCOSE BLOOD QUANT: CPT

## 2022-05-31 PROCEDURE — 6360000002 HC RX W HCPCS: Performed by: FAMILY MEDICINE

## 2022-05-31 PROCEDURE — 84484 ASSAY OF TROPONIN QUANT: CPT

## 2022-05-31 PROCEDURE — 84295 ASSAY OF SERUM SODIUM: CPT

## 2022-05-31 PROCEDURE — 85014 HEMATOCRIT: CPT

## 2022-05-31 PROCEDURE — 2700000000 HC OXYGEN THERAPY PER DAY

## 2022-05-31 PROCEDURE — 36415 COLL VENOUS BLD VENIPUNCTURE: CPT

## 2022-05-31 PROCEDURE — 84100 ASSAY OF PHOSPHORUS: CPT

## 2022-05-31 PROCEDURE — 2580000003 HC RX 258: Performed by: NURSE PRACTITIONER

## 2022-05-31 PROCEDURE — 99232 SBSQ HOSP IP/OBS MODERATE 35: CPT | Performed by: FAMILY MEDICINE

## 2022-05-31 PROCEDURE — 6370000000 HC RX 637 (ALT 250 FOR IP): Performed by: FAMILY MEDICINE

## 2022-05-31 PROCEDURE — 5A1D70Z PERFORMANCE OF URINARY FILTRATION, INTERMITTENT, LESS THAN 6 HOURS PER DAY: ICD-10-PCS | Performed by: SPECIALIST

## 2022-05-31 PROCEDURE — 6360000002 HC RX W HCPCS: Performed by: INTERNAL MEDICINE

## 2022-05-31 PROCEDURE — 6370000000 HC RX 637 (ALT 250 FOR IP): Performed by: NURSE PRACTITIONER

## 2022-05-31 PROCEDURE — 6360000002 HC RX W HCPCS: Performed by: NURSE PRACTITIONER

## 2022-05-31 PROCEDURE — 93308 TTE F-UP OR LMTD: CPT

## 2022-05-31 PROCEDURE — 90935 HEMODIALYSIS ONE EVALUATION: CPT

## 2022-05-31 PROCEDURE — 80048 BASIC METABOLIC PNL TOTAL CA: CPT

## 2022-05-31 PROCEDURE — 85018 HEMOGLOBIN: CPT

## 2022-05-31 PROCEDURE — 2000000000 HC ICU R&B

## 2022-05-31 PROCEDURE — 94761 N-INVAS EAR/PLS OXIMETRY MLT: CPT

## 2022-05-31 PROCEDURE — 85027 COMPLETE CBC AUTOMATED: CPT

## 2022-05-31 PROCEDURE — 2500000003 HC RX 250 WO HCPCS: Performed by: NURSE PRACTITIONER

## 2022-05-31 RX ORDER — KETOROLAC TROMETHAMINE 30 MG/ML
30 INJECTION, SOLUTION INTRAMUSCULAR; INTRAVENOUS ONCE
Status: COMPLETED | OUTPATIENT
Start: 2022-05-31 | End: 2022-05-31

## 2022-05-31 RX ORDER — MORPHINE SULFATE 2 MG/ML
2 INJECTION, SOLUTION INTRAMUSCULAR; INTRAVENOUS EVERY 4 HOURS PRN
Status: DISCONTINUED | OUTPATIENT
Start: 2022-05-31 | End: 2022-05-31

## 2022-05-31 RX ORDER — MORPHINE SULFATE 2 MG/ML
2 INJECTION, SOLUTION INTRAMUSCULAR; INTRAVENOUS
Status: DISCONTINUED | OUTPATIENT
Start: 2022-05-31 | End: 2022-06-03

## 2022-05-31 RX ORDER — COLCHICINE 0.6 MG/1
0.6 TABLET ORAL EVERY OTHER DAY
Status: COMPLETED | OUTPATIENT
Start: 2022-05-31 | End: 2022-06-04

## 2022-05-31 RX ADMIN — VITAM B12 500 MCG: 100 TAB at 08:50

## 2022-05-31 RX ADMIN — COLCHICINE 0.6 MG: 0.6 TABLET ORAL at 14:49

## 2022-05-31 RX ADMIN — EZETIMIBE 10 MG: 10 TABLET ORAL at 08:50

## 2022-05-31 RX ADMIN — MORPHINE SULFATE 2 MG: 2 INJECTION, SOLUTION INTRAMUSCULAR; INTRAVENOUS at 13:31

## 2022-05-31 RX ADMIN — CALCIUM ACETATE 1334 MG: 667 CAPSULE ORAL at 11:50

## 2022-05-31 RX ADMIN — MORPHINE SULFATE 2 MG: 2 INJECTION, SOLUTION INTRAMUSCULAR; INTRAVENOUS at 11:51

## 2022-05-31 RX ADMIN — BUPROPION HYDROCHLORIDE 150 MG: 150 TABLET, EXTENDED RELEASE ORAL at 08:50

## 2022-05-31 RX ADMIN — ROSUVASTATIN CALCIUM 10 MG: 10 TABLET, FILM COATED ORAL at 21:04

## 2022-05-31 RX ADMIN — CALCIUM ACETATE 1334 MG: 667 CAPSULE ORAL at 08:49

## 2022-05-31 RX ADMIN — PREDNISONE 40 MG: 20 TABLET ORAL at 08:51

## 2022-05-31 RX ADMIN — Medication 2 MG: at 21:04

## 2022-05-31 RX ADMIN — INSULIN LISPRO 3 UNITS: 100 INJECTION, SOLUTION INTRAVENOUS; SUBCUTANEOUS at 21:04

## 2022-05-31 RX ADMIN — INSULIN LISPRO 6 UNITS: 100 INJECTION, SOLUTION INTRAVENOUS; SUBCUTANEOUS at 08:44

## 2022-05-31 RX ADMIN — INSULIN LISPRO 6 UNITS: 100 INJECTION, SOLUTION INTRAVENOUS; SUBCUTANEOUS at 11:50

## 2022-05-31 RX ADMIN — ASPIRIN 81 MG: 81 TABLET, COATED ORAL at 08:50

## 2022-05-31 RX ADMIN — Medication 2 MG: at 15:59

## 2022-05-31 RX ADMIN — PANTOPRAZOLE SODIUM 40 MG: 40 TABLET, DELAYED RELEASE ORAL at 05:32

## 2022-05-31 RX ADMIN — INSULIN LISPRO 2 UNITS: 100 INJECTION, SOLUTION INTRAVENOUS; SUBCUTANEOUS at 18:24

## 2022-05-31 RX ADMIN — GABAPENTIN 300 MG: 300 CAPSULE ORAL at 08:50

## 2022-05-31 RX ADMIN — KETOROLAC TROMETHAMINE 30 MG: 30 INJECTION, SOLUTION INTRAMUSCULAR at 18:24

## 2022-05-31 RX ADMIN — SODIUM CHLORIDE, PRESERVATIVE FREE 10 ML: 5 INJECTION INTRAVENOUS at 21:04

## 2022-05-31 RX ADMIN — CALCIUM ACETATE 1334 MG: 667 CAPSULE ORAL at 18:24

## 2022-05-31 ASSESSMENT — ENCOUNTER SYMPTOMS
WHEEZING: 0
CHEST TIGHTNESS: 0
COUGH: 0
RHINORRHEA: 0
ABDOMINAL PAIN: 0
SHORTNESS OF BREATH: 1
CONSTIPATION: 0
SINUS PRESSURE: 0
NAUSEA: 0
VOICE CHANGE: 0
DIARRHEA: 0
CHOKING: 0
BACK PAIN: 0
VOMITING: 0

## 2022-05-31 ASSESSMENT — PAIN SCALES - GENERAL
PAINLEVEL_OUTOF10: 5
PAINLEVEL_OUTOF10: 3
PAINLEVEL_OUTOF10: 6
PAINLEVEL_OUTOF10: 8
PAINLEVEL_OUTOF10: 10
PAINLEVEL_OUTOF10: 6
PAINLEVEL_OUTOF10: 8
PAINLEVEL_OUTOF10: 6

## 2022-05-31 ASSESSMENT — PAIN DESCRIPTION - LOCATION
LOCATION: CHEST

## 2022-05-31 ASSESSMENT — PAIN DESCRIPTION - ORIENTATION
ORIENTATION: LEFT

## 2022-05-31 ASSESSMENT — PAIN DESCRIPTION - DESCRIPTORS
DESCRIPTORS: ACHING
DESCRIPTORS: STABBING
DESCRIPTORS: STABBING

## 2022-05-31 ASSESSMENT — PAIN DESCRIPTION - PAIN TYPE: TYPE: ACUTE PAIN

## 2022-05-31 NOTE — PROGRESS NOTES
[facility] with H&H and trop results. Awaiting return call.

## 2022-05-31 NOTE — PROGRESS NOTES
Date End Date Taking? Authorizing Provider   acetaminophen-codeine (TYLENOL/CODEINE #3) 300-30 MG per tablet Take 1 tablet by mouth every 6 hours as needed for Pain for up to 3 days. 5/28/22 5/31/22  Michelle Gutierrez MD   lisinopril (PRINIVIL;ZESTRIL) 20 MG tablet Take 1 tablet by mouth daily 5/27/22   DANIEL James CNP   gabapentin (NEURONTIN) 300 MG capsule Take 1 capsule by mouth daily. 5/28/22   DANIEL James CNP   insulin glargine (LANTUS) 100 UNIT/ML injection vial Inject 45 Units into the skin every morning Indications: 45 units in the morning and 10 units in the evening    Historical Provider, MD   traMADol (ULTRAM) 50 MG tablet Take 50 mg by mouth 2 times daily as needed for Pain. 5/6/22   Historical Provider, MD   Misc. Devices MISC Disp: custom molded shoes to accommodate for brace   DX: DM with history of stroke, foot drop  Duration: 1 year 5/11/22   Miles Cruz DPM   ondansetron (ZOFRAN) 4 MG tablet Take 1 tablet by mouth 3 times daily as needed for Nausea or Vomiting 4/29/22   DANIEL Gurrola CNP   magnesium oxide (MAG-OX) 400 MG tablet Take 400 mg by mouth daily  Patient not taking: Reported on 5/28/2022    Historical Provider, MD   magnesium hydroxide (MILK OF MAGNESIA) 400 MG/5ML suspension Take 15 mLs by mouth daily as needed for Constipation    Historical Provider, MD   insulin aspart (NOVOLOG) 100 UNIT/ML injection vial Inject 0-8 Units into the skin 3 times daily (before meals) SLIDING SCALE     Historical Provider, MD   FLUoxetine (PROZAC) 40 MG capsule Take 40 mg by mouth daily     Historical Provider, MD   ammonium lactate (LAC-HYDRIN) 12 % lotion Apply topically daily.   Patient taking differently: Apply to feet 11/1/21   Miles Cruz DPM   furosemide (LASIX) 20 MG tablet Take 1 tablet by mouth daily  Patient not taking: Reported on 5/28/2022 4/27/21   Zeinab Bella,    buPROPion (WELLBUTRIN XL) 150 MG extended release tablet Take 1 tablet by mouth every morning 3/25/21   Pedro Craven DO   Rosuvastatin Calcium 40 MG CPSP Take 40 mg by mouth daily    Historical Provider, MD   ezetimibe (ZETIA) 10 MG tablet Take 10 mg by mouth daily    Historical Provider, MD   omeprazole (PRILOSEC) 40 MG delayed release capsule Take 40 mg by mouth daily    Historical Provider, MD   aspirin 81 MG EC tablet Take 81 mg by mouth daily    Historical Provider, MD   calcium acetate 667 MG TABS Take 1,334 mg by mouth 3 times daily (with meals)     Historical Provider, MD   vitamin B-12 (CYANOCOBALAMIN) 500 MCG tablet Take 500 mcg by mouth daily    Historical Provider, MD       Scheduled Meds:   morphine  4 mg IntraVENous Once    aspirin  81 mg Oral Daily    buPROPion  150 mg Oral QAM    calcium acetate  1,334 mg Oral TID WC    ezetimibe  10 mg Oral Daily    [Held by provider] FLUoxetine  40 mg Oral Daily    gabapentin  300 mg Oral Daily    [Held by provider] lisinopril  20 mg Oral Daily    vitamin B-12  500 mcg Oral Daily    insulin lispro  0-12 Units SubCUTAneous TID WC    insulin lispro  0-6 Units SubCUTAneous Nightly    sodium chloride flush  5-40 mL IntraVENous 2 times per day    rosuvastatin  10 mg Oral Nightly    predniSONE  40 mg Oral Daily    polyethylene glycol  17 g Oral Daily    pantoprazole  40 mg Oral QAM AC     Continuous Infusions:   dextrose      sodium chloride       PRN Meds:glucose, dextrose bolus **OR** dextrose bolus, glucagon (rDNA), dextrose, sodium chloride flush, sodium chloride, ondansetron **OR** ondansetron, acetaminophen **OR** acetaminophen, nitroGLYCERIN, morphine, perflutren lipid microspheres    No Known Allergies    Social History     Socioeconomic History    Marital status: Single     Spouse name: Not on file    Number of children: Not on file    Years of education: Not on file    Highest education level: Not on file   Occupational History    Not on file   Tobacco Use    Smoking status: Never Smoker    Smokeless tobacco: Never Used Vaping Use    Vaping Use: Never used   Substance and Sexual Activity    Alcohol use: Never    Drug use: Never    Sexual activity: Not on file   Other Topics Concern    Not on file   Social History Narrative    Not on file     Social Determinants of Health     Financial Resource Strain:     Difficulty of Paying Living Expenses: Not on file   Food Insecurity:     Worried About Running Out of Food in the Last Year: Not on file    Dinh of Food in the Last Year: Not on file   Transportation Needs:     Lack of Transportation (Medical): Not on file    Lack of Transportation (Non-Medical):  Not on file   Physical Activity:     Days of Exercise per Week: Not on file    Minutes of Exercise per Session: Not on file   Stress:     Feeling of Stress : Not on file   Social Connections:     Frequency of Communication with Friends and Family: Not on file    Frequency of Social Gatherings with Friends and Family: Not on file    Attends Confucianist Services: Not on file    Active Member of [address] [place] [address] or Organizations: Not on file    Attends Club or Organization Meetings: Not on file    Marital Status: Not on file   Intimate Partner Violence:     Fear of Current or Ex-Partner: Not on file    Emotionally Abused: Not on file    Physically Abused: Not on file    Sexually Abused: Not on file   Housing Stability:     Unable to Pay for Housing in the Last Year: Not on file    Number of [place] in the Last Year: Not on file    Unstable Housing in the Last Year: Not on file       Family History   Problem Relation Age of Onset    Diabetes Mother     Heart Disease Father     Diabetes Father     Cancer Paternal Grandmother     Heart Disease Maternal Great Grandfather          Physical Exam:  Vitals:    05/31/22 0708 05/31/22 0800 05/31/22 0900 05/31/22 1000   BP:  (!) 113/52 (!) 124/50 (!) 109/45   Pulse: 84 89 82 84   Resp: 12 24 16 15   Temp:  98.7 °F (37.1 °C)     TempSrc:  Temporal     SpO2: 97% 96% 95% 95% Weight:       Height:         I/O last 3 completed shifts: In: 200 [P.O.:400; I.V.:30]  Out: 760 [Drains:760]    General:  Awake, alert, not in distress. Appears to be stated age. HEENT: Atraumatic, normocephalic. Anicteric sclera. Pink and moist oral mucosa. Neck supple. Chest: Bilateral air entry, clear to auscultation, no wheezing, rhonchi or rales. Cardiovascular: RRR, S1S2, no murmur, rub or gallop. No lower extremity edema. +pericaridial drain. Abdomen: Soft, non tender to palpation. Active bowel sounds x 4 quadrants. Musculoskeletal: Active ROM. No cyanosis or clubbing. Integumentary: Pink, warm and dry. Free from rash or lesions. Skin turgor normal.  CNS: Oriented to person, place and time. Cranial nerves grossly intact. Speech clear. Face symmetrical. No tremor.      Data:    CBC:   Lab Results   Component Value Date    WBC 16.7 (H) 05/31/2022    HGB 8.8 (L) 05/31/2022    HCT 28.7 (L) 05/31/2022    MCV 97.3 05/31/2022     05/31/2022     BMP:    Lab Results   Component Value Date     (L) 05/31/2022     (L) 05/31/2022     (L) 05/30/2022    K 6.1 (HH) 05/31/2022    K 5.9 (H) 05/30/2022    K 5.5 (H) 05/30/2022    CL 89 (L) 05/31/2022    CL 91 (L) 05/30/2022    CL 89 (L) 05/30/2022    CO2 21 05/31/2022    CO2 20 05/30/2022    CO2 22 05/30/2022    BUN 63 (H) 05/31/2022    BUN 45 (H) 05/30/2022    BUN 43 (H) 05/30/2022    CREATININE 10.28 (HH) 05/31/2022    CREATININE 8.98 (HH) 05/30/2022    CREATININE 8.15 (HH) 05/30/2022    GLUCOSE 312 (H) 05/31/2022    GLUCOSE 277 (H) 05/30/2022    GLUCOSE 282 05/30/2022     CMP:   Lab Results   Component Value Date     05/31/2022    K 6.1 05/31/2022    CL 89 05/31/2022    CO2 21 05/31/2022    BUN 63 05/31/2022    CREATININE 10.28 05/31/2022    GLUCOSE 312 05/31/2022    CALCIUM 8.9 05/31/2022    PROT 5.3 04/14/2022    LABALBU 3.4 04/14/2022    BILITOT 0.34 04/14/2022    ALKPHOS 137 04/14/2022    AST 38 04/14/2022    ALT 44 04/14/2022 Hepatic:   Lab Results   Component Value Date    AST 38 04/14/2022    AST 27 04/13/2022    AST 36 01/05/2022    ALT 44 (H) 04/14/2022    ALT 42 (H) 04/13/2022    ALT 38 01/05/2022    BILITOT 0.34 04/14/2022    BILITOT 0.28 (L) 04/13/2022    BILITOT 0.43 01/05/2022    ALKPHOS 137 (H) 04/14/2022    ALKPHOS 142 (H) 04/13/2022    ALKPHOS 110 01/05/2022     BNP: No results found for: BNP  Lipids:   Lab Results   Component Value Date    CHOL 118 04/12/2022    HDL 61 04/12/2022     INR:   Lab Results   Component Value Date    INR 1.0 05/27/2022    INR 0.9 04/12/2022     PTH: No results found for: PTH  Phosphorus:    Lab Results   Component Value Date    PHOS 4.9 05/31/2022     Ionized Calcium: No results found for: IONCA  Magnesium:   Lab Results   Component Value Date    MG 2.2 05/31/2022     Albumin:   Lab Results   Component Value Date    LABALBU 3.4 04/14/2022     Last 3 CK, CKMB, Troponin: @LABRCNT(CKTOTAL:3,CKMB:3,TROPONINI:3)       URINE:)No results found for: Neno Salmon    Radiology:   Reviewed. Assessment/Plan: To follow. Pt seen in collaboration with Dr. Priscilla Salas.     Electronically signed by DANIEL Cast CNP  on 5/31/2022 at 10:31 AM

## 2022-05-31 NOTE — PROGRESS NOTES
Patient has been complaining of chest pain from 8-10/10. Describes pain as sharp, stabbing, aching. Slightly worse when takes a deep breath. Pain upon light palpation above and below pericardial drainage tube. Spoke with Roosevelt Smiley NP with cardiology. Order received from EKG and Troponin. EKG completed and revealed normal EKG. ECHO reviewed with Roosevelt Smiley. No other changes at this time. Will continue to monitor patient and will update Roosevelt Smiley once Troponin level results. 5 Spoke with Dr Ellen Astorga with cardiology. Updated on patient's status (pain 6/10), EKG, and troponin results. Orders received for one time dose of toradol and repeat troponin in 1 hour to make sure it remains stable. Will continue to monitor patient.

## 2022-05-31 NOTE — FLOWSHEET NOTE
Patient was resting as writer entered the room; no family members present. Patient engaged in conversation and shares he is in pain. Writer decided to allow the patient to continue resting and was able to leave a prayer card for spiritual support. Writer stated he will pray for continued healing, comfort, and rest. The patient was thankful for the visit and prayers. Spiritual care will follow up as needed or requested.      05/31/22 1245   Encounter Summary   Service Provided For: Patient   Referral/Consult From: Rounding   Last Encounter  05/31/22   Complexity of Encounter Low   Begin Time 1200   End Time  1210   Total Time Calculated 10 min   Encounter    Type Initial Screen/Assessment   Assessment/Intervention/Outcome   Assessment Calm;Coping   Intervention Active listening;Discussed illness injury and its impact;Nurtured Hope;Read/Provided Scripture;Sustaining Presence/Ministry of presence   Outcome Engaged in conversation;Expressed Gratitude   Plan and Referrals   Plan/Referrals Continue to visit, (comment)

## 2022-05-31 NOTE — PROGRESS NOTES
[facility]  Office: [address], DO, Nina John, DO, Petey Ken, DO, Octavianotonny Waldener Blood, DO, Deirdre Aguilar MD, Corey De Souza MD, Janice Roland MD, Silver Olson MD, Jyoti West MD, Chris Martini MD, Shari Downs MD, Heather Rashid, DO, Lizzie Brice, DO, Sophia Carmona MD,  Renée Cavanaugh, DO, Maria Dolores Layne MD, Mayte Vasquez MD, Maria G Santo MD, Brent Arana DO, Myla Watkins MD, Michael Diop MD, Kevin Gray MD, Nina Massey, [facility], [facility], CNP, Ronan Tsang, CNP, Ari Alvarado, CNP, Eleazar Simon, CNP, Kavita France, CNP, Homer Luna PA-C, Geo Naranjo, DNP, Charlie Kaur, CNP, Liam Sanchez, CNP, Javid Corcoran, CNP, Chely Gamez, CNS, Farheen Flanagan, [facility], Sammy Anna, CNP, Clinton Pittman, CNP, [facility], [facility]      Daily Progress Note     Admit Date: 5/29/2022  Bed/Room No.  2029/2029-01  Admitting Physician : Janice Roland MD  Code Status :[facility] Day:  LOS: 1 day   Chief Complaint:     Chief Complaint   Patient presents with    Chest Pain    Shortness of Breath     Principal Problem:    Acute idiopathic pericarditis  Active Problems:    Hypotension    Elevated troponin    Hyponatremia    Anemia    Hyperkalemia    Left lower quadrant abdominal pain    ESRD (end stage renal disease) on dialysis [facility])    Primary hypertension    Hyperlipidemia    Type 2 diabetes mellitus with kidney complication, with long-term current use of insulin ([facility])    GERD (gastroesophageal reflux disease)  Resolved Problems:    * No resolved hospital problems. *    Subjective : Interval History/Significant events :  05/31/22    Patient continues to complain of moderate left-sided chest pain. He still has pericardial drain in place . Patient had 760 ml out from pericardial drain overnight. He is on supplemental oxygen via high flow at 50 LPM 40% FiO2. Patient is alert, oriented.   Patient remains on Daily  pantoprazole, 40 mg, Oral, QAM AC        Review of Systems   Review of Systems   Constitutional: Positive for activity change and appetite change. Negative for fatigue, fever and unexpected weight change. HENT: Negative for congestion, nosebleeds, rhinorrhea, sinus pressure, sneezing and voice change. Respiratory: Positive for shortness of breath. Negative for cough, choking, chest tightness and wheezing. Cardiovascular: Positive for chest pain. Negative for palpitations and leg swelling. Gastrointestinal: Negative for abdominal pain, constipation, diarrhea, nausea and vomiting. Genitourinary: Negative for difficulty urinating, dysuria, frequency, penile discharge and testicular pain. Musculoskeletal: Negative for back pain. Skin: Negative for rash. Neurological: Positive for weakness. Negative for dizziness, light-headedness and headaches. Hematological: Does not bruise/bleed easily. Psychiatric/Behavioral: Negative for agitation, behavioral problems, confusion, self-injury, sleep disturbance and suicidal ideas.      Objective :      Current Vitals : Temp: 98.7 °F (37.1 °C),  Heart Rate: 84, Resp: 12, BP: (!) 116/47, SpO2: 97 %  Last 24 Hrs Vitals   Patient Vitals for the past 24 hrs:   BP Temp Temp src Pulse Resp SpO2 Weight   05/31/22 0800 -- 98.7 °F (37.1 °C) Temporal -- -- -- --   05/31/22 0708 -- -- -- 84 12 97 % --   05/31/22 0600 (!) 116/47 -- -- 82 11 -- --   05/31/22 0500 (!) 124/48 -- -- 88 13 96 % --   05/31/22 0400 (!) 124/47 97.6 °F (36.4 °C) Temporal 84 12 95 % 182 lb (82.6 kg)   05/31/22 0349 -- -- -- 83 (!) 9 96 % --   05/31/22 0300 (!) 130/49 -- -- 82 12 96 % --   05/31/22 0235 -- -- -- 87 13 96 % --   05/31/22 0200 -- -- -- 86 11 96 % --   05/31/22 0100 -- -- -- 86 12 96 % --   05/31/22 0000 (!) 123/56 96.8 °F (36 °C) Temporal 86 12 97 % --   05/30/22 2310 -- -- -- 87 12 96 % --   05/30/22 2300 (!) 134/50 -- -- 88 10 96 % --   05/30/22 2200 (!) 148/63 -- -- 94 14 94 % --   05/30/22 2100 (!) 144/53 -- -- 88 13 -- --   05/30/22 2015 -- -- -- 92 14 91 % --   05/30/22 2000 (!) 164/75 96.9 °F (36.1 °C) Temporal 87 15 98 % --   05/30/22 1900 (!) 152/68 -- -- 83 (!) 9 93 % --   05/30/22 1830 (!) 149/70 -- -- 84 (!) 0 97 % --   05/30/22 1630 108/67 -- -- 91 11 -- --   05/30/22 1615 101/72 -- -- 92 14 -- --   05/30/22 1600 -- 97.4 °F (36.3 °C) Temporal -- -- -- --   05/30/22 1530 85/67 -- -- 91 13 100 % --   05/30/22 1515 101/73 -- -- 93 11 100 % --   05/30/22 1500 96/82 -- -- 93 16 100 % --   05/30/22 1445 101/72 -- -- 90 12 98 % --   05/30/22 1440 102/68 -- -- 90 13 -- --   05/30/22 1430 -- -- -- 89 12 99 % --   05/30/22 1420 -- -- -- 90 (!) 8 -- --   05/30/22 1415 -- -- -- -- -- (!) 86 % --   05/30/22 1410 -- -- -- 90 17 -- --   05/30/22 1400 116/63 -- -- 90 19 (!) 89 % --   05/30/22 1350 -- -- -- 95 20 -- --   05/30/22 1340 -- -- -- 89 14 -- --   05/30/22 1330 -- -- -- 86 16 -- --   05/30/22 1320 -- -- -- 86 29 -- --   05/30/22 1310 -- -- -- 80 15 -- --   05/30/22 1300 97/75 -- -- 85 16 95 % --   05/30/22 1245 -- -- -- -- -- (!) 83 % --   05/30/22 1230 -- -- -- -- -- 91 % --   05/30/22 1215 -- -- -- -- -- (!) 83 % --   05/30/22 1200 88/78 97.5 °F (36.4 °C) Temporal 86 12 -- --   05/30/22 1145 -- -- -- -- -- 92 % --   05/30/22 1130 -- -- -- -- -- 93 % --   05/30/22 1115 -- -- -- -- -- 95 % --   05/30/22 1100 101/78 -- -- 90 12 98 % --   05/30/22 1045 -- -- -- -- -- 93 % --   05/30/22 1000 111/86 -- -- 93 20 98 % --   05/30/22 0945 -- -- -- -- -- 94 % --   05/30/22 0930 -- -- -- -- -- 97 % --     Intake / output   05/29 1901 - 05/31 0700  In: 430 [P.O.:400; I.V.:30]  Out: 760 [Drains:760]  Physical Exam:  Physical Exam  Vitals and nursing note reviewed. Constitutional:       General: He is not in acute distress. Appearance: He is not diaphoretic. HENT:      Head: Normocephalic and atraumatic. Nose:      Right Sinus: No maxillary sinus tenderness or frontal sinus tenderness. Left Sinus: No maxillary sinus tenderness or frontal sinus tenderness. Mouth/Throat:      Pharynx: No oropharyngeal exudate. Eyes:      General: No scleral icterus. Conjunctiva/sclera: Conjunctivae normal.      Pupils: Pupils are equal, round, and reactive to light. Neck:      Thyroid: No thyromegaly. Vascular: No JVD. Cardiovascular:      Rate and Rhythm: Normal rate and regular rhythm. Pulses:           Dorsalis pedis pulses are 2+ on the right side and 2+ on the left side. Heart sounds: Normal heart sounds. No murmur heard. Pulmonary:      Effort: Pulmonary effort is normal.      Breath sounds: Normal breath sounds. No wheezing or rales. Abdominal:      Palpations: Abdomen is soft. There is no mass. Tenderness: There is no abdominal tenderness. Musculoskeletal:      Cervical back: Full passive range of motion without pain and neck supple. Comments: Wasting of thenar eminence bilaterally    Lymphadenopathy:      Head:      Right side of head: No submandibular adenopathy. Left side of head: No submandibular adenopathy. Cervical: No cervical adenopathy. Skin:     General: Skin is warm. Neurological:      Mental Status: He is alert and oriented to person, place, and time. Motor: No tremor. Comments: Bilateral foot drop, loss of bilateral thenar eminences intrinsic hand muscles    Psychiatric:         Behavior: Behavior is cooperative. Laboratory findings:    Recent Labs     05/30/22  0011 05/30/22  0011 05/30/22  1859 05/31/22  0007 05/31/22  0551   WBC 12.0*  --   --   --  16.7*   HGB 9.4*   < > 10.1* 9.2* 8.8*   HCT 30.5*   < > 33.8* 30.3* 28.7*     --   --   --  326    < > = values in this interval not displayed.      Recent Labs     05/30/22  0011 05/30/22  0011 05/30/22  0247 05/30/22  0555 05/30/22  0846 05/30/22  1411 05/30/22  1859 05/31/22  0007 05/31/22  0551   *   < >  --  128*   < >  --  124* 128* 127*   K 6.0*   < >  --  5.5*  --  5.9*  --   --  6.1*   CL 89*  --   --  91*  --   --   --   --  89*   CO2 22  --   --  20  --   --   --   --  21   GLUCOSE 535*   < > 282 277*  --   --   --   --  312*   BUN 43*  --   --  45*  --   --   --   --  63*   CREATININE 8.15*  --   --  8.98*  --   --   --   --  10.28*   MG  --   --   --   --   --   --   --   --  2.2   CALCIUM 9.3  --   --  9.6  --   --   --   --  8.9   PHOS  --   --   --   --   --   --   --   --  4.9*    < > = values in this interval not displayed. No results for input(s): PROT, LABALBU, LABA1C, I0EJRFQ, D2OSJTT, FT4, TSH, AST, ALT, LDH, GGT, ALKPHOS, BILITOT, BILIDIR, AMMONIA, AMYLASE, LIPASE, LACTATE, CHOL, HDL, LDLCHOLESTEROL, CHOLHDLRATIO, TRIG, VLDL, BNP, TROPONINI, CKTOTAL, CKMB, CKMBINDEX, RF, ECTOR in the last 72 hours. No results found for: [place], RBCUA, BLOODU, BACTERIA, NITRU, [address], LEUKOCYTESUR    Imaging / Clinical Data :-   CT ABDOMEN PELVIS WO CONTRAST Additional Contrast? Radiologist Recommendation    Result Date: 5/30/2022  Chest: Moderate to large pericardial effusion, which is likely related to the patient's history of pericarditis. Trace bilateral pleural effusions with associated atelectasis. Abdomen/pelvis: Findings suggestive of anasarca, including periportal edema, mesenteric edema, small volume of free fluid, and body wall edema. This may be due in part to volume resuscitation. More focal stranding along the ascending colon may be related to anasarca, or could represent mild colitis. A few prominent upper abdominal lymph nodes are favored reactive in etiology. XR KNEE RIGHT (3 VIEWS)    Result Date: 5/26/2022  No acute abnormality of the knee. CT CHEST WO CONTRAST    Result Date: 5/30/2022  Chest: Moderate to large pericardial effusion, which is likely related to the patient's history of pericarditis. Trace bilateral pleural effusions with associated atelectasis.  Abdomen/pelvis: Findings suggestive of anasarca, including periportal edema, mesenteric edema, small volume of free fluid, and body wall edema. This may be due in part to volume resuscitation. More focal stranding along the ascending colon may be related to anasarca, or could represent mild colitis. A few prominent upper abdominal lymph nodes are favored reactive in etiology. XR SHOULDER LEFT (MIN 2 VIEWS)    Result Date: 5/28/2022  No acute osseous abnormality. XR CHEST PORTABLE    Result Date: 5/30/2022  Retrocardiac, right perihilar and basilar airspace disease which may represent pneumonia or atelectasis. Clinical Course : unchanged  Assessment and Plan  :        Acute pericarditis -colchicine every other day for 3 doses. Pain controlled with morphine, prednisone 40 mg daily. Pericardial effusion with tamponade - s/p  pericardiocentesis -cardiology following. ESRD on hemodialysis -dialysis per nephrology. Diabetes mellitus with neuropathy -on insulin -   Essential hypertension -lisinopril on hold  Weakness / Myopathy - outpatient follow up with neurology , has referral and waiting for appointment       Continue to monitor vitals , Intake / output ,  Cell count , HGB , Kidney function, oxygenation  as indicated . Plan and updates discussed with patient ,  answers  explained to satisfaction.    Plan discussed with Staff Theresa Frazier RN     (Please note that portions of this note were completed with a voice recognition program. Efforts were made to edit the dictations but occasionally words are mis-transcribed.)      John Barron MD  5/31/2022

## 2022-05-31 NOTE — CARE COORDINATION
Case Management Initial Discharge Plan  Dawson Ceballos,             Met with:patient to discuss discharge plans. Information verified: address, contacts, phone number, , insurance Yes  Insurance Provider: medicare and BCBS  [place]: No    Emergency Contact/Next of Kin name & number: Levar/grandfather    [phone]  Who are involved in patient's support system? family    PCP: DANIEL Richards CNP  Date of last visit: 1 week ago      Discharge Planning    Living Arrangements:  Other (Comment) (grandparents)     Home has 1 stories  0 stairs to climb to get into front door, 0stairs to climb to reach second floor  Location of bedroom/bathroom in home main    Patient able to perform ADL's:Independent    Current Services (outpatient & in home) none  DME equipment: walker, cane, and shower chair  DME provider:     Is patient receiving oral anticoagulation therapy? No    If indicated:   Physician managing anticoagulation treatment:   Where does patient obtain lab work for ATC treatment? Does patient have any issues/concerns obtaining medications? No  If yes, what are patient's concerns? Is there a preferred Pharmacy after hours or on weekends? Yes    If yes, which pharmacy? [facility] on [address] Needed:  Outpatient PT/OT    Patient agreeable to home care: Yes  Freedom of choice provided:  yes    Prior SNF/Rehab Placement and Facility[facility]  Agreeable to SNF/Rehab: No  [place] of choice provided: n/a     Evaluation: no    Expected Discharge date:       Patient expects to be discharged to:        If home: is the family and/or caregiver wiling & able to provide support at home? yes  Who will be providing this support? family    Follow Up Appointment: Best Day/ Time:      Transportation provider: family  Transportation arrangements needed for discharge: No    Readmission Risk              Risk of Unplanned Readmission:  36             Does patient have a readmission risk score greater than 14?: Yes  If yes, follow-up appointment must be made within 7 days of discharge. Goals of Care:       Educated patient on transitional options, provided freedom of choice and are agreeable with plan      Discharge Plan: Chest pain  Patient lives with grandfather in a 1 story home with 0 steps to enter. Uses and walker, cane and SC.  PT/OT to eval when appropriate. [facility]. [facility]. Continue to follow. Plan is echo and dialysis today.           Electronically signed by Irena Recinos RN on 5/31/22 at 11:37 AM EDT

## 2022-05-31 NOTE — FLOWSHEET NOTE
05/31/22 1800   Vital Signs   BP (!) 144/82   Temp 97.2 °F (36.2 °C)   Heart Rate 92   Resp (!) 9   SpO2 100 %   Weight 182 lb 1.6 oz (82.6 kg)   Weight Method Bed scale   Percent Weight Change -2.94   Post-Hemodialysis Assessment   Machine Disinfection Process Acid/Vinegar Clean;Heat Disinfect; Exterior Machine Disinfection   Rinseback Volume (ml) 250 ml   Dialyzer Clearance Lightly streaked   Duration of Treatment (minutes) 210 minutes   Hemodialysis Intake (ml) 500 ml   Hemodialysis Output (ml) 3000 ml   NET Removed (ml) 2500   Tolerated Treatment Fair   Bilateral Breath Sounds Diminished   Edema None   Time Off 1754   3.5 hour tx completed, 2.5 liter  removed, avf needles removed sites held 10 min each x2, dressing dry and intact, pt had chest pain 8/10 Dr Lizet Torres notified wants dialysis continued,report given to primary rn

## 2022-05-31 NOTE — FLOWSHEET NOTE
05/31/22 1400   Vital Signs   BP (!) 141/63   Temp 97.7 °F (36.5 °C)   Heart Rate 82   Resp 12   SpO2 96 %   Weight 187 lb 9.8 oz (85.1 kg)   Weight Method Bed scale   Percent Weight Change 3.08   Observations & Evaluations   Level of Consciousness Alert (0)   Oriented X x3   Heart Rhythm Regular   Respiratory Quality/Effort Unlabored   Bilateral Breath Sounds Diminished   Skin Condition/Temp Dry; Warm   Edema None   Technical Checks   Dialysis Machine No. [id number]   RO Machine Number [id number]   Dialyzer Lot No. [id number]   All Connections Secure Yes   Saline Line Double Clamped Yes   Dialyzer F-180   RO Machine Log Sheet Completed Yes   Air Foam Detector Tested;Proper Function   Extracorporeal Circuit Tested for Integrity Yes   Machine Conductivity 13.3   Manual Conductivity 13.2   Manual Ph 7.4   Bleach Test (Neg) Yes   Treatment Initiation   Dialyze Hours 3.5   Treatment  Initiation Universal Precautions maintained;Lines secured to patient; Connections secured;Prime given;Venous Parameters set; Arterial Parameters set; Air foam detector engaged;Dialysate;Saline line double clamped;F180   Dialysis Bath   K+ (Potassium) 2   Ca+ (Calcium) 2.5   Na+ (Sodium) 136   HCO3 (Bicarb) 35   Hemodialysis Fistula/Graft Arteriovenous fistula Left Arm   Placement Date/Time: (c) 01/04/22 0700   Pre-existing: Yes  Access Type: Arteriovenous fistula  Orientation: Left  Access Location: Arm   Site Assessment Clean, dry & intact   Thrill Present   Bruit Present   Venous Needle Size 15 G   Arterial Needle Size 15 G   Accessed Ricarda Seymour rn   Access Attempts  1

## 2022-05-31 NOTE — PLAN OF CARE
breakdown  2. Assess vascular access sites hourly  3. Every 4-6 hours minimum:  Change oxygen saturation probe site  4. Every 4-6 hours:  If on nasal continuous positive airway pressure, respiratory therapy assess nares and determine need for appliance change or resting period.   5/31/2022 1730 by Artur Swanson RN  Outcome: Progressing  5/31/2022 0641 by Gregory Moulton RN  Outcome: Progressing

## 2022-05-31 NOTE — PROGRESS NOTES
Current Diet  Nutrition Education/Counseling: Education not indicated  Coordination of Nutrition Care: Continue to monitor while inpatient       Goals:     Goals: PO intake 50% or greater       Nutrition Monitoring and Evaluation:      Food/Nutrient Intake Outcomes: Supplement Intake,Food and Nutrient Intake,Diet Advancement/Tolerance  Physical Signs/Symptoms Outcomes: Biochemical Data,Fluid Status or Edema,Weight,GI Status,Skin    Discharge Planning:     Too soon to determine           Orvis Saad  MFN, RDN, LDN  Lead Clinical Dietitian  RD Office Phone [phone]

## 2022-05-31 NOTE — PROGRESS NOTES
Dr Chente Deras notified patient having chest pain 8/10 that primary rn giving morphine 2mg and doing ekg.  Wants to continue current orders and to do dialysis tomorrow

## 2022-05-31 NOTE — PROGRESS NOTES
[facility]   Progress Note                   Date:   5/31/2022  Patient name: Chintan Due  Date of admission:  5/29/2022 11:56 PM  MRN:   [id number]  YOB: 1975  PCP: DANIEL Vargas CNP    Reason for Admission: Chest pain [R07.9]  Chest pain, unspecified type [R07.9]    Subjective:       Clinical Changes / Abnormalities: Pt seen and examined in the room. Pericardial drain remains in place. Pt denies any CP. Pt on high flow 02 today and continues to have sob. RN at bedside.      -700 out from pericardial drain         Medications:   Scheduled Meds:   morphine  4 mg IntraVENous Once    aspirin  81 mg Oral Daily    buPROPion  150 mg Oral QAM    calcium acetate  1,334 mg Oral TID WC    ezetimibe  10 mg Oral Daily    [Held by provider] FLUoxetine  40 mg Oral Daily    gabapentin  300 mg Oral Daily    [Held by provider] lisinopril  20 mg Oral Daily    vitamin B-12  500 mcg Oral Daily    insulin lispro  0-12 Units SubCUTAneous TID WC    insulin lispro  0-6 Units SubCUTAneous Nightly    sodium chloride flush  5-40 mL IntraVENous 2 times per day    rosuvastatin  10 mg Oral Nightly    predniSONE  40 mg Oral Daily    polyethylene glycol  17 g Oral Daily    pantoprazole  40 mg Oral QAM AC     Continuous Infusions:   dextrose      sodium chloride       CBC:   Recent Labs     05/30/22  0011 05/30/22  0011 05/30/22  1859 05/31/22  0007 05/31/22  0551   WBC 12.0*  --   --   --  16.7*   HGB 9.4*   < > 10.1* 9.2* 8.8*     --   --   --  326    < > = values in this interval not displayed.      BMP:    Recent Labs     05/30/22  0011 05/30/22  0011 05/30/22  0247 05/30/22  0555 05/30/22  0846 05/30/22  1411 05/30/22  1859 05/31/22  0007 05/31/22  0551   *   < >  --  128*   < >  --  124* 128* 127*   K 6.0*   < >  --  5.5*  --  5.9*  --   --  6.1*   CL 89*  --   --  91*  --   --   --   --  89*   CO2 22  --   --  20  --   --   --   --  21   BUN 43*  --   -- 45*  --   --   --   --  63*   CREATININE 8.15*  --   --  8.98*  --   --   --   --  10.28*   GLUCOSE 535*   < > 282 277*  --   --   --   --  312*    < > = values in this interval not displayed. Hepatic: No results for input(s): AST, ALT, ALB, BILITOT, ALKPHOS in the last 72 hours. Troponin:   Recent Labs     05/30/22  0221 05/30/22  1859 05/31/22  0551   TROPHS 731* 561* 635*     BNP: No results for input(s): BNP in the last 72 hours. Lipids: No results for input(s): CHOL, HDL in the last 72 hours. Invalid input(s): LDLCALCU  INR: No results for input(s): INR in the last 72 hours. Procedure: 5/30/22  A local anesthetic was applied locally below the xyphoid area  7 F needle was then advanced under fluoro and echo guidance followed by wire and pigtail catheter which was left in place with drainage bag      A Bloody 860 cc fluid was removed with resolution of pericardial effusion on echo and improvement of BP to 160's from low 738'Z systolic.      Transthoracic echocardiogram was done to confirm the process and the success of drainage     Total amount of fluid: 860 cc     Type of fluid: Hemorraghic      Complications: Nonne      EBL: 20 cc         Plan:     Keep the pigtail catheter in overnight  Limited echocardiogram in AM  Serial Hb  Resume heparin drip if hb remains stable       EKG: Sinus rhythm, 81 bpm; non-specific ST and T wave abnormality with 0.5-1.0 concave ST elevations in limb and precordial leads. ECHO 5/30/22   Overall Global left ventricular systolic function is normal. Estimated  ejection fraction is 55 % . Large pericardial effusion was noted pre-pericardiocentesis with early  diastolic collapse of RV. No significant residual effusion left after the pericardiocentesis with  complete expansion of right heart. ECHO 5/30/22  Summary  Mild left ventricular hypertrophy  Global left ventricular systolic function is low normal  Estimated ejection fraction is 45-50 % .   Large anterior and moderate size posterior pericardial effusion, ear;y  diastolic collapse of right atrium and ventricle noted, suggesting early  cardiac tamponade. No mitral regurgitation. No tricuspid regurgitation was seen. 2D ECHO ( 4/13/22)  Summary  Mild left ventricular hypertrophy  Global left ventricular systolic function is normal  Estimated ejection fraction is 65 % . Left atrium is normal in size. Negative bubble study, no shunt noted. No significant valvular regurgitation or stenosis seen. No pericardial effusion seen. Normal aortic root dimension. Objective:   Vitals: BP (!) 109/45   Pulse 84   Temp 98.7 °F (37.1 °C) (Temporal)   Resp 15   Ht 5' 7\" (1.702 m)   Wt 182 lb (82.6 kg)   SpO2 95%   BMI 28.51 kg/m²   General appearance: alert and cooperative with exam  HEENT: Head: Normocephalic, no lesions, without obvious abnormality. Neck: no JVD, trachea midline, no adenopathy  Lungs: Clear to auscultation  Heart: Regular rate and rhythm, s1/s2 auscultated, no murmurs. Pericardial drain in place   Abdomen: soft, non-tender, bowel sounds active  Extremities: no edema  Neurologic: not done        Assessment / Acute Cardiac Problems:   1. Large pericardial effusion s/p pericardiocentesis   1. Atypical chest pain, reproducible on exam, however with diffuse ST elevation on EKG suggesting possible pericarditis. High sensitivity troponins are elevated above 700, higher than his previous baseline above 400 ng/L, but remain stable. 2. Diabetes mellitus  3. Essential HTN  4. Hyperlipidemia  5. ESRD, on chronic Tues-Thus-Sat HD  6. S/p CVA April 2022  7. Hyponatremia, stable  8.  Chronic hypotension, on midodrine      Patient Active Problem List:     ESRD (end stage renal disease) on dialysis ([address]     Primary hypertension     Hyperlipidemia     Acute pain of left knee     Bipolar affective disorder ([address]     Atherosclerosis of aorta ([address]     COVID-19     Cerebrovascular accident (CVA) ([address]     Type 2

## 2022-05-31 NOTE — PLAN OF CARE
Problem: Chronic Conditions and Co-morbidities  Goal: Patient's chronic conditions and co-morbidity symptoms are monitored and maintained or improved  Outcome: Progressing  Flowsheets (Taken 5/30/2022 2000)  Care Plan - Patient's Chronic Conditions and Co-Morbidity Symptoms are Monitored and Maintained or Improved: Monitor and assess patient's chronic conditions and comorbid symptoms for stability, deterioration, or improvement     Problem: Discharge Planning  Goal: Discharge to home or other facility with appropriate resources  Outcome: Progressing  Flowsheets (Taken 5/30/2022 2000)  Discharge to home or other facility with appropriate resources: Refer to discharge planning if patient needs post-hospital services based on physician order or complex needs related to functional status, cognitive ability or social support system     Problem: Pain  Goal: Verbalizes/displays adequate comfort level or baseline comfort level  Outcome: Progressing     Problem: Safety - Adult  Goal: Free from fall injury  Outcome: Progressing  Flowsheets (Taken 5/31/2022 0432)  Free From Fall Injury: Instruct family/caregiver on patient safety     Problem: ABCDS Injury Assessment  Goal: Absence of physical injury  Outcome: Progressing  Flowsheets (Taken 5/31/2022 0432)  Absence of Physical Injury: Implement safety measures based on patient assessment     Problem: Skin/Tissue Integrity  Goal: Absence of new skin breakdown  Description: 1. Monitor for areas of redness and/or skin breakdown  2. Assess vascular access sites hourly  3. Every 4-6 hours minimum:  Change oxygen saturation probe site  4. Every 4-6 hours:  If on nasal continuous positive airway pressure, respiratory therapy assess nares and determine need for appliance change or resting period.   Outcome: Progressing

## 2022-05-31 NOTE — PROGRESS NOTES
Progress Note    Reason for follow up: ESRD    INTERVAL HISTORY:   Feeling better. Had pericardiocentesis with 860mL. Drain in place. K 6.1 today. Corrected . Feels much better after procedure. HISTORY OF PRESENT ILLNESS:    The patient is a 49 y.o. male who presents with complaint of chest pain. EKG showed ST elevation. Cardiology has been consulted. Possible pericarditis. He is on heparin drip. Cardiac cath planned tomorrow. CT chest/abd/pelvis results pending. SBP trending 80s to 100s. Lisinopril does decreased and norvasc dc'd last admission (last week). He states he made these adjustments to home meds. On admission, glucose 535 with sodium 125 corrected sodium 134, potassium 6, BUN 43, creatinine 8.15. He was initiated on Lokelma and insulin. This a.m, sodium was 128, corrected sodium 132 with potassium 5.5, bicarb 20, BUN 45, creatinine 8.98, glucose improved to 277, lactic acid 2.4. Most recent sodium at 846 was 126. Glucose not checked at that time. Hemoglobin 9.4 with WBC 12. Ferritin 3028. Patient was admitted last week for weakness and falls. At that time Neurontin dose was decreased. Hx of ESRD on HD MWF via AV[facility] at [facility]. Review Of Systems:   Constitutional: No fever, chills  Cardiac:  No chest pain currently, no dyspnea  Chest:               No cough, phlegm or wheezing. Abdomen:  + abdominal pain, no nausea, vomiting        Past Medical History:   Diagnosis Date    Cerebral artery occlusion with cerebral infarction [facility])     Closed fracture of bone of right foot March - April 2020    Dialysis patient [facility])     Hemod[facility] ([facility]     Hyperlipidemia     Hypertension     Kidney disease     On Dialysis    Type 1 diabetes mellitus ([facility]        Past Surgical History:   Procedure Laterality Date    AV FISTULA CREATION Left     WRIST SURGERY  1995       Prior to Admission medications    Medication Sig Start Date End Date Taking?  Authorizing Provider   acetaminophen-codeine (TYLENOL/CODEINE #3) 300-30 MG per tablet Take 1 tablet by mouth every 6 hours as needed for Pain for up to 3 days. 5/28/22 5/31/22  Matti Garcia MD   lisinopril (PRINIVIL;ZESTRIL) 20 MG tablet Take 1 tablet by mouth daily 5/27/22   DANIEL Braxton CNP   gabapentin (NEURONTIN) 300 MG capsule Take 1 capsule by mouth daily. 5/28/22   DANIEL Braxton CNP   insulin glargine (LANTUS) 100 UNIT/ML injection vial Inject 45 Units into the skin every morning Indications: 45 units in the morning and 10 units in the evening    Historical Provider, MD   traMADol (ULTRAM) 50 MG tablet Take 50 mg by mouth 2 times daily as needed for Pain. 5/6/22   Historical Provider, MD   Misc. Devices MISC Disp: custom molded shoes to accommodate for brace   DX: DM with history of stroke, foot drop  Duration: 1 year 5/11/22   Paris Parker DPM   ondansetron (ZOFRAN) 4 MG tablet Take 1 tablet by mouth 3 times daily as needed for Nausea or Vomiting 4/29/22   DANIEL Waterman CNP   magnesium oxide (MAG-OX) 400 MG tablet Take 400 mg by mouth daily  Patient not taking: Reported on 5/28/2022    Historical Provider, MD   magnesium hydroxide (MILK OF MAGNESIA) 400 MG/5ML suspension Take 15 mLs by mouth daily as needed for Constipation    Historical Provider, MD   insulin aspart (NOVOLOG) 100 UNIT/ML injection vial Inject 0-8 Units into the skin 3 times daily (before meals) SLIDING SCALE     Historical Provider, MD   FLUoxetine (PROZAC) 40 MG capsule Take 40 mg by mouth daily     Historical Provider, MD   ammonium lactate (LAC-HYDRIN) 12 % lotion Apply topically daily.   Patient taking differently: Apply to feet 11/1/21   Paris Parker DPM   furosemide (LASIX) 20 MG tablet Take 1 tablet by mouth daily  Patient not taking: Reported on 5/28/2022 4/27/21   Cora Retana DO   buPROPion (WELLBUTRIN XL) 150 MG extended release tablet Take 1 tablet by mouth every morning 3/25/21   Cora Retana DO Rosuvastatin Calcium 40 MG CPSP Take 40 mg by mouth daily    Historical Provider, MD   ezetimibe (ZETIA) 10 MG tablet Take 10 mg by mouth daily    Historical Provider, MD   omeprazole (PRILOSEC) 40 MG delayed release capsule Take 40 mg by mouth daily    Historical Provider, MD   aspirin 81 MG EC tablet Take 81 mg by mouth daily    Historical Provider, MD   calcium acetate 667 MG TABS Take 1,334 mg by mouth 3 times daily (with meals)     Historical Provider, MD   vitamin B-12 (CYANOCOBALAMIN) 500 MCG tablet Take 500 mcg by mouth daily    Historical Provider, MD       Scheduled Meds:   morphine  4 mg IntraVENous Once    aspirin  81 mg Oral Daily    buPROPion  150 mg Oral QAM    calcium acetate  1,334 mg Oral TID WC    ezetimibe  10 mg Oral Daily    [Held by provider] FLUoxetine  40 mg Oral Daily    gabapentin  300 mg Oral Daily    [Held by provider] lisinopril  20 mg Oral Daily    vitamin B-12  500 mcg Oral Daily    insulin lispro  0-12 Units SubCUTAneous TID WC    insulin lispro  0-6 Units SubCUTAneous Nightly    sodium chloride flush  5-40 mL IntraVENous 2 times per day    rosuvastatin  10 mg Oral Nightly    predniSONE  40 mg Oral Daily    polyethylene glycol  17 g Oral Daily    pantoprazole  40 mg Oral QAM AC     Continuous Infusions:   dextrose      sodium chloride       PRN Meds:glucose, dextrose bolus **OR** dextrose bolus, glucagon (rDNA), dextrose, sodium chloride flush, sodium chloride, ondansetron **OR** ondansetron, acetaminophen **OR** acetaminophen, nitroGLYCERIN, morphine, perflutren lipid microspheres    No Known Allergies    Social History     Socioeconomic History    Marital status: Single     Spouse name: Not on file    Number of children: Not on file    Years of education: Not on file    Highest education level: Not on file   Occupational History    Not on file   Tobacco Use    Smoking status: Never Smoker    Smokeless tobacco: Never Used   Vaping Use    Vaping Use: Never used   Substance and Sexual Activity    Alcohol use: Never    Drug use: Never    Sexual activity: Not on file   Other Topics Concern    Not on file   Social History Narrative    Not on file     Social Determinants of Health     Financial Resource Strain:     Difficulty of Paying Living Expenses: Not on file   Food Insecurity:     Worried About Running Out of Food in the Last Year: Not on file    [address] of Food in the Last Year: Not on file   Transportation Needs:     Lack of Transportation (Medical): Not on file    Lack of Transportation (Non-Medical):  Not on file   Physical Activity:     Days of Exercise per Week: Not on file    Minutes of Exercise per Session: Not on file   Stress:     Feeling of Stress : Not on file   Social Connections:     Frequency of Communication with Friends and Family: Not on file    Frequency of Social Gatherings with Friends and Family: Not on file    Attends [employer] Services: Not on file    Active Member of [employer] or Organizations: Not on file    Attends Club or Organization Meetings: Not on file    Marital Status: Not on file   Intimate Partner Violence:     Fear of Current or Ex-Partner: Not on file    Emotionally Abused: Not on file    Physically Abused: Not on file    Sexually Abused: Not on file   Housing Stability:     Unable to Pay for Housing in the Last Year: Not on file    Number of [place] in the Last Year: Not on file    Unstable Housing in the Last Year: Not on file       Family History   Problem Relation Age of Onset    Diabetes Mother     Heart Disease Father     Diabetes Father     Cancer Paternal Grandmother     Heart Disease Maternal Great Grandfather          Physical Exam:  Vitals:    05/31/22 1000 05/31/22 1150 05/31/22 1156 05/31/22 1200   BP: (!) 109/45 (!) 145/62     Pulse: 84 87 86    Resp: 15 14 17    Temp:    98.6 °F (37 °C)   TempSrc:    Temporal   SpO2: 95% 98% 97%    Weight:       Height:         I/O last 3 completed shifts: In: 200 [P.O.:400; I.V.:30]  Out: 760 [Drains:760]    General:  Awake, alert, not in distress. Appears to be stated age. HEENT: Atraumatic, normocephalic. Anicteric sclera. Pink and moist oral mucosa. Neck supple. Chest: Bilateral air entry, clear to auscultation, no wheezing, rhonchi or rales. Cardiovascular: RRR, S1S2, no murmur, rub or gallop. No lower extremity edema. +pericaridial drain. Abdomen: Soft, non tender to palpation. Active bowel sounds x 4 quadrants. Integumentary: Pink, warm and dry. Free from rash or lesions. Skin turgor normal.  CNS: Oriented to person, place and time. Cranial nerves grossly intact. Speech clear. Face symmetrical. No tremor.      Data:    CBC:   Lab Results   Component Value Date    WBC 16.7 (H) 05/31/2022    HGB 8.8 (L) 05/31/2022    HCT 28.7 (L) 05/31/2022    MCV 97.3 05/31/2022     05/31/2022     BMP:    Lab Results   Component Value Date     (L) 05/31/2022     (L) 05/31/2022     (L) 05/30/2022    K 6.1 (HH) 05/31/2022    K 5.9 (H) 05/30/2022    K 5.5 (H) 05/30/2022    CL 89 (L) 05/31/2022    CL 91 (L) 05/30/2022    CL 89 (L) 05/30/2022    CO2 21 05/31/2022    CO2 20 05/30/2022    CO2 22 05/30/2022    BUN 63 (H) 05/31/2022    BUN 45 (H) 05/30/2022    BUN 43 (H) 05/30/2022    CREATININE 10.28 (HH) 05/31/2022    CREATININE 8.98 (HH) 05/30/2022    CREATININE 8.15 (HH) 05/30/2022    GLUCOSE 312 (H) 05/31/2022    GLUCOSE 277 (H) 05/30/2022    GLUCOSE 282 05/30/2022     CMP:   Lab Results   Component Value Date     05/31/2022    K 6.1 05/31/2022    CL 89 05/31/2022    CO2 21 05/31/2022    BUN 63 05/31/2022    CREATININE 10.28 05/31/2022    GLUCOSE 312 05/31/2022    CALCIUM 8.9 05/31/2022    PROT 5.3 04/14/2022    LABALBU 3.4 04/14/2022    BILITOT 0.34 04/14/2022    ALKPHOS 137 04/14/2022    AST 38 04/14/2022    ALT 44 04/14/2022      Hepatic:   Lab Results   Component Value Date    AST 38 04/14/2022    AST 27 04/13/2022    AST 36 01/05/2022    ALT 44 (H) 04/14/2022    ALT 42 (H) 04/13/2022    ALT 38 01/05/2022    BILITOT 0.34 04/14/2022    BILITOT 0.28 (L) 04/13/2022    BILITOT 0.43 01/05/2022    ALKPHOS 137 (H) 04/14/2022    ALKPHOS 142 (H) 04/13/2022    ALKPHOS 110 01/05/2022     BNP: No results found for: BNP  Lipids:   Lab Results   Component Value Date    CHOL 118 04/12/2022    HDL 61 04/12/2022     INR:   Lab Results   Component Value Date    INR 1.0 05/27/2022    INR 0.9 04/12/2022     PTH: No results found for: PTH  Phosphorus:    Lab Results   Component Value Date    PHOS 4.9 05/31/2022     Ionized Calcium: No results found for: IONCA  Magnesium:   Lab Results   Component Value Date    MG 2.2 05/31/2022     Albumin:   Lab Results   Component Value Date    LABALBU 3.4 04/14/2022     Last 3 CK, CKMB, Troponin: @LABRCNT(CKTOTAL:3,CKMB:3,TROPONINI:3)       URINE:)No results found for: Pat Gamez    Radiology:   Reviewed. Assessment:    ESRD  Anemia  MBD     Principal Problem:    Acute idiopathic pericarditis  Active Problems:    Hypotension    Elevated troponin    Hyponatremia    Anemia    Hyperkalemia    Left lower quadrant abdominal pain    ESRD (end stage renal disease) on dialysis ([address]    Primary hypertension    Hyperlipidemia    Type 2 diabetes mellitus with kidney complication, with long-term current use of insulin ([facility])    GERD (gastroesophageal reflux disease)  Resolved Problems:    * No resolved hospital problems.  *    Plan:    HD today  See orders  US as tolerated with HD  BP improved post pericardiocentesis  FU labs  Renal diet    Electronically signed by Gwen Mcgrath MD  on 5/31/2022 at 12:04 PM

## 2022-05-31 NOTE — FLOWSHEET NOTE
[] [facility] ([facility]) - [facility] &  Therapy  [address]    [phone]  F: [phone]   [] [address]  [facility]   [address]  P: [phone]  F: [phone]  [] [address] &  Therapy  [address]  P: [phone]  F: [phone] [] [address]  P: [phone]  F: [phone]  [] [address]  [facility]   [address]   [place]: [phone]  F: [phone]   [] [place] [address] [facility] [address]  [place]: [phone]   F: [phone]     Physical Therapy Cancel/No Show note    Date: 2022  Patient: Tommy Heath  : 1975  MRN: [id number]    Cancels/No Shows to date: 8cx/0ns    For 2022 appointment patient:    [x]  Cancelled    [] Rescheduled appointment    [] No-show     Reason given by patient:    [x]  Patient ill    []  Conflicting appointment    [] No transportation      [] Conflict with work    [] No reason given    [] Weather related    [] COVID-19    [] Other:      Comments: hospitalized with\"heart condition\" may be d/c to rehab facility per grandfather. I informed he would need a new order to restart. LS       [] Next appointment was confirmed    Electronically signed by: Juanita Reyes

## 2022-05-31 NOTE — PROGRESS NOTES
Paged placed to cardiology to request orders for patient's pericardial drain as there are no orders for care/management of tube and drain. Awaiting return call.

## 2022-06-01 ENCOUNTER — HOSPITAL ENCOUNTER (OUTPATIENT)
Dept: PHYSICAL THERAPY | Facility: CLINIC | Age: 47
Setting detail: THERAPIES SERIES
Discharge: HOME | End: 2022-06-01
Payer: MEDICARE

## 2022-06-01 LAB
ANION GAP SERPL CALCULATED.3IONS-SCNC: 14 MMOL/L (ref 9–17)
ANION GAP SERPL CALCULATED.3IONS-SCNC: 19 MMOL/L (ref 9–17)
BUN BLDV-MCNC: 43 MG/DL (ref 6–20)
BUN BLDV-MCNC: 52 MG/DL (ref 6–20)
BUN/CREAT BLD: 6 (ref 9–20)
BUN/CREAT BLD: 7 (ref 9–20)
CALCIUM SERPL-MCNC: 8.9 MG/DL (ref 8.6–10.4)
CALCIUM SERPL-MCNC: 9 MG/DL (ref 8.6–10.4)
CHLORIDE BLD-SCNC: 81 MMOL/L (ref 98–107)
CHLORIDE BLD-SCNC: 84 MMOL/L (ref 98–107)
CO2: 22 MMOL/L (ref 20–31)
CO2: 25 MMOL/L (ref 20–31)
CREAT SERPL-MCNC: 6.85 MG/DL (ref 0.7–1.2)
CREAT SERPL-MCNC: 7.03 MG/DL (ref 0.7–1.2)
EKG ATRIAL RATE: 81 BPM
EKG ATRIAL RATE: 82 BPM
EKG ATRIAL RATE: 84 BPM
EKG ATRIAL RATE: 89 BPM
EKG P AXIS: 28 DEGREES
EKG P AXIS: 57 DEGREES
EKG P AXIS: 60 DEGREES
EKG P AXIS: 67 DEGREES
EKG P-R INTERVAL: 136 MS
EKG P-R INTERVAL: 148 MS
EKG P-R INTERVAL: 156 MS
EKG P-R INTERVAL: 162 MS
EKG Q-T INTERVAL: 372 MS
EKG Q-T INTERVAL: 376 MS
EKG Q-T INTERVAL: 394 MS
EKG Q-T INTERVAL: 400 MS
EKG QRS DURATION: 100 MS
EKG QRS DURATION: 100 MS
EKG QRS DURATION: 102 MS
EKG QRS DURATION: 92 MS
EKG QTC CALCULATION (BAZETT): 439 MS
EKG QTC CALCULATION (BAZETT): 457 MS
EKG QTC CALCULATION (BAZETT): 457 MS
EKG QTC CALCULATION (BAZETT): 467 MS
EKG R AXIS: 12 DEGREES
EKG R AXIS: 14 DEGREES
EKG R AXIS: 23 DEGREES
EKG R AXIS: 7 DEGREES
EKG T AXIS: 39 DEGREES
EKG T AXIS: 41 DEGREES
EKG T AXIS: 46 DEGREES
EKG T AXIS: 52 DEGREES
EKG VENTRICULAR RATE: 81 BPM
EKG VENTRICULAR RATE: 82 BPM
EKG VENTRICULAR RATE: 84 BPM
EKG VENTRICULAR RATE: 89 BPM
GFR AFRICAN AMERICAN: 10 ML/MIN
GFR AFRICAN AMERICAN: 10 ML/MIN
GFR NON-AFRICAN AMERICAN: 8 ML/MIN
GFR NON-AFRICAN AMERICAN: 9 ML/MIN
GFR SERPL CREATININE-BSD FRML MDRD: ABNORMAL ML/MIN/{1.73_M2}
GFR SERPL CREATININE-BSD FRML MDRD: ABNORMAL ML/MIN/{1.73_M2}
GLUCOSE BLD-MCNC: 392 MG/DL (ref 75–110)
GLUCOSE BLD-MCNC: 398 MG/DL (ref 75–110)
GLUCOSE BLD-MCNC: 464 MG/DL (ref 75–110)
GLUCOSE BLD-MCNC: 464 MG/DL (ref 75–110)
GLUCOSE BLD-MCNC: 496 MG/DL (ref 70–99)
GLUCOSE BLD-MCNC: 511 MG/DL (ref 75–110)
GLUCOSE BLD-MCNC: 529 MG/DL (ref 75–110)
GLUCOSE BLD-MCNC: 585 MG/DL (ref 75–110)
GLUCOSE BLD-MCNC: 586 MG/DL (ref 70–99)
HCT VFR BLD CALC: 29.4 % (ref 40.7–50.3)
HCT VFR BLD CALC: 29.5 % (ref 40.7–50.3)
HCT VFR BLD CALC: 30.2 % (ref 40.7–50.3)
HEMOGLOBIN: 9.1 G/DL (ref 13–17)
HEMOGLOBIN: 9.1 G/DL (ref 13–17)
HEMOGLOBIN: 9.3 G/DL (ref 13–17)
MAGNESIUM: 2.1 MG/DL (ref 1.6–2.6)
PHOSPHORUS: 5.3 MG/DL (ref 2.5–4.5)
POTASSIUM SERPL-SCNC: 5.4 MMOL/L (ref 3.7–5.3)
POTASSIUM SERPL-SCNC: 5.8 MMOL/L (ref 3.7–5.3)
SODIUM BLD-SCNC: 120 MMOL/L (ref 135–144)
SODIUM BLD-SCNC: 125 MMOL/L (ref 135–144)
SODIUM BLD-SCNC: 125 MMOL/L (ref 135–144)
SURGICAL PATHOLOGY REPORT: NORMAL

## 2022-06-01 PROCEDURE — 6360000002 HC RX W HCPCS: Performed by: FAMILY MEDICINE

## 2022-06-01 PROCEDURE — 85014 HEMATOCRIT: CPT

## 2022-06-01 PROCEDURE — 2700000000 HC OXYGEN THERAPY PER DAY

## 2022-06-01 PROCEDURE — 6370000000 HC RX 637 (ALT 250 FOR IP): Performed by: INTERNAL MEDICINE

## 2022-06-01 PROCEDURE — 94761 N-INVAS EAR/PLS OXIMETRY MLT: CPT

## 2022-06-01 PROCEDURE — 82947 ASSAY GLUCOSE BLOOD QUANT: CPT

## 2022-06-01 PROCEDURE — 6370000000 HC RX 637 (ALT 250 FOR IP): Performed by: NURSE PRACTITIONER

## 2022-06-01 PROCEDURE — 83735 ASSAY OF MAGNESIUM: CPT

## 2022-06-01 PROCEDURE — 80048 BASIC METABOLIC PNL TOTAL CA: CPT

## 2022-06-01 PROCEDURE — 2060000000 HC ICU INTERMEDIATE R&B

## 2022-06-01 PROCEDURE — 36415 COLL VENOUS BLD VENIPUNCTURE: CPT

## 2022-06-01 PROCEDURE — 84100 ASSAY OF PHOSPHORUS: CPT

## 2022-06-01 PROCEDURE — 84295 ASSAY OF SERUM SODIUM: CPT

## 2022-06-01 PROCEDURE — 6370000000 HC RX 637 (ALT 250 FOR IP): Performed by: FAMILY MEDICINE

## 2022-06-01 PROCEDURE — 2580000003 HC RX 258: Performed by: NURSE PRACTITIONER

## 2022-06-01 PROCEDURE — 99232 SBSQ HOSP IP/OBS MODERATE 35: CPT | Performed by: FAMILY MEDICINE

## 2022-06-01 PROCEDURE — 85018 HEMOGLOBIN: CPT

## 2022-06-01 PROCEDURE — 2500000003 HC RX 250 WO HCPCS: Performed by: NURSE PRACTITIONER

## 2022-06-01 RX ORDER — INSULIN GLARGINE 100 [IU]/ML
45 INJECTION, SOLUTION SUBCUTANEOUS DAILY
Status: DISCONTINUED | OUTPATIENT
Start: 2022-06-01 | End: 2022-06-08 | Stop reason: HOSPADM

## 2022-06-01 RX ORDER — INSULIN GLARGINE 100 [IU]/ML
45 INJECTION, SOLUTION SUBCUTANEOUS 2 TIMES DAILY
Status: DISCONTINUED | OUTPATIENT
Start: 2022-06-01 | End: 2022-06-01

## 2022-06-01 RX ORDER — INSULIN GLARGINE 100 [IU]/ML
15 INJECTION, SOLUTION SUBCUTANEOUS 2 TIMES DAILY
Status: DISCONTINUED | OUTPATIENT
Start: 2022-06-01 | End: 2022-06-01

## 2022-06-01 RX ORDER — CARVEDILOL 6.25 MG/1
6.25 TABLET ORAL 2 TIMES DAILY WITH MEALS
Status: DISCONTINUED | OUTPATIENT
Start: 2022-06-01 | End: 2022-06-08 | Stop reason: HOSPADM

## 2022-06-01 RX ORDER — INSULIN GLARGINE 100 [IU]/ML
15 INJECTION, SOLUTION SUBCUTANEOUS NIGHTLY
Status: DISCONTINUED | OUTPATIENT
Start: 2022-06-01 | End: 2022-06-03

## 2022-06-01 RX ORDER — INSULIN LISPRO 100 [IU]/ML
0-18 INJECTION, SOLUTION INTRAVENOUS; SUBCUTANEOUS
Status: DISCONTINUED | OUTPATIENT
Start: 2022-06-01 | End: 2022-06-08 | Stop reason: HOSPADM

## 2022-06-01 RX ORDER — INSULIN LISPRO 100 [IU]/ML
0-9 INJECTION, SOLUTION INTRAVENOUS; SUBCUTANEOUS NIGHTLY
Status: DISCONTINUED | OUTPATIENT
Start: 2022-06-01 | End: 2022-06-08 | Stop reason: HOSPADM

## 2022-06-01 RX ORDER — INSULIN LISPRO 100 [IU]/ML
10 INJECTION, SOLUTION INTRAVENOUS; SUBCUTANEOUS ONCE
Status: COMPLETED | OUTPATIENT
Start: 2022-06-01 | End: 2022-06-01

## 2022-06-01 RX ADMIN — SODIUM CHLORIDE, PRESERVATIVE FREE 10 ML: 5 INJECTION INTRAVENOUS at 10:25

## 2022-06-01 RX ADMIN — INSULIN GLARGINE 15 UNITS: 100 INJECTION, SOLUTION SUBCUTANEOUS at 10:08

## 2022-06-01 RX ADMIN — POLYETHYLENE GLYCOL 3350 17 G: 17 POWDER, FOR SOLUTION ORAL at 10:06

## 2022-06-01 RX ADMIN — Medication 1 MG: at 13:53

## 2022-06-01 RX ADMIN — INSULIN LISPRO 10 UNITS: 100 INJECTION, SOLUTION INTRAVENOUS; SUBCUTANEOUS at 12:30

## 2022-06-01 RX ADMIN — INSULIN LISPRO 18 UNITS: 100 INJECTION, SOLUTION INTRAVENOUS; SUBCUTANEOUS at 19:39

## 2022-06-01 RX ADMIN — EZETIMIBE 10 MG: 10 TABLET ORAL at 10:07

## 2022-06-01 RX ADMIN — VITAM B12 500 MCG: 100 TAB at 10:07

## 2022-06-01 RX ADMIN — INSULIN LISPRO 9 UNITS: 100 INJECTION, SOLUTION INTRAVENOUS; SUBCUTANEOUS at 21:50

## 2022-06-01 RX ADMIN — CARVEDILOL 6.25 MG: 6.25 TABLET, FILM COATED ORAL at 17:00

## 2022-06-01 RX ADMIN — ROSUVASTATIN CALCIUM 10 MG: 10 TABLET, FILM COATED ORAL at 21:49

## 2022-06-01 RX ADMIN — SODIUM CHLORIDE, PRESERVATIVE FREE 10 ML: 5 INJECTION INTRAVENOUS at 21:51

## 2022-06-01 RX ADMIN — CALCIUM ACETATE 1334 MG: 667 CAPSULE ORAL at 17:00

## 2022-06-01 RX ADMIN — INSULIN GLARGINE 15 UNITS: 100 INJECTION, SOLUTION SUBCUTANEOUS at 21:49

## 2022-06-01 RX ADMIN — CALCIUM ACETATE 1334 MG: 667 CAPSULE ORAL at 12:30

## 2022-06-01 RX ADMIN — INSULIN LISPRO 12 UNITS: 100 INJECTION, SOLUTION INTRAVENOUS; SUBCUTANEOUS at 06:34

## 2022-06-01 RX ADMIN — CALCIUM ACETATE 1334 MG: 667 CAPSULE ORAL at 10:07

## 2022-06-01 RX ADMIN — ASPIRIN 81 MG: 81 TABLET, COATED ORAL at 10:07

## 2022-06-01 RX ADMIN — PANTOPRAZOLE SODIUM 40 MG: 40 TABLET, DELAYED RELEASE ORAL at 06:19

## 2022-06-01 RX ADMIN — INSULIN GLARGINE 45 UNITS: 100 INJECTION, SOLUTION SUBCUTANEOUS at 17:00

## 2022-06-01 RX ADMIN — INSULIN LISPRO 10 UNITS: 100 INJECTION, SOLUTION INTRAVENOUS; SUBCUTANEOUS at 21:50

## 2022-06-01 RX ADMIN — GABAPENTIN 300 MG: 300 CAPSULE ORAL at 10:07

## 2022-06-01 RX ADMIN — SODIUM ZIRCONIUM CYCLOSILICATE 10 G: 10 POWDER, FOR SUSPENSION ORAL at 15:53

## 2022-06-01 RX ADMIN — BUPROPION HYDROCHLORIDE 150 MG: 150 TABLET, EXTENDED RELEASE ORAL at 10:07

## 2022-06-01 RX ADMIN — PREDNISONE 40 MG: 20 TABLET ORAL at 10:07

## 2022-06-01 RX ADMIN — INSULIN LISPRO 12 UNITS: 100 INJECTION, SOLUTION INTRAVENOUS; SUBCUTANEOUS at 17:00

## 2022-06-01 ASSESSMENT — ENCOUNTER SYMPTOMS
VOICE CHANGE: 0
RHINORRHEA: 0
CONSTIPATION: 0
NAUSEA: 0
VOMITING: 0
CHEST TIGHTNESS: 0
WHEEZING: 0
DIARRHEA: 0
SINUS PRESSURE: 0
COUGH: 0
ABDOMINAL PAIN: 0
SHORTNESS OF BREATH: 1
BACK PAIN: 0
CHOKING: 0

## 2022-06-01 ASSESSMENT — PAIN SCALES - GENERAL: PAINLEVEL_OUTOF10: 2

## 2022-06-01 ASSESSMENT — PAIN DESCRIPTION - LOCATION: LOCATION: CHEST

## 2022-06-01 ASSESSMENT — PAIN DESCRIPTION - ORIENTATION: ORIENTATION: LEFT;MID

## 2022-06-01 ASSESSMENT — PAIN DESCRIPTION - PAIN TYPE: TYPE: SURGICAL PAIN

## 2022-06-01 ASSESSMENT — PAIN DESCRIPTION - FREQUENCY: FREQUENCY: INTERMITTENT

## 2022-06-01 ASSESSMENT — PAIN DESCRIPTION - ONSET: ONSET: OTHER (COMMENT)

## 2022-06-01 ASSESSMENT — PAIN DESCRIPTION - DESCRIPTORS: DESCRIPTORS: SORE;SHARP

## 2022-06-01 NOTE — PROGRESS NOTES
Physician Progress Note      Cristobal Coello  CSN #:                  [id number]  :                       1975  ADMIT DATE:       2022 11:56 PM  DISCH DATE:  RESPONDING  PROVIDER #:        Amaya Clark MD          QUERY TEXT:    Pt admitted with pericarditis. Pt noted to have increasing dyspnea and   hypoxia. If possible, please document in the progress notes and discharge   summary if you are evaluating and/or treating any of the following: The medical record reflects the following:  Risk Factors: ESRD, Pericarditis  Clinical Indicators: Per cardiology \"Patient had increasing hypoxia and   dyspnea since afternoon. SBP dropped to 85-90. \", proceeding with emergent   pericardiocentesis, SpO2 as low as 75% on 60L, tachypnea with RR as high as   29, diminished breath sounds  Treatment: supplemental o2, monitoring    Thank you,  Raina Hodgkin, RN  Options provided:  -- Acute respiratory failure with hypoxia  -- Acute respiratory failure with hypercapnia  -- Acute respiratory failure with hypoxia and hypercapnia  -- Other - I will add my own diagnosis  -- Disagree - Not applicable / Not valid  -- Disagree - Clinically unable to determine / Unknown  -- Refer to Clinical Documentation Reviewer    PROVIDER RESPONSE TEXT:    This patient is in acute respiratory failure with hypoxia. Query created by:  Claire Nunes on 2022 9:50 AM      Electronically signed by:  Amaya Clark MD 2022 4:16 PM

## 2022-06-01 NOTE — PROGRESS NOTES
[facility]   Progress Note                   Date:   6/1/2022  Patient name: Steve Iverson  Date of admission:  5/29/2022 11:56 PM  MRN:   [id number]  YOB: 1975  PCP: DANIEL Love CNP    Reason for Admission:  Pericarditis     Subjective: There were no acute events overnight, remained hemodynamically stable, chest pain has resolved, no drainage from pericardial catheter overnight and since this morning. -150 mm hg. HR normal. On room air now with no dyspnea or orthopnea. Medications:   Scheduled Meds:   carvedilol  6.25 mg Oral BID WC    insulin glargine  45 Units SubCUTAneous Daily    insulin glargine  15 Units SubCUTAneous Nightly    colchicine  0.6 mg Oral Every Other Day    morphine  4 mg IntraVENous Once    aspirin  81 mg Oral Daily    buPROPion  150 mg Oral QAM    calcium acetate  1,334 mg Oral TID WC    ezetimibe  10 mg Oral Daily    [Held by provider] FLUoxetine  40 mg Oral Daily    gabapentin  300 mg Oral Daily    [Held by provider] lisinopril  20 mg Oral Daily    vitamin B-12  500 mcg Oral Daily    insulin lispro  0-12 Units SubCUTAneous TID WC    insulin lispro  0-6 Units SubCUTAneous Nightly    sodium chloride flush  5-40 mL IntraVENous 2 times per day    rosuvastatin  10 mg Oral Nightly    predniSONE  40 mg Oral Daily    polyethylene glycol  17 g Oral Daily    pantoprazole  40 mg Oral QAM AC       Continuous Infusions:   dextrose      sodium chloride         CBC:   Recent Labs     05/30/22  0011 05/30/22  1859 05/31/22  0551 06/01/22  0013 06/01/22  1123   WBC 12.0*  --  16.7*  --   --    HGB 9.4*   < > 8.8* 9.3* 9.1*     --  326  --   --     < > = values in this interval not displayed.      BMP:    Recent Labs     05/31/22  0551 05/31/22  1239 06/01/22  0013 06/01/22  0545 06/01/22  1438   *   < > 125* 125* 120*   K 6.1*  --   --  5.8* 5.4*   CL 89*  --   --  84* 81*   CO2 21  --   --  22 25   BUN 63* --   --  43* 52*   CREATININE 10.28*  --   --  6.85* 7.03*   GLUCOSE 312*  --   --  496* 586*    < > = values in this interval not displayed. Hepatic: No results for input(s): AST, ALT, ALB, BILITOT, ALKPHOS in the last 72 hours. Troponin: No results for input(s): TROPONINI in the last 72 hours. BNP: No results for input(s): BNP in the last 72 hours. Lipids: No results for input(s): CHOL, HDL in the last 72 hours. Invalid input(s): LDLCALCU  INR: No results for input(s): INR in the last 72 hours. Objective:   Vitals: BP (!) 165/74   Pulse 85   Temp 98.5 °F (36.9 °C) (Temporal)   Resp 19   Ht 5' 7\" (1.702 m)   Wt 187 lb 14.4 oz (85.2 kg)   SpO2 93%   BMI 29.43 kg/m²     General appearance: awake, alert, in no apparent respiratory distress omn room air   HEENT: Head: Normocephalic, no lesions, without obvious abnormality  Neck: no JVD  Lungs: clear to auscultation bilaterally, no basilar rales, no wheezing   Heart: regular rate and rhythm, S1, S2 normal, no murmur, click, rub or gallop  Abdomen: soft, non-tender; bowel sounds normal  Extremities: No LE edema  Neurologic: Mental status: Alert, oriented. Motor and sensory not done. EKG: NSR, diffuse ST elevation suggesting pericarditis       Echocardiogram 5/30/2022:  Mild left ventricular hypertrophy  Global left ventricular systolic function is low normal  Estimated ejection fraction is 45-50 % . Large anterior and moderate size posterior pericardial effusion, early diastolic collapse of right atrium and ventricle noted, suggesting early  cardiac tamponade. No mitral regurgitation. No tricuspid regurgitation was seen. Assessment:   1. Acute idiopathic pericarditis   2. Large pericardial effusion with tamponade s/p pericardiocentesis with 850 cc bloody fluid drainage, followed by 700 cc output next 24 hours  3. Preserved LV systolic function  4.  Troponin elevation, chronic, likely secondary to impaired renal clearance and associated

## 2022-06-01 NOTE — PROGRESS NOTES
[facility]  Office: [address], DO, Loraine Walker, DO, Aguilar Alan, DO, Cullen Escobedo Blood, DO, Isabel Cheung MD, Akira Hadley MD, Jojo Méndez MD, Beatrice Alvarez MD, Ana Craig MD, Marcy Wong MD, Eriberto Draper MD, Bony Felix, DO, Delbert Silva, DO, Marbin Zamorano MD,  Sharif Salcido, DO, Edwin Banda MD, Joya Taylor MD, Lieutenant Everton MD, Ashley Walter DO, Sophia Myers MD, Deng Dumont MD, Jess Flores MD, Cresencio Hemphill, [facility], [facility], CNP, Deni Salazar, CNP, Bernardino Patel, CNP, Leanna Montgomery, CNP, Denny Simental, CNP, Yeison Bower PABethC, Dyan Rivera, [facility], Tito Flaherty, CNP, Gaurav Alfred, CNP, Kaleb Gordillo, CNP, Ban Linares, CNS, Yair Urbina, [facility], Mukul Hernandez, CNP, Barbie Recinos, CNP, [facility]      Daily Progress Note     Admit Date: 5/29/2022  Bed/Room No.  2029/2029-01  Admitting Physician : Jojo Méndez MD  Code Status :[facility] Day:  LOS: 2 days   Chief Complaint:     Chief Complaint   Patient presents with    Chest Pain    Shortness of Breath     Principal Problem:    Acute idiopathic pericarditis  Active Problems:    Hypotension    Elevated troponin    Hyponatremia    Anemia    Hyperkalemia    Left lower quadrant abdominal pain    ESRD (end stage renal disease) on dialysis [facility])    Primary hypertension    Hyperlipidemia    Type 2 diabetes mellitus with kidney complication, with long-term current use of insulin ([facility])    GERD (gastroesophageal reflux disease)  Resolved Problems:    * No resolved hospital problems. *    Subjective : Interval History/Significant events :  06/01/22    Patient remained stable. He denies any significant chest discomfort, breathing difficulty. Patient is on supplemental oxygen 3 L/min. He remains afebrile. No major drainage throat pericardial drain overnight. Hemoglobin stable. Patient is off heparin infusion.   Vitals - Stable afebrile  Labs -hyperkalemia 5.8, hyponatremia 125, glucose 496    Nursing notes , Consults notes reviewed. Overnight events and updates discussed with Nursing staff . Background History:         Giancarlo Bazzi is 49 y.o. male  Who was admitted to the hospital on 5/29/2022 for treatment of Acute idiopathic pericarditis. Patient came to emergency room with chest pain and shortness of breath. He was recently admitted in the hospital for lower extremity weakness which quickly resolved and was discharged home. He returned back with difficulty in breathing. Patient also had hiccups, pain in left side chest radiated shoulder. Patient denies any fever, chills. Patient was found to have a large pericardial effusion. EKG showed diffuse ST elevation suggestive of pericarditis. Patient had pericardiocentesis with 860 cc of hemorrhagic fluid obtained. He is also treated with heparin infusion and was stopped after STEMI ruled out.     PMH:  Past Medical History:   Diagnosis Date    Cerebral artery occlusion with cerebral infarction [facility])     Closed fracture of bone of right foot March - April 2020    Dialysis patient [facility])     Hemodialysis patient ([facility] [address]     Hyperlipidemia     Hypertension     Kidney disease     On Dialysis    Type 1 diabetes mellitus (HCC)       Allergies: No Known Allergies   Medications :  insulin glargine, 15 Units, SubCUTAneous, BID  colchicine, 0.6 mg, Oral, Every Other Day  morphine, 4 mg, IntraVENous, Once  aspirin, 81 mg, Oral, Daily  buPROPion, 150 mg, Oral, QAM  calcium acetate, 1,334 mg, Oral, TID WC  ezetimibe, 10 mg, Oral, Daily  [Held by provider] FLUoxetine, 40 mg, Oral, Daily  gabapentin, 300 mg, Oral, Daily  [Held by provider] lisinopril, 20 mg, Oral, Daily  vitamin B-12, 500 mcg, Oral, Daily  insulin lispro, 0-12 Units, SubCUTAneous, TID WC  insulin lispro, 0-6 Units, SubCUTAneous, Nightly  sodium chloride flush, 5-40 mL, IntraVENous, 2 times per day  rosuvastatin, 10 mg, Oral, Nightly  predniSONE, 40 mg, Oral, Daily  polyethylene glycol, 17 g, Oral, Daily  pantoprazole, 40 mg, Oral, QAM AC        Review of Systems   Review of Systems   Constitutional: Positive for activity change and appetite change. Negative for fatigue, fever and unexpected weight change. HENT: Negative for congestion, nosebleeds, rhinorrhea, sinus pressure, sneezing and voice change. Respiratory: Positive for shortness of breath. Negative for cough, choking, chest tightness and wheezing. Cardiovascular: Positive for chest pain. Negative for palpitations and leg swelling. Gastrointestinal: Negative for abdominal pain, constipation, diarrhea, nausea and vomiting. Genitourinary: Negative for difficulty urinating, dysuria, frequency, penile discharge and testicular pain. Musculoskeletal: Negative for back pain. Skin: Negative for rash. Neurological: Positive for weakness. Negative for dizziness, light-headedness and headaches. Hematological: Does not bruise/bleed easily. Psychiatric/Behavioral: Negative for agitation, behavioral problems, confusion, self-injury, sleep disturbance and suicidal ideas.      Objective :      Current Vitals : Temp: 97.4 °F (36.3 °C),  Heart Rate: 86, Resp: 11, BP: (!) 127/57, SpO2: 97 %  Last 24 Hrs Vitals   Patient Vitals for the past 24 hrs:   BP Temp Temp src Pulse Resp SpO2 Height Weight   06/01/22 0600 (!) 127/57 -- -- 86 11 97 % -- --   06/01/22 0516 -- -- -- 90 14 98 % -- --   06/01/22 0500 (!) 134/55 -- -- 85 10 97 % -- --   06/01/22 0400 (!) 125/55 97.4 °F (36.3 °C) Temporal 83 11 98 % -- 187 lb 14.4 oz (85.2 kg)   06/01/22 0300 (!) 134/48 -- -- 84 11 98 % -- --   06/01/22 0200 (!) 133/54 -- -- 85 11 98 % -- --   06/01/22 0100 (!) 135/51 -- -- 88 12 99 % -- --   06/01/22 0000 (!) 145/59 97.8 °F (36.6 °C) Temporal 93 12 99 % -- --   05/31/22 2300 (!) 151/62 -- -- 97 13 99 % -- --   05/31/22 2200 (!) 156/56 -- -- 100 17 98 % -- --   05/31/22 2111 -- -- -- (!) 102 16 98 % -- --   05/31/22 2100 (!) 166/61 -- -- (!) 102 15 99 % -- --   05/31/22 2000 (!) 147/57 97.4 °F (36.3 °C) Temporal (!) 101 11 100 % -- --   05/31/22 1900 (!) 139/59 -- -- 98 16 100 % -- --   05/31/22 1800 (!) 144/82 97.2 °F (36.2 °C) -- 92 (!) 9 100 % -- 182 lb 1.6 oz (82.6 kg)   05/31/22 1745 135/62 -- -- -- -- -- -- --   05/31/22 1730 133/62 -- -- -- -- -- -- --   05/31/22 1700 139/63 -- -- 86 13 -- -- --   05/31/22 1629 -- -- -- -- 12 -- -- --   05/31/22 1603 -- -- -- 80 13 100 % -- --   05/31/22 1600 (!) 140/69 98.4 °F (36.9 °C) -- 78 12 99 % -- --   05/31/22 1559 -- -- -- -- 18 -- -- --   05/31/22 1551 132/66 -- -- 83 -- -- -- --   05/31/22 1530 (!) 144/55 -- -- 82 -- -- -- --   05/31/22 1500 125/62 -- -- 85 13 97 % -- --   05/31/22 1456 -- -- -- -- -- -- 5' 7\" (1.702 m) --   05/31/22 1445 (!) 136/58 -- -- 85 -- -- -- --   05/31/22 1430 (!) 144/58 -- -- 84 12 -- -- --   05/31/22 1423 130/73 -- -- 84 -- -- -- --   05/31/22 1401 -- -- -- -- 17 -- -- --   05/31/22 1400 (!) 141/63 97.7 °F (36.5 °C) -- 82 12 96 % -- 187 lb 9.8 oz (85.1 kg)   05/31/22 1337 -- -- -- 82 17 97 % -- --   05/31/22 1300 (!) 144/58 -- -- 85 14 96 % -- --   05/31/22 1221 -- -- -- -- 16 -- -- --   05/31/22 1208 -- -- -- 86 20 97 % -- --   05/31/22 1200 (!) 130/50 98.6 °F (37 °C) Temporal 93 15 93 % -- --   05/31/22 1156 -- -- -- 86 17 97 % -- --   05/31/22 1150 (!) 145/62 -- -- 87 14 98 % -- --   05/31/22 1000 (!) 109/45 -- -- 84 15 95 % -- --   05/31/22 0900 (!) 124/50 -- -- 82 16 95 % -- --     Intake / output   05/30 1901 - 06/01 0700  In: 1040 [P.O.:540]  Out: 3760 [Drains:760]  Physical Exam:  Physical Exam  Vitals and nursing note reviewed. Constitutional:       General: He is not in acute distress. Appearance: He is not diaphoretic. HENT:      Head: Normocephalic and atraumatic. Nose:      Right Sinus: No maxillary sinus tenderness or frontal sinus tenderness.       Left Sinus: No maxillary sinus tenderness or frontal sinus tenderness. Mouth/Throat:      Pharynx: No oropharyngeal exudate. Eyes:      General: No scleral icterus. Conjunctiva/sclera: Conjunctivae normal.      Pupils: Pupils are equal, round, and reactive to light. Neck:      Thyroid: No thyromegaly. Vascular: No JVD. Cardiovascular:      Rate and Rhythm: Normal rate and regular rhythm. Pulses:           Dorsalis pedis pulses are 2+ on the right side and 2+ on the left side. Heart sounds: Normal heart sounds. No murmur heard. Pulmonary:      Effort: Pulmonary effort is normal.      Breath sounds: Normal breath sounds. No wheezing or rales. Chest:      Comments: Pericardial drain in place. Abdominal:      Palpations: Abdomen is soft. There is no mass. Tenderness: There is no abdominal tenderness. Musculoskeletal:      Cervical back: Full passive range of motion without pain and neck supple. Comments: Wasting of thenar eminence bilaterally    Lymphadenopathy:      Head:      Right side of head: No submandibular adenopathy. Left side of head: No submandibular adenopathy. Cervical: No cervical adenopathy. Skin:     General: Skin is warm. Neurological:      Mental Status: He is alert and oriented to person, place, and time. Motor: No tremor. Comments: Bilateral foot drop, loss of bilateral thenar eminences intrinsic hand muscles    Psychiatric:         Behavior: Behavior is cooperative. Laboratory findings:    Recent Labs     05/30/22  0011 05/30/22  1859 05/31/22  0007 05/31/22  0551 06/01/22  0013   WBC 12.0*  --   --  16.7*  --    HGB 9.4*   < > 9.2* 8.8* 9.3*   HCT 30.5*   < > 30.3* 28.7* 30.2*     --   --  326  --     < > = values in this interval not displayed.      Recent Labs     05/30/22  0555 05/30/22  0846 05/30/22  1411 05/30/22  1859 05/31/22  0551 05/31/22  0551 05/31/22  1239 06/01/22  0013 06/01/22  0545   *   < >  --    < > 127*   < > 127* 125* 125*   K 5.5*   < > 5.9*  --  6.1*  --   --   --  5.8*   CL 91*  --   --   --  89*  --   --   --  84*   CO2 20  --   --   --  21  --   --   --  22   GLUCOSE 277*  --   --   --  312*  --   --   --  496*   BUN 45*  --   --   --  63*  --   --   --  43*   CREATININE 8.98*  --   --   --  10.28*  --   --   --  6.85*   MG  --   --   --   --  2.2  --   --   --  2.1   CALCIUM 9.6  --   --   --  8.9  --   --   --  9.0   PHOS  --   --   --   --  4.9*  --   --   --  5.3*    < > = values in this interval not displayed. Recent Labs     05/30/22  0011   LABA1C 8.4*          No results found for: Vilinda Leanne, RBCUA, BLOODU, BACTERIA, NITRU, WBCUA, LEUKOCYTESUR    Imaging / Clinical Data :-   CT ABDOMEN PELVIS WO CONTRAST Additional Contrast? Radiologist Recommendation    Result Date: 5/30/2022  Chest: Moderate to large pericardial effusion, which is likely related to the patient's history of pericarditis. Trace bilateral pleural effusions with associated atelectasis. Abdomen/pelvis: Findings suggestive of anasarca, including periportal edema, mesenteric edema, small volume of free fluid, and body wall edema. This may be due in part to volume resuscitation. More focal stranding along the ascending colon may be related to anasarca, or could represent mild colitis. A few prominent upper abdominal lymph nodes are favored reactive in etiology. XR KNEE RIGHT (3 VIEWS)    Result Date: 5/26/2022  No acute abnormality of the knee. CT CHEST WO CONTRAST    Result Date: 5/30/2022  Chest: Moderate to large pericardial effusion, which is likely related to the patient's history of pericarditis. Trace bilateral pleural effusions with associated atelectasis. Abdomen/pelvis: Findings suggestive of anasarca, including periportal edema, mesenteric edema, small volume of free fluid, and body wall edema. This may be due in part to volume resuscitation.  More focal stranding along the ascending colon may be related to anasarca, or could represent mild colitis. A few prominent upper abdominal lymph nodes are favored reactive in etiology. XR SHOULDER LEFT (MIN 2 VIEWS)    Result Date: 5/28/2022  No acute osseous abnormality. XR CHEST PORTABLE    Result Date: 5/30/2022  Retrocardiac, right perihilar and basilar airspace disease which may represent pneumonia or atelectasis. Clinical Course : unchanged  Assessment and Plan  :        Acute pericarditis - colchicine every other day for 3 doses. Pain controlled with morphine, prednisone 40 mg daily. Pericardial effusion with tamponade - s/p  pericardiocentesis -cardiology following, likely will remove pericardial drain today. ESRD on hemodialysis -dialysis per nephrology. Diabetes mellitus with neuropathy -advance diet, increase insulin dose. Essential hypertension -lisinopril on hold  Weakness / Myopathy - outpatient follow up with neurology , has referral and waiting for appointment     Likely will remove pericardial drain. Discussed with cardiology attending. Continue to monitor in the hospital after drain removal and will need to reassess for reaccumulation with  echocardiogram tomorrow  Continue to monitor vitals , Intake / output ,  Cell count , HGB , Kidney function, oxygenation  as indicated . Plan and updates discussed with patient ,  answers  explained to satisfaction.    Plan discussed with Staff Ana Smith     (Please note that portions of this note were completed with a voice recognition program. Efforts were made to edit the dictations but occasionally words are mis-transcribed.)      Melva Samayoa MD  6/1/2022

## 2022-06-01 NOTE — PROGRESS NOTES
Progress Note    Reason for follow up: ESRD    INTERVAL HISTORY:   Feeling better. Had pericardiocentesis (5/30) with 860 mL removed. Drain removed just now.  K 5.8 today. Corrected . Phos 5.3 on phoslo. Hgb stable. HD done yesterday. 2.5L removed. C/o pain with deep breathing. Denies chest pain or abdominal pain. Glucose trending 400s. Lantus dose increased. He states home dose is 45 units in AM and 10 units PM. He was on 15 units BID. Starting 45 units BID tonight. HISTORY OF PRESENT ILLNESS:    The patient is a 49 y.o. male who presents with complaint of chest pain. EKG showed ST elevation. Cardiology has been consulted. Possible pericarditis. He is on heparin drip. Cardiac cath planned tomorrow. CT chest/abd/pelvis results pending. SBP trending 80s to 100s. Lisinopril does decreased and norvasc dc'd last admission (last week). He states he made these adjustments to home meds. On admission, glucose 535 with sodium 125 corrected sodium 134, potassium 6, BUN 43, creatinine 8.15. He was initiated on Lokelma and insulin. This a.m, sodium was 128, corrected sodium 132 with potassium 5.5, bicarb 20, BUN 45, creatinine 8.98, glucose improved to 277, lactic acid 2.4. Most recent sodium at 846 was 126. Glucose not checked at that time. Hemoglobin 9.4 with WBC 12. Ferritin 3028. Patient was admitted last week for weakness and falls. At that time Neurontin dose was decreased. Hx of ESRD on HD MWF [facility].     Review Of Systems:   Constitutional: No fever, chills  Cardiac:  No chest pain currently, no dyspnea  Chest:               No cough, phlegm or wheezing. +pain with deep breathing  Abdomen:  No abdominal pain, no nausea      Past Medical History:   Diagnosis Date    Cerebral artery occlusion with cerebral infarction [facility])     Closed fracture of bone of right foot March - April 2020    Dialysis patient [facility])     Hemodialysis patient [facility])     Hyperlipidemia     Hypertension     Kidney disease     On Dialysis    Type 1 diabetes mellitus ([facility] [address]        Past Surgical History:   Procedure Laterality Date    AV FISTULA CREATION Left     WRIST SURGERY  1995       Prior to Admission medications    Medication Sig Start Date End Date Taking? Authorizing Provider   lisinopril (PRINIVIL;ZESTRIL) 20 MG tablet Take 1 tablet by mouth daily 5/27/22   DANIEL Montano CNP   gabapentin (NEURONTIN) 300 MG capsule Take 1 capsule by mouth daily. 5/28/22   DANIEL Montano CNP   insulin glargine (LANTUS) 100 UNIT/ML injection vial Inject 10-45 Units into the skin See Admin Instructions Indications: 45 units in the morning and 10 units in the evening     Historical Provider, MD   traMADol (ULTRAM) 50 MG tablet Take 50 mg by mouth 2 times daily as needed for Pain. 5/6/22   Historical Provider, MD   Misc. Devices MISC Disp: custom molded shoes to accommodate for brace   DX: DM with history of stroke, foot drop  Duration: 1 year 5/11/22   Arik Nascimento DPM   ondansetron (ZOFRAN) 4 MG tablet Take 1 tablet by mouth 3 times daily as needed for Nausea or Vomiting 4/29/22   DANIEL Gaviria CNP   magnesium oxide (MAG-OX) 400 MG tablet Take 400 mg by mouth daily     Historical Provider, MD   magnesium hydroxide (MILK OF MAGNESIA) 400 MG/5ML suspension Take 15 mLs by mouth daily as needed for Constipation    Historical Provider, MD   insulin aspart (NOVOLOG) 100 UNIT/ML injection vial Inject 0-8 Units into the skin 3 times daily (before meals) SLIDING SCALE     Historical Provider, MD   FLUoxetine (PROZAC) 40 MG capsule Take 40 mg by mouth daily     Historical Provider, MD   ammonium lactate (LAC-HYDRIN) 12 % lotion Apply topically daily.   Patient taking differently: Apply to feet 11/1/21   Arik Nascimento DPM   furosemide (LASIX) 20 MG tablet Take 1 tablet by mouth daily 4/27/21   Laina Christianson DO   buPROPion (WELLBUTRIN XL) 150 MG extended release tablet Take 1 tablet by mouth every morning 3/25/21   Cecil Angeles DO   Rosuvastatin Calcium 40 MG CPSP Take 40 mg by mouth daily    Historical Provider, MD   ezetimibe (ZETIA) 10 MG tablet Take 10 mg by mouth daily    Historical Provider, MD   omeprazole (PRILOSEC) 40 MG delayed release capsule Take 40 mg by mouth daily    Historical Provider, MD   aspirin 81 MG EC tablet Take 81 mg by mouth daily    Historical Provider, MD   calcium acetate 667 MG TABS Take 1,334 mg by mouth 3 times daily (with meals)     Historical Provider, MD   vitamin B-12 (CYANOCOBALAMIN) 500 MCG tablet Take 500 mcg by mouth daily    Historical Provider, MD       Scheduled Meds:   insulin glargine  45 Units SubCUTAneous BID    colchicine  0.6 mg Oral Every Other Day    morphine  4 mg IntraVENous Once    aspirin  81 mg Oral Daily    buPROPion  150 mg Oral QAM    calcium acetate  1,334 mg Oral TID WC    ezetimibe  10 mg Oral Daily    [Held by provider] FLUoxetine  40 mg Oral Daily    gabapentin  300 mg Oral Daily    [Held by provider] lisinopril  20 mg Oral Daily    vitamin B-12  500 mcg Oral Daily    insulin lispro  0-12 Units SubCUTAneous TID WC    insulin lispro  0-6 Units SubCUTAneous Nightly    sodium chloride flush  5-40 mL IntraVENous 2 times per day    rosuvastatin  10 mg Oral Nightly    predniSONE  40 mg Oral Daily    polyethylene glycol  17 g Oral Daily    pantoprazole  40 mg Oral QAM AC     Continuous Infusions:   dextrose      sodium chloride       PRN Meds:morphine, glucose, dextrose bolus **OR** dextrose bolus, glucagon (rDNA), dextrose, sodium chloride flush, sodium chloride, ondansetron **OR** ondansetron, acetaminophen **OR** acetaminophen, nitroGLYCERIN, perflutren lipid microspheres    No Known Allergies    Social History     Socioeconomic History    Marital status: Single     Spouse name: Not on file    Number of children: Not on file    Years of education: Not on file    Highest education level: Not on file   Occupational History    Not on file   Tobacco Use    Smoking status: Never Smoker    Smokeless tobacco: Never Used   Vaping Use    Vaping Use: Never used   Substance and Sexual Activity    Alcohol use: Never    Drug use: Never    Sexual activity: Not on file   Other Topics Concern    Not on file   Social History Narrative    Not on file     Social Determinants of Health     Financial Resource Strain:     Difficulty of Paying Living Expenses: Not on file   Food Insecurity:     Worried About Running Out of Food in the Last Year: Not on file    Dinh of Food in the Last Year: Not on file   Transportation Needs:     Lack of Transportation (Medical): Not on file    Lack of Transportation (Non-Medical):  Not on file   Physical Activity:     Days of Exercise per Week: Not on file    Minutes of Exercise per Session: Not on file   Stress:     Feeling of Stress : Not on file   Social Connections:     Frequency of Communication with Friends and Family: Not on file    Frequency of Social Gatherings with Friends and Family: Not on file    Attends Muslim Services: Not on file    Active Member of [address] [place] [address] or Organizations: Not on file    Attends Club or Organization Meetings: Not on file    Marital Status: Not on file   Intimate Partner Violence:     Fear of Current or Ex-Partner: Not on file    Emotionally Abused: Not on file    Physically Abused: Not on file    Sexually Abused: Not on file   Housing Stability:     Unable to Pay for Housing in the Last Year: Not on file    Number of [place] in the Last Year: Not on file    Unstable Housing in the Last Year: Not on file       Family History   Problem Relation Age of Onset    Diabetes Mother     Heart Disease Father     Diabetes Father     Cancer Paternal Grandmother     Heart Disease Maternal Great Grandfather          Physical Exam:  Vitals:    06/01/22 0900 06/01/22 1000 06/01/22 1100 06/01/22 1200   BP: (!) 118/57 (!) 131/59 (!) 133/48 (!) 133/48   Pulse: 83 87 80 81   Resp: 11 16 10 (!) 9   Temp:    97.5 °F (36.4 °C)   TempSrc:    Temporal   SpO2: 93% 93% 93% 93%   Weight:       Height:         I/O last 3 completed shifts: In: 1040 [P.O.:540]  Out: 3760 [Drains:760]    General:  Awake, alert, not in distress. Appears to be stated age. HEENT: Atraumatic, normocephalic. Anicteric sclera. Pink and moist oral mucosa. Neck supple. Chest: Bilateral air entry, clear to auscultation, no wheezing, rhonchi or rales. Cardiovascular: RRR, S1S2, no murmur, rub or gallop. No lower extremity edema. +pericaridial drain. Abdomen: Soft, non tender to palpation. Active bowel sounds. Integumentary: Pink, warm and dry. Free from rash or lesions. Skin turgor normal.  CNS: Oriented to person, place and time. Cranial nerves grossly intact. Speech clear. Face symmetrical. No tremor.      Data:    CBC:   Lab Results   Component Value Date    WBC 16.7 (H) 05/31/2022    HGB 9.1 (L) 06/01/2022    HCT 29.4 (L) 06/01/2022    MCV 97.3 05/31/2022     05/31/2022     BMP:    Lab Results   Component Value Date     (L) 06/01/2022     (L) 06/01/2022     (L) 05/31/2022    K 5.8 (H) 06/01/2022    K 6.1 (HH) 05/31/2022    K 5.9 (H) 05/30/2022    CL 84 (L) 06/01/2022    CL 89 (L) 05/31/2022    CL 91 (L) 05/30/2022    CO2 22 06/01/2022    CO2 21 05/31/2022    CO2 20 05/30/2022    BUN 43 (H) 06/01/2022    BUN 63 (H) 05/31/2022    BUN 45 (H) 05/30/2022    CREATININE 6.85 (HH) 06/01/2022    CREATININE 10.28 (HH) 05/31/2022    CREATININE 8.98 (HH) 05/30/2022    GLUCOSE 496 (HH) 06/01/2022    GLUCOSE 312 (H) 05/31/2022    GLUCOSE 277 (H) 05/30/2022     CMP:   Lab Results   Component Value Date     06/01/2022    K 5.8 06/01/2022    CL 84 06/01/2022    CO2 22 06/01/2022    BUN 43 06/01/2022    CREATININE 6.85 06/01/2022    GLUCOSE 496 06/01/2022    CALCIUM 9.0 06/01/2022    PROT 5.3 04/14/2022    LABALBU 3.4 04/14/2022    BILITOT 0.34 04/14/2022    ALKPHOS 137 04/14/2022    AST 38 04/14/2022    ALT 44 04/14/2022      Hepatic:   Lab Results   Component Value Date    AST 38 04/14/2022    AST 27 04/13/2022    AST 36 01/05/2022    ALT 44 (H) 04/14/2022    ALT 42 (H) 04/13/2022    ALT 38 01/05/2022    BILITOT 0.34 04/14/2022    BILITOT 0.28 (L) 04/13/2022    BILITOT 0.43 01/05/2022    ALKPHOS 137 (H) 04/14/2022    ALKPHOS 142 (H) 04/13/2022    ALKPHOS 110 01/05/2022     BNP: No results found for: BNP  Lipids:   Lab Results   Component Value Date    CHOL 118 04/12/2022    HDL 61 04/12/2022     INR:   Lab Results   Component Value Date    INR 1.0 05/27/2022    INR 0.9 04/12/2022     PTH: No results found for: PTH  Phosphorus:    Lab Results   Component Value Date    PHOS 5.3 06/01/2022     Ionized Calcium: No results found for: IONCA  Magnesium:   Lab Results   Component Value Date    MG 2.1 06/01/2022     Albumin:   Lab Results   Component Value Date    LABALBU 3.4 04/14/2022     Last 3 CK, CKMB, Troponin: @LABRCNT(CKTOTAL:3,CKMB:3,TROPONINI:3)       URINE:)No results found for: Elkin Patricio    Radiology:   Reviewed. Assessment/Plan: To follow.     Pt seen in collaboration with Dr. Benoit Morrison. Electronically signed by DANIEL Carvajal CNP  on 6/1/2022 at 1:43 PM     Assessment:  ESRD  Hyponatremia. Hyperkalemia. Hyperphosphatemia. Anemia. Hypertension. Pericardial effusion. Plan:  Change to renal ONS and renal diet with oral fluid restriction. Hemodialysis MWF. Monitor BP. Recheck BMP today and daily in AM.  Will need HD today if K is higher. Electronically signed by Christopher Serna MD on 6/1/2022 at 2:39 PM  [facility].   Ph: [phone]

## 2022-06-01 NOTE — PLAN OF CARE
Problem: Chronic Conditions and Co-morbidities  Goal: Patient's chronic conditions and co-morbidity symptoms are monitored and maintained or improved  6/1/2022 0614 by Micky Becker RN  Outcome: Progressing  Flowsheets (Taken 5/31/2022 2000)  Care Plan - Patient's Chronic Conditions and Co-Morbidity Symptoms are Monitored and Maintained or Improved: Monitor and assess patient's chronic conditions and comorbid symptoms for stability, deterioration, or improvement  5/31/2022 1730 by Rony Zeng RN  Outcome: Progressing  Flowsheets (Taken 5/31/2022 0800)  Care Plan - Patient's Chronic Conditions and Co-Morbidity Symptoms are Monitored and Maintained or Improved: Monitor and assess patient's chronic conditions and comorbid symptoms for stability, deterioration, or improvement     Problem: Discharge Planning  Goal: Discharge to home or other facility with appropriate resources  6/1/2022 0614 by Micky Becker RN  Outcome: Progressing  Flowsheets (Taken 5/31/2022 2000)  Discharge to home or other facility with appropriate resources: Identify barriers to discharge with patient and caregiver  5/31/2022 1730 by Rony Zeng RN  Outcome: Progressing  Flowsheets (Taken 5/31/2022 0800)  Discharge to home or other facility with appropriate resources: Identify barriers to discharge with patient and caregiver     Problem: Pain  Goal: Verbalizes/displays adequate comfort level or baseline comfort level  6/1/2022 0614 by Micky Becker RN  Outcome: Progressing  5/31/2022 1730 by Rony Zeng RN  Outcome: Progressing     Problem: Safety - Adult  Goal: Free from fall injury  6/1/2022 0614 by Micky Becker RN  Outcome: Progressing  Flowsheets (Taken 5/31/2022 2350)  Free From Fall Injury: Instruct family/caregiver on patient safety  5/31/2022 1730 by Rony Zeng RN  Outcome: Progressing  Flowsheets (Taken 5/31/2022 1729)  Free From Fall Injury: Instruct family/caregiver on patient safety     Problem: ABCDS Injury Assessment  Goal: Absence of physical injury  6/1/2022 1864 by Darius Torres RN  Outcome: Progressing  Flowsheets (Taken 5/31/2022 2350)  Absence of Physical Injury: Implement safety measures based on patient assessment  5/31/2022 1730 by Donny Delarosa RN  Outcome: Progressing  Flowsheets (Taken 5/31/2022 1729)  Absence of Physical Injury: Implement safety measures based on patient assessment     Problem: Skin/Tissue Integrity  Goal: Absence of new skin breakdown  Description: 1. Monitor for areas of redness and/or skin breakdown  2. Assess vascular access sites hourly  3. Every 4-6 hours minimum:  Change oxygen saturation probe site  4. Every 4-6 hours:  If on nasal continuous positive airway pressure, respiratory therapy assess nares and determine need for appliance change or resting period.   6/1/2022 3540 by Darius Torres RN  Outcome: Progressing  5/31/2022 1730 by Donny Delarosa RN  Outcome: Progressing     Problem: Nutrition Deficit:  Goal: Optimize nutritional status  6/1/2022 0614 by Darius Torres RN  Outcome: Progressing  5/31/2022 1730 by Donny Delarosa RN  Outcome: Progressing

## 2022-06-01 NOTE — CARE COORDINATION
CASE MANAGEMENT NOTE:    Admission Date:  5/29/2022 Patrick Casanova is a 49 y.o.  male    Admitted for : Chest pain [R07.9]  Chest pain, unspecified type [R07.9]    Met with:  Patient and grandparents Donna Gould and Delicia Chung that pt lives with. Has ramps. PCP:  Maribell SANCHEZ                                Insurance:  [address]      Is patient alert and oriented at time of discussion:  Yes    Current Residence/ Living Arrangements:  at home dependent on family care             Current Services PTA:  No    Does patient go to outpatient dialysis: No  If yes, location and chair time: n/a    Is patient agreeable to VNS: Yes    Freedom of choice provided:  Yes    List of [address] provided: No    VNS chosen:  No    DME:  Walker with seat and 4 wheels, ramps, seat in bathroom. Home Oxygen: No    Nebulizer: No    CPAP/BIPAP: No    Supplier: N/A    Potential Assistance Needed: Yes    SNF needed: Yes rather [facility]    Freedom of choice and list provided: Yes    Ph[facility]:  [facility]       Is patient currently receiving oral anticoagulation therapy? NA    Is the Patient an [facility] with Readmission Risk Score greater than 14%? Yes  If yes, pt needs a follow up appointment made within 7 days. Family Members/Caregivers that pt would like involved in their care:    Yes    If yes, list name here:  Donna Gould and Delicia Chung live with pt and are his grandparents. Transportation Provider:  Family             Discharge Plan:  The Plan for Transition of Care is related to the following treatment goals: plan is [facility] pt was there in the past. He also had [facility] in [place] past no home care preference in the future if needed after [facility]. The Patient and/or patient representative patient and grandparents was provided with a choice of provider and agrees   with the discharge plan.  [x] Yes [] No    Freedom of choice list was provided with basic dialogue that supports the patient's individualized plan of care/goals, treatment preferences and shares the quality data associated with the providers.  [x] Yes [] No                 Electronically signed by: TYRONE Delgado, MACKENZIE on 6/1/2022 at 3:15 PM

## 2022-06-01 NOTE — PROGRESS NOTES
Repeat BMP results called to Dr. Sage Bowens , no dialysis today, order for [facility] received and primary nurse notified

## 2022-06-02 LAB
ABSOLUTE EOS #: 0 K/UL (ref 0–0.44)
ABSOLUTE IMMATURE GRANULOCYTE: 0 K/UL (ref 0–0.3)
ABSOLUTE LYMPH #: 0.69 K/UL (ref 1.1–3.7)
ABSOLUTE MONO #: 1.15 K/UL (ref 0.1–1.2)
ANION GAP SERPL CALCULATED.3IONS-SCNC: 14 MMOL/L (ref 9–17)
BASOPHILS # BLD: 0 % (ref 0–2)
BASOPHILS ABSOLUTE: 0 K/UL (ref 0–0.2)
BUN BLDV-MCNC: 66 MG/DL (ref 6–20)
BUN/CREAT BLD: 7 (ref 9–20)
CALCIUM SERPL-MCNC: 8.9 MG/DL (ref 8.6–10.4)
CHLORIDE BLD-SCNC: 85 MMOL/L (ref 98–107)
CO2: 26 MMOL/L (ref 20–31)
CREAT SERPL-MCNC: 9.13 MG/DL (ref 0.7–1.2)
EOSINOPHILS RELATIVE PERCENT: 0 % (ref 1–4)
GFR AFRICAN AMERICAN: 8 ML/MIN
GFR NON-AFRICAN AMERICAN: 6 ML/MIN
GFR SERPL CREATININE-BSD FRML MDRD: ABNORMAL ML/MIN/{1.73_M2}
GLUCOSE BLD-MCNC: 108 MG/DL (ref 75–110)
GLUCOSE BLD-MCNC: 215 MG/DL (ref 75–110)
GLUCOSE BLD-MCNC: 264 MG/DL (ref 70–99)
GLUCOSE BLD-MCNC: 285 MG/DL (ref 75–110)
GLUCOSE BLD-MCNC: 337 MG/DL (ref 75–110)
HCT VFR BLD CALC: 29 % (ref 40.7–50.3)
HCT VFR BLD CALC: 29.5 % (ref 40.7–50.3)
HCT VFR BLD CALC: 34.2 % (ref 40.7–50.3)
HEMOGLOBIN: 10.6 G/DL (ref 13–17)
HEMOGLOBIN: 9.3 G/DL (ref 13–17)
HEMOGLOBIN: 9.5 G/DL (ref 13–17)
IMMATURE GRANULOCYTES: 0 %
LYMPHOCYTES # BLD: 6 % (ref 24–43)
MAGNESIUM: 2.2 MG/DL (ref 1.6–2.6)
MCH RBC QN AUTO: 29.9 PG (ref 25.2–33.5)
MCHC RBC AUTO-ENTMCNC: 31.5 G/DL (ref 28–38)
MCV RBC AUTO: 94.9 FL (ref 82.6–102.9)
MONOCYTES # BLD: 10 % (ref 3–12)
PDW BLD-RTO: 12.1 % (ref 11.8–14.4)
PHOSPHORUS: 4.7 MG/DL (ref 2.5–4.5)
PLATELET # BLD: 350 K/UL (ref 138–453)
PMV BLD AUTO: 9.3 FL (ref 8.1–13.5)
POTASSIUM SERPL-SCNC: 5.3 MMOL/L (ref 3.7–5.3)
RBC # BLD: 3.11 M/UL (ref 4.21–5.77)
SEG NEUTROPHILS: 84 % (ref 36–65)
SEGMENTED NEUTROPHILS ABSOLUTE COUNT: 9.66 K/UL (ref 1.5–8.1)
SODIUM BLD-SCNC: 125 MMOL/L (ref 135–144)
WBC # BLD: 11.5 K/UL (ref 3.5–11.3)

## 2022-06-02 PROCEDURE — 36415 COLL VENOUS BLD VENIPUNCTURE: CPT

## 2022-06-02 PROCEDURE — 80048 BASIC METABOLIC PNL TOTAL CA: CPT

## 2022-06-02 PROCEDURE — 83735 ASSAY OF MAGNESIUM: CPT

## 2022-06-02 PROCEDURE — 6370000000 HC RX 637 (ALT 250 FOR IP): Performed by: NURSE PRACTITIONER

## 2022-06-02 PROCEDURE — 6360000002 HC RX W HCPCS: Performed by: NURSE PRACTITIONER

## 2022-06-02 PROCEDURE — 84100 ASSAY OF PHOSPHORUS: CPT

## 2022-06-02 PROCEDURE — 99238 HOSP IP/OBS DSCHRG MGMT 30/<: CPT | Performed by: FAMILY MEDICINE

## 2022-06-02 PROCEDURE — 2500000003 HC RX 250 WO HCPCS: Performed by: NURSE PRACTITIONER

## 2022-06-02 PROCEDURE — 6370000000 HC RX 637 (ALT 250 FOR IP): Performed by: INTERNAL MEDICINE

## 2022-06-02 PROCEDURE — 2060000000 HC ICU INTERMEDIATE R&B

## 2022-06-02 PROCEDURE — 82947 ASSAY GLUCOSE BLOOD QUANT: CPT

## 2022-06-02 PROCEDURE — 2580000003 HC RX 258: Performed by: NURSE PRACTITIONER

## 2022-06-02 PROCEDURE — 90935 HEMODIALYSIS ONE EVALUATION: CPT

## 2022-06-02 PROCEDURE — 85014 HEMATOCRIT: CPT

## 2022-06-02 PROCEDURE — 6370000000 HC RX 637 (ALT 250 FOR IP): Performed by: FAMILY MEDICINE

## 2022-06-02 PROCEDURE — 93308 TTE F-UP OR LMTD: CPT

## 2022-06-02 PROCEDURE — 85025 COMPLETE CBC W/AUTO DIFF WBC: CPT

## 2022-06-02 PROCEDURE — 85018 HEMOGLOBIN: CPT

## 2022-06-02 RX ORDER — PANTOPRAZOLE SODIUM 40 MG/1
40 TABLET, DELAYED RELEASE ORAL
Qty: 30 TABLET | Refills: 3 | Status: SHIPPED
Start: 2022-06-03 | End: 2022-08-02

## 2022-06-02 RX ORDER — SENNA PLUS 8.6 MG/1
2 TABLET ORAL 2 TIMES DAILY
Status: DISCONTINUED | OUTPATIENT
Start: 2022-06-02 | End: 2022-06-08 | Stop reason: HOSPADM

## 2022-06-02 RX ORDER — PREDNISONE 20 MG/1
40 TABLET ORAL DAILY
Qty: 20 TABLET | Refills: 0 | Status: SHIPPED
Start: 2022-06-03 | End: 2022-06-08 | Stop reason: HOSPADM

## 2022-06-02 RX ORDER — CARVEDILOL 6.25 MG/1
6.25 TABLET ORAL 2 TIMES DAILY WITH MEALS
Qty: 60 TABLET | Refills: 3 | Status: ON HOLD
Start: 2022-06-02 | End: 2022-07-14

## 2022-06-02 RX ADMIN — INSULIN LISPRO 6 UNITS: 100 INJECTION, SOLUTION INTRAVENOUS; SUBCUTANEOUS at 20:23

## 2022-06-02 RX ADMIN — INSULIN LISPRO 9 UNITS: 100 INJECTION, SOLUTION INTRAVENOUS; SUBCUTANEOUS at 08:40

## 2022-06-02 RX ADMIN — CALCIUM ACETATE 1334 MG: 667 CAPSULE ORAL at 18:01

## 2022-06-02 RX ADMIN — ROSUVASTATIN CALCIUM 10 MG: 10 TABLET, FILM COATED ORAL at 20:22

## 2022-06-02 RX ADMIN — EPOETIN ALFA-EPBX 3000 UNITS: 3000 INJECTION, SOLUTION INTRAVENOUS; SUBCUTANEOUS at 18:03

## 2022-06-02 RX ADMIN — BUPROPION HYDROCHLORIDE 150 MG: 150 TABLET, EXTENDED RELEASE ORAL at 08:34

## 2022-06-02 RX ADMIN — CARVEDILOL 6.25 MG: 6.25 TABLET, FILM COATED ORAL at 18:02

## 2022-06-02 RX ADMIN — EZETIMIBE 10 MG: 10 TABLET ORAL at 08:36

## 2022-06-02 RX ADMIN — POLYETHYLENE GLYCOL 3350 17 G: 17 POWDER, FOR SOLUTION ORAL at 18:30

## 2022-06-02 RX ADMIN — ASPIRIN 81 MG: 81 TABLET, COATED ORAL at 08:37

## 2022-06-02 RX ADMIN — PREDNISONE 40 MG: 20 TABLET ORAL at 08:38

## 2022-06-02 RX ADMIN — GABAPENTIN 300 MG: 300 CAPSULE ORAL at 08:36

## 2022-06-02 RX ADMIN — SODIUM CHLORIDE, PRESERVATIVE FREE 10 ML: 5 INJECTION INTRAVENOUS at 08:38

## 2022-06-02 RX ADMIN — INSULIN GLARGINE 45 UNITS: 100 INJECTION, SOLUTION SUBCUTANEOUS at 08:39

## 2022-06-02 RX ADMIN — EPOETIN ALFA-EPBX 2000 UNITS: 2000 INJECTION, SOLUTION INTRAVENOUS; SUBCUTANEOUS at 18:04

## 2022-06-02 RX ADMIN — INSULIN GLARGINE 15 UNITS: 100 INJECTION, SOLUTION SUBCUTANEOUS at 20:22

## 2022-06-02 RX ADMIN — BISACODYL 5 MG: 5 TABLET, COATED ORAL at 19:36

## 2022-06-02 RX ADMIN — CARVEDILOL 6.25 MG: 6.25 TABLET, FILM COATED ORAL at 08:36

## 2022-06-02 RX ADMIN — SENNOSIDES 17.2 MG: 8.6 TABLET, FILM COATED ORAL at 19:36

## 2022-06-02 RX ADMIN — PANTOPRAZOLE SODIUM 40 MG: 40 TABLET, DELAYED RELEASE ORAL at 05:55

## 2022-06-02 RX ADMIN — CALCIUM ACETATE 1334 MG: 667 CAPSULE ORAL at 08:34

## 2022-06-02 RX ADMIN — INSULIN LISPRO 6 UNITS: 100 INJECTION, SOLUTION INTRAVENOUS; SUBCUTANEOUS at 12:23

## 2022-06-02 RX ADMIN — VITAM B12 500 MCG: 100 TAB at 08:38

## 2022-06-02 RX ADMIN — SODIUM CHLORIDE, PRESERVATIVE FREE 10 ML: 5 INJECTION INTRAVENOUS at 20:23

## 2022-06-02 RX ADMIN — COLCHICINE 0.6 MG: 0.6 TABLET ORAL at 08:34

## 2022-06-02 ASSESSMENT — ENCOUNTER SYMPTOMS
BACK PAIN: 0
RHINORRHEA: 0
WHEEZING: 0
COUGH: 0
CONSTIPATION: 0
VOICE CHANGE: 0
VOMITING: 0
CHEST TIGHTNESS: 0
NAUSEA: 0
CHOKING: 0
SINUS PRESSURE: 0
SHORTNESS OF BREATH: 0
DIARRHEA: 0
ABDOMINAL PAIN: 0

## 2022-06-02 ASSESSMENT — PAIN DESCRIPTION - DESCRIPTORS: DESCRIPTORS: PRESSURE;DISCOMFORT

## 2022-06-02 ASSESSMENT — PAIN - FUNCTIONAL ASSESSMENT: PAIN_FUNCTIONAL_ASSESSMENT: ACTIVITIES ARE NOT PREVENTED

## 2022-06-02 ASSESSMENT — PAIN DESCRIPTION - PAIN TYPE: TYPE: ACUTE PAIN

## 2022-06-02 ASSESSMENT — PAIN DESCRIPTION - ORIENTATION: ORIENTATION: LOWER;LEFT

## 2022-06-02 ASSESSMENT — PAIN DESCRIPTION - ONSET: ONSET: SUDDEN

## 2022-06-02 ASSESSMENT — PAIN DESCRIPTION - LOCATION: LOCATION: ABDOMEN

## 2022-06-02 NOTE — PLAN OF CARE
Care Plan Note  Pt resting comfortably. Denies pain or needs at this time.    Problem: Chronic Conditions and Co-morbidities  Goal: Patient's chronic conditions and co-morbidity symptoms are monitored and maintained or improved  Outcome: Progressing  Flowsheets (Taken 6/1/2022 2219)  Care Plan - Patient's Chronic Conditions and Co-Morbidity Symptoms are Monitored and Maintained or Improved:   Monitor and assess patient's chronic conditions and comorbid symptoms for stability, deterioration, or improvement   Collaborate with multidisciplinary team to address chronic and comorbid conditions and prevent exacerbation or deterioration   Update acute care plan with appropriate goals if chronic or comorbid symptoms are exacerbated and prevent overall improvement and discharge     Problem: Discharge Planning  Goal: Discharge to home or other facility with appropriate resources  Outcome: Progressing  Flowsheets (Taken 6/1/2022 2219)  Discharge to home or other facility with appropriate resources:   Identify barriers to discharge with patient and caregiver   Arrange for needed discharge resources and transportation as appropriate   Identify discharge learning needs (meds, wound care, etc)   Arrange for interpreters to assist at discharge as needed   Refer to discharge planning if patient needs post-hospital services based on physician order or complex needs related to functional status, cognitive ability or social support system     Problem: Pain  Goal: Verbalizes/displays adequate comfort level or baseline comfort level  Outcome: Progressing  Flowsheets (Taken 6/1/2022 2219)  Verbalizes/displays adequate comfort level or baseline comfort level:   Encourage patient to monitor pain and request assistance   Assess pain using appropriate pain scale   Administer analgesics based on type and severity of pain and evaluate response   Implement non-pharmacological measures as appropriate and evaluate response   Consider cultural and social influences on pain and pain management   Notify Licensed Independent Practitioner if interventions unsuccessful or patient reports new pain     Problem: Safety - Adult  Goal: Free from fall injury  Outcome: Progressing  Flowsheets (Taken 6/1/2022 2219)  Free From Fall Injury:   Instruct family/caregiver on patient safety   Based on caregiver fall risk screen, instruct family/caregiver to ask for assistance with transferring infant if caregiver noted to have fall risk factors     Problem: ABCDS Injury Assessment  Goal: Absence of physical injury  Outcome: Progressing  Flowsheets (Taken 6/1/2022 2219)  Absence of Physical Injury: Implement safety measures based on patient assessment     Problem: Skin/Tissue Integrity  Goal: Absence of new skin breakdown  Description: 1. Monitor for areas of redness and/or skin breakdown  2. Assess vascular access sites hourly  3. Every 4-6 hours minimum:  Change oxygen saturation probe site  4. Every 4-6 hours:  If on nasal continuous positive airway pressure, respiratory therapy assess nares and determine need for appliance change or resting period.   Outcome: Progressing     Problem: Nutrition Deficit:  Goal: Optimize nutritional status  Outcome: Progressing  Flowsheets (Taken 6/1/2022 2219)  Nutrient intake appropriate for improving, restoring, or maintaining nutritional needs:   Provide specific nutrition education to patient or family as appropriate   Order, calculate, and assess calorie counts as needed   Recommend, monitor, and adjust tube feedings and TPN/PPN based on assessed needs   Recommend appropriate diets, oral nutritional supplements, and vitamin/mineral supplements   Assess nutritional status and recommend course of action

## 2022-06-02 NOTE — FLOWSHEET NOTE
06/02/22 1100   Treatment Team Notification   Reason for Communication Review case   Team Member Name Dr Rose Lala Team Role Consulting Provider   Method of Communication Face to face   Response At bedside   Notification Time 1100     MD rounded on unit at this time with RN at bedside. Repeat Echo today;   pending results, patient should be fine for discharge from cardiology standpoint.

## 2022-06-02 NOTE — PROGRESS NOTES
All services agree that patient is able to be discharged from their respective standpoints. Social work documentation shows that pt is to go to [facility] at discharge and they are following pt case. Writer contacted Social Work for clarification on status of acceptance. Per , [facility] requires PT/OT evaluation. PT/OT evaluation orders entered per request; RN reached out to check availability for evaluation today. Per PT/OT, it is too late in the day to fit a new eval into their schedule today to meet [facility] requirements. Dr Nida Luque updated; pt will stay overnight and be evaluated in the morning by PT/OT for discharge prep tomorrow.

## 2022-06-02 NOTE — FLOWSHEET NOTE
06/02/22 1637   Vital Signs   BP (!) 148/85   Heart Rate 88   Resp 18   Weight 175 lb 4.3 oz (79.5 kg)   Weight Method Bed scale   Percent Weight Change -3.64   Pain Assessment   Pain Assessment None - Denies Pain   Post-Hemodialysis Assessment   Post-Treatment Procedures Blood returned; Access bleeding time > 10 minutes   Machine Disinfection Process Acid/Vinegar Clean;Heat Disinfect; Exterior Machine Disinfection   Rinseback Volume (ml) 250 ml   Total Liters Processed (l/min) 78.1 l/min   Dialyzer Clearance Moderately streaked   Duration of Treatment (minutes) 210 minutes   Tolerated Treatment Good   Patient Response to Treatment Patient completed 3.5 hour hemodialysis treatment, patient did not require any medications for BP support, vitals stable, left arm AV fistula maintained good blood flow during entire duration of treatment, 3000 ml net fluid removal, post tx vitals stable, post tx report given to patient's primary nurse, Gilmer Hernandez RN.     Bilateral Breath Sounds Diminished   Edema None   Time Off 1630   Patient Disposition Return to room

## 2022-06-02 NOTE — PROGRESS NOTES
[facility]  Office: [address], DO, Kellie La Marque, DO, Sally Youssef, DO, Allegra Sabemma Blood, DO, Brett Gutierrez MD, Elysia Lutz MD, Aniceto Best MD, Teresa Luong MD, Camelia Kebede MD, Meryl Kwok MD, Albert Pierce MD, Marko Márquez, DO, Madelyn Sanchez, DO, Antonieta Boyd MD,  Fabiana Friday, DO, Bri Zaragoza MD, Michelle Anguiano MD, Dottie Parsons MD, Eduardo Stein, DO, Reba Doss MD, Dayan Stanford MD, Juli Stevens MD, Sofy Meraz, [facility], [facility], [place]), CNP, Merrill Prasad, CNP, Jodi Mckeon, CNP, Clarisse Adames, CNP, Shaila Parisi, CNP, Todd To PA-C, Suzanne Maier, [facility], Valerio Casiano, CNP, Deidra Stockton, CNP, [facility], C[facility], Kurt Cifuentes, [facility], [facility], [facility], Travis Romero, CNP, [facility], [facility], [facility]      Daily Progress Note     Admit Date: 5/29/2022  Bed/Room No.  2029/2029-01  Admitting Physician : Aniceto Best MD  Code Status :[facility] Day:  LOS: 3 days   Chief Complaint:     Chief Complaint   Patient presents with    Chest Pain    Shortness of Breath     Principal Problem:    Acute idiopathic pericarditis  Active Problems:    Hypotension    Elevated troponin    Hyponatremia    Anemia    Hyperkalemia    Left lower quadrant abdominal pain    ESRD (end stage renal disease) on dialysis [facility])    Primary hypertension    Hyperlipidemia    Type 2 diabetes mellitus with kidney complication, with long-term current use of insulin ([facility])    GERD (gastroesophageal reflux disease)  Resolved Problems:    * No resolved hospital problems. *    Subjective : Interval History/Significant events :  06/02/22    Patient remained stable. Pericardial drain was removed yesterday. He denies any shortness of breath, chest pain, dyspnea. Patient is breathing on room air. Vitals - Stable afebrile  Labs -hyponatremia 125, BUN 66, creatinine 1.13. Glucose 264.     Nursing notes , Consults notes reviewed. Overnight events and updates discussed with Nursing staff . Background History:         Lucas Zuluaga is 49 y.o. male  Who was admitted to the hospital on 5/29/2022 for treatment of Acute idiopathic pericarditis. Patient came to emergency room with chest pain and shortness of breath. He was recently admitted in the hospital for lower extremity weakness which quickly resolved and was discharged home. He returned back with difficulty in breathing. Patient also had hiccups, pain in left side chest radiated shoulder. Patient denies any fever, chills. Patient was found to have a large pericardial effusion. EKG showed diffuse ST elevation suggestive of pericarditis. Patient had pericardiocentesis with 860 cc of hemorrhagic fluid obtained. He is also treated with heparin infusion and was stopped after STEMI ruled out. Pericardial drain was maintained until 6/1/2022 and was removed.   Patient remained stable after drain removal.    PMH:  Past Medical History:   Diagnosis Date    Cerebral artery occlusion with cerebral infarction [facility])     Closed fracture of bone of right foot March - April 2020    Dialysis patient [facility])     Hemodialysis patient ([facility]     Hyperlipidemia     Hypertension     Kidney disease     On Dialysis    Type 1 diabetes mellitus (HCC)       Allergies: No Known Allergies   Medications :  epoetin pennie-epbx, 2,000 Units, SubCUTAneous, Once per day on Mon Wed Fri   And  epoetin pennie-epbx, 3,000 Units, SubCUTAneous, Once per day on Mon Wed Fri  carvedilol, 6.25 mg, Oral, BID WC  insulin glargine, 45 Units, SubCUTAneous, Daily  insulin glargine, 15 Units, SubCUTAneous, Nightly  insulin lispro, 0-18 Units, SubCUTAneous, TID WC  insulin lispro, 0-9 Units, SubCUTAneous, Nightly  colchicine, 0.6 mg, Oral, Every Other Day  morphine, 4 mg, IntraVENous, Once  aspirin, 81 mg, Oral, Daily  buPROPion, 150 mg, Oral, QAM  calcium acetate, 1,334 mg, Oral, TID WC  ezetimibe, 10 mg, Oral, Daily  [Held by provider] FLUoxetine, 40 mg, Oral, Daily  gabapentin, 300 mg, Oral, Daily  [Held by provider] lisinopril, 20 mg, Oral, Daily  vitamin B-12, 500 mcg, Oral, Daily  sodium chloride flush, 5-40 mL, IntraVENous, 2 times per day  rosuvastatin, 10 mg, Oral, Nightly  predniSONE, 40 mg, Oral, Daily  polyethylene glycol, 17 g, Oral, Daily  pantoprazole, 40 mg, Oral, QAM AC        Review of Systems   Review of Systems   Constitutional: Negative for activity change, appetite change, fatigue, fever and unexpected weight change. HENT: Negative for congestion, nosebleeds, rhinorrhea, sinus pressure, sneezing and voice change. Respiratory: Negative for cough, choking, chest tightness, shortness of breath and wheezing. Cardiovascular: Negative for chest pain, palpitations and leg swelling. Gastrointestinal: Negative for abdominal pain, constipation, diarrhea, nausea and vomiting. Genitourinary: Negative for difficulty urinating, dysuria, frequency, penile discharge and testicular pain. Musculoskeletal: Negative for back pain. Skin: Negative for rash. Neurological: Negative for dizziness, weakness, light-headedness and headaches. Hematological: Does not bruise/bleed easily. Psychiatric/Behavioral: Negative for agitation, behavioral problems, confusion, self-injury, sleep disturbance and suicidal ideas.      Objective :      Current Vitals : Temp: 97.3 °F (36.3 °C),  Heart Rate: 84, Resp: (!) 8, BP: (!) 165/105, SpO2: 90 %  Last 24 Hrs Vitals   Patient Vitals for the past 24 hrs:   BP Temp Temp src Pulse Resp SpO2 Weight   06/02/22 0834 (!) 165/105 -- -- 84 -- -- --   06/02/22 0800 (!) 164/86 97.3 °F (36.3 °C) Temporal 77 (!) 8 90 % --   06/02/22 0600 (!) 157/84 -- -- 79 13 90 % --   06/02/22 0500 (!) 147/84 -- -- 76 14 92 % --   06/02/22 0400 (!) 147/74 (!) 96.5 °F (35.8 °C) Temporal 73 11 93 % 187 lb (84.8 kg)   06/02/22 0300 122/68 -- -- 73 11 94 % --   06/02/22 0200 121/71 -- -- 77 13 96 % --   06/02/22 0100 130/71 -- -- 78 12 94 % --   06/02/22 0000 117/67 97.4 °F (36.3 °C) Temporal 80 12 92 % --   06/01/22 2300 122/65 -- -- 80 14 94 % --   06/01/22 2200 (!) 141/71 -- -- 79 12 90 % --   06/01/22 2100 136/65 -- -- 78 12 94 % --   06/01/22 2000 -- 97.5 °F (36.4 °C) Temporal 79 11 91 % --   06/01/22 1900 (!) 152/66 -- -- 88 10 92 % --   06/01/22 1800 (!) 145/62 -- -- 91 16 -- --   06/01/22 1700 (!) 157/68 -- -- 87 19 -- --   06/01/22 1600 (!) 165/74 98.5 °F (36.9 °C) Temporal 85 19 93 % --   06/01/22 1400 (!) 144/51 -- -- 93 15 90 % --   06/01/22 1300 (!) 147/68 -- -- 99 15 92 % --   06/01/22 1200 (!) 133/48 97.5 °F (36.4 °C) Temporal 81 (!) 9 93 % --   06/01/22 1100 (!) 133/48 -- -- 80 10 93 % --   06/01/22 1000 (!) 131/59 -- -- 87 16 93 % --   06/01/22 0900 (!) 118/57 -- -- 83 11 93 % --     Intake / output   05/31 1901 - 06/02 0700  In: 240 [P.O.:240]  Out: 0   Physical Exam:  Physical Exam  Vitals and nursing note reviewed. Constitutional:       General: He is not in acute distress. Appearance: He is not diaphoretic. HENT:      Head: Normocephalic and atraumatic. Nose:      Right Sinus: No maxillary sinus tenderness or frontal sinus tenderness. Left Sinus: No maxillary sinus tenderness or frontal sinus tenderness. Mouth/Throat:      Pharynx: No oropharyngeal exudate. Eyes:      General: No scleral icterus. Conjunctiva/sclera: Conjunctivae normal.      Pupils: Pupils are equal, round, and reactive to light. Neck:      Thyroid: No thyromegaly. Vascular: No JVD. Cardiovascular:      Rate and Rhythm: Normal rate and regular rhythm. Pulses:           Dorsalis pedis pulses are 2+ on the right side and 2+ on the left side. Heart sounds: Normal heart sounds. No murmur heard. Pulmonary:      Effort: Pulmonary effort is normal.      Breath sounds: Normal breath sounds. No wheezing or rales.    Chest:      Comments: Dressing at chest at prior drain site  Abdominal:      Palpations: Abdomen is soft. There is no mass. Tenderness: There is no abdominal tenderness. Musculoskeletal:      Cervical back: Full passive range of motion without pain and neck supple. Comments: Wasting of thenar eminence bilaterally    Lymphadenopathy:      Head:      Right side of head: No submandibular adenopathy. Left side of head: No submandibular adenopathy. Cervical: No cervical adenopathy. Skin:     General: Skin is warm. Neurological:      Mental Status: He is alert and oriented to person, place, and time. Motor: No tremor. Comments: Bilateral foot drop, loss of bilateral thenar eminences intrinsic hand muscles    Psychiatric:         Behavior: Behavior is cooperative. Laboratory findings:    Recent Labs     05/31/22  0551 06/01/22  0013 06/01/22  1123 06/01/22  2256 06/02/22  0347   WBC 16.7*  --   --   --  11.5*   HGB 8.8*   < > 9.1* 9.1* 9.3*   HCT 28.7*   < > 29.4* 29.5* 29.5*     --   --   --  350    < > = values in this interval not displayed. Recent Labs     05/31/22  0551 05/31/22  1239 06/01/22  0545 06/01/22  1438 06/02/22  0347   *   < > 125* 120* 125*   K 6.1*  --  5.8* 5.4* 5.3   CL 89*  --  84* 81* 85*   CO2 21  --  22 25 26   GLUCOSE 312*  --  496* 586* 264*   BUN 63*  --  43* 52* 66*   CREATININE 10.28*  --  6.85* 7.03* 9.13*   MG 2.2  --  2.1  --  2.2   CALCIUM 8.9  --  9.0 8.9 8.9   PHOS 4.9*  --  5.3*  --  4.7*    < > = values in this interval not displayed. No results for input(s): PROT, LABALBU, LABA1C, J3WYSIC, R6PUQRL, FT4, TSH, AST, ALT, LDH, GGT, ALKPHOS, BILITOT, BILIDIR, AMMONIA, AMYLASE, LIPASE, LACTATE, CHOL, HDL, LDLCHOLESTEROL, CHOLHDLRATIO, TRIG, VLDL, BNP, TROPONINI, CKTOTAL, CKMB, CKMBINDEX, RF, ECTOR in the last 72 hours.        No results found for: [place], RBCUA, BLOODU, BACTERIA, NITRU, [address], LEUKOCYTESUR    Imaging / Clinical Data :-   CT ABDOMEN PELVIS WO CONTRAST mellitus with neuropathy -continue cardiac diet, increase insulin dose. Essential hypertension -lisinopril on hold  Weakness / Myopathy - outpatient follow up with neurology , has referral and waiting for appointment     Limited echo cardiogram today to follow on pericardial effusion for any recurrence. If stable plan on discharging home later today. Continue to monitor vitals , Intake / output ,  Cell count , HGB , Kidney function, oxygenation  as indicated . Plan and updates discussed with patient ,  answers  explained to satisfaction.    Plan discussed with Staff Desiree RN     (Please note that portions of this note were completed with a voice recognition program. Efforts were made to edit the dictations but occasionally words are mis-transcribed.)      Will Palafox MD  6/2/2022

## 2022-06-02 NOTE — PROGRESS NOTES
Progress Note    Reason for follow up: ESRD    INTERVAL HISTORY:     K 5.3 today. Corrected . Phos 4.7 on phoslo. Changed to renal diet yesterday. Received lokelma last night. Hgb stable 9s. Restart NEDA. Glucose trending 300-500s. Lantus dose increased yesterday. SBP trending 110-160s. C/o chronic back pain. Denies chest pain or abdominal pain. HISTORY OF PRESENT ILLNESS:    The patient is a 49 y.o. male who presents with complaint of chest pain. EKG showed ST elevation. Cardiology has been consulted. Possible pericarditis. He is on heparin drip. Cardiac cath planned tomorrow. CT chest/abd/pelvis results pending. SBP trending 80s to 100s. Lisinopril does decreased and norvasc dc'd last admission (last week). He states he made these adjustments to home meds. On admission, glucose 535 with sodium 125 corrected sodium 134, potassium 6, BUN 43, creatinine 8.15. He was initiated on Lokelma and insulin. This a.m, sodium was 128, corrected sodium 132 with potassium 5.5, bicarb 20, BUN 45, creatinine 8.98, glucose improved to 277, lactic acid 2.4. Most recent sodium at 846 was 126. Glucose not checked at that time. Hemoglobin 9.4 with WBC 12. Ferritin 3028. Patient was admitted last week for weakness and falls. At that time Neurontin dose was decreased. Hx of ESRD on HD MWF via [facility]. Review Of Systems:   Constitutional: No fever, chills  Cardiac:  No chest pain currently, no dyspnea  Chest:               No cough, phlegm or wheezing.    Abdomen:  No abdominal pain, no nausea  Musculoskeletal:  +back pain      Past Medical History:   Diagnosis Date    Cerebral artery occlusion with cerebral infarction [facility])     Closed fracture of bone of right foot March - April 2020    Dialysis patient [facility])     Hemodialysis patient [facility])     Hyperlipidemia     Hypertension     Kidney disease     On Dialysis    Type 1 diabetes mellitus ([facility]        Past Surgical History:   Procedure Laterality Date    AV FISTULA CREATION Left     WRIST SURGERY  1995       Prior to Admission medications    Medication Sig Start Date End Date Taking? Authorizing Provider   lisinopril (PRINIVIL;ZESTRIL) 20 MG tablet Take 1 tablet by mouth daily 5/27/22   DANIEL Gutierrez CNP   gabapentin (NEURONTIN) 300 MG capsule Take 1 capsule by mouth daily. 5/28/22   DANIEL Gutierrez CNP   insulin glargine (LANTUS) 100 UNIT/ML injection vial Inject 10-45 Units into the skin See Admin Instructions Indications: 45 units in the morning and 10 units in the evening     Historical Provider, MD   traMADol (ULTRAM) 50 MG tablet Take 50 mg by mouth 2 times daily as needed for Pain. 5/6/22   Historical Provider, MD   Misc. Devices MISC Disp: custom molded shoes to accommodate for brace   DX: DM with history of stroke, foot drop  Duration: 1 year 5/11/22   Neptali Bell DPM   ondansetron (ZOFRAN) 4 MG tablet Take 1 tablet by mouth 3 times daily as needed for Nausea or Vomiting 4/29/22   DANIEL Blood CNP   magnesium oxide (MAG-OX) 400 MG tablet Take 400 mg by mouth daily     Historical Provider, MD   magnesium hydroxide (MILK OF MAGNESIA) 400 MG/5ML suspension Take 15 mLs by mouth daily as needed for Constipation    Historical Provider, MD   insulin aspart (NOVOLOG) 100 UNIT/ML injection vial Inject 0-8 Units into the skin 3 times daily (before meals) SLIDING SCALE     Historical Provider, MD   FLUoxetine (PROZAC) 40 MG capsule Take 40 mg by mouth daily     Historical Provider, MD   ammonium lactate (LAC-HYDRIN) 12 % lotion Apply topically daily.   Patient taking differently: Apply to feet 11/1/21   Neptali Bell DPM   furosemide (LASIX) 20 MG tablet Take 1 tablet by mouth daily 4/27/21   [place] Torres, DO   buPROPion (WELLBUTRIN XL) 150 MG extended release tablet Take 1 tablet by mouth every morning 3/25/21   Rory Macdonald, DO   Rosuvastatin Calcium 40 MG CPSP Take 40 mg by mouth daily    Historical Provider, MD ezetimibe (ZETIA) 10 MG tablet Take 10 mg by mouth daily    Historical Provider, MD   omeprazole (PRILOSEC) 40 MG delayed release capsule Take 40 mg by mouth daily    Historical Provider, MD   aspirin 81 MG EC tablet Take 81 mg by mouth daily    Historical Provider, MD   calcium acetate 667 MG TABS Take 1,334 mg by mouth 3 times daily (with meals)     Historical Provider, MD   vitamin B-12 (CYANOCOBALAMIN) 500 MCG tablet Take 500 mcg by mouth daily    Historical Provider, MD       Scheduled Meds:   carvedilol  6.25 mg Oral BID WC    insulin glargine  45 Units SubCUTAneous Daily    insulin glargine  15 Units SubCUTAneous Nightly    insulin lispro  0-18 Units SubCUTAneous TID WC    insulin lispro  0-9 Units SubCUTAneous Nightly    colchicine  0.6 mg Oral Every Other Day    morphine  4 mg IntraVENous Once    aspirin  81 mg Oral Daily    buPROPion  150 mg Oral QAM    calcium acetate  1,334 mg Oral TID WC    ezetimibe  10 mg Oral Daily    [Held by provider] FLUoxetine  40 mg Oral Daily    gabapentin  300 mg Oral Daily    [Held by provider] lisinopril  20 mg Oral Daily    vitamin B-12  500 mcg Oral Daily    sodium chloride flush  5-40 mL IntraVENous 2 times per day    rosuvastatin  10 mg Oral Nightly    predniSONE  40 mg Oral Daily    polyethylene glycol  17 g Oral Daily    pantoprazole  40 mg Oral QAM AC     Continuous Infusions:   dextrose      sodium chloride       PRN Meds:morphine, glucose, dextrose bolus **OR** dextrose bolus, glucagon (rDNA), dextrose, sodium chloride flush, sodium chloride, ondansetron **OR** ondansetron, acetaminophen **OR** acetaminophen, nitroGLYCERIN, perflutren lipid microspheres    No Known Allergies    Social History     Socioeconomic History    Marital status: Single     Spouse name: Not on file    Number of children: Not on file    Years of education: Not on file    Highest education level: Not on file   Occupational History    Not on file   Tobacco Use    Smoking status: Never Smoker    Smokeless tobacco: Never Used   Vaping Use    Vaping Use: Never used   Substance and Sexual Activity    Alcohol use: Never    Drug use: Never    Sexual activity: Not on file   Other Topics Concern    Not on file   Social History Narrative    Not on file     Social Determinants of Health     Financial Resource Strain:     Difficulty of Paying Living Expenses: Not on file   Food Insecurity:     Worried About Running Out of Food in the Last Year: Not on file    Dinh of Food in the Last Year: Not on file   Transportation Needs:     Lack of Transportation (Medical): Not on file    Lack of Transportation (Non-Medical):  Not on file   Physical Activity:     Days of Exercise per Week: Not on file    Minutes of Exercise per Session: Not on file   Stress:     Feeling of Stress : Not on file   Social Connections:     Frequency of Communication with Friends and Family: Not on file    Frequency of Social Gatherings with Friends and Family: Not on file    Attends Protestant Services: Not on file    Active Member of [address] [place] [address] or Organizations: Not on file    Attends Club or Organization Meetings: Not on file    Marital Status: Not on file   Intimate Partner Violence:     Fear of Current or Ex-Partner: Not on file    Emotionally Abused: Not on file    Physically Abused: Not on file    Sexually Abused: Not on file   Housing Stability:     Unable to Pay for Housing in the Last Year: Not on file    Number of [place] in the Last Year: Not on file    Unstable Housing in the Last Year: Not on file       Family History   Problem Relation Age of Onset    Diabetes Mother     Heart Disease Father     Diabetes Father     Cancer Paternal Grandmother     Heart Disease Maternal Great Grandfather          Physical Exam:  Vitals:    06/02/22 0300 06/02/22 0400 06/02/22 0500 06/02/22 0600   BP: 122/68 (!) 147/74 (!) 147/84 (!) 157/84   Pulse: 73 73 76 79   Resp: 11 11 14 13   Temp: Loyd Funetes ) 96.5 °F (35.8 °C)     TempSrc:  Temporal     SpO2: 94% 93% 92% 90%   Weight:  187 lb (84.8 kg)     Height:         I/O last 3 completed shifts: In: 240 [P.O.:240]  Out: 0     General:  Awake, alert, not in distress. Appears to be stated age. HEENT: Atraumatic, normocephalic. Anicteric sclera. Pink and moist oral mucosa. Neck supple. Chest: Bilateral air entry, clear to auscultation, no wheezing, rhonchi or rales. Cardiovascular: RRR, S1S2, no murmur, rub or gallop. No lower extremity edema. +pericaridial drain. Abdomen: Soft, non tender to palpation. Active bowel sounds. Integumentary: Pink, warm and dry. Free from rash or lesions. Skin turgor normal.  CNS: Oriented to person, place and time. Cranial nerves grossly intact. Speech clear. Face symmetrical. No tremor.      Data:    CBC:   Lab Results   Component Value Date    WBC 11.5 (H) 06/02/2022    HGB 9.3 (L) 06/02/2022    HCT 29.5 (L) 06/02/2022    MCV 94.9 06/02/2022     06/02/2022     BMP:    Lab Results   Component Value Date     (L) 06/02/2022     (L) 06/01/2022     (L) 06/01/2022    K 5.3 06/02/2022    K 5.4 (H) 06/01/2022    K 5.8 (H) 06/01/2022    CL 85 (L) 06/02/2022    CL 81 (L) 06/01/2022    CL 84 (L) 06/01/2022    CO2 26 06/02/2022    CO2 25 06/01/2022    CO2 22 06/01/2022    BUN 66 (H) 06/02/2022    BUN 52 (H) 06/01/2022    BUN 43 (H) 06/01/2022    CREATININE 9.13 (HH) 06/02/2022    CREATININE 7.03 (HH) 06/01/2022    CREATININE 6.85 (HH) 06/01/2022    GLUCOSE 264 (H) 06/02/2022    GLUCOSE 586 () 06/01/2022    GLUCOSE 496 () 06/01/2022     CMP:   Lab Results   Component Value Date     06/02/2022    K 5.3 06/02/2022    CL 85 06/02/2022    CO2 26 06/02/2022    BUN 66 06/02/2022    CREATININE 9.13 06/02/2022    GLUCOSE 264 06/02/2022    CALCIUM 8.9 06/02/2022    PROT 5.3 04/14/2022    LABALBU 3.4 04/14/2022    BILITOT 0.34 04/14/2022    ALKPHOS 137 04/14/2022    AST 38 04/14/2022    ALT 44 04/14/2022 Hepatic:   Lab Results   Component Value Date    AST 38 04/14/2022    AST 27 04/13/2022    AST 36 01/05/2022    ALT 44 (H) 04/14/2022    ALT 42 (H) 04/13/2022    ALT 38 01/05/2022    BILITOT 0.34 04/14/2022    BILITOT 0.28 (L) 04/13/2022    BILITOT 0.43 01/05/2022    ALKPHOS 137 (H) 04/14/2022    ALKPHOS 142 (H) 04/13/2022    ALKPHOS 110 01/05/2022     BNP: No results found for: BNP  Lipids:   Lab Results   Component Value Date    CHOL 118 04/12/2022    HDL 61 04/12/2022     INR:   Lab Results   Component Value Date    INR 1.0 05/27/2022    INR 0.9 04/12/2022     PTH: No results found for: PTH  Phosphorus:    Lab Results   Component Value Date    PHOS 4.7 06/02/2022     Ionized Calcium: No results found for: IONCA  Magnesium:   Lab Results   Component Value Date    MG 2.2 06/02/2022     Albumin:   Lab Results   Component Value Date    LABALBU 3.4 04/14/2022     Last 3 CK, CKMB, Troponin: @LABRCNT(CKTOTAL:3,CKMB:3,TROPONINI:3)       URINE:)No results found for: Melanie Denson    Radiology:   Reviewed. Assessment/Plan: To follow.     Pt seen in collaboration with Dr. Deidre Clayton. Electronically signed by DANIEL Russo CNP  on 6/2/2022 at 7:12 AM     Assessment:  ESRD  Hyponatremia. Hyperkalemia. Hyperphosphatemia. Anemia. Hypertension. Pericardial effusion. Volume overload.     Plan:  Maintain on renal ONS and renal diet with oral fluid restriction. Hemodialysis today and then TTS. Will target extra fluid removal with HD today as BP allows. Monitor BP. Follow up chemiatries daily in AM.    Electronically signed by Zak Elmore MD on 6/2/2022 at 10:30 AM  [facility].   Ph: [phone]

## 2022-06-02 NOTE — FLOWSHEET NOTE
06/02/22 1254   Vital Signs   /72   Heart Rate 75   Resp 18   Weight 181 lb 14.1 oz (82.5 kg)   Weight Method Bed scale   Percent Weight Change -2.74   Pain Assessment   Pain Assessment None - Denies Pain   Treatment   Time On 1258   Treatment Goal 6752-7353 ml as tolerated. Observations & Evaluations   Level of Consciousness Alert (0)   Oriented X AOx4   Heart Rhythm Regular   Respiratory Quality/Effort Unlabored   O2 Device None (Room air)   Bilateral Breath Sounds Diminished   Skin Color Pink   Skin Condition/Temp Dry; Warm   Abdomen Inspection Soft   Comments Pre treatment report received from patient's primary nurse, Desiree RN, vitals stable, HD orders verified, dialysate confirmed, left arm AV fistula was accessed without complication, safety checks completed. Technical Checks   Dialysis Machine No. [id number]   RO Machine Number [id number]   Dialyzer Lot No. [id number]   Tubing Lot Number [id number]   All Connections Secure Yes   NS Bag Yes   Saline Line Double Clamped Yes   Dialyzer F-180   Prime Volume (mL) 250 mL   ICEBOAT I;C;E;B;O;A;T   RO Machine Log Sheet Completed Yes   Machine Alarm Self Test Completed; Passed   Air Foam Detector Tested;Proper Function;pH Reading   Extracorporeal Circuit Tested for Integrity Yes   Machine Conductivity 13.5   Manual Conductivity 13.5   Machine Ph 7.4   Manual Ph 7.4   Bleach Test (Neg) Yes   Bath Temperature 96.8 °F (36 °C)   Treatment Initiation   Dialyze Hours 3.5   Treatment  Initiation Universal Precautions maintained;Lines secured to patient; Connections secured;Prime given;Venous Parameters set; Arterial Parameters set; Air foam detector engaged; Hemosafe Device; Dialysate;Saline line double clamped; Hemo-Safe Applied;Dialyzer;F180   Dialysis Bath   K+ (Potassium) 2   Ca+ (Calcium) 2.5   Na+ (Sodium) 135   HCO3 (Bicarb) 30   Hemodialysis Fistula/Graft Arteriovenous fistula Left Arm   Placement Date/Time: (c) 01/04/22 0700   Pre-existing: Yes  Access Type:

## 2022-06-02 NOTE — PROGRESS NOTES
[facility]   Progress Note                   Date:   6/2/2022  Patient name: Dom Chavez  Date of admission:  5/29/2022 11:56 PM  MRN:   [id number]  YOB: 1975  PCP: DANIEL Raphael CNP    Reason for Admission: Chest pain [R07.9]  Chest pain, unspecified type [R07.9]    Subjective:       Clinical Changes / Abnormalities: no chest pain or dyspnea.   K 5.3  hgb 9.3    Medications:   Scheduled Meds:   epoetin pennie-epbx  2,000 Units SubCUTAneous Once per day on Mon Wed Fri    And    epoetin pennie-epbx  3,000 Units SubCUTAneous Once per day on Mon Wed Fri    carvedilol  6.25 mg Oral BID WC    insulin glargine  45 Units SubCUTAneous Daily    insulin glargine  15 Units SubCUTAneous Nightly    insulin lispro  0-18 Units SubCUTAneous TID WC    insulin lispro  0-9 Units SubCUTAneous Nightly    colchicine  0.6 mg Oral Every Other Day    morphine  4 mg IntraVENous Once    aspirin  81 mg Oral Daily    buPROPion  150 mg Oral QAM    calcium acetate  1,334 mg Oral TID WC    ezetimibe  10 mg Oral Daily    [Held by provider] FLUoxetine  40 mg Oral Daily    gabapentin  300 mg Oral Daily    [Held by provider] lisinopril  20 mg Oral Daily    vitamin B-12  500 mcg Oral Daily    sodium chloride flush  5-40 mL IntraVENous 2 times per day    rosuvastatin  10 mg Oral Nightly    predniSONE  40 mg Oral Daily    polyethylene glycol  17 g Oral Daily    pantoprazole  40 mg Oral QAM AC     Continuous Infusions:   dextrose      sodium chloride       CBC:   Recent Labs     05/31/22  0551 06/01/22  0013 06/01/22  2256 06/02/22  0347 06/02/22  1046   WBC 16.7*  --   --  11.5*  --    HGB 8.8*   < > 9.1* 9.3* 9.5*     --   --  350  --     < > = values in this interval not displayed.      BMP:    Recent Labs     06/01/22  0545 06/01/22  1438 06/02/22  0347   * 120* 125*   K 5.8* 5.4* 5.3   CL 84* 81* 85*   CO2 22 25 26   BUN 43* 52* 66*   CREATININE 6.85* 7.03* 9.13*   GLUCOSE 496* 586* 264*     Hepatic: No results for input(s): AST, ALT, ALB, BILITOT, ALKPHOS in the last 72 hours. Troponin: No results for input(s): TROPONINI in the last 72 hours. BNP: No results for input(s): BNP in the last 72 hours. Lipids: No results for input(s): CHOL, HDL in the last 72 hours. Invalid input(s): LDLCALCU  INR: No results for input(s): INR in the last 72 hours. Objective:   Vitals: BP (!) 147/70   Pulse 74   Temp 97.3 °F (36.3 °C) (Temporal)   Resp 11   Ht 5' 7\" (1.702 m)   Wt 187 lb (84.8 kg)   SpO2 90%   BMI 29.29 kg/m²   General appearance: alert and cooperative with exam  HEENT: Head: Normocephalic, no lesions, without obvious abnormality. Neck: no JVD  Lungs: CTAB  Heart: RRR s1+s2, no murmurs  Abdomen: soft, non-tender  Extremities: no edema  Neurologic: not done    Echocardiogram 5/30/2022:  Mild left ventricular hypertrophy  Global left ventricular systolic function is low normal  Estimated ejection fraction is 45-50 % . Large anterior and moderate size posterior pericardial effusion, early diastolic collapse of right atrium and ventricle noted, suggesting early  cardiac tamponade. No mitral regurgitation. No tricuspid regurgitation was seen. Assessment / Acute Cardiac Problems:   1. Acute pericarditis with large pericardial effusion with tamponade s/p pericardiocentesis  2. HS trop elevation due to ESRD  3. ESRD  4. HTN  5. CVA  6.  Hyponatremia    Patient Active Problem List:     ESRD (end stage renal disease) on dialysis ([address]     Primary hypertension     Hyperlipidemia     Acute pain of left knee     Bipolar affective disorder ([address]     Atherosclerosis of aorta ([address]     COVID-19     Cerebrovascular accident (CVA) ([address]     Type 2 diabetes mellitus with kidney complication, with long-term current use of insulin (HCC)     Neuropathy of both feet     GERD (gastroesophageal reflux disease)     Right sided weakness     Leg weakness, bilateral Recurrent falls     Acute idiopathic pericarditis     Small kidney, bilateral     Umbilical hernia without obstruction or gangrene     Disorders of diaphragm     Atherosclerosis of other arteries     Hypotension     Elevated troponin     Hyponatremia     Anemia     Hyperkalemia     Left lower quadrant abdominal pain      Plan of Treatment:   1. On colchicine renal dose and prednisone  2. Continue ASA  3. Continue coreg  4. Electrolytes and volume management per nephrology  5. TTE today pending.  Pericardial drain was removed yesterday    AntonioDinesh James [facility] [facility]  [phone]

## 2022-06-02 NOTE — PLAN OF CARE
Problem: Chronic Conditions and Co-morbidities  Goal: Patient's chronic conditions and co-morbidity symptoms are monitored and maintained or improved  Outcome: Adequate for Discharge     Problem: Discharge Planning  Goal: Discharge to home or other facility with appropriate resources  Outcome: Adequate for Discharge     Problem: Pain  Goal: Verbalizes/displays adequate comfort level or baseline comfort level  Outcome: Adequate for Discharge     Problem: Safety - Adult  Goal: Free from fall injury  Outcome: Adequate for Discharge     Problem: ABCDS Injury Assessment  Goal: Absence of physical injury  Outcome: Adequate for Discharge     Problem: Skin/Tissue Integrity  Goal: Absence of new skin breakdown  Description: 1. Monitor for areas of redness and/or skin breakdown  2. Assess vascular access sites hourly  3. Every 4-6 hours minimum:  Change oxygen saturation probe site  4. Every 4-6 hours:  If on nasal continuous positive airway pressure, respiratory therapy assess nares and determine need for appliance change or resting period.   Outcome: Adequate for Discharge     Problem: Nutrition Deficit:  Goal: Optimize nutritional status  Outcome: Adequate for Discharge

## 2022-06-03 ENCOUNTER — APPOINTMENT (OUTPATIENT)
Dept: GENERAL RADIOLOGY | Age: 47
DRG: 252 | End: 2022-06-03
Payer: MEDICARE

## 2022-06-03 ENCOUNTER — APPOINTMENT (OUTPATIENT)
Dept: PHYSICAL THERAPY | Facility: CLINIC | Age: 47
End: 2022-06-03
Payer: MEDICARE

## 2022-06-03 LAB
ABSOLUTE EOS #: 0 K/UL (ref 0–0.44)
ABSOLUTE IMMATURE GRANULOCYTE: 0.14 K/UL (ref 0–0.3)
ABSOLUTE LYMPH #: 0.54 K/UL (ref 1.1–3.7)
ABSOLUTE MONO #: 1.08 K/UL (ref 0.1–1.2)
ANION GAP SERPL CALCULATED.3IONS-SCNC: 16 MMOL/L (ref 9–17)
BASOPHILS # BLD: 0 % (ref 0–2)
BASOPHILS ABSOLUTE: 0 K/UL (ref 0–0.2)
BUN BLDV-MCNC: 51 MG/DL (ref 6–20)
BUN/CREAT BLD: 9 (ref 9–20)
CALCIUM SERPL-MCNC: 8.9 MG/DL (ref 8.6–10.4)
CHLORIDE BLD-SCNC: 91 MMOL/L (ref 98–107)
CO2: 23 MMOL/L (ref 20–31)
CREAT SERPL-MCNC: 5.98 MG/DL (ref 0.7–1.2)
EOSINOPHILS RELATIVE PERCENT: 0 % (ref 1–4)
GFR AFRICAN AMERICAN: 12 ML/MIN
GFR NON-AFRICAN AMERICAN: 10 ML/MIN
GFR SERPL CREATININE-BSD FRML MDRD: ABNORMAL ML/MIN/{1.73_M2}
GLUCOSE BLD-MCNC: 159 MG/DL (ref 75–110)
GLUCOSE BLD-MCNC: 277 MG/DL (ref 75–110)
GLUCOSE BLD-MCNC: 317 MG/DL (ref 75–110)
GLUCOSE BLD-MCNC: 354 MG/DL (ref 70–99)
GLUCOSE BLD-MCNC: 377 MG/DL (ref 75–110)
GLUCOSE BLD-MCNC: 380 MG/DL (ref 75–110)
GLUCOSE BLD-MCNC: 430 MG/DL (ref 75–110)
HCT VFR BLD CALC: 35.5 % (ref 40.7–50.3)
HEMOGLOBIN: 11 G/DL (ref 13–17)
IMMATURE GRANULOCYTES: 1 %
LYMPHOCYTES # BLD: 4 % (ref 24–43)
MAGNESIUM: 2.2 MG/DL (ref 1.6–2.6)
MCH RBC QN AUTO: 29.6 PG (ref 25.2–33.5)
MCHC RBC AUTO-ENTMCNC: 31 G/DL (ref 28–38)
MCV RBC AUTO: 95.4 FL (ref 82.6–102.9)
MONOCYTES # BLD: 8 % (ref 3–12)
PDW BLD-RTO: 12.3 % (ref 11.8–14.4)
PHOSPHORUS: 3 MG/DL (ref 2.5–4.5)
PLATELET # BLD: 363 K/UL (ref 138–453)
PMV BLD AUTO: 9.5 FL (ref 8.1–13.5)
POTASSIUM SERPL-SCNC: 5 MMOL/L (ref 3.7–5.3)
RBC # BLD: 3.72 M/UL (ref 4.21–5.77)
SEG NEUTROPHILS: 87 % (ref 36–65)
SEGMENTED NEUTROPHILS ABSOLUTE COUNT: 11.74 K/UL (ref 1.5–8.1)
SODIUM BLD-SCNC: 130 MMOL/L (ref 135–144)
WBC # BLD: 13.5 K/UL (ref 3.5–11.3)

## 2022-06-03 PROCEDURE — 6370000000 HC RX 637 (ALT 250 FOR IP): Performed by: NURSE PRACTITIONER

## 2022-06-03 PROCEDURE — 74018 RADEX ABDOMEN 1 VIEW: CPT

## 2022-06-03 PROCEDURE — 6360000002 HC RX W HCPCS: Performed by: NURSE PRACTITIONER

## 2022-06-03 PROCEDURE — 82947 ASSAY GLUCOSE BLOOD QUANT: CPT

## 2022-06-03 PROCEDURE — 97166 OT EVAL MOD COMPLEX 45 MIN: CPT

## 2022-06-03 PROCEDURE — 2060000000 HC ICU INTERMEDIATE R&B

## 2022-06-03 PROCEDURE — 83735 ASSAY OF MAGNESIUM: CPT

## 2022-06-03 PROCEDURE — 36415 COLL VENOUS BLD VENIPUNCTURE: CPT

## 2022-06-03 PROCEDURE — 85025 COMPLETE CBC W/AUTO DIFF WBC: CPT

## 2022-06-03 PROCEDURE — 6370000000 HC RX 637 (ALT 250 FOR IP): Performed by: INTERNAL MEDICINE

## 2022-06-03 PROCEDURE — 97116 GAIT TRAINING THERAPY: CPT

## 2022-06-03 PROCEDURE — 97162 PT EVAL MOD COMPLEX 30 MIN: CPT

## 2022-06-03 PROCEDURE — 6370000000 HC RX 637 (ALT 250 FOR IP): Performed by: FAMILY MEDICINE

## 2022-06-03 PROCEDURE — 97535 SELF CARE MNGMENT TRAINING: CPT

## 2022-06-03 PROCEDURE — 2500000003 HC RX 250 WO HCPCS: Performed by: NURSE PRACTITIONER

## 2022-06-03 PROCEDURE — 84100 ASSAY OF PHOSPHORUS: CPT

## 2022-06-03 PROCEDURE — 2580000003 HC RX 258: Performed by: NURSE PRACTITIONER

## 2022-06-03 PROCEDURE — 99232 SBSQ HOSP IP/OBS MODERATE 35: CPT | Performed by: FAMILY MEDICINE

## 2022-06-03 PROCEDURE — 80048 BASIC METABOLIC PNL TOTAL CA: CPT

## 2022-06-03 PROCEDURE — 90935 HEMODIALYSIS ONE EVALUATION: CPT

## 2022-06-03 RX ORDER — LACTULOSE 10 G/15ML
20 SOLUTION ORAL 2 TIMES DAILY
Status: DISCONTINUED | OUTPATIENT
Start: 2022-06-03 | End: 2022-06-04

## 2022-06-03 RX ORDER — POLYETHYLENE GLYCOL 3350 17 G/17G
17 POWDER, FOR SOLUTION ORAL DAILY
Status: DISCONTINUED | OUTPATIENT
Start: 2022-06-04 | End: 2022-06-08 | Stop reason: HOSPADM

## 2022-06-03 RX ORDER — INSULIN GLARGINE 100 [IU]/ML
20 INJECTION, SOLUTION SUBCUTANEOUS NIGHTLY
Status: DISCONTINUED | OUTPATIENT
Start: 2022-06-03 | End: 2022-06-07

## 2022-06-03 RX ADMIN — EZETIMIBE 10 MG: 10 TABLET ORAL at 08:48

## 2022-06-03 RX ADMIN — ASPIRIN 81 MG: 81 TABLET, COATED ORAL at 08:46

## 2022-06-03 RX ADMIN — CARVEDILOL 6.25 MG: 6.25 TABLET, FILM COATED ORAL at 08:47

## 2022-06-03 RX ADMIN — INSULIN LISPRO 18 UNITS: 100 INJECTION, SOLUTION INTRAVENOUS; SUBCUTANEOUS at 12:05

## 2022-06-03 RX ADMIN — SENNOSIDES 17.2 MG: 8.6 TABLET, FILM COATED ORAL at 08:47

## 2022-06-03 RX ADMIN — MAGNESIUM CITRATE 296 ML: 1.75 LIQUID ORAL at 10:23

## 2022-06-03 RX ADMIN — BUPROPION HYDROCHLORIDE 150 MG: 150 TABLET, EXTENDED RELEASE ORAL at 08:47

## 2022-06-03 RX ADMIN — ACETAMINOPHEN 650 MG: 325 TABLET ORAL at 06:33

## 2022-06-03 RX ADMIN — INSULIN LISPRO 7 UNITS: 100 INJECTION, SOLUTION INTRAVENOUS; SUBCUTANEOUS at 21:59

## 2022-06-03 RX ADMIN — LACTULOSE 20 G: 20 SOLUTION ORAL at 22:00

## 2022-06-03 RX ADMIN — VITAM B12 500 MCG: 100 TAB at 08:49

## 2022-06-03 RX ADMIN — PANTOPRAZOLE SODIUM 40 MG: 40 TABLET, DELAYED RELEASE ORAL at 06:26

## 2022-06-03 RX ADMIN — PREDNISONE 40 MG: 20 TABLET ORAL at 08:47

## 2022-06-03 RX ADMIN — SENNOSIDES 17.2 MG: 8.6 TABLET, FILM COATED ORAL at 22:00

## 2022-06-03 RX ADMIN — Medication 240 ML: at 12:09

## 2022-06-03 RX ADMIN — CARVEDILOL 6.25 MG: 6.25 TABLET, FILM COATED ORAL at 19:11

## 2022-06-03 RX ADMIN — INSULIN LISPRO 15 UNITS: 100 INJECTION, SOLUTION INTRAVENOUS; SUBCUTANEOUS at 08:49

## 2022-06-03 RX ADMIN — ROSUVASTATIN CALCIUM 10 MG: 10 TABLET, FILM COATED ORAL at 22:01

## 2022-06-03 RX ADMIN — SODIUM CHLORIDE, PRESERVATIVE FREE 10 ML: 5 INJECTION INTRAVENOUS at 08:56

## 2022-06-03 RX ADMIN — INSULIN LISPRO 9 UNITS: 100 INJECTION, SOLUTION INTRAVENOUS; SUBCUTANEOUS at 19:12

## 2022-06-03 RX ADMIN — POLYETHYLENE GLYCOL 3350 17 G: 17 POWDER, FOR SOLUTION ORAL at 08:57

## 2022-06-03 RX ADMIN — ONDANSETRON 4 MG: 2 INJECTION INTRAMUSCULAR; INTRAVENOUS at 16:42

## 2022-06-03 RX ADMIN — INSULIN GLARGINE 20 UNITS: 100 INJECTION, SOLUTION SUBCUTANEOUS at 21:59

## 2022-06-03 RX ADMIN — GABAPENTIN 300 MG: 300 CAPSULE ORAL at 08:46

## 2022-06-03 RX ADMIN — BISACODYL 5 MG: 5 TABLET, COATED ORAL at 06:29

## 2022-06-03 RX ADMIN — SODIUM CHLORIDE, PRESERVATIVE FREE 10 ML: 5 INJECTION INTRAVENOUS at 22:01

## 2022-06-03 RX ADMIN — INSULIN GLARGINE 45 UNITS: 100 INJECTION, SOLUTION SUBCUTANEOUS at 08:50

## 2022-06-03 RX ADMIN — CALCIUM ACETATE 1334 MG: 667 CAPSULE ORAL at 08:48

## 2022-06-03 ASSESSMENT — ENCOUNTER SYMPTOMS
BACK PAIN: 0
COUGH: 0
CHEST TIGHTNESS: 0
CHOKING: 0
RHINORRHEA: 0
NAUSEA: 0
ABDOMINAL PAIN: 0
SHORTNESS OF BREATH: 0
SINUS PRESSURE: 0
DIARRHEA: 0
CONSTIPATION: 0
VOICE CHANGE: 0
WHEEZING: 0
VOMITING: 0

## 2022-06-03 ASSESSMENT — PAIN SCALES - GENERAL
PAINLEVEL_OUTOF10: 4
PAINLEVEL_OUTOF10: 8
PAINLEVEL_OUTOF10: 8

## 2022-06-03 ASSESSMENT — PAIN DESCRIPTION - ORIENTATION
ORIENTATION: LEFT;UPPER;LOWER
ORIENTATION: LOWER;LEFT

## 2022-06-03 ASSESSMENT — PAIN DESCRIPTION - DESCRIPTORS: DESCRIPTORS: PRESSURE;DISCOMFORT

## 2022-06-03 ASSESSMENT — PAIN - FUNCTIONAL ASSESSMENT: PAIN_FUNCTIONAL_ASSESSMENT: ACTIVITIES ARE NOT PREVENTED

## 2022-06-03 ASSESSMENT — PAIN DESCRIPTION - LOCATION
LOCATION: ABDOMEN
LOCATION: ABDOMEN

## 2022-06-03 ASSESSMENT — PAIN DESCRIPTION - PAIN TYPE: TYPE: ACUTE PAIN

## 2022-06-03 NOTE — PROGRESS NOTES
[facility]  Office: [address], DO, Rodolfo Woodsy, DO, Ruslan [facility], DO, Babak Stevens Blood, DO, Suzie King MD, Henry Husain MD, Marlon Alberto MD, Farrah Cody MD, Matty Jeffery MD, Ari Gray MD, Soraya Evans MD, Santos Timmons, DO, Narendra Davis, DO, Mouna Gaona MD,  Israel Augustine, DO, Kristine Martins MD, Melani Araya MD, Cathy Coffey MD, Stanley Ochoa, DO, Charlene House MD, Heather Salinas MD, Radha Dixon MD, Garrick Chavez, [facility], [facility], [facility], Elmer Stoll, CNP, Pa Young, CNP, Sam Barber, [facility], Demi Aquino, CNP, Nicole Morejon PA-C, Denver Parks, DNP, Nilam Lynch, CNP, Elio Layton, CNP, Valdemar Cat, CNP, Suresh Tamez, CNS, Billy Robb, [facility], Orion Smyth, CNP, Giancarlo Olmos, [facility], Aric Delaney [facility]      Daily Progress Note     Admit Date: 5/29/2022  Bed/Room No.  2029/2029-01  Admitting Physician : Marlon Alberto MD  Code Status :[facility] [facility] Day:  LOS: 4 days   Chief Complaint:     Chief Complaint   Patient presents with    Chest Pain    Shortness of Breath     Principal Problem:    Acute idiopathic pericarditis  Active Problems:    Hypotension    Elevated troponin    Hyponatremia    Anemia    Hyperkalemia    Left lower quadrant abdominal pain    ESRD (end stage renal disease) on dialysis [facility])    Primary hypertension    Hyperlipidemia    Type 2 diabetes mellitus with kidney complication, with long-term current use of insulin ([facility])    GERD (gastroesophageal reflux disease)  Resolved Problems:    * No resolved hospital problems. *    Subjective : Interval History/Significant events :  06/03/22    Patient continues to have constipation. No improvement with enema or Megace. He complains of nausea, abdominal discomfort. Patient is passing gas.   KUB suggestive of severe constipation  Vitals - Stable afebrile  Labs -hyponatremia 130, hyperglycemia 354    Nursing notes , Consults notes reviewed. Overnight events and updates discussed with Nursing staff . Background History:         Lino Byrne is 49 y.o. male  Who was admitted to the hospital on 5/29/2022 for treatment of Acute idiopathic pericarditis. Patient came to emergency room with chest pain and shortness of breath. He was recently admitted in the hospital for lower extremity weakness which quickly resolved and was discharged home. He returned back with difficulty in breathing. Patient also had hiccups, pain in left side chest radiated shoulder. Patient denies any fever, chills. Patient was found to have a large pericardial effusion. EKG showed diffuse ST elevation suggestive of pericarditis. Patient had pericardiocentesis with 860 cc of hemorrhagic fluid obtained. He is also treated with heparin infusion and was stopped after STEMI ruled out. Pericardial drain was maintained until 6/1/2022 and was removed. Patient remained stable after drain removal.  A limited echo was performed after drain was removed showed reduced ejection fraction 40% with global LV hypokinesis. Cardiology recommended cardiac cath.     PMH:  Past Medical History:   Diagnosis Date    Cerebral artery occlusion with cerebral infarction [facility])     Closed fracture of bone of right foot March - April 2020    Dialysis patient [facility])     [facility]     Hyperlipidemia     Hypertension     Kidney disease     On Dialysis    Type 1 diabetes mellitus (HCC)       Allergies: No Known Allergies   Medications :  [START ON 6/6/2022] epoetin pennie-epbx, 2,000 Units, SubCUTAneous, Once per day on Mon Wed Fri   And  [START ON 6/6/2022] epoetin pennie-epbx, 3,000 Units, SubCUTAneous, Once per day on Mon Wed Fri  senna, 2 tablet, Oral, BID  carvedilol, 6.25 mg, Oral, BID WC  insulin glargine, 45 Units, SubCUTAneous, Daily  insulin glargine, 15 Units, SubCUTAneous, Nightly  insulin lispro, 0-18 Units, SubCUTAneous, TID WC  insulin lispro, 0-9 Units, SubCUTAneous, Nightly  colchicine, 0.6 mg, Oral, Every Other Day  morphine, 4 mg, IntraVENous, Once  aspirin, 81 mg, Oral, Daily  buPROPion, 150 mg, Oral, QAM  calcium acetate, 1,334 mg, Oral, TID WC  ezetimibe, 10 mg, Oral, Daily  [Held by provider] FLUoxetine, 40 mg, Oral, Daily  gabapentin, 300 mg, Oral, Daily  [Held by provider] lisinopril, 20 mg, Oral, Daily  vitamin B-12, 500 mcg, Oral, Daily  sodium chloride flush, 5-40 mL, IntraVENous, 2 times per day  rosuvastatin, 10 mg, Oral, Nightly  predniSONE, 40 mg, Oral, Daily  polyethylene glycol, 17 g, Oral, Daily  pantoprazole, 40 mg, Oral, QAM AC        Review of Systems   Review of Systems   Constitutional: Negative for activity change, appetite change, fatigue, fever and unexpected weight change. HENT: Negative for congestion, nosebleeds, rhinorrhea, sinus pressure, sneezing and voice change. Respiratory: Negative for cough, choking, chest tightness, shortness of breath and wheezing. Cardiovascular: Negative for chest pain, palpitations and leg swelling. Gastrointestinal: Negative for abdominal pain, constipation, diarrhea, nausea and vomiting. Genitourinary: Negative for difficulty urinating, dysuria, frequency, penile discharge and testicular pain. Musculoskeletal: Negative for back pain. Skin: Negative for rash. Neurological: Negative for dizziness, weakness, light-headedness and headaches. Hematological: Does not bruise/bleed easily. Psychiatric/Behavioral: Negative for agitation, behavioral problems, confusion, self-injury, sleep disturbance and suicidal ideas.      Objective :      Current Vitals : Temp: 96.9 °F (36.1 °C),  Heart Rate: 90, Resp: 18, BP: (!) 125/49, SpO2: 97 %  Last 24 Hrs Vitals   Patient Vitals for the past 24 hrs:   BP Temp Temp src Pulse Resp SpO2 Weight   06/03/22 0800 -- 96.9 °F (36.1 °C) Temporal -- -- -- --   06/03/22 0415 -- -- -- -- -- -- 180 lb (81.6 kg)   06/03/22 0400 (!) 125/49 -- -- 90 18 97 % --   06/03/22 0000 116/76 97.6 °F (36.4 °C) Temporal 87 14 98 % --   06/02/22 2000 (!) 136/96 97.6 °F (36.4 °C) Temporal 85 16 97 % --   06/02/22 1802 112/84 -- -- (!) 102 -- -- --   06/02/22 1800 112/84 97.6 °F (36.4 °C) Temporal 98 18 94 % --   06/02/22 1637 (!) 148/85 -- -- 88 18 -- 175 lb 4.3 oz (79.5 kg)   06/02/22 1630 136/80 -- -- 90 -- -- --   06/02/22 1600 134/77 -- -- 92 -- -- --   06/02/22 1530 134/79 -- -- 82 -- -- --   06/02/22 1500 132/78 -- -- 80 -- -- --   06/02/22 1430 125/73 -- -- 79 -- -- --   06/02/22 1400 112/69 -- -- 79 -- -- --   06/02/22 1330 111/62 -- -- 75 -- -- --   06/02/22 1300 136/76 -- -- 73 -- -- --   06/02/22 1258 136/69 -- -- 73 -- -- --   06/02/22 1254 137/72 -- -- 75 18 -- 181 lb 14.1 oz (82.5 kg)   06/02/22 1200 136/72 97.6 °F (36.4 °C) Temporal 76 15 97 % --   06/02/22 1000 (!) 147/70 -- -- 74 11 -- --   06/02/22 0834 (!) 165/105 -- -- 84 -- -- --     Intake / output   06/01 1901 - 06/03 0700  In: 250 [P.O.:240; I.V.:10]  Out: 125 [Urine:125]  Physical Exam:  Physical Exam  Vitals and nursing note reviewed. Constitutional:       General: He is not in acute distress. Appearance: He is not diaphoretic. HENT:      Head: Normocephalic and atraumatic. Nose:      Right Sinus: No maxillary sinus tenderness or frontal sinus tenderness. Left Sinus: No maxillary sinus tenderness or frontal sinus tenderness. Mouth/Throat:      Pharynx: No oropharyngeal exudate. Eyes:      General: No scleral icterus. Conjunctiva/sclera: Conjunctivae normal.      Pupils: Pupils are equal, round, and reactive to light. Neck:      Thyroid: No thyromegaly. Vascular: No JVD. Cardiovascular:      Rate and Rhythm: Normal rate and regular rhythm. Pulses:           Dorsalis pedis pulses are 2+ on the right side and 2+ on the left side. Heart sounds: Normal heart sounds. No murmur heard.       Pulmonary:      Effort: Pulmonary effort is normal.      Breath sounds: Normal breath sounds. No wheezing or rales. Chest:      Comments: Dressing at chest at prior drain site  Abdominal:      Palpations: Abdomen is soft. There is no mass. Tenderness: There is no abdominal tenderness. Musculoskeletal:      Cervical back: Full passive range of motion without pain and neck supple. Comments: Wasting of thenar eminence bilaterally    Lymphadenopathy:      Head:      Right side of head: No submandibular adenopathy. Left side of head: No submandibular adenopathy. Cervical: No cervical adenopathy. Skin:     General: Skin is warm. Neurological:      Mental Status: He is alert and oriented to person, place, and time. Motor: No tremor. Comments: Bilateral foot drop, loss of bilateral thenar eminences intrinsic hand muscles    Psychiatric:         Behavior: Behavior is cooperative. Laboratory findings:    Recent Labs     06/02/22  0347 06/02/22  0347 06/02/22  1046 06/02/22  2326 06/03/22  0458   WBC 11.5*  --   --   --  13.5*   HGB 9.3*   < > 9.5* 10.6* 11.0*   HCT 29.5*   < > 29.0* 34.2* 35.5*     --   --   --  363    < > = values in this interval not displayed. Recent Labs     06/01/22  0545 06/01/22  0545 06/01/22  1438 06/02/22  0347 06/03/22  0458   *   < > 120* 125* 130*   K 5.8*   < > 5.4* 5.3 5.0   CL 84*   < > 81* 85* 91*   CO2 22   < > 25 26 23   GLUCOSE 496*   < > 586* 264* 354*   BUN 43*   < > 52* 66* 51*   CREATININE 6.85*   < > 7.03* 9.13* 5.98*   MG 2.1  --   --  2.2 2.2   CALCIUM 9.0   < > 8.9 8.9 8.9   PHOS 5.3*  --   --  4.7* 3.0    < > = values in this interval not displayed. No results for input(s): PROT, LABALBU, LABA1C, M8VHQOC, J0LUTHL, FT4, TSH, AST, ALT, LDH, GGT, ALKPHOS, BILITOT, BILIDIR, AMMONIA, AMYLASE, LIPASE, LACTATE, CHOL, HDL, LDLCHOLESTEROL, CHOLHDLRATIO, TRIG, VLDL, BNP, TROPONINI, CKTOTAL, CKMB, CKMBINDEX, RF, ECTOR in the last 72 hours.        No results found for: Liv Andrews, Audrey Morris, BACTERIA, NITRU, [address], LEUKOCYTESUR    Imaging / Clinical Data :-   CT ABDOMEN PELVIS WO CONTRAST Additional Contrast? Radiologist Recommendation    Result Date: 5/30/2022  Chest: Moderate to large pericardial effusion, which is likely related to the patient's history of pericarditis. Trace bilateral pleural effusions with associated atelectasis. Abdomen/pelvis: Findings suggestive of anasarca, including periportal edema, mesenteric edema, small volume of free fluid, and body wall edema. This may be due in part to volume resuscitation. More focal stranding along the ascending colon may be related to anasarca, or could represent mild colitis. A few prominent upper abdominal lymph nodes are favored reactive in etiology. XR KNEE RIGHT (3 VIEWS)    Result Date: 5/26/2022  No acute abnormality of the knee. CT CHEST WO CONTRAST    Result Date: 5/30/2022  Chest: Moderate to large pericardial effusion, which is likely related to the patient's history of pericarditis. Trace bilateral pleural effusions with associated atelectasis. Abdomen/pelvis: Findings suggestive of anasarca, including periportal edema, mesenteric edema, small volume of free fluid, and body wall edema. This may be due in part to volume resuscitation. More focal stranding along the ascending colon may be related to anasarca, or could represent mild colitis. A few prominent upper abdominal lymph nodes are favored reactive in etiology. XR SHOULDER LEFT (MIN 2 VIEWS)    Result Date: 5/28/2022  No acute osseous abnormality. XR CHEST PORTABLE    Result Date: 5/30/2022  Retrocardiac, right perihilar and basilar airspace disease which may represent pneumonia or atelectasis. Clinical Course : unchanged  Assessment and Plan  :        Acute pericarditis - colchicine every other day for 3 doses. Pain controlled with morphine, prednisone 40 mg daily. Protonix for GI prophylaxis.   Pericardial effusion with tamponade - s/p pericardiocentesis -pericardial drain removed on 612. .  Systolic dysfunction concerning for ischemic heart disease-plan for heart cath on Monday. ESRD on hemodialysis -dialysis per nephrology. Diabetes mellitus with neuropathy -continue cardiac diet, increase insulin dose. Essential hypertension -lisinopril on hold  Weakness / Myopathy - outpatient follow up with neurology , has referral and waiting for appointment. Constipation - bowel Regimen. Lactulose         Continue to monitor vitals , Intake / output ,  Cell count , HGB , Kidney function, oxygenation  as indicated . Plan and updates discussed with patient ,  answers  explained to satisfaction.    Plan discussed with Staff Herminio ARRIAZA     (Please note that portions of this note were completed with a voice recognition program. Efforts were made to edit the dictations but occasionally words are mis-transcribed.)      Isak Lew MD  6/3/2022

## 2022-06-03 NOTE — PROGRESS NOTES
Erythropoietin Stimulating Agents (NEDA) Hold/Discontinue Protocol    The patient's hemoglobin was 11.0 on 6/3    Pharmacy is only to dispense for Hgb < 10 g/dL, so the dose was held x1 per [facility] approved protocol. The Retacrit order was discontinued and reentered to resume at the next scheduled dose. The pharmacist will review the Hbg again at that time. If the next consecutive Hgb is also > 10 the pharmacist will contact the physician to discuss dose reduction/discontinuation  (this will not be held or discontinued automatically prior to prescriber contact).

## 2022-06-03 NOTE — FLOWSHEET NOTE
06/03/22 0831   Treatment Team Notification   Reason for Communication Review case   Team Member Name Dr Cantrell Current Team Role Attending Provider   Method of Communication Secure Message     Edgeio sent to Dr Shiv Sarabia re: pt continued c/o constipation and abd pain. Updated by RN on current orders and medication administration(s). New orders received for xray KUB, enema if normal.    **Update**    KUB resulted showing \"massive colonic fecal burden. \"  Orders updated to one time dose magnesium citrate to be followed with milk and molasses enema.   See orders for details

## 2022-06-03 NOTE — PROGRESS NOTES
Dialysis Safety Checks:    Patient ID Verified (Y/N) YES     -Hepatitis Test                   Date      Result  Hepatitis B Surface Antigen   22 NEG     Hepatitis B Surface Antibody 4/15/22 21.08     Hepatitis B Core Antibody        -Treatment Initiation  Blood Vol Processed Goal (Liters)  Pump Speed x Treatment Hours x 60 Minutes    Target Fluid Removal 1000     * Intra-treatment documented Safety Checks include the followin) Access and face visible at all times. 2) All connections and blood lines are secure with no kinks. 3) NVL alarm engaged. 4) Hemosafe device applied (if applicable). 5) No collapse of Arterial or Venous blood chambers. 6) All blood lines / pump segments in the air detectors. --------------------------------------------------------------------------------     Denies complaints. VSS.

## 2022-06-03 NOTE — DISCHARGE INSTR - COC
Dressing Status Clean;Dry; Intact 06/03/22 1200   Wound Cleansed Not Cleansed 06/03/22 1200   Dressing/Treatment Other (comment) 06/03/22 1200   Wound Assessment Dry;Epithelialization 06/03/22 1200   Drainage Amount None 06/03/22 1200   Odor None 06/03/22 0400   Zonia-wound Assessment Intact 06/03/22 1200   Number of days: 3        Elimination:  Continence: Bowel: {YES / NO:19904}  Bladder: {YES / FV:50125}  Urinary Catheter: None   Colostomy/Ileostomy/Ileal Conduit: No       Date of Last BM: ***    Intake/Output Summary (Last 24 hours) at 6/3/2022 1320  Last data filed at 6/3/2022 1030  Gross per 24 hour   Intake 1266 ml   Output --   Net 1266 ml     I/O last 3 completed shifts: In: 56 [P.O.:480; I.V.:10]  Out: 125 [Urine:125]    Safety Concerns: At Risk for Falls    Impairments/Disabilities:      S/p stroke with left sided deficits. Bilateral legs braces and personal rollator    Nutrition Therapy:  Current Nutrition Therapy:   - Oral Diet:  Carb Control 4 carbs/meal (1800kcals/day) and Low potassium (3g), low phosphorus (1g)      Routes of Feeding: Oral  Liquids: No Restrictions  Daily Fluid Restriction: yes - amount 1000ml  Last Modified Barium Swallow with Video (Video Swallowing Test): not done    Treatments at the Time of Hospital Discharge:   Respiratory Treatments: n/a  Oxygen Therapy:  is not on home oxygen therapy.   Ventilator:    - No ventilator support    Rehab Therapies: Physical Therapy, Occupational Therapy, and Orthotics/Prosthetics  Weight Bearing Status/Restrictions: No weight bearing restrictions  Other Medical Equipment (for information only, NOT a DME order):  walker and braces (bilateral leg braces for ambulation and home rollator)  Other Treatments: ***    Patient's personal belongings (please select all that are sent with patient):  {JESSICA DME Belongings:[id number]}    RN SIGNATURE:  {Esignature:[id number]}    CASE MANAGEMENT/SOCIAL WORK SECTION    Inpatient Status Date: ***    Readmission Risk Assessment Score:  Readmission Risk              Risk of Unplanned Readmission:  39           Discharging to Facility/ Agency   Name: arsh [place]:[address] [facility]  FAXFZ:[phone] or [phone]  Fax:[phone]    Dialysis Facility (if applicable)   Name:  Address:  Dialysis Schedule:  Phone:  Fax:    / signature: Electronically signed by TYRONE Howell LSW on 6/3/22 at 1:21 PM EDT    PHYSICIAN SECTION    Prognosis: Fair    Condition at Discharge: Stable    Rehab Potential (if transferring to Rehab): Good    Recommended Labs or Other Treatments After Discharge: PT OT . Follow up with neurology     Physician Certification: I certify the above information and transfer of Flor Herron  is necessary for the continuing treatment of the diagnosis listed and that he requires [place] for less 30 days.      Update Admission H&P: No change in H&P    PHYSICIAN SIGNATURE:  Electronically signed by Mary Kay Meraz MD on 6/6/22 at 4:16 PM EDT

## 2022-06-03 NOTE — PROGRESS NOTES
[facility]   Progress Note                   Date:   6/3/2022  Patient name: Sherly Walton  Date of admission:  5/29/2022 11:56 PM  MRN:   [id number]  YOB: 1975  PCP: DANIEL Viera CNP    Reason for Admission: Chest pain [R07.9]  Chest pain, unspecified type [R07.9]    Subjective:       Clinical Changes / Abnormalities: No issues overnight. Denies any chest pain or dyspnea. No pericardial effusion on TTE yesterday.  LVEF reported 40%- declined from 55%      K 5.0  hgb 11.0    Medications:   Scheduled Meds:   [START ON 6/6/2022] epoetin pennie-epbx  2,000 Units SubCUTAneous Once per day on Mon Wed Fri    And    [START ON 6/6/2022] epoetin pennie-epbx  3,000 Units SubCUTAneous Once per day on Mon Wed Fri    milk and molasses  240 mL Rectal Once    [START ON 6/4/2022] polyethylene glycol  17 g Oral Daily    senna  2 tablet Oral BID    carvedilol  6.25 mg Oral BID WC    insulin glargine  45 Units SubCUTAneous Daily    insulin glargine  15 Units SubCUTAneous Nightly    insulin lispro  0-18 Units SubCUTAneous TID WC    insulin lispro  0-9 Units SubCUTAneous Nightly    colchicine  0.6 mg Oral Every Other Day    morphine  4 mg IntraVENous Once    aspirin  81 mg Oral Daily    buPROPion  150 mg Oral QAM    ezetimibe  10 mg Oral Daily    [Held by provider] FLUoxetine  40 mg Oral Daily    gabapentin  300 mg Oral Daily    [Held by provider] lisinopril  20 mg Oral Daily    vitamin B-12  500 mcg Oral Daily    sodium chloride flush  5-40 mL IntraVENous 2 times per day    rosuvastatin  10 mg Oral Nightly    predniSONE  40 mg Oral Daily    pantoprazole  40 mg Oral QAM AC     Continuous Infusions:   dextrose      sodium chloride       CBC:   Recent Labs     06/02/22  0347 06/02/22  0347 06/02/22  1046 06/02/22  2326 06/03/22  0458   WBC 11.5*  --   --   --  13.5*   HGB 9.3*   < > 9.5* 10.6* 11.0*     --   --   --  363    < > = values in this interval not displayed. BMP:    Recent Labs     06/01/22  1438 06/02/22  0347 06/03/22  0458   * 125* 130*   K 5.4* 5.3 5.0   CL 81* 85* 91*   CO2 25 26 23   BUN 52* 66* 51*   CREATININE 7.03* 9.13* 5.98*   GLUCOSE 586* 264* 354*     Hepatic: No results for input(s): AST, ALT, ALB, BILITOT, ALKPHOS in the last 72 hours. Troponin: No results for input(s): TROPONINI in the last 72 hours. BNP: No results for input(s): BNP in the last 72 hours. Lipids: No results for input(s): CHOL, HDL in the last 72 hours. Invalid input(s): LDLCALCU  INR: No results for input(s): INR in the last 72 hours. Objective:   Vitals: /77 Comment: Simultaneous filing. User may not have seen previous data. Pulse 89 Comment: Simultaneous filing. User may not have seen previous data. Temp 96.9 °F (36.1 °C) (Temporal)   Resp 16 Comment: Simultaneous filing. User may not have seen previous data. Ht 5' 7\" (1.702 m)   Wt 180 lb (81.6 kg)   SpO2 97%   BMI 28.19 kg/m²   General appearance: alert and cooperative with exam  HEENT: Head: Normocephalic, no lesions, without obvious abnormality. Neck: no JVD  Lungs: CTAB  Heart: RRR s1+s2, no murmurs  Abdomen: soft, non-tender  Extremities: no edema  Neurologic: not done    Echocardiogram 5/30/2022:  Mild left ventricular hypertrophy  Global left ventricular systolic function is low normal  Estimated ejection fraction is 45-50 % . Large anterior and moderate size posterior pericardial effusion, early diastolic collapse of right atrium and ventricle noted, suggesting early  cardiac tamponade. No mitral regurgitation. No tricuspid regurgitation was seen. TTE 6/2/22    Summary   Global left ventricular systolic function is mildly reduced with an   estimated ejection fraction of 40 % . Global LV hypokinesis without obvious regionality   No significant pericardial effusion is seen. Limited echo to reassess for effusion.  There has been a reduction in EF from   prior study to around 40% with global mild hypokinesis       Assessment / Acute Cardiac Problems:   1. Acute pericarditis with large pericardial effusion with tamponade s/p pericardiocentesis  2. HS trop elevation due to ESRD  3. ESRD  4. HTN  5. CVA  6. Hyponatremia    Patient Active Problem List:     ESRD (end stage renal disease) on dialysis ([facility] [address]     Primary hypertension     Hyperlipidemia     Acute pain of left knee     Bipolar affective disorder ([facility] [address]     Atherosclerosis of aorta ([facility] [address]     COVID-19     Cerebrovascular accident (CVA) ([facility] [address]     Type 2 diabetes mellitus with kidney complication, with long-term current use of insulin (HCC)     Neuropathy of both feet     GERD (gastroesophageal reflux disease)     Right sided weakness     Leg weakness, bilateral     Recurrent falls     Acute idiopathic pericarditis     Small kidney, bilateral     Umbilical hernia without obstruction or gangrene     Disorders of diaphragm     Atherosclerosis of other arteries     Hypotension     Elevated troponin     Hyponatremia     Anemia     Hyperkalemia     Left lower quadrant abdominal pain      Plan of Treatment:   1. On colchicine renal dose and prednisone  2. Continue ASA  3. Continue coreg  4. Electrolytes and volume management per nephrology  5. Cytology of pericardial fluid negative for malignancy  6. Cardiac cath discussed. Patient is not NPO. This will be done on Monday. Patient wants to have it done prior to discharge.     Dinesh Rodas [facility]  [phone]

## 2022-06-03 NOTE — PROGRESS NOTES
Occupational Therapy  Facility/Department: [facility] CVICU  Occupational Therapy Initial Assessment    Name: Vannesa Medina  : 1975  MRN: [id number]  Date of Service: 6/3/2022    Discharge Recommendations:  Patient would benefit from continued therapy after discharge     Pt is currently functional below baseline and would suggest intense therapy post acute care. Would expect patient to be able to tolerate 3 hours of therapy per day and able to tolerate at least one hour up in chair. Please refer to AM-PAC score for current mobility/adl level. SOFIYA Spann reports patient is medically stable for therapy treatment this date. Chart reviewed prior to treatment and patient is agreeable for therapy. All lines intact and patient positioned comfortably at end of treatment. All patient needs addressed prior to ending therapy session. Per ED note: Vannesa Medina is a 49 y.o. male who presents to the emergency department chest pain. It started 16 or 18 hours ago and its been intermittent. He points to the left upper part of his chest to indicate area of pain. No injury or known fever he was given aspirin by paramedics and nitroglycerin and the patient states that the nitroglycerin made it better. He has no history of heart problems and had a stress test about 10 years ago. He was here last night for left shoulder pain that he had had for 3 months. At that time he was not having chest pain. OT Equipment Recommendations  Equipment Needed:  (TBA)       Patient Diagnosis(es): The encounter diagnosis was Chest pain, unspecified type. Past Medical History:  has a past medical history of Cerebral artery occlusion with cerebral infarction [facility]), Closed fracture of bone of right foot, Dialysis patient [facility]), Hemodialysis patient ([facility] [address] Hyperlipidemia, Hypertension, Kidney disease, and Type 1 diabetes mellitus ([facility] [address]   Past Surgical History:  has a past surgical history that includes AV fistula creation (Left) and Wrist surgery (1995). Assessment    Skilled OT is indicated to increase overall ROM, strength, balance, act alejandro as well as I/safety awareness in function to return home with assist as needed. Performance deficits / Impairments: Decreased functional mobility ; Decreased ADL status; Decreased ROM; Decreased strength;Decreased safe awareness;Decreased fine motor control;Decreased balance;Decreased sensation;Decreased endurance;Decreased posture;Decreased coordination  Prognosis: Good  Decision Making: Medium Complexity  REQUIRES OT FOLLOW-UP: Yes  Activity Tolerance  Activity Tolerance: Patient Tolerated treatment well;Patient limited by fatigue  Activity Tolerance Comments: Pt tolerated ~5 mins of standing. Plan   Plan  Times per Week: 4-5x a week, 1-2x a day  Current Treatment Recommendations: Strengthening,ROM,Balance training,Functional mobility training,Endurance training,Neuromuscular re-education,Positioning,Equipment evaluation, education, & procurement,Patient/Caregiver education & training,Safety education & training,Self-Care / ADL,Coordination training     Restrictions  Restrictions/Precautions  Restrictions/Precautions: General Precautions,Fall Risk  Required Braces or Orthoses?: Yes  Required Braces or Orthoses  Right Lower Extremity Brace: Ankle Foot Orthotics  Left Lower Extremity Brace:  Yakelin Foot Orthotics  Position Activity Restriction  Other position/activity restrictions: Fluid restriction, activity as tolerated, HD port LUE, IV RUE    Subjective   General  Chart Reviewed: Yes  Patient assessed for rehabilitation services?: Yes  Family / Caregiver Present: No     Social/Functional History  Social/Functional History  Lives With: Family (Grandparents: are indep)  Type of Home: House  Home Layout: One level  Home Access: Ramped entrance  Bathroom Shower/Tub: Walk-in shower  Bathroom Toilet: Handicap height  Bathroom Equipment: Grab bars in shower,Shower chair (Vanity next to toilet)  Home Equipment: Rollator,Walker, rolling,Cane (Bilateral AFOs)  Has the patient had two or more falls in the past year or any fall with injury in the past year?: Yes (6 falls in the last year, last fall one week ago. Patient thinks last fall was due to medications.)  ADL Assistance: Needs assistance (Recently needing more help with socks. Indep with shower, but slow with transfers, indep toileting.)  Toileting: Independent  Homemaking Assistance: Needs assistance (Assist with cooking and cleaning)  Ambulation Assistance: Independent (Amb household distances with rollator)  Transfer Assistance: Independent (difficulty with low chairs)  Active : No  Patient's  Info: grandparents  Occupation: Retired  Type of Occupation: Electropolishing  Additional Comments: Had CVA about a month ago, affecting right side, was getting better. Objective   O2 Device: None (room air)  Vision Exceptions: Wears glasses for reading  Hearing: Within functional limits       Observation/Palpation  Posture: Fair  Observation: Bilateral AFOs for foot drop from neuropathy, scab on left interior midfoot (patient aware). Safety Devices  Type of Devices: All fall risk precautions in place;Call light within reach;Gait belt;Left in chair;Patient at risk for falls;Nurse notified  Restraints  Restraints Initially in Place: No  Bed Mobility Training  Bed Mobility Training: Yes  Balance  Sitting: Intact  Standing: High guard  Transfer Training  Transfer Training: Yes    Gait  Overall Level of Assistance: Minimum assistance (Pt walked from bed to door, then to bathroom, then to bedside chair with RW.  MIN VCs for pacing and assist/mgmt of lines.)  Toilet Transfers  Toilet Transfer: Minimal assistance    AROM: Within functional limits  Strength: Generally decreased, functional  Coordination: Generally decreased, functional  Tone: Normal  Sensation: Impaired (BLE neuropathy; numbness in first 3 phalanx of R hand)     ADL  Feeding: Independent;Setup  Grooming: Stand by assistance;Setup  UE Bathing: Minimal assistance;Setup  LE Bathing: Moderate assistance;Maximum assistance;Setup (MAX A for donning B socks and B shoes/AFOs.)  UE Dressing: Minimal assistance;Setup  LE Dressing: Moderate assistance;Maximum assistance;Setup  Toileting: Minimal assistance;Contact guard assistance  Additional Comments: Pt requires MIN A for standing portions of ADLs. Pt states he does not have trouble bending at trunk, but that he has difficulty coordinating R hand for functional tasks (dressing, donning socks) d/t weakness and sensation issues. Activity Tolerance  Activity Tolerance: Patient tolerated treatment well  Bed mobility  Rolling to Left: Modified independent  Rolling to Right: Modified independent  Supine to Sit: Supervision  Scooting: Supervision  Bed Mobility Comments: Patient demo good use of bed rail. Transfers  Sit to stand: Minimal assistance  Stand to sit: Minimal assistance  Transfer Comments: MIN VCs for B hand placement on bed to push up to stand, reaching B hands back for surface prior to sitting, squaring self/AD up to surface prior to sitting, pacing, staying inside AD, slow and controlled sitting, and assist/mgmt of lines with good return demo, all to increase safety in function. Cognition  Overall Cognitive Status: WFL  Perception  Overall Perceptual Status: WFL        Education Given To: Patient  Education Provided: Role of Therapy;Plan of Care;Equipment; Energy Conservation;Transfer Training  Education Method: Demonstration;Verbal  Barriers to Learning: None  Education Outcome: Verbalized understanding;Demonstrated understanding;Continued education needed     Verbal edu given on benefits of \"moving what moves\" as able to maintain/increase strength and ROM during hospital stay and after discharge from hospital. Pt also given verbal edu on benefits of using affected UE to assist with functional tasks to increase UE strength and coordination to increase overall function and return to baseline. Educated patient on not leaning on walker during transfers and safe place for hand placement during transfers. Also keeping walker close to patient and using walker bag vs placing items onto walker or attempting to unsafely carry items while using walker. LUE AROM (degrees)  LUE AROM : WFL  RUE AROM (degrees)  RUE AROM : [facility]       AM-PAC Score        AM-PAC Inpatient Daily Activity Raw Score: 18 (06/03/22 0847)  AM-PAC Inpatient ADL T-Scale Score : 38.66 (06/03/22 0847)  ADL Inpatient CMS 0-100% Score: 46.65 (06/03/22 0847)  ADL Inpatient CMS G-Code Modifier : CK (06/03/22 0847)    Goals  Short Term Goals  Time Frame for Short term goals: By discharge, pt to demo  Short Term Goal 1: SBA with functional mob with good safety, pacing and use of AD as needed. Short Term Goal 2: SBA with ADL transfers with good safety, pacing and use of AD/DME as needed. Short Term Goal 3: SBA with toileting tasks with good safety, pacing and use of AD/DME as needed. Short Term Goal 4: SBA with UB ADLs and CGA with LB ADLs with good safety, pacing and use of AD/AE as needed. Short Term Goal 5: I with BUE HEP to assist with functional tasks. Long Term Goals  Long Term Goal 1: and verb good understanding of edu with BUE HEP, fall prevention techs, RW safety/mgmt, ADL compensatory techs, possible equip needs, EC/WS techs, breathing techs, positioning techs for skin integrity, and discharge recommendations.   Patient Goals   Patient goals : to get stronger       Therapy Time   Individual Concurrent Group Co-treatment   Time In 0801         Time Out 0838         Minutes 37          Treatment mins: [address] [facility], OT/S

## 2022-06-03 NOTE — PROGRESS NOTES
Progress Note    Reason for follow up: ESRD    INTERVAL HISTORY:   K 5.0 today. Corrected . Phos 3.0 on Phoslo. On renal diet. Hgb stable 9s. Restart NEDA. Glucose trending 300s. Lantus dose increased 6/1. SBP trending 110-130s. C/o chronic back pain and constipation. Denies chest pain or abdominal pain. HISTORY OF PRESENT ILLNESS:    The patient is a 49 y.o. male who presents with complaint of chest pain. EKG showed ST elevation. Cardiology has been consulted. Possible pericarditis. He is on heparin drip. Cardiac cath planned tomorrow. CT chest/abd/pelvis results pending. SBP trending 80s to 100s. Lisinopril does decreased and norvasc dc'd last admission (last week). He states he made these adjustments to home meds. On admission, glucose 535 with sodium 125 corrected sodium 134, potassium 6, BUN 43, creatinine 8.15. He was initiated on Lokelma and insulin. This a.m, sodium was 128, corrected sodium 132 with potassium 5.5, bicarb 20, BUN 45, creatinine 8.98, glucose improved to 277, lactic acid 2.4. Most recent sodium at 846 was 126. Glucose not checked at that time. Hemoglobin 9.4 with WBC 12. Ferritin 3028. Patient was admitted last week for weakness and falls. At that time Neurontin dose was decreased. Hx of ESRD on HD MWF via AVF at [facility]. Review Of Systems:   Constitutional: No fever, chills  Cardiac:  No chest pain currently, no dyspnea  Chest:               No cough, phlegm or wheezing.    Abdomen:  No abdominal pain, no nausea  Musculoskeletal:  +back pain      Past Medical History:   Diagnosis Date    Cerebral artery occlusion with cerebral infarction [facility])     Closed fracture of bone of right foot March - April 2020    Dialysis patient [facility])     Hemodialysis patient [facility])     Hyperlipidemia     Hypertension     Kidney disease     On Dialysis    Type 1 diabetes mellitus ([facility] [address]        Past Surgical History:   Procedure Laterality Date    AV FISTULA CREATION Left     WRIST SURGERY  1995       Prior to Admission medications    Medication Sig Start Date End Date Taking? Authorizing Provider   carvedilol (COREG) 6.25 MG tablet Take 1 tablet by mouth 2 times daily (with meals) 6/2/22  Yes Gus Metcalf MD   pantoprazole (PROTONIX) 40 MG tablet Take 1 tablet by mouth every morning (before breakfast) 6/3/22  Yes Gus Metcalf MD   predniSONE (DELTASONE) 20 MG tablet Take 2 tablets by mouth daily for 10 days 6/3/22 6/13/22 Yes Gus Metcalf MD   lisinopril (PRINIVIL;ZESTRIL) 20 MG tablet Take 1 tablet by mouth daily 5/27/22   DANIEL Hsieh CNP   gabapentin (NEURONTIN) 300 MG capsule Take 1 capsule by mouth daily. 5/28/22   DANIEL Hsieh CNP   insulin glargine (LANTUS) 100 UNIT/ML injection vial Inject 10-45 Units into the skin See Admin Instructions Indications: 45 units in the morning and 10 units in the evening     Historical Provider, MD   traMADol (ULTRAM) 50 MG tablet Take 50 mg by mouth 2 times daily as needed for Pain. 5/6/22   Historical Provider, MD   Misc. Devices MISC Disp: custom molded shoes to accommodate for brace   DX: DM with history of stroke, foot drop  Duration: 1 year 5/11/22   Michelle Snell DPM   ondansetron (ZOFRAN) 4 MG tablet Take 1 tablet by mouth 3 times daily as needed for Nausea or Vomiting 4/29/22   DANIEL Gomez CNP   magnesium oxide (MAG-OX) 400 MG tablet Take 400 mg by mouth daily     Historical Provider, MD   magnesium hydroxide (MILK OF MAGNESIA) 400 MG/5ML suspension Take 15 mLs by mouth daily as needed for Constipation    Historical Provider, MD   insulin aspart (NOVOLOG) 100 UNIT/ML injection vial Inject 0-8 Units into the skin 3 times daily (before meals) SLIDING SCALE     Historical Provider, MD   FLUoxetine (PROZAC) 40 MG capsule Take 40 mg by mouth daily     Historical Provider, MD   ammonium lactate (LAC-HYDRIN) 12 % lotion Apply topically daily.   Patient taking differently: Apply to feet 11/1/21   Tyler Dugan ANTHONY Mary   buPROPion (WELLBUTRIN XL) 150 MG extended release tablet Take 1 tablet by mouth every morning 3/25/21   Leatha Feliz DO   Rosuvastatin Calcium 40 MG CPSP Take 40 mg by mouth daily    Historical Provider, MD   ezetimibe (ZETIA) 10 MG tablet Take 10 mg by mouth daily    Historical Provider, MD   aspirin 81 MG EC tablet Take 81 mg by mouth daily    Historical Provider, MD   calcium acetate 667 MG TABS Take 1,334 mg by mouth 3 times daily (with meals)     Historical Provider, MD   vitamin B-12 (CYANOCOBALAMIN) 500 MCG tablet Take 500 mcg by mouth daily    Historical Provider, MD       Scheduled Meds:   [START ON 6/6/2022] epoetin pennie-epbx  2,000 Units SubCUTAneous Once per day on Mon Wed Fri    And    [START ON 6/6/2022] epoetin pennie-epbx  3,000 Units SubCUTAneous Once per day on Mon Wed Fri    milk and molasses  240 mL Rectal Once    magnesium citrate  296 mL Oral Once    senna  2 tablet Oral BID    carvedilol  6.25 mg Oral BID WC    insulin glargine  45 Units SubCUTAneous Daily    insulin glargine  15 Units SubCUTAneous Nightly    insulin lispro  0-18 Units SubCUTAneous TID WC    insulin lispro  0-9 Units SubCUTAneous Nightly    colchicine  0.6 mg Oral Every Other Day    morphine  4 mg IntraVENous Once    aspirin  81 mg Oral Daily    buPROPion  150 mg Oral QAM    calcium acetate  1,334 mg Oral TID WC    ezetimibe  10 mg Oral Daily    [Held by provider] FLUoxetine  40 mg Oral Daily    gabapentin  300 mg Oral Daily    [Held by provider] lisinopril  20 mg Oral Daily    vitamin B-12  500 mcg Oral Daily    sodium chloride flush  5-40 mL IntraVENous 2 times per day    rosuvastatin  10 mg Oral Nightly    predniSONE  40 mg Oral Daily    polyethylene glycol  17 g Oral Daily    pantoprazole  40 mg Oral QAM AC     Continuous Infusions:   dextrose      sodium chloride       PRN Meds:bisacodyl, morphine, glucose, dextrose bolus **OR** dextrose bolus, glucagon (rDNA), dextrose, sodium chloride flush, sodium chloride, ondansetron **OR** ondansetron, acetaminophen **OR** acetaminophen, nitroGLYCERIN, perflutren lipid microspheres    No Known Allergies    Social History     Socioeconomic History    Marital status: Single     Spouse name: Not on file    Number of children: Not on file    Years of education: Not on file    Highest education level: Not on file   Occupational History    Not on file   Tobacco Use    Smoking status: Never Smoker    Smokeless tobacco: Never Used   Vaping Use    Vaping Use: Never used   Substance and Sexual Activity    Alcohol use: Never    Drug use: Never    Sexual activity: Not on file   Other Topics Concern    Not on file   Social History Narrative    Not on file     Social Determinants of Health     Financial Resource Strain:     Difficulty of Paying Living Expenses: Not on file   Food Insecurity:     Worried About [address] [employer] in the Last Year: Not on file    [address] [employer] in the Last Year: Not on file   Transportation Needs:     Lack of Transportation (Medical): Not on file    Lack of Transportation (Non-Medical):  Not on file   Physical Activity:     Days of Exercise per Week: Not on file    Minutes of Exercise per Session: Not on file   Stress:     Feeling of Stress : Not on file   Social Connections:     Frequency of Communication with Friends and Family: Not on file    Frequency of Social Gatherings with Friends and Family: Not on file    Attends Druze Services: Not on file    Active Member of Clubs or Organizations: Not on file    Attends Club or Organization Meetings: Not on file    Marital Status: Not on file   Intimate Partner Violence:     Fear of Current or Ex-Partner: Not on file    Emotionally Abused: Not on file    Physically Abused: Not on file    Sexually Abused: Not on file   Housing Stability:     Unable to Pay for Housing in the Last Year: Not on file    Number of [place] in the Last Year: Not on file  Unstable Housing in the Last Year: Not on file       Family History   Problem Relation Age of Onset    Diabetes Mother     Heart Disease Father     Diabetes Father     Cancer Paternal Grandmother     Heart Disease Maternal Great Grandfather          Physical Exam:  Vitals:    06/03/22 0000 06/03/22 0400 06/03/22 0415 06/03/22 0800   BP: 116/76 (!) 125/49  136/77   Pulse: 87 90  89   Resp: 14 18  16   Temp: 97.6 °F (36.4 °C)   96.9 °F (36.1 °C)   TempSrc: Temporal   Temporal   SpO2: 98% 97%     Weight:   180 lb (81.6 kg)    Height:         I/O last 3 completed shifts: In: 56 [P.O.:480; I.V.:10]  Out: 125 [Urine:125]    General:  Awake, alert, not in distress. Appears to be stated age. HEENT: Atraumatic, normocephalic. Anicteric sclera. Pink and moist oral mucosa. Neck supple. Chest: Bilateral air entry, clear to auscultation, no wheezing, rhonchi or rales. Cardiovascular: RRR, S1S2, no murmur, rub or gallop. No lower extremity edema. +pericaridial drain. Abdomen: Soft, non tender to palpation. Active bowel sounds. Integumentary: Pink, warm and dry. Free from rash or lesions. Skin turgor normal.  CNS: Oriented to person, place and time. Cranial nerves grossly intact. Speech clear. Face symmetrical. No tremor.      Data:    CBC:   Lab Results   Component Value Date    WBC 13.5 (H) 06/03/2022    HGB 11.0 (L) 06/03/2022    HCT 35.5 (L) 06/03/2022    MCV 95.4 06/03/2022     06/03/2022     BMP:    Lab Results   Component Value Date     (L) 06/03/2022     (L) 06/02/2022     (L) 06/01/2022    K 5.0 06/03/2022    K 5.3 06/02/2022    K 5.4 (H) 06/01/2022    CL 91 (L) 06/03/2022    CL 85 (L) 06/02/2022    CL 81 (L) 06/01/2022    CO2 23 06/03/2022    CO2 26 06/02/2022    CO2 25 06/01/2022    BUN 51 (H) 06/03/2022    BUN 66 (H) 06/02/2022    BUN 52 (H) 06/01/2022    CREATININE 5.98 (HH) 06/03/2022    CREATININE 9.13 (HH) 06/02/2022    CREATININE 7.03 (HH) 06/01/2022    GLUCOSE 354 (H) 06/03/2022    GLUCOSE 264 (H) 06/02/2022    GLUCOSE 586 (HH) 06/01/2022     CMP:   Lab Results   Component Value Date     06/03/2022    K 5.0 06/03/2022    CL 91 06/03/2022    CO2 23 06/03/2022    BUN 51 06/03/2022    CREATININE 5.98 06/03/2022    GLUCOSE 354 06/03/2022    CALCIUM 8.9 06/03/2022    PROT 5.3 04/14/2022    LABALBU 3.4 04/14/2022    BILITOT 0.34 04/14/2022    ALKPHOS 137 04/14/2022    AST 38 04/14/2022    ALT 44 04/14/2022      Hepatic:   Lab Results   Component Value Date    AST 38 04/14/2022    AST 27 04/13/2022    AST 36 01/05/2022    ALT 44 (H) 04/14/2022    ALT 42 (H) 04/13/2022    ALT 38 01/05/2022    BILITOT 0.34 04/14/2022    BILITOT 0.28 (L) 04/13/2022    BILITOT 0.43 01/05/2022    ALKPHOS 137 (H) 04/14/2022    ALKPHOS 142 (H) 04/13/2022    ALKPHOS 110 01/05/2022     BNP: No results found for: BNP  Lipids:   Lab Results   Component Value Date    CHOL 118 04/12/2022    HDL 61 04/12/2022     INR:   Lab Results   Component Value Date    INR 1.0 05/27/2022    INR 0.9 04/12/2022     PTH: No results found for: PTH  Phosphorus:    Lab Results   Component Value Date    PHOS 3.0 06/03/2022     Ionized Calcium: No results found for: IONCA  Magnesium:   Lab Results   Component Value Date    MG 2.2 06/03/2022     Albumin:   Lab Results   Component Value Date    LABALBU 3.4 04/14/2022     Last 3 CK, CKMB, Troponin: @LABRCNT(CKTOTAL:3,CKMB:3,TROPONINI:3)       URINE:)No results found for: Sobeida Black    Radiology:   Reviewed. Assessment:  ESRD  Hyponatremia. Hyperkalemia. Hyperphosphatemia. Anemia. Hypertension. Pericardial effusion. Volume overload. Constipation.     Plan:  Started on magnesium citrate for constipation. Hold Phoslo for now. Maintain on renal ONS and renal diet with oral fluid restriction. Hemodialysis today and then TTS. Will target extra fluid removal with HD today as BP allows. Lisinopril on hold. Monitor BP.   Follow up chemiatries daily in AM.    Electronically signed by Deb Melo MD on 6/3/2022 at 10:17 AM  [facility].   Ph: [phone]

## 2022-06-03 NOTE — PROGRESS NOTES
Dialysis Post Treatment Report:     -Access Assessment  WNL     -Ultrafiltration   UF Goal 1500   Prime (-) 500   NS Flush (-)    Other (-/+)    Total UF Removed 1000     -Medications / Blood Administration  Medications Given (Y/N) yes   Blood Products (Y/N)      Narrative:    BP dropped toward end. Pt was nauseated. VSS after blood returned. Nausea relieved. Voiced complaints of pressure in intestines. No BM for almost a week. Enema just before arrival to HD. No results.

## 2022-06-03 NOTE — PROGRESS NOTES
Digital disimpaction attempted per physician order. Moderate amount of hard, brown stool removed by RN. Remaining stool is out of reach at this time; MD has given approval to reattempt and administer additional enema this evening. Pt is accepting of intervention, tolerated well.

## 2022-06-03 NOTE — FLOWSHEET NOTE
06/03/22 1100   Treatment Team Notification   Reason for Communication Review case   Team Member Name Dr Riky Fernando Team Role Consulting Provider   Method of Communication Face to face   Response At bedside   Dr Gurdeep Moya rounding on unit at this time, updated by RN at bedside. Per Dr Gurdeep Moya, repeat Echo completed yesterday shows decline in function. Plan for cardiac cath. Patient was given option of discharge with outpatient cardiac cath next week or remaining inpatient for cath on Monday. Patient/family opted to remain inpatient until Monday for cath, time TBD. Verbal order received for NPO at midnight 6/6. Patient may be stepped down/transfered to appropriate unit if necessary.

## 2022-06-03 NOTE — PROGRESS NOTES
2Physical Therapy  Facility/Department: [facility] CVICU  Physical Therapy Initial Assessment    Name: Valarie Boston  : 1975  MRN: [id number]  Date of Service: 6/3/2022    Discharge Recommendations:  Patient would benefit from continued therapy after discharge     Pt is currently functional below baseline and would suggest intense therapy post acute care. Would expect patient to be able to tolerate 3 hours of therapy per day and able to tolerate at least one hour up in chair. Please refer to AM-PAC score for current mobility/adl level. HPI (per chart): Valarie Boston is 49 y.o. male  Who was admitted to the hospital on 2022 for treatment of Acute idiopathic pericarditis. Patient came to emergency room with chest pain and shortness of breath. He was recently admitted in the hospital for lower extremity weakness which quickly resolved and was discharged home. He returned back with difficulty in breathing. Patient also had hiccups, pain in left side chest radiated shoulder. Patient denies any fever, chills. Patient was found to have a large pericardial effusion. EKG showed diffuse ST elevation suggestive of pericarditis. Patient had pericardiocentesis with 860 cc of hemorrhagic fluid obtained. He is also treated with heparin infusion and was stopped after STEMI ruled out. Pericardial drain was maintained until 2022 and was removed. Patient remained stable after drain removal.      Patient Diagnosis(es): The encounter diagnosis was Chest pain, unspecified type. Past Medical History:  has a past medical history of Cerebral artery occlusion with cerebral infarction [facility]), Closed fracture of bone of right foot, Dialysis patient [facility]), Hemodialysis patient ([facility] [address] Hyperlipidemia, Hypertension, Kidney disease, and Type 1 diabetes mellitus ([facility] [address] Past Surgical History:  has a past surgical history that includes AV fistula creation (Left) and Wrist surgery ().     Assessment   Body Structures, Functions, Activity Limitations Requiring Skilled Therapeutic Intervention: Decreased functional mobility ; Decreased ADL status; Decreased strength;Decreased endurance;Decreased balance  Assessment: Patient demo decreased gait, balance, strength, transfers, and endurance. Patient will benefit from skilled PT to improve gait, transfers, balance, and functional activity tolerance. Therapy Prognosis: Good  Decision Making: Medium Complexity  Requires PT Follow-Up: Yes  Activity Tolerance  Activity Tolerance: Patient tolerated treatment well     Plan   Plan  Plan: 5-7 times per week  Current Treatment Recommendations: Strengthening,Balance training,Functional mobility training,Transfer training,Endurance training,Gait training,Home exercise program,Safety education & training,Patient/Caregiver education & training,Therapeutic activities  Safety Devices  Type of Devices: All fall risk precautions in place,Call light within reach,Gait belt,Left in chair,Patient at risk for falls,Nurse notified     Restrictions  Restrictions/Precautions  Restrictions/Precautions: General Precautions,Fall Risk  Required Braces or Orthoses?: Yes  Required Braces or Orthoses  Right Lower Extremity Brace: Ankle Foot Orthotics  Left Lower Extremity Brace: Yakelin Foot Orthotics  Position Activity Restriction  Other position/activity restrictions: Fluid restriction, activity as tolerated, HD BERNARDINO Alexander     Subjective   General  Chart Reviewed: Yes  Patient assessed for rehabilitation services?: Yes  Additional Pertinent Hx: DM, HD, CVA  Response To Previous Treatment: Not applicable  Family / Caregiver Present: No  Diagnosis: Anemia, hyponatremia, hyperkalemia, pericarditis with pericardial effusion  Follows Commands: Within Functional Limits  General Comment  Comments: RN reports patient medically appropriate for PT, reports patient is constipated. Subjective  Subjective: Patient agreeable to PT with encouragement. Social/Functional History  Social/Functional History  Lives With: Family (Grandparents: are indep)  Type of Home: House  Home Layout: One level  Home Access: Ramped entrance  Bathroom Shower/Tub: Walk-in shower  Bathroom Toilet: Handicap height  Bathroom Equipment: Grab bars in shower,Shower chair ([facility] next to toilet)  Home Equipment: Rollator,Walker, rolling,Cane (Bilateral AFOs)  Has the patient had two or more falls in the past year or any fall with injury in the past year?: Yes (6 falls in the last year, last fall one week ago. Patient thinks last fall was due to medications.)  ADL Assistance: Needs assistance (Recently needing more help with socks. Indep with shower, but slow with transfers, indep toileting.)  Toileting: Independent  Homemaking Assistance: Needs assistance (Assist with cooking and cleaning)  Ambulation Assistance: Independent (Amb household distances with rollator)  Transfer Assistance: Independent (difficulty with low chairs)  Active : No  Patient's  Info: grandparents  Occupation: Retired  Type of Occupation: Electropolishing  Additional Comments: Had CVA about a month ago, affecting right side, was getting better. Vision/Hearing  Vision  Vision: Impaired  Vision Exceptions: Wears glasses for reading  Hearing  Hearing: Within functional limits    Cognition   Orientation  Overall Orientation Status: Within Normal Limits  Cognition  Overall Cognitive Status: WNL     Objective      Observation/Palpation  Posture: Fair  Observation: Bilateral AFOs for foot drop from neuropathy, scab on left interior midfoot (patient aware).   Gross Assessment  Sensation: Impaired (Decreased sensation in bilateral feet and fingers on right hand)     AROM RLE (degrees)  RLE General AROM: hip and knee WFL, DF to neutral  AROM LLE (degrees)  LLE General AROM: hip and knee WFL, DF to neutral  Strength RLE  Comment: hip 4-/5, knee ext 4-/5, knee flex 3+/5, DF 3+/5, PF 3+/5  Strength LLE  Comment: hip 4-/5, knee ext 4-/5, knee flex 3+/5, DF 1/5, PF 3+/5        Bed Mobility Training  Bed Mobility Training: Yes  Balance  Sitting: Intact  Standing: High guard  Transfer Training  Transfer Training: Yes  Gait  Overall Level of Assistance: Minimum assistance (Pt walked from bed to door, then to bathroom, then to bedside chair with RW. MIN VCs for pacing and assist/mgmt of lines.)  Bed mobility  Rolling to Left: Modified independent  Rolling to Right: Modified independent  Supine to Sit: Supervision  Scooting: Supervision  Bed Mobility Comments: Patient demo good use of bed rail. Transfers  Sit to Stand: Minimal Assistance  Stand to sit: Minimal Assistance  Bed to Chair: Minimal assistance  Stand Pivot Transfers: Minimal Assistance  Comment: Patient required use of grab bars and increased effort to stand from low chair / toilet. Demo good safety with pushing up from bed and reaching back for chair. Ambulation  Surface: level tile  Device: Rollator  Other Apparatus: AFO (Bilateral)  Assistance: Minimal assistance  Quality of Gait: Patient demo flexed posture with standing, increased lateral sway, mild path deviation. Gait Deviations: Slow Danica; Increased JASIEL; Decreased step length;Decreased step height;Decreased head and trunk rotation  Distance: 30 feet     Balance  Sitting - Static: Good  Sitting - Dynamic: Good;-  Standing - Static: Fair (rollator)  Standing - Dynamic: Fair (rollator)           OutComes Score  Balance Score: 2 (06/03/22 0901)  Gait Score: 4 (06/03/22 0901)        Tinetti Total Score: 6 (06/03/22 0901)                                   AM-PAC Score  AM-PAC Inpatient Mobility Raw Score : 15 (06/03/22 0901)  AM-PAC Inpatient T-Scale Score : 39.45 (06/03/22 0901)  Mobility Inpatient CMS 0-100% Score: 57.7 (06/03/22 0901)  Mobility Inpatient CMS G-Code Modifier : CK (06/03/22 0901)          Goals  Short Term Goals  Time Frame for Short term goals: 12 visits  Short term goal 1: Patient will be indep with bed mobility and transfers. Short term goal 2: Patient will amb 200 feet with rollator and indep. Short term goal 3: Patient will havd fair+ standing balance. Short term goal 4: Patient will be indep with HEP. Short term goal 5: Patient will have 4/5 hip and knee strength.   Patient Goals   Patient goals : Acute Rehab to improve ambulation       Education  Patient Education  Education Given To: Patient  Education Provided: Role of Therapy;Plan of Care  Education Provided Comments: Patient educated on importance of being OOB to improve constipation  Education Method: Verbal  Barriers to Learning: None  Education Outcome: Verbalized understanding      Therapy Time   Individual Concurrent Group Co-treatment   Time In 0820         Time Out 0859         Minutes 39         Timed Code Treatment Minutes: [address], PT

## 2022-06-03 NOTE — PLAN OF CARE
Problem: Chronic Conditions and Co-morbidities  Goal: Patient's chronic conditions and co-morbidity symptoms are monitored and maintained or improved  6/3/2022 0637 by Dash Cortez RN  Outcome: Progressing  Flowsheets (Taken 6/2/2022 2000)  Care Plan - Patient's Chronic Conditions and Co-Morbidity Symptoms are Monitored and Maintained or Improved: Monitor and assess patient's chronic conditions and comorbid symptoms for stability, deterioration, or improvement  6/2/2022 1915 by Maria A Anthony RN  Outcome: Adequate for Discharge     Problem: Discharge Planning  Goal: Discharge to home or other facility with appropriate resources  6/3/2022 0637 by Dash Cortez RN  Outcome: Progressing  Flowsheets (Taken 6/2/2022 2000)  Discharge to home or other facility with appropriate resources: Identify barriers to discharge with patient and caregiver  6/2/2022 1915 by Maria A Anthony RN  Outcome: Adequate for Discharge     Problem: Pain  Goal: Verbalizes/displays adequate comfort level or baseline comfort level  6/3/2022 0637 by Dash Cortez RN  Outcome: Progressing  6/2/2022 1915 by Maria A Anthony RN  Outcome: Adequate for Discharge     Problem: Safety - Adult  Goal: Free from fall injury  6/3/2022 0637 by Dash Cortez RN  Outcome: Progressing  6/2/2022 1915 by Maria A Anthony RN  Outcome: Adequate for Discharge     Problem: ABCDS Injury Assessment  Goal: Absence of physical injury  6/3/2022 0637 by Dash Cortez RN  Outcome: Progressing  6/2/2022 1915 by Maria A Anthony RN  Outcome: Adequate for Discharge     Problem: Skin/Tissue Integrity  Goal: Absence of new skin breakdown  Description: 1. Monitor for areas of redness and/or skin breakdown  2. Assess vascular access sites hourly  3. Every 4-6 hours minimum:  Change oxygen saturation probe site  4. Every 4-6 hours:  If on nasal continuous positive airway pressure, respiratory therapy assess nares and determine need for appliance change or resting period.   6/3/2022 2876 by Darius Torres RN  Outcome: Progressing  6/2/2022 1915 by Ambika Cordoba RN  Outcome: Adequate for Discharge     Problem: Nutrition Deficit:  Goal: Optimize nutritional status  6/3/2022 0637 by Darius Torres RN  Outcome: Progressing  6/2/2022 1915 by Ambika Cordoba, RN  Outcome: Adequate for Discharge

## 2022-06-03 NOTE — CARE COORDINATION
Social work: [facility] accepted pt for admission. However earlier informed pt had to remain for Heart cath on Monday. After bed cancelled at [facility] informed pt might be able to go prior to heart cath however bed is gone and they will not have a bed before Monday. Pt does not need a precert. Will need amanda at discharge. Will set up transport in wheelchair if appropriate when discharged.   Jose M mendez

## 2022-06-03 NOTE — PLAN OF CARE
Problem: Chronic Conditions and Co-morbidities  Goal: Patient's chronic conditions and co-morbidity symptoms are monitored and maintained or improved  6/3/2022 1904 by Christen Michaels RN  Outcome: Progressing  6/3/2022 0637 by Tona Espinoza RN  Outcome: Progressing  Flowsheets (Taken 6/2/2022 2000)  Care Plan - Patient's Chronic Conditions and Co-Morbidity Symptoms are Monitored and Maintained or Improved: Monitor and assess patient's chronic conditions and comorbid symptoms for stability, deterioration, or improvement     Problem: Discharge Planning  Goal: Discharge to home or other facility with appropriate resources  6/3/2022 1904 by Christen Michaels RN  Outcome: Progressing  6/3/2022 0637 by Tona Espinoza RN  Outcome: Progressing  Flowsheets (Taken 6/2/2022 2000)  Discharge to home or other facility with appropriate resources: Identify barriers to discharge with patient and caregiver     Problem: Pain  Goal: Verbalizes/displays adequate comfort level or baseline comfort level  6/3/2022 1904 by Christen Michaels RN  Outcome: Progressing  6/3/2022 0637 by Tona Espinoza RN  Outcome: Progressing     Problem: Safety - Adult  Goal: Free from fall injury  6/3/2022 1904 by Christen Michaels RN  Outcome: Progressing  6/3/2022 0637 by Tona Espinoza RN  Outcome: Progressing     Problem: ABCDS Injury Assessment  Goal: Absence of physical injury  6/3/2022 1904 by Christen Michaels RN  Outcome: Progressing  6/3/2022 0637 by Tona Espinoza RN  Outcome: Progressing     Problem: Skin/Tissue Integrity  Goal: Absence of new skin breakdown  Description: 1. Monitor for areas of redness and/or skin breakdown  2. Assess vascular access sites hourly  3. Every 4-6 hours minimum:  Change oxygen saturation probe site  4. Every 4-6 hours:  If on nasal continuous positive airway pressure, respiratory therapy assess nares and determine need for appliance change or resting period.   6/3/2022 1904 by Christen Michaels RN  Outcome: Progressing  6/3/2022 9196 by Thania Acosta, RN  Outcome: Progressing     Problem: Nutrition Deficit:  Goal: Optimize nutritional status  6/3/2022 1904 by Roshni Zhou RN  Outcome: Progressing  6/3/2022 0637 by Thania Acosta RN  Outcome: Progressing

## 2022-06-04 LAB
ABSOLUTE EOS #: 0 K/UL (ref 0–0.44)
ABSOLUTE IMMATURE GRANULOCYTE: 0.18 K/UL (ref 0–0.3)
ABSOLUTE LYMPH #: 1.09 K/UL (ref 1.1–3.7)
ABSOLUTE MONO #: 2 K/UL (ref 0.1–1.2)
ANION GAP SERPL CALCULATED.3IONS-SCNC: 13 MMOL/L (ref 9–17)
BASOPHILS # BLD: 0 % (ref 0–2)
BASOPHILS ABSOLUTE: 0 K/UL (ref 0–0.2)
BUN BLDV-MCNC: 50 MG/DL (ref 6–20)
BUN/CREAT BLD: 10 (ref 9–20)
CALCIUM SERPL-MCNC: 9 MG/DL (ref 8.6–10.4)
CHLORIDE BLD-SCNC: 92 MMOL/L (ref 98–107)
CO2: 25 MMOL/L (ref 20–31)
CREAT SERPL-MCNC: 5.21 MG/DL (ref 0.7–1.2)
CULTURE: NORMAL
CULTURE: NORMAL
EOSINOPHILS RELATIVE PERCENT: 0 % (ref 1–4)
GFR AFRICAN AMERICAN: 14 ML/MIN
GFR NON-AFRICAN AMERICAN: 12 ML/MIN
GFR SERPL CREATININE-BSD FRML MDRD: ABNORMAL ML/MIN/{1.73_M2}
GLUCOSE BLD-MCNC: 167 MG/DL (ref 75–110)
GLUCOSE BLD-MCNC: 189 MG/DL (ref 75–110)
GLUCOSE BLD-MCNC: 213 MG/DL (ref 70–99)
GLUCOSE BLD-MCNC: 219 MG/DL (ref 75–110)
GLUCOSE BLD-MCNC: 244 MG/DL (ref 75–110)
GLUCOSE BLD-MCNC: 302 MG/DL (ref 75–110)
HCT VFR BLD CALC: 34.8 % (ref 40.7–50.3)
HEMOGLOBIN: 10.9 G/DL (ref 13–17)
IMMATURE GRANULOCYTES: 1 %
LYMPHOCYTES # BLD: 6 % (ref 24–43)
Lab: NORMAL
Lab: NORMAL
MAGNESIUM: 3 MG/DL (ref 1.6–2.6)
MCH RBC QN AUTO: 30.1 PG (ref 25.2–33.5)
MCHC RBC AUTO-ENTMCNC: 31.3 G/DL (ref 28–38)
MCV RBC AUTO: 96.1 FL (ref 82.6–102.9)
MONOCYTES # BLD: 11 % (ref 3–12)
PDW BLD-RTO: 12.4 % (ref 11.8–14.4)
PHOSPHORUS: 2.2 MG/DL (ref 2.5–4.5)
PLATELET # BLD: 406 K/UL (ref 138–453)
PMV BLD AUTO: 9.7 FL (ref 8.1–13.5)
POTASSIUM SERPL-SCNC: 4.6 MMOL/L (ref 3.7–5.3)
RBC # BLD: 3.62 M/UL (ref 4.21–5.77)
SEG NEUTROPHILS: 82 % (ref 36–65)
SEGMENTED NEUTROPHILS ABSOLUTE COUNT: 14.93 K/UL (ref 1.5–8.1)
SODIUM BLD-SCNC: 130 MMOL/L (ref 135–144)
SPECIMEN DESCRIPTION: NORMAL
SPECIMEN DESCRIPTION: NORMAL
WBC # BLD: 18.2 K/UL (ref 3.5–11.3)

## 2022-06-04 PROCEDURE — 6370000000 HC RX 637 (ALT 250 FOR IP): Performed by: NURSE PRACTITIONER

## 2022-06-04 PROCEDURE — 90935 HEMODIALYSIS ONE EVALUATION: CPT

## 2022-06-04 PROCEDURE — 2580000003 HC RX 258: Performed by: NURSE PRACTITIONER

## 2022-06-04 PROCEDURE — 82947 ASSAY GLUCOSE BLOOD QUANT: CPT

## 2022-06-04 PROCEDURE — 6370000000 HC RX 637 (ALT 250 FOR IP): Performed by: INTERNAL MEDICINE

## 2022-06-04 PROCEDURE — 84100 ASSAY OF PHOSPHORUS: CPT

## 2022-06-04 PROCEDURE — 80048 BASIC METABOLIC PNL TOTAL CA: CPT

## 2022-06-04 PROCEDURE — 99232 SBSQ HOSP IP/OBS MODERATE 35: CPT | Performed by: FAMILY MEDICINE

## 2022-06-04 PROCEDURE — 6370000000 HC RX 637 (ALT 250 FOR IP): Performed by: FAMILY MEDICINE

## 2022-06-04 PROCEDURE — 36415 COLL VENOUS BLD VENIPUNCTURE: CPT

## 2022-06-04 PROCEDURE — 2060000000 HC ICU INTERMEDIATE R&B

## 2022-06-04 PROCEDURE — 83735 ASSAY OF MAGNESIUM: CPT

## 2022-06-04 PROCEDURE — 85025 COMPLETE CBC W/AUTO DIFF WBC: CPT

## 2022-06-04 PROCEDURE — 94761 N-INVAS EAR/PLS OXIMETRY MLT: CPT

## 2022-06-04 RX ADMIN — INSULIN GLARGINE 45 UNITS: 100 INJECTION, SOLUTION SUBCUTANEOUS at 08:39

## 2022-06-04 RX ADMIN — ASPIRIN 81 MG: 81 TABLET, COATED ORAL at 08:38

## 2022-06-04 RX ADMIN — VITAM B12 500 MCG: 100 TAB at 08:38

## 2022-06-04 RX ADMIN — BUPROPION HYDROCHLORIDE 150 MG: 150 TABLET, EXTENDED RELEASE ORAL at 08:39

## 2022-06-04 RX ADMIN — INSULIN LISPRO 3 UNITS: 100 INJECTION, SOLUTION INTRAVENOUS; SUBCUTANEOUS at 20:37

## 2022-06-04 RX ADMIN — ROSUVASTATIN CALCIUM 10 MG: 10 TABLET, FILM COATED ORAL at 20:37

## 2022-06-04 RX ADMIN — INSULIN GLARGINE 20 UNITS: 100 INJECTION, SOLUTION SUBCUTANEOUS at 20:37

## 2022-06-04 RX ADMIN — DIBASIC SODIUM PHOSPHATE, MONOBASIC POTASSIUM PHOSPHATE AND MONOBASIC SODIUM PHOSPHATE 1 TABLET: 852; 155; 130 TABLET ORAL at 08:39

## 2022-06-04 RX ADMIN — SODIUM CHLORIDE, PRESERVATIVE FREE 10 ML: 5 INJECTION INTRAVENOUS at 20:45

## 2022-06-04 RX ADMIN — PREDNISONE 40 MG: 20 TABLET ORAL at 08:38

## 2022-06-04 RX ADMIN — INSULIN LISPRO 3 UNITS: 100 INJECTION, SOLUTION INTRAVENOUS; SUBCUTANEOUS at 08:39

## 2022-06-04 RX ADMIN — POLYETHYLENE GLYCOL 3350 17 G: 17 POWDER, FOR SOLUTION ORAL at 17:15

## 2022-06-04 RX ADMIN — INSULIN LISPRO 12 UNITS: 100 INJECTION, SOLUTION INTRAVENOUS; SUBCUTANEOUS at 17:14

## 2022-06-04 RX ADMIN — GABAPENTIN 300 MG: 300 CAPSULE ORAL at 08:38

## 2022-06-04 RX ADMIN — CARVEDILOL 6.25 MG: 6.25 TABLET, FILM COATED ORAL at 17:15

## 2022-06-04 RX ADMIN — COLCHICINE 0.6 MG: 0.6 TABLET ORAL at 08:39

## 2022-06-04 RX ADMIN — SENNOSIDES 17.2 MG: 8.6 TABLET, FILM COATED ORAL at 20:37

## 2022-06-04 RX ADMIN — PANTOPRAZOLE SODIUM 40 MG: 40 TABLET, DELAYED RELEASE ORAL at 06:39

## 2022-06-04 RX ADMIN — EZETIMIBE 10 MG: 10 TABLET ORAL at 08:39

## 2022-06-04 ASSESSMENT — ENCOUNTER SYMPTOMS
CHOKING: 0
VOICE CHANGE: 0
BACK PAIN: 0
COUGH: 0
CONSTIPATION: 0
SINUS PRESSURE: 0
NAUSEA: 0
CHEST TIGHTNESS: 0
SHORTNESS OF BREATH: 0
DIARRHEA: 0
RHINORRHEA: 0
ABDOMINAL PAIN: 0
WHEEZING: 0
VOMITING: 0

## 2022-06-04 NOTE — PROGRESS NOTES
[facility]  Office: [address], DO, Avery Penaloza, DO, Eleazarcarlos Oro, DO, Alisha Shipman Blood, DO, Shea Robles MD, Milady Segundo MD, Gianna Lopez MD, Anabela Pastrana MD, Mirta Martinez MD, Arnel Peterson MD, Danie Chow MD, Susie Booker, DO, Dedra Stewart, DO, Andrey Bustillo MD,  Heather Ta, DO, Vanna Thapa MD, Trenton Alvarez MD, Julio Hayes MD, Enedina Jarrell DO, Tricia Torres MD, Rose Westfall MD, Rebecca Gonzalez MD, Jenelle Pillai CNP, [facility], CNP, Dania Ramírez, CNP, Leslie Hylton, CNP, Mario Sorto, CNP, Jose Manuel Nettles, CNP, Yvonne Álvarez PA-C, Lynn Ahuja, DNP, Sean Rowan, CNP, Ervin Mosley, CNP, Marissa Alegre, CNP, Sunny Nurse, CNS, Evelina Salazar, [facility], Liliana Alcantara, CNP, Sarah Rodriguez, CNP, [facility]      Daily Progress Note     Admit Date: 5/29/2022  Bed/Room No.  2029/2029-01  Admitting Physician : Gianna Lopez MD  Code Status :[facility] Day:  LOS: 5 days   Chief Complaint:     Chief Complaint   Patient presents with    Chest Pain    Shortness of Breath     Principal Problem:    Acute idiopathic pericarditis  Active Problems:    Hypotension    Elevated troponin    Hyponatremia    Anemia    Hyperkalemia    Left lower quadrant abdominal pain    ESRD (end stage renal disease) on dialysis [facility])    Primary hypertension    Hyperlipidemia    Type 2 diabetes mellitus with kidney complication, with long-term current use of insulin ([facility])    GERD (gastroesophageal reflux disease)  Resolved Problems:    * No resolved hospital problems. *    Subjective : Interval History/Significant events :  06/04/22    Patient reports improvement in constipation. Patient denies any abdominal pain, nausea, vomiting. He has been less active since admission and feels he is getting weaker. Patient denies any chest pain, diaphoresis, nausea, vomiting.   Vitals - Stable afebrile  Labs -hyponatremia 130, hyperglycemia 354    Nursing notes , Consults notes reviewed. Overnight events and updates discussed with Nursing staff . Background History:         Micha Handy is 49 y.o. male  Who was admitted to the hospital on 5/29/2022 for treatment of Acute idiopathic pericarditis. Patient came to emergency room with chest pain and shortness of breath. He was recently admitted in the hospital for lower extremity weakness which quickly resolved and was discharged home. MRI at that time showed acute lacunar infarct in the left basal ganglia with bilateral old lacunar infarcts. He returned back with difficulty in breathing. Patient also had hiccups, pain in left side chest radiated shoulder. Patient denies any fever, chills. Patient was found to have a large pericardial effusion. EKG showed diffuse ST elevation suggestive of pericarditis. Patient had pericardiocentesis with 860 cc of hemorrhagic fluid obtained. He is also treated with heparin infusion and was stopped after STEMI ruled out. Pericardial drain was maintained until 6/1/2022 and was removed. Patient remained stable after drain removal.  A limited echo was performed after drain was removed showed reduced ejection fraction 40% with global LV hypokinesis. Cardiology recommended cardiac cath.     PMH:  Past Medical History:   Diagnosis Date    Cerebral artery occlusion with cerebral infarction [facility])     Closed fracture of bone of right foot March - April 2020    Dialysis patient [facility])     [facility]     Hyperlipidemia     Hypertension     Kidney disease     On Dialysis    Type 1 diabetes mellitus (HCC)       Allergies: No Known Allergies   Medications :  [START ON 6/6/2022] epoetin pennie-epbx, 2,000 Units, SubCUTAneous, Once per day on Mon Wed Fri   And  [START ON 6/6/2022] epoetin pennie-epbx, 3,000 Units, SubCUTAneous, Once per day on Mon Wed Fri  polyethylene glycol, 17 g, Oral, Daily  insulin glargine, 20 Units, SubCUTAneous, Nightly  lactulose, 20 g, Oral, BID  milk and molasses, 240 mL, Rectal, Once  senna, 2 tablet, Oral, BID  carvedilol, 6.25 mg, Oral, BID WC  insulin glargine, 45 Units, SubCUTAneous, Daily  insulin lispro, 0-18 Units, SubCUTAneous, TID WC  insulin lispro, 0-9 Units, SubCUTAneous, Nightly  aspirin, 81 mg, Oral, Daily  buPROPion, 150 mg, Oral, QAM  ezetimibe, 10 mg, Oral, Daily  [Held by provider] FLUoxetine, 40 mg, Oral, Daily  gabapentin, 300 mg, Oral, Daily  [Held by provider] lisinopril, 20 mg, Oral, Daily  vitamin B-12, 500 mcg, Oral, Daily  sodium chloride flush, 5-40 mL, IntraVENous, 2 times per day  rosuvastatin, 10 mg, Oral, Nightly  predniSONE, 40 mg, Oral, Daily  pantoprazole, 40 mg, Oral, QAM AC        Review of Systems   Review of Systems   Constitutional: Negative for activity change, appetite change, fatigue, fever and unexpected weight change. HENT: Negative for congestion, nosebleeds, rhinorrhea, sinus pressure, sneezing and voice change. Respiratory: Negative for cough, choking, chest tightness, shortness of breath and wheezing. Cardiovascular: Negative for chest pain, palpitations and leg swelling. Gastrointestinal: Negative for abdominal pain, constipation, diarrhea, nausea and vomiting. Genitourinary: Negative for difficulty urinating, dysuria, frequency, penile discharge and testicular pain. Musculoskeletal: Negative for back pain. Skin: Negative for rash. Neurological: Negative for dizziness, weakness, light-headedness and headaches. Hematological: Does not bruise/bleed easily. Psychiatric/Behavioral: Negative for agitation, behavioral problems, confusion, self-injury, sleep disturbance and suicidal ideas.      Objective :      Current Vitals : Temp: 97.2 °F (36.2 °C),  Heart Rate: 92, Resp: 16, BP: 116/72, SpO2: 98 %  Last 24 Hrs Vitals   Patient Vitals for the past 24 hrs:   BP Temp Temp src Pulse Resp SpO2 Weight   06/04/22 1300 116/72 -- -- 92 16 98 % -- 06/04/22 1230 126/76 97.2 °F (36.2 °C) -- 77 14 -- 176 lb 5.9 oz (80 kg)   06/04/22 0904 110/61 -- -- 75 -- -- --   06/04/22 0855 122/68 97.5 °F (36.4 °C) -- -- 16 -- 179 lb 3.7 oz (81.3 kg)   06/04/22 0800 113/75 (!) 96.6 °F (35.9 °C) Temporal 77 16 -- --   06/04/22 0400 104/72 97.1 °F (36.2 °C) Temporal 77 18 96 % --   06/04/22 0354 -- -- -- -- -- -- 182 lb 12.2 oz (82.9 kg)   06/04/22 0000 (!) 114/55 97.4 °F (36.3 °C) Temporal 74 -- 92 % --   06/03/22 2014 107/72 98 °F (36.7 °C) Temporal 86 16 91 % --   06/03/22 1911 136/71 -- -- 89 -- -- --   06/03/22 1800 -- -- -- 79 -- -- --   06/03/22 1702 118/72 97.5 °F (36.4 °C) Temporal 65 16 -- 177 lb 11.1 oz (80.6 kg)   06/03/22 1700 93/65 -- -- 71 -- -- --     Intake / output   06/02 1901 - 06/04 0700  In: 1266 [P.O.:1256; I.V.:10]  Out: 1002   Physical Exam:  Physical Exam  Vitals and nursing note reviewed. Constitutional:       General: He is not in acute distress. Appearance: He is not diaphoretic. HENT:      Head: Normocephalic and atraumatic. Nose:      Right Sinus: No maxillary sinus tenderness or frontal sinus tenderness. Left Sinus: No maxillary sinus tenderness or frontal sinus tenderness. Mouth/Throat:      Pharynx: No oropharyngeal exudate. Eyes:      General: No scleral icterus. Conjunctiva/sclera: Conjunctivae normal.      Pupils: Pupils are equal, round, and reactive to light. Neck:      Thyroid: No thyromegaly. Vascular: No JVD. Cardiovascular:      Rate and Rhythm: Normal rate and regular rhythm. Pulses:           Dorsalis pedis pulses are 2+ on the right side and 2+ on the left side. Heart sounds: Normal heart sounds. No murmur heard. Pulmonary:      Effort: Pulmonary effort is normal.      Breath sounds: Normal breath sounds. No wheezing or rales. Chest:      Comments: Dressing at chest at prior drain site  Abdominal:      Palpations: Abdomen is soft. There is no mass. Tenderness:  There is no abdominal tenderness. Musculoskeletal:      Cervical back: Full passive range of motion without pain and neck supple. Comments: Wasting of thenar eminence bilaterally    Lymphadenopathy:      Head:      Right side of head: No submandibular adenopathy. Left side of head: No submandibular adenopathy. Cervical: No cervical adenopathy. Skin:     General: Skin is warm. Neurological:      Mental Status: He is alert and oriented to person, place, and time. Motor: No tremor. Comments: Bilateral foot drop, loss of bilateral thenar eminences intrinsic hand muscles    Psychiatric:         Behavior: Behavior is cooperative. Laboratory findings:    Recent Labs     06/02/22 0347 06/02/22  1046 06/02/22  2326 06/03/22  0458 06/04/22  0319   WBC 11.5*  --   --  13.5* 18.2*   HGB 9.3*   < > 10.6* 11.0* 10.9*   HCT 29.5*   < > 34.2* 35.5* 34.8*     --   --  363 406    < > = values in this interval not displayed. Recent Labs     06/02/22 0347 06/03/22 0458 06/04/22 0319   * 130* 130*   K 5.3 5.0 4.6   CL 85* 91* 92*   CO2 26 23 25   GLUCOSE 264* 354* 213*   BUN 66* 51* 50*   CREATININE 9.13* 5.98* 5.21*   MG 2.2 2.2 3.0*   CALCIUM 8.9 8.9 9.0   PHOS 4.7* 3.0 2.2*     No results for input(s): PROT, LABALBU, LABA1C, F4WKISS, S9MRKGD, FT4, TSH, AST, ALT, LDH, GGT, ALKPHOS, BILITOT, BILIDIR, AMMONIA, AMYLASE, LIPASE, LACTATE, CHOL, HDL, LDLCHOLESTEROL, CHOLHDLRATIO, TRIG, VLDL, BNP, TROPONINI, CKTOTAL, CKMB, CKMBINDEX, RF, ECTOR in the last 72 hours. No results found for: [place], RBCUA, BLOODU, BACTERIA, NITRU, [address], LEUKOCYTESUR    Imaging / Clinical Data :-   CT ABDOMEN PELVIS WO CONTRAST Additional Contrast? Radiologist Recommendation    Result Date: 5/30/2022  Chest: Moderate to large pericardial effusion, which is likely related to the patient's history of pericarditis. Trace bilateral pleural effusions with associated atelectasis.  Abdomen/pelvis: Findings suggestive of anasarca, including periportal edema, mesenteric edema, small volume of free fluid, and body wall edema. This may be due in part to volume resuscitation. More focal stranding along the ascending colon may be related to anasarca, or could represent mild colitis. A few prominent upper abdominal lymph nodes are favored reactive in etiology. XR KNEE RIGHT (3 VIEWS)    Result Date: 5/26/2022  No acute abnormality of the knee. CT CHEST WO CONTRAST    Result Date: 5/30/2022  Chest: Moderate to large pericardial effusion, which is likely related to the patient's history of pericarditis. Trace bilateral pleural effusions with associated atelectasis. Abdomen/pelvis: Findings suggestive of anasarca, including periportal edema, mesenteric edema, small volume of free fluid, and body wall edema. This may be due in part to volume resuscitation. More focal stranding along the ascending colon may be related to anasarca, or could represent mild colitis. A few prominent upper abdominal lymph nodes are favored reactive in etiology. XR SHOULDER LEFT (MIN 2 VIEWS)    Result Date: 5/28/2022  No acute osseous abnormality. XR CHEST PORTABLE    Result Date: 5/30/2022  Retrocardiac, right perihilar and basilar airspace disease which may represent pneumonia or atelectasis. Clinical Course : unchanged  Assessment and Plan  :        Acute pericarditis - colchicine every other day for 3 doses. Pain controlled with morphine, prednisone 40 mg daily. Protonix for GI prophylaxis. Pericardial effusion with tamponade - s/p  pericardiocentesis -pericardial drain removed on 612. .  Systolic dysfunction concerning for ischemic heart disease-plan for heart cath on Monday. ESRD on hemodialysis -dialysis per nephrology. Diabetes mellitus with neuropathy -continue cardiac diet, increase insulin dose.   Recently left basal ganglia stroke-continue aspirin 81 mg, Crestor-will benefit with acute rehab at

## 2022-06-04 NOTE — PROGRESS NOTES
Nutrition Assessment     Type and Reason for Visit: Reassess    Nutrition Recommendations/Plan:   1. Continue Adult Diet; Regular; 4 carb choices (60 gm/meal); Low Potassium (<3000 mg/d); Low Phosphorous (<1000 mg); 1000 mL   2. Continue Nepro supplement BID   3. Monitor po intakes, GI status, and labs      Malnutrition Assessment:  Malnutrition Status: At risk for malnutrition (Comment)    Nutrition Assessment:  Patient tolerating diet and consuming % of meals and ONS. Patient currently in dialysis. Patient had C/o constipation - had Mg citrate and Received Milk and molasses enemas and had a BM overnight. Active bowel sounds noted. Pt is On lactulose. Patients pericardial drain removed 6/2. Patient has heart cath planned Monday, 6/6. Continue current diet and ONS. Monitor po intakes and labs. Estimated Daily Nutrient Needs:  Energy (kcal):  1053-4274 kcal (20-23 kcal/kg) Weight Used for Energy Requirements: Current     Protein (g):  81-87 gm (1.2-1.3 gm/kg) Weight Used for Protein Requirements: Ideal        Fluid (ml/day):  1000 mL fluid restriction per physician     Nutrition Related Findings:   No edema. Active bowel sounds. c/o constipation -- resolved. ESRD on hemodialysis. Pericardial drain removed 6/2. Heart cath procedure planned 6/6. Labs: Mag 3.0 (H), Phos 2.2 (L), WBC 18.2 (H) Wound Type: None    Current Nutrition Therapies:    ADULT ORAL NUTRITION SUPPLEMENT; Breakfast, Dinner; Renal Oral Supplement  ADULT DIET; Regular; 4 carb choices (60 gm/meal); Low Potassium (Less than 3000 mg/day);  Low Phosphorus (Less than 1000 mg); 1000 ml  Diet NPO    Anthropometric Measures:  · Height: 5' 7\" (170.2 cm)  · Current Body Wt: 179 lb 3.7 oz (81.3 kg)   · BMI: 28.1    Nutrition Diagnosis:   · Inadequate protein-energy intake related to inadequate protein-energy intake as evidenced by poor intake prior to admission,weight loss    Nutrition Interventions:   Food and/or Nutrient Delivery: Continue Current Diet,Continue Oral Nutrition Supplement  Nutrition Education/Counseling: Education not indicated  Coordination of Nutrition Care: Continue to monitor while inpatient       Goals:  Previous Goal Met: Progressing toward Goal(s)  Goals: PO intake 50% or greater       Nutrition Monitoring and Evaluation:   Behavioral-Environmental Outcomes: None Identified  Food/Nutrient Intake Outcomes: Food and Nutrient Intake,Supplement Intake  Physical Signs/Symptoms Outcomes: Biochemical Data,Fluid Status or Edema,Weight,GI Status,Skin    Discharge Planning:    Continue current diet,Continue Oral Nutrition Supplement     Deidra Smoker, [address] [place] [address],   Office Number: [phone]

## 2022-06-04 NOTE — FLOWSHEET NOTE
06/04/22 1230   Vital Signs   /76   Temp 97.2 °F (36.2 °C)   Heart Rate 77   Resp 14   Weight 176 lb 5.9 oz (80 kg)   Weight Method Bed scale   Percent Weight Change -1.6   Post-Hemodialysis Assessment   Post-Treatment Procedures Blood returned; Access bleeding time < 10 minutes   Machine Disinfection Process Acid/Vinegar Clean;Heat Disinfect; Exterior Machine Disinfection   Rinseback Volume (ml) 400 ml   Total Liters Processed (l/min) 61.9 l/min   Dialyzer Clearance Lightly streaked   Duration of Treatment (minutes) 180 minutes   Hemodialysis Output (ml) 1000 ml   Tolerated Treatment Good   Bilateral Breath Sounds Clear   Edema None   Time Off 1204   Patient Disposition Return to room   completed 2.5 hr HD TX and removed 1 Liter of fluid. pt tolerated HD TX well. pre weight 92.5kg, end weight 91.5kg. CVC dressing is clean, dry and intact. report given to primary nurse, Dee Crocker RN.

## 2022-06-04 NOTE — PROGRESS NOTES
Physical Therapy  DATE: 2022    NAME: Janina Reynolds  MRN: [id number]   : 1975    Patient not seen this date for Physical Therapy due to:      [] Cancel by RN or physician due to:    [x] Hemodialysis    [] Critical Lab Value Level     [] Blood transfusion in progress    [] Acute or unstable cardiovascular status   _MAP < 55 or more than >115  _HR < 40 or > 130    [] Acute or unstable pulmonary status   -FiO2 > 60%   _RR < 5 or >40    _O2 sats < 85%    [] Strict Bedrest    [] Off Unit for surgery or procedure    [] Off Unit for testing       [] Pending imaging to R/O fracture    [] Refusal by Patient      [] Other      [] PT being discontinued at this time. Patient independent. No further needs. [] PT being discontinued at this time as the patient has been transferred to hospice care. No further needs.       Jazmin Smiley, PTA

## 2022-06-04 NOTE — PROGRESS NOTES
Progress Note    Reason for follow up: ESRD    INTERVAL HISTORY:   K 4.6 today. Phos 2.2. Phoslo dc'd yesterday. Hgb 10.9 on NEDA. Glucose trending 200s  SBP trending 90-130s  C/o constipation. Denies chest pain or abdominal pain. Mg 3 today after Mg citrate yesterday. Received Milk and molasses enemas with attempted digital disimpaction. Did have BM overnight. On lactulose. He states he is feeling better. HD planned today. HISTORY OF PRESENT ILLNESS:    The patient is a 49 y.o. male who presents with complaint of chest pain. EKG showed ST elevation. Cardiology has been consulted. Possible pericarditis. He is on heparin drip. Cardiac cath planned tomorrow. CT chest/abd/pelvis results pending. SBP trending 80s to 100s. Lisinopril does decreased and norvasc dc'd last admission (last week). He states he made these adjustments to home meds. On admission, glucose 535 with sodium 125 corrected sodium 134, potassium 6, BUN 43, creatinine 8.15. He was initiated on Lokelma and insulin. This a.m, sodium was 128, corrected sodium 132 with potassium 5.5, bicarb 20, BUN 45, creatinine 8.98, glucose improved to 277, lactic acid 2.4. Most recent sodium at 846 was 126. Glucose not checked at that time. Hemoglobin 9.4 with WBC 12. Ferritin 3028. Patient was admitted last week for weakness and falls. At that time Neurontin dose was decreased. Hx of ESRD on HD MWF via AVF at [facility]. Review Of Systems:   Constitutional: No fever, chills  Cardiac:  No chest pain currently, no dyspnea  Chest:               No cough, phlegm or wheezing.    Abdomen:  No abdominal pain, no nausea  Musculoskeletal:  +back pain      Past Medical History:   Diagnosis Date    Cerebral artery occlusion with cerebral infarction [facility])     Closed fracture of bone of right foot March - April 2020    Dialysis patient [facility])     Hemodialysis patient ([address]     Hyperlipidemia     Hypertension     Kidney disease     On Dialysis  Type 1 diabetes mellitus ([facility] [address]        Past Surgical History:   Procedure Laterality Date    AV FISTULA CREATION Left     WRIST SURGERY  1995       Prior to Admission medications    Medication Sig Start Date End Date Taking? Authorizing Provider   carvedilol (COREG) 6.25 MG tablet Take 1 tablet by mouth 2 times daily (with meals) 6/2/22  Yes Dawson Elliott MD   pantoprazole (PROTONIX) 40 MG tablet Take 1 tablet by mouth every morning (before breakfast) 6/3/22  Yes Dawson Elliott MD   predniSONE (DELTASONE) 20 MG tablet Take 2 tablets by mouth daily for 10 days 6/3/22 6/13/22 Yes Dawson Elliott MD   lisinopril (PRINIVIL;ZESTRIL) 20 MG tablet Take 1 tablet by mouth daily 5/27/22   DANIEL Gutierrez CNP   gabapentin (NEURONTIN) 300 MG capsule Take 1 capsule by mouth daily. 5/28/22   DANIEL Gutierrez CNP   insulin glargine (LANTUS) 100 UNIT/ML injection vial Inject 10-45 Units into the skin See Admin Instructions Indications: 45 units in the morning and 10 units in the evening     Historical Provider, MD   traMADol (ULTRAM) 50 MG tablet Take 50 mg by mouth 2 times daily as needed for Pain. 5/6/22   Historical Provider, MD   Misc.  Devices MISC Disp: custom molded shoes to accommodate for brace   DX: DM with history of stroke, foot drop  Duration: 1 year 5/11/22   Neptali Bell DPM   ondansetron (ZOFRAN) 4 MG tablet Take 1 tablet by mouth 3 times daily as needed for Nausea or Vomiting 4/29/22   DANIEL Blood CNP   magnesium oxide (MAG-OX) 400 MG tablet Take 400 mg by mouth daily     Historical Provider, MD   magnesium hydroxide (MILK OF MAGNESIA) 400 MG/5ML suspension Take 15 mLs by mouth daily as needed for Constipation    Historical Provider, MD   insulin aspart (NOVOLOG) 100 UNIT/ML injection vial Inject 0-8 Units into the skin 3 times daily (before meals) SLIDING SCALE     Historical Provider, MD   FLUoxetine (PROZAC) 40 MG capsule Take 40 mg by mouth daily     Historical Provider, MD ammonium lactate (LAC-HYDRIN) 12 % lotion Apply topically daily.   Patient taking differently: Apply to feet 11/1/21   Thai Shook DPM   buPROPion (WELLBUTRIN XL) 150 MG extended release tablet Take 1 tablet by mouth every morning 3/25/21   Matthew Ham DO   Rosuvastatin Calcium 40 MG CPSP Take 40 mg by mouth daily    Historical Provider, MD   ezetimibe (ZETIA) 10 MG tablet Take 10 mg by mouth daily    Historical Provider, MD   aspirin 81 MG EC tablet Take 81 mg by mouth daily    Historical Provider, MD   calcium acetate 667 MG TABS Take 1,334 mg by mouth 3 times daily (with meals)     Historical Provider, MD   vitamin B-12 (CYANOCOBALAMIN) 500 MCG tablet Take 500 mcg by mouth daily    Historical Provider, MD       Scheduled Meds:   phosphorus  250 mg Oral Once    [START ON 6/6/2022] epoetin pennie-epbx  2,000 Units SubCUTAneous Once per day on Mon Wed Fri    And    [START ON 6/6/2022] epoetin pennie-epbx  3,000 Units SubCUTAneous Once per day on Mon Wed Fri    polyethylene glycol  17 g Oral Daily    insulin glargine  20 Units SubCUTAneous Nightly    lactulose  20 g Oral BID    milk and molasses  240 mL Rectal Once    senna  2 tablet Oral BID    carvedilol  6.25 mg Oral BID WC    insulin glargine  45 Units SubCUTAneous Daily    insulin lispro  0-18 Units SubCUTAneous TID WC    insulin lispro  0-9 Units SubCUTAneous Nightly    colchicine  0.6 mg Oral Every Other Day    aspirin  81 mg Oral Daily    buPROPion  150 mg Oral QAM    ezetimibe  10 mg Oral Daily    [Held by provider] FLUoxetine  40 mg Oral Daily    gabapentin  300 mg Oral Daily    [Held by provider] lisinopril  20 mg Oral Daily    vitamin B-12  500 mcg Oral Daily    sodium chloride flush  5-40 mL IntraVENous 2 times per day    rosuvastatin  10 mg Oral Nightly    predniSONE  40 mg Oral Daily    pantoprazole  40 mg Oral QAM AC     Continuous Infusions:   dextrose      sodium chloride       PRN Meds:bisacodyl, glucose, dextrose bolus  Number of Places Lived in the Last Year: Not on file    Unstable Housing in the Last Year: Not on file       Family History   Problem Relation Age of Onset    Diabetes Mother     Heart Disease Father     Diabetes Father     Cancer Paternal Leslie Panda Heart Disease Maternal Great Grandfather          Physical Exam:  Vitals:    06/04/22 0000 06/04/22 0354 06/04/22 0400 06/04/22 0800   BP: (!) 114/55  104/72    Pulse: 74  77    Resp:   18 16   Temp: 97.4 °F (36.3 °C)  97.1 °F (36.2 °C) (!) 96.6 °F (35.9 °C)   TempSrc: Temporal  Temporal Temporal   SpO2: 92%  96%    Weight:  182 lb 12.2 oz (82.9 kg)     Height:         I/O last 3 completed shifts: In: 6365 [P.O.:1256; I.V.:10]  Out: 1002 [Stool:2]    General:  Awake, alert, not in distress. Appears to be stated age. HEENT: Atraumatic, normocephalic. Anicteric sclera. Pink and moist oral mucosa. Neck supple. Chest: Bilateral air entry, clear to auscultation, no wheezing, rhonchi or rales. Cardiovascular: RRR, S1S2, no murmur, rub or gallop. No lower extremity edema. Abdomen: Soft, non tender to palpation. Active bowel sounds. Integumentary: Pink, warm and dry. Free from rash or lesions. Skin turgor normal.  CNS: Oriented to person, place and time. Cranial nerves grossly intact. Speech clear. Face symmetrical. No tremor.      Data:    CBC:   Lab Results   Component Value Date    WBC 18.2 (H) 06/04/2022    HGB 10.9 (L) 06/04/2022    HCT 34.8 (L) 06/04/2022    MCV 96.1 06/04/2022     06/04/2022     BMP:    Lab Results   Component Value Date     (L) 06/04/2022     (L) 06/03/2022     (L) 06/02/2022    K 4.6 06/04/2022    K 5.0 06/03/2022    K 5.3 06/02/2022    CL 92 (L) 06/04/2022    CL 91 (L) 06/03/2022    CL 85 (L) 06/02/2022    CO2 25 06/04/2022    CO2 23 06/03/2022    CO2 26 06/02/2022    BUN 50 (H) 06/04/2022    BUN 51 (H) 06/03/2022    BUN 66 (H) 06/02/2022    CREATININE 5.21 (HH) 06/04/2022    CREATININE 5.98 () 06/03/2022    CREATININE 9.13 (HH) 06/02/2022    GLUCOSE 213 (H) 06/04/2022    GLUCOSE 354 (H) 06/03/2022    GLUCOSE 264 (H) 06/02/2022     CMP:   Lab Results   Component Value Date     06/04/2022    K 4.6 06/04/2022    CL 92 06/04/2022    CO2 25 06/04/2022    BUN 50 06/04/2022    CREATININE 5.21 06/04/2022    GLUCOSE 213 06/04/2022    CALCIUM 9.0 06/04/2022    PROT 5.3 04/14/2022    LABALBU 3.4 04/14/2022    BILITOT 0.34 04/14/2022    ALKPHOS 137 04/14/2022    AST 38 04/14/2022    ALT 44 04/14/2022      Hepatic:   Lab Results   Component Value Date    AST 38 04/14/2022    AST 27 04/13/2022    AST 36 01/05/2022    ALT 44 (H) 04/14/2022    ALT 42 (H) 04/13/2022    ALT 38 01/05/2022    BILITOT 0.34 04/14/2022    BILITOT 0.28 (L) 04/13/2022    BILITOT 0.43 01/05/2022    ALKPHOS 137 (H) 04/14/2022    ALKPHOS 142 (H) 04/13/2022    ALKPHOS 110 01/05/2022     BNP: No results found for: BNP  Lipids:   Lab Results   Component Value Date    CHOL 118 04/12/2022    HDL 61 04/12/2022     INR:   Lab Results   Component Value Date    INR 1.0 05/27/2022    INR 0.9 04/12/2022     PTH: No results found for: PTH  Phosphorus:    Lab Results   Component Value Date    PHOS 2.2 06/04/2022     Ionized Calcium: No results found for: IONCA  Magnesium:   Lab Results   Component Value Date    MG 3.0 06/04/2022     Albumin:   Lab Results   Component Value Date    LABALBU 3.4 04/14/2022     Last 3 CK, CKMB, Troponin: @LABRCNT(CKTOTAL:3,CKMB:3,TROPONINI:3)       URINE:)No results found for: Pat Gamez    Radiology:   Reviewed. Assessment/Plan:  Replaced phos. Pt seen in collaboration with Dr. Keke Zeng. Electronically signed by DANIEL Zaragoza CNP on 6/4/2022 at 8:23 AM  [facility].   Ph: [phone]

## 2022-06-04 NOTE — PROGRESS NOTES
[facility]   Progress Note                   Date:   6/4/2022  Patient name: Aquiles Drew  Date of admission:  5/29/2022 11:56 PM  MRN:   [id number]  YOB: 1975  PCP: DANIEL Gomez CNP    Reason for Admission:      Subjective:       Clinical Changes / Abnormalities: Patient seen in dialysis denies any chest pain pressure tightness        Medications:   Scheduled Meds:   [START ON 6/6/2022] epoetin pennie-epbx  2,000 Units SubCUTAneous Once per day on Mon Wed Fri    And    [START ON 6/6/2022] epoetin pennie-epbx  3,000 Units SubCUTAneous Once per day on Mon Wed Fri    polyethylene glycol  17 g Oral Daily    insulin glargine  20 Units SubCUTAneous Nightly    lactulose  20 g Oral BID    milk and molasses  240 mL Rectal Once    senna  2 tablet Oral BID    carvedilol  6.25 mg Oral BID WC    insulin glargine  45 Units SubCUTAneous Daily    insulin lispro  0-18 Units SubCUTAneous TID WC    insulin lispro  0-9 Units SubCUTAneous Nightly    aspirin  81 mg Oral Daily    buPROPion  150 mg Oral QAM    ezetimibe  10 mg Oral Daily    [Held by provider] FLUoxetine  40 mg Oral Daily    gabapentin  300 mg Oral Daily    [Held by provider] lisinopril  20 mg Oral Daily    vitamin B-12  500 mcg Oral Daily    sodium chloride flush  5-40 mL IntraVENous 2 times per day    rosuvastatin  10 mg Oral Nightly    predniSONE  40 mg Oral Daily    pantoprazole  40 mg Oral QAM AC     Continuous Infusions:   dextrose      sodium chloride       CBC:   Recent Labs     06/02/22 0347 06/02/22  1046 06/02/22  2326 06/03/22 0458 06/04/22 0319   WBC 11.5*  --   --  13.5* 18.2*   HGB 9.3*   < > 10.6* 11.0* 10.9*     --   --  363 406    < > = values in this interval not displayed.      BMP:    Recent Labs     06/02/22 0347 06/03/22 0458 06/04/22 0319   * 130* 130*   K 5.3 5.0 4.6   CL 85* 91* 92*   CO2 26 23 25   BUN 66* 51* 50*   CREATININE 9.13* 5.98* 5.21*   GLUCOSE 264* 354* 213*     Hepatic: No results for input(s): AST, ALT, ALB, BILITOT, ALKPHOS in the last 72 hours. Troponin: No results for input(s): TROPONINI in the last 72 hours. BNP: No results for input(s): BNP in the last 72 hours. Lipids: No results for input(s): CHOL, HDL in the last 72 hours. Invalid input(s): LDLCALCU  INR: No results for input(s): INR in the last 72 hours. Objective:   Vitals: /61   Pulse 75   Temp 97.5 °F (36.4 °C)   Resp 16   Ht 5' 7\" (1.702 m)   Wt 179 lb 3.7 oz (81.3 kg)   SpO2 96%   BMI 28.07 kg/m²   I/O last 3 completed shifts: In: 0018 [P.O.:1256; I.V.:10]  Out: 1002 [Stool:2]  No intake/output data recorded.   Scheduled Meds:   [START ON 6/6/2022] epoetin pennie-epbx  2,000 Units SubCUTAneous Once per day on Mon Wed Fri    And    [START ON 6/6/2022] epoetin pennie-epbx  3,000 Units SubCUTAneous Once per day on Mon Wed Fri    polyethylene glycol  17 g Oral Daily    insulin glargine  20 Units SubCUTAneous Nightly    lactulose  20 g Oral BID    milk and molasses  240 mL Rectal Once    senna  2 tablet Oral BID    carvedilol  6.25 mg Oral BID WC    insulin glargine  45 Units SubCUTAneous Daily    insulin lispro  0-18 Units SubCUTAneous TID WC    insulin lispro  0-9 Units SubCUTAneous Nightly    aspirin  81 mg Oral Daily    buPROPion  150 mg Oral QAM    ezetimibe  10 mg Oral Daily    [Held by provider] FLUoxetine  40 mg Oral Daily    gabapentin  300 mg Oral Daily    [Held by provider] lisinopril  20 mg Oral Daily    vitamin B-12  500 mcg Oral Daily    sodium chloride flush  5-40 mL IntraVENous 2 times per day    rosuvastatin  10 mg Oral Nightly    predniSONE  40 mg Oral Daily    pantoprazole  40 mg Oral QAM AC     Continuous Infusions:   dextrose      sodium chloride       PRN Meds:.bisacodyl, glucose, dextrose bolus **OR** dextrose bolus, glucagon (rDNA), dextrose, sodium chloride flush, sodium chloride, ondansetron **OR** ondansetron, acetaminophen **OR** complains of pain/discomfort

## 2022-06-05 LAB
ABSOLUTE EOS #: 0 K/UL (ref 0–0.4)
ABSOLUTE IMMATURE GRANULOCYTE: 0.33 K/UL (ref 0–0.3)
ABSOLUTE LYMPH #: 1.32 K/UL (ref 1–4.8)
ABSOLUTE MONO #: 1.65 K/UL (ref 0.2–0.8)
ANION GAP SERPL CALCULATED.3IONS-SCNC: 14 MMOL/L (ref 9–17)
BASOPHILS # BLD: 0 %
BASOPHILS ABSOLUTE: 0 K/UL (ref 0–0.2)
BUN BLDV-MCNC: 46 MG/DL (ref 6–20)
BUN/CREAT BLD: 9 (ref 9–20)
CALCIUM SERPL-MCNC: 8.9 MG/DL (ref 8.6–10.4)
CHLORIDE BLD-SCNC: 91 MMOL/L (ref 98–107)
CO2: 25 MMOL/L (ref 20–31)
CREAT SERPL-MCNC: 5 MG/DL (ref 0.7–1.2)
CULTURE: ABNORMAL
DIRECT EXAM: ABNORMAL
EOSINOPHILS RELATIVE PERCENT: 0 % (ref 1–4)
GFR AFRICAN AMERICAN: 15 ML/MIN
GFR NON-AFRICAN AMERICAN: 12 ML/MIN
GFR SERPL CREATININE-BSD FRML MDRD: ABNORMAL ML/MIN/{1.73_M2}
GLUCOSE BLD-MCNC: 163 MG/DL (ref 75–110)
GLUCOSE BLD-MCNC: 180 MG/DL (ref 70–99)
GLUCOSE BLD-MCNC: 207 MG/DL (ref 75–110)
GLUCOSE BLD-MCNC: 273 MG/DL (ref 75–110)
GLUCOSE BLD-MCNC: 332 MG/DL (ref 75–110)
HCT VFR BLD CALC: 34.6 % (ref 40.7–50.3)
HEMOGLOBIN: 11 G/DL (ref 13–17)
IMMATURE GRANULOCYTES: 2 %
LYMPHOCYTES # BLD: 8 % (ref 24–44)
MAGNESIUM: 2.4 MG/DL (ref 1.6–2.6)
MCH RBC QN AUTO: 30.2 PG (ref 25.2–33.5)
MCHC RBC AUTO-ENTMCNC: 31.8 G/DL (ref 28–38)
MCV RBC AUTO: 95.1 FL (ref 82.6–102.9)
MONOCYTES # BLD: 10 % (ref 1–7)
PDW BLD-RTO: 12.6 % (ref 11.8–14.4)
PHOSPHORUS: 2.4 MG/DL (ref 2.5–4.5)
PLATELET # BLD: 411 K/UL (ref 138–453)
PMV BLD AUTO: 9.5 FL (ref 8.1–13.5)
POTASSIUM SERPL-SCNC: 5.1 MMOL/L (ref 3.7–5.3)
RBC # BLD: 3.64 M/UL (ref 4.21–5.77)
SEG NEUTROPHILS: 80 % (ref 36–66)
SEGMENTED NEUTROPHILS ABSOLUTE COUNT: 13.2 K/UL (ref 1.8–7.7)
SODIUM BLD-SCNC: 130 MMOL/L (ref 135–144)
SPECIMEN DESCRIPTION: ABNORMAL
WBC # BLD: 16.5 K/UL (ref 3.5–11.3)

## 2022-06-05 PROCEDURE — 6370000000 HC RX 637 (ALT 250 FOR IP): Performed by: NURSE PRACTITIONER

## 2022-06-05 PROCEDURE — 82947 ASSAY GLUCOSE BLOOD QUANT: CPT

## 2022-06-05 PROCEDURE — 2060000000 HC ICU INTERMEDIATE R&B

## 2022-06-05 PROCEDURE — 80048 BASIC METABOLIC PNL TOTAL CA: CPT

## 2022-06-05 PROCEDURE — 84100 ASSAY OF PHOSPHORUS: CPT

## 2022-06-05 PROCEDURE — 36415 COLL VENOUS BLD VENIPUNCTURE: CPT

## 2022-06-05 PROCEDURE — 99232 SBSQ HOSP IP/OBS MODERATE 35: CPT | Performed by: FAMILY MEDICINE

## 2022-06-05 PROCEDURE — 83735 ASSAY OF MAGNESIUM: CPT

## 2022-06-05 PROCEDURE — 85025 COMPLETE CBC W/AUTO DIFF WBC: CPT

## 2022-06-05 PROCEDURE — 6370000000 HC RX 637 (ALT 250 FOR IP): Performed by: INTERNAL MEDICINE

## 2022-06-05 PROCEDURE — 2580000003 HC RX 258: Performed by: NURSE PRACTITIONER

## 2022-06-05 PROCEDURE — 6370000000 HC RX 637 (ALT 250 FOR IP): Performed by: FAMILY MEDICINE

## 2022-06-05 RX ADMIN — SODIUM ZIRCONIUM CYCLOSILICATE 10 G: 10 POWDER, FOR SUSPENSION ORAL at 12:47

## 2022-06-05 RX ADMIN — EZETIMIBE 10 MG: 10 TABLET ORAL at 07:34

## 2022-06-05 RX ADMIN — VITAM B12 500 MCG: 100 TAB at 07:32

## 2022-06-05 RX ADMIN — CARVEDILOL 6.25 MG: 6.25 TABLET, FILM COATED ORAL at 07:34

## 2022-06-05 RX ADMIN — INSULIN GLARGINE 45 UNITS: 100 INJECTION, SOLUTION SUBCUTANEOUS at 07:36

## 2022-06-05 RX ADMIN — INSULIN LISPRO 9 UNITS: 100 INJECTION, SOLUTION INTRAVENOUS; SUBCUTANEOUS at 17:07

## 2022-06-05 RX ADMIN — SENNOSIDES 17.2 MG: 8.6 TABLET, FILM COATED ORAL at 07:34

## 2022-06-05 RX ADMIN — CARVEDILOL 6.25 MG: 6.25 TABLET, FILM COATED ORAL at 17:07

## 2022-06-05 RX ADMIN — GABAPENTIN 300 MG: 300 CAPSULE ORAL at 07:32

## 2022-06-05 RX ADMIN — INSULIN LISPRO 3 UNITS: 100 INJECTION, SOLUTION INTRAVENOUS; SUBCUTANEOUS at 08:16

## 2022-06-05 RX ADMIN — ASPIRIN 81 MG: 81 TABLET, COATED ORAL at 07:34

## 2022-06-05 RX ADMIN — BUPROPION HYDROCHLORIDE 150 MG: 150 TABLET, EXTENDED RELEASE ORAL at 07:34

## 2022-06-05 RX ADMIN — ROSUVASTATIN CALCIUM 10 MG: 10 TABLET, FILM COATED ORAL at 20:45

## 2022-06-05 RX ADMIN — INSULIN LISPRO 6 UNITS: 100 INJECTION, SOLUTION INTRAVENOUS; SUBCUTANEOUS at 20:45

## 2022-06-05 RX ADMIN — INSULIN LISPRO 6 UNITS: 100 INJECTION, SOLUTION INTRAVENOUS; SUBCUTANEOUS at 12:33

## 2022-06-05 RX ADMIN — SENNOSIDES 17.2 MG: 8.6 TABLET, FILM COATED ORAL at 20:45

## 2022-06-05 RX ADMIN — PANTOPRAZOLE SODIUM 40 MG: 40 TABLET, DELAYED RELEASE ORAL at 05:59

## 2022-06-05 RX ADMIN — SODIUM CHLORIDE, PRESERVATIVE FREE 10 ML: 5 INJECTION INTRAVENOUS at 07:41

## 2022-06-05 RX ADMIN — INSULIN GLARGINE 20 UNITS: 100 INJECTION, SOLUTION SUBCUTANEOUS at 20:46

## 2022-06-05 RX ADMIN — PREDNISONE 40 MG: 20 TABLET ORAL at 07:36

## 2022-06-05 RX ADMIN — POLYETHYLENE GLYCOL 3350 17 G: 17 POWDER, FOR SOLUTION ORAL at 07:38

## 2022-06-05 RX ADMIN — SODIUM CHLORIDE, PRESERVATIVE FREE 10 ML: 5 INJECTION INTRAVENOUS at 20:48

## 2022-06-05 ASSESSMENT — ENCOUNTER SYMPTOMS
COUGH: 0
RHINORRHEA: 0
VOICE CHANGE: 0
CHEST TIGHTNESS: 0
ABDOMINAL PAIN: 0
SINUS PRESSURE: 0
BACK PAIN: 0
WHEEZING: 0
SHORTNESS OF BREATH: 0
NAUSEA: 0
CONSTIPATION: 0
CHOKING: 0
DIARRHEA: 0
VOMITING: 0

## 2022-06-05 NOTE — PROGRESS NOTES
[facility]  Office: [address], DO, Megan Ray, DO, Luciana Cross, DO, Belinda Emmanuel, DO, Shanae Shine MD, Gabriel Crabtree MD, Rolly Cruz MD, Pinky Menon MD, Walker Smiley MD, Asad Muro MD, Fam Ortega MD, Susie Avina, DO, Maribell Rodriguez, DO, Tolu Alfonso MD,  Latosha Chin, DO, Ga Jones MD, Светлана Harper MD, Lexie Hassan MD, Maddy Nagel DO, Susan Cruz MD, Elli Maloney MD, Adrian Mcclain MD, Stevie Reese, [facility], [facility], CNP, Litzy Browning, CNP, Edwin Jaramillo, CNP, Lois Sethi, CNP, Elif Wan, CNP, Georgia Johnson PA-C, Khalida Bang, DNP, Roberto Rincon, CNP, Margaret Neff, CNP, Lana Lechuga, CNP, Alex Guadalupe, CNS, Radha Schumacher, DNP, Cynthia Pace, CNP, Jamel Last, CNP, [facility]      Daily Progress Note     Admit Date: 5/29/2022  Bed/Room No.  2029/2029-01  Admitting Physician : Rolly Cruz MD  Code Status :[facility] Day:  LOS: 6 days   Chief Complaint:     Chief Complaint   Patient presents with    Chest Pain    Shortness of Breath     Principal Problem:    Acute idiopathic pericarditis  Active Problems:    Hypotension    Elevated troponin    Hyponatremia    Anemia    Hyperkalemia    Left lower quadrant abdominal pain    ESRD (end stage renal disease) on dialysis [facility])    Primary hypertension    Hyperlipidemia    Type 2 diabetes mellitus with kidney complication, with long-term current use of insulin ([facility])    GERD (gastroesophageal reflux disease)  Resolved Problems:    * No resolved hospital problems. *    Subjective : Interval History/Significant events :  06/05/22    Patient denies any chest pain , nausea , dyspnea . He is not having any constipation. No abd pain . Vitals - Stable afebrile  Labs -hyponatremia 130, hyperglycemia 354    Nursing notes , Consults notes reviewed. Overnight events and updates discussed with Nursing staff . Background History:         Casper Franklin is 49 y.o. male  Who was admitted to the hospital on 5/29/2022 for treatment of Acute idiopathic pericarditis. Patient came to emergency room with chest pain and shortness of breath. He was recently admitted in the hospital for lower extremity weakness which quickly resolved and was discharged home. MRI at that time showed acute lacunar infarct in the left basal ganglia with bilateral old lacunar infarcts. He returned back with difficulty in breathing. Patient also had hiccups, pain in left side chest radiated shoulder. Patient denies any fever, chills. Patient was found to have a large pericardial effusion. EKG showed diffuse ST elevation suggestive of pericarditis. Patient had pericardiocentesis with 860 cc of hemorrhagic fluid obtained. He is also treated with heparin infusion and was stopped after STEMI ruled out. Pericardial drain was maintained until 6/1/2022 and was removed. Patient remained stable after drain removal.  A limited echo was performed after drain was removed showed reduced ejection fraction 40% with global LV hypokinesis. Cardiology recommended cardiac cath. Patient also had constipation stool impaction and required digital disimpaction and stool softeners.     PMH:  Past Medical History:   Diagnosis Date    Cerebral artery occlusion with cerebral infarction [facility])     Closed fracture of bone of right foot March - April 2020    Dialysis patient [facility])     Hemodialysis patient ([facility]     Hyperlipidemia     Hypertension     Kidney disease     On Dialysis    Type 1 diabetes mellitus (HCC)       Allergies: No Known Allergies   Medications :  [START ON 6/6/2022] epoetin pennie-epbx, 2,000 Units, SubCUTAneous, Once per day on Mon Wed Fri   And  [START ON 6/6/2022] epoetin pennie-epbx, 3,000 Units, SubCUTAneous, Once per day on Mon Wed Fri  polyethylene glycol, 17 g, Oral, Daily  insulin glargine, 20 Units, SubCUTAneous, Nightly  milk and molasses, 240 mL, Rectal, Once  senna, 2 tablet, Oral, BID  carvedilol, 6.25 mg, Oral, BID WC  insulin glargine, 45 Units, SubCUTAneous, Daily  insulin lispro, 0-18 Units, SubCUTAneous, TID WC  insulin lispro, 0-9 Units, SubCUTAneous, Nightly  aspirin, 81 mg, Oral, Daily  buPROPion, 150 mg, Oral, QAM  ezetimibe, 10 mg, Oral, Daily  [Held by provider] FLUoxetine, 40 mg, Oral, Daily  gabapentin, 300 mg, Oral, Daily  [Held by provider] lisinopril, 20 mg, Oral, Daily  vitamin B-12, 500 mcg, Oral, Daily  sodium chloride flush, 5-40 mL, IntraVENous, 2 times per day  rosuvastatin, 10 mg, Oral, Nightly  predniSONE, 40 mg, Oral, Daily  pantoprazole, 40 mg, Oral, QAM AC        Review of Systems   Review of Systems   Constitutional: Negative for activity change, appetite change, fatigue, fever and unexpected weight change. HENT: Negative for congestion, nosebleeds, rhinorrhea, sinus pressure, sneezing and voice change. Respiratory: Negative for cough, choking, chest tightness, shortness of breath and wheezing. Cardiovascular: Negative for chest pain, palpitations and leg swelling. Gastrointestinal: Negative for abdominal pain, constipation, diarrhea, nausea and vomiting. Genitourinary: Negative for difficulty urinating, dysuria, frequency, penile discharge and testicular pain. Musculoskeletal: Negative for back pain. Skin: Negative for rash. Neurological: Negative for dizziness, weakness, light-headedness and headaches. Hematological: Does not bruise/bleed easily. Psychiatric/Behavioral: Negative for agitation, behavioral problems, confusion, self-injury, sleep disturbance and suicidal ideas.      Objective :      Current Vitals : Temp: 98 °F (36.7 °C),  Heart Rate: 84, Resp: 14, BP: 127/71, SpO2: 98 %  Last 24 Hrs Vitals   Patient Vitals for the past 24 hrs:   BP Temp Temp src Pulse Resp SpO2 Weight   06/05/22 0558 -- -- -- -- -- -- 176 lb 9.4 oz (80.1 kg)   06/05/22 8689 -- 98 °F (36.7 °C) Oral -- -- -- --   06/05/22 0407 127/71 -- Oral 84 14 98 % --   06/04/22 2045 (!) 141/82 -- -- 88 16 97 % --   06/04/22 2001 -- 98.6 °F (37 °C) Oral -- -- -- --   06/04/22 1600 (!) 127/51 96.8 °F (36 °C) Temporal 86 16 -- --   06/04/22 1300 116/72 -- -- 92 16 98 % --   06/04/22 1230 126/76 97.2 °F (36.2 °C) -- 77 14 -- 176 lb 5.9 oz (80 kg)   06/04/22 0904 110/61 -- -- 75 -- -- --   06/04/22 0855 122/68 97.5 °F (36.4 °C) -- -- 16 -- 179 lb 3.7 oz (81.3 kg)   06/04/22 0800 113/75 (!) 96.6 °F (35.9 °C) Temporal 77 16 -- --     Intake / output   06/03 1901 - 06/05 0700  In: -   Out: 1002   Physical Exam:  Physical Exam  Vitals and nursing note reviewed. Constitutional:       General: He is not in acute distress. Appearance: He is not diaphoretic. HENT:      Head: Normocephalic and atraumatic. Nose:      Right Sinus: No maxillary sinus tenderness or frontal sinus tenderness. Left Sinus: No maxillary sinus tenderness or frontal sinus tenderness. Mouth/Throat:      Pharynx: No oropharyngeal exudate. Eyes:      General: No scleral icterus. Conjunctiva/sclera: Conjunctivae normal.      Pupils: Pupils are equal, round, and reactive to light. Neck:      Thyroid: No thyromegaly. Vascular: No JVD. Cardiovascular:      Rate and Rhythm: Normal rate and regular rhythm. Pulses:           Dorsalis pedis pulses are 2+ on the right side and 2+ on the left side. Heart sounds: Normal heart sounds. No murmur heard. Pulmonary:      Effort: Pulmonary effort is normal.      Breath sounds: Normal breath sounds. No wheezing or rales. Chest:      Comments: Dressing at chest at prior drain site  Abdominal:      Palpations: Abdomen is soft. There is no mass. Tenderness: There is no abdominal tenderness. Musculoskeletal:      Cervical back: Full passive range of motion without pain and neck supple.       Comments: Wasting of thenar eminence bilaterally    Lymphadenopathy:      Head:      Right side of head: No submandibular adenopathy. Left side of head: No submandibular adenopathy. Cervical: No cervical adenopathy. Skin:     General: Skin is warm. Neurological:      Mental Status: He is alert and oriented to person, place, and time. Motor: No tremor. Comments: Bilateral foot drop, loss of bilateral thenar eminences intrinsic hand muscles    Psychiatric:         Behavior: Behavior is cooperative. Laboratory findings:    Recent Labs     06/03/22 0458 06/04/22 0319 06/05/22 0314   WBC 13.5* 18.2* 16.5*   HGB 11.0* 10.9* 11.0*   HCT 35.5* 34.8* 34.6*    406 411     Recent Labs     06/03/22 0458 06/04/22 0319 06/05/22 0314   * 130* 130*   K 5.0 4.6 5.1   CL 91* 92* 91*   CO2 23 25 25   GLUCOSE 354* 213* 180*   BUN 51* 50* 46*   CREATININE 5.98* 5.21* 5.00*   MG 2.2 3.0* 2.4   CALCIUM 8.9 9.0 8.9   PHOS 3.0 2.2* 2.4*     No results for input(s): PROT, LABALBU, LABA1C, W8GAWFY, E3TSBHB, FT4, TSH, AST, ALT, LDH, GGT, ALKPHOS, BILITOT, BILIDIR, AMMONIA, AMYLASE, LIPASE, LACTATE, CHOL, HDL, LDLCHOLESTEROL, CHOLHDLRATIO, TRIG, VLDL, BNP, TROPONINI, CKTOTAL, CKMB, CKMBINDEX, RF, ECTOR in the last 72 hours. No results found for: Digna Chamber, RBCUA, BLOODU, BACTERIA, NITRU, [address], LEUKOCYTESUR    Imaging / Clinical Data :-   CT ABDOMEN PELVIS WO CONTRAST Additional Contrast? Radiologist Recommendation    Result Date: 5/30/2022  Chest: Moderate to large pericardial effusion, which is likely related to the patient's history of pericarditis. Trace bilateral pleural effusions with associated atelectasis. Abdomen/pelvis: Findings suggestive of anasarca, including periportal edema, mesenteric edema, small volume of free fluid, and body wall edema. This may be due in part to volume resuscitation. More focal stranding along the ascending colon may be related to anasarca, or could represent mild colitis.  A few prominent upper abdominal lymph nodes are favored reactive in etiology. XR KNEE RIGHT (3 VIEWS)    Result Date: 5/26/2022  No acute abnormality of the knee. CT CHEST WO CONTRAST    Result Date: 5/30/2022  Chest: Moderate to large pericardial effusion, which is likely related to the patient's history of pericarditis. Trace bilateral pleural effusions with associated atelectasis. Abdomen/pelvis: Findings suggestive of anasarca, including periportal edema, mesenteric edema, small volume of free fluid, and body wall edema. This may be due in part to volume resuscitation. More focal stranding along the ascending colon may be related to anasarca, or could represent mild colitis. A few prominent upper abdominal lymph nodes are favored reactive in etiology. XR SHOULDER LEFT (MIN 2 VIEWS)    Result Date: 5/28/2022  No acute osseous abnormality. XR CHEST PORTABLE    Result Date: 5/30/2022  Retrocardiac, right perihilar and basilar airspace disease which may represent pneumonia or atelectasis. Clinical Course : unchanged  Assessment and Plan  :        Acute pericarditis - colchicine every other day for 3 doses. Pain controlled with morphine, prednisone 40 mg daily. Protonix for GI prophylaxis. Pericardial effusion with tamponade - s/p  pericardiocentesis -pericardial drain removed on 612. .  Systolic dysfunction concerning for ischemic heart disease-plan for heart cath on Monday. ESRD on hemodialysis -dialysis per nephrology. Diabetes mellitus with neuropathy -continue cardiac diet, increase insulin dose. Recently left basal ganglia stroke-continue aspirin 81 mg, Crestor-will benefit with acute rehab at discharge. Essential hypertension -lisinopril on hold  Weakness / Myopathy - outpatient follow up with neurology , has referral and waiting for appointment. Constipation with fecal impaction- bowel Regimen. S/p digital disimpaction .         NPO after midnight for heart Cath       Continue to monitor vitals , Intake / output ,  Cell count , HGB , Kidney

## 2022-06-05 NOTE — PROGRESS NOTES
input(s): TROPONINI in the last 72 hours. BNP: No results for input(s): BNP in the last 72 hours. Lipids: No results for input(s): CHOL, HDL in the last 72 hours. Invalid input(s): LDLCALCU  INR: No results for input(s): INR in the last 72 hours. Objective:   Vitals: /70   Pulse 80   Temp 98.6 °F (37 °C) (Temporal)   Resp 16   Ht 5' 7\" (1.702 m)   Wt 176 lb 9.4 oz (80.1 kg)   SpO2 98%   BMI 27.66 kg/m²   I/O last 3 completed shifts:  In: -   Out: 2916 [Stool:2]  I/O this shift:   In: 5 [P.O.:420]  Out: -   Scheduled Meds:   [START ON 6/6/2022] epoetin pennie-epbx  2,000 Units SubCUTAneous Once per day on Mon Wed Fri    And    [START ON 6/6/2022] epoetin pennie-epbx  3,000 Units SubCUTAneous Once per day on Mon Wed Fri    polyethylene glycol  17 g Oral Daily    insulin glargine  20 Units SubCUTAneous Nightly    milk and molasses  240 mL Rectal Once    senna  2 tablet Oral BID    carvedilol  6.25 mg Oral BID WC    insulin glargine  45 Units SubCUTAneous Daily    insulin lispro  0-18 Units SubCUTAneous TID WC    insulin lispro  0-9 Units SubCUTAneous Nightly    aspirin  81 mg Oral Daily    buPROPion  150 mg Oral QAM    ezetimibe  10 mg Oral Daily    [Held by provider] FLUoxetine  40 mg Oral Daily    gabapentin  300 mg Oral Daily    [Held by provider] lisinopril  20 mg Oral Daily    vitamin B-12  500 mcg Oral Daily    sodium chloride flush  5-40 mL IntraVENous 2 times per day    rosuvastatin  10 mg Oral Nightly    predniSONE  40 mg Oral Daily    pantoprazole  40 mg Oral QAM AC     Continuous Infusions:   dextrose      sodium chloride       PRN Meds:.bisacodyl, glucose, dextrose bolus **OR** dextrose bolus, glucagon (rDNA), dextrose, sodium chloride flush, sodium chloride, ondansetron **OR** ondansetron, acetaminophen **OR** acetaminophen, nitroGLYCERIN, perflutren lipid microspheres    CONSTITUTIONAL: AOx4, no apparent distress, appears stated age   HEAD: normocephalic, atraumatic   EYES: PERRLA, EOMI   ENT: moist mucous membranes, uvula midline   NECK: symmetric, no midline tenderness to palpation   LUNGS: clear to auscultation bilaterally   CARDIOVASCULAR: regular rate and rhythm, no murmurs, rubs or gallops   ABDOMEN: Soft, non-tender, non-distended with normal active bowel sounds   SKIN: no rash       Echocardiogram 5/30/2022:  Mild left ventricular hypertrophy  Global left ventricular systolic function is low normal  Estimated ejection fraction is 45-50 % . Large anterior and moderate size posterior pericardial effusion, early diastolic collapse of right atrium and ventricle noted, suggesting early  cardiac tamponade. No mitral regurgitation. No tricuspid regurgitation was seen.     TTE 6/2/22    Summary   Global left ventricular systolic function is mildly reduced with an   estimated ejection fraction of 40 % . Global LV hypokinesis without obvious regionality   No significant pericardial effusion is seen. Limited echo to reassess for effusion.  There has been a reduction in EF from   prior study to around 40% with global mild hypokinesis    .  Cardiac Angiography: will be on Monday             Assessment / Acute Cardiac Problems:       Patient Active Problem List:     ESRD (end stage renal disease) on dialysis [facility])     Primary hypertension     Hyperlipidemia     Acute pain of left knee     Bipolar affective disorder ([facility]     Atherosclerosis of aorta ([facility]     COVID-19     Cerebrovascular accident (CVA) ([facility]     Type 2 diabetes mellitus with kidney complication, with long-term current use of insulin ([facility])     Neuropathy of both feet     GERD (gastroesophageal reflux disease)     Right sided weakness     Leg weakness, bilateral     Recurrent falls     Acute idiopathic pericarditis     Small kidney, bilateral     Umbilical hernia without obstruction or gangrene     Disorders of diaphragm     Atherosclerosis of other arteries     Hypotension     Elevated troponin Hyponatremia     Anemia     Hyperkalemia     Left lower quadrant abdominal pain      Plan of Treatment:   Plan for the heart cath tomorrow. Please keep it n.p.o. after midnight.   Acute pericarditis pericardial effusion with tamponade status post drainage  Hypertension fairly controlled on current medication CKD on dialysis    Shon Maicas MD, MD  [facility]  [phone]

## 2022-06-05 NOTE — PROGRESS NOTES
Apply to feet 11/1/21   Leim Blush, DPM   buPROPion (WELLBUTRIN XL) 150 MG extended release tablet Take 1 tablet by mouth every morning 3/25/21   Manuel Fleming DO   Rosuvastatin Calcium 40 MG CPSP Take 40 mg by mouth daily    Historical Provider, MD   ezetimibe (ZETIA) 10 MG tablet Take 10 mg by mouth daily    Historical Provider, MD   aspirin 81 MG EC tablet Take 81 mg by mouth daily    Historical Provider, MD   calcium acetate 667 MG TABS Take 1,334 mg by mouth 3 times daily (with meals)     Historical Provider, MD   vitamin B-12 (CYANOCOBALAMIN) 500 MCG tablet Take 500 mcg by mouth daily    Historical Provider, MD       Scheduled Meds:   [START ON 6/6/2022] epoetin pennie-epbx  2,000 Units SubCUTAneous Once per day on Mon Wed Fri    And    [START ON 6/6/2022] epoetin pennie-epbx  3,000 Units SubCUTAneous Once per day on Mon Wed Fri    polyethylene glycol  17 g Oral Daily    insulin glargine  20 Units SubCUTAneous Nightly    milk and molasses  240 mL Rectal Once    senna  2 tablet Oral BID    carvedilol  6.25 mg Oral BID WC    insulin glargine  45 Units SubCUTAneous Daily    insulin lispro  0-18 Units SubCUTAneous TID WC    insulin lispro  0-9 Units SubCUTAneous Nightly    aspirin  81 mg Oral Daily    buPROPion  150 mg Oral QAM    ezetimibe  10 mg Oral Daily    [Held by provider] FLUoxetine  40 mg Oral Daily    gabapentin  300 mg Oral Daily    [Held by provider] lisinopril  20 mg Oral Daily    vitamin B-12  500 mcg Oral Daily    sodium chloride flush  5-40 mL IntraVENous 2 times per day    rosuvastatin  10 mg Oral Nightly    predniSONE  40 mg Oral Daily    pantoprazole  40 mg Oral QAM AC     Continuous Infusions:   dextrose      sodium chloride       PRN Meds:bisacodyl, glucose, dextrose bolus **OR** dextrose bolus, glucagon (rDNA), dextrose, sodium chloride flush, sodium chloride, ondansetron **OR** ondansetron, acetaminophen **OR** acetaminophen, nitroGLYCERIN, perflutren lipid microspheres    No Known Allergies    Social History     Socioeconomic History    Marital status: Single     Spouse name: Not on file    Number of children: Not on file    Years of education: Not on file    Highest education level: Not on file   Occupational History    Not on file   Tobacco Use    Smoking status: Never Smoker    Smokeless tobacco: Never Used   Vaping Use    Vaping Use: Never used   Substance and Sexual Activity    Alcohol use: Never    Drug use: Never    Sexual activity: Not on file   Other Topics Concern    Not on file   Social History Narrative    Not on file     Social Determinants of Health     Financial Resource Strain:     Difficulty of Paying Living Expenses: Not on file   Food Insecurity:     Worried About [address] in the Last Year: Not on file    Dinh of Food in the Last Year: Not on file   Transportation Needs:     Lack of Transportation (Medical): Not on file    Lack of Transportation (Non-Medical):  Not on file   Physical Activity:     Days of Exercise per Week: Not on file    Minutes of Exercise per Session: Not on file   Stress:     Feeling of Stress : Not on file   Social Connections:     Frequency of Communication with Friends and Family: Not on file    Frequency of Social Gatherings with Friends and Family: Not on file    Attends Zoroastrianism Services: Not on file    Active Member of [address] [place] [address] [employer] or Organizations: Not on file    Attends Club or Organization Meetings: Not on file    Marital Status: Not on file   Intimate Partner Violence:     Fear of Current or Ex-Partner: Not on file    Emotionally Abused: Not on file    Physically Abused: Not on file    Sexually Abused: Not on file   Housing Stability:     Unable to Pay for Housing in the Last Year: Not on file    Number of [place] in the Last Year: Not on file    Unstable Housing in the Last Year: Not on file       Family History   Problem Relation Age of Onset    Diabetes Mother    [facility] Heart Disease Father     Diabetes Father     Cancer Paternal Grandmother     Heart Disease Maternal Great Grandfather          Physical Exam:  Vitals:    06/05/22 0459 06/05/22 0558 06/05/22 0734 06/05/22 0800   BP:   126/70 126/70   Pulse:   75 80   Resp:    16   Temp: 98 °F (36.7 °C)   98.6 °F (37 °C)   TempSrc: Oral   Temporal   SpO2:       Weight:  176 lb 9.4 oz (80.1 kg)     Height:         I/O last 3 completed shifts:  In: -   Out: 1002 [Stool:2]    General:  Awake, alert, not in distress. Appears to be stated age. HEENT: Atraumatic, normocephalic. Anicteric sclera. Pink and moist oral mucosa. Neck supple. Chest: Bilateral air entry, clear to auscultation, no wheezing, rhonchi or rales. Cardiovascular: RRR, S1S2, no murmur, rub or gallop. No lower extremity edema. Abdomen: Soft, non tender to palpation. Active bowel sounds. Integumentary: Pink, warm and dry. Free from rash or lesions. Skin turgor normal.  CNS: Oriented to person, place and time. Cranial nerves grossly intact. Speech clear. Face symmetrical. No tremor.      Data:    CBC:   Lab Results   Component Value Date    WBC 16.5 (H) 06/05/2022    HGB 11.0 (L) 06/05/2022    HCT 34.6 (L) 06/05/2022    MCV 95.1 06/05/2022     06/05/2022     BMP:    Lab Results   Component Value Date     (L) 06/05/2022     (L) 06/04/2022     (L) 06/03/2022    K 5.1 06/05/2022    K 4.6 06/04/2022    K 5.0 06/03/2022    CL 91 (L) 06/05/2022    CL 92 (L) 06/04/2022    CL 91 (L) 06/03/2022    CO2 25 06/05/2022    CO2 25 06/04/2022    CO2 23 06/03/2022    BUN 46 (H) 06/05/2022    BUN 50 (H) 06/04/2022    BUN 51 (H) 06/03/2022    CREATININE 5.00 (H) 06/05/2022    CREATININE 5.21 (HH) 06/04/2022    CREATININE 5.98 (HH) 06/03/2022    GLUCOSE 180 (H) 06/05/2022    GLUCOSE 213 (H) 06/04/2022    GLUCOSE 354 (H) 06/03/2022     CMP:   Lab Results   Component Value Date     06/05/2022    K 5.1 06/05/2022    CL 91 06/05/2022    CO2 25 06/05/2022 BUN 46 06/05/2022    CREATININE 5.00 06/05/2022    GLUCOSE 180 06/05/2022    CALCIUM 8.9 06/05/2022    PROT 5.3 04/14/2022    LABALBU 3.4 04/14/2022    BILITOT 0.34 04/14/2022    ALKPHOS 137 04/14/2022    AST 38 04/14/2022    ALT 44 04/14/2022      Hepatic:   Lab Results   Component Value Date    AST 38 04/14/2022    AST 27 04/13/2022    AST 36 01/05/2022    ALT 44 (H) 04/14/2022    ALT 42 (H) 04/13/2022    ALT 38 01/05/2022    BILITOT 0.34 04/14/2022    BILITOT 0.28 (L) 04/13/2022    BILITOT 0.43 01/05/2022    ALKPHOS 137 (H) 04/14/2022    ALKPHOS 142 (H) 04/13/2022    ALKPHOS 110 01/05/2022     BNP: No results found for: BNP  Lipids:   Lab Results   Component Value Date    CHOL 118 04/12/2022    HDL 61 04/12/2022     INR:   Lab Results   Component Value Date    INR 1.0 05/27/2022    INR 0.9 04/12/2022     PTH: No results found for: PTH  Phosphorus:    Lab Results   Component Value Date    PHOS 2.4 06/05/2022     Ionized Calcium: No results found for: IONCA  Magnesium:   Lab Results   Component Value Date    MG 2.4 06/05/2022     Albumin:   Lab Results   Component Value Date    LABALBU 3.4 04/14/2022     Last 3 CK, CKMB, Troponin: @LABRCNT(CKTOTAL:3,CKMB:3,TROPONINI:3)       URINE:)No results found for: Amairani Sweeney    Radiology:   Reviewed. Assessment/Plan:  Lokelma ordered. Schedule fistulagram.    Pt seen in collaboration with Dr. Alli Armendariz. Electronically signed by DANIEL Mccray CNP on 6/5/2022 at 9:41 AM  [facility].   Ph: [phone]

## 2022-06-06 ENCOUNTER — HOSPITAL ENCOUNTER (OUTPATIENT)
Dept: CARDIAC CATH/INVASIVE PROCEDURES | Age: 47
Discharge: HOME | End: 2022-06-06

## 2022-06-06 ENCOUNTER — APPOINTMENT (OUTPATIENT)
Dept: CARDIAC CATH/INVASIVE PROCEDURES | Age: 47
DRG: 252 | End: 2022-06-06
Payer: MEDICARE

## 2022-06-06 LAB
ABSOLUTE EOS #: 0 K/UL (ref 0–0.4)
ABSOLUTE IMMATURE GRANULOCYTE: 0.47 K/UL (ref 0–0.3)
ABSOLUTE LYMPH #: 1.74 K/UL (ref 1–4.8)
ABSOLUTE MONO #: 1.58 K/UL (ref 0.2–0.8)
ANION GAP SERPL CALCULATED.3IONS-SCNC: 15 MMOL/L (ref 9–17)
BASOPHILS # BLD: 0 %
BASOPHILS ABSOLUTE: 0 K/UL (ref 0–0.2)
BUN BLDV-MCNC: 87 MG/DL (ref 6–20)
BUN/CREAT BLD: 12 (ref 9–20)
CALCIUM SERPL-MCNC: 8.7 MG/DL (ref 8.6–10.4)
CHLORIDE BLD-SCNC: 91 MMOL/L (ref 98–107)
CO2: 24 MMOL/L (ref 20–31)
CREAT SERPL-MCNC: 6.99 MG/DL (ref 0.7–1.2)
EOSINOPHILS RELATIVE PERCENT: 0 % (ref 1–4)
GFR AFRICAN AMERICAN: 10 ML/MIN
GFR NON-AFRICAN AMERICAN: 8 ML/MIN
GFR SERPL CREATININE-BSD FRML MDRD: ABNORMAL ML/MIN/{1.73_M2}
GLUCOSE BLD-MCNC: 104 MG/DL (ref 75–110)
GLUCOSE BLD-MCNC: 136 MG/DL (ref 75–110)
GLUCOSE BLD-MCNC: 138 MG/DL (ref 75–110)
GLUCOSE BLD-MCNC: 145 MG/DL (ref 75–110)
GLUCOSE BLD-MCNC: 208 MG/DL (ref 70–99)
GLUCOSE BLD-MCNC: 314 MG/DL (ref 75–110)
GLUCOSE BLD-MCNC: 317 MG/DL (ref 75–110)
GLUCOSE BLD-MCNC: 56 MG/DL (ref 75–110)
GLUCOSE BLD-MCNC: 66 MG/DL (ref 75–110)
HCT VFR BLD CALC: 32.7 % (ref 40.7–50.3)
HEMOGLOBIN: 10.3 G/DL (ref 13–17)
IMMATURE GRANULOCYTES: 3 %
LYMPHOCYTES # BLD: 11 % (ref 24–44)
MAGNESIUM: 2.5 MG/DL (ref 1.6–2.6)
MCH RBC QN AUTO: 29.9 PG (ref 25.2–33.5)
MCHC RBC AUTO-ENTMCNC: 31.5 G/DL (ref 28–38)
MCV RBC AUTO: 95.1 FL (ref 82.6–102.9)
MONOCYTES # BLD: 10 % (ref 1–7)
PARTIAL THROMBOPLASTIN TIME: 26.2 SEC (ref 23.9–33.8)
PDW BLD-RTO: 12.4 % (ref 11.8–14.4)
PHOSPHORUS: 3.8 MG/DL (ref 2.5–4.5)
PLATELET # BLD: 416 K/UL (ref 138–453)
PMV BLD AUTO: 9.4 FL (ref 8.1–13.5)
POTASSIUM SERPL-SCNC: 4.4 MMOL/L (ref 3.7–5.3)
RBC # BLD: 3.44 M/UL (ref 4.21–5.77)
SEG NEUTROPHILS: 76 % (ref 36–66)
SEGMENTED NEUTROPHILS ABSOLUTE COUNT: 12.01 K/UL (ref 1.8–7.7)
SODIUM BLD-SCNC: 130 MMOL/L (ref 135–144)
WBC # BLD: 15.8 K/UL (ref 3.5–11.3)

## 2022-06-06 PROCEDURE — 85730 THROMBOPLASTIN TIME PARTIAL: CPT

## 2022-06-06 PROCEDURE — 6370000000 HC RX 637 (ALT 250 FOR IP): Performed by: INTERNAL MEDICINE

## 2022-06-06 PROCEDURE — 4A023N7 MEASUREMENT OF CARDIAC SAMPLING AND PRESSURE, LEFT HEART, PERCUTANEOUS APPROACH: ICD-10-PCS | Performed by: INTERNAL MEDICINE

## 2022-06-06 PROCEDURE — 36415 COLL VENOUS BLD VENIPUNCTURE: CPT

## 2022-06-06 PROCEDURE — 99239 HOSP IP/OBS DSCHRG MGMT >30: CPT | Performed by: FAMILY MEDICINE

## 2022-06-06 PROCEDURE — 85025 COMPLETE CBC W/AUTO DIFF WBC: CPT

## 2022-06-06 PROCEDURE — 93458 L HRT ARTERY/VENTRICLE ANGIO: CPT

## 2022-06-06 PROCEDURE — 2580000003 HC RX 258: Performed by: NURSE PRACTITIONER

## 2022-06-06 PROCEDURE — 82947 ASSAY GLUCOSE BLOOD QUANT: CPT

## 2022-06-06 PROCEDURE — 97535 SELF CARE MNGMENT TRAINING: CPT

## 2022-06-06 PROCEDURE — 6370000000 HC RX 637 (ALT 250 FOR IP): Performed by: NURSE PRACTITIONER

## 2022-06-06 PROCEDURE — 80048 BASIC METABOLIC PNL TOTAL CA: CPT

## 2022-06-06 PROCEDURE — 83735 ASSAY OF MAGNESIUM: CPT

## 2022-06-06 PROCEDURE — 94761 N-INVAS EAR/PLS OXIMETRY MLT: CPT

## 2022-06-06 PROCEDURE — 6360000004 HC RX CONTRAST MEDICATION

## 2022-06-06 PROCEDURE — 6360000002 HC RX W HCPCS

## 2022-06-06 PROCEDURE — 84100 ASSAY OF PHOSPHORUS: CPT

## 2022-06-06 PROCEDURE — 97530 THERAPEUTIC ACTIVITIES: CPT

## 2022-06-06 PROCEDURE — 6370000000 HC RX 637 (ALT 250 FOR IP): Performed by: FAMILY MEDICINE

## 2022-06-06 PROCEDURE — B24BZZZ ULTRASONOGRAPHY OF HEART WITH AORTA: ICD-10-PCS | Performed by: INTERNAL MEDICINE

## 2022-06-06 PROCEDURE — 2060000000 HC ICU INTERMEDIATE R&B

## 2022-06-06 PROCEDURE — 2500000003 HC RX 250 WO HCPCS

## 2022-06-06 RX ORDER — SENNA PLUS 8.6 MG/1
2 TABLET ORAL 2 TIMES DAILY
Qty: 120 TABLET | Refills: 0 | Status: SHIPPED | OUTPATIENT
Start: 2022-06-06 | End: 2022-07-06

## 2022-06-06 RX ADMIN — INSULIN LISPRO 6 UNITS: 100 INJECTION, SOLUTION INTRAVENOUS; SUBCUTANEOUS at 20:14

## 2022-06-06 RX ADMIN — CARVEDILOL 6.25 MG: 6.25 TABLET, FILM COATED ORAL at 08:43

## 2022-06-06 RX ADMIN — SODIUM CHLORIDE, PRESERVATIVE FREE 10 ML: 5 INJECTION INTRAVENOUS at 08:47

## 2022-06-06 RX ADMIN — ROSUVASTATIN CALCIUM 10 MG: 10 TABLET, FILM COATED ORAL at 20:14

## 2022-06-06 RX ADMIN — DEXTROSE MONOHYDRATE 125 ML: 100 INJECTION, SOLUTION INTRAVENOUS at 12:27

## 2022-06-06 RX ADMIN — SENNOSIDES 17.2 MG: 8.6 TABLET, FILM COATED ORAL at 08:44

## 2022-06-06 RX ADMIN — BUPROPION HYDROCHLORIDE 150 MG: 150 TABLET, EXTENDED RELEASE ORAL at 08:43

## 2022-06-06 RX ADMIN — ASPIRIN 81 MG: 81 TABLET, COATED ORAL at 11:03

## 2022-06-06 RX ADMIN — Medication 16 G: at 11:37

## 2022-06-06 RX ADMIN — SENNOSIDES 17.2 MG: 8.6 TABLET, FILM COATED ORAL at 20:14

## 2022-06-06 RX ADMIN — EZETIMIBE 10 MG: 10 TABLET ORAL at 08:43

## 2022-06-06 RX ADMIN — CARVEDILOL 6.25 MG: 6.25 TABLET, FILM COATED ORAL at 17:27

## 2022-06-06 RX ADMIN — SODIUM CHLORIDE, PRESERVATIVE FREE 10 ML: 5 INJECTION INTRAVENOUS at 20:15

## 2022-06-06 RX ADMIN — VITAM B12 500 MCG: 100 TAB at 08:44

## 2022-06-06 RX ADMIN — INSULIN GLARGINE 20 UNITS: 100 INJECTION, SOLUTION SUBCUTANEOUS at 20:15

## 2022-06-06 RX ADMIN — PANTOPRAZOLE SODIUM 40 MG: 40 TABLET, DELAYED RELEASE ORAL at 06:29

## 2022-06-06 RX ADMIN — PREDNISONE 40 MG: 20 TABLET ORAL at 08:44

## 2022-06-06 RX ADMIN — GABAPENTIN 300 MG: 300 CAPSULE ORAL at 08:43

## 2022-06-06 ASSESSMENT — ENCOUNTER SYMPTOMS
BACK PAIN: 0
ABDOMINAL PAIN: 0
RHINORRHEA: 0
COUGH: 0
NAUSEA: 0
CHEST TIGHTNESS: 0
WHEEZING: 0
CONSTIPATION: 0
CHOKING: 0
SINUS PRESSURE: 0
DIARRHEA: 0
VOICE CHANGE: 0
SHORTNESS OF BREATH: 0
VOMITING: 0

## 2022-06-06 NOTE — PROGRESS NOTES
Physical Therapy Cancel Note      DATE: 2022    NAME: Patrick Casanova  MRN: [id number]   : 1975      Patient not seen this date for Physical Therapy due to: About to go for heart cath.   Will check in AM      Electronically signed by Molly Zelaya PT on 2022 at 2:27 PM

## 2022-06-06 NOTE — PROGRESS NOTES
Occupational Therapy  Facility/Department: [facility]  Rehabilitation Occupational Therapy Daily Treatment Note    Date: 22  Patient Name: Flor Herron       Room:   MRN: [id number]  Account: [de-identified]   : 1975  (49 y.o.) Gender: male        Pt is currently functional below baseline and would suggest intense therapy post acute care. Would expect patient to be able to tolerate 3 hours of therapy per day and able to tolerate at least one hour up in chair. Please refer to AM-PAC score for current mobility/adl level. Past Medical History:  has a past medical history of Cerebral artery occlusion with cerebral infarction [facility]), Closed fracture of bone of right foot, Dialysis patient [facility]), Hemodialysis patient ([facility] [address] Hyperlipidemia, Hypertension, Kidney disease, and Type 1 diabetes mellitus ([facility] [address] Past Surgical History:   has a past surgical history that includes AV fistula creation (Left) and Wrist surgery (). Restrictions  Restrictions/Precautions: General Precautions; Fall Risk  Other position/activity restrictions: Fluid restriction, activity as tolerated, HD port LUE, IV RUE  Required Braces or Orthoses  Right Lower Extremity Brace: Ankle Foot Orthotics  Left Lower Extremity Brace: Yakelin Foot Orthotics  Required Braces or Orthoses?: Yes    Subjective  Subjective: \"No, I'm good. \"  Restrictions/Precautions: General Precautions; Fall Risk      Objective     Cognition  Overall Cognitive Status: WFL  Orientation  Overall Orientation Status: Within Functional Limits         ADL  Putting On/Taking Off Footwear  Assistance Level: Set-up; Modified independent  Skilled Clinical Factors:  With set-up of shoes/AFO's near patient - Patient is Mod (I) to don [employer] Transfers  Skilled Clinical Factors: Patient verbalized no needs for ADL's          Functional Mobility  Device: 4-Wheeled walker  Assistance Level: Contact guard assist  Skilled Clinical Factors: Patient completed functional mobility around the room 3x with 4WW and CGA for safety. Patient demonstrated good pacing  Sit to Supine  Assistance Level: Supervision  Supine to Sit  Assistance Level: Supervision  Skilled Clinical Factors: With use of bed rails  Sit to Stand  Assistance Level: Contact guard assist;Stand by assist  Stand to Sit  Assistance Level: Contact guard assist;Stand by assist  Skilled Clinical Factors: VC provided to lock 4WW brakes before sitting         Assessment  Assessment  Activity Tolerance: Patient tolerated treatment well  Discharge Recommendations: Patient would benefit from continued therapy after discharge  Safety Devices  Safety Devices in place: Yes  Type of devices: Left in bed;Call light within reach;Nurse notified    Patient Education  Education  Education Given To: Patient; Family  Education Provided: Role of Therapy; Fall Prevention Strategies;Transfer Training;Plan of Care;Energy Conservation;Home Exercise Program;Precautions; Safety; Family Education;Equipment  Education Method: Printed Information/Hand-outs; Verbal;Demonstration  Barriers to Learning: None  Education Outcome: Verbalized understanding    Access Code: [email or web address]  URL: [email or web address]  Date: 06/06/2022  Prepared by: Lisset Hobson    Exercises  Seated Cervical Rotation AROM - 1 x daily - 7 x weekly - 3 sets - 10 reps  Seated Cervical Sidebending AROM - 1 x daily - 7 x weekly - 3 sets - 10 reps  Seated Cervical Retraction and Extension - 1 x daily - 7 x weekly - 3 sets - 10 reps  Seated Bicep Curls Supinated with Dumbbells - 1 x daily - 7 x weekly - 3 sets - 10 reps  Seated Chest Press with Dumbbells - 1 x daily - 7 x weekly - 3 sets - 10 reps  Seated Overhead Press with Dumbbells - 1 x daily - 7 x weekly - 3 sets - 10 reps  Seated Shoulder Abduction with Dumbbells - Thumbs Up - 1 x daily - 7 x weekly - 3 sets - 10 reps  Seated Shoulder Flexion with Dumbbells - 1 x daily - 7 x weekly - 3 sets - 10 reps    Patient Education  Check for Safety  How to Fall Safely  How to Get Up After a [address]  Times per Week: 4-5x a week, 1-2x a day  Times per Day: Daily  Current Treatment Recommendations: Strengthening;ROM;Balance training;Functional mobility training; Endurance training;Neuromuscular re-education;Positioning;Equipment evaluation, education, & procurement;Patient/Caregiver education & training; Safety education & training;Self-Care / ADL; Coordination training    Goals  Patient Goals   Patient goals : to get stronger  Short Term Goals  Time Frame for Short term goals: By discharge, pt to demo  Short Term Goal 1: SBA with functional mob with good safety, pacing and use of AD as needed. Short Term Goal 2: SBA with ADL transfers with good safety, pacing and use of AD/DME as needed. Short Term Goal 3: SBA with toileting tasks with good safety, pacing and use of AD/DME as needed. Short Term Goal 4: SBA with UB ADLs and CGA with LB ADLs with good safety, pacing and use of AD/AE as needed. Short Term Goal 5: I with BUE HEP to assist with functional tasks. Long Term Goals  Long Term Goal 1: and verb good understanding of edu with BUE HEP, fall prevention techs, RW safety/mgmt, ADL compensatory techs, possible equip needs, EC/WS techs, breathing techs, positioning techs for skin integrity, and discharge recommendations. AM-PAC Score        AM-[facility] Inpatient Daily Activity Raw Score: 19 (06/06/22 1152)  AM-PAC Inpatient ADL T-Scale Score : 40.22 (06/06/22 1152)  ADL Inpatient CMS 0-100% Score: 42.8 (06/06/22 1152)  ADL Inpatient CMS G-Code Modifier : CK (06/06/22 1152)      Therapy Time   Individual Concurrent Group Co-treatment   Time In 1046         Time Out 1110         Minutes 24           Upon writer exit, call light within reach, pt retired to bed. All lines intact and patient positioned comfortably. All patient needs addressed prior to ending therapy session.  Chart reviewed prior to treatment and patient is agreeable for therapy. RN reports patient is medically stable for therapy treatment this date.       BERE Lopez/JOHN

## 2022-06-06 NOTE — PLAN OF CARE
Problem: Chronic Conditions and Co-morbidities  Goal: Patient's chronic conditions and co-morbidity symptoms are monitored and maintained or improved  Outcome: Progressing     Problem: Discharge Planning  Goal: Discharge to home or other facility with appropriate resources  Outcome: Progressing     Problem: Pain  Goal: Verbalizes/displays adequate comfort level or baseline comfort level  Outcome: Progressing  Flowsheets (Taken 6/5/2022 2000)  Verbalizes/displays adequate comfort level or baseline comfort level: Encourage patient to monitor pain and request assistance     Problem: Safety - Adult  Goal: Free from fall injury  Outcome: Progressing  Flowsheets (Taken 6/6/2022 0035)  Free From Fall Injury: Instruct family/caregiver on patient safety     Problem: ABCDS Injury Assessment  Goal: Absence of physical injury  Outcome: Progressing  Flowsheets (Taken 6/6/2022 0035)  Absence of Physical Injury: Implement safety measures based on patient assessment     Problem: Skin/Tissue Integrity  Goal: Absence of new skin breakdown  Description: 1. Monitor for areas of redness and/or skin breakdown  2. Assess vascular access sites hourly  3. Every 4-6 hours minimum:  Change oxygen saturation probe site  4. Every 4-6 hours:  If on nasal continuous positive airway pressure, respiratory therapy assess nares and determine need for appliance change or resting period.   Outcome: Progressing     Problem: Nutrition Deficit:  Goal: Optimize nutritional status  Outcome: Progressing

## 2022-06-06 NOTE — PROGRESS NOTES
[facility]  Office: [address], DO, Partha Marsleti, DO, Rubi Joe, DO, Annita Pena Blood, DO, Quincy Sharpe MD, Thu Zeng MD, Mikael Au MD, Len Marrufo MD, Kari Page MD, Iman Krueger MD, Jackie Narvaez MD, Vero Augustin, DO, Lazaro Cutler DO, Eleazar Fall MD,  Walter Harris DO, Tello Enriquez MD, Maral Sousa MD, Deidre Miller MD, Tiffany Samayoa DO, Benny Juarez MD, Yolanda Reese MD, Megan Aldana MD, Alba Lindo, [facility], [facility], CNP, Mei Walsh, CNP, Luli Lawrence, CNP, Jennifer Gallo, CNP, Rufus Lemus, CNP, Robbie Quintero PA-C, Cleone Denver, [facility], Spring Benitez, [facility], Juan J Ross, CNP, Gunjan Aldrich, CNP, Erika Wu, CNS, Gris Fitzgerald, [facility], Kasia Kincaid, CNP, Yadiel Garcia, CNP, Julian[facility]      Daily Progress Note     Admit Date: 5/29/2022  Bed/Room No.  2036/2036-01  Admitting Physician : Mikael Au MD  Code Status :[address] [facility] Day:  LOS: 7 days   Chief Complaint:     Chief Complaint   Patient presents with    Chest Pain    Shortness of Breath     Principal Problem:    Acute idiopathic pericarditis  Active Problems:    Hypotension    Elevated troponin    Hyponatremia    Anemia    Hyperkalemia    Left lower quadrant abdominal pain    ESRD (end stage renal disease) on dialysis [facility])    Primary hypertension    Hyperlipidemia    Type 2 diabetes mellitus with kidney complication, with long-term current use of insulin (HCC)    GERD (gastroesophageal reflux disease)  Resolved Problems:    * No resolved hospital problems. *    Subjective : Interval History/Significant events :  06/06/22    Patient awaiting cardiac catheterization. He remains chest pain-free. Patient denies nausea, vomiting. .   Vitals - Stable afebrile  Labs -hyponatremia 130, creatinine 6.99     Nursing notes , Consults notes reviewed. Overnight events and updates discussed with Nursing staff . Background History:         Sherly Walton is 49 y.o. male  Who was admitted to the hospital on 5/29/2022 for treatment of Acute idiopathic pericarditis. Patient came to emergency room with chest pain and shortness of breath. He was recently admitted in the hospital for lower extremity weakness which quickly resolved and was discharged home. MRI at that time showed acute lacunar infarct in the left basal ganglia with bilateral old lacunar infarcts. He returned back with difficulty in breathing. Patient also had hiccups, pain in left side chest radiated shoulder. Patient denies any fever, chills. Patient was found to have a large pericardial effusion. EKG showed diffuse ST elevation suggestive of pericarditis. Patient had pericardiocentesis with 860 cc of hemorrhagic fluid obtained. He is also treated with heparin infusion and was stopped after STEMI ruled out. Pericardial drain was maintained until 6/1/2022 and was removed. Patient remained stable after drain removal.  A limited echo was performed after drain was removed showed reduced ejection fraction 40% with global LV hypokinesis. Cardiology recommended cardiac cath. Patient also had constipation stool impaction and required digital disimpaction and stool softeners.     PMH:  Past Medical History:   Diagnosis Date    Cerebral artery occlusion with cerebral infarction [facility])     Closed fracture of bone of right foot March - April 2020    Dialysis patient [facility])     Hemodialysis patient ([facility]     Hyperlipidemia     Hypertension     Kidney disease     On Dialysis    Type 1 diabetes mellitus (HCC)       Allergies: No Known Allergies   Medications :  epoetin pennie-epbx, 2,000 Units, SubCUTAneous, Once per day on Mon Wed Fri   And  epoetin pennie-epbx, 3,000 Units, SubCUTAneous, Once per day on Mon Wed Fri  polyethylene glycol, 17 g, Oral, Daily  insulin glargine, 20 Units, SubCUTAneous, Nightly  milk and molasses, 240 mL, Rectal, Once  senna, 2 tablet, Oral, BID  carvedilol, 6.25 mg, Oral, BID WC  insulin glargine, 45 Units, SubCUTAneous, Daily  insulin lispro, 0-18 Units, SubCUTAneous, TID WC  insulin lispro, 0-9 Units, SubCUTAneous, Nightly  aspirin, 81 mg, Oral, Daily  buPROPion, 150 mg, Oral, QAM  ezetimibe, 10 mg, Oral, Daily  [Held by provider] FLUoxetine, 40 mg, Oral, Daily  gabapentin, 300 mg, Oral, Daily  [Held by provider] lisinopril, 20 mg, Oral, Daily  vitamin B-12, 500 mcg, Oral, Daily  sodium chloride flush, 5-40 mL, IntraVENous, 2 times per day  rosuvastatin, 10 mg, Oral, Nightly  predniSONE, 40 mg, Oral, Daily  pantoprazole, 40 mg, Oral, QAM AC        Review of Systems   Review of Systems   Constitutional: Negative for activity change, appetite change, fatigue, fever and unexpected weight change. HENT: Negative for congestion, nosebleeds, rhinorrhea, sinus pressure, sneezing and voice change. Respiratory: Negative for cough, choking, chest tightness, shortness of breath and wheezing. Cardiovascular: Negative for chest pain, palpitations and leg swelling. Gastrointestinal: Negative for abdominal pain, constipation, diarrhea, nausea and vomiting. Genitourinary: Negative for difficulty urinating, dysuria, frequency, penile discharge and testicular pain. Musculoskeletal: Negative for back pain. Skin: Negative for rash. Neurological: Negative for dizziness, weakness, light-headedness and headaches. Hematological: Does not bruise/bleed easily. Psychiatric/Behavioral: Negative for agitation, behavioral problems, confusion, self-injury, sleep disturbance and suicidal ideas.      Objective :      Current Vitals : Temp: 97.6 °F (36.4 °C),  Heart Rate: 64, Resp: 16, BP: 125/63, SpO2: 99 %  Last 24 Hrs Vitals   Patient Vitals for the past 24 hrs:   BP Temp Temp src Pulse Resp SpO2   06/06/22 0800 125/63 97.6 °F (36.4 °C) Oral 64 16 99 %   06/06/22 0400 (!) 150/82 97.5 °F (36.4 °C) Temporal 77 15 95 %   06/06/22 0000 126/69 98.2 °F (36.8 °C) Temporal 77 18 98 %   06/05/22 2000 (!) 144/74 (!) 96.6 °F (35.9 °C) Temporal 83 18 95 %   06/05/22 1707 (!) 147/85 -- -- 87 -- 95 %   06/05/22 1600 (!) 124/90 98.9 °F (37.2 °C) Temporal 82 18 --   06/05/22 1230 (!) 154/75 -- -- -- -- --   06/05/22 1200 (!) 154/75 98 °F (36.7 °C) Temporal 85 16 98 %     Intake / output   06/04 1901 - 06/06 0700  In: 720 [P.O.:720]  Out: -   Physical Exam:  Physical Exam  Vitals and nursing note reviewed. Constitutional:       General: He is not in acute distress. Appearance: He is not diaphoretic. HENT:      Head: Normocephalic and atraumatic. Nose:      Right Sinus: No maxillary sinus tenderness or frontal sinus tenderness. Left Sinus: No maxillary sinus tenderness or frontal sinus tenderness. Mouth/Throat:      Pharynx: No oropharyngeal exudate. Eyes:      General: No scleral icterus. Conjunctiva/sclera: Conjunctivae normal.      Pupils: Pupils are equal, round, and reactive to light. Neck:      Thyroid: No thyromegaly. Vascular: No JVD. Cardiovascular:      Rate and Rhythm: Normal rate and regular rhythm. Pulses:           Dorsalis pedis pulses are 2+ on the right side and 2+ on the left side. Heart sounds: Normal heart sounds. No murmur heard. Pulmonary:      Effort: Pulmonary effort is normal.      Breath sounds: Normal breath sounds. No wheezing or rales. Chest:      Comments: Dressing at chest at prior drain site  Abdominal:      Palpations: Abdomen is soft. There is no mass. Tenderness: There is no abdominal tenderness. Musculoskeletal:      Cervical back: Full passive range of motion without pain and neck supple. Comments: Wasting of thenar eminence bilaterally    Lymphadenopathy:      Head:      Right side of head: No submandibular adenopathy. Left side of head: No submandibular adenopathy. Cervical: No cervical adenopathy. Skin:     General: Skin is warm.    Neurological:      Mental Status: He is alert and oriented to person, place, and time. Motor: No tremor. Comments: Bilateral foot drop, loss of bilateral thenar eminences intrinsic hand muscles    Psychiatric:         Behavior: Behavior is cooperative. Laboratory findings:    Recent Labs     06/04/22 0319 06/05/22 0314 06/06/22  0301   WBC 18.2* 16.5* 15.8*   HGB 10.9* 11.0* 10.3*   HCT 34.8* 34.6* 32.7*    411 416     Recent Labs     06/04/22 0319 06/05/22 0314 06/06/22  0301   * 130* 130*   K 4.6 5.1 4.4   CL 92* 91* 91*   CO2 25 25 24   GLUCOSE 213* 180* 208*   BUN 50* 46* 87*   CREATININE 5.21* 5.00* 6.99*   MG 3.0* 2.4 2.5   CALCIUM 9.0 8.9 8.7   PHOS 2.2* 2.4* 3.8     No results for input(s): PROT, LABALBU, LABA1C, O3TZMDW, K0VHOXX, FT4, TSH, AST, ALT, LDH, GGT, ALKPHOS, BILITOT, BILIDIR, AMMONIA, AMYLASE, LIPASE, LACTATE, CHOL, HDL, LDLCHOLESTEROL, CHOLHDLRATIO, TRIG, VLDL, BNP, TROPONINI, CKTOTAL, CKMB, CKMBINDEX, RF, ECTOR in the last 72 hours. No results found for: Rabia Luz, RBCUA, BLOODU, BACTERIA, NITRU, [address], LEUKOCYTESUR    Imaging / Clinical Data :-   CT ABDOMEN PELVIS WO CONTRAST Additional Contrast? Radiologist Recommendation    Result Date: 5/30/2022  Chest: Moderate to large pericardial effusion, which is likely related to the patient's history of pericarditis. Trace bilateral pleural effusions with associated atelectasis. Abdomen/pelvis: Findings suggestive of anasarca, including periportal edema, mesenteric edema, small volume of free fluid, and body wall edema. This may be due in part to volume resuscitation. More focal stranding along the ascending colon may be related to anasarca, or could represent mild colitis. A few prominent upper abdominal lymph nodes are favored reactive in etiology. XR KNEE RIGHT (3 VIEWS)    Result Date: 5/26/2022  No acute abnormality of the knee.      CT CHEST WO CONTRAST    Result Date: 5/30/2022  Chest: Moderate to large pericardial effusion, which is likely related to the patient's history of pericarditis. Trace bilateral pleural effusions with associated atelectasis. Abdomen/pelvis: Findings suggestive of anasarca, including periportal edema, mesenteric edema, small volume of free fluid, and body wall edema. This may be due in part to volume resuscitation. More focal stranding along the ascending colon may be related to anasarca, or could represent mild colitis. A few prominent upper abdominal lymph nodes are favored reactive in etiology. XR SHOULDER LEFT (MIN 2 VIEWS)    Result Date: 5/28/2022  No acute osseous abnormality. XR CHEST PORTABLE    Result Date: 5/30/2022  Retrocardiac, right perihilar and basilar airspace disease which may represent pneumonia or atelectasis. Clinical Course : unchanged  Assessment and Plan  :        Acute pericarditis - colchicine every other day for 3 doses. Pain controlled with morphine, prednisone 40 mg daily. Protonix for GI prophylaxis. Pericardial effusion with tamponade - s/p  pericardiocentesis -pericardial drain removed on 612. .  Systolic dysfunction concerning for ischemic heart disease-plan for heart cath on Monday. ESRD on hemodialysis -dialysis per nephrology. Diabetes mellitus with neuropathy -continue cardiac diet, increase insulin dose. Recently left basal ganglia stroke-continue aspirin 81 mg, Crestor-will benefit with acute rehab at discharge. Essential hypertension -lisinopril on hold  Weakness / Myopathy - outpatient follow up with neurology , has referral and waiting for appointment. Constipation with fecal impaction- bowel Regimen. S/p digital disimpaction . Normal cardiac cath. Discharge home. Plan and updates discussed with patient ,  answers  explained to satisfaction.    Plan discussed with Staff Sumeet Raymond RN     (Please note that portions of this note were completed with a voice recognition program. Efforts were made to edit the dictations but occasionally words are mis-transcribed.)      Di Stevens MD  6/6/2022

## 2022-06-06 NOTE — PROCEDURES
[facility]        Date:   6/6/2022  Patient name: Dawson Ceballos  Date of admission:  5/29/2022 11:56 PM  MRN:   [id number]  YOB: 1975    CARDIAC CATHETERIZATION    Operators:  Primary: Emile Mendoza MD.  Assistant:     Indications for cath: CHF, Cardiomyopathy EF of 40%,    Procedure performed: Cardiac cath. Access: Right Radial artery      Procedure: After informed consent was obtained with explanation of the risks and benefits, patient was brought to the cath lab. The right wrist was prepped and draped in sterile fashion. 1% lidocaine was used for local block. The Radial artery was cannulated with 6  Fr sheath with brisk arterial blood return. The side port was frequently flushed and aspirated with normal saline. Findings:    Left main: NL    LAD: Diffuse luminal irregularities of 30 to 40%. LCX: Mild luminal irregularities of 10 to 20%, very small vessel. RCA: Diffuse luminal irregularities of 30 to 40%    Ramus mild luminal irregularities of 10 to 20%    The LV gram was not performed. Conclusions:  1. Minimal nonobstructive CAD  2. LV was not performed    Recommendation:  1. Medical treatments. 2. Risk factors modifications.         Electronically signed by Llu Hung MD on 6/6/2022 at 2:33 PM      [facility]  [phone]

## 2022-06-06 NOTE — CARE COORDINATION
Social work: contacted Jose Lomeli from [facility] is trying to see if pt would be ready soon if there is a bed. Will need amanda at discharge. Asked Floor to get amanda completed. Cardiac Cath and fistulagram today.   Report: [phone] and [phone]  Fax: [phone]    Vika mendez

## 2022-06-06 NOTE — PROGRESS NOTES
Nephrology Progress Note    Otoniel He Severance    Maria A Dey, APRN - CNP    Follow Up for (CC) : End-stage renal disease    ASSESSMENT     End-stage renal disease  Anemia of end-stage renal disease  Hyperkalemia corrected  Metabolic bone disorder  Pericardial effusion hypotension s/p drainage    Principal Problem:    Acute idiopathic pericarditis  Active Problems:    Hypotension    Elevated troponin    Hyponatremia    Anemia    Hyperkalemia    Left lower quadrant abdominal pain    ESRD (end stage renal disease) on dialysis ([address]    Primary hypertension    Hyperlipidemia    Type 2 diabetes mellitus with kidney complication, with long-term current use of insulin ([facility])    GERD (gastroesophageal reflux disease)  Resolved Problems:    * No resolved hospital problems. *      PLAN     Hemodialysis on Tuesday Thursday Saturday schedule  And appears there was some concern regarding dialysis access recirculation  We will monitor that  Hemodynamically stable  NEDA  Follow-up labs    Please do not hesitate to call with questions.     SUBJECTIVE      Patient seen and examined the bedside  Denies any dyspnea no chest pain  Cardiology work-up noted    Chief Complaint   Patient presents with    Chest Pain    Shortness of Breath       Patient Active Problem List   Diagnosis    ESRD (end stage renal disease) on dialysis ([address]    Primary hypertension    Hyperlipidemia    Acute pain of left knee    Bipolar affective disorder ([address]    Atherosclerosis of aorta ([address]    COVID-19    Cerebrovascular accident (CVA) ([address]    Type 2 diabetes mellitus with kidney complication, with long-term current use of insulin ([address]    Neuropathy of both feet    GERD (gastroesophageal reflux disease)    Right sided weakness    Leg weakness, bilateral    Recurrent falls    Acute idiopathic pericarditis    Small kidney, bilateral    Umbilical hernia without obstruction or gangrene    Disorders of diaphragm    Atherosclerosis of other arteries    Hypotension    Elevated troponin    Hyponatremia    Anemia    Hyperkalemia    Left lower quadrant abdominal pain       CURRENT MEDICATIONS / Allergies [facility] History / Family History     Current Facility-Administered Medications   Medication Dose Route Frequency Provider Last Rate Last Admin    epoetin pennie-epbx (RETACRIT) injection 2,000 Units  2,000 Units SubCUTAneous Once per day on Mon Wed Fri Gus Metcalf MD        And    epoetin pennie-epbx (RETACRIT) injection 3,000 Units  3,000 Units SubCUTAneous Once per day on Mon Wed Fri Gus Metcalf MD        polyethylene glycol [facility]) packet 17 g  17 g Oral Daily Cathie Mccabe MD   17 g at 06/05/22 0738    insulin glargine (LANTUS) injection vial 20 Units  20 Units SubCUTAneous Nightly Gus Metcalf MD   20 Units at 06/05/22 2046    milk and molasses enema 240 mL  240 mL Rectal Once Gus Metcalf MD        senna (SENOKOT) tablet 17.2 mg  2 tablet Oral BID DANIEL Cunha CNP   17.2 mg at 06/06/22 0844    bisacodyl (DULCOLAX) EC tablet 5 mg  5 mg Oral Daily PRN DANIEL Cunha - CNP   5 mg at 06/03/22 0629    carvedilol (COREG) tablet 6.25 mg  6.25 mg Oral BID  Whitney Teran MD   6.25 mg at 06/06/22 0843    insulin glargine (LANTUS) injection vial 45 Units  45 Units SubCUTAneous Daily Gus Metcalf MD   45 Units at 06/05/22 0736    insulin lispro (HUMALOG) injection vial 0-18 Units  0-18 Units SubCUTAneous TID  DANIEL Cunha - CNP   9 Units at 06/05/22 1707    insulin lispro (HUMALOG) injection vial 0-9 Units  0-9 Units SubCUTAneous Nightly DANIEL Cunha - CNP   6 Units at 06/05/22 2045    aspirin EC tablet 81 mg  81 mg Oral Daily Maureen Stemple, APRN - NP   81 mg at 06/06/22 1103    buPROPion (WELLBUTRIN XL) extended release tablet 150 mg  150 mg Oral QAM Maureen Stemple, APRN - NP   150 mg at 06/06/22 0843    ezetimibe (ZETIA) tablet 10 mg  10 mg Oral Daily Maureen Stemple, APRN - NP   10 mg at 06/06/22 0843    [Held by provider] FLUoxetine (PROZAC) capsule 40 mg  40 mg Oral Daily Maureen Stemple, APRN - NP        gabapentin (NEURONTIN) capsule 300 mg  300 mg Oral Daily Maureen Stemple, APRN - NP   300 mg at 06/06/22 0843    [Held by provider] lisinopril (PRINIVIL;ZESTRIL) tablet 20 mg  20 mg Oral Daily Maureen Stemple, APRN - NP        vitamin B-12 (CYANOCOBALAMIN) tablet 500 mcg  500 mcg Oral Daily Maureen Stemple, APRN - NP   500 mcg at 06/06/22 0844    glucose chewable tablet 16 g  4 tablet Oral PRN Maureen Stemple, APRN - NP   16 g at 06/06/22 1137    dextrose bolus 10% 125 mL  125 mL IntraVENous PRN Maureen Stemple, APRN - NP   Stopped at 06/06/22 1235    Or    dextrose bolus 10% 250 mL  250 mL IntraVENous PRN Maureen Stemple, APRN - NP        glucagon (rDNA) injection 1 mg  1 mg IntraMUSCular PRN Maureen Stemple, APRN - NP        dextrose 5 % solution  100 mL/hr IntraVENous PRN Maureen Stemple, APRN - NP        sodium chloride flush 0.9 % injection 5-40 mL  5-40 mL IntraVENous 2 times per day Maureen Stemple, APRN - NP   10 mL at 06/06/22 0847    sodium chloride flush 0.9 % injection 5-40 mL  5-40 mL IntraVENous PRN Maureen Stemple, APRN - NP        0.9 % sodium chloride infusion   IntraVENous PRN Maureen Stemple, APRN - NP        ondansetron (ZOFRAN-ODT) disintegrating tablet 4 mg  4 mg Oral Q8H PRN Maureen Stemple, APRN - NP        Or    ondansetron (ZOFRAN) injection 4 mg  4 mg IntraVENous Q6H PRN Maureen Stemple, APRN - NP   4 mg at 06/03/22 1642    acetaminophen (TYLENOL) tablet 650 mg  650 mg Oral Q6H PRN Maureen Stemple, APRN - NP   650 mg at 06/03/22 1785    Or    acetaminophen (TYLENOL) suppository 650 mg  650 mg Rectal Q6H PRN Maureen Stemple, APRN - NP        nitroGLYCERIN (NITROSTAT) SL tablet 0.4 mg  0.4 mg SubLINGual Q5 Min PRN Maureen Stemple, APRN - NP        rosuvastatin (CRESTOR) tablet 10 mg  10 mg Oral Nightly Eyalmarjorie Toussaint APRN - NP   10 mg at 06/05/22 2045    predniSONE (DELTASONE) tablet 40 mg  40 mg Oral Daily Valeri Prescott, APRN - NP   40 mg at 06/06/22 0844    pantoprazole (PROTONIX) tablet 40 mg  40 mg Oral QAM AC Valeri Prescott, APRN - NP   40 mg at 06/06/22 2845    perflutren lipid microspheres (DEFINITY) injection 1.65 mg  1.5 mL IntraVENous ONCE PRN Jl Morgan, DO              No Known Allergies    Social History     Socioeconomic History    Marital status: Single     Spouse name: Not on file    Number of children: Not on file    Years of education: Not on file    Highest education level: Not on file   Occupational History    Not on file   Tobacco Use    Smoking status: Never Smoker    Smokeless tobacco: Never Used   Vaping Use    Vaping Use: Never used   Substance and Sexual Activity    Alcohol use: Never    Drug use: Never    Sexual activity: Not on file   Other Topics Concern    Not on file   Social History Narrative    Not on file     Social Determinants of Health     Financial Resource Strain:     Difficulty of Paying Living Expenses: Not on file   Food Insecurity:     Worried About [address] in the Last Year: Not on file    [address] in the Last Year: Not on file   Transportation Needs:     Lack of Transportation (Medical): Not on file    Lack of Transportation (Non-Medical):  Not on file   Physical Activity:     Days of Exercise per Week: Not on file    Minutes of Exercise per Session: Not on file   Stress:     Feeling of Stress : Not on file   Social Connections:     Frequency of Communication with Friends and Family: Not on file    Frequency of Social Gatherings with Friends and Family: Not on file    Attends Voodoo Services: Not on file    Active Member of Clubs or Organizations: Not on file    Attends Club or Organization Meetings: Not on file    Marital Status: Not on file   Intimate Partner Violence:     Fear of Current or Ex-Partner: Not on file    Emotionally Abused: Not on file    Physically Abused: Not on file    Sexually Abused: Not on file   Housing Stability:     Unable to Pay for Housing in the Last Year: Not on file    Number of Places Lived in the Last Year: Not on file    Unstable Housing in the Last Year: Not on file       Family History   Problem Relation Age of Onset    Diabetes Mother     Heart Disease Father     Diabetes Father     Cancer Paternal Grandmother     Heart Disease Maternal Great Grandfather        REVIEW OF SYSTEMS     All other ROS is negative. OBJECTIVE      Vitals:    06/06/22 1500   BP: (!) 141/60   Pulse: 65   Resp:    Temp:    SpO2: 99%     Wt Readings from Last 3 Encounters:   06/05/22 176 lb 9.4 oz (80.1 kg)   05/28/22 188 lb (85.3 kg)   05/27/22 182 lb (82.6 kg)     I/O last 3 completed shifts: In: 720 [P.O.:720]  Out: -   Body mass index is 27.66 kg/m². PHYSICAL EXAM      GENERAL APPEARANCE:Awake, alert, in no acute distress  SKIN: warm and dry, no rash or erythema  EYES: conjunctivae normal and sclera anicteric  NECK:  JVD Absent. PULMONARY: Symmetrical and CTA BL. CADRDIOVASCULAR: S1 and S2 audible. ABDOMEN: soft nontender, bowel sounds present.   EXTREMITIES: no cyanosis, clubbing or edema    LABS      Data:    CBC:   Recent Labs     06/04/22 0319 06/05/22 0314 06/06/22  0301   WBC 18.2* 16.5* 15.8*   HGB 10.9* 11.0* 10.3*   HCT 34.8* 34.6* 32.7*   MCV 96.1 95.1 95.1    411 416     BMP:    Recent Labs     06/04/22 0319 06/05/22 0314 06/06/22  0301   * 130* 130*   K 4.6 5.1 4.4   CL 92* 91* 91*   CO2 25 25 24   BUN 50* 46* 87*   CREATININE 5.21* 5.00* 6.99*   GLUCOSE 213* 180* 208*     CMP:   Recent Labs     06/04/22 0319 06/05/22 0314 06/06/22  0301   * 130* 130*   K 4.6 5.1 4.4   CL 92* 91* 91*   CO2 25 25 24   BUN 50* 46* 87*   CREATININE 5.21* 5.00* 6.99*   GLUCOSE 213* 180* 208*   CALCIUM 9.0 8.9 8.7       Phosphorus:    Recent Labs     06/04/22  0319 06/05/22  0314 06/06/22  0301   PHOS 2.2* 2.4* 3.8     Ionized Calcium: No results for input(s): IONCA in the last 72 hours. Magnesium:   Recent Labs     06/04/22 0319 06/05/22  0314 06/06/22  0301   MG 3.0* 2.4 2.5       Maylin Peraza MD MD  [facility].   Ph: [phone]

## 2022-06-06 NOTE — PROGRESS NOTES
[facility]  Progress Note                   Date:   6/6/2022  Patient name: Dom Chavez  Date of admission:  5/29/2022 11:56 PM  MRN:   [id number]  YOB: 1975  PCP: DANIEL Raphael CNP    Reason for Admission: Chest pain [R07.9]  Chest pain, unspecified type [R07.9]    Subjective:       Clinical Changes /Abnormalities: Seen & examined in room. No acute CV issues/concerns overnight. Labs, vitals, & tele reviewed. Remains SR. Currently NPO for cath this afternoon.      Review of Systems    Medications:   Scheduled Meds:   epoetin pennie-epbx  2,000 Units SubCUTAneous Once per day on Mon Wed Fri    And    epoetin pennie-epbx  3,000 Units SubCUTAneous Once per day on Mon Wed Fri    polyethylene glycol  17 g Oral Daily    insulin glargine  20 Units SubCUTAneous Nightly    milk and molasses  240 mL Rectal Once    senna  2 tablet Oral BID    carvedilol  6.25 mg Oral BID WC    insulin glargine  45 Units SubCUTAneous Daily    insulin lispro  0-18 Units SubCUTAneous TID WC    insulin lispro  0-9 Units SubCUTAneous Nightly    aspirin  81 mg Oral Daily    buPROPion  150 mg Oral QAM    ezetimibe  10 mg Oral Daily    [Held by provider] FLUoxetine  40 mg Oral Daily    gabapentin  300 mg Oral Daily    [Held by provider] lisinopril  20 mg Oral Daily    vitamin B-12  500 mcg Oral Daily    sodium chloride flush  5-40 mL IntraVENous 2 times per day    rosuvastatin  10 mg Oral Nightly    predniSONE  40 mg Oral Daily    pantoprazole  40 mg Oral QAM AC     Continuous Infusions:   dextrose      sodium chloride       CBC:   Recent Labs     06/04/22 0319 06/05/22 0314 06/06/22  0301   WBC 18.2* 16.5* 15.8*   HGB 10.9* 11.0* 10.3*    411 416     BMP:    Recent Labs     06/04/22 0319 06/05/22 0314 06/06/22  0301   * 130* 130*   K 4.6 5.1 4.4   CL 92* 91* 91*   CO2 25 25 24   BUN 50* 46* 87*   CREATININE 5.21* 5.00* 6.99*   GLUCOSE 213* 180* 208*     Hepatic:No results for input(s): AST, ALT, ALB, BILITOT, ALKPHOS in the last 72 hours. Troponin: No results for input(s): TROPHS in the last 72 hours. BNP: No results for input(s): BNP in the last 72 hours. Lipids: No results for input(s): CHOL, HDL in the last 72 hours. Invalid input(s): LDLCALCU  INR: No results for input(s): INR in the last 72 hours. Objective:   Vitals: /63   Pulse 64   Temp 97.6 °F (36.4 °C) (Oral)   Resp 16   Ht 5' 7\" (1.702 m)   Wt 176 lb 9.4 oz (80.1 kg)   SpO2 99%   BMI 27.66 kg/m²   General appearance: alert and cooperative with exam  HEENT: Head: Normocephalic, no lesions, without obvious abnormality. Neck:no JVD, trachea midline, no adenopathy  Lungs: Clear to auscultation  Heart: Regular rate and rhythm, s1/s2 auscultated, no murmurs. SR  Abdomen: soft, non-tender, bowel sounds active  Extremities: no edema  Neurologic: not done    Echocardiogram 5/30/2022:  Mild left ventricular hypertrophy  Global left ventricular systolic function is low normal  Estimated ejection fraction is 45-50 % . Large anterior and moderate size posterior pericardial effusion, early diastolic collapse of right atrium and ventricle noted, suggesting early  cardiac tamponade. No mitral regurgitation. No tricuspid regurgitation was seen.     TTE 6/2/22    Summary   Global left ventricular systolic function is mildly reduced with an   estimated ejection fraction of 40 % . Global LV hypokinesis without obvious regionality   No significant pericardial effusion is seen. Limited echo to reassess for effusion. There has been a reduction in EF from   prior study to around 40% with global mild hypokinesis     Assessment / Acute Cardiac Problems:   1. Acute pericardial effusion w/ tamponade s/p pericardiocentesis -pericardial drain removed on 6/12  2. Acute pericarditis  3. Systolic CHF with LVEF 85%  4. ESRD on HD  5. DM2  6. DM neuropathy  7. HTN  8. Constipation  9.  Hx of COVID-19 infection    Patient Active Problem List:     ESRD (end stage renal disease) on dialysis ([facility]     Primary hypertension     Hyperlipidemia     Acute pain of left knee     Bipolar affective disorder ([facility]     Atherosclerosis of aorta ([facility]     COVID-19     Cerebrovascular accident (CVA) ([facility]     Type 2 diabetes mellitus with kidney complication, with long-term current use of insulin ([facility])     Neuropathy of both feet     GERD (gastroesophageal reflux disease)     Right sided weakness     Leg weakness, bilateral     Recurrent falls     Acute idiopathic pericarditis     Small kidney, bilateral     Umbilical hernia without obstruction or gangrene     Disorders of diaphragm     Atherosclerosis of other arteries     Hypotension     Elevated troponin     Hyponatremia     Anemia     Hyperkalemia     Left lower quadrant abdominal pain      Plan of Treatment:   1. Stable. NPO for cardiac cath today given reduced LVEF to 40% on recent echo. I have discussed risks (including but not limited to vascular injury, infection, hematoma, contrast induced kidney dysfunction, CVA and MI), benefits, alternatives in detail. All questions answered. Patient agrees to proceed.       Electronically signed by DANIEL Jordan CNP on 6/6/2022 at 9:30 AM  [address]  [phone]

## 2022-06-06 NOTE — CARE COORDINATION
Discharge planning    Plan for cardiac cath today at 1330. Will also have a fistulagram done down in IR today. Plan is [facility] upon discharge. Probable tomorrow.

## 2022-06-07 ENCOUNTER — APPOINTMENT (OUTPATIENT)
Dept: INTERVENTIONAL RADIOLOGY/VASCULAR | Age: 47
DRG: 252 | End: 2022-06-07
Payer: MEDICARE

## 2022-06-07 LAB
ABSOLUTE EOS #: 0 K/UL (ref 0–0.4)
ABSOLUTE IMMATURE GRANULOCYTE: 0.6 K/UL (ref 0–0.3)
ABSOLUTE LYMPH #: 1.49 K/UL (ref 1–4.8)
ABSOLUTE MONO #: 1.19 K/UL (ref 0.2–0.8)
ANION GAP SERPL CALCULATED.3IONS-SCNC: 18 MMOL/L (ref 9–17)
BASOPHILS # BLD: 0 %
BASOPHILS ABSOLUTE: 0 K/UL (ref 0–0.2)
BUN BLDV-MCNC: 117 MG/DL (ref 6–20)
BUN/CREAT BLD: 14 (ref 9–20)
CALCIUM SERPL-MCNC: 8.5 MG/DL (ref 8.6–10.4)
CHLORIDE BLD-SCNC: 88 MMOL/L (ref 98–107)
CO2: 22 MMOL/L (ref 20–31)
CREAT SERPL-MCNC: 8.46 MG/DL (ref 0.7–1.2)
EOSINOPHILS RELATIVE PERCENT: 0 % (ref 1–4)
GFR AFRICAN AMERICAN: 8 ML/MIN
GFR NON-AFRICAN AMERICAN: 7 ML/MIN
GFR SERPL CREATININE-BSD FRML MDRD: ABNORMAL ML/MIN/{1.73_M2}
GLUCOSE BLD-MCNC: 122 MG/DL (ref 75–110)
GLUCOSE BLD-MCNC: 159 MG/DL (ref 75–110)
GLUCOSE BLD-MCNC: 208 MG/DL (ref 70–99)
GLUCOSE BLD-MCNC: 247 MG/DL (ref 75–110)
GLUCOSE BLD-MCNC: 281 MG/DL (ref 75–110)
HCT VFR BLD CALC: 31.4 % (ref 40.7–50.3)
HEMOGLOBIN: 10.1 G/DL (ref 13–17)
IMMATURE GRANULOCYTES: 4 %
INR BLD: 1
LYMPHOCYTES # BLD: 10 % (ref 24–44)
MAGNESIUM: 2.5 MG/DL (ref 1.6–2.6)
MCH RBC QN AUTO: 29.9 PG (ref 25.2–33.5)
MCHC RBC AUTO-ENTMCNC: 32.2 G/DL (ref 28–38)
MCV RBC AUTO: 92.9 FL (ref 82.6–102.9)
MONOCYTES # BLD: 8 % (ref 1–7)
PDW BLD-RTO: 12.6 % (ref 11.8–14.4)
PHOSPHORUS: 5.2 MG/DL (ref 2.5–4.5)
PLATELET # BLD: 421 K/UL (ref 138–453)
PMV BLD AUTO: 9.6 FL (ref 8.1–13.5)
POTASSIUM SERPL-SCNC: 5.1 MMOL/L (ref 3.7–5.3)
PROTHROMBIN TIME: 13.1 SEC (ref 11.5–14.2)
RBC # BLD: 3.38 M/UL (ref 4.21–5.77)
SEG NEUTROPHILS: 78 % (ref 36–66)
SEGMENTED NEUTROPHILS ABSOLUTE COUNT: 11.62 K/UL (ref 1.8–7.7)
SODIUM BLD-SCNC: 128 MMOL/L (ref 135–144)
WBC # BLD: 14.9 K/UL (ref 3.5–11.3)

## 2022-06-07 PROCEDURE — 2580000003 HC RX 258: Performed by: NURSE PRACTITIONER

## 2022-06-07 PROCEDURE — 82947 ASSAY GLUCOSE BLOOD QUANT: CPT

## 2022-06-07 PROCEDURE — 6360000004 HC RX CONTRAST MEDICATION: Performed by: RADIOLOGY

## 2022-06-07 PROCEDURE — 85610 PROTHROMBIN TIME: CPT

## 2022-06-07 PROCEDURE — 85025 COMPLETE CBC W/AUTO DIFF WBC: CPT

## 2022-06-07 PROCEDURE — 2500000003 HC RX 250 WO HCPCS: Performed by: RADIOLOGY

## 2022-06-07 PROCEDURE — 6370000000 HC RX 637 (ALT 250 FOR IP): Performed by: INTERNAL MEDICINE

## 2022-06-07 PROCEDURE — 84100 ASSAY OF PHOSPHORUS: CPT

## 2022-06-07 PROCEDURE — 6360000002 HC RX W HCPCS: Performed by: RADIOLOGY

## 2022-06-07 PROCEDURE — 99153 MOD SED SAME PHYS/QHP EA: CPT

## 2022-06-07 PROCEDURE — 36902 INTRO CATH DIALYSIS CIRCUIT: CPT

## 2022-06-07 PROCEDURE — 97535 SELF CARE MNGMENT TRAINING: CPT

## 2022-06-07 PROCEDURE — 2060000000 HC ICU INTERMEDIATE R&B

## 2022-06-07 PROCEDURE — 90935 HEMODIALYSIS ONE EVALUATION: CPT

## 2022-06-07 PROCEDURE — B51W1ZZ FLUOROSCOPY OF DIALYSIS SHUNT/FISTULA USING LOW OSMOLAR CONTRAST: ICD-10-PCS | Performed by: RADIOLOGY

## 2022-06-07 PROCEDURE — 6370000000 HC RX 637 (ALT 250 FOR IP): Performed by: NURSE PRACTITIONER

## 2022-06-07 PROCEDURE — C1894 INTRO/SHEATH, NON-LASER: HCPCS

## 2022-06-07 PROCEDURE — 03783ZZ DILATION OF LEFT BRACHIAL ARTERY, PERCUTANEOUS APPROACH: ICD-10-PCS | Performed by: RADIOLOGY

## 2022-06-07 PROCEDURE — 2709999900 HC NON-CHARGEABLE SUPPLY

## 2022-06-07 PROCEDURE — 6370000000 HC RX 637 (ALT 250 FOR IP): Performed by: FAMILY MEDICINE

## 2022-06-07 PROCEDURE — 36415 COLL VENOUS BLD VENIPUNCTURE: CPT

## 2022-06-07 PROCEDURE — 99152 MOD SED SAME PHYS/QHP 5/>YRS: CPT

## 2022-06-07 PROCEDURE — C1769 GUIDE WIRE: HCPCS

## 2022-06-07 PROCEDURE — 99232 SBSQ HOSP IP/OBS MODERATE 35: CPT | Performed by: INTERNAL MEDICINE

## 2022-06-07 PROCEDURE — 80048 BASIC METABOLIC PNL TOTAL CA: CPT

## 2022-06-07 PROCEDURE — 83735 ASSAY OF MAGNESIUM: CPT

## 2022-06-07 RX ORDER — INSULIN GLARGINE 100 [IU]/ML
25 INJECTION, SOLUTION SUBCUTANEOUS NIGHTLY
Status: DISCONTINUED | OUTPATIENT
Start: 2022-06-08 | End: 2022-06-08 | Stop reason: HOSPADM

## 2022-06-07 RX ORDER — MIDAZOLAM HYDROCHLORIDE 1 MG/ML
INJECTION INTRAMUSCULAR; INTRAVENOUS
Status: COMPLETED | OUTPATIENT
Start: 2022-06-07 | End: 2022-06-07

## 2022-06-07 RX ORDER — IODIXANOL 320 MG/ML
INJECTION, SOLUTION INTRAVASCULAR
Status: COMPLETED | OUTPATIENT
Start: 2022-06-07 | End: 2022-06-07

## 2022-06-07 RX ORDER — LIDOCAINE HYDROCHLORIDE 10 MG/ML
INJECTION, SOLUTION EPIDURAL; INFILTRATION; INTRACAUDAL; PERINEURAL
Status: COMPLETED | OUTPATIENT
Start: 2022-06-07 | End: 2022-06-07

## 2022-06-07 RX ADMIN — CARVEDILOL 6.25 MG: 6.25 TABLET, FILM COATED ORAL at 19:01

## 2022-06-07 RX ADMIN — PANTOPRAZOLE SODIUM 40 MG: 40 TABLET, DELAYED RELEASE ORAL at 06:29

## 2022-06-07 RX ADMIN — SENNOSIDES 17.2 MG: 8.6 TABLET, FILM COATED ORAL at 20:48

## 2022-06-07 RX ADMIN — SODIUM CHLORIDE, PRESERVATIVE FREE 10 ML: 5 INJECTION INTRAVENOUS at 08:29

## 2022-06-07 RX ADMIN — INSULIN LISPRO 3 UNITS: 100 INJECTION, SOLUTION INTRAVENOUS; SUBCUTANEOUS at 19:01

## 2022-06-07 RX ADMIN — POLYETHYLENE GLYCOL 3350 17 G: 17 POWDER, FOR SOLUTION ORAL at 08:23

## 2022-06-07 RX ADMIN — MIDAZOLAM 0.5 MG: 1 INJECTION INTRAMUSCULAR; INTRAVENOUS at 12:49

## 2022-06-07 RX ADMIN — ASPIRIN 81 MG: 81 TABLET, COATED ORAL at 08:15

## 2022-06-07 RX ADMIN — IODIXANOL 100 ML: 320 INJECTION, SOLUTION INTRAVASCULAR at 12:53

## 2022-06-07 RX ADMIN — PREDNISONE 40 MG: 20 TABLET ORAL at 08:15

## 2022-06-07 RX ADMIN — INSULIN LISPRO 3 UNITS: 100 INJECTION, SOLUTION INTRAVENOUS; SUBCUTANEOUS at 20:47

## 2022-06-07 RX ADMIN — SODIUM CHLORIDE, PRESERVATIVE FREE 10 ML: 5 INJECTION INTRAVENOUS at 20:48

## 2022-06-07 RX ADMIN — ROSUVASTATIN CALCIUM 10 MG: 10 TABLET, FILM COATED ORAL at 20:47

## 2022-06-07 RX ADMIN — LIDOCAINE HYDROCHLORIDE 5 ML: 10 INJECTION, SOLUTION EPIDURAL; INFILTRATION; INTRACAUDAL; PERINEURAL at 12:33

## 2022-06-07 RX ADMIN — INSULIN GLARGINE 45 UNITS: 100 INJECTION, SOLUTION SUBCUTANEOUS at 08:31

## 2022-06-07 RX ADMIN — BUPROPION HYDROCHLORIDE 150 MG: 150 TABLET, EXTENDED RELEASE ORAL at 08:15

## 2022-06-07 RX ADMIN — INSULIN GLARGINE 20 UNITS: 100 INJECTION, SOLUTION SUBCUTANEOUS at 20:47

## 2022-06-07 RX ADMIN — EZETIMIBE 10 MG: 10 TABLET ORAL at 08:15

## 2022-06-07 RX ADMIN — VITAM B12 500 MCG: 100 TAB at 08:17

## 2022-06-07 RX ADMIN — SENNOSIDES 17.2 MG: 8.6 TABLET, FILM COATED ORAL at 08:15

## 2022-06-07 RX ADMIN — GABAPENTIN 300 MG: 300 CAPSULE ORAL at 08:15

## 2022-06-07 RX ADMIN — CARVEDILOL 6.25 MG: 6.25 TABLET, FILM COATED ORAL at 08:15

## 2022-06-07 NOTE — PROGRESS NOTES
Physical Therapy  DATE: 2022    NAME: Lemuel Franco  MRN: [id number]   : 1975    Patient not seen this date for Physical Therapy due to:      [] Cancel by RN or physician due to:    [] Hemodialysis    [] Critical Lab Value Level     [] Blood transfusion in progress    [] Acute or unstable cardiovascular status   _MAP < 55 or more than >115  _HR < 40 or > 130    [] Acute or unstable pulmonary status   -FiO2 > 60%   _RR < 5 or >40    _O2 sats < 85%    [] Strict Bedrest    [x] Off Unit for surgery or procedure   Pt out of room at IR then nursing states he will be going to dialysis. Will continue to follow    [] Off Unit for testing       [] Pending imaging to R/O fracture    [] Refusal by Patient      [] Other      [] PT being discontinued at this time. Patient independent. No further needs. [] PT being discontinued at this time as the patient has been transferred to hospice care. No further needs.       Tatyana Astudillo, PTA

## 2022-06-07 NOTE — PLAN OF CARE
Problem: Discharge Planning  Goal: Discharge to home or other facility with appropriate resources  6/6/2022 2028 by Russell Sexton RN  Outcome: Progressing     Problem: Pain  Goal: Verbalizes/displays adequate comfort level or baseline comfort level  6/6/2022 2028 by Russell Sexton RN  Outcome: Progressing     Problem: Safety - Adult  Goal: Free from fall injury  6/6/2022 2028 by Russell Sexton RN  Outcome: Progressing     Problem: ABCDS Injury Assessment  Goal: Absence of physical injury  6/6/2022 2028 by Russell Sexton RN  Outcome: Progressing     Problem: Skin/Tissue Integrity  Goal: Absence of new skin breakdown  Description: 1. Monitor for areas of redness and/or skin breakdown  2. Assess vascular access sites hourly  3. Every 4-6 hours minimum:  Change oxygen saturation probe site  4. Every 4-6 hours:  If on nasal continuous positive airway pressure, respiratory therapy assess nares and determine need for appliance change or resting period.   6/6/2022 2028 by Russell Sexton RN  Outcome: Progressing

## 2022-06-07 NOTE — CARE COORDINATION
Social Work-[facility] can not admit after 5 today.  Scheduled admission for Wed at 11 AM. Josefa Ulrich

## 2022-06-07 NOTE — PLAN OF CARE
Problem: Chronic Conditions and Co-morbidities  Goal: Patient's chronic conditions and co-morbidity symptoms are monitored and maintained or improved  Outcome: Progressing     Problem: Discharge Planning  Goal: Discharge to home or other facility with appropriate resources  6/7/2022 0236 by Isabelle Thacker RN  Outcome: Progressing  6/6/2022 2028 by Roberto Brandt RN  Outcome: Progressing     Problem: Pain  Goal: Verbalizes/displays adequate comfort level or baseline comfort level  6/7/2022 0236 by Isabelle Thacker RN  Outcome: Progressing  6/6/2022 2028 by Roberto Brandt RN  Outcome: Progressing     Problem: Safety - Adult  Goal: Free from fall injury  6/7/2022 0236 by Isabelle Thacker RN  Outcome: Progressing  6/6/2022 2028 by Roberto Brandt RN  Outcome: Progressing     Problem: ABCDS Injury Assessment  Goal: Absence of physical injury  6/7/2022 0236 by Isabelle Thacker RN  Outcome: Progressing  6/6/2022 2028 by Roberto Brandt RN  Outcome: Progressing     Problem: Skin/Tissue Integrity  Goal: Absence of new skin breakdown  Description: 1. Monitor for areas of redness and/or skin breakdown  2. Assess vascular access sites hourly  3. Every 4-6 hours minimum:  Change oxygen saturation probe site  4. Every 4-6 hours:  If on nasal continuous positive airway pressure, respiratory therapy assess nares and determine need for appliance change or resting period.   6/7/2022 0236 by Isabelle Thacker RN  Outcome: Progressing  6/6/2022 2028 by Roberto Brandt RN  Outcome: Progressing     Problem: Nutrition Deficit:  Goal: Optimize nutritional status  Outcome: Progressing

## 2022-06-07 NOTE — PROGRESS NOTES
Nephrology Progress Note    Samuel Hora Severance Jadine Donate, APRN - CNP    Follow Up for (CC) : End-stage renal disease    ASSESSMENT     End-stage renal disease  Anemia of end-stage renal disease  Hyperkalemia corrected  Metabolic bone disorder  Pericardial effusion hypotension s/p drainage    Principal Problem:    Acute idiopathic pericarditis  Active Problems:    Hypotension    Elevated troponin    Hyponatremia    Anemia    Hyperkalemia    Left lower quadrant abdominal pain    ESRD (end stage renal disease) on dialysis ([address]    Primary hypertension    Hyperlipidemia    Type 2 diabetes mellitus with kidney complication, with long-term current use of insulin (HCC)    GERD (gastroesophageal reflux disease)  Resolved Problems:    * No resolved hospital problems. *      PLAN     Hemodialysis today   See orders   We will follow-up on fistulogram report   Decrease erythropoiesis stimulating agents dose   Target hemoglobin is 10-11   Hold erythropoiesis stimulating agents if hemoglobin is above 11   Decrease the dose of NEDA once the hemoglobin is between 10 and 11   See orders   Follow-up labs     Please do not hesitate to call with questions.     SUBJECTIVE      Patient had fistulogram per IR  Labs meds reviewed  Hemodialysis today planned  Chief Complaint   Patient presents with    Chest Pain    Shortness of Breath       Patient Active Problem List   Diagnosis    ESRD (end stage renal disease) on dialysis ([address]    Primary hypertension    Hyperlipidemia    Acute pain of left knee    Bipolar affective disorder ([address]    Atherosclerosis of aorta ([address]    COVID-19    Cerebrovascular accident (CVA) ([address]    Type 2 diabetes mellitus with kidney complication, with long-term current use of insulin ([address]    Neuropathy of both feet    GERD (gastroesophageal reflux disease)    Right sided weakness    Leg weakness, bilateral    Recurrent falls    Acute idiopathic pericarditis    Small kidney, bilateral    Umbilical hernia without obstruction or gangrene    Disorders of diaphragm    Atherosclerosis of other arteries    Hypotension    Elevated troponin    Hyponatremia    Anemia    Hyperkalemia    Left lower quadrant abdominal pain       CURRENT MEDICATIONS / Allergies Rosalva Jeronimo History / Family History     Current Facility-Administered Medications   Medication Dose Route Frequency Provider Last Rate Last Admin    epoetin pennie-epbx (RETACRIT) injection 3,000 Units  3,000 Units SubCUTAneous Once per day on Mon Wed Fri Kelsey Myers MD        polyethylene glycol [facility]) packet 17 g  17 g Oral Daily Stiven Morrison MD   17 g at 06/07/22 9077    insulin glargine (LANTUS) injection vial 20 Units  20 Units SubCUTAneous Nightly Kelsey Myers MD   20 Units at 06/06/22 2015    milk and molasses enema 240 mL  240 mL Rectal Once Kelsey Myers MD        senna (SENOKOT) tablet 17.2 mg  2 tablet Oral BID DANIEL Bennett CNP   17.2 mg at 06/07/22 0815    bisacodyl (DULCOLAX) EC tablet 5 mg  5 mg Oral Daily PRN DANIEL Bennett CNP   5 mg at 06/03/22 0629    carvedilol (COREG) tablet 6.25 mg  6.25 mg Oral BID  Whitney Teran MD   6.25 mg at 06/07/22 0815    insulin glargine (LANTUS) injection vial 45 Units  45 Units SubCUTAneous Daily Kelsey Myers MD   45 Units at 06/07/22 0831    insulin lispro (HUMALOG) injection vial 0-18 Units  0-18 Units SubCUTAneous TID  DANIEL Bennett CNP   9 Units at 06/05/22 1707    insulin lispro (HUMALOG) injection vial 0-9 Units  0-9 Units SubCUTAneous Nightly DANIEL Bennett CNP   6 Units at 06/06/22 2014    aspirin EC tablet 81 mg  81 mg Oral Daily Maureen Stemple, APRN - NP   81 mg at 06/07/22 0815    buPROPion (WELLBUTRIN XL) extended release tablet 150 mg  150 mg Oral QAM Maureen Stemple, APRN - NP   150 mg at 06/07/22 0815    ezetimibe (ZETIA) tablet 10 mg  10 mg Oral Daily Maureen Stemple, APRN - NP 10 mg at 06/07/22 0815    [Held by provider] FLUoxetine (PROZAC) capsule 40 mg  40 mg Oral Daily Maureen Stemple, APRN - NP        gabapentin (NEURONTIN) capsule 300 mg  300 mg Oral Daily Maureen Stemple, APRN - NP   300 mg at 06/07/22 0815    [Held by provider] lisinopril (PRINIVIL;ZESTRIL) tablet 20 mg  20 mg Oral Daily Maureen Stemple, APRN - NP        vitamin B-12 (CYANOCOBALAMIN) tablet 500 mcg  500 mcg Oral Daily Maureen Stemple, APRN - NP   500 mcg at 06/07/22 0817    glucose chewable tablet 16 g  4 tablet Oral PRN Maureen Stemple, APRN - NP   16 g at 06/06/22 1137    dextrose bolus 10% 125 mL  125 mL IntraVENous PRN Maureen Stemple, APRN - NP   Stopped at 06/06/22 1235    Or    dextrose bolus 10% 250 mL  250 mL IntraVENous PRN Maureen Stemple, APRN - NP        glucagon (rDNA) injection 1 mg  1 mg IntraMUSCular PRN Maureen Stemple, APRN - NP        dextrose 5 % solution  100 mL/hr IntraVENous PRN Maureen Stemple, APRN - NP        sodium chloride flush 0.9 % injection 5-40 mL  5-40 mL IntraVENous 2 times per day Maureen Stemple, APRN - NP   10 mL at 06/07/22 0829    sodium chloride flush 0.9 % injection 5-40 mL  5-40 mL IntraVENous PRN Maureen Stemple, APRN - NP        0.9 % sodium chloride infusion   IntraVENous PRN Maureen Stemple, APRN - NP        ondansetron (ZOFRAN-ODT) disintegrating tablet 4 mg  4 mg Oral Q8H PRN Maureen Stemple, APRN - NP        Or    ondansetron (ZOFRAN) injection 4 mg  4 mg IntraVENous Q6H PRN Maureen Stemple, APRN - NP   4 mg at 06/03/22 1642    acetaminophen (TYLENOL) tablet 650 mg  650 mg Oral Q6H PRN Maureen Stemple, APRN - NP   650 mg at 06/03/22 7128    Or    acetaminophen (TYLENOL) suppository 650 mg  650 mg Rectal Q6H PRN Maureen Stemple, APRN - NP        nitroGLYCERIN (NITROSTAT) SL tablet 0.4 mg  0.4 mg SubLINGual Q5 Min PRN Maureen Stemple, APRN - NP        rosuvastatin (CRESTOR) tablet 10 mg  10 mg Oral Nightly Maureen Trish APRN - NP   10 mg at 06/06/22 2014    predniSONE (DELTASONE) tablet 40 mg  40 mg Oral Daily Shagufta Byrne, APRN - NP   40 mg at 06/07/22 0815    pantoprazole (PROTONIX) tablet 40 mg  40 mg Oral QAM AC Shagufta Byrne APRN - NP   40 mg at 06/07/22 5256    perflutren lipid microspheres (DEFINITY) injection 1.65 mg  1.5 mL IntraVENous ONCE PRN Julia Morgan, DO              No Known Allergies    Social History     Socioeconomic History    Marital status: Single     Spouse name: Not on file    Number of children: Not on file    Years of education: Not on file    Highest education level: Not on file   Occupational History    Not on file   Tobacco Use    Smoking status: Never Smoker    Smokeless tobacco: Never Used   Vaping Use    Vaping Use: Never used   Substance and Sexual Activity    Alcohol use: Never    Drug use: Never    Sexual activity: Not on file   Other Topics Concern    Not on file   Social History Narrative    Not on file     Social Determinants of Health     Financial Resource Strain:     Difficulty of Paying Living Expenses: Not on file   Food Insecurity:     Worried About [address] in the Last Year: Not on file    [address] in the Last Year: Not on file   Transportation Needs:     Lack of Transportation (Medical): Not on file    Lack of Transportation (Non-Medical):  Not on file   Physical Activity:     Days of Exercise per Week: Not on file    Minutes of Exercise per Session: Not on file   Stress:     Feeling of Stress : Not on file   Social Connections:     Frequency of Communication with Friends and Family: Not on file    Frequency of Social Gatherings with Friends and Family: Not on file    Attends Latter day Services: Not on file    Active Member of Clubs or Organizations: Not on file    Attends Club or Organization Meetings: Not on file    Marital Status: Not on file   Intimate Partner Violence:     Fear of Current or Ex-Partner: Not on file   Bobby Bass PHOS 2.4* 3.8 5.2*     Ionized Calcium: No results for input(s): IONCA in the last 72 hours. Magnesium:   Recent Labs     06/05/22  0314 06/06/22  0301 06/07/22  0311   MG 2.4 2.5 2.5       Yarelis Giles MD MD  [facility].   Ph: [phone]

## 2022-06-07 NOTE — PLAN OF CARE

## 2022-06-07 NOTE — PROGRESS NOTES
Dialysis Post Treatment Report:     -Access Assessment  WNL     -Ultrafiltration   UF Goal 2000   Prime (-) 500   NS Flush (-)    Other (-/+)    Total UF Removed 1500     -Medications / Blood Administration  Medications Given (Y/N) YES   Blood Products (Y/N)      Narrative: Tolerated well. BP dropped mid tx, then increased when UF decreased. VSS upon departure to room. Denies complaints.

## 2022-06-07 NOTE — FLOWSHEET NOTE
06/07/22 1713   Treatment Team Notification   Reason for Communication Review case   Team Member Name Dr Princess Busby Provider  (nephrology)   Method of Communication Secure Message   Response Waiting for response   Notification Time [phone]     Per SW- messaged relayed to RN by manager that [facility] offers HD MWF only. Pt is currently on TRS HD schedule; currently receiving HD at this time. Accepting facility requires clarification on how patient is to be dialyzed moving forward to match facility schedule. Page out to Dr Татьяна Tom for orders. UPDATE   Dr Татьяна Tom returned page and updated by RN. Per Dr Татьяна Tom - Pt is to receive HD 3x this week; will need to be dialyzed Wednesday at [facility] and again Friday at [facility]. Requests transport to be moved to 1500. SW updated. CARISSA will need to updated to reflect HD needs per MD order.

## 2022-06-07 NOTE — CARE COORDINATION
Social work; await information on if pt can go to [facility] today and wait till Friday for the next dialysis treatment there. Will follow nurse manager is working on that issue. Transport set up for 11 am tomorroow to [facility] but that can be changed as needed. Pt MUST BE at [facility] by 5:30 pm per Bella in admissions. Holly mendez    Social work plan is now to have dialysis in am then at 3 pm be transported by [employer] to [facility]. Please inform pt  Left message for grandparents of the time and the current plan. Will need amanda.  Holly mendez

## 2022-06-07 NOTE — CARE COORDINATION
Social work: [facility] is asking that we convey to physician that they only do dialysis MWF at [facility] and pt is getting dialysis today will it be ok to wait until Friday or should pt get treatment in am tomorrow here and change the time for transfer to [facility]? [facility] cannot do dialysis on day of admission. [place] is following up to see if we could change the time of discharge tomorrow to fit getting early am dialysis as pt cannot arrive after 5:30 pm at [facility] right now due to staffing issues. Duane supervisor to call nephrologist and discuss. May change transportation time. If 11 am is too early. Will follow.  Ehsan mendez

## 2022-06-07 NOTE — PROGRESS NOTES
Tolerance: Patient tolerated treatment well  Discharge Recommendations: Patient would benefit from continued therapy after discharge  Safety Devices  Safety Devices in place: Yes  Type of devices: Left in bed;Call light within reach;Nurse notified    Patient Education  Education  Education Given To: Patient; Family  Education Provided: Role of Therapy; Fall Prevention Strategies;Transfer Training;Plan of Care;Energy Conservation;Home Exercise Program;Precautions; Safety; Family Education;Equipment  Education Method: Printed Information/Hand-outs; Verbal;Demonstration  Barriers to Learning: None  Education Outcome: Verbalized understanding    Plan  Plan  Times per Week: 4-5x a week, 1-2x a day  Times per Day: Daily  Current Treatment Recommendations: Strengthening;ROM;Balance training;Functional mobility training; Endurance training;Neuromuscular re-education;Positioning;Equipment evaluation, education, & procurement;Patient/Caregiver education & training; Safety education & training;Self-Care / ADL; Coordination training    Goals  Patient Goals   Patient goals : to get stronger  Short Term Goals  Time Frame for Short term goals: By discharge, pt to demo  Short Term Goal 1: SBA with functional mob with good safety, pacing and use of AD as needed. Short Term Goal 2: SBA with ADL transfers with good safety, pacing and use of AD/DME as needed. Short Term Goal 3: SBA with toileting tasks with good safety, pacing and use of AD/DME as needed. Short Term Goal 4: SBA with UB ADLs and CGA with LB ADLs with good safety, pacing and use of AD/AE as needed. Short Term Goal 5: I with BUE HEP to assist with functional tasks. Long Term Goals  Long Term Goal 1: and verb good understanding of edu with BUE HEP, fall prevention techs, RW safety/mgmt, ADL compensatory techs, possible equip needs, EC/WS techs, breathing techs, positioning techs for skin integrity, and discharge recommendations.     AM-PAC Score        AM-PAC Inpatient Daily Activity Raw Score: 19 (06/07/22 1100)  AM-PAC Inpatient ADL T-Scale Score : 40.22 (06/07/22 1100)  ADL Inpatient CMS 0-100% Score: 42.8 (06/07/22 1100)  ADL Inpatient CMS G-Code Modifier : CK (06/07/22 1100)      Therapy Time   Individual Concurrent Group Co-treatment   Time In 1021         Time Out 1036         Minutes 15           Upon writer exit, call light within reach, pt retired to bed. All lines intact and patient positioned comfortably. All patient needs addressed prior to ending therapy session. Chart reviewed prior to treatment and patient is agreeable for therapy. RN reports patient is medically stable for therapy treatment this date.       BERE Ceballos/JOHN

## 2022-06-08 VITALS
SYSTOLIC BLOOD PRESSURE: 133 MMHG | HEIGHT: 67 IN | HEART RATE: 83 BPM | RESPIRATION RATE: 18 BRPM | WEIGHT: 175.93 LBS | BODY MASS INDEX: 27.61 KG/M2 | TEMPERATURE: 98.2 F | OXYGEN SATURATION: 98 % | DIASTOLIC BLOOD PRESSURE: 75 MMHG

## 2022-06-08 LAB
ABSOLUTE EOS #: 0 K/UL (ref 0–0.44)
ABSOLUTE IMMATURE GRANULOCYTE: 0.47 K/UL (ref 0–0.3)
ABSOLUTE LYMPH #: 1.73 K/UL (ref 1.1–3.7)
ABSOLUTE MONO #: 1.57 K/UL (ref 0.1–1.2)
ANION GAP SERPL CALCULATED.3IONS-SCNC: 13 MMOL/L (ref 9–17)
BASOPHILS # BLD: 0 % (ref 0–2)
BASOPHILS ABSOLUTE: 0 K/UL (ref 0–0.2)
BUN BLDV-MCNC: 69 MG/DL (ref 6–20)
BUN/CREAT BLD: 12 (ref 9–20)
CALCIUM SERPL-MCNC: 8.3 MG/DL (ref 8.6–10.4)
CHLORIDE BLD-SCNC: 89 MMOL/L (ref 98–107)
CO2: 28 MMOL/L (ref 20–31)
CREAT SERPL-MCNC: 5.63 MG/DL (ref 0.7–1.2)
EOSINOPHILS RELATIVE PERCENT: 0 % (ref 1–4)
GFR AFRICAN AMERICAN: 13 ML/MIN
GFR NON-AFRICAN AMERICAN: 11 ML/MIN
GFR SERPL CREATININE-BSD FRML MDRD: ABNORMAL ML/MIN/{1.73_M2}
GLUCOSE BLD-MCNC: 136 MG/DL (ref 75–110)
GLUCOSE BLD-MCNC: 228 MG/DL (ref 70–99)
HCT VFR BLD CALC: 32.2 % (ref 40.7–50.3)
HEMOGLOBIN: 10.4 G/DL (ref 13–17)
IMMATURE GRANULOCYTES: 3 %
INR BLD: 1
LYMPHOCYTES # BLD: 11 % (ref 24–43)
MAGNESIUM: 2.2 MG/DL (ref 1.6–2.6)
MCH RBC QN AUTO: 29.9 PG (ref 25.2–33.5)
MCHC RBC AUTO-ENTMCNC: 32.3 G/DL (ref 28–38)
MCV RBC AUTO: 92.5 FL (ref 82.6–102.9)
MONOCYTES # BLD: 10 % (ref 3–12)
PDW BLD-RTO: 12.6 % (ref 11.8–14.4)
PHOSPHORUS: 4.7 MG/DL (ref 2.5–4.5)
PLATELET # BLD: 426 K/UL (ref 138–453)
PMV BLD AUTO: 9.3 FL (ref 8.1–13.5)
POTASSIUM SERPL-SCNC: 4.1 MMOL/L (ref 3.7–5.3)
PROTHROMBIN TIME: 13.5 SEC (ref 11.5–14.2)
RBC # BLD: 3.48 M/UL (ref 4.21–5.77)
SEG NEUTROPHILS: 76 % (ref 36–65)
SEGMENTED NEUTROPHILS ABSOLUTE COUNT: 11.93 K/UL (ref 1.5–8.1)
SODIUM BLD-SCNC: 130 MMOL/L (ref 135–144)
WBC # BLD: 15.7 K/UL (ref 3.5–11.3)

## 2022-06-08 PROCEDURE — 6370000000 HC RX 637 (ALT 250 FOR IP): Performed by: INTERNAL MEDICINE

## 2022-06-08 PROCEDURE — 99239 HOSP IP/OBS DSCHRG MGMT >30: CPT | Performed by: INTERNAL MEDICINE

## 2022-06-08 PROCEDURE — 83735 ASSAY OF MAGNESIUM: CPT

## 2022-06-08 PROCEDURE — 80048 BASIC METABOLIC PNL TOTAL CA: CPT

## 2022-06-08 PROCEDURE — 6370000000 HC RX 637 (ALT 250 FOR IP): Performed by: NURSE PRACTITIONER

## 2022-06-08 PROCEDURE — 6370000000 HC RX 637 (ALT 250 FOR IP): Performed by: FAMILY MEDICINE

## 2022-06-08 PROCEDURE — 36415 COLL VENOUS BLD VENIPUNCTURE: CPT

## 2022-06-08 PROCEDURE — 85025 COMPLETE CBC W/AUTO DIFF WBC: CPT

## 2022-06-08 PROCEDURE — 90935 HEMODIALYSIS ONE EVALUATION: CPT

## 2022-06-08 PROCEDURE — 82947 ASSAY GLUCOSE BLOOD QUANT: CPT

## 2022-06-08 PROCEDURE — 6360000002 HC RX W HCPCS: Performed by: FAMILY MEDICINE

## 2022-06-08 PROCEDURE — 2580000003 HC RX 258: Performed by: NURSE PRACTITIONER

## 2022-06-08 PROCEDURE — 85610 PROTHROMBIN TIME: CPT

## 2022-06-08 PROCEDURE — 84100 ASSAY OF PHOSPHORUS: CPT

## 2022-06-08 RX ORDER — INSULIN GLARGINE 100 [IU]/ML
45 INJECTION, SOLUTION SUBCUTANEOUS
Qty: 10 ML | Refills: 3 | Status: ON HOLD | DISCHARGE
Start: 2022-06-08 | End: 2022-07-15 | Stop reason: HOSPADM

## 2022-06-08 RX ORDER — ROSUVASTATIN CALCIUM 10 MG/1
10 TABLET, COATED ORAL NIGHTLY
Qty: 30 TABLET | Refills: 3 | Status: ON HOLD | DISCHARGE
Start: 2022-06-08 | End: 2022-07-14

## 2022-06-08 RX ORDER — INSULIN GLARGINE 100 [IU]/ML
25 INJECTION, SOLUTION SUBCUTANEOUS NIGHTLY
Qty: 10 ML | Refills: 3 | Status: ON HOLD | DISCHARGE
Start: 2022-06-08 | End: 2022-07-14

## 2022-06-08 RX ORDER — PREDNISONE 10 MG/1
TABLET ORAL
Qty: 30 TABLET | Refills: 0 | Status: ON HOLD | DISCHARGE
Start: 2022-06-08 | End: 2022-07-14

## 2022-06-08 RX ADMIN — EPOETIN ALFA-EPBX 3000 UNITS: 3000 INJECTION, SOLUTION INTRAVENOUS; SUBCUTANEOUS at 14:12

## 2022-06-08 RX ADMIN — VITAM B12 500 MCG: 100 TAB at 14:10

## 2022-06-08 RX ADMIN — EZETIMIBE 10 MG: 10 TABLET ORAL at 14:11

## 2022-06-08 RX ADMIN — INSULIN GLARGINE 45 UNITS: 100 INJECTION, SOLUTION SUBCUTANEOUS at 08:21

## 2022-06-08 RX ADMIN — PREDNISONE 40 MG: 20 TABLET ORAL at 14:11

## 2022-06-08 RX ADMIN — GABAPENTIN 300 MG: 300 CAPSULE ORAL at 14:11

## 2022-06-08 RX ADMIN — ASPIRIN 81 MG: 81 TABLET, COATED ORAL at 14:11

## 2022-06-08 RX ADMIN — SODIUM CHLORIDE, PRESERVATIVE FREE 10 ML: 5 INJECTION INTRAVENOUS at 14:11

## 2022-06-08 RX ADMIN — CARVEDILOL 6.25 MG: 6.25 TABLET, FILM COATED ORAL at 14:10

## 2022-06-08 RX ADMIN — BUPROPION HYDROCHLORIDE 150 MG: 150 TABLET, EXTENDED RELEASE ORAL at 14:10

## 2022-06-08 RX ADMIN — PANTOPRAZOLE SODIUM 40 MG: 40 TABLET, DELAYED RELEASE ORAL at 05:47

## 2022-06-08 ASSESSMENT — ENCOUNTER SYMPTOMS
NAUSEA: 0
VOMITING: 0
BACK PAIN: 0
SHORTNESS OF BREATH: 0

## 2022-06-08 NOTE — PROGRESS NOTES
Dialysis Post Treatment Report:     -Access Assessment  wnl good bfr     -Ultrafiltration   UF Goal 1400   Prime (-) 400   NS Flush (-)    Other (-/+)    Total UF Removed 1000     -Medications / Blood Administration  Medications Given (Y/N) n   Blood Products (Y/N) n     Narrative:  Pt tolerated hd well. No issues noted.  Post wt 78.8 kilo

## 2022-06-08 NOTE — PROGRESS NOTES
[facility]  Office: [address], DO, Kellie Salter, DO, Sally Youssef, DO, Allegra Nunes Cholo, DO, Brett Gutierrez MD, Elysia Lutz MD, Aniceto Best MD, Teresa Luong MD, Camelia Kebede MD, Mohinder Lopez MD, Albert Pierce MD, Madelyn Sanchez, DO, Antonieta Boyd MD,  Fabiana Friday, DO, Bri Zaragoza MD, Michelle Anguiano MD, Dottie Parsons MD, Eduardo Stein, DO, Reba Doss MD, Juli Stevens MD, Marko Márquez, DO, Dayan Stanford MD, Sofy Meraz, [facility], [facility], CNP, Merrill Prasad, CNP, Jodi Mckeon, CNP, Clarisse Neg, CNP, Shaila Parisi, CNP, RICHIE SantamariaC, Suzanne Maier, DNP, Valerio Casiano, CNP, Deidra Stockton, CNP, [facility] CNP, Kurt Cifuentes, [facility], Priscila Bachelor, [facility], Travis Romero, CNP, [facility], [facility], Arjun Zee, [id number]    Progress Note    Name:   Lexie Currie  MRN:     [id number]     Acct:      [de-identified]   Room:   2036/2036-01  IP Day:  8  Admit Date:  5/29/2022 11:56 PM    PCP:   DANIEL Castano CNP  Code Status:  Full Code    Subjective:     C/C:   Chief Complaint   Patient presents with    Chest Pain    Shortness of Breath     Interval History Status: improved. Underwent cardiac cath 6/6 which showed minimal nonobstructive CAD. Is scheduled to get fistulogram and HD today. Patient denies any complaints this morning. Anxious for discharge. Brief History:   Per my partner:'Patient came to emergency room with chest pain and shortness of breath. He was recently admitted in the hospital for lower extremity weakness which quickly resolved and was discharged home. MRI at that time showed acute lacunar infarct in the left basal ganglia with bilateral old lacunar infarcts. He returned back with difficulty in breathing. Patient also had hiccups, pain in left side chest radiated shoulder. Patient denies any fever, chills.   Patient was found to have a large pericardial effusion. EKG showed diffuse ST elevation suggestive of pericarditis. Patient had pericardiocentesis with 860 cc of hemorrhagic fluid obtained. He is also treated with heparin infusion and was stopped after STEMI ruled out. Pericardial drain was maintained until 6/1/2022 and was removed. Patient remained stable after drain removal.  A limited echo was performed after drain was removed showed reduced ejection fraction 40% with global LV hypokinesis. Cardiology recommended cardiac cath. Patient also had constipation stool impaction and required digital disimpaction and stool softeners.'    Review of Systems:     Constitutional:  negative for chills, fevers, sweats  Respiratory:  negative for cough, dyspnea on exertion, shortness of breath, wheezing  Cardiovascular:  negative for chest pain, chest pressure/discomfort, lower extremity edema, palpitations  Gastrointestinal:  negative for abdominal pain, constipation, diarrhea, nausea, vomiting  Neurological:  negative for dizziness, headache    Medications:      Allergies:  No Known Allergies    Current Meds:   Scheduled Meds:    epoetin pennie-epbx  3,000 Units SubCUTAneous Once per day on Mon Wed Fri    polyethylene glycol  17 g Oral Daily    insulin glargine  20 Units SubCUTAneous Nightly    milk and molasses  240 mL Rectal Once    senna  2 tablet Oral BID    carvedilol  6.25 mg Oral BID WC    insulin glargine  45 Units SubCUTAneous Daily    insulin lispro  0-18 Units SubCUTAneous TID WC    insulin lispro  0-9 Units SubCUTAneous Nightly    aspirin  81 mg Oral Daily    buPROPion  150 mg Oral QAM    ezetimibe  10 mg Oral Daily    [Held by provider] FLUoxetine  40 mg Oral Daily    gabapentin  300 mg Oral Daily    [Held by provider] lisinopril  20 mg Oral Daily    vitamin B-12  500 mcg Oral Daily    sodium chloride flush  5-40 mL IntraVENous 2 times per day    rosuvastatin  10 mg Oral Nightly    predniSONE  40 mg Oral Daily    pantoprazole  40 mg Oral QAM AC     Continuous Infusions:    dextrose      sodium chloride       PRN Meds: bisacodyl, glucose, dextrose bolus **OR** dextrose bolus, glucagon (rDNA), dextrose, sodium chloride flush, sodium chloride, ondansetron **OR** ondansetron, acetaminophen **OR** acetaminophen, nitroGLYCERIN, perflutren lipid microspheres    Data:     Past Medical History:   has a past medical history of Cerebral artery occlusion with cerebral infarction [facility]), Closed fracture of bone of right foot, [facility] Hemodialysis patient ([facility] Hyperlipidemia, Hypertension, Kidney disease, and Type 1 diabetes mellitus ([facility] Social History:   reports that he has never smoked. He has never used smokeless tobacco. He reports that he does not drink alcohol and does not use drugs. Family History:   Family History   Problem Relation Age of Onset    Diabetes Mother     Heart Disease Father     Diabetes Father     Cancer Paternal Grandmother     Heart Disease Maternal Great Grandfather        Vitals:  BP (!) 151/83   Pulse 92   Temp 97.7 °F (36.5 °C) (Temporal)   Resp 18   Ht 5' 7\" (1.702 m)   Wt 178 lb 9.2 oz (81 kg)   SpO2 93%   BMI 27.97 kg/m²   Temp (24hrs), Av.9 °F (36.6 °C), Min:97 °F (36.1 °C), Max:98.8 °F (37.1 °C)    Recent Labs     22  0743 22  1136 22  1649 22  1939   POCGLU 122* 281* 159* 247*       I/O (24Hr):     Intake/Output Summary (Last 24 hours) at 2022  Last data filed at 2022 1758  Gross per 24 hour   Intake 660 ml   Output 1500 ml   Net -840 ml       Labs:  Hematology:  Recent Labs     22  0314 22  0301 22  0311   WBC 16.5* 15.8* 14.9*   RBC 3.64* 3.44* 3.38*   HGB 11.0* 10.3* 10.1*   HCT 34.6* 32.7* 31.4*   MCV 95.1 95.1 92.9   MCH 30.2 29.9 29.9   MCHC 31.8 31.5 32.2   RDW 12.6 12.4 12.6    416 421   MPV 9.5 9.4 9.6   INR  --   --  1.0     Chemistry:  Recent Labs     22  0314 22  0301 06/07/22  0311   * 130* 128*   K 5.1 4.4 5.1   CL 91* 91* 88*   CO2 25 24 22   GLUCOSE 180* 208* 208*   BUN 46* 87* 117*   CREATININE 5.00* 6.99* 8.46*   MG 2.4 2.5 2.5   ANIONGAP 14 15 18*   LABGLOM 12* 8* 7*   GFRAA 15* 10* 8*   CALCIUM 8.9 8.7 8.5*   PHOS 2.4* 3.8 5.2*     Recent Labs     06/06/22 2000 06/06/22 2012 06/07/22  0743 06/07/22  1136 06/07/22  1649 06/07/22  1939   POCGLU 317* 314* 122* 281* 159* 247*     ABG:  Lab Results   Component Value Date    FIO2 NOT REPORTED 01/04/2022     Lab Results   Component Value Date/Time    SPECIAL RT WRIST 10ML 05/30/2022 05:15 AM     Lab Results   Component Value Date/Time    CULTURE NO GROWTH 6 DAYS 05/30/2022 06:00 PM       Radiology:  XR ABDOMEN (KUB) (SINGLE AP VIEW)    Result Date: 6/3/2022  Nonobstructive bowel gas pattern, massive colonic fecal burden     IR FISTULAGRAM    Result Date: 6/7/2022  Left arm fistulography with 2 stenotic segments requiring venoplasty as described above.        Physical Examination:        General appearance:  alert, cooperative and no distress, thin built  Mental Status:  oriented to person, place and time and normal affect  Lungs:  clear to auscultation bilaterally, normal effort  Heart:  regular rate and rhythm, no murmur  Abdomen:  soft, nontender, nondistended, normal bowel sounds  Extremities:  no edema, redness, tenderness in the calves  Skin:  no gross lesions, rashes, induration    Assessment:        Hospital Problems           Last Modified POA    * (Principal) Acute idiopathic pericarditis 5/30/2022 Yes    Hypotension 5/30/2022 Yes    Elevated troponin 5/30/2022 Yes    Hyponatremia 5/30/2022 Yes    Anemia 5/30/2022 Yes    Hyperkalemia 5/30/2022 Yes    Left lower quadrant abdominal pain 5/30/2022 Yes    ESRD (end stage renal disease) on dialysis ([facility] 5/30/2022 Yes    Primary hypertension 5/30/2022 Yes    Hyperlipidemia 5/30/2022 Yes    Type 2 diabetes mellitus with kidney complication, with long-term current use of insulin ([facility] (Chronic) 5/30/2022 Yes    GERD (gastroesophageal reflux disease) 5/30/2022 Yes          Plan:      - plan to d/c to SNF after HD today if ok with consultants. - blood sugars reviewed: increase night lantus dose to 25U.   - continue current steroids, pain medications , ASA and statin. - continue bowel regimen.      Tere Malin MD  6/7/2022

## 2022-06-08 NOTE — DISCHARGE SUMMARY
pain that radiated into her shoulder. EKG showed diffuse ST elevation suggestive of pericarditis. With EKG changes and elevated troponin was started on heparin which was stopped after STEMI was ruled out. He was found to have a large pericardial effusion . Underwent pericardiocentesis with 860 cc of hemorrhagic fluid removed. Was treated with colchicine x4 d, prednisone po. Pericardial drain was maintained until 6/1/2022. Limited echo was performed after the drain removal showed EF 40% with global LV hypokinesis. Underwent cardiac cath on 6/6/2022 which showed minimal nonobstructive CAD. Patient continued to have hemodialysis and underwent a fistulogram on 6/7/2022. With above treatment patient improved and is being discharged to SNF in stable conditions. Radiology:  XR ABDOMEN (KUB) (SINGLE AP VIEW)  Result Date: 6/3/2022  Nonobstructive bowel gas pattern, massive colonic fecal burden     IR FISTULAGRAM  Result Date: 6/7/2022  Left arm fistulography with 2 stenotic segments requiring venoplasty as described above. Consultations:    Consults:     Final Specialist Recommendations/Findings:   IP CONSULT TO HOSPITALIST  IP CONSULT TO NEPHROLOGY  IP CONSULT TO CARDIOLOGY      The patient was seen and examined on day of discharge and this discharge summary is in conjunction with any daily progress note from day of discharge.     Discharge plan:     Disposition: SNF    Physician Follow Up: =  Светлана Mckinney MD  [address] [place] [phone]    In 2 weeks  post hospital visit    DANIEL Gurrola - CNP  [address] [address]  [phone]    In 1 week  hospital follow up       Diet: renal diet    Activity: As tolerated    Discharge Medications:      Medication List      START taking these medications    bisacodyl 5 MG EC tablet  Commonly known as: DULCOLAX  Take 1 tablet by mouth daily as needed for Constipation     carvedilol 6.25 MG tablet  Commonly known as: COREG  Take 1 tablet by mouth 2 times daily (with meals)     pantoprazole 40 MG tablet  Commonly known as: PROTONIX  Take 1 tablet by mouth every morning (before breakfast)     predniSONE 10 MG tablet  Commonly known as: DELTASONE  Take 4 tabs daily for 3 days then 3 tabs daily for 3days followed by 2 tabs daily for 3 d and 1 tab daily for 3 d.     rosuvastatin 10 MG tablet  Commonly known as: CRESTOR  Take 1 tablet by mouth nightly  Replaces: Rosuvastatin Calcium 40 MG Cpsp     senna 8.6 MG tablet  Commonly known as: SENOKOT  Take 2 tablets by mouth 2 times daily        CHANGE how you take these medications    ammonium lactate 12 % lotion  Commonly known as: Lac-Hydrin  Apply topically daily. What changed: additional instructions     * insulin glargine 100 UNIT/ML injection vial  Commonly known as: LANTUS  Inject 45 Units into the skin every morning (before breakfast)  What changed:   how much to take  when to take this     * insulin glargine 100 UNIT/ML injection vial  Commonly known as: LANTUS  Inject 25 Units into the skin nightly  What changed: You were already taking a medication with the same name, and this prescription was added. Make sure you understand how and when to take each. * This list has 2 medication(s) that are the same as other medications prescribed for you. Read the directions carefully, and ask your doctor or other care provider to review them with you.             CONTINUE taking these medications    aspirin 81 MG EC tablet     buPROPion 150 MG extended release tablet  Commonly known as: WELLBUTRIN XL  Take 1 tablet by mouth every morning     calcium acetate 667 MG Tabs     ezetimibe 10 mg tablet  Commonly known as: ZETIA     FLUoxetine 40 MG capsule  Commonly known as: PROZAC     gabapentin 300 MG capsule  Commonly known as: NEURONTIN     magnesium hydroxide 400 MG/5ML suspension  Commonly known as: MILK OF MAGNESIA     magnesium oxide 400 MG tablet  Commonly known as: MAG-OX     Misc. Devices Misc  Disp: custom molded shoes to accommodate for brace   DX: DM with history of stroke, foot drop  Duration: 1 year     NovoLOG 100 UNIT/ML injection vial  Generic drug: insulin aspart     ondansetron 4 MG tablet  Commonly known as: ZOFRAN  Take 1 tablet by mouth 3 times daily as needed for Nausea or Vomiting     vitamin B-12 500 MCG tablet  Commonly known as: CYANOCOBALAMIN        STOP taking these medications    acetaminophen-codeine 300-30 MG per tablet  Commonly known as: TYLENOL/CODEINE #3     furosemide 20 MG tablet  Commonly known as: LASIX     lisinopril 20 MG tablet  Commonly known as: PRINIVIL;ZESTRIL     omeprazole 40 MG delayed release capsule  Commonly known as: PRILOSEC     Rosuvastatin Calcium 40 MG Cpsp  Replaced by: rosuvastatin 10 MG tablet     traMADol 50 MG tablet  Commonly known as: ULTRAM           Where to Get Your Medications      These medications were sent to [facility] [id number] [facility], [facility] - F [phone]  [address] [place] [address]    Phone: [phone]   carvedilol 6.25 MG tablet  pantoprazole 40 MG tablet  senna 8.6 MG tablet     Information about where to get these medications is not yet available    Ask your nurse or doctor about these medications  bisacodyl 5 MG EC tablet  insulin glargine 100 UNIT/ML injection vial  insulin glargine 100 UNIT/ML injection vial  predniSONE 10 MG tablet  rosuvastatin 10 MG tablet         No discharge procedures on file. Time Spent on discharge is  35 mins in patient examination, evaluation, counseling as well as medication reconciliation, prescriptions for required medications, discharge plan and follow up. Electronically signed by   Fabián Chery MD  6/8/2022  9:01 AM      Thank you Dr. Fawn Nowak, APRN - CNP for the opportunity to be involved in this patient's care.

## 2022-06-08 NOTE — PROGRESS NOTES
[facility]  Office: [address], DO, Vernon Ramses, DO, Satinder Bhakta, DO, Alexis Alejo Blood, DO, Davy Rivera MD, Eliz Woo MD, Ruddy Francis MD, Asad Bronson MD, Otto Estes MD, Gabriel Rosenberg MD, Elli Reyes MD, Satish Murphy DO, Brandi Altman MD,  Katarina Ash DO, Anders Asher MD, Kimmie Kumari MD, Columba Godwin MD, Mj Lopez DO, Jordan Fernandez MD, Nigel Baron MD, Kam Carney DO, Claudeen Hook, MD, Jemal Acevedo, [facility], [facility], CNP, Kayleen Mares, CNP, Rosibel Menard, CNP, Rocky Huitron, CNP, Nuris Song, CNP, Ruddy Choi, PA-C, Mai Castro, DNP, Vernell Pham, CNP, Grabiel Oviedo, CNP, Michele Ames, CNP, Marylene Bucy, CNS, Dustin Crain, [facility], Latricia Marinelli, CNP, Kristen May, [facility], [facility], [facility]    Progress Note    Name:   Carmen Leonardo  MRN:     [id number]     Acct:      [de-identified]   Room:   2036/2036-01  IP Day:  9  Admit Date:  5/29/2022 11:56 PM    PCP:   DANIEL Snyder CNP  Code Status:  Full Code    Subjective:     C/C:   Chief Complaint   Patient presents with    Chest Pain    Shortness of Breath     Interval History Status: improved. Underwent fistulogram and hemodialysis yesterday. Discharge held yesterday to coordinate hemodialysis days with SNF. No acute overnight events. Hemodynamically stable. Patient himself denies any complaints this morning. Brief History:   Per my partner:'Patient came to emergency room with chest pain and shortness of breath. He was recently admitted in the hospital for lower extremity weakness which quickly resolved and was discharged home. MRI at that time showed acute lacunar infarct in the left basal ganglia with bilateral old lacunar infarcts. He returned back with difficulty in breathing. Patient also had hiccups, pain in left side chest radiated shoulder.   Patient denies any fever, chills. Patient was found to have a large pericardial effusion. EKG showed diffuse ST elevation suggestive of pericarditis. Patient had pericardiocentesis with 860 cc of hemorrhagic fluid obtained. He is also treated with heparin infusion and was stopped after STEMI ruled out. Pericardial drain was maintained until 6/1/2022 and was removed. Patient remained stable after drain removal.  A limited echo was performed after drain was removed showed reduced ejection fraction 40% with global LV hypokinesis. Cardiology recommended cardiac cath. Patient also had constipation stool impaction and required digital disimpaction and stool softeners.'  Underwent cardiac cath 6/6 which showed minimal nonobstructive CAD  Review of Systems:     Constitutional:  negative for chills, fevers, sweats  Respiratory:  negative for cough, dyspnea on exertion, shortness of breath, wheezing  Cardiovascular:  negative for chest pain, chest pressure/discomfort, lower extremity edema, palpitations  Gastrointestinal:  negative for abdominal pain, constipation, diarrhea, nausea, vomiting  Neurological:  negative for dizziness, headache    Medications:      Allergies:  No Known Allergies    Current Meds:   Scheduled Meds:    insulin glargine  25 Units SubCUTAneous Nightly    epoetin pennie-epbx  3,000 Units SubCUTAneous Once per day on Mon Wed Fri    polyethylene glycol  17 g Oral Daily    milk and molasses  240 mL Rectal Once    senna  2 tablet Oral BID    carvedilol  6.25 mg Oral BID     insulin glargine  45 Units SubCUTAneous Daily    insulin lispro  0-18 Units SubCUTAneous TID     insulin lispro  0-9 Units SubCUTAneous Nightly    aspirin  81 mg Oral Daily    buPROPion  150 mg Oral QAM    ezetimibe  10 mg Oral Daily    [Held by provider] FLUoxetine  40 mg Oral Daily    gabapentin  300 mg Oral Daily    [Held by provider] lisinopril  20 mg Oral Daily    vitamin B-12  500 mcg Oral Daily    sodium chloride flush 426   MPV 9.4 9.6 9.3   INR  --  1.0 1.0     Chemistry:  Recent Labs     06/06/22  0301 06/07/22  0311 06/08/22  0434   * 128* 130*   K 4.4 5.1 4.1   CL 91* 88* 89*   CO2 24 22 28   GLUCOSE 208* 208* 228*   BUN 87* 117* 69*   CREATININE 6.99* 8.46* 5.63*   MG 2.5 2.5 2.2   ANIONGAP 15 18* 13   LABGLOM 8* 7* 11*   GFRAA 10* 8* 13*   CALCIUM 8.7 8.5* 8.3*   PHOS 3.8 5.2* 4.7*     Recent Labs     06/06/22 2012 06/07/22  0743 06/07/22  1136 06/07/22  1649 06/07/22  1939 06/08/22  0756   POCGLU 314* 122* 281* 159* 247* 136*     ABG:  Lab Results   Component Value Date    FIO2 NOT REPORTED 01/04/2022     Lab Results   Component Value Date/Time    SPECIAL RT WRIST 10ML 05/30/2022 05:15 AM     Lab Results   Component Value Date/Time    CULTURE NO GROWTH 6 DAYS 05/30/2022 06:00 PM       Radiology:  XR ABDOMEN (KUB) (SINGLE AP VIEW)    Result Date: 6/3/2022  Nonobstructive bowel gas pattern, massive colonic fecal burden     IR FISTULAGRAM    Result Date: 6/7/2022  Left arm fistulography with 2 stenotic segments requiring venoplasty as described above.        Physical Examination:        General appearance:  alert, cooperative and no distress, thin built  Mental Status:  oriented to person, place and time and normal affect  Lungs:  clear to auscultation bilaterally, normal effort  Heart:  regular rate and rhythm, no murmur  Abdomen:  soft, nontender, nondistended, normal bowel sounds  Extremities:  no edema, redness, tenderness in the calves  Skin:  no gross lesions, rashes, induration    Assessment:        Hospital Problems           Last Modified POA    * (Principal) Acute idiopathic pericarditis 5/30/2022 Yes    Hypotension 5/30/2022 Yes    Elevated troponin 5/30/2022 Yes    Hyponatremia 5/30/2022 Yes    Anemia 5/30/2022 Yes    Hyperkalemia 5/30/2022 Yes    Left lower quadrant abdominal pain 5/30/2022 Yes    ESRD (end stage renal disease) on dialysis ([facility] [address] 5/30/2022 Yes    Primary hypertension 5/30/2022 Yes Hyperlipidemia 5/30/2022 Yes    Type 2 diabetes mellitus with kidney complication, with long-term current use of insulin (HCC) (Chronic) 5/30/2022 Yes    GERD (gastroesophageal reflux disease) 5/30/2022 Yes          Plan:      - plan to d/c to SNF after HD today   -Continue current dose of Lantus. - continue current steroids-start on taper at discharge.   -Continue pain medications , ASA and statin. - continue bowel regimen. CARISSA and med rec completed.     Ana Craig MD  6/8/2022

## 2022-06-08 NOTE — FLOWSHEET NOTE
RN completed telephone report with Yesenia Banuelos, whom will be taking over patient's care at [facility]. Plan for pick-up today around 1500.

## 2022-06-08 NOTE — CARE COORDINATION
Social work: Patient to [employer] to [facility] via [address] [facility] [address]  at [email or web address].  # for RN report: [phone]. Completed CARISSA faxed to [phone]. Informed RN, pt, and facility of dc time, agreeable to plan.

## 2022-06-08 NOTE — PROGRESS NOTES
Dialysis Safety Checks:    Patient ID Verified (Y/N) y     -Hepatitis Test                   Date      Result  Hepatitis B Surface Antigen   22 neg     Hepatitis B Surface Antibody 22 pos     Hepatitis B Core Antibody        -Treatment Initiation  Blood Vol Processed Goal (Liters)  Pump Speed x Treatment Hours x 60 Minutes    Target Fluid Removal 1kilo as tolerated     * Intra-treatment documented Safety Checks include the followin) Access and face visible at all times. 2) All connections and blood lines are secure with no kinks. 3) NVL alarm engaged. 4) Hemosafe device applied (if applicable). 5) No collapse of Arterial or Venous blood chambers. 6) All blood lines / pump segments in the air detectors.   --------------------------------------------------------------------------------

## 2022-06-08 NOTE — PROGRESS NOTES
[address]  Occupational Therapy Not Seen    DATE: 2022    NAME: Giancarlo Bazzi  MRN: [id number]   : 1975    Patient not seen this date for Occupational Therapy due to:      [] Cancel by RN or physician due to:    [x] Hemodialysis    [] Critical Lab Value Level     [] Blood transfusion in progress    [] Acute or unstable cardiovascular status   _MAP < 55 or more than >115  _HR < 40 or > 130    [] Acute or unstable pulmonary status   -FiO2 > 60%   _RR < 5 or >40    _O2 sats < 85%    [] Strict Bedrest    [] Off Unit for surgery or procedure    [] Off Unit for testing       [] Pending imaging to R/O fracture    [] Refusal by Patient      [] Other      [] OT being discontinued at this time. Patient independent. No further needs. [] OT being discontinued at this time as the patient has been transferred to hospice care. No further needs.       Sammie Fuentes DIEGO

## 2022-06-08 NOTE — FLOWSHEET NOTE
RN spoke with dialysis RN via telephone and confirmed plan for patient to undergo dialysis today around 0900. Patient updated on plan of care and verbalizes understanding. Danii called and updated patient's grandfather via telephone. Will continue to monitor closely.

## 2022-06-08 NOTE — ED PROVIDER NOTES
[facility]  Emergency Medicine Department    Pt Name: Flor Herron  MRN: [id number]  [place] 1975  Date of evaluation: 5/26/2022  Provider: Pepe Sexton MD    CHIEF COMPLAINT     Chief Complaint   Patient presents with   Radha Se Fall     had stroke 3 wks ago, placed on blood thinner, since then gets \"shakey\" and fals ( x 6), needs dialysis today, bilateral skin supports for \"drop foot\" not related to stroke       HISTORY OF PRESENT ILLNESS  (Location/Symptom, Timing/Onset, Context/Setting,Quality, Duration, Modifying Factors, Severity.)   Flor Herron is a 49 y.o. male who presents to the emergency department after a fall while walking to go the car to go to dialysis. He states this has been happening recurrently over the last few days. He was recently admitted to the hospital for a stroke and discharged to rehab. Upon discharge from the rehab, he began having these frequent falls where he first gets shaky. He is concerned that these episodes are secondary to the plavix he was recently started on after the stroke. He denies any CP/SOB. He does complain of pain in his right knee. No other injuries. Denies neck or back pain. Nursing Notes were reviewed. ALLERGIES     Patient has no known allergies. CURRENT MEDICATIONS       Discharge Medication List as of 5/27/2022  1:45 PM      CONTINUE these medications which have NOT CHANGED    Details   insulin glargine (LANTUS) 100 UNIT/ML injection vial Inject 45 Units into the skin every morning Indications: 45 units in the morning and 10 units in the eveningHistorical Med      traMADol (ULTRAM) 50 MG tablet Take 50 mg by mouth 2 times daily as needed for Pain. Historical Med      Misc.  Devices MISC Disp-2 each, R-0, NormalDisp: custom molded shoes to accommodate for brace  DX: DM with history of stroke, foot drop Duration: 1 year      ondansetron (ZOFRAN) 4 MG tablet Take 1 tablet by mouth 3 times daily as needed for Nausea or Vomiting, Disp-30 tablet, R-1Normal      magnesium oxide (MAG-OX) 400 MG tablet Take 400 mg by mouth dailyHistorical Med      magnesium hydroxide (MILK OF MAGNESIA) 400 MG/5ML suspension Take 15 mLs by mouth daily as needed for ConstipationHistorical Med      insulin aspart (NOVOLOG) 100 UNIT/ML injection vial Inject 0-8 Units into the skin 3 times daily (before meals) SLIDING SCALE Historical Med      FLUoxetine (PROZAC) 40 MG capsule Take 40 mg by mouth daily Historical Med      ammonium lactate (LAC-HYDRIN) 12 % lotion Apply topically daily. , Disp-1 each, R-3, Normal      furosemide (LASIX) 20 MG tablet Take 1 tablet by mouth daily, Disp-60 tablet, R-5Normal      buPROPion (WELLBUTRIN XL) 150 MG extended release tablet Take 1 tablet by mouth every morning, Disp-15 tablet, R-1Normal      Rosuvastatin Calcium 40 MG CPSP Take 40 mg by mouth dailyHistorical Med      ezetimibe (ZETIA) 10 MG tablet Take 10 mg by mouth dailyHistorical Med      aspirin 81 MG EC tablet Take 81 mg by mouth dailyHistorical Med      calcium acetate 667 MG TABS Take 1,334 mg by mouth 3 times daily (with meals) Historical Med      vitamin B-12 (CYANOCOBALAMIN) 500 MCG tablet Take 500 mcg by mouth dailyHistorical Med      omeprazole (PRILOSEC) 40 MG delayed release capsule Take 40 mg by mouth dailyHistorical Med             PAST MEDICAL HISTORY         Diagnosis Date    Cerebral artery occlusion with cerebral infarction [facility])     Closed fracture of bone of right foot March - April 2020    Dialysis patient [facility])     Hemodialysis patient ([facility] [address]     Hyperlipidemia     Hypertension     Kidney disease     On Dialysis    Type 1 diabetes mellitus ([facility] [address]        SURGICAL HISTORY           Procedure Laterality Date    AV FISTULA CREATION Left     WRIST SURGERY  1995       FAMILY HISTORY           Problem Relation Age of Onset    Diabetes Mother     Heart Disease Father     Diabetes Father     Cancer Paternal Grandmother     [facility]aternal [facility]      Family Status   Relation Name Status    Mother  Alive    Father      PGM  (Not Specified)    MGGF  (Not Specified)        SOCIAL HISTORY      reports that he has never smoked. He has never used smokeless tobacco. He reports that he does not drink alcohol and does not use drugs. REVIEW OF SYSTEMS    (2-9 systems for level 4, 10 or more for level 5)     Review of Systems   Constitutional: Negative for fever. Respiratory: Negative for shortness of breath. Cardiovascular: Negative for chest pain. Gastrointestinal: Negative for nausea and vomiting. Musculoskeletal: Positive for arthralgias (right knee). Negative for back pain and neck pain. Skin: Negative for wound. All other systems reviewed and are negative. PHYSICAL EXAM    (up to 7 for level 4, 8 or more for level 5)     ED Triage Vitals   BP Temp Temp Source Heart Rate Resp SpO2 Height Weight   22 0601 22 0601 22 0601 22 0601 22 0601 22 0601 22 0848 22 0601   123/72 98 °F (36.7 °C) Oral 87 16 98 % 5' 7.5\" (1.715 m) 190 lb (86.2 kg)       Physical Exam  Vitals and nursing note reviewed. Constitutional:       General: He is not in acute distress. Appearance: He is well-developed. He is not diaphoretic. HENT:      Head: Normocephalic and atraumatic. Nose: Nose normal.      Mouth/Throat:      Mouth: Mucous membranes are moist.   Eyes:      Extraocular Movements: Extraocular movements intact. Cardiovascular:      Rate and Rhythm: Normal rate and regular rhythm. Heart sounds: Normal heart sounds. No murmur heard. Pulmonary:      Effort: Pulmonary effort is normal. No respiratory distress. Breath sounds: Normal breath sounds. Abdominal:      Palpations: Abdomen is soft. Tenderness: There is no abdominal tenderness. Musculoskeletal:         General: Signs of injury present. No tenderness.       Cervical back: Normal range of motion and neck supple. Comments: Foot braces in place   Skin:     General: Skin is warm and dry. Findings: No bruising. Neurological:      Mental Status: He is alert and oriented to person, place, and time. Sensory: No sensory deficit. Psychiatric:         Behavior: Behavior normal.         Thought Content: Thought content normal.         Judgment: Judgment normal.         DIAGNOSTIC RESULTS     RADIOLOGY:   Non-plain film images such as CT, Ultrasound and MRI are read by theradiologist. Plain radiographic images are visualized and preliminarily interpreted by the emergency physician with the below findings:    Interpretation per the Radiologist below, if available at the time of this note:    XR KNEE RIGHT (3 VIEWS)   Final Result   No acute abnormality of the knee.              ED BEDSIDE ULTRASOUND:   Performed by ED Physician - none    LABS:  Labs Reviewed   CBC WITH AUTO DIFFERENTIAL - Abnormal; Notable for the following components:       Result Value    RBC 3.88 (*)     Hemoglobin 11.6 (*)     Hematocrit 36.6 (*)     Seg Neutrophils 69 (*)     Lymphocytes 13 (*)     Monocytes 13 (*)     Absolute Mono # 1.24 (*)     All other components within normal limits   BASIC METABOLIC PANEL W/ REFLEX TO MG FOR LOW K - Abnormal; Notable for the following components:    Glucose 107 (*)     BUN 57 (*)     CREATININE 11.69 (*)     Bun/Cre Ratio 5 (*)     Chloride 95 (*)     GFR Non- 5 (*)     GFR  6 (*)     All other components within normal limits   BASIC METABOLIC PANEL W/ REFLEX TO MG FOR LOW K - Abnormal; Notable for the following components:    Glucose 191 (*)     BUN 32 (*)     CREATININE 7.85 (*)     Bun/Cre Ratio 4 (*)     Sodium 134 (*)     Chloride 96 (*)     GFR Non- 7 (*)     GFR  9 (*)     All other components within normal limits       All other labs were within normal range or not returned as of this dictation. EMERGENCYDEPARTMENT COURSE and DIFFERENTIAL DIAGNOSIS/MDM:   Vitals:    Vitals:    05/27/22 0026 05/27/22 0102 05/27/22 0707 05/27/22 1208   BP:   127/68 (!) 147/83   Pulse:   88 89   Resp: 18  16 16   Temp:   98.4 °F (36.9 °C) 97.5 °F (36.4 °C)   TempSrc:   Oral Axillary   SpO2: 100%  98% 100%   Weight:  182 lb (82.6 kg)     Height:         49 y.o. M with h/o ESRD presenting after a fall with no significant injuries. Given the recurrent falls and the need for dialysis today, will plan for admission for dialysis and possible PT eval.    CONSULTS:  IP CONSULT TO INTERNAL MEDICINE    PROCEDURES:  None indicated    FINAL IMPRESSION     1. Frequent falls    2.  ESRD (end stage renal disease) [facility])          DISPOSITION/PLAN   DISPOSITION Admitted 05/26/2022 07:20:42 AM    PATIENT REFERRED TO:   Elda Mcintyre MD  [address] [place] [address]  [phone]      August 10th 4 pm--new patient appointment    [facility] PT  [address] [id number] [phone]  Go on 6/1/2022  Appointment 6-1-2022 at [address] [place] [address] Pkwy:     Discharge Medication List as of 5/27/2022  1:45 PM        (Please note that portions of this note were completed with a voice recognition program.  Efforts were made to edit the dictations butoccasionally words are mis-transcribed.)    Paulette Sagastume MD  Attending Emergency Physician          Paulette Sagastume MD  06/08/22 1675

## 2022-06-08 NOTE — PROGRESS NOTES
Physical Therapy  DATE: 2022    NAME: Ines Massey  MRN: [id number]   : 1975    Patient not seen this date for Physical Therapy due to:      [] Cancel by RN or physician due to:    [x] Hemodialysis     Checked on pt at 1035 however pt was out of room for dialysis  [] Critical Lab Value Level     [] Blood transfusion in progress    [] Acute or unstable cardiovascular status   _MAP < 55 or more than >115  _HR < 40 or > 130    [] Acute or unstable pulmonary status   -FiO2 > 60%   _RR < 5 or >40    _O2 sats < 85%    [] Strict Bedrest    [] Off Unit for surgery or procedure    [] Off Unit for testing       [] Pending imaging to R/O fracture    [] Refusal by Patient      [] Other      [] PT being discontinued at this time. Patient independent. No further needs. [] PT being discontinued at this time as the patient has been transferred to hospice care. No further needs.       Mary Peraza, PTA No

## 2022-06-08 NOTE — PROGRESS NOTES
Nephrology Progress Note    [facility]    Fawn Nowak, APRN - CNP    Follow Up for (CC) : End-stage renal disease    ASSESSMENT     End-stage renal disease  Anemia of end-stage renal disease  Hyperkalemia corrected  Metabolic bone disorder  Pericardial effusion hypotension s/p drainage    Principal Problem:    Acute idiopathic pericarditis  Active Problems:    Hypotension    Elevated troponin    Hyponatremia    Anemia    Hyperkalemia    Left lower quadrant abdominal pain    ESRD (end stage renal disease) on dialysis ([address]    Primary hypertension    Hyperlipidemia    Type 2 diabetes mellitus with kidney complication, with long-term current use of insulin (HCC)    GERD (gastroesophageal reflux disease)  Resolved Problems:    * No resolved hospital problems. *      PLAN     HD today, 1kg UF, 3K bath  AVF working well  Next HD planned for Friday  NEDA as needed for target hemoglobin of 10-11   Hold erythropoiesis stimulating agents if hemoglobin is above 11   Decrease the dose of NEDA once the hemoglobin is between 10 and 11   OK for d/c planning  Follow-up labs daily while here  Please do not hesitate to call with questions.     SUBJECTIVE      Seen on HD, tolerating well  Good BFR s/p angioplasty of his AVF  Denies dyspnea, BP OK  Chief Complaint   Patient presents with    Chest Pain    Shortness of Breath       Patient Active Problem List   Diagnosis    ESRD (end stage renal disease) on dialysis ([address]    Primary hypertension    Hyperlipidemia    Acute pain of left knee    Bipolar affective disorder ([address]    Atherosclerosis of aorta ([address]    COVID-19    Cerebrovascular accident (CVA) ([address]    Type 2 diabetes mellitus with kidney complication, with long-term current use of insulin (HCC)    Neuropathy of both feet    GERD (gastroesophageal reflux disease)    Right sided weakness    Leg weakness, bilateral    Recurrent falls    Acute idiopathic pericarditis    Small kidney, bilateral    Umbilical hernia without obstruction or gangrene    Disorders of diaphragm    Atherosclerosis of other arteries    Hypotension    Elevated troponin    Hyponatremia    Anemia    Hyperkalemia    Left lower quadrant abdominal pain       CURRENT MEDICATIONS / Allergies Raiza Mendes History / Family History     Current Facility-Administered Medications   Medication Dose Route Frequency Provider Last Rate Last Admin    insulin glargine (LANTUS) injection vial 25 Units  25 Units SubCUTAneous Nightly Lisa Schafer MD        epoetin pennie-epbx (RETACRIT) injection 3,000 Units  3,000 Units SubCUTAneous Once per day on Mon Wed Fri Madison Ayers MD        polyethylene glycol [facility]) packet 17 g  17 g Oral Daily Ailin Cheng MD   17 g at 06/07/22 0823    milk and molasses enema 240 mL  240 mL Rectal Once Madison Ayers MD        senna (SENOKOT) tablet 17.2 mg  2 tablet Oral BID DANIEL Ignacio CNP   17.2 mg at 06/07/22 2048    bisacodyl (DULCOLAX) EC tablet 5 mg  5 mg Oral Daily PRN DANIEL Ignacio CNP   5 mg at 06/03/22 4573    carvedilol (COREG) tablet 6.25 mg  6.25 mg Oral BID  Whitney Teran MD   6.25 mg at 06/07/22 1901    insulin glargine (LANTUS) injection vial 45 Units  45 Units SubCUTAneous Daily Madison Ayers MD   45 Units at 06/08/22 0821    insulin lispro (HUMALOG) injection vial 0-18 Units  0-18 Units SubCUTAneous TID  DANIEL Ignacio CNP   3 Units at 06/07/22 1901    insulin lispro (HUMALOG) injection vial 0-9 Units  0-9 Units SubCUTAneous Nightly DANIEL Ignacio CNP   3 Units at 06/07/22 2047    aspirin EC tablet 81 mg  81 mg Oral Daily Maureen Stemple, APRN - NP   81 mg at 06/07/22 0815    buPROPion (WELLBUTRIN XL) extended release tablet 150 mg  150 mg Oral QAM Maureen Stemple, APRN - NP   150 mg at 06/07/22 0815    ezetimibe (ZETIA) tablet 10 mg  10 mg Oral Daily Maureen Stemple, APRN - NP   10 mg at 06/07/22 7724  [Held by provider] FLUoxetine (PROZAC) capsule 40 mg  40 mg Oral Daily Maureen Stemple, APRN - NP        gabapentin (NEURONTIN) capsule 300 mg  300 mg Oral Daily Maureen Stemple, APRN - NP   300 mg at 06/07/22 0815    [Held by provider] lisinopril (PRINIVIL;ZESTRIL) tablet 20 mg  20 mg Oral Daily Maureen Stemple, APRN - NP        vitamin B-12 (CYANOCOBALAMIN) tablet 500 mcg  500 mcg Oral Daily Maureen Stemple, APRN - NP   500 mcg at 06/07/22 0817    glucose chewable tablet 16 g  4 tablet Oral PRN Maureen Stemple, APRN - NP   16 g at 06/06/22 1137    dextrose bolus 10% 125 mL  125 mL IntraVENous PRN Maureen Stemple, APRN - NP   Stopped at 06/06/22 1235    Or    dextrose bolus 10% 250 mL  250 mL IntraVENous PRN Maureen Stemple, APRN - NP        glucagon (rDNA) injection 1 mg  1 mg IntraMUSCular PRN Maureen Stemple, APRN - NP        dextrose 5 % solution  100 mL/hr IntraVENous PRN Maureen Stemple, APRN - NP        sodium chloride flush 0.9 % injection 5-40 mL  5-40 mL IntraVENous 2 times per day Maureen Stemple, APRN - NP   10 mL at 06/07/22 2048    sodium chloride flush 0.9 % injection 5-40 mL  5-40 mL IntraVENous PRN Maureen Stemple, APRN - NP        0.9 % sodium chloride infusion   IntraVENous PRN Maureen Stemple, APRN - NP        ondansetron (ZOFRAN-ODT) disintegrating tablet 4 mg  4 mg Oral Q8H PRN Maureen Stemple, APRN - NP        Or    ondansetron (ZOFRAN) injection 4 mg  4 mg IntraVENous Q6H PRN Maureen Stemple, APRN - NP   4 mg at 06/03/22 1642    acetaminophen (TYLENOL) tablet 650 mg  650 mg Oral Q6H PRN Maureen Stemple, APRN - NP   650 mg at 06/03/22 9763    Or    acetaminophen (TYLENOL) suppository 650 mg  650 mg Rectal Q6H PRN Maureen Stemple, APRN - NP        nitroGLYCERIN (NITROSTAT) SL tablet 0.4 mg  0.4 mg SubLINGual Q5 Min PRN Maureen Stemple, APRN - NP        rosuvastatin (CRESTOR) tablet 10 mg  10 mg Oral Nightly Maureen Stemple, APRN - NP 10 mg at 06/07/22 2047    predniSONE (DELTASONE) tablet 40 mg  40 mg Oral Daily Saúl Fabrice, APRN - NP   40 mg at 06/07/22 0815    pantoprazole (PROTONIX) tablet 40 mg  40 mg Oral QAM AC Saúl Hun, APRN - NP   40 mg at 06/08/22 0547    perflutren lipid microspheres (DEFINITY) injection 1.65 mg  1.5 mL IntraVENous ONCE PRN Trinidad Kezia Eddie, DO              No Known Allergies    Social History     Socioeconomic History    Marital status: Single     Spouse name: Not on file    Number of children: Not on file    Years of education: Not on file    Highest education level: Not on file   Occupational History    Not on file   Tobacco Use    Smoking status: Never Smoker    Smokeless tobacco: Never Used   Vaping Use    Vaping Use: Never used   Substance and Sexual Activity    Alcohol use: Never    Drug use: Never    Sexual activity: Not on file   Other Topics Concern    Not on file   Social History Narrative    Not on file     Social Determinants of Health     Financial Resource Strain:     Difficulty of Paying Living Expenses: Not on file   Food Insecurity:     Worried About [address] in the Last Year: Not on file    [address] in the Last Year: Not on file   Transportation Needs:     Lack of Transportation (Medical): Not on file    Lack of Transportation (Non-Medical):  Not on file   Physical Activity:     Days of Exercise per Week: Not on file    Minutes of Exercise per Session: Not on file   Stress:     Feeling of Stress : Not on file   Social Connections:     Frequency of Communication with Friends and Family: Not on file    Frequency of Social Gatherings with Friends and Family: Not on file    Attends Protestant Services: Not on file    Active Member of Clubs or Organizations: Not on file    Attends Club or Organization Meetings: Not on file    Marital Status: Not on file   Intimate Partner Violence:     Fear of Current or Ex-Partner: Not on file    Emotionally Abused: Not on file    Physically Abused: Not on file    Sexually Abused: Not on file   Housing Stability:     Unable to Pay for Housing in the Last Year: Not on file    Number of Places Lived in the Last Year: Not on file    Unstable Housing in the Last Year: Not on file       Family History   Problem Relation Age of Onset    Diabetes Mother     Heart Disease Father     Diabetes Father     Cancer Paternal Grandmother     Heart Disease Maternal Great Grandfather        REVIEW OF SYSTEMS     All other ROS is negative. OBJECTIVE      Vitals:    06/08/22 1300   BP: 114/68   Pulse: 82   Resp:    Temp:    SpO2:      Wt Readings from Last 3 Encounters:   06/08/22 175 lb 14.8 oz (79.8 kg)   05/28/22 188 lb (85.3 kg)   05/27/22 182 lb (82.6 kg)     I/O last 3 completed shifts: In: 960 [P.O.:960]  Out: 1500   Body mass index is 27.55 kg/m². PHYSICAL EXAM      GENERAL APPEARANCE:Awake, alert, in no acute distress  SKIN: warm and dry, no rash or erythema  EYES: conjunctivae normal and sclera anicteric  NECK:  JVD Absent. PULMONARY: Symmetrical and CTA BL. CADRDIOVASCULAR: S1 and S2 audible. ABDOMEN: soft nontender, bowel sounds present.   EXTREMITIES: no cyanosis, clubbing or edema    LABS      Data:    CBC:   Recent Labs     06/06/22 0301 06/07/22 0311 06/08/22  0434   WBC 15.8* 14.9* 15.7*   HGB 10.3* 10.1* 10.4*   HCT 32.7* 31.4* 32.2*   MCV 95.1 92.9 92.5    421 426     BMP:    Recent Labs     06/06/22 0301 06/07/22 0311 06/08/22  0434   * 128* 130*   K 4.4 5.1 4.1   CL 91* 88* 89*   CO2 24 22 28   BUN 87* 117* 69*   CREATININE 6.99* 8.46* 5.63*   GLUCOSE 208* 208* 228*     CMP:   Recent Labs     06/06/22 0301 06/07/22 0311 06/08/22  0434   * 128* 130*   K 4.4 5.1 4.1   CL 91* 88* 89*   CO2 24 22 28   BUN 87* 117* 69*   CREATININE 6.99* 8.46* 5.63*   GLUCOSE 208* 208* 228*   CALCIUM 8.7 8.5* 8.3*       Phosphorus:    Recent Labs     06/06/22  0301 06/07/22  0311 06/08/22  0434   PHOS 3.8 5.2* 4.7*     Ionized Calcium: No results for input(s): IONCA in the last 72 hours. Magnesium:   Recent Labs     06/06/22  0301 06/07/22  0311 06/08/22  0434   MG 2.5 2.5 2.2       Mable Mulligan MD MD  [facility].   Ph: [phone]

## 2022-06-09 ENCOUNTER — HOSPITAL ENCOUNTER (OUTPATIENT)
Age: 47
Setting detail: SPECIMEN
Discharge: HOME | End: 2022-06-09

## 2022-06-09 LAB
ABSOLUTE EOS #: <0.03 K/UL (ref 0–0.44)
ABSOLUTE IMMATURE GRANULOCYTE: 0.36 K/UL (ref 0–0.3)
ABSOLUTE LYMPH #: 1.1 K/UL (ref 1.1–3.7)
ABSOLUTE MONO #: 1.41 K/UL (ref 0.1–1.2)
ALBUMIN SERPL-MCNC: 3 G/DL (ref 3.5–5.2)
ALP BLD-CCNC: 138 U/L (ref 40–129)
ALT SERPL-CCNC: 28 U/L (ref 5–41)
ANION GAP SERPL CALCULATED.3IONS-SCNC: 12 MMOL/L (ref 9–17)
AST SERPL-CCNC: 17 U/L
BASOPHILS # BLD: 0 % (ref 0–2)
BASOPHILS ABSOLUTE: <0.03 K/UL (ref 0–0.2)
BILIRUB SERPL-MCNC: 0.28 MG/DL (ref 0.3–1.2)
BUN BLDV-MCNC: 47 MG/DL (ref 6–20)
BUN/CREAT BLD: 9 (ref 9–20)
CALCIUM SERPL-MCNC: 8 MG/DL (ref 8.6–10.4)
CHLORIDE BLD-SCNC: 94 MMOL/L (ref 98–107)
CO2: 27 MMOL/L (ref 20–31)
CREAT SERPL-MCNC: 5.05 MG/DL (ref 0.7–1.2)
EOSINOPHILS RELATIVE PERCENT: 0 % (ref 1–4)
GFR AFRICAN AMERICAN: 15 ML/MIN
GFR NON-AFRICAN AMERICAN: 12 ML/MIN
GFR SERPL CREATININE-BSD FRML MDRD: ABNORMAL ML/MIN/{1.73_M2}
GLUCOSE BLD-MCNC: 247 MG/DL (ref 70–99)
HBV SURFACE AB TITR SER: 25.38 MIU/ML
HCT VFR BLD CALC: 31.2 % (ref 40.7–50.3)
HEMOGLOBIN: 10.1 G/DL (ref 13–17)
HEPATITIS B SURFACE ANTIGEN: NONREACTIVE
IMMATURE GRANULOCYTES: 3 %
LYMPHOCYTES # BLD: 8 % (ref 24–43)
MCH RBC QN AUTO: 29.8 PG (ref 25.2–33.5)
MCHC RBC AUTO-ENTMCNC: 32.4 G/DL (ref 28.4–34.8)
MCV RBC AUTO: 92 FL (ref 82.6–102.9)
MONOCYTES # BLD: 10 % (ref 3–12)
NRBC AUTOMATED: 0 PER 100 WBC
PDW BLD-RTO: 12.9 % (ref 11.8–14.4)
PLATELET # BLD: 330 K/UL (ref 138–453)
PMV BLD AUTO: 9.5 FL (ref 8.1–13.5)
POTASSIUM SERPL-SCNC: 4.4 MMOL/L (ref 3.7–5.3)
RBC # BLD: 3.39 M/UL (ref 4.21–5.77)
SEG NEUTROPHILS: 79 % (ref 36–65)
SEGMENTED NEUTROPHILS ABSOLUTE COUNT: 11.24 K/UL (ref 1.5–8.1)
SODIUM BLD-SCNC: 133 MMOL/L (ref 135–144)
TOTAL PROTEIN: 5.1 G/DL (ref 6.4–8.3)
WBC # BLD: 14.1 K/UL (ref 3.5–11.3)

## 2022-06-09 PROCEDURE — 36415 COLL VENOUS BLD VENIPUNCTURE: CPT

## 2022-06-09 PROCEDURE — 80053 COMPREHEN METABOLIC PANEL: CPT

## 2022-06-09 PROCEDURE — 85025 COMPLETE CBC W/AUTO DIFF WBC: CPT

## 2022-06-09 PROCEDURE — P9603 ONE-WAY ALLOW PRORATED MILES: HCPCS

## 2022-06-09 PROCEDURE — 87340 HEPATITIS B SURFACE AG IA: CPT

## 2022-06-09 PROCEDURE — 86317 IMMUNOASSAY INFECTIOUS AGENT: CPT

## 2022-06-10 ENCOUNTER — HOSPITAL ENCOUNTER (OUTPATIENT)
Age: 47
Setting detail: SPECIMEN
Discharge: HOME | End: 2022-06-10

## 2022-06-10 LAB
ANION GAP SERPL CALCULATED.3IONS-SCNC: 17 MMOL/L (ref 9–17)
BUN BLDV-MCNC: 70 MG/DL (ref 6–20)
BUN/CREAT BLD: 10 (ref 9–20)
CALCIUM SERPL-MCNC: 8.5 MG/DL (ref 8.6–10.4)
CHLORIDE BLD-SCNC: 94 MMOL/L (ref 98–107)
CO2: 23 MMOL/L (ref 20–31)
CREAT SERPL-MCNC: 7.17 MG/DL (ref 0.7–1.2)
GFR AFRICAN AMERICAN: 10 ML/MIN
GFR NON-AFRICAN AMERICAN: 8 ML/MIN
GFR SERPL CREATININE-BSD FRML MDRD: ABNORMAL ML/MIN/{1.73_M2}
GLUCOSE BLD-MCNC: 62 MG/DL (ref 70–99)
HCT VFR BLD CALC: 33 % (ref 40.7–50.3)
HEMOGLOBIN: 10.5 G/DL (ref 13–17)
MAGNESIUM: 2.2 MG/DL (ref 1.6–2.6)
MCH RBC QN AUTO: 29.7 PG (ref 25.2–33.5)
MCHC RBC AUTO-ENTMCNC: 31.8 G/DL (ref 28.4–34.8)
MCV RBC AUTO: 93.5 FL (ref 82.6–102.9)
NRBC AUTOMATED: 0 PER 100 WBC
PDW BLD-RTO: 13.2 % (ref 11.8–14.4)
PHOSPHORUS: 4.4 MG/DL (ref 2.5–4.5)
PLATELET # BLD: 339 K/UL (ref 138–453)
PMV BLD AUTO: 9.9 FL (ref 8.1–13.5)
POTASSIUM SERPL-SCNC: 4.1 MMOL/L (ref 3.7–5.3)
RBC # BLD: 3.53 M/UL (ref 4.21–5.77)
SODIUM BLD-SCNC: 134 MMOL/L (ref 135–144)
WBC # BLD: 19.2 K/UL (ref 3.5–11.3)

## 2022-06-10 PROCEDURE — 85027 COMPLETE CBC AUTOMATED: CPT

## 2022-06-10 PROCEDURE — P9603 ONE-WAY ALLOW PRORATED MILES: HCPCS

## 2022-06-10 PROCEDURE — 80048 BASIC METABOLIC PNL TOTAL CA: CPT

## 2022-06-10 PROCEDURE — 36415 COLL VENOUS BLD VENIPUNCTURE: CPT

## 2022-06-10 PROCEDURE — 84100 ASSAY OF PHOSPHORUS: CPT

## 2022-06-10 PROCEDURE — 83735 ASSAY OF MAGNESIUM: CPT

## 2022-06-13 ENCOUNTER — HOSPITAL ENCOUNTER (OUTPATIENT)
Age: 47
Setting detail: SPECIMEN
Discharge: HOME | End: 2022-06-13

## 2022-06-13 LAB
ANION GAP SERPL CALCULATED.3IONS-SCNC: 16 MMOL/L (ref 9–17)
BUN BLDV-MCNC: 90 MG/DL (ref 6–20)
BUN/CREAT BLD: 10 (ref 9–20)
CALCIUM SERPL-MCNC: 8.1 MG/DL (ref 8.6–10.4)
CHLORIDE BLD-SCNC: 94 MMOL/L (ref 98–107)
CO2: 26 MMOL/L (ref 20–31)
CREAT SERPL-MCNC: 9.47 MG/DL (ref 0.7–1.2)
GFR AFRICAN AMERICAN: 7 ML/MIN
GFR NON-AFRICAN AMERICAN: 6 ML/MIN
GFR SERPL CREATININE-BSD FRML MDRD: ABNORMAL ML/MIN/{1.73_M2}
GLUCOSE BLD-MCNC: 106 MG/DL (ref 70–99)
HCT VFR BLD CALC: 28.2 % (ref 40.7–50.3)
HEMOGLOBIN: 8.8 G/DL (ref 13–17)
MAGNESIUM: 1.8 MG/DL (ref 1.6–2.6)
MCH RBC QN AUTO: 29.6 PG (ref 25.2–33.5)
MCHC RBC AUTO-ENTMCNC: 31.2 G/DL (ref 28.4–34.8)
MCV RBC AUTO: 94.9 FL (ref 82.6–102.9)
NRBC AUTOMATED: 0 PER 100 WBC
PDW BLD-RTO: 13.9 % (ref 11.8–14.4)
PHOSPHORUS: 5 MG/DL (ref 2.5–4.5)
PLATELET # BLD: 278 K/UL (ref 138–453)
PMV BLD AUTO: 9.7 FL (ref 8.1–13.5)
POTASSIUM SERPL-SCNC: 4.8 MMOL/L (ref 3.7–5.3)
RBC # BLD: 2.97 M/UL (ref 4.21–5.77)
SODIUM BLD-SCNC: 136 MMOL/L (ref 135–144)
WBC # BLD: 14 K/UL (ref 3.5–11.3)

## 2022-06-13 PROCEDURE — P9603 ONE-WAY ALLOW PRORATED MILES: HCPCS

## 2022-06-13 PROCEDURE — 80048 BASIC METABOLIC PNL TOTAL CA: CPT

## 2022-06-13 PROCEDURE — 84100 ASSAY OF PHOSPHORUS: CPT

## 2022-06-13 PROCEDURE — 36415 COLL VENOUS BLD VENIPUNCTURE: CPT

## 2022-06-13 PROCEDURE — 85027 COMPLETE CBC AUTOMATED: CPT

## 2022-06-13 PROCEDURE — 83735 ASSAY OF MAGNESIUM: CPT

## 2022-06-15 ENCOUNTER — HOSPITAL ENCOUNTER (OUTPATIENT)
Age: 47
Setting detail: SPECIMEN
Discharge: HOME | End: 2022-06-15

## 2022-06-15 LAB
ANION GAP SERPL CALCULATED.3IONS-SCNC: 15 MMOL/L (ref 9–17)
BUN BLDV-MCNC: 76 MG/DL (ref 6–20)
BUN/CREAT BLD: 9 (ref 9–20)
CALCIUM SERPL-MCNC: 8 MG/DL (ref 8.6–10.4)
CHLORIDE BLD-SCNC: 99 MMOL/L (ref 98–107)
CO2: 24 MMOL/L (ref 20–31)
CREAT SERPL-MCNC: 8.28 MG/DL (ref 0.7–1.2)
GFR AFRICAN AMERICAN: 8 ML/MIN
GFR NON-AFRICAN AMERICAN: 7 ML/MIN
GFR SERPL CREATININE-BSD FRML MDRD: ABNORMAL ML/MIN/{1.73_M2}
GLUCOSE BLD-MCNC: 149 MG/DL (ref 70–99)
HCT VFR BLD CALC: 27.9 % (ref 40.7–50.3)
HEMOGLOBIN: 8.6 G/DL (ref 13–17)
MAGNESIUM: 1.9 MG/DL (ref 1.6–2.6)
MCH RBC QN AUTO: 30 PG (ref 25.2–33.5)
MCHC RBC AUTO-ENTMCNC: 30.8 G/DL (ref 28.4–34.8)
MCV RBC AUTO: 97.2 FL (ref 82.6–102.9)
NRBC AUTOMATED: 0 PER 100 WBC
PDW BLD-RTO: 14.6 % (ref 11.8–14.4)
PHOSPHORUS: 4 MG/DL (ref 2.5–4.5)
PLATELET # BLD: 231 K/UL (ref 138–453)
PMV BLD AUTO: 10 FL (ref 8.1–13.5)
POTASSIUM SERPL-SCNC: 4.3 MMOL/L (ref 3.7–5.3)
RBC # BLD: 2.87 M/UL (ref 4.21–5.77)
SODIUM BLD-SCNC: 138 MMOL/L (ref 135–144)
WBC # BLD: 12.9 K/UL (ref 3.5–11.3)

## 2022-06-15 PROCEDURE — 85027 COMPLETE CBC AUTOMATED: CPT

## 2022-06-15 PROCEDURE — 84100 ASSAY OF PHOSPHORUS: CPT

## 2022-06-15 PROCEDURE — 80048 BASIC METABOLIC PNL TOTAL CA: CPT

## 2022-06-15 PROCEDURE — 83735 ASSAY OF MAGNESIUM: CPT

## 2022-06-15 PROCEDURE — P9603 ONE-WAY ALLOW PRORATED MILES: HCPCS

## 2022-06-15 PROCEDURE — 36415 COLL VENOUS BLD VENIPUNCTURE: CPT

## 2022-06-17 ENCOUNTER — HOSPITAL ENCOUNTER (OUTPATIENT)
Age: 47
Setting detail: SPECIMEN
Discharge: HOME | End: 2022-06-17

## 2022-06-17 LAB
ANION GAP SERPL CALCULATED.3IONS-SCNC: 18 MMOL/L (ref 9–17)
BUN BLDV-MCNC: 67 MG/DL (ref 6–20)
BUN/CREAT BLD: 8 (ref 9–20)
CALCIUM SERPL-MCNC: 7.7 MG/DL (ref 8.6–10.4)
CHLORIDE BLD-SCNC: 100 MMOL/L (ref 98–107)
CO2: 24 MMOL/L (ref 20–31)
CREAT SERPL-MCNC: 7.89 MG/DL (ref 0.7–1.2)
GFR AFRICAN AMERICAN: 9 ML/MIN
GFR NON-AFRICAN AMERICAN: 7 ML/MIN
GFR SERPL CREATININE-BSD FRML MDRD: ABNORMAL ML/MIN/{1.73_M2}
GLUCOSE BLD-MCNC: 213 MG/DL (ref 70–99)
HCT VFR BLD CALC: 27.3 % (ref 40.7–50.3)
HEMOGLOBIN: 8.4 G/DL (ref 13–17)
MAGNESIUM: 1.7 MG/DL (ref 1.6–2.6)
MCH RBC QN AUTO: 30.1 PG (ref 25.2–33.5)
MCHC RBC AUTO-ENTMCNC: 30.8 G/DL (ref 28.4–34.8)
MCV RBC AUTO: 97.8 FL (ref 82.6–102.9)
NRBC AUTOMATED: 0 PER 100 WBC
PDW BLD-RTO: 15 % (ref 11.8–14.4)
PHOSPHORUS: 5.3 MG/DL (ref 2.5–4.5)
PLATELET # BLD: 199 K/UL (ref 138–453)
PMV BLD AUTO: 9.8 FL (ref 8.1–13.5)
POTASSIUM SERPL-SCNC: 4 MMOL/L (ref 3.7–5.3)
RBC # BLD: 2.79 M/UL (ref 4.21–5.77)
SODIUM BLD-SCNC: 142 MMOL/L (ref 135–144)
WBC # BLD: 9.1 K/UL (ref 3.5–11.3)

## 2022-06-17 PROCEDURE — 80048 BASIC METABOLIC PNL TOTAL CA: CPT

## 2022-06-17 PROCEDURE — 36415 COLL VENOUS BLD VENIPUNCTURE: CPT

## 2022-06-17 PROCEDURE — 83735 ASSAY OF MAGNESIUM: CPT

## 2022-06-17 PROCEDURE — 84100 ASSAY OF PHOSPHORUS: CPT

## 2022-06-17 PROCEDURE — P9603 ONE-WAY ALLOW PRORATED MILES: HCPCS

## 2022-06-17 PROCEDURE — 85027 COMPLETE CBC AUTOMATED: CPT

## 2022-06-29 ENCOUNTER — HOSPITAL ENCOUNTER (OUTPATIENT)
Dept: PHYSICAL THERAPY | Facility: CLINIC | Age: 47
Setting detail: THERAPIES SERIES
Discharge: HOME | End: 2022-06-29
Payer: MEDICARE

## 2022-06-29 PROBLEM — R79.89 ELEVATED TROPONIN: Status: RESOLVED | Noted: 2022-05-30 | Resolved: 2022-06-29

## 2022-06-29 PROBLEM — R77.8 ELEVATED TROPONIN: Status: RESOLVED | Noted: 2022-05-30 | Resolved: 2022-06-29

## 2022-06-29 PROCEDURE — 97110 THERAPEUTIC EXERCISES: CPT

## 2022-06-29 PROCEDURE — 97164 PT RE-EVAL EST PLAN CARE: CPT

## 2022-06-29 NOTE — FLOWSHEET NOTE
LEMA BALANCE SCALE 14-Item Long Form Original Version    Patient Name:  Gely Gallagher  Date:  6/29/2022    1. SITTING TO STANDING  INSTRUCTIONS: Please stand up. Try not to use your hands for support. (4) able to stand without using hands and stabilize independently  (3) able to stand independently using hands  (2) able to stand using hands after several tries  (1) needs minimal aid to stand or to stabilize  (0) needs moderate or maximal assist to stand  Score: 4    2. STANDING UNSUPPORTED  INSTRUCTIONS: Please stand for two minutes without holding. (4) able to stand safely 2 minutes  (3) able to stand 2 minutes with supervision  (2) able to stand 30 seconds unsupported  (1) needs several tries to stand 30 seconds unsupported  (0) unable to stand 30 seconds unassisted If a subject is able to stand 2  minutes unsupported, score full points for sitting unsupported. Proceed to  item #4. Score: 3    3. SITTING WITH BACK UNSUPPORTED BUT FEET SUPPORTED  ON FLOOR OR ON A STOOL  INSTRUCTIONS: Please sit with arms folded for 2 minutes. (4) able to sit safely and securely 2 minutes  (3) able to sit 2 minutes under supervision  (2) able to sit 30 seconds  (1) able to sit 10 seconds  (0) unable to sit without support 10 seconds  Score: 4     4. STANDING TO SITTING  INSTRUCTIONS: Please sit down. (4) sits safely with minimal use of hands  (3) controls descent by using hands  (2) uses back of legs against chair to control descent  (1) sits independently but has uncontrolled descent  (0) needs assistance to sit  Score: 3    5. TRANSFERS  INSTRUCTIONS: Arrange chairs(s) for a pivot transfer. Ask subject to  transfer one way toward a seat with armrests and one way toward a seat  without armrests. You may use two chairs (one with and one without  armrests) or a bed and a chair.   (4) able to transfer safely with minor use of hands  (3) able to transfer safely definite need of hands  (2) able to transfer with verbal cueing and/or supervision  (1) needs one person to assist  (0) needs two people to assist or supervise to be safe  Score: 3    6. STANDING UNSUPPORTED WITH EYES CLOSED  INSTRUCTIONS: Please close your eyes and stand still for 10 seconds. (4) able to stand 10 seconds safely  (3) able to stand 10 seconds with supervision  (2) able to stand 3 seconds  (1) unable to keep eyes closed 3 seconds but stays steady  (0) needs help to keep from falling  Score: 0    7. STANDING UNSUPPORTED WITH FEET TOGETHER  INSTRUCTIONS: Place your feet together and stand without holding. (4) able to place feet together independently and stand 1 minute safely  (3) able to place feet together independently and stand for 1 minute with  supervision  (2) able to place feet together independently but unable to hold for 30 seconds  (1) needs help to attain position but able to stand 15 seconds feet together  (0) needs help to attain position and unable to hold for 15 seconds - loses balance after 5 sec falls posteriorly onto mat  Score: 0    8. REACHING FORWARD WITH OUTSTRETCHED ARM WHILE  STANDING  INSTRUCTIONS: Lift arm to 90 degrees. Stretch out your fingers and reach  forward as far as you can. (Examiner places a ruler at end of fingertips when  arm is at 90 degrees. Fingers should not touch the ruler while reaching  forward. The recorded measure is the distance forward that the finger reaches  while the subject is in the most forward lean position. When possible, ask  subject to use both arms when reaching to avoid rotation of the trunk.). (4) can reach forward confidently >25 cm (10 inches)  (3) can reach forward >12 cm safely (5 inches)  (2) can reach forward >5 cm safely (2 inches)  (1) reaches forward but needs supervision  (0) loses balance while trying/requires external support  Score: 1    9.  OBJECT FROM FLOOR FROM A STANDING POSITION  INSTRUCTIONS:  shoe/slipper which is placed in front of your feet.   (4) able to  slipper safely and easily  (3) able to  slipper but needs supervision  (2) unable to  but reaches 2-5cm (1-2 inches) from slipper and keeps  balance independently  (1) unable to  and needs supervision while trying  (0) unable to try/needs assist to keep from losing balance or falling  Score: 0    10. TURNING TO LOOK BEHIND OVER LEFT AND RIGHT  SHOULDERS WHILE STANDING  INSTRUCTIONS: Turn to look directly behind you over toward left shoulder. Repeat to the right. Examiner may pick an object to look at directly behind the  subject to encourage a better twist turn. (4) looks behind from both sides and weight shifts well  (3) looks behind one side only other side shows less weight shift  (2) turns sideways only but maintains balance  (1) needs supervision when turning  (0) needs assist to keep from losing balance or falling  Score: 2    11. TURN 360 DEGREES  INSTRUCTIONS: Turn completely around in a full [place]. Pause. Then turn a  full [place] in the other direction. (4) able to turn 360 degrees safely in 4 seconds or less  (3) able to turn 360 degrees safely one side only in 4 seconds or less  (2) able to turn 360 degrees safely but slowly   (1) needs close supervision or verbal cueing  (0) needs assistance while turning  Score: 1    12. PLACING ALTERNATE FOOT ON STEP OR STOOL WHILE  STANDING UNSUPPORTED  INSTRUCTIONS: Place each foot alternately on the step/stool. Continue until  each foot has touched the step/stool four times. (4) able to stand independently and safely and complete 8 steps in 20 seconds  (3) able to stand independently and complete 8 steps >20 seconds  (2) able to complete 4 steps without aid with supervision  (1) able to complete >2 steps needs minimal assist  (0) needs assistance to keep from falling/unable to try  Score: 0    13. STANDING UNSUPPORTED ONE FOOT IN FRONT  INSTRUCTIONS: (DEMONSTRATE TO SUBJECT) Place one foot directly in  front of the other.  If you feel that you cannot place your foot directly in front,  try to step far enough ahead that the heel of your forward foot is ahead of the  toes of the other foot. (To score 3 points, the length of the step should exceed  the length of the other foot and the width of the stance should approximate the  subject's normal stride width). (4) able to place foot tandem independently and hold 30 seconds  (3) able to place foot ahead of other independently and hold 30 seconds  (2) able to take small step independently and hold 30 seconds  (1) needs help to step but can hold 15 seconds  (0) loses balance while stepping or standing  Score: 0    14. STANDING ON ONE LEG  INSTRUCTIONS: Stand on one leg as long as you can without holding. (4) able to lift leg independently and hold >10 seconds  (3) able to lift leg independently and hold 5-10 seconds  (2) able to lift leg independently and hold = or >3 seconds  (1) tries to lift leg unable to hold 3 seconds but remains standing  independently  (0) unable to try or needs assist to prevent fall  Score: 0     Total Score:  21/56  Max score 56,a person scoring below 45 is considered to be at risk for falling.     Completed by: Dionne Campos, PT

## 2022-06-29 NOTE — FLOWSHEET NOTE
[] [facility] [facility]  [address]  [phone]  F: [phone] [x] [address]  [facility] [address]   [address]  P: [phone]  F: [phone] [] [place] [facility]  [address]  P: [phone]  F: [phone] [] [address]  P: [phone]  F: [phone] [] [address]  [facility]   [address]   [place]: [phone]  F: [phone]      Physical Therapy Daily Treatment Note    Date:  2022  Patient Name:  Lawerance Para    :  1975  MRN: [id number]  Physician: Silviano Ochoa, APRN-CNP                           Insurance: Medicare ([employer])  Medical Diagnosis: I63.9 (ICD-10-CM) - Cerebrovascular accident (CVA), unspecified mechanism ([facility] [address]  Rehab Codes: R26.89, R53.1, M54.59, M54.2, R29.3,   Next 's appt.: 8/10 with neuro  Date of symptom onset: 22   Visit# / total visits: 3/16    Cancels/No Shows: 0/0    Subjective:    Pain:  [x] Yes  [] No Location: bilateral shoulders  Pain Rating: (0-10 scale) none given/10  Pain altered Tx:  [x] No  [] Yes  Action:  Comments: Pt arrives after ~1.5 mo off of therapy d/t hospitalizations d/t falls, acute idiopathic pericarditis. In hospital for a prolonged period of time, then went to [facility] for rehab after this for 10 days, d/c home on  . Also notes while he was in rehab, his mom passed and has had to deal with that. Notes that he was working specifically on knee strength, balance, and LE strength. Reports his strength is doing better, but B knees \"need work, especially R knee\". Notes his hands have been of concern, last two digits are raising into MTP extension and clawing bilat (L>R).  R shoulder aching pain, which radiates into chest when breathing deeply or yawning, or when rolling off of it. Notes medications have been changed for recent heart issues.     Objective:    ROM DEGREES A/P ROM DEGREES A/P STRENGTH  STRENGTH     LEFT  RIGHT LEFT RIGHT   HIP FLEX   4 4   HIPEXT   4 4-   HIP ABD   4- 4-    HIP ADD   4 4          KNEE FLEX   5- 4+   KNEE EXT   5-* 5-          ANKLE DF   2- 2-                PF   3 3+                INV                    EVER       * = pain with testing    UE strength: 4+/5 B shoulders grossly, 4-/5 B triceps, biceps  UE AROM: limited to ~120deg RUE flex/abd    Hernandez/56 (same as eval)  10MWT: .74m/s self selected; .91m/s fast - with rollator, B AFOs      Modalities:   Precautions:  Exercises: Bolded completed 2022:  Exercise Reps/ Time Weight/ Level Comments   Re-eval 35 min     Scapular retraction/elevation mobs 5 min     Pec stretch on wall 2x30\"     Posterior shoulder rolls 20x  Assist for slow, max retraction   Scap retraction 2x10     Scap retract w/ shoulder flex 10x AAROM          Standing    Parallel bars   Gastroc stretch 3x30\"  UE support   Marches 15x  UE support   Weight shifting towards R 10x  UE support         Balance   Parallel bars   Trunk rotation with ball 5x ea     Shoulder press with ball 10x     NBOS 1x30\"     NBOS with EC 2x20\"     Tandem stance 2x20\"     Cone  off ground 2x5 5 cones    Cone reach with LUE across body 2x5 5 cones    Cone reach with LUE to L side 2x5 5 cones    Heel taps 10x ea 4in With 1 hand for UE support   Dynamic march with retro hamstring curl 3L  No UE support   Side stepping 3L  No UE support   360 turn 2x  Clockwise more challenging         Ambulation with rollator 2L     Other:    Specific Instructions for next treatment:  FALL PREVENTION HANDOUT  - resisted RLE swing phase of gait, resisted RLE step overs  - standing balance: reaching, turning to look around, stoop to  objects off step, heel taps to step, etc  - calf stretching    Treatment Charges: Mins Units   []  Modalities     [x]  Ther Exercise 10 1   [x]  Manual Therapy 5 --   []  Ther Activities     []  Aquatics     []  Vasocompression     [x]  Other: Re-eval 35 1   []  Other: Neuro     Total Treatment time 50 2       Assessment: [x] Progressing toward goals. Restarted therapy today, reassessment performed d/t longer hospitalization and rehab stay since last visit in outpatient therapy - continues to demonstrate weakness in BLE/BUEs and static/dynamic balance challenge, though Hernandez score is the same. Ambulating with rolling walker. Treatment for R shoulder performed this date for improved scapular retraction/elevation d/t abnormal positioning, followed by scap strengthening. Will continue RUE strengthening to aid in transfers, BLE strengthening, and balance training. [] No change. [] Other:  [x] Patient would continue to benefit from skilled physical therapy services in order to: address R hemibody weakness, improve gait mechanics and efficiency, and increase standing static/dynamic balance to allow improved household ADL/IADL completion and reduce fall risk. STG: (to be met in 8 treatments)  1. ? Pain: No more than 4/10 pain reported in low back or neck post therapy sessions to indicate improving tolerance to increased activity and exercise  2. ? ROM: at least 0deg DF PROM with knee extended to indicate improving mobility in ankles to increase heel strike and reduce toe drag with prolonged ambulation  3. ? Balance:   a. Pt able to fully turn to look behind himself without instability in either direction to indicate improving postural stability  b. Pt able to complete standing reaching tasks without UE assist and no more than mild instability to indicate improving dynamic balance for safety with household IADLs  4. ? Strength:   a.  At least 4+/5 grossly in R hip to allow improved pelvic stability in standing and improved swing phase control/speed during prolonged gait  b. Pt able to utilize RUE for fine grasp and release tasks with no more than minimally slowed speed evident  5. ? Function: Pt able to ambulate 450 ft with least restrictive device with no evidence of lagging RLE swing or abnormal stance time noted, with no more than 11 on RPE scale  6. Patient to be independent with home exercise program as demonstrated by performance with correct form without cues. 7. Demonstrate Knowledge of fall prevention     LTG: (to be met in 16 treatments)  8. ? Pain: No more than 2/10 pain reported in low back or neck post therapy sessions to indicate improving tolerance to increased activity and exercise  9. ? ROM: at least 5 deg DF PROM with knee extended to indicate improving mobility in ankles to increase heel strike and reduce toe drag with prolonged ambulation  10. ? Balance:   a. At least 30/56 on Hernandez to indicate significant improvement in standing static/dynamic balance and progress towards reduced fall risk  b. Able to negotiate gait obstacles using rollator without increased instability or excessively slowed gait  11. ? Strength:   a. At least 5-/5 grossly in R hip to allow further improved pelvic stability in standing and improved swing phase control/speed during prolonged gait  b. At least 5/5 B knee ext to prevent excessive buckling with prolonged standing  12. ? Function:   a. Pt able to ambulate 600 ft with least restrictive device with appropriate RLE swing/stance phase timing, no evidence of toe drag, and no more than minimal fatigue by end of ambulation  b. Pt able to ambulate 100 ft with intermittent turning, stopping, etc without assistive device and demonstrating good stability and no evidence of B knee buckling to indicate improving functional strength and safety with household ambulation distances for ADL/IADLs  c. No more than 14% impaired on ABC scale to indicate improving subjective balance confidence           Patient goals: \"to strengthen right side back as much as I can\"    Pt.  Education: [x] Yes  [] No  [x] Reviewed Prior HEP/Ed  Method of Education: [x] Verbal  [x] Demo  [] Written  Comprehension of Education:  [x] Verbalizes understanding. [x] Demonstrates understanding. [] Needs review. [] Demonstrates/verbalizes HEP/Ed previously given. Plan: [x] Continue current frequency toward long and short term goals.     [x] Specific Instructions for subsequent treatments:  FALL PREVENTION HANDOUT, resisted RLE swing phase of gait, resisted RLE step overs, standing balance        Time In: 6:00 pm            Time Out: 7:00 pm    Electronically signed by:  Markell Kwok, PT

## 2022-07-06 ENCOUNTER — HOSPITAL ENCOUNTER (OUTPATIENT)
Dept: PHYSICAL THERAPY | Facility: CLINIC | Age: 47
Setting detail: THERAPIES SERIES
Discharge: HOME | End: 2022-07-06
Payer: MEDICARE

## 2022-07-06 NOTE — FLOWSHEET NOTE
[] [facility]) [facility]  Therapy  [address]    [phone]  F: [phone]   [x] [address]  [address] [facility]   [address]  P: [phone]  F: [phone]  [] [facility] &  Therapy  [address] [facility]  P: [phone]  F: [phone] [] [address]  P: [phone]  F: [phone]  [] [address]  [address] [facility] [address]   [address]   [place]: [phone]  F: [phone]   [] [place] [address] [facility] [address]  [place]: [phone]   F: [phone]     Physical Therapy Cancel/No Show note    Date: 2022  Patient: Ines Massey  : 1975  MRN: [id number]    Cancels/No Shows to date: 3cx/1ns    For 2022 appointment patient:    [x]  Cancelled    [] Rescheduled appointment    [] No-show     Reason given by patient:    []  Patient ill    [x]  Conflicting appointment    [] No transportation      [] Conflict with work    [] No reason given    [] Weather related    [] [id number]    [x] Other:      Comments:       [x] Next appointment was confirmed    Electronically signed by: Sejal Hope PTA

## 2022-07-11 ENCOUNTER — HOSPITAL ENCOUNTER (OUTPATIENT)
Dept: PHYSICAL THERAPY | Facility: CLINIC | Age: 47
Setting detail: THERAPIES SERIES
Discharge: HOME | End: 2022-07-11
Payer: MEDICARE

## 2022-07-11 PROCEDURE — 97112 NEUROMUSCULAR REEDUCATION: CPT

## 2022-07-11 NOTE — FLOWSHEET NOTE
[] [facility]  [address]  [phone]  F: [phone] [x] [address]  [facility] 36   [address]  P: [phone]  F: [phone] [] [place] [facility]  [address]  P: [phone]  F: [phone] [] [address]  P: [phone]  F: [phone] [] [address]  [facility]   [address]   [place]: [phone]  F: [phone]      Physical Therapy Daily Treatment Note    Date:  2022  Patient Name:  Dawson Ceballos    :  1975  MRN: [id number]  Physician: DANIEL Hernandez-SHIMA                           Insurance: Medicare ([facility])  Medical Diagnosis: I63.9 (ICD-10-CM) - Cerebrovascular accident (CVA), unspecified mechanism ([facility] [address]  Rehab Codes: R26.89, R53.1, M54.59, M54.2, R29.3,   Next 's appt.: 8/10 with neuro  Date of symptom onset: 22   Visit# / total visits:     Cancels/No Shows: 3/1    Subjective:    Pain:  [x] Yes  [] No Location: bilateral shoulders  Pain Rating: (0-10 scale) none given/10  Pain altered Tx:  [x] No  [] Yes  Action:  Comments: Pt arrives noting a sharp pain at base of neck. Mentions he saw his heart specialist last week. Reports EKG came back normal and he was put on BP meds.      Objective:    ROM DEGREES A/P ROM DEGREES A/P STRENGTH  STRENGTH     LEFT  RIGHT LEFT RIGHT   HIP FLEX   4 4   HIPEXT   4 4-   HIP ABD   4- 4-    HIP ADD   4 4          KNEE FLEX   5- 4+   KNEE EXT   5-* 5-          ANKLE DF   2- 2-                PF   3 3+                INV                    EVER       * = pain with testing    UE strength: 4+/5 B shoulders grossly, 4-/5 B triceps, biceps  UE AROM: limited to ~120deg RUE flex/abd    Hernandez/56 (same as eval)  10MWT: .74m/s self selected; .91m/s fast - with rollator, B AFOs      Modalities:   Precautions:  Exercises: Bolded completed 7/11/2022:  Exercise RLE Reps/ Time Weight/ Level Comments         Scapular retraction/elevation mobs 5 min     Pec stretch-doorway  2x30\"     Posterior shoulder rolls 20x  Assist for slow, max retraction   Scap retraction 2x10     Scap retract w/ shoulder flex 10x AAROM    Chin tucks  15x3\"                 Standing    Parallel bars   Gastroc stretch 3x30\"  UE support   Marches 20x  UE support   Weight shifting towards R 10x  UE support         Resisted swing phase 3x30'  x13' Lime     Resisted step over small round cone 2x10 Lime  With weight shift onto RLE   Heel taps to step  10x 4\"    Trunk/cervical rotation naming how many fingers clinician is holding  10xea  clinician standing behind pt    Picking up rings from steps  4x 4 rings;   6\" and 12\" step Wider JASIEL no UE; notes some irritation in LB         Balance   Parallel bars   Trunk rotation with ball 5x ea     Shoulder press with ball 10x     NBOS 1x30\"     NBOS with EC 2x20\"     Tandem stance 2x20\"     Cone  off ground 2x5 5 cones    Cone reach with LUE across body 2x5 5 cones    Cone reach with LUE to L side 2x5 5 cones    Heel taps 10x ea 4in With 1 hand for UE support   Dynamic march with retro hamstring curl 3L  No UE support   Side stepping 3L  No UE support   360 turn 2x  Clockwise more challenging         Ambulation with rollator 2L     Other:    Specific Instructions for next treatment:  - resisted RLE swing phase of gait, resisted RLE step overs  - standing balance: reaching, turning to look around, stoop to  objects off step, heel taps to step, etc  - calf stretching     Treatment Charges: Mins Units   []  Modalities     []  Ther Exercise     []  Manual Therapy     []  Ther Activities     []  Aquatics     []  Vasocompression     []  Other:      [x]  Other: Neuro 50 3   Total Treatment time 50 3       Assessment: [x] Progressing toward goals. Initiated session with scapular retraction exercises and added chin tucks to improve deep cervical strength followed by pec stretch in the doorway. Issued and reviewed fall prevention handout with pt noting he currently does not have a cell phone but plans on getting one. Implemented resisted step overs with cues for RLE weight shift. Instructed pt to  rings off 6-12\" steps at different angles which was able to complete with some c/o back pain. Added standing cervical/trunk rotation this date instructing pt to call out the number of fingers clinician is holding up with x2 wrong answers when turning to the L side which was able to correct. Resisted walking completed this date with pt using rolling walker for stability. As pt approached last 30' of resisted walking pt leaned forward with walker stretched out in front of him noting his arms are weak feeling like they are giving out. Cued pt for an upright posture and to keep walker close and assisted pt to sit in a chair to avoid LOB. Pt fatigued at end of session. Will monitor sx's and plan to progress standing balance next session. [] No change. [] Other:  [x] Patient would continue to benefit from skilled physical therapy services in order to: address R hemibody weakness, improve gait mechanics and efficiency, and increase standing static/dynamic balance to allow improved household ADL/IADL completion and reduce fall risk. STG: (to be met in 8 treatments)  1. ? Pain: No more than 4/10 pain reported in low back or neck post therapy sessions to indicate improving tolerance to increased activity and exercise  2. ? ROM: at least 0deg DF PROM with knee extended to indicate improving mobility in ankles to increase heel strike and reduce toe drag with prolonged ambulation  3. ? Balance:   a. Pt able to fully turn to look behind himself without instability in either direction to indicate improving postural stability  b.  Pt able to complete standing reaching tasks without UE assist and no more than mild instability to indicate improving dynamic balance for safety with household IADLs  4. ? Strength:   a. At least 4+/5 grossly in R hip to allow improved pelvic stability in standing and improved swing phase control/speed during prolonged gait  b. Pt able to utilize RUE for fine grasp and release tasks with no more than minimally slowed speed evident  5. ? Function: Pt able to ambulate 450 ft with least restrictive device with no evidence of lagging RLE swing or abnormal stance time noted, with no more than 11 on RPE scale  6. Patient to be independent with home exercise program as demonstrated by performance with correct form without cues. 7. Demonstrate Knowledge of fall prevention     LTG: (to be met in 16 treatments)  8. ? Pain: No more than 2/10 pain reported in low back or neck post therapy sessions to indicate improving tolerance to increased activity and exercise  9. ? ROM: at least 5 deg DF PROM with knee extended to indicate improving mobility in ankles to increase heel strike and reduce toe drag with prolonged ambulation  10. ? Balance:   a. At least 30/56 on Hernandez to indicate significant improvement in standing static/dynamic balance and progress towards reduced fall risk  b. Able to negotiate gait obstacles using rollator without increased instability or excessively slowed gait  11. ? Strength:   a. At least 5-/5 grossly in R hip to allow further improved pelvic stability in standing and improved swing phase control/speed during prolonged gait  b. At least 5/5 B knee ext to prevent excessive buckling with prolonged standing  12. ? Function:   a.  Pt able to ambulate 600 ft with least restrictive device with appropriate RLE swing/stance phase timing, no evidence of toe drag, and no more than minimal fatigue by end of ambulation  b. Pt able to ambulate 100 ft with intermittent turning, stopping, etc without assistive device and demonstrating good stability and no evidence of B knee buckling to indicate improving functional strength and safety with household ambulation distances for ADL/IADLs  c. No more than 14% impaired on ABC scale to indicate improving subjective balance confidence           Patient goals: \"to strengthen right side back as much as I can\"    Pt. Education:  [x] Yes  [] No  [x] Reviewed Prior HEP/Ed  Method of Education: [x] Verbal  [x] Demo  [x] Written fall prevention 7/11/22  Comprehension of Education:  [x] Verbalizes understanding. [x] Demonstrates understanding. [] Needs review. [] Demonstrates/verbalizes HEP/Ed previously given. Plan: [x] Continue current frequency toward long and short term goals.     [x] Specific Instructions for subsequent treatments:  resisted RLE swing phase of gait, resisted RLE step overs, standing balance        Time In: 3:02 pm            Time Out: 3:52 pm    Electronically signed by:  Aurelio Pichardo PTA

## 2022-07-13 ENCOUNTER — HOSPITAL ENCOUNTER (OUTPATIENT)
Dept: PHYSICAL THERAPY | Facility: CLINIC | Age: 47
Setting detail: THERAPIES SERIES
Discharge: HOME | End: 2022-07-13
Payer: MEDICARE

## 2022-07-13 PROCEDURE — 97112 NEUROMUSCULAR REEDUCATION: CPT

## 2022-07-13 NOTE — FLOWSHEET NOTE
[] [address]  [address]  [phone]  F: [phone] [x] [address]  [facility]   [address]  P: [phone]  F: [phone] [] [place] [facility]  [address] [facility]  P: [phone]  F: [phone] [] [address]  P: [phone]  F: [phone] [] [address]  [facility]   [address]   [place]: [phone]  F: [phone]      Physical Therapy Daily Treatment Note    Date:  2022  Patient Name:  Dom Chavez    :  1975  MRN: [id number]  Physician: RACHAEL Mathew                           Insurance: Medicare ([address] [place] [address])  Medical Diagnosis: I63.9 (ICD-10-CM) - Cerebrovascular accident (CVA), unspecified mechanism ([facility] [address]  Rehab Codes: R26.89, R53.1, M54.59, M54.2, R29.3,   Next 's appt.: 8/10 with neuro  Date of symptom onset: 22   Visit# / total visits:     Cancels/No Shows: 3/1    Subjective:    Pain:  [x] Yes  [] No Location: bilateral shoulders L shoulder Pain Rating: (0-10 scale) 8/10  Pain altered Tx:  [x] No  [] Yes  Action:  Comments: Pt arrives noting continued pain at the top of L shoulder.      Objective:    ROM DEGREES A/P ROM DEGREES A/P STRENGTH  STRENGTH     LEFT  RIGHT LEFT RIGHT   HIP FLEX   4 4   HIPEXT   4 4-   HIP ABD   4- 4-    HIP ADD   4 4          KNEE FLEX   5- 4+   KNEE EXT   5-* 5-          ANKLE DF   2- 2-                PF   3 3+                INV                    EVER       * = pain with testing    UE strength: 4+/5 B shoulders grossly, 4-/5 B triceps, biceps  UE AROM: limited to ~120deg RUE flex/abd    Hernandez/56 (same as eval)  10MWT: .74m/s self selected; .91m/s fast - with rollator, B AFOs      Modalities:   Precautions:  Exercises: Bolded completed 7/13/2022:  Exercise RLE Reps/ Time Weight/ Level Comments         Scapular retraction/elevation mobs 5 min     UT stretch  3x20\"ea     scap squeeze  10x3\"     Pec stretch-doorway  2x30\"     Posterior shoulder rolls 20x  Assist for slow, max retraction   Scap retraction 2x10 Orange  Added resistance 7/13   Scap retract w/ shoulder flex 10x AROM    Chin tucks  10x3\"                             Standing    Parallel bars   Gastroc stretch 3x30\"  UE support; 1 LE at a time    Marches 20x  UE support   Weight shifting towards R 10x  UE support         Resisted swing phase 3x30'  x13' Lime     Resisted step over small round cone 2x10 Lime  With weight shift onto RLE   Heel taps to step  10x 4\"    Trunk/cervical rotation naming how many fingers clinician is holding  10xea  clinician standing behind pt    Picking up rings from steps  4x 4 rings;   6\" and 12\" step Wider JASIEL no UE; notes some irritation in LB; x2 leading with R UE, x2 leading with L UE and handing to clinician at multiple angles   Resisted swing phase fwd and retro amb on way back  5xea // bars;   Lime  Resisted fwd walk                Balance   Parallel bars   Trunk rotation with ball 5x ea     Shoulder press with ball 10x     NBOS 1x30\"     NBOS with EC 2x20\"     NBOS on foam  x30\"     NBOS with ball raises, rotation 10x2ea volleyball     Tandem stance 2x20\"     Cone  off ground 2x5 5 cones    Cone reach with LUE across body 2x5 5 cones    Cone reach with LUE to L side 2x5 5 cones    Heel taps 10x ea 4in With 1 hand for UE support   Dynamic march with retro hamstring curl 3L  No UE support   March amb  3L // bars     Hs curl amb  2L // bars     Side stepping 3L  No UE support   360 turn 2x  Clockwise more challenging         Ambulation with rollator 2L     Other:    Specific Instructions for next treatment:  - resisted RLE swing phase of gait, resisted RLE step overs  - standing balance: reaching, turning to look around, stoop to  objects off step, heel taps to step, etc  - calf stretching     Treatment Charges: Mins Units   []  Modalities     []  Ther Exercise     []  Manual Therapy     []  Ther Activities     []  Aquatics     []  Vasocompression     []  Other:      [x]  Other: Neuro 55 4   Total Treatment time 55 4       Assessment: [x] Progressing toward goals. Initiated session with scapular retraction exercises and cervical stretching this date. Incorporated more balance exercises on/off foam as charted above. Progressed ring  from stairs to B UE's and handing ring to clinician at multiple angles to challenge balance and stability. Slight irritation in LB after this exercise. Incorporated march, hs curl, and side stepping ambulation to challenge balance with gait. Resisted walking completed in // bars to ensure safety this date and completed retro ambulation on the way back. Most activities completed in // bars with light UE assist as needed to maintain balance. Seated rest breaks throughout session d/t fatigue. Will progress as able in upcoming sessions. [] No change. [] Other:  [x] Patient would continue to benefit from skilled physical therapy services in order to: address R hemibody weakness, improve gait mechanics and efficiency, and increase standing static/dynamic balance to allow improved household ADL/IADL completion and reduce fall risk. STG: (to be met in 8 treatments)  1. ? Pain: No more than 4/10 pain reported in low back or neck post therapy sessions to indicate improving tolerance to increased activity and exercise  2. ? ROM: at least 0deg DF PROM with knee extended to indicate improving mobility in ankles to increase heel strike and reduce toe drag with prolonged ambulation  3. ? Balance:   a. Pt able to fully turn to look behind himself without instability in either direction to indicate improving postural stability  b.  Pt able to complete standing reaching tasks without UE assist and no more than mild instability to indicate improving dynamic balance for safety with household IADLs  4. ? Strength:   a. At least 4+/5 grossly in R hip to allow improved pelvic stability in standing and improved swing phase control/speed during prolonged gait  b. Pt able to utilize RUE for fine grasp and release tasks with no more than minimally slowed speed evident  5. ? Function: Pt able to ambulate 450 ft with least restrictive device with no evidence of lagging RLE swing or abnormal stance time noted, with no more than 11 on RPE scale  6. Patient to be independent with home exercise program as demonstrated by performance with correct form without cues. 7. Demonstrate Knowledge of fall prevention     LTG: (to be met in 16 treatments)  8. ? Pain: No more than 2/10 pain reported in low back or neck post therapy sessions to indicate improving tolerance to increased activity and exercise  9. ? ROM: at least 5 deg DF PROM with knee extended to indicate improving mobility in ankles to increase heel strike and reduce toe drag with prolonged ambulation  10. ? Balance:   a. At least 30/56 on Hernandez to indicate significant improvement in standing static/dynamic balance and progress towards reduced fall risk  b. Able to negotiate gait obstacles using rollator without increased instability or excessively slowed gait  11. ? Strength:   a. At least 5-/5 grossly in R hip to allow further improved pelvic stability in standing and improved swing phase control/speed during prolonged gait  b. At least 5/5 B knee ext to prevent excessive buckling with prolonged standing  12. ? Function:   a.  Pt able to ambulate 600 ft with least restrictive device with appropriate RLE swing/stance phase timing, no evidence of toe drag, and no more than minimal fatigue by end of ambulation  b. Pt able to ambulate 100 ft with intermittent turning, stopping, etc without assistive device and demonstrating good stability and no evidence of B knee buckling to indicate improving functional strength and safety with household ambulation distances for ADL/IADLs  c. No more than 14% impaired on ABC scale to indicate improving subjective balance confidence           Patient goals: \"to strengthen right side back as much as I can\"    Pt. Education:  [x] Yes  [] No  [x] Reviewed Prior HEP/Ed  Method of Education: [x] Verbal  [] Demo  [] Written   fall prevention 7/11/22  Comprehension of Education:  [x] Verbalizes understanding. [x] Demonstrates understanding. [] Needs review. [] Demonstrates/verbalizes HEP/Ed previously given. Plan: [x] Continue current frequency toward long and short term goals.     [x] Specific Instructions for subsequent treatments:  resisted RLE swing phase of gait, resisted RLE step overs, standing balance        Time In: 3:00 pm            Time Out: 3:55 pm    Electronically signed by:  Paula Fall PTA

## 2022-07-14 ENCOUNTER — APPOINTMENT (OUTPATIENT)
Dept: CT IMAGING | Age: 47
DRG: 871 | End: 2022-07-14
Payer: MEDICARE

## 2022-07-14 ENCOUNTER — APPOINTMENT (OUTPATIENT)
Dept: GENERAL RADIOLOGY | Age: 47
DRG: 871 | End: 2022-07-14
Payer: MEDICARE

## 2022-07-14 ENCOUNTER — HOSPITAL ENCOUNTER (INPATIENT)
Age: 47
LOS: 1 days | Discharge: HOME | DRG: 871 | End: 2022-07-15
Attending: STUDENT IN AN ORGANIZED HEALTH CARE EDUCATION/TRAINING PROGRAM
Payer: MEDICARE

## 2022-07-14 DIAGNOSIS — N18.6 END STAGE RENAL DISEASE ON DIALYSIS (HCC): ICD-10-CM

## 2022-07-14 DIAGNOSIS — J18.9 PNEUMONIA DUE TO INFECTIOUS ORGANISM, UNSPECIFIED LATERALITY, UNSPECIFIED PART OF LUNG: Primary | ICD-10-CM

## 2022-07-14 DIAGNOSIS — Z99.2 END STAGE RENAL DISEASE ON DIALYSIS (HCC): ICD-10-CM

## 2022-07-14 PROBLEM — A41.9 SEPSIS (HCC): Status: ACTIVE | Noted: 2022-07-14

## 2022-07-14 PROBLEM — I25.10 CORONARY ARTERY DISEASE INVOLVING NATIVE CORONARY ARTERY OF NATIVE HEART WITHOUT ANGINA PECTORIS: Status: ACTIVE | Noted: 2022-07-14

## 2022-07-14 LAB
ABSOLUTE EOS #: 0.13 K/UL (ref 0–0.44)
ABSOLUTE IMMATURE GRANULOCYTE: 0.07 K/UL (ref 0–0.3)
ABSOLUTE LYMPH #: 0.96 K/UL (ref 1.1–3.7)
ABSOLUTE MONO #: 1.35 K/UL (ref 0.1–1.2)
ALBUMIN SERPL-MCNC: 3.4 G/DL (ref 3.5–5.2)
ALP BLD-CCNC: 161 U/L (ref 40–129)
ALT SERPL-CCNC: 18 U/L (ref 5–41)
ANION GAP SERPL CALCULATED.3IONS-SCNC: 15 MMOL/L (ref 9–17)
AST SERPL-CCNC: 14 U/L
BASOPHILS # BLD: 0 % (ref 0–2)
BASOPHILS ABSOLUTE: 0.06 K/UL (ref 0–0.2)
BILIRUB SERPL-MCNC: 0.27 MG/DL (ref 0.3–1.2)
BUN BLDV-MCNC: 38 MG/DL (ref 6–20)
BUN/CREAT BLD: 5 (ref 9–20)
CALCIUM SERPL-MCNC: 9.2 MG/DL (ref 8.6–10.4)
CHLORIDE BLD-SCNC: 95 MMOL/L (ref 98–107)
CO2: 23 MMOL/L (ref 20–31)
CREAT SERPL-MCNC: 7.59 MG/DL (ref 0.7–1.2)
EKG ATRIAL RATE: 104 BPM
EKG P AXIS: 64 DEGREES
EKG P-R INTERVAL: 142 MS
EKG Q-T INTERVAL: 378 MS
EKG QRS DURATION: 98 MS
EKG QTC CALCULATION (BAZETT): 497 MS
EKG R AXIS: 2 DEGREES
EKG T AXIS: 96 DEGREES
EKG VENTRICULAR RATE: 104 BPM
EOSINOPHILS RELATIVE PERCENT: 1 % (ref 1–4)
GFR AFRICAN AMERICAN: 9 ML/MIN
GFR NON-AFRICAN AMERICAN: 8 ML/MIN
GFR SERPL CREATININE-BSD FRML MDRD: ABNORMAL ML/MIN/{1.73_M2}
GLUCOSE BLD-MCNC: 114 MG/DL (ref 75–110)
GLUCOSE BLD-MCNC: 169 MG/DL (ref 75–110)
GLUCOSE BLD-MCNC: 176 MG/DL (ref 75–110)
GLUCOSE BLD-MCNC: 216 MG/DL (ref 70–99)
GLUCOSE BLD-MCNC: 91 MG/DL (ref 75–110)
GLUCOSE BLD-MCNC: 97 MG/DL (ref 75–110)
HCT VFR BLD CALC: 27.4 % (ref 40.7–50.3)
HEMOGLOBIN: 8.5 G/DL (ref 13–17)
IMMATURE GRANULOCYTES: 1 %
LACTIC ACID, SEPSIS: 0.6 MMOL/L (ref 0.5–1.9)
LACTIC ACID, SEPSIS: 0.7 MMOL/L (ref 0.5–1.9)
LACTIC ACID, SEPSIS: 1 MMOL/L (ref 0.5–1.9)
LYMPHOCYTES # BLD: 7 % (ref 24–43)
MCH RBC QN AUTO: 29.7 PG (ref 25.2–33.5)
MCHC RBC AUTO-ENTMCNC: 31 G/DL (ref 28.4–34.8)
MCV RBC AUTO: 95.8 FL (ref 82.6–102.9)
MONOCYTES # BLD: 10 % (ref 3–12)
NRBC AUTOMATED: 0 PER 100 WBC
PDW BLD-RTO: 13.7 % (ref 11.8–14.4)
PLATELET # BLD: 345 K/UL (ref 138–453)
PMV BLD AUTO: 9.5 FL (ref 8.1–13.5)
POTASSIUM SERPL-SCNC: 4.5 MMOL/L (ref 3.7–5.3)
PRO-BNP: ABNORMAL PG/ML
PROCALCITONIN: 0.63 NG/ML
RBC # BLD: 2.86 M/UL (ref 4.21–5.77)
SARS-COV-2, RAPID: NOT DETECTED
SEG NEUTROPHILS: 81 % (ref 36–65)
SEGMENTED NEUTROPHILS ABSOLUTE COUNT: 11.56 K/UL (ref 1.5–8.1)
SODIUM BLD-SCNC: 133 MMOL/L (ref 135–144)
SPECIMEN DESCRIPTION: NORMAL
TOTAL PROTEIN: 6.3 G/DL (ref 6.4–8.3)
TROPONIN, HIGH SENSITIVITY: 727 NG/L (ref 0–22)
TROPONIN, HIGH SENSITIVITY: 730 NG/L (ref 0–22)
WBC # BLD: 14.1 K/UL (ref 3.5–11.3)

## 2022-07-14 PROCEDURE — 83605 ASSAY OF LACTIC ACID: CPT

## 2022-07-14 PROCEDURE — 85025 COMPLETE CBC W/AUTO DIFF WBC: CPT

## 2022-07-14 PROCEDURE — 80053 COMPREHEN METABOLIC PANEL: CPT

## 2022-07-14 PROCEDURE — 36415 COLL VENOUS BLD VENIPUNCTURE: CPT

## 2022-07-14 PROCEDURE — APPSS30 APP SPLIT SHARED TIME 16-30 MINUTES: Performed by: NURSE PRACTITIONER

## 2022-07-14 PROCEDURE — 96374 THER/PROPH/DIAG INJ IV PUSH: CPT

## 2022-07-14 PROCEDURE — 6360000002 HC RX W HCPCS: Performed by: NURSE PRACTITIONER

## 2022-07-14 PROCEDURE — 87040 BLOOD CULTURE FOR BACTERIA: CPT

## 2022-07-14 PROCEDURE — 2580000003 HC RX 258: Performed by: STUDENT IN AN ORGANIZED HEALTH CARE EDUCATION/TRAINING PROGRAM

## 2022-07-14 PROCEDURE — 6360000002 HC RX W HCPCS: Performed by: STUDENT IN AN ORGANIZED HEALTH CARE EDUCATION/TRAINING PROGRAM

## 2022-07-14 PROCEDURE — 6360000002 HC RX W HCPCS: Performed by: FAMILY MEDICINE

## 2022-07-14 PROCEDURE — 71045 X-RAY EXAM CHEST 1 VIEW: CPT

## 2022-07-14 PROCEDURE — 6370000000 HC RX 637 (ALT 250 FOR IP): Performed by: NURSE PRACTITIONER

## 2022-07-14 PROCEDURE — 6370000000 HC RX 637 (ALT 250 FOR IP): Performed by: FAMILY MEDICINE

## 2022-07-14 PROCEDURE — 90935 HEMODIALYSIS ONE EVALUATION: CPT

## 2022-07-14 PROCEDURE — 99223 1ST HOSP IP/OBS HIGH 75: CPT | Performed by: FAMILY MEDICINE

## 2022-07-14 PROCEDURE — 6360000002 HC RX W HCPCS

## 2022-07-14 PROCEDURE — 96375 TX/PRO/DX INJ NEW DRUG ADDON: CPT

## 2022-07-14 PROCEDURE — 93005 ELECTROCARDIOGRAM TRACING: CPT | Performed by: NURSE PRACTITIONER

## 2022-07-14 PROCEDURE — 6370000000 HC RX 637 (ALT 250 FOR IP): Performed by: CLINICAL NURSE SPECIALIST

## 2022-07-14 PROCEDURE — 97530 THERAPEUTIC ACTIVITIES: CPT

## 2022-07-14 PROCEDURE — 93010 ELECTROCARDIOGRAM REPORT: CPT | Performed by: INTERNAL MEDICINE

## 2022-07-14 PROCEDURE — 2060000000 HC ICU INTERMEDIATE R&B

## 2022-07-14 PROCEDURE — 84145 PROCALCITONIN (PCT): CPT

## 2022-07-14 PROCEDURE — 71250 CT THORAX DX C-: CPT

## 2022-07-14 PROCEDURE — 6360000002 HC RX W HCPCS: Performed by: CLINICAL NURSE SPECIALIST

## 2022-07-14 PROCEDURE — 87635 SARS-COV-2 COVID-19 AMP PRB: CPT

## 2022-07-14 PROCEDURE — 82947 ASSAY GLUCOSE BLOOD QUANT: CPT

## 2022-07-14 PROCEDURE — 2500000003 HC RX 250 WO HCPCS: Performed by: NURSE PRACTITIONER

## 2022-07-14 PROCEDURE — 5A1D70Z PERFORMANCE OF URINARY FILTRATION, INTERMITTENT, LESS THAN 6 HOURS PER DAY: ICD-10-PCS | Performed by: INTERNAL MEDICINE

## 2022-07-14 PROCEDURE — 99285 EMERGENCY DEPT VISIT HI MDM: CPT

## 2022-07-14 PROCEDURE — 83880 ASSAY OF NATRIURETIC PEPTIDE: CPT

## 2022-07-14 PROCEDURE — 2580000003 HC RX 258: Performed by: NURSE PRACTITIONER

## 2022-07-14 PROCEDURE — 84484 ASSAY OF TROPONIN QUANT: CPT

## 2022-07-14 PROCEDURE — 97162 PT EVAL MOD COMPLEX 30 MIN: CPT

## 2022-07-14 RX ORDER — INSULIN GLARGINE 100 [IU]/ML
25 INJECTION, SOLUTION SUBCUTANEOUS NIGHTLY
Status: DISCONTINUED | OUTPATIENT
Start: 2022-07-14 | End: 2022-07-15 | Stop reason: HOSPADM

## 2022-07-14 RX ORDER — DEXTROSE MONOHYDRATE 50 MG/ML
100 INJECTION, SOLUTION INTRAVENOUS PRN
Status: DISCONTINUED | OUTPATIENT
Start: 2022-07-14 | End: 2022-07-15 | Stop reason: HOSPADM

## 2022-07-14 RX ORDER — SODIUM CHLORIDE 0.9 % (FLUSH) 0.9 %
5-40 SYRINGE (ML) INJECTION EVERY 12 HOURS SCHEDULED
Status: DISCONTINUED | OUTPATIENT
Start: 2022-07-14 | End: 2022-07-15 | Stop reason: HOSPADM

## 2022-07-14 RX ORDER — CALCIUM ACETATE 667 MG/1
1334 CAPSULE ORAL
Status: DISCONTINUED | OUTPATIENT
Start: 2022-07-14 | End: 2022-07-15 | Stop reason: HOSPADM

## 2022-07-14 RX ORDER — GABAPENTIN 300 MG/1
300 CAPSULE ORAL DAILY
Status: DISCONTINUED | OUTPATIENT
Start: 2022-07-14 | End: 2022-07-15 | Stop reason: HOSPADM

## 2022-07-14 RX ORDER — INSULIN LISPRO 100 [IU]/ML
0-6 INJECTION, SOLUTION INTRAVENOUS; SUBCUTANEOUS NIGHTLY
Status: DISCONTINUED | OUTPATIENT
Start: 2022-07-14 | End: 2022-07-15

## 2022-07-14 RX ORDER — ROSUVASTATIN CALCIUM 10 MG/1
10 TABLET, COATED ORAL NIGHTLY
Status: DISCONTINUED | OUTPATIENT
Start: 2022-07-14 | End: 2022-07-15 | Stop reason: HOSPADM

## 2022-07-14 RX ORDER — INSULIN GLARGINE 100 [IU]/ML
45 INJECTION, SOLUTION SUBCUTANEOUS
Status: DISCONTINUED | OUTPATIENT
Start: 2022-07-14 | End: 2022-07-15 | Stop reason: HOSPADM

## 2022-07-14 RX ORDER — ONDANSETRON 2 MG/ML
4 INJECTION INTRAMUSCULAR; INTRAVENOUS EVERY 6 HOURS PRN
Status: DISCONTINUED | OUTPATIENT
Start: 2022-07-14 | End: 2022-07-15 | Stop reason: HOSPADM

## 2022-07-14 RX ORDER — ONDANSETRON 2 MG/ML
INJECTION INTRAMUSCULAR; INTRAVENOUS
Status: COMPLETED
Start: 2022-07-14 | End: 2022-07-14

## 2022-07-14 RX ORDER — CARVEDILOL 6.25 MG/1
6.25 TABLET ORAL 2 TIMES DAILY WITH MEALS
Status: DISCONTINUED | OUTPATIENT
Start: 2022-07-14 | End: 2022-07-14

## 2022-07-14 RX ORDER — FLUOXETINE HYDROCHLORIDE 20 MG/1
40 CAPSULE ORAL DAILY
Status: DISCONTINUED | OUTPATIENT
Start: 2022-07-14 | End: 2022-07-15 | Stop reason: HOSPADM

## 2022-07-14 RX ORDER — PROCHLORPERAZINE EDISYLATE 5 MG/ML
10 INJECTION INTRAMUSCULAR; INTRAVENOUS EVERY 6 HOURS PRN
Status: DISCONTINUED | OUTPATIENT
Start: 2022-07-14 | End: 2022-07-15 | Stop reason: HOSPADM

## 2022-07-14 RX ORDER — ONDANSETRON 4 MG/1
4 TABLET, FILM COATED ORAL 3 TIMES DAILY PRN
Status: DISCONTINUED | OUTPATIENT
Start: 2022-07-14 | End: 2022-07-15 | Stop reason: HOSPADM

## 2022-07-14 RX ORDER — CARVEDILOL 3.12 MG/1
3.12 TABLET ORAL 2 TIMES DAILY WITH MEALS
Status: ON HOLD | COMMUNITY
End: 2022-07-15 | Stop reason: HOSPADM

## 2022-07-14 RX ORDER — ONDANSETRON 2 MG/ML
4 INJECTION INTRAMUSCULAR; INTRAVENOUS ONCE
Status: COMPLETED | OUTPATIENT
Start: 2022-07-14 | End: 2022-07-14

## 2022-07-14 RX ORDER — CARVEDILOL 12.5 MG/1
12.5 TABLET ORAL 2 TIMES DAILY WITH MEALS
Status: DISCONTINUED | OUTPATIENT
Start: 2022-07-14 | End: 2022-07-15 | Stop reason: HOSPADM

## 2022-07-14 RX ORDER — LANOLIN ALCOHOL/MO/W.PET/CERES
500 CREAM (GRAM) TOPICAL DAILY
Status: DISCONTINUED | OUTPATIENT
Start: 2022-07-14 | End: 2022-07-15 | Stop reason: HOSPADM

## 2022-07-14 RX ORDER — BUPROPION HYDROCHLORIDE 150 MG/1
150 TABLET ORAL EVERY MORNING
Status: DISCONTINUED | OUTPATIENT
Start: 2022-07-14 | End: 2022-07-15 | Stop reason: HOSPADM

## 2022-07-14 RX ORDER — FUROSEMIDE 10 MG/ML
40 INJECTION INTRAMUSCULAR; INTRAVENOUS 2 TIMES DAILY
Status: DISCONTINUED | OUTPATIENT
Start: 2022-07-14 | End: 2022-07-15 | Stop reason: HOSPADM

## 2022-07-14 RX ORDER — SODIUM CHLORIDE 9 MG/ML
INJECTION, SOLUTION INTRAVENOUS PRN
Status: DISCONTINUED | OUTPATIENT
Start: 2022-07-14 | End: 2022-07-15 | Stop reason: HOSPADM

## 2022-07-14 RX ORDER — HYDRALAZINE HYDROCHLORIDE 20 MG/ML
10 INJECTION INTRAMUSCULAR; INTRAVENOUS EVERY 6 HOURS PRN
Status: DISCONTINUED | OUTPATIENT
Start: 2022-07-14 | End: 2022-07-15 | Stop reason: HOSPADM

## 2022-07-14 RX ORDER — HEPARIN SODIUM 5000 [USP'U]/ML
5000 INJECTION, SOLUTION INTRAVENOUS; SUBCUTANEOUS EVERY 8 HOURS SCHEDULED
Status: DISCONTINUED | OUTPATIENT
Start: 2022-07-14 | End: 2022-07-15 | Stop reason: HOSPADM

## 2022-07-14 RX ORDER — HYDRALAZINE HYDROCHLORIDE 20 MG/ML
10 INJECTION INTRAMUSCULAR; INTRAVENOUS ONCE
Status: COMPLETED | OUTPATIENT
Start: 2022-07-14 | End: 2022-07-14

## 2022-07-14 RX ORDER — ACETAMINOPHEN 650 MG/1
650 SUPPOSITORY RECTAL EVERY 6 HOURS PRN
Status: DISCONTINUED | OUTPATIENT
Start: 2022-07-14 | End: 2022-07-15 | Stop reason: HOSPADM

## 2022-07-14 RX ORDER — SODIUM CHLORIDE 0.9 % (FLUSH) 0.9 %
5-40 SYRINGE (ML) INJECTION PRN
Status: DISCONTINUED | OUTPATIENT
Start: 2022-07-14 | End: 2022-07-15 | Stop reason: HOSPADM

## 2022-07-14 RX ORDER — LEVOFLOXACIN 5 MG/ML
750 INJECTION, SOLUTION INTRAVENOUS ONCE
Status: COMPLETED | OUTPATIENT
Start: 2022-07-14 | End: 2022-07-14

## 2022-07-14 RX ORDER — ASPIRIN 81 MG/1
81 TABLET ORAL DAILY
Status: DISCONTINUED | OUTPATIENT
Start: 2022-07-14 | End: 2022-07-15 | Stop reason: HOSPADM

## 2022-07-14 RX ORDER — ROSUVASTATIN CALCIUM 40 MG/1
40 TABLET, COATED ORAL EVERY EVENING
Status: ON HOLD | COMMUNITY
End: 2022-08-03 | Stop reason: HOSPADM

## 2022-07-14 RX ORDER — LEVOFLOXACIN 5 MG/ML
500 INJECTION, SOLUTION INTRAVENOUS
Status: DISCONTINUED | OUTPATIENT
Start: 2022-07-16 | End: 2022-07-15 | Stop reason: HOSPADM

## 2022-07-14 RX ORDER — PANTOPRAZOLE SODIUM 40 MG/1
40 TABLET, DELAYED RELEASE ORAL
Status: DISCONTINUED | OUTPATIENT
Start: 2022-07-14 | End: 2022-07-15 | Stop reason: HOSPADM

## 2022-07-14 RX ORDER — INSULIN LISPRO 100 [IU]/ML
0-12 INJECTION, SOLUTION INTRAVENOUS; SUBCUTANEOUS
Status: DISCONTINUED | OUTPATIENT
Start: 2022-07-14 | End: 2022-07-15

## 2022-07-14 RX ORDER — EZETIMIBE 10 MG/1
10 TABLET ORAL DAILY
Status: DISCONTINUED | OUTPATIENT
Start: 2022-07-14 | End: 2022-07-15 | Stop reason: HOSPADM

## 2022-07-14 RX ORDER — LISINOPRIL 20 MG/1
20 TABLET ORAL DAILY
Status: DISCONTINUED | OUTPATIENT
Start: 2022-07-14 | End: 2022-07-15 | Stop reason: HOSPADM

## 2022-07-14 RX ORDER — ACETAMINOPHEN 325 MG/1
650 TABLET ORAL EVERY 6 HOURS PRN
Status: DISCONTINUED | OUTPATIENT
Start: 2022-07-14 | End: 2022-07-15 | Stop reason: HOSPADM

## 2022-07-14 RX ORDER — LANOLIN ALCOHOL/MO/W.PET/CERES
400 CREAM (GRAM) TOPICAL DAILY
Status: DISCONTINUED | OUTPATIENT
Start: 2022-07-14 | End: 2022-07-15 | Stop reason: HOSPADM

## 2022-07-14 RX ADMIN — PROCHLORPERAZINE EDISYLATE 10 MG: 5 INJECTION INTRAMUSCULAR; INTRAVENOUS at 20:46

## 2022-07-14 RX ADMIN — HYDRALAZINE HYDROCHLORIDE 10 MG: 20 INJECTION INTRAMUSCULAR; INTRAVENOUS at 17:26

## 2022-07-14 RX ADMIN — LISINOPRIL 20 MG: 20 TABLET ORAL at 12:43

## 2022-07-14 RX ADMIN — CARVEDILOL 6.25 MG: 6.25 TABLET, FILM COATED ORAL at 09:27

## 2022-07-14 RX ADMIN — BUPROPION HYDROCHLORIDE 150 MG: 150 TABLET, EXTENDED RELEASE ORAL at 09:27

## 2022-07-14 RX ADMIN — ONDANSETRON 4 MG: 2 INJECTION INTRAMUSCULAR; INTRAVENOUS at 17:26

## 2022-07-14 RX ADMIN — HEPARIN SODIUM 5000 UNITS: 5000 INJECTION INTRAVENOUS; SUBCUTANEOUS at 17:26

## 2022-07-14 RX ADMIN — PANTOPRAZOLE SODIUM 40 MG: 40 TABLET, DELAYED RELEASE ORAL at 09:27

## 2022-07-14 RX ADMIN — HEPARIN SODIUM 5000 UNITS: 5000 INJECTION INTRAVENOUS; SUBCUTANEOUS at 20:46

## 2022-07-14 RX ADMIN — FLUOXETINE 40 MG: 20 CAPSULE ORAL at 09:27

## 2022-07-14 RX ADMIN — SODIUM CHLORIDE, PRESERVATIVE FREE 10 ML: 5 INJECTION INTRAVENOUS at 09:42

## 2022-07-14 RX ADMIN — FUROSEMIDE 40 MG: 10 INJECTION, SOLUTION INTRAMUSCULAR; INTRAVENOUS at 12:45

## 2022-07-14 RX ADMIN — Medication 400 MG: at 09:27

## 2022-07-14 RX ADMIN — SODIUM CHLORIDE, PRESERVATIVE FREE 10 ML: 5 INJECTION INTRAVENOUS at 20:47

## 2022-07-14 RX ADMIN — INSULIN LISPRO 2 UNITS: 100 INJECTION, SOLUTION INTRAVENOUS; SUBCUTANEOUS at 09:27

## 2022-07-14 RX ADMIN — ONDANSETRON 4 MG: 2 INJECTION INTRAMUSCULAR; INTRAVENOUS at 04:58

## 2022-07-14 RX ADMIN — HYDRALAZINE HYDROCHLORIDE 10 MG: 20 INJECTION INTRAMUSCULAR; INTRAVENOUS at 09:35

## 2022-07-14 RX ADMIN — GABAPENTIN 300 MG: 300 CAPSULE ORAL at 09:27

## 2022-07-14 RX ADMIN — INSULIN LISPRO 2 UNITS: 100 INJECTION, SOLUTION INTRAVENOUS; SUBCUTANEOUS at 12:45

## 2022-07-14 RX ADMIN — ASPIRIN 81 MG: 81 TABLET, COATED ORAL at 09:27

## 2022-07-14 RX ADMIN — LEVOFLOXACIN 750 MG: 5 INJECTION, SOLUTION INTRAVENOUS at 10:06

## 2022-07-14 RX ADMIN — CALCIUM ACETATE 1334 MG: 667 CAPSULE ORAL at 12:43

## 2022-07-14 RX ADMIN — FUROSEMIDE 40 MG: 10 INJECTION, SOLUTION INTRAMUSCULAR; INTRAVENOUS at 18:15

## 2022-07-14 RX ADMIN — ONDANSETRON HYDROCHLORIDE 4 MG: 4 TABLET, FILM COATED ORAL at 12:43

## 2022-07-14 RX ADMIN — ACETAMINOPHEN 650 MG: 325 TABLET ORAL at 20:46

## 2022-07-14 RX ADMIN — INSULIN GLARGINE 45 UNITS: 100 INJECTION, SOLUTION SUBCUTANEOUS at 09:28

## 2022-07-14 RX ADMIN — CARVEDILOL 12.5 MG: 12.5 TABLET, FILM COATED ORAL at 20:47

## 2022-07-14 RX ADMIN — HEPARIN SODIUM 5000 UNITS: 5000 INJECTION INTRAVENOUS; SUBCUTANEOUS at 09:28

## 2022-07-14 RX ADMIN — CYANOCOBALAMIN TAB 1000 MCG 500 MCG: 1000 TAB at 09:27

## 2022-07-14 RX ADMIN — ROSUVASTATIN CALCIUM 10 MG: 10 TABLET, FILM COATED ORAL at 20:47

## 2022-07-14 RX ADMIN — CALCIUM ACETATE 1334 MG: 667 CAPSULE ORAL at 09:27

## 2022-07-14 RX ADMIN — EZETIMIBE 10 MG: 10 TABLET ORAL at 09:27

## 2022-07-14 RX ADMIN — CEFTRIAXONE SODIUM 1000 MG: 1 INJECTION, POWDER, FOR SOLUTION INTRAMUSCULAR; INTRAVENOUS at 06:13

## 2022-07-14 ASSESSMENT — ENCOUNTER SYMPTOMS
COLOR CHANGE: 0
EYE DISCHARGE: 0
CONSTIPATION: 0
ABDOMINAL PAIN: 0
EYE REDNESS: 0
NAUSEA: 0
BLOOD IN STOOL: 0
VOMITING: 0
COUGH: 1
SHORTNESS OF BREATH: 1
STRIDOR: 0
DIARRHEA: 0
WHEEZING: 0

## 2022-07-14 ASSESSMENT — PAIN SCALES - GENERAL
PAINLEVEL_OUTOF10: 3
PAINLEVEL_OUTOF10: 7

## 2022-07-14 ASSESSMENT — PAIN DESCRIPTION - DESCRIPTORS: DESCRIPTORS: ACHING;DISCOMFORT;DULL

## 2022-07-14 ASSESSMENT — PAIN DESCRIPTION - ORIENTATION: ORIENTATION: LEFT

## 2022-07-14 ASSESSMENT — PAIN DESCRIPTION - LOCATION: LOCATION: NECK;EAR

## 2022-07-14 ASSESSMENT — PAIN - FUNCTIONAL ASSESSMENT: PAIN_FUNCTIONAL_ASSESSMENT: ACTIVITIES ARE NOT PREVENTED

## 2022-07-14 ASSESSMENT — PAIN DESCRIPTION - PAIN TYPE: TYPE: ACUTE PAIN

## 2022-07-14 NOTE — ED NOTES
Pt presents to the er c/o a two week history of a cough and shortness of breath pt iswith respirations even and unlabored pt states that he was admitted here three weeks ago for a pericardial effusion     Chip SOFIYA Reno  07/14/22 [id number]

## 2022-07-14 NOTE — PROGRESS NOTES
HEMODIALYSIS PRE-TREATMENT NOTE    Patient Identifiers prior to treatment: Name,     Isolation Required: No                      Isolation Type: N/A       (please document if patient is being managed as a PUI/COVID-19 patient)        Hepatitis status:                           Date Drawn                             Result  Hepatitis B Surface Antigen 22     neg                     Hepatitis B Surface Antibody 22 Immune        Hepatitis B Core Antibody n/a n/a          How was Hepatitis Status verified: Epic     Was a copy of the labs you documented provided to facility for the patient's chart: Epic    Hemodialysis orders verified: Yes, with Dr. Cutler Stager Within normal limits ( I.e. s/s of infection,...): WNL, bladimir, jordon present     Pre-Assessment completed: Yes    Pre-dialysis report received from: [facility] SOFIYA LINO                      Time: 13:00

## 2022-07-14 NOTE — ED PROVIDER NOTES
[facility]  Emergency Department Encounter     Pt Name: Dawson Ceballos  MRN: [id number]  [place] 1975  Date of evaluation: 7/14/22  PCP:  DANIEL Richards CNP    CHIEF COMPLAINT       Chief Complaint   Patient presents with    Cough    Shortness of Breath    Neck Pain       HISTORY OFPRESENT ILLNESS  (Location/Symptom, Timing/Onset, Context/Setting, Quality, Duration, Modifying Factors,Severity.)      Dawson Ceballos is a 49 y.o. male who presents with cough, shortness of breath, left-sided neck pain. Patient is end-stage renal dialysis. Recently admitted for a large pericardial effusion which was drained. Dialyzing Tuesday Thursday Saturday. Denies any missed dialysis sessions. Does endorse increasing cough as well as shortness of breath. States he had a fever few days ago that is now resolved. PAST MEDICAL / SURGICAL / SOCIAL / FAMILY HISTORY      has a past medical history of Cerebral artery occlusion with cerebral infarction [facility]), Closed fracture of bone of right foot, Dialysis patient ([facility] [address] Hemodialysis patient ([facility] [address] Hyperlipidemia, Hypertension, Kidney disease, and Type 1 diabetes mellitus ([facility] [address] has a past surgical history that includes AV fistula creation (Left) and Wrist surgery (1995).     Social History     Socioeconomic History    Marital status: Single     Spouse name: Not on file    Number of children: Not on file    Years of education: Not on file    Highest education level: Not on file   Occupational History    Not on file   Tobacco Use    Smoking status: Never Smoker    Smokeless tobacco: Never Used   Vaping Use    Vaping Use: Never used   Substance and Sexual Activity    Alcohol use: Never    Drug use: Never    Sexual activity: Not on file   Other Topics Concern    Not on file   Social History Narrative    Not on file     Social Determinants of Health     Financial Resource Strain:     Difficulty of Paying Living Expenses: Not on file   Food Insecurity:     Worried About [facility] in the Last Year: Not on file    Dinh of Food in the Last Year: Not on file   Transportation Needs:     Lack of Transportation (Medical): Not on file    Lack of Transportation (Non-Medical): Not on file   Physical Activity:     Days of Exercise per Week: Not on file    Minutes of Exercise per Session: Not on file   Stress:     Feeling of Stress : Not on file   Social Connections:     Frequency of Communication with Friends and Family: Not on file    Frequency of Social Gatherings with Friends and Family: Not on file    Attends Religion Services: Not on file    Active Member of [address] [place] [address] or Organizations: Not on file    Attends Club or Organization Meetings: Not on file    Marital Status: Not on file   Intimate Partner Violence:     Fear of Current or Ex-Partner: Not on file    Emotionally Abused: Not on file    Physically Abused: Not on file    Sexually Abused: Not on file   Housing Stability:     Unable to Pay for Housing in the Last Year: Not on file    Number of [place] in the Last Year: Not on file    Unstable Housing in the Last Year: Not on file       Family History   Problem Relation Age of Onset    Diabetes Mother     Heart Disease Father     Diabetes Father     Cancer Paternal Grandmother     Heart Disease Maternal Great Grandfather        Allergies:  Patient has no known allergies. Home Medications:  Prior to Admission medications    Medication Sig Start Date End Date Taking?  Authorizing Provider   insulin glargine (LANTUS) 100 UNIT/ML injection vial Inject 45 Units into the skin every morning (before breakfast) 6/8/22   Dante Cabral MD   insulin glargine (LANTUS) 100 UNIT/ML injection vial Inject 25 Units into the skin nightly 6/8/22   Dante Cabral MD   rosuvastatin (CRESTOR) 10 MG tablet Take 1 tablet by mouth nightly 6/8/22   Dante Cabral MD   bisacodyl (DULCOLAX) 5 MG EC tablet Take 1 tablet by mouth daily as needed for Constipation 6/8/22   Mirta Martinez MD   predniSONE (DELTASONE) 10 MG tablet Take 4 tabs daily for 3 days then 3 tabs daily for 3days followed by 2 tabs daily for 3 d and 1 tab daily for 3 d. 6/8/22   Mirta Martinez MD   carvedilol (COREG) 6.25 MG tablet Take 1 tablet by mouth 2 times daily (with meals) 6/2/22   Gianna Lopez MD   pantoprazole (PROTONIX) 40 MG tablet Take 1 tablet by mouth every morning (before breakfast) 6/3/22   Gianna Lopez MD   gabapentin (NEURONTIN) 300 MG capsule Take 1 capsule by mouth daily. 5/28/22   DANIEL Christopher CNP   Misc. Devices MISC Disp: custom molded shoes to accommodate for brace   DX: DM with history of stroke, foot drop  Duration: 1 year 5/11/22   Lucretia De La Garza DPM   ondansetron (ZOFRAN) 4 MG tablet Take 1 tablet by mouth 3 times daily as needed for Nausea or Vomiting 4/29/22   DANIEL Rubalcava CNP   magnesium oxide (MAG-OX) 400 MG tablet Take 400 mg by mouth daily     Historical Provider, MD   magnesium hydroxide (MILK OF MAGNESIA) 400 MG/5ML suspension Take 15 mLs by mouth daily as needed for Constipation    Historical Provider, MD   insulin aspart (NOVOLOG) 100 UNIT/ML injection vial Inject 0-8 Units into the skin 3 times daily (before meals) SLIDING SCALE     Historical Provider, MD   FLUoxetine (PROZAC) 40 MG capsule Take 40 mg by mouth daily     Historical Provider, MD   ammonium lactate (LAC-HYDRIN) 12 % lotion Apply topically daily.   Patient taking differently: Apply to feet 11/1/21   Lucretia De La Garza DPM   buPROPion (WELLBUTRIN XL) 150 MG extended release tablet Take 1 tablet by mouth every morning 3/25/21   Po Wheeler DO   ezetimibe (ZETIA) 10 MG tablet Take 10 mg by mouth daily    Historical Provider, MD   aspirin 81 MG EC tablet Take 81 mg by mouth daily    Historical Provider, MD   calcium acetate 667 MG TABS Take 1,334 mg by mouth 3 times daily (with meals)     Historical Provider, MD   vitamin B-12 (CYANOCOBALAMIN) 500 MCG tablet Take 500 mcg by mouth daily    Historical Provider, MD       REVIEW OF SYSTEMS    (2-9 systems for level 4, 10 or more for level 5)      Review of Systems   Constitutional: Positive for fatigue and fever. Eyes: Negative for discharge and redness. Respiratory: Positive for cough and shortness of breath. Cardiovascular: Negative for chest pain. Gastrointestinal: Negative for abdominal pain. Genitourinary: Negative for dysuria. Musculoskeletal: Negative for arthralgias. Skin: Negative for color change and rash. Neurological: Negative for headaches. Psychiatric/Behavioral: Negative for agitation and confusion. PHYSICAL EXAM   (up to 7 for level 4, 8 or more for level 5)     INITIAL VITALS:    height is 5' 7\" (1.702 m). His oral temperature is 98.4 °F (36.9 °C). His blood pressure is 207/98 (abnormal) and his pulse is 105 (abnormal). His respiration is 28 and oxygen saturation is 93%. Physical Exam  Vitals and nursing note reviewed. Constitutional:       Appearance: He is well-developed. HENT:      Head: Normocephalic and atraumatic. Nose: Nose normal.      Mouth/Throat:      Mouth: Mucous membranes are moist.   Eyes:      General: No scleral icterus. Conjunctiva/sclera: Conjunctivae normal.      Pupils: Pupils are equal, round, and reactive to light. Cardiovascular:      Rate and Rhythm: Normal rate and regular rhythm. Heart sounds: Normal heart sounds. No murmur heard. No friction rub. No gallop. Pulmonary:      Comments: Mildly increased respiratory effort, speaking in full sentences, decreased lung sounds at base  Abdominal:      Palpations: Abdomen is soft. Tenderness: There is no abdominal tenderness. Musculoskeletal:         General: Normal range of motion. Skin:     General: Skin is warm and dry. Findings: No erythema or rash. Neurological:      Mental Status: He is alert and oriented to person, place, and time.    Psychiatric: Behavior: Behavior normal.         DIFFERENTIAL  DIAGNOSIS     PLAN (LABS / IMAGING / EKG):  Orders Placed This Encounter   Procedures    Culture, Blood 1    Culture, Blood 1    COVID-19, Rapid    XR CHEST PORTABLE    CT CHEST WO CONTRAST    CBC with Auto Differential    Comprehensive Metabolic Panel    Brain Natriuretic Peptide    Troponin    Troponin    Lactate, Sepsis    Procalcitonin    Inpatient consult to Hospitalist    Insert peripheral IV       MEDICATIONS ORDERED:  Orders Placed This Encounter   Medications    ondansetron (ZOFRAN) injection 4 mg    ondansetron (ZOFRAN) 4 MG/2ML injection     GRIS DURAN: cabinet override    azithromycin (ZITHROMAX) 500 mg in D5W 250ml addavial     Order Specific Question:   Antimicrobial Indications     Answer:   Pneumonia (CAP)    cefTRIAXone (ROCEPHIN) 1000 mg IVPB in 50 mL D5W minibag     Order Specific Question:   Antimicrobial Indications     Answer:   Pneumonia (CAP)       DDX: Pneumonia versus fluid overload versus COVID    Initial MDM/Plan: 49 y.o. male who presents with cough, shortness of breath. No initial oxygen requirement but patient did require 2 L oxygen to maintain sat. Given his new oxygen requirement will plan admission.   Will get labs, COVID testing    DIAGNOSTIC RESULTS / EMERGENCY DEPARTMENT COURSE / MDM     LABS:  Labs Reviewed   CBC WITH AUTO DIFFERENTIAL - Abnormal; Notable for the following components:       Result Value    WBC 14.1 (*)     RBC 2.86 (*)     Hemoglobin 8.5 (*)     Hematocrit 27.4 (*)     Seg Neutrophils 81 (*)     Lymphocytes 7 (*)     Immature Granulocytes 1 (*)     Segs Absolute 11.56 (*)     Absolute Lymph # 0.96 (*)     Absolute Mono # 1.35 (*)     All other components within normal limits   COMPREHENSIVE METABOLIC PANEL - Abnormal; Notable for the following components:    Glucose 216 (*)     BUN 38 (*)     CREATININE 7.59 (*)     Bun/Cre Ratio 5 (*)     Sodium 133 (*)     Chloride 95 (*)     Alkaline Phosphatase 161 (*)     Total Bilirubin 0.27 (*)     Total Protein 6.3 (*)     Albumin 3.4 (*)     GFR Non- 8 (*)     GFR  9 (*)     All other components within normal limits   BRAIN NATRIURETIC PEPTIDE - Abnormal; Notable for the following components:    Pro-BNP 22,585 (*)     All other components within normal limits   TROPONIN - Abnormal; Notable for the following components:    Troponin, High Sensitivity 727 (*)     All other components within normal limits   TROPONIN - Abnormal; Notable for the following components:    Troponin, High Sensitivity 730 (*)     All other components within normal limits   CULTURE, BLOOD 1   CULTURE, BLOOD 1   COVID-19, RAPID   LACTATE, SEPSIS   PROCALCITONIN         RADIOLOGY:  CT CHEST WO CONTRAST    Result Date: 7/14/2022  EXAMINATION: CT OF THE CHEST WITHOUT CONTRAST 7/14/2022 4:01 am TECHNIQUE: CT of the chest was performed without the administration of intravenous contrast. Multiplanar reformatted images are provided for review. Automated exposure control, iterative reconstruction, and/or weight based adjustment of the mA/kV was utilized to reduce the radiation dose to as low as reasonably achievable. COMPARISON: Chest radiograph today. Chest CT 05/30/2022. HISTORY: ORDERING SYSTEM PROVIDED HISTORY: Recent large pericardial effusion, requiring drainage, reassess, cough, chest pain, neck pain TECHNOLOGIST PROVIDED HISTORY: Recent large pericardial effusion, requiring drainage, reassess, cough, chest pain, neck pain Decision Support Exception - unselect if not a suspected or confirmed emergency medical condition->Emergency Medical Condition (MA) FINDINGS: Mediastinum: Scattered small mediastinal lymph nodes and left internal mammary lymph nodes. These are likely reactive. Trace pericardial effusion. Calcified atheromatous plaque and coronary calcification. The esophagus is mildly patulous.  Lungs/pleura: Small left effusion and trace right infectious organism, unspecified laterality, unspecified part of lung    2. End stage renal disease on dialysis [facility])         DISPOSITION / PLAN     DISPOSITION Decision To Admit 07/14/2022 05:38:33 AM        PATIENTREFERRED TO:  No follow-up provider specified.     DISCHARGE MEDICATIONS:  New Prescriptions    No medications on file       Sharlene Morales DO  EmergencyMedicine Attending    (Please note that portions of this note were completed with a voice recognition program.  Efforts were made to edit the dictations but occasionally words are mis-transcribed.)       Sharlene Morales DO  07/14/22 [id number]

## 2022-07-14 NOTE — H&P
[facility]  Office: [address], DO, Gwen Nyhan, DO, Joan Vela, DO, Tex Linnea Blood, DO, Annette Alonzo MD, Mirella Cross MD, Dawson Elliott MD, Jay Ibanez MD, Joel López MD, Anderson Molina MD, Jing Wheeler MD, Dariela Crain, DO, Leodan Valdes MD,  Ernesto Zavala DO, Peterson Nobles MD, Marcella Mireles MD, Jonny Hennessy, DO, Neri Cuevas MD, Neno Bradshaw MD, Ford Jimenez DO, Otto Girard MD, Brandon German MD, Juan Storm, [facility], [facility], CNP, Ayush Guevara, CNP, Angela Higginbotham, CNP, Quentin Schilling, CNP, Consuelo Marino, CNP, Ann Christian PA-C, [place]ismark Brambila, [facility], Yohannes Avilez, CNP, Rachael Tavares, CNP, Andrew Padilla, CNP, Socorro John, CNS, Josias Batista, [facility], Cortez Barkley, CNP, Alysha Brunner, CNP, Garfield Lugo, [facility]    HISTORY AND PHYSICAL EXAMINATION            Date:   7/14/2022  Patient name:  Lucas Zuluaga  Date of admission:  7/14/2022  2:36 AM  MRN:   [id number]  Account:  [de-identified]  YOB: 1975  PCP:    DANIEL Blood CNP  Room:   [place] [address]  Code Status:    Prior    Chief Complaint:     Chief Complaint   Patient presents with    Cough    Shortness of Breath    Neck Pain       History Obtained From:     patient, electronic medical record    History of Present Illness:     Lucas Zuluaga is a 49 y.o. Non- / non  male who presents with Cough, Shortness of Breath, and Neck Pain   and is admitted to the hospital for the management of Sepsis ([facility] [address] Patient presented to the ER with complaints of cough, shortness of breath, he states he is having a little difficulty breathing but he does not necessarily feel short of breath and left-sided chest pain taking of breath. He states he had a fever a few days ago but that resolved.   Since arrival to the ER has been afebrile but has been mildly tachycardic between 102 and 111 with blood pressures consistently low 200s over 90s. Chest x-ray showed patchy right perihilar and left basilar airspace disease with possible small left effusion concerning for pneumonia. CT of the chest without contrast shows trace pericardial effusion with small left effusion and trace right pleural effusions. There is also scattered groundglass opacities predominate in the right lung as well as mild septal thickening. Per the ER attending a bedside echo did not show an effusion. Patient is end-stage renal disease on dialysis Tuesday Thursday and Saturday and follows with Dr. Jadon Jang. Troponin of 730 fairly consistent with his average troponin. BNP is 22,585 likely related to end-stage renal disease. No other BNP's are in the computer to compare. White count is elevated at 14.1. Lactic acid 0.6. Hemoglobin hematocrit 8.5 test 27.4. Blood cultures x2 were obtained and patient was started on Rocephin and azithromycin. Rapid COVID's pending. Patient was admitted from 5/29 through 6/8 related to acute idiopathic pericarditis and a large pericardial effusion. At that time his EKG showed diffuse ST elevation. Patient underwent a pericardiocentesis/pericardial drain placement with 860 cc of hemorrhagic fluid removed and he was treated with colchicine and prednisone. An echo at that time showed an EF of 40% with global hypokinesis. Heart cath on 6/6 showed minimal nonobstructive CAD.   During that admission he also had a fistulogram.     Past Medical History:     Past Medical History:   Diagnosis Date    Cerebral artery occlusion with cerebral infarction [facility])     Closed fracture of bone of right foot March - April 2020    Dialysis patient [facility])     Hemodialysis patient [facility])     Hyperlipidemia     Hypertension     Kidney disease     On Dialysis    Type 1 diabetes mellitus ([facility] [address]         Past Surgical History:     Past Surgical History:   Procedure Laterality Date    AV FISTULA CREATION Left [address] [facility]        Medications Prior to Admission:     Prior to Admission medications    Medication Sig Start Date End Date Taking? Authorizing Provider   insulin glargine (LANTUS) 100 UNIT/ML injection vial Inject 45 Units into the skin every morning (before breakfast) 6/8/22   Deshawn Jones MD   insulin glargine (LANTUS) 100 UNIT/ML injection vial Inject 25 Units into the skin nightly 6/8/22   Deshawn Jones MD   rosuvastatin (CRESTOR) 10 MG tablet Take 1 tablet by mouth nightly 6/8/22   Deshawn Jones MD   bisacodyl (DULCOLAX) 5 MG EC tablet Take 1 tablet by mouth daily as needed for Constipation 6/8/22   Deshawn Jones MD   predniSONE (DELTASONE) 10 MG tablet Take 4 tabs daily for 3 days then 3 tabs daily for 3days followed by 2 tabs daily for 3 d and 1 tab daily for 3 d. 6/8/22   Deshawn Jones MD   carvedilol (COREG) 6.25 MG tablet Take 1 tablet by mouth 2 times daily (with meals) 6/2/22   Lula Ivory MD   pantoprazole (PROTONIX) 40 MG tablet Take 1 tablet by mouth every morning (before breakfast) 6/3/22   Lula Ivory MD   gabapentin (NEURONTIN) 300 MG capsule Take 1 capsule by mouth daily. 5/28/22   DANIEL Coelho CNP   Misc.  Devices MISC Disp: custom molded shoes to accommodate for brace   DX: DM with history of stroke, foot drop  Duration: 1 year 5/11/22   Jayde Yanez DPM   ondansetron (ZOFRAN) 4 MG tablet Take 1 tablet by mouth 3 times daily as needed for Nausea or Vomiting 4/29/22   DANIEL Torres CNP   magnesium oxide (MAG-OX) 400 MG tablet Take 400 mg by mouth daily     Historical Provider, MD   magnesium hydroxide (MILK OF MAGNESIA) 400 MG/5ML suspension Take 15 mLs by mouth daily as needed for Constipation    Historical Provider, MD   insulin aspart (NOVOLOG) 100 UNIT/ML injection vial Inject 0-8 Units into the skin 3 times daily (before meals) SLIDING SCALE     Historical Provider, MD   FLUoxetine (PROZAC) 40 MG capsule Take 40 mg by mouth daily Historical Provider, MD   ammonium lactate (LAC-HYDRIN) 12 % lotion Apply topically daily. Patient taking differently: Apply to feet 11/1/21   Francesca Gave, DPM   buPROPion (WELLBUTRIN XL) 150 MG extended release tablet Take 1 tablet by mouth every morning 3/25/21   Gib Never, DO   ezetimibe (ZETIA) 10 MG tablet Take 10 mg by mouth daily    Historical Provider, MD   aspirin 81 MG EC tablet Take 81 mg by mouth daily    Historical Provider, MD   calcium acetate 667 MG TABS Take 1,334 mg by mouth 3 times daily (with meals)     Historical Provider, MD   vitamin B-12 (CYANOCOBALAMIN) 500 MCG tablet Take 500 mcg by mouth daily    Historical Provider, MD        Allergies:     Patient has no known allergies. Social History:     Tobacco:    reports that he has never smoked. He has never used smokeless tobacco.  Alcohol:      reports no history of alcohol use. Drug Use:  reports no history of drug use. Family History:     Family History   Problem Relation Age of Onset    Diabetes Mother     Heart Disease Father     Diabetes Father     Cancer Paternal Grandmother     Heart Disease Maternal Great Grandfather        Review of Systems:     Positive and Negative as described in HPI. Review of Systems   Constitutional: Negative for chills, diaphoresis and fever. HENT: Negative for congestion and hearing loss. Respiratory: Positive for cough and shortness of breath. Negative for wheezing and stridor. Cardiovascular: Positive for chest pain. Negative for palpitations and leg swelling. Gastrointestinal: Negative for abdominal pain, blood in stool, constipation, diarrhea, nausea and vomiting. Genitourinary: Negative for dysuria and frequency. Musculoskeletal: Negative for myalgias. Skin: Negative for rash. Neurological: Negative for dizziness, seizures and headaches. Psychiatric/Behavioral: The patient is not nervous/anxious.         Physical Exam:   BP (!) 207/98   Pulse (!) 105   Temp 98.4 °F (36.9 °C) (Oral)   Resp 28   Ht 5' 7\" (1.702 m)   SpO2 93%   BMI 27.55 kg/m²   Temp (24hrs), Av.4 °F (36.9 °C), Min:98.4 °F (36.9 °C), Max:98.4 °F (36.9 °C)    No results for input(s): POCGLU in the last 72 hours. No intake or output data in the 24 hours ending 22 0617    Physical Exam  Vitals and nursing note reviewed. Constitutional:       Appearance: Normal appearance. HENT:      Mouth/Throat:      Mouth: Mucous membranes are dry. Eyes:      Extraocular Movements: Extraocular movements intact. Cardiovascular:      Rate and Rhythm: Normal rate and regular rhythm. Pulses: Normal pulses. Heart sounds: Normal heart sounds. Pulmonary:      Effort: Pulmonary effort is normal.      Breath sounds: Examination of the right-lower field reveals decreased breath sounds. Examination of the left-lower field reveals decreased breath sounds. Decreased breath sounds present. Comments: Lung sounds decreased in the bases otherwise clear  Abdominal:      General: Bowel sounds are normal.      Palpations: Abdomen is soft. Skin:     General: Skin is warm and dry. Capillary Refill: Capillary refill takes less than 2 seconds. Coloration: Skin is pale. Neurological:      Mental Status: He is alert and oriented to person, place, and time.          Investigations:      Laboratory Testing:  Recent Results (from the past 24 hour(s))   CBC with Auto Differential    Collection Time: 22  3:31 AM   Result Value Ref Range    WBC 14.1 (H) 3.5 - 11.3 k/uL    RBC 2.86 (L) 4.21 - 5.77 m/uL    Hemoglobin 8.5 (L) 13.0 - 17.0 g/dL    Hematocrit 27.4 (L) 40.7 - 50.3 %    MCV 95.8 82.6 - 102.9 fL    MCH 29.7 25.2 - 33.5 pg    MCHC 31.0 28.4 - 34.8 g/dL    RDW 13.7 11.8 - 14.4 %    Platelets 433 836 - 476 k/uL    MPV 9.5 8.1 - 13.5 fL    NRBC Automated 0.0 0.0 per 100 WBC    Seg Neutrophils 81 (H) 36 - 65 %    Lymphocytes 7 (L) 24 - 43 %    Monocytes 10 3 - 12 %    Eosinophils % 1 1 - 4 % Basophils 0 0 - 2 %    Immature Granulocytes 1 (H) 0 %    Segs Absolute 11.56 (H) 1.50 - 8.10 k/uL    Absolute Lymph # 0.96 (L) 1.10 - 3.70 k/uL    Absolute Mono # 1.35 (H) 0.10 - 1.20 k/uL    Absolute Eos # 0.13 0.00 - 0.44 k/uL    Basophils Absolute 0.06 0.00 - 0.20 k/uL    Absolute Immature Granulocyte 0.07 0.00 - 0.30 k/uL   Comprehensive Metabolic Panel    Collection Time: 07/14/22  3:31 AM   Result Value Ref Range    Glucose 216 (H) 70 - 99 mg/dL    BUN 38 (H) 6 - 20 mg/dL    CREATININE 7.59 (HH) 0.70 - 1.20 mg/dL    Bun/Cre Ratio 5 (L) 9 - 20    Calcium 9.2 8.6 - 10.4 mg/dL    Sodium 133 (L) 135 - 144 mmol/L    Potassium 4.5 3.7 - 5.3 mmol/L    Chloride 95 (L) 98 - 107 mmol/L    CO2 23 20 - 31 mmol/L    Anion Gap 15 9 - 17 mmol/L    Alkaline Phosphatase 161 (H) 40 - 129 U/L    ALT 18 5 - 41 U/L    AST 14 <40 U/L    Total Bilirubin 0.27 (L) 0.3 - 1.2 mg/dL    Total Protein 6.3 (L) 6.4 - 8.3 g/dL    Albumin 3.4 (L) 3.5 - 5.2 g/dL    GFR Non- 8 (L) >60 mL/min    GFR  9 (L) >60 mL/min    GFR Comment         Brain Natriuretic Peptide    Collection Time: 07/14/22  3:31 AM   Result Value Ref Range    Pro-BNP 22,585 (H) <300 pg/mL   Troponin    Collection Time: 07/14/22  3:31 AM   Result Value Ref Range    Troponin, High Sensitivity 727 (HH) 0 - 22 ng/L   Troponin    Collection Time: 07/14/22  4:57 AM   Result Value Ref Range    Troponin, High Sensitivity 730 (HH) 0 - 22 ng/L       Imaging/Diagnostics:  CT CHEST WO CONTRAST    Result Date: 7/14/2022  1. Trace pericardial effusion. 2.  Small left effusion and trace right pleural effusion. 3.  Scattered ground-glass opacities predominate in the right lung, which may represent an inflammatory process or infection. There is also mild septal thickening for which an underlying component of edema may also be present.      XR CHEST PORTABLE    Result Date: 7/14/2022  Patchy right perihilar and left basilar airspace disease with possible small bianca [facility], DANIEL - CNP  7/14/2022  6:17 AM    Copy sent to DANIEL Calix - CNP

## 2022-07-14 NOTE — CONSULTS
Reason for Consult:  End stage renal disease. Requesting Physician:  Varun Wiseman MD    HISTORY OF PRESENT ILLNESS:    The patient is a 49 y.o. gentleman with known history of end-stage renal disease on hemodialysis, he receives his dialysis per Tuesday Thursday Saturday schedule, he has an AV fistula in the left upper extremity, he receives dialysis at [facility] under the care of Dr. Wesley Aleman. He presented with cough and reported mild shortness of breath, had the symptoms for the last couple of days, ported a transient fever, work-up included a CAT scan of the chest that was remarkable for trace pericardial effusion and small left effusion with scattered groundglass opacities worse in the right lung. The patient had an admission last month for pericarditis with pericardial effusion    Review Of Systems:   Constitutional: No fever, chills, lethargy, weakness or wt loss. HEENT:  No headache, nasal discharge or sore throat. Cardiac:  No chest pain, ported having mild dyspnea, no orthopnea or PND. Chest:   Reported dry cough, no phlegm or wheezing. Abdomen:  No abdominal pain, nausea, vomiting or diarrhea. Neuro:              No gross focal weakness, numbness, abnormal movements or seizure like activity. Skin:   No rashes or itching. :   No hematuria, pyuria, dysuria or flank pain. Extremities:  No swelling or joint pains. Psych:             No depression or anxiety     Past Medical History:   Diagnosis Date    Cerebral artery occlusion with cerebral infarction [facility])     Closed fracture of bone of right foot March - April 2020    Dialysis patient [facility])     Hemodialysis patient ([facility] [address]     Hyperlipidemia     Hypertension     Kidney disease     On Dialysis    Type 1 diabetes mellitus ([facility] [address]        Past Surgical History:   Procedure Laterality Date    AV FISTULA CREATION Left     WRIST SURGERY  1995       Prior to Admission medications    Medication Sig Start Date End Date Taking? Authorizing Provider   carvedilol (COREG) 3.125 MG tablet Take 3.125 mg by mouth 2 times daily (with meals)   Yes Historical Provider, MD   rosuvastatin (CRESTOR) 40 MG tablet Take 40 mg by mouth every evening   Yes Historical Provider, MD   insulin glargine (LANTUS) 100 UNIT/ML injection vial Inject 45 Units into the skin every morning (before breakfast) 6/8/22   Jose Manuel Garcia MD   pantoprazole (PROTONIX) 40 MG tablet Take 1 tablet by mouth every morning (before breakfast) 6/3/22   Pantera Sen MD   gabapentin (NEURONTIN) 300 MG capsule Take 1 capsule by mouth daily. 5/28/22   Shanon Castaneda, APRN - CNP   Misc.  Devices MISC Disp: custom molded shoes to accommodate for brace   DX: DM with history of stroke, foot drop  Duration: 1 year 5/11/22   Cathy Enrique DPM   insulin aspart (NOVOLOG) 100 UNIT/ML injection vial Inject 0-8 Units into the skin 3 times daily (before meals) SLIDING SCALE     Historical Provider, MD   FLUoxetine (PROZAC) 20 MG tablet Take 2 tablets (40 mg)  by mouth daily    Historical Provider, MD   buPROPion (WELLBUTRIN XL) 150 MG extended release tablet Take 1 tablet by mouth every morning 3/25/21   Didier Richardson,    ezetimibe (ZETIA) 10 MG tablet Take 10 mg by mouth daily    Historical Provider, MD   aspirin 81 MG EC tablet Take 81 mg by mouth daily    Historical Provider, MD   calcium acetate 667 MG TABS Take 1,334 mg by mouth 3 times daily (with meals)     Historical Provider, MD   vitamin B-12 (CYANOCOBALAMIN) 500 MCG tablet Take 500 mcg by mouth daily    Historical Provider, MD       Scheduled Meds:   azithromycin  500 mg IntraVENous Once    ezetimibe  10 mg Oral Daily    aspirin  81 mg Oral Daily    calcium acetate  1,334 mg Oral TID WC    vitamin B-12  500 mcg Oral Daily    buPROPion  150 mg Oral QAM    FLUoxetine  40 mg Oral Daily    magnesium oxide  400 mg Oral Daily    gabapentin  300 mg Oral Daily    carvedilol  6.25 mg Oral BID WC    pantoprazole  40 mg Oral QAM AC    insulin glargine  45 Units SubCUTAneous QAM AC    insulin glargine  25 Units SubCUTAneous Nightly    rosuvastatin  10 mg Oral Nightly    sodium chloride flush  5-40 mL IntraVENous 2 times per day    heparin (porcine)  5,000 Units SubCUTAneous 3 times per day    [START ON 7/15/2022] cefTRIAXone (ROCEPHIN) IV  1,000 mg IntraVENous Q24H    insulin lispro  0-12 Units SubCUTAneous TID WC    insulin lispro  0-6 Units SubCUTAneous Nightly    [START ON 7/16/2022] levofloxacin  500 mg IntraVENous Q48H    furosemide  40 mg IntraVENous BID    lisinopril  20 mg Oral Daily     Continuous Infusions:   sodium chloride      dextrose       PRN Meds:magnesium hydroxide, ondansetron, bisacodyl, sodium chloride flush, sodium chloride, acetaminophen **OR** acetaminophen, glucose, dextrose bolus **OR** dextrose bolus, glucagon (rDNA), dextrose, hydrALAZINE    No Known Allergies    Social History     Socioeconomic History    Marital status: Single     Spouse name: Not on file    Number of children: Not on file    Years of education: Not on file    Highest education level: Not on file   Occupational History    Not on file   Tobacco Use    Smoking status: Never Smoker    Smokeless tobacco: Never Used   Vaping Use    Vaping Use: Never used   Substance and Sexual Activity    Alcohol use: Never    Drug use: Never    Sexual activity: Not on file   Other Topics Concern    Not on file   Social History Narrative    Not on file     Social Determinants of Health     Financial Resource Strain:     Difficulty of Paying Living Expenses: Not on file   Food Insecurity:     Worried About Running Out of Food in the Last Year: Not on file    Dinh of Food in the Last Year: Not on file   Transportation Needs:     Lack of Transportation (Medical): Not on file    Lack of Transportation (Non-Medical):  Not on file   Physical Activity:     Days of Exercise per Week: Not on file    Minutes of Exercise per Session: Not on file Stress:     Feeling of Stress : Not on file   Social Connections:     Frequency of Communication with Friends and Family: Not on file    Frequency of Social Gatherings with Friends and Family: Not on file    Attends Buddhist Services: Not on file    Active Member of [address] [place] [address] [employer] or Organizations: Not on file    Attends Club or Organization Meetings: Not on file    Marital Status: Not on file   Intimate Partner Violence:     Fear of Current or Ex-Partner: Not on file    Emotionally Abused: Not on file    Physically Abused: Not on file    Sexually Abused: Not on file   Housing Stability:     Unable to Pay for Housing in the Last Year: Not on file    Number of Places Lived in the Last Year: Not on file    Unstable Housing in the Last Year: Not on file       Family History   Problem Relation Age of Onset    Diabetes Mother     Heart Disease Father     Diabetes Father     Cancer Paternal Grandmother     Heart Disease Maternal Mendel Cobia          Physical Exam:  Vitals:    07/14/22 0804 07/14/22 0815 07/14/22 1149 07/14/22 1330   BP: (!) 184/87  (!) 188/84 (!) 183/90   Pulse: (!) 101  99 93   Resp: 18  18    Temp: 98.2 °F (36.8 °C)  98.8 °F (37.1 °C)    TempSrc: Oral  Oral    SpO2: (!) 87%  91%    Weight:  175 lb 14.8 oz (79.8 kg)     Height:         No intake/output data recorded. General:  Awake, alert, not in distress. Appears to be stated age. HEENT: Atraumatic, normocephalic. Anicteric sclera. Pink and moist oral mucosa. Neck supple. No JVD. Chest: Diminished bilateral breathing sounds, clear to auscultation, no wheezing, rhonchi or rales. Cardiovascular: RRR, S1S2, no murmur, rub or gallop. No lower extremity edema. Abdomen: Soft, non tender to palpation. Musculoskeletal: No cyanosis or clubbing. Integumentary: Pink, warm and dry. Free from rash or lesions. CNS: Oriented to person, place and time. Speech clear. Face symmetrical. No tremor. Psych:  Answering questions appropriately, appropriate mood and affect    Data:  CBC:   Lab Results   Component Value Date    WBC 14.1 (H) 07/14/2022    HGB 8.5 (L) 07/14/2022    HCT 27.4 (L) 07/14/2022    MCV 95.8 07/14/2022     07/14/2022     BMP:    Lab Results   Component Value Date     (L) 07/14/2022     06/17/2022     06/15/2022    K 4.5 07/14/2022    K 4.0 06/17/2022    K 4.3 06/15/2022    CL 95 (L) 07/14/2022     06/17/2022    CL 99 06/15/2022    CO2 23 07/14/2022    CO2 24 06/17/2022    CO2 24 06/15/2022    BUN 38 (H) 07/14/2022    BUN 67 (H) 06/17/2022    BUN 76 (H) 06/15/2022    CREATININE 7.59 (HH) 07/14/2022    CREATININE 7.89 (HH) 06/17/2022    CREATININE 8.28 (HH) 06/15/2022    GLUCOSE 216 (H) 07/14/2022    GLUCOSE 213 (H) 06/17/2022    GLUCOSE 149 (H) 06/15/2022     CMP:   Lab Results   Component Value Date/Time     07/14/2022 03:31 AM    K 4.5 07/14/2022 03:31 AM    CL 95 07/14/2022 03:31 AM    CO2 23 07/14/2022 03:31 AM    BUN 38 07/14/2022 03:31 AM    CREATININE 7.59 07/14/2022 03:31 AM    GLUCOSE 216 07/14/2022 03:31 AM    CALCIUM 9.2 07/14/2022 03:31 AM    PROT 6.3 07/14/2022 03:31 AM    LABALBU 3.4 07/14/2022 03:31 AM    BILITOT 0.27 07/14/2022 03:31 AM    ALKPHOS 161 07/14/2022 03:31 AM    AST 14 07/14/2022 03:31 AM    ALT 18 07/14/2022 03:31 AM      Hepatic:   Lab Results   Component Value Date    AST 14 07/14/2022    AST 17 06/09/2022    AST 38 04/14/2022    ALT 18 07/14/2022    ALT 28 06/09/2022    ALT 44 (H) 04/14/2022    BILITOT 0.27 (L) 07/14/2022    BILITOT 0.28 (L) 06/09/2022    BILITOT 0.34 04/14/2022    ALKPHOS 161 (H) 07/14/2022    ALKPHOS 138 (H) 06/09/2022    ALKPHOS 137 (H) 04/14/2022     BNP: No results found for: BNP  Lipids:   Lab Results   Component Value Date    CHOL 118 04/12/2022    HDL 61 04/12/2022     INR:   Lab Results   Component Value Date    INR 1.0 06/08/2022    INR 1.0 06/07/2022    INR 1.0 05/27/2022     PTH: No results found for: PTH  Phosphorus:    Lab Results   Component Value Date/Time    PHOS 5.3 06/17/2022 05:20 AM     Ionized Calcium: No results found for: IONCA  Magnesium:   Lab Results   Component Value Date/Time    MG 1.7 06/17/2022 05:20 AM     Albumin:   Lab Results   Component Value Date/Time    LABALBU 3.4 07/14/2022 03:31 AM     Last 3 CK, CKMB, Troponin: @LABRCNT(CKTOTAL:3,CKMB:3,TROPONINI:3)       URINE:)No results found for: Blanca Nap    Radiology:   1.  Trace pericardial effusion.       2.  Small left effusion and trace right pleural effusion.       3.  Scattered ground-glass opacities predominate in the right lung, which may   represent an inflammatory process or infection.  There is also mild septal   thickening for which an underlying component of edema may also be present. Assessment:  1. End stage renal disease. 2. Anemia of ESRD. 3. Pneumonia  4. Hypertension  5. Hyperphosphatemia    Plan:  Hemodialysis is due today , seen while receiving hemodialysis treatment   Continue dialysis per Tuesday Thursday Saturday schedule   Reevaluate blood pressure after dialysis   Resume blood pressure medications   Continue as needed IV hydralazine   Antibiotics per primary team  We will follow phosphorus level and adjust binders as needed. We will follow H&H and adjust erythropoeitin stimulating agent as needed. Thank you for the consultation. Please do not hesitate to contact us for any further questions/concerns. We will continue to follow along with you.        Electronically signed by Cindy Brito MD  on 7/14/2022 at 1:58 PM

## 2022-07-14 NOTE — ACP (ADVANCE CARE PLANNING)
Advance Care Planning     Advance Care Planning Activator (Inpatient)  Conversation Note      Date of ACP Conversation: 7/14/2022     Conversation Conducted with: Patient with Decision Making Capacity    ACP Activator: Dinora Love RN        Health Care Decision Maker: Self     Current Designated Health Care Decision Maker: self     Click here to complete Healthcare Decision Makers including section of the Healthcare Decision Maker Relationship (ie \"Primary\")  Today we documented Decision Maker(s) consistent with Legal Next of Kin hierarchy. Care Preferences    Ventilation: \"If you were in your present state of health and suddenly became very ill and were unable to breathe on your own, what would your preference be about the use of a ventilator (breathing machine) if it were available to you? \"      Would the patient desire the use of ventilator (breathing machine)?: yes    Full code      [] Yes   [] No   Educated Patient / Decision Maker regarding differences between Advance Directives and portable DNR orders. Length of ACP Conversation in minutes:  10    Conversation Outcomes:  [x] ACP discussion completed  [] Existing advance directive reviewed with patient; no changes to patient's previously recorded wishes  [] New Advance Directive completed  [] Portable Do Not Rescitate prepared for Provider review and signature  [] POLST/POST/MOLST/MOST prepared for Provider review and signature      Follow-up plan:    [] Schedule follow-up conversation to continue planning  [x] Referred individual to Provider for additional questions/concerns   [] Advised patient/agent/surrogate to review completed ACP document and update if needed with changes in condition, patient preferences or care setting    [] This note routed to one or more involved healthcare providers    Pt is not  no kids, siblings are next of kin (2?) Abbi Gama is sister and next of kin but he does not remember her phone number.  He lives with his grandfather.

## 2022-07-14 NOTE — PROGRESS NOTES
Physical Therapy  Facility/Department: [facility] PROGRESSIVE CARE  Physical Therapy Initial Assessment    Name: Casper Franklin  : 1975  MRN: [id number]  Date of Service: 2022   RN Oralia Coffey reports patient is medically stable for therapy treatment this date. Chart reviewed prior to treatment and patient is agreeable for therapy. All lines intact and patient positioned comfortably at end of treatment. All patient needs addressed prior to ending therapy session. Per H&P:Ernesto Yuen is a 49 y.o. Non- / non  male who presents with Cough, Shortness of Breath, and Neck Pain and is admitted to the hospital for the management of Sepsis ([facility] [address] Patient presented to the ER with complaints of cough, shortness of breath, he states he is having a little difficulty breathing but he does not necessarily feel short of breath and left-sided chest pain taking of breath. He states he had a fever a few days ago but that resolved. Since arrival to the ER has been afebrile but has been mildly tachycardic between 102 and 111 with blood pressures consistently low 200s over 90s. Chest x-ray showed patchy right perihilar and left basilar airspace disease with possible small left effusion concerning for pneumonia. CT of the chest without contrast shows trace pericardial effusion with small left effusion and trace right pleural effusions. There is also scattered groundglass opacities predominate in the right lung as well as mild septal thickening. Per the ER attending a bedside echo did not show an effusion. Patient is end-stage renal disease on dialysis  and Saturday and follows with Dr. Brian Thomson. Discharge Recommendations:  Pt presenting with new musculoskeletal dysfunction and would benefit from additional therapy at time of discharge. Please refer to the AM-PAC score for current functional status.    Patient would benefit from continued therapy after discharge          Patient Diagnosis(es): The primary encounter diagnosis was Pneumonia due to infectious organism, unspecified laterality, unspecified part of lung. A diagnosis of End stage renal disease on dialysis [facility]) was also pertinent to this visit. Past Medical History:  has a past medical history of Cerebral artery occlusion with cerebral infarction [facility]), Closed fracture of bone of right foot, Dialysis patient [facility]), Hemodialysis patient ([facility] [address] Hyperlipidemia, Hypertension, Kidney disease, and [facility] Past Surgical History:  has a past surgical history that includes AV fistula creation (Left) and Wrist surgery (1995). Assessment   Body Structures, Functions, Activity Limitations Requiring Skilled Therapeutic Intervention: Decreased strength;Decreased safe awareness;Decreased functional mobility ; Decreased balance;Decreased sensation;Decreased endurance  Assessment: Pt tolerated PT eval well. Pt demonstrating fair steadiness throughout ambulation w/ RW this date. Pt is a fall risk given hx of falls and decreased safety awareness. Pt would benefit from continued skilled PT to address high level balance, gait, and endurance deficits in order to return to OF.   Therapy Prognosis: Excellent  Decision Making: Medium Complexity  Requires PT Follow-Up: Yes  Activity Tolerance  Activity Tolerance: Patient tolerated evaluation without incident     Plan   Plan  Plan: 5-7 times per week  Current Treatment Recommendations: Strengthening,ROM,Balance training,Functional mobility training,Transfer training,Neuromuscular re-education,Gait training,Endurance training,Patient/Caregiver education & training,Safety education & training,Home exercise program,Pain management,Equipment evaluation, education, & procurement,Therapeutic activities  Safety Devices  Type of Devices: Bed alarm in place,Call light within reach,Left in bed,Gait belt,Nurse notified  Restraints  Restraints Initially in Place: No Restrictions  Restrictions/Precautions  Restrictions/Precautions: General Precautions,Fall Risk,Up as Tolerated  Required Braces or Orthoses?: Yes  Required Braces or Orthoses  Right Lower Extremity Brace: Ankle Foot Orthotics  Left Lower Extremity Brace: Yakelin Foot Orthotics  Position Activity Restriction  Other position/activity restrictions: Up w/ assist, O2 NC     Subjective   General  Patient assessed for rehabilitation services?: Yes  Response To Previous Treatment: Not applicable  Family / Caregiver Present: No  Follows Commands: Within Functional Limits  General Comment  Comments: RN and pt agreeable to therapy. Pt supine in bed upon arrival.  Pt pleasant and cooperative throughout.   Subjective  Subjective: Pt reporting feeling \"okay\" at time of PT eval.         Social/Functional History  Social/Functional History  Lives With: Family (grandparents who are indep)  Type of Home: House  Home Layout: One level  Home Access: Ramped entrance  Bathroom Shower/Tub: Walk-in shower  Bathroom Toilet: Handicap height  Bathroom Equipment: Grab bars in shower,Shower chair (pt reports vanity is close to toilet for support)  Home Equipment: Knovel  Has the patient had two or more falls in the past year or any fall with injury in the past year?: Yes (Pt denies any falls since last admission, however has had 6 falls within the last year)  Receives Help From: Outpatient therapy (Pt has been going to OP PT \"a couple times a week\")  ADL Assistance: Independent  Homemaking Assistance: Independent  Homemaking Responsibilities: No (Pt reports his grandparent do it all, states \"I've offered to help but they won't let me\")  Ambulation Assistance: Independent  Transfer Assistance: Independent (Pt reports difficulty standing from low surfaces)  Active : No  Patient's  Info: grandparents  Occupation: On disability  Type of Occupation: Electropolishing  Additional Comments: Prev CVA affecting R side  Vision/Hearing  Vision  Vision: Impaired  Vision Exceptions: Wears glasses for reading  Hearing  Hearing: Within functional limits    Cognition   Orientation  Overall Orientation Status: Within Functional Limits  Cognition  Overall Cognitive Status: WFL  Safety Judgement: Decreased awareness of need for safety     Objective      Observation/Palpation  Posture: Fair  Observation: Bilateral AFOs on upon arrival  Gross Assessment  Sensation: Impaired (Pt reports chronic numbness/tingling in B feet & hands)     AROM RLE (degrees)  RLE AROM: WFL  AROM LLE (degrees)  LLE AROM : WFL  AROM RUE (degrees)  RUE AROM : WFL  AROM LUE (degrees)  LUE AROM : WFL  Strength RLE  Strength RLE: Exception  R Hip Flexion: 3+/5  R Knee Flexion: 4/5  R Knee Extension: 4/5  R Ankle Dorsiflexion: 3-/5  R Ankle Plantar flexion: 3/5  Strength LLE  Strength LLE: Exception  L Hip Flexion: 3+/5  L Knee Flexion: 4/5  L Knee Extension: 4/5  L Ankle Dorsiflexion: 3-/5  L Ankle Plantar Flexion: 3/5  Strength RUE  Strength RUE: WFL  Comment: Grossly 4-/5  Strength LUE  Strength LUE: WFL  Comment: Grossly 4-/5           Bed mobility  Supine to Sit: Independent  Sit to Supine: Independent  Scooting: Independent  Bed Mobility Comments: Pt w/o difficulty throughout bed mobility this date. Transfers  Sit to Stand: Stand by assistance  Stand to sit: Stand by assistance  Comment: Pt demonstrating good steadiness throughout STS transfers w/ RW. Pt requiring min verbal cueing for proper hand placement throughout transfers w/ RW w/ good return demo. Pt denying any dizziness/lightheadedness throughout position changes. Ambulation  Surface: level tile  Device: Rolling Walker  Other Apparatus: AFO; Left;Right  Assistance: Stand by assistance;Contact guard assistance  Quality of Gait: fair steadiness, increased hip flex throughout swing phase bilaterally  Gait Deviations: Slow Danica;Decreased step length  Distance: 15ft x 2  Comments: Pt ambulating within room demonstrating fair steadiness throughout. Pt demonstrating increased hip flexion throughout swing phase of gait bilaterally and requiring min verbal cueing to maintain JASIEL within RW w/ good return demo. More Ambulation?: No  Stairs/Curb  Stairs?: No     Balance  Posture: Fair  Sitting - Static: Good  Sitting - Dynamic: Good  Standing - Static: Good;-  Standing - Dynamic: Fair;+  Single Leg Stance R Le  Single Leg Stance L Le  Comments: Standing balance assessed w/ RW  Romberg/Narrow Base of Support  Eyes Open: 10 seconds  Sway: None  Eyes Closed: 10 seconds  Sway: Minimal           AM-PAC Score  AM-PAC Inpatient Mobility Raw Score : 21 (22)  AM-PAC Inpatient T-Scale Score : 50.25 (22)  Mobility Inpatient CMS 0-100% Score: 28.97 (22)  Mobility Inpatient CMS G-Code Modifier : CJ (22)           Functional Outcome Measure-   Single Leg Stance Test:  0 sec. (<5 sec.= fall risk)      Goals  Short Term Goals  Time Frame for Short term goals: 8 visits  Short term goal 1: Pt to perform STS transfers w/ RW independently  Short term goal 2: Pt to ambulate at least 150ft w/ RW Joy  Short term goal 3: Pt to actively participate in at least 30 minutes of physical therapy for ther act, ther ex, balance, gait, and endurance training  Short term goal 4: Pt to perform SLS for at least 5 seconds on each LE to demonstrate increased high level balance  Patient Goals   Patient goals : To feel better, to go home       Education  Patient Education  Education Given To: Patient  Education Provided: Role of Therapy;Plan of Care  Education Provided Comments: Pt educated on: purpose of acute PT eval, importance of continued mobility throughout admission, general safety awareness, fall risk prevention, and PT POC. Pt w/ good return demo.   Education Method: Verbal  Education Outcome: Verbalized understanding      Therapy Time   Individual Concurrent Group Co-treatment   Time In 8818         Time Out 1007         Minutes 28         Treatment time: 10 minutes       Marissa Dunn, PT

## 2022-07-14 NOTE — PROGRESS NOTES
Transitions of Care Pharmacy Service   Medication Review    The patient's list of current home medications has been reviewed. Source(s) of information: spoke to patient and sure scripts     Based on information provided by the above source(s), I have updated the patient's home med list as described below. I changed or updated the following medications on the patient's home medication list:  Discontinued ammonium lactate (LAC-HYDRIN) 12 % lotion  bisacodyl (DULCOLAX) 5 MG EC tablet  magnesium hydroxide (MILK OF MAGNESIA) 400 MG/5ML suspension  magnesium oxide (MAG-OX) 400 MG tablet  ondansetron (ZOFRAN) 4 MG tablet  predniSONE (DELTASONE) 10 MG tablet     Added None      Adjusted   carvedilol (COREG) 3.125 MG tablet  FLUoxetine (PROZAC) 20 MG tablet  rosuvastatin (CRESTOR) 40 MG tablet     Other Notes Patient stated that he only inject 45 units of insulin glargine (LANTUS) 100 UNIT/ML injection vial in the morning only. Please feel free to call me with any questions about this encounter. Thank you. This note will be reviewed and co-signed by the Transitions of Care Pharmacist. The pharmacist will review inpatient orders and contact the physician about any discrepancies. Murray Miner, pharmacy technician  Transitions of [facility] Service  Phone:  [phone]  Fax: [phone]      Electronically signed by Murray Miner on 7/14/2022 at 2:18 PM         Prior to Admission medications    Medication Sig   carvedilol (COREG) 3.125 MG tablet Take 3.125 mg by mouth 2 times daily (with meals)   rosuvastatin (CRESTOR) 40 MG tablet Take 40 mg by mouth every evening   insulin glargine (LANTUS) 100 UNIT/ML injection vial Inject 45 Units into the skin every morning (before breakfast)   pantoprazole (PROTONIX) 40 MG tablet Take 1 tablet by mouth every morning (before breakfast)   gabapentin (NEURONTIN) 300 MG capsule Take 1 capsule by mouth daily.    insulin aspart (NOVOLOG) 100 UNIT/ML injection vial Inject 0-8 Units into the skin 3 times daily (before meals) SLIDING SCALE    FLUoxetine (PROZAC) 20 MG tablet Take 2 tablets (40 mg)  by mouth daily   buPROPion (WELLBUTRIN XL) 150 MG extended release tablet Take 1 tablet by mouth every morning   ezetimibe (ZETIA) 10 MG tablet Take 10 mg by mouth daily   aspirin 81 MG EC tablet Take 81 mg by mouth daily   calcium acetate 667 MG TABS Take 1,334 mg by mouth 3 times daily (with meals)    vitamin B-12 (CYANOCOBALAMIN) 500 MCG tablet Take 500 mcg by mouth daily

## 2022-07-14 NOTE — ED NOTES
Pt placed on 2L NC. Pt O2 sat started to drop a little while he was falling asleep. Pt repositioned and moved up in bed. Pt has no complaints at this time.       Dain Oro RN  07/14/22 [id number]

## 2022-07-14 NOTE — PLAN OF CARE
Problem: Discharge Planning  Goal: Discharge to home or other facility with appropriate resources  Outcome: Progressing  Flowsheets  Taken 7/14/2022 0830  Discharge to home or other facility with appropriate resources:   Identify barriers to discharge with patient and caregiver   Arrange for needed discharge resources and transportation as appropriate   Identify discharge learning needs (meds, wound care, etc)   Refer to discharge planning if patient needs post-hospital services based on physician order or complex needs related to functional status, cognitive ability or social support system  Taken 7/14/2022 0802  Discharge to home or other facility with appropriate resources:   Identify barriers to discharge with patient and caregiver   Identify discharge learning needs (meds, wound care, etc)   Refer to discharge planning if patient needs post-hospital services based on physician order or complex needs related to functional status, cognitive ability or social support system   Arrange for needed discharge resources and transportation as appropriate     Problem: ABCDS Injury Assessment  Goal: Absence of physical injury  Outcome: Progressing     Problem: Safety - Adult  Goal: Free from fall injury  Outcome: Progressing     Problem: Chronic Conditions and Co-morbidities  Goal: Patient's chronic conditions and co-morbidity symptoms are monitored and maintained or improved  Outcome: Progressing  Flowsheets (Taken 7/14/2022 0830)  Care Plan - Patient's Chronic Conditions and Co-Morbidity Symptoms are Monitored and Maintained or Improved:   Collaborate with multidisciplinary team to address chronic and comorbid conditions and prevent exacerbation or deterioration   Update acute care plan with appropriate goals if chronic or comorbid symptoms are exacerbated and prevent overall improvement and discharge   Monitor and assess patient's chronic conditions and comorbid symptoms for stability, deterioration, or improvement Problem: Pain  Goal: Verbalizes/displays adequate comfort level or baseline comfort level  Outcome: Progressing

## 2022-07-14 NOTE — PROGRESS NOTES
Perfect Serve message sent to Patrice Baptiste CNP regarding pt elevated blood pressure and still being nausea and vomiting. Last Prn 10mg of hydralazine was given at 1725, and Zofran was given at that time also. Prn orders given from NP. Blood sugar discussed and 25 units of lanuts order held due to pt being unable to keep anything down. See MAR for orders.

## 2022-07-14 NOTE — CARE COORDINATION
Case Management Initial Discharge Plan  Dom Chavez,         Readmission Risk              Risk of Unplanned Readmission:  39             Met with:patient to discuss discharge plans. Information verified: address, contacts, phone number, , insurance Yes  PCP: DANIEL Raphael CNP  Date of last visit:  22    Insurance Provider: Medicare/ARTHUR     Discharge Planning  Current Residence:  1  Story with grandfather   Living Arrangements:  [place] has 1 stories/0 stairs to climb  Support Systems:  Family Members  Current Services PTA:  OPPT Agency: [place] [id number]   Patient able to perform ADL's:Independent  DME in home:  Rollator, ramp, cane, sc  DME used to aid ambulation prior to admission:   Rollator, cane   DME used during admission:  TBD     Potential Assistance Needed:  N/A    Pharmacy: [address] [place] [address] Medications:     Does patient want to participate in local refill/ meds to beds program?  Yes    Patient agreeable to home care: No  [place] of choice provided:  n/a      Type of Home Care Services:  None  Patient expects to be discharged to:       Prior SNF/Rehab Placement and Facility: [facility]   Agreeable to SNF/Rehab: No  [place] of choice provided: n/a   Evaluation: n/a    Expected Discharge date: Follow Up Appointment: Best Day/ Time:      Transportation provider: grandfather   Transportation arrangements needed for discharge: No    Discharge Plan: PNA/HTN Levaquin IV/Lasix IV. RA. Pt is independent. DME-rollator, ramp, sc. Current OPPT [facility]. Declines dc needs. Spoke with pt at bedside. Lives with grandfather he is 74yo. Recent dc from [facility] was there about 1 week after last admission -. He is currently going to [facility] at [facility]. His grandfather takes him to appts and to HD. He has been on HD for about 4 years goes to [facility]. Sees Dr. Mian Degroot.  Relays he has not missed any meds or txs.      PNA  Levaquin IV  Lasix IV        Electronically signed by Lisha Tan RN on 7/14/22 at 3:38 PM EDT

## 2022-07-15 VITALS
OXYGEN SATURATION: 94 % | WEIGHT: 188.7 LBS | RESPIRATION RATE: 16 BRPM | HEIGHT: 67 IN | DIASTOLIC BLOOD PRESSURE: 68 MMHG | HEART RATE: 98 BPM | SYSTOLIC BLOOD PRESSURE: 149 MMHG | BODY MASS INDEX: 29.62 KG/M2 | TEMPERATURE: 97.9 F

## 2022-07-15 LAB
ALBUMIN SERPL-MCNC: 3.1 G/DL (ref 3.5–5.2)
ALP BLD-CCNC: 138 U/L (ref 40–129)
ALT SERPL-CCNC: 12 U/L (ref 5–41)
ANION GAP SERPL CALCULATED.3IONS-SCNC: 9 MMOL/L (ref 9–17)
AST SERPL-CCNC: 12 U/L
BILIRUB SERPL-MCNC: 0.31 MG/DL (ref 0.3–1.2)
BUN BLDV-MCNC: 25 MG/DL (ref 6–20)
BUN/CREAT BLD: 5 (ref 9–20)
CALCIUM SERPL-MCNC: 9 MG/DL (ref 8.6–10.4)
CHLORIDE BLD-SCNC: 100 MMOL/L (ref 98–107)
CO2: 26 MMOL/L (ref 20–31)
CREAT SERPL-MCNC: 5.49 MG/DL (ref 0.7–1.2)
GFR AFRICAN AMERICAN: 14 ML/MIN
GFR NON-AFRICAN AMERICAN: 11 ML/MIN
GFR SERPL CREATININE-BSD FRML MDRD: ABNORMAL ML/MIN/{1.73_M2}
GLUCOSE BLD-MCNC: 109 MG/DL (ref 75–110)
GLUCOSE BLD-MCNC: 166 MG/DL (ref 75–110)
GLUCOSE BLD-MCNC: 306 MG/DL (ref 75–110)
GLUCOSE BLD-MCNC: 43 MG/DL (ref 75–110)
GLUCOSE BLD-MCNC: 52 MG/DL (ref 70–99)
GLUCOSE BLD-MCNC: 97 MG/DL (ref 75–110)
INR BLD: 1.3
POTASSIUM SERPL-SCNC: 4.4 MMOL/L (ref 3.7–5.3)
PROTHROMBIN TIME: 15.7 SEC (ref 11.5–14.2)
SODIUM BLD-SCNC: 135 MMOL/L (ref 135–144)
TOTAL PROTEIN: 5.4 G/DL (ref 6.4–8.3)

## 2022-07-15 PROCEDURE — 80053 COMPREHEN METABOLIC PANEL: CPT

## 2022-07-15 PROCEDURE — 6360000002 HC RX W HCPCS: Performed by: FAMILY MEDICINE

## 2022-07-15 PROCEDURE — 6370000000 HC RX 637 (ALT 250 FOR IP): Performed by: FAMILY MEDICINE

## 2022-07-15 PROCEDURE — 2500000003 HC RX 250 WO HCPCS: Performed by: NURSE PRACTITIONER

## 2022-07-15 PROCEDURE — 99238 HOSP IP/OBS DSCHRG MGMT 30/<: CPT | Performed by: FAMILY MEDICINE

## 2022-07-15 PROCEDURE — 6360000002 HC RX W HCPCS: Performed by: NURSE PRACTITIONER

## 2022-07-15 PROCEDURE — 85610 PROTHROMBIN TIME: CPT

## 2022-07-15 PROCEDURE — 2580000003 HC RX 258: Performed by: NURSE PRACTITIONER

## 2022-07-15 PROCEDURE — 36415 COLL VENOUS BLD VENIPUNCTURE: CPT

## 2022-07-15 PROCEDURE — 6370000000 HC RX 637 (ALT 250 FOR IP): Performed by: NURSE PRACTITIONER

## 2022-07-15 PROCEDURE — 97166 OT EVAL MOD COMPLEX 45 MIN: CPT

## 2022-07-15 PROCEDURE — 6370000000 HC RX 637 (ALT 250 FOR IP): Performed by: CLINICAL NURSE SPECIALIST

## 2022-07-15 PROCEDURE — 82947 ASSAY GLUCOSE BLOOD QUANT: CPT

## 2022-07-15 RX ORDER — INSULIN LISPRO 100 [IU]/ML
0-3 INJECTION, SOLUTION INTRAVENOUS; SUBCUTANEOUS NIGHTLY
Status: DISCONTINUED | OUTPATIENT
Start: 2022-07-15 | End: 2022-07-15 | Stop reason: HOSPADM

## 2022-07-15 RX ORDER — INSULIN LISPRO 100 [IU]/ML
0-6 INJECTION, SOLUTION INTRAVENOUS; SUBCUTANEOUS
Status: DISCONTINUED | OUTPATIENT
Start: 2022-07-15 | End: 2022-07-15 | Stop reason: HOSPADM

## 2022-07-15 RX ORDER — LEVOFLOXACIN 750 MG/1
750 TABLET ORAL
Qty: 3 TABLET | Refills: 0 | Status: SHIPPED | OUTPATIENT
Start: 2022-07-15 | End: 2022-07-20

## 2022-07-15 RX ORDER — PEN NEEDLE, DIABETIC 31 GX5/16"
1 NEEDLE, DISPOSABLE MISCELLANEOUS DAILY
Qty: 100 EACH | Refills: 3 | Status: SHIPPED | OUTPATIENT
Start: 2022-07-15

## 2022-07-15 RX ORDER — CARVEDILOL 12.5 MG/1
12.5 TABLET ORAL 2 TIMES DAILY WITH MEALS
Qty: 60 TABLET | Refills: 3 | Status: SHIPPED | OUTPATIENT
Start: 2022-07-15 | End: 2022-08-19 | Stop reason: SDUPTHER

## 2022-07-15 RX ORDER — INSULIN GLARGINE 100 [IU]/ML
25 INJECTION, SOLUTION SUBCUTANEOUS 2 TIMES DAILY
Qty: 5 PEN | Refills: 3 | Status: SHIPPED | OUTPATIENT
Start: 2022-07-15 | End: 2022-08-19

## 2022-07-15 RX ORDER — LISINOPRIL 20 MG/1
20 TABLET ORAL DAILY
Qty: 30 TABLET | Refills: 3 | Status: SHIPPED | OUTPATIENT
Start: 2022-07-16 | End: 2022-07-29

## 2022-07-15 RX ORDER — FUROSEMIDE 40 MG/1
40 TABLET ORAL DAILY
Qty: 60 TABLET | Refills: 3 | Status: ON HOLD | OUTPATIENT
Start: 2022-07-15 | End: 2022-08-02 | Stop reason: SDUPTHER

## 2022-07-15 RX ADMIN — BUPROPION HYDROCHLORIDE 150 MG: 150 TABLET, EXTENDED RELEASE ORAL at 08:51

## 2022-07-15 RX ADMIN — ASPIRIN 81 MG: 81 TABLET, COATED ORAL at 08:51

## 2022-07-15 RX ADMIN — FLUOXETINE 40 MG: 20 CAPSULE ORAL at 08:51

## 2022-07-15 RX ADMIN — ONDANSETRON HYDROCHLORIDE 4 MG: 4 TABLET, FILM COATED ORAL at 06:06

## 2022-07-15 RX ADMIN — PANTOPRAZOLE SODIUM 40 MG: 40 TABLET, DELAYED RELEASE ORAL at 06:06

## 2022-07-15 RX ADMIN — GABAPENTIN 300 MG: 300 CAPSULE ORAL at 08:51

## 2022-07-15 RX ADMIN — SODIUM CHLORIDE, PRESERVATIVE FREE 10 ML: 5 INJECTION INTRAVENOUS at 08:52

## 2022-07-15 RX ADMIN — EZETIMIBE 10 MG: 10 TABLET ORAL at 08:51

## 2022-07-15 RX ADMIN — INSULIN LISPRO 4 UNITS: 100 INJECTION, SOLUTION INTRAVENOUS; SUBCUTANEOUS at 11:18

## 2022-07-15 RX ADMIN — Medication 16 G: at 05:59

## 2022-07-15 RX ADMIN — Medication 16 G: at 06:24

## 2022-07-15 RX ADMIN — CALCIUM ACETATE 1334 MG: 667 CAPSULE ORAL at 08:51

## 2022-07-15 RX ADMIN — INSULIN LISPRO 1 UNITS: 100 INJECTION, SOLUTION INTRAVENOUS; SUBCUTANEOUS at 09:10

## 2022-07-15 RX ADMIN — CALCIUM ACETATE 1334 MG: 667 CAPSULE ORAL at 11:18

## 2022-07-15 RX ADMIN — LISINOPRIL 20 MG: 20 TABLET ORAL at 08:52

## 2022-07-15 RX ADMIN — Medication 400 MG: at 08:52

## 2022-07-15 RX ADMIN — HEPARIN SODIUM 5000 UNITS: 5000 INJECTION INTRAVENOUS; SUBCUTANEOUS at 06:06

## 2022-07-15 RX ADMIN — CARVEDILOL 12.5 MG: 12.5 TABLET, FILM COATED ORAL at 08:51

## 2022-07-15 RX ADMIN — CYANOCOBALAMIN TAB 1000 MCG 500 MCG: 1000 TAB at 08:51

## 2022-07-15 RX ADMIN — FUROSEMIDE 40 MG: 10 INJECTION, SOLUTION INTRAMUSCULAR; INTRAVENOUS at 08:51

## 2022-07-15 ASSESSMENT — ENCOUNTER SYMPTOMS
RHINORRHEA: 0
WHEEZING: 0
COUGH: 0
ABDOMINAL PAIN: 0
CHOKING: 0
CONSTIPATION: 0
CHEST TIGHTNESS: 0
VOMITING: 0
SINUS PRESSURE: 0
VOICE CHANGE: 0
SHORTNESS OF BREATH: 0
DIARRHEA: 0
BACK PAIN: 0
NAUSEA: 0

## 2022-07-15 NOTE — PROGRESS NOTES
Rn notifed of Blood sugar of 55. 4 glucose tabs given per Mar and blood sugar rechecked in 15 minutes. Orange juice and jello given and encourage by RN. Pt complaining of some nausea prn Zofran given. After 15 minutes Blood sugar was 43. 4 glucose tabs given per MAR. Pt encourage to drink more orange juice. 15 minutes passed and sugar rechecked and was 97.

## 2022-07-15 NOTE — PROGRESS NOTES
Reason for Consult:  End stage renal disease. Requesting Physician:  Yinka Arrieta MD    Interval history:  Had hemodialysis treatment yesterday, no events   Blood pressure is  improving   Coreg dose was increased to 12.5 mg twice daily     HISTORY OF PRESENT ILLNESS:    The patient is a 49 y.o. gentleman with known history of end-stage renal disease on hemodialysis, he receives his dialysis per Tuesday Thursday Saturday schedule, he has an AV fistula in the left upper extremity, he receives dialysis at [facility] under the care of Dr. Clayton March. He presented with cough and reported mild shortness of breath, had the symptoms for the last couple of days, ported a transient fever, work-up included a CAT scan of the chest that was remarkable for trace pericardial effusion and small left effusion with scattered groundglass opacities worse in the right lung. The patient had an admission last month for pericarditis with pericardial effusion    Prior to Admission medications    Medication Sig Start Date End Date Taking? Authorizing Provider   carvedilol (COREG) 3.125 MG tablet Take 3.125 mg by mouth 2 times daily (with meals)   Yes Historical Provider, MD   rosuvastatin (CRESTOR) 40 MG tablet Take 40 mg by mouth every evening   Yes Historical Provider, MD   insulin glargine (LANTUS) 100 UNIT/ML injection vial Inject 45 Units into the skin every morning (before breakfast) 6/8/22   Donny Enamorado MD   pantoprazole (PROTONIX) 40 MG tablet Take 1 tablet by mouth every morning (before breakfast) 6/3/22   Yinka Arrieta MD   gabapentin (NEURONTIN) 300 MG capsule Take 1 capsule by mouth daily. 5/28/22   ADNIEL James - CNP   Misc.  Devices MISC Disp: custom molded shoes to accommodate for brace   DX: DM with history of stroke, foot drop  Duration: 1 year 5/11/22   Miles Cruz DPM   insulin aspart (NOVOLOG) 100 UNIT/ML injection vial Inject 0-8 Units into the skin 3 times daily (before meals) SLIDING SCALE Historical Provider, MD   FLUoxetine (PROZAC) 20 MG tablet Take 2 tablets (40 mg)  by mouth daily    Historical Provider, MD   buPROPion (WELLBUTRIN XL) 150 MG extended release tablet Take 1 tablet by mouth every morning 3/25/21   Rory Macdonald DO   ezetimibe (ZETIA) 10 MG tablet Take 10 mg by mouth daily    Historical Provider, MD   aspirin 81 MG EC tablet Take 81 mg by mouth daily    Historical Provider, MD   calcium acetate 667 MG TABS Take 1,334 mg by mouth 3 times daily (with meals)     Historical Provider, MD   vitamin B-12 (CYANOCOBALAMIN) 500 MCG tablet Take 500 mcg by mouth daily    Historical Provider, MD       Scheduled Meds:   insulin lispro  0-6 Units SubCUTAneous TID WC    insulin lispro  0-3 Units SubCUTAneous Nightly    azithromycin  500 mg IntraVENous Once    ezetimibe  10 mg Oral Daily    aspirin  81 mg Oral Daily    calcium acetate  1,334 mg Oral TID WC    vitamin B-12  500 mcg Oral Daily    buPROPion  150 mg Oral QAM    FLUoxetine  40 mg Oral Daily    magnesium oxide  400 mg Oral Daily    gabapentin  300 mg Oral Daily    pantoprazole  40 mg Oral QAM AC    [Held by provider] insulin glargine  45 Units SubCUTAneous QAM AC    [Held by provider] insulin glargine  25 Units SubCUTAneous Nightly    rosuvastatin  10 mg Oral Nightly    sodium chloride flush  5-40 mL IntraVENous 2 times per day    heparin (porcine)  5,000 Units SubCUTAneous 3 times per day    [START ON 7/16/2022] levofloxacin  500 mg IntraVENous Q48H    furosemide  40 mg IntraVENous BID    lisinopril  20 mg Oral Daily    carvedilol  12.5 mg Oral BID WC     Continuous Infusions:   sodium chloride      dextrose       PRN Meds:ondansetron, bisacodyl, sodium chloride flush, sodium chloride, acetaminophen **OR** acetaminophen, glucose, dextrose bolus **OR** dextrose bolus, glucagon (rDNA), dextrose, hydrALAZINE, ondansetron, prochlorperazine      Physical Exam:  Vitals:    07/15/22 0351 07/15/22 0600 07/15/22 0738 07/15/22 1103   BP: (!) 142/72 (!) 144/79 (!) 149/68   Pulse: 87  85 98   Resp: 18  16 16   Temp: 98.8 °F (37.1 °C)  98.2 °F (36.8 °C) 97.9 °F (36.6 °C)   TempSrc: Oral  Oral Oral   SpO2: 97%  96% 94%   Weight:  188 lb 11.2 oz (85.6 kg)     Height:         I/O last 3 completed shifts: In: 120 [P.O.:120]  Out: 250 [Emesis/NG output:250]    General:  Awake, alert, not in distress. Appears to be stated age. HEENT: Atraumatic, normocephalic. Anicteric sclera. Pink and moist oral mucosa. Neck supple. No JVD. Chest: Diminished bilateral breathing sounds, clear to auscultation, no wheezing, rhonchi or rales. Cardiovascular: RRR, S1S2, no murmur, rub or gallop. No lower extremity edema. Abdomen: Soft, non tender to palpation. Musculoskeletal: No cyanosis or clubbing. Integumentary: Pink, warm and dry. Free from rash or lesions. CNS: Oriented to person, place and time. Speech clear. Face symmetrical. No tremor. Psych:  Answering questions appropriately, appropriate mood and affect    Data:  CBC:   Lab Results   Component Value Date    WBC 14.1 (H) 07/14/2022    HGB 8.5 (L) 07/14/2022    HCT 27.4 (L) 07/14/2022    MCV 95.8 07/14/2022     07/14/2022     BMP:    Lab Results   Component Value Date     07/15/2022     (L) 07/14/2022     06/17/2022    K 4.4 07/15/2022    K 4.5 07/14/2022    K 4.0 06/17/2022     07/15/2022    CL 95 (L) 07/14/2022     06/17/2022    CO2 26 07/15/2022    CO2 23 07/14/2022    CO2 24 06/17/2022    BUN 25 (H) 07/15/2022    BUN 38 (H) 07/14/2022    BUN 67 (H) 06/17/2022    CREATININE 5.49 (HH) 07/15/2022    CREATININE 7.59 (HH) 07/14/2022    CREATININE 7.89 (HH) 06/17/2022    GLUCOSE 52 (L) 07/15/2022    GLUCOSE 216 (H) 07/14/2022    GLUCOSE 213 (H) 06/17/2022     CMP:   Lab Results   Component Value Date/Time     07/15/2022 05:26 AM    K 4.4 07/15/2022 05:26 AM     07/15/2022 05:26 AM    CO2 26 07/15/2022 05:26 AM    BUN 25 07/15/2022 05:26 AM    CREATININE 5.49 07/15/2022 05:26 AM    GLUCOSE 52 07/15/2022 05:26 AM    CALCIUM 9.0 07/15/2022 05:26 AM    PROT 5.4 07/15/2022 05:26 AM    LABALBU 3.1 07/15/2022 05:26 AM    BILITOT 0.31 07/15/2022 05:26 AM    ALKPHOS 138 07/15/2022 05:26 AM    AST 12 07/15/2022 05:26 AM    ALT 12 07/15/2022 05:26 AM      Hepatic:   Lab Results   Component Value Date    AST 12 07/15/2022    AST 14 07/14/2022    AST 17 06/09/2022    ALT 12 07/15/2022    ALT 18 07/14/2022    ALT 28 06/09/2022    BILITOT 0.31 07/15/2022    BILITOT 0.27 (L) 07/14/2022    BILITOT 0.28 (L) 06/09/2022    ALKPHOS 138 (H) 07/15/2022    ALKPHOS 161 (H) 07/14/2022    ALKPHOS 138 (H) 06/09/2022     BNP: No results found for: BNP  Lipids:   Lab Results   Component Value Date    CHOL 118 04/12/2022    HDL 61 04/12/2022     INR:   Lab Results   Component Value Date    INR 1.3 07/15/2022    INR 1.0 06/08/2022    INR 1.0 06/07/2022     PTH: No results found for: PTH  Phosphorus:    Lab Results   Component Value Date/Time    PHOS 5.3 06/17/2022 05:20 AM     Ionized Calcium: No results found for: IONCA  Magnesium:   Lab Results   Component Value Date/Time    MG 1.7 06/17/2022 05:20 AM     Albumin:   Lab Results   Component Value Date/Time    LABALBU 3.1 07/15/2022 05:26 AM     Last 3 CK, CKMB, Troponin: @LABRCNT(CKTOTAL:3,CKMB:3,TROPONINI:3)       URINE:)No results found for: Denice Barrera    Radiology:   1. Trace pericardial effusion. 2.  Small left effusion and trace right pleural effusion. 3.  Scattered ground-glass opacities predominate in the right lung, which may   represent an inflammatory process or infection. There is also mild septal   thickening for which an underlying component of edema may also be present. Assessment:  1. End stage renal disease. 2. Anemia of ESRD. 3. Pneumonia  4. Hypertension  5.   Hyperphosphatemia    Plan:  Blood pressure is improving   reported significant improvement in cough   Had hemodialysis treatment yesterday   continue dialysis per Tuesday Thursday Saturday schedule   Diabetes management per primary team antibiotics per primary team  We will follow H&H and adjust erythropoeitin stimulating agent as needed. Please do not hesitate to contact us for any further questions/concerns. We will continue to follow along with you.        Electronically signed by Marsha Guthrie MD  on 7/15/2022 at 11:26 AM

## 2022-07-15 NOTE — FLOWSHEET NOTE
Discharge instructions given and explained thoroughly. Meds reviewed and hospital pharmacy called for meds to beds. . Reinforced follow up appointments and reminded to continue dialysis treatments on scheduled days. . Pt and grandfather at bedside both verbalize understanding and agreeable to discharge plan  to lobby via wheelchair.  All belongings sent with

## 2022-07-15 NOTE — PROGRESS NOTES
Physical Therapy  DATE: 7/15/2022    NAME: Aquiles Drew  MRN: [id number]   : 1975    Patient not seen this date for Physical Therapy due to:      [] Cancel by RN or physician due to:    [] Hemodialysis    [] Critical Lab Value Level     [] Blood transfusion in progress    [] Acute or unstable cardiovascular status   _MAP < 55 or more than >115  _HR < 40 or > 130    [] Acute or unstable pulmonary status   -FiO2 > 60%   _RR < 5 or >40    _O2 sats < 85%    [] Strict Bedrest    [] Off Unit for surgery or procedure    [] Off Unit for testing       [] Pending imaging to R/O fracture    [] Refusal by Patient      [x] Other Patient in process of getting discharged home, asked if he had any concerns from PT standpoint prior to D/C and he stated he did not. [] PT being discontinued at this time. Patient independent. No further needs. [] PT being discontinued at this time as the patient has been transferred to hospice care. No further needs.       Lis Shine, PT

## 2022-07-15 NOTE — PROGRESS NOTES
[facility]  Office: [address], DO, Eron De La Garza, DO, Vanessa Peraza, DO, Lorna Rick Blood, DO, Evelyne Kolb MD, Dami Blanchard MD, Di Stevens MD, Chica Spencer MD, Heri Brunson MD, Marilee Staley MD, Arnaud Bagley MD, Valencia Christy, DO, Alton Harley MD,  Arturo Rocha MD, Alejandro Najera MD, Tammy Whittington DO, Janice Rivera MD, Fern Costa MD, Jewell Davidson DO, Olga Heath MD, Leann Luna MD, Rosalba Simental, [facility], [facility], CNP, Alee Perrin, CNP, America Koehler, CNP, Michael Dumont, CNP, hSant Sanchez, CNP, Dominick Servin PA-C, Lima Domínguez, [facility], Milagro Wolfe, CNP, Daniel Zimmerman, CNP, Adriana Crabtree, CNP, Garry Boles, CNS, Mellisa Rogers, [facility], Mal Cavanaugh, CNP, Ulysses Sykes, CNP, [facility]      Daily Progress Note     Admit Date: 7/14/2022  Bed/Room No.  1015/1015-01  Admitting Physician : Di Stevens MD  Code Status :[address] [facility] Day:  LOS: 1 day   Chief Complaint:     Chief Complaint   Patient presents with    Cough    Shortness of Breath    Neck Pain     Principal Problem:    Sepsis [facility])  Active Problems:    Anemia    Coronary artery disease involving native coronary artery of native heart without angina pectoris    End stage renal disease on dialysis [facility])    Type 2 diabetes mellitus with kidney complication, with long-term current use of insulin ([facility] [address]  Resolved Problems:    * No resolved hospital problems. *    Subjective : Interval History/Significant events :  07/15/22    Patient reports improvement in breathing. He is breathing on room air. Patient continues to have cough denies expectoration. Patient is eating and drinking well. He reports 90% improvement in his symptoms. Vitals - Stable afebrile, T-max 100 °F, heart rate 98  Labs -blood sugar 306, BUN 25, creatinine 5.49, hyperglycemia. Leukocytosis, low hemoglobin.     Nursing notes , Consults notes reviewed. Overnight events and updates discussed with Nursing staff . Background History:         Josiah Ron is 49 y.o. male  Who was admitted to the hospital on 7/14/2022 for treatment of Sepsis ([facility] [address] Patient was brought to emergency room in private vehicle with difficulty in breathing. He reported that he had been home from nursing home about 3 weeks before. Patient had recent hospitalization for pericardial effusion that required drainage. He has history of ESRD on hemodialysis. Patient has limited activity due to recent stroke. He has history of chronic bilateral foot drop. Patient reported cough for 3 days with difficulty in breathing for 1 day. He denies any fever, chills. He reported that he had sick contact with multiple people were coughing had dialysis center. Patient denies any congestion, diarrhea, abdominal pain, rash. Evaluation emergency room showed temperature 98.4, heart rate 101, blood pressure 218/94. Patient was placed on nasal cannula for comfort. Lab evaluation showed BUN 38, creatinine 7.59, proBNP 22,000 585, troponin 730, WBC 14.1, hemoglobin 8.5. Chest x-ray was suggestive of left pleural effusion, CT chest was negative for PE showed trace pericardial effusion, small left pleural effusion, groundglass opacity in right lung. Initial rapid COVID-19 test was negative. Patient was started on Zosyn, [address] He was also started on IV Lasix as he still makes urine 1-2 times daily. Nephrology was consulted for dialysis management. Left pleural effusion was conservatively managed.   Patient developed significant hypoglycemia overnight on day 2 of hospitalization and his insulin was held    PMH:  Past Medical History:   Diagnosis Date    Cerebral artery occlusion with cerebral infarction [facility])     Closed fracture of bone of right foot March - April 2020    Dialysis patient [facility])     Hemodialysis patient [facility])     Hyperlipidemia     Hypertension     Kidney disease On Dialysis    Type 1 diabetes mellitus (HCC)       Allergies: No Known Allergies   Medications :  insulin lispro, 0-6 Units, SubCUTAneous, TID WC  insulin lispro, 0-3 Units, SubCUTAneous, Nightly  azithromycin, 500 mg, IntraVENous, Once  ezetimibe, 10 mg, Oral, Daily  aspirin, 81 mg, Oral, Daily  calcium acetate, 1,334 mg, Oral, TID WC  vitamin B-12, 500 mcg, Oral, Daily  buPROPion, 150 mg, Oral, QAM  FLUoxetine, 40 mg, Oral, Daily  magnesium oxide, 400 mg, Oral, Daily  gabapentin, 300 mg, Oral, Daily  pantoprazole, 40 mg, Oral, QAM AC  [Held by provider] insulin glargine, 45 Units, SubCUTAneous, QAM AC  [Held by provider] insulin glargine, 25 Units, SubCUTAneous, Nightly  rosuvastatin, 10 mg, Oral, Nightly  sodium chloride flush, 5-40 mL, IntraVENous, 2 times per day  heparin (porcine), 5,000 Units, SubCUTAneous, 3 times per day  [START ON 7/16/2022] levofloxacin, 500 mg, IntraVENous, Q48H  furosemide, 40 mg, IntraVENous, BID  lisinopril, 20 mg, Oral, Daily  carvedilol, 12.5 mg, Oral, BID         Review of Systems   Review of Systems   Constitutional:  Negative for activity change, appetite change, fatigue, fever and unexpected weight change. HENT:  Negative for congestion, nosebleeds, rhinorrhea, sinus pressure, sneezing and voice change. Respiratory:  Negative for cough, choking, chest tightness, shortness of breath and wheezing. Cardiovascular:  Negative for chest pain, palpitations and leg swelling. Gastrointestinal:  Negative for abdominal pain, constipation, diarrhea, nausea and vomiting. Genitourinary:  Negative for difficulty urinating, dysuria, frequency, penile discharge and testicular pain. Musculoskeletal:  Negative for back pain. Skin:  Negative for rash. Neurological:  Negative for dizziness, weakness, light-headedness and headaches. Hematological:  Does not bruise/bleed easily.    Psychiatric/Behavioral:  Negative for agitation, behavioral problems, confusion, self-injury, sleep disturbance and suicidal ideas. Objective :      Current Vitals : Temp: 98.8 °F (37.1 °C),  Heart Rate: 87, Resp: 18, BP: (!) 142/72, SpO2: 97 %  Last 24 Hrs Vitals   Patient Vitals for the past 24 hrs:   BP Temp Temp src Pulse Resp SpO2 Weight   07/15/22 0600 -- -- -- -- -- -- 188 lb 11.2 oz (85.6 kg)   07/15/22 0351 (!) 142/72 98.8 °F (37.1 °C) Oral 87 18 97 % --   07/15/22 0016 133/73 98.2 °F (36.8 °C) Oral 92 18 94 % --   07/14/22 2155 (!) 157/87 100.2 °F (37.9 °C) Oral (!) 108 18 -- --   07/14/22 2045 (!) 167/82 (!) 100.5 °F (38.1 °C) Oral (!) 119 20 96 % --   07/14/22 1926 (!) 182/93 100.4 °F (38 °C) Oral (!) 118 20 95 % --   07/14/22 1630 (!) 204/95 -- -- (!) 105 -- -- --   07/14/22 1600 (!) 195/104 -- -- (!) 107 -- -- --   07/14/22 1530 (!) 182/107 -- -- (!) 101 -- -- --   07/14/22 1500 (!) 199/100 -- -- 99 -- -- --   07/14/22 1430 (!) 194/93 -- -- 99 -- -- --   07/14/22 1400 (!) 186/90 -- -- 96 -- -- --   07/14/22 1330 (!) 183/90 -- -- 93 -- -- --   07/14/22 1314 (!) 186/94 -- -- 95 18 -- --   07/14/22 1149 (!) 188/84 98.8 °F (37.1 °C) Oral 99 18 91 % --   07/14/22 0815 -- -- -- -- -- -- 175 lb 14.8 oz (79.8 kg)   07/14/22 0804 (!) 184/87 98.2 °F (36.8 °C) Oral (!) 101 18 (!) 87 % --     Intake / output   07/13 1901 - 07/15 0700  In: 120 [P.O.:120]  Out: 250   Physical Exam:  Physical Exam  Vitals and nursing note reviewed. Constitutional:       General: He is not in acute distress. Appearance: He is not diaphoretic. HENT:      Head: Normocephalic and atraumatic. Nose:      Right Sinus: No maxillary sinus tenderness or frontal sinus tenderness. Left Sinus: No maxillary sinus tenderness or frontal sinus tenderness. Mouth/Throat:      Pharynx: No oropharyngeal exudate. Eyes:      General: No scleral icterus. Conjunctiva/sclera: Conjunctivae normal.      Pupils: Pupils are equal, round, and reactive to light. Neck:      Thyroid: No thyromegaly. Vascular: No JVD. may also be present. XR CHEST PORTABLE    Result Date: 7/14/2022  Patchy right perihilar and left basilar airspace disease with possible small left effusion suggestive of pneumonia. Clinical Course : gradually improving  Assessment and Plan  :        Right lower lobe pneumonia -improved with IV Levaquin. Changed to oral.  Uncontrolled hypertension -Coreg dose increased to 12.5 twice daily, started on lisinopril. Hydralazine IV as needed. Left pleural effusion -IV Lasix, patient still urinates 1-2 times daily. Dialysis for fluid management. Consult nephrology. No plan for thoracentesis at this time. H/o pericardial effusion -trace pericardial effusion on CT chest.  Completed prednisone dose for pericarditis. ESRD on hemodialysis -nephrology consult. Type 2 diabetes mellitus with neuropathy -developed hypoglycemia, medication held in the hospital.  H/o left basal ganglia stroke -continue aspirin, Crestor. Discharge planning home. Patient lives with his parents. Plan and updates discussed with patient ,  answers  explained to satisfaction.    Plan discussed with Staff Hilary ARRIAZA     (Please note that portions of this note were completed with a voice recognition program. Efforts were made to edit the dictations but occasionally words are mis-transcribed.)      Ruddy Francis MD  7/15/2022

## 2022-07-15 NOTE — PROGRESS NOTES
chest without contrast shows trace pericardial effusion with small left effusion and trace right pleural effusions. There is also scattered groundglass opacities predominate in the right lung as well as mild septal thickening. Per the ER attending a bedside echo did not show an effusion. Patient is end-stage renal disease on dialysis Tuesday Thursday and Saturday and follows with Dr. Maci Aguilera. Troponin of 730 fairly consistent with his average troponin. BNP is 22,585 likely related to end-stage renal disease. No other BNP's are in the computer to compare. White count is elevated at 14.1. Lactic acid 0.6. Hemoglobin hematocrit 8.5 test 27.4. Blood cultures x2 were obtained and patient was started on Rocephin and azithromycin. Rapid COVID's pending. Patient was admitted from 5/29 through 6/8 related to acute idiopathic pericarditis and a large pericardial effusion. At that time his EKG showed diffuse ST elevation. Patient underwent a pericardiocentesis/pericardial drain placement with 860 cc of hemorrhagic fluid removed and he was treated with colchicine and prednisone. An echo at that time showed an EF of 40% with global hypokinesis. Heart cath on 6/6 showed minimal nonobstructive CAD. During that admission he also had a fistulogram.       Assessment   Performance deficits / Impairments: Decreased functional mobility ; Decreased strength;Decreased safe awareness;Decreased balance;Decreased endurance  Assessment: Skilled OT is indicated to increase overall IND and safety with self-care and functional tasks to return home when appropriate  Prognosis: Good  Decision Making: Medium Complexity  Activity Tolerance  Activity Tolerance: Patient Tolerated treatment well        Plan   Plan  Times per Week: 4-5x per week, 1-2x per day as alejandro     Restrictions  Restrictions/Precautions  Restrictions/Precautions: General Precautions, Fall Risk, Up as Tolerated  Required Braces or Orthoses?: Yes  Required Braces or Orthoses  Right Lower Extremity Brace: Ankle Foot Orthotics  Left Lower Extremity Brace: Yakelin Foot Orthotics  Position Activity Restriction  Other position/activity restrictions: Up w/ assist, O2 NC    Subjective   General  Chart Reviewed: Yes  Patient assessed for rehabilitation services?: Yes  Family / Caregiver Present: No  Subjective  Subjective: \"I'm doing good, hopefully leaving soon\"  General Comment  Comments: pt agreeable to OT eval     Social/Functional History  Social/Functional History  Lives With: Family (grandparents who are indep)  Type of Home: House  Home Layout: One level  Home Access: Ramped entrance  Bathroom Shower/Tub: Walk-in shower  Bathroom Toilet: Handicap height  Bathroom Equipment: Grab bars in shower, Shower chair (pt reports vanity is close to toilet for support)  Home Equipment: Frederich Gu, rolling, Rollator, Cane  Has the patient had two or more falls in the past year or any fall with injury in the past year?: Yes (Pt denies any falls since last admission, however has had 6 falls within the last year)  Receives Help From: Outpatient therapy (Pt has been going to OP PT \"a couple times a week\")  ADL Assistance: Independent  Homemaking Assistance: Independent  Homemaking Responsibilities: No (Pt reports his grandparent do it all, states \"I've offered to help but they won't let me\")  Ambulation Assistance: Independent  Transfer Assistance: Independent (Pt reports difficulty standing from low surfaces)  Active : No  Patient's  Info: grandparents  Occupation: On disability  Type of Occupation: Electropolishing  Additional Comments: Prev CVA affecting R side       Objective             Observation/Palpation  Posture: Fair  Safety Devices  Type of Devices: Call light within reach; Left in bed;Gait belt;Nurse notified        AROM: Within functional limits  PROM: Within functional limits  Strength: Generally decreased, functional  Coordination: Within functional limits  Tone: Normal  Sensation: Intact  ADL  Feeding: Modified independent   Grooming: Stand by assistance (seated)  UE Bathing: Stand by assistance  LE Bathing: Contact guard assistance  UE Dressing: Stand by assistance  LE Dressing: Contact guard assistance  LE Dressing Skilled Clinical Factors: pt reports no difficulty tonia/doffing B AFOs, pt unable to tonia AFOs this date d/t hospital socks  Toileting: Contact guard assistance        Bed mobility  Supine to Sit: Unable to assess  Sit to Supine: Unable to assess  Scooting: Unable to assess  Bed Mobility Comments: Pt sitting EOB upon arrival and exit    Transfers  Sit to stand: Stand by assistance  Stand to sit: Stand by assistance  Transfer Comments: Pt used proper transfer tech this date    Balance     Sitting Balance Stand by assistance   Standing Balance Stand by assistance   Standing Balance     Time ~2 min   Activity functional mob   Comment --   Functional Mobility     Functional - Mobility Device No device   Activity Other   Assist Level Contact guard assistance   Functional Mobility Comments Pt with no LOB this date. Min VCs for overall safety. Cognition  Overall Cognitive Status: WFL  Safety Judgement: Decreased awareness of need for safety  Orientation  Overall Orientation Status: Within Functional Limits                           Education     Education Given To Patient   Education Provided Role of Therapy; Safety; Transfer Training; Energy Conservation; ADL Function; Plan of Care; Fall Prevention Strategies   Education Provided Comments OT POC, safety in function, call light/fall prevention. Pt with no concerns or questions about going home. Pt possibly discharging this date.     Education Method Verbal   Barriers to Learning --   Education Outcome Verbalized understanding                         AM-PAC Score        AM-PAC Inpatient Daily Activity Raw Score: 19 (07/15/22 1527)  AM-PAC Inpatient ADL T-Scale Score : 40.22 (07/15/22 1527)  ADL Inpatient CMS 0-100% Score: 42.8 (07/15/22 1527)  ADL Inpatient CMS G-Code Modifier : CK (07/15/22 1527)    Goals  Short Term Goals  Time Frame for Short term goals: by discharge, pt to demo  Short Term Goal 1: I/MI for bed mob tasks without bed rails  Short Term Goal 2: I/MI for ADL transfers and functional mob with AD as needed, AFOs donned, and Good safety  Short Term Goal 3: I/MI for total body ADLs with AE as needed and Good safety  Short Term Goal 4: pt to be IND with EC/WS, fall prevention tech, as well as DME/AE recommendations with use of handouts as needed  Patient Goals   Patient goals : to go home today       Therapy Time   Individual Concurrent Group Co-treatment   Time In 1154         Time Out 1206 (+10 chart review)         Minutes 22                 Breanne Souza OTR/L

## 2022-07-15 NOTE — DISCHARGE SUMMARY
[facility]  Office: [phone]  [facility] DO, Quincy Sharpe MD, Thu Zeng MD, Mikael Au MD, Len Marrufo MD, Bethel Boyer MD, Kari Page MD, Iman Krueger MD, Jackie Narvaez MD, Azam Marques MD, Vero Augustin DO, Eneida Scherer MD, Maylin Huston MD, Lazaro Cutler DO, Eleazar Fall MD,  Walter Harris DO, Tello Enriquez MD, Jerod Shanks MD, Alba Lindo [facility], [facility], Patricia Flores CNP, Erika Wu CNS, Mei Walsh CNP, Luli Lawrence CNP, Patricia Graves CNP, Radha Esposito CNP, Rufus Lemus CNP, Robbie Quintero PA-C, Cleone Denver CNP, Ping Brandt CNP, Gris Fitzgerald CNP, Spring Benitez [facility], Karen Son [facility], [place] [address]      Discharge Summary     Patient ID: Lui Reagan  :  1975   MRN: [id number]     ACCOUNT:  [de-identified]   Patient Location : [address] [place] [address]  Patient's PCP: DANIEL Malcolm CNP  Admit Date: 2022   Discharge Date: 7/15/2022     Length of Stay: 1  Code Status:  Full Code  Admitting Physician: No admitting provider for patient encounter.   Discharge Physician: Mikael Au MD     Active Discharge Diagnosis :     Primary Problem  Pneumonia of right lower lobe due to infectious organism      [facility] Problems    Diagnosis Date Noted    Coronary artery disease involving native coronary artery of native heart without angina pectoris [I25.10] 2022     Priority: Medium    Pneumonia of right lower lobe due to infectious organism [J18.9]      Priority: Medium    Anemia [D64.9] 2022     Priority: Medium    Type 2 diabetes mellitus with kidney complication, with long-term current use of insulin ([facility] [E11.29, Z79.4]     End stage renal disease on dialysis ([facility] [N18.6, Z99.2] 2021       Admission Condition:  fair     Discharged Condition: stable    Hospital Stay:     Hospital Course:  Allison Weir Js Jiang is a 49 y.o. male who was admitted for the management of   Pneumonia of right lower lobe due to infectious organism , presented to ER with Cough, Shortness of Breath, and Neck Pain  Patient presented to the ER with complaints of cough, shortness of breath, he states he is having a little difficulty breathing but he does not necessarily feel short of breath and left-sided chest pain taking of breath. He states he had a fever a few days ago but that resolved. Since arrival to the ER has been afebrile but has been mildly tachycardic between 102 and 111 with blood pressures consistently low 200s over 90s. Chest x-ray showed patchy right perihilar and left basilar airspace disease with possible small left effusion concerning for pneumonia. CT of the chest without contrast shows trace pericardial effusion with small left effusion and trace right pleural effusions. There is also scattered groundglass opacities predominate in the right lung as well as mild septal thickening. Per the ER attending a bedside echo did not show an effusion. Patient is end-stage renal disease on dialysis Tuesday Thursday and Saturday and follows with Dr. Sarita Gallo. Troponin of 730 fairly consistent with his average troponin. BNP is 22,585 likely related to end-stage renal disease. No other BNP's are in the computer to compare. White count is elevated at 14.1. Lactic acid 0.6. Hemoglobin hematocrit 8.5 test 27.4. Blood cultures x2 were obtained and patient was started on Rocephin and azithromycin. Rapid COVID's pending. Patient was admitted from 5/29 through 6/8 related to acute idiopathic pericarditis and a large pericardial effusion. At that time his EKG showed diffuse ST elevation. Patient underwent a pericardiocentesis/pericardial drain placement with 860 cc of hemorrhagic fluid removed and he was treated with colchicine and prednisone. An echo at that time showed an EF of 40% with global hypokinesis.   Heart cath on Lab Results   Component Value Date     05/30/2022     Lab Results   Component Value Date    INR 1.3 07/15/2022    INR 1.0 06/08/2022    INR 1.0 06/07/2022    PROTIME 15.7 (H) 07/15/2022    PROTIME 13.5 06/08/2022    PROTIME 13.1 06/07/2022       CT CHEST WO CONTRAST    Result Date: 7/14/2022  1. Trace pericardial effusion. 2.  Small left effusion and trace right pleural effusion. 3.  Scattered ground-glass opacities predominate in the right lung, which may represent an inflammatory process or infection. There is also mild septal thickening for which an underlying component of edema may also be present. XR CHEST PORTABLE    Result Date: 7/14/2022  Patchy right perihilar and left basilar airspace disease with possible small left effusion suggestive of pneumonia. Consultations:    Consults:     Final Specialist Recommendations/Findings:   IP CONSULT TO HOSPITALIST  IP CONSULT TO NEPHROLOGY      The patient was seen and examined on day of discharge and this discharge summary is in conjunction with any daily progress note from day of discharge. Discharge plan:     Disposition: Home    Physician Follow Up:     Hugh Sutherland, APRN - CNP  [address] [address]  [phone]    Call in 1 week(s)  for follow up -adjust insulin and diabetic medication dose. Monitor blood pressure and adjust medication as needed. Samule Najjar, [address]  [phone]    Schedule an appointment as soon as possible for a visit in 2 week(s)  for follow up. keep Dialysis schedule Tues-Thurs-Sat       Requiring Further Evaluation/Follow Up POST HOSPITALIZATION/Incidental Findings: Follow-up with nephrology as outpatient. Diet: diabetic diet and renal diet    Activity: As tolerated. Instructions to Patient: Fluid restriction, salt restriction, dialysis per schedule.     Discharge Medications:      Medication List        START taking these medications furosemide 40 MG tablet  Commonly known as: Lasix  Take 1 tablet by mouth in the morning. glucose 4 g chewable tablet  Take 4 tablets by mouth as needed for Low blood sugar     Lantus SoloStar 100 UNIT/ML injection pen  Generic drug: insulin glargine  Inject 25 Units into the skin in the morning and 25 Units before bedtime. Replaces: insulin glargine 100 UNIT/ML injection vial     levoFLOXacin 750 MG tablet  Commonly known as: Levaquin  Take 1 tablet by mouth every 48 hours for 3 doses     lisinopril 20 MG tablet  Commonly known as: PRINIVIL;ZESTRIL  Take 1 tablet by mouth in the morning. Start taking on: July 16, 2022     [employer] Pen Needles 31G X 8 MM Misc  Generic drug: Insulin Pen Needle  1 each by Does not apply route daily            CHANGE how you take these medications      carvedilol 12.5 MG tablet  Commonly known as: COREG  Take 1 tablet by mouth in the morning and 1 tablet in the evening. Take with meals. What changed:   medication strength  how much to take  Another medication with the same name was removed. Continue taking this medication, and follow the directions you see here. rosuvastatin 40 MG tablet  Commonly known as: CRESTOR  What changed: Another medication with the same name was removed. Continue taking this medication, and follow the directions you see here. CONTINUE taking these medications      aspirin 81 MG EC tablet     buPROPion 150 MG extended release tablet  Commonly known as: WELLBUTRIN XL  Take 1 tablet by mouth every morning     calcium acetate 667 MG Tabs     ezetimibe 10 MG tablet  Commonly known as: ZETIA     FLUoxetine 20 MG tablet  Commonly known as: PROZAC     gabapentin 300 MG capsule  Commonly known as: NEURONTIN     Misc.  Devices Misc  Disp: custom molded shoes to accommodate for brace   DX: DM with history of stroke, foot drop  Duration: 1 year     NovoLOG 100 UNIT/ML injection vial  Generic drug: insulin aspart     pantoprazole 40 MG tablet  Commonly known as: PROTONIX  Take 1 tablet by mouth every morning (before breakfast)     vitamin B-12 500 MCG tablet  Commonly known as: CYANOCOBALAMIN            STOP taking these medications      insulin glargine 100 UNIT/ML injection vial  Commonly known as: LANTUS  Replaced by: Lantus SoloStar 100 UNIT/ML injection pen               Where to Get Your Medications        These medications were sent to [facility] [address], [facility] [address]      Phone: [phone]   carvedilol 12.5 MG tablet  furosemide 40 MG tablet  glucose 4 g chewable tablet  Lantus SoloStar 100 UNIT/ML injection pen  levoFLOXacin 750 MG tablet  lisinopril 20 MG tablet  [employer] Pen Needles 31G X 8 MM Misc         Time Spent on discharge is  25 mins in patient examination, evaluation, counseling as well as medication reconciliation, prescriptions for required medications, discharge plan and follow up. Electronically signed by   Di Stevens MD  7/15/2022        Thank you Dr. Nkechi Kebede APRN - CNP for the opportunity to be involved in this patient's care.

## 2022-07-15 NOTE — DISCHARGE INSTR - DIET
Good nutrition is important when healing from an illness, injury, or surgery. Follow any nutrition recommendations given to you during your hospital stay. If you were given an oral nutrition supplement while in the hospital, continue to take this supplement at home. You can take it with meals, in-between meals, and/or before bedtime. These supplements can be purchased at most local grocery stores, pharmacies, and chain [employer]-stores. If you have any questions about your diet or nutrition, call the hospital and ask for the dietitian.     GENERAL HEALTHY DIABETIC/RENAL DIET

## 2022-07-18 ENCOUNTER — CARE COORDINATION (OUTPATIENT)
Dept: CASE MANAGEMENT | Age: 47
End: 2022-07-18

## 2022-07-18 ENCOUNTER — HOSPITAL ENCOUNTER (OUTPATIENT)
Dept: PHYSICAL THERAPY | Facility: CLINIC | Age: 47
Setting detail: THERAPIES SERIES
Discharge: HOME | End: 2022-07-18
Payer: MEDICARE

## 2022-07-18 DIAGNOSIS — J18.9 PNEUMONIA OF RIGHT LOWER LOBE DUE TO INFECTIOUS ORGANISM: Primary | ICD-10-CM

## 2022-07-18 PROCEDURE — 1111F DSCHRG MED/CURRENT MED MERGE: CPT | Performed by: NURSE PRACTITIONER

## 2022-07-18 NOTE — FLOWSHEET NOTE
[] [facility] ([facility]) [facility] &  Therapy  [address]    [phone]  F: [phone]   [x] [address]  [facility]   [address]  P: [phone]  F: [phone]  [] [address] &  Therapy  [address]  P: [phone]  F: [phone] [] [address]  P: [phone]  F: [phone]  [] [address]  [address] [address] [facility]   [address]   [place]: [phone]  F: [phone]   [] [address]  [place]: [phone]   F: [phone]     Physical Therapy Cancel/No Show note    Date: 2022  Patient: Aquiles Drew  : 1975  MRN: [id number]    Cancels/No Shows to date: 4cx/1ns    For 2022 appointment patient:    [x]  Cancelled    [] Rescheduled appointment    [] No-show     Reason given by patient:    []  Patient ill    []  Conflicting appointment    [x] No transportation      [] Conflict with work    [] No reason given    [] Weather related    [] [id number]    [] Other:      Comments:       [x] Next appointment was confirmed    Electronically signed by: Brittany Ramos PTA

## 2022-07-18 NOTE — CARE COORDINATION
[facility] Transitions Initial Follow Up Call    Call within 2 business days of discharge: Yes    Patient: Carmen Leonardo Patient : 1975   MRN: [id number]  Reason for Admission: RLL PNE  Discharge Date: 7/15/22 RARS: Readmission Risk Score: 29      Last Discharge [facility]       Date Complaint Diagnosis Description Type Department Provider    22 Cough; Shortness of Breath; Neck Pain Pneumonia due to infectious organism, unspecified laterality, unspecified part of lung . .. ED to Hosp-Admission (Discharged) (ADMITTED) BOBBY Francis MD; Whitney Laird. .. Spoke with: Patient    Facility: [facility]    Non-face-to-face services provided:  Scheduled appointment with PCP-  Scheduled appointment with Specialist-8/10 with neurology    Spoke with patient who said he is feeling better. He has a slight cough, states he does not bring up any phlegm, and is feeling less fatigued. He his on dialysis on TTS, goes to [address]. He lives with his grandfather that takes him to and from his appointments. He has a h/o stroke, has been going to OP PT, has appointment today at 3pm.  He is on his last dose of ATB. Discharge instructions reviewed, 1111F  done. He has all his new medications. He denies any f/c, n/v, swelling or other symptoms. States his blood sugars are doing \"really good\". Denies any needs or concerns at this time. Transitions of Care Initial Call    Was this an external facility discharge? No Discharge Facility: [facility]    Challenges to be reviewed by the provider   Additional needs identified to be addressed with provider: No  none             Method of communication with provider : none    Advance Care Planning:   Does patient have an Advance Directive: not on file; education provided. Care Transition Nurse contacted the patient by telephone to perform post hospital discharge assessment. Verified name and  with patient as identifiers. Provided introduction to self, and explanation of the CTN role. CTN reviewed discharge instructions, medical action plan and red flags with patient who verbalized understanding. Patient given an opportunity to ask questions and does not have any further questions or concerns at this time. Were discharge instructions available to patient? No. Reviewed appropriate site of care based on symptoms and resources available to patient including: PCP  Specialist. The patient agrees to contact the PCP office for questions related to their healthcare. Medication reconciliation was performed with patient, who verbalizes understanding of administration of home medications. Advised obtaining a 90-day supply of all daily and as-needed medications. Was patient discharged with a pulse oximeter? no    CTN provided contact information. Plan for follow-up call in 3-5 days based on severity of symptoms and risk factors. Plan for next call:  Check on cough, see if any new symptoms.         Care Transitions 24 Hour Call    Schedule Follow Up Appointment with PCP: Completed  Do you have a copy of your discharge instructions?: Yes  Do you have all of your prescriptions and are they filled?: Yes  Have you been contacted by a [address]?: No  Have you scheduled your follow up appointment?: Yes  How are you going to get to your appointment?: Car - family or friend to transport  Do you feel like you have everything you need to keep you well at home?: Yes  Care Transitions Interventions         Follow Up  Future Appointments   Date Time Provider Gary Carcamo   7/18/2022  3:00 PM Leann Hodgson, PT [facility]   7/20/2022  3:00 PM Isacc Ingram PT [facility]   7/25/2022  3:00 PM Alexi Leontine Kanner, PTA [facility]   7/27/2022  3:00 PM Alexi Leontine Kanner, PTA [facility]   7/29/2022  1:10 PM DANIEL Waterman - SHIMA Dailey   8/10/2022  4:00 PM Kelvin Monaco MD Neuro Spec Lynnette Beaulieu RN

## 2022-07-19 LAB
CULTURE: NORMAL
CULTURE: NORMAL
Lab: NORMAL
Lab: NORMAL
SPECIMEN DESCRIPTION: NORMAL
SPECIMEN DESCRIPTION: NORMAL

## 2022-07-20 ENCOUNTER — HOSPITAL ENCOUNTER (OUTPATIENT)
Dept: PHYSICAL THERAPY | Facility: CLINIC | Age: 47
Setting detail: THERAPIES SERIES
Discharge: HOME | End: 2022-07-20
Payer: MEDICARE

## 2022-07-20 PROCEDURE — 97110 THERAPEUTIC EXERCISES: CPT

## 2022-07-20 PROCEDURE — 97112 NEUROMUSCULAR REEDUCATION: CPT

## 2022-07-20 NOTE — FLOWSHEET NOTE
[] [facility]  [address]  [phone]  F: [phone] [x] [address]  [facility]   [address]  P: [phone]  F: [phone] [] [place] &  Therapy  [address] [facility]  P: [phone]  F: [phone] [] [address]  P: [phone]  F: [phone] [] [address]  [facility]   [address]   [place]: [phone]  F: [phone]      Physical Therapy Daily Treatment Note    Date:  2022  Patient Name:  Placido Buenrostro    :  1975  MRN: [id number]  Physician: RACHAEL Black                           Insurance: Medicare ([address] [place] [address])  Medical Diagnosis: I63.9 (ICD-10-CM) - Cerebrovascular accident (CVA), unspecified mechanism ([address]  Rehab Codes: R26.89, R53.1, M54.59, M54.2, R29.3,   Next 's appt.: 8/10 with neuro  Date of symptom onset: 22   Visit# / total visits:     Cancels/No Shows: 3/1    Subjective:    Pain:  [x] Yes  [] No Location: bilateral shoulders L shoulder Pain Rating: (0-10 scale) 3/10  Pain altered Tx:  [x] No  [] Yes  Action:  Comments: Pt notes his L shoulder continues to hurt, less when he's moving. Reaching into fridge, bending down and R knee gets \"shaky\" - these things are the most noticeable balance issues. States he feels he's still recovering from bout of pneumonia in hospital.     Objective:     Modalities:   Precautions:  Exercises: Bolded completed 2022:  HELD TREATMENT THIS DATE FOR STG ASSESSMENT AND PT POC EDUCATION, HEP FOCUS, SEE BELOW  Exercise RLE Reps/ Time Weight/ Level Comments         Scapular retraction/elevation mobs 5 min     UT stretch  3x20\"ea     scap squeeze  10x3\"     Pec stretch-doorway  2x30\"     Posterior shoulder rolls 20x  Assist for slow, max retraction   Scap retraction 2x10 Orange  Added resistance 7/13   Scap retract w/ shoulder flex 10x AROM    Chin tucks  10x3\"                             Standing    Parallel bars   Gastroc stretch 3x30\"  UE support; 1 LE at a time    Marches 20x  UE support   Weight shifting towards R 10x  UE support         Resisted swing phase 3x30'  x13' Lime     Resisted step over small round cone 2x10 Lime  With weight shift onto RLE   Heel taps to step  10x 4\"    Trunk/cervical rotation naming how many fingers clinician is holding  10xea  clinician standing behind pt    Picking up rings from steps  4x 4 rings;   6\" and 12\" step Wider JASIEL no UE; notes some irritation in LB; x2 leading with R UE, x2 leading with L UE and handing to clinician at multiple angles   Resisted swing phase fwd and retro amb on way back  5xea // bars;   Lime  Resisted fwd walk                Balance   Parallel bars   Trunk rotation with ball 5x ea     Shoulder press with ball 10x     NBOS 1x30\"     NBOS with EC 2x20\"     NBOS on foam  x30\"     NBOS with ball raises, rotation 10x2ea volleyball     Tandem stance 2x20\"     Cone  off ground 2x5 5 cones    Cone reach with LUE across body 2x5 5 cones    Cone reach with LUE to L side 2x5 5 cones    Heel taps 10x ea 4in With 1 hand for UE support   Dynamic march with retro hamstring curl 3L  No UE support   March amb  3L // bars     Hs curl amb  2L // bars     Side stepping 3L  No UE support   360 turn 2x  Clockwise more challenging         Ambulation with rollator 2L     Other: Billed only for STG assessment and pt education as therex and neuro as appropriate, see below    Specific Instructions for next treatment:  - resisted RLE swing phase of gait, resisted RLE step overs  - standing balance: reaching, turning to look around, stoop to  objects off step, heel taps to step, etc  - calf stretching     Treatment Charges: Mins Units   []  Modalities     [x]  Ther Exercise 15 1   [] Manual Therapy     []  Ther Activities     []  Aquatics     []  Vasocompression     []  Other:      [x]  Other: Neuro 20 1   Total Treatment time 35 2       Assessment: [] Progressing toward goals. [] No change. [x] Other: Remains primarily the same as eval - has had hospitalizations and set backs that can possibly account for this. BLE stance control (R>L) continues to be difficult and pt requires UE assist for most all standing tasks d/t poor balance. Lacking any balance recovery strategies when set off balance - further assessment reveals significantly poor B ankle DF ROM, explaining lack of ankle strategy usage. Emphasized long sitting calf stretch for HEP - pt demo with good understanding noted, will need to take off AFO to complete. Will plan to focus on BLE stance strength/control, standing balance especially balance recovery strategies, and ankle DF ROM. [x] Patient would continue to benefit from skilled physical therapy services in order to: address R hemibody weakness, improve gait mechanics and efficiency, and increase standing static/dynamic balance to allow improved household ADL/IADL completion and reduce fall risk. STG: (to be met in 8 treatments) - Assessed by Tea Pepe PT on 7/20:  ? Pain: No more than 4/10 pain reported in low back or neck post therapy sessions to indicate improving tolerance to increased activity and exercise - MET for low back or neck, but L shoulder is 2/10 - newer pain over the past 3 weeks. ? ROM: at least 0deg DF PROM with knee extended to indicate improving mobility in ankles to increase heel strike and reduce toe drag with prolonged ambulation - NOT MET, lacking 10 deg from neutral in L ankle, lacking 3 deg from neutral in R ankle  ?  Balance:   Pt able to fully turn to look behind himself without instability in either direction to indicate improving postural stability - NOT MET, LOB with full turn to R and L  Pt able to complete standing reaching tasks without UE assist and no more than mild instability to indicate improving dynamic balance for safety with household IADLs - NOT MET, primarily needs to be supported at trunk on counter or holding onto something  ? Strength: At least 4+/5 grossly in R hip to allow improved pelvic stability in standing and improved swing phase control/speed during prolonged gait - NOT MET, 4-/5 grossly in R hip  Pt able to utilize RUE for fine grasp and release tasks with no more than minimally slowed speed evident - Ongoing, better fine grasp but still limited with heavier objects, twisting lid  ? Function: Pt able to ambulate 450 ft with least restrictive device with no evidence of lagging RLE swing or abnormal stance time noted, with no more than 11 on RPE scale - NOT MET, achieves ~400 ft before needing to rest d/t RLE instability and R knee buckling, rates 13 RPE  Patient to be independent with home exercise program as demonstrated by performance with correct form without cues. - Ongoing   Demonstrate Knowledge of fall prevention - MET     LTG: (to be met in 16 treatments)  ? Pain: No more than 2/10 pain reported in low back or neck post therapy sessions to indicate improving tolerance to increased activity and exercise  ? ROM: at least 5 deg DF PROM with knee extended to indicate improving mobility in ankles to increase heel strike and reduce toe drag with prolonged ambulation  ? Balance: At least 30/56 on Hernandez to indicate significant improvement in standing static/dynamic balance and progress towards reduced fall risk  Able to negotiate gait obstacles using rollator without increased instability or excessively slowed gait  ? Strength: At least 5-/5 grossly in R hip to allow further improved pelvic stability in standing and improved swing phase control/speed during prolonged gait  At least 5/5 B knee ext to prevent excessive buckling with prolonged standing  ?  Function:   Pt able to ambulate 600 ft with least restrictive

## 2022-07-22 ENCOUNTER — CARE COORDINATION (OUTPATIENT)
Dept: CASE MANAGEMENT | Age: 47
End: 2022-07-22

## 2022-07-22 NOTE — CARE COORDINATION
[facility] Transitions Follow Up Call    2022    Patient: Steven Alvarez  Patient : 1975   MRN: [id number]  Reason for Admission: RLL PNE  Discharge Date: 7/15/22 RARS: Readmission Risk Score: 34         Spoke with: Patient    Spoke with patient who said he has been doing ok. He c/o chest soreness from coughing but otherwise is doing ok. He denies any f/c, n/v or other lingering symptoms from his PNE. He has continued with PT for therapy outpatient and continues with his dialysis treatments on TTS. He is scheduled to see his PCP next Friday. He denies any needs or concerns at this time. Care Transitions Follow Up Call    Needs to be reviewed by the provider   Additional needs identified to be addressed with provider: No  none             Method of communication with provider : none    Vipul Putnam Transition Nurse contacted the patient by telephone to follow up after admission on . Verified name and  with patient as identifiers. Addressed changes since last contact: none  Discussed follow-up appointments. .     CTN reviewed discharge instructions, medical action plan and red flags with patient and discussed any barriers to care and/or understanding of plan of care after discharge. Discussed appropriate site of care based on symptoms and resources available to patient including: PCP  Specialist. The patient agrees to contact the PCP office for questions related to their healthcare. Patients top risk factors for readmission: medical condition-PNE  Interventions to address risk factors: Education of patient/family/caregiver/guardian to support self-management-Discussed hydration, monitoring for worsening s/s of infection      Non-[facility] follow up appointment(s): n/a    CTN provided contact information for future needs. Plan for follow-up call in 5-7 days based on severity of symptoms and risk factors.   Plan for next call:  check on chest discomfort, cough          Care Transitions Subsequent and Final Call    Schedule Follow Up Appointment with PCP: Completed  Subsequent and Final Calls  Do you have any ongoing symptoms?: Yes  Onset of Patient-reported symptoms: In the past 7 days  Patient-reported symptoms: Chest Pain, Cough  Have your medications changed?: No  Do you have any questions related to your medications?: No  Do you currently have any active services?: No  Do you have any needs or concerns that I can assist you with?: No  Identified Barriers: Lack of Education  Care Transitions Interventions  Other Interventions:              Follow Up  Future Appointments   Date Time Provider Gary Carcamo   7/25/2022  3:00 PM Teena Romano [facility] PT [facility]   7/27/2022  3:00 PM Titogalindo Christopher [facility] PT [facility]   7/29/2022  1:10 PM DANIEL Gurrola - [facility]IM[facility]   8/10/2022  4:00 PM Angelica Regan MD Neuro Spec Ruma Brower RN

## 2022-07-25 ENCOUNTER — HOSPITAL ENCOUNTER (OUTPATIENT)
Dept: PHYSICAL THERAPY | Facility: CLINIC | Age: 47
Setting detail: THERAPIES SERIES
Discharge: HOME | End: 2022-07-25
Payer: MEDICARE

## 2022-07-25 NOTE — FLOWSHEET NOTE
[] [facility]  [address]    [phone]  F: [phone]   [x] [address] [place] [address]   [address]  P: [phone]  F: [phone]  [] [place]  [address]  P: [phone]  F: [phone] [] [address]  P: [phone]  F: [phone]  [] [address]  [address] [facility]   [address]   [place]: [phone]  F: [phone]   [] [address]  [place]: [phone]   F: [phone]     Physical Therapy Cancel/No Show note    Date: 2022  Patient: Dom Chavez  : 1975  MRN: [id number]    Cancels/No Shows to date: 5cx/1ns    For 2022 appointment patient:    [x]  Cancelled    [] Rescheduled appointment    [] No-show     Reason given by patient:    []  Patient ill    []  Conflicting appointment    [] No transportation      [] Conflict with work    [] No reason given    [] Weather related    [] COVID-19    [x] Other:      Comments: Pt CX d/t severe pain in neck and shoulder.         [x] Next appointment was previously confirmed    Electronically signed by: Lana Benz PTA

## 2022-07-26 RX ORDER — CALCIUM ACETATE 667 MG/1
1334 TABLET ORAL
Qty: 180 TABLET | Refills: 1 | Status: SHIPPED | OUTPATIENT
Start: 2022-07-26

## 2022-07-27 ENCOUNTER — HOSPITAL ENCOUNTER (OUTPATIENT)
Dept: PHYSICAL THERAPY | Facility: CLINIC | Age: 47
Setting detail: THERAPIES SERIES
Discharge: HOME | End: 2022-07-27
Payer: MEDICARE

## 2022-07-27 ENCOUNTER — CARE COORDINATION (OUTPATIENT)
Dept: CASE MANAGEMENT | Age: 47
End: 2022-07-27

## 2022-07-27 PROCEDURE — 97112 NEUROMUSCULAR REEDUCATION: CPT

## 2022-07-27 NOTE — FLOWSHEET NOTE
AROM    Chin tucks  10x3\"           Seated toe raises  10x2                 Standing    Parallel bars   Gastroc stretch 3x30\"  UE support; 1 LE at a time    Marches 20x  UE support   Weight shifting  10xea  No UE as able; M/L, A/P, P/A         Resisted swing phase 3x30'  x13' Lime     Resisted step over small round cone 2x10 Lime  With weight shift onto RLE   Heel taps to step  10x 4\"    Trunk/cervical rotation naming how many fingers clinician is holding  10xea  clinician standing behind pt    Picking up rings from steps  4x 4 rings;   6\" and 12\" step Wider JASIEL no UE; notes some irritation in LB; x2 leading with R UE, x2 leading with L UE and handing to clinician at multiple angles   Resisted swing phase fwd and retro amb on way back  5xea // bars;   Lime  Resisted fwd walk    Step over round cone  10xea // bars           Balance   Parallel bars   Trunk rotation with ball 5x ea     Shoulder press with ball 10x     NBOS 2x30\"  Posterior sway after 20 sec on 1st rep    NBOS with EC 2x30\"     NBOS on foam  2x30\"     NBOS with ball raises, rotation 2x30\"ea Volley  ball     Tandem stance 2x20\"     Cone  off ground 2x5 5 cones    Standing on foam with head turns  30\" ea  Up/down, rotation    Cone reach with LUE across body 2x5 5 cones    Cone reach with LUE to L side 2x5 5 cones    Heel taps 10x ea 4in With 1 hand for UE support   Dynamic march with retro hamstring curl 3L  No UE support   March amb  3L // bars     Hs curl amb  2L // bars     Side stepping 3L // bars  No UE-1 light UE support   360 turn 2x  Clockwise more challenging   Marches  2x20\"  No UE   Alt toe taps to foam  2x30\"  No UE                     Ambulation with rollator 2L     Other:     Specific Instructions for next treatment:  - resisted RLE swing phase of gait, resisted RLE step overs  - standing balance: reaching, turning to look around, stoop to  objects off step, heel taps to step, etc  - calf stretching     Treatment Charges: Mins Units   []  Modalities     [x]  Ther Exercise     []  Manual Therapy     []  Ther Activities     []  Aquatics     []  Vasocompression     []  Other:      [x]  Other: Neuro 40 3   Total Treatment time 40 3       Assessment: [x] Progressing toward goals. Initiated session on nu step to promote increased blood flow and activity tolerance. Focus on standing balance and control as detailed above. Does note some irritation in LB towards end of NBOS exercises. Added head turns standing on foam this date. Tends to shift balance posteriorly with balance exercises. Seated rest breaks in chair inside // bars throughout session this date. With STS pt's shoes tend to slip so provided min-mod assist to stand safely. Notes increased  R knee pain at anterior/medial aspect after completion of weight shifts. One instance of R knee bucking using UE assist on // bars to avoid LOB. CGA assist provided throughout session to ensure safety. [] No change. [] Other:   [] Patient would continue to benefit from skilled physical therapy services in order to: address R hemibody weakness, improve gait mechanics and efficiency, and increase standing static/dynamic balance to allow improved household ADL/IADL completion and reduce fall risk. STG: (to be met in 8 treatments) - Assessed by Nikki Bright PT on 7/20:  ? Pain: No more than 4/10 pain reported in low back or neck post therapy sessions to indicate improving tolerance to increased activity and exercise - MET for low back or neck, but L shoulder is 2/10 - newer pain over the past 3 weeks. ? ROM: at least 0deg DF PROM with knee extended to indicate improving mobility in ankles to increase heel strike and reduce toe drag with prolonged ambulation - NOT MET, lacking 10 deg from neutral in L ankle, lacking 3 deg from neutral in R ankle  ?  Balance:   Pt able to fully turn to look behind himself without instability in either direction to indicate improving postural stability - NOT MET, LOB with full turn to R and L  Pt able to complete standing reaching tasks without UE assist and no more than mild instability to indicate improving dynamic balance for safety with household IADLs - NOT MET, primarily needs to be supported at trunk on counter or holding onto something  ? Strength: At least 4+/5 grossly in R hip to allow improved pelvic stability in standing and improved swing phase control/speed during prolonged gait - NOT MET, 4-/5 grossly in R hip  Pt able to utilize RUE for fine grasp and release tasks with no more than minimally slowed speed evident - Ongoing, better fine grasp but still limited with heavier objects, twisting lid  ? Function: Pt able to ambulate 450 ft with least restrictive device with no evidence of lagging RLE swing or abnormal stance time noted, with no more than 11 on RPE scale - NOT MET, achieves ~400 ft before needing to rest d/t RLE instability and R knee buckling, rates 13 RPE  Patient to be independent with home exercise program as demonstrated by performance with correct form without cues. - Ongoing   Demonstrate Knowledge of fall prevention - MET     LTG: (to be met in 16 treatments)  ? Pain: No more than 2/10 pain reported in low back or neck post therapy sessions to indicate improving tolerance to increased activity and exercise  ? ROM: at least 5 deg DF PROM with knee extended to indicate improving mobility in ankles to increase heel strike and reduce toe drag with prolonged ambulation  ? Balance: At least 30/56 on Hernandez to indicate significant improvement in standing static/dynamic balance and progress towards reduced fall risk  Able to negotiate gait obstacles using rollator without increased instability or excessively slowed gait  ? Strength:    At least 5-/5 grossly in R hip to allow further improved pelvic stability in standing and improved swing phase control/speed during prolonged gait  At least 5/5 B knee ext to prevent excessive buckling with prolonged standing  ? Function:   Pt able to ambulate 600 ft with least restrictive device with appropriate RLE swing/stance phase timing, no evidence of toe drag, and no more than minimal fatigue by end of ambulation  Pt able to ambulate 100 ft with intermittent turning, stopping, etc without assistive device and demonstrating good stability and no evidence of B knee buckling to indicate improving functional strength and safety with household ambulation distances for ADL/IADLs  No more than 14% impaired on ABC scale to indicate improving subjective balance confidence           Patient goals: \"to strengthen right side back as much as I can\"    Pt. Education:  [x] Yes  [] No  [x] Reviewed Prior HEP/Ed  Method of Education: [x] Verbal  [] Demo  [] Written   fall prevention 7/11/22  Comprehension of Education:  [x] Verbalizes understanding. [x] Demonstrates understanding. [] Needs review. [] Demonstrates/verbalizes HEP/Ed previously given. Plan: [x] Continue current frequency toward long and short term goals.     [x] Specific Instructions for subsequent treatments:  resisted RLE swing phase of gait, resisted RLE step overs, standing balance        Time In: 3:00 pm            Time Out: 3:45 pm    Electronically signed by:  Paula Fall PTA

## 2022-07-27 NOTE — CARE COORDINATION
[facility] Trans[facility] Follow Up Call    2022    Patient: Aquiles Drew  Patient : 1975   MRN: [id number]  Reason for Admission: RLL PNE  Discharge Date: 7/15/22 RARS: Readmission Risk Score: 34         Spoke with: Patient's grandfather    Spoke with patients grandfather who said patient was on his way to PT. He said that he was seen at the eye MD today, fell in office and hit his head but was doing ok with no injury. States his blood sugar dropped which caused him to fall but did not say what it was. He is on his way to outpatient PT for therapy, has h/o CVA. He also reports that patient has developed a harsh non productive cough and said that patient's nephrologist took him off the Lisinopril and placed him on another medication but they have not picked up from pharmacy yet, he did not know the name of the medication. They deny any new needs, denies any f/c, n/v or s/s of worsening infection. He will see his PCP on Friday. Care Transitions Follow Up Call    Needs to be reviewed by the provider   Additional needs identified to be addressed with provider: No  none             Method of communication with provider : none      Care Transition Nurse contacted the family by telephone to follow up after admission on . Verified name and  with family as identifiers. Addressed changes since last contact: none  Discussed follow-up appointments. CTN reviewed discharge instructions, medical action plan and red flags with family and discussed any barriers to care and/or understanding of plan of care after discharge. Discussed appropriate site of care based on symptoms and resources available to patient including: PCP  Specialist. The family agrees to contact the PCP office for questions related to their healthcare.      Patients top risk factors for readmission: medical condition-PNE, CVA, ESRD  Interventions to address risk factors: Education of patient/family/caregiver/guardian to support self-management-discussed symptoms, reviewed SE of Lisinopril and discussed new medication      Non-[facility] follow up appointment(s): n/a    CTN provided contact information for future needs. Plan for follow-up call in 7-10 days based on severity of symptoms and risk factors. Plan for next call:  Check what new medication patient is taking for blood pressure, see how blood sugars are doing, any further falls. Care Transitions Subsequent and Final Call    Schedule Follow Up Appointment with PCP: Completed  Subsequent and Final Calls  Do you have any ongoing symptoms?: Yes  Onset of Patient-reported symptoms: In the past 7 days  Patient-reported symptoms: Cough, Weakness  Have your medications changed?: Yes  Patient Reports: Lisinopril stopped, new medication ordered (has not picked up yet, does not know what the name is)  Do you have any questions related to your medications?: No  Do you currently have any active services?: Yes  Are you currently active with any services?: Outpatient/Community Services  Identified Barriers: Lack of Education  Care Transitions Interventions  Other Interventions:              Follow Up  Future Appointments   Date Time Provider Gary Carcamo   7/27/2022  3:[facility]   7/29/2022  1:10 PM DANIEL Castano - [facility]   8/10/2022  4:00 PM Shiela Nash MD Neuro Spec Augusta Gilliam RN

## 2022-07-29 ENCOUNTER — OFFICE VISIT (OUTPATIENT)
Dept: FAMILY MEDICINE CLINIC | Age: 47
End: 2022-07-29
Payer: MEDICARE

## 2022-07-29 VITALS
TEMPERATURE: 97.8 F | HEART RATE: 82 BPM | HEIGHT: 67 IN | OXYGEN SATURATION: 98 % | WEIGHT: 189.2 LBS | DIASTOLIC BLOOD PRESSURE: 82 MMHG | SYSTOLIC BLOOD PRESSURE: 136 MMHG | BODY MASS INDEX: 29.7 KG/M2

## 2022-07-29 DIAGNOSIS — M54.12 CERVICAL RADICULOPATHY: ICD-10-CM

## 2022-07-29 DIAGNOSIS — Z87.01 HX OF BACTERIAL PNEUMONIA: ICD-10-CM

## 2022-07-29 DIAGNOSIS — Z09 HOSPITAL DISCHARGE FOLLOW-UP: Primary | ICD-10-CM

## 2022-07-29 DIAGNOSIS — I31.39 PERICARDIAL EFFUSION: ICD-10-CM

## 2022-07-29 PROCEDURE — 99495 TRANSJ CARE MGMT MOD F2F 14D: CPT | Performed by: NURSE PRACTITIONER

## 2022-07-29 RX ORDER — BENZONATATE 200 MG/1
200 CAPSULE ORAL 3 TIMES DAILY PRN
Qty: 30 CAPSULE | Refills: 0 | Status: SHIPPED | OUTPATIENT
Start: 2022-07-29 | End: 2022-08-05

## 2022-07-29 RX ORDER — BACLOFEN 10 MG/1
10 TABLET ORAL 2 TIMES DAILY
Qty: 60 TABLET | Refills: 1 | Status: ON HOLD
Start: 2022-07-29 | End: 2022-08-02 | Stop reason: HOSPADM

## 2022-07-29 RX ORDER — LOSARTAN POTASSIUM 25 MG/1
25 TABLET ORAL DAILY
COMMUNITY

## 2022-07-29 SDOH — ECONOMIC STABILITY: FOOD INSECURITY: WITHIN THE PAST 12 MONTHS, THE FOOD YOU BOUGHT JUST DIDN'T LAST AND YOU DIDN'T HAVE MONEY TO GET MORE.: NEVER TRUE

## 2022-07-29 SDOH — ECONOMIC STABILITY: FOOD INSECURITY: WITHIN THE PAST 12 MONTHS, YOU WORRIED THAT YOUR FOOD WOULD RUN OUT BEFORE YOU GOT MONEY TO BUY MORE.: NEVER TRUE

## 2022-07-29 ASSESSMENT — ENCOUNTER SYMPTOMS
DIARRHEA: 0
SORE THROAT: 0
SINUS PAIN: 0
ABDOMINAL PAIN: 0
EYE PAIN: 0
VOMITING: 0
BACK PAIN: 0
COUGH: 1
SHORTNESS OF BREATH: 0
NAUSEA: 0

## 2022-07-29 ASSESSMENT — SOCIAL DETERMINANTS OF HEALTH (SDOH): HOW HARD IS IT FOR YOU TO PAY FOR THE VERY BASICS LIKE FOOD, HOUSING, MEDICAL CARE, AND HEATING?: NOT HARD AT ALL

## 2022-07-29 NOTE — PATIENT INSTRUCTIONS
Patient Education        High Blood Pressure: Care Instructions  Overview     It's normal for blood pressure to go up and down throughout the day. But if it stays up, you have high blood pressure. Another name for high blood pressure ishypertension. Despite what a lot of people think, high blood pressure usually doesn't cause headaches or make you feel dizzy or lightheaded. It usually has no symptoms. But it does increase your risk of stroke, heart attack, and other problems. You and your doctor will talk about your risks of these problems based on yourblood pressure. Your doctor will give you a goal for your blood pressure. Your goal will bebased on your health and your age. Lifestyle changes, such as eating healthy and being active, are always important to help lower blood pressure. You might also take medicine to reachyour blood pressure goal.  Follow-up care is a key part of your treatment and safety. Be sure to make and go to all appointments, and call your doctor if you are having problems. It's also a good idea to know your test results and keep alist of the medicines you take. How can you care for yourself at home? Medical treatment  If you stop taking your medicine, your blood pressure will go back up. You may take one or more types of medicine to lower your blood pressure. Be safe with medicines. Take your medicine exactly as prescribed. Call your doctor if you think you are having a problem with your medicine. Talk to your doctor before you start taking aspirin every day. Aspirin can help certain people lower their risk of a heart attack or stroke. But taking aspirin isn't right for everyone, because it can cause serious bleeding. See your doctor regularly. You may need to see the doctor more often at first or until your blood pressure comes down. If you are taking blood pressure medicine, talk to your doctor before you take decongestants or anti-inflammatory medicine, such as ibuprofen.  Some of these medicines can raise blood pressure. Learn how to check your blood pressure at home. Lifestyle changes  Stay at a healthy weight. This is especially important if you put on weight around the waist. Losing even 10 pounds can help you lower your blood pressure. If your doctor recommends it, get more exercise. Walking is a good choice. Bit by bit, increase the amount you walk every day. Try for at least 30 minutes on most days of the week. You also may want to swim, bike, or do other activities. Avoid or limit alcohol. Talk to your doctor about whether you can drink any alcohol. Try to limit how much sodium you eat to less than 2,300 milligrams (mg) a day. Your doctor may ask you to try to eat less than 1,500 mg a day. Eat plenty of fruits (such as bananas and oranges), vegetables, legumes, whole grains, and low-fat dairy products. Lower the amount of saturated fat in your diet. Saturated fat is found in animal products such as milk, cheese, and meat. Limiting these foods may help you lose weight and also lower your risk for heart disease. Do not smoke. Smoking increases your risk for heart attack and stroke. If you need help quitting, talk to your doctor about stop-smoking programs and medicines. These can increase your chances of quitting for good. When should you call for help? Call 911  anytime you think you may need emergency care. This may mean having symptoms that suggest that your blood pressure is causing a serious heart or blood vessel problem. Your blood pressure may be over 180/120. For example, call 911 if:    You have symptoms of a heart attack. These may include:  Chest pain or pressure, or a strange feeling in the chest.  Sweating. Shortness of breath. Nausea or vomiting. Pain, pressure, or a strange feeling in the back, neck, jaw, or upper belly or in one or both shoulders or arms. Lightheadedness or sudden weakness. A fast or irregular heartbeat.      You have symptoms of a stroke. These may include:  Sudden numbness, tingling, weakness, or loss of movement in your face, arm, or leg, especially on only one side of your body. Sudden vision changes. Sudden trouble speaking. Sudden confusion or trouble understanding simple statements. Sudden problems with walking or balance. A sudden, severe headache that is different from past headaches. You have severe back or belly pain. Do not wait until your blood pressure comes down on its own. Get help right away. Call your doctor now or seek immediate care if:    Your blood pressure is much higher than normal (such as 180/120 or higher), but you don't have symptoms. You think high blood pressure is causing symptoms, such as:  Severe headache. Blurry vision. Watch closely for changes in your health, and be sure to contact your doctor if:    Your blood pressure measures higher than your doctor recommends at least 2 times. That means the top number is higher or the bottom number is higher, or both. You think you may be having side effects from your blood pressure medicine. Where can you learn more? Go to https://[employer]. org and sign in to your [employer] account. Enter B554 in the [employer] box to learn more about \"High Blood Pressure: Care Instructions. \"     If you do not have an account, please click on the \"Sign Up Now\" link. Current as of: January 10, 2022               Content Version: 13.3  © 0902-3660 Healthwise, Incorporated. Care instructions adapted under license by [facility] ([facility]). If you have questions about a medical condition or this instruction, always ask your healthcare professional. [place] [address] any warranty or liability for your use of this information.

## 2022-07-29 NOTE — PROGRESS NOTES
Visit Information    Have you changed or started any medications since your last visit including any over-the-counter medicines, vitamins, or herbal medicines? no   Have you stopped taking any of your medications? Is so, why? -  no  Are you having any side effects from any of your medications? - no    Have you seen any other physician or provider since your last visit?  no   Have you had any other diagnostic tests since your last visit?  no   Have you been seen in the emergency room and/or had an admission in a hospital since we last saw you?  no   Have you had your routine dental cleaning in the past 6 months?  no     Do you have an active MyChart account? If no, what is the barrier?   No: na    Patient Care Team:  DANIEL Torres CNP as PCP - General (Certified Nurse Practitioner)  DANIEL Torres CNP as PCP - [facility] Empaneled Provider  Jayde Yanez DPM as Physician (Podiatry)  Sully Simms RN as Care Transitions Nurse    Medical History Review  Past Medical, Family, and Social History reviewed and  contribute to the patient presenting condition    Health Maintenance   Topic Date Due    Pneumococcal 0-64 years Vaccine (1 - PCV) 05/11/1980    Diabetic retinal exam  Never done    Hepatitis C screen  Never done    Hepatitis B vaccine (1 of 3 - Risk Recombivax 3-dose series) Never done    Colorectal Cancer Screen  Never done    Flu vaccine (1) 09/01/2022    Diabetic foot exam  03/09/2023    Lipids  04/12/2023    Depression Monitoring  04/29/2023    A1C test (Diabetic or Prediabetic)  05/30/2023    DTaP/Tdap/Td vaccine (3 - Td or Tdap) 11/21/2030    COVID-19 Vaccine  Completed    Hepatitis A vaccine  Aged Out    Hib vaccine  Aged Out    Meningococcal (ACWY) vaccine  Aged Out    HIV screen  Discontinued

## 2022-07-29 NOTE — PROGRESS NOTES
[facility]  [address] [place] [address]  Dept: [phone]  Dept Fax: [phone]    Ines Massey is a 49 y.o. male who presents today for his medicalconditions/complaints as noted below. Ines Massey is c/o of Cough (Since having pneumonia a couple weeks ago, not using any inhalers, given abx and finished course) and Hypertension (Was taken off lisinopril and put on losartan)      HPI:        51 y.o presents for follow up    Neck and shoulder pain, no acute injury    Pericardial effusion had drainage, has since followed up cardio. Trace noted on last ct chest from recent hospitalization,    Pneumonia, cough - completed levaquin. Has been out for 2 weeks, doing a little better. Lingering cough. Stroke hospitaliztion [facility] 12-14, [facility] 27th, doing physical therapy [facility], right upper and lower extremity deficits, plavix and aspirin initiated. Complains of left knee pain. Treatments tried include none. MRI was positive for subacute fracture going to physical therapy, tramadol stopped. Chronic low back pain, been taking tramadol. Following with pain management. Had xrays in the past that were negative in [facility], ongoing for 30 yrs. Did see pain management and was started on gabapentin. Has seen podiatry regarding foot lesions, ongoing neuropathy and DM foot exams. Does have callus and dark lesion to left lateral plantar aspect. Significant history of end-stage renal disease, currently on dialysis follows with nephrologist Dr. Josafat Mcallister. Does take Lasix 40 mg once daily, still producing urine. Compliant with dialysis therapies. , TRS. Doing ongoing visits with transplant clinic at [facility]. Type 1 diabetes takes Humalog sliding scale, Lantus 45 units in a.m., 5 units nightly follows with endocrinology.  8.0     History of hypertension takes amlodipine 10 mg, losartan 25 mg, BP well controlled today. Lisinopril discontinued over possible side effect. History of hyperlipidemia, coronary artery disease takes Crestor 40, Zetia 10, last lipids stable. History of GERD takes omeprazole daily, working well. History of anxiety depression takes Prozac, Wellbutrin, mood stable. History of insomnia takes trazodone for sleep, sleep stable      Past Medical History:   Diagnosis Date    Cerebral artery occlusion with cerebral infarction [facility])     Closed fracture of bone of right foot March - April 2020    Dialysis patient [facility])     Hemodialysis patient [facility])     Hyperlipidemia     Hypertension     Kidney disease     On Dialysis    Type 1 diabetes mellitus (HCC)         Current Outpatient Medications   Medication Sig Dispense Refill    losartan (COZAAR) 25 MG tablet Take 25 mg by mouth in the morning. baclofen (LIORESAL) 10 MG tablet Take 1 tablet by mouth in the morning and 1 tablet before bedtime. 60 tablet 1    benzonatate (TESSALON) 200 MG capsule Take 1 capsule by mouth 3 times daily as needed for Cough 30 capsule 0    calcium acetate 667 MG TABS Take 1,334 mg by mouth 3 times daily (with meals) 180 tablet 1    carvedilol (COREG) 12.5 MG tablet Take 1 tablet by mouth in the morning and 1 tablet in the evening. Take with meals. 60 tablet 3    insulin glargine (LANTUS SOLOSTAR) 100 UNIT/ML injection pen Inject 25 Units into the skin in the morning and 25 Units before bedtime. 5 pen 3    furosemide (LASIX) 40 MG tablet Take 1 tablet by mouth in the morning. 60 tablet 3    glucose 4 g chewable tablet Take 4 tablets by mouth as needed for Low blood sugar 60 tablet 3    rosuvastatin (CRESTOR) 40 MG tablet Take 40 mg by mouth every evening      pantoprazole (PROTONIX) 40 MG tablet Take 1 tablet by mouth every morning (before breakfast) 30 tablet 3    gabapentin (NEURONTIN) 300 MG capsule Take 1 capsule by mouth daily.  90 capsule 3    insulin aspart (NOVOLOG) 100 UNIT/ML injection vial Inject 0-8 Units into the skin 3 times daily (before meals) SLIDING SCALE       FLUoxetine (PROZAC) 20 MG tablet Take 2 tablets (40 mg)  by mouth daily      buPROPion (WELLBUTRIN XL) 150 MG extended release tablet Take 1 tablet by mouth every morning 15 tablet 1    ezetimibe (ZETIA) 10 MG tablet Take 10 mg by mouth daily      aspirin 81 MG EC tablet Take 81 mg by mouth daily      vitamin B-12 (CYANOCOBALAMIN) 500 MCG tablet Take 500 mcg by mouth daily      Insulin Pen Needle (MEIJER PEN NEEDLES) 31G X 8 MM MISC 1 each by Does not apply route daily 100 each 3    Misc. Devices MISC Disp: custom molded shoes to accommodate for brace   DX: DM with history of stroke, foot drop  Duration: 1 year 2 each 0     No current facility-administered medications for this visit. No Known Allergies    Subjective:      Review of Systems   Constitutional:  Negative for chills and fatigue. HENT:  Negative for congestion, ear pain, sinus pain and sore throat. Eyes:  Negative for pain and visual disturbance. Respiratory:  Positive for cough. Negative for shortness of breath. Cardiovascular:  Negative for chest pain and palpitations. Gastrointestinal:  Negative for abdominal pain, diarrhea, nausea and vomiting. Genitourinary:  Negative for penile pain and testicular pain. Musculoskeletal:  Positive for arthralgias and neck pain. Negative for back pain and joint swelling. Skin:  Negative for rash. Neurological:  Negative for dizziness and light-headedness. Hematological:  Does not bruise/bleed easily. All other systems reviewed and are negative.    :Objective     Physical Exam  Vitals and nursing note reviewed. Constitutional:       General: He is not in acute distress. Appearance: Normal appearance. Cardiovascular:      Rate and Rhythm: Normal rate. Pulmonary:      Effort: Pulmonary effort is normal.      Breath sounds: Normal breath sounds. Skin:     General: Skin is warm and dry.    Neurological: General: No focal deficit present. Mental Status: He is alert and oriented to person, place, and time. /82 (Site: Right Upper Arm, Position: Sitting, Cuff Size: Medium Adult)   Pulse 82   Temp 97.8 °F (36.6 °C) (Tympanic)   Ht 5' 7\" (1.702 m)   Wt 189 lb 3.2 oz (85.8 kg)   SpO2 98%   BMI 29.63 kg/m²     Lab Review   Admission on 07/14/2022, Discharged on 07/15/2022   Component Date Value    WBC 07/14/2022 14.1 (A)    RBC 07/14/2022 2.86 (A)    Hemoglobin 07/14/2022 8.5 (A)    Hematocrit 07/14/2022 27.4 (A)    MCV 07/14/2022 95.8     MCH 07/14/2022 29.7     MCHC 07/14/2022 31.0     RDW 07/14/2022 13.7     Platelets 75/64/7713 345     MPV 07/14/2022 9.5     NRBC Automated 07/14/2022 0.0     Seg Neutrophils 07/14/2022 81 (A)    Lymphocytes 07/14/2022 7 (A)    Monocytes 07/14/2022 10     Eosinophils % 07/14/2022 1     Basophils 07/14/2022 0     Immature Granulocytes 07/14/2022 1 (A)    Segs Absolute 07/14/2022 11.56 (A)    Absolute Lymph # 07/14/2022 0.96 (A)    Absolute Mono # 07/14/2022 1.35 (A)    Absolute Eos # 07/14/2022 0.13     Basophils Absolute 07/14/2022 0.06     Absolute Immature Granul* 07/14/2022 0.07     Glucose 07/14/2022 216 (A)    BUN 07/14/2022 38 (A)    Creatinine 07/14/2022 7.59 (A)    Bun/Cre Ratio 07/14/2022 5 (A)    Calcium 07/14/2022 9.2     Sodium 07/14/2022 133 (A)    Potassium 07/14/2022 4.5     Chloride 07/14/2022 95 (A)    CO2 07/14/2022 23     Anion Gap 07/14/2022 15     Alkaline Phosphatase 07/14/2022 161 (A)    ALT 07/14/2022 18     AST 07/14/2022 14     Total Bilirubin 07/14/2022 0.27 (A)    Total Protein 07/14/2022 6.3 (A)    Albumin 07/14/2022 3.4 (A)    GFR Non- 07/14/2022 8 (A)    GFR  07/14/2022 9 (A)    GFR Comment 07/14/2022          Pro-BNP 07/14/2022 22,585 (A)    Troponin, High Sensitivi* 07/14/2022 727 (A)    Troponin, High Sensitivi* 07/14/2022 730 (A)    Specimen Description 07/14/2022 . BLOOD     Special Requests Potassium 06/17/2022 4.0     Chloride 06/17/2022 100     CO2 06/17/2022 24     Anion Gap 06/17/2022 18 (A)    GFR Non- 06/17/2022 7 (A)    GFR  06/17/2022 9 (A)    GFR Comment 06/17/2022          WBC 06/17/2022 9.1     RBC 06/17/2022 2.79 (A)    Hemoglobin 06/17/2022 8.4 (A)    Hematocrit 06/17/2022 27.3 (A)    MCV 06/17/2022 97.8     MCH 06/17/2022 30.1     MCHC 06/17/2022 30.8     RDW 06/17/2022 15.0 (A)    Platelets 68/81/1452 199     MPV 06/17/2022 9.8     NRBC Automated 06/17/2022 0.0     Magnesium 06/17/2022 1.7     Phosphorus 06/17/2022 5.3 (A)   Hospital Outpatient Visit on 06/15/2022   Component Date Value    Glucose 06/15/2022 149 (A)    BUN 06/15/2022 76 (A)    Creatinine 06/15/2022 8.28 (A)    Bun/Cre Ratio 06/15/2022 9     Calcium 06/15/2022 8.0 (A)    Sodium 06/15/2022 138     Potassium 06/15/2022 4.3     Chloride 06/15/2022 99     CO2 06/15/2022 24     Anion Gap 06/15/2022 15     GFR Non- 06/15/2022 7 (A)    GFR  06/15/2022 8 (A)    GFR Comment 06/15/2022          WBC 06/15/2022 12.9 (A)    RBC 06/15/2022 2.87 (A)    Hemoglobin 06/15/2022 8.6 (A)    Hematocrit 06/15/2022 27.9 (A)    MCV 06/15/2022 97.2     MCH 06/15/2022 30.0     MCHC 06/15/2022 30.8     RDW 06/15/2022 14.6 (A)    Platelets 81/01/5367 231     MPV 06/15/2022 10.0     NRBC Automated 06/15/2022 0.0     Magnesium 06/15/2022 1.9     Phosphorus 06/15/2022 4.0    Hospital Outpatient Visit on 06/13/2022   Component Date Value    Glucose 06/13/2022 106 (A)    BUN 06/13/2022 90 (A)    Creatinine 06/13/2022 9.47 (A)    Bun/Cre Ratio 06/13/2022 10     Calcium 06/13/2022 8.1 (A)    Sodium 06/13/2022 136     Potassium 06/13/2022 4.8     Chloride 06/13/2022 94 (A)    CO2 06/13/2022 26     Anion Gap 06/13/2022 16     GFR Non- 06/13/2022 6 (A)    GFR  06/13/2022 7 (A)    GFR Comment 06/13/2022          WBC 06/13/2022 14.0 (A)    RBC 06/13/2022 2.97 (A) Hemoglobin 06/13/2022 8.8 (A)    Hematocrit 06/13/2022 28.2 (A)    MCV 06/13/2022 94.9     MCH 06/13/2022 29.6     MCHC 06/13/2022 31.2     RDW 06/13/2022 13.9     Platelets 15/31/2602 278     MPV 06/13/2022 9.7     NRBC Automated 06/13/2022 0.0     Magnesium 06/13/2022 1.8     Phosphorus 06/13/2022 5.0 (A)   Hospital Outpatient Visit on 06/10/2022   Component Date Value    Glucose 06/10/2022 62 (A)    BUN 06/10/2022 70 (A)    Creatinine 06/10/2022 7.17 (A)    Bun/Cre Ratio 06/10/2022 10     Calcium 06/10/2022 8.5 (A)    Sodium 06/10/2022 134 (A)    Potassium 06/10/2022 4.1     Chloride 06/10/2022 94 (A)    CO2 06/10/2022 23     Anion Gap 06/10/2022 17     GFR Non- 06/10/2022 8 (A)    GFR  06/10/2022 10 (A)    GFR Comment 06/10/2022          WBC 06/10/2022 19.2 (A)    RBC 06/10/2022 3.53 (A)    Hemoglobin 06/10/2022 10.5 (A)    Hematocrit 06/10/2022 33.0 (A)    MCV 06/10/2022 93.5     MCH 06/10/2022 29.7     MCHC 06/10/2022 31.8     RDW 06/10/2022 13.2     Platelets 53/35/6193 339     MPV 06/10/2022 9.9     NRBC Automated 06/10/2022 0.0     Magnesium 06/10/2022 2.2     Phosphorus 06/10/2022 4.4    Hospital Outpatient Visit on 06/09/2022   Component Date Value    Hep B S Ab 06/09/2022 25.38 (A)    WBC 06/09/2022 14.1 (A)    RBC 06/09/2022 3.39 (A)    Hemoglobin 06/09/2022 10.1 (A)    Hematocrit 06/09/2022 31.2 (A)    MCV 06/09/2022 92.0     MCH 06/09/2022 29.8     MCHC 06/09/2022 32.4     RDW 06/09/2022 12.9     Platelets 55/58/7720 330     MPV 06/09/2022 9.5     NRBC Automated 06/09/2022 0.0     Seg Neutrophils 06/09/2022 79 (A)    Lymphocytes 06/09/2022 8 (A)    Monocytes 06/09/2022 10     Eosinophils % 06/09/2022 0 (A)    Basophils 06/09/2022 0     Immature Granulocytes 06/09/2022 3 (A)    Segs Absolute 06/09/2022 11.24 (A)    Absolute Lymph # 06/09/2022 1.10     Absolute Mono # 06/09/2022 1.41 (A)    Absolute Eos # 06/09/2022 <0.03     Basophils Absolute 06/09/2022 <0.03 Absolute Immature Granul* 06/09/2022 0.36 (A)    Glucose 06/09/2022 247 (A)    BUN 06/09/2022 47 (A)    Creatinine 06/09/2022 5.05 (A)    Bun/Cre Ratio 06/09/2022 9     Calcium 06/09/2022 8.0 (A)    Sodium 06/09/2022 133 (A)    Potassium 06/09/2022 4.4     Chloride 06/09/2022 94 (A)    CO2 06/09/2022 27     Anion Gap 06/09/2022 12     Alkaline Phosphatase 06/09/2022 138 (A)    ALT 06/09/2022 28     AST 06/09/2022 17     Total Bilirubin 06/09/2022 0.28 (A)    Total Protein 06/09/2022 5.1 (A)    Albumin 06/09/2022 3.0 (A)    GFR Non- 06/09/2022 12 (A)    GFR  06/09/2022 15 (A)    GFR Comment 06/09/2022          Hepatitis B Surface Ag 06/09/2022 NONREACTIVE    No results displayed because visit has over 200 results.       Admission on 05/26/2022, Discharged on 05/27/2022   Component Date Value    WBC 05/26/2022 9.9     RBC 05/26/2022 3.88 (A)    Hemoglobin 05/26/2022 11.6 (A)    Hematocrit 05/26/2022 36.6 (A)    MCV 05/26/2022 94.3     MCH 05/26/2022 29.9     MCHC 05/26/2022 31.7     RDW 05/26/2022 11.9     Platelets 06/46/2825 231     MPV 05/26/2022 9.6     NRBC Automated 05/26/2022 0.0     Seg Neutrophils 05/26/2022 69 (A)    Lymphocytes 05/26/2022 13 (A)    Monocytes 05/26/2022 13 (A)    Eosinophils % 05/26/2022 4     Basophils 05/26/2022 1     Immature Granulocytes 05/26/2022 0     Segs Absolute 05/26/2022 6.84     Absolute Lymph # 05/26/2022 1.27     Absolute Mono # 05/26/2022 1.24 (A)    Absolute Eos # 05/26/2022 0.44     Basophils Absolute 05/26/2022 0.07     Absolute Immature Granul* 05/26/2022 0.03     Glucose 05/26/2022 107 (A)    BUN 05/26/2022 57 (A)    Creatinine 05/26/2022 11.69 (A)    Bun/Cre Ratio 05/26/2022 5 (A)    Calcium 05/26/2022 9.8     Sodium 05/26/2022 136     Potassium 05/26/2022 4.7     Chloride 05/26/2022 95 (A)    CO2 05/26/2022 26     Anion Gap 05/26/2022 15     GFR Non- 05/26/2022 5 (A)    GFR  05/26/2022 6 (A)    GFR Comment WBC 04/25/2022 5.5     RBC 04/25/2022 3.43 (A)    Hemoglobin 04/25/2022 10.5 (A)    Hematocrit 04/25/2022 32.4 (A)    MCV 04/25/2022 94.5     MCH 04/25/2022 30.6     MCHC 04/25/2022 32.4     RDW 04/25/2022 11.9     Platelets 81/77/3338 222     MPV 04/25/2022 10.1     NRBC Automated 04/25/2022 0.0     Magnesium 04/25/2022 1.8     Phosphorus 04/25/2022 3.9    There may be more visits with results that are not included. Assessment and Plan      1. Hospital discharge follow-up  2. Hx of bacterial pneumonia  -     XR CHEST STANDARD (2 VW); Future  -     benzonatate (TESSALON) 200 MG capsule; Take 1 capsule by mouth 3 times daily as needed for Cough, Disp-30 capsule, R-0Normal  3. Pericardial effusion  4. Cervical radiculopathy  -     baclofen (LIORESAL) 10 MG tablet; Take 1 tablet by mouth in the morning and 1 tablet before bedtime. , Disp-60 tablet, R-1Normal  -     XR CERVICAL SPINE (2-3 VIEWS); Future       recommend xray neck and chest  Trial tessalon for cough  Trial baclofen for neck  F/u after testing        No results found for this visit on 07/29/22. Return in about 3 months (around 10/29/2022), or if symptoms worsen or fail to improve. Orders Placed This Encounter   Medications    baclofen (LIORESAL) 10 MG tablet     Sig: Take 1 tablet by mouth in the morning and 1 tablet before bedtime. Dispense:  60 tablet     Refill:  1    benzonatate (TESSALON) 200 MG capsule     Sig: Take 1 capsule by mouth 3 times daily as needed for Cough     Dispense:  30 capsule     Refill:  0        Patient given educational materials - see patient instructions. Discussed use, benefit, and side effects of prescribed medications. All patientquestions answered. Pt voiced understanding. Patient given educational materials - see patient instructions. Discussed use, benefit, and side effects of prescribed medications. All patientquestions answered. Pt voiced understanding.     This note was transcribed using dictation with Dragon services. Efforts were made to correct any errors but some words may be misinterpreted.     Patient assumes risks associated with failure to complete recommended testing and treatments in a timely manner    Electronically signed by DANIEL Love CNP on 7/29/2022at 1:54 PM

## 2022-07-31 ENCOUNTER — APPOINTMENT (OUTPATIENT)
Dept: CT IMAGING | Age: 47
End: 2022-07-31
Payer: MEDICARE

## 2022-07-31 ENCOUNTER — HOSPITAL ENCOUNTER (OUTPATIENT)
Age: 47
Setting detail: OBSERVATION
Discharge: NURSING, REHAB OR LONG-TERM CARE | End: 2022-08-04
Attending: EMERGENCY MEDICINE | Admitting: INTERNAL MEDICINE
Payer: MEDICARE

## 2022-07-31 ENCOUNTER — APPOINTMENT (OUTPATIENT)
Dept: GENERAL RADIOLOGY | Age: 47
End: 2022-07-31
Payer: MEDICARE

## 2022-07-31 DIAGNOSIS — R53.1 GENERALIZED WEAKNESS: Primary | ICD-10-CM

## 2022-07-31 DIAGNOSIS — D50.9 IRON DEFICIENCY ANEMIA, UNSPECIFIED IRON DEFICIENCY ANEMIA TYPE: ICD-10-CM

## 2022-07-31 LAB
ABSOLUTE EOS #: 0.18 K/UL (ref 0–0.44)
ABSOLUTE IMMATURE GRANULOCYTE: 0.02 K/UL (ref 0–0.3)
ABSOLUTE LYMPH #: 0.87 K/UL (ref 1.1–3.7)
ABSOLUTE MONO #: 0.84 K/UL (ref 0.1–1.2)
ALBUMIN SERPL-MCNC: 3.1 G/DL (ref 3.5–5.2)
ALP BLD-CCNC: 134 U/L (ref 40–129)
ALT SERPL-CCNC: 10 U/L (ref 5–41)
ANION GAP SERPL CALCULATED.3IONS-SCNC: 10 MMOL/L (ref 9–17)
AST SERPL-CCNC: 11 U/L
BASOPHILS # BLD: 1 % (ref 0–2)
BASOPHILS ABSOLUTE: 0.04 K/UL (ref 0–0.2)
BETA-HYDROXYBUTYRATE: 0.11 MMOL/L (ref 0.02–0.27)
BILIRUB SERPL-MCNC: 0.35 MG/DL (ref 0.3–1.2)
BUN BLDV-MCNC: 26 MG/DL (ref 6–20)
BUN/CREAT BLD: 5 (ref 9–20)
CALCIUM SERPL-MCNC: 9 MG/DL (ref 8.6–10.4)
CHLORIDE BLD-SCNC: 98 MMOL/L (ref 98–107)
CO2: 22 MMOL/L (ref 20–31)
CREAT SERPL-MCNC: 5.43 MG/DL (ref 0.7–1.2)
EOSINOPHILS RELATIVE PERCENT: 2 % (ref 1–4)
GFR AFRICAN AMERICAN: 14 ML/MIN
GFR NON-AFRICAN AMERICAN: 11 ML/MIN
GFR SERPL CREATININE-BSD FRML MDRD: ABNORMAL ML/MIN/{1.73_M2}
GLUCOSE BLD-MCNC: 309 MG/DL (ref 75–110)
GLUCOSE BLD-MCNC: 326 MG/DL (ref 75–110)
GLUCOSE BLD-MCNC: 407 MG/DL (ref 70–99)
HCO3 VENOUS: 23.5 MMOL/L (ref 22–29)
HCT VFR BLD CALC: 26.1 % (ref 40.7–50.3)
HEMOGLOBIN: 8 G/DL (ref 13–17)
IMMATURE GRANULOCYTES: 0 %
LYMPHOCYTES # BLD: 10 % (ref 24–43)
MCH RBC QN AUTO: 29 PG (ref 25.2–33.5)
MCHC RBC AUTO-ENTMCNC: 30.7 G/DL (ref 28.4–34.8)
MCV RBC AUTO: 94.6 FL (ref 82.6–102.9)
MONOCYTES # BLD: 10 % (ref 3–12)
NEGATIVE BASE EXCESS, VEN: 1 (ref 0–2)
NRBC AUTOMATED: 0 PER 100 WBC
O2 SAT, VEN: 95 % (ref 60–85)
PCO2, VEN: 38.8 MM HG (ref 41–51)
PDW BLD-RTO: 14.6 % (ref 11.8–14.4)
PH VENOUS: 7.39 (ref 7.32–7.43)
PLATELET # BLD: 279 K/UL (ref 138–453)
PMV BLD AUTO: 9.4 FL (ref 8.1–13.5)
PO2, VEN: 74.9 MM HG (ref 30–50)
POTASSIUM SERPL-SCNC: 4.7 MMOL/L (ref 3.7–5.3)
RBC # BLD: 2.76 M/UL (ref 4.21–5.77)
RBC # BLD: ABNORMAL 10*6/UL
SEG NEUTROPHILS: 77 % (ref 36–65)
SEGMENTED NEUTROPHILS ABSOLUTE COUNT: 6.79 K/UL (ref 1.5–8.1)
SODIUM BLD-SCNC: 130 MMOL/L (ref 135–144)
TOTAL PROTEIN: 6 G/DL (ref 6.4–8.3)
WBC # BLD: 8.7 K/UL (ref 3.5–11.3)

## 2022-07-31 PROCEDURE — 82010 KETONE BODYS QUAN: CPT

## 2022-07-31 PROCEDURE — 82947 ASSAY GLUCOSE BLOOD QUANT: CPT

## 2022-07-31 PROCEDURE — 82803 BLOOD GASES ANY COMBINATION: CPT

## 2022-07-31 PROCEDURE — 6370000000 HC RX 637 (ALT 250 FOR IP): Performed by: EMERGENCY MEDICINE

## 2022-07-31 PROCEDURE — 96372 THER/PROPH/DIAG INJ SC/IM: CPT

## 2022-07-31 PROCEDURE — 83550 IRON BINDING TEST: CPT

## 2022-07-31 PROCEDURE — 82728 ASSAY OF FERRITIN: CPT

## 2022-07-31 PROCEDURE — 85025 COMPLETE CBC W/AUTO DIFF WBC: CPT

## 2022-07-31 PROCEDURE — 71045 X-RAY EXAM CHEST 1 VIEW: CPT

## 2022-07-31 PROCEDURE — 96374 THER/PROPH/DIAG INJ IV PUSH: CPT

## 2022-07-31 PROCEDURE — 83540 ASSAY OF IRON: CPT

## 2022-07-31 PROCEDURE — 6360000002 HC RX W HCPCS: Performed by: EMERGENCY MEDICINE

## 2022-07-31 PROCEDURE — 93005 ELECTROCARDIOGRAM TRACING: CPT | Performed by: EMERGENCY MEDICINE

## 2022-07-31 PROCEDURE — 70450 CT HEAD/BRAIN W/O DYE: CPT

## 2022-07-31 PROCEDURE — 85045 AUTOMATED RETICULOCYTE COUNT: CPT

## 2022-07-31 PROCEDURE — 80053 COMPREHEN METABOLIC PANEL: CPT

## 2022-07-31 PROCEDURE — 99285 EMERGENCY DEPT VISIT HI MDM: CPT

## 2022-07-31 RX ORDER — FUROSEMIDE 10 MG/ML
60 INJECTION INTRAMUSCULAR; INTRAVENOUS ONCE
Status: COMPLETED | OUTPATIENT
Start: 2022-07-31 | End: 2022-07-31

## 2022-07-31 RX ORDER — CARVEDILOL 3.12 MG/1
12.5 TABLET ORAL ONCE
Status: COMPLETED | OUTPATIENT
Start: 2022-07-31 | End: 2022-07-31

## 2022-07-31 RX ADMIN — CARVEDILOL 12.5 MG: 3.12 TABLET, FILM COATED ORAL at 19:40

## 2022-07-31 RX ADMIN — INSULIN HUMAN 8 UNITS: 100 INJECTION, SOLUTION PARENTERAL at 20:22

## 2022-07-31 RX ADMIN — FUROSEMIDE 60 MG: 10 INJECTION, SOLUTION INTRAMUSCULAR; INTRAVENOUS at 22:58

## 2022-07-31 ASSESSMENT — ENCOUNTER SYMPTOMS
NAUSEA: 0
VOMITING: 0
ABDOMINAL PAIN: 0
SHORTNESS OF BREATH: 0

## 2022-07-31 ASSESSMENT — PAIN - FUNCTIONAL ASSESSMENT: PAIN_FUNCTIONAL_ASSESSMENT: NONE - DENIES PAIN

## 2022-07-31 NOTE — ED PROVIDER NOTES
[address] [facility]  Emergency Medicine Department    Pt Name: Raj Bowser  MRN: [id number]  [place] 1975  Date of evaluation: 7/31/2022  Provider: Van Marin MD    CHIEF COMPLAINT     Chief Complaint   Patient presents with    Hyperglycemia    Extremity Weakness       HISTORY OF PRESENT ILLNESS  (Location/Symptom, Timing/Onset, Context/Setting,Quality, Duration, Modifying Factors, Severity.)   Raj Bowser is a 49 y.o. male who presents to the emergency department complaining of generalized weakness for 1 day. He states he feels like his sugar is low. He has been taking his blood sugar at home and is actually found it to be high. He reports taking all of his medications as prescribed. He is a dialysis patient [facility]Th[facility] and last had his dialysis yesterday. Denies any fevers. No chest pain or shortness of breath. No nausea or vomiting. Nursing Notes were reviewed. ALLERGIES     Patient has no known allergies. CURRENT MEDICATIONS       Current Discharge Medication List        CONTINUE these medications which have NOT CHANGED    Details   losartan (COZAAR) 25 MG tablet Take 25 mg by mouth in the morning. baclofen (LIORESAL) 10 MG tablet Take 1 tablet by mouth in the morning and 1 tablet before bedtime. Qty: 60 tablet, Refills: 1    Associated Diagnoses: Cervical radiculopathy      benzonatate (TESSALON) 200 MG capsule Take 1 capsule by mouth 3 times daily as needed for Cough  Qty: 30 capsule, Refills: 0    Associated Diagnoses: Hx of bacterial pneumonia      calcium acetate 667 MG TABS Take 1,334 mg by mouth 3 times daily (with meals)  Qty: 180 tablet, Refills: 1      carvedilol (COREG) 12.5 MG tablet Take 1 tablet by mouth in the morning and 1 tablet in the evening. Take with meals. Qty: 60 tablet, Refills: 3      insulin glargine (LANTUS SOLOSTAR) 100 UNIT/ML injection pen Inject 25 Units into the skin in the morning and 25 Units before bedtime.   Qty: 5 pen, Refills: 3      Insulin Pen Needle (MEIJER PEN NEEDLES) 31G X 8 MM MISC 1 each by Does not apply route daily  Qty: 100 each, Refills: 3      furosemide (LASIX) 40 MG tablet Take 1 tablet by mouth in the morning. Qty: 60 tablet, Refills: 3      glucose 4 g chewable tablet Take 4 tablets by mouth as needed for Low blood sugar  Qty: 60 tablet, Refills: 3      rosuvastatin (CRESTOR) 40 MG tablet Take 40 mg by mouth every evening      pantoprazole (PROTONIX) 40 MG tablet Take 1 tablet by mouth every morning (before breakfast)  Qty: 30 tablet, Refills: 3      gabapentin (NEURONTIN) 300 MG capsule Take 1 capsule by mouth daily. Qty: 90 capsule, Refills: 3      Misc.  Devices MISC Disp: custom molded shoes to accommodate for brace   DX: DM with history of stroke, foot drop  Duration: 1 year  Qty: 2 each, Refills: 0      insulin aspart (NOVOLOG) 100 UNIT/ML injection vial Inject 0-8 Units into the skin 3 times daily (before meals) SLIDING SCALE       FLUoxetine (PROZAC) 20 MG tablet Take 2 tablets (40 mg)  by mouth daily      buPROPion (WELLBUTRIN XL) 150 MG extended release tablet Take 1 tablet by mouth every morning  Qty: 15 tablet, Refills: 1      ezetimibe (ZETIA) 10 MG tablet Take 10 mg by mouth daily      aspirin 81 MG EC tablet Take 81 mg by mouth daily      vitamin B-12 (CYANOCOBALAMIN) 500 MCG tablet Take 500 mcg by mouth daily             PAST MEDICAL HISTORY         Diagnosis Date    Cerebral artery occlusion with cerebral infarction [facility])     Closed fracture of bone of right foot March - April 2020    Dialysis patient [facility])     Hemodialysis patient [facility])     Hyperlipidemia     Hypertension     Kidney disease     On Dialysis    Type 1 diabetes mellitus ([facility] [address]        SURGICAL HISTORY           Procedure Laterality Date    AV FISTULA CREATION Left     WRIST SURGERY  1995       FAMILY HISTORY           Problem Relation Age of Onset    Diabetes Mother     Heart Disease Father     Diabetes Father     Cancer Paternal Grandmother Heart Disease Maternal Great Grandfather      Family Status   Relation Name Status    Mother  Alive    Father      PGM  (Not Specified)    MGGF  (Not Specified)        SOCIAL HISTORY      reports that he has never smoked. He has never used smokeless tobacco. He reports that he does not drink alcohol and does not use drugs. REVIEW OF SYSTEMS    (2-9 systems for level 4, 10 or more for level 5)     Review of Systems   Constitutional:  Negative for chills and fever. Respiratory:  Negative for shortness of breath. Cardiovascular:  Negative for chest pain. Gastrointestinal:  Negative for abdominal pain, nausea and vomiting. Allergic/Immunologic: Negative for immunocompromised state. Neurological:  Positive for dizziness, weakness and light-headedness. Negative for headaches. All other systems reviewed and are negative. PHYSICAL EXAM    (up to 7 for level 4, 8 or more for level 5)     ED Triage Vitals [22 1851]   BP Temp Temp src Heart Rate Resp SpO2 Height Weight   (!) 190/84 98.1 °F (36.7 °C) -- 81 20 97 % 5' 7\" (1.702 m) 190 lb (86.2 kg)       Physical Exam  Vitals and nursing note reviewed. Constitutional:       General: He is not in acute distress. Appearance: He is well-developed. He is not diaphoretic. HENT:      Head: Normocephalic and atraumatic. Nose: Nose normal.      Mouth/Throat:      Mouth: Mucous membranes are dry. Pharynx: Oropharynx is clear. Eyes:      Extraocular Movements: Extraocular movements intact. Cardiovascular:      Rate and Rhythm: Normal rate and regular rhythm. Heart sounds: Normal heart sounds. No murmur heard. Comments: Left upper arm fistula with palpable thrill  Pulmonary:      Effort: Pulmonary effort is normal. No respiratory distress. Breath sounds: Normal breath sounds. Abdominal:      Palpations: Abdomen is soft. Tenderness: There is no abdominal tenderness.    Musculoskeletal:         General: No tenderness or deformity. Normal range of motion. Cervical back: Normal range of motion and neck supple. Skin:     General: Skin is warm and dry. Neurological:      Mental Status: He is alert and oriented to person, place, and time. Psychiatric:         Behavior: Behavior normal.         Thought Content: Thought content normal.         Judgment: Judgment normal.       DIAGNOSTIC RESULTS     EKG:All EKG's are interpreted by the Emergency Department Physician who either signs or Co-signs this chart in the absence of a cardiologist.    Interpretation: Normal sinus rhythm with a rate of 80. Left Axis Deviation. Normal intervals. No ST elevations. RADIOLOGY:   Non-plain film images such as CT, Ultrasound and MRI are read by theradiologist. Plain radiographic images are visualized and preliminarily interpreted by the emergency physician with the below findings:    Interpretation per the Radiologist below, if available at the time of this note:    CT HEAD WO CONTRAST   Final Result   No acute intracranial abnormality. XR CHEST PORTABLE   Final Result   Patchy right interstitial and ground-glass infiltrates.   Findings are   suggestive of pneumonia      Small left pleural effusion             ED BEDSIDE ULTRASOUND:   Performed by ED Physician - none    LABS:  Labs Reviewed   CBC WITH AUTO DIFFERENTIAL - Abnormal; Notable for the following components:       Result Value    RBC 2.76 (*)     Hemoglobin 8.0 (*)     Hematocrit 26.1 (*)     RDW 14.6 (*)     Seg Neutrophils 77 (*)     Lymphocytes 10 (*)     Absolute Lymph # 0.87 (*)     All other components within normal limits   COMPREHENSIVE METABOLIC PANEL - Abnormal; Notable for the following components:    Glucose 407 (*)     BUN 26 (*)     Creatinine 5.43 (*)     Bun/Cre Ratio 5 (*)     Sodium 130 (*)     Alkaline Phosphatase 134 (*)     Total Protein 6.0 (*)     Albumin 3.1 (*)     GFR Non- 11 (*)     GFR  14 (*) All other components within normal limits   POC GLUCOSE FINGERSTICK - Abnormal; Notable for the following components:    POC Glucose 309 (*)     All other components within normal limits   VENOUS BLOOD GAS, POINT OF CARE - Abnormal; Notable for the following components:    pCO2, Kaiser 38.8 (*)     pO2, Kaiser 74.9 (*)     O2 Sat, Kaiser 95 (*)     All other components within normal limits   POC GLUCOSE FINGERSTICK - Abnormal; Notable for the following components:    POC Glucose 326 (*)     All other components within normal limits   POC GLUCOSE FINGERSTICK - Abnormal; Notable for the following components:    POC Glucose 205 (*)     All other components within normal limits   BETA-HYDROXYBUTYRATE   URINALYSIS WITH MICROSCOPIC   HEMOGLOBIN A1C   POCT GLUCOSE   POCT GLUCOSE     All other labs were within normal range or not returned as of this dictation. EMERGENCYDEPARTMENT COURSE and DIFFERENTIAL DIAGNOSIS/MDM:   Vitals:    Vitals:    07/31/22 2303 08/01/22 0000 08/01/22 0015 08/01/22 0110   BP: (!) 179/81 (!) 169/80 (!) 160/78 (!) 152/63   Pulse: 73 70 69 72   Resp: 11 15 16 18   Temp:    97.4 °F (36.3 °C)   TempSrc:    Oral   SpO2: 98% 96% 98% 100%   Weight:       Height:         49 y.o. M presenting with generalized weakness. Afebrile. No specific complaints otherwise. Given reports of recent fall, CT brain obtained which is negative. CXR shows concerns for PNA but he has not respiratory complaints, no leukocytosis, and no fever. Prior CXR and CT scans have shown similar findings so I do not think this is true PNA and will hold off on abx at this time. He was unable to get out of the stretcher for an ambulation trial so decision was made to admit for further care. Patient unable to provide urine sample at this time despite treatment with high dose of IV lasix. CONSULTS:  IP CONSULT TO INTERNAL MEDICINE    PROCEDURES:  None indicated    FINAL IMPRESSION     1.  Generalized weakness        DISPOSITION/PLAN DISPOSITION Admitted 08/01/2022 12:27:35 AM    (Please note that portions of this note were completed with a voice recognition program.  Efforts were made to edit the dictations butoccasionally words are mis-transcribed.)    Ainsley Caldera MD  Attending Emergency Physician         Ainsley Caldera MD  08/01/22 5350

## 2022-07-31 NOTE — ED NOTES
Dialysis T, TH, SA;   Sliding scale- novalog & lantus  Had breakfast today.      Jann Rico RN  07/31/22 6345

## 2022-08-01 PROBLEM — G25.1 TREMOR DUE TO MULTIPLE DRUGS: Status: ACTIVE | Noted: 2022-08-01

## 2022-08-01 PROBLEM — R73.9 HYPERGLYCEMIA: Status: ACTIVE | Noted: 2022-08-01

## 2022-08-01 PROBLEM — R53.1 GENERALIZED WEAKNESS: Status: ACTIVE | Noted: 2022-08-01

## 2022-08-01 LAB
ABSOLUTE RETIC #: 0.09 M/UL (ref 0.03–0.08)
EKG ATRIAL RATE: 80 BPM
EKG P AXIS: 54 DEGREES
EKG P-R INTERVAL: 148 MS
EKG Q-T INTERVAL: 388 MS
EKG QRS DURATION: 106 MS
EKG QTC CALCULATION (BAZETT): 447 MS
EKG T AXIS: 90 DEGREES
EKG VENTRICULAR RATE: 80 BPM
FERRITIN: 1288 NG/ML (ref 30–400)
FOLATE: >20 NG/ML
GLUCOSE BLD-MCNC: 163 MG/DL (ref 75–110)
GLUCOSE BLD-MCNC: 163 MG/DL (ref 75–110)
GLUCOSE BLD-MCNC: 205 MG/DL (ref 75–110)
GLUCOSE BLD-MCNC: 210 MG/DL (ref 75–110)
GLUCOSE BLD-MCNC: 247 MG/DL (ref 75–110)
GLUCOSE BLD-MCNC: 249 MG/DL (ref 75–110)
GLUCOSE BLD-MCNC: 309 MG/DL (ref 75–110)
IMMATURE RETIC FRACT: 18.2 % (ref 2.7–18.3)
IRON SATURATION: 9 % (ref 20–55)
IRON: 15 UG/DL (ref 59–158)
RETIC %: 3.3 % (ref 0.5–1.9)
RETIC HEMOGLOBIN: 27.7 PG (ref 28.2–35.7)
TOTAL CK: 47 U/L (ref 39–308)
TOTAL IRON BINDING CAPACITY: 163 UG/DL (ref 250–450)
TSH SERPL DL<=0.05 MIU/L-ACNC: 0.6 UIU/ML (ref 0.3–5)
UNSATURATED IRON BINDING CAPACITY: 148 UG/DL (ref 112–347)
VITAMIN B-12: >2000 PG/ML (ref 232–1245)

## 2022-08-01 PROCEDURE — 97110 THERAPEUTIC EXERCISES: CPT

## 2022-08-01 PROCEDURE — G0378 HOSPITAL OBSERVATION PER HR: HCPCS

## 2022-08-01 PROCEDURE — 97535 SELF CARE MNGMENT TRAINING: CPT

## 2022-08-01 PROCEDURE — APPSS30 APP SPLIT SHARED TIME 16-30 MINUTES: Performed by: NURSE PRACTITIONER

## 2022-08-01 PROCEDURE — 36415 COLL VENOUS BLD VENIPUNCTURE: CPT

## 2022-08-01 PROCEDURE — 99220 PR INITIAL OBSERVATION CARE/DAY 70 MINUTES: CPT | Performed by: INTERNAL MEDICINE

## 2022-08-01 PROCEDURE — 6370000000 HC RX 637 (ALT 250 FOR IP): Performed by: INTERNAL MEDICINE

## 2022-08-01 PROCEDURE — 97530 THERAPEUTIC ACTIVITIES: CPT

## 2022-08-01 PROCEDURE — 2580000003 HC RX 258: Performed by: NURSE PRACTITIONER

## 2022-08-01 PROCEDURE — 6370000000 HC RX 637 (ALT 250 FOR IP): Performed by: NURSE PRACTITIONER

## 2022-08-01 PROCEDURE — 6360000002 HC RX W HCPCS: Performed by: NURSE PRACTITIONER

## 2022-08-01 PROCEDURE — 82947 ASSAY GLUCOSE BLOOD QUANT: CPT

## 2022-08-01 PROCEDURE — 82550 ASSAY OF CK (CPK): CPT

## 2022-08-01 PROCEDURE — 97167 OT EVAL HIGH COMPLEX 60 MIN: CPT

## 2022-08-01 PROCEDURE — 82607 VITAMIN B-12: CPT

## 2022-08-01 PROCEDURE — 84443 ASSAY THYROID STIM HORMONE: CPT

## 2022-08-01 PROCEDURE — 97163 PT EVAL HIGH COMPLEX 45 MIN: CPT

## 2022-08-01 PROCEDURE — 82746 ASSAY OF FOLIC ACID SERUM: CPT

## 2022-08-01 RX ORDER — ONDANSETRON 4 MG/1
4 TABLET, ORALLY DISINTEGRATING ORAL EVERY 8 HOURS PRN
Status: DISCONTINUED | OUTPATIENT
Start: 2022-08-01 | End: 2022-08-04 | Stop reason: HOSPADM

## 2022-08-01 RX ORDER — LORATADINE 10 MG/1
10 TABLET ORAL DAILY
COMMUNITY

## 2022-08-01 RX ORDER — PANTOPRAZOLE SODIUM 40 MG/1
40 TABLET, DELAYED RELEASE ORAL
Status: DISCONTINUED | OUTPATIENT
Start: 2022-08-01 | End: 2022-08-01

## 2022-08-01 RX ORDER — CALCIUM ACETATE 667 MG/1
1334 TABLET ORAL
Status: DISCONTINUED | OUTPATIENT
Start: 2022-08-01 | End: 2022-08-04 | Stop reason: HOSPADM

## 2022-08-01 RX ORDER — GABAPENTIN 300 MG/1
300 CAPSULE ORAL DAILY
Status: DISCONTINUED | OUTPATIENT
Start: 2022-08-01 | End: 2022-08-01

## 2022-08-01 RX ORDER — ROSUVASTATIN CALCIUM 40 MG/1
40 TABLET, COATED ORAL EVERY EVENING
Status: DISCONTINUED | OUTPATIENT
Start: 2022-08-01 | End: 2022-08-03

## 2022-08-01 RX ORDER — FLUOXETINE HYDROCHLORIDE 20 MG/1
20 CAPSULE ORAL 2 TIMES DAILY
Status: DISCONTINUED | OUTPATIENT
Start: 2022-08-01 | End: 2022-08-04 | Stop reason: HOSPADM

## 2022-08-01 RX ORDER — INSULIN LISPRO 100 [IU]/ML
0-4 INJECTION, SOLUTION INTRAVENOUS; SUBCUTANEOUS NIGHTLY
Status: DISCONTINUED | OUTPATIENT
Start: 2022-08-01 | End: 2022-08-04 | Stop reason: HOSPADM

## 2022-08-01 RX ORDER — INSULIN LISPRO 100 [IU]/ML
0-8 INJECTION, SOLUTION INTRAVENOUS; SUBCUTANEOUS
Status: DISCONTINUED | OUTPATIENT
Start: 2022-08-01 | End: 2022-08-04 | Stop reason: HOSPADM

## 2022-08-01 RX ORDER — LOSARTAN POTASSIUM 25 MG/1
25 TABLET ORAL DAILY
Status: DISCONTINUED | OUTPATIENT
Start: 2022-08-01 | End: 2022-08-04 | Stop reason: HOSPADM

## 2022-08-01 RX ORDER — ONDANSETRON 2 MG/ML
4 INJECTION INTRAMUSCULAR; INTRAVENOUS EVERY 6 HOURS PRN
Status: DISCONTINUED | OUTPATIENT
Start: 2022-08-01 | End: 2022-08-04 | Stop reason: HOSPADM

## 2022-08-01 RX ORDER — OMEPRAZOLE 40 MG/1
40 CAPSULE, DELAYED RELEASE ORAL
Status: DISCONTINUED | OUTPATIENT
Start: 2022-08-01 | End: 2022-08-04 | Stop reason: HOSPADM

## 2022-08-01 RX ORDER — SODIUM CHLORIDE 0.9 % (FLUSH) 0.9 %
5-40 SYRINGE (ML) INJECTION EVERY 12 HOURS SCHEDULED
Status: DISCONTINUED | OUTPATIENT
Start: 2022-08-01 | End: 2022-08-04 | Stop reason: HOSPADM

## 2022-08-01 RX ORDER — SENNA AND DOCUSATE SODIUM 50; 8.6 MG/1; MG/1
1 TABLET, FILM COATED ORAL DAILY
COMMUNITY

## 2022-08-01 RX ORDER — SODIUM CHLORIDE 0.9 % (FLUSH) 0.9 %
10 SYRINGE (ML) INJECTION PRN
Status: DISCONTINUED | OUTPATIENT
Start: 2022-08-01 | End: 2022-08-04 | Stop reason: HOSPADM

## 2022-08-01 RX ORDER — BUPROPION HYDROCHLORIDE 150 MG/1
150 TABLET ORAL EVERY MORNING
Status: DISCONTINUED | OUTPATIENT
Start: 2022-08-01 | End: 2022-08-04 | Stop reason: HOSPADM

## 2022-08-01 RX ORDER — DEXTROSE MONOHYDRATE 100 MG/ML
INJECTION, SOLUTION INTRAVENOUS CONTINUOUS PRN
Status: DISCONTINUED | OUTPATIENT
Start: 2022-08-01 | End: 2022-08-04 | Stop reason: HOSPADM

## 2022-08-01 RX ORDER — FLUOXETINE HYDROCHLORIDE 20 MG/1
40 CAPSULE ORAL DAILY
Status: DISCONTINUED | OUTPATIENT
Start: 2022-08-01 | End: 2022-08-01

## 2022-08-01 RX ORDER — UBIDECARENONE 75 MG
500 CAPSULE ORAL DAILY
Status: DISCONTINUED | OUTPATIENT
Start: 2022-08-01 | End: 2022-08-01

## 2022-08-01 RX ORDER — GABAPENTIN 300 MG/1
300 CAPSULE ORAL 3 TIMES DAILY
Status: DISCONTINUED | OUTPATIENT
Start: 2022-08-01 | End: 2022-08-01

## 2022-08-01 RX ORDER — THIAMINE HCL 100 MG
2500 TABLET ORAL DAILY
Status: DISCONTINUED | OUTPATIENT
Start: 2022-08-01 | End: 2022-08-01

## 2022-08-01 RX ORDER — ACETAMINOPHEN 325 MG/1
650 TABLET ORAL EVERY 6 HOURS PRN
Status: DISCONTINUED | OUTPATIENT
Start: 2022-08-01 | End: 2022-08-04 | Stop reason: HOSPADM

## 2022-08-01 RX ORDER — ASPIRIN 81 MG/1
81 TABLET ORAL DAILY
Status: DISCONTINUED | OUTPATIENT
Start: 2022-08-01 | End: 2022-08-04 | Stop reason: HOSPADM

## 2022-08-01 RX ORDER — POLYETHYLENE GLYCOL 3350 17 G/17G
17 POWDER, FOR SOLUTION ORAL DAILY PRN
Status: DISCONTINUED | OUTPATIENT
Start: 2022-08-01 | End: 2022-08-04 | Stop reason: HOSPADM

## 2022-08-01 RX ORDER — FUROSEMIDE 20 MG/1
40 TABLET ORAL DAILY
Status: DISCONTINUED | OUTPATIENT
Start: 2022-08-01 | End: 2022-08-01

## 2022-08-01 RX ORDER — CARVEDILOL 12.5 MG/1
12.5 TABLET ORAL 2 TIMES DAILY WITH MEALS
Status: DISCONTINUED | OUTPATIENT
Start: 2022-08-01 | End: 2022-08-02

## 2022-08-01 RX ORDER — HEPARIN SODIUM 5000 [USP'U]/ML
5000 INJECTION, SOLUTION INTRAVENOUS; SUBCUTANEOUS EVERY 8 HOURS SCHEDULED
Status: DISCONTINUED | OUTPATIENT
Start: 2022-08-01 | End: 2022-08-04 | Stop reason: HOSPADM

## 2022-08-01 RX ORDER — CODEINE PHOSPHATE AND GUAIFENESIN 10; 100 MG/5ML; MG/5ML
5 SOLUTION ORAL EVERY 4 HOURS PRN
Status: DISCONTINUED | OUTPATIENT
Start: 2022-08-01 | End: 2022-08-04 | Stop reason: HOSPADM

## 2022-08-01 RX ORDER — EZETIMIBE 10 MG/1
10 TABLET ORAL DAILY
Status: DISCONTINUED | OUTPATIENT
Start: 2022-08-01 | End: 2022-08-04 | Stop reason: HOSPADM

## 2022-08-01 RX ORDER — GABAPENTIN 300 MG/1
300 CAPSULE ORAL NIGHTLY
Status: DISCONTINUED | OUTPATIENT
Start: 2022-08-02 | End: 2022-08-04 | Stop reason: HOSPADM

## 2022-08-01 RX ORDER — FLUTICASONE PROPIONATE 50 MCG
2 SPRAY, SUSPENSION (ML) NASAL DAILY
Status: DISCONTINUED | OUTPATIENT
Start: 2022-08-01 | End: 2022-08-04 | Stop reason: HOSPADM

## 2022-08-01 RX ORDER — INSULIN GLARGINE 100 [IU]/ML
25 INJECTION, SOLUTION SUBCUTANEOUS 2 TIMES DAILY
Status: DISCONTINUED | OUTPATIENT
Start: 2022-08-01 | End: 2022-08-01

## 2022-08-01 RX ORDER — OMEPRAZOLE 10 MG/1
10 CAPSULE, DELAYED RELEASE ORAL DAILY
COMMUNITY

## 2022-08-01 RX ORDER — ACETAMINOPHEN 650 MG/1
650 SUPPOSITORY RECTAL EVERY 6 HOURS PRN
Status: DISCONTINUED | OUTPATIENT
Start: 2022-08-01 | End: 2022-08-04 | Stop reason: HOSPADM

## 2022-08-01 RX ORDER — INSULIN GLARGINE 100 [IU]/ML
45 INJECTION, SOLUTION SUBCUTANEOUS
Status: DISCONTINUED | OUTPATIENT
Start: 2022-08-01 | End: 2022-08-04 | Stop reason: HOSPADM

## 2022-08-01 RX ORDER — SODIUM CHLORIDE 9 MG/ML
INJECTION, SOLUTION INTRAVENOUS PRN
Status: DISCONTINUED | OUTPATIENT
Start: 2022-08-01 | End: 2022-08-04 | Stop reason: HOSPADM

## 2022-08-01 RX ORDER — FUROSEMIDE 20 MG/1
10 TABLET ORAL DAILY
Status: DISCONTINUED | OUTPATIENT
Start: 2022-08-01 | End: 2022-08-04 | Stop reason: HOSPADM

## 2022-08-01 RX ADMIN — SODIUM CHLORIDE, PRESERVATIVE FREE 10 ML: 5 INJECTION INTRAVENOUS at 22:32

## 2022-08-01 RX ADMIN — ASPIRIN 81 MG: 81 TABLET ORAL at 10:42

## 2022-08-01 RX ADMIN — CARVEDILOL 12.5 MG: 12.5 TABLET ORAL at 10:54

## 2022-08-01 RX ADMIN — INSULIN LISPRO 2 UNITS: 100 INJECTION, SOLUTION INTRAVENOUS; SUBCUTANEOUS at 12:22

## 2022-08-01 RX ADMIN — INSULIN GLARGINE 45 UNITS: 100 INJECTION, SOLUTION SUBCUTANEOUS at 11:36

## 2022-08-01 RX ADMIN — FLUOXETINE HYDROCHLORIDE 20 MG: 20 CAPSULE ORAL at 22:31

## 2022-08-01 RX ADMIN — FUROSEMIDE 10 MG: 20 TABLET ORAL at 10:44

## 2022-08-01 RX ADMIN — CALCIUM ACETATE 1334 MG: 667 TABLET ORAL at 17:17

## 2022-08-01 RX ADMIN — INSULIN LISPRO 2 UNITS: 100 INJECTION, SOLUTION INTRAVENOUS; SUBCUTANEOUS at 17:14

## 2022-08-01 RX ADMIN — Medication 2500 MCG: at 10:53

## 2022-08-01 RX ADMIN — BUPROPION HYDROCHLORIDE 150 MG: 150 TABLET ORAL at 10:51

## 2022-08-01 RX ADMIN — OMEPRAZOLE 40 MG: 40 CAPSULE, DELAYED RELEASE ORAL at 06:35

## 2022-08-01 RX ADMIN — FLUTICASONE PROPIONATE 2 SPRAY: 50 SPRAY, METERED NASAL at 18:28

## 2022-08-01 RX ADMIN — GUAIFENESIN AND CODEINE PHOSPHATE 5 ML: 10; 100 LIQUID ORAL at 22:44

## 2022-08-01 RX ADMIN — CALCIUM ACETATE 1334 MG: 667 TABLET ORAL at 12:28

## 2022-08-01 RX ADMIN — HEPARIN SODIUM 5000 UNITS: 5000 INJECTION INTRAVENOUS; SUBCUTANEOUS at 13:26

## 2022-08-01 RX ADMIN — LOSARTAN POTASSIUM 25 MG: 25 TABLET ORAL at 10:43

## 2022-08-01 RX ADMIN — CALCIUM ACETATE 1334 MG: 667 TABLET ORAL at 10:41

## 2022-08-01 RX ADMIN — EPOETIN ALFA-EPBX 8000 UNITS: 4000 INJECTION, SOLUTION INTRAVENOUS; SUBCUTANEOUS at 18:27

## 2022-08-01 RX ADMIN — HEPARIN SODIUM 5000 UNITS: 5000 INJECTION INTRAVENOUS; SUBCUTANEOUS at 22:31

## 2022-08-01 RX ADMIN — INSULIN LISPRO 4 UNITS: 100 INJECTION, SOLUTION INTRAVENOUS; SUBCUTANEOUS at 22:31

## 2022-08-01 RX ADMIN — CARVEDILOL 12.5 MG: 12.5 TABLET ORAL at 17:18

## 2022-08-01 RX ADMIN — EZETIMIBE 10 MG: 10 TABLET ORAL at 10:53

## 2022-08-01 RX ADMIN — FLUOXETINE HYDROCHLORIDE 20 MG: 20 CAPSULE ORAL at 10:52

## 2022-08-01 RX ADMIN — HEPARIN SODIUM 5000 UNITS: 5000 INJECTION INTRAVENOUS; SUBCUTANEOUS at 06:35

## 2022-08-01 RX ADMIN — SODIUM CHLORIDE, PRESERVATIVE FREE 10 ML: 5 INJECTION INTRAVENOUS at 10:56

## 2022-08-01 RX ADMIN — GABAPENTIN 300 MG: 300 CAPSULE ORAL at 10:51

## 2022-08-01 NOTE — ED NOTES
Pt is a diaylsis pt. He has treatments Tuesday, Thursday, Saturday and states he had a full treatment yesterday. Fistula is in the left upper arm.       To Simental RN  07/31/22 2002

## 2022-08-01 NOTE — DISCHARGE INSTR - COC
Continuity of Care Form    Patient Name: Jamel Rodriguez   :  1975  MRN:  [id number]    Admit date:  2022  Discharge date:  22    Code Status Order: Full Code   Advance Directives:     Admitting Physician:  No admitting provider for patient encounter.   PCP: DANIEL Gurrola CNP    Discharging Nurse: [facility] ER Unit/Room#: 2106/2106-01  Discharging Unit Phone Number: [phone]    Emergency Contact:   Extended Emergency Contact Information  Primary Emergency Contact: Rox Dodson  Home Phone: [phone]  Mobile Phone: [phone]  Relation: Grandparent    Past Surgical History:  Past Surgical History:   Procedure Laterality Date    AV FISTULA CREATION Left     WRIST SURGERY         Immunization History:   Immunization History   Administered Date(s) Administered    COVID-19, MODERNA BLUE border, Primary or Immunocompromised, (age 12y+), IM, 100 mcg/0.5mL 2021, 2021, 2022    COVID-19, MODERNA Booster BLUE border, (age 18y+), IM, 50mcg/0.25mL 2022    Influenza Virus Vaccine 2016, 2017, 10/05/2020, 10/21/2020, 09/10/2021    Influenza, MDCK Quadv, IM, PF (Flucelvax 2 yrs and older) 09/10/2021    PPD Test 2021, 2021, 06/15/2021, 2022    Pneumococcal Vaccine 2020    Tdap (Boostrix, Adacel) 10/21/2011, 2020       Active Problems:  Patient Active Problem List   Diagnosis Code    End stage renal disease on dialysis ([facility] [address] N18.6, Z99.2    Primary hypertension I10    Hyperlipidemia E78.5    Acute pain of left knee M25.562    Bipolar affective disorder ([facility] [address] F31.9    Atherosclerosis of aorta (HCC) I70.0    COVID-19 U07.1    Cerebrovascular accident (CVA) ([facility] [address] I63.9    Type 2 diabetes mellitus with kidney complication, with long-term current use of insulin (HCC) E11.29, Z79.4    Neuropathy of both feet G57.93    GERD (gastroesophageal reflux disease) K21.9    Right sided weakness R53.1    Leg weakness, bilateral R29.898    Recurrent falls R29.6    Acute idiopathic pericarditis I30.0    Small kidney, bilateral X87.9    Umbilical hernia without obstruction or gangrene K42.9    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    Atherosclerosis of other arteries I70.8    Hypotension I95.9    Hyponatremia E87.1    Anemia D64.9    Hyperkalemia E87.5    Left lower quadrant abdominal pain R10.32    Coronary artery disease involving native coronary artery of native heart without angina pectoris I25.10    Pneumonia of right lower lobe due to infectious organism J18.9    Generalized weakness R53.1       Isolation/Infection:   Isolation            No Isolation          Patient Infection Status       Infection Onset Added Last Indicated Last Indicated By Review Planned Expiration Resolved Resolved By    None active    Resolved    COVID-19 (Rule Out) 22 COVID-19, Rapid (Ordered)   22 Rule-Out Test Resulted    COVID-19 22 COVID-19, Rapid   22     S/s 1/4    COVID-19 (Rule Out) 22 COVID-19, Rapid (Ordered)   22 Rule-Out Test Resulted            Nurse Assessment:  Last Vital Signs: BP (!) 165/73   Pulse 76   Temp 97.8 °F (36.6 °C) (Oral)   Resp 18   Ht 5' 7\" (1.702 m)   Wt 190 lb (86.2 kg)   SpO2 98%   BMI 29.76 kg/m²     Last documented pain score (0-10 scale):    Last Weight:   Wt Readings from Last 1 Encounters:   22 190 lb (86.2 kg)     Mental Status:  oriented, alert, coherent, logical, thought processes intact, and able to concentrate and follow conversation    IV Access:  - None    Nursing Mobility/ADLs:  Walking   Assisted  Transfer  Assisted  Bathing  Assisted  Dressing  Assisted  Toileting  Assisted  Feeding  Independent  Med Admin  Independent  Med Delivery   whole    Wound Care Documentation and Therapy:  Wound 22 Foot Left;Lateral;Posterior (Active)   Number of days: 62       Wound 22 Ankle Left;Medial (Active)   Number of days: 62        Elimination:  Continence: Bowel: Yes  Bladder: Pt is anuric. Pt on HD. Urinary Catheter: None   Colostomy/Ileostomy/Ileal Conduit: No       Date of Last BM: 8/4/22    Intake/Output Summary (Last 24 hours) at 8/1/2022 1652  Last data filed at 8/1/2022 0644  Gross per 24 hour   Intake 30 ml   Output --   Net 30 ml     I/O last 3 completed shifts: In: 27 [P.O.:30]  Out: -     Safety Concerns:     History of Falls (last 30 days)    Impairments/Disabilities:      Vision    Nutrition Therapy:  Current Nutrition Therapy:   - Oral Diet:  Carb Control 4 carbs/meal (1800kcals/day), Low Potassium, and Low Phorphorus    Routes of Feeding: Oral  Liquids: Thin Liquids  Daily Fluid Restriction: yes - amount 1200  Last Modified Barium Swallow with Video (Video Swallowing Test): not done    Treatments at the Time of Hospital Discharge:   Respiratory Treatments: N/A  Oxygen Therapy:  is not on home oxygen therapy. Ventilator:    - No ventilator support    Rehab Therapies: Physical Therapy and Occupational Therapy  Weight Bearing Status/Restrictions: No weight bearing restrictions  Other Medical Equipment (for information only, NOT a DME order): Walker and Bilateral AFO braces  Other Treatments: No B/P or Lab draws to LUE. Fistula in place. Patient's personal belongings (please select all that are sent with patient):  None, Glasses are not with patient.     RN SIGNATURE:  Electronically signed by Ivy Eid RN on 8/2/22 at 1:46 PM EDT    CASE MANAGEMENT/SOCIAL WORK SECTION    Inpatient Status Date: ***    Readmission Risk Assessment Score:  Readmission Risk              Risk of Unplanned Readmission:  0           Discharging to Facility/ Agency   Name: Name: [facility]  Address: [facility] [address] [place] [address], [address] [place] [address]  Phone: [phone]/HYPOBV: [phone]  Fax: [phone]   Address:  Phone:  Fax:    Dialysis Facility (if applicable) Name:[facility]   Address:  Dialysis Schedule:Scheduled at [facility] M/W/F  Phone:  Fax:    / signature: Electronically signed by MACKENZIE Dumont on 8/1/22 at 4:52 PM EDT    PHYSICIAN SECTION    Prognosis: Fair    Condition at Discharge: Stable    Rehab Potential (if transferring to Rehab): Fair    Recommended Labs or Other Treatments After Discharge: PT OT    Physician Certification: I certify the above information and transfer of Carmen Sommer  is necessary for the continuing treatment of the diagnosis listed and that he requires {Admit to Appropriate Level of Care:97546} for greater 30 days.      Update Admission H&P: No change in H&P    PHYSICIAN SIGNATURE:  Electronically signed by Lili Choi MD on 8/2/22 at 11:14 AM EDT

## 2022-08-01 NOTE — ED NOTES
Pt presents to the ER for weakness, hyperglycemia. Pt states he has not been feeling well all day. Pt has a history of diabetes and his sugar has been running high. Pt states he has never been in DKA. Pt states he just does not feel well. Pt also states he has a history of anemia.       Jose Alfredo Trivedi RN  07/31/22 2000

## 2022-08-01 NOTE — ED NOTES
ED to inpatient nurses report     Chief Complaint   Patient presents with    Hyperglycemia    Extremity Weakness      Present to ED from home for weakness, hyperglycemia, worse today   LOC: alert and orientated to name, place, date  Vital signs   Vitals:    07/31/22 2219 07/31/22 2303 08/01/22 0000 08/01/22 0015   BP: (!) 188/82 (!) 179/81 (!) 169/80 (!) 160/78   Pulse: 74 73 70 69   Resp: 14 11 15 16   Temp:       SpO2:  98% 96% 98%   Weight:       Height:          Oxygen Baseline room air    Current needs required none   LDAs:   Peripheral IV 07/31/22 Right;Dorsal Hand (Active)     Mobility: Requires assistance * 1  Fall Risk:   yes  Pending ED orders: none  Present condition: fair  Code Status: full  Consults: IP CONSULT TO INTERNAL MEDICINE  IP CONSULT TO SOCIAL WORK  [x]  Hospitalist  Completed  [x] yes [] no Who:   []  Medicine  Completed  [] yes [] No Who:   []  Cardiology  Completed  [] yes [] No Who:   []  GI   Completed  [] yes [] No Who:   []  Neurology  Completed  [] yes [] No Who:   []  Nephrology Completed  [] yes [] No Who:    []  Vascular  Completed  [] yes [] No Who:   []  Ortho  Completed  [] yes [] No Who:     []  Surgery  Completed  [] yes [] No Who:    []  Urology  Completed  [] yes [] No Who:    []  CT Surgery Completed  [] yes [] No Who:   []  Podiatry  Completed  [] yes [] No Who:    []  Other    Completed  [] yes [] No Who:  Interventions: 60mg Lasix, chest xray, blood work, 12.5 mg Coreg, 8 units insulin  Important Events: pt is Tuesday, Thursday, Saturday Dialysis and did his treatment on Saturday         Electronically signed by Jorge Yuan RN on 8/1/2022 at 12:48 AM      Jorge Yuan RN  08/01/22 0050       Jorge Yuan RN  08/01/22 [address], RN  08/01/22 0770

## 2022-08-01 NOTE — ED NOTES
Pt states he is unable to urinate. Per pt he does not make much if any urine.       Clearance SOFIYA Ivy  07/31/22 7701

## 2022-08-01 NOTE — CARE COORDINATION
Case Management Initial Discharge Plan  [facility],         Readmission Risk              Risk of Unplanned Readmission:  0         Met with:patient to discuss discharge plans. Information verified: address, contacts, phone number, , insurance Yes  PCP: DANIEL Richards CNP  Date of last visit: 2022    Insurance Provider: Medicare    Discharge Planning  Current Residence:  [address] [place] [address] Mall:  Family Members   Home has  stories/ stairs to climb  Support Systems:  Family Members  Current Services PTA:  outpatient PT/OT Agency:   Patient able to perform ADL's:Independent  DME in home:  rollator, ramp, cane  DME used to aid ambulation prior to admission:     DME used during admission:      Potential Assistance Needed:  N/A    Pharmacy: [address] [place] [address],[address] Medications:  No  Does patient want to participate in local refill/ meds to beds program?  No    Patient agreeable to home care: Yes  Freedom of choice provided:  yes      Type of Home Care Services:  None  Patient expects to be discharged to:       Prior SNF/Rehab Placement and Facility: [facility]  Agreeable to SNF/Rehab: Yes  [place] of choice provided: yes   Evaluation: yes    Expected Discharge date: Follow Up Appointment: Best Day/ Time:      Transportation provider: family  Transportation arrangements needed for discharge: No    Discharge Plan: Met with patient at bedside to complete discharge assessment. He lives with grandfather. DME at  home- rollator. Current outpatient therapy at [facility].  He is interested in inpt rehab at [facility], he's been there 2x in the past.  Referral sent.    Patient current with dialysis [facility] //Sa 5:00AM.       Electronically signed by MACKENZIE Burris on 22 at 12:42 PM EDT

## 2022-08-01 NOTE — ED NOTES
Pt states he is too weak to walk and it is not a good idea to try. Dr Radha Gutierrez notified.       Merlin Pick, RN  07/31/22 2216       Merlin Pick, RN  07/31/22 2217

## 2022-08-01 NOTE — CARE COORDINATION
Social work: patient accepted at [facility], anticipate dc Tuesday, after dialysis.

## 2022-08-01 NOTE — PROGRESS NOTES
Physical Therapy  Facility/Department: [facility] SURG  Physical Therapy Initial Assessment    Name: Dom Chavez  : 1975  MRN: [id number]  Date of Service: 2022    Discharge Recommendations:    Pt is currently functional below baseline and recommend comprehensive and intensive skilled therapy by a multidisciplinary team. Would expect patient to be able to tolerate 3 hours of therapy per day and able to tolerate at least one hour up in chair. Please refer to AM-PAC score for current mobility/adl level. Dom Chavez is a 49 y.o. Non- / non  male who presents with Hyperglycemia and Extremity Weakness   and is admitted to the hospital for the management of Generalized weakness. Patient presented to the ER with complaints of weakness. He states he really has not felt good all day. He said his sugars have been running high despite a poor appetite. He denies fevers, chest pain, shortness of breath nausea or vomiting. When they attempted to ambulate him in the ER he said he felt too weak and did not think he could so he refused. Patient was admitted here overnight from  through July 15 related to concerns of right lower lobe pneumonia and  left pleural effusion and received IV Lasix that was transition to p.o. Lasix at discharge and IV Levaquin was changed to p.o. Levaquin for an additional 3 doses. Per the note he recently completed a dose of prednisone related to pericarditis         Patient Diagnosis(es): The encounter diagnosis was Generalized weakness. Past Medical History:  has a past medical history of Cerebral artery occlusion with cerebral infarction [facility]), Closed fracture of bone of right foot, Dialysis patient [facility]), Hemodialysis patient ([facility] Hyperlipidemia, Hypertension, Kidney disease, and Type 1 diabetes mellitus ([facility] [address] Past Surgical History:  has a past surgical history that includes AV fistula creation (Left) and Wrist surgery ().     Assessment Body Structures, Functions, Activity Limitations Requiring Skilled Therapeutic Intervention: Decreased functional mobility ; Decreased cognition;Decreased endurance;Decreased balance;Decreased posture; Increased pain  Assessment: Pt. generally debilitated along with bilat. foot drop x3 yrs. from DM and h/o recent CVA with Rt. side weakness. Uses rollator at home with recent fall due to LE weakness. Will progress as able. Specific Instructions for Next Treatment: progress standing balance/ gait with RW  Therapy Prognosis: Good  Decision Making: High Complexity  Barriers to Learning: questionable cognition  Requires PT Follow-Up: Yes  Activity Tolerance  Activity Tolerance: Patient limited by fatigue;Treatment limited secondary to decreased cognition;Patient limited by endurance  Activity Tolerance Comments: /81 () when in chair. RN informed. Plan   Plan  Plan: 5-7 times per week  Specific Instructions for Next Treatment: progress standing balance/ gait with RW  Current Treatment Recommendations: Strengthening, ROM, Balance training, Functional mobility training, Transfer training, Gait training, Safety education & training, Home exercise program, Patient/Caregiver education & training, Therapeutic activities  Safety Devices  Type of Devices: Patient at risk for falls, All fall risk precautions in place, Gait belt, Left in chair, Call light within reach, Chair alarm in place     Restrictions  Restrictions/Precautions  Restrictions/Precautions: Fall Risk, General Precautions, Up as Tolerated  Position Activity Restriction  Other position/activity restrictions: AFO's B legs for drop foot. (not here at hospital) Pt had h.o. CVA in April of this year, but had braces prior d/t diabetes/neuropathy.      Subjective   General  Chart Reviewed: Yes  Patient assessed for rehabilitation services?: Yes  Family / Caregiver Present: No  Follows Commands: Within Functional Limits         Social/Functional History  Social/Functional History  Lives With:  (grandparents who are independent. They take care of pt)  Type of Home: House  Home Layout: One level  Home Access: Ramped entrance  Bathroom Shower/Tub: Walk-in shower  Bathroom Toilet: Handicap height  Bathroom Equipment: Shower chair, Grab bars in [facility]: [place], [place], [place], 4 wheeled, Alyssa Lennox  Has the patient had two or more falls in the past year or any fall with injury in the past year?: Yes (pt had fall prior to admission; per notes, pt has had 6 falls in last year.)  ADL Assistance: Independent  Homemaking Assistance: Needs assistance (grandparents do all of homemaking tasks- meals, cleaning, laundry)  Ambulation Assistance: Independent (uses 4ww at all times in/out of home)  Transfer Assistance: Independent  Active : No  Patient's  Info: grandparents  Occupation: On disability  Type of Occupation: electropolishing  Additional Comments: h.o.  CVA affecting R sides  Vision/Hearing  Vision  Vision: Impaired  Vision Exceptions: Wears glasses for reading;Wears glasses for distance  Hearing  Hearing: Within functional limits    Cognition   Orientation  Overall Orientation Status: Impaired  Orientation Level: Disoriented to time;Oriented to person;Oriented to place  Cognition  Overall Cognitive Status: Exceptions  Arousal/Alertness: Appropriate responses to stimuli  Attention Span: Attends with cues to redirect  Memory: Decreased recall of recent events  Safety Judgement: Decreased awareness of need for assistance;Decreased awareness of need for safety  Problem Solving: Assistance required to generate solutions;Assistance required to correct errors made;Assistance required to implement solutions;Decreased awareness of errors  Insights: Decreased awareness of deficits  Initiation: Requires cues for some  Sequencing: Requires cues for some     Objective      Observation/Palpation  Posture: Fair  Observation: pt lying in bed; willing to participate. Stating he doesn't feel well; concerned over blood sugars  Gross Assessment  AROM: Generally decreased, functional  Strength: Generally decreased, functional  Coordination: Generally decreased, functional (Rt. LE)                 Balance  Sitting: High guard (pt sitting EOB x 15 min prior to standing. Pt leaning to left in sitting with dec. awareness of midline. Pt also rotating head and trunk to L . Pt cued visually with mirror and needing some manual cues to correct)  Standing: With support (mod-max A x 2 in [place]. Pt buckling following ~1 min in standing (L LE))  Gait  Overall Level of Assistance: Maximum assistance;Assist X2;Moderate assistance (using RW for taking ~3-4 small steps bed to chair. Pt having difficulty advancing feet (R>L) d/t foot drop. AFO's not present and pt would benefit. Recommend staff use [place])  Bed mobility  Supine to Sit: Contact guard assistance  Bed Mobility Comments: up to chair  Transfers  Sit to Stand: Minimal Assistance;2 Person Assistance  Stand to sit: Minimal Assistance;2 Person Assistance  Bed to Chair: Moderate assistance;Maximum assistance;2 Person Assistance (Bed to chair transfer with RW. Pt with posterior LOB and bilat. drop foot. (does not have braces here at hospital.))           Static Sitting Balance Exercises: focused on midline orientation (pt. leans slightly to L with L neck and trunk rotation);  used phone to allow pt to see himself and correct  Static Standing Balance Exercises: RW for support;  pt. initially using backs of legs on bed for support. Had pt step away from bed. L neck and trunk rotation evident in standing, however pt. OK with midline orientation. Postural Correction Exercises:  Focused on tall posture/ midline orientation in both sitting and standing        OutComes Score                                                  AM-PAC Score  AM-PAC Inpatient Mobility Raw Score : 12 (08/01/22 4332)  AM-PAC Inpatient T-Scale Score : 35.33 (08/01/22 1432)  Mobility Inpatient CMS 0-100% Score: 68.66 (08/01/22 1432)  Mobility Inpatient CMS G-Code Modifier : CL (08/01/22 1432)          Tinneti Score       Goals  Short Term Goals  Time Frame for Short term goals: 12 visits:  Short term goal 1: Pt. to be SBA with bed mob. Short term goal 2: Pt. to be CGA for sit to stand transfer with RW  Short term goal 3: Pt. to require min A for gait with RW, 25ft  Short term goal 4: Pt. to tolerate 25+ min. of PT daily for ther ex/ gait/balance training; functional mob. training  Patient Goals   Patient goals : feel normal       Education  Patient Education  Education Given To: Patient  Education Provided: Role of Therapy;Plan of Care  Education Provided Comments: impt. of OOB, walker safety  Education Method: Demonstration;Verbal  Education Outcome: Verbalized understanding;Demonstrated understanding      Therapy Time   Individual Concurrent Group Co-treatment   Time In 0925         Time Out 1024         Minutes 59             Treatment time:  49min. Co-treatment with OT warranted secondary to decreased safety and independence requiring 2 skilled therapy professionals to address individual discipline's goals.      Donato Dickerson, PT

## 2022-08-01 NOTE — H&P
[facility]  Office: [address], DO, Jer Scott, DO, Grady Dry, DO, Colleen Childress Blood, DO, Vandana Santos MD, Jennifer Berry MD, Daniel Staley MD, Allison Carroll MD,  Neris Molina MD, Mony Bray MD, Porsha Qureshi, DO, Bella Zelaya MD,  Lesa Mcguire MD, Pal Osorio MD, Samantha Ceballos, DO, David Oliveira MD, Lopez Baeza MD, Osiel Penaloza MD, Claudell Orion, DO, Joasfat Sorto MD, Carlos Abrams MD, Nina Hardin, CNP,  Darvin Sotelo, CNP, Cristin Gonzáles, CNP, Óscar Castano, CNP, Pat Bashir PA-C, Karmen Vuong, [facility], Carlotta Choi, CNP, Berlinda Boast, CNP, Ashley Guerra, CNP, Mary Grissom, CNP, Mirella Ashby, CNP, Lalito King, CNS, Mariza Maldonado, [facility], Harjeet Ross, CNP, Laury Bean, CNP, Marc Worley, [facility]           [address] [facility]    HISTORY AND PHYSICAL EXAMINATION            Date:   8/1/2022  Patient name:  Micha Handy  Date of admission:  7/31/2022  6:49 PM  MRN:   [id number]  Account:  [de-identified]  YOB: 1975  PCP:    DANIEL Tony CNP  Room:   [place] [address]  Code Status:    Prior    Chief Complaint:     Chief Complaint   Patient presents with    Hyperglycemia    Extremity Weakness       History Obtained From:     patient, electronic medical record    History of Present Illness:     Micha Handy is a 49 y.o. Non- / non  male who presents with Hyperglycemia and Extremity Weakness   and is admitted to the hospital for the management of Generalized weakness. Patient presented to the ER with complaints of weakness. He states he really has not felt good all day. He said his sugars have been running high despite a poor appetite. He denies fevers, chest pain, shortness of breath nausea or vomiting. When they attempted to ambulate him in the ER he said he felt too weak and did not think he could so he refused.   Patient was admitted here overnight from July 14 through July 15 related to concerns of right lower lobe pneumonia and  left pleural effusion and received IV Lasix that was transition to p.o. Lasix at discharge and IV Levaquin was changed to p.o. Levaquin for an additional 3 doses. Per the note he recently completed a dose of prednisone related to pericarditis    ESRD with treatments Tuesday, Thursday and Saturday-tells me he missed his dialysis on Saturday. Patient has a fistula in his left upper arm and follows with Dr. Citlali Le. His initial blood pressure was 190/84 but more recently 160/78 otherwise his vitals are stable. Chest x-ray shows patchy right interstitial and groundglass infiltrates suggestive of pneumonia with small left pleural effusion. Upon reviewing his chart most of his chest x-rays since the beginning of the year have reported similar findings. CT of the head showed nothing acute. Sodium is 130 (patient has chronic hyponatremia that ranges between 130 and 142)  BUN and creatinine are 26/5.43, glucose was greater than 400 but it is currently 205. Beta hydroxy 0. 11. When he was discharged the last time they change his Lantus to 25 units twice daily but he states he is not taking insulin at night and has been taking 45 units in the morning for his previous dose. There is no leukocytosis. Hemoglobin is 8.0 (average hemoglobin between 8.4 and 11.0) In May his iron was 35, TIBC was 149, iron saturation was 23, ferritin was 3028.      He received 60 mg of IV Lasix, Coreg 12.5 mg and 8 units of insulin in the ER      Past Medical History:     Past Medical History:   Diagnosis Date    Cerebral artery occlusion with cerebral infarction [facility])     Closed fracture of bone of right foot March - April 2020    Dialysis patient [facility])     Hemodialysis patient [facility])     Hyperlipidemia     Hypertension     Kidney disease     On Dialysis    Type 1 diabetes mellitus ([facility] [address]         Past Surgical History: Past Surgical History:   Procedure Laterality Date    AV FISTULA CREATION Left     WRIST SURGERY  1995        Medications Prior to Admission:     Prior to Admission medications    Medication Sig Start Date End Date Taking? Authorizing Provider   losartan (COZAAR) 25 MG tablet Take 25 mg by mouth in the morning. Historical Provider, MD   baclofen (LIORESAL) 10 MG tablet Take 1 tablet by mouth in the morning and 1 tablet before bedtime. 7/29/22   Mickiel DANIEL Cowan CNP   benzonatate (TESSALON) 200 MG capsule Take 1 capsule by mouth 3 times daily as needed for Cough 7/29/22 8/5/22  Mickiel DANIEL Cowan CNP   calcium acetate 667 MG TABS Take 1,334 mg by mouth 3 times daily (with meals) 7/26/22   DANIEL Regalado CNP   carvedilol (COREG) 12.5 MG tablet Take 1 tablet by mouth in the morning and 1 tablet in the evening. Take with meals. 7/15/22   Isak Lew MD   insulin glargine (LANTUS SOLOSTAR) 100 UNIT/ML injection pen Inject 25 Units into the skin in the morning and 25 Units before bedtime. 7/15/22 8/14/22  Isak Lew MD   Insulin Pen Needle (MEIJER PEN NEEDLES) 31G X 8 MM MISC 1 each by Does not apply route daily 7/15/22   Isak Lew MD   furosemide (LASIX) 40 MG tablet Take 1 tablet by mouth in the morning. 7/15/22   Isak Lew MD   glucose 4 g chewable tablet Take 4 tablets by mouth as needed for Low blood sugar 7/15/22   Isak Lew MD   rosuvastatin (CRESTOR) 40 MG tablet Take 40 mg by mouth every evening    Historical Provider, MD   pantoprazole (PROTONIX) 40 MG tablet Take 1 tablet by mouth every morning (before breakfast) 6/3/22   Isak Lew MD   gabapentin (NEURONTIN) 300 MG capsule Take 1 capsule by mouth daily. 5/28/22   DANIEL Lane CNP   Misc.  Devices MISC Disp: custom molded shoes to accommodate for brace   DX: DM with history of stroke, foot drop  Duration: 1 year 5/11/22   Francesca Shah, DPAMA   insulin aspart (NOVOLOG) 100 UNIT/ML injection vial Inject 0-8 Units into the skin 3 times daily (before meals) SLIDING SCALE     Historical Provider, MD   FLUoxetine (PROZAC) 20 MG tablet Take 2 tablets (40 mg)  by mouth daily    Historical Provider, MD   buPROPion (WELLBUTRIN XL) 150 MG extended release tablet Take 1 tablet by mouth every morning 3/25/21   Manuel Fleming,    ezetimibe (ZETIA) 10 MG tablet Take 10 mg by mouth daily    Historical Provider, MD   aspirin 81 MG EC tablet Take 81 mg by mouth daily    Historical Provider, MD   vitamin B-12 (CYANOCOBALAMIN) 500 MCG tablet Take 500 mcg by mouth daily    Historical Provider, MD        Allergies:     Patient has no known allergies. Social History:     Tobacco:    reports that he has never smoked. He has never used smokeless tobacco.  Alcohol:      reports no history of alcohol use. Drug Use:  reports no history of drug use. Family History:     Family History   Problem Relation Age of Onset    Diabetes Mother     Heart Disease Father     Diabetes Father     Cancer Paternal Grandmother     Heart Disease Maternal Great Grandfather        Review of Systems:     Positive and Negative as described in HPI. Review of Systems   Constitutional:  Positive for fatigue. Musculoskeletal:  Positive for gait problem. Neurological:  Positive for weakness. All other systems reviewed and are negative. Physical Exam:   BP (!) 160/78   Pulse 69   Temp 98.1 °F (36.7 °C)   Resp 16   Ht 5' 7\" (1.702 m)   Wt 190 lb (86.2 kg)   SpO2 98%   BMI 29.76 kg/m²   Temp (24hrs), Av.1 °F (36.7 °C), Min:98.1 °F (36.7 °C), Max:98.1 °F (36.7 °C)    Recent Labs     22  1855 22  2120   POCGLU 309* 326*     No intake or output data in the 24 hours ending 22 0054    Physical Exam  Vitals and nursing note reviewed. HENT:      Mouth/Throat:      Mouth: Mucous membranes are dry. Eyes:      Conjunctiva/sclera: Conjunctivae normal.      Pupils: Pupils are equal, round, and reactive to light. Cardiovascular:      Rate and Rhythm: Normal rate. Pulses: Normal pulses. Heart sounds: Normal heart sounds. Arteriovenous access: Left arteriovenous access is present. Pulmonary:      Effort: Pulmonary effort is normal.      Breath sounds: Normal breath sounds. Abdominal:      General: Bowel sounds are normal.      Palpations: Abdomen is soft. Musculoskeletal:      Right foot: Foot drop present. Left foot: Foot drop present. Skin:     General: Skin is warm. Capillary Refill: Capillary refill takes less than 2 seconds. Coloration: Skin is pale. Neurological:      Mental Status: He is alert and oriented to person, place, and time. GCS: GCS eye subscore is 4. GCS verbal subscore is 5. GCS motor subscore is 6. Sensory: Sensation is intact.       Comments: No difficulty lifting both of his legs off the bed   Psychiatric:         Mood and Affect: Mood normal.       Investigations:      Laboratory Testing:  Recent Results (from the past 24 hour(s))   POC Glucose Fingerstick    Collection Time: 07/31/22  6:55 PM   Result Value Ref Range    POC Glucose 309 (H) 75 - 110 mg/dL   CBC with Auto Differential    Collection Time: 07/31/22  6:57 PM   Result Value Ref Range    WBC 8.7 3.5 - 11.3 k/uL    RBC 2.76 (L) 4.21 - 5.77 m/uL    Hemoglobin 8.0 (L) 13.0 - 17.0 g/dL    Hematocrit 26.1 (L) 40.7 - 50.3 %    MCV 94.6 82.6 - 102.9 fL    MCH 29.0 25.2 - 33.5 pg    MCHC 30.7 28.4 - 34.8 g/dL    RDW 14.6 (H) 11.8 - 14.4 %    Platelets 723 306 - 219 k/uL    MPV 9.4 8.1 - 13.5 fL    NRBC Automated 0.0 0.0 per 100 WBC    Seg Neutrophils 77 (H) 36 - 65 %    Lymphocytes 10 (L) 24 - 43 %    Monocytes 10 3 - 12 %    Eosinophils % 2 1 - 4 %    Basophils 1 0 - 2 %    Immature Granulocytes 0 0 %    Segs Absolute 6.79 1.50 - 8.10 k/uL    Absolute Lymph # 0.87 (L) 1.10 - 3.70 k/uL    Absolute Mono # 0.84 0.10 - 1.20 k/uL    Absolute Eos # 0.18 0.00 - 0.44 k/uL    Basophils Absolute 0.04 0.00 - 0.20 k/uL    Absolute Immature Granulocyte 0.02 0.00 - 0.30 k/uL    RBC Morphology ANISOCYTOSIS PRESENT    CMP    Collection Time: 07/31/22  6:57 PM   Result Value Ref Range    Glucose 407 (HH) 70 - 99 mg/dL    BUN 26 (H) 6 - 20 mg/dL    Creatinine 5.43 (HH) 0.70 - 1.20 mg/dL    Bun/Cre Ratio 5 (L) 9 - 20    Calcium 9.0 8.6 - 10.4 mg/dL    Sodium 130 (L) 135 - 144 mmol/L    Potassium 4.7 3.7 - 5.3 mmol/L    Chloride 98 98 - 107 mmol/L    CO2 22 20 - 31 mmol/L    Anion Gap 10 9 - 17 mmol/L    Alkaline Phosphatase 134 (H) 40 - 129 U/L    ALT 10 5 - 41 U/L    AST 11 <40 U/L    Total Bilirubin 0.35 0.3 - 1.2 mg/dL    Total Protein 6.0 (L) 6.4 - 8.3 g/dL    Albumin 3.1 (L) 3.5 - 5.2 g/dL    GFR Non- 11 (L) >60 mL/min    GFR  14 (L) >60 mL/min    GFR Comment         Beta-Hydroxybutyrate    Collection Time: 07/31/22  6:57 PM   Result Value Ref Range    Beta-Hydroxybutyrate 0.11 0.02 - 0.27 mmol/L   Venous Blood Gas, POC    Collection Time: 07/31/22  7:36 PM   Result Value Ref Range    pH, [place] 7.390 7.320 - 7.430    pCO2, [place] 38.8 (L) 41.0 - 51.0 mm Hg    pO2, [place] 74.9 (H) 30.0 - 50.0 mm Hg    HCO3, Venous 23.5 22.0 - 29.0 mmol/L    Negative Base Excess, Kaiser 1 0.0 - 2.0    O2 Sat, [place] 95 (H) 60.0 - 85.0 %   POC Glucose Fingerstick    Collection Time: 07/31/22  9:20 PM   Result Value Ref Range    POC Glucose 326 (H) 75 - 110 mg/dL       Imaging/Diagnostics:  CT HEAD WO CONTRAST    Result Date: 7/31/2022  No acute intracranial abnormality. XR CHEST PORTABLE    Result Date: 7/31/2022  Patchy right interstitial and ground-glass infiltrates.   Findings are suggestive of pneumonia Small left pleural effusion       Assessment :      Hospital Problems             Last Modified POA    * (Principal) Generalized weakness 8/1/2022 Yes    Anemia 8/1/2022 Yes    Coronary artery disease involving native coronary artery of native heart without angina pectoris 8/1/2022 Yes    End stage renal disease on dialysis ([facility] 8/1/2022 Yes    Primary hypertension 8/1/2022 Yes    Bipolar affective disorder ([facility] 8/1/2022 Yes    Cerebrovascular accident (CVA) ([facility] 8/1/2022 Yes    Type 2 diabetes mellitus with kidney complication, with long-term current use of insulin ([facility] (Chronic) 8/1/2022 Yes    GERD (gastroesophageal reflux disease) 8/1/2022 Yes       Plan:     Patient status observation in the  Med/Surge    Generalized weakness  PT/OT  Case management to assist with discharge planning    CAD without angina  Continue home aspirin, losartan, Zetia and beta-blocker    ESRD  Consult nephrology  Lasix 40 mg p.o. daily  Continue home vitamin B12 and calcium acetate  Avoid nephrotoxic agents  Hemoglobin seems to be trending down-repeat anemia labs    Primary hypertension  Continue home beta-blocker and losartan    Bipolar disorder  Continue home Prozac and Wellbutrin    History of CVA  Continue Crestor and aspirin    Type 2 diabetes with long-term insulin  Continue Lantus 45 units daily in the morning with breakfast  Add insulin sliding scale medium correction factor  Continue home gabapentin related to neuropathy  5/30/2022 hemoglobin A1c was 8.4    GERD  Continue home PPI    DVT prophylaxis    I spent 30 min reviewing the patient's chart, completing assessment and developing POC.  Case discussed with RN    Consultations:   IP CONSULT TO INTERNAL MEDICINE  IP CONSULT TO SOCIAL WORK        DANIEL Vázquez CNP  8/1/2022  12:54 AM    Copy sent to DANIEL England CNP

## 2022-08-01 NOTE — PROGRESS NOTES
he refused. Patient was admitted here overnight from July 14 through July 15 related to concerns of right lower lobe pneumonia and  left pleural effusion and received IV Lasix that was transition to p.o. Lasix at discharge and IV Levaquin was changed to p.o. Levaquin for an additional 3 doses. Per the note he recently completed a dose of prednisone related to pericarditis     ESRD with treatments Tuesday, Thursday and Saturday-tells me he missed his dialysis on Saturday. Patient has a fistula in his left upper arm and follows with Dr. Lisa Lane. Assessment   Performance deficits / Impairments: Decreased functional mobility ; Decreased ADL status; Decreased strength;Decreased safe awareness;Decreased endurance;Decreased posture;Decreased balance;Decreased cognition;Decreased fine motor control;Decreased coordination;Decreased sensation  Assessment: Pt with deficits of strength, bed mobility, transfers,  balance, safety awareness and endurance this session,  & required 2 assist for SAFE functional mobility, & transfers, not able to tolerate ambulation away from bed -chair this session. With current deficits, Pt HIGH risk for falls & requires continued OT to maximize independence with functional mobility, balance, safety awareness & activity tolerance.    Prognosis: Good  Decision Making: High Complexity  REQUIRES OT FOLLOW-UP: Yes  Activity Tolerance  Activity Tolerance: Patient limited by fatigue;Patient limited by pain        Plan   Plan  Times per Week: 4-5x/week, 1-2x/day  Current Treatment Recommendations: Strengthening, Functional mobility training, Balance training, Endurance training, Patient/Caregiver education & training, Positioning, Equipment evaluation, education, & procurement, Self-Care / ADL, Safety education & training, Cognitive/Perceptual training, Coordination training     Restrictions  Restrictions/Precautions  Restrictions/Precautions: Fall Risk, General Precautions  Position Activity Restriction  Other position/activity restrictions: AFO's B legs for drop foot. Pt had h.o. CVA in April of this year, but had braces prior d/t diabetes/neuropathy. Subjective   General  Chart Reviewed: Yes  Patient assessed for rehabilitation services?: Yes  Family / Caregiver Present: No  Subjective  Subjective: pt c/o  muscle spasms in legs and R hand, as well as face. Social/Functional History  Social/Functional History  Lives With:  (grandparents who are independent. They take care of pt)  Type of Home: House  Home Layout: One level  Home Access: Ramped entrance  Bathroom Shower/Tub: Walk-in shower  Bathroom Toilet: Handicap height  Bathroom Equipment: Shower chair, Grab bars in [address] [place]: Jemal Janus, [place], Jemal Janus, 4 wheeled, [place]  Has the patient had two or more falls in the past year or any fall with injury in the past year?: Yes (pt had fall prior to admission; per notes, pt has had 6 falls in last year.)  ADL Assistance: Independent  Homemaking Assistance: Needs assistance (grandparents do all of homemaking tasks- meals, cleaning, laundry)  Ambulation Assistance: Independent (uses 4ww at all times in/out of home)  Transfer Assistance: Independent  Active : No  Patient's  Info: grandparents  Occupation: On disability  Type of Occupation: electropolishing  Additional Comments: h.o. CVA affecting R sides       Objective   Heart Rate: 79  Heart Rate Source: Monitor  BP: (!) 142/72  BP Location: Right Arm  Patient Position: Sitting  MAP (Calculated): 95.33  Resp: 18  SpO2: 98 %  O2 Device: None (Room air)          Observation/Palpation  Posture: Fair  Observation: pt lying in bed; willing to participate. Stating he doesn't feel well; concerned over blood sugars  Safety Devices  Type of Devices: Patient at risk for falls; All fall risk precautions in place;Gait belt;Left in chair;Call light within reach; Chair alarm in place  Balance  Sitting: High guard (pt sitting EOB x 15 min prior to standing. Pt leaning to left in sitting with dec. awareness of midline. Pt also rotating head and trunk to L . Pt cued visually with mirror and needing some manual cues to correct)  Standing: With support (mod-max A x 2 in Bernardine Massy. Pt buckling following ~1 min in standing (L LE))  Gait  Overall Level of Assistance: Maximum assistance;Assist X2;Moderate assistance (using RW for taking ~3-4 small steps bed to chair. Pt having difficulty advancing feet (R>L) d/t foot drop. AFO's not present and pt would benefit. Recommend staff use Bernardine Celly)     AROM: Within functional limits  Strength: Within functional limits (R UE decreased vs L , noted more distal weakness and dec. coordination.)  Coordination: Generally decreased, functional (dec. coordination noted in R hand; pt using altered  to hold spoon to eat breakfast. Pt reporting hand numbness and cramps)  Tone: Normal  Sensation: Impaired (neuropathy B feet/LE's and numbness in R hand.)  ADL  Feeding: Independent;Setup;Supervision  Feeding Skilled Clinical Factors: pt with dec. coordination with R hand to hold utensils  Grooming: Minimal assistance;Setup  UE Bathing: Moderate assistance  LE Bathing: Moderate assistance  UE Dressing: Moderate assistance  LE Dressing: Maximum assistance  Toileting: Moderate assistance;Maximum assistance  Additional Comments: pt is limited by dec. standing tolerance/balance as well as generalized weakness and dec. coordination. Pt needing 2 person for transfers and recommending staff use Johan brower if pt needs to get OOB        Bed mobility  Supine to Sit: Contact guard assistance  Bed Mobility Comments: up to chair  Transfers  Sit to stand: Minimal assistance;2 Person assistance  Stand to sit: 2 Person assistance;Minimal assistance  Transfer Comments: cues for hand placement, pushing up to stand and reaching back to sit.   Vision  Vision: Impaired  Vision Exceptions: Wears glasses for reading;Wears glasses for distance  Hearing  Hearing: Within functional limits  Cognition  Overall Cognitive Status: Exceptions  Arousal/Alertness: Appropriate responses to stimuli  Attention Span: Attends with cues to redirect  Memory: Decreased recall of recent events  Safety Judgement: Decreased awareness of need for assistance;Decreased awareness of need for safety  Problem Solving: Assistance required to generate solutions;Assistance required to correct errors made;Assistance required to implement solutions;Decreased awareness of errors  Insights: Decreased awareness of deficits  Initiation: Requires cues for some  Sequencing: Requires cues for some  Orientation  Overall Orientation Status: Impaired  Orientation Level: Disoriented to time;Oriented to person;Oriented to place  Perception  Overall Perceptual Status: [facility]               Education Given To: Patient  Education Provided: Role of Therapy;Plan of Care;Home Exercise Program;Precautions; ADL Adaptive Strategies;Transfer Training;Energy Conservation; Fall Prevention Strategies; Equipment; Family Education  Education Method: Demonstration;Verbal  Education Outcome: Continued education needed;Verbalized understanding               [facility] Inpatient Daily Activity Raw Score: 14 (08/01/22 1348)  AM-[facility] Inpatient ADL T-Scale Score : 33.39 (08/01/22 1348)  ADL Inpatient CMS 0-100% Score: 59.67 (08/01/22 1348)  ADL Inpatient CMS G-Code Modifier : CK (08/01/22 1348)    Tinneti Score       Goals  Short Term Goals  Time Frame for Short term goals: by discharge, pt will  Short Term Goal 1: demo min A x 1 with ADL transfers with approp AD/DME and good safety  Short Term Goal 2: demo min A with toileting routine with good safety, DME as needed  Short Term Goal 3: demo SBA with UB ADLs and min A LB ADLs with AE/DME as needed and good safety  Short Term Goal 4: demo and verb good understanding of fall prevention techs, EC/WS techs, equip needs, B UE HEP, and d/c recommendations  Long Term Goals  Long Term Goal 1: demo CGA with functional mob in room distances with AD/DME as needed and good safety/pacing for ADL completion  Patient Goals   Patient goals : to go home when able       Therapy Time   Individual Concurrent Group Co-treatment   Time In 0850         Time Out 0950         Minutes 60          Treatment min: 50  Co-treatment with PT warranted secondary to decreased safety and independence requiring 2 skilled therapy professionals to address individual discipline's goals. OT addressing preparation for ADL transfer, sitting balance for increased ADL performance, sitting/activity tolerance, functional reaching, environmental safety/scanning, fall prevention, functional mobility for ADL transfers, ability to sequence and follow directions, bed mobility tech, and functional UE strength.         Alexa Jaimes, OT

## 2022-08-01 NOTE — PLAN OF CARE
Problem: Discharge Planning  Goal: Discharge to home or other facility with appropriate resources  Outcome: Progressing  Flowsheets (Taken 8/1/2022 0900)  Discharge to home or other facility with appropriate resources: Identify barriers to discharge with patient and caregiver     Problem: Skin/Tissue Integrity  Goal: Absence of new skin breakdown  Description: 1. Monitor for areas of redness and/or skin breakdown  2. Assess vascular access sites hourly  3. Every 4-6 hours minimum:  Change oxygen saturation probe site  4. Every 4-6 hours:  If on nasal continuous positive airway pressure, respiratory therapy assess nares and determine need for appliance change or resting period.   Outcome: Progressing     Problem: Safety - Adult  Goal: Free from fall injury  Outcome: Progressing     Problem: ABCDS Injury Assessment  Goal: Absence of physical injury  Outcome: Progressing     Problem: Neurosensory - Adult  Goal: Achieves stable or improved neurological status  Outcome: Progressing     Problem: Respiratory - Adult  Goal: Achieves optimal ventilation and oxygenation  Outcome: Progressing     Problem: Skin/Tissue Integrity - Adult  Goal: Skin integrity remains intact  Outcome: Progressing  Flowsheets (Taken 8/1/2022 0900)  Skin Integrity Remains Intact: Monitor for areas of redness and/or skin breakdown     Problem: Musculoskeletal - Adult  Goal: Return mobility to safest level of function  Outcome: Progressing  Flowsheets (Taken 8/1/2022 0900)  Return Mobility to Safest Level of Function: Assess patient stability and activity tolerance for standing, transferring and ambulating with or without assistive devices  Goal: Return ADL status to a safe level of function  Outcome: Progressing  Flowsheets (Taken 8/1/2022 0900)  Return ADL Status to a Safe Level of Function: Administer medication as ordered     Problem: Gastrointestinal - Adult  Goal: Minimal or absence of nausea and vomiting  Outcome: Progressing  Goal: Maintains or returns to baseline bowel function  Outcome: Progressing  Goal: Maintains adequate nutritional intake  Outcome: Progressing     Problem: Genitourinary - Adult  Goal: Absence of urinary retention  Outcome: Progressing  Flowsheets (Taken 8/1/2022 0900)  Absence of urinary retention: Assess patients ability to void and empty bladder     Problem: Metabolic/Fluid and Electrolytes - Adult  Goal: Electrolytes maintained within normal limits  Outcome: Progressing  Flowsheets (Taken 8/1/2022 0900)  Electrolytes maintained within normal limits: Monitor labs and assess patient for signs and symptoms of electrolyte imbalances  Goal: Hemodynamic stability and optimal renal function maintained  Outcome: Progressing  Flowsheets (Taken 8/1/2022 0900)  Hemodynamic stability and optimal renal function maintained: Monitor labs and assess for signs and symptoms of volume excess or deficit  Goal: Glucose maintained within prescribed range  Outcome: Progressing  Flowsheets (Taken 8/1/2022 0900)  Glucose maintained within prescribed range: Monitor blood glucose as ordered     Problem: Chronic Conditions and Co-morbidities  Goal: Patient's chronic conditions and co-morbidity symptoms are monitored and maintained or improved  Outcome: Progressing  Flowsheets (Taken 8/1/2022 0900)  Care Plan - Patient's Chronic Conditions and Co-Morbidity Symptoms are Monitored and Maintained or Improved: Monitor and assess patient's chronic conditions and comorbid symptoms for stability, deterioration, or improvement

## 2022-08-01 NOTE — PROGRESS NOTES
Pt admitted to room 2106 from ED. Oriented to room, call light and bed mechanics. Side rails up x2. Call light within reach. Orders reviewed.

## 2022-08-02 ENCOUNTER — HOSPITAL ENCOUNTER (OUTPATIENT)
Dept: PHYSICAL THERAPY | Facility: CLINIC | Age: 47
Setting detail: THERAPIES SERIES
Discharge: HOME | End: 2022-08-02
Payer: MEDICARE

## 2022-08-02 LAB
ABSOLUTE EOS #: 0.19 K/UL (ref 0–0.44)
ABSOLUTE IMMATURE GRANULOCYTE: 0.05 K/UL (ref 0–0.3)
ABSOLUTE LYMPH #: 0.96 K/UL (ref 1.1–3.7)
ABSOLUTE MONO #: 0.94 K/UL (ref 0.1–1.2)
ANION GAP SERPL CALCULATED.3IONS-SCNC: 12 MMOL/L (ref 9–17)
BASOPHILS # BLD: 1 % (ref 0–2)
BASOPHILS ABSOLUTE: 0.05 K/UL (ref 0–0.2)
BUN BLDV-MCNC: 40 MG/DL (ref 6–20)
BUN/CREAT BLD: 5 (ref 9–20)
CALCIUM SERPL-MCNC: 9.2 MG/DL (ref 8.6–10.4)
CHLORIDE BLD-SCNC: 98 MMOL/L (ref 98–107)
CO2: 24 MMOL/L (ref 20–31)
CORTISOL COLLECTION INFO: NORMAL
CORTISOL: 10.4 UG/DL (ref 2.7–18.4)
CREAT SERPL-MCNC: 7.75 MG/DL (ref 0.7–1.2)
DATE, STOOL #1: NORMAL
EOSINOPHILS RELATIVE PERCENT: 2 % (ref 1–4)
GFR AFRICAN AMERICAN: 9 ML/MIN
GFR NON-AFRICAN AMERICAN: 8 ML/MIN
GFR SERPL CREATININE-BSD FRML MDRD: ABNORMAL ML/MIN/{1.73_M2}
GLUCOSE BLD-MCNC: 141 MG/DL (ref 75–110)
GLUCOSE BLD-MCNC: 157 MG/DL (ref 70–99)
GLUCOSE BLD-MCNC: 166 MG/DL (ref 75–110)
GLUCOSE BLD-MCNC: 80 MG/DL (ref 75–110)
GLUCOSE BLD-MCNC: 99 MG/DL (ref 75–110)
HCT VFR BLD CALC: 25.5 % (ref 40.7–50.3)
HEMOCCULT SP1 STL QL: NEGATIVE
HEMOGLOBIN: 7.8 G/DL (ref 13–17)
IMMATURE GRANULOCYTES: 1 %
INR BLD: 1.1
LYMPHOCYTES # BLD: 10 % (ref 24–43)
MCH RBC QN AUTO: 29 PG (ref 25.2–33.5)
MCHC RBC AUTO-ENTMCNC: 30.6 G/DL (ref 28.4–34.8)
MCV RBC AUTO: 94.8 FL (ref 82.6–102.9)
MONOCYTES # BLD: 10 % (ref 3–12)
NRBC AUTOMATED: 0 PER 100 WBC
PDW BLD-RTO: 14.6 % (ref 11.8–14.4)
PHOSPHORUS: 2.7 MG/DL (ref 2.5–4.5)
PLATELET # BLD: 252 K/UL (ref 138–453)
PMV BLD AUTO: 9.2 FL (ref 8.1–13.5)
POTASSIUM SERPL-SCNC: 4.8 MMOL/L (ref 3.7–5.3)
PROTHROMBIN TIME: 14.1 SEC (ref 11.5–14.2)
RBC # BLD: 2.69 M/UL (ref 4.21–5.77)
RBC # BLD: ABNORMAL 10*6/UL
SEG NEUTROPHILS: 77 % (ref 36–65)
SEGMENTED NEUTROPHILS ABSOLUTE COUNT: 7.2 K/UL (ref 1.5–8.1)
SODIUM BLD-SCNC: 134 MMOL/L (ref 135–144)
TIME, STOOL #1: 2313
WBC # BLD: 9.4 K/UL (ref 3.5–11.3)

## 2022-08-02 PROCEDURE — 6360000002 HC RX W HCPCS: Performed by: NURSE PRACTITIONER

## 2022-08-02 PROCEDURE — 6370000000 HC RX 637 (ALT 250 FOR IP): Performed by: INTERNAL MEDICINE

## 2022-08-02 PROCEDURE — 84100 ASSAY OF PHOSPHORUS: CPT

## 2022-08-02 PROCEDURE — G0378 HOSPITAL OBSERVATION PER HR: HCPCS

## 2022-08-02 PROCEDURE — 99204 OFFICE O/P NEW MOD 45 MIN: CPT | Performed by: INTERNAL MEDICINE

## 2022-08-02 PROCEDURE — 97110 THERAPEUTIC EXERCISES: CPT

## 2022-08-02 PROCEDURE — 6370000000 HC RX 637 (ALT 250 FOR IP): Performed by: NURSE PRACTITIONER

## 2022-08-02 PROCEDURE — 80048 BASIC METABOLIC PNL TOTAL CA: CPT

## 2022-08-02 PROCEDURE — 2580000003 HC RX 258: Performed by: NURSE PRACTITIONER

## 2022-08-02 PROCEDURE — 97535 SELF CARE MNGMENT TRAINING: CPT

## 2022-08-02 PROCEDURE — 90935 HEMODIALYSIS ONE EVALUATION: CPT

## 2022-08-02 PROCEDURE — 97116 GAIT TRAINING THERAPY: CPT

## 2022-08-02 PROCEDURE — 82947 ASSAY GLUCOSE BLOOD QUANT: CPT

## 2022-08-02 PROCEDURE — APPNB30 APP NON BILLABLE TIME 0-30 MINS: Performed by: NURSE PRACTITIONER

## 2022-08-02 PROCEDURE — 99217 PR OBSERVATION CARE DISCHARGE MANAGEMENT: CPT | Performed by: INTERNAL MEDICINE

## 2022-08-02 PROCEDURE — 82270 OCCULT BLOOD FECES: CPT

## 2022-08-02 PROCEDURE — 36415 COLL VENOUS BLD VENIPUNCTURE: CPT

## 2022-08-02 PROCEDURE — 82533 TOTAL CORTISOL: CPT

## 2022-08-02 PROCEDURE — 85025 COMPLETE CBC W/AUTO DIFF WBC: CPT

## 2022-08-02 PROCEDURE — 85610 PROTHROMBIN TIME: CPT

## 2022-08-02 RX ORDER — HYDRALAZINE HYDROCHLORIDE 20 MG/ML
10 INJECTION INTRAMUSCULAR; INTRAVENOUS EVERY 6 HOURS PRN
Status: DISCONTINUED | OUTPATIENT
Start: 2022-08-02 | End: 2022-08-04 | Stop reason: HOSPADM

## 2022-08-02 RX ORDER — CARVEDILOL 12.5 MG/1
25 TABLET ORAL 2 TIMES DAILY WITH MEALS
Status: DISCONTINUED | OUTPATIENT
Start: 2022-08-02 | End: 2022-08-04 | Stop reason: HOSPADM

## 2022-08-02 RX ORDER — FUROSEMIDE 20 MG/1
20 TABLET ORAL DAILY
Qty: 30 TABLET | Refills: 0 | Status: SHIPPED | OUTPATIENT
Start: 2022-08-02

## 2022-08-02 RX ADMIN — CALCIUM ACETATE 1334 MG: 667 TABLET ORAL at 08:28

## 2022-08-02 RX ADMIN — FLUTICASONE PROPIONATE 2 SPRAY: 50 SPRAY, METERED NASAL at 08:34

## 2022-08-02 RX ADMIN — GUAIFENESIN AND CODEINE PHOSPHATE 5 ML: 10; 100 LIQUID ORAL at 22:26

## 2022-08-02 RX ADMIN — EZETIMIBE 10 MG: 10 TABLET ORAL at 08:28

## 2022-08-02 RX ADMIN — GABAPENTIN 300 MG: 300 CAPSULE ORAL at 22:27

## 2022-08-02 RX ADMIN — POLYETHYLENE GLYCOL 3350, SODIUM SULFATE ANHYDROUS, SODIUM BICARBONATE, SODIUM CHLORIDE, POTASSIUM CHLORIDE 4000 ML: 236; 22.74; 6.74; 5.86; 2.97 POWDER, FOR SOLUTION ORAL at 16:15

## 2022-08-02 RX ADMIN — SODIUM CHLORIDE, PRESERVATIVE FREE 10 ML: 5 INJECTION INTRAVENOUS at 08:25

## 2022-08-02 RX ADMIN — BISACODYL 10 MG: 5 TABLET, COATED ORAL at 16:15

## 2022-08-02 RX ADMIN — HEPARIN SODIUM 5000 UNITS: 5000 INJECTION INTRAVENOUS; SUBCUTANEOUS at 06:35

## 2022-08-02 RX ADMIN — GUAIFENESIN AND CODEINE PHOSPHATE 5 ML: 10; 100 LIQUID ORAL at 02:45

## 2022-08-02 RX ADMIN — FLUOXETINE HYDROCHLORIDE 20 MG: 20 CAPSULE ORAL at 22:26

## 2022-08-02 RX ADMIN — FUROSEMIDE 10 MG: 20 TABLET ORAL at 08:27

## 2022-08-02 RX ADMIN — FLUOXETINE HYDROCHLORIDE 20 MG: 20 CAPSULE ORAL at 08:27

## 2022-08-02 RX ADMIN — ASPIRIN 81 MG: 81 TABLET ORAL at 08:27

## 2022-08-02 RX ADMIN — SODIUM CHLORIDE, PRESERVATIVE FREE 10 ML: 5 INJECTION INTRAVENOUS at 22:27

## 2022-08-02 RX ADMIN — CALCIUM ACETATE 1334 MG: 667 TABLET ORAL at 16:30

## 2022-08-02 RX ADMIN — CARVEDILOL 12.5 MG: 12.5 TABLET ORAL at 08:27

## 2022-08-02 RX ADMIN — GUAIFENESIN AND CODEINE PHOSPHATE 5 ML: 10; 100 LIQUID ORAL at 16:42

## 2022-08-02 RX ADMIN — INSULIN GLARGINE 45 UNITS: 100 INJECTION, SOLUTION SUBCUTANEOUS at 08:54

## 2022-08-02 RX ADMIN — BUPROPION HYDROCHLORIDE 150 MG: 150 TABLET ORAL at 08:27

## 2022-08-02 RX ADMIN — GUAIFENESIN AND CODEINE PHOSPHATE 5 ML: 10; 100 LIQUID ORAL at 09:00

## 2022-08-02 RX ADMIN — ONDANSETRON 4 MG: 2 INJECTION INTRAMUSCULAR; INTRAVENOUS at 23:37

## 2022-08-02 RX ADMIN — OMEPRAZOLE 40 MG: 40 CAPSULE, DELAYED RELEASE ORAL at 06:35

## 2022-08-02 RX ADMIN — CARVEDILOL 25 MG: 12.5 TABLET ORAL at 16:42

## 2022-08-02 NOTE — PROGRESS NOTES
Nephrology Progress Note    Carolanne Nares Severance Leila Lunch, APRN - CNP    Follow Up for (CC) : ESRD, HTN    ASSESSMENT     End-stage renal disease  Hypertension primary as well as secondary due to volume overload  Anemia of end-stage renal disease  Baclofen reduced to weakness  Mineral bone disorder      Principal Problem:    Generalized weakness  Active Problems:    Anemia    Coronary artery disease involving native coronary artery of native heart without angina pectoris    Tremor due to multiple drugs    Hyperglycemia    End stage renal disease on dialysis [facility])    Primary hypertension    Bipolar affective disorder ([facility]    [facility]    Type 2 diabetes mellitus with diabetic neuropathy, with long-term current use of insulin ([facility])    Neuropathy of both feet    GERD (gastroesophageal reflux disease)  Resolved Problems:    * No resolved hospital problems. *      PLAN     Patient seen and examined during hemodialysis treatment  Patient is tolerating ultrafiltration pretty good  Blood pressure much better controlled  Avoid use of baclofen in future  Okay to continue Neurontin  Erythropoiesis stimulating agents  Renal diet  Fluid restriction  AV fistula working well  GI work-up recommended for significant drop in hemoglobin as outpatient     Please do not hesitate to call with questions.       SUBJECTIVE      Patient seen and examined during hemodialysis treatment  He denies any complaints  Blood pressure was high earlier but improved with hemodialysis and ultrafiltration  Chief Complaint   Patient presents with    Hyperglycemia    Extremity Weakness       Patient Active Problem List   Diagnosis    End stage renal disease on dialysis [facility])    Primary hypertension    Hyperlipidemia    Acute pain of left knee    Bipolar affective disorder ([facility]    Atherosclerosis of aorta ([facility]    COVID-19    C[facility]brovascular accident (CVA) ([facility]    Type 2 diabetes mellitus with diabetic neuropathy, with long-term current use of insulin ([facility])    Neuropathy of both feet    GERD (gastroesophageal reflux disease)    Right sided weakness    Leg weakness, bilateral    Recurrent falls    Acute idiopathic pericarditis    Small kidney, bilateral    Umbilical hernia without obstruction or gangrene    Disorders of diaphragm    Atherosclerosis of other arteries    Hypotension    Hyponatremia    Anemia    Hyperkalemia    Left lower quadrant abdominal pain    Coronary artery disease involving native coronary artery of native heart without angina pectoris    Pneumonia of right lower lobe due to infectious organism    Generalized weakness    Tremor due to multiple drugs    Hyperglycemia       CURRENT MEDICATIONS / Allergies Keila Mayen History / Family History     Current Facility-Administered Medications   Medication Dose Route Frequency Provider Last Rate Last Admin    carvedilol (COREG) tablet 25 mg  25 mg Oral BID  Jelly Moore MD        aspirin EC tablet 81 mg (Patient Supplied)  81 mg Oral Daily Lonzie Dainans, APRN - CNP   81 mg at 08/02/22 0827    buPROPion (WELLBUTRIN XL) extended release tablet 150 mg (Patient Supplied)  150 mg Oral QAM Lonzie Dainans, APRN - CNP   150 mg at 08/02/22 0827    calcium acetate TABS 1,334 mg (Patient Supplied)  1,334 mg Oral TID  Lonzie Dainans, APRN - CNP   1,334 mg at 08/02/22 7820    ezetimibe (ZETIA) tablet 10 mg (Patient Supplied)  10 mg Oral Daily Lonzie Likens, APRN - CNP   10 mg at 08/02/22 5117    [Held by provider] losartan (COZAAR) tablet 25 mg (Patient Supplied)  25 mg Oral Daily Lonzie Likens, APRN - CNP   25 mg at 08/01/22 1043    rosuvastatin (CRESTOR) tablet 40 mg (Patient Supplied)  40 mg Oral QPM Lonzie Dainans, APRN - CNP        glucose chewable tablet 16 g  4 tablet Oral PRN Lonzie Dainans, APRN - CNP        dextrose bolus 10% 125 mL  125 mL IntraVENous PRN Lonzie Likens, APRN - CNP        Or dextrose bolus 10% 250 mL  250 mL IntraVENous PRN Lonzie Likens, APRN - CNP        glucagon (rDNA) injection 1 mg  1 mg SubCUTAneous PRN Lonzie Likens, APRN - CNP        dextrose 10 % infusion   IntraVENous Continuous PRN Lonzie Likens, APRN - CNP        sodium chloride flush 0.9 % injection 5-40 mL  5-40 mL IntraVENous 2 times per day Lonzie Likens, APRN - CNP   10 mL at 08/02/22 0825    sodium chloride flush 0.9 % injection 10 mL  10 mL IntraVENous PRN Lonzie Likens, APRN - CNP        0.9 % sodium chloride infusion   IntraVENous PRN Lonzie Likens, APRN - CNP        ondansetron (ZOFRAN-ODT) disintegrating tablet 4 mg  4 mg Oral Q8H PRN Lonzie Likens, APRN - CNP        Or    ondansetron [facility]) injection 4 mg  4 mg IntraVENous Q6H PRN Lonzie Likens, APRN - CNP        polyethylene glycol (GLYCOLAX) packet 17 g  17 g Oral Daily PRN Lonzie Likens, APRN - CNP        acetaminophen (TYLENOL) tablet 650 mg  650 mg Oral Q6H PRN Lonzie Likens, APRN - CNP        Or    acetaminophen (TYLENOL) suppository 650 mg  650 mg Rectal Q6H PRN Lonzie Likens, APRN - CNP        heparin (porcine) injection 5,000 Units  5,000 Units SubCUTAneous 3 times per day Lonzie Likens, APRN - CNP   5,000 Units at 08/02/22 9748    insulin lispro (HUMALOG) injection vial 0-8 Units  0-8 Units SubCUTAneous TID WC Lonzie Likens, APRN - CNP   2 Units at 08/01/22 1714    insulin lispro (HUMALOG) injection vial 0-4 Units  0-4 Units SubCUTAneous Nightly Lonzie Likens, APRN - CNP   4 Units at 08/01/22 2231    furosemide (LASIX) tablet 10 mg (Patient Supplied)  10 mg Oral Daily Tiffanie Connors APRN - CNP   10 mg at 08/02/22 0827    FLUoxetine (PROZAC) capsule 20 mg (Patient Supplied)  20 mg Oral BID Tiffanie Connors APRN - CNP   20 mg at 08/02/22 0827    omeprazole (PRILOSEC) delayed release capsule 40 mg (Patient Supplied)  36 mg Oral QAM AC Margie Neal, APRN - CNP   40 mg at 08/02/22 8920    insulin glargine (LANTUS) injection vial 45 Units (Patient Supplied)  45 Units SubCUTAneous Daily with breakfast Onetha Shaji APRN - CNP   45 Units at 08/02/22 0854    gabapentin (NEURONTIN) capsule 300 mg  300 mg Oral Nightly Honeymarguerite Benavides, APRN - CNP        epoetin pennie-epbx (RETACRIT) injection 8,000 Units  8,000 Units SubCUTAneous Once per day on Mon Wed Fri Honeyjuni Benavides, APRN - CNP   8,000 Units at 08/01/22 1827    guaiFENesin-codeine (GUAIFENESIN AC) 100-10 MG/5ML liquid 5 mL  5 mL Oral Q4H PRN Gloria Flores, DO   5 mL at 08/02/22 0900    fluticasone (FLONASE) 50 MCG/ACT nasal spray 2 spray  2 spray Each Nostril Daily Gloria Flores, DO   2 spray at 08/02/22 0834          No Known Allergies    Social History     Socioeconomic History    Marital status: Single     Spouse name: Not on file    Number of children: Not on file    Years of education: Not on file    Highest education level: Not on file   Occupational History    Not on file   Tobacco Use    Smoking status: Never    Smokeless tobacco: Never   Vaping Use    Vaping Use: Never used   Substance and Sexual Activity    Alcohol use: Never    Drug use: Never    Sexual activity: Not on file   Other Topics Concern    Not on file   Social History Narrative    Not on file     Social Determinants of Health     Financial Resource Strain: Low Risk     Difficulty of Paying Living Expenses: Not hard at all   Food Insecurity: No Food Insecurity    Worried About Running Out of Food in the Last Year: Never true    Ran Out of Food in the Last Year: Never true   Transportation Needs: Not on file   Physical Activity: Not on file   Stress: Not on file   Social Connections: Not on file   Intimate Partner Violence: Not on file   Housing Stability: Not on file       Family History   Problem Relation Age of Onset    Diabetes Mother     Heart Disease Father     Diabetes Father Cancer Paternal Grandmother     Heart Disease Maternal Great Grandfather        REVIEW OF SYSTEMS     All other ROS is negative. OBJECTIVE      Vitals:    08/02/22 1130   BP: (!) 159/75   Pulse: 74   Resp:    Temp:    SpO2:      Wt Readings from Last 3 Encounters:   08/02/22 195 lb (88.5 kg)   07/29/22 189 lb 3.2 oz (85.8 kg)   07/15/22 188 lb 11.2 oz (85.6 kg)     I/O last 3 completed shifts: In: 180 [P.O.:180]  Out: -   Body mass index is 30.54 kg/m². PHYSICAL EXAM      GENERAL APPEARANCE:Awake, alert, in no acute distress  SKIN: warm and dry, no rash or erythema  EYES: conjunctivae normal and sclera anicteric  NECK:  JVD Absent. PULMONARY: Symmetrical and CTA BL. CADRDIOVASCULAR: S1 and S2 audible. ABDOMEN: soft nontender, bowel sounds present. EXTREMITIES: Left upper arm AV fistula with the good blood flow rates     LABS      Data:    CBC:   Recent Labs     07/31/22 1857 08/02/22  0546   WBC 8.7 9.4   HGB 8.0* 7.8*   HCT 26.1* 25.5*   MCV 94.6 94.8    252     BMP:    Recent Labs     07/31/22 1857 08/02/22  0546   * 134*   K 4.7 4.8   CL 98 98   CO2 22 24   BUN 26* 40*   CREATININE 5.43* 7.75*   GLUCOSE 407* 157*     CMP:   Recent Labs     07/31/22 1857 08/02/22  0546   * 134*   K 4.7 4.8   CL 98 98   CO2 22 24   BUN 26* 40*   CREATININE 5.43* 7.75*   GLUCOSE 407* 157*   CALCIUM 9.0 9.2   PROT 6.0*  --    LABALBU 3.1*  --    BILITOT 0.35  --    ALKPHOS 134*  --    AST 11  --    ALT 10  --        Phosphorus:    Recent Labs     08/02/22  0546   PHOS 2.7     Ionized Calcium: No results for input(s): IONCA in the last 72 hours. Magnesium: No results for input(s): MG in the last 72 hours. Albumin:   Recent Labs     07/31/22 1857   LABALBU 3.1*       RADIOLOGY      No results found for this or any previous visit. Cara Elliott MD MD  [facility].   Ph: [phone]

## 2022-08-02 NOTE — PROGRESS NOTES
[facility]  Office: [address], DO, Tannerkirk Clementina, DO, Jimy Marlow, DO, Nahomy Theodore Blood, DO, Lazara Fox MD, Andrés Terrazas MD, Gagan Pardo MD, Raúl Aquino MD,  Porsha German MD, Neal Aquino MD, Diana Reynolds, DO, Nevin Eid MD,  Kaila Nice MD, Luz Jose MD, Rory López, DO, Sandie Tamez MD, Severiano Border, MD, Kranthi Tony MD, Mi Dorman, DO, Deysi Berumen MD, Yesenia Evans MD, Walter Womack, CNP,  Amy Levi, CNP, Liasha Watts, CNP, Aydee Perez, CNP, Katiuska Xavier PABethC, Rafael Handy, DNP, Adelfo Huber, CNP, Toma Johnson, CNP, Eugenio Jefferson, CNP, Misha Lafleur, CNP, Theresa Botello, CNP, Ivana Chang, CNS, Moira Peacock, VASILE, Claudell Belfast, CNP, Velez Hearing, CNP, Nandini Beard, CNP           [facility]    Progress Note    8/2/2022    11:07 AM    Name:   Bob Conklin  MRN:     [id number]     Acct:      [de-identified]   Room:   2106/2106-01   Day:  0  Admit Date:  7/31/2022  6:49 PM    PCP:   DANIEL Waterman CNP  Code Status:  Full Code    Subjective:     C/C:   Chief Complaint   Patient presents with    Hyperglycemia    Extremity Weakness     Interval History Status: . Seen while getting dialysis  Still feels weak although little better than before  Coreg dose was increased today morning due to blood pressure of 185/81, heart rate 85  GI evaluating for anemia  He has been accepted at [facility] and will be transported at 4 PM    Brief History:   Bob Conklin is a 49 y.o. Non- / non  male who presents with Hyperglycemia and Extremity Weakness   and is admitted to the hospital for the management of Generalized weakness. Patient presented to the ER with complaints of weakness. He states he really has not felt good all day. He said his sugars have been running high despite a poor appetite.   He denies fevers, chest pain, shortness of breath nausea or vomiting. When they attempted to ambulate him in the ER he said he felt too weak and did not think he could so he refused. Patient was admitted here overnight from July 14 through July 15 related to concerns of right lower lobe pneumonia and  left pleural effusion and received IV Lasix that was transition to p.o. Lasix at discharge and IV Levaquin was changed to p.o. Levaquin for an additional 3 doses. Per the note he recently completed a dose of prednisone related to pericarditis     ESRD with treatments Tuesday, Thursday and Saturday-tells me he missed his dialysis on Saturday. Patient has a fistula in his left upper arm and follows with Dr. Lisa Lane. His initial blood pressure was 190/84 but more recently 160/78 otherwise his vitals are stable. Chest x-ray shows patchy right interstitial and groundglass infiltrates suggestive of pneumonia with small left pleural effusion. Upon reviewing his chart most of his chest x-rays since the beginning of the year have reported similar findings. CT of the head showed nothing acute. Sodium is 130 (patient has chronic hyponatremia that ranges between 130 and 142)  BUN and creatinine are 26/5.43, glucose was greater than 400 but it is currently 205. Beta hydroxy 0. 11. When he was discharged the last time they change his Lantus to 25 units twice daily but he states he is not taking insulin at night and has been taking 45 units in the morning for his previous dose. There is no leukocytosis. Hemoglobin is 8.0 (average hemoglobin between 8.4 and 11.0) In May his iron was 35, TIBC was 149, iron saturation was 23, ferritin was 3028.       He received 60 mg of IV Lasix, Coreg 12.5 mg and 8 units of insulin in the ER         Review of Systems:     Constitutional:  negative for chills, fevers, sweats,+ fatigue  Respiratory:  negative for cough, dyspnea on exertion, shortness of breath, wheezing  Cardiovascular:  negative for chest pain, chest pressure/discomfort, lower extremity edema, palpitations  Gastrointestinal:  negative for abdominal pain, constipation, diarrhea, nausea, vomiting  Neurological:  negative for dizziness, headache, gait problem due to weakness    Medications: Allergies:  No Known Allergies    Current Meds:   Scheduled Meds:    carvedilol  25 mg Oral BID WC    aspirin  81 mg Oral Daily    buPROPion  150 mg Oral QAM    calcium acetate  1,334 mg Oral TID WC    ezetimibe  10 mg Oral Daily    [Held by provider] losartan  25 mg Oral Daily    rosuvastatin  40 mg Oral QPM    sodium chloride flush  5-40 mL IntraVENous 2 times per day    heparin (porcine)  5,000 Units SubCUTAneous 3 times per day    insulin lispro  0-8 Units SubCUTAneous TID WC    insulin lispro  0-4 Units SubCUTAneous Nightly    furosemide  10 mg Oral Daily    FLUoxetine  20 mg Oral BID    omeprazole  40 mg Oral QAM AC    insulin glargine  45 Units SubCUTAneous Daily with breakfast    gabapentin  300 mg Oral Nightly    epoetin pennie-epbx  8,000 Units SubCUTAneous Once per day on Mon Wed Fri    fluticasone  2 spray Each Nostril Daily     Continuous Infusions:    dextrose      sodium chloride       PRN Meds: glucose, dextrose bolus **OR** dextrose bolus, glucagon (rDNA), dextrose, sodium chloride flush, sodium chloride, ondansetron **OR** ondansetron, polyethylene glycol, acetaminophen **OR** acetaminophen, guaiFENesin-codeine    Data:     Past Medical History:   has a past medical history of Cerebral artery occlusion with cerebral infarction [facility]), Closed fracture of bone of right foot, Dialysis patient ([facility] Hemodialysis patient ([facility] [address] Hyperlipidemia, Hypertension, Kidney disease, and Type 1 diabetes mellitus ([facility] Social History:   reports that he has never smoked. He has never used smokeless tobacco. He reports that he does not drink alcohol and does not use drugs.      Family History:   Family History   Problem Relation Age of Onset    Diabetes Mother Heart Disease Father     Diabetes Father     Cancer Paternal Grandmother     Heart Disease Maternal Great Grandfather        Vitals:  BP (!) 166/78   Pulse 75   Temp 97.3 °F (36.3 °C) (Oral)   Resp 18   Ht 5' 7\" (1.702 m)   Wt 195 lb (88.5 kg)   SpO2 99%   BMI 30.54 kg/m²   Temp (24hrs), Av.7 °F (36.5 °C), Min:97.3 °F (36.3 °C), Max:98.1 °F (36.7 °C)    Recent Labs     22  0634   POCGLU 249* 247* 309* 141*       I/O (24Hr): Intake/Output Summary (Last 24 hours) at 2022 1107  Last data filed at 2022 5818  Gross per 24 hour   Intake 150 ml   Output --   Net 150 ml       Labs:  Hematology:  Recent Labs     22  0546   WBC 8.7 9.4   RBC 2.76* 2.69*   HGB 8.0* 7.8*   HCT 26.1* 25.5*   MCV 94.6 94.8   MCH 29.0 29.0   MCHC 30.7 30.6   RDW 14.6* 14.6*    252   MPV 9.4 9.2   INR  --  1.1     Chemistry:  Recent Labs     22  0546   *  --  134*   K 4.7  --  4.8   CL 98  --  98   CO2 22  --  24   GLUCOSE 407*  --  157*   BUN 26*  --  40*   CREATININE 5.43*  --  7.75*   ANIONGAP 10  --  12   LABGLOM 11*  --  8*   GFRAA 14*  --  9*   CALCIUM 9.0  --  9.2   PHOS  --   --  2.7   CKTOTAL  --  47  --      Recent Labs     22  2120 22  0643 22  1039 22  1219 22  1623 22  1638 22  0634   PROT 6.0*  --   --   --   --   --   --   --   --    LABALBU 3.1*  --   --   --   --   --   --   --   --    TSH  --   --   --   --   --  0.60  --   --   --    AST 11  --   --   --   --   --   --   --   --    ALT 10  --   --   --   --   --   --   --   --    ALKPHOS 134*  --   --   --   --   --   --   --   --    BILITOT 0.35  --   --   --   --   --   --   --   --    POCGLU  --    < > 163* 210* 249*  --  247* 309* 141*    < > = values in this interval not displayed.      ABG:  Lab Results   Component Value Date/Time    FIO2 NOT REPORTED 01/04/2022 11:49 AM     Lab Results   Component Value Date/Time    SPECIAL RT F A 20ML 07/14/2022 06:05 AM     Lab Results   Component Value Date/Time    CULTURE NO GROWTH 5 DAYS 07/14/2022 06:05 AM       Radiology:  CT HEAD WO CONTRAST    Result Date: 7/31/2022  No acute intracranial abnormality. XR CHEST PORTABLE    Result Date: 7/31/2022  Patchy right interstitial and ground-glass infiltrates.   Findings are suggestive of pneumonia Small left pleural effusion       Physical Examination:        General appearance:  alert, cooperative and no distress, getting hemodialysis  Mental Status:  oriented to person, place and time and normal affect  Lungs:  clear to auscultation bilaterally, normal effort  Heart:  regular rate and rhythm, no murmur  Abdomen:  soft, nontender, nondistended, normal bowel sounds, no masses, hepatomegaly, splenomegaly  Extremities:  no edema, redness, tenderness in the calves  Skin:  no gross lesions, rashes, induration    Assessment:        Hospital Problems             Last Modified POA    * (Principal) Generalized weakness 8/1/2022 Yes    Anemia 8/1/2022 Yes    Coronary artery disease involving native coronary artery of native heart without angina pectoris 8/1/2022 Yes    Tremor due to multiple drugs 8/1/2022 Yes    Hyperglycemia 8/1/2022 Yes    End stage renal disease on dialysis ([address] 8/1/2022 Yes    Primary hypertension 8/1/2022 Yes    Bipolar affective disorder ([address] 8/1/2022 Yes    Cerebrovascular accident (CVA) ([address] 8/1/2022 Yes    Type 2 diabetes mellitus with diabetic neuropathy, with long-term current use of insulin ([address] 8/1/2022 Yes    Neuropathy of both feet 8/1/2022 Yes    GERD (gastroesophageal reflux disease) 8/1/2022 Yes       Plan:        Generalized weakness  PT/OT  Case management to assist with discharge planning, will go to [facility] at 4 PM today     CAD without angina  Continue home aspirin, losartan, Zetia and beta-blocker     ESRD  Continue dialysis in consultation with nephrology  Lasix 40 mg p.o. daily  Continue home vitamin B12 and calcium acetate  Avoid nephrotoxic agents  Hemoglobin seems to be trending down-repeat anemia labs     Primary hypertension  Continue home beta-blocker and losartan     Bipolar disorder  Continue home Prozac and Wellbutrin     History of CVA  Continue Crestor and aspirin     Type 2 diabetes with long-term insulin  Continue Lantus 45 units daily in the morning with breakfast  Add insulin sliding scale medium correction factor  Continue home gabapentin related to neuropathy  5/30/2022 hemoglobin A1c was 8.4     GERD  Continue home PPI     DVT prophylaxis    Avoid baclofen    Discharge to [facility] today after dialysis, nephrology is okay with that    Burnette Frankel, MD  8/2/2022  11:07 AM

## 2022-08-02 NOTE — PROGRESS NOTES
HEMODIALYSIS PRE-TREATMENT NOTE    Patient Identifiers prior to treatment: Name,     Isolation Required: No                      Isolation Type: N/A       (please document if patient is being managed as a PUI/COVID-19 patient)        Hepatitis status:                           Date Drawn                             Result  Hepatitis B Surface Antigen 22     neg                     Hepatitis B Surface Antibody 22 Immune        Hepatitis B Core Antibody 22 neg          How was Hepatitis Status verified: Epic     Was a copy of the labs you documented provided to facility for the patient's chart: Epic    Hemodialysis orders verified: Yes, with Dr. Gonzalez Cos Within normal limits ( I.e. s/s of infection,...): WNL, bruit, jordon present     Pre-Assessment completed: Yes    Pre-dialysis report received from: Robyn Corral RN                      Time: 10:15

## 2022-08-02 NOTE — PROGRESS NOTES
Physical Therapy  Facility/Department: [facility]  Daily Treatment Note  NAME: Lucas Zuluaga  : 1975  MRN: [id number]    Date of Service: 2022    Discharge Recommendations:  Patient would benefit from continued therapy after discharge   Patient Diagnosis(es): The encounter diagnosis was Generalized weakness. Assessment   Assessment: Pt with decreased balance and  fair steadiness in standing and when ambulating requiring mod A of 2 for safe mobility. Pt is at high risk for falls and would benefit from continued therapy after discharge to maximize independence with all functional mobility and for return to OF. AM-PAC score of 16/24 for current level of function. Activity Tolerance: Patient tolerated treatment well;Patient limited by endurance     Plan    Plan  Plan: 5-7 times per week  Specific Instructions for Next Treatment: progress standing balance/ gait with RW  Current Treatment Recommendations: Strengthening;ROM;Balance training;Functional mobility training;Transfer training;Gait training; Safety education & training;Home exercise program;Patient/Caregiver education & training; Therapeutic activities     Restrictions  Restrictions/Precautions  Restrictions/Precautions: Fall Risk, General Precautions, Up as Tolerated  Position Activity Restriction  Other position/activity restrictions: AFO's B legs for drop foot. (not here at hospital) Pt had h.o. CVA in April of this year, but had braces prior d/t diabetes/neuropathy. Subjective    Subjective  Subjective: Pt agreeable to PT session, states he is having muscle spasms in arms and legs. (Nurse gives approval for PT session)  Orientation  Overall Orientation Status: Within Functional Limits     Objective   Bed Mobility Training  Bed Mobility Training: No  Balance  Sitting: Intact  Standing: With support  Transfer Training  Transfer Training: Yes  Overall Level of Assistance: Minimum assistance; Moderate assistance  Interventions: Safety awareness training; Tactile cues; Verbal cues  Sit to Stand: Minimum assistance (unsteady initial standing balance)  Stand to Sit: Minimum assistance  Stand Pivot Transfers: Minimum assistance; Moderate assistance  Bed to Chair: Moderate assistance  Gait Training  Gait Training: Yes  Gait  Overall Level of Assistance: Moderate assistance;Assist X2  Interventions: Safety awareness training; Tactile cues; Verbal cues  Speed/Danica: Slow  Step Length: Right shortened;Left shortened  Gait Abnormalities: Step to gait, unsteady, assist to manage walker with turns, decreased balance and endurance  Distance (ft): 40 Feet and 15 Feet  Assistive Device: Walker, rolling;Gait belt     PT Exercises  Exercise Treatment: yes  A/AROM Exercises: LAQ, seated marching, hip ABD  Static Standing Balance Exercises: Static standing at sink for ADLs   Pt required min to mod A for static standing balance. Goals  Short Term Goals  Time Frame for Short term goals: 12 visits:  Short term goal 1: Pt. to be SBA with bed mob. Short term goal 2: Pt. to be CGA for sit to stand transfer with RW  Short term goal 3: Pt. to require min A for gait with RW, 25ft  Short term goal 4: Pt. to tolerate 25+ min. of PT daily for ther ex/ gait/balance training; functional mob. training  Patient Goals   Patient goals : feel normal    Education  Patient Education  Education Given To: Patient  Education Provided: Role of Therapy;Plan of Care;Home Exercise Program;Transfer Training  Education Provided Comments: Education on safety with all functional mobility  Education Method: Demonstration;Verbal  Education Outcome: Verbalized understanding;Demonstrated understanding    Therapy Time   Individual Concurrent Group Co-treatment   Time In        0908   Time Out        0938   Minutes        27     Co-treatment with OT warranted secondary to decreased safety and independence requiring 2 skilled therapy professionals to address individual discipline's goals.  PT addressing   , pre gait trunk strengthening, weight shifting prior to transfers, transfer training, and postural control in sitting.    Heri Navas, PTA

## 2022-08-02 NOTE — PLAN OF CARE
in respiratory status     Problem: Skin/Tissue Integrity - Adult  Goal: Skin integrity remains intact  8/2/2022 0138 by Megan Geronimo RN  Outcome: Progressing  Flowsheets (Taken 8/1/2022 2127)  Skin Integrity Remains Intact: Monitor for areas of redness and/or skin breakdown     Problem: Musculoskeletal - Adult  Goal: Return mobility to safest level of function  8/2/2022 0138 by Megan Geronimo RN  Outcome: Progressing  Flowsheets (Taken 8/1/2022 2127)  Return Mobility to Safest Level of Function:   Assess patient stability and activity tolerance for standing, transferring and ambulating with or without assistive devices   Assist with transfers and ambulation using safe patient handling equipment as needed   Ensure adequate protection for wounds/incisions during mobilization   Obtain physical therapy/occupational therapy consults as needed   Apply continuous passive motion per provider or physical therapy orders to increase flexion toward goal   Instruct patient/family in ordered activity level     Problem: Musculoskeletal - Adult  Goal: Return ADL status to a safe level of function  8/2/2022 0138 by Megan Geronimo RN  Outcome: Progressing  Flowsheets (Taken 8/1/2022 2127)  Return ADL Status to a Safe Level of Function:   Administer medication as ordered   Assess activities of daily living deficits and provide assistive devices as needed   Obtain physical therapy/occupational therapy consults as needed   Assist and instruct patient to increase activity and self care as tolerated     Problem: Gastrointestinal - Adult  Goal: Minimal or absence of nausea and vomiting  8/2/2022 0138 by Megan Geronimo RN  Outcome: Progressing  Flowsheets (Taken 8/1/2022 2127)  Minimal or absence of nausea and vomiting:   Administer ordered antiemetic medications as needed   Provide nonpharmacologic comfort measures as appropriate   Advance diet as tolerated, if ordered     Problem: Gastrointestinal - Adult  Goal: Maintains or returns to baseline bowel function  8/2/2022 0138 by Rayray Last RN  Outcome: Progressing  Flowsheets (Taken 8/1/2022 2127)  Maintains or returns to baseline bowel function:   Assess bowel function   Encourage oral fluids to ensure adequate hydration   Administer ordered medications as needed   Encourage mobilization and activity     Problem: Gastrointestinal - Adult  Goal: Maintains adequate nutritional intake  8/2/2022 0138 by Rayray Last RN  Outcome: Progressing  Flowsheets (Taken 8/1/2022 2127)  Maintains adequate nutritional intake:   Monitor percentage of each meal consumed   Identify factors contributing to decreased intake, treat as appropriate   Monitor intake and output, weight and lab values     Problem: Genitourinary - Adult  Goal: Absence of urinary retention  8/2/2022 0138 by Rayray Last RN  Outcome: Progressing  Flowsheets (Taken 8/1/2022 2127)  Absence of urinary retention:   Assess patients ability to void and empty bladder   Monitor intake/output and perform bladder scan as needed     Problem: Metabolic/Fluid and Electrolytes - Adult  Goal: Electrolytes maintained within normal limits  8/2/2022 0138 by Rayray Last RN  Outcome: Progressing  Flowsheets (Taken 8/1/2022 2127)  Electrolytes maintained within normal limits:   Monitor labs and assess patient for signs and symptoms of electrolyte imbalances   Administer electrolyte replacement as ordered   Fluid restriction as ordered   Instruct patient on fluid and nutrition restrictions as appropriate     Problem: Metabolic/Fluid and Electrolytes - Adult  Goal: Hemodynamic stability and optimal renal function maintained  8/2/2022 0138 by Rayray Last RN  Outcome: Progressing  Flowsheets (Taken 8/1/2022 2127)  Hemodynamic stability and optimal renal function maintained:   Monitor labs and assess for signs and symptoms of volume excess or deficit   Monitor intake, output and patient weight Instruct patient on fluid and nutrition restrictions as appropriate     Problem: Metabolic/Fluid and Electrolytes - Adult  Goal: Glucose maintained within prescribed range  8/2/2022 0138 by Krista Mcallister RN  Outcome: Progressing  Flowsheets (Taken 8/1/2022 2127)  Glucose maintained within prescribed range:   Monitor blood glucose as ordered   Assess for signs and symptoms of hyperglycemia and hypoglycemia   Administer ordered medications to maintain glucose within target range   Assess barriers to adequate nutritional intake and initiate nutrition consult as needed   Instruct patient on self management of diabetes and initiate consult as needed     Problem: Chronic Conditions and Co-morbidities  Goal: Patient's chronic conditions and co-morbidity symptoms are monitored and maintained or improved  8/2/2022 0138 by Krista Mcallister RN  Outcome: Progressing  Flowsheets (Taken 8/1/2022 2127)  Care Plan - Patient's Chronic Conditions and Co-Morbidity Symptoms are Monitored and Maintained or Improved:   Monitor and assess patient's chronic conditions and comorbid symptoms for stability, deterioration, or improvement   Collaborate with multidisciplinary team to address chronic and comorbid conditions and prevent exacerbation or deterioration   Update acute care plan with appropriate goals if chronic or comorbid symptoms are exacerbated and prevent overall improvement and discharge

## 2022-08-02 NOTE — CONSULTS
Gastroenterology Consult Note      Patient: Sherly Walton  : 1975  Acct#:  [de-identified]     Date:  2022    Subjective:       History of Present Illness  Patient is a 49 y.o.  male admitted with Generalized weakness [R53.1] who is seen in consult for anemia    Patient is end-stage renal disease on hemodialysis infection seen him right now in dialysis unit  He came to the emergency room with fatigue and weakness was found to be anemic his hemoglobin is 7.5 he was found to be iron deficient with iron of 9%  Denied any hematochezia or hematemesis  Creatinine 7.7  White blood normal  Alkaline phosphatase is elevated most likely related to his renal failure  Abdominal CAT scan was done  Showing anasarca some focal stranding along the ascending colon probably related to the anasarca itself    There is some few prominent upper abdominal lymph nodes most likely reactive      No recent endoscopies    No other GI symptoms        Past Medical History:   Diagnosis Date    Cerebral artery occlusion with cerebral infarction [facility])     Closed fracture of bone of right foot March - 2020    Dialysis patient [facility])     Hemodialysis patient [facility])     Hyperlipidemia     Hypertension     Kidney disease     On Dialysis    Type 1 diabetes mellitus ([facility] [address]       Past Surgical History:   Procedure Laterality Date    AV FISTULA CREATION Left     WRIST SURGERY        Past Endoscopic History as above    Admission Meds  No current facility-administered medications on file prior to encounter. Current Outpatient Medications on File Prior to Encounter   Medication Sig Dispense Refill    omeprazole (PRILOSEC) 10 MG delayed release capsule Take 10 mg by mouth in the morning. sennosides-docusate sodium (SENOKOT-S) 8.6-50 MG tablet Take 1 tablet by mouth in the morning. loratadine (CLARITIN) 10 MG tablet Take 10 mg by mouth in the morning.       losartan (COZAAR) 25 MG tablet Take 25 mg by mouth in the morning. benzonatate (TESSALON) 200 MG capsule Take 1 capsule by mouth 3 times daily as needed for Cough 30 capsule 0    calcium acetate 667 MG TABS Take 1,334 mg by mouth 3 times daily (with meals) 180 tablet 1    carvedilol (COREG) 12.5 MG tablet Take 1 tablet by mouth in the morning and 1 tablet in the evening. Take with meals. 60 tablet 3    insulin glargine (LANTUS SOLOSTAR) 100 UNIT/ML injection pen Inject 25 Units into the skin in the morning and 25 Units before bedtime. 5 pen 3    Insulin Pen Needle (MEIJER PEN NEEDLES) 31G X 8 MM MISC 1 each by Does not apply route daily 100 each 3    glucose 4 g chewable tablet Take 4 tablets by mouth as needed for Low blood sugar 60 tablet 3    rosuvastatin (CRESTOR) 40 MG tablet Take 40 mg by mouth every evening      [DISCONTINUED] pantoprazole (PROTONIX) 40 MG tablet Take 1 tablet by mouth every morning (before breakfast) 30 tablet 3    gabapentin (NEURONTIN) 300 MG capsule Take 300 mg by mouth in the morning and 300 mg at noon and 300 mg before bedtime. 90 capsule 3    Misc. Devices MISC Disp: custom molded shoes to accommodate for brace   DX: DM with history of stroke, foot drop  Duration: 1 year 2 each 0    insulin aspart (NOVOLOG) 100 UNIT/ML injection vial Inject 0-8 Units into the skin 3 times daily (before meals) SLIDING SCALE       FLUoxetine (PROZAC) 20 MG tablet Take 20 mg by mouth in the morning and 20 mg in the evening. Take 1 tablets (20 mg)  by mouth in the morning and at bedtime.       buPROPion (WELLBUTRIN XL) 150 MG extended release tablet Take 1 tablet by mouth every morning 15 tablet 1    ezetimibe (ZETIA) 10 MG tablet Take 10 mg by mouth daily      aspirin 81 MG EC tablet Take 81 mg by mouth daily      Cyanocobalamin (VITAMIN B-12 PO) Take 2,500 mcg by mouth daily         Patient   Does Use ASA, NSAID No  Allergies  No Known Allergies     Social   Social History     Tobacco Use    Smoking status: Never    Smokeless tobacco: Never   Substance Use Topics    Alcohol use: Never        PSYCH HISTORY:  Depression No  Anxiety No  Suicide No       Family History   Problem Relation Age of Onset    Diabetes Mother     Heart Disease Father     Diabetes Father     Cancer Paternal Grandmother     Heart Disease Maternal Great Grandfather       No family history of colon cancer, Crohn's disease, or ulcerative colitis. Review of Systems  Constitutional: negative  Eyes: negative  Ears, nose, mouth, throat, and face: negative  Respiratory: negative  Cardiovascular: negative  Gastrointestinal: negative  Genitourinary:negative  Integument/breast: negative  Hematologic/lymphatic: negative  Musculoskeletal:negative  Endocrine: negative           Physical Exam  Blood pressure (!) 171/82, pulse 75, temperature 97.3 °F (36.3 °C), temperature source Oral, resp. rate 18, height 5' 7\" (1.702 m), weight 195 lb (88.5 kg), SpO2 99 %.          General Appearance: alert and oriented to person, place and time, well-developed and well-nourished, in no acute distress  Skin: warm and dry, no rash or erythema  Head: normocephalic and atraumatic  Eyes: pupils equal, round, and reactive to light, extraocular eye movements intact, conjunctivae normal  ENT: hearing grossly normal bilaterally  Neck: neck supple and non tender without mass, no thyromegaly or thyroid nodules, no cervical lymphadenopathy   Pulmonary/Chest: clear to auscultation bilaterally- no wheezes, rales or rhonchi, normal air movement, no respiratory distress  Cardiovascular: normal rate, regular rhythm, normal S1 and S2, no murmurs, rubs, clicks or gallops, distal pulses intact, no carotid bruits  Abdomen: soft, non-tender, non-distended, normal bowel sounds, no masses or organomegaly  Extremities: no cyanosis, clubbing or edema  Musculoskeletal: normal range of motion, no joint swelling, deformity or tenderness  Neurologic: no cranial nerve deficit and muscle strength normal    Data Review:    Recent Labs 07/31/22 1857 08/02/22  0546   WBC 8.7 9.4   HGB 8.0* 7.8*   HCT 26.1* 25.5*   MCV 94.6 94.8    252     Recent Labs     07/31/22 1857 08/02/22  0546   * 134*   K 4.7 4.8   CL 98 98   CO2 22 24   PHOS  --  2.7   BUN 26* 40*   CREATININE 5.43* 7.75*     Recent Labs     07/31/22 1857   AST 11   ALT 10   BILITOT 0.35   ALKPHOS 134*     No results for input(s): LIPASE, AMYLASE in the last 72 hours. Recent Labs     08/02/22  0546   PROTIME 14.1   INR 1.1     No results for input(s): PTT in the last 72 hours. No results for input(s): OCCULTBLD in the last 72 hours. CEA:  No results found for: CEA  Ca 125:  No results found for:   Ca 19-9:  No results found for:   Ca 15-3:  No results found for:   AFP:  No components found for: AFAFP  Beta HCG:  No components found for: BHCG  Neuron Specific Enolase:  No results found for: NSE  Imaging Studies:                           All appropriate imaging studies and reports reviewed: Yes                 Assessment:     Principal Problem:    Generalized weakness  Active Problems:    Anemia    Coronary artery disease involving native coronary artery of native heart without angina pectoris    Tremor due to multiple drugs    Hyperglycemia    End stage renal disease on dialysis [facility])    Primary hypertension    Bipolar affective disorder ([facility] [address]    Cerebrovascular accident (CVA) ([facility] [address]    Type 2 diabetes mellitus with diabetic neuropathy, with long-term current use of insulin ([facility])    Neuropathy of both feet    GERD (gastroesophageal reflux disease)  Resolved Problems:    * No resolved hospital problems. *    Anemia with iron deficiency  End-stage renal disease on hemodialysis  Elevated alkaline phosphatase most likely related to his renal failure    Recommendations:   Plan for EGD colonoscopy tomorrow  PPI  Iron  Hold aspirin for the procedure                      Thank you for allowing me to participate in the care of your patient.   Please feel free to

## 2022-08-02 NOTE — PROGRESS NOTES
Occupational Therapy  Facility/Department: [facility] MED SURG  Rehabilitation Occupational Therapy Daily Treatment Note    Date: 22  Patient Name: Barry Hong       Room: 6/2106-01  MRN: [id number]  Account: [de-identified]   : 1975  (49 y.o.) Gender: male      RN Elkin Glez reports patient is medically stable for therapy treatment this date. Chart reviewed prior to treatment and patient is agreeable for therapy. All lines intact and patient positioned comfortably at end of treatment. All patient needs addressed prior to ending therapy session. Pt is currently functional below baseline and recommend comprehensive and intensive skilled therapy by a multidisciplinary team. Would expect patient to be able to tolerate 3 hours of therapy per day and able to tolerate at least one hour up in chair. Please refer to AM-PAC score for current mobility/adl level. Past Medical History:  has a past medical history of Cerebral artery occlusion with cerebral infarction [facility]), Closed fracture of bone of right foot, Dialysis patient [facility]), Hemodialysis patient ([facility] [address] Hyperlipidemia, Hypertension, Kidney disease, and Type 1 diabetes mellitus ([facility] [address] Past Surgical History:   has a past surgical history that includes AV fistula creation (Left) and Wrist surgery (). Restrictions  Restrictions/Precautions: Fall Risk;General Precautions; Up as Tolerated  Other position/activity restrictions: AFO's B legs for drop foot. (not here at hospital) Pt had h.o. CVA in April of this year, but had braces prior d/t diabetes/neuropathy. Subjective  Subjective: \"I have been having some spasms, but I'll try. \"  Restrictions/Precautions: Fall Risk;General Precautions; Up as Tolerated             Objective     Cognition  Overall Cognitive Status: Exceptions  Arousal/Alertness: Appropriate responses to stimuli  Attention Span: Attends with cues to redirect  Memory: Decreased recall of recent events  Safety Judgement: Assessment  Assessment  Assessment: Pt progressing toward goals. With current deficits, Pt HIGH risk for falls & requires continued OT to maximize independence with functional mobility, balance, safety awareness & activity tolerance. Activity Tolerance: Patient tolerated treatment well;Patient limited by endurance  Discharge Recommendations: Patient would benefit from continued therapy after discharge  Safety Devices  Safety Devices in place: Yes  Type of devices: Left in chair;Chair alarm in place;Nurse notified;Call light within reach; Patient at risk for falls;Gait belt; All fall risk precautions in place    Patient Education  Education  Education Given To: Patient  Education Provided: Role of Therapy;Plan of Care;Safety; Energy Conservation;Transfer Training;Mobility Training;ADL Function  Education Method: Verbal;Demonstration  Barriers to Learning: None  Education Outcome: Verbalized understanding;Continued education needed    Plan  Plan  Times per Week: 4-5x/week, 1-2x/day  Current Treatment Recommendations: Strengthening; Functional mobility training;Balance training; Endurance training;Patient/Caregiver education & training;Positioning;Equipment evaluation, education, & procurement;Self-Care / ADL; Safety education & training;Cognitive/Perceptual training;Coordination training    Goals  Patient Goals   Patient goals : to go home when able  Short Term Goals  Time Frame for Short term goals: by discharge, pt will  Short Term Goal 1: demo min A x 1 with ADL transfers with approp AD/DME and good safety  Short Term Goal 2: demo min A with toileting routine with good safety, DME as needed  Short Term Goal 3: demo SBA with UB ADLs and min A LB ADLs with AE/DME as needed and good safety  Short Term Goal 4: demo and verb good understanding of fall prevention techs, EC/WS techs, equip needs, B UE HEP, and d/c recommendations  Long Term Goals  Long Term Goal 1: demo CGA with functional mob in room distances with AD/DME as needed and good safety/pacing for ADL completion    AM-PAC Score        AM-PAC Inpatient Daily Activity Raw Score: 14 (08/02/22 1206)  AM-PAC Inpatient ADL T-Scale Score : 33.39 (08/02/22 1206)  ADL Inpatient CMS 0-100% Score: 59.67 (08/02/22 1206)  ADL Inpatient CMS G-Code Modifier : CK (08/02/22 1206)      Therapy Time   Co-Treatment Concurrent Group Co-treatment   Time In 0908         Time Out 0938         Minutes 27               Co-treatment with PT warranted secondary to decreased safety and independence requiring 2 skilled therapy professionals to address individual discipline's goals. OT addressing preparation for ADL transfer, sitting balance for increased ADL performance, sitting/activity tolerance, functional reaching, environmental safety/scanning, fall prevention, functional mobility for ADL transfers, ability to sequence and follow directions, bed mobility tech, and functional UE strength.      DIEGO Partida

## 2022-08-02 NOTE — PROGRESS NOTES
Treatment time: 210 minutes    Net UF: 3000 mL    Pre weight: 88.5 kg  Post weight: 85.5 kg  EDW: 83.9 kg    Access used: AVF L UE  Access function: good    Medications or blood products given: none    Regular outpatient schedule: TTS [employer] of response to treatment: Patient tolerated treatment well, no signs or symptoms of distress noted during treatment, patient was evaluated by Dr. Les Yanez during treatment, orders reviewed with Dr. Les Yanez prior to dialysis, report given to [facility]. Copy of dialysis treatment record placed in chart, to be scanned into EMR.

## 2022-08-02 NOTE — PLAN OF CARE
PRE CONSULT ROUNDING NOTE  HPI  49year old male with pmh of cva on aspirin esrd on hd htn dmt1 who presented to the ed for weakness. Our service is consulted for anemia. He denies a history of anemia or gi bleeding . Hgb 7.8 iron 15 with 9% saturation. He states that he \"is supposed to call [facility] next month for a colonoscopy \" . Labs show creat 7.75 no leucocytosis alk phos 134. Abd ct in may of this year showed   Chest:5/30/22       Moderate to large pericardial effusion, which is likely related to the   patient's history of pericarditis. Trace bilateral pleural effusions with associated atelectasis. Abdomen/pelvis:       Findings suggestive of anasarca, including periportal edema, mesenteric   edema, small volume of free fluid, and body wall edema. This may be due in   part to volume resuscitation. More focal stranding along the ascending colon may be related to anasarca, or   could represent mild colitis. A few prominent upper abdominal lymph nodes are favored reactive in etiology   He has not had fevers chills dysphagia weight loss abdominal pain melena hematochezia hematemesis change in the bowel habits rash.      Endoscopy none  Family reports no colon liver pancreatic stomach cancer no uc/crohns  Social no smoking no etoh or illicit drugs  BP (!) 802/29   Pulse 85   Temp 97.3 °F (36.3 °C) (Oral)   Resp 18   Ht 5' 7\" (1.702 m)   Wt 195 lb (88.5 kg)   SpO2 99%   BMI 30.54 kg/m²     ROS as above meds labs imaging and past medical records were reviewed    Exam  General Appearance: alert and oriented to person, place and time, well-developed and well-nourished, in no acute distress  Skin: warm and dry, no rash or erythema  Head: normocephalic and atraumatic  Eyes: pupils equal, round, and reactive to light, extraocular eye movements intact, conjunctivae normal  ENT: hearing grossly normal bilaterally  Neck: neck supple and non tender without mass, no thyromegaly or thyroid nodules, no cervical lymphadenopathy   Pulmonary/Chest: clear to auscultation bilaterally- no wheezes, rales or rhonchi, normal air movement, no respiratory distress  Cardiovascular: normal rate, regular rhythm, normal S1 and S2, no murmurs, rubs, clicks or gallops, distal pulses intact, no carotid bruits  Abdomen: soft, non tender, non-distended, normal bowel sounds, no masses or organomegaly no ascites  Extremities: no cyanosis, clubbing or edema  Musculoskeletal: normal range of motion, no joint swelling, deformity or tenderness  Neurologic: no cranial nerve deficit and muscle strength normal    Assessment  Iron deficiency anemia  Esrd on hd  weakness    Plan  D/w md will plan for egd and colonoscopy tomorrow   Recc iron replacement  Trend hh   Hold the aspirin  Clear diet  Formal gi consult to follow  . Tabby Gleason, DANIEL - CNP

## 2022-08-02 NOTE — FLOWSHEET NOTE
[] [facility]) - [facility] &  Therapy  [address]    [phone]  F: [phone]   [x] [address]  [facility] [address]   [address]  P: [phone]  F: [phone]  [] [address] &  Therapy  [address]  P: [phone]  F: [phone] [] [address]  P: [phone]  F: [phone]  [] [address]  [address] [facility] [address]   [address]   [place]: [phone]  F: [phone]   [] [place] [id number] [facility] [address]  [place]: [phone]   F: [phone]     Physical Therapy Cancel/No Show note    Date: 2022  Patient: Raj Bowser  : 1975  MRN: [id number]    Cancels/No Shows to date: 6cx/1ns    For 2022 appointment patient:    [x]  Cancelled    [] Rescheduled appointment    [] No-show     Reason given by patient:    []  Patient ill    []  Conflicting appointment    [] No transportation      [] Conflict with work    [] No reason given    [] Weather related    [] COVID-19    [x] Other:      Comments: Pt in hospital.       [x] Next appointment was NOT confirmed.      Electronically signed by: Greta Palmer PTA

## 2022-08-02 NOTE — FLOWSHEET NOTE
[facility]  PROGRESS NOTE    Room # 2106/2106-01   Name: Dom Chavez              Reason for visit: Routine    I visited the patient. Admit Date & Time: 7/31/2022  6:49 PM    Assessment:  Dom Chavez is a 49 y.o. male. Upon entering the room patient states about their medical condition, states struggles with their medical situation. States worries, fears frustrations. Patient states well , treated well. Patient states good family support, shares about spiritual life, Bahai background, shares Bahai beliefs. Patient shares about outside interests. Intervention:   provided a ministry presence, listening and prayer. Outcome:  Patient open to visit. Plan:  Chaplains will remain available to offer spiritual and emotional support as needed. Electronically signed by Chaplain Basilia, on 8/2/2022 at [address]      08/02/22 1328   Encounter Summary   Service Provided For: Patient   Referral/Consult From: [facility] Family members   Last Encounter  08/02/22   Complexity of Encounter Moderate   Begin Time 0346   End Time  0352   Total Time Calculated 6 min   Encounter    Type Initial Screen/Assessment   Assessment/Intervention/Outcome   Assessment Calm;Coping; Hopeful   Intervention Active listening;Discussed belief system/Bahai practices/kirsty;Discussed illness injury and its impact;Sustaining Presence/Ministry of presence   Outcome Receptive; Expressed Gratitude

## 2022-08-02 NOTE — DISCHARGE SUMMARY
[facility]  Office: [address], DO, Rodolfo Woodsy, DO, Mercer Base, DO, Babak Stevens Blood, DO, Suzie King MD, Henry Husain MD, Marlon Alberto MD, Farrah Cody MD,  Ari Gray MD, Soraya Evans MD, Narendra Davis, DO, Mouna Gaona MD,  Britton Oviedo MD, Cathy Coffey MD, Stanley Ochoa, DO, Charlene House MD, Radha Dixon MD, Matty Jeffery MD, Santos Timmons, DO, Heather Salinas MD, Kristine Martins MD, Garrick Chavez, CNP,  Pa Young, CNP, Sam Barber, CNP, Demi Aquino CNP, Nicole Morejon PA-C, Denver Parks, DNP, Nilam Lynch, CNP, Elio Layton, CNP, Valdemar Cat, CNP, Giancarlo Olmos, CNP, Elaina Garay, CNP, Suresh Tamez, [facility], [facility] [place], [facility], Orion Smyth, CNP, Aric Delaney, CNP, Manpreet Campbell, [facility]    Discharge Summary     Patient ID: Chintan Villagran  :  1975   MRN: [id number]     ACCOUNT:  [de-identified]   Patient's PCP: DANIEL Vargas CNP  Admit Date: 2022   Discharge Date: 2022     Length of Stay: 0  Code Status:  Full Code  Admitting Physician: Bella Emerson MD  Discharge Physician: Bella Emerson MD     Active Discharge Diagnoses:     Hospital Problem Lists:  Principal Problem:    Generalized weakness  Active Problems:    Anemia    Coronary artery disease involving native coronary artery of native heart without angina pectoris    Tremor due to multiple drugs    Hyperglycemia    End stage renal disease on dialysis [facility])    Primary hypertension    Bipolar affective disorder ([facility] [address]    Cerebrovascular accident (CVA) ([facility]    Type 2 diabetes mellitus with diabetic neuropathy, with long-term current use of insulin ([facility] [address]    Neuropathy of both feet    GERD (gastroesophageal reflux disease)  Resolved Problems:    * No resolved hospital problems.  *      Admission Condition:  poor     Discharged Condition: stable    Hospital Stay:   Admitting history:  Placido Buenrostro is a 49 y.o. Non- / non  male who presents with Hyperglycemia and Extremity Weakness   and is admitted to the hospital for the management of Generalized weakness. Patient presented to the ER with complaints of weakness. He states he really has not felt good all day. He said his sugars have been running high despite a poor appetite. He denies fevers, chest pain, shortness of breath nausea or vomiting. When they attempted to ambulate him in the ER he said he felt too weak and did not think he could so he refused. Patient was admitted here overnight from July 14 through July 15 related to concerns of right lower lobe pneumonia and  left pleural effusion and received IV Lasix that was transition to p.o. Lasix at discharge and IV Levaquin was changed to p.o. Levaquin for an additional 3 doses. Per the note he recently completed a dose of prednisone related to pericarditis     ESRD with treatments Tuesday, Thursday and Saturday-tells me he missed his dialysis on Saturday. Patient has a fistula in his left upper arm and follows with Dr. Andrea Lewis. His initial blood pressure was 190/84 but more recently 160/78 otherwise his vitals are stable. Chest x-ray shows patchy right interstitial and groundglass infiltrates suggestive of pneumonia with small left pleural effusion. Upon reviewing his chart most of his chest x-rays since the beginning of the year have reported similar findings. CT of the head showed nothing acute. Sodium is 130 (patient has chronic hyponatremia that ranges between 130 and 142)  BUN and creatinine are 26/5.43, glucose was greater than 400 but it is currently 205. Beta hydroxy 0. 11. When he was discharged the last time they change his Lantus to 25 units twice daily but he states he is not taking insulin at night and has been taking 45 units in the morning for his previous dose.       There is no HCT 23.3 08/04/2022 05:39 AM    MCV 94.0 08/04/2022 05:39 AM    MCH 28.6 08/04/2022 05:39 AM    MCHC 30.5 08/04/2022 05:39 AM    RDW 14.3 08/04/2022 05:39 AM     08/04/2022 05:39 AM     BMP:    Lab Results   Component Value Date/Time    GLUCOSE 108 08/04/2022 05:39 AM     08/04/2022 05:39 AM    K 4.4 08/04/2022 05:39 AM    CL 94 08/04/2022 05:39 AM    CO2 24 08/04/2022 05:39 AM    ANIONGAP 13 08/04/2022 05:39 AM    BUN 25 08/04/2022 05:39 AM    CREATININE 6.46 08/04/2022 05:39 AM    BUNCRER 4 08/04/2022 05:39 AM    CALCIUM 8.7 08/04/2022 05:39 AM    LABGLOM 9 08/04/2022 05:39 AM    GFRAA 11 08/04/2022 05:39 AM    GFR      08/04/2022 05:39 AM     HFP:    Lab Results   Component Value Date/Time    PROT 6.0 07/31/2022 06:57 PM     CMP:    Lab Results   Component Value Date/Time    GLUCOSE 108 08/04/2022 05:39 AM     08/04/2022 05:39 AM    K 4.4 08/04/2022 05:39 AM    CL 94 08/04/2022 05:39 AM    CO2 24 08/04/2022 05:39 AM    BUN 25 08/04/2022 05:39 AM    CREATININE 6.46 08/04/2022 05:39 AM    ANIONGAP 13 08/04/2022 05:39 AM    ALKPHOS 134 07/31/2022 06:57 PM    ALT 10 07/31/2022 06:57 PM    AST 11 07/31/2022 06:57 PM    BILITOT 0.35 07/31/2022 06:57 PM    LABALBU 3.1 07/31/2022 06:57 PM    ALBUMIN NOT REPORTED 01/05/2022 06:08 AM    LABGLOM 9 08/04/2022 05:39 AM    GFRAA 11 08/04/2022 05:39 AM    GFR      08/04/2022 05:39 AM    PROT 6.0 07/31/2022 06:57 PM    CALCIUM 8.7 08/04/2022 05:39 AM     PT/INR:    Lab Results   Component Value Date/Time    PROTIME 14.1 08/02/2022 05:46 AM    INR 1.1 08/02/2022 05:46 AM     PTT:   Lab Results   Component Value Date/Time    APTT 26.2 06/06/2022 12:39 PM     FLP:    Lab Results   Component Value Date/Time    CHOL 118 04/12/2022 12:17 PM    TRIG 66 04/12/2022 12:17 PM    HDL 61 04/12/2022 12:17 PM     U/A:  No results found for: COLORU, TURBIDITY, SPECGRAV, HGBUR, PHUR, PROTEINU, GLUCOSEU, KETUA, BILIRUBINUR, UROBILINOGEN, NITRU, LEUKOCYTESUR  TSH:    Lab Results Component Value Date/Time    TSH 0.60 08/01/2022 04:23 PM        Radiology:  CT HEAD WO CONTRAST    Result Date: 7/31/2022  No acute intracranial abnormality. XR CHEST PORTABLE    Result Date: 7/31/2022  Patchy right interstitial and ground-glass infiltrates. Findings are suggestive of pneumonia Small left pleural effusion       Consultations:    Consults:     Final Specialist Recommendations/Findings:   IP CONSULT TO INTERNAL MEDICINE  IP CONSULT TO SOCIAL WORK  IP CONSULT TO NEPHROLOGY  IP CONSULT TO GI  IP CONSULT TO PHYSICAL MEDICINE REHAB      The patient was seen and examined on day of discharge and this discharge summary is in conjunction with any daily progress note from day of discharge. Discharge plan:     Disposition: [facility]    Physician Follow Up:     Alok Roche, APRN - CNP  [address] [address]  [phone]    Schedule an appointment as soon as possible for a visit  As needed    Royce Ruggiero MD  [facility], [facility]  [address] [facility] [address]  [phone]    Schedule an appointment as soon as possible for a visit in 3 week(s)  bx results     Requiring Further Evaluation/Follow Up POST HOSPITALIZATION/Incidental Findings: Continue dialysis as scheduled    Diet: cardiac diet and diabetic diet    Activity: As tolerated    Instructions to Patient: Do not take baclofen in future    Discharge Medications:      Medication List        CHANGE how you take these medications      furosemide 20 MG tablet  Commonly known as: Lasix  Take 1 tablet by mouth in the morning.   What changed:   medication strength  how much to take     rosuvastatin 10 MG tablet  Commonly known as: CRESTOR  Take 1 tablet by mouth every evening  What changed:   medication strength  how much to take            CONTINUE taking these medications      aspirin 81 MG EC tablet     benzonatate 200 MG capsule  Commonly known as: TESSALON  Take 1 capsule by mouth 3 times daily as needed for Cough     buPROPion 150 MG extended release tablet  Commonly known as: WELLBUTRIN XL  Take 1 tablet by mouth every morning     calcium acetate 667 MG Tabs  Take 1,334 mg by mouth 3 times daily (with meals)     carvedilol 12.5 MG tablet  Commonly known as: COREG  Take 1 tablet by mouth in the morning and 1 tablet in the evening. Take with meals. ezetimibe 10 MG tablet  Commonly known as: ZETIA     FLUoxetine 20 MG tablet  Commonly known as: PROZAC     gabapentin 300 MG capsule  Commonly known as: NEURONTIN     glucose 4 g chewable tablet  Take 4 tablets by mouth as needed for Low blood sugar     insulin aspart 100 UNIT/ML injection vial  Commonly known as: NOVOLOG     Lantus SoloStar 100 UNIT/ML injection pen  Generic drug: insulin glargine  Inject 25 Units into the skin in the morning and 25 Units before bedtime. loratadine 10 MG tablet  Commonly known as: CLARITIN     losartan 25 MG tablet  Commonly known as: COZAAR     [employer] Pen Needles 31G X 8 MM Misc  Generic drug: Insulin Pen Needle  1 each by Does not apply route daily     Misc. Devices Misc  Disp: custom molded shoes to accommodate for brace   DX: DM with history of stroke, foot drop  Duration: 1 year     omeprazole 10 MG delayed release capsule  Commonly known as: PRILOSEC     sennosides-docusate sodium 8.6-50 MG tablet  Commonly known as: SENOKOT-S     VITAMIN B-12 PO            STOP taking these medications      baclofen 10 MG tablet  Commonly known as: LIORESAL     pantoprazole 40 MG tablet  Commonly known as: PROTONIX               Where to Get Your Medications        These medications were sent to [facility] [address] [id number] [phone] [address]      Phone: [phone]   furosemide 20 MG tablet  rosuvastatin 10 MG tablet         No discharge procedures on file.     Time Spent on discharge is  35 mins in patient examination, evaluation, counseling as well as medication reconciliation, prescriptions for required medications, discharge plan and follow up. Electronically signed by   Aniceto Zepeda MD  8/4/2022  5:20 PM      Thank you DANIEL Sarah - SHIMA for the opportunity to be involved in this patient's care.

## 2022-08-02 NOTE — CARE COORDINATION
Social work: [facility] can admit patient today after dialysis. Neo/RN spoke to HD nurse and was informed plan to run him this morning. SW requested 4:00PM pickup to [facility] after HD. Patient to dc to [facility] via [address] [place] [address]  at [facility].  # for RN report: [phone]. Completed CARISSA faxed to [phone]. Informed RN, pt, and facility of dc time, agreeable to plan. UPDATE: Plan for egd and colonoscopy tomorrow. Transfer to [facility] held today, [facility] cancelled.

## 2022-08-02 NOTE — CONSULTS
Physical Medicine & Rehabilitation  Consult Note - RECORD REVIEW ONLY      Admitting Physician:   Alexis Retana MD    Primary Care Provider:   DANIEL Porter CNP     Reason for Consult:  Acute Inpatient Rehabilitation    Chief Complaint:  Weakness    History of Present Illness:  Referring Provider is requesting an evaluation for appropriate placement upon discharge from acute care. History from chart review. Baldo Hernandez is a 49 y.o. male with history of CVA, ESRD on HD, HTN, HLD, type 2 diabetes admitted to  [facility]  on 7/31/2022. He initially presented with generalized weakness, malaise, and hyperglycemia. He was recently admitted for pneumonia and left pleural effusion. CT head showed no acute intracranial abnormality. Medications have been adjusted. Of note, he has had several admissions over the last few months, including for CVA in 4/2022. Review of Systems:  Record Review Only     Premorbid function:  Needing assistance with homemaking    Current function:    PT    Restrictions/Precautions: Fall Risk, General Precautions, Up as Tolerated  Other position/activity restrictions: AFO's B legs for drop foot. (not here at hospital) Pt had h.o. CVA in April of this year, but had braces prior d/t diabetes/neuropathy. Bed mobility  Supine to Sit: Contact guard assistance  Bed Mobility Comments: up to chair    Transfers  Sit to Stand: Minimal Assistance, 2 Person Assistance  Stand to sit: Minimal Assistance, 2 Person Assistance  Bed to Chair: Moderate assistance, Maximum assistance, 2 Person Assistance (Bed to chair transfer with RW. Pt with posterior LOB and bilat. drop foot. (does not have braces here at hospital.))           OT    ADL  Feeding: Independent, Setup, Supervision  Feeding Skilled Clinical Factors: pt with dec. coordination with R hand to hold utensils  Grooming: Minimal assistance, Setup  UE Bathing: Moderate assistance  LE Bathing:  Moderate assistance  UE Dressing: Moderate assistance  LE Dressing: Maximum assistance  Toileting: Moderate assistance, Maximum assistance  Additional Comments: pt is limited by dec. standing tolerance/balance as well as generalized weakness and dec. coordination. Pt needing 2 person for transfers and recommending staff use Justin brower if pt needs to get OOB                      Transfers  Sit to stand: Minimal assistance, 2 Person assistance  Stand to sit: 2 Person assistance, Minimal assistance  Transfer Comments: cues for hand placement, pushing up to stand and reaching back to sit.                   SLP:      Past Medical History:        Diagnosis Date    Cerebral artery occlusion with cerebral infarction [facility])     Closed fracture of bone of right foot March - April 2020    Dialysis patient [facility])     Hemodialysis patient [facility])     Hyperlipidemia     Hypertension     Kidney disease     On Dialysis    Type 1 diabetes mellitus ([facility] [address]        Past Surgical History:        Procedure Laterality Date    AV FISTULA CREATION Left     WRIST SURGERY  1995       Allergies:    No Known Allergies     Current Medications:   Current Facility-Administered Medications: aspirin EC tablet 81 mg (Patient Supplied), 81 mg, Oral, Daily  buPROPion (WELLBUTRIN XL) extended release tablet 150 mg (Patient Supplied), 150 mg, Oral, QAM  calcium acetate TABS 1,334 mg (Patient Supplied), 1,334 mg, Oral, TID WC  carvedilol (COREG) tablet 12.5 mg (Patient Supplied), 12.5 mg, Oral, BID WC  ezetimibe (ZETIA) tablet 10 mg (Patient Supplied), 10 mg, Oral, Daily  [Held by provider] losartan (COZAAR) tablet 25 mg (Patient Supplied), 25 mg, Oral, Daily  rosuvastatin (CRESTOR) tablet 40 mg (Patient Supplied), 40 mg, Oral, QPM  glucose chewable tablet 16 g, 4 tablet, Oral, PRN  dextrose bolus 10% 125 mL, 125 mL, IntraVENous, PRN **OR** dextrose bolus 10% 250 mL, 250 mL, IntraVENous, PRN  glucagon (rDNA) injection 1 mg, 1 mg, SubCUTAneous, PRN  dextrose 10 % infusion, , 157*      HbA1c:   Lab Results   Component Value Date    LABA1C 8.4 (H) 05/30/2022     BNP: No results for input(s): BNP in the last 72 hours. PT/INR:   Recent Labs     08/02/22  0546   PROTIME 14.1   INR 1.1     APTT: No results for input(s): APTT in the last 72 hours. CARDIAC ENZYMES: No results for input(s): CKMB, CKMBINDEX, TROPONINT in the last 72 hours. Invalid input(s): CKTOTAL;3  FASTING LIPID PANEL:  Lab Results   Component Value Date    CHOL 118 04/12/2022    HDL 61 04/12/2022    TRIG 66 04/12/2022     LIVER PROFILE:   Recent Labs     07/31/22  1857   AST 11   ALT 10   BILITOT 0.35   ALKPHOS 134*        Radiology:  CT HEAD WO CONTRAST   Final Result   No acute intracranial abnormality. XR CHEST PORTABLE   Final Result   Patchy right interstitial and ground-glass infiltrates. Findings are   suggestive of pneumonia      Small left pleural effusion               Impression:    Debility  Recent pneumonia and left pleural effusion  Anemia  ESRD on HD  HTN  HLD  Type 2 diabetes with hyperglycemia  History of CVA (4/2022)  Obesity    Recommendations:    Diagnosis:  Debility  Therapy: Has PT/OT needs  Medical Necessity: As above  Support: Lives with grandparents (they assist him at home)  Rehab Recommendation: The patient will likely benefit from acute inpatient rehabilitation once medically stable per primary service in the setting of CVA in 4/2022 and recent admission for pneumonia and left pleural effusion. He has also had other hospitalizations in the last few months. DVT Prophylaxis: Heparin    It was my pleasure to evaluate the chart of Valarie Boston today. Please call with questions.     Marcelo Andrew MD

## 2022-08-03 ENCOUNTER — ANESTHESIA EVENT (OUTPATIENT)
Dept: OPERATING ROOM | Age: 47
End: 2022-08-03
Payer: MEDICARE

## 2022-08-03 ENCOUNTER — ANESTHESIA (OUTPATIENT)
Dept: OPERATING ROOM | Age: 47
End: 2022-08-03
Payer: MEDICARE

## 2022-08-03 LAB
ABSOLUTE EOS #: 0.18 K/UL (ref 0–0.4)
ABSOLUTE IMMATURE GRANULOCYTE: 0 K/UL (ref 0–0.3)
ABSOLUTE LYMPH #: 1.06 K/UL (ref 1–4.8)
ABSOLUTE MONO #: 0.88 K/UL (ref 0.2–0.8)
BASOPHILS # BLD: 1 %
BASOPHILS ABSOLUTE: 0.09 K/UL (ref 0–0.2)
DATE, STOOL #1: NORMAL
EOSINOPHILS RELATIVE PERCENT: 2 % (ref 1–4)
GLUCOSE BLD-MCNC: 100 MG/DL (ref 75–110)
GLUCOSE BLD-MCNC: 137 MG/DL (ref 75–110)
GLUCOSE BLD-MCNC: 158 MG/DL (ref 75–110)
GLUCOSE BLD-MCNC: 166 MG/DL (ref 75–110)
GLUCOSE BLD-MCNC: 168 MG/DL (ref 75–110)
GLUCOSE BLD-MCNC: 45 MG/DL (ref 75–110)
GLUCOSE BLD-MCNC: 50 MG/DL (ref 75–110)
GLUCOSE BLD-MCNC: 65 MG/DL (ref 75–110)
GLUCOSE BLD-MCNC: 72 MG/DL (ref 75–110)
GLUCOSE BLD-MCNC: 74 MG/DL (ref 75–110)
HCT VFR BLD CALC: 26.3 % (ref 40.7–50.3)
HEMOCCULT SP1 STL QL: NEGATIVE
HEMOGLOBIN: 8 G/DL (ref 13–17)
IMMATURE GRANULOCYTES: 0 %
LYMPHOCYTES # BLD: 12 % (ref 24–44)
MCH RBC QN AUTO: 28.8 PG (ref 25.2–33.5)
MCHC RBC AUTO-ENTMCNC: 30.4 G/DL (ref 28.4–34.8)
MCV RBC AUTO: 94.6 FL (ref 82.6–102.9)
MONOCYTES # BLD: 10 % (ref 1–7)
NRBC AUTOMATED: 0 PER 100 WBC
PDW BLD-RTO: 14.4 % (ref 11.8–14.4)
PHOSPHORUS: 2.4 MG/DL (ref 2.5–4.5)
PLATELET # BLD: 263 K/UL (ref 138–453)
PMV BLD AUTO: 9.5 FL (ref 8.1–13.5)
RBC # BLD: 2.78 M/UL (ref 4.21–5.77)
SEG NEUTROPHILS: 75 % (ref 36–66)
SEGMENTED NEUTROPHILS ABSOLUTE COUNT: 6.59 K/UL (ref 1.8–7.7)
TIME, STOOL #1: 600
WBC # BLD: 8.8 K/UL (ref 3.5–11.3)

## 2022-08-03 PROCEDURE — 6360000002 HC RX W HCPCS: Performed by: INTERNAL MEDICINE

## 2022-08-03 PROCEDURE — 45378 DIAGNOSTIC COLONOSCOPY: CPT | Performed by: INTERNAL MEDICINE

## 2022-08-03 PROCEDURE — 43239 EGD BIOPSY SINGLE/MULTIPLE: CPT | Performed by: INTERNAL MEDICINE

## 2022-08-03 PROCEDURE — 3609027000 HC COLONOSCOPY: Performed by: INTERNAL MEDICINE

## 2022-08-03 PROCEDURE — 97530 THERAPEUTIC ACTIVITIES: CPT

## 2022-08-03 PROCEDURE — 84100 ASSAY OF PHOSPHORUS: CPT

## 2022-08-03 PROCEDURE — G0378 HOSPITAL OBSERVATION PER HR: HCPCS

## 2022-08-03 PROCEDURE — 7100000000 HC PACU RECOVERY - FIRST 15 MIN: Performed by: INTERNAL MEDICINE

## 2022-08-03 PROCEDURE — 99225 PR SBSQ OBSERVATION CARE/DAY 25 MINUTES: CPT | Performed by: INTERNAL MEDICINE

## 2022-08-03 PROCEDURE — 85025 COMPLETE CBC W/AUTO DIFF WBC: CPT

## 2022-08-03 PROCEDURE — 82270 OCCULT BLOOD FECES: CPT

## 2022-08-03 PROCEDURE — 3609017100 HC EGD: Performed by: INTERNAL MEDICINE

## 2022-08-03 PROCEDURE — 2500000003 HC RX 250 WO HCPCS: Performed by: NURSE ANESTHETIST, CERTIFIED REGISTERED

## 2022-08-03 PROCEDURE — 2709999900 HC NON-CHARGEABLE SUPPLY: Performed by: INTERNAL MEDICINE

## 2022-08-03 PROCEDURE — 2580000003 HC RX 258: Performed by: NURSE ANESTHETIST, CERTIFIED REGISTERED

## 2022-08-03 PROCEDURE — 97116 GAIT TRAINING THERAPY: CPT

## 2022-08-03 PROCEDURE — 82271 OCCULT BLOOD OTHER SOURCES: CPT

## 2022-08-03 PROCEDURE — 88305 TISSUE EXAM BY PATHOLOGIST: CPT

## 2022-08-03 PROCEDURE — 97110 THERAPEUTIC EXERCISES: CPT

## 2022-08-03 PROCEDURE — 7100000001 HC PACU RECOVERY - ADDTL 15 MIN: Performed by: INTERNAL MEDICINE

## 2022-08-03 PROCEDURE — 6360000002 HC RX W HCPCS: Performed by: NURSE ANESTHETIST, CERTIFIED REGISTERED

## 2022-08-03 PROCEDURE — 3700000000 HC ANESTHESIA ATTENDED CARE: Performed by: INTERNAL MEDICINE

## 2022-08-03 PROCEDURE — 36415 COLL VENOUS BLD VENIPUNCTURE: CPT

## 2022-08-03 PROCEDURE — 2580000003 HC RX 258: Performed by: NURSE PRACTITIONER

## 2022-08-03 PROCEDURE — 3700000001 HC ADD 15 MINUTES (ANESTHESIA): Performed by: INTERNAL MEDICINE

## 2022-08-03 PROCEDURE — 82947 ASSAY GLUCOSE BLOOD QUANT: CPT

## 2022-08-03 PROCEDURE — 2580000003 HC RX 258: Performed by: INTERNAL MEDICINE

## 2022-08-03 RX ORDER — ROSUVASTATIN CALCIUM 10 MG/1
10 TABLET, COATED ORAL EVERY EVENING
Status: DISCONTINUED | OUTPATIENT
Start: 2022-08-03 | End: 2022-08-04 | Stop reason: HOSPADM

## 2022-08-03 RX ORDER — ROSUVASTATIN CALCIUM 10 MG/1
10 TABLET, COATED ORAL EVERY EVENING
Qty: 30 TABLET | Refills: 3 | Status: SHIPPED | OUTPATIENT
Start: 2022-08-03 | End: 2022-08-19

## 2022-08-03 RX ORDER — LIDOCAINE HYDROCHLORIDE 20 MG/ML
INJECTION, SOLUTION EPIDURAL; INFILTRATION; INTRACAUDAL; PERINEURAL PRN
Status: DISCONTINUED | OUTPATIENT
Start: 2022-08-03 | End: 2022-08-03 | Stop reason: SDUPTHER

## 2022-08-03 RX ORDER — PROPOFOL 10 MG/ML
INJECTION, EMULSION INTRAVENOUS PRN
Status: DISCONTINUED | OUTPATIENT
Start: 2022-08-03 | End: 2022-08-03 | Stop reason: SDUPTHER

## 2022-08-03 RX ORDER — SODIUM CHLORIDE, SODIUM LACTATE, POTASSIUM CHLORIDE, CALCIUM CHLORIDE 600; 310; 30; 20 MG/100ML; MG/100ML; MG/100ML; MG/100ML
INJECTION, SOLUTION INTRAVENOUS CONTINUOUS PRN
Status: DISCONTINUED | OUTPATIENT
Start: 2022-08-03 | End: 2022-08-03 | Stop reason: SDUPTHER

## 2022-08-03 RX ADMIN — FLUOXETINE HYDROCHLORIDE 20 MG: 20 CAPSULE ORAL at 09:43

## 2022-08-03 RX ADMIN — PROPOFOL 50 MG: 10 INJECTION, EMULSION INTRAVENOUS at 13:32

## 2022-08-03 RX ADMIN — DEXTROSE MONOHYDRATE 125 ML: 10 INJECTION, SOLUTION INTRAVENOUS at 12:04

## 2022-08-03 RX ADMIN — CARVEDILOL 25 MG: 12.5 TABLET ORAL at 09:42

## 2022-08-03 RX ADMIN — PROPOFOL 100 MG: 10 INJECTION, EMULSION INTRAVENOUS at 13:23

## 2022-08-03 RX ADMIN — LIDOCAINE HYDROCHLORIDE 50 MG: 20 INJECTION, SOLUTION EPIDURAL; INFILTRATION; INTRACAUDAL; PERINEURAL at 13:29

## 2022-08-03 RX ADMIN — SODIUM CHLORIDE 25 ML: 9 INJECTION, SOLUTION INTRAVENOUS at 12:22

## 2022-08-03 RX ADMIN — PROPOFOL 50 MG: 10 INJECTION, EMULSION INTRAVENOUS at 13:29

## 2022-08-03 RX ADMIN — ROSUVASTATIN CALCIUM 10 MG: 10 TABLET, COATED ORAL at 18:28

## 2022-08-03 RX ADMIN — LIDOCAINE HYDROCHLORIDE 50 MG: 20 INJECTION, SOLUTION EPIDURAL; INFILTRATION; INTRACAUDAL; PERINEURAL at 13:22

## 2022-08-03 RX ADMIN — SODIUM CHLORIDE, POTASSIUM CHLORIDE, SODIUM LACTATE AND CALCIUM CHLORIDE: 600; 310; 30; 20 INJECTION, SOLUTION INTRAVENOUS at 13:02

## 2022-08-03 RX ADMIN — GABAPENTIN 300 MG: 300 CAPSULE ORAL at 19:53

## 2022-08-03 RX ADMIN — DEXTROSE MONOHYDRATE 125 ML: 10 INJECTION, SOLUTION INTRAVENOUS at 00:32

## 2022-08-03 RX ADMIN — SODIUM CHLORIDE, PRESERVATIVE FREE 10 ML: 5 INJECTION INTRAVENOUS at 16:59

## 2022-08-03 RX ADMIN — PROPOFOL 50 MG: 10 INJECTION, EMULSION INTRAVENOUS at 13:40

## 2022-08-03 RX ADMIN — CARVEDILOL 25 MG: 12.5 TABLET ORAL at 17:00

## 2022-08-03 RX ADMIN — FUROSEMIDE 10 MG: 20 TABLET ORAL at 09:43

## 2022-08-03 RX ADMIN — EPOETIN ALFA-EPBX 8000 UNITS: 4000 INJECTION, SOLUTION INTRAVENOUS; SUBCUTANEOUS at 18:28

## 2022-08-03 RX ADMIN — BUPROPION HYDROCHLORIDE 150 MG: 150 TABLET ORAL at 16:59

## 2022-08-03 RX ADMIN — FLUOXETINE HYDROCHLORIDE 20 MG: 20 CAPSULE ORAL at 19:53

## 2022-08-03 RX ADMIN — PROPOFOL 50 MG: 10 INJECTION, EMULSION INTRAVENOUS at 13:48

## 2022-08-03 RX ADMIN — CALCIUM ACETATE 1334 MG: 667 TABLET ORAL at 16:59

## 2022-08-03 RX ADMIN — SODIUM CHLORIDE, PRESERVATIVE FREE 10 ML: 5 INJECTION INTRAVENOUS at 19:53

## 2022-08-03 NOTE — PROGRESS NOTES
Reason for Follow up: ESRD    HISTORY:    Patient seen and examined in PACU. He had colonoscopy and EGD done. EGD demonstrated abnormal esophagus and biopsies were taken. Colonoscopy demonstrated diverticulosis. Had HD yesterday, UF 3500 ml. Today is he 1.4 kg above EDW. No BMP today. Hgb 8. BP trending 120-150s/60-70s. Review Of Systems:   Constitutional: No fever, chills, lethargy, weakness or wt loss. Cardiac:  No chest pain, dyspnea, orthopnea or PND. Pulmonary:  +cough, no phlegm or wheezing. Abdomen:  No abdominal pain, nausea, vomiting or diarrhea. :   No hematuria, pyuria, dysuria or flank pain. Extremities:  No swelling or joint pains. All other ROS is negative.       Scheduled Meds:   [MAR Hold] carvedilol  25 mg Oral BID WC    [Held by provider] aspirin  81 mg Oral Daily    [MAR Hold] buPROPion  150 mg Oral QAM    [MAR Hold] calcium acetate  1,334 mg Oral TID WC    [MAR Hold] ezetimibe  10 mg Oral Daily    [Held by provider] losartan  25 mg Oral Daily    [MAR Hold] rosuvastatin  40 mg Oral QPM    [MAR Hold] sodium chloride flush  5-40 mL IntraVENous 2 times per day    [Held by provider] heparin (porcine)  5,000 Units SubCUTAneous 3 times per day    [MAR Hold] insulin lispro  0-8 Units SubCUTAneous TID WC    [MAR Hold] insulin lispro  0-4 Units SubCUTAneous Nightly    [MAR Hold] furosemide  10 mg Oral Daily    [MAR Hold] FLUoxetine  20 mg Oral BID    [MAR Hold] omeprazole  40 mg Oral QAM AC    [MAR Hold] insulin glargine  45 Units SubCUTAneous Daily with breakfast    [MAR Hold] gabapentin  300 mg Oral Nightly    [MAR Hold] epoetin pennie-epbx  8,000 Units SubCUTAneous Once per day on Mon Wed Fri    [facility] Hold] fluticasone  2 spray Each Nostril Daily   Continuous Infusions:   [MAR Hold] dextrose      [MAR Hold] sodium chloride 5 mL/hr at 08/03/22 1224     PRN Meds:[MAR Hold] hydrALAZINE, [MAR Hold] glucose, [MAR Hold] dextrose bolus **OR** [MAR Hold] dextrose bolus, [MAR Hold] glucagon (rDNA), [MAR Hold] dextrose, [MAR Hold] sodium chloride flush, [MAR Hold] sodium chloride, [MAR Hold] ondansetron **OR** [MAR Hold] ondansetron, [MAR Hold] polyethylene glycol, [MAR Hold] acetaminophen **OR** [MAR Hold] acetaminophen, [MAR Hold] guaiFENesin-codeine    No Known Allergies    Physical Exam:  Blood pressure (!) 152/75, pulse 67, temperature 97.3 °F (36.3 °C), temperature source Temporal, resp. rate 19, height 5' 7\" (1.702 m), weight 188 lb (85.3 kg), SpO2 100 %. In: 1110 [P.O.:150; I.V.:460]  Out: 3500   In: 1110   Out: 3500     General:  Awake, alert, not in distress. Appears to be stated age. HEENT: Atraumatic, normocephalic. Anicteric sclera. Pink and moist oral mucosa. No carotid bruit. No JVD. Chest: Bilateral air entry, clear to auscultation, no wheezing, rhonchi or rales. Cardiovascular: RRR, S1S2, no murmur, rub or gallop. Has lower extremity edema. Abdomen: Soft, non tender to palpation. Active bowel sounds x 4 quadrants. Musculoskeletal: Active ROM x 4 extremities. No cyanosis or clubbing. Integumentary: Pink, warm and dry. Free from rash or lesions. Skin turgor normal.  CNS: Oriented to person, place and time. Speech clear. Face symmetrical. No tremor.      Data:  CBC:   Lab Results   Component Value Date    WBC 8.8 08/03/2022    HGB 8.0 (L) 08/03/2022    HCT 26.3 (L) 08/03/2022    MCV 94.6 08/03/2022     08/03/2022     BMP:    Lab Results   Component Value Date     (L) 08/02/2022    K 4.8 08/02/2022    CL 98 08/02/2022    CO2 24 08/02/2022    BUN 40 (H) 08/02/2022    CREATININE 7.75 (HH) 08/02/2022    GLUCOSE 157 (H) 08/02/2022     CMP:   Lab Results   Component Value Date     (L) 08/02/2022    K 4.8 08/02/2022    CL 98 08/02/2022    CO2 24 08/02/2022    BUN 40 (H) 08/02/2022    CREATININE 7.75 (HH) 08/02/2022    GLUCOSE 157 (H) 08/02/2022    CALCIUM 9.2 08/02/2022    PROT 6.0 (L) 07/31/2022    LABALBU 3.1 (L) 07/31/2022    BILITOT 0.35 07/31/2022    ALKPHOS 134 (H) 07/31/2022    AST 11 07/31/2022    ALT 10 07/31/2022      Hepatic:   Lab Results   Component Value Date    AST 11 07/31/2022    ALT 10 07/31/2022    BILITOT 0.35 07/31/2022    ALKPHOS 134 (H) 07/31/2022     BNP: No results found for: BNP  Lipids:   Lab Results   Component Value Date    CHOL 118 04/12/2022    HDL 61 04/12/2022     INR:   Lab Results   Component Value Date    INR 1.1 08/02/2022     PTH: No results found for: PTH  Phosphorus:    Lab Results   Component Value Date    PHOS 2.4 (L) 08/03/2022     Ionized Calcium: No results found for: IONCA  Magnesium:   Lab Results   Component Value Date    MG 1.7 06/17/2022     Albumin:   Lab Results   Component Value Date    LABALBU 3.1 (L) 07/31/2022     Last 3 CK, CKMB, Troponin: @LABRCNT(CKTOTAL:3,CKMB:3,TROPONINI:3)       URINE:)No results found for: Heidi Dunn    Radiology:   Reviewed. Assessment and Plan to follow. Patient seen in collaboration with Dr. Nick Felix. Electronically signed by DANIEL Frederick CNP  on 8/3/2022 at 2:37 PM  [facility]. Ph: [phone]    Assessment   End-stage renal disease  Hypertension primary as well as secondary due to volume overload  Anemia of end-stage renal disease  Baclofen reduced to weakness  Mineral bone disorder    Plan   Blood pressure much better controlled  Avoid use of baclofen in future  Okay to continue Neurontin  Erythropoiesis stimulating agents  Renal diet with oral fluid restriction  AV fistula working well    Electronically signed by Susan Mayer MD on 8/3/2022 at 3:39 PM  [facility].   Ph: [phone]

## 2022-08-03 NOTE — OP NOTE
PROCEDURE NOTE    DATE OF PROCEDURE: 8/3/2022    SURGEON: Lucero Valencia MD  Facility : [facility]  ASSISTANT: None  Anesthesia: MAC  PREOPERATIVE DIAGNOSIS:   Iron deficiency anemia    POSTOPERATIVE DIAGNOSIS: as described below    OPERATION: Total colonoscopy     ANESTHESIA: Moderate Sedation    ESTIMATED BLOOD LOSS: less than 50     COMPLICATIONS: None. SPECIMENS:  Was Not Obtained    HISTORY: The patient is a 49y.o. year old male with history of above preop diagnosis. I recommended colonoscopy with possible biopsy or polypectomy and I explained the risk, benefits, expected outcome, and alternatives to the procedure. Risks included but are not limited to bleeding, infection, respiratory distress, hypotension, and perforation of the colon and possibility of missing a lesion. The patient understands and is in agreement. The patient was counseled at length about the risks of taran Covid-19 during their perioperative period and any recovery window from their procedure. The patient was made aware that taran Covid-19  may worsen their prognosis for recovering from their procedure  and lend to a higher morbidity and/or mortality risk. All material risks, benefits, and reasonable alternatives including postponing the procedure were discussed. The patient does wish to proceed with the procedure at this time. PROCEDURE: The patient was given IV conscious sedation. The patient's SPO2 remained above 90% throughout the procedure. The colonoscope was inserted per rectum and advanced under direct vision to the cecum without difficulty. Post sedation note : The patient's SPO2 remained above 90% throughout the procedure. the vital signs remained stable , and no immediate complication form the procedure noted, patient will be ready for d/c when criteria is met . The prep was poor.   Large amount of solid stool throughout the entire colon could not be cleaned this is a very

## 2022-08-03 NOTE — PROGRESS NOTES
Physical Therapy  Facility/Department: [facility]  Daily Treatment Note  NAME: Aquiles Drew  : 1975  MRN: [id number]    Date of Service: 8/3/2022    Discharge Recommendations:  Patient would benefit from continued therapy after discharge   Patient Diagnosis(es): The primary encounter diagnosis was Generalized weakness. A diagnosis of Iron deficiency anemia, unspecified iron deficiency anemia type was also pertinent to this visit. Assessment   Assessment: Pt with deficits in balance, coordination, gait, transfers, bed mobility requiring min to mod A for mobility. Pt lives with grandfather who provides some assistance with mobility at home however pt is currently functioning below baseline and would benefit from continued skilled therapy to maximize independence with all functional mobility. Pt demonstrates poor standing balance and is at High risk for falls. AM-PAC score of 15/24 for current level of function. Activity Tolerance: Patient tolerated treatment well;Patient limited by endurance   Plan    Plan  Plan: 5-7 times per week  Specific Instructions for Next Treatment: progress standing balance/ gait with RW  Current Treatment Recommendations: Strengthening;ROM;Balance training;Functional mobility training;Transfer training;Gait training; Safety education & training;Home exercise program;Patient/Caregiver education & training; Therapeutic activities     Restrictions  Restrictions/Precautions  Restrictions/Precautions: Fall Risk, General Precautions, Up as Tolerated  Position Activity Restriction  Other position/activity restrictions: AFO's B legs for drop foot. (not here at hospital) Pt had h.o. CVA in April of this year, but had braces prior d/t diabetes/neuropathy.      Subjective    Subjective  Subjective: Pt agreeable to PT session (Nurse gives approval for PT session)  Orientation  Overall Orientation Status: Within Normal Limits     Objective   Bed Mobility Training  Bed Mobility Training: Yes  Overall Level of Assistance: Minimum assistance  Interventions: Safety awareness training;Verbal cues  Rolling: Contact-guard assistance;Minimum assistance  Supine to Sit: Minimum assistance  Scooting: Minimum assistance  Balance  Sitting: Intact  Standing: With support  Transfer Training  Transfer Training: Yes  Overall Level of Assistance: Minimum assistance; Moderate assistance  Interventions: Safety awareness training; Tactile cues; Verbal cues  Sit to Stand: Minimum assistance; Moderate assistance (mod A for initial standing balance with LOB x1 posteriorly)  Stand to Sit: Minimum assistance  Stand Pivot Transfers: Minimum assistance; Moderate assistance  Bed to Chair: Moderate assistance  Toilet Transfer: Minimum assistance; Moderate assistance  Gait Training  Gait Training: Yes  Gait  Overall Level of Assistance: Moderate assistance  Interventions: Safety awareness training;Verbal cues  Speed/Danica: Slow  Step Length: Right shortened;Left shortened  Gait Abnormalities: Step to gait ,Antalgic ,rigid steps, unsteady, no buckling this session  Distance (ft): 20 Feet x2  Assistive Device: Walker, rolling;Gait belt  Other Activities   Assisted pt to the bathroom with min to mod A for sit to stand from commode     PT Exercises  Exercise Treatment: yes  A/AROM Exercises: LAQ, seated marching  Circulation/Endurance Exercises: heel raises      Goals  Short Term Goals  Time Frame for Short term goals: 12 visits:  Short term goal 1: Pt. to be SBA with bed mob. Short term goal 2: Pt. to be CGA for sit to stand transfer with RW  Short term goal 3: Pt. to require min A for gait with RW, 25ft  Short term goal 4: Pt. to tolerate 25+ min.  of PT daily for ther ex/ gait/balance training; functional mob. training  Patient Goals   Patient goals : feel normal    Education  Patient Education  Education Given To: Patient  Education Provided: Role of Therapy;Plan of Care;Home Exercise Program;Transfer Training  Education Provided Comments: Education on safety with all functional mobility  Education Method: Demonstration;Verbal  Barriers to Learning: None  Education Outcome: Verbalized understanding;Demonstrated understanding    Therapy Time   Individual Concurrent Group Co-treatment   Time In 1809         Time Out 1849         Minutes [address] [place], [place]

## 2022-08-03 NOTE — CARE COORDINATION
[facility] approved patient for admission and can admit tomorrow after dialysis.  Becca Queen

## 2022-08-03 NOTE — ANESTHESIA PRE PROCEDURE
Department of Anesthesiology  Preprocedure Note       Name:  Jamel Rodriguez   Age:  49 y.o.  :  1975                                          MRN:  [id number]         Date:  8/3/2022      Surgeon: Hilda Salinas):  Burt Velasco MD    Procedure: Procedure(s):  EGD ESOPHAGOGASTRODUODENOSCOPY  COLONOSCOPY DIAGNOSTIC    Medications prior to admission:   Prior to Admission medications    Medication Sig Start Date End Date Taking? Authorizing Provider   furosemide (LASIX) 20 MG tablet Take 1 tablet by mouth in the morning. 22  Yes Pat Staples MD   omeprazole (PRILOSEC) 10 MG delayed release capsule Take 10 mg by mouth in the morning. Yes Historical Provider, MD   sennosides-docusate sodium (SENOKOT-S) 8.6-50 MG tablet Take 1 tablet by mouth in the morning. Yes Historical Provider, MD   loratadine (CLARITIN) 10 MG tablet Take 10 mg by mouth in the morning. Yes Historical Provider, MD   losartan (COZAAR) 25 MG tablet Take 25 mg by mouth in the morning. Historical Provider, MD   benzonatate (TESSALON) 200 MG capsule Take 1 capsule by mouth 3 times daily as needed for Cough 22  DANIEL Gurrola CNP   calcium acetate 667 MG TABS Take 1,334 mg by mouth 3 times daily (with meals) 22   DANIEL Gurrola CNP   carvedilol (COREG) 12.5 MG tablet Take 1 tablet by mouth in the morning and 1 tablet in the evening. Take with meals. 7/15/22   Yinka Arrieta MD   insulin glargine (LANTUS SOLOSTAR) 100 UNIT/ML injection pen Inject 25 Units into the skin in the morning and 25 Units before bedtime.  7/15/22 8/14/22  Yinka Arrieta MD   Insulin Pen Needle (MEIJER PEN NEEDLES) 31G X 8 MM MISC 1 each by Does not apply route daily 7/15/22   Yinka Arrieta MD   glucose 4 g chewable tablet Take 4 tablets by mouth as needed for Low blood sugar 7/15/22   Yinka Arrieta MD   rosuvastatin (CRESTOR) 40 MG tablet Take 40 mg by mouth every evening    Historical Provider, MD   pantoprazole ([place] [address]) 40 MG tablet Take 1 tablet by mouth every morning (before breakfast) 6/3/22 8/2/22  Cassy Augustine MD   gabapentin (NEURONTIN) 300 MG capsule Take 300 mg by mouth in the morning and 300 mg at noon and 300 mg before bedtime. 5/28/22   DANIEL Medina CNP   Misc. Devices MISC Disp: custom molded shoes to accommodate for brace   DX: DM with history of stroke, foot drop  Duration: 1 year 5/11/22   Mirna Sánchez DPAMA   insulin aspart (NOVOLOG) 100 UNIT/ML injection vial Inject 0-8 Units into the skin 3 times daily (before meals) SLIDING SCALE     Historical Provider, MD   FLUoxetine (PROZAC) 20 MG tablet Take 20 mg by mouth in the morning and 20 mg in the evening. Take 1 tablets (20 mg)  by mouth in the morning and at bedtime.     Historical Provider, MD   buPROPion (WELLBUTRIN XL) 150 MG extended release tablet Take 1 tablet by mouth every morning 3/25/21   Kam Acevedo DO   ezetimibe (ZETIA) 10 MG tablet Take 10 mg by mouth daily    Historical Provider, MD   aspirin 81 MG EC tablet Take 81 mg by mouth daily    Historical Provider, MD   Cyanocobalamin (VITAMIN B-12 PO) Take 2,500 mcg by mouth daily    Historical Provider, MD       Current medications:    Current Facility-Administered Medications   Medication Dose Route Frequency Provider Last Rate Last Admin    [MAR Hold] carvedilol (COREG) tablet 25 mg (Patient Supplied)  25 mg Oral BID  Jelly Hair MD   25 mg at 08/03/22 0942    [MAR Hold] hydrALAZINE (APRESOLINE) injection 10 mg  10 mg IntraVENous Q6H PRN Mary Suárez MD        [facility] [place] by provider] aspirin EC tablet 81 mg (Patient Supplied)  81 mg Oral Daily DANIEL Masters CNP   81 mg at 08/02/22 0827    [MAR Hold] buPROPion (WELLBUTRIN XL) extended release tablet 150 mg (Patient Supplied)  150 mg Oral QAM DANIEL Masters CNP   150 mg at 08/02/22 0827    [MAR Hold] calcium acetate TABS 1,334 mg (Patient Supplied)  1,334 mg Oral TID  DANIEL Masters - CNP 1,334 mg at 08/02/22 1630    [MAR Hold] ezetimibe (ZETIA) tablet 10 mg (Patient Supplied)  10 mg Oral Daily Louie Mystic, APRN - CNP   10 mg at 08/02/22 1672    [Held by provider] losartan (COZAAR) tablet 25 mg (Patient Supplied)  25 mg Oral Daily Louie Amarilis, APRN - CNP   25 mg at 08/01/22 1043    [MAR Hold] rosuvastatin (CRESTOR) tablet 40 mg (Patient Supplied)  40 mg Oral QPM Louie Mystic, APRN - CNP        Anyi Sheer Hold] glucose chewable tablet 16 g  4 tablet Oral PRN Louie Amarilis, APRN - CNP        [MAR Hold] dextrose bolus 10% 125 mL  125 mL IntraVENous PRN Louie Amarilis, APRN - CNP   Stopped at 08/03/22 1219    Or    [MAR Hold] dextrose bolus 10% 250 mL  250 mL IntraVENous PRN Louie Mystic, APRN - CNP        [MAR Hold] glucagon (rDNA) injection 1 mg  1 mg SubCUTAneous PRN Louie Mystic, APRN - CNP        Anyi Sheer Hold] dextrose 10 % infusion   IntraVENous Continuous PRN Louie Amarilis, APRN - CNP        Anyi Sheer Hold] sodium chloride flush 0.9 % injection 5-40 mL  5-40 mL IntraVENous 2 times per day Louie Amarilis, APRN - CNP   10 mL at 08/02/22 2227    [MAR Hold] sodium chloride flush 0.9 % injection 10 mL  10 mL IntraVENous PRN Louie Mystic, APRN - CNP        Anyi Sheer Hold] 0.9 % sodium chloride infusion   IntraVENous PRN Louie Mystic, APRN - CNP 5 mL/hr at 08/03/22 1224 Rate Verify at 08/03/22 1224    [MAR Hold] ondansetron (ZOFRAN-ODT) disintegrating tablet 4 mg  4 mg Oral Q8H PRN Louie Mystic, APRN - CNP        Or    [MAR Hold] ondansetron [facility]) injection 4 mg  4 mg IntraVENous Q6H PRN Louie Mystic, APRN - CNP   4 mg at 08/02/22 2337    [MAR Hold] polyethylene glycol (GLYCOLAX) packet 17 g  17 g Oral Daily PRN DANIEL Eaton - CNP        [MAR Hold] acetaminophen (TYLENOL) tablet 650 mg  650 mg Oral Q6H PRN DANIEL Eaton - CNP        Or    [MAR Hold] acetaminophen (TYLENOL) suppository 650 mg  650 mg Rectal Q6H PRN DANIEL Wellington CNP        [Held by provider] heparin (porcine) injection 5,000 Units  5,000 Units SubCUTAneous 3 times per day DANIEL Wellington CNP   5,000 Units at 08/02/22 0635    [MAR Hold] insulin lispro (HUMALOG) injection vial 0-8 Units  0-8 Units SubCUTAneous TID WC DANIEL Wellington CNP   2 Units at 08/01/22 1714    [MAR Hold] insulin lispro (HUMALOG) injection vial 0-4 Units  0-4 Units SubCUTAneous Nightly DANIEL Wellington CNP   4 Units at 08/01/22 2231    [MAR Hold] furosemide (LASIX) tablet 10 mg (Patient Supplied)  10 mg Oral Daily DANIEL Fernandez CNP   10 mg at 08/03/22 0943    [MAR Hold] FLUoxetine (PROZAC) capsule 20 mg (Patient Supplied)  20 mg Oral BID DANIEL Fernandez CNP   20 mg at 08/03/22 0943    [MAR Hold] omeprazole (PRILOSEC) delayed release capsule 40 mg (Patient Supplied)  40 mg Oral QAM AC DANIEL Wellington CNP   40 mg at 08/02/22 0635    [MAR Hold] insulin glargine (LANTUS) injection vial 45 Units (Patient Supplied)  45 Units SubCUTAneous Daily with breakfast DANIEL Baires - CNP   45 Units at 08/02/22 0854    [MAR Hold] gabapentin (NEURONTIN) capsule 300 mg  300 mg Oral Nightly Honey DANIEL Gtz - CNP   300 mg at 08/02/22 2227    [MAR Hold] epoetin pennie-epbx (RETACRIT) injection 8,000 Units  8,000 Units SubCUTAneous Once per day on Mon Wed Fri Nanci ChamberlainDANIEL menezes - CNP   8,000 Units at 08/01/22 1827    [MAR Hold] guaiFENesin-codeine (GUAIFENESIN AC) 100-10 MG/5ML liquid 5 mL  5 mL Oral Q4H PRN Mohammad I Mashaleh, DO   5 mL at 08/02/22 2226    [MAR Hold] fluticasone (FLONASE) 50 MCG/ACT nasal spray 2 spray  2 spray Each Nostril Daily Mohammad I Mashaleh, DO   2 spray at 08/02/22 1191       Allergies:  No Known Allergies    Problem List:    Patient Active Problem List   Diagnosis Code    End stage renal disease on dialysis ([facility] N18.6, Z99.2    Primary hypertension I10    Hyperlipidemia E78.5    Acute pain of left knee M25.562    Bipolar affective disorder ([facility]) F31.9    Atherosclerosis of aorta ([facility]) I70.0    COVID-19 U07.1    Cerebrovascular accident (CVA) ([facility]) I63.9    Type 2 diabetes mellitus with diabetic neuropathy, with long-term current use of insulin ([facility]) E11.40, Z79.4    Neuropathy of both feet G57.93    GERD (gastroesophageal reflux disease) K21.9    Right sided weakness R53.1    Leg weakness, bilateral R29.898    Recurrent falls R29.6    Acute idiopathic pericarditis I30.0    Small kidney, bilateral E50.2    Umbilical hernia without obstruction or gangrene K42.9    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    Atherosclerosis of other arteries I70.8    Hypotension I95.9    Hyponatremia E87.1    Anemia D64.9    Hyperkalemia E87.5    Left lower quadrant abdominal pain R10.32    Coronary artery disease involving native coronary artery of native heart without angina pectoris I25.10    Pneumonia of right lower lobe due to infectious organism J18.9    Generalized weakness R53.1    Tremor due to multiple drugs G25.1    Hyperglycemia R73.9       Past Medical History:        Diagnosis Date    Cerebral artery occlusion with cerebral infarction [facility])     Closed fracture of bone of right foot March - April 2020    Dialysis patient [facility])     Hemodialysis patient ([facility]     Hyperlipidemia     Hypertension     Kidney disease     On Dialysis    Type 1 diabetes mellitus ([facility]        Past Surgical History:        Procedure Laterality Date    AV FISTULA CREATION Left     WRIST SURGERY  1995       Social History:    Social History     Tobacco Use    Smoking status: Never    Smokeless tobacco: Never   Substance Use Topics    Alcohol use: Never                                Counseling given: Not Answered      Vital Signs (Current):   Vitals:    08/03/22 4609 08/03/22 0653 08/03/22 0730 08/03/22 1131   BP: (!) 159/72 (!) 152/74  (!) 158/69   Pulse: 73 75  74   Resp:    18   Temp:    98.1 °F (36.7 °C)   TempSrc:    Oral   SpO2:    98%   Weight:   188 lb (85.3 kg)    Height:                                                  BP Readings from Last 3 Encounters:   08/03/22 (!) 158/69   07/29/22 136/82   07/15/22 (!) 149/68       NPO Status:                                                                                 BMI:   Wt Readings from Last 3 Encounters:   08/03/22 188 lb (85.3 kg)   07/29/22 189 lb 3.2 oz (85.8 kg)   07/15/22 188 lb 11.2 oz (85.6 kg)     Body mass index is 29.44 kg/m².     CBC:   Lab Results   Component Value Date/Time    WBC 8.8 08/03/2022 05:44 AM    RBC 2.78 08/03/2022 05:44 AM    HGB 8.0 08/03/2022 05:44 AM    HCT 26.3 08/03/2022 05:44 AM    MCV 94.6 08/03/2022 05:44 AM    RDW 14.4 08/03/2022 05:44 AM     08/03/2022 05:44 AM       CMP:   Lab Results   Component Value Date/Time     08/02/2022 05:46 AM    K 4.8 08/02/2022 05:46 AM    CL 98 08/02/2022 05:46 AM    CO2 24 08/02/2022 05:46 AM    BUN 40 08/02/2022 05:46 AM    CREATININE 7.75 08/02/2022 05:46 AM    GFRAA 9 08/02/2022 05:46 AM    LABGLOM 8 08/02/2022 05:46 AM    GLUCOSE 157 08/02/2022 05:46 AM    PROT 6.0 07/31/2022 06:57 PM    CALCIUM 9.2 08/02/2022 05:46 AM    BILITOT 0.35 07/31/2022 06:57 PM    ALKPHOS 134 07/31/2022 06:57 PM    AST 11 07/31/2022 06:57 PM    ALT 10 07/31/2022 06:57 PM       POC Tests:   Recent Labs     08/03/22  1219   POCGLU 100       Coags:   Lab Results   Component Value Date/Time    PROTIME 14.1 08/02/2022 05:46 AM    INR 1.1 08/02/2022 05:46 AM    APTT 26.2 06/06/2022 12:39 PM       HCG (If Applicable): No results found for: PREGTESTUR, PREGSERUM, HCG, HCGQUANT     ABGs: No results found for: PHART, PO2ART, NAD3ZEJ, LOH1DJA, BEART, W0SMXNCU     Type & Screen (If Applicable):  No results found for: LABABO, LABRH    Drug/Infectious Status (If Applicable):  No results found for: HIV, HEPCAB    COVID-19 Screening (If Applicable):   Lab Results   Component Value Date/Time    COVID19 Not Detected 07/14/2022 06:00 AM           Anesthesia Evaluation  Patient summary reviewed and Nursing notes reviewed no history of anesthetic complications:   Airway: Mallampati: II  TM distance: >3 FB   Neck ROM: full  Mouth opening: > = 3 FB   Dental: normal exam         Pulmonary:normal exam                               Cardiovascular:  Exercise tolerance: no interval change,   (+) hypertension:, CAD:,     (-) CABG/stent        Rate: normal                    Neuro/Psych:   (+) CVA:, psychiatric history:            GI/Hepatic/Renal:   (+) GERD:, renal disease: ESRD and dialysis,           Endo/Other:    (+) Diabetes, . Abdominal:             Vascular: Other Findings:           Anesthesia Plan      MAC and general     ASA 4       Induction: intravenous. MIPS: prophylactic pharmacologic antiemetic agents not administered perioperatively for documented reasons. Anesthetic plan and risks discussed with patient. Plan discussed with CRNA.     Attending anesthesiologist reviewed and agrees with Preprocedure content                Ken Fonseca DO   8/3/2022

## 2022-08-03 NOTE — PLAN OF CARE
Adult  Goal: Minimal or absence of nausea and vomiting  Outcome: Progressing  Flowsheets (Taken 8/2/2022 2200)  Minimal or absence of nausea and vomiting: Administer IV fluids as ordered to ensure adequate hydration  Goal: Maintains or returns to baseline bowel function  Outcome: Progressing  Flowsheets (Taken 8/2/2022 2200)  Maintains or returns to baseline bowel function: Assess bowel function  Goal: Maintains adequate nutritional intake  Outcome: Progressing  Flowsheets (Taken 8/2/2022 2200)  Maintains adequate nutritional intake:   Identify factors contributing to decreased intake, treat as appropriate   Assist with meals as needed   Monitor percentage of each meal consumed     Problem: Gastrointestinal - Adult  Goal: Maintains or returns to baseline bowel function  Outcome: Progressing  Flowsheets (Taken 8/2/2022 2200)  Maintains or returns to baseline bowel function: Assess bowel function     Problem: Genitourinary - Adult  Goal: Absence of urinary retention  Outcome: Progressing     Problem: Metabolic/Fluid and Electrolytes - Adult  Goal: Electrolytes maintained within normal limits  Outcome: Progressing  Flowsheets (Taken 8/2/2022 2200)  Electrolytes maintained within normal limits: Monitor labs and assess patient for signs and symptoms of electrolyte imbalances  Goal: Hemodynamic stability and optimal renal function maintained  Outcome: Progressing  Flowsheets (Taken 8/2/2022 2200)  Hemodynamic stability and optimal renal function maintained:   Monitor intake, output and patient weight   Monitor labs and assess for signs and symptoms of volume excess or deficit  Goal: Glucose maintained within prescribed range  Outcome: Progressing  Flowsheets (Taken 8/2/2022 2200)  Glucose maintained within prescribed range: Monitor blood glucose as ordered     Problem: Chronic Conditions and Co-morbidities  Goal: Patient's chronic conditions and co-morbidity symptoms are monitored and maintained or improved  Outcome: Progressing  Flowsheets (Taken 8/2/2022 2200)  Care Plan - Patient's Chronic Conditions and Co-Morbidity Symptoms are Monitored and Maintained or Improved: Monitor and assess patient's chronic conditions and comorbid symptoms for stability, deterioration, or improvement

## 2022-08-03 NOTE — OP NOTE
PROCEDURE NOTE    DATE OF PROCEDURE: 8/3/2022     SURGEON: Jarrod Lyn MD  Facility: [facility]  ASSISTANT: None  Anesthesia: MAC  PREOPERATIVE DIAGNOSIS:   Anemia  Iron deficiency    Diagnosis:  Irregular Z-line biopsies were taken to rule out [place]    In the cardia there is a second gastric fold, biopsies were taken gently    Gastric polyps we sampled one of the most likely fundic gland polyps there in the body and the fundus they are all less than 1 cm in size    Gastritis biopsies were taken    Small bowel biopsies were taken        POSTOPERATIVE DIAGNOSIS: As described below    OPERATION: Upper GI endoscopy with Biopsy    ANESTHESIA: Moderate Sedation     ESTIMATED BLOOD LOSS: Less than 50 ml    COMPLICATIONS: None. SPECIMENS:  Was Obtained:     As above     HISTORY: The patient is a 49y.o. year old male with history of above preop diagnosis. I recommended esophagogastroduodenoscopy with possible biopsy and I explained the risk, benefits, expected outcome, and alternatives to the procedure. Risks included but are not limited to bleeding, infection, respiratory distress, hypotension, and perforation of the esophagus, stomach, or duodenum. Patient understands and is in agreement. The patient was counseled at length about the risks of taran Covid-19 during their perioperative period and any recovery window from their procedure. The patient was made aware that taran Covid-19  may worsen their prognosis for recovering from their procedure  and lend to a higher morbidity and/or mortality risk. All material risks, benefits, and reasonable alternatives including postponing the procedure were discussed. The patient does wish to proceed with the procedure at this time. PROCEDURE: The patient was given IV conscious sedation. The patient's SPO2 remained above 90% throughout the procedure. The gastroscope was inserted orally and advanced under direct vision through the

## 2022-08-03 NOTE — PROGRESS NOTES
[facility]  Office: [address], DO, Kristie Press, DO, Bushra Nyeing, DO, Gerald Marroquin Blood, DO, Gloria Martins MD, Queta Marin MD, John Hartman MD, Latoya Watkins MD,  Alva Cadena MD, Kristine Bryant MD, Stephanie Forman, DO, Harjeet Guido MD,  Jaqueline Pathak MD, Shanae Sam MD, Sally Thacker, DO, Parish Le MD, Sanna Davis MD, Naresh Cervantes MD, Chip Miranda, DO, Margaret Solorio MD, Rm Denney MD, Isai Degroot, CNP,  Preethi Shelley, CNP, Mk House, CNP, Anuradha Gautam, CNP, Roylene Goodell, PA-C, Jolene Sher, DNP, Dayana Deras, CNP, Veronica Fierro, CNP, Debra Al, CNP, Isi Francois, CNP, Yaneth Cornea, CNP, Humble Simmons, CNS, Zulma Love, DNP, Donald Vaughn, CNP, Carcheng Pro, CNP, Smooth Schmidt, CNP           [facility]    Progress Note    8/3/2022    3:40 PM    Name:   Liam Du  MRN:     [id number]     Acct:      [de-identified]   Room:   2106/2106-01   Day:  0  Admit Date:  7/31/2022  6:49 PM    PCP:   DANIEL Molina CNP  Code Status:  Full Code    Subjective:     C/C:   Chief Complaint   Patient presents with    Hyperglycemia    Extremity Weakness     Interval History Status: . Patient was to be discharged yesterday but it was held as GI decided to do EGD and colonoscopy today for anemia work-up  Due for next dialysis tomorrow  Still feels weak although little better than before  He has been accepted at [facility] and will be transported tomorrow after dialysis    Brief History:   Liam Du is a 49 y.o. Non- / non  male who presents with Hyperglycemia and Extremity Weakness   and is admitted to the hospital for the management of Generalized weakness. Patient presented to the ER with complaints of weakness. He states he really has not felt good all day. He said his sugars have been running high despite a poor appetite.   He denies fevers, chest pain, shortness of breath nausea or vomiting. When they attempted to ambulate him in the ER he said he felt too weak and did not think he could so he refused. Patient was admitted here overnight from July 14 through July 15 related to concerns of right lower lobe pneumonia and  left pleural effusion and received IV Lasix that was transition to p.o. Lasix at discharge and IV Levaquin was changed to p.o. Levaquin for an additional 3 doses. Per the note he recently completed a dose of prednisone related to pericarditis     ESRD with treatments Tuesday, Thursday and Saturday-tells me he missed his dialysis on Saturday. Patient has a fistula in his left upper arm and follows with Dr. Sharon Vargas. His initial blood pressure was 190/84 but more recently 160/78 otherwise his vitals are stable. Chest x-ray shows patchy right interstitial and groundglass infiltrates suggestive of pneumonia with small left pleural effusion. Upon reviewing his chart most of his chest x-rays since the beginning of the year have reported similar findings. CT of the head showed nothing acute. Sodium is 130 (patient has chronic hyponatremia that ranges between 130 and 142)  BUN and creatinine are 26/5.43, glucose was greater than 400 but it is currently 205. Beta hydroxy 0. 11. When he was discharged the last time they change his Lantus to 25 units twice daily but he states he is not taking insulin at night and has been taking 45 units in the morning for his previous dose. There is no leukocytosis. Hemoglobin is 8.0 (average hemoglobin between 8.4 and 11.0) In May his iron was 35, TIBC was 149, iron saturation was 23, ferritin was 3028.       He received 60 mg of IV Lasix, Coreg 12.5 mg and 8 units of insulin in the ER         Review of Systems:     Constitutional:  negative for chills, fevers, sweats,+ fatigue  Respiratory:  negative for cough, dyspnea on exertion, shortness of breath, alcohol and does not use drugs. Family History:   Family History   Problem Relation Age of Onset    Diabetes Mother     Heart Disease Father     Diabetes Father     Cancer Paternal Grandmother     Heart Disease Maternal Great Grandfather        Vitals:  BP (!) 152/75   Pulse 67   Temp 97.9 °F (36.6 °C) (Temporal)   Resp 19   Ht 5' 7\" (1.702 m)   Wt 188 lb (85.3 kg)   SpO2 100%   BMI 29.44 kg/m²   Temp (24hrs), Av °F (36.7 °C), Min:97.3 °F (36.3 °C), Max:98.8 °F (37.1 °C)    Recent Labs     22  1159 22  1219 22  1403 22  1432   POCGLU 45* 100 65* 72*         I/O (24Hr):     Intake/Output Summary (Last 24 hours) at 8/3/2022 1540  Last data filed at 8/3/2022 1415  Gross per 24 hour   Intake 609.99 ml   Output --   Net 609.99 ml         Labs:  Hematology:  Recent Labs     22  0546 22  0544   WBC 8.7 9.4 8.8   RBC 2.76* 2.69* 2.78*   HGB 8.0* 7.8* 8.0*   HCT 26.1* 25.5* 26.3*   MCV 94.6 94.8 94.6   MCH 29.0 29.0 28.8   MCHC 30.7 30.6 30.4   RDW 14.6* 14.6* 14.4    252 263   MPV 9.4 9.2 9.5   INR  --  1.1  --        Chemistry:  Recent Labs     22  1623 22  0546 22  0544   *  --  134*  --    K 4.7  --  4.8  --    CL 98  --  98  --    CO2 22  --  24  --    GLUCOSE 407*  --  157*  --    BUN 26*  --  40*  --    CREATININE 5.43*  --  7.75*  --    ANIONGAP 10  --  12  --    LABGLOM 11*  --  8*  --    GFRAA 14*  --  9*  --    CALCIUM 9.0  --  9.2  --    PHOS  --   --  2.7 2.4*   CKTOTAL  --  47  --   --        Recent Labs     22  1857 22  2120 22  1623 22  1638 22  0154 22  0653 22  1159 22  1219 22  1403 22  1432   PROT 6.0*  --   --   --   --   --   --   --   --   --    LABALBU 3.1*  --   --   --   --   --   --   --   --   --    TSH  --   --  0.60  --   --   --   --   --   --   --    AST 11  --   --   --   --   --   --   --   --   --    ALT 10  --   --   -- --   --   --   --   --   --    ALKPHOS 134*  --   --   --   --   --   --   --   --   --    BILITOT 0.35  --   --   --   --   --   --   --   --   --    POCGLU  --    < >  --    < > 158* 137* 45* 100 65* 72*    < > = values in this interval not displayed. ABG:  Lab Results   Component Value Date/Time    FIO2 NOT REPORTED 01/04/2022 11:49 AM     Lab Results   Component Value Date/Time    SPECIAL RT F A 20ML 07/14/2022 06:05 AM     Lab Results   Component Value Date/Time    CULTURE NO GROWTH 5 DAYS 07/14/2022 06:05 AM       Radiology:  CT HEAD WO CONTRAST    Result Date: 7/31/2022  No acute intracranial abnormality. XR CHEST PORTABLE    Result Date: 7/31/2022  Patchy right interstitial and ground-glass infiltrates.   Findings are suggestive of pneumonia Small left pleural effusion       Physical Examination:        General appearance:  alert, cooperative and no distress,   Mental Status:  oriented to person, place and time and normal affect  Lungs:  clear to auscultation bilaterally, normal effort  Heart:  regular rate and rhythm, no murmur  Abdomen:  soft, nontender, nondistended, normal bowel sounds, no masses, hepatomegaly, splenomegaly  Extremities:  no edema, redness, tenderness in the calves  Skin:  no gross lesions, rashes, induration    Assessment:        Hospital Problems             Last Modified POA    * (Principal) Generalized weakness 8/1/2022 Yes    Anemia 8/1/2022 Yes    Coronary artery disease involving native coronary artery of native heart without angina pectoris 8/1/2022 Yes    Tremor due to multiple drugs 8/1/2022 Yes    Hyperglycemia 8/1/2022 Yes    End stage renal disease on dialysis ([facility] [address] 8/1/2022 Yes    Primary hypertension 8/1/2022 Yes    Bipolar affective disorder ([facility] [address] 8/1/2022 Yes    Cerebrovascular accident (CVA) ([facility] [address] 8/1/2022 Yes    Type 2 diabetes mellitus with diabetic neuropathy, with long-term current use of insulin ([facility] [address] 8/1/2022 Yes    Neuropathy of both feet 8/1/2022 Yes GERD (gastroesophageal reflux disease) 8/1/2022 Yes     Plan:        Generalized weakness  PT/OT  Case management to assist with discharge planning, will go to [facility] tomorrow     CAD without angina  Continue home aspirin, losartan, Zetia and beta-blocker     ESRD  Continue dialysis in consultation with nephrology  Lasix 40 mg p.o. daily  Continue home vitamin B12 and calcium acetate  Avoid nephrotoxic agents  Hemoglobin seems to be stable now     Primary hypertension  Continue home Coreg and losartan is on hold     Bipolar disorder  Continue home Prozac and Wellbutrin     History of CVA  Continue Crestor and aspirin     Type 2 diabetes with long-term insulin  Continue Lantus 45 units daily in the morning with breakfast  Add insulin sliding scale medium correction factor  Continue home gabapentin related to neuropathy  5/30/2022 hemoglobin A1c was 8.4     GERD  Continue home PPI     DVT prophylaxis    Avoid baclofen    Discharge to [facility] tomorrow after dialysis      Lamar Corbin MD  8/3/2022  3:40 PM

## 2022-08-03 NOTE — PROGRESS NOTES
DATE: 8/3/2022    NAME: Micha Handy  MRN: [id number]   : 1975    Patient not seen this date for Occupational Therapy due to:      [] Cancel by RN or physician due to:    [] Hemodialysis    [] Critical Lab Value Level     [] Blood transfusion in progress    [] Acute or unstable cardiovascular status   _MAP < 55 or more than >115  _HR < 40 or > 130    [] Acute or unstable pulmonary status   -FiO2 > 60%   _RR < 5 or >40    _O2 sats < 85%    [] Strict Bedrest    [] Off Unit for surgery or procedure    [] Off Unit for testing       [] Pending imaging to R/O fracture    [] Refusal by Patient      [x] Other: preparing for colonoscopy and EGD. [] OT being discontinued at this time. Patient independent. No further needs. [] OT being discontinued at this time as the patient has been transferred to hospice care. No further needs.       DIEGO Hahn

## 2022-08-04 ENCOUNTER — HOSPITAL ENCOUNTER (OUTPATIENT)
Dept: PHYSICAL THERAPY | Facility: CLINIC | Age: 47
Setting detail: THERAPIES SERIES
End: 2022-08-04
Payer: MEDICARE

## 2022-08-04 VITALS
RESPIRATION RATE: 16 BRPM | HEART RATE: 73 BPM | HEIGHT: 67 IN | TEMPERATURE: 97.9 F | SYSTOLIC BLOOD PRESSURE: 169 MMHG | DIASTOLIC BLOOD PRESSURE: 72 MMHG | OXYGEN SATURATION: 99 % | WEIGHT: 188.71 LBS | BODY MASS INDEX: 29.62 KG/M2

## 2022-08-04 LAB
ABSOLUTE EOS #: 0.15 K/UL (ref 0–0.44)
ABSOLUTE IMMATURE GRANULOCYTE: 0.03 K/UL (ref 0–0.3)
ABSOLUTE LYMPH #: 0.87 K/UL (ref 1.1–3.7)
ABSOLUTE MONO #: 0.77 K/UL (ref 0.1–1.2)
ANION GAP SERPL CALCULATED.3IONS-SCNC: 13 MMOL/L (ref 9–17)
BASOPHILS # BLD: 1 % (ref 0–2)
BASOPHILS ABSOLUTE: 0.04 K/UL (ref 0–0.2)
BUN BLDV-MCNC: 25 MG/DL (ref 6–20)
BUN/CREAT BLD: 4 (ref 9–20)
CALCIUM SERPL-MCNC: 8.7 MG/DL (ref 8.6–10.4)
CHLORIDE BLD-SCNC: 94 MMOL/L (ref 98–107)
CO2: 24 MMOL/L (ref 20–31)
CREAT SERPL-MCNC: 6.46 MG/DL (ref 0.7–1.2)
EOSINOPHILS RELATIVE PERCENT: 2 % (ref 1–4)
GFR AFRICAN AMERICAN: 11 ML/MIN
GFR NON-AFRICAN AMERICAN: 9 ML/MIN
GFR SERPL CREATININE-BSD FRML MDRD: ABNORMAL ML/MIN/{1.73_M2}
GLUCOSE BLD-MCNC: 108 MG/DL (ref 70–99)
GLUCOSE BLD-MCNC: 118 MG/DL (ref 75–110)
GLUCOSE BLD-MCNC: 153 MG/DL (ref 75–110)
GLUCOSE BLD-MCNC: 230 MG/DL (ref 75–110)
GLUCOSE BLD-MCNC: 55 MG/DL (ref 75–110)
GLUCOSE BLD-MCNC: 61 MG/DL (ref 75–110)
GLUCOSE BLD-MCNC: 71 MG/DL (ref 75–110)
HCT VFR BLD CALC: 23.3 % (ref 40.7–50.3)
HEMOGLOBIN: 7.1 G/DL (ref 13–17)
IMMATURE GRANULOCYTES: 0 %
LYMPHOCYTES # BLD: 13 % (ref 24–43)
MCH RBC QN AUTO: 28.6 PG (ref 25.2–33.5)
MCHC RBC AUTO-ENTMCNC: 30.5 G/DL (ref 28.4–34.8)
MCV RBC AUTO: 94 FL (ref 82.6–102.9)
MONOCYTES # BLD: 12 % (ref 3–12)
NRBC AUTOMATED: 0 PER 100 WBC
PDW BLD-RTO: 14.3 % (ref 11.8–14.4)
PHOSPHORUS: 3.5 MG/DL (ref 2.5–4.5)
PLATELET # BLD: 214 K/UL (ref 138–453)
PMV BLD AUTO: 9.2 FL (ref 8.1–13.5)
POTASSIUM SERPL-SCNC: 4.4 MMOL/L (ref 3.7–5.3)
RBC # BLD: 2.48 M/UL (ref 4.21–5.77)
SEG NEUTROPHILS: 72 % (ref 36–65)
SEGMENTED NEUTROPHILS ABSOLUTE COUNT: 4.83 K/UL (ref 1.5–8.1)
SODIUM BLD-SCNC: 131 MMOL/L (ref 135–144)
WBC # BLD: 6.7 K/UL (ref 3.5–11.3)

## 2022-08-04 PROCEDURE — 84100 ASSAY OF PHOSPHORUS: CPT

## 2022-08-04 PROCEDURE — 99217 PR OBSERVATION CARE DISCHARGE MANAGEMENT: CPT | Performed by: INTERNAL MEDICINE

## 2022-08-04 PROCEDURE — 2580000003 HC RX 258: Performed by: INTERNAL MEDICINE

## 2022-08-04 PROCEDURE — 85025 COMPLETE CBC W/AUTO DIFF WBC: CPT

## 2022-08-04 PROCEDURE — 80048 BASIC METABOLIC PNL TOTAL CA: CPT

## 2022-08-04 PROCEDURE — 90935 HEMODIALYSIS ONE EVALUATION: CPT

## 2022-08-04 PROCEDURE — 36415 COLL VENOUS BLD VENIPUNCTURE: CPT

## 2022-08-04 PROCEDURE — G0378 HOSPITAL OBSERVATION PER HR: HCPCS

## 2022-08-04 PROCEDURE — 82947 ASSAY GLUCOSE BLOOD QUANT: CPT

## 2022-08-04 RX ADMIN — BUPROPION HYDROCHLORIDE 150 MG: 150 TABLET ORAL at 08:34

## 2022-08-04 RX ADMIN — SODIUM CHLORIDE, PRESERVATIVE FREE 10 ML: 5 INJECTION INTRAVENOUS at 08:35

## 2022-08-04 RX ADMIN — CALCIUM ACETATE 1334 MG: 667 TABLET ORAL at 17:27

## 2022-08-04 RX ADMIN — CARVEDILOL 25 MG: 12.5 TABLET ORAL at 08:34

## 2022-08-04 RX ADMIN — FLUOXETINE HYDROCHLORIDE 20 MG: 20 CAPSULE ORAL at 08:33

## 2022-08-04 RX ADMIN — CARVEDILOL 25 MG: 12.5 TABLET ORAL at 17:27

## 2022-08-04 RX ADMIN — OMEPRAZOLE 40 MG: 40 CAPSULE, DELAYED RELEASE ORAL at 06:33

## 2022-08-04 RX ADMIN — EZETIMIBE 10 MG: 10 TABLET ORAL at 08:34

## 2022-08-04 RX ADMIN — FUROSEMIDE 10 MG: 20 TABLET ORAL at 08:34

## 2022-08-04 RX ADMIN — INSULIN GLARGINE 45 UNITS: 100 INJECTION, SOLUTION SUBCUTANEOUS at 08:36

## 2022-08-04 RX ADMIN — CALCIUM ACETATE 1334 MG: 667 TABLET ORAL at 08:34

## 2022-08-04 NOTE — PROGRESS NOTES
Dialysis orders received from Dr. Thien Huddleston. Implemented All Universal Safety Interventions:  [place] to call system. Call bell, personal items and telephone within reach. Instruct patient to call for assistance. Room bathroom lighting operational. Non-slip footwear when patient is off stretcher. Physically safe environment: no spills, clutter or unnecessary equipment. Stretcher in lowest position, wheels locked, appropriate side rails in place.

## 2022-08-04 NOTE — CARE COORDINATION
[facility] will admit today. [facility] will transport at [address]. Nurse to call report to [phone]. Patient is agreeable with dc plans.  Luis Vera

## 2022-08-04 NOTE — PROGRESS NOTES
Physical Therapy  DATE: 2022    NAME: Nataliia Estrada  MRN: [id number]   : 1975    Patient not seen this date for Physical Therapy due to:      [] Cancel by RN or physician due to:    [x] Hemodialysis    Pt off unit, will continue to follow. [] Critical Lab Value Level     [] Blood transfusion in progress    [] Acute or unstable cardiovascular status   _MAP < 55 or more than >115  _HR < 40 or > 130    [] Acute or unstable pulmonary status   -FiO2 > 60%   _RR < 5 or >40    _O2 sats < 85%    [] Strict Bedrest    [] Off Unit for surgery or procedure    [] Off Unit for testing       [] Pending imaging to R/O fracture    [] Refusal by Patient      [] Other      [] PT being discontinued at this time. Patient independent. No further needs. [] PT being discontinued at this time as the patient has been transferred to hospice care. No further needs.       Leocadia Chain, PTA

## 2022-08-04 NOTE — PLAN OF CARE
Problem: Discharge Planning  Goal: Discharge to home or other facility with appropriate resources  8/4/2022 1101 by Ellen Marshall RN  Outcome: Progressing  8/4/2022 0001 by Starr Woods RN  Outcome: Progressing     Problem: Chronic Conditions and Co-morbidities  Goal: Patient's chronic conditions and co-morbidity symptoms are monitored and maintained or improved  Outcome: Progressing     Problem: Skin/Tissue Integrity  Goal: Absence of new skin breakdown  Description: 1. Monitor for areas of redness and/or skin breakdown  2. Assess vascular access sites hourly  3. Every 4-6 hours minimum:  Change oxygen saturation probe site  4. Every 4-6 hours:  If on nasal continuous positive airway pressure, respiratory therapy assess nares and determine need for appliance change or resting period.   8/4/2022 1101 by Ellen Marshall RN  Outcome: Adequate for Discharge  8/4/2022 0001 by Starr Woods RN  Outcome: Progressing     Problem: Safety - Adult  Goal: Free from fall injury  8/4/2022 1101 by Ellen Marshall RN  Outcome: Adequate for Discharge  8/4/2022 0001 by Starr Woods RN  Outcome: Progressing     Problem: ABCDS Injury Assessment  Goal: Absence of physical injury  8/4/2022 1101 by Ellen Marshall RN  Outcome: Adequate for Discharge  Flowsheets (Taken 8/4/2022 0849)  Absence of Physical Injury: Implement safety measures based on patient assessment  8/4/2022 0001 by Starr Woods RN  Outcome: Progressing     Problem: Neurosensory - Adult  Goal: Achieves stable or improved neurological status  Outcome: Adequate for Discharge     Problem: Respiratory - Adult  Goal: Achieves optimal ventilation and oxygenation  Outcome: Adequate for Discharge     Problem: Skin/Tissue Integrity - Adult  Goal: Skin integrity remains intact  8/4/2022 1101 by Ellen Marshall RN  Outcome: Adequate for Discharge  Flowsheets (Taken 8/4/2022 0849)  Skin Integrity Remains Intact: Monitor for areas of redness and/or skin

## 2022-08-04 NOTE — PROGRESS NOTES
[facility]  Office: [address], DO, Eronjose De La Garza, DO, Vanessa Peraza, DO, Lorna Cabrera Blood, DO, Evelyne Kolb MD, Dami Blanchard MD, Di Stevens MD, Chica Spencer MD,  Marilee Staley MD, Arnaud Bagley MD, Valencia Christy, DO, Alton Harley MD,  Arturo Rocha MD, Alejandro Najera MD, Tammy Whittington, DO, Janice Rivera MD, Fern Costa MD, Heri Brunson MD, Jewell Davidson DO, Olga Heath MD, Leann Luna MD, Rosalba Simental, CNP,  America Koehler, CNP, Michael Dumont, CNP, Shant Sanchez, CNP, RICHIE AlexanderC, Lima Domínguez, DNP, Milagro Wolfe, CNP, Daniel Zimmerman, CNP, Adriana Crabtree, CNP, Ulysses Sykes, CNP, Clinton Zamorano, CNP, Garry Boles, CNS, Mellisa Rogers, [facility], Mal Cavanaugh, CNP, Dagmar Michaud, CNP, Sandoval Ogden, [facility]    Progress Note    8/4/2022    3:37 PM    Name:   Alyssa Abdi  MRN:     [id number]     Acct:      [de-identified]   Room:   2106/2106-01   Day:  0  Admit Date:  7/31/2022  6:49 PM    PCP:   DANIEL Boss CNP  Code Status:  Full Code    Subjective:     C/C:   Chief Complaint   Patient presents with    Hyperglycemia    Extremity Weakness     Interval History Status: . Patient had endoscopic procedures yesterday with GI, colonoscopy showed diverticulosis, EGD showed abnormal esophagus with irregular Z-line, few gastric polyps and gastritis, biopsies were taken. Patient is due for dialysis today again    Denies any other problem, weakness is slowly improving    Brief History:   Alyssa Abdi is a 49 y.o. Non- / non  male who presents with Hyperglycemia and Extremity Weakness   and is admitted to the hospital for the management of Generalized weakness. Patient presented to the ER with complaints of weakness. He states he really has not felt good all day.   He said his sugars have been running high despite a poor appetite. He denies fevers, chest pain, shortness of breath nausea or vomiting. When they attempted to ambulate him in the ER he said he felt too weak and did not think he could so he refused. Patient was admitted here overnight from July 14 through July 15 related to concerns of right lower lobe pneumonia and  left pleural effusion and received IV Lasix that was transition to p.o. Lasix at discharge and IV Levaquin was changed to p.o. Levaquin for an additional 3 doses. Per the note he recently completed a dose of prednisone related to pericarditis     ESRD with treatments Tuesday, Thursday and Saturday-tells me he missed his dialysis on Saturday. Patient has a fistula in his left upper arm and follows with Dr. Conrado Agrawal. His initial blood pressure was 190/84 but more recently 160/78 otherwise his vitals are stable. Chest x-ray shows patchy right interstitial and groundglass infiltrates suggestive of pneumonia with small left pleural effusion. Upon reviewing his chart most of his chest x-rays since the beginning of the year have reported similar findings. CT of the head showed nothing acute. Sodium is 130 (patient has chronic hyponatremia that ranges between 130 and 142)  BUN and creatinine are 26/5.43, glucose was greater than 400 but it is currently 205. Beta hydroxy 0. 11. When he was discharged the last time they change his Lantus to 25 units twice daily but he states he is not taking insulin at night and has been taking 45 units in the morning for his previous dose. There is no leukocytosis. Hemoglobin is 8.0 (average hemoglobin between 8.4 and 11.0) In May his iron was 35, TIBC was 149, iron saturation was 23, ferritin was 3028.       He received 60 mg of IV Lasix, Coreg 12.5 mg and 8 units of insulin in the ER     8/3/2022  Patient was to be discharged yesterday but it was held as GI decided to do EGD and colonoscopy today for anemia work-up  Due for next dialysis tomorrow  Still feels weak although little better than before  He has been accepted at [facility] and will be transported tomorrow after dialysis  Review of Systems:     Constitutional:  negative for chills, fevers, sweats,+ fatigue  Respiratory:  negative for cough, dyspnea on exertion, shortness of breath, wheezing  Cardiovascular:  negative for chest pain, chest pressure/discomfort, lower extremity edema, palpitations  Gastrointestinal:  negative for abdominal pain, constipation, diarrhea, nausea, vomiting  Neurological:  negative for dizziness, headache, gait problem due to weakness    Medications:      Allergies:  No Known Allergies    Current Meds:   Scheduled Meds:    rosuvastatin  10 mg Oral QPM    carvedilol  25 mg Oral BID WC    [Held by provider] aspirin  81 mg Oral Daily    buPROPion  150 mg Oral QAM    calcium acetate  1,334 mg Oral TID WC    ezetimibe  10 mg Oral Daily    [Held by provider] losartan  25 mg Oral Daily    sodium chloride flush  5-40 mL IntraVENous 2 times per day    [Held by provider] heparin (porcine)  5,000 Units SubCUTAneous 3 times per day    insulin lispro  0-8 Units SubCUTAneous TID WC    insulin lispro  0-4 Units SubCUTAneous Nightly    furosemide  10 mg Oral Daily    FLUoxetine  20 mg Oral BID    omeprazole  40 mg Oral QAM AC    insulin glargine  45 Units SubCUTAneous Daily with breakfast    gabapentin  300 mg Oral Nightly    epoetin pennie-epbx  8,000 Units SubCUTAneous Once per day on Mon Wed Fri    fluticasone  2 spray Each Nostril Daily     Continuous Infusions:    dextrose      sodium chloride Stopped (08/03/22 1250)     PRN Meds: hydrALAZINE, glucose, dextrose bolus **OR** dextrose bolus, glucagon (rDNA), dextrose, sodium chloride flush, sodium chloride, ondansetron **OR** ondansetron, polyethylene glycol, acetaminophen **OR** acetaminophen, guaiFENesin-codeine    Data:     Past Medical History:   has a past medical history of Cerebral artery occlusion with cerebral infarction [facility]), Closed fracture of bone of right foot, Dialysis patient [facility]), Hemodialysis patient ([facility] [address] Hyperlipidemia, Hypertension, Kidney disease, and Type 1 diabetes mellitus ([facility] [address] Social History:   reports that he has never smoked. He has never used smokeless tobacco. He reports that he does not drink alcohol and does not use drugs. Family History:   Family History   Problem Relation Age of Onset    Diabetes Mother     Heart Disease Father     Diabetes Father     Cancer Paternal Grandmother     Heart Disease Maternal Great Grandfather        Vitals:  BP (!) 179/86   Pulse 69   Temp 98 °F (36.7 °C)   Resp 16   Ht 5' 7\" (1.702 m)   Wt 188 lb 11.4 oz (85.6 kg)   SpO2 94%   BMI 29.56 kg/m²   Temp (24hrs), Av.9 °F (36.6 °C), Min:97.5 °F (36.4 °C), Max:98.1 °F (36.7 °C)    Recent Labs     22  0405 22  0422 22  0727 22  1128   POCGLU 61* 71* 118* 230*         I/O (24Hr):     Intake/Output Summary (Last 24 hours) at 2022 1537  Last data filed at 8/3/2022 1601  Gross per 24 hour   Intake 2.21 ml   Output --   Net 2.21 ml         Labs:  Hematology:  Recent Labs     22  0546 22  0544 22  0539   WBC 9.4 8.8 6.7   RBC 2.69* 2.78* 2.48*   HGB 7.8* 8.0* 7.1*   HCT 25.5* 26.3* 23.3*   MCV 94.8 94.6 94.0   MCH 29.0 28.8 28.6   MCHC 30.6 30.4 30.5   RDW 14.6* 14.4 14.3    263 214   MPV 9.2 9.5 9.2   INR 1.1  --   --        Chemistry:  Recent Labs     22  1623 22  0546 22  0544 22  0539   NA  --  134*  --  131*   K  --  4.8  --  4.4   CL  --  98  --  94*   CO2  --  24  --  24   GLUCOSE  --  157*  --  108*   BUN  --  40*  --  25*   CREATININE  --  7.75*  --  6.46*   ANIONGAP  --  12  --  13   LABGLOM  --  8*  --  9*   GFRAA  --  9*  --  11*   CALCIUM  --  9.2  --  8.7   PHOS  --  2.7 2.4* 3.5   CKTOTAL 47  --   --   --        Recent Labs     22  1623 22  1638 22  1937 22  0343 22  0405 22  0422 22  0727 22  1128   TSH 0.60  --   --   --   --   --   --   --    POCGLU  --    < > 168* 55* 61* 71* 118* 230*    < > = values in this interval not displayed. ABG:  Lab Results   Component Value Date/Time    FIO2 NOT REPORTED 01/04/2022 11:49 AM     Lab Results   Component Value Date/Time    SPECIAL RT F A 20ML 07/14/2022 06:05 AM     Lab Results   Component Value Date/Time    CULTURE NO GROWTH 5 DAYS 07/14/2022 06:05 AM       Radiology:  CT HEAD WO CONTRAST    Result Date: 7/31/2022  No acute intracranial abnormality. XR CHEST PORTABLE    Result Date: 7/31/2022  Patchy right interstitial and ground-glass infiltrates.   Findings are suggestive of pneumonia Small left pleural effusion       Physical Examination:        General appearance:  alert, cooperative and no distress,   Mental Status:  oriented to person, place and time and normal affect  Lungs:  clear to auscultation bilaterally, normal effort  Heart:  regular rate and rhythm, no murmur  Abdomen:  soft, nontender, nondistended, normal bowel sounds, no masses, hepatomegaly, splenomegaly  Extremities:  no edema, redness, tenderness in the calves  Skin:  no gross lesions, rashes, induration    Assessment:        Hospital Problems             Last Modified POA    * (Principal) Generalized weakness 8/1/2022 Yes    Anemia 8/1/2022 Yes    Coronary artery disease involving native coronary artery of native heart without angina pectoris 8/1/2022 Yes    Tremor due to multiple drugs 8/1/2022 Yes    Hyperglycemia 8/1/2022 Yes    End stage renal disease on dialysis ([address] 8/1/2022 Yes    Primary hypertension 8/1/2022 Yes    Bipolar affective disorder ([address] 8/1/2022 Yes    Cerebrovascular accident (CVA) ([address] 8/1/2022 Yes    Type 2 diabetes mellitus with diabetic neuropathy, with long-term current use of insulin ([address] 8/1/2022 Yes    Neuropathy of both feet 8/1/2022 Yes    GERD (gastroesophageal reflux disease) 8/1/2022 Yes     Plan:        Generalized weakness  PT/OT  Case management to assist with discharge planning, will go to [facility] today after dialysis     CAD without angina  Continue home aspirin, losartan, Zetia and beta-blocker     ESRD  Continue dialysis in consultation with nephrology  Lasix 40 mg p.o. daily  Continue home vitamin B12 and calcium acetate  Avoid nephrotoxic agents  Hemoglobin seems to be stable now     Primary hypertension  Continue home Coreg and losartan is on hold     Bipolar disorder  Continue home Prozac and Wellbutrin     History of CVA  Continue Crestor and aspirin     Type 2 diabetes with long-term insulin  Continue Lantus 45 units daily in the morning with breakfast  Add insulin sliding scale medium correction factor  Continue home gabapentin related to neuropathy  5/30/2022 hemoglobin A1c was 8.4     GERD  Continue home PPI     DVT prophylaxis    Avoid baclofen in future    Discharge to [facility] today after dialysis      Eloisa Doss MD  8/4/2022  3:37 PM

## 2022-08-04 NOTE — PROGRESS NOTES
Reason for Follow up: ESRD    HISTORY:    Patient seen on dialysis. UF goal 3500 ml  Electrolytes are stable. Will most likely be discharged to [facility] this evening. Review Of Systems:   Constitutional: No fever, chills, lethargy, weakness or wt loss. Cardiac:  No chest pain, dyspnea, orthopnea or PND. Pulmonary:  No cough, phlegm or wheezing. Abdomen:  No abdominal pain, nausea, vomiting or diarrhea. :   No hematuria, pyuria, dysuria or flank pain. Extremities:  No swelling or joint pains. Scheduled Meds:   rosuvastatin  10 mg Oral QPM    carvedilol  25 mg Oral BID WC    [Held by provider] aspirin  81 mg Oral Daily    buPROPion  150 mg Oral QAM    calcium acetate  1,334 mg Oral TID WC    ezetimibe  10 mg Oral Daily    [Held by provider] losartan  25 mg Oral Daily    sodium chloride flush  5-40 mL IntraVENous 2 times per day    [Held by provider] heparin (porcine)  5,000 Units SubCUTAneous 3 times per day    insulin lispro  0-8 Units SubCUTAneous TID WC    insulin lispro  0-4 Units SubCUTAneous Nightly    furosemide  10 mg Oral Daily    FLUoxetine  20 mg Oral BID    omeprazole  40 mg Oral QAM AC    insulin glargine  45 Units SubCUTAneous Daily with breakfast    gabapentin  300 mg Oral Nightly    epoetin pennie-epbx  8,000 Units SubCUTAneous Once per day on Mon Wed Fri    fluticasone  2 spray Each Nostril Daily   Continuous Infusions:   dextrose      sodium chloride Stopped (08/03/22 1250)     PRN Meds:hydrALAZINE, glucose, dextrose bolus **OR** dextrose bolus, glucagon (rDNA), dextrose, sodium chloride flush, sodium chloride, ondansetron **OR** ondansetron, polyethylene glycol, acetaminophen **OR** acetaminophen, guaiFENesin-codeine    No Known Allergies    Physical Exam:  Blood pressure (!) 160/77, pulse 69, temperature 98 °F (36.7 °C), resp. rate 16, height 5' 7\" (1.702 m), weight 188 lb 11.4 oz (85.6 kg), SpO2 94 %. In: 612.2 [P.O.:150;  I.V.:462.2]  Out: -   In: 612.2   Out: -     General:  Awake, alert, not in distress. Appears to be stated age. HEENT: Atraumatic, normocephalic. Anicteric sclera. Pink and moist oral mucosa. No carotid bruit. No JVD. Chest: Bilateral air entry, clear to auscultation, no wheezing, rhonchi or rales. Cardiovascular: RRR, S1S2, no murmur, rub or gallop. Has lower extremity edema. Abdomen: Soft, non tender to palpation. Active bowel sounds x 4 quadrants. Musculoskeletal: Active ROM x 4 extremities. No cyanosis or clubbing. Integumentary: Pink, warm and dry. Free from rash or lesions. Skin turgor normal.  CNS: Oriented to person, place and time. Speech clear. Face symmetrical. No tremor.      Data:  CBC:   Lab Results   Component Value Date    WBC 6.7 08/04/2022    HGB 7.1 (L) 08/04/2022    HCT 23.3 (L) 08/04/2022    MCV 94.0 08/04/2022     08/04/2022     BMP:    Lab Results   Component Value Date     (L) 08/04/2022    K 4.4 08/04/2022    CL 94 (L) 08/04/2022    CO2 24 08/04/2022    BUN 25 (H) 08/04/2022    CREATININE 6.46 (HH) 08/04/2022    GLUCOSE 108 (H) 08/04/2022     CMP:   Lab Results   Component Value Date     (L) 08/04/2022    K 4.4 08/04/2022    CL 94 (L) 08/04/2022    CO2 24 08/04/2022    BUN 25 (H) 08/04/2022    CREATININE 6.46 (HH) 08/04/2022    GLUCOSE 108 (H) 08/04/2022    CALCIUM 8.7 08/04/2022    PROT 6.0 (L) 07/31/2022    LABALBU 3.1 (L) 07/31/2022    BILITOT 0.35 07/31/2022    ALKPHOS 134 (H) 07/31/2022    AST 11 07/31/2022    ALT 10 07/31/2022      Hepatic:   Lab Results   Component Value Date    AST 11 07/31/2022    ALT 10 07/31/2022    BILITOT 0.35 07/31/2022    ALKPHOS 134 (H) 07/31/2022     BNP: No results found for: BNP  Lipids:   Lab Results   Component Value Date    CHOL 118 04/12/2022    HDL 61 04/12/2022     INR:   Lab Results   Component Value Date    INR 1.1 08/02/2022     PTH: No results found for: PTH  Phosphorus:    Lab Results   Component Value Date    PHOS 3.5 08/04/2022     Ionized Calcium: No results found for: IONCA  Magnesium:   Lab Results   Component Value Date    MG 1.7 06/17/2022     Albumin:   Lab Results   Component Value Date    LABALBU 3.1 (L) 07/31/2022     Last 3 CK, CKMB, Troponin: @LABRCNT(CKTOTAL:3,CKMB:3,TROPONINI:3)       URINE:)No results found for: Blanca Gates    Radiology:   Reviewed. Assessment:  End-stage renal disease  Hypertension primary as well as secondary due to volume overload  Anemia of end-stage renal disease  Baclofen reduced to weakness  Mineral bone disorder    Plan:  HD today, continue his normal TTS schedule. UF as tolerated  Avoid use of baclofen in future  Okay to continue Neurontin  Erythropoiesis stimulating agents  Renal diet with oral fluid restriction  AV fistula working well  Avoid hypotension, nephrotoxic drugs, Lovenox, Fleets enema and IV contrast exposure. Please do not hesitate to call with questions. We will follow with you. Patient seen in collaboration with Dr. Andrea Lewis. Electronically signed by DANIEL Rosales CNP  on 8/4/2022 at 12:36 PM  [facility]. Ph: [phone]      Physician Addendum  I have personally examined and evaluated the patient, reviewed documentations and lab  Patient was seen while receiving hemodialysis treatment  No new complaints    Physical  Exam:  Blood pressure (!) 175/79, pulse 68, temperature 98 °F (36.7 °C), resp. rate 16, height 5' 7\" (1.702 m), weight 188 lb 11.4 oz (85.6 kg), SpO2 94 %. In: 612.2 [P.O.:150; I.V.:462.2]  Out: -   In: 612.2   Out: -       General:  Awake, alert, not in distress. Appears to be stated age. HEENT: Atraumatic, normocephalic. Anicteric sclera. Pink and moist oral mucosa. Neck supple. No JVD. Chest: Bilateral air entry, clear to auscultation, no wheezing, rhonchi or rales. Cardiovascular: RRR, S1S2, no murmur, rub or gallop. No lower extremity edema. Abdomen: Soft, non tender to palpation.   Musculoskeletal: No cyanosis or clubbing. Integumentary: Pink, warm and dry. Free from rash or lesions. CNS: Oriented to person, place and time. Speech clear. Face symmetrical. No tremor. Assessment   End-stage renal disease  Essential hypertension with worsening of blood pressure secondary to volume overload   Anemia of end-stage renal disease  Baclofen reduced to weakness  Mineral bone disorder     Plan   Seen today while receiving hemodialysis treatment   Recommend avoiding further use of Baclofen in future  Continue low-dose Neurontin  Okay for discharge from nephrology perspective    Please do not hesitate to call with questions.       Electronically signed by July Francisco MD  on 8/4/2022 at 3:04 PM

## 2022-08-04 NOTE — PLAN OF CARE
Problem: Discharge Planning  Goal: Discharge to home or other facility with appropriate resources  Outcome: Progressing     Problem: Skin/Tissue Integrity  Goal: Absence of new skin breakdown  Description: 1. Monitor for areas of redness and/or skin breakdown  2. Assess vascular access sites hourly  3. Every 4-6 hours minimum:  Change oxygen saturation probe site  4. Every 4-6 hours:  If on nasal continuous positive airway pressure, respiratory therapy assess nares and determine need for appliance change or resting period.   Outcome: Progressing     Problem: Safety - Adult  Goal: Free from fall injury  Outcome: Progressing     Problem: ABCDS Injury Assessment  Goal: Absence of physical injury  Outcome: Progressing     Problem: Skin/Tissue Integrity - Adult  Goal: Skin integrity remains intact  Outcome: Progressing

## 2022-08-04 NOTE — PROGRESS NOTES
Treatment time: 210 minutes    Net UF: 3000 mL    Pre weight: 85.6 kg  Post weight: 82.6 kg  EDW: n/a    Access used: AVF LUE  Access function: good    Medications or blood products given: none    Regular outpatient schedule: TTS [employer] of response to treatment: Patient tolerated treatment well, no signs or symptoms of distress noted during treatment, orders reviewed with Dr. Felisa Alfred prior to dialysis, Dr. Felisa Alfred evaluated patient during dialysis, report given to [facility]. Copy of dialysis treatment record placed in chart, to be scanned into EMR.

## 2022-08-04 NOTE — PROGRESS NOTES
DATE: 2022    NAME: Vannesa Medina  MRN: [id number]   : 1975    Patient not seen this date for Occupational Therapy due to:      [] Cancel by RN or physician due to:    [x] Hemodialysis    [] Critical Lab Value Level     [] Blood transfusion in progress    [] Acute or unstable cardiovascular status   _MAP < 55 or more than >115  _HR < 40 or > 130    [] Acute or unstable pulmonary status   -FiO2 > 60%   _RR < 5 or >40    _O2 sats < 85%    [] Strict Bedrest    [] Off Unit for surgery or procedure    [] Off Unit for testing       [] Pending imaging to R/O fracture    [] Refusal by Patient      [] Other      [] OT being discontinued at this time. Patient independent. No further needs. [] OT being discontinued at this time as the patient has been transferred to hospice care. No further needs.       Domi Schneider DIEGO

## 2022-08-04 NOTE — PROGRESS NOTES
Pt discharged to [facility] in good condition with belongings via [facility] ambulette/wheelchair  Packet given to [facility] to deliver to facility  Pt denies having any further questions at this time  Locked up home medication(s)/personal items given to patient at discharge  Patient/family state they have everything they were admitted with.

## 2022-08-05 ENCOUNTER — HOSPITAL ENCOUNTER (OUTPATIENT)
Age: 47
Setting detail: SPECIMEN
Discharge: HOME | End: 2022-08-05

## 2022-08-05 LAB
ABSOLUTE EOS #: 0.15 K/UL (ref 0–0.44)
ABSOLUTE IMMATURE GRANULOCYTE: 0.02 K/UL (ref 0–0.3)
ABSOLUTE LYMPH #: 1.07 K/UL (ref 1.1–3.7)
ABSOLUTE MONO #: 0.73 K/UL (ref 0.1–1.2)
ANION GAP SERPL CALCULATED.3IONS-SCNC: 11 MMOL/L (ref 9–17)
BASOPHILS # BLD: 1 % (ref 0–2)
BASOPHILS ABSOLUTE: 0.05 K/UL (ref 0–0.2)
BUN BLDV-MCNC: 17 MG/DL (ref 6–20)
BUN/CREAT BLD: 3 (ref 9–20)
CALCIUM SERPL-MCNC: 9.1 MG/DL (ref 8.6–10.4)
CHLORIDE BLD-SCNC: 97 MMOL/L (ref 98–107)
CO2: 27 MMOL/L (ref 20–31)
CREAT SERPL-MCNC: 5.02 MG/DL (ref 0.7–1.2)
EOSINOPHILS RELATIVE PERCENT: 2 % (ref 1–4)
GFR AFRICAN AMERICAN: 15 ML/MIN
GFR NON-AFRICAN AMERICAN: 12 ML/MIN
GFR SERPL CREATININE-BSD FRML MDRD: ABNORMAL ML/MIN/{1.73_M2}
GLUCOSE BLD-MCNC: 61 MG/DL (ref 70–99)
HBV SURFACE AB TITR SER: 19.41 MIU/ML
HCT VFR BLD CALC: 25.2 % (ref 40.7–50.3)
HEMOGLOBIN: 7.5 G/DL (ref 13–17)
HEPATITIS B SURFACE ANTIGEN: NONREACTIVE
IMMATURE GRANULOCYTES: 0 %
LYMPHOCYTES # BLD: 14 % (ref 24–43)
MAGNESIUM: 1.9 MG/DL (ref 1.6–2.6)
MCH RBC QN AUTO: 28.1 PG (ref 25.2–33.5)
MCHC RBC AUTO-ENTMCNC: 29.8 G/DL (ref 28.4–34.8)
MCV RBC AUTO: 94.4 FL (ref 82.6–102.9)
MONOCYTES # BLD: 10 % (ref 3–12)
NRBC AUTOMATED: 0 PER 100 WBC
PDW BLD-RTO: 14.4 % (ref 11.8–14.4)
PHOSPHORUS: 3 MG/DL (ref 2.5–4.5)
PLATELET # BLD: 270 K/UL (ref 138–453)
PMV BLD AUTO: 9.6 FL (ref 8.1–13.5)
POTASSIUM SERPL-SCNC: 4 MMOL/L (ref 3.7–5.3)
RBC # BLD: 2.67 M/UL (ref 4.21–5.77)
SEG NEUTROPHILS: 73 % (ref 36–65)
SEGMENTED NEUTROPHILS ABSOLUTE COUNT: 5.42 K/UL (ref 1.5–8.1)
SODIUM BLD-SCNC: 135 MMOL/L (ref 135–144)
SURGICAL PATHOLOGY REPORT: NORMAL
WBC # BLD: 7.4 K/UL (ref 3.5–11.3)

## 2022-08-05 PROCEDURE — 80048 BASIC METABOLIC PNL TOTAL CA: CPT

## 2022-08-05 PROCEDURE — 86317 IMMUNOASSAY INFECTIOUS AGENT: CPT

## 2022-08-05 PROCEDURE — 87340 HEPATITIS B SURFACE AG IA: CPT

## 2022-08-05 PROCEDURE — 83735 ASSAY OF MAGNESIUM: CPT

## 2022-08-05 PROCEDURE — P9603 ONE-WAY ALLOW PRORATED MILES: HCPCS

## 2022-08-05 PROCEDURE — 36415 COLL VENOUS BLD VENIPUNCTURE: CPT

## 2022-08-05 PROCEDURE — 84100 ASSAY OF PHOSPHORUS: CPT

## 2022-08-05 PROCEDURE — 85025 COMPLETE CBC W/AUTO DIFF WBC: CPT

## 2022-08-09 ENCOUNTER — TELEPHONE (OUTPATIENT)
Dept: NEUROLOGY | Age: 47
End: 2022-08-09

## 2022-08-09 ENCOUNTER — APPOINTMENT (OUTPATIENT)
Dept: PHYSICAL THERAPY | Facility: CLINIC | Age: 47
End: 2022-08-09
Payer: MEDICARE

## 2022-08-09 NOTE — TELEPHONE ENCOUNTER
Received a call from nurse at [facility] [address]. Pt. is there. Apparently he was at [facility] and seen by neurology. When he was sent back to [facility] there was a note that said pt. to have event monitor but she said there is no order. i told her he has not been seen here yet so it had to have been [facility] who saw him at [facility] as we do not go there. She said since he is coming here as a new pt. tomorrow for Dr. Laurel Trevizo she will just leave it up to her if he should have an event monitor and if so they will need the order. She has contacted Dr. Ed Urbina office cardiology but they won't schedule without an order. Will notify Dr. Laurel Trevizo and she can make determination after his NP visit tomorrow.

## 2022-08-10 ENCOUNTER — HOSPITAL ENCOUNTER (OUTPATIENT)
Age: 47
Setting detail: SPECIMEN
Discharge: HOME | End: 2022-08-10

## 2022-08-10 LAB
ABSOLUTE EOS #: 0.16 K/UL (ref 0–0.44)
ABSOLUTE IMMATURE GRANULOCYTE: 0.04 K/UL (ref 0–0.3)
ABSOLUTE LYMPH #: 0.72 K/UL (ref 1.1–3.7)
ABSOLUTE MONO #: 0.85 K/UL (ref 0.1–1.2)
ALBUMIN SERPL-MCNC: 3.5 G/DL (ref 3.5–5.2)
ALP BLD-CCNC: 139 U/L (ref 40–129)
ALT SERPL-CCNC: 9 U/L (ref 5–41)
ANION GAP SERPL CALCULATED.3IONS-SCNC: 12 MMOL/L (ref 9–17)
AST SERPL-CCNC: 10 U/L
BASOPHILS # BLD: 0 % (ref 0–2)
BASOPHILS ABSOLUTE: 0.03 K/UL (ref 0–0.2)
BILIRUB SERPL-MCNC: 0.27 MG/DL (ref 0.3–1.2)
BUN BLDV-MCNC: 26 MG/DL (ref 6–20)
BUN/CREAT BLD: 5 (ref 9–20)
CALCIUM SERPL-MCNC: 9.2 MG/DL (ref 8.6–10.4)
CHLORIDE BLD-SCNC: 95 MMOL/L (ref 98–107)
CO2: 28 MMOL/L (ref 20–31)
CREAT SERPL-MCNC: 4.98 MG/DL (ref 0.7–1.2)
EOSINOPHILS RELATIVE PERCENT: 2 % (ref 1–4)
GFR AFRICAN AMERICAN: 15 ML/MIN
GFR NON-AFRICAN AMERICAN: 13 ML/MIN
GFR SERPL CREATININE-BSD FRML MDRD: ABNORMAL ML/MIN/{1.73_M2}
GLUCOSE BLD-MCNC: 159 MG/DL (ref 70–99)
HCT VFR BLD CALC: 27.4 % (ref 40.7–50.3)
HEMOGLOBIN: 8.3 G/DL (ref 13–17)
IMMATURE GRANULOCYTES: 0 %
LYMPHOCYTES # BLD: 8 % (ref 24–43)
MAGNESIUM: 1.9 MG/DL (ref 1.6–2.6)
MCH RBC QN AUTO: 28.7 PG (ref 25.2–33.5)
MCHC RBC AUTO-ENTMCNC: 30.3 G/DL (ref 28.4–34.8)
MCV RBC AUTO: 94.8 FL (ref 82.6–102.9)
MONOCYTES # BLD: 9 % (ref 3–12)
NRBC AUTOMATED: 0 PER 100 WBC
PDW BLD-RTO: 16 % (ref 11.8–14.4)
PHOSPHORUS: 1.7 MG/DL (ref 2.5–4.5)
PLATELET # BLD: 294 K/UL (ref 138–453)
PMV BLD AUTO: 9.7 FL (ref 8.1–13.5)
POTASSIUM SERPL-SCNC: 4 MMOL/L (ref 3.7–5.3)
RBC # BLD: 2.89 M/UL (ref 4.21–5.77)
RBC # BLD: ABNORMAL 10*6/UL
SEG NEUTROPHILS: 81 % (ref 36–65)
SEGMENTED NEUTROPHILS ABSOLUTE COUNT: 7.61 K/UL (ref 1.5–8.1)
SODIUM BLD-SCNC: 135 MMOL/L (ref 135–144)
TOTAL PROTEIN: 6 G/DL (ref 6.4–8.3)
WBC # BLD: 9.4 K/UL (ref 3.5–11.3)

## 2022-08-10 PROCEDURE — 80053 COMPREHEN METABOLIC PANEL: CPT

## 2022-08-10 PROCEDURE — 83735 ASSAY OF MAGNESIUM: CPT

## 2022-08-10 PROCEDURE — 84100 ASSAY OF PHOSPHORUS: CPT

## 2022-08-10 PROCEDURE — 85025 COMPLETE CBC W/AUTO DIFF WBC: CPT

## 2022-08-11 ENCOUNTER — APPOINTMENT (OUTPATIENT)
Dept: PHYSICAL THERAPY | Facility: CLINIC | Age: 47
End: 2022-08-11
Payer: MEDICARE

## 2022-08-12 ENCOUNTER — HOSPITAL ENCOUNTER (OUTPATIENT)
Age: 47
Setting detail: SPECIMEN
Discharge: HOME | End: 2022-08-12

## 2022-08-12 LAB
ABSOLUTE EOS #: 0.21 K/UL (ref 0–0.44)
ABSOLUTE IMMATURE GRANULOCYTE: 0.02 K/UL (ref 0–0.3)
ABSOLUTE LYMPH #: 1 K/UL (ref 1.1–3.7)
ABSOLUTE MONO #: 1.01 K/UL (ref 0.1–1.2)
ANION GAP SERPL CALCULATED.3IONS-SCNC: 15 MMOL/L (ref 9–17)
BASOPHILS # BLD: 0 % (ref 0–2)
BASOPHILS ABSOLUTE: 0.03 K/UL (ref 0–0.2)
BUN BLDV-MCNC: 36 MG/DL (ref 6–20)
BUN/CREAT BLD: 5 (ref 9–20)
CALCIUM SERPL-MCNC: 9.1 MG/DL (ref 8.6–10.4)
CHLORIDE BLD-SCNC: 94 MMOL/L (ref 98–107)
CO2: 28 MMOL/L (ref 20–31)
CREAT SERPL-MCNC: 7.86 MG/DL (ref 0.7–1.2)
EOSINOPHILS RELATIVE PERCENT: 2 % (ref 1–4)
GFR AFRICAN AMERICAN: 9 ML/MIN
GFR NON-AFRICAN AMERICAN: 7 ML/MIN
GFR SERPL CREATININE-BSD FRML MDRD: ABNORMAL ML/MIN/{1.73_M2}
GLUCOSE BLD-MCNC: 128 MG/DL (ref 70–99)
HCT VFR BLD CALC: 28 % (ref 40.7–50.3)
HEMOGLOBIN: 8.5 G/DL (ref 13–17)
IMMATURE GRANULOCYTES: 0 %
LYMPHOCYTES # BLD: 11 % (ref 24–43)
MAGNESIUM: 1.8 MG/DL (ref 1.6–2.6)
MCH RBC QN AUTO: 28.8 PG (ref 25.2–33.5)
MCHC RBC AUTO-ENTMCNC: 30.4 G/DL (ref 28.4–34.8)
MCV RBC AUTO: 94.9 FL (ref 82.6–102.9)
MONOCYTES # BLD: 11 % (ref 3–12)
NRBC AUTOMATED: 0 PER 100 WBC
PDW BLD-RTO: 16.5 % (ref 11.8–14.4)
PHOSPHORUS: 5.2 MG/DL (ref 2.5–4.5)
PLATELET # BLD: 291 K/UL (ref 138–453)
PMV BLD AUTO: 9.5 FL (ref 8.1–13.5)
POTASSIUM SERPL-SCNC: 4.6 MMOL/L (ref 3.7–5.3)
RBC # BLD: 2.95 M/UL (ref 4.21–5.77)
RBC # BLD: ABNORMAL 10*6/UL
SEG NEUTROPHILS: 76 % (ref 36–65)
SEGMENTED NEUTROPHILS ABSOLUTE COUNT: 6.86 K/UL (ref 1.5–8.1)
SODIUM BLD-SCNC: 137 MMOL/L (ref 135–144)
WBC # BLD: 9.1 K/UL (ref 3.5–11.3)

## 2022-08-12 PROCEDURE — P9603 ONE-WAY ALLOW PRORATED MILES: HCPCS

## 2022-08-12 PROCEDURE — 83735 ASSAY OF MAGNESIUM: CPT

## 2022-08-12 PROCEDURE — 85025 COMPLETE CBC W/AUTO DIFF WBC: CPT

## 2022-08-12 PROCEDURE — 36415 COLL VENOUS BLD VENIPUNCTURE: CPT

## 2022-08-12 PROCEDURE — 84100 ASSAY OF PHOSPHORUS: CPT

## 2022-08-12 PROCEDURE — 80048 BASIC METABOLIC PNL TOTAL CA: CPT

## 2022-08-15 ENCOUNTER — HOSPITAL ENCOUNTER (OUTPATIENT)
Age: 47
Setting detail: SPECIMEN
Discharge: HOME | End: 2022-08-15

## 2022-08-15 LAB
ABSOLUTE EOS #: 0.12 K/UL (ref 0–0.44)
ABSOLUTE IMMATURE GRANULOCYTE: 0.02 K/UL (ref 0–0.3)
ABSOLUTE LYMPH #: 1.16 K/UL (ref 1.1–3.7)
ABSOLUTE MONO #: 0.57 K/UL (ref 0.1–1.2)
ANION GAP SERPL CALCULATED.3IONS-SCNC: 21 MMOL/L (ref 9–17)
BASOPHILS # BLD: 1 % (ref 0–2)
BASOPHILS ABSOLUTE: 0.03 K/UL (ref 0–0.2)
BUN BLDV-MCNC: 38 MG/DL (ref 6–20)
BUN/CREAT BLD: 4 (ref 9–20)
CALCIUM SERPL-MCNC: 7.8 MG/DL (ref 8.6–10.4)
CHLORIDE BLD-SCNC: 93 MMOL/L (ref 98–107)
CO2: 26 MMOL/L (ref 20–31)
CREAT SERPL-MCNC: 8.72 MG/DL (ref 0.7–1.2)
EOSINOPHILS RELATIVE PERCENT: 2 % (ref 1–4)
GFR AFRICAN AMERICAN: 8 ML/MIN
GFR NON-AFRICAN AMERICAN: 7 ML/MIN
GFR SERPL CREATININE-BSD FRML MDRD: ABNORMAL ML/MIN/{1.73_M2}
GLUCOSE BLD-MCNC: 208 MG/DL (ref 70–99)
HCT VFR BLD CALC: 27.4 % (ref 40.7–50.3)
HEMOGLOBIN: 8.2 G/DL (ref 13–17)
IMMATURE GRANULOCYTES: 0 %
LYMPHOCYTES # BLD: 19 % (ref 24–43)
MAGNESIUM: 1.8 MG/DL (ref 1.6–2.6)
MCH RBC QN AUTO: 28.1 PG (ref 25.2–33.5)
MCHC RBC AUTO-ENTMCNC: 29.9 G/DL (ref 28.4–34.8)
MCV RBC AUTO: 93.8 FL (ref 82.6–102.9)
MONOCYTES # BLD: 10 % (ref 3–12)
NRBC AUTOMATED: 0 PER 100 WBC
PDW BLD-RTO: 16 % (ref 11.8–14.4)
PHOSPHORUS: 8.5 MG/DL (ref 2.5–4.5)
PLATELET # BLD: 316 K/UL (ref 138–453)
PMV BLD AUTO: 9.5 FL (ref 8.1–13.5)
POTASSIUM SERPL-SCNC: 4.2 MMOL/L (ref 3.7–5.3)
RBC # BLD: 2.92 M/UL (ref 4.21–5.77)
RBC # BLD: ABNORMAL 10*6/UL
SEG NEUTROPHILS: 68 % (ref 36–65)
SEGMENTED NEUTROPHILS ABSOLUTE COUNT: 4.09 K/UL (ref 1.5–8.1)
SODIUM BLD-SCNC: 140 MMOL/L (ref 135–144)
WBC # BLD: 6 K/UL (ref 3.5–11.3)

## 2022-08-15 PROCEDURE — 84100 ASSAY OF PHOSPHORUS: CPT

## 2022-08-15 PROCEDURE — 85025 COMPLETE CBC W/AUTO DIFF WBC: CPT

## 2022-08-15 PROCEDURE — P9603 ONE-WAY ALLOW PRORATED MILES: HCPCS

## 2022-08-15 PROCEDURE — 83735 ASSAY OF MAGNESIUM: CPT

## 2022-08-15 PROCEDURE — 80048 BASIC METABOLIC PNL TOTAL CA: CPT

## 2022-08-15 PROCEDURE — 36415 COLL VENOUS BLD VENIPUNCTURE: CPT

## 2022-08-19 ENCOUNTER — OFFICE VISIT (OUTPATIENT)
Dept: FAMILY MEDICINE CLINIC | Age: 47
End: 2022-08-19
Payer: MEDICARE

## 2022-08-19 VITALS
BODY MASS INDEX: 28.66 KG/M2 | TEMPERATURE: 97.9 F | WEIGHT: 183 LBS | OXYGEN SATURATION: 98 % | HEART RATE: 72 BPM | RESPIRATION RATE: 16 BRPM | SYSTOLIC BLOOD PRESSURE: 126 MMHG | DIASTOLIC BLOOD PRESSURE: 74 MMHG

## 2022-08-19 DIAGNOSIS — D63.1 ANEMIA DUE TO STAGE 4 CHRONIC KIDNEY DISEASE (HCC): ICD-10-CM

## 2022-08-19 DIAGNOSIS — G45.9 TIA (TRANSIENT ISCHEMIC ATTACK): Primary | ICD-10-CM

## 2022-08-19 DIAGNOSIS — I34.0 MITRAL VALVE INSUFFICIENCY, UNSPECIFIED ETIOLOGY: ICD-10-CM

## 2022-08-19 DIAGNOSIS — N18.4 ANEMIA DUE TO STAGE 4 CHRONIC KIDNEY DISEASE (HCC): ICD-10-CM

## 2022-08-19 PROCEDURE — 99215 OFFICE O/P EST HI 40 MIN: CPT | Performed by: NURSE PRACTITIONER

## 2022-08-19 RX ORDER — FERROUS SULFATE 325(65) MG
325 TABLET ORAL
Qty: 90 TABLET | Refills: 1 | Status: SHIPPED | OUTPATIENT
Start: 2022-08-19

## 2022-08-19 RX ORDER — CARVEDILOL 12.5 MG/1
12.5 TABLET ORAL 2 TIMES DAILY WITH MEALS
Qty: 180 TABLET | Refills: 1 | Status: SHIPPED | OUTPATIENT
Start: 2022-08-19 | End: 2022-09-26

## 2022-08-19 RX ORDER — HYDRALAZINE HYDROCHLORIDE 25 MG/1
25 TABLET, FILM COATED ORAL 3 TIMES DAILY
Qty: 270 TABLET | Refills: 1 | Status: SHIPPED | OUTPATIENT
Start: 2022-08-19 | End: 2022-09-14 | Stop reason: SDUPTHER

## 2022-08-19 RX ORDER — HYDRALAZINE HYDROCHLORIDE 25 MG/1
TABLET, FILM COATED ORAL
COMMUNITY
Start: 2022-08-15 | End: 2022-08-19 | Stop reason: SDUPTHER

## 2022-08-19 RX ORDER — COLCHICINE 0.6 MG/1
TABLET ORAL
COMMUNITY
Start: 2022-08-15

## 2022-08-19 RX ORDER — ATORVASTATIN CALCIUM 20 MG/1
TABLET, FILM COATED ORAL
COMMUNITY
Start: 2022-08-15 | End: 2022-08-19

## 2022-08-19 RX ORDER — FERROUS SULFATE 325(65) MG
325 TABLET ORAL
COMMUNITY
End: 2022-08-19 | Stop reason: SDUPTHER

## 2022-08-19 RX ORDER — EPOETIN ALFA-EPBX 2000 [IU]/ML
8000 INJECTION, SOLUTION INTRAVENOUS; SUBCUTANEOUS WEEKLY
COMMUNITY

## 2022-08-19 RX ORDER — CLOPIDOGREL BISULFATE 75 MG/1
TABLET ORAL
COMMUNITY
Start: 2022-08-15 | End: 2022-08-19 | Stop reason: SDUPTHER

## 2022-08-19 RX ORDER — ATORVASTATIN CALCIUM 20 MG/1
20 TABLET, FILM COATED ORAL DAILY
COMMUNITY

## 2022-08-19 RX ORDER — CYCLOBENZAPRINE HCL 5 MG
TABLET ORAL
COMMUNITY
Start: 2022-08-08 | End: 2022-09-14 | Stop reason: SDUPTHER

## 2022-08-19 RX ORDER — CLOPIDOGREL BISULFATE 75 MG/1
75 TABLET ORAL DAILY
Qty: 90 TABLET | Refills: 1 | Status: SHIPPED | OUTPATIENT
Start: 2022-08-19

## 2022-08-19 ASSESSMENT — ENCOUNTER SYMPTOMS
EYE PAIN: 0
SINUS PAIN: 0
SHORTNESS OF BREATH: 0
ABDOMINAL PAIN: 0
NAUSEA: 0
DIARRHEA: 0
BACK PAIN: 0
VOMITING: 0
SORE THROAT: 0
COUGH: 0

## 2022-08-19 NOTE — PROGRESS NOTES
Visit Information    Have you changed or started any medications since your last visit including any over-the-counter medicines, vitamins, or herbal medicines? no   Are you having any side effects from any of your medications? -  no  Have you stopped taking any of your medications? Is so, why? -  no    Have you seen any other physician or provider since your last visit? No  Have you had any other diagnostic tests since your last visit? No  Have you been seen in the emergency room and/or had an admission to a hospital since we last saw you? Yes - Records Obtained  Have you had your routine dental cleaning in the past 6 months? yes -     Have you activated your [employer] account? If not, what are your barriers?  No:      Patient Care Team:  DANIEL Castano CNP as PCP - General (Certified Nurse Practitioner)  DANIEL Castano CNP as PCP - [facility]  Otilio Covington DPM as Physician (Podiatry)    Medical History Review  Past Medical, Family, and Social History reviewed and does contribute to the patient presenting condition    Health Maintenance   Topic Date Due    Pneumococcal 0-64 years Vaccine (1 - PCV) 05/11/1980    Hepatitis B vaccine (1 of 3 - Risk Recombivax 3-dose series) Never done    Flu vaccine (1) 09/01/2022    Diabetic foot exam  03/09/2023    Depression Monitoring  04/29/2023    Diabetic retinal exam  07/27/2023    A1C test (Diabetic or Prediabetic)  08/07/2023    Lipids  08/08/2023    DTaP/Tdap/Td vaccine (3 - Td or Tdap) 11/21/2030    Colorectal Cancer Screen  08/03/2032    COVID-19 Vaccine  Completed    Hepatitis C screen  Completed    Hepatitis A vaccine  Aged Out    Hib vaccine  Aged Out    Meningococcal (ACWY) vaccine  Aged Out    HIV screen  Discontinued

## 2022-08-19 NOTE — PATIENT INSTRUCTIONS
Patient Education        High Blood Pressure: Care Instructions  Overview     It's normal for blood pressure to go up and down throughout the day. But if it stays up, you have high blood pressure. Another name for high blood pressure ishypertension. Despite what a lot of people think, high blood pressure usually doesn't cause headaches or make you feel dizzy or lightheaded. It usually has no symptoms. But it does increase your risk of stroke, heart attack, and other problems. You and your doctor will talk about your risks of these problems based on yourblood pressure. Your doctor will give you a goal for your blood pressure. Your goal will bebased on your health and your age. Lifestyle changes, such as eating healthy and being active, are always important to help lower blood pressure. You might also take medicine to reachyour blood pressure goal.  Follow-up care is a key part of your treatment and safety. Be sure to make and go to all appointments, and call your doctor if you are having problems. It's also a good idea to know your test results and keep alist of the medicines you take. How can you care for yourself at home? Medical treatment  If you stop taking your medicine, your blood pressure will go back up. You may take one or more types of medicine to lower your blood pressure. Be safe with medicines. Take your medicine exactly as prescribed. Call your doctor if you think you are having a problem with your medicine. Talk to your doctor before you start taking aspirin every day. Aspirin can help certain people lower their risk of a heart attack or stroke. But taking aspirin isn't right for everyone, because it can cause serious bleeding. See your doctor regularly. You may need to see the doctor more often at first or until your blood pressure comes down. If you are taking blood pressure medicine, talk to your doctor before you take decongestants or anti-inflammatory medicine, such as ibuprofen.  Some of these medicines can raise blood pressure. Learn how to check your blood pressure at home. Lifestyle changes  Stay at a healthy weight. This is especially important if you put on weight around the waist. Losing even 10 pounds can help you lower your blood pressure. If your doctor recommends it, get more exercise. Walking is a good choice. Bit by bit, increase the amount you walk every day. Try for at least 30 minutes on most days of the week. You also may want to swim, bike, or do other activities. Avoid or limit alcohol. Talk to your doctor about whether you can drink any alcohol. Try to limit how much sodium you eat to less than 2,300 milligrams (mg) a day. Your doctor may ask you to try to eat less than 1,500 mg a day. Eat plenty of fruits (such as bananas and oranges), vegetables, legumes, whole grains, and low-fat dairy products. Lower the amount of saturated fat in your diet. Saturated fat is found in animal products such as milk, cheese, and meat. Limiting these foods may help you lose weight and also lower your risk for heart disease. Do not smoke. Smoking increases your risk for heart attack and stroke. If you need help quitting, talk to your doctor about stop-smoking programs and medicines. These can increase your chances of quitting for good. When should you call for help? Call 911  anytime you think you may need emergency care. This may mean having symptoms that suggest that your blood pressure is causing a serious heart or blood vessel problem. Your blood pressure may be over 180/120. For example, call 911 if:    You have symptoms of a heart attack. These may include:  Chest pain or pressure, or a strange feeling in the chest.  Sweating. Shortness of breath. Nausea or vomiting. Pain, pressure, or a strange feeling in the back, neck, jaw, or upper belly or in one or both shoulders or arms. Lightheadedness or sudden weakness. A fast or irregular heartbeat.      You have symptoms of a stroke. These may include:  Sudden numbness, tingling, weakness, or loss of movement in your face, arm, or leg, especially on only one side of your body. Sudden vision changes. Sudden trouble speaking. Sudden confusion or trouble understanding simple statements. Sudden problems with walking or balance. A sudden, severe headache that is different from past headaches. You have severe back or belly pain. Do not wait until your blood pressure comes down on its own. Get help right away. Call your doctor now or seek immediate care if:    Your blood pressure is much higher than normal (such as 180/120 or higher), but you don't have symptoms. You think high blood pressure is causing symptoms, such as:  Severe headache. Blurry vision. Watch closely for changes in your health, and be sure to contact your doctor if:    Your blood pressure measures higher than your doctor recommends at least 2 times. That means the top number is higher or the bottom number is higher, or both. You think you may be having side effects from your blood pressure medicine. Where can you learn more? Go to https://SkritterpeEdenbee.comelizabethdreamsha.re.DocASAP. org and sign in to your [employer] account. Enter A244 in the "[employer]" box to learn more about \"High Blood Pressure: Care Instructions. \"     If you do not have an account, please click on the \"Sign Up Now\" link. Current as of: January 10, 2022               Content Version: 13.3  © 5287-2925 Healthwise, Inc[employer]. Care instructions adapted under license by [facility] [facility] ([facility]). If you have questions about a medical condition or this instruction, always ask your healthcare professional. [place] [address] any warranty or liability for your use of this information.

## 2022-08-19 NOTE — PROGRESS NOTES
Inject 25 Units into the skin in the morning and 25 Units before bedtime. 5 pen 3    Insulin Pen Needle (MEIJER PEN NEEDLES) 31G X 8 MM MISC 1 each by Does not apply route daily 100 each 3    glucose 4 g chewable tablet Take 4 tablets by mouth as needed for Low blood sugar 60 tablet 3    gabapentin (NEURONTIN) 300 MG capsule Take 300 mg by mouth in the morning and 300 mg at noon and 300 mg before bedtime. 90 capsule 3    Misc. Devices MISC Disp: custom molded shoes to accommodate for brace   DX: DM with history of stroke, foot drop  Duration: 1 year 2 each 0    insulin aspart (NOVOLOG) 100 UNIT/ML injection vial Inject 0-8 Units into the skin 3 times daily (before meals) SLIDING SCALE       FLUoxetine (PROZAC) 20 MG tablet Take 20 mg by mouth in the morning and 20 mg in the evening. Take 1 tablets (20 mg)  by mouth in the morning and at bedtime. buPROPion (WELLBUTRIN XL) 150 MG extended release tablet Take 1 tablet by mouth every morning 15 tablet 1    ezetimibe (ZETIA) 10 MG tablet Take 10 mg by mouth daily      aspirin 81 MG EC tablet Take 81 mg by mouth daily      Cyanocobalamin (VITAMIN B-12 PO) Take 2,500 mcg by mouth daily       No current facility-administered medications for this visit. No Known Allergies    Subjective:      Review of Systems   Constitutional:  Positive for fatigue. Negative for chills. HENT:  Negative for congestion, ear pain, sinus pain and sore throat. Eyes:  Negative for pain and visual disturbance. Respiratory:  Negative for cough and shortness of breath. Cardiovascular:  Negative for chest pain and palpitations. Gastrointestinal:  Negative for abdominal pain, diarrhea, nausea and vomiting. Genitourinary:  Negative for penile pain and testicular pain. Musculoskeletal:  Negative for back pain, joint swelling and neck pain. Skin:  Negative for rash. Neurological:  Positive for weakness. Negative for dizziness and light-headedness.    Hematological:  Does not bruise/bleed easily.     :Objective     Physical Exam  HENT:      Mouth/Throat:      Mouth: Mucous membranes are moist.   Cardiovascular:      Rate and Rhythm: Normal rate. Pulmonary:      Effort: Pulmonary effort is normal.   Skin:     General: Skin is warm and dry. Neurological:      Mental Status: He is alert. Mental status is at baseline.      /74 (Site: Left Upper Arm, Position: Sitting, Cuff Size: Medium Adult)   Pulse 72   Temp 97.9 °F (36.6 °C) (Tympanic)   Resp 16   Wt 183 lb (83 kg)   SpO2 98%   BMI 28.66 kg/m²     Lab Review   Hospital Outpatient Visit on 08/15/2022   Component Date Value    Glucose 08/15/2022 208 (A)    BUN 08/15/2022 38 (A)    Creatinine 08/15/2022 8.72 (A)    Bun/Cre Ratio 08/15/2022 4 (A)    Calcium 08/15/2022 7.8 (A)    Sodium 08/15/2022 140     Potassium 08/15/2022 4.2     Chloride 08/15/2022 93 (A)    CO2 08/15/2022 26     Anion Gap 08/15/2022 21 (A)    GFR Non- 08/15/2022 7 (A)    GFR  08/15/2022 8 (A)    GFR Comment 08/15/2022          WBC 08/15/2022 6.0     RBC 08/15/2022 2.92 (A)    Hemoglobin 08/15/2022 8.2 (A)    Hematocrit 08/15/2022 27.4 (A)    MCV 08/15/2022 93.8     MCH 08/15/2022 28.1     MCHC 08/15/2022 29.9     RDW 08/15/2022 16.0 (A)    Platelets 25/41/5929 316     MPV 08/15/2022 9.5     NRBC Automated 08/15/2022 0.0     RBC Morphology 08/15/2022 ANISOCYTOSIS PRESENT     Seg Neutrophils 08/15/2022 68 (A)    Lymphocytes 08/15/2022 19 (A)    Monocytes 08/15/2022 10     Eosinophils % 08/15/2022 2     Basophils 08/15/2022 1     Immature Granulocytes 08/15/2022 0     Segs Absolute 08/15/2022 4.09     Absolute Lymph # 08/15/2022 1.16     Absolute Mono # 08/15/2022 0.57     Absolute Eos # 08/15/2022 0.12     Basophils Absolute 08/15/2022 0.03     Absolute Immature Granul* 08/15/2022 0.02     Magnesium 08/15/2022 1.8     Phosphorus 08/15/2022 8.5 (A)   Hospital Outpatient Visit on 08/12/2022   Component Date Value    Glucose 08/12/2022 128 (A)    BUN 08/12/2022 36 (A)    Creatinine 08/12/2022 7.86 (A)    Bun/Cre Ratio 08/12/2022 5 (A)    Calcium 08/12/2022 9.1     Sodium 08/12/2022 137     Potassium 08/12/2022 4.6     Chloride 08/12/2022 94 (A)    CO2 08/12/2022 28     Anion Gap 08/12/2022 15     GFR Non- 08/12/2022 7 (A)    GFR  08/12/2022 9 (A)    GFR Comment 08/12/2022          WBC 08/12/2022 9.1     RBC 08/12/2022 2.95 (A)    Hemoglobin 08/12/2022 8.5 (A)    Hematocrit 08/12/2022 28.0 (A)    MCV 08/12/2022 94.9     MCH 08/12/2022 28.8     MCHC 08/12/2022 30.4     RDW 08/12/2022 16.5 (A)    Platelets 39/13/5907 291     MPV 08/12/2022 9.5     NRBC Automated 08/12/2022 0.0     Seg Neutrophils 08/12/2022 76 (A)    Lymphocytes 08/12/2022 11 (A)    Monocytes 08/12/2022 11     Eosinophils % 08/12/2022 2     Basophils 08/12/2022 0     Immature Granulocytes 08/12/2022 0     Segs Absolute 08/12/2022 6.86     Absolute Lymph # 08/12/2022 1.00 (A)    Absolute Mono # 08/12/2022 1.01     Absolute Eos # 08/12/2022 0.21     Basophils Absolute 08/12/2022 0.03     Absolute Immature Granul* 08/12/2022 0.02     RBC Morphology 08/12/2022 ANISOCYTOSIS PRESENT     Magnesium 08/12/2022 1.8     Phosphorus 08/12/2022 5.2 (A)   Hospital Outpatient Visit on 08/10/2022   Component Date Value    WBC 08/10/2022 9.4     RBC 08/10/2022 2.89 (A)    Hemoglobin 08/10/2022 8.3 (A)    Hematocrit 08/10/2022 27.4 (A)    MCV 08/10/2022 94.8     MCH 08/10/2022 28.7     MCHC 08/10/2022 30.3     RDW 08/10/2022 16.0 (A)    Platelets 52/25/3243 294     MPV 08/10/2022 9.7     NRBC Automated 08/10/2022 0.0     Seg Neutrophils 08/10/2022 81 (A)    Lymphocytes 08/10/2022 8 (A)    Monocytes 08/10/2022 9     Eosinophils % 08/10/2022 2     Basophils 08/10/2022 0     Immature Granulocytes 08/10/2022 0     Segs Absolute 08/10/2022 7.61     Absolute Lymph # 08/10/2022 0.72 (A)    Absolute Mono # 08/10/2022 0.85     Absolute Eos # 08/10/2022 0.16 Absolute 08/05/2022 0.05     Absolute Immature Granul* 08/05/2022 0.02     Hepatitis B Surface Ag 08/05/2022 NONREACTIVE     Magnesium 08/05/2022 1.9     Phosphorus 08/05/2022 3.0    No results displayed because visit has over 200 results. Admission on 07/14/2022, Discharged on 07/15/2022   Component Date Value    WBC 07/14/2022 14.1 (A)    RBC 07/14/2022 2.86 (A)    Hemoglobin 07/14/2022 8.5 (A)    Hematocrit 07/14/2022 27.4 (A)    MCV 07/14/2022 95.8     MCH 07/14/2022 29.7     MCHC 07/14/2022 31.0     RDW 07/14/2022 13.7     Platelets 47/24/4218 345     MPV 07/14/2022 9.5     NRBC Automated 07/14/2022 0.0     Seg Neutrophils 07/14/2022 81 (A)    Lymphocytes 07/14/2022 7 (A)    Monocytes 07/14/2022 10     Eosinophils % 07/14/2022 1     Basophils 07/14/2022 0     Immature Granulocytes 07/14/2022 1 (A)    Segs Absolute 07/14/2022 11.56 (A)    Absolute Lymph # 07/14/2022 0.96 (A)    Absolute Mono # 07/14/2022 1.35 (A)    Absolute Eos # 07/14/2022 0.13     Basophils Absolute 07/14/2022 0.06     Absolute Immature Granul* 07/14/2022 0.07     Glucose 07/14/2022 216 (A)    BUN 07/14/2022 38 (A)    Creatinine 07/14/2022 7.59 (A)    Bun/Cre Ratio 07/14/2022 5 (A)    Calcium 07/14/2022 9.2     Sodium 07/14/2022 133 (A)    Potassium 07/14/2022 4.5     Chloride 07/14/2022 95 (A)    CO2 07/14/2022 23     Anion Gap 07/14/2022 15     Alkaline Phosphatase 07/14/2022 161 (A)    ALT 07/14/2022 18     AST 07/14/2022 14     Total Bilirubin 07/14/2022 0.27 (A)    Total Protein 07/14/2022 6.3 (A)    Albumin 07/14/2022 3.4 (A)    GFR Non- 07/14/2022 8 (A)    GFR  07/14/2022 9 (A)    GFR Comment 07/14/2022          Pro-BNP 07/14/2022 22,585 (A)    Troponin, High Sensitivi* 07/14/2022 727 (A)    Troponin, High Sensitivi* 07/14/2022 730 (A)    Specimen Description 07/14/2022 . BLOOD     Special Requests 07/14/2022 RT WRIST 2OML     Culture 07/14/2022 NO GROWTH 5 DAYS     Specimen Description 07/14/2022 Claire Stands BLOOD     Special Requests 07/14/2022 RT F A 20ML     Culture 07/14/2022 NO GROWTH 5 DAYS     Specimen Description 07/14/2022 . NASOPHARYNGEAL SWAB     SARS-CoV-2, Rapid 07/14/2022 Not Detected     Lactic Acid, Sepsis 07/14/2022 0.6     Procalcitonin 07/14/2022 0.63 (A)    Ventricular Rate 07/14/2022 104     Atrial Rate 07/14/2022 104     P-R Interval 07/14/2022 142     QRS Duration 07/14/2022 98     Q-T Interval 07/14/2022 378     QTc Calculation (Bazett) 07/14/2022 497     P Axis 07/14/2022 64     R Axis 07/14/2022 2     T Cros[place] 07/14/2022 96     Lactic Acid, Sepsis 07/14/2022 0.7     Lactic Acid, Sepsis 07/14/2022 1.0     POC Glucose 07/14/2022 169 (A)    POC Glucose 07/14/2022 176 (A)    POC Glucose 07/14/2022 97     POC Glucose 07/14/2022 91     Glucose 07/15/2022 52 (A)    BUN 07/15/2022 25 (A)    Creatinine 07/15/2022 5.49 (A)    Bun/Cre Ratio 07/15/2022 5 (A)    Calcium 07/15/2022 9.0     Sodium 07/15/2022 135     Potassium 07/15/2022 4.4     Chloride 07/15/2022 100     CO2 07/15/2022 26     Anion Gap 07/15/2022 9     Alkaline Phosphatase 07/15/2022 138 (A)    ALT 07/15/2022 12     AST 07/15/2022 12     Total Bilirubin 07/15/2022 0.31     Total Protein 07/15/2022 5.4 (A)    Albumin 07/15/2022 3.1 (A)    GFR Non- 07/15/2022 11 (A)    GFR  07/15/2022 14 (A)    GFR Comment 07/15/2022          Protime 07/15/2022 15.7 (A)    INR 07/15/2022 1.3     POC Glucose 07/14/2022 114 (A)    POC Glucose 07/14/2022 109     POC Glucose 07/15/2022 43 (A)    POC Glucose 07/15/2022 97     POC Glucose 07/15/2022 166 (A)    POC Glucose 07/15/2022 306 (A)   Hospital Outpatient Visit on 06/17/2022   Component Date Value    Glucose 06/17/2022 213 (A)    BUN 06/17/2022 67 (A)    Creatinine 06/17/2022 7.89 (A)    Bun/Cre Ratio 06/17/2022 8 (A)    Calcium 06/17/2022 7.7 (A)    Sodium 06/17/2022 142     Potassium 06/17/2022 4.0     Chloride 06/17/2022 100     CO2 06/17/2022 24     Anion Gap 06/17/2022 18 (A)    GFR Non- 06/17/2022 7 (A)    GFR  06/17/2022 9 (A)    GFR Comment 06/17/2022          WBC 06/17/2022 9.1     RBC 06/17/2022 2.79 (A)    Hemoglobin 06/17/2022 8.4 (A)    Hematocrit 06/17/2022 27.3 (A)    MCV 06/17/2022 97.8     MCH 06/17/2022 30.1     MCHC 06/17/2022 30.8     RDW 06/17/2022 15.0 (A)    Platelets 02/10/1884 199     MPV 06/17/2022 9.8     NRBC Automated 06/17/2022 0.0     Magnesium 06/17/2022 1.7     Phosphorus 06/17/2022 5.3 (A)   Hospital Outpatient Visit on 06/15/2022   Component Date Value    Glucose 06/15/2022 149 (A)    BUN 06/15/2022 76 (A)    Creatinine 06/15/2022 8.28 (A)    Bun/Cre Ratio 06/15/2022 9     Calcium 06/15/2022 8.0 (A)    Sodium 06/15/2022 138     Potassium 06/15/2022 4.3     Chloride 06/15/2022 99     CO2 06/15/2022 24     Anion Gap 06/15/2022 15     GFR Non- 06/15/2022 7 (A)    GFR  06/15/2022 8 (A)    GFR Comment 06/15/2022          WBC 06/15/2022 12.9 (A)    RBC 06/15/2022 2.87 (A)    Hemoglobin 06/15/2022 8.6 (A)    Hematocrit 06/15/2022 27.9 (A)    MCV 06/15/2022 97.2     MCH 06/15/2022 30.0     MCHC 06/15/2022 30.8     RDW 06/15/2022 14.6 (A)    Platelets 24/63/5979 231     MPV 06/15/2022 10.0     NRBC Automated 06/15/2022 0.0     Magnesium 06/15/2022 1.9     Phosphorus 06/15/2022 4.0    Hospital Outpatient Visit on 06/13/2022   Component Date Value    Glucose 06/13/2022 106 (A)    BUN 06/13/2022 90 (A)    Creatinine 06/13/2022 9.47 (A)    Bun/Cre Ratio 06/13/2022 10     Calcium 06/13/2022 8.1 (A)    Sodium 06/13/2022 136     Potassium 06/13/2022 4.8     Chloride 06/13/2022 94 (A)    CO2 06/13/2022 26     Anion Gap 06/13/2022 16     GFR Non- 06/13/2022 6 (A)    GFR  06/13/2022 7 (A)    GFR Comment 06/13/2022          WBC 06/13/2022 14.0 (A)    RBC 06/13/2022 2.97 (A)    Hemoglobin 06/13/2022 8.8 (A)    Hematocrit 06/13/2022 28.2 (A)    MCV 06/13/2022 94.9     MCH 06/13/2022 29.6     MCHC 06/13/2022 31.2     RDW 06/13/2022 13.9     Platelets 79/01/2520 278     MPV 06/13/2022 9.7     NRBC Automated 06/13/2022 0.0     Magnesium 06/13/2022 1.8     Phosphorus 06/13/2022 5.0 (A)   Hospital Outpatient Visit on 06/10/2022   Component Date Value    Glucose 06/10/2022 62 (A)    BUN 06/10/2022 70 (A)    Creatinine 06/10/2022 7.17 (A)    Bun/Cre Ratio 06/10/2022 10     Calcium 06/10/2022 8.5 (A)    Sodium 06/10/2022 134 (A)    Potassium 06/10/2022 4.1     Chloride 06/10/2022 94 (A)    CO2 06/10/2022 23     Anion Gap 06/10/2022 17     GFR Non- 06/10/2022 8 (A)    GFR  06/10/2022 10 (A)    GFR Comment 06/10/2022          WBC 06/10/2022 19.2 (A)    RBC 06/10/2022 3.53 (A)    Hemoglobin 06/10/2022 10.5 (A)    Hematocrit 06/10/2022 33.0 (A)    MCV 06/10/2022 93.5     MCH 06/10/2022 29.7     MCHC 06/10/2022 31.8     RDW 06/10/2022 13.2     Platelets 22/37/6551 339     MPV 06/10/2022 9.9     NRBC Automated 06/10/2022 0.0     Magnesium 06/10/2022 2.2     Phosphorus 06/10/2022 4.4    There may be more visits with results that are not included. Assessment and Plan      1. TIA (transient ischemic attack)  -     clopidogrel (PLAVIX) 75 MG tablet; Take 1 tablet by mouth daily, Disp-90 tablet, R-1Normal  -     carvedilol (COREG) 12.5 MG tablet; Take 1 tablet by mouth 2 times daily (with meals), Disp-180 tablet, R-1Normal  -     hydrALAZINE (APRESOLINE) 25 MG tablet; Take 1 tablet by mouth 3 times daily, Disp-270 tablet, R-1Normal  -     VL DUP CAROTID BILATERAL; Future  -     TSH With Reflex Ft4; Future  2. Mitral valve insufficiency, unspecified etiology  -     clopidogrel (PLAVIX) 75 MG tablet; Take 1 tablet by mouth daily, Disp-90 tablet, R-1Normal  -     carvedilol (COREG) 12.5 MG tablet; Take 1 tablet by mouth 2 times daily (with meals), Disp-180 tablet, R-1Normal  -     hydrALAZINE (APRESOLINE) 25 MG tablet;  Take 1 tablet by mouth 3 times daily, Disp-270

## 2022-08-22 ENCOUNTER — TELEPHONE (OUTPATIENT)
Dept: FAMILY MEDICINE CLINIC | Age: 47
End: 2022-08-22

## 2022-08-22 ENCOUNTER — HOSPITAL ENCOUNTER (OUTPATIENT)
Dept: PHYSICAL THERAPY | Facility: CLINIC | Age: 47
Setting detail: THERAPIES SERIES
Discharge: HOME | End: 2022-08-22
Payer: MEDICARE

## 2022-08-22 DIAGNOSIS — Z86.73 HISTORY OF CVA (CEREBROVASCULAR ACCIDENT): Primary | ICD-10-CM

## 2022-08-22 DIAGNOSIS — G47.9 SLEEP DISTURBANCE: ICD-10-CM

## 2022-08-22 DIAGNOSIS — I70.8 ATHEROSCLEROSIS OF OTHER ARTERIES: ICD-10-CM

## 2022-08-22 DIAGNOSIS — E78.2 MIXED HYPERLIPIDEMIA: ICD-10-CM

## 2022-08-22 DIAGNOSIS — I70.0 ATHEROSCLEROSIS OF AORTA (HCC): ICD-10-CM

## 2022-08-22 PROCEDURE — 97164 PT RE-EVAL EST PLAN CARE: CPT

## 2022-08-22 NOTE — FLOWSHEET NOTE
LEMA BALANCE SCALE 14-Item Long Form Original Version    Patient Name:  Barry oHng  Date:  8/22/2022    1. SITTING TO STANDING  INSTRUCTIONS: Please stand up. Try not to use your hands for support. (4) able to stand without using hands and stabilize independently  (3) able to stand independently using hands  (2) able to stand using hands after several tries  (1) needs minimal aid to stand or to stabilize  (0) needs moderate or maximal assist to stand  Score: 3    2. STANDING UNSUPPORTED  INSTRUCTIONS: Please stand for two minutes without holding. (4) able to stand safely 2 minutes  (3) able to stand 2 minutes with supervision  (2) able to stand 30 seconds unsupported  (1) needs several tries to stand 30 seconds unsupported  (0) unable to stand 30 seconds unassisted If a subject is able to stand 2  minutes unsupported, score full points for sitting unsupported. Proceed to  item #4. Score: 3    3. SITTING WITH BACK UNSUPPORTED BUT FEET SUPPORTED  ON FLOOR OR ON A STOOL  INSTRUCTIONS: Please sit with arms folded for 2 minutes. (4) able to sit safely and securely 2 minutes  (3) able to sit 2 minutes under supervision  (2) able to sit 30 seconds  (1) able to sit 10 seconds  (0) unable to sit without support 10 seconds  Score: 4     4. STANDING TO SITTING  INSTRUCTIONS: Please sit down. (4) sits safely with minimal use of hands  (3) controls descent by using hands  (2) uses back of legs against chair to control descent  (1) sits independently but has uncontrolled descent  (0) needs assistance to sit  Score: 2    5. TRANSFERS  INSTRUCTIONS: Arrange chairs(s) for a pivot transfer. Ask subject to  transfer one way toward a seat with armrests and one way toward a seat  without armrests. You may use two chairs (one with and one without  armrests) or a bed and a chair.   (4) able to transfer safely with minor use of hands  (3) able to transfer safely definite need of hands  (2) able to transfer with verbal cueing and/or supervision  (1) needs one person to assist  (0) needs two people to assist or supervise to be safe  Score: 3    6. STANDING UNSUPPORTED WITH EYES CLOSED  INSTRUCTIONS: Please close your eyes and stand still for 10 seconds. (4) able to stand 10 seconds safely  (3) able to stand 10 seconds with supervision  (2) able to stand 3 seconds  (1) unable to keep eyes closed 3 seconds but stays steady  (0) needs help to keep from falling  Score: 3    7. STANDING UNSUPPORTED WITH FEET TOGETHER  INSTRUCTIONS: Place your feet together and stand without holding. (4) able to place feet together independently and stand 1 minute safely  (3) able to place feet together independently and stand for 1 minute with  supervision  (2) able to place feet together independently but unable to hold for 30 seconds  (1) needs help to attain position but able to stand 15 seconds feet together  (0) needs help to attain position and unable to hold for 15 seconds  Score: 2 - only holds for 50 sec before falling backwards    8. REACHING FORWARD WITH OUTSTRETCHED ARM WHILE  STANDING  INSTRUCTIONS: Lift arm to 90 degrees. Stretch out your fingers and reach  forward as far as you can. (Examiner places a ruler at end of fingertips when  arm is at 90 degrees. Fingers should not touch the ruler while reaching  forward. The recorded measure is the distance forward that the finger reaches  while the subject is in the most forward lean position. When possible, ask  subject to use both arms when reaching to avoid rotation of the trunk.). (4) can reach forward confidently >25 cm (10 inches)  (3) can reach forward >12 cm safely (5 inches)  (2) can reach forward >5 cm safely (2 inches)  (1) reaches forward but needs supervision  (0) loses balance while trying/requires external support  Score: 1    9.  OBJECT FROM FLOOR FROM A STANDING POSITION  INSTRUCTIONS:  shoe/slipper which is placed in front of your feet.   (4) able to  slipper safely and easily  (3) able to  slipper but needs supervision  (2) unable to  but reaches 2-5cm (1-2 inches) from slipper and keeps  balance independently  (1) unable to  and needs supervision while trying  (0) unable to try/needs assist to keep from losing balance or falling  Score: 0    10. TURNING TO LOOK BEHIND OVER LEFT AND RIGHT  SHOULDERS WHILE STANDING  INSTRUCTIONS: Turn to look directly behind you over toward left shoulder. Repeat to the right. Examiner may pick an object to look at directly behind the  subject to encourage a better twist turn. (4) looks behind from both sides and weight shifts well  (3) looks behind one side only other side shows less weight shift  (2) turns sideways only but maintains balance  (1) needs supervision when turning  (0) needs assist to keep from losing balance or falling  Score: 1    11. TURN 360 DEGREES  INSTRUCTIONS: Turn completely around in a full [place]. Pause. Then turn a  full [place] in the other direction. (4) able to turn 360 degrees safely in 4 seconds or less  (3) able to turn 360 degrees safely one side only in 4 seconds or less  (2) able to turn 360 degrees safely but slowly  (1) needs close supervision or verbal cueing  (0) needs assistance while turning  Score: 1    12. PLACING ALTERNATE FOOT ON STEP OR STOOL WHILE  STANDING UNSUPPORTED  INSTRUCTIONS: Place each foot alternately on the step/stool. Continue until  each foot has touched the step/stool four times. (4) able to stand independently and safely and complete 8 steps in 20 seconds  (3) able to stand independently and complete 8 steps >20 seconds  (2) able to complete 4 steps without aid with supervision  (1) able to complete >2 steps needs minimal assist  (0) needs assistance to keep from falling/unable to try  Score: 0    13. STANDING UNSUPPORTED ONE FOOT IN FRONT  INSTRUCTIONS: (DEMONSTRATE TO SUBJECT) Place one foot directly in  front of the other.  If you feel that you cannot place your foot directly in front,  try to step far enough ahead that the heel of your forward foot is ahead of the  toes of the other foot. (To score 3 points, the length of the step should exceed  the length of the other foot and the width of the stance should approximate the  subject's normal stride width). (4) able to place foot tandem independently and hold 30 seconds  (3) able to place foot ahead of other independently and hold 30 seconds  (2) able to take small step independently and hold 30 seconds  (1) needs help to step but can hold 15 seconds  (0) loses balance while stepping or standing  Score: 0    14. STANDING ON ONE LEG  INSTRUCTIONS: Stand on one leg as long as you can without holding. (4) able to lift leg independently and hold >10 seconds  (3) able to lift leg independently and hold 5-10 seconds  (2) able to lift leg independently and hold = or >3 seconds  (1) tries to lift leg unable to hold 3 seconds but remains standing  independently  (0) unable to try or needs assist to prevent fall  Score:  0    Total Score:  23  Max score 56,a person scoring below 45 is considered to be at risk for falling.     Completed by: Danyelle Miner, PT

## 2022-08-22 NOTE — TELEPHONE ENCOUNTER
Scheduling called to inform you that TIA (transient ischemic attack) (G45.9 [ICD-10-CM]) is not a covered dx carotid.

## 2022-08-22 NOTE — FLOWSHEET NOTE
[] [facility] [address]  [address]  [phone]  F: [phone] [x] [address]  [facility] 36   [address]  P: [phone]  F: [phone] [] [place] [facility]  Therapy  [address] [facility]  P: [phone]  F: [phone] [] [address]  P: [phone]  F: [phone] [] [address]  [facility]   [address]   [place]: [phone]  F: [phone]      Physical Therapy Daily Treatment / Santosh Wang Note    Date:  2022  Patient Name:  Bob Conklin    :  1975  MRN: [id number]  Physician: RACHAEL Rizvi                           Insurance: Medicare ([address] [place] [address])  Medical Diagnosis: I63.9 (ICD-10-CM) - Cerebrovascular accident (CVA), unspecified mechanism ([facility] [address]  Rehab Codes: R26.89, R53.1, M54.59, M54.2, R29.3,   Next 's appt.: 8/10 with neuro  Date of symptom onset: 22   Visit# / total visits:     Cancels/No Shows:     Subjective:    Pain:  [] Yes  [x] No Location: bilateral shoulders L shoulder Pain Rating: (0-10 scale) 0/10  Pain altered Tx:  [x] No  [] Yes  Action:  Comments: Pt arrives after sustaining repeat CVA on 22 - right-sided hemiparesis again noted in both UE/LE, leg >UE. Walking differently, feels that right side \"won't cooperate\" - hemiplegic gait noted. This occurred at [facility]. Pt reports he was told BP was very high, in 200's, and was transported to the hospital. (Prior to this, he was in the hospital for having issues with leg weakness d/t a new pill he was on, transferred to [facility] for intensive BLE strengthening, and then it was there 2 days into rehab that he sustained the stroke).  States after going back to rehab after sustaining his stroke, he was slowly getting better but was only able to stay for ~8 days d/t insurance limitations, and is now returning to outpatient PT. Pt reports walking deficits, R hip weakness, R foot \"not straightening out, R arm difficulty with grasping/numbness in all fingers. Current functional deficits: needs assist to don RLE into pants d/t inability to lift up, difficulty donning/doffing AFOs but able to complete, just slow. Dysarthria.      Objective:     10MWT: .69m/s self selected gait speed; .73m/s fast  Hernandez/56  6MWT: 196ft/60m in 2'32\" before having to sit    Gait analysis with rollator and B AFOs donned: RLE hemiplegic gait with genurecurvatum in loading phase, trendelenburg on R in stance, decreased swing speed and delayed initiation on RLE, heavy BUE reliance with gait     ROM DEGREES A/P ROM DEGREES A/P STRENGTH  STRENGTH     LEFT  RIGHT LEFT RIGHT   HIP FLEX   4+ 3+   HIPEXT   2+ 2   HIP ER       HIP IR       HIP ABD   4- 3+   HIP ADD   3+ 3+          KNEE FLEX   5- 5-   KNEE EXT   5- 2+ (unable to fully extend against gravity)          ANKLE DF Lacking 12deg with knee ext Lacking 15deg with knee ext 2- 2-                PF   4 2+                INV                    EVER             Modalities:   Precautions:  Exercises: HELD ALL TREATMENT FOR RE-EVAL D/T RECENT CVA:  Exercise RLE Reps/ Time Weight/ Level Comments   Nu step  5'  New    Scapular retraction/elevation mobs 5 min     UT stretch  3x20\"ea     scap squeeze  10x3\"     Pec stretch-doorway  2x30\"     Posterior shoulder rolls 20x  Assist for slow, max retraction   Scap retraction 2x10 Orange  Added resistance    Scap retract w/ shoulder flex 10x AROM    Chin tucks  10x3\"           Seated toe raises  10x2                 Standing    Parallel bars   Gastroc stretch 3x30\"  UE support; 1 LE at a time    Marches 20x  UE support   Weight shifting  10xea  No UE as able; M/L, A/P, P/A         Resisted swing phase 3x30'  x13' Lime     Resisted step over small round cone 2x10 Lime  With weight shift onto RLE   Heel taps to step  10x 4\"    Trunk/cervical rotation naming how many fingers clinician is holding  10xea  clinician standing behind pt    Picking up rings from steps  4x 4 rings;   6\" and 12\" step Wider JASIEL no UE; notes some irritation in LB; x2 leading with R UE, x2 leading with L UE and handing to clinician at multiple angles   Resisted swing phase fwd and retro amb on way back  5xea // bars;   Lime  Resisted fwd walk    Step over round cone  10xea // bars           Balance   Parallel bars   Trunk rotation with ball 5x ea     Shoulder press with ball 10x     NBOS 2x30\"  Posterior sway after 20 sec on 1st rep    NBOS with EC 2x30\"     NBOS on foam  2x30\"     NBOS with ball raises, rotation 2x30\"ea Volley  ball     Tandem stance 2x20\"     Cone  off ground 2x5 5 cones    Standing on foam with head turns  30\" ea  Up/down, rotation    Cone reach with LUE across body 2x5 5 cones    Cone reach with LUE to L side 2x5 5 cones    Heel taps 10x ea 4in With 1 hand for UE support   Dynamic march with retro hamstring curl 3L  No UE support   March amb  3L // bars     Hs curl amb  2L // bars     Side stepping 3L // bars  No UE-1 light UE support   360 turn 2x  Clockwise more challenging   Marches  2x20\"  No UE   Alt toe taps to foam  2x30\"  No UE                     Ambulation with rollator 2L     Other:         Treatment Charges: Mins Units   []  Modalities     []  Ther Exercise     []  Manual Therapy     []  Ther Activities     []  Aquatics     []  Vasocompression     []  Other:      [x]  Other: Re-eval 44 1   Total Treatment time 44 1       Assessment: [] Progressing toward goals. [] No change. [x] Other: Presents with increased deficits as compared to baseline, including more R-hemiparetic deficits of both UE/LE. Would benefit from addition of skilled OT and also ST d/t dysarthria and involvement of R facial musculature.  Plan to transfer to [facility] to seek this 3-discipline care. [x] Patient would continue to benefit from skilled physical therapy services in order to: address R hemibody weakness, improve gait mechanics and efficiency, and increase standing static/dynamic balance to allow improved household ADL/IADL completion and reduce fall risk. STG: (to be met in 8 treatments) - Assessed by Bella Person PT on 7/20:  ? Pain: No more than 4/10 pain reported in low back or neck post therapy sessions to indicate improving tolerance to increased activity and exercise - MET for low back or neck, but L shoulder is 2/10 - newer pain over the past 3 weeks. ? ROM: at least 0deg DF PROM with knee extended to indicate improving mobility in ankles to increase heel strike and reduce toe drag with prolonged ambulation - NOT MET, lacking 10 deg from neutral in L ankle, lacking 3 deg from neutral in R ankle  ? Balance:   Pt able to fully turn to look behind himself without instability in either direction to indicate improving postural stability - NOT MET, LOB with full turn to R and L  Pt able to complete standing reaching tasks without UE assist and no more than mild instability to indicate improving dynamic balance for safety with household IADLs - NOT MET, primarily needs to be supported at trunk on counter or holding onto something  ? Strength: At least 4+/5 grossly in R hip to allow improved pelvic stability in standing and improved swing phase control/speed during prolonged gait - NOT MET, 4-/5 grossly in R hip  Pt able to utilize RUE for fine grasp and release tasks with no more than minimally slowed speed evident - Ongoing, better fine grasp but still limited with heavier objects, twisting lid  ?  Function: Pt able to ambulate 450 ft with least restrictive device with no evidence of lagging RLE swing or abnormal stance time noted, with no more than 11 on RPE scale - NOT MET, achieves ~400 ft before needing to rest d/t RLE instability and R knee buckling, rates 13 RPE  Patient to be independent with home exercise program as demonstrated by performance with correct form without cues. - Ongoing   Demonstrate Knowledge of fall prevention - MET     LTG: (to be met in 16 treatments) - 8/22:will continue with current goals, adjusting at visit 16 as needed d/t recent CVA on 8/8  ? Pain: No more than 2/10 pain reported in low back or neck post therapy sessions to indicate improving tolerance to increased activity and exercise  ? ROM: at least 5 deg DF PROM with knee extended to indicate improving mobility in ankles to increase heel strike and reduce toe drag with prolonged ambulation  ? Balance: At least 30/56 on Hernandez to indicate significant improvement in standing static/dynamic balance and progress towards reduced fall risk  Able to negotiate gait obstacles using rollator without increased instability or excessively slowed gait  ? Strength: At least 5-/5 grossly in R hip to allow further improved pelvic stability in standing and improved swing phase control/speed during prolonged gait  At least 5/5 B knee ext to prevent excessive buckling with prolonged standing  ? Function:   Pt able to ambulate 600 ft with least restrictive device with appropriate RLE swing/stance phase timing, no evidence of toe drag, and no more than minimal fatigue by end of ambulation  Pt able to ambulate 100 ft with intermittent turning, stopping, etc without assistive device and demonstrating good stability and no evidence of B knee buckling to indicate improving functional strength and safety with household ambulation distances for ADL/IADLs  No more than 14% impaired on ABC scale to indicate improving subjective balance confidence           Patient goals: \"to strengthen right side back as much as I can\"    Pt.  Education:  [x] Yes  [] No  [x] Reviewed Prior HEP/Ed  Method of Education: [x] Verbal  [] Demo  [] Written   fall prevention 7/11/22  Comprehension of Education:  [x] Frank Glasgow understanding. [x] Demonstrates understanding. [] Needs review. [] Demonstrates/verbalizes HEP/Ed previously given. Plan: [x] Continue current frequency toward long and short term goals.     [x] Specific Instructions for subsequent treatments:  [facility]. V's for PT/OT/ST        Time In: 3:07 pm            Time Out: 3:51 pm    Electronically signed by:  Renetta De Paz PT

## 2022-08-23 NOTE — TELEPHONE ENCOUNTER
Bal Dial with [place]'s scheduling called back stating the new dx codes are still not covered and it needs changed again. Please advise.

## 2022-08-25 ENCOUNTER — TELEPHONE (OUTPATIENT)
Dept: FAMILY MEDICINE CLINIC | Age: 47
End: 2022-08-25

## 2022-08-25 DIAGNOSIS — Z86.73 HISTORY OF CVA (CEREBROVASCULAR ACCIDENT): Primary | ICD-10-CM

## 2022-08-25 NOTE — TELEPHONE ENCOUNTER
Bright Chavira called from [facility] physical therapy and stated that patient had another stroke and would benefit from speech and occupational therapy. Can you please place a order in epic.

## 2022-08-26 ENCOUNTER — HOSPITAL ENCOUNTER (OUTPATIENT)
Dept: PHYSICAL THERAPY | Age: 47
Setting detail: THERAPIES SERIES
Discharge: HOME | End: 2022-08-26
Payer: MEDICARE

## 2022-08-26 PROCEDURE — 97110 THERAPEUTIC EXERCISES: CPT

## 2022-08-26 PROCEDURE — 97116 GAIT TRAINING THERAPY: CPT

## 2022-08-26 NOTE — FLOWSHEET NOTE
[x] [address]  - [facility] TWELVE-STEP [facility] &  Therapy  [address]  [phone]  F: [phone] [x] [address]  [address] [facility]   [address]  P: [phone]  F: [phone] [] [place] [facility]  Therapy  [address] [facility]  P: [phone]  F: [phone] [] [address]  P: [phone]  F: [phone] [] [address]  [facility]   [address]   [place]: [phone]  F: [phone]      Physical Therapy Daily Treatment / Krysta Cat Note    Date:  2022  Patient Name:  Micha Handy    :  1975  MRN: [id number]  Physician: RACHAEL Goyal                           Insurance: Medicare ([address] [place] [address])  Medical Diagnosis: I63.9 (ICD-10-CM) - Cerebrovascular accident (CVA), unspecified mechanism ([facility] [address]  Rehab Codes: R26.89, R53.1, M54.59, M54.2, R29.3,   Next Dr.'s appt.: 8/10 with neuro  Date of symptom onset: 22   Visit# / total visits:     Cancels/No Shows:     Subjective:    Pain:  [] Yes  [x] No Location: bilateral shoulders L shoulder Pain Rating: (0-10 scale) 0/10  Pain altered Tx:  [x] No  [] Yes  Action:  Comments: Patient arrives to therapy reporting no pain. States he is a little tired this morning with the early arrival time. Current functional deficits: needs assist to don RLE into pants d/t inability to lift up, difficulty donning/doffing AFOs but able to complete, just slow. Dysarthria.      Objective:     10MWT: .69m/s self selected gait speed; .73m/s fast  Hernandez/56  6MWT: 196ft/60m in 2'32\" before having to sit    Gait analysis with rollator and B AFOs donned: RLE hemiplegic gait with genurecurvatum in loading phase, trendelenburg on R in stance, decreased swing speed and delayed initiation on RLE, heavy BUE reliance rotation 2x30\"ea Volley  ball     Tandem stance 2x20\"     Cone  off ground 2x5 5 cones    Standing on foam with head turns  30\" ea  Up/down, rotation    Cone reach with LUE across body 2x5 5 cones    Cone reach with LUE to L side 2x5 5 cones    Heel taps 10x ea 4in With 1 hand for UE support   Dynamic march with retro hamstring curl 3L  No UE support   March amb  3L // bars     Hs curl amb  2L // bars     Side stepping 3L // bars  No UE-1 light UE support   360 turn 2x  Clockwise more challenging   Marches  2x20\"  No UE   Alt toe taps to foam  2x30\"  No UE         Gait training with Bioness 38 min Lower and thigh cuff Initial set up  STS 5x  4x3 ft in // bar  1x120 ft  Adjustments for thigh cuff with min improvement with recurvatum  Gait analysis   2 sit down breaks         Ambulation with rollator 2L       Treatment this date included using Functional Electrical Stimulation via the BioDigital Trowel [id number], Lower caft cuff including the thigh cuff. Stimulation parameters and outcomes can be viewed on [facility] [employer] with the patients' username (patient ID is generated by first initial of first name, and first initial of last name, followed by two digit month and two digit day the treatment commenced), and no patient specific password required. A check of the patients' skin prior to application of electrodes, and after the treatment concluded was completed and appropriate. Training Gait mode was used this date. Other:         Treatment Charges: Mins Units   []  Modalities     [x]  Ther Exercise 12 1   []  Manual Therapy     []  Ther Activities     []  Aquatics     []  Vasocompression     []  Other:      [x]  Other: Gait 38 2   Total Treatment time 50 3       Assessment: [x] Progressing toward goals. Initiated patient with NuStep to increase blood flow. Patient ambulated into clinic with JUDITH AFO, R genu recurvatum with  use of rolling walker.   Rakesh Sommer started with gait training with the use of lower calf and thigh cuff for the hamstring. Testing began in seated position followed by adjustments in amplitude during the stance phase. Swing through phase was viewed from frontal plane with fair clearance of shoe. Gait analysis from the sagittal plane continued to show genu recurvatum. Patient would benefit from further adjustments of the thigh cuff for hamstring stimulation, possibly higher on the thigh under the glutes. Mod to severe R ankle instability noted and patient was promptly given a chair. Patient then recalled rolling his ankle 2x ago with doffed AFO and shoe. Lateral malleolus found to be with edema and tender with palpation. Reports ankle soreness but with altered pain sensation due to brain infarct. AFO donned on for remaining standing exercises. [] No change. [] Other:   [x] Patient would continue to benefit from skilled physical therapy services in order to: address R hemibody weakness, improve gait mechanics and efficiency, and increase standing static/dynamic balance to allow improved household ADL/IADL completion and reduce fall risk. STG: (to be met in 8 treatments) - Assessed by Larisa Banegas PT on 7/20:  ? Pain: No more than 4/10 pain reported in low back or neck post therapy sessions to indicate improving tolerance to increased activity and exercise - MET for low back or neck, but L shoulder is 2/10 - newer pain over the past 3 weeks. ? ROM: at least 0deg DF PROM with knee extended to indicate improving mobility in ankles to increase heel strike and reduce toe drag with prolonged ambulation - NOT MET, lacking 10 deg from neutral in L ankle, lacking 3 deg from neutral in R ankle  ?  Balance:   Pt able to fully turn to look behind himself without instability in either direction to indicate improving postural stability - NOT MET, LOB with full turn to R and L  Pt able to complete standing reaching tasks without UE assist and no more than mild instability to indicate improving dynamic balance for safety with household IADLs - NOT MET, primarily needs to be supported at trunk on counter or holding onto something  ? Strength: At least 4+/5 grossly in R hip to allow improved pelvic stability in standing and improved swing phase control/speed during prolonged gait - NOT MET, 4-/5 grossly in R hip  Pt able to utilize RUE for fine grasp and release tasks with no more than minimally slowed speed evident - Ongoing, better fine grasp but still limited with heavier objects, twisting lid  ? Function: Pt able to ambulate 450 ft with least restrictive device with no evidence of lagging RLE swing or abnormal stance time noted, with no more than 11 on RPE scale - NOT MET, achieves ~400 ft before needing to rest d/t RLE instability and R knee buckling, rates 13 RPE  Patient to be independent with home exercise program as demonstrated by performance with correct form without cues. - Ongoing   Demonstrate Knowledge of fall prevention - MET     LTG: (to be met in 16 treatments) - 8/22:will continue with current goals, adjusting at visit 16 as needed d/t recent CVA on 8/8  ? Pain: No more than 2/10 pain reported in low back or neck post therapy sessions to indicate improving tolerance to increased activity and exercise  ? ROM: at least 5 deg DF PROM with knee extended to indicate improving mobility in ankles to increase heel strike and reduce toe drag with prolonged ambulation  ? Balance: At least 30/56 on Hernandez to indicate significant improvement in standing static/dynamic balance and progress towards reduced fall risk  Able to negotiate gait obstacles using rollator without increased instability or excessively slowed gait  ? Strength: At least 5-/5 grossly in R hip to allow further improved pelvic stability in standing and improved swing phase control/speed during prolonged gait  At least 5/5 B knee ext to prevent excessive buckling with prolonged standing  ?  Function:   Pt able to ambulate 600 ft with least restrictive device with appropriate RLE swing/stance phase timing, no evidence of toe drag, and no more than minimal fatigue by end of ambulation  Pt able to ambulate 100 ft with intermittent turning, stopping, etc without assistive device and demonstrating good stability and no evidence of B knee buckling to indicate improving functional strength and safety with household ambulation distances for ADL/IADLs  No more than 14% impaired on ABC scale to indicate improving subjective balance confidence        Patient goals: \"to strengthen right side back as much as I can\"    Pt. Education:  [x] Yes  [] No  [] Reviewed Prior HEP/Ed  Method of Education: [x] Verbal  [x] Demo  [] Written   fall prevention 7/11/22  Comprehension of Education:  [x] Verbalizes understanding. [x] Demonstrates understanding. [] Needs review. [] Demonstrates/verbalizes HEP/Ed previously given. Plan: [x] Continue current frequency toward long and short term goals.     [x] Specific Instructions for subsequent treatments:  gait training with Bioness        Time In: 8:00 am            Time Out: 8:55 am    Electronically signed by:  Eron Arceo PTA

## 2022-08-29 ENCOUNTER — HOSPITAL ENCOUNTER (OUTPATIENT)
Dept: PHYSICAL THERAPY | Age: 47
Setting detail: THERAPIES SERIES
Discharge: HOME | End: 2022-08-29
Payer: MEDICARE

## 2022-08-29 NOTE — FLOWSHEET NOTE
[x] [facility]) [facility] &  Therapy  [address]    [phone]  F: [phone]   [] [address]  [facility]   [address]  P: [phone]  F: [phone]  [] [address]  [address]  P: [phone]  F: [phone] [] [address]  P: [phone]  F: [phone]  [] [address] [facility]   [address]   [place]: [phone]  F: [phone]   [] [place] [id number] [facility] [address]  [place]: [phone]   F: [phone]     Physical Therapy Cancel/No Show note    Date: 2022  Patient: Dawson Kidney  : 1975  MRN: [id number]    Cancels/No Shows to date:     For today's appointment patient:    [x]  Cancelled    [] Rescheduled appointment    [] No-show     Reason given by patient:    []  Patient ill    []  Conflicting appointment    [] No transportation      [] Conflict with work    [] No reason given    [] Weather related    [] COVID-19    [x] Other:      Comments: Having difficulty walking       [x] Next appointment was confirmed    Electronically signed by: Janeen Bautista PTA

## 2022-08-31 ENCOUNTER — HOSPITAL ENCOUNTER (OUTPATIENT)
Dept: PHYSICAL THERAPY | Age: 47
Setting detail: THERAPIES SERIES
Discharge: HOME | End: 2022-08-31
Payer: MEDICARE

## 2022-08-31 ENCOUNTER — HOSPITAL ENCOUNTER (OUTPATIENT)
Dept: OCCUPATIONAL THERAPY | Age: 47
Setting detail: THERAPIES SERIES
Discharge: HOME | End: 2022-08-31
Payer: MEDICARE

## 2022-08-31 PROCEDURE — 97116 GAIT TRAINING THERAPY: CPT

## 2022-08-31 PROCEDURE — 97110 THERAPEUTIC EXERCISES: CPT

## 2022-08-31 PROCEDURE — 97166 OT EVAL MOD COMPLEX 45 MIN: CPT

## 2022-08-31 NOTE — FLOWSHEET NOTE
Medicare Cap   [] Physical Therapy  [] Speech Therapy  [x] Occupational therapy  *PT and Speech caps combine      $2150 Limit for PT and Speech combined  $2150 Limit for OT individually  At the beginning of the month where you expect to go over $2150, please add the [address] modifier      Patient Name: Brianna Chapman: 1975    Note:  This is an estimate of charges billed.      Date of [address] Name # units/ charge $$$ charge Daily Total Charge Ongoing Total $$$   8/31/22 OT Eval Mod Complexity  TE 1  1 95.15  22.84 118.99 118.99

## 2022-08-31 NOTE — CONSULTS
[x] [facility]       Occupational Therapy 1st floor       [address], [place]         Phone: [phone]       Fax: [phone] [] [facility]  [address] [place] [address]  Phone: [phone]  Fax: [phone]         Occupational Therapy Hand & Upper Extremity  Initial Evaluation     Date:  2022  Patient: Micha Handy             : 1975                      MRN: [id number]  Physician: DANIEL Goyal-SHIMA                             Insurance: Medicare (Linda Karthik) [facility]  Medical Diagnosis: I63.9 (ICD-10-CM) - Cerebrovascular accident (CVA), unspecified mechanism ([facility] [address]  Rehab Codes: R26.89, R53.1, M54.59, M54.2, R29.3,   Next 's appt.: TBD  Date of symptom onset: 22      Subjective:   CC: Patient complains primarily of R sided weakness especially with R hand. HPI: Pt presents with deficits associated to basal ganglia stroke which occurred 2022 affecting the basal ganglia which resulted in R-sided weakness in RUE and RLE. Pt has had previous medical complications prior to CVA that have impacted his overall health including hx of kidney disease, diabetes, HTN, COVID-19. Notes his initial symptoms presented with increased impairment and then recovered slightly. Reported he initially had speech therapy when he resumed [facility] inpatient although reports the memory issues he was experiencing have now dissipated. No reports of vision impairments and reports he continues to primarily use rollator walker for ambulation.       Mechanism of Injury:  N/A   Surgery Date:             N/A     Precautions: None     Involved Extremity: RUE                                   Dominant Extremity:Right handed     Past Medical History: Refer to [facility]          Comorbidities: HTN, Kidney Disease, Diabetes, CVA    Medications: Refer to Medical chart in [facility] Allergies:  Refer to Medical chart in Epic     Pain: Intensity:   0/10  Location: 0 / NA     Pain Type: None         Pain Altered Tx: no                 Action Taken:none            Home Environment:    Pt lives in a  and House          With a ramp with rails      Primary assistance from grandparents          The washing machine is on and Family does     Vocation Polishing   Job Status []Normal duty []Light duty [x] Off due to condition []Retired  []Not employed []Disability  []Other:  []  Return to work: Work activities/duties       Avocational Activities Watching sports, reading book, movies, walking,      [] [address]     [] [facility]     [] Care giver [] OTHER    [x] NA         ADL/IADL Previous level of function Current level of function Current Performance Beneficial Adaptive Devices   Bathing  [x] Independent     [] Assist  [x] Independent      [] Assist  Reports he completes bathing on own    Dress/grooming [x] Independent    [] Assist  [] Independent     [x] Assist  Assistance with LBD for donning shorts, requires assistance for AFO     Long handled AE, button hook   Transfer/mobility [] Independent    [x] Assist  [] Independent      [x] Assist      Feeding [x] Independent      [] Assist  [x] Independent     [] Assist  Reports \"a little difficult holding utensils\" Demonstrated active shoulder range to mouth, reports drops food at times   Toileting [x] Independent      [] Assist  [x] Independent     [] Assist  Reports IND, has grab bar to assist     Driving [] Independent     [x] Assist  [] Independent      [x] Assist  Does not drive     Housekeeping [x] Independent     [] Assist  [] Independent      [x] Assist  Reports grandparents primarily completing Long handled AE   Grocery shop/meal prep [x] Independent     [] Assist  [] Independent     [x] Assist  Family completes shopping.    Reports difficulty opening jars at times      Functional Transfers Previous Level of Function:  Current level of function Assist Level DME / Home Modifications Details   Sit/Stand [x] Independent     [] Assist  [x] Independent     [] Assist  MI       Shower [x] Independent    [] Assist  [x] Independent    [] Assist  MI Long handled sponge from [employer] Reports grandparents proving distant supervision    Bed [x] Independent    [] Assist  [x] Independent     [] Assist  MI    Has regular bed, does not have bed cane   Wheelchair [x] Independent    [] Assist  [x] Independent    [] Assist  MI       Chair [x] Independent    [] Assist  [x] Independent    [] Assist  MI    Reports no difficulty with rollator seat transfers or standard set transfers. Toilet [x] Independent    [] Assist  [x] Independent     [] Assist  MI Raised toilet seat without handles Reports no difficulty       Device: Rolator primarily, RW      Orthosis: NA     Objective: Tests/Measurements: Upper Extremity Functional Index  Current Functional Level:  27/80 functionally impaired as measured with the Upper Extremity Functional Index Survey. 0-80 scale, with 80 = no Deficits  (The UEFI model does not provide any specific cut off points that could classify the upper limb disability degree, however, a minimal detectable change of 9 points is provided. This means that for improvement or deterioration to be considered, between two subsequent evaluations, the scores must differ by at least 9 points.)     Sensibility: Normal    Edema: None      Skin Color: Normal   Skin/Scar condition Not tested     Problems:      ROM, Strength, Function, and Coordination                     Visual Dx: Blurred vision, small print up close.     Visual: R Visual Field Comments L Visual Field Comments   Ocular ROM [x] Normal    [] Impaired Demonstrated full ROM although  [x] Normal    [] Impaired Demonstrated full ROM although unable to coordinate full ROM on command   Pursuits (tracking) [x] Normal    [] Impaired Good ability to track [x] Normal    [] Impaired Good ability to track   Convergence [x] Normal    [] Impaired Normal [x] Normal    [] Impaired Normal   Peripheral [x] Normal    [] Impaired Normal [x] Normal    [] Impaired Normal                    Shoulder and Elbow  Right AROM (degrees) Right Strength (MMT) Left AROM (degrees) Left Strength  (MMT) Spasticity (modified neelam)   Shoulder Flexion (180) 140 3+ WNL 4 0   Shoulder Extension (60) 40 4- WNL 4+ 0   Shoulder Abduction (180) 117 3 WNL 4- 0   Shoulder Adduction WFL 3 WFL 4 0   Shoulder Internal Rotation (70) WFL 4- WFL 4- 0   External Rotation (80-90) 65 3- WFL 4 0   Elbow Flexion WFL 3+ WFL 4- 0   Elbow Extension WFL 3+ WFL 4- 0         Wrist/Forearm ROM Right ROM (degrees) Left ROM (degrees) Right Spasticity (modified neelam) Left Spasticity (modified neelam) COMMENTS   Extension (60-70) WFL WFL 0 0    Flexion (70-80) WFL WFL 0 0    Radial Deviation (20-25) WFL WFL 0 0    Ulnar Deviation (30-40) WFL WFL 0 0    Forearm Pronation (80-90) WFL WFL 0 0    Forearm Supination (80-90) WFL WFL 0 0       R Hand Extension/Flexion INDEX MIDDLE RING LITTLE COMMENTS   MCP WFL [facility] WFL  Pt presents with bilateral PIP deformities that passively extend without resistance. Pt reports these have been there \"for awhile\". PIP WFL [facility] WFL    DIP [facility] WFL       L Hand Extension/Flexion INDEX MIDDLE RING LITTLE COMMENTS   MCP WNL [facility] WFL     PIP WFL [facility] WFL WFL    DIP WFL [facility] WFL                  Hand Strength Right   (pounds) Left   (pounds) COMMENTS    37 35 Pt demonstrated difficulty with isolating digits to complete pinch strengths on B hands due to abnormalities in PIP joints that pt has reported have been there \"for awhile\"   Lateral pinch 5 7    2 point pinch 6 4    3 jaw pinch 7 5          Coordination Right Scoring Percentile Left Scoring Percentile   9 Hole Peg Test 1 min 1 second   38 seconds     Box and Blocks 31 blocks in 1 minute   38 blocks in 1 minute          Functional support reach 13 inches  Functional unsupported 12 inches. Assessment:  Patient would benefit from skilled occupational therapy services in order to: Improve  functional /grasp, Motor control/, ROM, Strength, and Activity tolerance in order to ensure good follow through and improve functional use of UE in ADL performance     Short Term Goals: (  8    Treatments)  Pt to increase RUE ROM for at least 2 planes by 10 degrees actively  Increase gross UE strength  as evidenced by an increase in 1 grade for all planes bilaterally to improve safety and stability with functional transfers  Increase bilateral  strength by 10  lbs to improve functional participation in ADL/IADL tasks  Increase all pinch strengths bilaterally by 3 lbs to improve functional participation in ADL/IADL tasks  Increase function:UE Functional Index Score 5 or more points to promote increased functional abilities  Patient to be independent with home exercise program as demonstrated by performance with correct form without cues.   Increase R side FMC as evidenced by a decrease in 8 seconds  with the 9-hole peg test  Increase coordination as evidenced by an increase in 5 blocks completed during the box and blocks assessment to indicate significant clinical improvement       Long Term Goals: (  16  Treatments)  Pt to increase RUE ROM for at least 2 planes by 15+ degrees actively  Increase gross UE strength  as evidenced by an increase in 2 grades for all planes bilaterally to improve safety and stability with functional transfers  Increase bilateral  strength by 15  lbs to improve functional participation in ADL/IADL tasks  Increase all pinch strengths bilaterally by 5 lbs to improve functional participation in ADL/IADL tasks  Increase function:UE Functional Index Score 10 or more points to promote increased functional abilities  Increase R side FMC as evidenced by a decrease in 14 seconds  with the 9-hole peg test  Increase coordination as evidenced by an increase in 10 blocks completed during the box and blocks  to indicate significant clinical improvement     Patient Goals: To get stronger and use R hand fully. Treatment Potential: Good         Suggested Professional Referral: No - Pt seeing Physical therapy  Domestic Concerns: No                          Barriers to Goal Achievement: No     Comments/Assessment:  Pt presents with moderate impairment of functional engagement as evidenced by a score of 27/80 on the UEFI. Pt presents with R side AROM deficits. Pt presents with moderate impairment for strength for BUE shoulder flexion/extension as well as bilateral  strength. Pt presents with significantly reduced FM coordination bilaterally as evidenced by increased time required for 9-hole peg test as well as scoring significantly below the average for quantity of blocks transferred during box and blocks test. Pt remained edge of mat throughout OT evaluation. Introduced to [address] [place] [address] for scapular exercises. Pt completed with good technique and min cuing. Pt to benefit from skilled OT services to maximize UE Strength, ROM, and FM coordination, and visual impairments for increased ability to engage in ADL and IADL tasks independently.       Home Program Initiated: Demo Comprehension of Education: Yes       Plans, Discussed with, and Patient     Treatment Plan:   [x]  Therapeutic Exercise   91832              []  Iontophoresis: 4 mg/mL Dexamethasone Sodium Phosphate  mAmin  48900    [x]  Therapeutic Activity  66422  []  Vasopneumatic cold with compression  52024                [x]  ADL training  28608  []  Ultrasound        46063    [x]  Neuromuscular Re-education  29748  []  Electrical Stimulation Attended  35096    [x]  Manual Therapy  30316  Orthotic    []  Fit  93962     []  Train 97997    [x]  Instruction in [address]    []  Fit 09471      []  Train 48709    []  Cognitive Interventions, (first 15 min 67180, subsequent 15 min 88188)  []  Cold/hotpack      []  Massage   19715 Treatment Plan:  Frequency: 2 x/week for 16 visits          Today's Treatment:  Modalities: N/A                        Precautions:                N/A  Exercise Flow Sheet:  Exercise Reps/Time Weight/Level Comments   Scapular Exercises with Mirror 10 reps 1lb Administered for HEP, Completed                                                   Other: N/A     Specific Instructions for Next Treatment: Review of tx plan, PROM with sustained stretch, review of PROM techniques. Evaluation Complexity:  History (Personal factors, comorbidities) []  0 [x]  1-2 []  3+   Exam (limitations, restrictions) []  1-2 [x]  3 []  4+   Decision Making []  Low [x]  Moderate []  High   ? []  Low Complexity [x]  Moderate   Complexity []  High Complexity      Treatment Charges: Mins Units   Evaluation  []  High  [x]  Moderate  []  Low 45  1   [x]  Ther Exercise 15 1   []  Manual Therapy       []  Ther Activities     []  Orthotic fit/train       []  Orthotic recheck       []         Total Treatment time 60 min 2      Time In: [de-identified]  AM                              Time out: 10:00 AM                            Electronically signed by Kristal GOLDEN on 8/17/2022 at 8:54 AM     Physician Signature: _________________________        Date: _______________  By signing above or cosigning this note, I have reviewed this plan of care and certify a need for medically necessary rehabilitation services.       *PLEASE SIGN ABOVE AND FAX BACK ALL PAGES*

## 2022-09-02 ENCOUNTER — HOSPITAL ENCOUNTER (OUTPATIENT)
Dept: PHYSICAL THERAPY | Age: 47
Setting detail: THERAPIES SERIES
Discharge: HOME | End: 2022-09-02
Payer: MEDICARE

## 2022-09-07 ENCOUNTER — HOSPITAL ENCOUNTER (OUTPATIENT)
Dept: PHYSICAL THERAPY | Age: 47
Setting detail: THERAPIES SERIES
Discharge: HOME | End: 2022-09-07
Payer: MEDICARE

## 2022-09-07 ENCOUNTER — HOSPITAL ENCOUNTER (OUTPATIENT)
Dept: OCCUPATIONAL THERAPY | Age: 47
Setting detail: THERAPIES SERIES
Discharge: HOME | End: 2022-09-07
Payer: MEDICARE

## 2022-09-07 PROCEDURE — 97112 NEUROMUSCULAR REEDUCATION: CPT

## 2022-09-07 PROCEDURE — 97110 THERAPEUTIC EXERCISES: CPT

## 2022-09-07 NOTE — FLOWSHEET NOTE
[x] [address]  [address]  [phone]  F: [phone] [x] [address]  [facility]   [address]  P: [phone]  F: [phone] [] [facility]  [address] [facility]  P: [phone]  F: [phone] [] [address]  P: [phone]  F: [phone] [] [address]  [facility]   [address]   [place]: [phone]  F: [phone]      Physical Therapy Daily Treatment / Donnie Swann Note    Date:  2022  Patient Name:  Casper Franklin    :  1975  MRN: [id number]  Physician: RACHAEL Helms                           Insurance: Medicare ([address] [place] [address])  Medical Diagnosis: I63.9 (ICD-10-CM) - Cerebrovascular accident (CVA), unspecified mechanism ([facility] [address]  Rehab Codes: R26.89, R53.1, M54.59, M54.2, R29.3,   Next 's appt.: 8/10 with neuro  Date of symptom onset: 22   Visit# / total visits: 10/16    Cancels/No Shows:     Subjective:    Pain:  [] Yes  [x] No Location: bilateral shoulders R ankle Pain Rating: (0-10 scale) 3/10  Pain altered Tx:  [x] No  [] Yes  Action:  Comments: Patient reports he twisted his R ankle again last Thursday and why he cx his Fri gio't. Walking to bed without AFO and it twisted to the side. Immediate pain of 7/10. Pain now has improved. 0/10 pain at rest, 3/10 with palpation and walking. Current functional deficits: needs assist to don RLE into pants d/t inability to lift up, difficulty donning/doffing AFOs but able to complete, just slow. Dysarthria.      Objective:       22 ROM DEGREES A/P ROM DEGREES A/P STRENGTH  STRENGTH     LEFT  RIGHT LEFT RIGHT   HIP FLEX   4+ 3+   HIPEXT   2+ 2   HIP ER       HIP IR       HIP ABD   4- 3+   HIP ADD   3+ 3+          KNEE FLEX   5- 5-   KNEE EXT   5- 2+ (unable to fully extend against gravity)          ANKLE DF Lacking 12deg with knee ext Lacking 15deg with knee ext 2- 2-                PF   4 2+                INV                    EVER             Modalities:   Precautions:  Exercises:   Exercise RLE Reps/ Time Weight/ Level Comments   Nu step  5' Level 4 Inc resistance 8.26   Scapular retraction/elevation mobs 5 min     UT stretch  3x20\"ea     scap squeeze  10x3\"     Pec stretch-doorway  2x30\"     Posterior shoulder rolls 20x  Assist for slow, max retraction   Scap retraction 2x10 Orange  Added resistance 7/13   Scap retract w/ shoulder flex 10x AROM    Chin tucks  10x3\"           Seated toe raises  10x2                 Standing    Parallel bars   Gastroc stretch 3x30\"  UE support; 1 LE at a time    Marches 3x10  UE support   Hip abd 2x10  Cuff on R leg  R hip CKC  L hip OKC   Weight shifting  10xea  No UE as able; M/L, A/P, P/A         Resisted swing phase 3x30'  x13' Lime     Resisted step over small round cone 2x10 Lime  With weight shift onto RLE   Heel taps to step  10x 4\"    Trunk/cervical rotation naming how many fingers clinician is holding  10xea  clinician standing behind pt    Picking up rings from steps  4x 4 rings;   6\" and 12\" step Wider JASIEL no UE; notes some irritation in LB; x2 leading with R UE, x2 leading with L UE and handing to clinician at multiple angles   Resisted swing phase fwd and retro amb on way back  5xea // bars;   Lime  Resisted fwd walk    Step over round cone  10xea // bars           Balance   Parallel bars   Trunk rotation with ball 5x ea     Shoulder press with ball 10x     NBOS 2x30\"  Posterior sway after 20 sec on 1st rep    NBOS with EC 2x30\"     NBOS on foam  2x30\"     NBOS with ball raises, rotation 2x30\"ea Volley  ball     Tandem stance 2x20\"     Cone  off ground 2x5 5 cones    Standing on foam with head turns  30\" ea  Up/down, rotation    Cone reach with LUE across body 2x5 5 cones phase.  Held DF/PF to don AFO due to L ankle instability. Trial of in house AFO compared to patients current AFO with similar presentation of lateral support arm. No benefit noted to continue with trial of AFO. Improved stance noted immediately with soft knee and no recurvatum. Completed hip abd/toe taps in CKC to increase glute strength. Adjustments made to promote good standing without over stimulating muscle groups to prevent muscular fatigue. 1 R knee LOB. [] No change. [] Other:   [x] Patient would continue to benefit from skilled physical therapy services in order to: address R hemibody weakness, improve gait mechanics and efficiency, and increase standing static/dynamic balance to allow improved household ADL/IADL completion and reduce fall risk. STG: (to be met in 8 treatments) - Assessed by John Quinteros PT on 7/20:  ? Pain: No more than 4/10 pain reported in low back or neck post therapy sessions to indicate improving tolerance to increased activity and exercise - MET for low back or neck, but L shoulder is 2/10 - newer pain over the past 3 weeks. ? ROM: at least 0deg DF PROM with knee extended to indicate improving mobility in ankles to increase heel strike and reduce toe drag with prolonged ambulation - NOT MET, lacking 10 deg from neutral in L ankle, lacking 3 deg from neutral in R ankle  ? Balance:   Pt able to fully turn to look behind himself without instability in either direction to indicate improving postural stability - NOT MET, LOB with full turn to R and L  Pt able to complete standing reaching tasks without UE assist and no more than mild instability to indicate improving dynamic balance for safety with household IADLs - NOT MET, primarily needs to be supported at trunk on counter or holding onto something  ? Strength:    At least 4+/5 grossly in R hip to allow improved pelvic stability in standing and improved swing phase control/speed during prolonged gait - NOT MET, 4-/5 grossly in R hip  Pt able to utilize RUE for fine grasp and release tasks with no more than minimally slowed speed evident - Ongoing, better fine grasp but still limited with heavier objects, twisting lid  ? Function: Pt able to ambulate 450 ft with least restrictive device with no evidence of lagging RLE swing or abnormal stance time noted, with no more than 11 on RPE scale - NOT MET, achieves ~400 ft before needing to rest d/t RLE instability and R knee buckling, rates 13 RPE  Patient to be independent with home exercise program as demonstrated by performance with correct form without cues. - Ongoing   Demonstrate Knowledge of fall prevention - MET     LTG: (to be met in 16 treatments) - 8/22:will continue with current goals, adjusting at visit 16 as needed d/t recent CVA on 8/8  ? Pain: No more than 2/10 pain reported in low back or neck post therapy sessions to indicate improving tolerance to increased activity and exercise  ? ROM: at least 5 deg DF PROM with knee extended to indicate improving mobility in ankles to increase heel strike and reduce toe drag with prolonged ambulation  ? Balance: At least 30/56 on [address] to indicate significant improvement in standing static/dynamic balance and progress towards reduced fall risk  Able to negotiate gait obstacles using rollator without increased instability or excessively slowed gait  ? Strength: At least 5-/5 grossly in R hip to allow further improved pelvic stability in standing and improved swing phase control/speed during prolonged gait  At least 5/5 B knee ext to prevent excessive buckling with prolonged standing  ?  Function:   Pt able to ambulate 600 ft with least restrictive device with appropriate RLE swing/stance phase timing, no evidence of toe drag, and no more than minimal fatigue by end of ambulation  Pt able to ambulate 100 ft with intermittent turning, stopping, etc without assistive device and demonstrating good stability and no evidence of B knee buckling to indicate improving functional strength and safety with household ambulation distances for ADL/IADLs  No more than 14% impaired on ABC scale to indicate improving subjective balance confidence        Patient goals: \"to strengthen right side back as much as I can\"    Pt. Education:  [x] Yes  [] No  [x] Reviewed Prior HEP/Ed  Method of Education: [x] Verbal  [x] Demo  [] Written   fall prevention 7/11/22  Comprehension of Education:  [x] Verbalizes understanding. [x] Demonstrates understanding. [] Needs review. [x] Demonstrates/verbalizes HEP/Ed previously given. Plan: [x] Continue current frequency toward long and short term goals.     [x] Specific Instructions for subsequent treatments:  gait training with Bioness, trial of Xcite        Time In: 8:00 am            Time Out: 8:55 am    Electronically signed by:  Cuong Carolina PTA

## 2022-09-07 NOTE — FLOWSHEET NOTE
[x] [facility]       Occupational Therapy            1st floor       [address] [facility]         Phone: [phone]       Fax: [phone] [] [facility] Occupational Therapy  [address] [facility] [address]   [facility]  Phone: [phone]  Fax: [phone]      Occupational Therapy Daily Treatment Note    Date:  2022  Patient Name:  Lucas Zuluaga    :  1975  MRN: [id number]  Physician: RACHAEL Ardon                             Insurance: Medicare ([address] [place] [address]) - [facility]  Medical Diagnosis: I63.9 (ICD-10-CM) - Cerebrovascular accident (CVA), unspecified mechanism ([facility] [address]  Rehab Codes: R26.89, R53.1, M54.59, M54.2, R29.3,   Next 's Appt: TBD  Date of Onset: 22   Visit# / total visits: ; Progress note for Medicare patient due at visit 10  Cancels/No Shows: 0/0      Subjective:    Pain:  [x] Yes  [] No Location: low back Pain Rating: (0-10 scale) 6/10  Pain altered Tx:  [x] No  [] Yes  Action:  Pt Comments: Pt reports compliance with HEP, denies pain in bilateral UE's, 6/10 pain in low back, \"I am tired. I haven't been to bed yet\". Precautions: none  Surgery procedure and date: NA    Objective: Today's Treatment:  Modalities:  Exercises:  EXERCISE    REPS/     TIME  WEIGHT/    LEVEL COMMENTS   Scapular Exercises with Mirror 10 reps  1 pound Not Completed, Issued as part of HEP   Foam Cube /Pinch 20 reps each blue Added, Completed, Issued as part of HEP   Velcro Pegboard 1 series  Added, Completed   Clothespin Foam Cube Pinch 1/2 series yellow Added, Completed   Large Pegboard, Crossing Midline 3 rows  Added, Completed               Other:      Assessment: [x] Progressing toward goals. Pt incoming from PT, ambulating with Rollator walker, bilateral AFO's donned. Pt reports there has been a change in his dialysis schedule, OT/PT appointments adjusted accordingly.  Pt denies pain in bilateral UE's, 6/10 pain in low back this coordination as evidenced by an increase in 5 blocks completed during the box and blocks assessment to indicate significant clinical improvement        Long Term Goals: (  16  Treatments)  Pt to increase RUE ROM for at least 2 planes by 15+ degrees actively  Increase gross UE strength  as evidenced by an increase in 2 grades for all planes bilaterally to improve safety and stability with functional transfers  Increase bilateral  strength by 15  lbs to improve functional participation in ADL/IADL tasks  Increase all pinch strengths bilaterally by 5 lbs to improve functional participation in ADL/IADL tasks  Increase function:UE Functional Index Score 10 or more points to promote increased functional abilities  Increase R side FMC as evidenced by a decrease in 14 seconds  with the 9-hole peg test  Increase coordination as evidenced by an increase in 10 blocks completed during the box and blocks  to indicate significant clinical improvement     Patient Goals: To get stronger and use R hand fully      Pt. Education:  [x] Yes  [] No  [x] Reviewed Prior HEP/Ed  Method of Education: [x] Verbal  [x] Demo  [] Written  Re: Scapular Exercises, Blue Foam Cube /Pinch HEP, Foam Tubing for Self-Feeding  Comprehension of Education:  [x] Verbalizes understanding. [x] Demonstrates understanding. [] Needs review. [] Demonstrates/verbalizes HEP/Ed previously given. Plan: [x] Continue current frequency toward short and long term goals.   [x] Specific Instructions for subsequent treatments: Progress with FM, /pinch strengthening as appropriate   [] Other:       Time In: 8130  Time Out: 0940        Treatment Charges: Mins Units [facility]) Time In/Out:   []  Modalities:       []  Ultrasound      [x]  Ther Exercise 45 3 [id number]   []  Manual Therapy      []  Ther Activities      []  Orthotic fit/train      []  Orthotic recheck      []  Other      Total Treatment time 45 3         Medicare Cap   [] Physical Therapy   [] Speech Therapy      [x] Occupational therapy  *PT and Speech caps combine                                                                                  $2150 Limit for PT and Speech combined  $2150 Limit for OT individually  At the beginning of the month where you expect to go over $2150, please add the [address] [place] [address] modifier       Patient Name: Laurie Angel: 1975     Note:  This is an estimate of charges billed.               Date of [address] Name # units/ charge $$$ charge Daily Total Charge Ongoing Total $$$   8/31/22 OT Eval Mod   TE 1  1 95.15  22.84 118.99 118.99   9/7/22  TE  3 29.21  22.84  22.84 74.89 193.88                                                                                                                     Electronically signed by:  ANDREA Izquierdo/JOHN soft/no masses palpable/nontender/bowel sounds normal

## 2022-09-09 ENCOUNTER — HOSPITAL ENCOUNTER (OUTPATIENT)
Dept: OCCUPATIONAL THERAPY | Age: 47
Setting detail: THERAPIES SERIES
Discharge: HOME | End: 2022-09-09
Payer: MEDICARE

## 2022-09-09 ENCOUNTER — APPOINTMENT (OUTPATIENT)
Dept: OCCUPATIONAL THERAPY | Age: 47
End: 2022-09-09
Payer: MEDICARE

## 2022-09-09 ENCOUNTER — HOSPITAL ENCOUNTER (OUTPATIENT)
Dept: PHYSICAL THERAPY | Age: 47
Setting detail: THERAPIES SERIES
Discharge: HOME | End: 2022-09-09
Payer: MEDICARE

## 2022-09-09 NOTE — SIGNIFICANT EVENT
[x] [address] Occupational Therapy       [address] [facility], 1st Floor       Phone: [phone]       Fax: [phone] [] [facility] Occupational  Therapy at [facility]       Phone: [phone]       Fax: [phone]          Occupational Therapy Cancel/No Show note    Date: 2022  Patient: Lui Reagan  : 1975  MRN: [id number]  Cancels/No Shows to date:     For today's appointment patient:  [x]  Cancelled  []  Rescheduled appointment  []  No-show     Reason given by patient:  []  Patient ill  []  Conflicting appointment  []  No transportation    []  Conflict with work  []  No reason given  [x]  Other:     Comments:  Low blood sugar    Electronically signed by: Yodit Julio.  OTS

## 2022-09-09 NOTE — FLOWSHEET NOTE
[x] [facility]) [facility]  Therapy  [address]    [phone]  F: [phone]   [] [address]  [facility]   [address]  P: [phone]  F: [phone]  [] [place]  [address]  P: [phone]  F: [phone] [] [address]  P: [phone]  F: [phone]  [] [address]  [facility]   [address]   [place]: [phone]  F: [phone]   [] [place] [address] [facility] [address]  [place]: [phone]   F: [phone]     Physical Therapy Cancel/No Show note    Date: 2022  Patient: Janina Reynolds  : 1975  MRN: [id number]    Cancels/No Shows to date: 3/0 (last 30 days)    For today's appointment patient:    [x]  Cancelled    [] Rescheduled appointment    [] No-show     Reason given by patient:    []  Patient ill    []  Conflicting appointment    [] No transportation      [] Conflict with work    [] No reason given    [] Weather related    [] [id number]    [x] Other:      Comments: Sugar  low      [x] Next appointment was confirmed    Electronically signed by: Sammy Menon PTA
- Follow up with your PCP in 1-2 weeks from discharge. - Follow up with Nephro Dr. Emily Gusman within 2 weeks from discharge and repeat BMP (lab/ blood work) within 1 week from discharge. - Follow up with Dr. Ramsey at the [facility] [phone].  WIll determine if safe to continue BCG treatments given this risk of infection and previous history of UTI's

## 2022-09-13 ENCOUNTER — HOSPITAL ENCOUNTER (OUTPATIENT)
Dept: PHYSICAL THERAPY | Age: 47
Setting detail: THERAPIES SERIES
Discharge: HOME | End: 2022-09-13
Payer: MEDICARE

## 2022-09-13 ENCOUNTER — HOSPITAL ENCOUNTER (OUTPATIENT)
Dept: OCCUPATIONAL THERAPY | Age: 47
Setting detail: THERAPIES SERIES
Discharge: HOME | End: 2022-09-13
Payer: MEDICARE

## 2022-09-13 PROCEDURE — 97112 NEUROMUSCULAR REEDUCATION: CPT

## 2022-09-13 PROCEDURE — 97110 THERAPEUTIC EXERCISES: CPT

## 2022-09-13 NOTE — FLOWSHEET NOTE
[x] [address]  [address]  [phone]  F: [phone] [x] [address]  [facility] [place] [facility]"" [address]   [address]  P: [phone]  F: [phone] [] [address]  [address]  P: [phone]  F: [phone] [] [address]  P: [phone]  F: [phone] [] [address]  [facility]   [address]   [place]: [phone]  F: [phone]      Physical Therapy Daily Treatment / Amita Kaplan Note    Date:  2022  Patient Name:  Steve Iverson    :  1975  MRN: [id number]  Physician: RACHAEL Salazar                           Insurance: Medicare ([address] [place] [address])  Medical Diagnosis: I63.9 (ICD-10-CM) - Cerebrovascular accident (CVA), unspecified mechanism ([facility] [address]  Rehab Codes: R26.89, R53.1, M54.59, M54.2, R29.3,   Next 's appt.: 8/10 with neuro  Date of symptom onset: 22   Visit# / total visits:     Cancels/No Shows: 3/0 (Last 30 days)    Subjective:    Pain:  [] Yes  [x] No Location: bilateral shoulders R ankle Pain Rating: (0-10 scale) 3/10  Pain altered Tx:  [x] No  [] Yes  Action:  Comments: Patient arrives stating he fell this morning while standing and leaning forward to pick something up. No new injury reported. Has noticed his L foot is rolling in when he is walking. Current functional deficits: needs assist to don RLE into pants d/t inability to lift up, difficulty donning/doffing AFOs but able to complete, just slow. Dysarthria.      Objective:       22 ROM DEGREES A/P ROM DEGREES A/P STRENGTH  STRENGTH     LEFT  RIGHT LEFT RIGHT   HIP FLEX   4+ 3+   HIPEXT   2+ 2   HIP ER       HIP IR       HIP ABD   4- 3+   HIP ADD   3+ 3+          KNEE FLEX   5- 5-   KNEE EXT   5- 2+ (unable to fully extend against gravity)          ANKLE DF Lacking 12deg with knee ext Lacking 15deg with knee ext 2- 2-                PF   4 2+                INV                    EVER             Modalities:   Precautions: Pt has dialysis 3x per week  Exercises:   Exercise RLE Reps/ Time Weight/ Level Comments   Nu step  5' Level 4    Scapular retraction/elevation mobs 5 min     UT stretch  3x20\"ea     scap squeeze  10x3\"     Pec stretch-doorway  2x30\"     Posterior shoulder rolls 20x  Assist for slow, max retraction   Scap retraction 2x10 Orange  Added resistance 7/13   Scap retract w/ shoulder flex 10x AROM    Chin tucks  10x3\"           Seated toe raises  10x2                 Standing    Parallel bars   Gastroc stretch 3x30\"  UE support; 1 LE at a time    Marches 3x10  UE support   Hip abd 2x10  Cuff on R leg  R hip CKC  L hip OKC   Weight shifting  10xea  No UE as able; M/L, A/P, P/A         Resisted swing phase 3x30'  x13' Lime     Resisted step over small round cone 2x10 Lime  With weight shift onto RLE   Heel taps to step  10x 4\"    Trunk/cervical rotation naming how many fingers clinician is holding  10xea  clinician standing behind pt    Picking up rings from steps  4x 4 rings;   6\" and 12\" step Wider JASIEL no UE; notes some irritation in LB; x2 leading with R UE, x2 leading with L UE and handing to clinician at multiple angles   Resisted swing phase fwd and retro amb on way back  5xea // bars;   Lime  Resisted fwd walk    Step over round cone  10xea // bars           Balance   Parallel bars   Trunk rotation with ball 5x ea     Shoulder press with ball 10x     NBOS 2x30\"  Posterior sway after 20 sec on 1st rep    NBOS with EC 2x30\"     NBOS on foam  2x30\"     NBOS with ball raises, rotation 2x30\"ea Volley  ball     Tandem stance 2x20\"     Cone  off ground 2x5 5 cones    Standing on foam with head turns  30\" ea  Up/down, rotation    Cone reach with LUE across body 2x5 5 cones    Cone reach with LUE to L side 2x5 5 cones    Heel taps 10x ea 4in With 1 hand for UE support   Dynamic march with retro hamstring curl 3L  No UE support   March amb  3L // bars     Hs curl amb  2L // bars     Side stepping 3L // bars  No UE-1 light UE support   360 turn 2x  Clockwise more challenging   Marches  2x20\"  No UE   Alt toe taps to foam  2x30\"  No UE         Gait training with Bioness 50 min Lower and thigh cuff Initial set up  STS 5x  Hip abd  Marches  Adjustments for thigh cuff more proximal  Gait analysis   Change in speed  4 sit down breaks   Xcite training 50 min L Hams  L Quads  L Glutes Held DF due to ankle instability  Set up in back room  3x4\" pads hams/quads  2/4\" pads glutes  Hip abd in STS 'pause mode'  Toe tap in step up setting  STS 1x3 set up          1x7 // bars  Hip abd L sided          10x2  Toe taps on 4\" box          10x2  Balance without UE 2x10 sec    1 R knee buckle                  Xcite training ankle pumps 20 min 4 x 10 Set up in supine, head elevated  Ant/ tib placement adjusted 3x to execute DF and EV  Gastroc pads added on 3rd and 4th set   PROM 5 min  DF/EV between sets of Xcite   Heel slides 20x   Required stabilization of ankle   SLR 10x2     SAQ 10x2                  Treatment this date included using Functional Electrical Stimulation via the Xcite 2. Stimulation parameters and outcomes can be viewed on [email or web address] with the patients' username (patient ID generated by [employer] without any patient identifiers) and password (patients' pin generated by [employer] without any patient identifiers). A check of the patients' skin prior to application of electrodes, and after the treatment concluded was completed and clear. See above in treatment log for treatments performed.     Other:         Treatment Charges: Mins Units   []  Modalities     [x]  Ther Exercise 20 1   []  Manual Therapy     []  Ther Activities     []  Aquatics     []  Vasocompression     [x]  Other: Neuro re-ed 30 2   []  Other: Gait Total Treatment time 50 3       Assessment: [x] Progressing toward goals. Patient ambulated with rolling walker with heel strike followed by immediate IV during stance phase. Patient reports toes 4 and 5 are very painful with his 'new gait' as demonstrated by rolling off lateral foot during toe off phase. Held standing exercises due to new pain in digits and ankle instability. At rest with doffed AFO, patient presents with new IV tone, Last scale of 2/4, and up to 40-45 degrees of IV. PROM limited EV to neutral.   Xcite training added with ankle pumps with focus on contractions of DV/EV. Tone would increase after 3 or 4 reps requiring AA to complete reps. Decreased tone noted with antagonist xcite over gastroc belly. Heel slides/SLR and SAQ completed for LE strengthening while resting in supine. Heel slides required AA to maintain neutral position and avoid further injury. [] No change. [x] Other: Patient would benefit from AFO with more ankle stability necessary for safe ambulation. [x] Patient would continue to benefit from skilled physical therapy services in order to: address R hemibody weakness, improve gait mechanics and efficiency, and increase standing static/dynamic balance to allow improved household ADL/IADL completion and reduce fall risk. STG: (to be met in 8 treatments) - Assessed by Bre George PT on 7/20:  ? Pain: No more than 4/10 pain reported in low back or neck post therapy sessions to indicate improving tolerance to increased activity and exercise - MET for low back or neck, but L shoulder is 2/10 - newer pain over the past 3 weeks. ? ROM: at least 0deg DF PROM with knee extended to indicate improving mobility in ankles to increase heel strike and reduce toe drag with prolonged ambulation - NOT MET, lacking 10 deg from neutral in L ankle, lacking 3 deg from neutral in R ankle  ?  Balance:   Pt able to fully turn to look behind himself without instability in either direction to indicate improving postural stability - NOT MET, LOB with full turn to R and L  Pt able to complete standing reaching tasks without UE assist and no more than mild instability to indicate improving dynamic balance for safety with household IADLs - NOT MET, primarily needs to be supported at trunk on counter or holding onto something  ? Strength: At least 4+/5 grossly in R hip to allow improved pelvic stability in standing and improved swing phase control/speed during prolonged gait - NOT MET, 4-/5 grossly in R hip  Pt able to utilize RUE for fine grasp and release tasks with no more than minimally slowed speed evident - Ongoing, better fine grasp but still limited with heavier objects, twisting lid  ? Function: Pt able to ambulate 450 ft with least restrictive device with no evidence of lagging RLE swing or abnormal stance time noted, with no more than 11 on RPE scale - NOT MET, achieves ~400 ft before needing to rest d/t RLE instability and R knee buckling, rates 13 RPE  Patient to be independent with home exercise program as demonstrated by performance with correct form without cues. - Ongoing   Demonstrate Knowledge of fall prevention - MET     LTG: (to be met in 16 treatments) - 8/22:will continue with current goals, adjusting at visit 16 as needed d/t recent CVA on 8/8  ? Pain: No more than 2/10 pain reported in low back or neck post therapy sessions to indicate improving tolerance to increased activity and exercise  ? ROM: at least 5 deg DF PROM with knee extended to indicate improving mobility in ankles to increase heel strike and reduce toe drag with prolonged ambulation  ? Balance: At least 30/56 on Hernandez to indicate significant improvement in standing static/dynamic balance and progress towards reduced fall risk  Able to negotiate gait obstacles using rollator without increased instability or excessively slowed gait  ? Strength:    At least 5-/5 grossly in R hip to allow further improved pelvic stability in standing and improved swing phase control/speed during prolonged gait  At least 5/5 B knee ext to prevent excessive buckling with prolonged standing  ? Function:   Pt able to ambulate 600 ft with least restrictive device with appropriate RLE swing/stance phase timing, no evidence of toe drag, and no more than minimal fatigue by end of ambulation  Pt able to ambulate 100 ft with intermittent turning, stopping, etc without assistive device and demonstrating good stability and no evidence of B knee buckling to indicate improving functional strength and safety with household ambulation distances for ADL/IADLs  No more than 14% impaired on ABC scale to indicate improving subjective balance confidence        Patient goals: \"to strengthen right side back as much as I can\"    Pt. Education:  [x] Yes  [] No  [x] Reviewed Prior HEP/Ed  Method of Education: [x] Verbal  [x] Demo  [] Written   fall prevention 7/11/22  Comprehension of Education:  [x] Verbalizes understanding. [x] Demonstrates understanding. [] Needs review. [x] Demonstrates/verbalizes HEP/Ed previously given. Plan: [x] Continue current frequency toward long and short term goals.     [x] Specific Instructions for subsequent treatments:  balance training with Serge Parker joint stability        Time In: 8:00 am            Time Out: 8:55 am    Electronically signed by:  Kym Hidalgo PTA

## 2022-09-13 NOTE — FLOWSHEET NOTE
[x] [facility]       Occupational Therapy            1st floor       [address], [place]         Phone: [phone]       Fax: [phone] [] [facility]  Occupational Therapy  [address] [place] [address]  Phone: [phone]  Fax: [phone]      Occupational Therapy Daily Treatment Note    Date:  2022  Patient Name:  Flor Herron    :  1975  MRN: [id number]  Physician: DANIEL Anaya-SHIMA                             Insurance: Medicare ([address] [place] [address]) - [facility]  Medical Diagnosis: I63.9 (ICD-10-CM) - Cerebrovascular accident (CVA), unspecified mechanism ([facility] [address]  Rehab Codes: R26.89, R53.1, M54.59, M54.2, R29.3,   Next 's Appt: TBD  Date of Onset: 22   Visit# / total visits: 3/16; Progress note for Medicare patient due at visit 10  Cancels/No Shows: 1/0      Subjective:    Pain:  [] Yes  [x] No Location:  Pain Rating: (0-10 scale) 0/10  Pain altered Tx:  [x] No  [] Yes  Action:  Pt Comments: Pt reports decreased pain to 0/10, \"Everything is pretty much the same\". Precautions: none  Surgery procedure and date: NA    Objective: Today's Treatment:  Modalities:  Exercises:  EXERCISE    REPS/     TIME  WEIGHT/    LEVEL COMMENTS   Scapular Exercises with Mirror 10 reps  1 pound Not Completed, Issued as part of HEP   Foam Cube /Pinch 20 reps each blue Completed, Issued as part of HEP   Velcro Pegboard 1 series  Completed   Clothespin Foam Cube Pinch 1 series blue Increased, Completed   Large Pegboard, Crossing Midline 3 rows  Completed   Digi-Flex 20 reps (1 digit at a time) yellow (1.5 pounds) Added, Completed   Hand Exercise 25 reps 25 pounds Added, Completed   Pebble Grasp and Release 1 series  Added, Completed   Other:      Assessment: [x] Progressing toward goals. Pt ambulating with Rollator walker, bilateral AFO's donned.  Pt denies pain in bilateral UE's, reports increased sensation in R hand, significant atrophy noted in 1st dorsal web space of R hand. Provided with large foam tubing last session, able to independently cut food with a knife. Min difficulty with Velcro pegboard exercise due to limited  strength, no use of R 4th and 5th digits, no drops noted, performed large pegboard with colored pegs, mod difficulty with exercise, instructed to reach across midline to pickup each peg before being inserted into the board, 4 drops noted and able to complete with increased speed and precision. Pt introduced to pebble grasp and release for increased functional use while reaching across midline, no difficulty with exercise, able to incorporate R 4th and 5th digits. No difficulty with increased blue clothespin foam cube pinch, hand exerciser to promote increased  strength, digi-flex to promote increased pinch strength. Pt reports compliance with HEP, has blue theraputty at home that he performs daily. PT following OT treatment session. [] No change. [] Other     [x] Patient would continue to benefit from skilled occupational therapy services in order to: Improve  functional /grasp, Motor control/, ROM, Strength, and Activity tolerance in order to ensure good follow through and improve functional use of UE in ADL performance    Short Term Goals: (  8    Treatments)  Pt to increase RUE ROM for at least 2 planes by 10 degrees actively  Increase gross UE strength  as evidenced by an increase in 1 grade for all planes bilaterally to improve safety and stability with functional transfers  Increase bilateral  strength by 10  lbs to improve functional participation in ADL/IADL tasks  Increase all pinch strengths bilaterally by 3 lbs to improve functional participation in ADL/IADL tasks  Increase function:UE Functional Index Score 5 or more points to promote increased functional abilities  Patient to be independent with home exercise program as demonstrated by performance with correct form without cues.   Increase R Therapy      [x] Occupational therapy  *PT and Speech caps combine                                                                                  $2150 Limit for PT and Speech combined  $2150 Limit for OT individually  At the beginning of the month where you expect to go over $2150, please add the [address] [place] [address] modifier       Patient Name: Adrianna Trotter: 1975     Note:  This is an estimate of charges billed.               Date of [address] Name # units/ charge $$$ charge Daily Total Charge Ongoing Total $$$   8/31/22 OT Eval Mod   TE 1  1 95.15  22.84 118.99 118.99   9/7/22 TE 3 29.21  22.84  22.84 74.89 193.88    9/13/22 TE 3  29.21  22.84  22.84 74.89 268.77                                                                                                       Electronically signed by:  ANDREA Ventura/JOHN

## 2022-09-14 ENCOUNTER — APPOINTMENT (OUTPATIENT)
Dept: OCCUPATIONAL THERAPY | Age: 47
End: 2022-09-14
Payer: MEDICARE

## 2022-09-14 ENCOUNTER — APPOINTMENT (OUTPATIENT)
Dept: PHYSICAL THERAPY | Age: 47
End: 2022-09-14
Payer: MEDICARE

## 2022-09-14 DIAGNOSIS — I34.0 MITRAL VALVE INSUFFICIENCY, UNSPECIFIED ETIOLOGY: ICD-10-CM

## 2022-09-14 DIAGNOSIS — G45.9 TIA (TRANSIENT ISCHEMIC ATTACK): ICD-10-CM

## 2022-09-14 RX ORDER — HYDRALAZINE HYDROCHLORIDE 25 MG/1
25 TABLET, FILM COATED ORAL 3 TIMES DAILY
Qty: 270 TABLET | Refills: 1 | Status: SHIPPED | OUTPATIENT
Start: 2022-09-14

## 2022-09-14 RX ORDER — CYCLOBENZAPRINE HCL 5 MG
5 TABLET ORAL 3 TIMES DAILY PRN
Qty: 90 TABLET | Refills: 5 | Status: SHIPPED | OUTPATIENT
Start: 2022-09-14

## 2022-09-15 ENCOUNTER — HOSPITAL ENCOUNTER (OUTPATIENT)
Dept: OCCUPATIONAL THERAPY | Age: 47
Setting detail: THERAPIES SERIES
Discharge: HOME | End: 2022-09-15
Payer: MEDICARE

## 2022-09-15 ENCOUNTER — HOSPITAL ENCOUNTER (OUTPATIENT)
Dept: PHYSICAL THERAPY | Age: 47
Setting detail: THERAPIES SERIES
Discharge: HOME | End: 2022-09-15
Payer: MEDICARE

## 2022-09-15 PROCEDURE — 97530 THERAPEUTIC ACTIVITIES: CPT

## 2022-09-15 PROCEDURE — 97110 THERAPEUTIC EXERCISES: CPT

## 2022-09-15 PROCEDURE — 97112 NEUROMUSCULAR REEDUCATION: CPT

## 2022-09-15 NOTE — FLOWSHEET NOTE
[x] [facility]       Occupational Therapy            1st floor       [address], [place]         Phone: [phone]       Fax: [phone] [] [facility]  Occupational Therapy  [address] [place] [address]  Phone: [phone]  Fax: [phone]      Occupational Therapy Daily Treatment Note    Date:  9/15/2022  Patient Name:  Casper Franklin    :  1975  MRN: [id number]  Physician: RACHAEL Helms                             Insurance: Medicare ([address] [place] [address]) - Visits based on medical [facility]  Medical Diagnosis: I63.9 (ICD-10-CM) - Cerebrovascular accident (CVA), unspecified mechanism ([facility] [address]  Rehab Codes: R26.89, R53.1, M54.59, M54.2, R29.3,   Next 's Appt: TBD  Date of Onset: 22   Visit# / total visits: ; Progress note for Medicare patient due at visit 10  Cancels/No Shows: 1/0      Subjective:    Pain:  [] Yes  [x] No Location:  Pain Rating: (0-10 scale) 0/10  Pain altered Tx:  [x] No  [] Yes  Action:  Pt Comments: Pt denies pain, reports no new changes or complaints since last visit. Precautions: none  Surgery procedure and date: NA    Objective: Today's Treatment:  Modalities:  Exercises:  EXERCISE    REPS/     TIME  WEIGHT/    LEVEL COMMENTS   Scapular Exercises with Mirror 10 reps  1 pound Not Completed, Issued as part of HEP   Digit AROM Exercises 10 reps each  Added, Completed, Issued as part of HEP   Foam Cube /Pinch 20 reps each blue Completed, Issued as part of HEP   Velcro Pegboard 1 series  Not Completed   Clothespin Foam Cube Pinch 1 series blue Completed   Large Pegboard, Crossing Midline 3 rows  Completed   Digi-Flex 20 reps (1 digit at a time) yellow (1.5 pounds) Completed   Hand Exercise 25 reps 25 pounds Completed   Pebble Grasp and Release 1 series  Completed   [place] Translation 1 series  Added, Completed   Other:      Assessment: [x] Progressing toward goals.  Pt ambulating with Rollator walker, bilateral AFO's donned upon entering OT treatment area. Pt denies pain in bilateral UE's, reports continued increased sensation in R hand, significant atrophy noted in 1st dorsal web space of R hand, able to form full composite fist and oppose each digit. Min difficulty with the above exercise plan, continued min difficulty with Velcro pegboard due to limited  strength, no incorporation of R 4th and 5th digits, min difficulty with marble translation, 2 drops noted but able to complete. Pt requiring more time to complete exercises this date, min fatigue noted at the end of treatment. Pt continues to report good compliance with HEP. Introduced to digit AROM exercises as part of HEP, difficulty with all exercises with R 4th and 5th digits, demonstrated good knowledge and understanding. Pt to PT following OT treatment session. [] No change. [] Other     [x] Patient would continue to benefit from skilled occupational therapy services in order to: Improve  functional /grasp, Motor control/, ROM, Strength, and Activity tolerance in order to ensure good follow through and improve functional use of UE in ADL performance    Short Term Goals: (  8    Treatments)  Pt to increase RUE ROM for at least 2 planes by 10 degrees actively  Increase gross UE strength  as evidenced by an increase in 1 grade for all planes bilaterally to improve safety and stability with functional transfers  Increase bilateral  strength by 10  lbs to improve functional participation in ADL/IADL tasks  Increase all pinch strengths bilaterally by 3 lbs to improve functional participation in ADL/IADL tasks  Increase function:UE Functional Index Score 5 or more points to promote increased functional abilities  Patient to be independent with home exercise program as demonstrated by performance with correct form without cues.   Increase R side FMC as evidenced by a decrease in 8 seconds  with the 9-hole peg test  Increase coordination as evidenced by an increase in 5 blocks completed during the box and blocks assessment to indicate significant clinical improvement        Long Term Goals: (  16  Treatments)  Pt to increase RUE ROM for at least 2 planes by 15+ degrees actively  Increase gross UE strength  as evidenced by an increase in 2 grades for all planes bilaterally to improve safety and stability with functional transfers  Increase bilateral  strength by 15  lbs to improve functional participation in ADL/IADL tasks  Increase all pinch strengths bilaterally by 5 lbs to improve functional participation in ADL/IADL tasks  Increase function:UE Functional Index Score 10 or more points to promote increased functional abilities  Increase R side FMC as evidenced by a decrease in 14 seconds  with the 9-hole peg test  Increase coordination as evidenced by an increase in 10 blocks completed during the box and blocks  to indicate significant clinical improvement     Patient Goals: To get stronger and use R hand fully      Pt. Education:  [x] Yes  [] No  [] Reviewed Prior HEP/Ed  Method of Education: [x] Verbal  [x] Demo  [] Written  Re: Digit AROM Exercises  Comprehension of Education:  [x] Verbalizes understanding. [x] Demonstrates understanding. [] Needs review. [] Demonstrates/verbalizes HEP/Ed previously given. Plan: [x] Continue current frequency toward short and long term goals.   [x] Specific Instructions for subsequent treatments: Progress with FM, /pinch strengthening as appropriate   [] Other:       Time In: 1890  Time Out: 5876        Treatment Charges: Mins Units [facility]) Time In/Out:   []  Modalities:       []  Ultrasound      [x]  Ther Exercise 51 3 0513-5553   []  Manual Therapy      []  Ther Activities      []  Orthotic fit/train      []  Orthotic recheck      []  Other      Total Treatment time 51 3         Medicare Cap   [] Physical Therapy   [] Speech Therapy      [x] Occupational therapy  *PT and Speech caps combine $2150 Limit for PT and Speech combined  $2150 Limit for OT individually  At the beginning of the month where you expect to go over $2150, please add the [address] [place] [address] modifier       Patient Name: Author Seay: 1975     Note:  This is an estimate of charges billed.               Date of [address] Name # units/ charge $$$ charge Daily Total Charge Ongoing Total $$$   8/31/22 OT Eval Mod   TE 1  1 95.15  22.84 118.99 118.99   9/7/22 TE 3 29.21  22.84  22.84 74.89 193.88   9/13/22 TE 3  29.21  22.84  22.84 74.89 268.77   9/15/22 TE 3 29.21  22.84  22.84  74.89  343.66                                                                                         Electronically signed by:  Veverly Sever, OTR/JOHN

## 2022-09-15 NOTE — FLOWSHEET NOTE
[x] [address]  [address]  [phone]  F: [phone] [x] [address]  [facility]   [address]  P: [phone]  F: [phone] [] [address]  [address] [facility]  P: [phone]  F: [phone] [] [address]  P: [phone]  F: [phone] [] [address]  [facility]   [address]   [place]: [phone]  F: [phone]      Physical Therapy Daily Treatment / Elige Raúl Note    Date:  9/15/2022  Patient Name:  Steven Alvarez    :  1975  MRN: [id number]  Physician: RACHAEL Knight                           Insurance: Medicare ([address] [place] [address])  Medical Diagnosis: I63.9 (ICD-10-CM) - Cerebrovascular accident (CVA), unspecified mechanism ([facility] [address]  Rehab Codes: R26.89, R53.1, M54.59, M54.2, R29.3,   Next 's appt.: 8/10 with neuro  Date of symptom onset: 22   Visit# / total visits:     Cancels/No Shows: 3/0 (Last 30 days)    Subjective:    Pain:  [] Yes  [x] No Location: bilateral shoulders R ankle Pain Rating: (0-10 scale) 3/10  Pain altered Tx:  [x] No  [] Yes  Action:  Comments: Patient reports biggest problem continues to be rolling inward of ankle. States he can fall with or without shoes on at home.         Objective:     Sensation: absent to light touch throughout B feet below ankle     Modalities:   Precautions: Pt has dialysis 3x per week  Exercises:  BOLDED COMPLETED 9/15/2022:  Exercise RLE Reps/ Time Weight/ Level Comments   Nu step  5' Level 4    Scapular retraction/elevation mobs 5 min     UT stretch  3x20\"ea     scap squeeze  10x3\"     Pec stretch-doorway  2x30\"     Posterior shoulder rolls 20x  Assist for slow, max retraction   Scap retraction 2x10 Orange  Added resistance 7/13   Scap retract w/ shoulder flex 10x AROM    Chin tucks  10x3\"           Seated toe raises  10x2                 Standing    Parallel bars   Gastroc stretch 3x30\"  UE support; 1 LE at a time    Marches 3x10  UE support   Hip abd 2x10  Cuff on R leg  R hip CKC  L hip OKC   Weight shifting  10xea  No UE as able; M/L, A/P, P/A         Resisted swing phase 3x30'  x13' Lime     Resisted step over small round cone 2x10 Lime  With weight shift onto RLE   Heel taps to step  10x 4\"    Trunk/cervical rotation naming how many fingers clinician is holding  10xea  clinician standing behind pt    Picking up rings from steps  4x 4 rings;   6\" and 12\" step Wider JASIEL no UE; notes some irritation in LB; x2 leading with R UE, x2 leading with L UE and handing to clinician at multiple angles   Resisted swing phase fwd and retro amb on way back  5xea // bars;   Lime  Resisted fwd walk    Step over round cone  10xea // bars           Balance   Parallel bars   Trunk rotation with ball 5x ea     Shoulder press with ball 10x     NBOS 2x30\"  Posterior sway after 20 sec on 1st rep    NBOS with EC 2x30\"     NBOS on foam  2x30\"     NBOS with ball raises, rotation 2x30\"ea Volley  ball     Tandem stance 2x20\"     Cone  off ground 2x5 5 cones    Standing on foam with head turns  30\" ea  Up/down, rotation    Cone reach with LUE across body 2x5 5 cones    Cone reach with LUE to L side 2x5 5 cones    Heel taps 10x ea 4in With 1 hand for UE support   Dynamic march with retro hamstring curl 3L  No UE support   March amb  3L // bars     Hs curl amb  2L // bars     Side stepping 3L // bars  No UE-1 light UE support   360 turn 2x  Clockwise more challenging   Marches  2x20\"  No UE   Alt toe taps to foam  2x30\"  No UE         Gait training with [employer] 50 min Lower and thigh cuff Initial set up  STS 2x10, LLE on 2\" step  Hip abd  Marches  Adjustments for thigh cuff more proximal  Gait analysis   Change in speed  4 sit down breaks   [employer] 45 min R hams  R glutes  R quads  R PFs Set up in back room  STS program  STS w/ LLE on 2\" step: 3x10, min A    STS \"pause\" mode: Unilat UE assist  Step taps: 2x10, 4\" step  Hip abd/ext: 3x10 ea  Hip abd L sided - not today  Balance without UE - not today                      Xcite training ankle pumps 20 min 4 x 10 Set up in supine, head elevated  Ant/ tib placement adjusted 3x to execute DF and EV  Gastroc pads added on 3rd and 4th set   PROM 5 min  DF/EV between sets of Xcite   Heel slides 20x   Required stabilization of ankle   SLR 10x2     SAQ 10x2             Other: ACE wrap for improved neutral positioning of foot vs inversion - used with AFO and shoe, unable to provide any improvement in neutral foot positioning that's better than just shoe donned. Educated provided re: shoes need to be donned at all times for walking to improve foot positioning. Pt verbalized understanding. - 10 min, billed with theract    Treatment this date included using Functional Electrical Stimulation via the Xcite 2. Stimulation parameters and outcomes can be viewed on [email or web address] with the patients' username (patient ID generated by [employer] without any patient identifiers) and password (patients' pin generated by [employer] without any patient identifiers). A check of the patients' skin prior to application of electrodes, and after the treatment concluded was completed and skin noted to be in good condition, no issues. See above in treatment log for treatments performed. Treatment Charges: Mins Units   []  Modalities     []  Ther Exercise     []  Manual Therapy     [x]  Ther Activities 10 1   []  Aquatics     []  Vasocompression     [x]  Other: Neuro re-ed 45 3   []  Other: Gait     Total Treatment time 55 4       Assessment: [x] Progressing toward goals. Attempted to trial ACE wrap for increased eversion in neutral foot positioning - no change as pt's tone/lack of ligamentous stability pushes through that wrap. with household IADLs - NOT MET, primarily needs to be supported at trunk on counter or holding onto something  ? Strength: At least 4+/5 grossly in R hip to allow improved pelvic stability in standing and improved swing phase control/speed during prolonged gait - NOT MET, 4-/5 grossly in R hip  Pt able to utilize RUE for fine grasp and release tasks with no more than minimally slowed speed evident - Ongoing, better fine grasp but still limited with heavier objects, twisting lid  ? Function: Pt able to ambulate 450 ft with least restrictive device with no evidence of lagging RLE swing or abnormal stance time noted, with no more than 11 on RPE scale - NOT MET, achieves ~400 ft before needing to rest d/t RLE instability and R knee buckling, rates 13 RPE  Patient to be independent with home exercise program as demonstrated by performance with correct form without cues. - Ongoing   Demonstrate Knowledge of fall prevention - MET     LTG: (to be met in 16 treatments) - 8/22:will continue with current goals, adjusting at visit 16 as needed d/t recent CVA on 8/8  ? Pain: No more than 2/10 pain reported in low back or neck post therapy sessions to indicate improving tolerance to increased activity and exercise  ? ROM: at least 5 deg DF PROM with knee extended to indicate improving mobility in ankles to increase heel strike and reduce toe drag with prolonged ambulation  ? Balance: At least 30/56 on Hernandez to indicate significant improvement in standing static/dynamic balance and progress towards reduced fall risk  Able to negotiate gait obstacles using rollator without increased instability or excessively slowed gait  ? Strength: At least 5-/5 grossly in R hip to allow further improved pelvic stability in standing and improved swing phase control/speed during prolonged gait  At least 5/5 B knee ext to prevent excessive buckling with prolonged standing  ?  Function:   Pt able to ambulate 600 ft with least restrictive device

## 2022-09-16 ENCOUNTER — APPOINTMENT (OUTPATIENT)
Dept: OCCUPATIONAL THERAPY | Age: 47
End: 2022-09-16
Payer: MEDICARE

## 2022-09-16 ENCOUNTER — APPOINTMENT (OUTPATIENT)
Dept: PHYSICAL THERAPY | Age: 47
End: 2022-09-16
Payer: MEDICARE

## 2022-09-20 ENCOUNTER — HOSPITAL ENCOUNTER (OUTPATIENT)
Dept: OCCUPATIONAL THERAPY | Age: 47
Setting detail: THERAPIES SERIES
Discharge: HOME | End: 2022-09-20
Payer: MEDICARE

## 2022-09-20 ENCOUNTER — HOSPITAL ENCOUNTER (OUTPATIENT)
Dept: PHYSICAL THERAPY | Age: 47
Setting detail: THERAPIES SERIES
Discharge: HOME | End: 2022-09-20
Payer: MEDICARE

## 2022-09-20 NOTE — SIGNIFICANT EVENT
[x] [address] Occupational Therapy       [address] [facility], 1st Floor       Phone: [phone]       Fax: [phone] [] [facility] Occupational  Therapy at [facility]       Phone: [phone]       Fax: [phone]          Occupational Therapy Cancel/No Show note    Date: 2022  Patient: Zoila Scott  : 1975  MRN: [id number]  Cancels/No Shows to date:     For today's appointment patient:  [x]  Cancelled  []  Rescheduled appointment  []  No-show     Reason given by patient:  [x]  Patient ill  []  Conflicting appointment  []  No transportation    []  Conflict with work  []  No reason given  []  Other:     Comments: Pt called to cx OT treatment session, confirmed next appointment scheduled for 22.      Electronically signed by: ANDREA Flores/JOHN

## 2022-09-20 NOTE — FLOWSHEET NOTE
[x] [facility] &  Therapy  [address]    [phone]  F: [phone]   [] [address]  [facility]   [address]  P: [phone]  F: [phone]  [] [address] &  Therapy  [address] [facility]  P: [phone]  F: [phone] [] [address] [facility]  P: [phone]  F: [phone]  [] [address]  [facility]   [address]   [place]: [phone]  F: [phone]   [] [address]  [place]: [phone]   F: [phone]     Physical Therapy Cancel/No Show note    Date: 2022  Patient: Barry Hong  : 1975  MRN: [id number]    Cancels/No Shows to date:     For today's appointment patient:    [x]  Cancelled    [] Rescheduled appointment    [] No-show     Reason given by patient:    [x]  Patient ill    []  Conflicting appointment    [] No transportation      [] Conflict with work    [] No reason given    [] Weather related    [] BITFR-30    [] Other:      Comments:        [x] Next appointment was confirmed    Electronically signed by: Larey Eisenmenger, PTA

## 2022-09-21 ENCOUNTER — APPOINTMENT (OUTPATIENT)
Dept: OCCUPATIONAL THERAPY | Age: 47
End: 2022-09-21
Payer: MEDICARE

## 2022-09-21 ENCOUNTER — APPOINTMENT (OUTPATIENT)
Dept: PHYSICAL THERAPY | Age: 47
End: 2022-09-21
Payer: MEDICARE

## 2022-09-22 ENCOUNTER — HOSPITAL ENCOUNTER (OUTPATIENT)
Dept: OCCUPATIONAL THERAPY | Age: 47
Setting detail: THERAPIES SERIES
Discharge: HOME | End: 2022-09-22
Payer: MEDICARE

## 2022-09-22 ENCOUNTER — HOSPITAL ENCOUNTER (OUTPATIENT)
Dept: PHYSICAL THERAPY | Age: 47
Setting detail: THERAPIES SERIES
Discharge: HOME | End: 2022-09-22
Payer: MEDICARE

## 2022-09-22 PROCEDURE — 97110 THERAPEUTIC EXERCISES: CPT

## 2022-09-22 NOTE — FLOWSHEET NOTE
[x] [facility]       Occupational Therapy            1st floor       [address] [place], [place]         Phone: [phone]       Fax: [phone] [] [facility]  Occupational Therapy  [address] [place], [place]  Phone: [phone]  Fax: [phone]      Occupational Therapy Daily Treatment Note    Date:  2022  Patient Name:  Baldo Hernandez    :  1975  MRN: [id number]  Physician: DANEIL Crandall-SHIMA                             Insurance: Medicare ([address] [place] [address]) - Visits based on medical [facility]  Medical Diagnosis: I63.9 (ICD-10-CM) - Cerebrovascular accident (CVA), unspecified mechanism ([facility] [address]  Rehab Codes: R26.89, R53.1, M54.59, M54.2, R29.3,   Next 's Appt: TBD  Date of Onset: 22   Visit# / total visits: ; Progress note for Medicare patient due at visit 10  Cancels/No Shows: 2/0      Subjective:    Pain:  [] Yes  [x] No Location:  Pain Rating: (0-10 scale) 0/10  Pain altered Tx:  [x] No  [] Yes  Action:  Pt Comments: Pt reports he had to call the ambulance last night because his sugar dropped too low, \"It dropped to 30. I slept for a long time after it happened\", reports feeling better this date. Precautions: none  Surgery procedure and date: NA    Objective:   Today's Treatment:  Modalities:  Exercises:  EXERCISE    REPS/     TIME  WEIGHT/    LEVEL COMMENTS   Scapular Exercises with Mirror 10 reps  1 pound Not Completed, Issued as part of HEP   Digit AROM Exercises 10 reps each  Completed, Issued as part of HEP   Foam Cube /Pinch 20 reps each green Increased, Completed, Issued as part of HEP   Velcro Pegboard 1 series  Completed   Clothespin Foam Cube Pinch 1 series green Downgraded Clothespin, Completed   Large Pegboard, Crossing Midline 3 rows  Not Completed   Digi-Flex 20 reps (1 digit at a time) red (3 pounds) Increased, Completed   Hand Exercise 25 reps 40 pounds Increased, Completed   Pebble Grasp and Release 1 series  Completed [place] Translation- Crossing Midline 1 series  Completed   Weighted Bar Bicep Curls/Chest Press 20 reps each 3 pounds Added, Completed    Other:      Assessment: [x] Progressing toward goals. Pt ambulating with Rollator walker, denies pain in bilateral UE's, reports continued increased sensation in R hand, significant atrophy remains in 1st dorsal web space. Overall, min difficulty with above exercise plan due to limited strength and fine motor skills, required more time to complete exercises requiring in-hand manipulation and pt to cross midline, great difficulty with blue clothespin foam cube pinch exercise this date, downgraded to green clothespin, min fatigue noted at the end of treatment. Issued green foam cube as part of HEP as pt expressed blue foam cube \"is a little easy\", verbalized and demonstrated good recall. [] No change. [] Other     [x] Patient would continue to benefit from skilled occupational therapy services in order to: Improve  functional /grasp, Motor control/, ROM, Strength, and Activity tolerance in order to ensure good follow through and improve functional use of UE in ADL performance    Short Term Goals: (  8    Treatments)  Pt to increase RUE ROM for at least 2 planes by 10 degrees actively  Increase gross UE strength  as evidenced by an increase in 1 grade for all planes bilaterally to improve safety and stability with functional transfers  Increase bilateral  strength by 10  lbs to improve functional participation in ADL/IADL tasks  Increase all pinch strengths bilaterally by 3 lbs to improve functional participation in ADL/IADL tasks  Increase function:UE Functional Index Score 5 or more points to promote increased functional abilities  Patient to be independent with home exercise program as demonstrated by performance with correct form without cues.   Increase R side FMC as evidenced by a decrease in 8 seconds  with the 9-hole peg test  Increase coordination as evidenced $2150 Limit for PT and Speech combined  $2150 Limit for OT individually  At the beginning of the month where you expect to go over $2150, please add the [address] [place] [address] modifier       Patient Name: Lyndsey Alu: 1975     Note:  This is an estimate of charges billed.               Date of [address] Name # units/ charge $$$ charge Daily Total Charge Ongoing Total $$$   8/31/22 OT Eval Mod   TE 1  1 95.15  22.84 118.99 118.99   9/7/22 TE 3 29.21  22.84  22.84 74.89 193.88   9/13/22 TE 3  29.21  22.84  22.84 74.89 268.77   9/15/22 TE 3 29.21  22.84  22.84  74.89  343.66   9/22/22  TE 3  29.21  22.84  22.84  74.89  418.55                                                                            Electronically signed by:  ANDREA Brasher/JOHN

## 2022-09-22 NOTE — FLOWSHEET NOTE
[x] [facility]) [facility] &  Therapy  [address]    [phone]  F: [phone]   [] [address]  [place] [address]   [address]  P: [phone]  F: [phone]  [] [address]  [address]  P: [phone]  F: [phone] [] [address]  P: [phone]  F: [phone]  [] [address]  [address] [facility] [address]   [address]   [place]: [phone]  F: [phone]   [] [place] [id number] [facility] [address]  [place]: [phone]   F: [phone]     Physical Therapy Cancel/No Show note    Date: 2022  Patient: Lemuel Franco  : 1975  MRN: [id number]    Cancels/No Shows to date:   (not counted on totals)    For today's appointment patient:    []  Cancelled    [] Rescheduled appointment    [] No-show     Reason given by patient:    []  Patient ill    []  Conflicting appointment    [] No transportation      [] Conflict with work    [] No reason given    [] Weather related    [] COVID-19    [x] Other:      Comments:  Patient arrived to OT to be followed up by PT. Clinician noted pt had neuro gio't at 11am at [facility]. Confirmation made for gio't, location and discussed need for R AFO order. Patient and grandfather was in agreement.          [x] Next appointment was confirmed    Electronically signed by: Ty Mast PTA

## 2022-09-23 ENCOUNTER — APPOINTMENT (OUTPATIENT)
Dept: OCCUPATIONAL THERAPY | Age: 47
End: 2022-09-23
Payer: MEDICARE

## 2022-09-23 ENCOUNTER — TELEPHONE (OUTPATIENT)
Dept: FAMILY MEDICINE CLINIC | Age: 47
End: 2022-09-23

## 2022-09-23 ENCOUNTER — APPOINTMENT (OUTPATIENT)
Dept: PHYSICAL THERAPY | Age: 47
End: 2022-09-23
Payer: MEDICARE

## 2022-09-23 NOTE — TELEPHONE ENCOUNTER
Patient's grandfather called stating that the patient's bp has been running really high and he needs his hydralazine increased to 50mg. Please advise.

## 2022-09-26 RX ORDER — CARVEDILOL 25 MG/1
25 TABLET ORAL 2 TIMES DAILY
Qty: 60 TABLET | Refills: 3 | Status: SHIPPED | OUTPATIENT
Start: 2022-09-26

## 2022-09-27 ENCOUNTER — HOSPITAL ENCOUNTER (OUTPATIENT)
Dept: OCCUPATIONAL THERAPY | Age: 47
Setting detail: THERAPIES SERIES
Discharge: HOME | End: 2022-09-27
Payer: MEDICARE

## 2022-09-27 ENCOUNTER — HOSPITAL ENCOUNTER (OUTPATIENT)
Dept: PHYSICAL THERAPY | Age: 47
Setting detail: THERAPIES SERIES
Discharge: HOME | End: 2022-09-27
Payer: MEDICARE

## 2022-09-27 PROCEDURE — 97112 NEUROMUSCULAR REEDUCATION: CPT

## 2022-09-27 PROCEDURE — 97110 THERAPEUTIC EXERCISES: CPT

## 2022-09-27 NOTE — FLOWSHEET NOTE
[x] [facility] [address]  [address]  [phone]  F: [phone] [x] [address]  [facility]   [address]  P: [phone]  F: [phone] [] [address]  [address]  P: [phone]  F: [phone] [] [address]  P: [phone]  F: [phone] [] [address]  [facility]   [address]   [place]: [phone]  F: [phone]      Physical Therapy Daily Treatment Note    Date:  2022  Patient Name:  Valarie Boston    :  1975  MRN: [id number]  Physician: RACHAEL Gilmore                           Insurance: Medicare ([address] [place] [address])  Medical Diagnosis: I63.9 (ICD-10-CM) - Cerebrovascular accident (CVA), unspecified mechanism ([facility] [address]  Rehab Codes: R26.89, R53.1, M54.59, M54.2, R29.3,   Next 's appt.: 8/10 with neuro  Date of symptom onset: 22   Visit# / total visits: 16    Cancels/No Shows: 4/0 (Last 30 days)    Subjective:    Pain:  [] Yes  [x] No Location: bilateral shoulders R ankle Pain Rating: (0-10 scale) 6/10  Pain altered Tx:  [x] No  [] Yes  Action:  Comments: Patient reports fell this morning. Walked out of bed towards rolling walking when R ankle rolled and patient fell. Pain is not 6/10 along L ankle bone and up the outside of the leg. Took something for the pain prior to therapy today. States he has a busy day with OT, PT then cardio in the afternoon.         Objective:     Sensation: absent to light touch throughout B feet below ankle     Modalities:   Precautions: Pt has dialysis 3x per week  Exercises:  BOLDED COMPLETED 2022:  Exercise RLE Reps/ Time Weight/ Level Comments   Nu step  6' Level 4    Scapular retraction/elevation mobs 5 min     UT stretch  3x20\"ea scap squeeze  10x3\"     Pec stretch-doorway  2x30\"     Posterior shoulder rolls 20x  Assist for slow, max retraction   Scap retraction 2x10 Orange  Added resistance 7/13   Scap retract w/ shoulder flex 10x AROM    Chin tucks  10x3\"           Seated toe raises  10x2                 Standing    Parallel bars   Gastroc stretch 3x30\"  UE support; 1 LE at a time    Marches 3x10  UE support   Hip abd 2x10  Cuff on R leg  R hip CKC  L hip OKC   Weight shifting  10xea  No UE as able; M/L, A/P, P/A         Resisted swing phase 3x30'  x13' Lime     Resisted step over small round cone 2x10 Lime  With weight shift onto RLE   Heel taps to step  10x 4\"    Trunk/cervical rotation naming how many fingers clinician is holding  10xea  clinician standing behind pt    Picking up rings from steps  4x 4 rings;   6\" and 12\" step Wider JASIEL no UE; notes some irritation in LB; x2 leading with R UE, x2 leading with L UE and handing to clinician at multiple angles   Resisted swing phase fwd and retro amb on way back  5xea // bars;   Lime  Resisted fwd walk    Step over round cone  10xea // bars           Balance   Parallel bars   Trunk rotation with ball 5x ea     Shoulder press with ball 10x     NBOS 2x30\"  Posterior sway after 20 sec on 1st rep    NBOS with EC 2x30\"     NBOS on foam  2x30\"     NBOS with ball raises, rotation 2x30\"ea Volley  ball     Tandem stance 2x20\"     Cone  off ground 2x5 5 cones    Standing on foam with head turns  30\" ea  Up/down, rotation    Cone reach with LUE across body 2x5 5 cones    Cone reach with LUE to L side 2x5 5 cones    Heel taps 10x ea 4in With 1 hand for UE support   Dynamic march with retro hamstring curl 3L  No UE support   March amb  3L // bars     Hs curl amb  2L // bars     Side stepping 3L // bars  No UE-1 light UE support   360 turn 2x  Clockwise more challenging   Marches  2x20\"  No UE   Alt toe taps to foam  2x30\"  No UE         Gait training with Bioness 50 min Lower and thigh cuff Initial set up  STS 2x10, LLE on 2\" step  Hip abd  Marches  Adjustments for thigh cuff more proximal  Gait analysis   Change in speed  4 sit down breaks   Xcite training 40 min R hams  R glutes  R quads  R PFs Set up in back room  STS program  STS w/ LLE on 2\" step: 2x10, min A    STS \"pause\" mode: Unilat UE assist  Step taps: 2x10, 4\" step  Hip abd/ext: 2x10 ea  Hip abd L sided - 10x   Balance without UE 2x30 sec  Reaching for ball touches 10x  Punching of pad 10x2  (Reaching&punches across body)                      Xcite training ankle pumps 20 min 4 x 10 Set up in supine, head elevated  Ant/ tib placement adjusted 3x to execute DF and EV  Gastroc pads added on 3rd and 4th set   PROM 5 min  DF/EV between sets of Xcite   Heel slides 20x   Required stabilization of ankle   SLR 10x2     SAQ 10x2             Other:     Treatment this date included using Functional Electrical Stimulation via the Xcite 2. Stimulation parameters and outcomes can be viewed on [email or web address] with the patients' username (patient ID generated by [employer] without any patient identifiers) and password (patients' pin generated by [employer] without any patient identifiers). A check of the patients' skin prior to application of electrodes, and after the treatment concluded was completed and skin noted to be in good condition, no issues. See above in treatment log for treatments performed. Treatment Charges: Mins Units   []  Modalities     []  Ther Exercise     []  Manual Therapy     []  Ther Activities     []  Aquatics     []  Vasocompression     [x]  Other: Neuro re-ed 50 3   []  Other: Gait     Total Treatment time 50 3   NuStep 3942-1089    Assessment: [x] Progressing toward goals. Started on NuStep for warm up following OT. Xcite training used for FES to increase LE strength. STS completed without UE support. Added 2\" step on uninvolved extremity to increase focus strengthening on the R quad/hams/glutes for terminal extension. Sit down rest breaks required to avoid muscular fatigue and increased pain in R ankle. Added standing balance to reaching forward and across trunk to challenge LOS with CGA: assist of 2nd clinician. Progressed to punching to improve postural response with trunk control. [] No change. [x] Other: Faxed received for [facility]FO just after patient completed treatment. Will call and get an gio't. [x] Patient would continue to benefit from skilled physical therapy services in order to: address R hemibody weakness, improve gait mechanics and efficiency, and increase standing static/dynamic balance to allow improved household ADL/IADL completion and reduce fall risk. STG: (to be met in 8 treatments) - Assessed by Cristina Luong PT on 7/20:  ? Pain: No more than 4/10 pain reported in low back or neck post therapy sessions to indicate improving tolerance to increased activity and exercise - MET for low back or neck, but L shoulder is 2/10 - newer pain over the past 3 weeks. ? ROM: at least 0deg DF PROM with knee extended to indicate improving mobility in ankles to increase heel strike and reduce toe drag with prolonged ambulation - NOT MET, lacking 10 deg from neutral in L ankle, lacking 3 deg from neutral in R ankle  ? Balance:   Pt able to fully turn to look behind himself without instability in either direction to indicate improving postural stability - NOT MET, LOB with full turn to R and L  Pt able to complete standing reaching tasks without UE assist and no more than mild instability to indicate improving dynamic balance for safety with household IADLs - NOT MET, primarily needs to be supported at trunk on counter or holding onto something  ? Strength:    At least 4+/5 grossly in R hip to allow improved pelvic stability in standing and improved swing phase control/speed during prolonged gait - NOT MET, 4-/5 grossly in R hip  Pt able to utilize RUE for fine grasp and release tasks with no more than distances for ADL/IADLs  No more than 14% impaired on ABC scale to indicate improving subjective balance confidence        Patient goals: \"to strengthen right side back as much as I can\"    Pt. Education:  [x] Yes  [] No  [x] Reviewed Prior HEP/Ed  Method of Education: [x] Verbal  [x] Demo  [] Written   fall prevention 7/11/22  Comprehension of Education:  [x] Verbalizes understanding. [x] Demonstrates understanding. [] Needs review. [x] Demonstrates/verbalizes HEP/Ed previously given. Plan: [x] Continue current frequency toward long and short term goals.     [x] Specific Instructions for subsequent treatments:  balance training with Lupis Grubbs, joint stability        Time In: 9:52 am            Time Out: 10:48 am    Electronically signed by:  Eric Vance PTA

## 2022-09-27 NOTE — FLOWSHEET NOTE
[x] [facility]       Occupational Therapy            1st floor       [address], [place]         Phone: [phone]       Fax: [phone] [] [facility] Occupational Therapy  [address] [place] [address] [place], [place]  Phone: [phone]  Fax: [phone]      Occupational Therapy Daily Treatment Note    Date:  2022  Patient Name:  Dom Chavez    :  1975  MRN: [id number]  Physician: RACHAEL Mathew                             Insurance: Medicare ([address] [place] [address]) - [facility]  Medical Diagnosis: I63.9 (ICD-10-CM) - Cerebrovascular accident (CVA), unspecified mechanism ([facility] [address]  Rehab Codes: R26.89, R53.1, M54.59, M54.2, R29.3,   Next 's Appt: JAMEL  Date of Onset: 22   Visit# / total visits: ; Progress note for Medicare patient due at visit 10  Cancels/No Shows: 2/0      Subjective:    Pain:  [x] Yes  [] No Location: R ankle  Pain Rating: (0-10 scale) 6/10  Pain altered Tx:  [x] No  [] Yes  Action:  Pt Comments: Pt reports 6/10 pain in R ankle, \"My ankle ended up giving out on me today and I fell\". Precautions: none  Surgery procedure and date: NA    Objective:   Today's Treatment:  Modalities:  Exercises:  EXERCISE    REPS/     TIME  WEIGHT/    LEVEL COMMENTS   Scapular Exercises with Mirror 10 reps  1 pound Not Completed, Issued as part of HEP   Digit AROM Exercises 10 reps each  Not Completed, Issued as part of HEP   Foam Cube /Pinch 20 reps each green Completed, Issued as part of HEP   Velcro Pegboard 1 series  Completed   Clothespin Foam Cube Pinch 1 series green Completed   Large Pegboard, Crossing Midline 3 rows  Not Completed   Digi-Flex 20 reps (1 digit at a time) red (3 pounds) Completed   Hand Exercise 25 reps 40 pounds Completed   Pebble Grasp and Release 1 series  Completed   [place] Translation- Crossing Midline 1 series  Completed   Weighted Bar Bicep Curls/Chest Press/Shoulder Press 20 reps each 3 pounds Increased, Completed    Grooved Pegboard 4 pegs   Added, Completed    Other:      Assessment: [x] Progressing toward goals. Pt ambulating with Rollator walker, denies pain in bilateral UE's, reports a fall this morning after his \"right ankle gave out on me\", continued increased sensation in R hand, significant atrophy remains in 1st dorsal web space. Overall, min difficulty with above exercise plan due to limited strength and fine motor skills, introduced to grooved pegboard exercise, mod-max difficulty with orientation and manipulation of grooved pegs into slots, fatigue and hand shaking noted during exercise but able to correctly place 4 pegs, min fatigue noted at the end of treatment. [] No change. [] Other     [x] Patient would continue to benefit from skilled occupational therapy services in order to: Improve  functional /grasp, Motor control/, ROM, Strength, and Activity tolerance in order to ensure good follow through and improve functional use of UE in ADL performance    Short Term Goals: (  8    Treatments)  Pt to increase RUE ROM for at least 2 planes by 10 degrees actively  Increase gross UE strength  as evidenced by an increase in 1 grade for all planes bilaterally to improve safety and stability with functional transfers  Increase bilateral  strength by 10  lbs to improve functional participation in ADL/IADL tasks  Increase all pinch strengths bilaterally by 3 lbs to improve functional participation in ADL/IADL tasks  Increase function:UE Functional Index Score 5 or more points to promote increased functional abilities  Patient to be independent with home exercise program as demonstrated by performance with correct form without cues.   Increase R side FMC as evidenced by a decrease in 8 seconds  with the 9-hole peg test  Increase coordination as evidenced by an increase in 5 blocks completed during the box and blocks assessment to indicate significant clinical improvement        Long Term $0297, please add the Sher Hernandezther modifier       Patient Name: Dalton Fountain: 1975     Note:  This is an estimate of charges billed.               Date of [address] Name # units/ charge $$$ charge Daily Total Charge Ongoing Total $$$   8/31/22 OT Eval Mod   TE 1  1 95.15  22.84 118.99 118.99   9/7/22 TE 3 29.21  22.84  22.84 74.89 193.88   9/13/22 TE 3  29.21  22.84  22.84 74.89 268.77   9/15/22 TE 3 29.21  22.84  22.84  74.89  343.66   9/22/22  TE 3  29.21  22.84  22.84  74.89  418.55    9/27/22  TE  3 29.21  22.84  22.84 74.89  493.44                                                             Electronically signed by:  ANDREA Covarrubias/JOHN

## 2022-09-28 ENCOUNTER — APPOINTMENT (OUTPATIENT)
Dept: PHYSICAL THERAPY | Age: 47
End: 2022-09-28
Payer: MEDICARE

## 2022-09-28 ENCOUNTER — APPOINTMENT (OUTPATIENT)
Dept: OCCUPATIONAL THERAPY | Age: 47
End: 2022-09-28
Payer: MEDICARE

## 2022-09-29 ENCOUNTER — HOSPITAL ENCOUNTER (OUTPATIENT)
Dept: OCCUPATIONAL THERAPY | Age: 47
Setting detail: THERAPIES SERIES
Discharge: HOME | End: 2022-09-29
Payer: MEDICARE

## 2022-09-29 ENCOUNTER — HOSPITAL ENCOUNTER (OUTPATIENT)
Dept: PHYSICAL THERAPY | Age: 47
Setting detail: THERAPIES SERIES
Discharge: HOME | End: 2022-09-29
Payer: MEDICARE

## 2022-09-29 NOTE — SIGNIFICANT EVENT
[x] [address] [facility] [address]        Occupational Therapy       [address] [facility], 1st Floor       Phone: [phone]       Fax: [phone] [] [facility] Occupational  Therapy at [facility]       Phone: [phone]       Fax: [phone]          Occupational Therapy Cancel/No Show note    Date: 2022  Patient: Lorene Obrien  : 1975  MRN: [id number]  Cancels/No Shows to date: 3/0    For today's appointment patient:  [x]  Cancelled  []  Rescheduled appointment  []  No-show     Reason given by patient:  []  Patient ill  [x]  Conflicting appointment  []  No transportation    []  Conflict with work  []  No reason given  []  Other:     Comments:      Electronically signed by: ANDREA Jiménez/JOHN

## 2022-09-29 NOTE — FLOWSHEET NOTE
[x] [facility]  [address]    [phone]  F: [phone]   [] [address] [facility]   [address]  P: [phone]  F: [phone]  [] [facility] &  Therapy  [facility]  P: [phone]  F: [phone] [] [address] [employer]  P: [phone]  F: [phone]  [] [address]  [facility]   [address]   [place]: [phone]  F: [phone]   [] [place] [address] [facility] [address]  [place]: [phone]   F: [phone]     Physical Therapy Cancel/No Show note    Date: 2022  Patient: Asiya Mcallister  : 1975  MRN: [id number]    Cancels/No Shows to date: 5 cx /0 ns     For today's appointment patient:    [x]  Cancelled    [] Rescheduled appointment    [] No-show     Reason given by patient:    []  Patient ill    [x]  Conflicting appointment    [] No transportation      [] Conflict with work    [] No reason given    [] Weather related    [] [id number]    [x] Other:      Comments:        [x] Next appointment was confirmed    Electronically signed by: Lova Gosselin, PT

## 2022-09-30 ENCOUNTER — APPOINTMENT (OUTPATIENT)
Dept: PHYSICAL THERAPY | Age: 47
End: 2022-09-30
Payer: MEDICARE

## 2022-09-30 ENCOUNTER — APPOINTMENT (OUTPATIENT)
Dept: OCCUPATIONAL THERAPY | Age: 47
End: 2022-09-30
Payer: MEDICARE

## 2022-10-04 ENCOUNTER — HOSPITAL ENCOUNTER (OUTPATIENT)
Dept: PHYSICAL THERAPY | Age: 47
Setting detail: THERAPIES SERIES
Discharge: HOME | End: 2022-10-04
Payer: MEDICARE

## 2022-10-04 PROCEDURE — 97112 NEUROMUSCULAR REEDUCATION: CPT

## 2022-10-04 PROCEDURE — 97116 GAIT TRAINING THERAPY: CPT

## 2022-10-04 NOTE — FLOWSHEET NOTE
[x] [address]  [address]  [phone]  F: [phone] [x] [address]  [facility]   [address]  P: [phone]  F: [phone] [] [address]  [address] [facility] [address]  P: [phone]  F: [phone] [] [address]  P: [phone]  F: [phone] [] [address]  [facility]   [address]   [place]: [phone]  F: [phone]      Physical Therapy Daily Treatment    Date:  10/4/2022  Patient Name:  Mayo Daugherty    :  1975  MRN: [id number]  Physician: RACHAEL Hernandez                           Insurance: Medicare ([address] [place] [address])  Medical Diagnosis: I63.9 (ICD-10-CM) - Cerebrovascular accident (CVA), unspecified mechanism ([facility] [address]  Rehab Codes: R26.89, R53.1, M54.59, M54.2, R29.3,   Next 's appt.: 8/10 with neuro  Date of symptom onset: 22   Visit# / total visits:     Cancels/No Shows: 4/0 (Last 30 days)    Subjective:    Pain:  [] Yes  [x] No Location: bilateral shoulders B shoulders Pain Rating: (0-10 scale) 2/10  Pain altered Tx:  [x] No  [] Yes  Action:  Comments: Patient arrived to PT feeling well with some mild pain in his shoulders, he notes more pain with colder weather.      Objective:   Sensation: absent to light touch throughout B feet below ankle     Modalities:   Precautions: Pt has dialysis 3x per week  Exercises:  BOLDED COMPLETED 10/4/2022:  Exercise RLE Reps/ Time Weight/ Level Comments   Nu step  6' Level 4    Scapular retraction/elevation mobs 5 min     UT stretch  3x20\"ea     scap squeeze  10x3\"     Pec stretch-doorway  2x30\"     Posterior shoulder rolls 20x  Assist for slow, max retraction   Scap retraction 2x10 Orange  Added resistance    Scap retract w/ shoulder flex 10x AROM    Chin tucks  10x3\"           Seated toe raises  10x2                 Standing    Parallel bars   Gastroc stretch 3x30\"  UE support; 1 LE at a time    Marches 3x10  UE support   Hip abd 2x10  Cuff on R leg  R hip CKC  L hip OKC   Weight shifting  10xea  No UE as able; M/L, A/P, P/A         Resisted swing phase 3x30'  x13' Lime     Resisted step over small round cone 2x10 Lime  With weight shift onto RLE   Heel taps to step  10x 4\"    Trunk/cervical rotation naming how many fingers clinician is holding  10xea  clinician standing behind pt    Picking up rings from steps  4x 4 rings;   6\" and 12\" step Wider JASIEL no UE; notes some irritation in LB; x2 leading with R UE, x2 leading with L UE and handing to clinician at multiple angles   Resisted swing phase fwd and retro amb on way back  5xea // bars;   Lime  Resisted fwd walk    Step over round cone  10xea // bars           Balance   Parallel bars   Trunk rotation with ball 5x ea     Shoulder press with ball 10x     NBOS 2x30\"  Posterior sway after 20 sec on 1st rep    NBOS with EC 2x30\"     NBOS on foam  2x30\"     NBOS with ball raises, rotation 2x30\"ea Volley  ball     Tandem stance 2x20\"     Cone  off ground 2x5 5 cones    Standing on foam with head turns  30\" ea  Up/down, rotation    Cone reach with LUE across body 2x5 5 cones    Cone reach with LUE to L side 2x5 5 cones    Heel taps 10x ea 4in With 1 hand for UE support   Dynamic march with retro hamstring curl 3L  No UE support   March amb  3L // bars     Hs curl amb  2L // bars     Side stepping 3L // bars  No UE-1 light UE support   360 turn 2x  Clockwise more challenging   Marches  2x20\"  No UE   Alt toe taps to foam  2x30\"  No UE   Gait Training with Rollator  15 min      Gait training with Bioness 50 min Lower and thigh cuff Initial set up  STS 2x10, LLE on 2\" step  Hip abd  Marches  Adjustments for thigh cuff more proximal  Gait analysis   Change in speed  4 sit down breaks   Qufenqi training 40 min R hams  R glutes  R quads  R PFs Set up in back room  STS program  STS w/ LLE on 2\" step: 2x10, min A    STS \"pause\" mode: Unilat UE assist  Step taps: 2x10, 4\" step  Hip abd/ext: 2x10 ea  Hip abd L sided - 10x   Balance without UE 2x30 sec  Reaching for ball touches 10x  Punching of pad 10x2  (Reaching&punches across body)                      Xcite training ankle pumps 20 min 4 x 10 Set up in supine, head elevated  Ant/ tib placement adjusted 3x to execute DF and EV  Gastroc pads added on 3rd and 4th set   PROM 5 min  DF/EV between sets of Xcite   Heel slides 20x   Required stabilization of ankle   SLR 10x2     SAQ 10x2             Other: Majority of treatment time spent assessing LTG, Patient was supposed to have Orthotist consult during today's session but the orthotist had to reschedule. Treatment this date included using Functional Electrical Stimulation via the Xcite 2. Stimulation parameters and outcomes can be viewed on [email or web address] with the patients' username (patient ID generated by [employer] without any patient identifiers) and password (patients' pin generated by [employer] without any patient identifiers). A check of the patients' skin prior to application of electrodes, and after the treatment concluded was completed and skin noted to be in good condition, no issues. See above in treatment log for treatments performed. Treatment Charges: Mins Units   []  Modalities     []  Ther Exercise     []  Manual Therapy     []  Ther Activities     []  Aquatics     []  Vasocompression     [x]  Other: Neuro re-ed 30 2   [x]  Other: Gait 15 1   Total Treatment time 45 3       Assessment: [x] Progressing toward goals. LTG assessed today. Patient has improved his Hernandez balance score but still has limitations with several dynamic balance tasks like picking item up from floor and performing tandem stance.  Gait mechanics continue to improve, but his walking endurance is limited to about 500 feet due to his shoulder pain and fatigue this date. Orthotist consult rescheduled to next week. [] No change. [x] Other:  ABC score 41% impairment. [x] Patient would continue to benefit from skilled physical therapy services in order to: address R hemibody weakness, improve gait mechanics and efficiency, and increase standing static/dynamic balance to allow improved household ADL/IADL completion and reduce fall risk. STG: (to be met in 8 treatments) - Assessed by Alvino Clark PT on 7/20:  ? Pain: No more than 4/10 pain reported in low back or neck post therapy sessions to indicate improving tolerance to increased activity and exercise - MET for low back or neck, but L shoulder is 2/10 - newer pain over the past 3 weeks. ? ROM: at least 0deg DF PROM with knee extended to indicate improving mobility in ankles to increase heel strike and reduce toe drag with prolonged ambulation - NOT MET, lacking 10 deg from neutral in L ankle, lacking 3 deg from neutral in R ankle  ? Balance:   Pt able to fully turn to look behind himself without instability in either direction to indicate improving postural stability - NOT MET, LOB with full turn to R and L  Pt able to complete standing reaching tasks without UE assist and no more than mild instability to indicate improving dynamic balance for safety with household IADLs - NOT MET, primarily needs to be supported at trunk on counter or holding onto something  ? Strength: At least 4+/5 grossly in R hip to allow improved pelvic stability in standing and improved swing phase control/speed during prolonged gait - NOT MET, 4-/5 grossly in R hip  Pt able to utilize RUE for fine grasp and release tasks with no more than minimally slowed speed evident - Ongoing, better fine grasp but still limited with heavier objects, twisting lid  ?  Function: Pt able to ambulate 450 ft with least restrictive device with no evidence of lagging RLE swing or abnormal stance time noted, with no more than 11 on RPE scale - NOT MET, achieves ~400 ft before needing to rest d/t RLE instability and R knee buckling, rates 13 RPE  Patient to be independent with home exercise program as demonstrated by performance with correct form without cues. - Ongoing   Demonstrate Knowledge of fall prevention - MET     LTG: (to be met in 16 treatments) - 8/22: Will continue with current goals, adjusting at visit 16 as needed d/t recent CVA on 8/8, Assessed by Lawrence Lucas PT on 10/4   ? Pain: No more than 2/10 pain reported in low back or neck post therapy sessions to indicate improving tolerance to increased activity and exercise MET  ? ROM: at least 5 deg DF PROM with knee extended to indicate improving mobility in ankles to increase heel strike and reduce toe drag with prolonged ambulation. MET  ? Balance: At least 30/56 on Hernandez to indicate significant improvement in standing static/dynamic balance and progress towards reduced fall risk. 37/56 MET  Able to negotiate gait obstacles using rollator without increased instability or excessively slowed gait MET  ? Strength: At least 5-/5 grossly in R hip to allow further improved pelvic stability in standing and improved swing phase control/speed during prolonged gait MET  At least 5/5 B knee ext to prevent excessive buckling with prolonged standing Progress, some buckling still reported   ? Function:   Pt able to ambulate 600 ft with least restrictive device with appropriate RLE swing/stance phase timing, no evidence of toe drag, and no more than minimal fatigue by end of ambulation Progress  Pt able to ambulate 100 ft with intermittent turning, stopping, etc without assistive device and demonstrating good stability and no evidence of B knee buckling to indicate improving functional strength and safety with household ambulation distances for ADL/IADLs Not Met  No more than 14% impaired on ABC scale to indicate improving subjective balance confidence.  Not Met        Patient goals: \"to strengthen right side back as much as I can\"    Pt. Education:  [x] Yes  [] No  [x] Reviewed Prior HEP/Ed  Method of Education: [x] Verbal  [x] Demo  [] Written   fall prevention 7/11/22  Comprehension of Education:  [x] Verbalizes understanding. [x] Demonstrates understanding. [] Needs review. [x] Demonstrates/verbalizes HEP/Ed previously given. Plan: [x] Continue current frequency toward long and short term goals.     [x] Specific Instructions for subsequent treatments:  balance training with Smiley Starch, joint stability        Time In: 9:00 am            Time Out: 09:45 am    Electronically signed by:  Finesse Scott PT

## 2022-10-06 ENCOUNTER — HOSPITAL ENCOUNTER (OUTPATIENT)
Dept: PHYSICAL THERAPY | Age: 47
Setting detail: THERAPIES SERIES
Discharge: HOME | End: 2022-10-06
Payer: MEDICARE

## 2022-10-06 PROCEDURE — 97110 THERAPEUTIC EXERCISES: CPT

## 2022-10-06 PROCEDURE — 97112 NEUROMUSCULAR REEDUCATION: CPT

## 2022-10-06 NOTE — FLOWSHEET NOTE
[x] [facility]) [facility] &  Therapy  [address]  [phone]  F: [phone] [x] [address]  [address]   [address]  P: [phone]  F: [phone] [] [facility] [address] &  Therapy  [address]  P: [phone]  F: [phone] [] [address]  P: [phone]  F: [phone] [] [address]  [facility]   [address]   [place]: [phone]  F: [phone]      Physical Therapy Daily Treatment    Date:  10/6/2022  Patient Name:  Dami Moreno    :  1975  MRN: [id number]  Physician: RACHAEL Ocampo                           Insurance: Medicare ([address] [place] [address])  Medical Diagnosis: I63.9 (ICD-10-CM) - Cerebrovascular accident (CVA), unspecified mechanism ([facility] [address]  Rehab Codes: R26.89, R53.1, M54.59, M54.2, R29.3,   Next Dr.'s appt.: 8/10 with neuro  Date of symptom onset: 22   Visit# / total visits: 15/16    Cancels/No Shows: 4/0 (Last 30 days)    Subjective:    Pain:  [] Yes  [x] No Location: N/A Pain Rating: (0-10 scale) 0/10  Pain altered Tx:  [x] No  [] Yes  Action:  Comments: Patient reports no pain in shoulders today.      Objective:   Sensation: absent to light touch throughout B feet below ankle     Modalities:   Precautions: Pt has dialysis 3x per week  Exercises:  BOLDED COMPLETED 10/6/2022:  Exercise RLE Reps/ Time Weight/ Level Comments   Nu step  6' Level 4    Scapular retraction/elevation mobs 5 min     UT stretch  3x20\"ea     scap squeeze  10x3\"     Pec stretch-doorway  2x30\"     Posterior shoulder rolls 20x  Assist for slow, max retraction   Scap retraction 2x10 Orange  Added resistance    Scap retract w/ shoulder flex 10x AROM    Chin tucks  10x3\"           Seated toe raises  10x2                 Standing Parallel bars   Gastroc stretch 3x30\"  UE support; 1 LE at a time    Marches 3x10  UE support   Hip abd 2x10  Cuff on R leg  R hip CKC  L hip OKC   Weight shifting  10xea  No UE as able; M/L, A/P, P/A         Resisted swing  2x10 Lime  Tapping 4\" step   Step up 2x10 4\" RLE   LLE marching 3x10  Focus on RLE stance control   LLE step taps 3x10 4\" step \" \"   LLE hip abd AROM 3x10  \" \"   Sit <> stand w/ RLE bias 3x8 2\" step under LLE Min A from therapist to ensure ant fwd weightshift, extension to stand   Resisted step over small round cone 2x10 Lime  With weight shift onto RLE   Heel taps to step  10x 4\"    Trunk/cervical rotation naming how many fingers clinician is holding  10xea  clinician standing behind pt    Picking up rings from steps  4x 4 rings;   6\" and 12\" step Wider JASIEL no UE; notes some irritation in LB; x2 leading with R UE, x2 leading with L UE and handing to clinician at multiple angles   Resisted swing phase fwd and retro amb on way back  5xea // bars;   Lime  Resisted fwd walk    Step over round cone  10xea // bars           Balance   Parallel bars   Posterior perturbations 2x10  No UE assist   Anterior perturbations 2x10           Trunk rotation with ball 5x ea     Shoulder press with ball 10x     NBOS 2x30\"  Posterior sway after 20 sec on 1st rep    NBOS with EC 2x30\"     NBOS on foam  2x30\"     NBOS with ball raises, rotation 2x30\"ea Volley  ball     Tandem stance 2x20\"     Cone  off ground 2x5 5 cones    Standing on foam with head turns  30\" ea  Up/down, rotation    Cone reach with LUE across body 2x5 5 cones    Cone reach with LUE to L side 2x5 5 cones    Heel taps 10x ea 4in With 1 hand for UE support   Dynamic march with retro hamstring curl 3L  No UE support   March amb  3L // bars     Hs curl amb  2L // bars     Side stepping 3L // bars  No UE-1 light UE support   360 turn 2x  Clockwise more challenging   Marches  2x20\"  No UE   Alt toe taps to foam  2x30\"  No UE   Gait Training with Rollator  15 min      Gait training with Bioness 50 min Lower and thigh cuff Initial set up  STS 2x10, LLE on 2\" step  Hip abd  Marches  Adjustments for thigh cuff more proximal  Gait analysis   Change in speed  4 sit down breaks   Xcite training 40 min R hams  R glutes  R quads  R PFs Set up in back room  STS program  STS w/ LLE on 2\" step: 2x10, min A    STS \"pause\" mode: Unilat UE assist  Step taps: 2x10, 4\" step  Hip abd/ext: 2x10 ea  Hip abd L sided - 10x   Balance without UE 2x30 sec  Reaching for ball touches 10x  Punching of pad 10x2  (Reaching&punches across body)                      Xcite training ankle pumps 20 min 4 x 10 Set up in supine, head elevated  Ant/ tib placement adjusted 3x to execute DF and EV  Gastroc pads added on 3rd and 4th set   PROM 5 min  DF/EV between sets of Xcite   Heel slides 20x   Required stabilization of ankle   SLR 10x2     SAQ 10x2             Other: Majority of treatment time spent assessing LTG, Patient was supposed to have Orthotist consult during today's session but the orthotist had to reschedule. Treatment this date included using Functional Electrical Stimulation via the Xcite 2. Stimulation parameters and outcomes can be viewed on [email or web address] with the patients' username (patient ID generated by [employer] without any patient identifiers) and password (patients' pin generated by [employer] without any patient identifiers). A check of the patients' skin prior to application of electrodes, and after the treatment concluded was completed and skin noted to be in good condition, no issues. See above in treatment log for treatments performed. Treatment Charges: Mins Units   []  Modalities     [x]  Ther Exercise 35 2   []  Manual Therapy     []  Ther Activities     []  Aquatics     []  Vasocompression     [x]  Other: Neuro re-ed 10 1   []  Other: Gait     Total Treatment time 45 3       Assessment: [x] Progressing toward goals. Focused on RLE SLS control - fatigues quickly, requires lots of seated rest (1-2 min, 3 min later in session) d/t muscle fasciculations noted. Significant Vcs to encourage forward weight control over JASIEL d/t tendency for retropulsion. Fair carryover noted. Held gait training d/t too much RLE fatigue this date, will implement again in upcoming sessions. [] No change. [x] Other:  Progress note for more visits requested d/t continued weakness/gait/balance impairment and still fall risk - see prog note of same date. [x] Patient would continue to benefit from skilled physical therapy services in order to: address R hemibody weakness, improve gait mechanics and efficiency, and increase standing static/dynamic balance to allow improved household ADL/IADL completion and reduce fall risk. STG: (to be met in 8 treatments) - Assessed by Maribel Wheat PT on 7/20:  ? Pain: No more than 4/10 pain reported in low back or neck post therapy sessions to indicate improving tolerance to increased activity and exercise - MET for low back or neck, but L shoulder is 2/10 - newer pain over the past 3 weeks. ? ROM: at least 0deg DF PROM with knee extended to indicate improving mobility in ankles to increase heel strike and reduce toe drag with prolonged ambulation - NOT MET, lacking 10 deg from neutral in L ankle, lacking 3 deg from neutral in R ankle  ? Balance:   Pt able to fully turn to look behind himself without instability in either direction to indicate improving postural stability - NOT MET, LOB with full turn to R and L  Pt able to complete standing reaching tasks without UE assist and no more than mild instability to indicate improving dynamic balance for safety with household IADLs - NOT MET, primarily needs to be supported at trunk on counter or holding onto something  ? Strength:    At least 4+/5 grossly in R hip to allow improved pelvic stability in standing and improved swing phase control/speed during prolonged gait - NOT MET, 4-/5 grossly in R hip  Pt able to utilize RUE for fine grasp and release tasks with no more than minimally slowed speed evident - Ongoing, better fine grasp but still limited with heavier objects, twisting lid  ? Function: Pt able to ambulate 450 ft with least restrictive device with no evidence of lagging RLE swing or abnormal stance time noted, with no more than 11 on RPE scale - NOT MET, achieves ~400 ft before needing to rest d/t RLE instability and R knee buckling, rates 13 RPE  Patient to be independent with home exercise program as demonstrated by performance with correct form without cues. - Ongoing   Demonstrate Knowledge of fall prevention - MET     LTG: (to be met in 16 treatments) - 8/22: Will continue with current goals, adjusting at visit 16 as needed d/t recent CVA on 8/8, Assessed by Gracie Carias PT on 10/4   ? Pain: No more than 2/10 pain reported in low back or neck post therapy sessions to indicate improving tolerance to increased activity and exercise MET  ? ROM: at least 5 deg DF PROM with knee extended to indicate improving mobility in ankles to increase heel strike and reduce toe drag with prolonged ambulation. MET  ? Balance: At least 30/56 on Hernandez to indicate significant improvement in standing static/dynamic balance and progress towards reduced fall risk. 37/56 MET  Able to negotiate gait obstacles using rollator without increased instability or excessively slowed gait MET  ? Strength: At least 5-/5 grossly in R hip to allow further improved pelvic stability in standing and improved swing phase control/speed during prolonged gait MET  At least 5/5 B knee ext to prevent excessive buckling with prolonged standing Progress, some buckling still reported   ?  Function:   Pt able to ambulate 600 ft with least restrictive device with appropriate RLE swing/stance phase timing, no evidence of toe drag, and no more than minimal fatigue by end of ambulation Progress  Pt able to ambulate 100 ft with intermittent turning, stopping, etc without assistive device and demonstrating good stability and no evidence of B knee buckling to indicate improving functional strength and safety with household ambulation distances for ADL/IADLs Not Met  No more than 14% impaired on ABC scale to indicate improving subjective balance confidence. Not Met      ABC score 41% impairment. Patient goals: \"to strengthen right side back as much as I can\"    Pt. Education:  [x] Yes  [] No  [x] Reviewed Prior HEP/Ed  Method of Education: [x] Verbal  [x] Demo  [] Written   fall prevention 7/11/22  Comprehension of Education:  [x] Verbalizes understanding. [x] Demonstrates understanding. [] Needs review. [x] Demonstrates/verbalizes HEP/Ed previously given. Plan: [x] Continue current frequency toward long and short term goals.     [x] Specific Instructions for subsequent treatments:  balance training with Jeremie Hedrick, joint stability        Time In: 10:05 am            Time Out: 11:00  am    Electronically signed by:  Suhail Hernandes PT

## 2022-10-06 NOTE — PROGRESS NOTES
[x] [facility]) [facility] &  Therapy  [address]  [phone]  F: [phone] [] [address]  [facility]   [address]  P: [phone]  F: [phone] [] [address] &  Therapy  [address]  P: [phone]  F: [phone] [] [address]  P: [phone]  F: [phone] [] [address]  [facility]   [address]   [place]: [phone]  F: [phone]      Physical Therapy Progress Note    Date: 10/6/2022      Patient: Jessie Lucas  : 1975  MRN: [id number]    Physician: RACHAEL Ford                           Insurance: Medicare ([address] [place] [address])  Medical Diagnosis: I63.9 (ICD-10-CM) - Cerebrovascular accident (CVA), unspecified mechanism ([facility] [address]  Rehab Codes: R26.89, R53.1, M54.59, M54.2, R29.3,   Next Dr.'s appt.: 8/10 with neuro  Date of symptom onset: 22   Visit# / total visits: 1516                    Cancels/No Shows: 4/0 (Last 30 days)  Date range of services: 22 to 10/6/22      Subjective:  Pain:  [] Yes  [x] No   Location: N/A  Pain Rating: (0-10 scale) 0/10  Pain altered Tx:  [x] No  [] Yes  Action:  Comments: Patient reports no pain in shoulders today. Objective:  Test Measurements: see goal assessment below  Function: see goal assessment below    Assessment:     STG: (to be met in 8 treatments) - Assessed by Penny Reyes PT on :  ? Pain: No more than 4/10 pain reported in low back or neck post therapy sessions to indicate improving tolerance to increased activity and exercise - MET for low back or neck, but L shoulder is 2/10 - newer pain over the past 3 weeks.    ? ROM: at least 0deg DF PROM with knee extended to indicate improving mobility in ankles to increase heel strike and reduce toe drag with prolonged ambulation - NOT MET, lacking 10 deg from neutral in L ankle, lacking 3 deg from neutral in R ankle  ? Balance:   Pt able to fully turn to look behind himself without instability in either direction to indicate improving postural stability - NOT MET, LOB with full turn to R and L  Pt able to complete standing reaching tasks without UE assist and no more than mild instability to indicate improving dynamic balance for safety with household IADLs - NOT MET, primarily needs to be supported at trunk on counter or holding onto something  ? Strength: At least 4+/5 grossly in R hip to allow improved pelvic stability in standing and improved swing phase control/speed during prolonged gait - NOT MET, 4-/5 grossly in R hip  Pt able to utilize RUE for fine grasp and release tasks with no more than minimally slowed speed evident - Ongoing, better fine grasp but still limited with heavier objects, twisting lid  ? Function: Pt able to ambulate 450 ft with least restrictive device with no evidence of lagging RLE swing or abnormal stance time noted, with no more than 11 on RPE scale - NOT MET, achieves ~400 ft before needing to rest d/t RLE instability and R knee buckling, rates 13 RPE  Patient to be independent with home exercise program as demonstrated by performance with correct form without cues. - Ongoing   Demonstrate Knowledge of fall prevention - MET     LTG: (to be met in 16 treatments) - 8/22: Will continue with current goals, adjusting at visit 16 as needed d/t recent CVA on 8/8, Assessed by Caitlin Almanzar PT on 10/4   ? Pain: No more than 2/10 pain reported in low back or neck post therapy sessions to indicate improving tolerance to increased activity and exercise MET  ? ROM: at least 5 deg DF PROM with knee extended to indicate improving mobility in ankles to increase heel strike and reduce toe drag with prolonged ambulation. MET  ? Balance:    At least 30/56 on Hernandez to indicate significant improvement in standing static/dynamic balance and progress towards reduced fall risk. 37/56 MET  Able to negotiate gait obstacles using rollator without increased instability or excessively slowed gait MET  ? Strength: At least 5-/5 grossly in R hip to allow further improved pelvic stability in standing and improved swing phase control/speed during prolonged gait MET  At least 5/5 B knee ext to prevent excessive buckling with prolonged standing Progress, some buckling still reported   ? Function:   Pt able to ambulate 600 ft with least restrictive device with appropriate RLE swing/stance phase timing, no evidence of toe drag, and no more than minimal fatigue by end of ambulation Progress  Pt able to ambulate 100 ft with intermittent turning, stopping, etc without assistive device and demonstrating good stability and no evidence of B knee buckling to indicate improving functional strength and safety with household ambulation distances for ADL/IADLs Not Met  No more than 14% impaired on ABC scale to indicate improving subjective balance confidence. Not Met - 41% impaired    New goals as of 10/6, to be met in next 12 visits:   1. Hernandez score to at least 45/56 to indicate reduced fall risk, progressive balance improvement   2. Ability to complete RLE SLS tasks with no UE assist and no buckling/instability noted. 3. All bracing needs for RLE will have been met at this time to improve stability in ankle, reduce falls    4. No more than 20% impaired on ABC scale to indicate subjective improvement in balance confidence.     5. At least 650 ft with least restrictive device with appropriate RLE swing/stance phase timing, no toe drag             Patient goals: \"to strengthen right side back as much as I can\"       Treatment Plan:  [x] Therapeutic Exercise   24131  [] Iontophoresis: 4 mg/mL Dexamethasone Sodium Phosphate  mAmin  73030   [x] Therapeutic Activity  20182 [] Vasopneumatic cold with compression  06110 [x] Gait Training   [id number] [] Ultrasound   [id number]   [x] Neuromuscular Re-education  [id number] [] Electrical Stimulation Unattended  96856   [x] Manual Therapy  39299 [] Electrical Stimulation Attended  [id number]   [x] Instruction in HEP  [] Lumbar/Cervical Traction  [id number]   [] Aquatic Therapy   [id number] [] Cold/hotpack    [] Massage   02564      [] Dry Needling, 1 or 2 muscles  80771   [] Biofeedback, first 15 minutes   23145  [] Biofeedback, additional 15 minutes   13585 [] Dry Needling, 3 or more muscles  09836       Patient Status:     [] Continue per initial plan of care. [x] Additional visits necessary. Still with RLE weakness in stance and instability in R ankle - needs additional single limb strengthening, bracing and gait training with more stable AFO. Will benefit from additional therapy to reduce fall risk and improve standing balance, functional mobility tolerance. [] Other:     Requested Frequency/Duration: 2 times per week for 14 treatments. Electronically signed by Jeri Boast, PT on 10/6/2022 at 11:26 AM      If you have any questions or concerns, please don't hesitate to call. Thank you for your referral.    Physician Signature:________________________________Date:__________________  By signing above or cosigning this note, I have reviewed this plan of care and certify a need for medically necessary rehabilitation services.      *PLEASE SIGN ABOVE AND FAX BACK ALL PAGES*

## 2022-10-11 ENCOUNTER — HOSPITAL ENCOUNTER (OUTPATIENT)
Dept: PHYSICAL THERAPY | Age: 47
Setting detail: THERAPIES SERIES
Discharge: HOME | End: 2022-10-11
Payer: MEDICARE

## 2022-10-11 PROCEDURE — 97110 THERAPEUTIC EXERCISES: CPT

## 2022-10-11 NOTE — FLOWSHEET NOTE
[x] [facility]) [facility] &  Therapy  [address]  [phone]  F: [phone] [x] [address]  [facility]   [address]  P: [phone]  F: [phone] [] [place] &  Therapy  [address]  P: [phone]  F: [phone] [] [address]  P: [phone]  F: [phone] [] [address]  [facility]   [address]   [place]: [phone]  F: [phone]      Physical Therapy Daily Treatment    Date:  10/11/2022  Patient Name:  Ambreen Andrew    :  1975  MRN: [id number]  Physician: RACHAEL Segal                           Insurance: Medicare ([address] [place] [address])  Medical Diagnosis: I63.9 (ICD-10-CM) - Cerebrovascular accident (CVA), unspecified mechanism ([facility] [address]  Rehab Codes: R26.89, R53.1, M54.59, M54.2, R29.3,   Next 's appt.: 8/10 with neuro  Date of symptom onset: 22   Visit# / total visits: 16/16    Cancels/No Shows: 4/0 (Last 30 days)    Subjective:    Pain:  [] Yes  [x] No Location: N/A Pain Rating: (0-10 scale) 0/10  Pain altered Tx:  [x] No  [] Yes  Action:  Comments:  Pt arrives noting a headache over the past few days with BP up and down. BP prior to therapy taken at home 135/75.       Objective:   Sensation: absent to light touch throughout B feet below ankle     Modalities:   Precautions: Pt has dialysis 3x per week  Exercises:  BOLDED COMPLETED 10/11/2022:  Exercise RLE Reps/ Time Weight/ Level Comments   Nu step  5' Level 4    Scapular retraction/elevation mobs 5 min     UT stretch  3x20\"ea     scap squeeze  10x3\"     Pec stretch-doorway  2x30\"     Posterior shoulder rolls 20x  Assist for slow, max retraction   Scap retraction 2x10 Orange  Added resistance    Scap retract w/ shoulder flex 10x AROM Chin tucks  10x3\"           Seated toe raises  10x2                 Standing    Parallel bars   Gastroc stretch 3x30\"  UE support; 1 LE at a time    Marches 3x10  UE support   Hip abd 2x10  Cuff on R leg  R hip CKC  L hip OKC   Weight shifting  10xea  No UE as able; M/L, A/P, P/A         Resisted swing  2x10 Lime  Tapping 4\" step   Step up 2x10 4\" RLE   LLE marching 3x10  Focus on RLE stance control   LLE step taps 3x10 4\" step \" \"   LLE hip abd AROM 3x10  \" \"   Sit <> stand w/ RLE bias 3x8 2\" step under LLE Min A from therapist to ensure ant fwd weightshift, extension to stand   Resisted step over small round cone 2x10 Lime  With weight shift onto RLE   Heel taps to step  10x 4\"    Trunk/cervical rotation naming how many fingers clinician is holding  10xea  clinician standing behind pt    Picking up rings from steps  4x 4 rings;   6\" and 12\" step Wider JASIEL no UE; notes some irritation in LB; x2 leading with R UE, x2 leading with L UE and handing to clinician at multiple angles   Resisted swing phase fwd and retro amb on way back  5xea // bars;   Lime  Resisted fwd walk    Step over round cone  10xea // bars           Balance   Parallel bars   Posterior perturbations 2x10  No UE assist   Anterior perturbations 2x10           Trunk rotation with ball 5x ea     Shoulder press with ball 10x     NBOS 2x30\"  Posterior sway after 20 sec on 1st rep    NBOS with EC 2x30\"     NBOS on foam  2x30\"     NBOS with ball raises, rotation 2x30\"ea Volley  ball     Tandem stance 2x20\"     Cone  off ground 2x5 5 cones    Standing on foam with head turns  30\" ea  Up/down, rotation    Cone reach with LUE across body 2x5 5 cones    Cone reach with LUE to L side 2x5 5 cones    Heel taps 10x ea 4in With 1 hand for UE support   Dynamic march with retro hamstring curl 3L  No UE support   March amb  3L // bars     Hs curl amb  2L // bars     Side stepping 3L // bars  No UE-1 light UE support   360 turn 2x  Clockwise more challenging Fortunato Chalet  2x20\"  No UE   Alt toe taps to foam  2x30\"  No UE   Gait Training without AFO 8 steps  Observation only for orthotist   Gait training with Bioness 50 min Lower and thigh cuff Initial set up  STS 2x10, LLE on 2\" step  Hip abd  Marches  Adjustments for thigh cuff more proximal  Gait analysis   Change in speed  4 sit down breaks   Xcite training 40 min R hams  R glutes  R quads  R PFs Set up in back room  STS program  STS w/ LLE on 2\" step: 2x10, min A    STS \"pause\" mode: Unilat UE assist  Step taps: 2x10, 4\" step  Hip abd/ext: 2x10 ea  Hip abd L sided - 10x   Balance without UE 2x30 sec  Reaching for ball touches 10x  Punching of pad 10x2  (Reaching&punches across body)      Xcite training ankle pumps 20 min 4 x 10 Set up in supine, head elevated  Ant/ tib placement adjusted 3x to execute DF and EV  Gastroc pads added on 3rd and 4th set   PROM 5 min  DF/EV   Toe flexion/ext   Ankle EV 5x2  AROM added 10.11   Heel slides 20x   Required stabilization of ankle   SLR 10x2     SAQ 10x2             Other: Majority of treatment time spent assessing custom fit braces with orthotist.        Treatment Charges: Mins Units   []  Modalities     [x]  Ther Exercise 10 1   []  Manual Therapy     []  Ther Activities     []  Aquatics     []  Vasocompression     []  Other: Neuro re-ed     []  Other: Gait     Total Treatment time 10 1   Time with orthotist not billed    Assessment: [x] Progressing toward goals. Initiated treatment with high intensity SciFit to increase LE strength and muscular endurance, necessary for ADL's. Majority of session spent with orthotist for custom orthotics to improve gait mechanics. Ambulated without B AFO short distance with genu varus/IV, forcing patient to walk on lateral border of B feet. Ended with focus on neutral positioning in AROM and PROM. [] No change.        [] Other:    [x] Patient would continue to benefit from skilled physical therapy services in order to: address R hemibody weakness, improve gait mechanics and efficiency, and increase standing static/dynamic balance to allow improved household ADL/IADL completion and reduce fall risk. STG: (to be met in 8 treatments) - Assessed by Peyman Corbett PT on 7/20:  ? Pain: No more than 4/10 pain reported in low back or neck post therapy sessions to indicate improving tolerance to increased activity and exercise - MET for low back or neck, but L shoulder is 2/10 - newer pain over the past 3 weeks. ? ROM: at least 0deg DF PROM with knee extended to indicate improving mobility in ankles to increase heel strike and reduce toe drag with prolonged ambulation - NOT MET, lacking 10 deg from neutral in L ankle, lacking 3 deg from neutral in R ankle  ? Balance:   Pt able to fully turn to look behind himself without instability in either direction to indicate improving postural stability - NOT MET, LOB with full turn to R and L  Pt able to complete standing reaching tasks without UE assist and no more than mild instability to indicate improving dynamic balance for safety with household IADLs - NOT MET, primarily needs to be supported at trunk on counter or holding onto something  ? Strength: At least 4+/5 grossly in R hip to allow improved pelvic stability in standing and improved swing phase control/speed during prolonged gait - NOT MET, 4-/5 grossly in R hip  Pt able to utilize RUE for fine grasp and release tasks with no more than minimally slowed speed evident - Ongoing, better fine grasp but still limited with heavier objects, twisting lid  ? Function: Pt able to ambulate 450 ft with least restrictive device with no evidence of lagging RLE swing or abnormal stance time noted, with no more than 11 on RPE scale - NOT MET, achieves ~400 ft before needing to rest d/t RLE instability and R knee buckling, rates 13 RPE  Patient to be independent with home exercise program as demonstrated by performance with correct form without cues.  - Ongoing   Demonstrate Knowledge of fall prevention - MET     LTG: (to be met in 16 treatments) - 8/22: Will continue with current goals, adjusting at visit 16 as needed d/t recent CVA on 8/8, Assessed by Gracie Carias PT on 10/4   ? Pain: No more than 2/10 pain reported in low back or neck post therapy sessions to indicate improving tolerance to increased activity and exercise MET  ? ROM: at least 5 deg DF PROM with knee extended to indicate improving mobility in ankles to increase heel strike and reduce toe drag with prolonged ambulation. MET  ? Balance: At least 30/56 on Hernandez to indicate significant improvement in standing static/dynamic balance and progress towards reduced fall risk. 37/56 MET  Able to negotiate gait obstacles using rollator without increased instability or excessively slowed gait MET  ? Strength: At least 5-/5 grossly in R hip to allow further improved pelvic stability in standing and improved swing phase control/speed during prolonged gait MET  At least 5/5 B knee ext to prevent excessive buckling with prolonged standing Progress, some buckling still reported   ? Function:   Pt able to ambulate 600 ft with least restrictive device with appropriate RLE swing/stance phase timing, no evidence of toe drag, and no more than minimal fatigue by end of ambulation Progress  Pt able to ambulate 100 ft with intermittent turning, stopping, etc without assistive device and demonstrating good stability and no evidence of B knee buckling to indicate improving functional strength and safety with household ambulation distances for ADL/IADLs Not Met  No more than 14% impaired on ABC scale to indicate improving subjective balance confidence. Not Met      ABC score 41% impairment. New goals as of 10/6, to be met in next 12 visits:        1. Hernandez score to at least 45/56 to indicate reduced fall risk, progressive balance improvement        2.  Ability to complete RLE SLS tasks with no UE assist and no buckling/instability noted. 3. All bracing needs for RLE will have been met at this time to improve stability in ankle, reduce falls        4. No more than 20% impaired on ABC scale to indicate subjective improvement in balance confidence. 5. At least 650 ft with least restrictive device with appropriate RLE swing/stance phase timing, no toe drag        Patient goals: \"to strengthen right side back as much as I can\"    Pt. Education:  [x] Yes  [] No  [x] Reviewed Prior HEP/Ed  Method of Education: [x] Verbal  [x] Demo  [] Written   fall prevention 7/11/22  Comprehension of Education:  [x] Verbalizes understanding. [x] Demonstrates understanding. [] Needs review. [x] Demonstrates/verbalizes HEP/Ed previously given. Plan: [x] Continue current frequency toward long and short term goals.     [x] Specific Instructions for subsequent treatments:  balance training with Xcite, joint stability        Time In: 2:00 pm            Time Out: 2:58  pm    Electronically signed by:  Marcelline Merlin, PTA

## 2022-10-13 ENCOUNTER — HOSPITAL ENCOUNTER (OUTPATIENT)
Dept: PHYSICAL THERAPY | Age: 47
Setting detail: THERAPIES SERIES
Discharge: HOME | End: 2022-10-13
Payer: MEDICARE

## 2022-10-13 PROCEDURE — 97110 THERAPEUTIC EXERCISES: CPT

## 2022-10-13 PROCEDURE — 97112 NEUROMUSCULAR REEDUCATION: CPT

## 2022-10-13 NOTE — FLOWSHEET NOTE
[x] [facility]) [facility]  [address]  [phone]  F: [phone] [x] [address]  [facility]   [address]  P: [phone]  F: [phone] [] [address]  [address]  P: [phone]  F: [phone] [] [address]  P: [phone]  F: [phone] [] [address]  [facility]   [address]   [place]: [phone]  F: [phone]      Physical Therapy Daily Treatment    Date:  10/13/2022  Patient Name:  Lou Flores    :  1975  MRN: [id number]  Physician: RACHAEL Martins                           Insurance: Medicare ([address] [place] [address])  Medical Diagnosis: I63.9 (ICD-10-CM) - Cerebrovascular accident (CVA), unspecified mechanism ([facility] [address]  Rehab Codes: R26.89, R53.1, M54.59, M54.2, R29.3,   Next 's appt.: 8/10 with neuro  Date of symptom onset: 22   Visit# / total visits:  (additional visits approved 10/6)   Cancels/No Shows: 4/0 (Last 30 days)    Subjective:    Pain:  [] Yes  [x] No Location: N/A Pain Rating: (0-10 scale) 0/10  Pain altered Tx:  [x] No  [] Yes  Action:  Comments:  Pt  arrived early, thought he had OT appt. Has appt today with orthotist to get casted.      Objective:   Sensation: absent to light touch throughout B feet below ankle     Modalities: Precautions: Pt has dialysis 3x per week  Exercises:  BOLDED COMPLETED 10/13/2022:  Exercise RLE Reps/ Time Weight/ Level Comments   Nu step  6' Level 5                Standing       Gastroc stretch on incline 5x1 min  BUE assist   Resisted swing  2x10 Lime  Tapping 4\" step   Step up 2x10 4\" RLE   LLE marching 3x10  Focus on RLE stance control   LLE step taps 3x10 4\" step \" \"   LLE hip abd AROM 3x10  \" \"   Sit <> stand w/ RLE bias 3x8 2\" step under LLE Min A from therapist to ensure ant fwd weightshift, extension to stand   Resisted step over small round cone 2x10 Lime  With weight shift onto RLE   Heel taps to step  10x 4\"    Trunk/cervical rotation naming how many fingers clinician is holding  10xea  clinician standing behind pt    Picking up rings from steps  4x 4 rings;   6\" and 12\" step Wider JASIEL no UE; notes some irritation in LB; x2 leading with R UE, x2 leading with L UE and handing to clinician at multiple angles   Resisted swing phase fwd and retro amb on way back  5xea // bars;   Lime  Resisted fwd walk    Step over round cone  10xea // bars           Balance   In front of mat table, step stool for hand support   Posterior perturbations 2x10  No UE assist   Anterior perturbations 2x10     LLE marches 2x15  Unilat UE assist   Squats   No UE assist   Trunk rotation with ball 2x5 ea     Shoulder press with ball 10x     NBOS 3x1 min     NBOS with EC 2x30\"     NBOS on foam  2x30\"     NBOS with ball raises, rotation 2x30\"ea Volley  ball     Tandem stance 2x20\"     Cone  off ground 2x5 5 cones    Standing on foam with head turns  30\" ea  Up/down, rotation    Cone reach with LUE across body 2x5 5 cones    Cone reach with LUE to L side 2x5 5 cones    Heel taps 10x ea 4in With 1 hand for UE support   Dynamic march with retro hamstring curl 3L  No UE support   Side stepping 3L // bars  No UE-1 light UE support   360 turn 2x  Clockwise more challenging   Marches  2x20\"  No UE   Alt toe taps to foam  2x30\"  No UE                     FES modalities      Gait training with Bioness 50 min Lower and thigh cuff Initial set up  STS 2x10, LLE on 2\" step  Hip abd  Marches  Adjustments for thigh cuff more proximal  Gait analysis   Change in speed  4 sit down breaks   Xcite training 40 min R hams  R glutes  R quads  R PFs Set up in back room  STS program  STS w/ LLE on 2\" step: 2x10, min A    STS \"pause\" mode: Unilat UE assist  Step taps: 2x10, 4\" step  Hip abd/ext: 2x10 ea  Hip abd L sided - 10x   Balance without UE 2x30 sec  Reaching for ball touches 10x  Punching of pad 10x2  (Reaching&punches across body)      Xcite training ankle pumps 20 min 4 x 10 Set up in supine, head elevated  Ant/ tib placement adjusted 3x to execute DF and EV  Gastroc pads added on 3rd and 4th set                     PROM      Ankle DF PROM 3x1 min ea  By therapist   Ankle EV 5x2  AROM added 10.11   Heel slides 20x   Required stabilization of ankle   SLR 10x2     SAQ 10x2             Other:       Treatment Charges: Mins Units   []  Modalities     [x]  Ther Exercise 11 1   []  Manual Therapy     []  Ther Activities     []  Aquatics     []  Vasocompression     [x]  Other: Neuro re-ed 32 2   []  Other: Gait     Total Treatment time 43 3       Assessment: [x] Progressing toward goals. Focused on standing balance and ankle DF stretching this date - better ability to utilize hip strategies for balance when falling posteriorly, though does need mod Vcs to initiate this. Practiced identifying when COG is too far out and need to implement hip strategies when completing all balance tasks today - does improve with repetition. Ankles significantly supinated with lateral weightbearing - difficult to achieve subtalar neutral position when stretching, benefits from manual stretching from therapist to achieve this. [] No change. [] Other:    [x] Patient would continue to benefit from skilled physical therapy services in order to: address R hemibody weakness, improve gait mechanics and efficiency, and increase standing static/dynamic balance to allow improved household ADL/IADL completion and reduce fall risk.     STG: (to be met in 8 treatments) - Assessed by Roxanne Polk PT on 7/20:  ? Pain: No more than 4/10 pain reported in low back or neck post therapy sessions to indicate improving tolerance to increased activity and exercise - MET for low back or neck, but L shoulder is 2/10 - mobility in ankles to increase heel strike and reduce toe drag with prolonged ambulation. MET  ? Balance: At least 30/56 on Hernandez to indicate significant improvement in standing static/dynamic balance and progress towards reduced fall risk. 37/56 MET  Able to negotiate gait obstacles using rollator without increased instability or excessively slowed gait MET  ? Strength: At least 5-/5 grossly in R hip to allow further improved pelvic stability in standing and improved swing phase control/speed during prolonged gait MET  At least 5/5 B knee ext to prevent excessive buckling with prolonged standing Progress, some buckling still reported   ? Function:   Pt able to ambulate 600 ft with least restrictive device with appropriate RLE swing/stance phase timing, no evidence of toe drag, and no more than minimal fatigue by end of ambulation Progress  Pt able to ambulate 100 ft with intermittent turning, stopping, etc without assistive device and demonstrating good stability and no evidence of B knee buckling to indicate improving functional strength and safety with household ambulation distances for ADL/IADLs Not Met  No more than 14% impaired on ABC scale to indicate improving subjective balance confidence. Not Met      ABC score 41% impairment. New goals as of 10/6, to be met in next additional 12 visits, at total visit 28:        1. Hernandez score to at least 45/56 to indicate reduced fall risk, progressive balance improvement        2. Ability to complete RLE SLS tasks with no UE assist and no buckling/instability noted. 3. All bracing needs for RLE will have been met at this time to improve stability in ankle, reduce falls        4. No more than 20% impaired on ABC scale to indicate subjective improvement in balance confidence.          5. At least 650 ft with least restrictive device with appropriate RLE swing/stance phase timing, no toe drag        Patient goals: \"to strengthen right side back as much as I can\"    Pt. Education:  [x] Yes  [] No  [x] Reviewed Prior HEP/Ed  Method of Education: [x] Verbal  [x] Demo  [] Written   fall prevention 7/11/22  Comprehension of Education:  [x] Verbalizes understanding. [x] Demonstrates understanding. [] Needs review. [x] Demonstrates/verbalizes HEP/Ed previously given. Plan: [x] Continue current frequency toward long and short term goals.     [x] Specific Instructions for subsequent treatments:  balance training with Smiley Starch, joint stability        Time In: 10:10 am          Time Out: 11:00 am     Electronically signed by:  Elaine Juárez, PT

## 2022-10-18 ENCOUNTER — HOSPITAL ENCOUNTER (OUTPATIENT)
Dept: PHYSICAL THERAPY | Age: 47
Setting detail: THERAPIES SERIES
Discharge: HOME | End: 2022-10-18
Payer: MEDICARE

## 2022-10-18 ENCOUNTER — HOSPITAL ENCOUNTER (OUTPATIENT)
Dept: OCCUPATIONAL THERAPY | Age: 47
Setting detail: THERAPIES SERIES
Discharge: HOME | End: 2022-10-18
Payer: MEDICARE

## 2022-10-18 PROCEDURE — 97112 NEUROMUSCULAR REEDUCATION: CPT

## 2022-10-18 PROCEDURE — 97110 THERAPEUTIC EXERCISES: CPT

## 2022-10-18 NOTE — PROGRESS NOTES
[x] [facility]) [facility] &  Therapy  [address]  [phone]  F: [phone] [] [facility]  [address], [place]  Phone: [phone]  Fax: [phone]        Occupational Therapy Progress Note    Date: 10/18/2022      Patient: Lorene Obrien  : 1975  MRN: [id number]    Physician: DANIEL Galaviz-SHIMA                             Insurance: Medicare ([address] [place] [address]) - [facility]  Medical Diagnosis: I63.9 (ICD-10-CM) - Cerebrovascular accident (CVA), unspecified mechanism ([facility] [address]  Rehab Codes: R26.89, R53.1, M54.59, M54.2, R29.3,   Next 's Appt: TBD  Date of Onset: 22   Visit# / total visits: ; Progress note for Medicare patient due at visit 10  Cancels/No Shows:   Date range of services: 22 to 10/18/22    Subjective:  Pain:  [] Yes  [x] No  Location:  N/A  Pain Rating: (0-10 scale) 0/10 Pain altered Tx:  [x] No  [] Yes  Action: NA  Comments: Presents following significant delay in OT treatment. Reports difficulty with vision, states he has appt with optometrist next month. States he has glasses but didn't have them with him. Objective:  Tests/Measurements: Upper Extremity Functional Index  Current Functional Level:  58/80 (increase 31 points) functionally impaired as measured with the Upper Extremity Functional Index Survey. 0-80 scale, with 80 = no Deficits  (The UEFI model does not provide any specific cut off points that could classify the upper limb disability degree, however, a minimal detectable change of 9 points is provided. This means that for improvement or deterioration to be considered, between two subsequent evaluations, the scores must differ by at least 9 points.)              Shoulder and Elbow  Right AROM (degrees) Right Strength (MMT) Left AROM (degrees) Left Strength  (MMT)   Shoulder Flexion (180) 140 (NC) 4- WNL 4   Shoulder Extension (60) 58 (+18) NT WNL 4+ Shoulder Abduction (180) 145 (+28) 4- WNL 4-   Shoulder Adduction WFL NT WFL 4   Shoulder Internal Rotation (70) WFL 4- WFL 4-   External Rotation (80-90) 65 (NC) 4- WFL 4   Elbow Flexion WFL 4 WFL 4-   Elbow Extension WFL 4 WFL 4-                        Hand Strength Right   (pounds) Left   (pounds) COMMENTS    55 (+18) 45 (+10) No difficulty with digit placement to complete pinch measurements - IMPROVED    Lateral pinch 7 (+2) 7 (NC)    2 point pinch 4 (-2) 4 (NC)    3 jaw pinch 6 (-1)  7 (+2)          Coordination Right  Percentile Left  Percentile   9 Hole Peg Test 52 (faster by 9 seconds)    38 seconds     Box and Blocks 33 blocks in 1 minute (improved by 2)   38 blocks in 1 minute          Problems:      ROM, Strength, Function, and Coordination              Assessment: Presents for progress note following ~1 month break in OT treatment. Demonstrates improvements with RUE ROM, strength, coordination, and function since OT evaluation. Demonstrates wrist and hand ROM WFL, however some finger movement is delayed and uncoordinated. All scores with RUE MMT increased. Utilizes rollator for functional mobility. Hasn't had any falls recently. Fine and gross motor skills with R hand improved. Pt feels functional skills at home have become much easier. Pt to continue to benefit from skilled outpatient occupational therapy services to improve functional endurance, UB strength, and gross and fine motor skills.      Short Term Goals: (  8    Treatments)  Pt to increase RUE ROM for at least 2 planes by 10 degrees actively - MET  Increase gross UE strength  as evidenced by an increase in 1 grade for all planes bilaterally to improve safety and stability with functional transfers - MET   Increase bilateral  strength by 10  lbs to improve functional participation in ADL/IADL tasks - MET  Increase all pinch strengths bilaterally by 3 lbs to improve functional participation in ADL/IADL tasks - NOT MET  Increase function:UE Functional Index Score 5 or more points to promote increased functional abilities - MET  Patient to be independent with home exercise program as demonstrated by performance with correct form without cues. - MET  Increase R side FMC as evidenced by a decrease in 8 seconds  with the 9-hole peg test - MET  Increase coordination as evidenced by an increase in 5 blocks completed during the box and blocks assessment to indicate significant clinical improvement - NOT MET, 2 blocks         Long Term Goals: (  16  Treatments)  Pt to increase RUE ROM for at least 2 planes by 15+ degrees actively  Increase gross UE strength  as evidenced by an increase in 2 grades for all planes bilaterally to improve safety and stability with functional transfers  Increase bilateral  strength by 15  lbs to improve functional participation in ADL/IADL tasks  Increase all pinch strengths bilaterally by 5 lbs to improve functional participation in ADL/IADL tasks  Increase function:UE Functional Index Score 10 or more points to promote increased functional abilities  Increase R side [address] as evidenced by a decrease in 14 seconds  with the 9-hole peg test  Modified 10/18/22 - Increase coordination as evidenced by an increase in 5 blocks completed during the box and blocks  to indicate significant clinical improvement     Patient Goals: To get stronger and use R hand fully      Treatment Plan:   [x]  Therapeutic Exercise   28432              []  Iontophoresis: 4 mg/mL Dexamethasone Sodium Phosphate  mAmin  99919    [x]  Therapeutic Activity  02694  []  Vasopneumatic cold with compression  01406                []  ADL training  77390  []  Ultrasound        30497    [x]  Neuromuscular Re-education  53647  []  Electrical Stimulation Attended  16876    []  Manual Therapy  14584  Orthotic    []  Fit  20316     []  Train [phone]    [x]  Instruction in HEP        Prosthetic    []  Fit [id number]      []  Train 45719    []  Cognitive

## 2022-10-18 NOTE — FLOWSHEET NOTE
[x] [facility]) [facility]  [address]  [phone]  F: [phone] [x] [address]  [facility]   [address]  P: [phone]  F: [phone] [] [address]  [address]  P: [phone]  F: [phone] [] [address]  P: [phone]  F: [phone] [] [address]  [facility]   [address]   [place]: [phone]  F: [phone]      Physical Therapy Daily Treatment    Date:  10/18/2022  Patient Name:  Jamee Smiley    :  1975  MRN: [id number]  Physician: RACHAEL Silva                           Insurance: Medicare ([address] [place] [address])  Medical Diagnosis: I63.9 (ICD-10-CM) - Cerebrovascular accident (CVA), unspecified mechanism ([facility] [address]  Rehab Codes: R26.89, R53.1, M54.59, M54.2, R29.3,   Next 's appt.: 8/10 with neuro  Date of symptom onset: 22   Visit# / total visits:  (additional visits approved 10/6)   Cancels/No Shows: 4/0 (Last 30 days)    Subjective:    Pain:  [] Yes  [x] No Location: N/A Pain Rating: (0-10 scale) 0/10  Pain altered Tx:  [x] No  [] Yes  Action:  Comments:  Pt reports no pain in general.  Was casted for B AFO's. Follow up gio't early Nov at  [facility] [place] [facility]. Ankle if feeling better.        Objective:   Sensation: absent to light touch throughout B feet below ankle     Modalities: Precautions: Pt has dialysis 3x per week  Exercises:  BOLDED COMPLETED 10/18/2022:  Exercise RLE Reps/ Time Weight/ Level Comments   Nu step  6' Level 5                Standing       Gastroc stretch on incline 5x1 min  BUE assist   Resisted swing  2x10 Lime  Tapping 4\" step   Step up 2x10 4\" RLE   LLE marching 3x10  Focus on RLE stance control   LLE step taps 3x10 4\" step \" \"   LLE hip abd AROM 3x10  \" \"   Sit <> stand w/ RLE bias 3x8 2\" step under LLE Min A from therapist to ensure ant fwd weightshift, extension to stand   Resisted step over small round cone 2x10 Lime  With weight shift onto RLE   Heel taps to step  10x 4\"    Trunk/cervical rotation naming how many fingers clinician is holding  10xea  clinician standing behind pt    Picking up rings from steps  4x 4 rings;   6\" and 12\" step Wider JASIEL no UE; notes some irritation in LB; x2 leading with R UE, x2 leading with L UE and handing to clinician at multiple angles   Resisted swing phase fwd and retro amb on way back  5xea // bars;   Lime  Resisted fwd walk    Step over round cone  10xea // bars           Balance   In front of mat table, step stool for hand support   Posterior perturbations 2x10  No UE assist   Anterior perturbations 2x10     Retro step outs 10x ea  Started with slow step backs  Cues for quicker steps on L only   Hurricane sway 3X30\"  Ankle strategy skill  Pt self leaning in all planes -   DF/PF/EV/IV/diagonal    LLE marches 2x15  Unilat UE assist   Squats 1x6  1x10   1 lb cane No UE assist  2nd set with bringing 1 lb cane fwd tapping on // bar  Limited with back pain   Trunk rotation with ball 2x10 ea 7\" ball    Shoulder press with ball 10x2 7\" ball    NBOS 3x1 min     NBOS with EC 2x30\"     NBOS on foam  2x30\"  Cues for ankle strategies   NBOS with ball raises, rotation 2x30\"ea Volley  ball     Tandem stance 2x20\"     Cone  off ground 2x5 5 cones    Standing on foam with head turns  30\" ea  Up/down, rotation    Cone reach with LUE across body 2x5 5 cones    Cone reach with LUE to L side 2x5 5 cones    Heel taps 10x ea 4in With 1 hand for UE support   Dynamic march with retro hamstring curl 3L  No UE support   Side stepping 3L // bars  No UE-1 light UE support   360 turn 2x  Clockwise more challenging   Marches  2x20\"  No UE   Alt toe taps to foam  2x30\"  No UE                     FES modalities      Gait training with Bioness 50 min Lower and thigh cuff Initial set up  STS 2x10, LLE on 2\" step  Hip abd  Marches  Adjustments for thigh cuff more proximal  Gait analysis   Change in speed  4 sit down breaks   Xcite training 40 min R hams  R glutes  R quads  R PFs Set up in back room  STS program  STS w/ LLE on 2\" step: 2x10, min A    STS \"pause\" mode: Unilat UE assist  Step taps: 2x10, 4\" step  Hip abd/ext: 2x10 ea  Hip abd L sided - 10x   Balance without UE 2x30 sec  Reaching for ball touches 10x  Punching of pad 10x2  (Reaching&punches across body)      Xcite training ankle pumps 20 min 4 x 10 Set up in supine, head elevated  Ant/ tib placement adjusted 3x to execute DF and EV  Gastroc pads added on 3rd and 4th set                     PROM      Ankle DF PROM 3x1 min ea  By therapist   Ankle EV 5x2  AROM added 10.11   Heel slides 20x   Required stabilization of ankle   SLR 10x2     SAQ 10x2             Other:       Treatment Charges: Mins Units   []  Modalities     [x]  Ther Exercise 10 1   []  Manual Therapy     []  Ther Activities     []  Aquatics     []  Vasocompression     [x]  Other: Neuro re-ed 34 2   []  Other: Gait     Total Treatment time 44 3       Assessment: [x] Progressing toward goals. Added weighted ball with trunk rotation with cues for visual focus during head turning to decrease dizziness. Progressed NBOS with compliant surface with recurrent retro lean. Cues for shifting weight from heels to 'toes' without compensating at the hips. Additional time spend with ankle strategies in all plane, including diagonal, with hurricane sway training. Patient demonstrate good carry over into pertubation's with unannounced from multidirectional with anticipatory control. Difficultly noted with squats with pushing hips out instead of squatting down. Added cane touches forward to hit // bar for anterior lean with poor postural control. [] No change.        [] Other:    [x] Patient would continue to benefit from skilled physical therapy services in order to: address R hemibody weakness, improve gait mechanics and efficiency, and increase standing static/dynamic balance to allow improved household ADL/IADL completion and reduce fall risk. STG: (to be met in 8 treatments) - Assessed by Sanket Villalpando PT on 7/20:  ? Pain: No more than 4/10 pain reported in low back or neck post therapy sessions to indicate improving tolerance to increased activity and exercise - MET for low back or neck, but L shoulder is 2/10 - newer pain over the past 3 weeks. ? ROM: at least 0deg DF PROM with knee extended to indicate improving mobility in ankles to increase heel strike and reduce toe drag with prolonged ambulation - NOT MET, lacking 10 deg from neutral in L ankle, lacking 3 deg from neutral in R ankle  ? Balance:   Pt able to fully turn to look behind himself without instability in either direction to indicate improving postural stability - NOT MET, LOB with full turn to R and L  Pt able to complete standing reaching tasks without UE assist and no more than mild instability to indicate improving dynamic balance for safety with household IADLs - NOT MET, primarily needs to be supported at trunk on counter or holding onto something  ? Strength: At least 4+/5 grossly in R hip to allow improved pelvic stability in standing and improved swing phase control/speed during prolonged gait - NOT MET, 4-/5 grossly in R hip  Pt able to utilize RUE for fine grasp and release tasks with no more than minimally slowed speed evident - Ongoing, better fine grasp but still limited with heavier objects, twisting lid  ?  Function: Pt able to ambulate 450 ft with least restrictive device with no evidence of lagging RLE swing or abnormal stance time noted, with no more than 11 on RPE scale - NOT MET, achieves ~400 ft before needing to rest d/t RLE instability and R knee buckling, rates 13 RPE  Patient to be independent with home exercise program as demonstrated by performance with correct form without cues. - Ongoing   Demonstrate Knowledge of fall prevention - MET     LTG: (to be met in 16 treatments) - 8/22: Will continue with current goals, adjusting at visit 16 as needed d/t recent CVA on 8/8, Assessed by Ellie Kaplan PT on 10/4   ? Pain: No more than 2/10 pain reported in low back or neck post therapy sessions to indicate improving tolerance to increased activity and exercise MET  ? ROM: at least 5 deg DF PROM with knee extended to indicate improving mobility in ankles to increase heel strike and reduce toe drag with prolonged ambulation. MET  ? Balance: At least 30/56 on Hernandez to indicate significant improvement in standing static/dynamic balance and progress towards reduced fall risk. 37/56 MET  Able to negotiate gait obstacles using rollator without increased instability or excessively slowed gait MET  ? Strength: At least 5-/5 grossly in R hip to allow further improved pelvic stability in standing and improved swing phase control/speed during prolonged gait MET  At least 5/5 B knee ext to prevent excessive buckling with prolonged standing Progress, some buckling still reported   ? Function:   Pt able to ambulate 600 ft with least restrictive device with appropriate RLE swing/stance phase timing, no evidence of toe drag, and no more than minimal fatigue by end of ambulation Progress  Pt able to ambulate 100 ft with intermittent turning, stopping, etc without assistive device and demonstrating good stability and no evidence of B knee buckling to indicate improving functional strength and safety with household ambulation distances for ADL/IADLs Not Met  No more than 14% impaired on ABC scale to indicate improving subjective balance confidence. Not Met      ABC score 41% impairment. New goals as of 10/6, to be met in next additional 12 visits, at total visit 28:        1.  Hernandez score to at least 45/56 to indicate reduced fall risk, progressive balance improvement        2. Ability to complete RLE SLS tasks with no UE assist and no buckling/instability noted. 3. All bracing needs for RLE will have been met at this time to improve stability in ankle, reduce falls        4. No more than 20% impaired on ABC scale to indicate subjective improvement in balance confidence. 5. At least 650 ft with least restrictive device with appropriate RLE swing/stance phase timing, no toe drag        Patient goals: \"to strengthen right side back as much as I can\"    Pt. Education:  [x] Yes  [] No  [x] Reviewed Prior HEP/Ed  Method of Education: [x] Verbal  [x] Demo  [] Written   fall prevention 7/11/22  Comprehension of Education:  [x] Verbalizes understanding. [x] Demonstrates understanding. [] Needs review. [x] Demonstrates/verbalizes HEP/Ed previously given. Plan: [x] Continue current frequency toward long and short term goals.     [x] Specific Instructions for subsequent treatments:  balance training with Xcite, joint stability, increase ankle ROM, quick step outs        Time In: 09:00 am          Time Out: 09:50 am     Electronically signed by:  Marcelline Merlin, PTA

## 2022-10-18 NOTE — FLOWSHEET NOTE
[x] [facility]       Occupational Therapy            1st floor       [address], [place]         Phone: [phone]       Fax: [phone] [] [facility]  Occupational Therapy  [address], [place]  Phone: [phone]  Fax: [phone]      Occupational Therapy Daily Treatment Note    Date:  10/18/2022  Patient Name:  Oren Sport    :  1975  MRN: [id number]  Physician: DANIEL Fuller-SHIMA                             Insurance: Medicare ([address] [place] [address]) - [facility]  Medical Diagnosis: I63.9 (ICD-10-CM) - Cerebrovascular accident (CVA), unspecified mechanism ([facility] [address]  Rehab Codes: R26.89, R53.1, M54.59, M54.2, R29.3,   Next 's Appt: TBD  Date of Onset: 22   Visit# / total visits: ; Progress note for Medicare patient due at visit 10  Cancels/No Shows: 2/0      Subjective:    Pain:  [x] Yes  [] No Location: R ankle  Pain Rating: (0-10 scale) 0/10  Pain altered Tx:  [x] No  [] Yes  Action: NA  Pt Comments: Presents following significant delay in OT treatment. Reports difficulty with vision, states he has appt with optometrist next month. States he has glasses but didn't have them with him. Precautions: none  Surgery procedure and date: NA    Objective:   Today's Treatment:  Modalities: NA  Exercises:  EXERCISE    REPS/     TIME  WEIGHT/    LEVEL COMMENTS   Scapular Exercises with Mirror 10 reps  1 pound Not Completed, Issued as part of HEP   Digit AROM Exercises 10 reps each  Not Completed, Issued as part of HEP   Foam Cube /Pinch 20 reps each green Not completed    Velcro Pegboard 1 series  Not Completed   Clothespin Foam Cube Pinch 1 series green Completed   Large Pegboard, Crossing Midline 3 rows  Not Completed   Digi-Flex 20 reps (1 digit at a time) red (3 pounds) Not Completed   Hand Exercise 25 reps 40 pounds Not Completed   Pebble Grasp and Release 1 series  Not Completed   [place] Translation- Crossing Midline 1 series  Not Completed   Weighted Bar Bicep Curls/Chest Press/Shoulder Press 20 reps each 3 pounds Increased, Completed    Grooved Pegboard 12 pegs   Reps increased, Completed    Other: NA      Assessment: [x] Progressing toward goals. Measurements completed for progress note. See progress note for details. Moderate difficulty completing grooved peg board activity. Increased time needed to complete resistive pinch pin activity. Utilizes a modified  on pinch pin to complete activity. [] No change. [] Other     [x] Patient would continue to benefit from skilled occupational therapy services in order to: Improve  functional /grasp, Motor control/, ROM, Strength, and Activity tolerance in order to ensure good follow through and improve functional use of UE in ADL performance    Short Term Goals: (  8    Treatments)  Pt to increase RUE ROM for at least 2 planes by 10 degrees actively - MET  Increase gross UE strength  as evidenced by an increase in 1 grade for all planes bilaterally to improve safety and stability with functional transfers - MET   Increase bilateral  strength by 10  lbs to improve functional participation in ADL/IADL tasks - MET  Increase all pinch strengths bilaterally by 3 lbs to improve functional participation in ADL/IADL tasks - NOT MET  Increase function:UE Functional Index Score 5 or more points to promote increased functional abilities - MET  Patient to be independent with home exercise program as demonstrated by performance with correct form without cues.  - MET  Increase R side FMC as evidenced by a decrease in 8 seconds  with the 9-hole peg test - MET  Increase coordination as evidenced by an increase in 5 blocks completed during the box and blocks assessment to indicate significant clinical improvement - NOT MET, 2 blocks         Long Term Goals: (  16  Treatments)  Pt to increase RUE ROM for at least 2 planes by 15+ degrees actively  Increase gross UE strength  as evidenced by an increase in 2 grades for all planes bilaterally to improve safety and stability with functional transfers  Increase bilateral  strength by 15  lbs to improve functional participation in ADL/IADL tasks  Increase all pinch strengths bilaterally by 5 lbs to improve functional participation in ADL/IADL tasks  Increase function:UE Functional Index Score 10 or more points to promote increased functional abilities  Increase R side [address] as evidenced by a decrease in 14 seconds  with the 9-hole peg test  Modified 10/18/22 - Increase coordination as evidenced by an increase in 5 blocks completed during the box and blocks  to indicate significant clinical improvement     Patient Goals: To get stronger and use R hand fully         Pt. Education:  [] Yes  [x] No  [] Reviewed Prior HEP/Ed  Method of Education: [] Verbal  [] Demo  [] Written  Re:   Comprehension of Education:  [] Verbalizes understanding. [] Demonstrates understanding. [] Needs review. [] Demonstrates/verbalizes HEP/Ed previously given. Plan: [x] Continue current frequency toward short and long term goals.   [x] Specific Instructions for subsequent treatments: fine motor coordination, UB strength    [] Other:       Time In: 1004  Time Out: 1059        Treatment Charges: Mins Units   []  Modalities:      []  Ultrasound     [x]  Ther Exercise 55 4   []  Manual Therapy     []  Ther Activities     []  Orthotic fit/train     []  Orthotic recheck     []  Other     Total Treatment time 55 4        Medicare Cap   [] Physical Therapy   [] Speech Therapy      [x] Occupational therapy  *PT and Speech caps combine                                                                                  $2150 Limit for PT and Speech combined  $2150 Limit for OT individually  At the beginning of the month where you expect to go over $2150, please add the Feliz Brown modifier       Patient Name: Mary Pam: 1975     Note:  This is an estimate of charges billed.               Date of [address] Name # units/ charge $$$ charge Daily Total Charge Ongoing Total $$$   8/31/22 OT Eval Mod   TE 1  1 95.15  22.84 118.99 118.99   9/7/22 TE 3 29.21  22.84  22.84 74.89 193.88   9/13/22 TE 3  29.21  22.84  22.84 74.89 268.77   9/15/22 TE 3 29.21  22.84  22.84  74.89  343.66   9/22/22  TE 3  29.21  22.84  22.84  74.89  418.55    9/27/22  TE  3 29.21  22.84  22.84 74.89  493.44   10/18/22   TE 4  29.21+22.84+22.84+22.84  97.73  591.17                                               Electronically signed by:  ANDREA Herrera/JOHN

## 2022-10-20 ENCOUNTER — HOSPITAL ENCOUNTER (OUTPATIENT)
Dept: PHYSICAL THERAPY | Age: 47
Setting detail: THERAPIES SERIES
Discharge: HOME | End: 2022-10-20
Payer: MEDICARE

## 2022-10-20 ENCOUNTER — APPOINTMENT (OUTPATIENT)
Dept: OCCUPATIONAL THERAPY | Age: 47
End: 2022-10-20
Payer: MEDICARE

## 2022-10-20 PROCEDURE — 97112 NEUROMUSCULAR REEDUCATION: CPT

## 2022-10-20 PROCEDURE — 97110 THERAPEUTIC EXERCISES: CPT

## 2022-10-20 NOTE — FLOWSHEET NOTE
[x] [facility]) [facility] &  Therapy  [address]  [phone]  F: [phone] [x] [address]  [facility] [facility]   [address]  P: [phone]  F: [phone] [] [address]  [address]  P: [phone]  F: [phone] [] [address]  P: [phone]  F: [phone] [] [address]  [facility]   [address]   [place]: [phone]  F: [phone]      Physical Therapy Daily Treatment    Date:  10/20/2022  Patient Name:  Jessie Lucas    :  1975  MRN: [id number]  Physician: RACHAEL Ford                           Insurance: Medicare ([address] [place] [address])  Medical Diagnosis: I63.9 (ICD-10-CM) - Cerebrovascular accident (CVA), unspecified mechanism ([facility] [address]  Rehab Codes: R26.89, R53.1, M54.59, M54.2, R29.3,   Next 's appt.: 8/10 with neuro  Date of symptom onset: 22   Visit# / total visits:  (additional visits approved 10/6)   Cancels/No Shows: 4/0 (Last 30 days)    Subjective:    Pain:  [] Yes  [x] No Location: N/A Pain Rating: (0-10 scale) 0/10  Pain altered Tx:  [x] No  [] Yes  Action:  Comments:  Patient arrives reporting no new pain. Has chronic pain but is kind of getting use to it. No falls and ankle is feeling stronger.      Objective:   Sensation: absent to light touch throughout B feet below ankle     Modalities: Precautions: Pt has dialysis 3x per week  Exercises:  BOLDED COMPLETED 10/20/2022:  Exercise RLE Reps/ Time Weight/ Level Comments   Nu step  6' Level 5                Standing       Gastroc stretch on incline 5x1 min  BUE assist   Resisted swing  2x10 Lime  Tapping 4\" step   Step up 2x10 4\" RLE   LLE marching 3x10  Focus on RLE stance control   LLE step taps with resistance 3x10 4\" step Blue TB   LLE hip abd AROM 3x10  \" \"   Sit <> stand w/ RLE bias 3x8 2\" step under LLE Min A from therapist to ensure ant fwd weightshift, extension to stand   Resistive STS 10x Blue Added 10.20  Terminal extension  Mod UE pull   Resisted step over small round cone 2x10 Lime  With weight shift onto RLE   Heel taps to step  10x 4\"    Trunk/cervical rotation naming how many fingers clinician is holding  10xea  clinician standing behind pt    Picking up rings from steps  4x 4 rings;   6\" and 12\" step Wider JASIEL no UE; notes some irritation in LB; x2 leading with R UE, x2 leading with L UE and handing to clinician at multiple angles   Resisted swing phase fwd and retro amb on way back  5xea // bars;   Lime  Resisted fwd walk    Step over round cone  10xea // bars     TKE 20x Blue Added 10.20               Balance   In front of mat table, step stool for hand support   Posterior perturbations 2x10  No UE assist   Anterior perturbations 2x10     Retro step outs 10x ea  Started with slow step backs  Cues for quicker steps on L only   Hurricane sway 3X30\"  Ankle strategy skill  Pt self leaning in all planes -   DF/PF/EV/IV/diagonal    LLE marches 2x15  Unilat UE assist   Squats 1x6  1x10   1 lb cane No UE assist  2nd set with bringing 1 lb cane fwd tapping on // bar  Limited with back pain   Trunk rotation with ball 2x10 ea 7\" ball    Shoulder press with ball 10x2 7\" ball    Roll ball up on wall 10x Green Good ankle strategy   NBOS 3x1 min     NBOS with EC 2x30\"     NBOS on foam  2x30\"  Cues for ankle strategies   NBOS with ball raises, rotation 2x30\"ea Volley  ball     Tandem stance 2x20\"     Cone  off ground 2x5 5 cones    Standing on foam with head turns  30\" ea  Up/down, rotation    Cone reach with LUE across body 2x5 5 cones    Cone reach with LUE to L side 2x5 5 cones    Heel taps 10x ea 4in With 1 hand for UE support   Dynamic march with retro hamstring curl 3L  No UE support   Side stepping 3L // bars  No UE-1 light UE support   360 turn 2x  Clockwise more challenging   Marches  2x20\"  No UE   Alt toe taps to foam  2x30\"  No UE                     FES modalities      Gait training with Bioness 50 min Lower and thigh cuff Initial set up  STS 2x10, LLE on 2\" step  Hip abd  Marches  Adjustments for thigh cuff more proximal  Gait analysis   Change in speed  4 sit down breaks   Xcite training 40 min R hams  R glutes  R quads  R PFs Set up in back room  STS program  STS w/ LLE on 2\" step: 2x10, min A    STS \"pause\" mode: Unilat UE assist  Step taps: 2x10, 4\" step  Hip abd/ext: 2x10 ea  Hip abd L sided - 10x   Balance without UE 2x30 sec  Reaching for ball touches 10x  Punching of pad 10x2  (Reaching&punches across body)      Xcite training ankle pumps 20 min 4 x 10 Set up in supine, head elevated  Ant/ tib placement adjusted 3x to execute DF and EV  Gastroc pads added on 3rd and 4th set                     PROM      Ankle DF PROM 5x1 min ea  By therapist  Doffed AFO   Ankle pronation 10x     Heel slides 20x   Required stabilization of ankle   SLR 10x2     SAQ 10x2             Other:       Treatment Charges: Mins Units   []  Modalities     [x]  Ther Exercise 24 2   [x]  Manual Therapy 5 0   []  Ther Activities     []  Aquatics     []  Vasocompression     [x]  Other: Neuro re-ed 20 1   []  Other: Gait     Total Treatment time 49 3       Assessment: [x] Progressing toward goals. Started with high resistance SciFit and standing hip abd to improve standing tolerance. Added resistive STS with thera bands from chair to  // bars for terminal hip extension. Fatigued quickly and required rest break. Toe taps to box completed with resistive bands for hip flexor strength to improve swing thru phase. TKE added for control with cues for soft knee with good carry over on R. Standing balance with improved ankle strategy with no posterior sway. Ended with manual ankle PROM and active pronation. [] No change.        [] Other:    [x] Patient would continue to benefit from skilled physical therapy services in order to: address R hemibody weakness, improve gait mechanics and efficiency, and increase standing static/dynamic balance to allow improved household ADL/IADL completion and reduce fall risk. STG: (to be met in 8 treatments) - Assessed by Veronique Oviedo PT on 7/20:  ? Pain: No more than 4/10 pain reported in low back or neck post therapy sessions to indicate improving tolerance to increased activity and exercise - MET for low back or neck, but L shoulder is 2/10 - newer pain over the past 3 weeks. ? ROM: at least 0deg DF PROM with knee extended to indicate improving mobility in ankles to increase heel strike and reduce toe drag with prolonged ambulation - NOT MET, lacking 10 deg from neutral in L ankle, lacking 3 deg from neutral in R ankle  ? Balance:   Pt able to fully turn to look behind himself without instability in either direction to indicate improving postural stability - NOT MET, LOB with full turn to R and L  Pt able to complete standing reaching tasks without UE assist and no more than mild instability to indicate improving dynamic balance for safety with household IADLs - NOT MET, primarily needs to be supported at trunk on counter or holding onto something  ? Strength: At least 4+/5 grossly in R hip to allow improved pelvic stability in standing and improved swing phase control/speed during prolonged gait - NOT MET, 4-/5 grossly in R hip  Pt able to utilize RUE for fine grasp and release tasks with no more than minimally slowed speed evident - Ongoing, better fine grasp but still limited with heavier objects, twisting lid  ?  Function: Pt able to ambulate 450 ft with least restrictive device with no evidence of lagging RLE swing or abnormal stance time noted, with no more than 11 on RPE scale - NOT MET, achieves ~400 ft before needing to rest d/t RLE instability and R knee buckling, rates 13 RPE  Patient to be independent with home exercise program as demonstrated by performance with correct form without cues. - Ongoing   Demonstrate Knowledge of fall prevention - MET     LTG: (to be met in 16 treatments) - 8/22: Will continue with current goals, adjusting at visit 16 as needed d/t recent CVA on 8/8, Assessed by Dayna Bello, PT on 10/4   ? Pain: No more than 2/10 pain reported in low back or neck post therapy sessions to indicate improving tolerance to increased activity and exercise MET  ? ROM: at least 5 deg DF PROM with knee extended to indicate improving mobility in ankles to increase heel strike and reduce toe drag with prolonged ambulation. MET  ? Balance: At least 30/56 on Hernandez to indicate significant improvement in standing static/dynamic balance and progress towards reduced fall risk. 37/56 MET  Able to negotiate gait obstacles using rollator without increased instability or excessively slowed gait MET  ? Strength: At least 5-/5 grossly in R hip to allow further improved pelvic stability in standing and improved swing phase control/speed during prolonged gait MET  At least 5/5 B knee ext to prevent excessive buckling with prolonged standing Progress, some buckling still reported   ? Function:   Pt able to ambulate 600 ft with least restrictive device with appropriate RLE swing/stance phase timing, no evidence of toe drag, and no more than minimal fatigue by end of ambulation Progress  Pt able to ambulate 100 ft with intermittent turning, stopping, etc without assistive device and demonstrating good stability and no evidence of B knee buckling to indicate improving functional strength and safety with household ambulation distances for ADL/IADLs Not Met  No more than 14% impaired on ABC scale to indicate improving subjective balance confidence. Not Met      ABC score 41% impairment. New goals as of 10/6, to be met in next additional 12 visits, at total visit 28:        1.  Hernandez score to at least 45/56 to indicate reduced fall risk, progressive balance improvement        2. Ability to complete RLE SLS tasks with no UE assist and no buckling/instability noted. 3. All bracing needs for RLE will have been met at this time to improve stability in ankle, reduce falls        4. No more than 20% impaired on ABC scale to indicate subjective improvement in balance confidence. 5. At least 650 ft with least restrictive device with appropriate RLE swing/stance phase timing, no toe drag        Patient goals: \"to strengthen right side back as much as I can\"    Pt. Education:  [x] Yes  [] No  [x] Reviewed Prior HEP/Ed  Method of Education: [x] Verbal  [x] Demo  [] Written   fall prevention 7/11/22  Comprehension of Education:  [x] Verbalizes understanding. [x] Demonstrates understanding. [] Needs review. [x] Demonstrates/verbalizes HEP/Ed previously given. Plan: [x] Continue current frequency toward long and short term goals.     [x] Specific Instructions for subsequent treatments:  balance training with Xcite, joint stability, increase ankle ROM, quick step outs        Time In: 01:00 pm          Time Out: 01:54 pm     Electronically signed by:  Delbert White PTA

## 2022-10-25 ENCOUNTER — APPOINTMENT (OUTPATIENT)
Dept: OCCUPATIONAL THERAPY | Age: 47
End: 2022-10-25
Payer: MEDICARE

## 2022-10-25 ENCOUNTER — HOSPITAL ENCOUNTER (OUTPATIENT)
Dept: PHYSICAL THERAPY | Age: 47
Setting detail: THERAPIES SERIES
Discharge: HOME | End: 2022-10-25
Payer: MEDICARE

## 2022-10-25 PROCEDURE — 97110 THERAPEUTIC EXERCISES: CPT

## 2022-10-25 PROCEDURE — 97112 NEUROMUSCULAR REEDUCATION: CPT

## 2022-10-25 NOTE — FLOWSHEET NOTE
[x] [address]  [address]  [phone]  F: [phone] [x] [address]  [facility]   [address]  P: [phone]  F: [phone] [] [address]  [address] [facility]  P: [phone]  F: [phone] [] [address]  P: [phone]  F: [phone] [] [address]  [facility]   [address]   [place]: [phone]  F: [phone]      Physical Therapy Daily Treatment    Date:  10/25/2022  Patient Name:  Pepito Mejía    :  1975  MRN: [id number]  Physician: RACHAEL Luong                           Insurance: Medicare ([address] [place] [address])  Medical Diagnosis: I63.9 (ICD-10-CM) - Cerebrovascular accident (CVA), unspecified mechanism ([facility] [address]  Rehab Codes: R26.89, R53.1, M54.59, M54.2, R29.3,   Next 's appt.: 8/10 with neuro  Date of symptom onset: 22   Visit# / total visits:  (additional visits approved 10/6)   Cancels/No Shows: 4/0 (Last 30 days)    Subjective:    Pain:  [] Yes  [x] No Location: N/A Pain Rating: (0-10 scale) 0/10  Pain altered Tx:  [x] No  [] Yes  Action:  Comments:  Patient reports he had a rough day at dialysis yesterday. Very tired but only got 1.5 hrs of sleep last night.       Objective:   Sensation: absent to light touch throughout B feet below ankle     Modalities: Precautions: Pt has dialysis 3x per week  Exercises:  BOLDED COMPLETED 10/25/2022:  Exercise RLE Reps/ Time Weight/ Level Comments   Nu step  6' Level 5                Standing       Gastroc stretch on incline 5x1 min  BUE assist   Resisted swing  2x10 Lime  Tapping 4\" step   Step up 2x10 4\" RLE   LLE marching 3x10  Focus on RLE stance control   LLE step taps with resistance 3x10 4\" step Blue TB   LLE hip abd AROM 3x10  \" \"   Sit <> stand w/ RLE bias 3x8 2\" step under LLE Min A from therapist to ensure ant fwd weightshift, extension to stand   Resistive STS 10x Blue Added 10.20  Terminal extension  Mod UE pull   Resisted step over small round cone 2x10 Lime  With weight shift onto RLE   Heel taps to step  10x 4\"    Trunk/cervical rotation naming how many fingers clinician is holding  10xea  clinician standing behind pt    Picking up rings from steps  4x 4 rings;   6\" and 12\" step Wider JASIEL no UE; notes some irritation in LB; x2 leading with R UE, x2 leading with L UE and handing to clinician at multiple angles   Resisted swing phase fwd and retro amb on way back  5xea // bars;   Lime  Resisted fwd walk    Step over round cone  10xea // bars     TKE 20x Blue Added 10.20               Balance   In front of mat table, step stool for hand support   Posterior perturbations 2x10  No UE assist   Anterior perturbations 2x10     Fwd step outs 1x8  Added 10.25  Anterior lean w/ quick step out   Retro step outs 10x ea  Started with slow step backs  Cues for quicker steps on L only   Hurricane sway 3X30\"  Ankle strategy skill  Pt self leaning in all planes -   DF/PF/EV/IV/diagonal    LLE marches 2x15  Unilat UE assist   Squats 1x6  1x10   1 lb cane No UE assist  2nd set with bringing 1 lb cane fwd tapping on // bar  Limited with back pain   Trunk rotation with ball 2x10 ea Volleyball Regressed from weighted ball 10.25   Shoulder press with ball 10x2 7\" ball    Roll ball up on wall 10x Green Good ankle strategy   NBOS 3x1 min     NBOS with EC 2x30\"     NBOS on foam  2x30\"  Cues for ankle strategies   NBOS with ball raises, rotation 2x30\"ea Volley  ball     Tandem stance 2x20\"     Cone  off ground 2x5 5 cones    Standing on foam with head turns  30\" ea  Up/down, rotation    Cone reach with LUE across body 2x5 5 cones    Cone reach with LUE to L side 2x5 5 cones    Heel taps 10x ea 4in With 1 hand for UE support  Poor tolerance of No UE supports w/ 2 LOB with lateral sway   Dynamic march with retro hamstring curl 3L  No UE support   Side stepping 3L // bars  No UE-1 light UE support   360 turn 2x  Clockwise more challenging   Marches  2x20\"  No UE   Alt toe taps to foam  2x30\"  No UE                     FES modalities      Gait training with Bioness 50 min Lower and thigh cuff Initial set up  STS 2x10, LLE on 2\" step  Hip abd  Marches  Adjustments for thigh cuff more proximal  Gait analysis   Change in speed  4 sit down breaks   Xcite training 40 min R hams  R glutes  R quads  R PFs Set up in back room  STS program  STS w/ LLE on 2\" step: 2x10, min A    STS \"pause\" mode: Unilat UE assist  Step taps: 2x10, 4\" step  Hip abd/ext: 2x10 ea  Hip abd L sided - 10x   Balance without UE 2x30 sec  Reaching for ball touches 10x  Punching of pad 10x2  (Reaching&punches across body)      Xcite training ankle pumps 20 min 4 x 10 Set up in supine, head elevated  Ant/ tib placement adjusted 3x to execute DF and EV  Gastroc pads added on 3rd and 4th set                     PROM      Ankle DF PROM 5x1 min ea  By therapist  Doffed AFO   Ankle pronation 10x     Heel slides 20x   Required stabilization of ankle   SLR 10x2     SAQ 10x2             Other:       Treatment Charges: Mins Units   []  Modalities     [x]  Ther Exercise 10 1   []  Manual Therapy     []  Ther Activities     []  Aquatics     []  Vasocompression     [x]  Other: Neuro re-ed 35 2   []  Other: Gait     Total Treatment time 45 3       Assessment: [x] Progressing toward goals. Continued to focus on stepping strategies with addition of stepping fwd past limits of stability. Cues for anterior lean of the hips and not the chest to improve ankle strategies. Poor trunk control and LE weakness while performing toe taps without UE support due to lateral lean with 2 LOB. Pt was able to perform with CGA while using 1 UE support on bar.   Good visual tracking noted with rolling ball on wall with standing in NBOS. Good carry over from previous sessions with little to no posterior lean. Ended with PROM of R ankle, doffed AFO. Noted pt rest in supine with R hip in ER with shin in forward position. [] No change. [] Other:    [x] Patient would continue to benefit from skilled physical therapy services in order to: address R hemibody weakness, improve gait mechanics and efficiency, and increase standing static/dynamic balance to allow improved household ADL/IADL completion and reduce fall risk. STG: (to be met in 8 treatments) - Assessed by Binu Gutierrez PT on 7/20:  ? Pain: No more than 4/10 pain reported in low back or neck post therapy sessions to indicate improving tolerance to increased activity and exercise - MET for low back or neck, but L shoulder is 2/10 - newer pain over the past 3 weeks. ? ROM: at least 0deg DF PROM with knee extended to indicate improving mobility in ankles to increase heel strike and reduce toe drag with prolonged ambulation - NOT MET, lacking 10 deg from neutral in L ankle, lacking 3 deg from neutral in R ankle  ? Balance:   Pt able to fully turn to look behind himself without instability in either direction to indicate improving postural stability - NOT MET, LOB with full turn to R and L  Pt able to complete standing reaching tasks without UE assist and no more than mild instability to indicate improving dynamic balance for safety with household IADLs - NOT MET, primarily needs to be supported at trunk on counter or holding onto something  ? Strength: At least 4+/5 grossly in R hip to allow improved pelvic stability in standing and improved swing phase control/speed during prolonged gait - NOT MET, 4-/5 grossly in R hip  Pt able to utilize RUE for fine grasp and release tasks with no more than minimally slowed speed evident - Ongoing, better fine grasp but still limited with heavier objects, twisting lid  ?  Function: Pt able to ambulate 450 ft with least impaired on ABC scale to indicate improving subjective balance confidence. Not Met      ABC score 41% impairment. New goals as of 10/6, to be met in next additional 12 visits, at total visit 28:        1. Hernandez score to at least 45/56 to indicate reduced fall risk, progressive balance improvement        2. Ability to complete RLE SLS tasks with no UE assist and no buckling/instability noted. 3. All bracing needs for RLE will have been met at this time to improve stability in ankle, reduce falls        4. No more than 20% impaired on ABC scale to indicate subjective improvement in balance confidence. 5. At least 650 ft with least restrictive device with appropriate RLE swing/stance phase timing, no toe drag        Patient goals: \"to strengthen right side back as much as I can\"    Pt. Education:  [x] Yes  [] No  [x] Reviewed Prior HEP/Ed  Method of Education: [x] Verbal  [x] Demo  [] Written   fall prevention 7/11/22  Comprehension of Education:  [x] Verbalizes understanding. [x] Demonstrates understanding. [] Needs review. [x] Demonstrates/verbalizes HEP/Ed previously given. Plan: [x] Continue current frequency toward long and short term goals.     [x] Specific Instructions for subsequent treatments:  balance training with Xcite, joint stability, increase ankle ROM, quick step outs        Time In: 10:00 am          Time Out: 10:50 am     Electronically signed by:  Marcelline Merlin, PTA

## 2022-10-27 ENCOUNTER — HOSPITAL ENCOUNTER (OUTPATIENT)
Dept: OCCUPATIONAL THERAPY | Age: 47
Setting detail: THERAPIES SERIES
Discharge: HOME | End: 2022-10-27
Payer: MEDICARE

## 2022-10-27 ENCOUNTER — APPOINTMENT (OUTPATIENT)
Dept: PHYSICAL THERAPY | Age: 47
End: 2022-10-27
Payer: MEDICARE

## 2022-10-27 PROCEDURE — 97110 THERAPEUTIC EXERCISES: CPT

## 2022-10-27 NOTE — FLOWSHEET NOTE
[x] Tex Jackson       Occupational Therapy            1st floor       [address], [place]         Phone: [phone]       Fax: [phone] [] [facility]  [address] [place] [address]  Phone: [phone]  Fax: [phone]      Occupational Therapy Daily Treatment Note    Date:  10/27/2022  Patient Name:  Carolyn Tanner    :  1975  MRN: [id number]  Physician: DANIEL Reilly-SHIMA                             Insurance: Medicare ([address] [place] [address]) - Visits based on medical [facility]  Medical Diagnosis: I63.9 (ICD-10-CM) - Cerebrovascular accident (CVA), unspecified mechanism ([facility] [address]  Rehab Codes: R26.89, R53.1, M54.59, M54.2, R29.3,   Next 's Appt: TBD  Date of Onset: 22   Visit# / total visits: ; Progress note for Medicare patient completed at visit 7  Cancels/No Shows: 2/0      Subjective:    Pain:  [x] Yes  [] No Location: R ankle  Pain Rating: (0-10 scale) 0/10  Pain altered Tx:  [x] No  [] Yes  Action: NA  Pt Comments: No new reports since last visit. Denies pain upon arrival.     Precautions: none  Surgery procedure and date: NA    Objective:   Today's Treatment:  Modalities: NA  Exercises:  EXERCISE    REPS/     TIME  WEIGHT/    LEVEL COMMENTS   Scapular Exercises with Mirror 10 reps  1 pound Not Completed, Issued as part of HEP   Digit AROM Exercises 10 reps each  Not Completed, Issued as part of HEP   Foam Cube /Pinch 20 reps each green Not completed    Velcro Pegboard 1 series  Completed   Clothespin Foam Cube Pinch 1 series green Completed   Large Pegboard, Crossing Midline 3 rows  Not Completed   Digi-Flex 20 reps (1 digit at a time) red (3 pounds) Not Completed   Metal Hand Exerciser 30 reps 35 pounds Completed   Pebble Grasp and Release 1 series  Not Completed   [place] Translation 1 series  Completed   Weighted Bar Bicep Curls/Chest Press/Shoulder Press 20 reps each 3 pounds Completed    Grooved Pegboard 15 pegs   Reps increased, Completed    Other: NA      Assessment: [x] Progressing toward goals. Treatment session started late due to computer not starting and connecting to internet. Moderate difficulty completing grooved peg board activity. Increased time needed to complete all activities. Utilizes a modified  on pinch pin to complete activity. RUE fatigues quickly with strengthening exercises. Multiple drops noted when completing marble translations. [] No change. [] Other     [x] Patient would continue to benefit from skilled occupational therapy services in order to: Improve  functional /grasp, Motor control/, ROM, Strength, and Activity tolerance in order to ensure good follow through and improve functional use of UE in ADL performance    Short Term Goals: (  8    Treatments)  Pt to increase RUE ROM for at least 2 planes by 10 degrees actively - MET  Increase gross UE strength  as evidenced by an increase in 1 grade for all planes bilaterally to improve safety and stability with functional transfers - MET   Increase bilateral  strength by 10  lbs to improve functional participation in ADL/IADL tasks - MET  Increase all pinch strengths bilaterally by 3 lbs to improve functional participation in ADL/IADL tasks - NOT MET  Increase function:UE Functional Index Score 5 or more points to promote increased functional abilities - MET  Patient to be independent with home exercise program as demonstrated by performance with correct form without cues.  - MET  Increase R side FMC as evidenced by a decrease in 8 seconds  with the 9-hole peg test - MET  Increase coordination as evidenced by an increase in 5 blocks completed during the box and blocks assessment to indicate significant clinical improvement - NOT MET, 2 blocks         Long Term Goals: (  16  Treatments)  Pt to increase RUE ROM for at least 2 planes by 15+ degrees actively  Increase gross UE strength  as evidenced by an increase in 2 grades for all planes bilaterally to improve safety and stability with functional transfers  Increase bilateral  strength by 15  lbs to improve functional participation in ADL/IADL tasks  Increase all pinch strengths bilaterally by 5 lbs to improve functional participation in ADL/IADL tasks  Increase function:UE Functional Index Score 10 or more points to promote increased functional abilities  Increase R side [address] as evidenced by a decrease in 14 seconds  with the 9-hole peg test  Modified 10/18/22 - Increase coordination as evidenced by an increase in 5 blocks completed during the box and blocks  to indicate significant clinical improvement     Patient Goals: To get stronger and use R hand fully         Pt. Education:  [] Yes  [x] No  [] Reviewed Prior HEP/Ed  Method of Education: [] Verbal  [] Demo  [] Written  Re:   Comprehension of Education:  [] Verbalizes understanding. [] Demonstrates understanding. [] Needs review. [] Demonstrates/verbalizes HEP/Ed previously given. Plan: [x] Continue current frequency toward short and long term goals. [x] Specific Instructions for subsequent treatments: fine motor coordination, UB strength    [] Other:       Time In: 0907  Time Out: 1979        Treatment Charges: Mins Units   []  Modalities:      []  Ultrasound     [x]  Ther Exercise 55 4   []  Manual Therapy     []  Ther Activities     []  Orthotic fit/train     []  Orthotic recheck     []  Other     Total Treatment time 55 4        Medicare Cap   [] Physical Therapy   [] Speech Therapy      [x] Occupational therapy  *PT and Speech caps combine                                                                                  $2150 Limit for PT and Speech combined  $2150 Limit for OT individually  At the beginning of the month where you expect to go over $2150, please add the [address] modifier       Patient Name: Anahi Calzada: 1975     Note:  This is an estimate of charges billed.               Date of [address] Name # units/ charge $$$ charge Daily Total Charge Ongoing Total $$$   8/31/22 OT Eval Mod   TE 1  1 95.15  22.84 118.99 118.99   9/7/22 TE 3 29.21  22.84  22.84 74.89 193.88   9/13/22 TE 3  29.21  22.84  22.84 74.89 268.77   9/15/22 TE 3 29.21  22.84  22.84  74.89  343.66   9/22/22  TE 3  29.21  22.84  22.84  74.89  418.55    9/27/22  TE  3 29.21  22.84  22.84 74.89  493.44   10/18/22   TE 4  29.21+22.84+22.84+22.84  97.73  591.17   10/27/22   TE  4 29.21+22.84+22.84+22.84  97.73  688.90                                 Electronically signed by:  ANDREA Dye/JOHN

## 2022-11-01 ENCOUNTER — HOSPITAL ENCOUNTER (OUTPATIENT)
Dept: PHYSICAL THERAPY | Age: 47
Setting detail: THERAPIES SERIES
Discharge: HOME | End: 2022-11-01
Payer: MEDICARE

## 2022-11-01 ENCOUNTER — HOSPITAL ENCOUNTER (OUTPATIENT)
Dept: OCCUPATIONAL THERAPY | Age: 47
Setting detail: THERAPIES SERIES
Discharge: HOME | End: 2022-11-01
Payer: MEDICARE

## 2022-11-01 NOTE — SIGNIFICANT EVENT
[x] [address]        Occupational Therapy       [address] [facility], 1st Floor       Phone: [phone]       Fax: [phone] [] [facility] Occupational  Therapy at [facility]       Phone: [phone]       Fax: [phone]          Occupational Therapy Cancel/No Show note    Date: 2022  Patient: Jarrod Garrett  : 1975  MRN: [id number]  Cancels/No Shows to date: 3/1    For today's appointment patient:  [x]  Cancelled  []  Rescheduled appointment  []  No-show     Reason given by patient:  [x]  Patient ill  []  Conflicting appointment  []  No transportation    []  Conflict with work  []  No reason given  []  Other:     Comments:      Electronically signed by: Carol Calvo OTR/L

## 2022-11-03 ENCOUNTER — HOSPITAL ENCOUNTER (OUTPATIENT)
Dept: OCCUPATIONAL THERAPY | Age: 47
Setting detail: THERAPIES SERIES
Discharge: HOME | End: 2022-11-03
Payer: MEDICARE

## 2022-11-03 ENCOUNTER — HOSPITAL ENCOUNTER (OUTPATIENT)
Dept: PHYSICAL THERAPY | Age: 47
Setting detail: THERAPIES SERIES
Discharge: HOME | End: 2022-11-03
Payer: MEDICARE

## 2022-11-03 PROCEDURE — 97110 THERAPEUTIC EXERCISES: CPT

## 2022-11-03 PROCEDURE — 97112 NEUROMUSCULAR REEDUCATION: CPT

## 2022-11-03 NOTE — FLOWSHEET NOTE
[x] Tex Paz       Occupational Therapy            1st floor       [address], [place]         Phone: [phone]       Fax: [phone] [] [facility]  Occupational Therapy  [address] [place] [address]  Phone: [phone]  Fax: [phone]      Occupational Therapy Daily Treatment Note    Date:  11/3/2022  Patient Name:  Jarrod Garrett    :  1975  MRN: [id number]  Physician: DANIEL Sal-SHIMA                             Insurance: Medicare ([address] [place] [address]) - [facility]  Medical Diagnosis: I63.9 (ICD-10-CM) - Cerebrovascular accident (CVA), unspecified mechanism ([facility] [address]  Rehab Codes: R26.89, R53.1, M54.59, M54.2, R29.3,   Next 's Appt: TBD  Date of Onset: 22   Visit# / total visits: ; Progress note for Medicare patient completed at visit 7  Cancels/No Shows: 2/0      Subjective:    Pain:  [x] Yes  [] No Location: R ankle  Pain Rating: (0-10 scale) 0/10  Pain altered Tx:  [x] No  [] Yes  Action: NA  Pt Comments: Reports feeling better since Tuesday, states dialysis causes him to be wiped out every so often. States he is talking with nephrologist about adjusting dialysis. Precautions: none  Surgery procedure and date: NA    Objective:   Today's Treatment:  Modalities: NA  Exercises:  EXERCISE    REPS/     TIME  WEIGHT/    LEVEL COMMENTS   Scapular Exercises with Mirror 10 reps  1 pound Completed - at home only    Digit AROM Exercises 10 reps each  Not Completed, Issued as part of HEP   Foam Cube /Pinch 20 reps each green Not completed - at home   Velcro Pegboard 1 series  Completed   Clothespin Foam Cube Pinch 1 series green Completed   Large Pegboard, Crossing Midline 3 rows  Not Completed   Digi-Flex 20 reps (1 digit at a time) red (3 pounds) Not Completed   Metal Hand Exerciser 35 reps 35 pounds Reps, Completed   Pebble Grasp and Release 1 series  Not Completed   [place] palm to finger Translations 1 series  Completed Weighted Bar Bicep Curls/Chest Press/Shoulder Press 20 reps each, 2 sets  5 pounds Resistance increased, Completed    Grooved Pegboard 15 pegs   Completed    Flexbar   strength  Wrist Flexion/Ext  Wrist pronation  Wrist supination  Wrist ulnar deviation  Wrist radial dev  Shoulder Adb  Shoulder Add   15 reps each  Red Added & completed    Other: NA      Assessment: [x] Progressing toward goals. Scapular movements are symmetrical and functional. Encouraged pt to continue with scapular exercises daily to maintain ROM and function - voiced understanding. Moderate difficulty completing grooved peg board activity. Increased time needed to complete all activities. Utilizes a modified  on pinch pin to complete activity. RUE fatigues quickly with strengthening exercises. Only 2 drops noted when completing marble translations, much improved. Demo fair control with RUE when completing flexbar exercises. Continued weakness and fatigue with RUE. [] No change. [] Other     [x] Patient would continue to benefit from skilled occupational therapy services in order to:   Improve  functional /grasp, Motor control/, ROM, Strength, and Activity tolerance in order to ensure good follow through and improve functional use of UE in ADL performance    Short Term Goals: (  8    Treatments)  Pt to increase RUE ROM for at least 2 planes by 10 degrees actively - MET  Increase gross UE strength  as evidenced by an increase in 1 grade for all planes bilaterally to improve safety and stability with functional transfers - MET   Increase bilateral  strength by 10  lbs to improve functional participation in ADL/IADL tasks - MET  Increase all pinch strengths bilaterally by 3 lbs to improve functional participation in ADL/IADL tasks - NOT MET  Increase function:UE Functional Index Score 5 or more points to promote increased functional abilities - MET  Patient to be independent with home exercise program as demonstrated by performance with correct form without cues. - MET  Increase R side FMC as evidenced by a decrease in 8 seconds  with the 9-hole peg test - MET  Increase coordination as evidenced by an increase in 5 blocks completed during the box and blocks assessment to indicate significant clinical improvement - NOT MET, 2 blocks         Long Term Goals: (  16  Treatments)  Pt to increase RUE ROM for at least 2 planes by 15+ degrees actively  Increase gross UE strength  as evidenced by an increase in 2 grades for all planes bilaterally to improve safety and stability with functional transfers  Increase bilateral  strength by 15  lbs to improve functional participation in ADL/IADL tasks  Increase all pinch strengths bilaterally by 5 lbs to improve functional participation in ADL/IADL tasks  Increase function:UE Functional Index Score 10 or more points to promote increased functional abilities  Increase R side [address] as evidenced by a decrease in 14 seconds  with the 9-hole peg test  Modified 10/18/22 - Increase coordination as evidenced by an increase in 5 blocks completed during the box and blocks  to indicate significant clinical improvement     Patient Goals: To get stronger and use R hand fully         Pt. Education:  [] Yes  [x] No  [] Reviewed Prior HEP/Ed  Method of Education: [] Verbal  [] Demo  [] Written  Re:   Comprehension of Education:  [] Verbalizes understanding. [] Demonstrates understanding. [] Needs review. [] Demonstrates/verbalizes HEP/Ed previously given. Plan: [x] Continue current frequency toward short and long term goals.   [x] Specific Instructions for subsequent treatments: fine motor coordination, UB strength    [] Other:       Time In: 0906  Time Out: 1056        Treatment Charges: Mins Units   []  Modalities:      []  Ultrasound     [x]  Ther Exercise 50 3   []  Manual Therapy     []  Ther Activities     []  Orthotic fit/train     []  Orthotic recheck     []  Other     Total Treatment time 50 3 Medicare Cap   [] Physical Therapy   [] Speech Therapy      [x] Occupational therapy  *PT and Speech caps combine                                                                                  $2150 Limit for PT and Speech combined  $2150 Limit for OT individually  At the beginning of the month where you expect to go over $2150, please add the [address] modifier       Patient Name: Mihai Leggett: 1975     Note:  This is an estimate of charges billed.               Date of [address] Name # units/ charge $$$ charge Daily Total Charge Ongoing Total $$$   8/31/22 OT Eval Mod   TE 1  1 95.15  22.84 118.99 118.99   9/7/22 TE 3 29.21  22.84  22.84 74.89 193.88   9/13/22 TE 3  29.21  22.84  22.84 74.89 268.77   9/15/22 TE 3 29.21  22.84  22.84  74.89  343.66   9/22/22  TE 3  29.21  22.84  22.84  74.89  418.55    9/27/22  TE  3 29.21  22.84  22.84 74.89  493.44   10/18/22   TE 4  29.21+22.84+22.84+22.84  97.73  591.17   10/27/22   TE  4 29.21+22.84+22.84+22.84  97.73  688.90   11/3/22   TE  3 29.21+22.84+22.84  74.89  763.69                   Electronically signed by:  ANDREA Blair/JOHN

## 2022-11-03 NOTE — FLOWSHEET NOTE
[x] [facility]) [facility]  [facility]rapy  [address]  [phone]  F: [phone] [x] [address]  [address]   [address]  P: [phone]  F: [phone] [] [address]  [address]  P: [phone]  F: [phone] [] [address]  P: [phone]  F: [phone] [] [address]  [place]   [address]   [place]: [phone]  F: [phone]      Physical Therapy Daily Treatment    Date:  11/3/2022  Patient Name:  Khadra Stringer    :  1975  MRN: [id number]  Physician: RACHAEL Melendez                           Insurance: Medicare ([address] [place] [address])  Medical Diagnosis: I63.9 (ICD-10-CM) - Cerebrovascular accident (CVA), unspecified mechanism ([address]  Rehab Codes: R26.89, R53.1, M54.59, M54.2, R29.3,   Next 's appt.: 8/10 with neuro  Date of symptom onset: 22   Visit# / total visits:  (additional visits approved 10/6)   Cancels/No Shows: 5/0 (Last 30 days)    Subjective:    Pain:  [] Yes  [x] No Location: N/A Pain Rating: (0-10 scale) 0/10  Pain altered Tx:  [x] No  [] Yes  Action:  Comments:  Patient arrives with no pain. States he has been getting HA and dizzy with dialysis lately.       Objective:   Sensation: absent to light touch throughout B feet below ankle     Modalities: Precautions: Pt has dialysis 3x per week  Exercises:  BOLDED COMPLETED 11/3/2022:  Exercise RLE Reps/ Time Weight/ Level Comments   Nu step  6' Level 5                Standing       Gastroc stretch on incline 3x1 min  BUE assist   Resisted swing  2x10 Lime  Tapping 4\" step   Step up 2x10 4\" RLE   LLE marching 3x10  Focus on RLE stance control   LLE step taps with resistance 3x10 4\" step Blue TB   LLE hip abd AROM 3x10  \" \"   Sit <> stand w/ RLE bias 8x 2\" step under LLE Min A from therapist to ensure ant fwd weightshift, extension to stand   Resistive STS 10x Blue Added 10.20  Terminal extension  Mod UE pull   Resisted step over small round cone 2x10 Lime  With weight shift onto RLE   Heel taps to step  10x 4\"    Trunk/cervical rotation naming how many fingers clinician is holding  10xea  clinician standing behind pt    Picking up rings from steps  4x 4 rings;   6\" and 12\" step Wider JASIEL no UE; notes some irritation in LB; x2 leading with R UE, x2 leading with L UE and handing to clinician at multiple angles   Resisted swing phase fwd and retro amb on way back  5xea // bars;   Lime  Resisted fwd walk    Step over round cone  10xea // bars     TKE 20x Blue Added 10.20               Balance   In front of mat table, step stool for hand support   Posterior perturbations 3x10  No UE assist  Added step backs for last set   Anterior perturbations 2x10     Fwd step outs 1x12  Added 10.25  Anterior lean w/ quick step out   Retro step outs 10x3 ea  Started with slow step backs  Cues for quicker steps on L only   Hurricane sway 3X30\"  Ankle strategy skill  Pt self leaning in all planes -   DF/PF/EV/IV/diagonal    LLE marches      AirEx 3x15  Unilat UE assist  Added AIrEx for last 2 sets   Squats 1x6  1x10   1 lb cane No UE assist  2nd set with bringing 1 lb cane fwd tapping on // bar  Limited with back pain   Trunk rotation with ball 2x10 ea Volleyball    Shoulder press with ball 10x2 7\" ball    Roll ball up on wall 10x Green Good ankle strategy   NBOS 3x1 min     NBOS with EC 2x30\"     NBOS on foam  2x30\"  Cues for ankle strategies   NBOS with ball raises, rotation 2x30\"ea Volley  ball     Tandem stance 2x20\"     Cone  off ground 2x5 5 cones    Standing on foam with head turns  30\" ea  Up/down, rotation    Cone reach with LUE across body 2x5 5 cones    Cone reach with LUE to L side 2x5 5 cones    Heel taps 10x2 ea 4in With 1 hand for UE support  Poor tolerance of No UE supports w/ 2 LOB with lateral sway   Dynamic march with retro hamstring curl 3L  No UE support   Side stepping 3L // bars  No UE-1 light UE support   360 turn 2x  Clockwise more challenging   Marches  2x20\"  No UE   Alt toe taps to foam  2x30\"  No UE                     FES modalities      Gait training with Bioness 50 min Lower and thigh cuff Initial set up  STS 2x10, LLE on 2\" step  Hip abd  Marches  Adjustments for thigh cuff more proximal  Gait analysis   Change in speed  4 sit down breaks   Xcite training 40 min R hams  R glutes  R quads  R PFs Set up in back room  STS program  STS w/ LLE on 2\" step: 2x10, min A    STS \"pause\" mode: Unilat UE assist  Step taps: 2x10, 4\" step  Hip abd/ext: 2x10 ea  Hip abd L sided - 10x   Balance without UE 2x30 sec  Reaching for ball touches 10x  Punching of pad 10x2  (Reaching&punches across body)      Xcite training ankle pumps 20 min 4 x 10 Set up in supine, head elevated  Ant/ tib placement adjusted 3x to execute DF and EV  Gastroc pads added on 3rd and 4th set                     PROM      Ankle DF PROM 5x1 min ea  By therapist  Doffed AFO   Ankle pronation 10x     Heel slides 20x   Required stabilization of ankle   SLR 10x2     SAQ 10x2             Other:       Treatment Charges: Mins Units   []  Modalities     [x]  Ther Exercise 15 1   []  Manual Therapy     []  Ther Activities     []  Aquatics     []  Vasocompression     [x]  Other: Neuro re-ed 30 2   []  Other: Gait     Total Treatment time 45 3       Assessment: [x] Progressing toward goals. Patient arrives with sharp recurvatum in stance phase. Good carry over with retro step backs to self correct with improved ankle strategy. Progressed to pertubation's with step outs with 2 LOB, self corrected within // bar. No retro leaning noted from previous sessions. Repeated with 3 reps with rest between.   Patient able to perform R side foot taps without UE support; however, unsafe shift when stepping up with L. Functional balance is improving overall but does require AD for instability. [] No change. [] Other:    [x] Patient would continue to benefit from skilled physical therapy services in order to: address R hemibody weakness, improve gait mechanics and efficiency, and increase standing static/dynamic balance to allow improved household ADL/IADL completion and reduce fall risk. STG: (to be met in 8 treatments) - Assessed by Ellen Sanchez PT on 7/20:  ? Pain: No more than 4/10 pain reported in low back or neck post therapy sessions to indicate improving tolerance to increased activity and exercise - MET for low back or neck, but L shoulder is 2/10 - newer pain over the past 3 weeks. ? ROM: at least 0deg DF PROM with knee extended to indicate improving mobility in ankles to increase heel strike and reduce toe drag with prolonged ambulation - NOT MET, lacking 10 deg from neutral in L ankle, lacking 3 deg from neutral in R ankle  ? Balance:   Pt able to fully turn to look behind himself without instability in either direction to indicate improving postural stability - NOT MET, LOB with full turn to R and L  Pt able to complete standing reaching tasks without UE assist and no more than mild instability to indicate improving dynamic balance for safety with household IADLs - NOT MET, primarily needs to be supported at trunk on counter or holding onto something  ? Strength: At least 4+/5 grossly in R hip to allow improved pelvic stability in standing and improved swing phase control/speed during prolonged gait - NOT MET, 4-/5 grossly in R hip  Pt able to utilize RUE for fine grasp and release tasks with no more than minimally slowed speed evident - Ongoing, better fine grasp but still limited with heavier objects, twisting lid  ?  Function: Pt able to ambulate 450 ft with least restrictive device with no evidence of lagging RLE swing or abnormal stance time noted, with no more than 11 on RPE scale - NOT MET, achieves ~400 ft before needing to rest d/t RLE instability and R knee buckling, rates 13 RPE  Patient to be independent with home exercise program as demonstrated by performance with correct form without cues. - Ongoing   Demonstrate Knowledge of fall prevention - MET     LTG: (to be met in 16 treatments) - 8/22: Will continue with current goals, adjusting at visit 16 as needed d/t recent CVA on 8/8, Assessed by Sona Simms PT on 10/4   ? Pain: No more than 2/10 pain reported in low back or neck post therapy sessions to indicate improving tolerance to increased activity and exercise MET  ? ROM: at least 5 deg DF PROM with knee extended to indicate improving mobility in ankles to increase heel strike and reduce toe drag with prolonged ambulation. MET  ? Balance: At least 30/56 on Hernandez to indicate significant improvement in standing static/dynamic balance and progress towards reduced fall risk. 37/56 MET  Able to negotiate gait obstacles using rollator without increased instability or excessively slowed gait MET  ? Strength: At least 5-/5 grossly in R hip to allow further improved pelvic stability in standing and improved swing phase control/speed during prolonged gait MET  At least 5/5 B knee ext to prevent excessive buckling with prolonged standing Progress, some buckling still reported   ? Function:   Pt able to ambulate 600 ft with least restrictive device with appropriate RLE swing/stance phase timing, no evidence of toe drag, and no more than minimal fatigue by end of ambulation Progress  Pt able to ambulate 100 ft with intermittent turning, stopping, etc without assistive device and demonstrating good stability and no evidence of B knee buckling to indicate improving functional strength and safety with household ambulation distances for ADL/IADLs Not Met  No more than 14% impaired on ABC scale to indicate improving subjective balance confidence.  Not Met      ABC score 41% impairment. New goals as of 10/6, to be met in next additional 12 visits, at total visit 28:        1. Hernandez score to at least 45/56 to indicate reduced fall risk, progressive balance improvement        2. Ability to complete RLE SLS tasks with no UE assist and no buckling/instability noted. 3. All bracing needs for RLE will have been met at this time to improve stability in ankle, reduce falls        4. No more than 20% impaired on ABC scale to indicate subjective improvement in balance confidence. 5. At least 650 ft with least restrictive device with appropriate RLE swing/stance phase timing, no toe drag        Patient goals: \"to strengthen right side back as much as I can\"    Pt. Education:  [x] Yes  [] No  [x] Reviewed Prior HEP/Ed  Method of Education: [x] Verbal  [x] Demo  [] Written   fall prevention 7/11/22  Comprehension of Education:  [x] Verbalizes understanding. [x] Demonstrates understanding. [] Needs review. [x] Demonstrates/verbalizes HEP/Ed previously given. Plan: [x] Continue current frequency toward long and short term goals.     [x] Specific Instructions for subsequent treatments:  balance training with Xcite, joint stability, increase ankle ROM, quick step outs, SLS        Time In: 09:00 am          Time Out: 9:50 am     Electronically signed by:  Eloy Coppola PTA

## 2022-11-07 ENCOUNTER — HOSPITAL ENCOUNTER (EMERGENCY)
Age: 47
Discharge: HOME | End: 2022-11-07
Attending: EMERGENCY MEDICINE
Payer: MEDICARE

## 2022-11-07 VITALS
HEART RATE: 82 BPM | HEIGHT: 67 IN | BODY MASS INDEX: 29.82 KG/M2 | OXYGEN SATURATION: 100 % | TEMPERATURE: 97.9 F | SYSTOLIC BLOOD PRESSURE: 176 MMHG | DIASTOLIC BLOOD PRESSURE: 89 MMHG | RESPIRATION RATE: 18 BRPM | WEIGHT: 190 LBS

## 2022-11-07 DIAGNOSIS — K59.00 CONSTIPATION, UNSPECIFIED CONSTIPATION TYPE: Primary | ICD-10-CM

## 2022-11-07 PROCEDURE — 99282 EMERGENCY DEPT VISIT SF MDM: CPT

## 2022-11-07 ASSESSMENT — ENCOUNTER SYMPTOMS
NAUSEA: 0
ABDOMINAL PAIN: 1
CONSTIPATION: 1
VOMITING: 0

## 2022-11-07 ASSESSMENT — PAIN - FUNCTIONAL ASSESSMENT: PAIN_FUNCTIONAL_ASSESSMENT: 0-10

## 2022-11-07 ASSESSMENT — PAIN SCALES - GENERAL: PAINLEVEL_OUTOF10: 9

## 2022-11-08 ENCOUNTER — HOSPITAL ENCOUNTER (OUTPATIENT)
Dept: PHYSICAL THERAPY | Age: 47
Setting detail: THERAPIES SERIES
Discharge: HOME | End: 2022-11-08
Payer: MEDICARE

## 2022-11-08 ENCOUNTER — HOSPITAL ENCOUNTER (OUTPATIENT)
Dept: OCCUPATIONAL THERAPY | Age: 47
Setting detail: THERAPIES SERIES
Discharge: HOME | End: 2022-11-08
Payer: MEDICARE

## 2022-11-08 PROCEDURE — 97110 THERAPEUTIC EXERCISES: CPT

## 2022-11-08 PROCEDURE — 97112 NEUROMUSCULAR REEDUCATION: CPT

## 2022-11-08 NOTE — ED PROVIDER NOTES
3 times daily (with meals)    CARVEDILOL (COREG) 25 MG TABLET    Take 1 tablet by mouth 2 times daily    CLOPIDOGREL (PLAVIX) 75 MG TABLET    Take 1 tablet by mouth daily    COLCHICINE (COLCRYS) 0.6 MG TABLET        CYANOCOBALAMIN (VITAMIN B-12 PO)    Take 2,500 mcg by mouth daily    CYCLOBENZAPRINE (FLEXERIL) 5 MG TABLET    Take 1 tablet by mouth 3 times daily as needed for Muscle spasms    EPOETIN LIZ-EPBX (RETACRIT) 2000 UNIT/ML INJECTION    Inject 8,000 Units into the skin once a week    EZETIMIBE (ZETIA) 10 MG TABLET    Take 10 mg by mouth daily    FERROUS SULFATE (IRON 325) 325 (65 FE) MG TABLET    Take 1 tablet by mouth daily (with breakfast)    FLUOXETINE (PROZAC) 20 MG TABLET    Take 20 mg by mouth in the morning and 20 mg in the evening. Take 1 tablets (20 mg)  by mouth in the morning and at bedtime. FUROSEMIDE (LASIX) 20 MG TABLET    Take 1 tablet by mouth in the morning. GABAPENTIN (NEURONTIN) 300 MG CAPSULE    Take 300 mg by mouth in the morning and 300 mg at noon and 300 mg before bedtime. GLUCOSE 4 G CHEWABLE TABLET    Take 4 tablets by mouth as needed for Low blood sugar    HYDRALAZINE (APRESOLINE) 25 MG TABLET    Take 1 tablet by mouth 3 times daily    INSULIN ASPART (NOVOLOG) 100 UNIT/ML INJECTION VIAL    Inject 0-8 Units into the skin 3 times daily (before meals) SLIDING SCALE     INSULIN GLARGINE (LANTUS SOLOSTAR) 100 UNIT/ML INJECTION PEN    Inject 25 Units into the skin in the morning and 25 Units before bedtime. INSULIN PEN NEEDLE (MEIJER PEN NEEDLES) 31G X 8 MM MISC    1 each by Does not apply route daily    LORATADINE (CLARITIN) 10 MG TABLET    Take 10 mg by mouth in the morning. LOSARTAN (COZAAR) 25 MG TABLET    Take 25 mg by mouth in the morning. MISC. DEVICES MISC    Disp: custom molded shoes to accommodate for brace   DX: DM with history of stroke, foot drop  Duration: 1 year    OMEPRAZOLE (PRILOSEC) 10 MG DELAYED RELEASE CAPSULE    Take 10 mg by mouth in the morning. SENNOSIDES-DOCUSATE SODIUM (SENOKOT-S) 8.6-50 MG TABLET    Take 1 tablet by mouth in the morning. ALLERGIES     Patient has no known allergies. FAMILY HISTORY       Family History   Problem Relation Age of Onset    Diabetes Mother     Heart Disease Father     Diabetes Father     Cancer Paternal Grandmother     Heart Disease Maternal Great Grandfather           SOCIAL HISTORY       Social History     Socioeconomic History    Marital status: Single   Tobacco Use    Smoking status: Never    Smokeless tobacco: Never   Vaping Use    Vaping Use: Never used   Substance and Sexual Activity    Alcohol use: Never    Drug use: Never     Social Determinants of Health     Financial Resource Strain: Low Risk     Difficulty of Paying Living Expenses: Not hard at all   Food Insecurity: No Food Insecurity    Worried About [employer] in the Last Year: Never true    Ran Out of Food in the Last Year: Never true         REVIEW OF SYSTEMS    (2-9 systems for level 4, 10 or more for level 5)     Review of Systems   Gastrointestinal:  Positive for abdominal pain and constipation. Negative for nausea and vomiting. All other systems reviewed and are negative. Except as noted above the remainder of the review of systems was reviewed and negative. PHYSICAL EXAM    (up to 7 for level 4, 8 or more for level 5)     ED Triage Vitals [11/07/22 1917]   BP Temp Temp Source Heart Rate Resp SpO2 Height Weight   (!) 176/89 97.9 °F (36.6 °C) Oral 82 18 100 % 5' 7\" (1.702 m) 190 lb (86.2 kg)       Physical Exam  Constitutional:       Appearance: Normal appearance. He is well-developed, well-groomed and normal weight. HENT:      Head: Normocephalic. Right Ear: External ear normal.      Left Ear: External ear normal.      Nose: Nose normal.   Eyes:      Conjunctiva/sclera: Conjunctivae normal.   Pulmonary:      Effort: Pulmonary effort is normal. No respiratory distress.    Abdominal:      General: Bowel sounds are normal. There is no distension. Palpations: Abdomen is soft. Tenderness: There is no abdominal tenderness. There is no guarding or rebound. Comments: Abdomen is soft, nondistended, and nontender to palpation. Bowel sounds active. Musculoskeletal:         General: Normal range of motion. Cervical back: Normal range of motion. Neurological:      Mental Status: He is alert and oriented to person, place, and time. DIAGNOSTIC RESULTS     EKG:All EKG's are interpreted by the Emergency Department Physician who either signs or Co-signs this chart in the absence of a cardiologist.        RADIOLOGY:   Non-plain film images such as CT, Ultrasound and MRI are read by theradiologist. Plain radiographic images are visualized and preliminarily interpreted by the emergency physician with the below findings:        Interpretation per the Radiologist below, if available at the time of this note:    No orders to display         EDBEDSIDE ULTRASOUND:   Performed by Jil Burnette - none    LABS:  Labs Reviewed - No data to display    All other labs were within normal range or not returned as of this dictation. EMERGENCY DEPARTMENT COURSE andDIFFERENTIAL DIAGNOSIS/MDM:   Patient evaluated in conjunction with ER physician. Patient presents with constipation. When he went to initially assessed the patient he was in the restroom. After the patient came out of the restroom spoke with the patient and assessed him. Patient states he had a bowel movement while he was in the restroom and he feels better. Abdominal exam benign. No further work-up is indicated. Discharged home instructed to continue taking his Linzess and senna. Return precautions provided.       Vitals:    Vitals:    11/07/22 1917   BP: (!) 176/89   Pulse: 82   Resp: 18   Temp: 97.9 °F (36.6 °C)   TempSrc: Oral   SpO2: 100%   Weight: 190 lb (86.2 kg)   Height: 5' 7\" (1.702 m)         CONSULTS:  None    RES:  Procedures    FINAL IMPRESSION      1. Constipation, unspecified constipation type          DISPOSITION/PLAN   DISPOSITION Decision To Discharge 11/07/2022 08:08:22 PM      PATIENT REFERRED TO:   DANIEL Osei CNP  [address] [facility] [address]  [phone]      As needed    [facility] ED  [address] [facility]  [phone]    If symptoms worsen      DISCHARGE MEDICATIONS:     New Prescriptions    No medications on file     Electronically signed by DANIEL Murcia 11/7/2022 at 8:08 PM            DANIEL Murcia CNP  11/07/22 2012

## 2022-11-08 NOTE — FLOWSHEET NOTE
[x] Tex Paz       Occupational Therapy            1st floor       [address], [place]         Phone: [phone]       Fax: [phone] [] [facility]  [address] [place] [address]  Phone: [phone]  Fax: [phone]      Occupational Therapy Daily Treatment Note    Date:  2022  Patient Name:  Nate Murray    :  1975  MRN: [id number]  Physician: RACHAEL Santillan                             Insurance: Medicare ([address] [place] [address]) - Visits based on medical [facility]  Medical Diagnosis: I63.9 (ICD-10-CM) - Cerebrovascular accident (CVA), unspecified mechanism ([facility] [address]  Rehab Codes: R26.89, R53.1, M54.59, M54.2, R29.3,   Next 's Appt: TBD  Date of Onset: 22   Visit# / total visits: 10/16; Progress note for Medicare patient completed at visit 7  Cancels/No Shows: 2/0      Subjective:    Pain:  [x] Yes  [] No Location: R ankle  Pain Rating: (0-10 scale) 0/10  Pain altered Tx:  [x] No  [] Yes  Action: NA  Pt Comments: reports having a good weekend. No new changes or reports since last visit. Precautions: none  Surgery procedure and date: NA    Objective:   Today's Treatment:  Modalities: NA  Exercises:  EXERCISE    REPS/     TIME  WEIGHT/    LEVEL COMMENTS   Scapular Exercises with Mirror 10 reps  1 pound Completed - at home only    Digit AROM Exercises 10 reps each  Not Completed, Issued as part of HEP   Foam Cube /Pinch 20 reps each green Not completed - at home   Velcro Pegboard 1 series  Completed   Clothespin Foam Cube Pinch 1 series green Completed   Large Pegboard, Crossing Midline 3 rows  Not Completed   Digi-Flex 20 reps (1 digit at a time) red (3 pounds) Not Completed   Metal Hand Exerciser 40 reps 35 pounds Reps increased, Completed   Pebble Grasp and Release 1 series  Not Completed   [place] palm to finger Translations 1 series  Completed   Weighted Bar Bicep Curls/Chest Press/Front raises  20 reps each, 2 sets  5 pounds Completed    Grooved Pegboard 20 pegs   Reps increased, Completed    Flexbar   strength  Wrist Flexion/Ext  Wrist pronation  Wrist supination  Wrist ulnar deviation  Wrist radial dev  Shoulder Adb  Shoulder Add   15 reps each  Red Completed    Other: NA      Assessment: [x] Progressing toward goals. Scapular movements are symmetrical and functional. Encouraged pt to continue with scapular exercises daily to maintain ROM and function - voiced understanding. Displays difficulty with using 2point and 3jaw pinches to  various items. Generally utilizes a lateral pinch to  items. Mod difficulty with manipulating marbles in hand. Moderate difficulty completing grooved peg board activity. Increased time needed to complete all activities. Utilizes a modified  on pinch pin to complete activity. RUE fatigues quickly with strengthening exercises. Demo fair control with RUE when completing flexbar exercises. Continued weakness and fatigue with RUE. Mod difficulty completing weighted bar exercises. [] No change. [] Other     [x] Patient would continue to benefit from skilled occupational therapy services in order to:   Improve  functional /grasp, Motor control/, ROM, Strength, and Activity tolerance in order to ensure good follow through and improve functional use of UE in ADL performance    Short Term Goals: (  8    Treatments)  Pt to increase RUE ROM for at least 2 planes by 10 degrees actively - MET  Increase gross UE strength  as evidenced by an increase in 1 grade for all planes bilaterally to improve safety and stability with functional transfers - MET   Increase bilateral  strength by 10  lbs to improve functional participation in ADL/IADL tasks - MET  Increase all pinch strengths bilaterally by 3 lbs to improve functional participation in ADL/IADL tasks - NOT MET  Increase function:UE Functional Index Score 5 or more points to promote increased functional abilities - MET  Patient to be independent with home exercise program as demonstrated by performance with correct form without cues. - MET  Increase R side FMC as evidenced by a decrease in 8 seconds  with the 9-hole peg test - MET  Increase coordination as evidenced by an increase in 5 blocks completed during the box and blocks assessment to indicate significant clinical improvement - NOT MET, 2 blocks         Long Term Goals: (  16  Treatments)  Pt to increase RUE ROM for at least 2 planes by 15+ degrees actively  Increase gross UE strength  as evidenced by an increase in 2 grades for all planes bilaterally to improve safety and stability with functional transfers  Increase bilateral  strength by 15  lbs to improve functional participation in ADL/IADL tasks  Increase all pinch strengths bilaterally by 5 lbs to improve functional participation in ADL/IADL tasks  Increase function:UE Functional Index Score 10 or more points to promote increased functional abilities  Increase R side [facility] as evidenced by a decrease in 14 seconds  with the 9-hole peg test  Modified 10/18/22 - Increase coordination as evidenced by an increase in 5 blocks completed during the box and blocks  to indicate significant clinical improvement     Patient Goals: To get stronger and use R hand fully         Pt. Education:  [] Yes  [x] No  [] Reviewed Prior HEP/Ed  Method of Education: [] Verbal  [] Demo  [] Written  Re:   Comprehension of Education:  [] Verbalizes understanding. [] Demonstrates understanding. [] Needs review. [] Demonstrates/verbalizes HEP/Ed previously given. Plan: [x] Continue current frequency toward short and long term goals.   [x] Specific Instructions for subsequent treatments: fine motor coordination, UB strength    [] Other:       Time In: 1006  Time Out: 1057        Treatment Charges: Mins Units   []  Modalities:      []  Ultrasound     [x]  Ther Exercise 51 3   []  Manual Therapy     []  Ther Activities     []  Orthotic fit/train []  Orthotic recheck     []  Other     Total Treatment time 51 3        Medicare Cap   [] Physical Therapy   [] Speech Therapy      [x] Occupational therapy  *PT and Speech caps combine                                                                                  $2150 Limit for PT and Speech combined  $2150 Limit for OT individually  At the beginning of the month where you expect to go over $2150, please add the [address] [place] [address] modifier       Patient Name: Bill Burns: 1975     Note:  This is an estimate of charges billed.               Date of [address] Name # units/ charge $$$ charge Daily Total Charge Ongoing Total $$$   8/31/22 OT Eval Mod   TE 1  1 95.15  22.84 118.99 118.99   9/7/22 TE 3 29.21  22.84  22.84 74.89 193.88   9/13/22 TE 3  29.21  22.84  22.84 74.89 268.77   9/15/22 TE 3 29.21  22.84  22.84  74.89  343.66   9/22/22  TE 3  29.21  22.84  22.84  74.89  418.55    9/27/22  TE  3 29.21  22.84  22.84 74.89  493.44   10/18/22   TE 4  29.21+22.84+22.84+22.84  97.73  591.17   10/27/22   TE  4 29.21+22.84+22.84+22.84  97.73  688.90   11/3/22   TE  3 29.21+22.84+22.84  74.89  763.69   11/8/22   TE  3 29.21+22.84+22.84  74.89  838.58     Electronically signed by:  ANDREA Moe/JOHN

## 2022-11-08 NOTE — FLOWSHEET NOTE
[x] [facility] [place]) [facility] &  Therapy  [address]  [phone]  F: [phone] [x] [address]  [facility] [place] [facility]   [address]  P: [phone]  F: [phone] [] [address]  [address]  P: [phone]  F: [phone] [] [address]  P: [phone]  F: [phone] [] [address]  [facility]   [address]   [place]: [phone]  F: [phone]      Physical Therapy Daily Treatment    Date:  2022  Patient Name:  Rc Mclaughlin    :  1975  MRN: [id number]  Physician: Leighann , DANIEL-CNP                           Insurance: Medicare ([address] [place] [address])  Medical Diagnosis: I63.9 (ICD-10-CM) - Cerebrovascular accident (CVA), unspecified mechanism ([facility] [address]  Rehab Codes: R26.89, R53.1, M54.59, M54.2, R29.3,   Next 's appt.:  with neuro  Date of symptom onset: 22   Visit# / total visits:  (additional visits approved 10/6)   Cancels/No Shows: 5/0 (Last 30 days)    Subjective:    Pain:  [] Yes  [x] No Location: N/A Pain Rating: (0-10 scale) 0/10  Pain altered Tx:  [x] No  [] Yes  Action:  Comments:  Patient denies any pain. Denies any falls since last visit as well. Sees orthotist  for picking up the new braces. Notes he had to go to ER last night d/t severe constipation - \"pulling too much fluid off me at dialysis\". Pt notes he feels that his R knee has been buckling more of late.      Objective:   Sensation: absent to light touch throughout B feet below ankle     Modalities: Precautions: Pt has dialysis 3x per week  Exercises:  BOLDED COMPLETED 2022:  Exercise RLE Reps/ Time Weight/ Level Comments   Nu step  6' Level 5                Standing       Gastroc stretch on incline 3x1 min  BUE assist Resisted swing  2x10 Lime  Tapping 4\" step   Step up 1x15  1x7 4\" RLE  - very fatigued this date   LLE marching 3x10  Focus on RLE stance control   LLE step taps with resistance 3x10 4\" step Blue TB   LLE hip abd AROM 3x10  \" \"   Sit <> stand w/ RLE bias 8x 2\" step under LLE Min A from therapist to ensure ant fwd weightshift, extension to stand   Resistive STS 10x Blue Added 10.20  Terminal extension  Mod UE pull   Resisted step over small round cone 2x10 Lime  With weight shift onto RLE   Heel taps to step  10x 4\"    Trunk/cervical rotation naming how many fingers clinician is holding  10xea  clinician standing behind pt    Picking up rings from steps  4x 4 rings;   6\" and 12\" step Wider JASIEL no UE; notes some irritation in LB; x2 leading with R UE, x2 leading with L UE and handing to clinician at multiple angles   Resisted swing phase fwd and retro amb on way back  5xea // bars;   Lime  Resisted fwd walk    Step over round cone  10xea // bars     TKE 20x Blue Added 10.20               Balance   In front of mat table, step stool for hand support   Posterior perturbations 2x10  No UE assist  Added step backs for last set   Anterior perturbations 2x10     Fwd step outs 1x12  Added 10.25  Anterior lean w/ quick step out   Retro step outs 10x3 ea  Started with slow step backs  Cues for quicker steps on L only   Hurricane sway 3X30\"  Ankle strategy skill  Pt self leaning in all planes -   DF/PF/EV/IV/diagonal    LLE marches      AirEx 2x15  Unilat UE assist  Added AIrEx for last 2 sets   Squats 1x6  1x10   1 lb cane No UE assist  2nd set with bringing 1 lb cane fwd tapping on // bar  Limited with back pain   Trunk rotation with ball 2x10 ea Volleyball    Shoulder press with ball 10x2 7\" ball    Roll ball up on wall 10x Green Good ankle strategy   NBOS 3x1 min     NBOS with EC 2x30\"     NBOS on foam  2x30\"  Cues for ankle strategies   NBOS with ball raises, rotation 2x30\"ea Volley  ball     Tandem stance 2x20\"     Cone  off ground 2x5 5 cones    Standing on foam with head turns  30\" ea  Up/down, rotation    Cone reach with LUE across body 2x5 5 cones    Cone reach with LUE to L side 2x5 5 cones    Heel taps 10x2 ea 4in With 1 hand for UE support  Poor tolerance of No UE supports w/ 2 LOB with lateral sway   Dynamic march with retro hamstring curl 3L  No UE support   Side stepping 3L // bars  No UE-1 light UE support   360 turn 2x  Clockwise more challenging   Marches  2x20\"  No UE   Alt toe taps to foam  2x30\"  No UE                     FES modalities      Gait training with Bioness 50 min Lower and thigh cuff Initial set up  STS 2x10, LLE on 2\" step  Hip abd  Marches  Adjustments for thigh cuff more proximal  Gait analysis   Change in speed  4 sit down breaks   Xcite training 40 min R hams  R glutes  R quads  R PFs Set up in back room  STS program  STS w/ LLE on 2\" step: 2x10, min A    STS \"pause\" mode: Unilat UE assist  Step taps: 2x10, 4\" step  Hip abd/ext: 2x10 ea  Hip abd L sided - 10x   Balance without UE 2x30 sec  Reaching for ball touches 10x  Punching of pad 10x2  (Reaching&punches across body)      Xcite training ankle pumps 20 min 4 x 10 Set up in supine, head elevated  Ant/ tib placement adjusted 3x to execute DF and EV  Gastroc pads added on 3rd and 4th set                     PROM      Ankle DF PROM 5x1 min ea  By therapist  Doffed AFO   Ankle pronation 10x     Heel slides 20x   Required stabilization of ankle   SLR 10x2     SAQ 10x2           Other: Decreased exercises performed on 11/8 d/t instability - see assessment below      Treatment Charges: Mins Units   []  Modalities     [x]  Ther Exercise 25 2   []  Manual Therapy     []  Ther Activities     []  Aquatics     []  Vasocompression     [x]  Other: Neuro re-ed 15 1   []  Other: Gait     Total Treatment time 40 3       Assessment: [x] Progressing toward goals.   Improving ability to utilize hip/trunk for balance with perturbations, but still challenged with posterior over time especially with fatigue. [] No change. [x] Other:  RLE very fatigued this date - after first set of step ups, demonstrates weakness/instability on second set even after longer seated therapeutic rest. Had to reduce all sets of exercises this date from 3 sets to 2, and longer seated rest. Mod-max VC and tactile cues to reduce R knee hyperextension in stance, which is more common and present this date. Will continue to monitor, sees neurology on 11/17 next week and informed pt to notify them of this finding, especially since subjectively pt is noticing this as well. [x] Patient would continue to benefit from skilled physical therapy services in order to: address R hemibody weakness, improve gait mechanics and efficiency, and increase standing static/dynamic balance to allow improved household ADL/IADL completion and reduce fall risk. STG: (to be met in 8 treatments) - Assessed by Pop Peraza PT on 7/20:  ? Pain: No more than 4/10 pain reported in low back or neck post therapy sessions to indicate improving tolerance to increased activity and exercise - MET for low back or neck, but L shoulder is 2/10 - newer pain over the past 3 weeks. ? ROM: at least 0deg DF PROM with knee extended to indicate improving mobility in ankles to increase heel strike and reduce toe drag with prolonged ambulation - NOT MET, lacking 10 deg from neutral in L ankle, lacking 3 deg from neutral in R ankle  ? Balance:   Pt able to fully turn to look behind himself without instability in either direction to indicate improving postural stability - NOT MET, LOB with full turn to R and L  Pt able to complete standing reaching tasks without UE assist and no more than mild instability to indicate improving dynamic balance for safety with household IADLs - NOT MET, primarily needs to be supported at trunk on counter or holding onto something  ? Strength:    At least 4+/5 grossly in R hip to allow improved pelvic stability in standing and improved swing phase control/speed during prolonged gait - NOT MET, 4-/5 grossly in R hip  Pt able to utilize RUE for fine grasp and release tasks with no more than minimally slowed speed evident - Ongoing, better fine grasp but still limited with heavier objects, twisting lid  ? Function: Pt able to ambulate 450 ft with least restrictive device with no evidence of lagging RLE swing or abnormal stance time noted, with no more than 11 on RPE scale - NOT MET, achieves ~400 ft before needing to rest d/t RLE instability and R knee buckling, rates 13 RPE  Patient to be independent with home exercise program as demonstrated by performance with correct form without cues. - Ongoing   Demonstrate Knowledge of fall prevention - MET     LTG: (to be met in 16 treatments) - 8/22: Will continue with current goals, adjusting at visit 16 as needed d/t recent CVA on 8/8, Assessed by Florecita Marques, PT on 10/4   ? Pain: No more than 2/10 pain reported in low back or neck post therapy sessions to indicate improving tolerance to increased activity and exercise MET  ? ROM: at least 5 deg DF PROM with knee extended to indicate improving mobility in ankles to increase heel strike and reduce toe drag with prolonged ambulation. MET  ? Balance: At least 30/56 on Hernandez to indicate significant improvement in standing static/dynamic balance and progress towards reduced fall risk. 37/56 MET  Able to negotiate gait obstacles using rollator without increased instability or excessively slowed gait MET  ? Strength: At least 5-/5 grossly in R hip to allow further improved pelvic stability in standing and improved swing phase control/speed during prolonged gait MET  At least 5/5 B knee ext to prevent excessive buckling with prolonged standing Progress, some buckling still reported   ?  Function:   Pt able to ambulate 600 ft with least restrictive device with appropriate RLE swing/stance phase timing, no evidence of toe drag, and no more than minimal fatigue by end of ambulation Progress  Pt able to ambulate 100 ft with intermittent turning, stopping, etc without assistive device and demonstrating good stability and no evidence of B knee buckling to indicate improving functional strength and safety with household ambulation distances for ADL/IADLs Not Met  No more than 14% impaired on ABC scale to indicate improving subjective balance confidence. Not Met      ABC score 41% impairment. New goals as of 10/6, to be met in next additional 12 visits, at total visit 28:        1. Hernandez score to at least 45/56 to indicate reduced fall risk, progressive balance improvement        2. Ability to complete RLE SLS tasks with no UE assist and no buckling/instability noted. 3. All bracing needs for RLE will have been met at this time to improve stability in ankle, reduce falls        4. No more than 20% impaired on ABC scale to indicate subjective improvement in balance confidence. 5. At least 650 ft with least restrictive device with appropriate RLE swing/stance phase timing, no toe drag        Patient goals: \"to strengthen right side back as much as I can\"    Pt. Education:  [x] Yes  [] No  [x] Reviewed Prior HEP/Ed  Method of Education: [x] Verbal  [x] Demo  [] Written   fall prevention 7/11/22  Comprehension of Education:  [x] Verbalizes understanding. [x] Demonstrates understanding. [] Needs review. [x] Demonstrates/verbalizes HEP/Ed previously given. Plan: [x] Continue current frequency toward long and short term goals.     [x] Specific Instructions for subsequent treatments:  balance training with Xcite, joint stability, increase ankle ROM, quick step outs, SLS        Time In: 09:03 am          Time Out: 9:52 am     Electronically signed by:  Ashu Buckley, PT

## 2022-11-10 ENCOUNTER — HOSPITAL ENCOUNTER (OUTPATIENT)
Dept: PHYSICAL THERAPY | Age: 47
Setting detail: THERAPIES SERIES
Discharge: HOME | End: 2022-11-10
Payer: MEDICARE

## 2022-11-10 ENCOUNTER — HOSPITAL ENCOUNTER (OUTPATIENT)
Dept: OCCUPATIONAL THERAPY | Age: 47
Setting detail: THERAPIES SERIES
Discharge: HOME | End: 2022-11-10
Payer: MEDICARE

## 2022-11-10 PROCEDURE — 97112 NEUROMUSCULAR REEDUCATION: CPT

## 2022-11-10 PROCEDURE — 97110 THERAPEUTIC EXERCISES: CPT

## 2022-11-10 NOTE — FLOWSHEET NOTE
[x] Tex Paz       Occupational Therapy            1st floor       [address], [place]         Phone: [phone]       Fax: [phone] [] [facility]  Occupational Therapy  [address] [place] [address]  Phone: [phone]  Fax: [phone]      Occupational Therapy Daily Treatment Note    Date:  11/10/2022  Patient Name:  Charissa Corbin    :  1975  MRN: [id number]  Physician: RACHAEL Stark                             Insurance: Medicare ([address] [place] [address]) - Visits based on medical [facility]  Medical Diagnosis: I63.9 (ICD-10-CM) - Cerebrovascular accident (CVA), unspecified mechanism ([facility] [address]  Rehab Codes: R26.89, R53.1, M54.59, M54.2, R29.3,   Next 's Appt: TBD  Date of Onset: 22   Visit# / total visits: ; Progress note for Medicare patient completed at visit 7  Cancels/No Shows: 2/0      Subjective:    Pain:  [x] Yes  [] No Location: R ankle  Pain Rating: (0-10 scale) 0/10  Pain altered Tx:  [x] No  [] Yes  Action: NA  Pt Comments: Reports feeling pretty good upon arrival.     Precautions: none  Surgery procedure and date: NA    Objective:   Today's Treatment:  Modalities: NA  Exercises:  EXERCISE    REPS/     TIME  WEIGHT/    LEVEL COMMENTS   Scapular Exercises with Mirror 10 reps  1 pound Not Completed - at home only    Digit AROM Exercises 10 reps each  Not Completed, Issued as part of HEP   Foam Cube /Pinch 20 reps each green Not completed - at home   Velcro Pegboard 1 series  Completed   Clothespin Foam Cube Pinch 1 series green Completed   Large Pegboard, Crossing Midline 3 rows  Not Completed   Digi-Flex 20 reps (1 digit at a time) red (3 pounds) Completed - stopped activity due to being too awkward to hold onto and not beneficial for pt   Metal Hand Exerciser 40 reps 35 pounds Completed   Individual pinches - foam block  15 reps each digit Pink Added & completed    Pebble Grasp and Release 1 series  Not Completed   [place]iota palm to finger Translations 1 series  Completed   Weighted Bar Bicep Curls/Chest Press/Front raises  20 reps each, 2 sets  5 pounds Completed    Grooved Pegboard 20 pegs   Completed    Flexbar   strength  Wrist Flexion/Ext  Wrist pronation  Wrist supination  Wrist ulnar deviation  Wrist radial dev  Shoulder Adb  Shoulder Add   15 reps each  Red Completed    Other: NA      Assessment: [x] Progressing toward goals. Scapular movements are symmetrical and functional. Encouraged pt to continue with scapular exercises daily to maintain ROM and function - voiced understanding. Displays difficulty with using 2point and 3jaw pinches to  various items. Generally utilizes a lateral pinch to  items. Mod difficulty with manipulating marbles in hand. Moderate difficulty completing grooved peg board activity. Increased time needed to complete all activities. Utilizes a modified  on pinch pin to complete activity. RUE fatigues quickly with strengthening exercises. Demo fair control with RUE when completing flexbar exercises. Continued weakness and fatigue with RUE. Mod difficulty completing weighted bar exercises. Moderate difficulty completing opposition to all digits. Encouraged pt to work on this skill at home - voiced understanding. [] No change. [] Other     [x] Patient would continue to benefit from skilled occupational therapy services in order to:   Improve  functional /grasp, Motor control/, ROM, Strength, and Activity tolerance in order to ensure good follow through and improve functional use of UE in ADL performance    Short Term Goals: (  8    Treatments)  Pt to increase RUE ROM for at least 2 planes by 10 degrees actively - MET  Increase gross UE strength  as evidenced by an increase in 1 grade for all planes bilaterally to improve safety and stability with functional transfers - MET   Increase bilateral  strength by 10  lbs to improve functional participation in ADL/IADL tasks - MET  Increase all pinch strengths bilaterally by 3 lbs to improve functional participation in ADL/IADL tasks - NOT MET  Increase function:UE Functional Index Score 5 or more points to promote increased functional abilities - MET  Patient to be independent with home exercise program as demonstrated by performance with correct form without cues. - MET  Increase R side [facility] as evidenced by a decrease in 8 seconds  with the 9-hole peg test - MET  Increase coordination as evidenced by an increase in 5 blocks completed during the box and blocks assessment to indicate significant clinical improvement - NOT MET, 2 blocks         Long Term Goals: (  16  Treatments)  Pt to increase RUE ROM for at least 2 planes by 15+ degrees actively  Increase gross UE strength  as evidenced by an increase in 2 grades for all planes bilaterally to improve safety and stability with functional transfers  Increase bilateral  strength by 15  lbs to improve functional participation in ADL/IADL tasks  Increase all pinch strengths bilaterally by 5 lbs to improve functional participation in ADL/IADL tasks  Increase function:UE Functional Index Score 10 or more points to promote increased functional abilities  Increase R side [facility] as evidenced by a decrease in 14 seconds  with the 9-hole peg test  Modified 10/18/22 - Increase coordination as evidenced by an increase in 5 blocks completed during the box and blocks  to indicate significant clinical improvement     Patient Goals: To get stronger and use R hand fully         Pt. Education:  [] Yes  [x] No  [] Reviewed Prior HEP/Ed  Method of Education: [] Verbal  [] Demo  [] Written  Re:   Comprehension of Education:  [] Verbalizes understanding. [] Demonstrates understanding. [] Needs review. [] Demonstrates/verbalizes HEP/Ed previously given. Plan: [x] Continue current frequency toward short and long term goals.   [x] Specific Instructions for subsequent treatments: opposition exercises     [] Other: Time In: 1005  Time Out: 1100        Treatment Charges: Mins Units   []  Modalities:      []  Ultrasound     [x]  Ther Exercise 55 4   []  Manual Therapy     []  Ther Activities     []  Orthotic fit/train     []  Orthotic recheck     []  Other     Total Treatment time 55 4        Medicare Cap   [] Physical Therapy   [] Speech Therapy      [x] Occupational therapy  *PT and Speech caps combine                                                                                  $2150 Limit for PT and Speech combined  $2150 Limit for OT individually  At the beginning of the month where you expect to go over $2150, please add the [address] modifier       Patient Name: Kev Redding: 1975     Note:  This is an estimate of charges billed.               Date of [address] Name # units/ charge $$$ charge Daily Total Charge Ongoing Total $$$   8/31/22 OT Eval Mod   TE 1  1 95.15  22.84 118.99 118.99   9/7/22 TE 3 29.21  22.84  22.84 74.89 193.88   9/13/22 TE 3  29.21  22.84  22.84 74.89 268.77   9/15/22 TE 3 29.21  22.84  22.84  74.89  343.66   9/22/22  TE 3  29.21  22.84  22.84  74.89  418.55    9/27/22  TE  3 29.21  22.84  22.84 74.89  493.44   10/18/22   TE 4  29.21+22.84+22.84+22.84  97.73  591.17   10/27/22   TE  4 29.21+22.84+22.84+22.84  97.73  688.90   11/3/22   TE  3 29.21+22.84+22.84  74.89  763.69   11/8/22   TE  3 29.21+22.84+22.84  74.89  838.58   11/10/22 TE 4 29.21+22.84+22.84+22.84 97.73 936.31     Electronically signed by:  ANDREA Cordero/JOHN

## 2022-11-10 NOTE — FLOWSHEET NOTE
[x] [facility]) [facility] &  Therapy  [address]  [phone]  F: [phone] [x] [address]  [address]   [address]  P: [phone]  F: [phone] [] [address] &  Therapy  [address]  P: [phone]  F: [phone] [] [address]  P: [phone]  F: [phone] [] [address]  [facility]   [address]   [place]: [phone]  F: [phone]      Physical Therapy Daily Treatment    Date:  11/10/2022  Patient Name:  Abebe Fagan    :  1975  MRN: [id number]  Physician: RACHAEL Foster                           Insurance: Medicare ([address] [place] [address])  Medical Diagnosis: I63.9 (ICD-10-CM) - Cerebrovascular accident (CVA), unspecified mechanism ([facility] [address]  Rehab Codes: R26.89, R53.1, M54.59, M54.2, R29.3,   Next 's appt.:  with neuro  Date of symptom onset: 22   Visit# / total visits:  (additional visits approved 10/6)   Cancels/No Shows: 5/0 (Last 30 days)    Subjective:    Pain:  [] Yes  [x] No Location: N/A Pain Rating: (0-10 scale) 0/10  Pain altered Tx:  [x] No  [] Yes  Action:  Comments:  Patient reports he kick back in the R knee is not as bad as it was on Tuesday.       Objective:   Sensation: absent to light touch throughout B feet below ankle     Modalities: Precautions: Pt has dialysis 3x per week  Exercises:  BOLDED COMPLETED 11/10/2022:  Exercise RLE Reps/ Time Weight/ Level Comments   Nu step  6' Level 5                Standing       Gastroc stretch on incline 3x1 min  BUE assist   Resisted swing  2x10 Lime  Tapping 4\" step   Step up 1x12  1x8 4\" RLE  - very fatigued this date   LLE marching 3x10  Focus on RLE stance control   LLE step taps with resistance 3x10 4\" step Blue TB   LLE hip abd AROM 3x10  \" \"   Sit <> stand w/ RLE bias 8x 2\" step under LLE Min A from therapist to ensure ant fwd weightshift, extension to stand   Resistive STS 10x Blue Added 10.20  Terminal extension  Mod UE pull   Resisted step over small round cone 2x10 Lime  With weight shift onto RLE   Heel taps to step  10x 4\"    Trunk/cervical rotation naming how many fingers clinician is holding  10xea  clinician standing behind pt    Picking up rings from steps  4x 4 rings;   6\" and 12\" step Wider JASIEL no UE; notes some irritation in LB; x2 leading with R UE, x2 leading with L UE and handing to clinician at multiple angles   Resisted swing phase fwd and retro amb on way back  5xea // bars;   Lime  Resisted fwd walk    Step over round cone  10xea // bars     TKE 20x Blue Added 10.20               Balance      Posterior perturbations 3x10  No UE assist  Step back  Hip strategies   Anterior perturbations 2x10     Fwd step outs 1x12  Added 10.25  Anterior lean w/ quick step out   Retro step outs 10x3 ea  Started with slow step backs  Cues for quicker steps on L only   Hurricane sway 3X30\"  Ankle strategy skill  Pt self leaning in all planes -   DF/PF/EV/IV/diagonal    LLE marches      AirEx 2x15  Unilat UE assist  L foot IV new     Squats 1x6  1x10   1 lb cane No UE assist  2nd set with bringing 1 lb cane fwd tapping on // bar  Limited with back pain   Trunk rotation with ball 2x10 ea Volleyball    Shoulder press with ball 10x2 7\" ball    Roll ball up on wall 10x yellow Good ankle strategy   NBOS 3x1 min     NBOS with EC 2x30\"     NBOS PNF patterns 2x10  Volleyball  W/ visual tracking with head movement   NBOS on foam  2x30\"  Cues for ankle strategies   NBOS with ball raises, rotation 2x30\"ea Volley  ball     Tandem stance 2x20\"     Cone  off ground 2x5 5 cones    Standing on foam with head turns  30\" ea  Up/down, rotation    Cone reach with LUE across body 2x5 5 cones    Cone reach with LUE to L side 2x5 5 cones    Heel taps 10x2 ea 4in With 1 hand for UE support  Poor tolerance of No UE supports w/ 2 LOB with lateral sway   Dynamic march with retro hamstring curl 3L  No UE support   Side stepping 3L // bars  No UE-1 light UE support   360 turn 2x  Clockwise more challenging   Marches  2x20\"  No UE   Alt toe taps to foam  2x30\"  No UE                     FES modalities      Gait training with Bioness 50 min Lower and thigh cuff Initial set up  STS 2x10, LLE on 2\" step  Hip abd  Marches  Adjustments for thigh cuff more proximal  Gait analysis   Change in speed  4 sit down breaks   Xcite training 40 min R hams  R glutes  R quads  R PFs Set up in back room  STS program  STS w/ LLE on 2\" step: 2x10, min A    STS \"pause\" mode: Unilat UE assist  Step taps: 2x10, 4\" step  Hip abd/ext: 2x10 ea  Hip abd L sided - 10x   Balance without UE 2x30 sec  Reaching for ball touches 10x  Punching of pad 10x2  (Reaching&punches across body)      Xcite training ankle pumps 20 min 4 x 10 Set up in supine, head elevated  Ant/ tib placement adjusted 3x to execute DF and EV  Gastroc pads added on 3rd and 4th set                     PROM      Ankle DF PROM 5x1 min ea  By therapist  Doffed AFO   Ankle pronation 10x     Heel slides 20x   Required stabilization of ankle   SLR 10x2     SAQ 10x2           Other:       Treatment Charges: Mins Units   []  Modalities     [x]  Ther Exercise 20 1   []  Manual Therapy     []  Ther Activities     []  Aquatics     []  Vasocompression     [x]  Other: Neuro re-ed 30 2   []  Other: Gait     Total Treatment time 50 3       Assessment: [x] Progressing toward goals. Patient demonstrated good carry over from previous training of hip strategies. Worked on both hip strategies and stepping for balance with pertubation's. Sit to stands completed with 2\" box under L foot to focus strengthening the R leg. Poor L quad eccentric control with step downs. Added PNF patterns with NBOS, good visual tracking. [] No change.        [x] Other: [x] Patient would continue to benefit from skilled physical therapy services in order to: address R hemibody weakness, improve gait mechanics and efficiency, and increase standing static/dynamic balance to allow improved household ADL/IADL completion and reduce fall risk. STG: (to be met in 8 treatments) - Assessed by Torrey Blevins PT on 7/20:  ? Pain: No more than 4/10 pain reported in low back or neck post therapy sessions to indicate improving tolerance to increased activity and exercise - MET for low back or neck, but L shoulder is 2/10 - newer pain over the past 3 weeks. ? ROM: at least 0deg DF PROM with knee extended to indicate improving mobility in ankles to increase heel strike and reduce toe drag with prolonged ambulation - NOT MET, lacking 10 deg from neutral in L ankle, lacking 3 deg from neutral in R ankle  ? Balance:   Pt able to fully turn to look behind himself without instability in either direction to indicate improving postural stability - NOT MET, LOB with full turn to R and L  Pt able to complete standing reaching tasks without UE assist and no more than mild instability to indicate improving dynamic balance for safety with household IADLs - NOT MET, primarily needs to be supported at trunk on counter or holding onto something  ? Strength: At least 4+/5 grossly in R hip to allow improved pelvic stability in standing and improved swing phase control/speed during prolonged gait - NOT MET, 4-/5 grossly in R hip  Pt able to utilize RUE for fine grasp and release tasks with no more than minimally slowed speed evident - Ongoing, better fine grasp but still limited with heavier objects, twisting lid  ?  Function: Pt able to ambulate 450 ft with least restrictive device with no evidence of lagging RLE swing or abnormal stance time noted, with no more than 11 on RPE scale - NOT MET, achieves ~400 ft before needing to rest d/t RLE instability and R knee buckling, rates 13 RPE  Patient to be independent with home exercise program as demonstrated by performance with correct form without cues. - Ongoing   Demonstrate Knowledge of fall prevention - MET     LTG: (to be met in 16 treatments) - 8/22: Will continue with current goals, adjusting at visit 16 as needed d/t recent CVA on 8/8, Assessed by Kenneth Copeland, PT on 10/4   ? Pain: No more than 2/10 pain reported in low back or neck post therapy sessions to indicate improving tolerance to increased activity and exercise MET  ? ROM: at least 5 deg DF PROM with knee extended to indicate improving mobility in ankles to increase heel strike and reduce toe drag with prolonged ambulation. MET  ? Balance: At least 30/56 on Hernandez to indicate significant improvement in standing static/dynamic balance and progress towards reduced fall risk. 37/56 MET  Able to negotiate gait obstacles using rollator without increased instability or excessively slowed gait MET  ? Strength: At least 5-/5 grossly in R hip to allow further improved pelvic stability in standing and improved swing phase control/speed during prolonged gait MET  At least 5/5 B knee ext to prevent excessive buckling with prolonged standing Progress, some buckling still reported   ? Function:   Pt able to ambulate 600 ft with least restrictive device with appropriate RLE swing/stance phase timing, no evidence of toe drag, and no more than minimal fatigue by end of ambulation Progress  Pt able to ambulate 100 ft with intermittent turning, stopping, etc without assistive device and demonstrating good stability and no evidence of B knee buckling to indicate improving functional strength and safety with household ambulation distances for ADL/IADLs Not Met  No more than 14% impaired on ABC scale to indicate improving subjective balance confidence. Not Met      ABC score 41% impairment. New goals as of 10/6, to be met in next additional 12 visits, at total visit 28:        1.  Hernandez score to at least 45/56 to indicate reduced fall risk, progressive balance improvement        2. Ability to complete RLE SLS tasks with no UE assist and no buckling/instability noted. 3. All bracing needs for RLE will have been met at this time to improve stability in ankle, reduce falls        4. No more than 20% impaired on ABC scale to indicate subjective improvement in balance confidence. 5. At least 650 ft with least restrictive device with appropriate RLE swing/stance phase timing, no toe drag        Patient goals: \"to strengthen right side back as much as I can\"    Pt. Education:  [x] Yes  [] No  [x] Reviewed Prior HEP/Ed  Method of Education: [x] Verbal  [x] Demo  [] Written   fall prevention 7/11/22  Comprehension of Education:  [x] Verbalizes understanding. [x] Demonstrates understanding. [] Needs review. [x] Demonstrates/verbalizes HEP/Ed previously given. Plan: [x] Continue current frequency toward long and short term goals.     [x] Specific Instructions for subsequent treatments:  balance training out COG, R LE strengthening        Time In: 08:58 am          Time Out: 9:53 am     Electronically signed by:  Dilip Stringer PTA

## 2022-11-11 NOTE — ED PROVIDER NOTES
eMERGENCY dEPARTMENT eNCOUnter   [address] [place] Name: Jessie Lucas  MRN: [id number]  [place] 1975  Date of evaluation: 11/10/22     Jessie Lucas is a 49 y.o. male with CC: Constipation (Pt to ED for constipation, last BM 3 days ago/DM1, on dialysis, last was today. )        This visit was performed by both a physician and an APC. I performed all aspects of the MDM as documented. The care is provided during an unprecedented national emergency due to the novel coronavirus, COVID 19.     Rubi Ocasio MD  Attending Emergency Physician            Rubi Ocasio MD  11/10/22 5747

## 2022-11-15 ENCOUNTER — HOSPITAL ENCOUNTER (OUTPATIENT)
Dept: OCCUPATIONAL THERAPY | Age: 47
Setting detail: THERAPIES SERIES
Discharge: HOME | End: 2022-11-15
Payer: MEDICARE

## 2022-11-15 ENCOUNTER — HOSPITAL ENCOUNTER (OUTPATIENT)
Dept: PHYSICAL THERAPY | Age: 47
Setting detail: THERAPIES SERIES
Discharge: HOME | End: 2022-11-15
Payer: MEDICARE

## 2022-11-15 PROCEDURE — 97140 MANUAL THERAPY 1/> REGIONS: CPT

## 2022-11-15 PROCEDURE — 97110 THERAPEUTIC EXERCISES: CPT

## 2022-11-15 NOTE — FLOWSHEET NOTE
[x] [facility]) [facility] &  Therapy  [address]  [phone]  F: [phone] [x] [address]  [facility]   [address]  P: [phone]  F: [phone] [] [address]  [address]  P: [phone]  F: [phone] [] [address]  P: [phone]  F: [phone] [] [address]  [facility]   [address]   [place]: [phone]  F: [phone]      Physical Therapy Daily Treatment    Date:  11/15/2022  Patient Name:  Mirta Sandhu    :  1975  MRN: [id number]  Physician: RACHAEL Liu                           Insurance: Medicare ([address] [place] [address])  Medical Diagnosis: I63.9 (ICD-10-CM) - Cerebrovascular accident (CVA), unspecified mechanism ([facility] [address]  Rehab Codes: R26.89, R53.1, M54.59, M54.2, R29.3,   Next 's appt.:  with neuro  Date of symptom onset: 22   Visit# / total visits:  (additional visits approved 10/6)   Cancels/No Shows: 5/0 (Last 30 days)    Subjective:    Pain:  [] Yes  [x] No Location: N/A Pain Rating: (0-10 scale) 0/10  Pain altered Tx:  [x] No  [] Yes  Action:  Comments:  Patient arrives to therapy noting no change from last visit. Continues to be tired due to dialysis and settling into new house.        Objective:   Sensation: absent to light touch throughout B feet below ankle     Modalities: Precautions: Pt has dialysis 3x per week  Exercises:  BOLDED COMPLETED 11/15/2022:  Exercise RLE Reps/ Time Weight/ Level Comments   Nu step  6' Level 5 Held 11.15 d/t availability               Standing       Gastroc stretch on incline 3x1 min  BUE assist   Resisted swing  2x10 Lime  Tapping 4\" step   Step up 1x12  1x8 4\" RLE  - very fatigued this date   LLE marching 3x10  Focus on RLE stance control   LLE step taps with resistance 3x10 4\" step Blue TB   LLE hip abd AROM 3x10  \" \"   Sit <> stand w/ RLE bias 8x 2\" step under LLE Min A from therapist to ensure ant fwd weightshift, extension to stand   Resistive STS 10x Blue Added 10.20  Terminal extension  Mod UE pull   Resisted step over small round cone 2x10 Lime  With weight shift onto RLE   Heel taps to step  10x 4\"    Trunk/cervical rotation naming how many fingers clinician is holding  10xea  clinician standing behind pt    Picking up rings from steps  4x 4 rings;   6\" and 12\" step Wider JASIEL no UE; notes some irritation in LB; x2 leading with R UE, x2 leading with L UE and handing to clinician at multiple angles   Resisted swing phase fwd and retro amb on way back  5xea // bars;   Lime  Resisted fwd walk    Step over round cone  10xea // bars     TKE 20x Blue Added 10.20               Balance      Posterior perturbations 3x10  No UE assist  Step back  Hip strategies   Anterior perturbations 2x10     Fwd step outs 1x12  Added 10.25  Anterior lean w/ quick step out   Retro step outs 10x3 ea  Started with slow step backs  Cues for quicker steps on L only   Hurricane sway 3X30\"  Ankle strategy skill  Pt self leaning in all planes -   DF/PF/EV/IV/diagonal    LLE marches      AirEx 2x15  Unilat UE assist  L foot IV new     Squats 1x6  1x10   1 lb cane No UE assist  2nd set with bringing 1 lb cane fwd tapping on // bar  Limited with back pain   Trunk rotation with ball 2x10 ea Volleyball    Shoulder press with ball 10x2 7\" ball    Roll ball up on wall 10x yellow Good ankle strategy   NBOS 3x1 min     NBOS with EC 2x30\"     NBOS PNF patterns 2x10  Volleyball  W/ visual tracking with head movement   NBOS on foam  2x30\"  Cues for ankle strategies   NBOS with ball raises, rotation 2x30\"ea Volley  ball     Tandem stance 2x20\"     Cone  off ground 2x5 5 cones    Standing on foam with head turns  30\" ea  Up/down, rotation    Cone reach with LUE across body 2x5 5 cones    Cone reach with LUE to L side 2x5 5 cones    Heel taps 10x2 ea 4in With 1 hand for UE support  Poor tolerance of No UE supports w/ 2 LOB with lateral sway   Dynamic march with retro hamstring curl 3L  No UE support   Side stepping 3L // bars  No UE-1 light UE support   360 turn 2x  Clockwise more challenging   Marches  2x20\"  No UE   Alt toe taps to foam  2x30\"  No UE                     FES modalities      Gait training with Bioness 50 min Lower and thigh cuff Initial set up  STS 2x10, LLE on 2\" step  Hip abd  Marches  Adjustments for thigh cuff more proximal  Gait analysis   Change in speed  4 sit down breaks   Xcite training 40 min R hams  R glutes  R quads  R PFs Set up in back room  STS program  STS w/ LLE on 2\" step: 2x10, min A    STS \"pause\" mode: Unilat UE assist  Step taps: 2x10, 4\" step  Hip abd/ext: 2x10 ea  Hip abd L sided - 10x   Balance without UE 2x30 sec  Reaching for ball touches 10x  Punching of pad 10x2  (Reaching&punches across body)      Xcite training ankle pumps 20 min 4 x 10 Set up in supine, head elevated  Ant/ tib placement adjusted 3x to execute DF and EV  Gastroc pads added on 3rd and 4th set                     PROM      Ankle DF PROM 10x1 min   By therapist  Doffed AFO   Ankle pronation 10x2 AA    DF/PF 10x2 A    Heel slides 20x2 A Required stabilization of ankle   SLR 10x2 A Bilaterally   SAQ 10x2 A    Hip add 10x3 A    Hip abd 10x3 A    Marches 10x2 A    Resistive marches 10x2 blue    Resistive hip and knee extension 10x3 blue Loop around ankle, wrapped laterally, under knee then up through legs for EV assist   Bridges 10x3     Other:       Treatment Charges: Mins Units   []  Modalities     [x]  Ther Exercise 48 3   [x]  Manual Therapy 10 1   []  Ther Activities     []  Aquatics     []  Vasocompression     []  Other: Neuro re-ed     []  Other: Gait     Total Treatment time 58 4       Assessment: [x] Progressing toward goals.   Focused on LE strengthening with exercises for HEP. Increased reps with SLR, heel slides, SAQ, EV and DF/PF use AROM. Breaks taken between sets and swapped between exercise to avoid fatigue. Good carry over with bridges and marches with improved core stability. Progressed to resistive hip and knee flexion/ext with use of TB. Additional time spent with understanding and able to demonstrate new HEP. Difficulty getting R foot into loop of thera band initially but improved with larger loop made for home use. Completed therapy with patient reporting he was tired and R leg feeling weak. [] No change. [] Other:    [x] Patient would continue to benefit from skilled physical therapy services in order to: address R hemibody weakness, improve gait mechanics and efficiency, and increase standing static/dynamic balance to allow improved household ADL/IADL completion and reduce fall risk. STG: (to be met in 8 treatments) - Assessed by Bin Cates PT on 7/20:  ? Pain: No more than 4/10 pain reported in low back or neck post therapy sessions to indicate improving tolerance to increased activity and exercise - MET for low back or neck, but L shoulder is 2/10 - newer pain over the past 3 weeks. ? ROM: at least 0deg DF PROM with knee extended to indicate improving mobility in ankles to increase heel strike and reduce toe drag with prolonged ambulation - NOT MET, lacking 10 deg from neutral in L ankle, lacking 3 deg from neutral in R ankle  ? Balance:   Pt able to fully turn to look behind himself without instability in either direction to indicate improving postural stability - NOT MET, LOB with full turn to R and L  Pt able to complete standing reaching tasks without UE assist and no more than mild instability to indicate improving dynamic balance for safety with household IADLs - NOT MET, primarily needs to be supported at trunk on counter or holding onto something  ? Strength:    At least 4+/5 grossly in R hip to allow improved pelvic stability in standing and improved swing phase control/speed during prolonged gait - NOT MET, 4-/5 grossly in R hip  Pt able to utilize RUE for fine grasp and release tasks with no more than minimally slowed speed evident - Ongoing, better fine grasp but still limited with heavier objects, twisting lid  ? Function: Pt able to ambulate 450 ft with least restrictive device with no evidence of lagging RLE swing or abnormal stance time noted, with no more than 11 on RPE scale - NOT MET, achieves ~400 ft before needing to rest d/t RLE instability and R knee buckling, rates 13 RPE  Patient to be independent with home exercise program as demonstrated by performance with correct form without cues. - Ongoing   Demonstrate Knowledge of fall prevention - MET     LTG: (to be met in 16 treatments) - 8/22: Will continue with current goals, adjusting at visit 16 as needed d/t recent CVA on 8/8, Assessed by Jessica Valentin, PT on 10/4   ? Pain: No more than 2/10 pain reported in low back or neck post therapy sessions to indicate improving tolerance to increased activity and exercise MET  ? ROM: at least 5 deg DF PROM with knee extended to indicate improving mobility in ankles to increase heel strike and reduce toe drag with prolonged ambulation. MET  ? Balance: At least 30/56 on Hernandez to indicate significant improvement in standing static/dynamic balance and progress towards reduced fall risk. 37/56 MET  Able to negotiate gait obstacles using rollator without increased instability or excessively slowed gait MET  ? Strength: At least 5-/5 grossly in R hip to allow further improved pelvic stability in standing and improved swing phase control/speed during prolonged gait MET  At least 5/5 B knee ext to prevent excessive buckling with prolonged standing Progress, some buckling still reported   ?  Function:   Pt able to ambulate 600 ft with least restrictive device with appropriate RLE swing/stance phase timing, no evidence of toe drag, and no more than minimal fatigue by end of ambulation Progress  Pt able to ambulate 100 ft with intermittent turning, stopping, etc without assistive device and demonstrating good stability and no evidence of B knee buckling to indicate improving functional strength and safety with household ambulation distances for ADL/IADLs Not Met  No more than 14% impaired on ABC scale to indicate improving subjective balance confidence. Not Met      ABC score 41% impairment. New goals as of 10/6, to be met in next additional 12 visits, at total visit 28:        1. Hernandez score to at least 45/56 to indicate reduced fall risk, progressive balance improvement        2. Ability to complete RLE SLS tasks with no UE assist and no buckling/instability noted. 3. All bracing needs for RLE will have been met at this time to improve stability in ankle, reduce falls        4. No more than 20% impaired on ABC scale to indicate subjective improvement in balance confidence. 5. At least 650 ft with least restrictive device with appropriate RLE swing/stance phase timing, no toe drag        Patient goals: \"to strengthen right side back as much as I can\"    Pt. Education:  [x] Yes  [] No  [x] Reviewed Prior HEP/Ed  Method of Education: [x] Verbal  [x] Demo  [] Written   fall prevention 7/11/22  Comprehension of Education:  [x] Verbalizes understanding. [x] Demonstrates understanding. [] Needs review. [x] Demonstrates/verbalizes HEP/Ed previously given. Access Code: [email or web address]: [email or web address]: 11/15/2022Prepared by: Karthik Dodd   Supine Knee and Hip Extension with Resistance - 1 x daily - 7 x weekly - 3 sets - 10 reps   Seated Hip Abduction with Resistance - 1 x daily - 7 x weekly - 3 sets - 10 reps   Hip add - 1 x daily - 7 x weekly - 3 sets - 10 reps   SLR - 1 x daily - 7 x weekly - 3 sets - 10 reps   Supine March with Resistance Band - 1 x daily - 7 x weekly - 3 sets - 10 reps    Plan: [x] Continue current frequency toward long and short term goals.     [x] Specific Instructions for subsequent treatments:  LE strengthening, ScitFit mod intensity, Excite        Time In: 09:00 am           Time Out: 9:58 am     Electronically signed by:  Asuncion Yeung PTA

## 2022-11-15 NOTE — FLOWSHEET NOTE
[x] [facility]       Occupational Therapy            1st floor       [address], [place]         Phone: [phone]       Fax: [phone] [] [facility]  Occupational Therapy  [address]  [place], [place]  Phone: [phone]  Fax: [phone]      Occupational Therapy Daily Treatment Note    Date:  11/15/2022  Patient Name:  Johnna Zhang    :  1975  MRN: [id number]  Physician: DANIEL Carrion-SHIMA                             Insurance: Medicare ([address] [place] [address]) - [facility]  Medical Diagnosis: I63.9 (ICD-10-CM) - Cerebrovascular accident (CVA), unspecified mechanism ([facility] [address]  Rehab Codes: R26.89, R53.1, M54.59, M54.2, R29.3,   Next 's Appt: TBD  Date of Onset: 22   Visit# / total visits: ; Progress note for Medicare patient completed at visit 7  Cancels/No Shows: 2/0      Subjective:    Pain:  [x] Yes  [] No Location: R ankle  Pain Rating: (0-10 scale) 0/10  Pain altered Tx:  [x] No  [] Yes  Action: NA  Pt Comments: Reports feeling pretty good upon arrival.     Precautions: none  Surgery procedure and date: NA    Objective:   Today's Treatment:  Modalities: NA  Exercises:  EXERCISE    REPS/     TIME  WEIGHT/    LEVEL COMMENTS   Scapular Exercises with Mirror 10 reps  1 pound Not Completed - at home only    Digit AROM Exercises 10 reps each  Not Completed, Issued as part of HEP   Velcro Pegboard 1 series  Completed   Clothespin Foam Cube Pinch 1 series green Completed   Large Pegboard, Crossing Midline 3 rows  Not Completed   Digi-Flex 20 reps (1 digit at a time) red (3 pounds) Not completed    Metal Hand Exerciser 40 reps 35 pounds Completed   Individual pinches - foam block  15 reps each digit Yellow  Completed    Pebble Grasp and Release 1 series  Not Completed   Cutler palm to finger Translations 1 series  Completed   Weighted Bar Bicep Curls/Chest Press/Front raises  20 reps each, 2 sets  5 pounds Completed    Grooved Pegboard 20 pegs   Completed    Picking up pennies from table top   Added & completed    Flexbar   strength  Wrist Flexion/Ext  Wrist pronation  Wrist supination  Wrist ulnar deviation  Wrist radial dev  Shoulder Adb  Shoulder Add   15 reps each  Red Completed    Other: NA      Assessment: [x] Progressing toward goals. Demonstrates difficulty with using 2point and 3jaw pinches to  various items. Generally utilizes a lateral pinch to  items. Mod difficulty with manipulating marbles in hand. Moderate difficulty completing grooved peg board activity. Increased time needed to complete all activities. Demonstrated improved 3jaw  on resistive pinch pin, able to maintain throughout activity with rest breaks. RUE fatigues quickly with strengthening exercises. Demo improved control with RUE when completing flexbar exercises. Continued weakness and fatigue with RUE. Mod difficulty completing weighted bar exercises, RUE not symmetrically to LUE. Moderate difficulty completing opposition to all digits. Added susannah activity to promote a 2point pinch, Min difficulty to complete. Encouraged pt to work on this skill at home - voiced understanding. [] No change. [] Other     [x] Patient would continue to benefit from skilled occupational therapy services in order to:   Improve  functional /grasp, Motor control/, ROM, Strength, and Activity tolerance in order to ensure good follow through and improve functional use of UE in ADL performance    Short Term Goals: (  8    Treatments)  Pt to increase RUE ROM for at least 2 planes by 10 degrees actively - MET  Increase gross UE strength  as evidenced by an increase in 1 grade for all planes bilaterally to improve safety and stability with functional transfers - MET   Increase bilateral  strength by 10  lbs to improve functional participation in ADL/IADL tasks - MET  Increase all pinch strengths bilaterally by 3 lbs to improve functional participation in ADL/IADL tasks - NOT MET  Increase function:UE Functional Index Score 5 or more points to promote increased functional abilities - MET  Patient to be independent with home exercise program as demonstrated by performance with correct form without cues. - MET  Increase R side FMC as evidenced by a decrease in 8 seconds  with the 9-hole peg test - MET  Increase coordination as evidenced by an increase in 5 blocks completed during the box and blocks assessment to indicate significant clinical improvement - NOT MET, 2 blocks         Long Term Goals: (  16  Treatments)  Pt to increase RUE ROM for at least 2 planes by 15+ degrees actively  Increase gross UE strength  as evidenced by an increase in 2 grades for all planes bilaterally to improve safety and stability with functional transfers  Increase bilateral  strength by 15  lbs to improve functional participation in ADL/IADL tasks  Increase all pinch strengths bilaterally by 5 lbs to improve functional participation in ADL/IADL tasks  Increase function:UE Functional Index Score 10 or more points to promote increased functional abilities  Increase R side [address] as evidenced by a decrease in 14 seconds  with the 9-hole peg test  Modified 10/18/22 - Increase coordination as evidenced by an increase in 5 blocks completed during the box and blocks  to indicate significant clinical improvement     Patient Goals: To get stronger and use R hand fully         Pt. Education:  [] Yes  [x] No  [] Reviewed Prior HEP/Ed  Method of Education: [] Verbal  [] Demo  [] Written  Re:   Comprehension of Education:  [] Verbalizes understanding. [] Demonstrates understanding. [] Needs review. [] Demonstrates/verbalizes HEP/Ed previously given. Plan: [x] Continue current frequency toward short and long term goals.   [x] Specific Instructions for subsequent treatments: cabinet reaching activities     [] Other:       Time In: 1000  Time Out: 1057        Treatment Charges: Mins Units   [] Modalities:      []  Ultrasound     [x]  Ther Exercise 57 4   []  Manual Therapy     []  Ther Activities     []  Orthotic fit/train     []  Orthotic recheck     []  Other     Total Treatment time 57 4        Medicare Cap   [] Physical Therapy   [] Speech Therapy      [x] Occupational therapy  *PT and Speech caps combine                                                                                  $2150 Limit for PT and Speech combined  $2150 Limit for OT individually  At the beginning of the month where you expect to go over $2150, please add the [address] modifier       Patient Name: Whit Hyman: 1975     Note:  This is an estimate of charges billed.               Date of [address] Name # units/ charge $$$ charge Daily Total Charge Ongoing Total $$$   8/31/22 OT Eval Mod   TE 1  1 95.15  22.84 118.99 118.99   9/7/22 TE 3 29.21  22.84  22.84 74.89 193.88   9/13/22 TE 3  29.21  22.84  22.84 74.89 268.77   9/15/22 TE 3 29.21  22.84  22.84  74.89  343.66   9/22/22  TE 3  29.21  22.84  22.84  74.89  418.55    9/27/22  TE  3 29.21  22.84  22.84 74.89  493.44   10/18/22   TE 4  29.21+22.84+22.84+22.84  97.73  591.17   10/27/22   TE  4 29.21+22.84+22.84+22.84  97.73  688.90   11/3/22   TE  3 29.21+22.84+22.84  74.89  763.69   11/8/22   TE  3 29.21+22.84+22.84  74.89  838.58   11/10/22 TE 4 29.21+22.84+22.84+22.84 97.73 936.31   11/15/22 TE 4 29.21+22.84+22.84+22.84 97.73 1,034.04     Electronically signed by:  Mariama Olson OTR/L

## 2022-11-17 ENCOUNTER — HOSPITAL ENCOUNTER (OUTPATIENT)
Dept: PHYSICAL THERAPY | Age: 47
Setting detail: THERAPIES SERIES
Discharge: HOME | End: 2022-11-17
Payer: MEDICARE

## 2022-11-17 ENCOUNTER — HOSPITAL ENCOUNTER (OUTPATIENT)
Dept: OCCUPATIONAL THERAPY | Age: 47
Setting detail: THERAPIES SERIES
Discharge: HOME | End: 2022-11-17
Payer: MEDICARE

## 2022-11-17 ENCOUNTER — OFFICE VISIT (OUTPATIENT)
Dept: NEUROLOGY | Age: 47
End: 2022-11-17
Payer: MEDICARE

## 2022-11-17 VITALS
DIASTOLIC BLOOD PRESSURE: 84 MMHG | HEART RATE: 72 BPM | HEIGHT: 68 IN | WEIGHT: 187.6 LBS | BODY MASS INDEX: 28.43 KG/M2 | SYSTOLIC BLOOD PRESSURE: 153 MMHG

## 2022-11-17 DIAGNOSIS — I63.9 CEREBROVASCULAR ACCIDENT (CVA), UNSPECIFIED MECHANISM (HCC): Primary | ICD-10-CM

## 2022-11-17 DIAGNOSIS — G47.30 SLEEP APNEA, UNSPECIFIED TYPE: ICD-10-CM

## 2022-11-17 DIAGNOSIS — R00.2 PALPITATIONS: ICD-10-CM

## 2022-11-17 PROCEDURE — 97110 THERAPEUTIC EXERCISES: CPT

## 2022-11-17 PROCEDURE — G8484 FLU IMMUNIZE NO ADMIN: HCPCS | Performed by: STUDENT IN AN ORGANIZED HEALTH CARE EDUCATION/TRAINING PROGRAM

## 2022-11-17 PROCEDURE — 97140 MANUAL THERAPY 1/> REGIONS: CPT

## 2022-11-17 PROCEDURE — G8427 DOCREV CUR MEDS BY ELIG CLIN: HCPCS | Performed by: STUDENT IN AN ORGANIZED HEALTH CARE EDUCATION/TRAINING PROGRAM

## 2022-11-17 PROCEDURE — 97112 NEUROMUSCULAR REEDUCATION: CPT

## 2022-11-17 PROCEDURE — G8417 CALC BMI ABV UP PARAM F/U: HCPCS | Performed by: STUDENT IN AN ORGANIZED HEALTH CARE EDUCATION/TRAINING PROGRAM

## 2022-11-17 PROCEDURE — 99204 OFFICE O/P NEW MOD 45 MIN: CPT | Performed by: STUDENT IN AN ORGANIZED HEALTH CARE EDUCATION/TRAINING PROGRAM

## 2022-11-17 PROCEDURE — 3078F DIAST BP <80 MM HG: CPT | Performed by: STUDENT IN AN ORGANIZED HEALTH CARE EDUCATION/TRAINING PROGRAM

## 2022-11-17 PROCEDURE — 1036F TOBACCO NON-USER: CPT | Performed by: STUDENT IN AN ORGANIZED HEALTH CARE EDUCATION/TRAINING PROGRAM

## 2022-11-17 PROCEDURE — 3074F SYST BP LT 130 MM HG: CPT | Performed by: STUDENT IN AN ORGANIZED HEALTH CARE EDUCATION/TRAINING PROGRAM

## 2022-11-17 NOTE — FLOWSHEET NOTE
[x] [address] [facility] [facility] TWELVESTEP [facility]  [facility]  [address]  [phone]  F: [phone] [x] [address]  [address]   [address]  P: [phone]  F: [phone] [] [address]  [address]  P: [phone]  F: [phone] [] [address]  P: [phone]  F: [phone] [] [address]  [facility]   [address]   [place]: [phone]  F: [phone]      Physical Therapy Daily Treatment    Date:  2022  Patient Name:  Ayaka Marie    :  1975  MRN: [id number]  Physician: DANIEL Garza-SHIMA                           Insurance: Medicare (Julieta Ohara)  Medical Diagnosis: I63.9 (ICD-10-CM) - Cerebrovascular accident (CVA), unspecified mechanism ([facility] [place] [address]  Rehab Codes: R26.89, R53.1, M54.59, M54.2, R29.3,   Next 's appt.:  with neuro  Date of symptom onset: 22   Visit# / total visits:  (additional visits approved 10/6)   Cancels/No Shows: 5/0 (Last 30 days)    Subjective:    Pain:  [] Yes  [x] No Location: N/A Pain Rating: (0-10 scale) 0/10  Pain altered Tx:  [x] No  [] Yes  Action:  Comments:  Patient arrives following OT. Reports he is very fatigued today and has 3 more appointments today.          Objective:   Sensation: absent to light touch throughout B feet below ankle     Modalities: Precautions: Pt has dialysis 3x per week  Exercises:  BOLDED COMPLETED 2022:  Exercise RLE Reps/ Time Weight/ Level Comments   Nu step  6' Level 5                Standing       Gastroc stretch on incline 3x1 min  BUE assist   Resisted swing  2x10 Lime  Tapping 4\" step   Step up 1x12  1x8 4\" RLE  - very fatigued this date   LLE marching 3x10  Focus on RLE stance control   LLE step taps with resistance 3x10 4\" step Blue TB   LLE hip abd AROM 3x10  \" \"   Sit <> stand w/ RLE bias 8x 2\" step under LLE Min A from therapist to ensure ant fwd weightshift, extension to stand   Resistive STS 10x Blue Added 10.20  Terminal extension  Mod UE pull   Resisted step over small round cone 2x10 Lime  With weight shift onto RLE   Heel taps to step  10x 4\"    Trunk/cervical rotation naming how many fingers clinician is holding  10xea  clinician standing behind pt    Picking up rings from steps  4x 4 rings;   6\" and 12\" step Wider JASIEL no UE; notes some irritation in LB; x2 leading with R UE, x2 leading with L UE and handing to clinician at multiple angles   Resisted swing phase fwd and retro amb on way back  5xea // bars;   Lime  Resisted fwd walk    Step over round cone  10xea // bars     TKE 20x Blue Added 10.20               Balance      Posterior perturbations 3x10  No UE assist  Step back  Hip strategies   Anterior perturbations 2x10     Fwd step outs 1x12  Added 10.25  Anterior lean w/ quick step out   Retro step outs 10x3 ea  Started with slow step backs  Cues for quicker steps on L only   Hurricane sway 3X30\"  Ankle strategy skill  Pt self leaning in all planes -   DF/PF/EV/IV/diagonal    LLE marches      AirEx 2x15  Unilat UE assist  L foot IV new     Squats 1x6  1x10   1 lb cane No UE assist  2nd set with bringing 1 lb cane fwd tapping on // bar  Limited with back pain   Trunk rotation with ball 2x10 ea Volleyball    Shoulder press with ball 10x2 7\" ball    Roll ball up on wall 10x yellow Good ankle strategy   NBOS 3x1 min     NBOS with EC 2x30\"     NBOS PNF patterns 2x10  Volleyball  W/ visual tracking with head movement   NBOS on foam  2x30\"  Cues for ankle strategies   NBOS with ball raises, rotation 2x30\"ea Volley  ball     Tandem stance 2x20\"     Cone  off ground 2x5 5 cones    Standing on foam with head turns  30\" ea  Up/down, rotation    Cone reach with LUE across body 2x5 5 cones    Cone reach with LUE to L side 2x5 5 cones    Heel taps 10x2 ea 4in With 1 hand for UE support  Poor tolerance of No UE supports w/ 2 LOB with lateral sway   Dynamic march with retro hamstring curl 3L  No UE support   Side stepping 3L // bars  No UE-1 light UE support   360 turn 2x  Clockwise more challenging   Marches  2x20\"  No UE   Alt toe taps to foam  2x30\"  No UE                     FES modalities      Gait training with Bioness 50 min Lower and thigh cuff Initial set up  STS 2x10, LLE on 2\" step  Hip abd  Marches  Adjustments for thigh cuff more proximal  Gait analysis   Change in speed  4 sit down breaks   Xcite training 40 min R hams  R glutes  R quads  R PFs Set up in back room  STS program  STS w/ LLE on 2\" step: 2x10, min A    STS \"pause\" mode: Unilat UE assist  Step taps: 2x10, 4\" step  Hip abd/ext: 2x10 ea  Hip abd L sided - 10x   Balance without UE 2x30 sec  Reaching for ball touches 10x  Punching of pad 10x2  (Reaching&punches across body)      Xcite training  20 min  Set up in supine, head elevated  Ankle pump setting on Xcite      * Lateral LE for ant tib w/            peroneals      * Gastrocs  Ankle pumps 10x4      * PROM b/w sets  Heel slides w/ Exite DF 10x4                       PROM      Ankle DF PROM 5x1 min   By therapist between sets of xcite  Doffed AFO   Ankle pronation 10x2 AA    Resistive PF 10x3  Pt pumped foot into soft ball   Resistive supination 10x2  Rolled B feet into ball   DF/PF 10x2 A    Heel slides 20x2 A Required stabilization of ankle   SLR 10x2 A Bilaterally   SAQ 10x2 A    Hip add 10x3 A    Hip abd 10x3 A    Marches 10x2 A    Resistive marches 10x2 blue    Resistive hip and knee extension 10x3 blue Loop around ankle, wrapped laterally, under knee then up through legs for EV assist   Bridges 10x3     Other: Adjustments made to Xcite patches for optimal muscle activation, don/doff system - all billed with neuro    Treatment Charges: Mins Units   []  Modalities     []  Ther Exercise     [x]  Manual Therapy 10 1 []  Ther Activities     []  Aquatics     []  Vasocompression     []  Other: Neuro re-ed 28 2   []  Other: Gait     Total Treatment time 38 3       Assessment: [] Progressing toward goals. [] No change. [x] Other: Focused on Xcite training with AROM for neuroplasticity of R lower extremity. Adjustments made to Ant-tib placement to more lateral to include peroneals for eversion. Completed low reps with increase sets for repetition of the skill without fatiguing the muscles. PROM performed between sets with alternating exercises. Improved neutral ankle joint alignment with estim during heel slides with use of Excite. [x] Patient would continue to benefit from skilled physical therapy services in order to: address R hemibody weakness, improve gait mechanics and efficiency, and increase standing static/dynamic balance to allow improved household ADL/IADL completion and reduce fall risk. STG: (to be met in 8 treatments) - Assessed by Fabiana Nguyen PT on 7/20:  ? Pain: No more than 4/10 pain reported in low back or neck post therapy sessions to indicate improving tolerance to increased activity and exercise - MET for low back or neck, but L shoulder is 2/10 - newer pain over the past 3 weeks. ? ROM: at least 0deg DF PROM with knee extended to indicate improving mobility in ankles to increase heel strike and reduce toe drag with prolonged ambulation - NOT MET, lacking 10 deg from neutral in L ankle, lacking 3 deg from neutral in R ankle  ? Balance:   Pt able to fully turn to look behind himself without instability in either direction to indicate improving postural stability - NOT MET, LOB with full turn to R and L  Pt able to complete standing reaching tasks without UE assist and no more than mild instability to indicate improving dynamic balance for safety with household IADLs - NOT MET, primarily needs to be supported at trunk on counter or holding onto something  ? Strength:    At least 4+/5 grossly in R hip to allow improved pelvic stability in standing and improved swing phase control/speed during prolonged gait - NOT MET, 4-/5 grossly in R hip  Pt able to utilize RUE for fine grasp and release tasks with no more than minimally slowed speed evident - Ongoing, better fine grasp but still limited with heavier objects, twisting lid  ? Function: Pt able to ambulate 450 ft with least restrictive device with no evidence of lagging RLE swing or abnormal stance time noted, with no more than 11 on RPE scale - NOT MET, achieves ~400 ft before needing to rest d/t RLE instability and R knee buckling, rates 13 RPE  Patient to be independent with home exercise program as demonstrated by performance with correct form without cues. - Ongoing   Demonstrate Knowledge of fall prevention - MET     LTG: (to be met in 16 treatments) - 8/22: Will continue with current goals, adjusting at visit 16 as needed d/t recent CVA on 8/8, Assessed by Karmen Marroquin, PT on 10/4   ? Pain: No more than 2/10 pain reported in low back or neck post therapy sessions to indicate improving tolerance to increased activity and exercise MET  ? ROM: at least 5 deg DF PROM with knee extended to indicate improving mobility in ankles to increase heel strike and reduce toe drag with prolonged ambulation. MET  ? Balance: At least 30/56 on Hernandez to indicate significant improvement in standing static/dynamic balance and progress towards reduced fall risk. 37/56 MET  Able to negotiate gait obstacles using rollator without increased instability or excessively slowed gait MET  ? Strength: At least 5-/5 grossly in R hip to allow further improved pelvic stability in standing and improved swing phase control/speed during prolonged gait MET  At least 5/5 B knee ext to prevent excessive buckling with prolonged standing Progress, some buckling still reported   ?  Function:   Pt able to ambulate 600 ft with least restrictive device with appropriate RLE swing/stance phase timing, no evidence of toe drag, and no more than minimal fatigue by end of ambulation Progress  Pt able to ambulate 100 ft with intermittent turning, stopping, etc without assistive device and demonstrating good stability and no evidence of B knee buckling to indicate improving functional strength and safety with household ambulation distances for ADL/IADLs Not Met  No more than 14% impaired on ABC scale to indicate improving subjective balance confidence. Not Met      ABC score 41% impairment. New goals as of 10/6, to be met in next additional 12 visits, at total visit 28:        1. Hernandez score to at least 45/56 to indicate reduced fall risk, progressive balance improvement        2. Ability to complete RLE SLS tasks with no UE assist and no buckling/instability noted. 3. All bracing needs for RLE will have been met at this time to improve stability in ankle, reduce falls        4. No more than 20% impaired on ABC scale to indicate subjective improvement in balance confidence. 5. At least 650 ft with least restrictive device with appropriate RLE swing/stance phase timing, no toe drag        Patient goals: \"to strengthen right side back as much as I can\"    Pt. Education:  [x] Yes  [] No  [x] Reviewed Prior HEP/Ed  Method of Education: [x] Verbal  [x] Demo  [] Written   fall prevention 7/11/22  Comprehension of Education:  [x] Verbalizes understanding. [x] Demonstrates understanding. [] Needs review. [x] Demonstrates/verbalizes HEP/Ed previously given. Access Code: [email or web address]: 11/15/2022Prepared by: Karthik Dodd   Supine Knee and Hip Extension with Resistance - 1 x daily - 7 x weekly - 3 sets - 10 reps   Seated Hip Abduction with Resistance - 1 x daily - 7 x weekly - 3 sets - 10 reps   Hip add - 1 x daily - 7 x weekly - 3 sets - 10 reps   SLR - 1 x daily - 7 x weekly - 3 sets - 10 reps   Supine March with Resistance Band - 1 x daily - 7 x weekly - 3 sets - 10 reps    Plan: [x] Continue current frequency toward long and short term goals.     [x] Specific Instructions for subsequent treatments:  LE strengthening, ScitFit mod intensity, Excite        Time In: 08:57 am           Time Out: 9:35 am     Electronically signed by:  Jose A Ortiz PTA

## 2022-11-17 NOTE — PROGRESS NOTES
[facility] NEUROLOGY SPECIALIST  [address] [facility] [address] [address]  Dept: [phone]    PATIENT NAME: Cherie Alvarado  PATIENT MRN: [id number]  PRIMARY CARE PHYSICIAN: DANIEL Whittington CNP    HPI:      Cherie Alvarado is a 49 y.o. male has medical history of diabetes and end-stage renal disease on hemodialysis, hypertension, diabetic peripheral neuropathy and prior strokes who presents to clinic today for evaluation of Cerebrovascular Accident     Patient was admitted in April 2022 when he presented with right leg and arm numbness, facial weakness that had been ongoing for 2 to 3 days prior to admission. Patient had a CT head which noted a small hypodensity in the left basal ganglia which MRI confirmed his left basal ganglia infarct. I brain also showed multiple small old lacunar infarcts and microvascular disease. MRA suggested asymmetric flow in the left transverse and sigmoid dural venous sinuses thought to be related to slow flow. He  was already on aspirin 81 mg and started on Plavix 75 mg for 21 days. Patient is also on Crestor. Had an echo in April 2022 which showed a normal left atrium, negative bubble study. LDL in August 2022 was 65    He also has bilateral foot drop requiring AFOs and what appears to be left ulnar nerve neuropathy with atrophy of intrinsic muscles of hands and presence of a mild Dupuytren's contracture left palm. Non-smoker. He does snore and has fragmented sleep. He notes he has unrefreshing sleep. PREVIOUS WORKUP:   CT HEAD WO CONTRAST     Result Date: 4/12/2022  There is a small hypodensity involving the left corona radiata, and extending inferiorly into the left basal ganglia. This could wrist present a infarct. No evidence of any hemorrhage or mass-effect. MRA head without contrast     Result Date: 4/12/2022  1. Small acute lacunar type infarct in the posterior left basal ganglia. No acute intracranial hemorrhage.  2.  Chronic small vessel ischemic disease. Small bilateral old lacunar infarcts. 3.  Asymmetric signal in the left transverse and sigmoid dural venous sinuses may relate to slow flow. Underlying thrombosis is difficult to exclude. 4.  Unremarkable MRA head. 5.  Unremarkable MRA neck. MRA neck without contrast     Result Date: 4/12/2022  1. Small acute lacunar type infarct in the posterior left basal ganglia. No acute intracranial hemorrhage. 2.  Chronic small vessel ischemic disease. Small bilateral old lacunar infarcts. 3.  Asymmetric signal in the left transverse and sigmoid dural venous sinuses may relate to slow flow. Underlying thrombosis is difficult to exclude. 4.  Unremarkable MRA head. 5.  Unremarkable MRA neck. MRI brain without contrast     Result Date: 4/12/2022  1. Small acute lacunar type infarct in the posterior left basal ganglia. No acute intracranial hemorrhage. 2.  Chronic small vessel ischemic disease. Small bilateral old lacunar infarcts. 3.  Asymmetric signal in the left transverse and sigmoid dural venous sinuses may relate to slow flow.   Underlying thrombosis         Past Medical History:   Diagnosis Date    Cerebral artery occlusion with cerebral infarction [facility])     Closed fracture of bone of right foot March - April 2020    Dialysis patient [facility])     Hemodialysis patient [facility])     Hyperlipidemia     Hypertension     Kidney disease     On Dialysis    Type 1 diabetes mellitus ([facility] [address]         Past Surgical History:   Procedure Laterality Date    AV FISTULA CREATION Left     COLONOSCOPY N/A 8/3/2022    COLONOSCOPY DIAGNOSTIC performed by Efraín Cadena MD at . [address] N/A 8/3/2022    EGD ESOPHAGOGASTRODUODENOSCOPY performed by Efraín Cadena MD at [address] History     Socioeconomic History    Marital status:      Spouse name: Not on file    Number of children: Not on file    Years of education: Not on file Highest education level: Not on file   Occupational History    Not on file   Tobacco Use    Smoking status: Never    Smokeless tobacco: Never   Vaping Use    Vaping Use: Never used   Substance and Sexual Activity    Alcohol use: Never    Drug use: Never    Sexual activity: Not on file   Other Topics Concern    Not on file   Social History Narrative    Not on file     Social Determinants of Health     Financial Resource Strain: Low Risk     Difficulty of Paying Living Expenses: Not hard at all   Food Insecurity: No Food Insecurity    Worried About Running Out of Food in the Last Year: Never true    Ran Out of Food in the Last Year: Never true   Transportation Needs: Not on file   Physical Activity: Not on file   Stress: Not on file   Social Connections: Not on file   Intimate Partner Violence: Not on file   Housing Stability: Not on file        Current Outpatient Medications   Medication Sig Dispense Refill    carvedilol (COREG) 25 MG tablet Take 1 tablet by mouth 2 times daily (Patient taking differently: Take 12.5 mg by mouth 2 times daily) 60 tablet 3    cyclobenzaprine (FLEXERIL) 5 MG tablet Take 1 tablet by mouth 3 times daily as needed for Muscle spasms 90 tablet 5    hydrALAZINE (APRESOLINE) 25 MG tablet Take 1 tablet by mouth 3 times daily (Patient taking differently: Take 12.5 mg by mouth 3 times daily) 270 tablet 1    colchicine (COLCRYS) 0.6 MG tablet       clopidogrel (PLAVIX) 75 MG tablet Take 1 tablet by mouth daily 90 tablet 1    ferrous sulfate (IRON 325) 325 (65 Fe) MG tablet Take 1 tablet by mouth daily (with breakfast) 90 tablet 1    furosemide (LASIX) 20 MG tablet Take 1 tablet by mouth in the morning. 30 tablet 0    omeprazole (PRILOSEC) 10 MG delayed release capsule Take 10 mg by mouth in the morning. sennosides-docusate sodium (SENOKOT-S) 8.6-50 MG tablet Take 1 tablet by mouth in the morning. loratadine (CLARITIN) 10 MG tablet Take 10 mg by mouth in the morning.       losartan (COZAAR) 25 MG tablet Take 25 mg by mouth in the morning. calcium acetate 667 MG TABS Take 1,334 mg by mouth 3 times daily (with meals) 180 tablet 1    insulin glargine (LANTUS SOLOSTAR) 100 UNIT/ML injection pen Inject 25 Units into the skin in the morning and 25 Units before bedtime. 5 pen 3    Insulin Pen Needle (MEIJER PEN NEEDLES) 31G X 8 MM MISC 1 each by Does not apply route daily 100 each 3    glucose 4 g chewable tablet Take 4 tablets by mouth as needed for Low blood sugar 60 tablet 3    Misc. Devices MISC Disp: custom molded shoes to accommodate for brace   DX: DM with history of stroke, foot drop  Duration: 1 year 2 each 0    insulin aspart (NOVOLOG) 100 UNIT/ML injection vial Inject 0-8 Units into the skin 3 times daily (before meals) SLIDING SCALE       FLUoxetine (PROZAC) 20 MG tablet Take 20 mg by mouth in the morning and 20 mg in the evening. Take 1 tablets (20 mg)  by mouth in the morning and at bedtime. buPROPion (WELLBUTRIN XL) 150 MG extended release tablet Take 1 tablet by mouth every morning 15 tablet 1    ezetimibe (ZETIA) 10 MG tablet Take 10 mg by mouth daily      aspirin 81 MG EC tablet Take 81 mg by mouth daily      Cyanocobalamin (VITAMIN B-12 PO) Take 2,500 mcg by mouth daily      epoetin pennie-epbx (RETACRIT) 2000 UNIT/ML injection Inject 8,000 Units into the skin once a week (Patient not taking: Reported on 11/17/2022)      atorvastatin (LIPITOR) 20 MG tablet Take 20 mg by mouth daily (Patient not taking: Reported on 11/17/2022)      gabapentin (NEURONTIN) 300 MG capsule Take 300 mg by mouth in the morning and 300 mg at noon and 300 mg before bedtime. (Patient not taking: Reported on 11/17/2022) 90 capsule 3     No current facility-administered medications for this visit.         No Known Allergies     REVIEW OF SYSTEMS:     Review of Systems       NEUROLOGICAL EXAMINATION:   VITALS  Ht 5' 7.5\" (1.715 m)   Wt 187 lb 9.6 oz (85.1 kg)   BMI 28.95 kg/m²      Mental status Alert and oriented x 3; intact memory with no confusion, speech or language problems; no hallucinations or delusions     Cranial nerves    II - visual fields intact to confrontation  III, IV, VI - extra-ocular muscles full: no pupillary defect; no MEDINA, no nystagmus, no ptosis   V - normal facial sensation                                                               VII - mild right facial asymmetry                                                     VIII - intact hearing                                                                             IX, X - symmetrical palate                                                                  XI - symmetrical shoulder shrug                                                       XII - tongue midline without atrophy or fasciculation      Motor function   4/+5 RUE, 4+/5 RLE, 5/5 LUE, 5/5  LLE. bilateral foot drop he has 0 foot dorsiflexion and 0 extensor houses longus extension. of first dorsal interossei and atrophy of many of the interossei of the hand and mild Dupuytren contracture of the left palm and right palm. Sensory function Intact to light touch, pinprick, vibration, proprioception on all 4 extremities      Cerebellar Intact fine motor movement. No involuntary movements or tremors. No ataxia or dysmetria on finger to nose or heel to shin testing      Reflex function 0/4 and symmetric throughout      Gait                   not tested        ASSESSMENT / PLAN:   Jessie Lucas is a 49 y.o. male that established care with neurology for left basal ganglia stroke in 4/2022. Patient presented in April 2022 and was noted to have a left basal ganglia stroke. Most likely etiology of stroke is small vessel given hypertension and diabetes. Echo showed a negative bubble study. We will proceed with cardiac monitoring given patient notes he sometimes feels like he has not abnormal heart rhythm.   Patient is still taking aspirin and Plavix and was only supposed to be on dual antiplatelet therapy for 21 days. Discussed stopping Plavix and continuing aspirin monotherapy. Plan to continue Crestor. LDL is at goal.  Given snoring, unrefreshing sleep, BMI of 28 and history of stroke we will proceed with a home sleep study for further evaluation.       Ifeanyi Fleming MD   Neurology & Sleep Medicine  [facility].

## 2022-11-17 NOTE — FLOWSHEET NOTE
[x] [facility]       Occupational Therapy            1st floor       [address], [place]         Phone: [phone]       Fax: [phone] [] [facility] Occupational Therapy  [address] [place] [address]  Phone: [phone]  Fax: [phone]      Occupational Therapy Daily Treatment Note    Date:  2022  Patient Name:  Annita Herron    :  1975  MRN: [id number]  Physician: RACHAEL Carrillo                             Insurance: Medicare ([address] [place] [address]) - [facility]  Medical Diagnosis: I63.9 (ICD-10-CM) - Cerebrovascular accident (CVA), unspecified mechanism ([facility] [address]  Rehab Codes: R26.89, R53.1, M54.59, M54.2, R29.3,   Next 's Appt: TBD  Date of Onset: 22   Visit# / total visits: ; Progress note for Medicare patient completed at visit 7  Cancels/No Shows: 2/0      Subjective:    Pain:  [x] Yes  [] No Location: R ankle  Pain Rating: (0-10 scale) 0/10  Pain altered Tx:  [x] No  [] Yes  Action: NA  Pt Comments: Reports feeling pretty good upon arrival. Reports no difficulty with being able to reach into cabinets, overhead shelves. Precautions: none  Surgery procedure and date: NA    Objective:   Today's Treatment:  Modalities: NA  Exercises:  EXERCISE    REPS/     TIME  WEIGHT/    LEVEL COMMENTS   Scapular Exercises with Mirror 10 reps  1 pound Not Completed - at home only    Digit AROM Exercises 10 reps each  Not Completed, Issued as part of HEP   Velcro Pegboard 1 series  Completed   Clothespin Foam Cube Pinch 1 series green Completed   Large Pegboard, Crossing Midline 3 rows  Not Completed   Metal Hand   Exerciser 25 reps 50 pounds Resistance increased, Completed   Individual pinches - foam block  15 reps each digit Yellow  Completed    Pebble Grasp and Release 1 series  Not Completed   [place] palm to finger Translations 1 series  Completed   Weighted Bar Bicep Curls/Chest Press/Front raises  20 reps each, 2 sets  5 pounds Completed    Grooved Pegboard 20 pegs   Completed    Picking up pennies from table top 1 series   Completed    Flexbar   strength  Wrist Flexion/Ext  Wrist pronation  Wrist supination  Wrist ulnar deviation  Wrist radial dev  Shoulder Adb  Shoulder Add   15 reps each  Green Resistance increased, Completed    Other: NA      Assessment: [x] Progressing toward goals. Demonstrates difficulty with using 2point and 3jaw pinches to  various items. Generally utilizes a lateral pinch to  items. Min difficulty with manipulating marbles in hand (IMPROVED). Manipulating grooved pegs becoming easier, taking less time to complete. Demonstrated improved 3jaw  on resistive pinch pin, able to maintain throughout activity with rest breaks. Endurance with RUE improving, not needing as many rest breaks throughout session. Continued weakness and fatigue with RUE. Mod difficulty completing weighted bar exercises, RUE not symmetrical to LUE. Moderate difficulty completing opposition to all digits. Continued susannah activity to promote a 2point pinch, Min difficulty to complete. [] No change. [] Other     [x] Patient would continue to benefit from skilled occupational therapy services in order to:   Improve  functional /grasp, Motor control/, ROM, Strength, and Activity tolerance in order to ensure good follow through and improve functional use of UE in ADL performance    Short Term Goals: (  8    Treatments)  Pt to increase RUE ROM for at least 2 planes by 10 degrees actively - MET  Increase gross UE strength  as evidenced by an increase in 1 grade for all planes bilaterally to improve safety and stability with functional transfers - MET   Increase bilateral  strength by 10  lbs to improve functional participation in ADL/IADL tasks - MET  Increase all pinch strengths bilaterally by 3 lbs to improve functional participation in ADL/IADL tasks - NOT MET  Increase function:UE Functional Index Score 5 or more points to promote increased functional abilities - MET  Patient to be independent with home exercise program as demonstrated by performance with correct form without cues. - MET  Increase R side FMC as evidenced by a decrease in 8 seconds  with the 9-hole peg test - MET  Increase coordination as evidenced by an increase in 5 blocks completed during the box and blocks assessment to indicate significant clinical improvement - NOT MET, 2 blocks         Long Term Goals: (  16  Treatments)  Pt to increase RUE ROM for at least 2 planes by 15+ degrees actively  Increase gross UE strength  as evidenced by an increase in 2 grades for all planes bilaterally to improve safety and stability with functional transfers  Increase bilateral  strength by 15  lbs to improve functional participation in ADL/IADL tasks  Increase all pinch strengths bilaterally by 5 lbs to improve functional participation in ADL/IADL tasks  Increase function:UE Functional Index Score 10 or more points to promote increased functional abilities  Increase R side [facility] as evidenced by a decrease in 14 seconds  with the 9-hole peg test  Modified 10/18/22 - Increase coordination as evidenced by an increase in 5 blocks completed during the box and blocks  to indicate significant clinical improvement     Patient Goals: To get stronger and use R hand fully         Pt. Education:  [] Yes  [x] No  [] Reviewed Prior HEP/Ed  Method of Education: [] Verbal  [] Demo  [] Written  Re:   Comprehension of Education:  [] Verbalizes understanding. [] Demonstrates understanding. [] Needs review. [] Demonstrates/verbalizes HEP/Ed previously given. Plan: [x] Continue current frequency toward short and long term goals.   [x] Specific Instructions for subsequent treatments: gross UE strengthening    [] Other:       Time In: 0802  Time Out: 8929        Treatment Charges: Mins Units   []  Modalities:      []  Ultrasound     [x]  Ther Exercise 54 4   []  Manual Therapy []  Ther Activities     []  Orthotic fit/train     []  Orthotic recheck     []  Other     Total Treatment time 54 4        Medicare Cap   [] Physical Therapy   [] Speech Therapy      [x] Occupational therapy  *PT and Speech caps combine                                                                                  $2150 Limit for PT and Speech combined  $2150 Limit for OT individually  At the beginning of the month where you expect to go over $2150, please add the [address] modifier       Patient Name: Padmini Comer: 1975     Note:  This is an estimate of charges billed.               Date of [address] Name # units/ charge $$$ charge Daily Total Charge Ongoing Total $$$   8/31/22 OT Eval Mod   TE 1  1 95.15  22.84 118.99 118.99   9/7/22 TE 3 29.21  22.84  22.84 74.89 193.88   9/13/22 TE 3  29.21  22.84  22.84 74.89 268.77   9/15/22 TE 3 29.21  22.84  22.84  74.89  343.66   9/22/22  TE 3  29.21  22.84  22.84  74.89  418.55    9/27/22  TE  3 29.21  22.84  22.84 74.89  493.44   10/18/22   TE 4  29.21+22.84+22.84+22.84  97.73  591.17   10/27/22   TE  4 29.21+22.84+22.84+22.84  97.73  688.90   11/3/22   TE  3 29.21+22.84+22.84  74.89  763.69   11/8/22   TE  3 29.21+22.84+22.84  74.89  838.58   11/10/22 TE 4 29.21+22.84+22.84+22.84 97.73 936.31   11/15/22 TE 4 29.21+22.84+22.84+22.84 97.73 1,034.04   11/17/22 TE 4 29.21+22.84+22.84+22.84 97.73 1131.77     Electronically signed by:  ANDREA Whitehead/JOHN

## 2022-11-22 ENCOUNTER — HOSPITAL ENCOUNTER (OUTPATIENT)
Dept: PHYSICAL THERAPY | Age: 47
Setting detail: THERAPIES SERIES
Discharge: HOME | End: 2022-11-22
Payer: MEDICARE

## 2022-11-22 ENCOUNTER — HOSPITAL ENCOUNTER (OUTPATIENT)
Dept: OCCUPATIONAL THERAPY | Age: 47
Setting detail: THERAPIES SERIES
Discharge: HOME | End: 2022-11-22
Payer: MEDICARE

## 2022-11-22 PROCEDURE — 97110 THERAPEUTIC EXERCISES: CPT

## 2022-11-22 PROCEDURE — 97116 GAIT TRAINING THERAPY: CPT

## 2022-11-22 PROCEDURE — 97112 NEUROMUSCULAR REEDUCATION: CPT

## 2022-11-22 NOTE — FLOWSHEET NOTE
[x] [facility]       Occupational Therapy            1st floor       [address], [place]         Phone: [phone]       Fax: [phone] [] [facility]  [address] [place] [address]  Phone: [phone]  Fax: [phone]      Occupational Therapy Daily Treatment Note    Date:  2022  Patient Name:  Jamee Smiley    :  1975  MRN: [id number]  Physician: RACHAEL Silva                             Insurance: Medicare ([address] [place] [address]) - [facility]  Medical Diagnosis: I63.9 (ICD-10-CM) - Cerebrovascular accident (CVA), unspecified mechanism ([facility] [address]  Rehab Codes: R26.89, R53.1, M54.59, M54.2, R29.3,   Next 's Appt: TBD  Date of Onset: 22   Visit# / total visits: 14; Progress note for Medicare patient completed at visit 7  Cancels/No Shows: 2/0      Subjective:    Pain:  [x] Yes  [] No Location: R ankle  Pain Rating: (0-10 scale) 0/10  Pain altered Tx:  [x] No  [] Yes  Action: NA  Pt Comments: No new changes or reports since last visit. Precautions: none  Surgery procedure and date: NA    Objective:   Today's Treatment:  Modalities: NA  Exercises:  EXERCISE    REPS/     TIME  WEIGHT/    LEVEL COMMENTS   Scapular Exercises with Mirror 10 reps  1 pound Not Completed - at home only    Digit AROM Exercises 10 reps each  Not Completed, Issued as part of HEP   Velcro Pegboard 1 series  Completed   Clothespin Foam Cube Pinch 1 series Green Completed   Large Pegboard, Crossing Midline 3 rows  Not Completed   Metal Hand   Exerciser 30 reps 50 pounds Reps increased, Completed   Individual pinches - foam block  15 reps each digit Yellow  Completed    Pebble Grasp and Release 1 series  Not Completed   [place] palm to finger Translations 1 series  Completed   Weighted Bar Bicep Curls/Chest Press/Front raises  20 reps each, 2 sets  5 pounds Completed    Grooved Pegboard 25 pegs   Reps increased, Completed    Picking up pennies from table top 1 series   Completed    Flexbar   strength  Wrist Flexion/Ext  Wrist pronation  Wrist supination  Wrist ulnar deviation  Wrist radial dev  Shoulder Adb  Shoulder Add   15 reps each  Green Completed    Other: NA      Assessment: [x] Progressing toward goals. Demonstrates difficulty with using 2point and 3jaw pinches to  various items. Generally utilizes a lateral pinch to  items. In-hand manipulations with marbles becoming easier. Manipulating grooved pegs becoming easier, taking less time to complete. Endurance with RUE improving, not needing as many rest breaks throughout session. Continued weakness and fatigue with RUE. Mod difficulty completing weighted bar exercises, RUE not symmetrical to LUE - trial mirror for feedback next session. Moderate difficulty completing opposition to all digits. Continued susannah activity to promote a 2point pinch, Min difficulty to complete. Activity tolerance improving. [] No change. [] Other     [x] Patient would continue to benefit from skilled occupational therapy services in order to:   Improve  functional /grasp, Motor control/, ROM, Strength, and Activity tolerance in order to ensure good follow through and improve functional use of UE in ADL performance    Short Term Goals: (  8    Treatments)  Pt to increase RUE ROM for at least 2 planes by 10 degrees actively - MET  Increase gross UE strength  as evidenced by an increase in 1 grade for all planes bilaterally to improve safety and stability with functional transfers - MET   Increase bilateral  strength by 10  lbs to improve functional participation in ADL/IADL tasks - MET  Increase all pinch strengths bilaterally by 3 lbs to improve functional participation in ADL/IADL tasks - NOT MET  Increase function:UE Functional Index Score 5 or more points to promote increased functional abilities - MET  Patient to be independent with home exercise program as demonstrated by performance with correct form without cues. - MET  Increase R side FMC as evidenced by a decrease in 8 seconds  with the 9-hole peg test - MET  Increase coordination as evidenced by an increase in 5 blocks completed during the box and blocks assessment to indicate significant clinical improvement - NOT MET, 2 blocks         Long Term Goals: (  16  Treatments)  Pt to increase RUE ROM for at least 2 planes by 15+ degrees actively  Increase gross UE strength  as evidenced by an increase in 2 grades for all planes bilaterally to improve safety and stability with functional transfers  Increase bilateral  strength by 15  lbs to improve functional participation in ADL/IADL tasks  Increase all pinch strengths bilaterally by 5 lbs to improve functional participation in ADL/IADL tasks  Increase function:UE Functional Index Score 10 or more points to promote increased functional abilities  Increase R side [address] as evidenced by a decrease in 14 seconds  with the 9-hole peg test  Modified 10/18/22 - Increase coordination as evidenced by an increase in 5 blocks completed during the box and blocks  to indicate significant clinical improvement     Patient Goals: To get stronger and use R hand fully         Pt. Education:  [] Yes  [x] No  [] Reviewed Prior HEP/Ed  Method of Education: [] Verbal  [] Demo  [] Written  Re:   Comprehension of Education:  [] Verbalizes understanding. [] Demonstrates understanding. [] Needs review. [] Demonstrates/verbalizes HEP/Ed previously given. Plan: [x] Continue current frequency toward short and long term goals.   [x] Specific Instructions for subsequent treatments: gross UE strengthening    [] Other:       Time In: 5778  Time Out: 8042        Treatment Charges: Mins Units   []  Modalities:      []  Ultrasound     [x]  Ther Exercise 49 3   []  Manual Therapy     []  Ther Activities     []  Orthotic fit/train     []  Orthotic recheck     []  Other     Total Treatment time 49 3 Medicare Cap   [] Physical Therapy   [] Speech Therapy      [x] Occupational therapy  *PT and Speech caps combine                                                                                  $2150 Limit for PT and Speech combined  $2150 Limit for OT individually  At the beginning of the month where you expect to go over $2150, please add the [address] modifier       Patient Name: Rommel Handy: 1975     Note:  This is an estimate of charges billed.               Date of [address] Name # units/ charge $$$ charge Daily Total Charge Ongoing Total $$$   8/31/22 OT Eval Mod   TE 1  1 95.15  22.84 118.99 118.99   9/7/22 TE 3 29.21  22.84  22.84 74.89 193.88   9/13/22 TE 3  29.21  22.84  22.84 74.89 268.77   9/15/22 TE 3 29.21  22.84  22.84  74.89  343.66   9/22/22  TE 3  29.21  22.84  22.84  74.89  418.55    9/27/22  TE  3 29.21  22.84  22.84 74.89  493.44   10/18/22   TE 4  29.21+22.84+22.84+22.84  97.73  591.17   10/27/22   TE  4 29.21+22.84+22.84+22.84  97.73  688.90   11/3/22   TE  3 29.21+22.84+22.84  74.89  763.69   11/8/22   TE  3 29.21+22.84+22.84  74.89  838.58   11/10/22 TE 4 29.21+22.84+22.84+22.84 97.73 936.31   11/15/22 TE 4 29.21+22.84+22.84+22.84 97.73 1,034.04   11/17/22 TE 4 29.21+22.84+22.84+22.84 97.73 1131.77   11/22/22 TE 3 29.21+22.84+22.84 74.89 1,206.66     Electronically signed by:  ANDREA Manley/JOHN

## 2022-11-22 NOTE — FLOWSHEET NOTE
[x] [facility]) - [facility]  [address]  [phone]  F: [phone] [x] [address]  [facility]   [address]  P: [phone]  F: [phone] [] [facility]  [address]  P: [phone]  F: [phone] [] [address]  P: [phone]  F: [phone] [] [address]  [facility]   [address]   [place]: [phone]  F: [phone]      Physical Therapy Daily Treatment    Date:  2022  Patient Name:  Lorene Obrien    :  1975  MRN: [id number]  Physician: DANIEL Galaviz-SHIMA                           Insurance: Medicare ([facility])  Medical Diagnosis: I63.9 (ICD-10-CM) - Cerebrovascular accident (CVA), unspecified mechanism ([facility] [address]  Rehab Codes: R26.89, R53.1, M54.59, M54.2, R29.3,   Next 's appt.:  with neuro  Date of symptom onset: 22   Visit# / total visits:  (additional visits approved 10/6)   Cancels/No Shows: 5/0 (Last 30 days)    Subjective:    Pain:  [] Yes  [x] No Location: N/A Pain Rating: (0-10 scale) 0/10  Pain altered Tx:  [x] No  [] Yes  Action:  Comments:  Patient reports he feels the R knee is not kicking back as much.           Objective:   Sensation: absent to light touch throughout B feet below ankle     Modalities: Precautions: Pt has dialysis 3x per week  Exercises:  BOLDED COMPLETED 2022:  Exercise RLE Reps/ Time Weight/ Level Comments   Nu step  5' Level 5                Standing       Gastroc stretch on incline 3x1 min  BUE assist   Resisted swing  2x10 Lime  Tapping 4\" step   Step up 1x10  1x8 4\" RLE  - very fatigued this date   Lateral step ups 1x8 4\" Step on R side   LLE marching 3x10 1 lb Focus on RLE stance control  Added wt 11.22   LLE step taps with resistance 3x10 4\" step Blue TB   LLE hip abd AROM 3x10 1 lb Added wt 11.22   Sit <> stand w/ RLE bias 8x 2\" step under LLE Min A from therapist to ensure ant fwd weightshift, extension to stand   Resistive STS 8x Blue Added 10.20  Terminal extension  Mod UE pull   Resistive squats 10x blue Added 11.22   Resisted step over small round cone 2x10 Lime  With weight shift onto RLE   Heel taps to step  10x 4\"    Trunk/cervical rotation naming how many fingers clinician is holding  10xea  clinician standing behind pt    Picking up rings from steps  4x 4 rings;   6\" and 12\" step Wider JASIEL no UE; notes some irritation in LB; x2 leading with R UE, x2 leading with L UE and handing to clinician at multiple angles   Resisted swing phase fwd and retro amb on way back  5xea // bars;   Lime  Resisted fwd walk    Step over round cone  10xea // bars     TKE 20x Blue Added 10.20   Lunges  10x2  In // bars   Hip flexor stretch 5x10\"  Cues to hold stretch         Balance      Posterior perturbations 3x10  No UE assist  Step back  Hip strategies   Anterior perturbations 2x10     Fwd step outs 1x12  Added 10.25  Anterior lean w/ quick step out   Retro step outs 10x3 ea  Started with slow step backs  Cues for quicker steps on L only   Hurricane sway 3X30\"  Ankle strategy skill  Pt self leaning in all planes -   DF/PF/EV/IV/diagonal    LLE marches      AirEx 2x15  Unilat UE assist  L foot IV new     Squats 1x6  1x10   1 lb cane No UE assist  2nd set with bringing 1 lb cane fwd tapping on // bar  Limited with back pain   Trunk rotation with ball 2x10 ea Volleyball    Shoulder press with ball 10x2 7\" ball    Roll ball up on wall 10x yellow Good ankle strategy   NBOS 3x1 min     NBOS with EC 2x30\"     NBOS PNF patterns 2x10  Volleyball  W/ visual tracking with head movement   NBOS on foam  2x30\"  Cues for ankle strategies   NBOS with ball raises, rotation 2x30\"ea Volley  ball     Tandem stance 2x20\"     Cone  off ground 2x5 5 cones    Standing on foam with head turns  30\" ea  Up/down, rotation    Cone reach with LUE across body 2x5 5 cones    Cone reach with LUE to L side 2x5 5 cones    Heel taps 10x2 ea 4in With 1 hand for UE support  Poor tolerance of No UE supports w/ 2 LOB with lateral sway   Dynamic march with retro hamstring curl 3L  No UE support   Side stepping 3L // bars  No UE-1 light UE support   360 turn 2x  Clockwise more challenging   Marches  2x20\"  No UE   Alt toe taps to foam  2x30\"  No UE   Uneven stance 3x30\"  Patient placed 1 foot on 4\" box while balancing in [facility] for advancement into  SLS         Resistive gait 2x80 ft  1x110 ft Single leg  Hips Blue bands around LE 1st rep  2nd rep band above knee  3rd rep patient pulled clinician on stool         FES modalities      Gait training with Bioness 50 min Lower and thigh cuff Initial set up  STS 2x10, LLE on 2\" step  Hip abd  Marches  Adjustments for thigh cuff more proximal  Gait analysis   Change in speed  4 sit down breaks   Xcite training 40 min R hams  R glutes  R quads  R PFs Set up in back room  STS program  STS w/ LLE on 2\" step: 2x10, min A    STS \"pause\" mode: Unilat UE assist  Step taps: 2x10, 4\" step  Hip abd/ext: 2x10 ea  Hip abd L sided - 10x   Balance without UE 2x30 sec  Reaching for ball touches 10x  Punching of pad 10x2  (Reaching&punches across body)      Xcite training  20 min  Set up in supine, head elevated  Ankle pump setting on Xcite      * Lateral LE for ant tib w/            peroneals      * Gastrocs  Ankle pumps 10x4      * PROM b/w sets  Heel slides w/ Exite DF 10x4                       PROM      Ankle DF PROM 5x1 min   By therapist between sets of xcite  Doffed AFO   Ankle pronation 10x2 AA    Resistive PF 10x3  Pt pumped foot into soft ball   Resistive supination 10x2  Rolled B feet into ball   DF/PF 10x2 A    Heel slides 20x2 A Required stabilization of ankle   SLR 10x2 A Bilaterally   SAQ 10x2 A    Hip add 10x3 A    Hip abd 10x3 A Marches 10x2 A    Resistive marches 10x2 blue    Resistive hip and knee extension 10x3 blue Loop around ankle, wrapped laterally, under knee then up through legs for EV assist   Bridges 10x3     Other:    Treatment Charges: Mins Units   []  Modalities     [x]  Ther Exercise 13 1   []  Manual Therapy     []  Ther Activities     []  Aquatics     []  Vasocompression     []  Other: Neuro re-ed 10 1   []  Other: Gait 30 2   Total Treatment time 53 4       Assessment: [x] Progressing toward goals. Started treatment by adding ankle weights to marches, hip abd, hip extension and step ups to increase LE strength. Difficulty with STS noted with patient using B arms to help pull himself up to the // bars instead of LE. Added resistive squats to improve terminal extension necessary to improve STS. Uneven surface stance added to promote SLS with trunk flexion noted. Ended with resistive gait training with varying resistance with good hip follow through gait pattern. [] No change. [] Other:      [x] Patient would continue to benefit from skilled physical therapy services in order to: address R hemibody weakness, improve gait mechanics and efficiency, and increase standing static/dynamic balance to allow improved household ADL/IADL completion and reduce fall risk. STG: (to be met in 8 treatments) - Assessed by Niki Ospina PT on 7/20:  ? Pain: No more than 4/10 pain reported in low back or neck post therapy sessions to indicate improving tolerance to increased activity and exercise - MET for low back or neck, but L shoulder is 2/10 - newer pain over the past 3 weeks. ? ROM: at least 0deg DF PROM with knee extended to indicate improving mobility in ankles to increase heel strike and reduce toe drag with prolonged ambulation - NOT MET, lacking 10 deg from neutral in L ankle, lacking 3 deg from neutral in R ankle  ?  Balance:   Pt able to fully turn to look behind himself without instability in either direction to indicate improving postural stability - NOT MET, LOB with full turn to R and L  Pt able to complete standing reaching tasks without UE assist and no more than mild instability to indicate improving dynamic balance for safety with household IADLs - NOT MET, primarily needs to be supported at trunk on counter or holding onto something  ? Strength: At least 4+/5 grossly in R hip to allow improved pelvic stability in standing and improved swing phase control/speed during prolonged gait - NOT MET, 4-/5 grossly in R hip  Pt able to utilize RUE for fine grasp and release tasks with no more than minimally slowed speed evident - Ongoing, better fine grasp but still limited with heavier objects, twisting lid  ? Function: Pt able to ambulate 450 ft with least restrictive device with no evidence of lagging RLE swing or abnormal stance time noted, with no more than 11 on RPE scale - NOT MET, achieves ~400 ft before needing to rest d/t RLE instability and R knee buckling, rates 13 RPE  Patient to be independent with home exercise program as demonstrated by performance with correct form without cues. - Ongoing   Demonstrate Knowledge of fall prevention - MET     LTG: (to be met in 16 treatments) - 8/22: Will continue with current goals, adjusting at visit 16 as needed d/t recent CVA on 8/8, Assessed by Caitlin Almanzar PT on 10/4   ? Pain: No more than 2/10 pain reported in low back or neck post therapy sessions to indicate improving tolerance to increased activity and exercise MET  ? ROM: at least 5 deg DF PROM with knee extended to indicate improving mobility in ankles to increase heel strike and reduce toe drag with prolonged ambulation. MET  ? Balance: At least 30/56 on Hernandez to indicate significant improvement in standing static/dynamic balance and progress towards reduced fall risk. 37/56 MET  Able to negotiate gait obstacles using rollator without increased instability or excessively slowed gait MET  ? Strength: At least 5-/5 grossly in R hip to allow further improved pelvic stability in standing and improved swing phase control/speed during prolonged gait MET  At least 5/5 B knee ext to prevent excessive buckling with prolonged standing Progress, some buckling still reported   ? Function:   Pt able to ambulate 600 ft with least restrictive device with appropriate RLE swing/stance phase timing, no evidence of toe drag, and no more than minimal fatigue by end of ambulation Progress  Pt able to ambulate 100 ft with intermittent turning, stopping, etc without assistive device and demonstrating good stability and no evidence of B knee buckling to indicate improving functional strength and safety with household ambulation distances for ADL/IADLs Not Met  No more than 14% impaired on ABC scale to indicate improving subjective balance confidence. Not Met      ABC score 41% impairment. New goals as of 10/6, to be met in next additional 12 visits, at total visit 28:        1. Hernandez score to at least 45/56 to indicate reduced fall risk, progressive balance improvement        2. Ability to complete RLE SLS tasks with no UE assist and no buckling/instability noted. 3. All bracing needs for RLE will have been met at this time to improve stability in ankle, reduce falls        4. No more than 20% impaired on ABC scale to indicate subjective improvement in balance confidence. 5. At least 650 ft with least restrictive device with appropriate RLE swing/stance phase timing, no toe drag        Patient goals: \"to strengthen right side back as much as I can\"    Pt. Education:  [x] Yes  [] No  [x] Reviewed Prior HEP/Ed  Method of Education: [x] Verbal  [x] Demo  [] Written   fall prevention 7/11/22  Comprehension of Education:  [x] Verbalizes understanding. [x] Demonstrates understanding. [] Needs review. [x] Demonstrates/verbalizes HEP/Ed previously given.      Access Code: [email or web address]: [email or web address]/Date: 11/15/2022Prepared by: Margarita Sharpe   Supine Knee and Hip Extension with Resistance - 1 x daily - 7 x weekly - 3 sets - 10 reps   Seated Hip Abduction with Resistance - 1 x daily - 7 x weekly - 3 sets - 10 reps   Hip add - 1 x daily - 7 x weekly - 3 sets - 10 reps   SLR - 1 x daily - 7 x weekly - 3 sets - 10 reps   Supine March with Resistance Band - 1 x daily - 7 x weekly - 3 sets - 10 reps    Plan: [x] Continue current frequency toward long and short term goals.     [x] Specific Instructions for subsequent treatments:  LE strengthening, ScitFit mod intensity, Excite        Time In: 09:00 am           Time Out: 9:58 am     Electronically signed by:  Dilip Stringer PTA

## 2022-11-29 ENCOUNTER — HOSPITAL ENCOUNTER (OUTPATIENT)
Dept: OCCUPATIONAL THERAPY | Age: 47
Setting detail: THERAPIES SERIES
Discharge: HOME | End: 2022-11-29
Payer: MEDICARE

## 2022-11-29 ENCOUNTER — HOSPITAL ENCOUNTER (OUTPATIENT)
Dept: PHYSICAL THERAPY | Age: 47
Setting detail: THERAPIES SERIES
Discharge: HOME | End: 2022-11-29
Payer: MEDICARE

## 2022-11-29 PROCEDURE — 97116 GAIT TRAINING THERAPY: CPT

## 2022-11-29 PROCEDURE — 97110 THERAPEUTIC EXERCISES: CPT

## 2022-11-29 PROCEDURE — 97530 THERAPEUTIC ACTIVITIES: CPT

## 2022-11-29 PROCEDURE — 97112 NEUROMUSCULAR REEDUCATION: CPT

## 2022-11-29 NOTE — FLOWSHEET NOTE
palm to finger Translations 1 series  Completed   Weighted Bar Bicep Curls/Chest Press/Front raises  20 reps each, 2 sets  5 pounds Completed    Grooved Pegboard 25 pegs   Completed    Picking up pennies from table top 1 series   Not Completed    Theraputty - see below  10 reps each  Yellow Completed; issued for HEP   Flexbar   strength  Wrist Flexion/Ext  Wrist pronation  Wrist supination  Wrist ulnar deviation  Wrist radial dev  Shoulder Adb  Shoulder Add   15 reps each  Green Completed    Other: NA  Access Code: [email or web address]  URL: [employer]/  Date: 11/29/2022  Prepared by: Maci Nichols    Exercises  Putty Squeezes - 1 x daily - 7 x weekly - 3 sets - 10 reps  Tip Pinch with Putty - 1 x daily - 7 x weekly - 3 sets - 10 reps  Key Pinch with Putty - 1 x daily - 7 x weekly - 3 sets - 10 reps  Seated Three Finger Pinch with Putty - 1 x daily - 7 x weekly - 3 sets - 10 reps  Seated Finger Composite Flexion with Putty - 1 x daily - 7 x weekly - 3 sets - 10 reps  Rolling Putty on Table - 1 x daily - 7 x weekly - 3 sets - 10 reps      Assessment: [x] Progressing toward goals. Demonstrates difficulty with using 2point and 3jaw pinches to  various items. Manipulating grooved pegs becoming easier, taking less time to complete. Endurance with RUE improving, not needing as many rest breaks throughout session. Mod difficulty completing weighted bar exercises, RUE not symmetrical to LUE - trial mirror for feedback next session. Moderate difficulty completing opposition to all digits. Issued theraputty HEP, Min difficulty to complete. Educated pt to focus on completing pinches with fingertips touching. [] No change. [] Other     [x] Patient would continue to benefit from skilled occupational therapy services in order to:   Improve  functional /grasp, Motor control/, ROM, Strength, and Activity tolerance in order to ensure good follow through and improve functional use of UE in ADL performance    Short Term Goals: (  8    Treatments)  Pt to increase RUE ROM for at least 2 planes by 10 degrees actively - MET  Increase gross UE strength  as evidenced by an increase in 1 grade for all planes bilaterally to improve safety and stability with functional transfers - MET   Increase bilateral  strength by 10  lbs to improve functional participation in ADL/IADL tasks - MET  Increase all pinch strengths bilaterally by 3 lbs to improve functional participation in ADL/IADL tasks - NOT MET  Increase function:UE Functional Index Score 5 or more points to promote increased functional abilities - MET  Patient to be independent with home exercise program as demonstrated by performance with correct form without cues. - MET  Increase R side FMC as evidenced by a decrease in 8 seconds  with the 9-hole peg test - MET  Increase coordination as evidenced by an increase in 5 blocks completed during the box and blocks assessment to indicate significant clinical improvement - NOT MET, 2 blocks         Long Term Goals: (  16  Treatments)  Pt to increase RUE ROM for at least 2 planes by 15+ degrees actively  Increase gross UE strength  as evidenced by an increase in 2 grades for all planes bilaterally to improve safety and stability with functional transfers  Increase bilateral  strength by 15  lbs to improve functional participation in ADL/IADL tasks  Increase all pinch strengths bilaterally by 5 lbs to improve functional participation in ADL/IADL tasks  Increase function:UE Functional Index Score 10 or more points to promote increased functional abilities  Increase R side [address] as evidenced by a decrease in 14 seconds  with the 9-hole peg test  Modified 10/18/22 - Increase coordination as evidenced by an increase in 5 blocks completed during the box and blocks  to indicate significant clinical improvement     Patient Goals: To get stronger and use R hand fully         Pt.  Education:  [x] Yes  [] No  [] Reviewed Prior HEP/Ed  Method of Education: [x] Verbal  [x] Demo  [x] Written  Re: theraputty HEP  Comprehension of Education:  [x] Verbalizes understanding. [x] Demonstrates understanding. [] Needs review. [] Demonstrates/verbalizes HEP/Ed previously given. Plan: [x] Continue current frequency toward short and long term goals. [x] Specific Instructions for subsequent treatments: progress note/potential discharge     [] Other:       Time In: 0902  Time Out: 4958        Treatment Charges: Mins Units   []  Modalities:      []  Ultrasound     [x]  Ther Exercise 54 4   []  Manual Therapy     []  Ther Activities     []  Orthotic fit/train     []  Orthotic recheck     []  Other     Total Treatment time 54 4        Medicare Cap   [] Physical Therapy   [] Speech Therapy      [x] Occupational therapy  *PT and Speech caps combine                                                                                  $2150 Limit for PT and Speech combined  $2150 Limit for OT individually  At the beginning of the month where you expect to go over $2150, please add the [address] modifier       Patient Name: Nandini Dent: 1975     Note:  This is an estimate of charges billed.               Date of [address] Name # units/ charge $$$ charge Daily Total Charge Ongoing Total $$$   8/31/22 OT Eval Mod   TE 1  1 95.15  22.84 118.99 118.99   9/7/22 TE 3 29.21  22.84  22.84 74.89 193.88   9/13/22 TE 3  29.21  22.84  22.84 74.89 268.77   9/15/22 TE 3 29.21  22.84  22.84  74.89  343.66   9/22/22  TE 3  29.21  22.84  22.84  74.89  418.55    9/27/22  TE  3 29.21  22.84  22.84 74.89  493.44   10/18/22   TE 4  29.21+22.84+22.84+22.84  97.73  591.17   10/27/22   TE  4 29.21+22.84+22.84+22.84  97.73  688.90   11/3/22   TE  3 29.21+22.84+22.84  74.89  763.69   11/8/22   TE  3 29.21+22.84+22.84  74.89  838.58   11/10/22 TE 4 29.21+22.84+22.84+22.84 97.73 936.31   11/15/22 TE 4 29.21+22.84+22.84+22.84 97.73 1,034.04   11/17/22 TE 4 29.21+22.84+22.84+22.84 97.73 1131.77   11/22/22 TE 3 29.21+22.84+22.84 74.89 1,206.66   11/29/22 TE 4 29.21+22.84+22.84+22.84 97.73 1,304.39     Electronically signed by:  ANDREA Rain/JOHN

## 2022-11-29 NOTE — FLOWSHEET NOTE
[x] [facility]) [facility]  [address]  [phone]  F: [phone] [x] [address]  [facility]   [address]  P: [phone]  F: [phone] [] [address]  [address]  P: [phone]  F: [phone] [] [address]  P: [phone]  F: [phone] [] [address]  [facility]   [address]   [place]: [phone]  F: [phone]      Physical Therapy Daily Treatment    Date:  2022  Patient Name:  Allison Braun    :  1975  MRN: [id number]  Physician: Debbrah Goodpasture, APRN-SHIMA                           Insurance: Medicare ([address] [place] [address])  Medical Diagnosis: I63.9 (ICD-10-CM) - Cerebrovascular accident (CVA), unspecified mechanism ([facility] [address]  Rehab Codes: R26.89, R53.1, M54.59, M54.2, R29.3,   Next 's appt.:  with neuro  Date of symptom onset: 22   Visit# / total visits:  (additional visits approved 10/6)   Cancels/No Shows: 5/0 (Last 30 days)    Subjective:    Pain:  [x] Yes  [] No Location: N/A Pain Rating: (0-10 scale) 5/10 L wrist  Pain altered Tx:  [x] No  [] Yes  Action:  Comments:  Patient reports L wrist pain over the past few days. Wrist pain 5/10. Mixed his days up with [facility] and missed appointment. Rescheduled for 22 and will double check so he does not miss again.            Objective:   Sensation: absent to light touch throughout B feet below ankle     Modalities: Precautions: Pt has dialysis 3x per week  Exercises:  BOLDED COMPLETED 2022:  Exercise RLE Reps/ Time Weight/ Level Comments   Nu step  5' Level 5 No UE               Standing       Gastroc stretch on incline 3x1 min  BUE assist   Resisted swing  2x10 Lime  Tapping 4\" step   Step up 2x10  2x10 4\" Inc reps 11.29   Lateral step ups 2x10 4\" Step on L side   LLE marching 3x10 1 lb Focus on RLE stance control  Added wt 11.22   LLE step taps with resistance 3x10 4\" step Blue TB   LLE hip abd AROM 3x10 1 lb Added wt 11.22   Sit <> stand w/ RLE bias 8x  Difficult w/ modA   Resistive STS 8x Blue Added 10.20  Terminal extension  Mod UE pull   Resistive squats 10x blue Added 11.22   Resisted step over small round cone 2x10 Lime  With weight shift onto RLE   Heel taps to step  10x 4\"    Trunk/cervical rotation naming how many fingers clinician is holding  10xea  clinician standing behind pt    Picking up rings from steps  4x 4 rings;   6\" and 12\" step Wider JASIEL no UE; notes some irritation in LB; x2 leading with R UE, x2 leading with L UE and handing to clinician at multiple angles   Resisted swing phase fwd and retro amb on way back  5xea // bars;   Lime  Resisted fwd walk    Step over round cone  10xea // bars     TKE 20x Blue Added 10.20   Lunges  10x2  In // bars   Hip flexor stretch 5x10\"  Cues to hold stretch         Balance      Posterior perturbations 1x8  No UE assist  Step back  Hip strategies   Anterior perturbations 1x10     Fwd step outs 1x10  Added 10.25  Anterior lean w/ quick step out   Retro step outs 5 min  Started with slow step backs  Cues for quicker steps on L only   Hurricane sway 3X30\"  Ankle strategy skill  Pt self leaning in all planes -   DF/PF/EV/IV/diagonal   5 posterior LOB   LLE marches      AirEx 2x15  Unilat UE assist  L foot IV new     Squats 1x6  1x10   1 lb cane No UE assist  2nd set with bringing 1 lb cane fwd tapping on // bar  Limited with back pain   Trunk rotation with ball 2x10 ea Volleyball    Shoulder press with ball 10x2 7\" ball    Roll ball up on wall 10x yellow Good ankle strategy   NBOS 3x1 min     NBOS with EC 2x30\"     NBOS PNF patterns 2x10  Volleyball  W/ visual tracking with head movement   NBOS on foam  2x30\"  Cues for ankle strategies   NBOS w/ toes on incline EO/EC 2x30\"  Toes on incline    NBOS with ball raises, rotation 2x30\"ea Volley  ball     Tandem stance 2x20\"  Modified wider stance with R fwd   Cone  off ground 2x5 5 cones    Standing on foam with head turns  30\" ea  Up/down, rotation    Cone reach with LUE across body 2x5 5 cones    Cone reach with LUE to L side 2x5 5 cones    Heel taps 10x2 ea 4in With 1 hand for UE support  Poor tolerance of No UE supports w/ 2 LOB with lateral sway   Dynamic march with retro hamstring curl 3L  No UE support   Side stepping 3L // bars  No UE-1 light UE support   360 turn 2x  Clockwise more challenging   Marches  2x20\"  No UE   Alt toe taps to foam  2x30\"  No UE   Uneven stance 3x30\"  Patient placed 1 foot on 4\" box while balancing in [facility] for advancement into  SLS         Resistive gait 1x140 ft  Blue bands above knee  Cues to knee drive           FES modalities      Gait training with Bioness 50 min Lower and thigh cuff Initial set up  STS 2x10, LLE on 2\" step  Hip abd  Marches  Adjustments for thigh cuff more proximal  Gait analysis   Change in speed  4 sit down breaks   Xcite training 40 min R hams  R glutes  R quads  R PFs Set up in back room  STS program  STS w/ LLE on 2\" step: 2x10, min A    STS \"pause\" mode: Unilat UE assist  Step taps: 2x10, 4\" step  Hip abd/ext: 2x10 ea  Hip abd L sided - 10x   Balance without UE 2x30 sec  Reaching for ball touches 10x  Punching of pad 10x2  (Reaching&punches across body)      Xcite training  20 min  Set up in supine, head elevated  Ankle pump setting on Xcite      * Lateral LE for ant tib w/            peroneals      * Gastrocs  Ankle pumps 10x4      * PROM b/w sets  Heel slides w/ Exite DF 10x4                       PROM      Ankle DF PROM 5x1 min   By therapist between sets of xcite  Doffed AFO   Ankle pronation 10x2 AA    Resistive PF 10x3  Pt pumped foot into soft ball   Resistive supination 10x2  Rolled B feet into ball   DF/PF 10x2 A    Heel slides 20x2 A Required stabilization of ankle   SLR 10x2 A Bilaterally   SAQ 10x2 A    Hip add 10x3 A    Hip abd 10x3 A    Marches 10x2 A    Resistive marches 10x2 blue    Resistive hip and knee extension 10x3 blue Loop around ankle, wrapped laterally, under knee then up through legs for EV assist   Bridges 10x3     Other:    Treatment Charges: Mins Units   []  Modalities     []  Ther Exercise     []  Manual Therapy     [x]  Ther Activities 10 1   []  Aquatics     []  Vasocompression     [x]  Other: Neuro re-ed 35 2   []  Other: Gait 10 1   Total Treatment time 55 4       Assessment: [x] Progressing toward goals. Increased reps with steps both forward and lateral to increase LE strength. Poor eccentric control noted with weak R quad. Improved control into hyper extension noted this date. Balance training following LE fatigue with required verbal cues for wider stance and hip strategies. Poor stepping strategy noted with 5 LOB posteriorly. Regressed to hurricane sways with focus on proprioception. Added NBOS with toes on incline to advance balance training while decreasing tactile sensory. [] No change. [] Other:      [x] Patient would continue to benefit from skilled physical therapy services in order to: address R hemibody weakness, improve gait mechanics and efficiency, and increase standing static/dynamic balance to allow improved household ADL/IADL completion and reduce fall risk. STG: (to be met in 8 treatments) - Assessed by Marko Malone PT on 7/20:  ? Pain: No more than 4/10 pain reported in low back or neck post therapy sessions to indicate improving tolerance to increased activity and exercise - MET for low back or neck, but L shoulder is 2/10 - newer pain over the past 3 weeks.    ? ROM: at least 0deg DF PROM with knee extended to indicate improving mobility in ankles to increase heel strike and reduce toe drag with prolonged ambulation - NOT MET, lacking 10 deg from neutral in L ankle, lacking 3 deg from neutral in R ankle  ? Balance:   Pt able to fully turn to look behind himself without instability in either direction to indicate improving postural stability - NOT MET, LOB with full turn to R and L  Pt able to complete standing reaching tasks without UE assist and no more than mild instability to indicate improving dynamic balance for safety with household IADLs - NOT MET, primarily needs to be supported at trunk on counter or holding onto something  ? Strength: At least 4+/5 grossly in R hip to allow improved pelvic stability in standing and improved swing phase control/speed during prolonged gait - NOT MET, 4-/5 grossly in R hip  Pt able to utilize RUE for fine grasp and release tasks with no more than minimally slowed speed evident - Ongoing, better fine grasp but still limited with heavier objects, twisting lid  ? Function: Pt able to ambulate 450 ft with least restrictive device with no evidence of lagging RLE swing or abnormal stance time noted, with no more than 11 on RPE scale - NOT MET, achieves ~400 ft before needing to rest d/t RLE instability and R knee buckling, rates 13 RPE  Patient to be independent with home exercise program as demonstrated by performance with correct form without cues. - Ongoing   Demonstrate Knowledge of fall prevention - MET     LTG: (to be met in 16 treatments) - 8/22: Will continue with current goals, adjusting at visit 16 as needed d/t recent CVA on 8/8, Assessed by Florecita Marques PT on 10/4   ? Pain: No more than 2/10 pain reported in low back or neck post therapy sessions to indicate improving tolerance to increased activity and exercise MET  ? ROM: at least 5 deg DF PROM with knee extended to indicate improving mobility in ankles to increase heel strike and reduce toe drag with prolonged ambulation. MET  ? Balance: At least 30/56 on Hernandez to indicate significant improvement in standing static/dynamic balance and progress towards reduced fall risk.  37/56 MET  Able to negotiate gait obstacles using rollator without increased instability or excessively slowed gait MET  ? Strength: At least 5-/5 grossly in R hip to allow further improved pelvic stability in standing and improved swing phase control/speed during prolonged gait MET  At least 5/5 B knee ext to prevent excessive buckling with prolonged standing Progress, some buckling still reported   ? Function:   Pt able to ambulate 600 ft with least restrictive device with appropriate RLE swing/stance phase timing, no evidence of toe drag, and no more than minimal fatigue by end of ambulation Progress  Pt able to ambulate 100 ft with intermittent turning, stopping, etc without assistive device and demonstrating good stability and no evidence of B knee buckling to indicate improving functional strength and safety with household ambulation distances for ADL/IADLs Not Met  No more than 14% impaired on ABC scale to indicate improving subjective balance confidence. Not Met      ABC score 41% impairment. New goals as of 10/6, to be met in next additional 12 visits, at total visit 28:        1. Hernandez score to at least 45/56 to indicate reduced fall risk, progressive balance improvement        2. Ability to complete RLE SLS tasks with no UE assist and no buckling/instability noted. 3. All bracing needs for RLE will have been met at this time to improve stability in ankle, reduce falls        4. No more than 20% impaired on ABC scale to indicate subjective improvement in balance confidence. 5. At least 650 ft with least restrictive device with appropriate RLE swing/stance phase timing, no toe drag        Patient goals: \"to strengthen right side back as much as I can\"    Pt. Education:  [x] Yes  [] No  [x] Reviewed Prior HEP/Ed  Method of Education: [x] Verbal  [x] Demo  [] Written   fall prevention 7/11/22  Comprehension of Education:  [x] Verbalizes understanding. [x] Demonstrates understanding. [] Needs review.   [x] Demonstrates/verbalizes HEP/Ed previously given. Access Code: [email or web address]: [email or web address]: 11/15/2022Prepared by: Diego Vega   Supine Knee and Hip Extension with Resistance - 1 x daily - 7 x weekly - 3 sets - 10 reps   Seated Hip Abduction with Resistance - 1 x daily - 7 x weekly - 3 sets - 10 reps   Hip add - 1 x daily - 7 x weekly - 3 sets - 10 reps   SLR - 1 x daily - 7 x weekly - 3 sets - 10 reps   Supine March with Resistance Band - 1 x daily - 7 x weekly - 3 sets - 10 reps    Plan: [x] Continue current frequency toward long and short term goals.     [x] Specific Instructions for subsequent treatments:  LE strengthening, ScitFit mod intensity, Excite        Time In: 10:00 am           Time Out: 10:55 am     Electronically signed by:  Kurtis Vegas PTA

## 2022-12-01 ENCOUNTER — HOSPITAL ENCOUNTER (OUTPATIENT)
Dept: OCCUPATIONAL THERAPY | Age: 47
Setting detail: THERAPIES SERIES
Discharge: HOME | End: 2022-12-01
Payer: MEDICARE

## 2022-12-01 ENCOUNTER — HOSPITAL ENCOUNTER (OUTPATIENT)
Dept: PHYSICAL THERAPY | Age: 47
Setting detail: THERAPIES SERIES
Discharge: HOME | End: 2022-12-01
Payer: MEDICARE

## 2022-12-01 PROCEDURE — 97110 THERAPEUTIC EXERCISES: CPT

## 2022-12-01 PROCEDURE — 97112 NEUROMUSCULAR REEDUCATION: CPT

## 2022-12-01 PROCEDURE — 97530 THERAPEUTIC ACTIVITIES: CPT

## 2022-12-01 PROCEDURE — 97116 GAIT TRAINING THERAPY: CPT

## 2022-12-01 NOTE — FLOWSHEET NOTE
LEMA BALANCE SCALE 14-Item Long Form Original Version    Patient Name:  Megan Jones  Date:  12/1/2022    1. SITTING TO STANDING  INSTRUCTIONS: Please stand up. Try not to use your hands for support. (4) able to stand without using hands and stabilize independently  (3) able to stand independently using hands  (2) able to stand using hands after several tries  (1) needs minimal aid to stand or to stabilize  (0) needs moderate or maximal assist to stand  Score: 3    2. STANDING UNSUPPORTED  INSTRUCTIONS: Please stand for two minutes without holding. (4) able to stand safely 2 minutes  (3) able to stand 2 minutes with supervision  (2) able to stand 30 seconds unsupported  (1) needs several tries to stand 30 seconds unsupported  (0) unable to stand 30 seconds unassisted If a subject is able to stand 2  minutes unsupported, score full points for sitting unsupported. Proceed to  item #4. Score: 3    3. SITTING WITH BACK UNSUPPORTED BUT FEET SUPPORTED  ON FLOOR OR ON A STOOL  INSTRUCTIONS: Please sit with arms folded for 2 minutes. (4) able to sit safely and securely 2 minutes  (3) able to sit 2 minutes under supervision  (2) able to sit 30 seconds  (1) able to sit 10 seconds  (0) unable to sit without support 10 seconds  Score: 4     4. STANDING TO SITTING  INSTRUCTIONS: Please sit down. (4) sits safely with minimal use of hands  (3) controls descent by using hands  (2) uses back of legs against chair to control descent  (1) sits independently but has uncontrolled descent  (0) needs assistance to sit  Score: 2    5. TRANSFERS  INSTRUCTIONS: Arrange chairs(s) for a pivot transfer. Ask subject to  transfer one way toward a seat with armrests and one way toward a seat  without armrests. You may use two chairs (one with and one without  armrests) or a bed and a chair.   (4) able to transfer safely with minor use of hands  (3) able to transfer safely definite need of hands  (2) able to transfer with verbal cueing and/or supervision  (1) needs one person to assist  (0) needs two people to assist or supervise to be safe  Score: 3    6. STANDING UNSUPPORTED WITH EYES CLOSED  INSTRUCTIONS: Please close your eyes and stand still for 10 seconds. (4) able to stand 10 seconds safely  (3) able to stand 10 seconds with supervision  (2) able to stand 3 seconds  (1) unable to keep eyes closed 3 seconds but stays steady  (0) needs help to keep from falling  Score: 4    7. STANDING UNSUPPORTED WITH FEET TOGETHER  INSTRUCTIONS: Place your feet together and stand without holding. (4) able to place feet together independently and stand 1 minute safely  (3) able to place feet together independently and stand for 1 minute with  supervision  (2) able to place feet together independently but unable to hold for 30 seconds  (1) needs help to attain position but able to stand 15 seconds feet together  (0) needs help to attain position and unable to hold for 15 seconds  Score: 3    8. REACHING FORWARD WITH OUTSTRETCHED ARM WHILE  STANDING  INSTRUCTIONS: Lift arm to 90 degrees. Stretch out your fingers and reach  forward as far as you can. (Examiner places a ruler at end of fingertips when  arm is at 90 degrees. Fingers should not touch the ruler while reaching  forward. The recorded measure is the distance forward that the finger reaches  while the subject is in the most forward lean position. When possible, ask  subject to use both arms when reaching to avoid rotation of the trunk.). (4) can reach forward confidently >25 cm (10 inches)  (3) can reach forward >12 cm safely (5 inches)  (2) can reach forward >5 cm safely (2 inches)  (1) reaches forward but needs supervision  (0) loses balance while trying/requires external support  Score: 2    9.  OBJECT FROM FLOOR FROM A STANDING POSITION  INSTRUCTIONS:  shoe/slipper which is placed in front of your feet.   (4) able to  slipper safely and easily  (3) able to  slipper but needs supervision  (2) unable to  but reaches 2-5cm (1-2 inches) from slipper and keeps  balance independently  (1) unable to  and needs supervision while trying  (0) unable to try/needs assist to keep from losing balance or falling  Score: 0    10. TURNING TO LOOK BEHIND OVER LEFT AND RIGHT  SHOULDERS WHILE STANDING  INSTRUCTIONS: Turn to look directly behind you over toward left shoulder. Repeat to the right. Examiner may pick an object to look at directly behind the  subject to encourage a better twist turn. (4) looks behind from both sides and weight shifts well  (3) looks behind one side only other side shows less weight shift  (2) turns sideways only but maintains balance  (1) needs supervision when turning  (0) needs assist to keep from losing balance or falling  Score: 2    11. TURN 360 DEGREES  INSTRUCTIONS: Turn completely around in a full [place]. Pause. Then turn a  full [place] in the other direction. (4) able to turn 360 degrees safely in 4 seconds or less  (3) able to turn 360 degrees safely one side only in 4 seconds or less  (2) able to turn 360 degrees safely but slowly  (1) needs close supervision or verbal cueing  (0) needs assistance while turning  Score: 1    12. PLACING ALTERNATE FOOT ON STEP OR STOOL WHILE  STANDING UNSUPPORTED  INSTRUCTIONS: Place each foot alternately on the step/stool. Continue until  each foot has touched the step/stool four times. (4) able to stand independently and safely and complete 8 steps in 20 seconds  (3) able to stand independently and complete 8 steps >20 seconds  (2) able to complete 4 steps without aid with supervision  (1) able to complete >2 steps needs minimal assist  (0) needs assistance to keep from falling/unable to try  Score: 1    13. STANDING UNSUPPORTED ONE FOOT IN FRONT  INSTRUCTIONS: (DEMONSTRATE TO SUBJECT) Place one foot directly in  front of the other.  If you feel that you cannot place your foot directly in front,  try to step far enough ahead that the heel of your forward foot is ahead of the  toes of the other foot. (To score 3 points, the length of the step should exceed  the length of the other foot and the width of the stance should approximate the  subject's normal stride width). (4) able to place foot tandem independently and hold 30 seconds  (3) able to place foot ahead of other independently and hold 30 seconds  (2) able to take small step independently and hold 30 seconds  (1) needs help to step but can hold 15 seconds - doesn't need help to step but only holds 23 sec  (0) loses balance while stepping or standing  Score: 1    14. STANDING ON ONE LEG  INSTRUCTIONS: Stand on one leg as long as you can without holding. (4) able to lift leg independently and hold >10 seconds  (3) able to lift leg independently and hold 5-10 seconds  (2) able to lift leg independently and hold = or >3 seconds  (1) tries to lift leg unable to hold 3 seconds but remains standing  independently  (0) unable to try or needs assist to prevent fall  Score:  1    Total Score:  30/56  Max score 56,a person scoring below 45 is considered to be at risk for falling.     Completed by: Lova Gosselin, PT

## 2022-12-01 NOTE — DISCHARGE SUMMARY
[x] [facility]       Occupational Therapy             1st floor       [address], [place]         Phone: [phone]       Fax: [phone] [] [place]  Occupational Therapy  [address] [place] [address]  Phone: [phone]  Fax: [phone]         Occupational Therapy Discharge Note    Date: 2022      Patient: Ayaka Marie  : 1975  MRN: [id number]    Physician: DANIEL Garza-SHIMA                             Insurance: Medicare ([address] [place] [address]) - Visits based on medical [facility]  Medical Diagnosis: I63.9 (ICD-10-CM) - Cerebrovascular accident (CVA), unspecified mechanism ([facility] [address]  Rehab Codes: R26.89, R53.1, M54.59, M54.2, R29.3,   Next 's Appt: May - Neuro   Date of Onset: 22   Visit# / total visits: ; Progress note for Medicare patient completed at visit 7  Cancels/No Shows: 2/0  Date of initial visit: 22                Date of final visit: 22      Subjective:  Pain:  [] Yes  [x] No  Location:  N/A  Pain Rating: (0-10 scale) 0/10  Pain altered Tx:  [x] No  [] Yes  Action: NA  Comments: Reports he think he has made progress since beginning OT treatment. Objective:  Tests/Measurements: Upper Extremity Functional Index  Current Functional Level: 46/80 (increase 19 points) functionally impaired as measured with the Upper Extremity Functional Index Survey. 0-80 scale, with 80 = no Deficits  (The UEFI model does not provide any specific cut off points that could classify the upper limb disability degree, however, a minimal detectable change of 9 points is provided. This means that for improvement or deterioration to be considered, between two subsequent evaluations, the scores must differ by at least 9 points.)               Shoulder and Elbow  Right AROM (degrees) Right Strength (MMT) Left AROM (degrees) Left Strength  (MMT)   Shoulder Flexion (180) 145 (+5) 5 WNL 4   Shoulder Extension (60) 58 (+18) NT WNL 4+   Shoulder Abduction (180) 135 (+18) 5 WNL 4-   Shoulder Adduction WFL NT WFL 4   Shoulder Internal Rotation (70) WFL 5 WFL 4-   External Rotation (80-90) 55 (-10) 5 WFL 4   Elbow Flexion WFL 5 WFL 4-   Elbow Extension WFL 5 WFL 4-                        Hand Strength Right   (pounds) Left   (pounds) COMMENTS    50 (+13) 45 (+10) No difficulty with digit placement to complete pinch measurements - IMPROVED    Lateral pinch 6 (+1) 7 (NC)    2 point pinch 6 (NC) 4 (NC)    3 jaw pinch 10 (+3)  9 (+4)          Coordination Right  Percentile Left  Percentile   9 Hole Peg Test 51 (faster by 10 seconds)    38 seconds     Box and Blocks 34 blocks in 1 minute (improved by 3)   38 blocks in 1 minute               Assessment: all above measurements are in comparison to evaluation date. Pt has greatly improved with functional skills, RUE ROM, strength, and coordination. R hand coordination skills remain below normal limits, but are functional. Atrophy of R hand muscles observed. Appearance of muscles has not changed over the course of treatment. Gross strength of RUE is strong and functional. R shoulder ROM slightly limited, however pt is not limited with any daily activities. Although pt has progressed from evaluation date, measurements were relatively the same when compared to last progress note ~1 month ago. Indicating pt has received maximum benefit from skilled OT treatment at this time. Encouraged pt to continue with HEP to continue to progress and maintain RUE strength and coordination. Encouraged pt to contact clinic if needs change in the future. Pt in agreement with discharge from OT services at this time.      Short Term Goals: (  8    Treatments)  Pt to increase RUE ROM for at least 2 planes by 10 degrees actively - MET  Increase gross UE strength  as evidenced by an increase in 1 grade for all planes bilaterally to improve safety and stability with functional transfers - MET   Increase bilateral  strength by 10  lbs to improve functional participation in ADL/IADL tasks - MET  Increase all pinch strengths bilaterally by 3 lbs to improve functional participation in ADL/IADL tasks - NOT MET  Increase function:UE Functional Index Score 5 or more points to promote increased functional abilities - MET  Patient to be independent with home exercise program as demonstrated by performance with correct form without cues. - MET  Increase R side FMC as evidenced by a decrease in 8 seconds  with the 9-hole peg test - MET  Increase coordination as evidenced by an increase in 5 blocks completed during the box and blocks assessment to indicate significant clinical improvement - NOT MET, 2 blocks         Long Term Goals: (  16  Treatments)  Pt to increase RUE ROM for at least 2 planes by 15+ degrees actively - MET  Increase gross UE strength  as evidenced by an increase in 2 grades for all planes bilaterally to improve safety and stability with functional transfers - MET  Increase bilateral  strength by 15  lbs to improve functional participation in ADL/IADL tasks - NOT MET  Increase all pinch strengths bilaterally by 5 lbs to improve functional participation in ADL/IADL tasks - NOT MET  Increase function:UE Functional Index Score 10 or more points to promote increased functional abilities - MET  Increase R side FMC as evidenced by a decrease in 14 seconds  with the 9-hole peg test - NOT MET, 10 seconds   Modified 10/18/22 - Increase coordination as evidenced by an increase in 5 blocks completed during the box and blocks  to indicate significant clinical improvement - NOT MET, 3 blocks      Patient Goals: To get stronger and use R hand fully       Treatment to Date:     [x]  Therapeutic Exercise   53223              []  Iontophoresis: 4 mg/mL Dexamethasone Sodium Phosphate  mAmin  99402    [x]  Therapeutic Activity  99405  []  Vasopneumatic cold with compression  65992                []  ADL training  83033  []  Ultrasound        94457    [] Neuromuscular Re-education  [address]  []  Electrical Stimulation Attended  [id number]    []  Manual Therapy  [id number]  Orthotic    []  Fit  [id number]     []  Train [id number]    [x]  Instruction in HEP        Prosthetic    []  Fit [id number]      []  Train [id number]    []  Cognitive Interventions, (first 15 min 82867, subsequent 15 min 01956)  []  Cold/hotpack      []  Massage   20381             Discharge Status:     [] Pt recovered from conditions. Treatment goals were met. [x] Pt received maximum benefit. No further therapy indicated at this time. [x] Pt to continue exercise/home instructions independently. [] Therapy interrupted due to:    [] Pt has 2 or more no shows/cancels, is discontinued per our policy. [x] Pt has completed prescribed number of treatment sessions. [] Other:       Time in/out: 9410-6227  Electronically signed by: Gricelda Warren OTR/L    If you have any questions or concerns, please don't hesitate to call.   Thank you for your referral.

## 2022-12-06 ENCOUNTER — HOSPITAL ENCOUNTER (OUTPATIENT)
Dept: OCCUPATIONAL THERAPY | Age: 47
Setting detail: THERAPIES SERIES
Discharge: HOME | End: 2022-12-06
Payer: MEDICARE

## 2022-12-06 ENCOUNTER — HOSPITAL ENCOUNTER (OUTPATIENT)
Dept: SLEEP CENTER | Age: 47
Discharge: HOME | End: 2022-12-08
Payer: MEDICARE

## 2022-12-06 ENCOUNTER — HOSPITAL ENCOUNTER (OUTPATIENT)
Dept: PHYSICAL THERAPY | Age: 47
Setting detail: THERAPIES SERIES
Discharge: HOME | End: 2022-12-06
Payer: MEDICARE

## 2022-12-06 DIAGNOSIS — G47.30 SLEEP APNEA, UNSPECIFIED TYPE: ICD-10-CM

## 2022-12-06 PROCEDURE — G0399 HOME SLEEP TEST/TYPE 3 PORTA: HCPCS

## 2022-12-06 PROCEDURE — 97116 GAIT TRAINING THERAPY: CPT

## 2022-12-06 PROCEDURE — 97530 THERAPEUTIC ACTIVITIES: CPT

## 2022-12-06 NOTE — FLOWSHEET NOTE
[x] [facility]) [facility] &  Therapy  [address]  [phone]  F: [phone] [] [address]  [facility]   [address]  P: [phone]  F: [phone] [] [address]  [address]  P: [phone]  F: [phone] [] [address]  P: [phone]  F: [phone] [] [address]  [facility]   [address]   [place]: [phone]  F: [phone]      Physical Therapy Daily Treatment    Date:  2022  Patient Name:  Jarrod Garrett    :  1975  MRN: [id number]  Physician: RACHAEL Sal                           Insurance: Medicare ([address] [place] [address])  Medical Diagnosis: I63.9 (ICD-10-CM) - Cerebrovascular accident (CVA), unspecified mechanism ([facility] [address]  Rehab Codes: R26.89, R53.1, M54.59, M54.2, R29.3,   Next 's appt.:  with neuro  Date of symptom onset: 22   Visit# / total visits:  (additional visits approved 10/6)   Cancels/No Shows: 5/0 (Last 30 days)    Subjective:    Pain:  [x] Yes  [] No Location: N/A Pain Rating: (0-10 scale) 0/10   Pain altered Tx:  [x] No  [] Yes  Action:  Comments:  Patient arrives to therapy with reporting no pain. Wrist pain is no longer present. Reports no falls or ankle rolls. Patient able to recall his upcoming [facility] clinic gio't for his new AFO's.      Objective:   Sensation: absent to light touch throughout B feet below ankle     Modalities: Precautions: Pt has dialysis 3x per week  Exercises:  BOLDED COMPLETED 2022:  Exercise RLE Reps/ Time Weight/ Level Comments   Nu step  5' Level 5 No UE               Standing       Gastroc stretch on incline 3x1 min  BUE assist   Resisted swing  2x10 Lime  Tapping 4\" step   Step up 2x10  2x10 4\" Inc reps 11.29   Lateral step ups 2x10 4\" Step on L side   LLE marching 3x10 1 lb Focus on RLE stance control  Added wt 11.22   LLE step taps with resistance 3x10 4\" step Blue TB   LLE hip abd AROM 3x10 1 lb Added wt 11.22   Sit <> stand w/ RLE bias 8x  Difficult w/ modA   Resistive STS 8x Blue Added 10.20  Terminal extension  Mod UE pull   Resistive squats 10x blue Added 11.22   Resisted step over small round cone 2x10 Lime  With weight shift onto RLE   Heel taps to step  10x2 4\" Inc reps 12.6  Fwd and lateral   Trunk/cervical rotation naming how many fingers clinician is holding  10xea  clinician standing behind pt    Picking up rings from steps  4x 4 rings;   6\" and 12\" step Wider JSAIEL no UE; notes some irritation in LB; x2 leading with R UE, x2 leading with L UE and handing to clinician at multiple angles   Resisted swing phase fwd and retro amb on way back  5xea // bars;   Lime  Resisted fwd walk    Step over round cone  10xea // bars     TKE 20x Blue Added 10.20   Lunges  10x2  In // bars   Hip flexor stretch 5x10\"  Cues to hold stretch         Balance      Posterior perturbations 1x8  No UE assist  Step back  Hip strategies   Anterior perturbations 1x10     Fwd step outs 1x10  Added 10.25  Anterior lean w/ quick step out   Retro step outs 5 min  Started with slow step backs  Cues for quicker steps on L only   Hurricane sway 3X30\"  Ankle strategy skill  Pt self leaning in all planes -   DF/PF/EV/IV/diagonal   5 posterior LOB   LLE marches      AirEx 2x15  Unilat UE assist  L foot IV new     Squats 1x6  1x10   1 lb cane No UE assist  2nd set with bringing 1 lb cane fwd tapping on // bar  Limited with back pain   Trunk rotation with ball 2x10 ea Volleyball Seated for  HEP review 12.6   Shoulder press with ball 10x2 7\" ball    Roll ball up on wall 10x yellow Good ankle strategy   NBOS 3x1 min     NBOS with EC 2x30\"      PNF patterns 2x10  Seated for HEP 12.6   NBOS on foam  2x30\"  Cues for ankle strategies   NBOS w/ toes on incline EO/EC 2x30\" Toes on incline    NBOS with ball raises, rotation 2x30\"ea Volley  ball     Tandem stance 2x20\"  Modified wider stance with R fwd   Cone  off ground 2x5 5 cones    Standing on foam with head turns  30\" ea  Up/down, rotation    Cone reach with LUE across body 2x5 5 cones    Cone reach with LUE to L side 2x5 5 cones    Heel taps 10x2 ea 4in With 1 hand for UE support  Poor tolerance of No UE supports w/ 2 LOB with lateral sway   Dynamic march with retro hamstring curl 3L  No UE support   Side stepping 3L // bars  No UE-1 light UE support   360 turn 2x  Clockwise more challenging   Marches  2x20\"  No UE   Alt toe taps to foam  2x30\"  No UE   Uneven stance 3x30\"  Patient placed 1 foot on 4\" box while balancing in [facility] for advancement into  SLS         Resistive gait 1x140 ft  Blue bands above knee  Cues to knee drive           FES modalities      Gait training with Bioness 50 min Lower and thigh cuff Initial set up  STS 2x10, LLE on 2\" step  Hip abd  Marches  Adjustments for thigh cuff more proximal  Gait analysis   Change in speed  4 sit down breaks   Xcite training 40 min R hams  R glutes  R quads  R PFs Set up in back room  STS program  STS w/ LLE on 2\" step: 2x10, min A    STS \"pause\" mode: Unilat UE assist  Step taps: 2x10, 4\" step  Hip abd/ext: 2x10 ea  Hip abd L sided - 10x   Balance without UE 2x30 sec  Reaching for ball touches 10x  Punching of pad 10x2  (Reaching&punches across body)      Xcite training  20 min  Set up in supine, head elevated  Ankle pump setting on Xcite      * Lateral LE for ant tib w/            peroneals      * Gastrocs  Ankle pumps 10x4      * PROM b/w sets  Heel slides w/ Exite DF 10x4     360 turns 4x  1x with no AD - 1 LOB with mod dizziness  3x with AD - min dizziness  Added 12.6               PROM      Ankle DF PROM 5x1 min   By therapist between sets of xcite  Doffed AFO   Ankle pronation 10x2 AA    Resistive PF 10x3  Pt pumped foot into soft ball   Resistive supination 10x2 Rolled B feet into ball   DF/PF 10x2 A    Heel slides 20x2 A Required stabilization of ankle   SLR 10x2 A Bilaterally   SAQ 10x2 A    Hip add 10x3 A    Hip abd 10x3 A    Marches 10x2 A    Resistive marches 10x2 blue    Resistive hip and knee extension 10x3 blue Loop around ankle, wrapped laterally, under knee then up through legs for EV assist   Bridges 10x3     Other:       Treatment Charges: Mins Units   []  Modalities     []  Ther Exercise     []  Manual Therapy     [x]  Ther Activities 43 2   []  Aquatics     []  Vasocompression     [x]  Other: Neuro re-ed     [x]  Other: Gait 8 1   Total Treatment time 51 3       Assessment: [] Progressing toward goals. [x] No change. Spent a large of amount of time on HEP with addition of loop resistive bands with seated marches, LAQ and hip abd. Patient was able to demonstrate ability to place looped bands on appropriately. Poor stepping strategy noted with 360 degree turns with mod dizziness and LOB. Patient with foot drag without AD, making turns very difficult. 360 turns with AD, improved and was able to demonstrate with min dizziness. Printed updated HEP with a variety of strengths and lengths of thera bands. [] Other:  [x] Patient would continue to benefit from skilled physical therapy services in order to: address R hemibody weakness, improve gait mechanics and efficiency, and increase standing static/dynamic balance to allow improved household ADL/IADL completion and reduce fall risk. STG: (to be met in 8 treatments) - Assessed by Chon Ocampo PT on 7/20:  ? Pain: No more than 4/10 pain reported in low back or neck post therapy sessions to indicate improving tolerance to increased activity and exercise - MET for low back or neck, but L shoulder is 2/10 - newer pain over the past 3 weeks.    ? ROM: at least 0deg DF PROM with knee extended to indicate improving mobility in ankles to increase heel strike and reduce toe drag with prolonged ambulation - NOT MET, lacking 10 deg from neutral in L ankle, lacking 3 deg from neutral in R ankle  ? Balance:   Pt able to fully turn to look behind himself without instability in either direction to indicate improving postural stability - NOT MET, LOB with full turn to R and L  Pt able to complete standing reaching tasks without UE assist and no more than mild instability to indicate improving dynamic balance for safety with household IADLs - NOT MET, primarily needs to be supported at trunk on counter or holding onto something  ? Strength: At least 4+/5 grossly in R hip to allow improved pelvic stability in standing and improved swing phase control/speed during prolonged gait - NOT MET, 4-/5 grossly in R hip  Pt able to utilize RUE for fine grasp and release tasks with no more than minimally slowed speed evident - Ongoing, better fine grasp but still limited with heavier objects, twisting lid  ? Function: Pt able to ambulate 450 ft with least restrictive device with no evidence of lagging RLE swing or abnormal stance time noted, with no more than 11 on RPE scale - NOT MET, achieves ~400 ft before needing to rest d/t RLE instability and R knee buckling, rates 13 RPE  Patient to be independent with home exercise program as demonstrated by performance with correct form without cues. - Ongoing   Demonstrate Knowledge of fall prevention - MET     LTG: (to be met in 16 treatments) - 8/22: Will continue with current goals, adjusting at visit 16 as needed d/t recent CVA on 8/8, Assessed by Jacklyn Oconnell PT on 10/4   ? Pain: No more than 2/10 pain reported in low back or neck post therapy sessions to indicate improving tolerance to increased activity and exercise MET  ? ROM: at least 5 deg DF PROM with knee extended to indicate improving mobility in ankles to increase heel strike and reduce toe drag with prolonged ambulation. MET  ? Balance:    At least 30/56 on Hernandez to indicate significant improvement in standing static/dynamic balance and progress towards reduced fall risk. 37/56 MET  Able to negotiate gait obstacles using rollator without increased instability or excessively slowed gait MET  ? Strength: At least 5-/5 grossly in R hip to allow further improved pelvic stability in standing and improved swing phase control/speed during prolonged gait MET  At least 5/5 B knee ext to prevent excessive buckling with prolonged standing Progress, some buckling still reported   ? Function:   Pt able to ambulate 600 ft with least restrictive device with appropriate RLE swing/stance phase timing, no evidence of toe drag, and no more than minimal fatigue by end of ambulation Progress  Pt able to ambulate 100 ft with intermittent turning, stopping, etc without assistive device and demonstrating good stability and no evidence of B knee buckling to indicate improving functional strength and safety with household ambulation distances for ADL/IADLs Not Met  No more than 14% impaired on ABC scale to indicate improving subjective balance confidence. Not Met    ABC score 41% impairment. New goals as of 10/6, to be met in next additional 12 visits, at total visit 28: - Assessed on 12/122 by Torrey Blevins, PT:        1. Hernandez score to at least 45/56 to indicate reduced fall risk, progressive balance improvement - not met, 30/56        2. Ability to complete RLE SLS tasks with no UE assist and no buckling/instability noted. - NOT MET        3. All bracing needs for RLE will have been met at this time to improve stability in ankle, reduce falls - NOT MET, pt missed appointment at orthotist to  braces, rescheduled for later December        4. No more than 20% impaired on ABC scale to indicate subjective improvement in balance confidence. - NOT MET, 63% confident, 37% impaired        5.  At least 650 ft with least restrictive device with appropriate RLE swing/stance phase timing, no toe drag - NOT MET, limited to 456 ft with toe drag and instability on RLE starting at ~400 ft    10MWT: .79m/s gait speed (<.8m/s indicates limited community ambulator, <1.0m/s indicates risks for falls)        Patient goals: \"to strengthen right side back as much as I can\"     Pt. Education:  [x] Yes  [] No  [x] Reviewed Prior HEP/Ed  Method of Education: [x] Verbal  [x] Demo  [x] Written 12.6.22 [address] [place] [address] printed    fall prevention 7/11/22  Comprehension of Education:  [x] Verbalizes understanding. [x] Demonstrates understanding. [] Needs review. [x] Demonstrates/verbalizes HEP/Ed previously given. Access Code: [email or web address]  URL: AppleTreeBook/  Date: 12/06/2022  Prepared by: Eloy Landau    Exercises  Gastroc Stretch on Wall - 1 x daily - 7 x weekly - 3 sets - 10 reps  Standing Marching - 1 x daily - 7 x weekly - 3 sets - 10 reps  Seated March with Resistance - 1 x daily - 7 x weekly - 3 sets - 10 reps  Seated Hip Abduction with Resistance - 1 x daily - 7 x weekly - 3 sets - 10 reps  Sitting Knee Extension with Resistance - 1 x daily - 7 x weekly - 3 sets - 10 reps  Standing Anterior Toe Taps - 1 x daily - 7 x weekly - 3 sets - 10 reps  Standing Hip Abduction with Counter Support - 1 x daily - 7 x weekly - 3 sets - 10 reps  Standing hip extension - 1 x daily - 7 x weekly - 3 sets - 10 reps  Hip flexion - 1 x daily - 7 x weekly - 3 sets - 10 reps  Mini Squat with Counter Support - 1 x daily - 7 x weekly - 3 sets - 10 reps  Mini Lunge - 1 x daily - 7 x weekly - 3 sets - 10 reps  Seated Diagonal Chops with Medicine Ball - 1 x daily - 7 x weekly - 3 sets - 10 reps      Access Code: [email or web address]: AppleTreeBook/Date: 11/15/2022Prepared by: Maulik Fuentes   Supine Knee and Hip Extension with Resistance - 1 x daily - 7 x weekly - 3 sets - 10 reps   Seated Hip Abduction with Resistance - 1 x daily - 7 x weekly - 3 sets - 10 reps   Hip add - 1 x daily - 7 x weekly - 3 sets - 10 reps   SLR - 1 x daily - 7 x weekly - 3 sets - 10 reps Supine March with Resistance Band - 1 x daily - 7 x weekly - 3 sets - 10 reps    Plan: [x] To be discharged with completion of therapy sessions.         Time In: 9:00 am           Time Out: 9:56 am     Electronically signed by:  He Sands PTA

## 2022-12-06 NOTE — SIGNIFICANT EVENT
[x] [address] Occupational Therapy       [address] [facility], 1st Floor       Phone: [phone]       Fax: [phone] [] [facility] Occupational  Therapy at [facility]       Phone: [phone]       Fax: [phone]          Occupational Therapy Cancel/No Show note    Date: 2022  Patient: Rodrigo Flanagan  : 1975  MRN: [id number]  Cancels/No Shows to date: 3/1 - not counted against pt     For today's appointment patient:  [x]  Cancelled  []  Rescheduled appointment  []  No-show     Reason given by patient:  []  Patient ill  []  Conflicting appointment  []  No transportation    []  Conflict with work  []  No reason given  [x]  Other:  pt was d/c last session.     Comments:      Electronically signed by: ANDREA Cordero/JOHN

## 2022-12-08 ENCOUNTER — APPOINTMENT (OUTPATIENT)
Dept: OCCUPATIONAL THERAPY | Age: 47
End: 2022-12-08
Payer: MEDICARE

## 2022-12-12 NOTE — DISCHARGE SUMMARY
[x] [facility]) [facility]  [address]  [phone]  F: [phone] [] [address]  [address]   [address]  P: [phone]  F: [phone] [] [place]  [address]  P: [phone]  F: [phone] [] [address]  P: [phone]  F: [phone] [] [address]  [facility]   [address]   [place]: [phone]  F: [phone]      Physical Therapy Discharge Note    Date: 2022      Patient: Rodrigo Flanagan  : 1975  MRN: [id number]    Physician: RACHAEL Cleveland                           Insurance: Medicare ([address] [place] [address])  Medical Diagnosis: I63.9 (ICD-10-CM) - Cerebrovascular accident (CVA), unspecified mechanism ([facility] [address]  Rehab Codes: R26.89, R53.1, M54.59, M54.2, R29.3,   Next 's appt.:  with neuro  Date of symptom onset: 22   Visit# / total visits:  (additional visits approved 10/6)    Cancels/No Shows: 5/0 (Last 30 days)  Date of initial visit: 22                Date of final visit: 22      Subjective:  Pain:  [x] Yes  [] No   Location: N/A  Pain Rating: (0-10 scale) 0/10   Pain altered Tx:  [x] No  [] Yes  Action:  Comments:  Patient arrives to therapy with reporting no pain. Wrist pain is no longer present. Reports no falls or ankle rolls. Patient able to recall his upcoming [facility] clinic gio't for his new AFO's.      Objective:  Test Measurements: see goal assessment below  Function: see goal assessment below    Assessment:  STG: (to be met in 8 treatments) - Assessed by Linda Aguero PT on :  ? Pain: No more than 4/10 pain reported in low back or neck post therapy sessions to indicate improving tolerance to increased activity and exercise - MET for low back or neck, but L shoulder is 2/10 - newer pain over the past 3 weeks. ? ROM: at least 0deg DF PROM with knee extended to indicate improving mobility in ankles to increase heel strike and reduce toe drag with prolonged ambulation - NOT MET, lacking 10 deg from neutral in L ankle, lacking 3 deg from neutral in R ankle  ? Balance:   Pt able to fully turn to look behind himself without instability in either direction to indicate improving postural stability - NOT MET, LOB with full turn to R and L  Pt able to complete standing reaching tasks without UE assist and no more than mild instability to indicate improving dynamic balance for safety with household IADLs - NOT MET, primarily needs to be supported at trunk on counter or holding onto something  ? Strength: At least 4+/5 grossly in R hip to allow improved pelvic stability in standing and improved swing phase control/speed during prolonged gait - NOT MET, 4-/5 grossly in R hip  Pt able to utilize RUE for fine grasp and release tasks with no more than minimally slowed speed evident - Ongoing, better fine grasp but still limited with heavier objects, twisting lid  ? Function: Pt able to ambulate 450 ft with least restrictive device with no evidence of lagging RLE swing or abnormal stance time noted, with no more than 11 on RPE scale - NOT MET, achieves ~400 ft before needing to rest d/t RLE instability and R knee buckling, rates 13 RPE  Patient to be independent with home exercise program as demonstrated by performance with correct form without cues. - Ongoing   Demonstrate Knowledge of fall prevention - MET     LTG: (to be met in 16 treatments) - 8/22: Will continue with current goals, adjusting at visit 16 as needed d/t recent CVA on 8/8, Assessed by Facundo Vargas PT on 10/4   ?  Pain: No more than 2/10 pain reported in low back or neck post therapy sessions to indicate improving tolerance to increased activity and exercise MET  ? ROM: at least 5 deg DF PROM with knee extended to indicate improving mobility in ankles to increase heel strike and reduce toe drag with prolonged ambulation. MET  ? Balance: At least 30/56 on Hernandez to indicate significant improvement in standing static/dynamic balance and progress towards reduced fall risk. 37/56 MET  Able to negotiate gait obstacles using rollator without increased instability or excessively slowed gait MET  ? Strength: At least 5-/5 grossly in R hip to allow further improved pelvic stability in standing and improved swing phase control/speed during prolonged gait MET  At least 5/5 B knee ext to prevent excessive buckling with prolonged standing Progress, some buckling still reported   ? Function:   Pt able to ambulate 600 ft with least restrictive device with appropriate RLE swing/stance phase timing, no evidence of toe drag, and no more than minimal fatigue by end of ambulation Progress  Pt able to ambulate 100 ft with intermittent turning, stopping, etc without assistive device and demonstrating good stability and no evidence of B knee buckling to indicate improving functional strength and safety with household ambulation distances for ADL/IADLs Not Met  No more than 14% impaired on ABC scale to indicate improving subjective balance confidence. Not Met     ABC score 41% impairment. New goals as of 10/6, to be met in next additional 12 visits, at total visit 28: - Assessed on 12/122 by Bin Cates, PT:        1. Hernandez score to at least 45/56 to indicate reduced fall risk, progressive balance improvement - not met, 30/56        2. Ability to complete RLE SLS tasks with no UE assist and no buckling/instability noted. - NOT MET        3. All bracing needs for RLE will have been met at this time to improve stability in ankle, reduce falls - NOT MET, pt missed appointment at orthotist to  braces, rescheduled for later December        4.  No more than 20% impaired on ABC scale to indicate subjective improvement in balance confidence. - NOT MET, 63% confident, 37% impaired        5. At least 650 ft with least restrictive device with appropriate RLE swing/stance phase timing, no toe drag - NOT MET, limited to 456 ft with toe drag and instability on RLE starting at ~400 ft     10MWT: .79m/s gait speed (<.8m/s indicates limited community ambulator, <1.0m/s indicates risks for falls)       Treatment to Date:  [x] Therapeutic Exercise    [] Modalities:  [x] Therapeutic Activity    [] Ultrasound  [] Electrical Stimulation  [x] Gait Training     [] Massage       [] Lumbar/Cervical Traction  [x] Neuromuscular Re-education [] Cold/hotpack [] Iontophoresis: 4 mg/mL  [x] Instruction in Home Exercise Program                     Dexamethasone Sodium  [] Manual Therapy             Phosphate 40-80 mAmin  [] Aquatic Therapy                   [] Vasocompression/    [] Other:             Game Ready    Discharge Status:     [] Pt recovered from conditions. Treatment goals were met. [x] Pt received maximum benefit. No further therapy indicated at this time. [x] Pt to continue exercise/home instructions independently. [] Therapy interrupted due to:    [] Pt has 2 or more no shows/cancels, is discontinued per our policy. [x] Pt has completed prescribed number of treatment sessions. [] Other:         Electronically signed by Jeri Boast, PT on 12/12/2022 at 5:25 PM      If you have any questions or concerns, please don't hesitate to call.   Thank you for your referral.

## 2022-12-20 PROBLEM — G47.30 SLEEP APNEA: Status: ACTIVE | Noted: 2022-12-20

## 2023-02-14 ENCOUNTER — TELEPHONE (OUTPATIENT)
Dept: FAMILY MEDICINE CLINIC | Age: 48
End: 2023-02-14

## 2023-02-14 NOTE — TELEPHONE ENCOUNTER
Pts grandfather called stated he spoke with Lou kerr about his grandson diabetic shoes. Andrei's states they cannot read Niranjan's signature and that Francine Hemphill needs to sign off on podiatry for the diabetic shoes. Pts grandfather asked if Francine Hemphill could resign paperwork and fax to [address] [place] [address] [phone] and sign off on podiatry?

## 2023-02-16 ENCOUNTER — OFFICE VISIT (OUTPATIENT)
Dept: PODIATRY | Age: 48
End: 2023-02-16
Payer: MEDICARE

## 2023-02-16 VITALS — WEIGHT: 185 LBS | BODY MASS INDEX: 28.04 KG/M2 | HEIGHT: 68 IN

## 2023-02-16 DIAGNOSIS — D23.72 BENIGN NEOPLASM OF SKIN OF LOWER LIMB, INCLUDING HIP, LEFT: ICD-10-CM

## 2023-02-16 DIAGNOSIS — I73.9 PERIPHERAL VASCULAR DISORDER (HCC): ICD-10-CM

## 2023-02-16 DIAGNOSIS — E11.51 TYPE II DIABETES MELLITUS WITH PERIPHERAL CIRCULATORY DISORDER (HCC): Primary | ICD-10-CM

## 2023-02-16 DIAGNOSIS — B35.1 DERMATOPHYTOSIS OF NAIL: ICD-10-CM

## 2023-02-16 DIAGNOSIS — M79.605 PAIN IN BOTH LOWER EXTREMITIES: ICD-10-CM

## 2023-02-16 DIAGNOSIS — M79.604 PAIN IN BOTH LOWER EXTREMITIES: ICD-10-CM

## 2023-02-16 DIAGNOSIS — D23.71 BENIGN NEOPLASM OF SKIN OF LOWER LIMB, INCLUDING HIP, RIGHT: ICD-10-CM

## 2023-02-16 PROCEDURE — 99999 PR OFFICE/OUTPT VISIT,PROCEDURE ONLY: CPT | Performed by: PODIATRIST

## 2023-02-16 PROCEDURE — 11721 DEBRIDE NAIL 6 OR MORE: CPT | Performed by: PODIATRIST

## 2023-02-16 PROCEDURE — 17110 DESTRUCTION B9 LES UP TO 14: CPT | Performed by: PODIATRIST

## 2023-02-16 NOTE — PROGRESS NOTES
[address] [facility] PODIATRY [facility]  [address] [address]  Dept: [phone]  Dept Fax: [phone]    DIABETIC NAIL & BENIGN NEOPLASM PROGRESS NOTE  Date of patient's visit: 2/16/2023  Patient's Name:  Christina Ramos YOB: 1975            Patient Care Team:  DANIEL Chakraborty CNP as PCP - General (Certified Nurse Practitioner)  DANIEL Chakraborty CNP as PCP - Empaneled Provider  José Miguel Ngo DPM as Physician (Podiatry)          Chief Complaint   Patient presents with    Nail Problem     Toenail trim    Foot Pain     Bl feet    Diabetes     Diabetic foot care  A1C 8.4    Benign Neoplasm     Basil feet         Subjective:   Christian Ramos comes to clinic for Nail Problem (Toenail trim), Foot Pain (Bl feet), Diabetes (Diabetic foot care/A1C 8.4), and Benign Neoplasm (Basil feet)    he is a diabetic and states that he checks his feet daily and uses lotion somet imes . Pt currently has complaint of thickened, elongated nails that they cannot manage by themselves. Pt's primary care physician is DANIEL Chakraborty CNP last seen august 19 2022   Pt's last blood sugar was A1C 8.4. Painful lesions on hisfeet. . They have been present for 9 month(s) duration. The lesions are present on the bilateral foot. The patient has 2 lesion that is deep seated andhas a painful core. Treatment has included previous podiatry treatment. Lab Results   Component Value Date    LABA1C 8.4 (H) 05/30/2022      Complains of numbness in the feet bilat.   Past Medical History:   Diagnosis Date    Cerebral artery occlusion with cerebral infarction [facility])     Closed fracture of bone of right foot March - April 2020    Dialysis patient [facility])     Hemodialysis patient [facility])     Hyperlipidemia     Hypertension     Kidney disease     On Dialysis    Type 1 diabetes mellitus ([facility] [address]        No Known Allergies  Current Outpatient Medications on File Prior to Visit   Medication Sig Dispense Refill    carvedilol (COREG) 25 MG tablet Take 1 tablet by mouth 2 times daily (Patient taking differently: Take 12.5 mg by mouth 2 times daily) 60 tablet 3    cyclobenzaprine (FLEXERIL) 5 MG tablet Take 1 tablet by mouth 3 times daily as needed for Muscle spasms 90 tablet 5    hydrALAZINE (APRESOLINE) 25 MG tablet Take 1 tablet by mouth 3 times daily (Patient taking differently: Take 12.5 mg by mouth 3 times daily) 270 tablet 1    epoetin pennie-epbx (RETACRIT) 2000 UNIT/ML injection Inject 8,000 Units into the skin once a week      colchicine (COLCRYS) 0.6 MG tablet       ferrous sulfate (IRON 325) 325 (65 Fe) MG tablet Take 1 tablet by mouth daily (with breakfast) 90 tablet 1    furosemide (LASIX) 20 MG tablet Take 1 tablet by mouth in the morning. 30 tablet 0    omeprazole (PRILOSEC) 10 MG delayed release capsule Take 10 mg by mouth in the morning. sennosides-docusate sodium (SENOKOT-S) 8.6-50 MG tablet Take 1 tablet by mouth in the morning. loratadine (CLARITIN) 10 MG tablet Take 10 mg by mouth in the morning. losartan (COZAAR) 25 MG tablet Take 25 mg by mouth in the morning. calcium acetate 667 MG TABS Take 1,334 mg by mouth 3 times daily (with meals) 180 tablet 1    Insulin Pen Needle (MEIJER PEN NEEDLES) 31G X 8 MM MISC 1 each by Does not apply route daily 100 each 3    glucose 4 g chewable tablet Take 4 tablets by mouth as needed for Low blood sugar 60 tablet 3    gabapentin (NEURONTIN) 300 MG capsule Take 300 mg by mouth 3 times daily. 90 capsule 3    Misc. Devices MISC Disp: custom molded shoes to accommodate for brace   DX: DM with history of stroke, foot drop  Duration: 1 year 2 each 0    insulin aspart (NOVOLOG) 100 UNIT/ML injection vial Inject 0-8 Units into the skin 3 times daily (before meals) SLIDING SCALE       FLUoxetine (PROZAC) 20 MG tablet Take 20 mg by mouth in the morning and 20 mg in the evening.  Take 1 tablets (20 mg)  by mouth in the morning and at bedtime. buPROPion (WELLBUTRIN XL) 150 MG extended release tablet Take 1 tablet by mouth every morning 15 tablet 1    ezetimibe (ZETIA) 10 MG tablet Take 10 mg by mouth daily      aspirin 81 MG EC tablet Take 81 mg by mouth daily      Cyanocobalamin (VITAMIN B-12 PO) Take 2,500 mcg by mouth daily      atorvastatin (LIPITOR) 20 MG tablet Take 20 mg by mouth daily (Patient not taking: No sig reported)      insulin glargine (LANTUS SOLOSTAR) 100 UNIT/ML injection pen Inject 25 Units into the skin in the morning and 25 Units before bedtime. 5 pen 3    [DISCONTINUED] pantoprazole (PROTONIX) 40 MG tablet Take 1 tablet by mouth every morning (before breakfast) 30 tablet 3     No current facility-administered medications on file prior to visit. Review of Systems      Review of Systems:   History obtained from chart review and the patient  General ROS: negative for - chills, fatigue, fever, night sweats or weight gain  Constitutional: Negativefor chills, diaphoresis, fatigue, fever and unexpected weight change. Musculoskeletal: Positive for arthralgias, gait problem and joint swelling. Neurological ROS: negative for - behavioral changes, confusion, headaches or seizures. Negative for weakness and numbness. Dermatological ROS: negative for - mole changes,rash  Cardiovascular: Negative for leg swelling. Gastrointestinal: Negative for constipation, diarrhea, nausea and vomiting. Objective:  Dermatologic Exam:  Skin lesion present to the right and left plantar foot with a central core and petchaie noted to the lesions periphery.   Pain on palpation of the lesion    Skin is thin, with flaky sloughing skin as well as decreased hair growth to the lower leg  Small red hemosiderin deposits seen dorsal foot   Musculoskeletal:     1st MPJ ROM decreased, Bilateral.  Muscle strength 5/5, Bilateral.  Pain present upon palpation of toenails 1-5, Bilateral. decreased medial longitudinal arch, Bilateral.  Ankle ROM decreased,Bilateral.    Dorsally contracted digits present digits 2, Bilateral.     Vascular: DP pulses 1/4 bilateral.  PT pulses 0/4 bilateral.   CFT <5 seconds, Bilateral.  Hair growth absent to the level of the digits, Bilateral.  Edema present, Bilateral.  Varicosities absent, Bilateral. Erythema absent, Bilateral    Neurological: Sensation diminshed to light touch to level of digits, Bilateral.  Protective sensation intact 6/10 sites via 5.07/10g [place] Monofilament, Bilateral.  negative Tinel's, Bilateral.  negative Valleix sign, Bilateral.      Integument: Warm, dry, supple, Bilateral.  Open lesion absent, Bilateral.  Interdigital maceration absent to web spaces 4, Bilateral.  Nails 1-5 left and 1-5 right thickened > 3.0 mm, dystrophic and crumbly, discolored with subungual debris. Fissures absent, Bilateral.   General: AAO x 3 in NAD.     Derm  Toenail Description  Sites of Onychomycosis Involvement (Check affected area)  [x] [x] [x] [x] [x] [x] [x] [x] [x] [x]  5 4 3 2 1 1 2 3 4 5                          Right                                        Left    Thickness  [x] [x] [x] [x] [x] [x] [x] [x] [x] [x]  5 4 3 2 1 1 2 3 4 5                         Right                                        Left    Dystrophic Changes   [x] [x] [x] [x] [x] [x] [x] [x] [x] [x]  5 4 3 2 1 1 2 3 4 5                         Right                                        Left    Color   [x] [x] [x] [x] [x] [x] [x] [x] [x] [x]  5 4 3 2 1 1 2 3 4 5                          Right                                        Left    Incurvation/Ingrowin   [] [] [] [] [] [] [] [] [] []  5 4 3 2 1 1 2 3 4 5                         Right                                        Left    Inflammation/Pain   [x] [x] [x] [x] [x] [x] [x] [x] [x] [x]  5 4 3 2 1 1 2 3 4 5                         Right                                        Left      Visual inspection:  Deformity: hammertoe deformity danielle feet  amputation: absent  Skin lesions: present - as above  Edema: right- 2+ pittingedema, left- 2+ pitting edema  Sensory exam:  Monofilament sensation: abnormal - 6/10 via SW 5.07/10g monofilament to the plantar foot bilateral feet  Pulses: abnormal - 1/4 dorsalis pedis pulse and 0/4Posterior tibial pulse,   Pinprick: Impaired  Proprioception: Impaired  Vibration (128 Hz): Impaired       Assessment:  49 y.o. male with:     Diagnosis Orders   1. Type II diabetes mellitus with peripheral circulatory disorder (HCC)  75005 - [place] DEBRIDEMENT OF NAILS, 6 OR MORE    HM DIABETES FOOT EXAM    46779 - [place] DESTRUCTION BENIGN LESIONS UP TO 14      2. Dermatophytosis of nail  88317 - [place] DEBRIDEMENT OF NAILS, 6 OR MORE    HM DIABETES FOOT EXAM      3. Peripheral vascular disorder (HCC)  90813 - [place] DEBRIDEMENT OF NAILS, 6 OR MORE    HM DIABETES FOOT EXAM    05755 - [place] DESTRUCTION BENIGN LESIONS UP TO 14      4. Benign neoplasm of skin of lower limb, including hip, left  HM DIABETES FOOT EXAM    82735 - [place] DESTRUCTION BENIGN LESIONS UP TO 14      5. Benign neoplasm of skin of lower limb, including hip, right  HM DIABETES FOOT EXAM    10475 - [place] DESTRUCTION BENIGN LESIONS UP TO 14      6. Pain in both lower extremities  68948 - [place] DEBRIDEMENT OF NAILS, 6 OR MORE    HM DIABETES FOOT EXAM    14950 - [place] DESTRUCTION BENIGN LESIONS UP TO 14              Q7   []Yes  []No                Q8   [x]Yes  []No                     Q9   []Yes    []No    Plan:   Pt was evaluated and examined. Patient was given personalized discharge instructions. The lesions were partially excised via 15 blade and silver nitrate was applied under occlusion. The patient tolerated the procedure well and without complication. Advised patient to use vasoline to the area after tomorrow to prevent surrounding tissue irritation. Nails 1-10 were debrided sharply in length and thickness with stivenipper and , without incident.  Pt will follow up in 9 weeks or sooner if any problems arise. Diagnosis was discussed with the pt and all of their questions were answered indetail. Proper foot hygiene and care was discussed with the pt. Informed patient on proper diabetic foot care and importance of tight glycemic control. Patient to check feet daily and contact the office with anyquestions/problems/concerns.    Other comorbidity noted and will be managed by PCP.  2/16/2023        Electronically signed by Elyse Severance, DPM on 2/23/2023 at 7:40 AM  2/16/2023

## 2023-03-12 DIAGNOSIS — N18.4 ANEMIA DUE TO STAGE 4 CHRONIC KIDNEY DISEASE (HCC): ICD-10-CM

## 2023-03-12 DIAGNOSIS — D63.1 ANEMIA DUE TO STAGE 4 CHRONIC KIDNEY DISEASE (HCC): ICD-10-CM

## 2023-03-13 RX ORDER — FERROUS SULFATE 325(65) MG
TABLET ORAL
Qty: 90 TABLET | Refills: 1 | Status: SHIPPED | OUTPATIENT
Start: 2023-03-13

## 2023-04-07 ENCOUNTER — HOSPITAL ENCOUNTER (EMERGENCY)
Age: 48
Discharge: HOME | End: 2023-04-07
Attending: EMERGENCY MEDICINE
Payer: MEDICARE

## 2023-04-07 VITALS
HEART RATE: 75 BPM | RESPIRATION RATE: 16 BRPM | SYSTOLIC BLOOD PRESSURE: 129 MMHG | OXYGEN SATURATION: 98 % | TEMPERATURE: 97.6 F | WEIGHT: 196 LBS | BODY MASS INDEX: 30.24 KG/M2 | DIASTOLIC BLOOD PRESSURE: 73 MMHG

## 2023-04-07 DIAGNOSIS — E16.2 HYPOGLYCEMIA: Primary | ICD-10-CM

## 2023-04-07 LAB
ABSOLUTE EOS #: 0.24 K/UL (ref 0–0.44)
ABSOLUTE IMMATURE GRANULOCYTE: 0.02 K/UL (ref 0–0.3)
ABSOLUTE LYMPH #: 1.48 K/UL (ref 1.1–3.7)
ABSOLUTE MONO #: 0.84 K/UL (ref 0.1–1.2)
ALBUMIN SERPL-MCNC: 3.7 G/DL (ref 3.5–5.2)
ALP SERPL-CCNC: 83 U/L (ref 40–129)
ALT SERPL-CCNC: 9 U/L (ref 5–41)
ANION GAP SERPL CALCULATED.3IONS-SCNC: 17 MMOL/L (ref 9–17)
AST SERPL-CCNC: 12 U/L
BASOPHILS # BLD: 1 % (ref 0–2)
BASOPHILS ABSOLUTE: 0.06 K/UL (ref 0–0.2)
BILIRUB SERPL-MCNC: 0.3 MG/DL (ref 0.3–1.2)
BUN SERPL-MCNC: 46 MG/DL (ref 6–20)
BUN/CREAT BLD: 5 (ref 9–20)
CALCIUM SERPL-MCNC: 8.8 MG/DL (ref 8.6–10.4)
CHLORIDE SERPL-SCNC: 98 MMOL/L (ref 98–107)
CHP ED QC CHECK: 231
CHP ED QC CHECK: NORMAL
CO2 SERPL-SCNC: 22 MMOL/L (ref 20–31)
CREAT SERPL-MCNC: 9.13 MG/DL (ref 0.7–1.2)
EKG ATRIAL RATE: 73 BPM
EKG P AXIS: 33 DEGREES
EKG P-R INTERVAL: 160 MS
EKG Q-T INTERVAL: 456 MS
EKG QRS DURATION: 104 MS
EKG QTC CALCULATION (BAZETT): 502 MS
EKG R AXIS: -27 DEGREES
EKG VENTRICULAR RATE: 73 BPM
EOSINOPHILS RELATIVE PERCENT: 3 % (ref 1–4)
GFR SERPL CREATININE-BSD FRML MDRD: 7 ML/MIN/1.73M2
GLUCOSE BLD-MCNC: 200 MG/DL
GLUCOSE BLD-MCNC: 200 MG/DL (ref 75–110)
GLUCOSE BLD-MCNC: 231 MG/DL (ref 75–110)
GLUCOSE SERPL-MCNC: 167 MG/DL (ref 70–99)
HCT VFR BLD AUTO: 40.7 % (ref 40.7–50.3)
HGB BLD-MCNC: 13.4 G/DL (ref 13–17)
IMMATURE GRANULOCYTES: 0 %
LYMPHOCYTES # BLD: 20 % (ref 24–43)
MCH RBC QN AUTO: 31.1 PG (ref 25.2–33.5)
MCHC RBC AUTO-ENTMCNC: 32.9 G/DL (ref 28.4–34.8)
MCV RBC AUTO: 94.4 FL (ref 82.6–102.9)
MONOCYTES # BLD: 11 % (ref 3–12)
NRBC AUTOMATED: 0 PER 100 WBC
PDW BLD-RTO: 13.5 % (ref 11.8–14.4)
PLATELET # BLD AUTO: 157 K/UL (ref 138–453)
PMV BLD AUTO: 9.9 FL (ref 8.1–13.5)
POTASSIUM SERPL-SCNC: 4.7 MMOL/L (ref 3.7–5.3)
PROT SERPL-MCNC: 5.8 G/DL (ref 6.4–8.3)
RBC # BLD: 4.31 M/UL (ref 4.21–5.77)
REASON FOR REJECTION: NORMAL
SEG NEUTROPHILS: 65 % (ref 36–65)
SEGMENTED NEUTROPHILS ABSOLUTE COUNT: 4.82 K/UL (ref 1.5–8.1)
SODIUM SERPL-SCNC: 137 MMOL/L (ref 135–144)
WBC # BLD AUTO: 7.5 K/UL (ref 3.5–11.3)
ZZ NTE CLEAN UP: ORDERED TEST: NORMAL
ZZ NTE WITH NAME CLEAN UP: SPECIMEN SOURCE: NORMAL

## 2023-04-07 PROCEDURE — 80053 COMPREHEN METABOLIC PANEL: CPT

## 2023-04-07 PROCEDURE — 85025 COMPLETE CBC W/AUTO DIFF WBC: CPT

## 2023-04-07 PROCEDURE — 82947 ASSAY GLUCOSE BLOOD QUANT: CPT

## 2023-04-07 PROCEDURE — 93005 ELECTROCARDIOGRAM TRACING: CPT | Performed by: EMERGENCY MEDICINE

## 2023-04-07 NOTE — ED NOTES
Pt. To the ED via EMS c/o hypoglycemia. Pt. States that he was sleeping and he woke up and didn't feel right. EMS found his sugar to be in the 50s and gave him and amp of dextrose bringing his sugar up into the 200s. Pt. States that he also is a M/W/F dialysis patient. Pt. Placed on full cardiac monitor.       Hunter Barajas RN  04/07/23 0393

## 2023-04-07 NOTE — ED PROVIDER NOTES
morning. Historical Med      loratadine (CLARITIN) 10 MG tablet Take 10 mg by mouth in the morning. Historical Med      losartan (COZAAR) 25 MG tablet Take 25 mg by mouth in the morning. Historical Med      calcium acetate 667 MG TABS Take 1,334 mg by mouth 3 times daily (with meals), Disp-180 tablet, R-1Normal      insulin glargine (LANTUS SOLOSTAR) 100 UNIT/ML injection pen Inject 25 Units into the skin in the morning and 25 Units before bedtime. , Disp-5 pen, R-3Normal      Insulin Pen Needle (MEIJER PEN NEEDLES) 31G X 8 MM MISC DAILY Starting Fri 7/15/2022, Disp-100 each, R-3, Normal      glucose 4 g chewable tablet Take 4 tablets by mouth as needed for Low blood sugar, Disp-60 tablet, R-3Normal      gabapentin (NEURONTIN) 300 MG capsule Take 300 mg by mouth 3 times daily. , Disp-90 capsule, R-3Historical Med      Misc. Devices MISC Disp-2 each, R-0, NormalDisp: custom molded shoes to accommodate for brace  DX: DM with history of stroke, foot drop Duration: 1 year      insulin aspart (NOVOLOG) 100 UNIT/ML injection vial Inject 0-8 Units into the skin 3 times daily (before meals) SLIDING SCALE Historical Med      FLUoxetine (PROZAC) 20 MG tablet Take 20 mg by mouth in the morning and 20 mg in the evening. Take 1 tablets (20 mg)  by mouth in the morning and at bedtime. Historical Med      buPROPion (WELLBUTRIN XL) 150 MG extended release tablet Take 1 tablet by mouth every morning, Disp-15 tablet, R-1Normal      ezetimibe (ZETIA) 10 MG tablet Take 10 mg by mouth dailyHistorical Med      aspirin 81 MG EC tablet Take 81 mg by mouth dailyHistorical Med      Cyanocobalamin (VITAMIN B-12 PO) Take 2,500 mcg by mouth dailyHistorical Med           ALLERGIES     has No Known Allergies. FAMILY HISTORY     He indicated that his mother is alive. He indicated that his father is . He indicated that the status of his maternal grandmother is unknown. He indicated that the status of his maternal grandfather is unknown.  He

## 2023-04-14 ENCOUNTER — TELEPHONE (OUTPATIENT)
Dept: NEUROLOGY | Age: 48
End: 2023-04-14

## 2023-04-24 NOTE — TELEPHONE ENCOUNTER
Call placed to Shana Khanna RN transplant coordinator at [facility] ([phone]). Writer left a message on her VM that the patient hasn't been seen since 11/17/22, but has a follow up on 5/18/23 with Dr. Ese George. Writer asked that she return the call to confirm that she received this.

## 2023-05-08 ENCOUNTER — OFFICE VISIT (OUTPATIENT)
Dept: PODIATRY | Age: 48
End: 2023-05-08
Payer: MEDICARE

## 2023-05-08 VITALS — WEIGHT: 196 LBS | BODY MASS INDEX: 30.76 KG/M2 | HEIGHT: 67 IN

## 2023-05-08 DIAGNOSIS — M79.604 PAIN IN BOTH LOWER EXTREMITIES: ICD-10-CM

## 2023-05-08 DIAGNOSIS — E11.51 TYPE II DIABETES MELLITUS WITH PERIPHERAL CIRCULATORY DISORDER (HCC): Primary | ICD-10-CM

## 2023-05-08 DIAGNOSIS — M79.605 PAIN IN BOTH LOWER EXTREMITIES: ICD-10-CM

## 2023-05-08 DIAGNOSIS — L97.522 ULCER OF LEFT FOOT, WITH FAT LAYER EXPOSED (HCC): ICD-10-CM

## 2023-05-08 DIAGNOSIS — B35.1 DERMATOPHYTOSIS OF NAIL: ICD-10-CM

## 2023-05-08 DIAGNOSIS — I73.9 PERIPHERAL VASCULAR DISORDER (HCC): ICD-10-CM

## 2023-05-08 PROCEDURE — 2022F DILAT RTA XM EVC RTNOPTHY: CPT | Performed by: PODIATRIST

## 2023-05-08 PROCEDURE — 1036F TOBACCO NON-USER: CPT | Performed by: PODIATRIST

## 2023-05-08 PROCEDURE — G8427 DOCREV CUR MEDS BY ELIG CLIN: HCPCS | Performed by: PODIATRIST

## 2023-05-08 PROCEDURE — 3046F HEMOGLOBIN A1C LEVEL >9.0%: CPT | Performed by: PODIATRIST

## 2023-05-08 PROCEDURE — 11042 DBRDMT SUBQ TIS 1ST 20SQCM/<: CPT | Performed by: PODIATRIST

## 2023-05-08 PROCEDURE — 11721 DEBRIDE NAIL 6 OR MORE: CPT | Performed by: PODIATRIST

## 2023-05-08 PROCEDURE — 99213 OFFICE O/P EST LOW 20 MIN: CPT | Performed by: PODIATRIST

## 2023-05-08 PROCEDURE — G8417 CALC BMI ABV UP PARAM F/U: HCPCS | Performed by: PODIATRIST

## 2023-05-27 ENCOUNTER — HOSPITAL ENCOUNTER (EMERGENCY)
Age: 48
Discharge: HOME | End: 2023-05-27
Attending: EMERGENCY MEDICINE
Payer: MEDICARE

## 2023-05-27 ENCOUNTER — APPOINTMENT (OUTPATIENT)
Dept: GENERAL RADIOLOGY | Age: 48
End: 2023-05-27
Payer: MEDICARE

## 2023-05-27 VITALS
RESPIRATION RATE: 19 BRPM | TEMPERATURE: 97 F | HEIGHT: 67 IN | HEART RATE: 79 BPM | OXYGEN SATURATION: 100 % | BODY MASS INDEX: 30.29 KG/M2 | SYSTOLIC BLOOD PRESSURE: 191 MMHG | DIASTOLIC BLOOD PRESSURE: 77 MMHG | WEIGHT: 193 LBS

## 2023-05-27 DIAGNOSIS — L03.116 CELLULITIS OF LEFT FOOT: Primary | ICD-10-CM

## 2023-05-27 DIAGNOSIS — L97.529 ULCER OF LEFT FOOT, UNSPECIFIED ULCER STAGE (HCC): ICD-10-CM

## 2023-05-27 LAB
ANION GAP SERPL CALCULATED.3IONS-SCNC: 13 MMOL/L (ref 9–17)
BASOPHILS # BLD: 0.03 K/UL (ref 0–0.2)
BASOPHILS NFR BLD: 0 % (ref 0–2)
BUN SERPL-MCNC: 29 MG/DL (ref 6–20)
BUN/CREAT SERPL: 5 (ref 9–20)
CALCIUM SERPL-MCNC: 9.8 MG/DL (ref 8.6–10.4)
CHLORIDE SERPL-SCNC: 99 MMOL/L (ref 98–107)
CO2 SERPL-SCNC: 24 MMOL/L (ref 20–31)
CREAT SERPL-MCNC: 6.28 MG/DL (ref 0.7–1.2)
CRP SERPL HS-MCNC: 12 MG/L (ref 0–5)
EOSINOPHIL # BLD: 0.18 K/UL (ref 0–0.44)
EOSINOPHILS RELATIVE PERCENT: 2 % (ref 1–4)
ERYTHROCYTE [DISTWIDTH] IN BLOOD BY AUTOMATED COUNT: 14.7 % (ref 11.8–14.4)
ERYTHROCYTE [SEDIMENTATION RATE] IN BLOOD BY WESTERGREN METHOD: 14 MM/HR (ref 0–15)
GFR SERPL CREATININE-BSD FRML MDRD: 10 ML/MIN/1.73M2
GLUCOSE SERPL-MCNC: 304 MG/DL (ref 70–99)
HCT VFR BLD AUTO: 36.6 % (ref 40.7–50.3)
HGB BLD-MCNC: 12 G/DL (ref 13–17)
IMM GRANULOCYTES # BLD AUTO: 0.01 K/UL (ref 0–0.3)
IMM GRANULOCYTES NFR BLD: 0 %
LYMPHOCYTES # BLD: 13 % (ref 24–43)
LYMPHOCYTES NFR BLD: 1.05 K/UL (ref 1.1–3.7)
MAGNESIUM SERPL-MCNC: 2.2 MG/DL (ref 1.6–2.6)
MCH RBC QN AUTO: 31.2 PG (ref 25.2–33.5)
MCHC RBC AUTO-ENTMCNC: 32.8 G/DL (ref 28.4–34.8)
MCV RBC AUTO: 95.1 FL (ref 82.6–102.9)
MONOCYTES NFR BLD: 0.97 K/UL (ref 0.1–1.2)
MONOCYTES NFR BLD: 12 % (ref 3–12)
NEUTROPHILS NFR BLD: 73 % (ref 36–65)
NEUTS SEG NFR BLD: 5.91 K/UL (ref 1.5–8.1)
NRBC AUTOMATED: 0 PER 100 WBC
PHOSPHATE SERPL-MCNC: 3.5 MG/DL (ref 2.5–4.5)
PLATELET # BLD AUTO: 170 K/UL (ref 138–453)
PMV BLD AUTO: 9.9 FL (ref 8.1–13.5)
POTASSIUM SERPL-SCNC: 4.6 MMOL/L (ref 3.7–5.3)
RBC # BLD AUTO: 3.85 M/UL (ref 4.21–5.77)
RBC # BLD: ABNORMAL 10*6/UL
SODIUM SERPL-SCNC: 136 MMOL/L (ref 135–144)
WBC OTHER # BLD: 8.2 K/UL (ref 3.5–11.3)

## 2023-05-27 PROCEDURE — 83735 ASSAY OF MAGNESIUM: CPT

## 2023-05-27 PROCEDURE — 80048 BASIC METABOLIC PNL TOTAL CA: CPT

## 2023-05-27 PROCEDURE — 85652 RBC SED RATE AUTOMATED: CPT

## 2023-05-27 PROCEDURE — 73630 X-RAY EXAM OF FOOT: CPT

## 2023-05-27 PROCEDURE — 85025 COMPLETE CBC W/AUTO DIFF WBC: CPT

## 2023-05-27 PROCEDURE — 86140 C-REACTIVE PROTEIN: CPT

## 2023-05-27 PROCEDURE — 99284 EMERGENCY DEPT VISIT MOD MDM: CPT

## 2023-05-27 PROCEDURE — 6370000000 HC RX 637 (ALT 250 FOR IP): Performed by: NURSE PRACTITIONER

## 2023-05-27 PROCEDURE — 84100 ASSAY OF PHOSPHORUS: CPT

## 2023-05-27 RX ORDER — DOXYCYCLINE 100 MG/1
100 CAPSULE ORAL ONCE
Status: COMPLETED | OUTPATIENT
Start: 2023-05-27 | End: 2023-05-27

## 2023-05-27 RX ORDER — DOXYCYCLINE HYCLATE 100 MG
100 TABLET ORAL 2 TIMES DAILY
Qty: 20 TABLET | Refills: 0 | Status: SHIPPED | OUTPATIENT
Start: 2023-05-27 | End: 2023-06-06

## 2023-05-27 RX ORDER — TRAMADOL HYDROCHLORIDE 50 MG/1
50 TABLET ORAL ONCE
Status: COMPLETED | OUTPATIENT
Start: 2023-05-27 | End: 2023-05-27

## 2023-05-27 RX ADMIN — TRAMADOL HYDROCHLORIDE 50 MG: 50 TABLET, FILM COATED ORAL at 14:38

## 2023-05-27 RX ADMIN — DOXYCYCLINE 100 MG: 100 CAPSULE ORAL at 16:42

## 2023-05-27 ASSESSMENT — ENCOUNTER SYMPTOMS
VOICE CHANGE: 0
NAUSEA: 0
COLOR CHANGE: 1
VOMITING: 0

## 2023-05-27 ASSESSMENT — PAIN DESCRIPTION - DESCRIPTORS: DESCRIPTORS: STABBING;SHARP;ACHING

## 2023-05-27 ASSESSMENT — PAIN - FUNCTIONAL ASSESSMENT: PAIN_FUNCTIONAL_ASSESSMENT: 0-10

## 2023-05-27 ASSESSMENT — PAIN SCALES - GENERAL: PAINLEVEL_OUTOF10: 9

## 2023-05-27 NOTE — DISCHARGE INSTRUCTIONS
Take antibiotic as prescribed. Follow-up with podiatrist.  Return to emergency department if symptoms worsen.

## 2023-05-27 NOTE — CONSULTS
hours Vitals:  TEMPERATURE:  Temp  Av °F (36.1 °C)  Min: 97 °F (36.1 °C)  Max: 97 °F (36.1 °C)    RESPIRATIONS RANGE: Resp  Av  Min: 19  Max: 19    PULSE RANGE: Pulse  Av  Min: 79  Max: 79    BLOOD PRESSURE RANGE:  Systolic (03RZG), FZO:988 , Min:191 , MPP:183   ; Diastolic (84GWA), OTD:82, Min:77, Max:77      PULSE OXIMETRY RANGE: SpO2  Av %  Min: 100 %  Max: 100 %  I&O:  No intake/output data recorded. CBC:  Recent Labs     23  1500   WBC 8.2   HGB 12.0*   HCT 36.6*      CRP 12.0*        BMP:  Recent Labs     23  1500      K 4.6   CL 99   CO2 24   BUN 29*   CREATININE 6.28*   GLUCOSE 304*   CALCIUM 9.8        Coags:  No results for input(s): APTT, PROT, INR in the last 72 hours. Lab Results   Component Value Date    LABA1C 8.4 (H) 2022     Lab Results   Component Value Date    SEDRATE 14 2023     Lab Results   Component Value Date    CRP 12.0 (H) 2023       Physical Exam:  Vascular: DP and PT pulses are palpable. CFT < 3 seconds  brisk to all digits. Hair growth is present to the level of the digits. Neuro: Saph/sural/SP/DP/plantar sensation intact to light touch. Musculoskeletal: Muscle strength is weaker with dorsiflexion on the left. There is deformity of the right lower extremity appreciated. . Compartments are soft and compressible. No pain with compression of posterior calf. Dermatologic: Blisterlike formation was appreciated to the lateral and plantar aspect of the fifth metatarsal head of the left foot. There is no drainage at this time. Base is erythematous with no associated malodor. Mild edema appreciated surrounding the blister site. There is no associated increase in warmth. Does not probe to bone. Positive for fluctuance. Interdigital maceration absent.        Clinical:   Predebridement          Postdebridement            Imaging:   XR FOOT LEFT (MIN 3 VIEWS)   Final Result   No fracture or radiographic

## 2023-05-31 ENCOUNTER — OFFICE VISIT (OUTPATIENT)
Dept: PODIATRY | Age: 48
End: 2023-05-31
Payer: MEDICARE

## 2023-05-31 VITALS — HEIGHT: 67 IN | WEIGHT: 193 LBS | BODY MASS INDEX: 30.29 KG/M2

## 2023-05-31 DIAGNOSIS — E11.51 TYPE II DIABETES MELLITUS WITH PERIPHERAL CIRCULATORY DISORDER (HCC): ICD-10-CM

## 2023-05-31 DIAGNOSIS — L97.522 ULCER OF LEFT FOOT, WITH FAT LAYER EXPOSED (HCC): Primary | ICD-10-CM

## 2023-05-31 PROCEDURE — G8417 CALC BMI ABV UP PARAM F/U: HCPCS | Performed by: PODIATRIST

## 2023-05-31 PROCEDURE — G8427 DOCREV CUR MEDS BY ELIG CLIN: HCPCS | Performed by: PODIATRIST

## 2023-05-31 PROCEDURE — 3046F HEMOGLOBIN A1C LEVEL >9.0%: CPT | Performed by: PODIATRIST

## 2023-05-31 PROCEDURE — 11042 DBRDMT SUBQ TIS 1ST 20SQCM/<: CPT | Performed by: PODIATRIST

## 2023-05-31 PROCEDURE — 1036F TOBACCO NON-USER: CPT | Performed by: PODIATRIST

## 2023-05-31 PROCEDURE — 99213 OFFICE O/P EST LOW 20 MIN: CPT | Performed by: PODIATRIST

## 2023-05-31 PROCEDURE — 2022F DILAT RTA XM EVC RTNOPTHY: CPT | Performed by: PODIATRIST

## 2023-05-31 RX ORDER — ONDANSETRON HYDROCHLORIDE 8 MG/1
TABLET, FILM COATED ORAL
COMMUNITY
Start: 2023-05-09

## 2023-05-31 RX ORDER — CLONIDINE HYDROCHLORIDE 0.1 MG/1
TABLET ORAL
COMMUNITY
Start: 2023-05-12

## 2023-05-31 RX ORDER — MONTELUKAST SODIUM 10 MG/1
TABLET ORAL
COMMUNITY
Start: 2023-05-12

## 2023-06-02 NOTE — ED PROVIDER NOTES
eMERGENCY dEPARTMENT eNCOUnter   Independent Attestation     Pt Name: Leanna Jason  MRN: [id number]  [place] 1975  Date of evaluation: 6/2/23     Leanna Jason is a 51 y.o. male with CC: Sore (Left foot. Had calcus cut down 2 weeks ago. Now having blister like sores on bottom of foot)        This visit was performed by both a physician and an APC. I performed all aspects of the MDM as documented.       Eulalio Branham MD  Attending Emergency Physician           Eulalio Branham MD  06/02/23 [id number]
Pulses:           Dorsalis pedis pulses are 2+ on the left side. Posterior tibial pulses are 2+ on the left side. Heart sounds: Normal heart sounds. Pulmonary:      Effort: Pulmonary effort is normal.      Breath sounds: Normal breath sounds. Musculoskeletal:         General: Normal range of motion. Cervical back: Normal range of motion and neck supple. Right lower leg: No edema. Left lower leg: No edema. Left foot: Normal range of motion and normal capillary refill. Swelling and tenderness present. No crepitus. Normal pulse. Feet:       Comments: Patient has a sore to the bottom of his left foot with some erythema. No crepitus. There is a blister noted around the sore. Images of wound placed in media file chart. Skin:     General: Skin is warm and dry. Capillary Refill: Capillary refill takes less than 2 seconds. Neurological:      Mental Status: He is alert and oriented to person, place, and time. DIAGNOSTIC RESULTS     EKG: All EKG's are interpreted by the Emergency Department Physician who either signs or Co-signs this chart in the absence of a cardiologist.      RADIOLOGY:   Non-plain film images such as CT, Ultrasound and MRI are read by the radiologist. Plain radiographic images are visualized and preliminarily interpreted by the emergency physician with the below findings:    Interpretation per the Radiologist below, if available at the time of this note:    XR FOOT LEFT (MIN 3 VIEWS)    Result Date: 5/27/2023  EXAMINATION: THREE XRAY VIEWS OF THE LEFT FOOT 5/27/2023 2:05 pm COMPARISON: None. HISTORY: ORDERING SYSTEM PROVIDED HISTORY: Sore on bottom of left foot, rule out osteomyelitis TECHNOLOGIST PROVIDED HISTORY: Sore on bottom of left foot, rule out osteomyelitis Reason for Exam: sore op foot left FINDINGS: No acute fracture or dislocation. No acute periostitis or erosion. Focal soft tissue swelling lateral to the 5th MTP joint.

## 2023-06-08 NOTE — PROGRESS NOTES
Pinprick: Impaired  Proprioception: Impaired  Vibration (128 Hz): Impaired       DM with PVD       [x]Yes    []No      Assessment:  51 y.o. male with:     Diagnosis Orders   1. Ulcer of left foot, with fat layer exposed ([address]  OR DEBRIDEMENT SUBCUTANEOUS TISSUE 20 SQ CM/<      2. Type II diabetes mellitus with peripheral circulatory disorder (HCC)  OR DEBRIDEMENT SUBCUTANEOUS TISSUE 20 SQ CM/<            Q7   []Yes    []No                Q8   [x]Yes    []No                     Q9   []Yes    []No    Plan:   Pt was evaluated and examined. Patient was given personalized discharge instructions. Sharp excisional debridement down thru and including subcutaneous tissue was done after topical EMLA cream was applied with a currett and tissue nippers. .  All necrotic tissue was removed. Hemostasis was achieved via direct pressure. Sterile dressings were placed on the patient. 0/10 post procedural pain. Pt will follow up in 9 weeks or sooner if any problems arise. Diagnosis was discussed with the pt and all of their questions were answered in detail. Proper foot hygiene and care was discussed with the pt. Informed patient on proper diabetic foot care and importance of tight glycemic control. Patient to check feet daily and contact the office with any questions/problems/concerns.    Other comorbidity noted and will be managed by PCP.  5/31/2023    Electronically signed by Lima Castano DPM on 6/8/2023 at 11:52 AM  5/31/2023

## 2023-06-28 ENCOUNTER — OFFICE VISIT (OUTPATIENT)
Dept: PODIATRY | Age: 48
End: 2023-06-28
Payer: MEDICARE

## 2023-06-28 VITALS — HEIGHT: 67 IN | BODY MASS INDEX: 30.29 KG/M2 | WEIGHT: 193 LBS

## 2023-06-28 DIAGNOSIS — M79.605 PAIN IN BOTH LOWER EXTREMITIES: ICD-10-CM

## 2023-06-28 DIAGNOSIS — I73.9 PERIPHERAL VASCULAR DISORDER (HCC): ICD-10-CM

## 2023-06-28 DIAGNOSIS — M79.604 PAIN IN BOTH LOWER EXTREMITIES: ICD-10-CM

## 2023-06-28 DIAGNOSIS — E11.51 TYPE II DIABETES MELLITUS WITH PERIPHERAL CIRCULATORY DISORDER (HCC): Primary | ICD-10-CM

## 2023-06-28 DIAGNOSIS — D23.72 BENIGN NEOPLASM OF SKIN OF LOWER LIMB, INCLUDING HIP, LEFT: ICD-10-CM

## 2023-06-28 PROCEDURE — 2022F DILAT RTA XM EVC RTNOPTHY: CPT | Performed by: PODIATRIST

## 2023-06-28 PROCEDURE — G8427 DOCREV CUR MEDS BY ELIG CLIN: HCPCS | Performed by: PODIATRIST

## 2023-06-28 PROCEDURE — 99213 OFFICE O/P EST LOW 20 MIN: CPT | Performed by: PODIATRIST

## 2023-06-28 PROCEDURE — G8417 CALC BMI ABV UP PARAM F/U: HCPCS | Performed by: PODIATRIST

## 2023-06-28 PROCEDURE — 17110 DESTRUCTION B9 LES UP TO 14: CPT | Performed by: PODIATRIST

## 2023-06-28 PROCEDURE — 3046F HEMOGLOBIN A1C LEVEL >9.0%: CPT | Performed by: PODIATRIST

## 2023-06-28 PROCEDURE — 1036F TOBACCO NON-USER: CPT | Performed by: PODIATRIST

## 2023-06-28 RX ORDER — UBIDECARENONE 75 MG
2500 CAPSULE ORAL
COMMUNITY

## 2023-09-27 ENCOUNTER — OFFICE VISIT (OUTPATIENT)
Dept: PODIATRY | Age: 48
End: 2023-09-27
Payer: MEDICARE

## 2023-09-27 VITALS — WEIGHT: 189 LBS | HEIGHT: 67 IN | BODY MASS INDEX: 29.66 KG/M2

## 2023-09-27 DIAGNOSIS — M79.604 PAIN IN BOTH LOWER EXTREMITIES: ICD-10-CM

## 2023-09-27 DIAGNOSIS — I73.9 PERIPHERAL VASCULAR DISORDER (HCC): ICD-10-CM

## 2023-09-27 DIAGNOSIS — E11.51 TYPE II DIABETES MELLITUS WITH PERIPHERAL CIRCULATORY DISORDER (HCC): Primary | ICD-10-CM

## 2023-09-27 DIAGNOSIS — M79.605 PAIN IN BOTH LOWER EXTREMITIES: ICD-10-CM

## 2023-09-27 DIAGNOSIS — L85.3 XEROSIS CUTIS: ICD-10-CM

## 2023-09-27 DIAGNOSIS — B35.1 DERMATOPHYTOSIS OF NAIL: ICD-10-CM

## 2023-09-27 PROCEDURE — 99213 OFFICE O/P EST LOW 20 MIN: CPT | Performed by: PODIATRIST

## 2023-09-27 PROCEDURE — 2022F DILAT RTA XM EVC RTNOPTHY: CPT | Performed by: PODIATRIST

## 2023-09-27 PROCEDURE — 11721 DEBRIDE NAIL 6 OR MORE: CPT | Performed by: PODIATRIST

## 2023-09-27 PROCEDURE — 1036F TOBACCO NON-USER: CPT | Performed by: PODIATRIST

## 2023-09-27 PROCEDURE — G8417 CALC BMI ABV UP PARAM F/U: HCPCS | Performed by: PODIATRIST

## 2023-09-27 PROCEDURE — 3046F HEMOGLOBIN A1C LEVEL >9.0%: CPT | Performed by: PODIATRIST

## 2023-09-27 PROCEDURE — G8427 DOCREV CUR MEDS BY ELIG CLIN: HCPCS | Performed by: PODIATRIST

## 2023-09-27 RX ORDER — QUETIAPINE FUMARATE 25 MG/1
TABLET, FILM COATED ORAL
COMMUNITY
Start: 2023-08-23

## 2023-09-27 RX ORDER — ONDANSETRON 4 MG/1
TABLET, ORALLY DISINTEGRATING ORAL
COMMUNITY
Start: 2023-08-23

## 2023-09-27 RX ORDER — FLUOXETINE HYDROCHLORIDE 20 MG/1
CAPSULE ORAL
COMMUNITY
Start: 2023-07-07

## 2023-09-27 RX ORDER — OMEPRAZOLE 40 MG/1
CAPSULE, DELAYED RELEASE ORAL
COMMUNITY
Start: 2023-07-07

## 2023-09-27 NOTE — PROGRESS NOTES
abnormal - 1/4 dorsalis pedis pulse and 0/4 Posterior tibial pulse,   Pinprick: Impaired  Proprioception: Impaired  Vibration (128 Hz): Impaired       DM with PVD       [x]Yes    []No      Assessment:  51 y.o. male with:   Diagnosis Orders   1. Type II diabetes mellitus with peripheral circulatory disorder (HCC)  30346 - DC DEBRIDEMENT OF NAILS, 6 OR MORE    HM DIABETES FOOT EXAM      2. Pain in both lower extremities  82222 - DC DEBRIDEMENT OF NAILS, 6 OR MORE    HM DIABETES FOOT EXAM      3. Peripheral vascular disorder (HCC)  34575 - DC DEBRIDEMENT OF NAILS, 6 OR MORE    HM DIABETES FOOT EXAM      4. Dermatophytosis of nail  18899 - DC DEBRIDEMENT OF NAILS, 6 OR MORE    HM DIABETES FOOT EXAM      5. Xerosis cutis  11579 - DC DEBRIDEMENT OF NAILS, 6 OR MORE    HM DIABETES FOOT EXAM                Q7   []Yes    []No                Q8   [x]Yes    []No                     Q9   []Yes    []No    Plan:   Pt was evaluated and examined. Patient was given personalized discharge instructions. To address xerosis, patient to apply lachydrin cream to feet daily. Pt to monitor for fissures due to dryness. Advised pt to contact office is there are any open lesions. Nails 1-10 were debrided sharply in length and thickness with a nipper and , without incident. All labs were reviewed and all imagining including the above findings were reviewed PRIOR to the patients arrival and with the patient today. Previous patient encounter was reviewed. Encounters from the patients other medical providers were reviewed and noted. I personally interpreted the imaging and labs and discussed the results with the patient Time was spent educating the patient and their families/caregivers on proper care of the feet and ankles. All the above diagnosis were addressed at todays visit and all questions were answered.   A total of 20 minutes was spent with this patients encounter which included charting after the patients visit    Pt will

## 2023-10-06 NOTE — TELEPHONE ENCOUNTER
Bennie Osman advised.  Please send over new script 

## 2023-12-20 ENCOUNTER — OFFICE VISIT (OUTPATIENT)
Dept: PODIATRY | Age: 48
End: 2023-12-20
Payer: MEDICARE

## 2023-12-20 VITALS — HEIGHT: 67 IN | BODY MASS INDEX: 29.66 KG/M2 | WEIGHT: 189 LBS

## 2023-12-20 DIAGNOSIS — M79.604 PAIN IN BOTH LOWER EXTREMITIES: ICD-10-CM

## 2023-12-20 DIAGNOSIS — M79.605 PAIN IN BOTH LOWER EXTREMITIES: ICD-10-CM

## 2023-12-20 DIAGNOSIS — D23.71 BENIGN NEOPLASM OF SKIN OF LOWER LIMB, INCLUDING HIP, RIGHT: ICD-10-CM

## 2023-12-20 DIAGNOSIS — I73.9 PERIPHERAL VASCULAR DISORDER (HCC): ICD-10-CM

## 2023-12-20 DIAGNOSIS — E11.51 TYPE II DIABETES MELLITUS WITH PERIPHERAL CIRCULATORY DISORDER (HCC): Primary | ICD-10-CM

## 2023-12-20 PROCEDURE — 99213 OFFICE O/P EST LOW 20 MIN: CPT | Performed by: PODIATRIST

## 2023-12-20 PROCEDURE — 2022F DILAT RTA XM EVC RTNOPTHY: CPT | Performed by: PODIATRIST

## 2023-12-20 PROCEDURE — 3046F HEMOGLOBIN A1C LEVEL >9.0%: CPT | Performed by: PODIATRIST

## 2023-12-20 PROCEDURE — G8427 DOCREV CUR MEDS BY ELIG CLIN: HCPCS | Performed by: PODIATRIST

## 2023-12-20 PROCEDURE — G8417 CALC BMI ABV UP PARAM F/U: HCPCS | Performed by: PODIATRIST

## 2023-12-20 PROCEDURE — 11721 DEBRIDE NAIL 6 OR MORE: CPT | Performed by: PODIATRIST

## 2023-12-20 PROCEDURE — G8484 FLU IMMUNIZE NO ADMIN: HCPCS | Performed by: PODIATRIST

## 2023-12-20 PROCEDURE — 1036F TOBACCO NON-USER: CPT | Performed by: PODIATRIST

## 2023-12-20 PROCEDURE — 17110 DESTRUCTION B9 LES UP TO 14: CPT | Performed by: PODIATRIST

## 2023-12-20 NOTE — PROGRESS NOTES
[facility] PODIATRY [address] [place] [address]  Dept: [phone]  Dept Fax: [phone]    DIABETIC PROGRESS NOTE  Date of patient's visit: 12/20/2023  Patient's Name:  Ronaldo Cardoso YOB: 1975            Patient Care Team:  Dread Sainz MD as PCP - General (Family Medicine)  Araseli Craig DPM as Physician (Podiatry)          Chief Complaint   Patient presents with    Diabetes     Diabetic foot care    Peripheral Neuropathy     Bilateral feet       Subjective:   Ronaldo Cardoso comes to clinic for Diabetes (Diabetic foot care) and Peripheral Neuropathy (Bilateral feet)    he is a diabetic and states that he is doing well. Pt currently has complaint of thickened, elongated nails that they cannot manage by themselves. Pt's primary care physician is Dread Sainz MD last seen 08/07/2023   Pt's last blood sugar was 270 this morning. Pt has a new complaint of increased pain to left foot. Pt has tried changing shoes but it has not helped the pain  Patient is taking over the counter medications with minimal relief. Lab Results   Component Value Date    LABA1C 8.4 (H) 05/30/2022      Complains of numbness in the feet bilat.   Past Medical History:   Diagnosis Date    Cerebral artery occlusion with cerebral infarction [facility])     Closed fracture of bone of right foot March - April 2020    Dialysis patient [facility])     Hemodialysis patient [facility])     Hyperlipidemia     Hypertension     Kidney disease     On Dialysis    Type 1 diabetes mellitus ([address])        No Known Allergies  Current Outpatient Medications on File Prior to Visit   Medication Sig Dispense Refill    FLUoxetine (PROZAC) 20 MG capsule       omeprazole (PRILOSEC) 40 MG delayed release capsule       ondansetron (ZOFRAN-ODT) 4 MG disintegrating tablet       QUEtiapine (SEROQUEL) 25 MG tablet       rosuvastatin (CRESTOR) 20 MG tablet Take 1 tablet by

## 2024-01-04 ENCOUNTER — OFFICE VISIT (OUTPATIENT)
Dept: NEUROLOGY | Age: 49
End: 2024-01-04
Payer: MEDICARE

## 2024-01-04 VITALS
BODY MASS INDEX: 30.57 KG/M2 | WEIGHT: 194.8 LBS | DIASTOLIC BLOOD PRESSURE: 90 MMHG | HEART RATE: 67 BPM | SYSTOLIC BLOOD PRESSURE: 195 MMHG | HEIGHT: 67 IN

## 2024-01-04 DIAGNOSIS — I63.9 CEREBROVASCULAR ACCIDENT (CVA), UNSPECIFIED MECHANISM (HCC): Primary | ICD-10-CM

## 2024-01-04 DIAGNOSIS — G47.30 SLEEP APNEA, UNSPECIFIED TYPE: ICD-10-CM

## 2024-01-04 PROCEDURE — 1036F TOBACCO NON-USER: CPT | Performed by: STUDENT IN AN ORGANIZED HEALTH CARE EDUCATION/TRAINING PROGRAM

## 2024-01-04 PROCEDURE — 99214 OFFICE O/P EST MOD 30 MIN: CPT | Performed by: STUDENT IN AN ORGANIZED HEALTH CARE EDUCATION/TRAINING PROGRAM

## 2024-01-04 PROCEDURE — G8417 CALC BMI ABV UP PARAM F/U: HCPCS | Performed by: STUDENT IN AN ORGANIZED HEALTH CARE EDUCATION/TRAINING PROGRAM

## 2024-01-04 PROCEDURE — G8427 DOCREV CUR MEDS BY ELIG CLIN: HCPCS | Performed by: STUDENT IN AN ORGANIZED HEALTH CARE EDUCATION/TRAINING PROGRAM

## 2024-01-04 PROCEDURE — 3080F DIAST BP >= 90 MM HG: CPT | Performed by: STUDENT IN AN ORGANIZED HEALTH CARE EDUCATION/TRAINING PROGRAM

## 2024-01-04 PROCEDURE — 3077F SYST BP >= 140 MM HG: CPT | Performed by: STUDENT IN AN ORGANIZED HEALTH CARE EDUCATION/TRAINING PROGRAM

## 2024-01-04 PROCEDURE — G8484 FLU IMMUNIZE NO ADMIN: HCPCS | Performed by: STUDENT IN AN ORGANIZED HEALTH CARE EDUCATION/TRAINING PROGRAM

## 2024-01-04 NOTE — PROGRESS NOTES
omeprazole (PRILOSEC) 40 MG delayed release capsule       ondansetron (ZOFRAN-ODT) 4 MG disintegrating tablet       QUEtiapine (SEROQUEL) 25 MG tablet       rosuvastatin (CRESTOR) 20 MG tablet Take 1 tablet by mouth daily      vitamin B-12 (CYANOCOBALAMIN) 100 MCG tablet Take 25 tablets by mouth      cloNIDine (CATAPRES) 0.1 MG tablet       montelukast (SINGULAIR) 10 MG tablet       ondansetron (ZOFRAN) 8 MG tablet       FEROSUL 325 (65 Fe) MG tablet TAKE ONE TABLET BY MOUTH DAILY WITH BREAKFAST 90 tablet 1    carvedilol (COREG) 25 MG tablet Take 1 tablet by mouth 2 times daily (Patient taking differently: Take 0.5 tablets by mouth 2 times daily) 60 tablet 3    cyclobenzaprine (FLEXERIL) 5 MG tablet Take 1 tablet by mouth 3 times daily as needed for Muscle spasms 90 tablet 5    hydrALAZINE (APRESOLINE) 25 MG tablet Take 1 tablet by mouth 3 times daily 270 tablet 1    epoetin pennie-epbx (RETACRIT) 2000 UNIT/ML injection Inject 4 mLs into the skin once a week      colchicine (COLCRYS) 0.6 MG tablet       furosemide (LASIX) 20 MG tablet Take 1 tablet by mouth in the morning. 30 tablet 0    omeprazole (PRILOSEC) 10 MG delayed release capsule Take 1 capsule by mouth daily      sennosides-docusate sodium (SENOKOT-S) 8.6-50 MG tablet Take 1 tablet by mouth daily      loratadine (CLARITIN) 10 MG tablet Take 1 tablet by mouth daily      losartan (COZAAR) 25 MG tablet Take 1 tablet by mouth daily      calcium acetate 667 MG TABS Take 1,334 mg by mouth 3 times daily (with meals) 180 tablet 1    insulin glargine (LANTUS SOLOSTAR) 100 UNIT/ML injection pen Inject 25 Units into the skin in the morning and 25 Units before bedtime. 5 pen 3    Insulin Pen Needle (MEIJER PEN NEEDLES) 31G X 8 MM MISC 1 each by Does not apply route daily 100 each 3    glucose 4 g chewable tablet Take 4 tablets by mouth as needed for Low blood sugar 60 tablet 3    gabapentin (NEURONTIN) 300 MG capsule Take 1 capsule by mouth 3 times daily. 90

## 2024-02-21 ENCOUNTER — HOSPITAL ENCOUNTER (INPATIENT)
Age: 49
LOS: 2 days | Discharge: HOME | End: 2024-02-23
Attending: EMERGENCY MEDICINE | Admitting: STUDENT IN AN ORGANIZED HEALTH CARE EDUCATION/TRAINING PROGRAM
Payer: MEDICARE

## 2024-02-21 ENCOUNTER — APPOINTMENT (OUTPATIENT)
Dept: GENERAL RADIOLOGY | Age: 49
End: 2024-02-21
Payer: MEDICARE

## 2024-02-21 DIAGNOSIS — I21.4 NON-STEMI (NON-ST ELEVATED MYOCARDIAL INFARCTION) (HCC): ICD-10-CM

## 2024-02-21 DIAGNOSIS — R07.9 CHEST PAIN, UNSPECIFIED TYPE: Primary | ICD-10-CM

## 2024-02-21 DIAGNOSIS — I20.81 ANGINA PECTORIS WITH CORONARY MICROVASCULAR DYSFUNCTION: ICD-10-CM

## 2024-02-21 DIAGNOSIS — E83.42 HYPOMAGNESEMIA: ICD-10-CM

## 2024-02-21 DIAGNOSIS — R07.1 CHEST PAIN ON BREATHING: ICD-10-CM

## 2024-02-21 DIAGNOSIS — E87.6 HYPOKALEMIA: ICD-10-CM

## 2024-02-21 LAB
ANION GAP SERPL CALCULATED.3IONS-SCNC: 12 MMOL/L (ref 9–17)
ANION GAP SERPL CALCULATED.3IONS-SCNC: 12 MMOL/L (ref 9–17)
BASOPHILS # BLD: <0.03 K/UL (ref 0–0.2)
BASOPHILS NFR BLD: 0 % (ref 0–2)
BNP SERPL-MCNC: 8958 PG/ML
BUN SERPL-MCNC: 15 MG/DL (ref 6–20)
BUN SERPL-MCNC: 26 MG/DL (ref 6–20)
CALCIUM SERPL-MCNC: 5.9 MG/DL (ref 8.6–10.4)
CALCIUM SERPL-MCNC: 8.9 MG/DL (ref 8.6–10.4)
CHLORIDE SERPL-SCNC: 113 MMOL/L (ref 98–107)
CHLORIDE SERPL-SCNC: 96 MMOL/L (ref 98–107)
CO2 SERPL-SCNC: 16 MMOL/L (ref 20–31)
CO2 SERPL-SCNC: 27 MMOL/L (ref 20–31)
CREAT SERPL-MCNC: 2.8 MG/DL (ref 0.7–1.2)
CREAT SERPL-MCNC: 5.4 MG/DL (ref 0.7–1.2)
EOSINOPHIL # BLD: 0.11 K/UL (ref 0–0.44)
EOSINOPHILS RELATIVE PERCENT: 2 % (ref 1–4)
ERYTHROCYTE [DISTWIDTH] IN BLOOD BY AUTOMATED COUNT: 13.7 % (ref 11.8–14.4)
GFR SERPL CREATININE-BSD FRML MDRD: 12 ML/MIN/1.73M2
GFR SERPL CREATININE-BSD FRML MDRD: 27 ML/MIN/1.73M2
GLUCOSE BLD-MCNC: 291 MG/DL (ref 75–110)
GLUCOSE BLD-MCNC: 507 MG/DL (ref 75–110)
GLUCOSE BLD-MCNC: 532 MG/DL (ref 75–110)
GLUCOSE SERPL-MCNC: 230 MG/DL (ref 70–99)
GLUCOSE SERPL-MCNC: 392 MG/DL (ref 70–99)
HCT VFR BLD AUTO: 27 % (ref 40.7–50.3)
HGB BLD-MCNC: 8.4 G/DL (ref 13–17)
IMM GRANULOCYTES # BLD AUTO: <0.03 K/UL (ref 0–0.3)
IMM GRANULOCYTES NFR BLD: 0 %
INR PPP: 1
LYMPHOCYTES NFR BLD: 0.7 K/UL (ref 1.1–3.7)
LYMPHOCYTES RELATIVE PERCENT: 14 % (ref 24–43)
MAGNESIUM SERPL-MCNC: 1.4 MG/DL (ref 1.6–2.6)
MAGNESIUM SERPL-MCNC: 2.5 MG/DL (ref 1.6–2.6)
MCH RBC QN AUTO: 31.5 PG (ref 25.2–33.5)
MCHC RBC AUTO-ENTMCNC: 31.1 G/DL (ref 28.4–34.8)
MCV RBC AUTO: 101.1 FL (ref 82.6–102.9)
MONOCYTES NFR BLD: 0.46 K/UL (ref 0.1–1.2)
MONOCYTES NFR BLD: 9 % (ref 3–12)
NEUTROPHILS NFR BLD: 75 % (ref 36–65)
NEUTS SEG NFR BLD: 3.82 K/UL (ref 1.5–8.1)
NRBC BLD-RTO: 0 PER 100 WBC
PARTIAL THROMBOPLASTIN TIME: 29.4 SEC (ref 23–36.5)
PHOSPHATE SERPL-MCNC: 1.7 MG/DL (ref 2.5–4.5)
PLATELET # BLD AUTO: ABNORMAL K/UL (ref 138–453)
PLATELET, FLUORESCENCE: 80 K/UL (ref 138–453)
PLATELETS.RETICULATED NFR BLD AUTO: 2.4 % (ref 1.1–10.3)
POTASSIUM SERPL-SCNC: 2.7 MMOL/L (ref 3.7–5.3)
POTASSIUM SERPL-SCNC: 4.3 MMOL/L (ref 3.7–5.3)
PROTHROMBIN TIME: 13.3 SEC (ref 11.7–14.9)
RBC # BLD AUTO: 2.67 M/UL (ref 4.21–5.77)
SODIUM SERPL-SCNC: 135 MMOL/L (ref 135–144)
SODIUM SERPL-SCNC: 141 MMOL/L (ref 135–144)
TROPONIN I SERPL HS-MCNC: 149 NG/L (ref 0–22)
TROPONIN I SERPL HS-MCNC: 217 NG/L (ref 0–22)
WBC OTHER # BLD: 5.1 K/UL (ref 3.5–11.3)

## 2024-02-21 PROCEDURE — 71045 X-RAY EXAM CHEST 1 VIEW: CPT

## 2024-02-21 PROCEDURE — 6370000000 HC RX 637 (ALT 250 FOR IP): Performed by: STUDENT IN AN ORGANIZED HEALTH CARE EDUCATION/TRAINING PROGRAM

## 2024-02-21 PROCEDURE — 2580000003 HC RX 258: Performed by: STUDENT IN AN ORGANIZED HEALTH CARE EDUCATION/TRAINING PROGRAM

## 2024-02-21 PROCEDURE — 96374 THER/PROPH/DIAG INJ IV PUSH: CPT

## 2024-02-21 PROCEDURE — 93005 ELECTROCARDIOGRAM TRACING: CPT | Performed by: EMERGENCY MEDICINE

## 2024-02-21 PROCEDURE — 85055 RETICULATED PLATELET ASSAY: CPT

## 2024-02-21 PROCEDURE — 1200000000 HC SEMI PRIVATE

## 2024-02-21 PROCEDURE — 83880 ASSAY OF NATRIURETIC PEPTIDE: CPT

## 2024-02-21 PROCEDURE — 84484 ASSAY OF TROPONIN QUANT: CPT

## 2024-02-21 PROCEDURE — 6360000002 HC RX W HCPCS: Performed by: EMERGENCY MEDICINE

## 2024-02-21 PROCEDURE — 80048 BASIC METABOLIC PNL TOTAL CA: CPT

## 2024-02-21 PROCEDURE — 99222 1ST HOSP IP/OBS MODERATE 55: CPT | Performed by: STUDENT IN AN ORGANIZED HEALTH CARE EDUCATION/TRAINING PROGRAM

## 2024-02-21 PROCEDURE — 84100 ASSAY OF PHOSPHORUS: CPT

## 2024-02-21 PROCEDURE — 6360000002 HC RX W HCPCS: Performed by: STUDENT IN AN ORGANIZED HEALTH CARE EDUCATION/TRAINING PROGRAM

## 2024-02-21 PROCEDURE — 82947 ASSAY GLUCOSE BLOOD QUANT: CPT

## 2024-02-21 PROCEDURE — 6370000000 HC RX 637 (ALT 250 FOR IP): Performed by: EMERGENCY MEDICINE

## 2024-02-21 PROCEDURE — 85730 THROMBOPLASTIN TIME PARTIAL: CPT

## 2024-02-21 PROCEDURE — 85025 COMPLETE CBC W/AUTO DIFF WBC: CPT

## 2024-02-21 PROCEDURE — 99285 EMERGENCY DEPT VISIT HI MDM: CPT

## 2024-02-21 PROCEDURE — 85610 PROTHROMBIN TIME: CPT

## 2024-02-21 PROCEDURE — 83735 ASSAY OF MAGNESIUM: CPT

## 2024-02-21 RX ORDER — ROSUVASTATIN CALCIUM 20 MG/1
20 TABLET, COATED ORAL DAILY
Status: DISCONTINUED | OUTPATIENT
Start: 2024-02-21 | End: 2024-02-23 | Stop reason: HOSPADM

## 2024-02-21 RX ORDER — ENOXAPARIN SODIUM 100 MG/ML
40 INJECTION SUBCUTANEOUS DAILY
Status: DISCONTINUED | OUTPATIENT
Start: 2024-02-21 | End: 2024-02-23 | Stop reason: HOSPADM

## 2024-02-21 RX ORDER — POLYETHYLENE GLYCOL 3350 17 G/17G
17 POWDER, FOR SOLUTION ORAL DAILY PRN
Status: DISCONTINUED | OUTPATIENT
Start: 2024-02-21 | End: 2024-02-23 | Stop reason: HOSPADM

## 2024-02-21 RX ORDER — FLUOXETINE 10 MG/1
20 CAPSULE ORAL 2 TIMES DAILY
Status: DISCONTINUED | OUTPATIENT
Start: 2024-02-21 | End: 2024-02-23 | Stop reason: HOSPADM

## 2024-02-21 RX ORDER — EZETIMIBE 10 MG/1
10 TABLET ORAL DAILY
Status: DISCONTINUED | OUTPATIENT
Start: 2024-02-21 | End: 2024-02-23 | Stop reason: HOSPADM

## 2024-02-21 RX ORDER — SODIUM CHLORIDE 0.9 % (FLUSH) 0.9 %
5-40 SYRINGE (ML) INJECTION EVERY 12 HOURS SCHEDULED
Status: DISCONTINUED | OUTPATIENT
Start: 2024-02-21 | End: 2024-02-23 | Stop reason: HOSPADM

## 2024-02-21 RX ORDER — LOSARTAN POTASSIUM 50 MG/1
25 TABLET ORAL DAILY
Status: DISCONTINUED | OUTPATIENT
Start: 2024-02-21 | End: 2024-02-22

## 2024-02-21 RX ORDER — ONDANSETRON 2 MG/ML
4 INJECTION INTRAMUSCULAR; INTRAVENOUS EVERY 6 HOURS PRN
Status: DISCONTINUED | OUTPATIENT
Start: 2024-02-21 | End: 2024-02-23 | Stop reason: HOSPADM

## 2024-02-21 RX ORDER — INSULIN LISPRO 100 [IU]/ML
0-4 INJECTION, SOLUTION INTRAVENOUS; SUBCUTANEOUS NIGHTLY
Status: DISCONTINUED | OUTPATIENT
Start: 2024-02-21 | End: 2024-02-23 | Stop reason: HOSPADM

## 2024-02-21 RX ORDER — MAGNESIUM SULFATE IN WATER 40 MG/ML
2000 INJECTION, SOLUTION INTRAVENOUS PRN
Status: DISCONTINUED | OUTPATIENT
Start: 2024-02-21 | End: 2024-02-23 | Stop reason: HOSPADM

## 2024-02-21 RX ORDER — ACETAMINOPHEN 650 MG/1
650 SUPPOSITORY RECTAL EVERY 6 HOURS PRN
Status: DISCONTINUED | OUTPATIENT
Start: 2024-02-21 | End: 2024-02-23 | Stop reason: HOSPADM

## 2024-02-21 RX ORDER — POTASSIUM CHLORIDE 7.45 MG/ML
10 INJECTION INTRAVENOUS PRN
Status: DISCONTINUED | OUTPATIENT
Start: 2024-02-21 | End: 2024-02-23 | Stop reason: HOSPADM

## 2024-02-21 RX ORDER — MAGNESIUM SULFATE IN WATER 40 MG/ML
2000 INJECTION, SOLUTION INTRAVENOUS ONCE
Status: COMPLETED | OUTPATIENT
Start: 2024-02-21 | End: 2024-02-21

## 2024-02-21 RX ORDER — INSULIN LISPRO 100 [IU]/ML
0-8 INJECTION, SOLUTION INTRAVENOUS; SUBCUTANEOUS
Status: DISCONTINUED | OUTPATIENT
Start: 2024-02-21 | End: 2024-02-23 | Stop reason: HOSPADM

## 2024-02-21 RX ORDER — CARVEDILOL 3.12 MG/1
3.12 TABLET ORAL 2 TIMES DAILY
Status: DISCONTINUED | OUTPATIENT
Start: 2024-02-21 | End: 2024-02-22

## 2024-02-21 RX ORDER — SODIUM CHLORIDE 0.9 % (FLUSH) 0.9 %
5-40 SYRINGE (ML) INJECTION PRN
Status: DISCONTINUED | OUTPATIENT
Start: 2024-02-21 | End: 2024-02-23 | Stop reason: HOSPADM

## 2024-02-21 RX ORDER — POTASSIUM CHLORIDE 20 MEQ/1
40 TABLET, EXTENDED RELEASE ORAL PRN
Status: DISCONTINUED | OUTPATIENT
Start: 2024-02-21 | End: 2024-02-23 | Stop reason: HOSPADM

## 2024-02-21 RX ORDER — INSULIN GLARGINE 100 [IU]/ML
15 INJECTION, SOLUTION SUBCUTANEOUS 2 TIMES DAILY
Status: DISCONTINUED | OUTPATIENT
Start: 2024-02-21 | End: 2024-02-23 | Stop reason: HOSPADM

## 2024-02-21 RX ORDER — ONDANSETRON 4 MG/1
4 TABLET, ORALLY DISINTEGRATING ORAL EVERY 8 HOURS PRN
Status: DISCONTINUED | OUTPATIENT
Start: 2024-02-21 | End: 2024-02-23 | Stop reason: HOSPADM

## 2024-02-21 RX ORDER — MONTELUKAST SODIUM 10 MG/1
1 TABLET ORAL DAILY
COMMUNITY

## 2024-02-21 RX ORDER — PANTOPRAZOLE SODIUM 40 MG/1
40 TABLET, DELAYED RELEASE ORAL
Status: DISCONTINUED | OUTPATIENT
Start: 2024-02-22 | End: 2024-02-23 | Stop reason: HOSPADM

## 2024-02-21 RX ORDER — ASPIRIN 81 MG/1
81 TABLET ORAL DAILY
Status: DISCONTINUED | OUTPATIENT
Start: 2024-02-21 | End: 2024-02-23 | Stop reason: HOSPADM

## 2024-02-21 RX ORDER — ACETAMINOPHEN 325 MG/1
650 TABLET ORAL EVERY 6 HOURS PRN
Status: DISCONTINUED | OUTPATIENT
Start: 2024-02-21 | End: 2024-02-23 | Stop reason: HOSPADM

## 2024-02-21 RX ORDER — BUPROPION HYDROCHLORIDE 150 MG/1
150 TABLET ORAL EVERY MORNING
Status: DISCONTINUED | OUTPATIENT
Start: 2024-02-22 | End: 2024-02-23 | Stop reason: HOSPADM

## 2024-02-21 RX ORDER — SODIUM CHLORIDE 9 MG/ML
INJECTION, SOLUTION INTRAVENOUS PRN
Status: DISCONTINUED | OUTPATIENT
Start: 2024-02-21 | End: 2024-02-23 | Stop reason: HOSPADM

## 2024-02-21 RX ADMIN — CARVEDILOL 3.12 MG: 3.12 TABLET, FILM COATED ORAL at 21:07

## 2024-02-21 RX ADMIN — ENOXAPARIN SODIUM 40 MG: 100 INJECTION SUBCUTANEOUS at 16:46

## 2024-02-21 RX ADMIN — FLUOXETINE 20 MG: 10 CAPSULE ORAL at 16:14

## 2024-02-21 RX ADMIN — INSULIN LISPRO 8 UNITS: 100 INJECTION, SOLUTION INTRAVENOUS; SUBCUTANEOUS at 16:45

## 2024-02-21 RX ADMIN — ROSUVASTATIN CALCIUM 20 MG: 20 TABLET, FILM COATED ORAL at 16:14

## 2024-02-21 RX ADMIN — SODIUM CHLORIDE, PRESERVATIVE FREE 10 ML: 5 INJECTION INTRAVENOUS at 20:25

## 2024-02-21 RX ADMIN — LOSARTAN POTASSIUM 25 MG: 50 TABLET, FILM COATED ORAL at 16:14

## 2024-02-21 RX ADMIN — MAGNESIUM SULFATE HEPTAHYDRATE 2000 MG: 40 INJECTION, SOLUTION INTRAVENOUS at 11:44

## 2024-02-21 RX ADMIN — EZETIMIBE 10 MG: 10 TABLET ORAL at 16:14

## 2024-02-21 RX ADMIN — POTASSIUM BICARBONATE 20 MEQ: 782 TABLET, EFFERVESCENT ORAL at 14:59

## 2024-02-21 ASSESSMENT — ENCOUNTER SYMPTOMS
ABDOMINAL PAIN: 0
SHORTNESS OF BREATH: 1
GASTROINTESTINAL NEGATIVE: 1
NAUSEA: 0
VOMITING: 0
DIARRHEA: 0

## 2024-02-21 ASSESSMENT — PAIN SCALES - GENERAL: PAINLEVEL_OUTOF10: 0

## 2024-02-21 NOTE — ED NOTES
ED to inpatient nurses report      Chief Complaint:  Chief Complaint   Patient presents with    Chest Pain     Present to ED from: Dialysis for chest pain    MOA:     LOC: alert and orientated to name, place, date  Mobility: Independent  Oxygen Baseline: RA    Current needs required: RA     Present condition: A&Ox 4, resting comfortably on stretcher. NAD.     Why did the patient come to the ED? Chest pain    Any procedures or intervention occur? Labs, EKG, Mag 2 grams  Any safety concerns??    Mental Status:       Psych Assessment:   Psychosocial  Psychosocial (WDL): Within Defined Limits  Vital signs   Vitals:    02/21/24 1059 02/21/24 1115 02/21/24 1129 02/21/24 1130   BP:       Pulse: 72 76 73    Resp:       Temp:       TempSrc:       SpO2: 100% 95% 94% 94%   Weight:            Vitals:  Patient Vitals for the past 24 hrs:   BP Temp Temp src Pulse Resp SpO2 Weight   02/21/24 1130 -- -- -- -- -- 94 % --   02/21/24 1129 -- -- -- 73 -- 94 % --   02/21/24 1115 -- -- -- 76 -- 95 % --   02/21/24 1059 -- -- -- 72 -- 100 % --   02/21/24 1045 -- -- -- 73 -- 100 % --   02/21/24 1031 -- -- -- -- -- -- 81.6 kg (180 lb)   02/21/24 1030 (!) 154/55 -- -- 73 20 100 % --   02/21/24 1029 (!) 161/66 -- -- 72 18 100 % --   02/21/24 1028 (!) 171/53 97.8 °F (36.6 °C) Oral 71 20 100 % --      Visit Vitals  BP (!) 154/55   Pulse 73   Temp 97.8 °F (36.6 °C) (Oral)   Resp 20   Wt 81.6 kg (180 lb)   SpO2 94%   BMI 27.78 kg/m²        LDAs:   Peripheral IV 02/21/24 Right Forearm (Active)   Site Assessment Clean, dry & intact 02/21/24 1031   Dressing Status Clean, dry & intact 02/21/24 1031   Dressing Type Transparent 02/21/24 1031   Dressing Intervention New 02/21/24 1031       Ambulatory Status:  Presents to emergency department  because of falls (Syncope, seizure, or loss of consciousness): No, Age > 70: No, Altered Mental Status, Intoxication with alcohol or substance confusion (Disorientation, impaired judgment, poor safety awaremess, or  ASPIRIN 81 MG EC TABLET    Take 1 tablet by mouth daily    BUPROPION (WELLBUTRIN XL) 150 MG EXTENDED RELEASE TABLET    Take 1 tablet by mouth every morning    CALCIUM ACETATE 667 MG TABS    Take 1,334 mg by mouth 3 times daily (with meals)    CARVEDILOL (COREG) 3.125 MG TABLET    Take 1 tablet by mouth 2 times daily    CYANOCOBALAMIN (VITAMIN B-12 PO)    Take 2,500 mcg by mouth daily    EPOETIN LIZ-EPBX (RETACRIT) 2000 UNIT/ML INJECTION    Inject 4 mLs into the skin once a week    EZETIMIBE (ZETIA) 10 MG TABLET    Take 1 tablet by mouth daily    FLUOXETINE (PROZAC) 20 MG TABLET    Take 1 tablet by mouth in the morning and 1 tablet in the evening. Take 1 tablets (20 mg)  by mouth in the morning and at bedtime.    FUROSEMIDE (LASIX) 20 MG TABLET    Take 1 tablet by mouth in the morning.    GLUCOSE 4 G CHEWABLE TABLET    Take 4 tablets by mouth as needed for Low blood sugar    INSULIN ASPART (NOVOLOG) 100 UNIT/ML INJECTION VIAL    Inject 0-8 Units into the skin 3 times daily (before meals) SLIDING SCALE    INSULIN GLARGINE (LANTUS SOLOSTAR) 100 UNIT/ML INJECTION PEN    Inject 25 Units into the skin in the morning and 25 Units before bedtime.    INSULIN PEN NEEDLE (MEIJER PEN NEEDLES) 31G X 8 MM MISC    1 each by Does not apply route daily    LORATADINE (CLARITIN) 10 MG TABLET    Take 1 tablet by mouth daily    LOSARTAN (COZAAR) 25 MG TABLET    Take 1 tablet by mouth daily    MISC. DEVICES MISC    Disp: custom molded shoes to accommodate for brace   DX: DM with history of stroke, foot drop  Duration: 1 year    OMEPRAZOLE (PRILOSEC) 40 MG DELAYED RELEASE CAPSULE        ONDANSETRON (ZOFRAN-ODT) 4 MG DISINTEGRATING TABLET        QUETIAPINE (SEROQUEL) 25 MG TABLET        ROSUVASTATIN (CRESTOR) 20 MG TABLET    Take 1 tablet by mouth daily    SENNOSIDES-DOCUSATE SODIUM (SENOKOT-S) 8.6-50 MG TABLET    Take 1 tablet by mouth daily     Orders Placed This Encounter   Medications    magnesium sulfate 2000 mg in 50 mL IVPB premix     potassium bicarb-citric acid (EFFER-K) effervescent tablet 20 mEq    aspirin EC tablet 81 mg    buPROPion (WELLBUTRIN XL) extended release tablet 150 mg    carvedilol (COREG) tablet 3.125 mg    ezetimibe (ZETIA) tablet 10 mg    FLUoxetine (PROZAC) tablet 20 mg    insulin glargine (LANTUS;BASAGLAR) injection pen 15 Units    losartan (COZAAR) tablet 25 mg    pantoprazole (PROTONIX) tablet 40 mg    rosuvastatin (CRESTOR) tablet 20 mg    insulin lispro (HUMALOG) injection vial 0-8 Units    insulin lispro (HUMALOG) injection vial 0-4 Units       SURGICAL HISTORY       Past Surgical History:   Procedure Laterality Date    AV FISTULA CREATION Left     COLONOSCOPY N/A 8/3/2022    COLONOSCOPY DIAGNOSTIC [facility]    UPPER GASTROINTESTINAL ENDOSCOPY N/A 8/3/2022    EGD ESOPHAGOGASTRODUODENOSCOPY performed by Rose [facility] Richard [facility] at [facility] OR    WRIST SURGERY  1995       PAST MEDICAL HISTORY       Past Medical History:   Diagnosis Date    Cerebral artery occlusion with cerebral infarction (HCC)     Closed fracture of bone of right foot March - April 2020    Dialysis patient (HCC)     Hemodialysis patient (HCC)     Hyperlipidemia     Hypertension     Kidney disease     On Dialysis    Type 1 diabetes mellitus (HCC)        Labs:  Labs Reviewed   CBC WITH AUTO DIFFERENTIAL - Abnormal; Notable for the following components:       Result Value    RBC 2.67 (*)     Hemoglobin 8.4 (*)     Hematocrit 27.0 (*)     Platelet, Fluorescence 80 (*)     Neutrophils % 75 (*)     Lymphocytes % 14 (*)     Lymphocytes Absolute 0.70 (*)     All other components within normal limits   BASIC METABOLIC PANEL - Abnormal; Notable for the following components:    Potassium 2.7 (*)     Chloride 113 (*)     CO2 16 (*)     Glucose 230 (*)     Creatinine 2.8 (*)     Est, Glom Filt Rate 27 (*)     Calcium 5.9 (*)     All other components within normal limits   TROPONIN - Abnormal; Notable for the following components:    Troponin,

## 2024-02-21 NOTE — H&P
the skin once a week    Pete Rojas MD   furosemide (LASIX) 20 MG tablet Take 1 tablet by mouth in the morning. 8/2/22   Jelly Carrasco MD   sennosides-docusate sodium (SENOKOT-S) 8.6-50 MG tablet Take 1 tablet by mouth daily    Pete Rojas MD   loratadine (CLARITIN) 10 MG tablet Take 1 tablet by mouth daily    Pete Rojas MD   losartan (COZAAR) 25 MG tablet Take 1 tablet by mouth daily    Pete Rojas MD   calcium acetate 667 MG TABS Take 1,334 mg by mouth 3 times daily (with meals) 7/26/22   Christofer Rodriguez, APRN - CNP   insulin glargine (LANTUS SOLOSTAR) 100 UNIT/ML injection pen Inject 25 Units into the skin in the morning and 25 Units before bedtime. 7/15/22   Anastasiya Musa MD   Insulin Pen Needle (MEIJER PEN NEEDLES) 31G X 8 MM MISC 1 each by Does not apply route daily 7/15/22   Anastasiya Musa MD   glucose 4 g chewable tablet Take 4 tablets by mouth as needed for Low blood sugar 7/15/22   Anastasiya Musa MD   pantoprazole (PROTONIX) 40 MG tablet Take 1 tablet by mouth every morning (before breakfast) 6/3/22 8/2/22  Anastasiya Musa MD   Misc. Devices MISC Disp: custom molded shoes to accommodate for brace   DX: DM with history of stroke, foot drop  Duration: 1 year 5/11/22   Kavitha Mary DPM   insulin aspart (NOVOLOG) 100 UNIT/ML injection vial Inject 0-8 Units into the skin 3 times daily (before meals) SLIDING SCALE    Pete Rojas MD   FLUoxetine (PROZAC) 20 MG tablet Take 1 tablet by mouth in the morning and 1 tablet in the evening. Take 1 tablets (20 mg)  by mouth in the morning and at bedtime.    Pete Rojas MD   buPROPion (WELLBUTRIN XL) 150 MG extended release tablet Take 1 tablet by mouth every morning 3/25/21   Jovita Sanders DO   ezetimibe (ZETIA) 10 MG tablet Take 1 tablet by mouth daily    Pete Rojas MD   aspirin 81 MG EC tablet Take 1 tablet by mouth daily    Pete Rojas MD   Cyanocobalamin (VITAMIN B-12 PO)  murmur heard.  Pulmonary:      Effort: Pulmonary effort is normal. No respiratory distress.      Breath sounds: Normal breath sounds. No wheezing or rales.   Abdominal:      General: There is no distension.      Palpations: Abdomen is soft.      Tenderness: There is no abdominal tenderness. There is no rebound.   Musculoskeletal:         General: No deformity.      Cervical back: No rigidity or tenderness.      Right lower leg: No edema.      Left lower leg: No edema.   Lymphadenopathy:      Cervical: No cervical adenopathy.   Skin:     Coloration: Skin is not jaundiced or pale.      Findings: No lesion or rash.   Neurological:      General: No focal deficit present.      Mental Status: He is alert and oriented to person, place, and time.      Sensory: No sensory deficit.      Motor: No weakness.   Psychiatric:         Mood and Affect: Mood normal.         Investigations:      Laboratory Testing:  Recent Results (from the past 24 hour(s))   CBC with Auto Differential    Collection Time: 02/21/24 10:39 AM   Result Value Ref Range    WBC 5.1 3.5 - 11.3 k/uL    RBC 2.67 (L) 4.21 - 5.77 m/uL    Hemoglobin 8.4 (L) 13.0 - 17.0 g/dL    Hematocrit 27.0 (L) 40.7 - 50.3 %    .1 82.6 - 102.9 fL    MCH 31.5 25.2 - 33.5 pg    MCHC 31.1 28.4 - 34.8 g/dL    RDW 13.7 11.8 - 14.4 %    Platelets See Reflexed IPF Result 138 - 453 k/uL    Platelet, Fluorescence 80 (L) 138 - 453 k/uL    Platelet, Immature Fraction 2.4 1.1 - 10.3 %    NRBC Automated 0.0 0.0 per 100 WBC    Neutrophils % 75 (H) 36 - 65 %    Lymphocytes % 14 (L) 24 - 43 %    Monocytes % 9 3 - 12 %    Eosinophils % 2 1 - 4 %    Basophils % 0 0 - 2 %    Immature Granulocytes 0 0 %    Neutrophils Absolute 3.82 1.50 - 8.10 k/uL    Lymphocytes Absolute 0.70 (L) 1.10 - 3.70 k/uL    Monocytes Absolute 0.46 0.10 - 1.20 k/uL    Eosinophils Absolute 0.11 0.00 - 0.44 k/uL    Basophils Absolute <0.03 0.00 - 0.20 k/uL    Absolute Immature Granulocyte <0.03 0.00 - 0.30 k/uL    Basic Metabolic Panel    Collection Time: 02/21/24 10:39 AM   Result Value Ref Range    Sodium 141 135 - 144 mmol/L    Potassium 2.7 (LL) 3.7 - 5.3 mmol/L    Chloride 113 (H) 98 - 107 mmol/L    CO2 16 (L) 20 - 31 mmol/L    Anion Gap 12 9 - 17 mmol/L    Glucose 230 (H) 70 - 99 mg/dL    BUN 15 6 - 20 mg/dL    Creatinine 2.8 (H) 0.7 - 1.2 mg/dL    Est, Glom Filt Rate 27 (L) >60 mL/min/1.73m2    Calcium 5.9 (LL) 8.6 - 10.4 mg/dL   Troponin    Collection Time: 02/21/24 10:39 AM   Result Value Ref Range    Troponin, High Sensitivity 149 (HH) 0 - 22 ng/L   Protime-INR    Collection Time: 02/21/24 10:39 AM   Result Value Ref Range    Protime 13.3 11.7 - 14.9 sec    INR 1.0    APTT    Collection Time: 02/21/24 10:39 AM   Result Value Ref Range    APTT 29.4 23.0 - 36.5 sec   Brain Natriuretic Peptide    Collection Time: 02/21/24 10:39 AM   Result Value Ref Range    Pro-BNP 8,958 (H) <300 pg/mL   Magnesium    Collection Time: 02/21/24 10:39 AM   Result Value Ref Range    Magnesium 1.4 (L) 1.6 - 2.6 mg/dL   Phosphorus    Collection Time: 02/21/24 10:39 AM   Result Value Ref Range    Phosphorus 1.7 (L) 2.5 - 4.5 mg/dL   Troponin    Collection Time: 02/21/24 11:40 AM   Result Value Ref Range    Troponin, High Sensitivity 217 (HH) 0 - 22 ng/L       Imaging/Diagnostics:  XR CHEST PORTABLE    Result Date: 2/21/2024  No acute process.       Assessment :      Hospital Problems             Last Modified POA    * (Principal) Chest pain 2/21/2024 Yes       Plan:     Patient status inpatient in the Med/Surge    Chest pain to rule out acute coronary syndrome, patient had episode of chest pain that lasted 30 minutes, patient now is chest pain-free : continue with aspirin and statin, trend troponin, monitor on telemetry, correct electrolyte abnormalities, will order 2D echo to evaluate ejection fraction or any valvular or wall motion abnormalities, cardiology was consulted for further evaluation  Elevated troponin plan as above  End-stage

## 2024-02-21 NOTE — ED TRIAGE NOTES
Pt presents to ED from dialysis via [facility] for 5/10 chest pain, nonradiating. Pt had 42 mins left on tx when he started having CP. Rates pain as 2/10 currently, was given 324 ASA PTA By [facility]. Pt is a MWF dialysis pt, no missed tx. Pt denies n/v/d, LOC, SOB, fever, chills, injury/trauma, change in bowel/bladder function, loss of appetite, or any other sx. Pt arrives with clamp on fistula from tx.     Pt is A&OX4, placed on full monitor, EKG done, IV established, changed into gown and given warm blankets. Labs drawn, labeled, and sent to lab. Pt vitals  show HTN but otherwise WNL.White board updated, and patient is updated on plan of care. Will continue to monitor.

## 2024-02-21 NOTE — PROGRESS NOTES
[facility]    Admission Medication Reconciliation       The patient's list of current home medications has been reviewed.     Source(s) of information: Dispense report    Based on information provided by the above source(s), I have updated the patient's home med list as described below.     Please review the ACTION REQUESTED section of this note below for any discrepancies on current hospital orders.      I changed or updated the following medications on the patient's home medication list:  Removed None     Added Montelukast 10 mg      Adjusted   None   Other Notes Failed to reach [facility]   to confirm his Lantus and Novolog directions  to clarify directions on Quetiapine  to clarify the strength of Rosuvastatin and Furosemide. Dispense report shows patient takes Rosuvastatin 40 mg  and Furosemide 40 mg.             Please feel free to call me with any questions about this encounter. Thank you.    Thank you  Gypsy Boudreaux, PharmD, [facility]  Inpatient Clinical Pharmacist  [phone]      Electronically signed by Maricarmen Loredo on 2/21/2024 at 3:50 PM

## 2024-02-21 NOTE — ED PROVIDER NOTES
[facility] ED  EMERGENCY DEPARTMENT ENCOUNTER      Pt Name: Steven L Severance  MRN: [id number]  Birthdate 1975  Date of evaluation: 2/21/24  PCP:  Smith Andres MD    CHIEF COMPLAINT:   Chief Complaint   Patient presents with    Chest Pain     HISTORY OF PRESENT ILLNESS   Steven L Severance is a 48 y.o. malewhopresents with multiple medical problems including end-stage renal disease on hemodialysis with last dialysis today as well as hypertension and diabetes and 3 previous strokes per the patient that have affected the right side.   Tolerated most of dialysis today and then began having some centralized to left-sided chest pain that was sharp and pressure-like at the same time.   Shortness of breath associated but denied any vomiting or diaphoresis.   No radiation of pain either.   Denies any back pain or abdominal pain or recent illnesses such as URI symptoms or diarrhea or blood in the stool.    Symptoms were moderate now mostly improved and almost completely gone.   Patient received full dose aspirin by EMS prior to arrival.          REVIEW OF SYSTEMS       Review of Systems   Constitutional:  Negative for chills and fever.   Respiratory:  Positive for shortness of breath.    Cardiovascular:  Positive for chest pain.   Gastrointestinal:  Negative for abdominal pain, diarrhea, nausea and vomiting.   Genitourinary:  Negative for dysuria.   Musculoskeletal:  Negative for neck pain.   Skin:  Negative for rash.   Neurological:  Negative for weakness (Denies any focal weakness).   All other systems reviewed and are negative.      10 essential systems negative except as stated above and in the HPI.    PAST MEDICAL HISTORY   PMH:  has a past medical history of Cerebral artery occlusion with cerebral infarction (HCC), Closed fracture of bone of right foot, Dialysis patient (HCC), Hemodialysis patient (HCC), Hyperlipidemia, Hypertension, Kidney disease, and Type 1 diabetes mellitus (HCC).  Surgical    Neurological:      Mental Status: He is alert. Mental status is at baseline.      Motor: Weakness (Slight weakness on the right arm and right leg that is unchanged from previous) present.   Psychiatric:         Speech: Speech normal.           EMERGENCY DEPARTMENT COURSE:     LABS: Labs reviewed by myself show:   Labs Reviewed   CBC WITH AUTO DIFFERENTIAL - Abnormal; Notable for the following components:       Result Value    RBC 2.67 (*)     Hemoglobin 8.4 (*)     Hematocrit 27.0 (*)     Platelet, Fluorescence 80 (*)     Neutrophils % 75 (*)     Lymphocytes % 14 (*)     Lymphocytes Absolute 0.70 (*)     All other components within normal limits   BASIC METABOLIC PANEL - Abnormal; Notable for the following components:    Potassium 2.7 (*)     Chloride 113 (*)     CO2 16 (*)     Glucose 230 (*)     Creatinine 2.8 (*)     Est, Glom Filt Rate 27 (*)     Calcium 5.9 (*)     All other components within normal limits   TROPONIN - Abnormal; Notable for the following components:    Troponin, High Sensitivity 149 (*)     All other components within normal limits   BRAIN NATRIURETIC PEPTIDE - Abnormal; Notable for the following components:    Pro-BNP 8,958 (*)     All other components within normal limits   MAGNESIUM - Abnormal; Notable for the following components:    Magnesium 1.4 (*)     All other components within normal limits   PHOSPHORUS - Abnormal; Notable for the following components:    Phosphorus 1.7 (*)     All other components within normal limits   PROTIME-INR   APTT   TROPONIN          EKG: All EKG's are interpreted by the Emergency Department Physician who either signs or Co-signs this chart in the absence of a cardiologist.    EKG Interpretation    Interpreted by emergency department physician    Rhythm: normal sinus   Rate: normal at 72 bpm  Axis: normal  Conduction: normal except for prolonged QTc at 584 ms  ST Segments: no acute change  T Waves: Inferior T wave inversions  Q Waves: no acute  words are used in this note in any gender, they shall be construed as though they were used in thegenderappropriate tothe circumstances; and whenever words are used in this note in the singular or plural form, they shall be construed as though they were used in the form appropriate to the circumstances.)    Watson Tatum MD [facility]  Attending Emergency Medicine Physician         Watson Tatum MD  02/21/24 1431       Watson Tatum MD  02/21/24 1447

## 2024-02-22 ENCOUNTER — APPOINTMENT (OUTPATIENT)
Age: 49
End: 2024-02-22
Attending: STUDENT IN AN ORGANIZED HEALTH CARE EDUCATION/TRAINING PROGRAM
Payer: MEDICARE

## 2024-02-22 PROBLEM — I34.0 NONRHEUMATIC MITRAL VALVE REGURGITATION: Status: ACTIVE | Noted: 2024-02-22

## 2024-02-22 PROBLEM — I21.4 NON-STEMI (NON-ST ELEVATED MYOCARDIAL INFARCTION) (HCC): Status: ACTIVE | Noted: 2024-02-22

## 2024-02-22 PROBLEM — I21.A1 TYPE 2 MI (MYOCARDIAL INFARCTION) (HCC): Status: ACTIVE | Noted: 2024-02-22

## 2024-02-22 LAB
ECHO AO ROOT DIAM: 2.8 CM
ECHO AO ROOT INDEX: 1.41 CM/M2
ECHO AV AREA PEAK VELOCITY: 3 CM2
ECHO AV AREA VTI: 2.9 CM2
ECHO AV AREA/BSA PEAK VELOCITY: 1.5 CM2/M2
ECHO AV AREA/BSA VTI: 1.5 CM2/M2
ECHO AV MEAN GRADIENT: 5 MMHG
ECHO AV MEAN VELOCITY: 1.1 M/S
ECHO AV PEAK GRADIENT: 11 MMHG
ECHO AV PEAK VELOCITY: 1.7 M/S
ECHO AV VELOCITY RATIO: 0.76
ECHO AV VTI: 35.6 CM
ECHO BSA: 2.02 M2
ECHO LA AREA 2C: 26.1 CM2
ECHO LA AREA 4C: 22.6 CM2
ECHO LA DIAMETER INDEX: 2.16 CM/M2
ECHO LA DIAMETER: 4.3 CM
ECHO LA MAJOR AXIS: 5.8 CM
ECHO LA MINOR AXIS: 5.8 CM
ECHO LA TO AORTIC ROOT RATIO: 1.54
ECHO LA VOL BP: 83 ML (ref 18–58)
ECHO LA VOL MOD A2C: 97 ML (ref 18–58)
ECHO LA VOL MOD A4C: 72 ML (ref 18–58)
ECHO LA VOL/BSA BIPLANE: 42 ML/M2 (ref 16–34)
ECHO LA VOLUME INDEX MOD A2C: 49 ML/M2 (ref 16–34)
ECHO LA VOLUME INDEX MOD A4C: 36 ML/M2 (ref 16–34)
ECHO LV E' LATERAL VELOCITY: 8 CM/S
ECHO LV E' SEPTAL VELOCITY: 5 CM/S
ECHO LV EDV 3D: 261 ML
ECHO LV EDV INDEX 3D: 131 ML/M2
ECHO LV EJECTION FRACTION 3D: 38 %
ECHO LV ESV 3D: 162 ML
ECHO LV ESV INDEX 3D: 81 ML/M2
ECHO LV FRACTIONAL SHORTENING: 26 % (ref 28–44)
ECHO LV INTERNAL DIMENSION DIASTOLE INDEX: 2.91 CM/M2
ECHO LV INTERNAL DIMENSION DIASTOLIC: 5.8 CM (ref 4.2–5.9)
ECHO LV INTERNAL DIMENSION SYSTOLIC INDEX: 2.16 CM/M2
ECHO LV INTERNAL DIMENSION SYSTOLIC: 4.3 CM
ECHO LV IVSD: 1.1 CM (ref 0.6–1)
ECHO LV MASS 2D: 264.3 G (ref 88–224)
ECHO LV MASS 3D INDEX: 125.1 G/M2
ECHO LV MASS 3D: 249 G
ECHO LV MASS INDEX 2D: 132.8 G/M2 (ref 49–115)
ECHO LV POSTERIOR WALL DIASTOLIC: 1.1 CM (ref 0.6–1)
ECHO LV RELATIVE WALL THICKNESS RATIO: 0.38
ECHO LVOT AREA: 3.8 CM2
ECHO LVOT AV VTI INDEX: 0.76
ECHO LVOT DIAM: 2.2 CM
ECHO LVOT MEAN GRADIENT: 3 MMHG
ECHO LVOT PEAK GRADIENT: 7 MMHG
ECHO LVOT PEAK VELOCITY: 1.3 M/S
ECHO LVOT STROKE VOLUME INDEX: 51.4 ML/M2
ECHO LVOT SV: 102.2 ML
ECHO LVOT VTI: 26.9 CM
ECHO MV A VELOCITY: 0.94 M/S
ECHO MV AREA VTI: 2.5 CM2
ECHO MV E DECELERATION TIME (DT): 288 MS
ECHO MV E VELOCITY: 1.1 M/S
ECHO MV E/A RATIO: 1.17
ECHO MV E/E' LATERAL: 13.75
ECHO MV E/E' RATIO (AVERAGED): 17.88
ECHO MV LVOT VTI INDEX: 1.5
ECHO MV MAX VELOCITY: 1.4 M/S
ECHO MV MEAN GRADIENT: 3 MMHG
ECHO MV MEAN VELOCITY: 0.9 M/S
ECHO MV PEAK GRADIENT: 8 MMHG
ECHO MV VTI: 40.3 CM
ECHO PV MAX VELOCITY: 0.9 M/S
ECHO PV PEAK GRADIENT: 3 MMHG
ECHO RV BASAL DIMENSION: 4.2 CM
ECHO RV FREE WALL PEAK S': 10 CM/S
ECHO RV INTERNAL DIMENSION: 4.3 CM
ECHO RV TAPSE: 2 CM (ref 1.7–?)
GLUCOSE BLD-MCNC: 261 MG/DL (ref 75–110)
GLUCOSE BLD-MCNC: 285 MG/DL (ref 75–110)
GLUCOSE BLD-MCNC: 316 MG/DL (ref 75–110)
GLUCOSE BLD-MCNC: 320 MG/DL (ref 75–110)

## 2024-02-22 PROCEDURE — 99232 SBSQ HOSP IP/OBS MODERATE 35: CPT | Performed by: STUDENT IN AN ORGANIZED HEALTH CARE EDUCATION/TRAINING PROGRAM

## 2024-02-22 PROCEDURE — 93306 TTE W/DOPPLER COMPLETE: CPT

## 2024-02-22 PROCEDURE — 2580000003 HC RX 258: Performed by: STUDENT IN AN ORGANIZED HEALTH CARE EDUCATION/TRAINING PROGRAM

## 2024-02-22 PROCEDURE — 99223 1ST HOSP IP/OBS HIGH 75: CPT | Performed by: INTERNAL MEDICINE

## 2024-02-22 PROCEDURE — 93306 TTE W/DOPPLER COMPLETE: CPT | Performed by: INTERNAL MEDICINE

## 2024-02-22 PROCEDURE — 82947 ASSAY GLUCOSE BLOOD QUANT: CPT

## 2024-02-22 PROCEDURE — 6360000002 HC RX W HCPCS: Performed by: STUDENT IN AN ORGANIZED HEALTH CARE EDUCATION/TRAINING PROGRAM

## 2024-02-22 PROCEDURE — 1200000000 HC SEMI PRIVATE

## 2024-02-22 PROCEDURE — 6370000000 HC RX 637 (ALT 250 FOR IP): Performed by: STUDENT IN AN ORGANIZED HEALTH CARE EDUCATION/TRAINING PROGRAM

## 2024-02-22 RX ORDER — CARVEDILOL 25 MG/1
25 TABLET ORAL 2 TIMES DAILY
Status: DISCONTINUED | OUTPATIENT
Start: 2024-02-22 | End: 2024-02-23 | Stop reason: HOSPADM

## 2024-02-22 RX ORDER — CARVEDILOL 12.5 MG/1
12.5 TABLET ORAL 2 TIMES DAILY
Status: DISCONTINUED | OUTPATIENT
Start: 2024-02-22 | End: 2024-02-22

## 2024-02-22 RX ORDER — LOSARTAN POTASSIUM 50 MG/1
50 TABLET ORAL ONCE
Status: COMPLETED | OUTPATIENT
Start: 2024-02-22 | End: 2024-02-22

## 2024-02-22 RX ORDER — CARVEDILOL 25 MG/1
25 TABLET ORAL ONCE
Status: COMPLETED | OUTPATIENT
Start: 2024-02-22 | End: 2024-02-22

## 2024-02-22 RX ORDER — LOSARTAN POTASSIUM 50 MG/1
50 TABLET ORAL DAILY
Status: DISCONTINUED | OUTPATIENT
Start: 2024-02-23 | End: 2024-02-23 | Stop reason: HOSPADM

## 2024-02-22 RX ADMIN — ROSUVASTATIN CALCIUM 20 MG: 20 TABLET, FILM COATED ORAL at 10:04

## 2024-02-22 RX ADMIN — INSULIN GLARGINE 15 UNITS: 100 INJECTION, SOLUTION SUBCUTANEOUS at 10:14

## 2024-02-22 RX ADMIN — ENOXAPARIN SODIUM 40 MG: 100 INJECTION SUBCUTANEOUS at 10:06

## 2024-02-22 RX ADMIN — ASPIRIN 81 MG: 81 TABLET, COATED ORAL at 10:05

## 2024-02-22 RX ADMIN — FLUOXETINE 20 MG: 10 CAPSULE ORAL at 10:04

## 2024-02-22 RX ADMIN — INSULIN LISPRO 6 UNITS: 100 INJECTION, SOLUTION INTRAVENOUS; SUBCUTANEOUS at 12:30

## 2024-02-22 RX ADMIN — INSULIN LISPRO 4 UNITS: 100 INJECTION, SOLUTION INTRAVENOUS; SUBCUTANEOUS at 17:24

## 2024-02-22 RX ADMIN — PANTOPRAZOLE SODIUM 40 MG: 40 TABLET, DELAYED RELEASE ORAL at 10:04

## 2024-02-22 RX ADMIN — CARVEDILOL 25 MG: 25 TABLET, FILM COATED ORAL at 20:51

## 2024-02-22 RX ADMIN — LOSARTAN POTASSIUM 50 MG: 50 TABLET, FILM COATED ORAL at 18:58

## 2024-02-22 RX ADMIN — CARVEDILOL 25 MG: 25 TABLET, FILM COATED ORAL at 18:58

## 2024-02-22 RX ADMIN — SODIUM CHLORIDE, PRESERVATIVE FREE 5 ML: 5 INJECTION INTRAVENOUS at 10:30

## 2024-02-22 RX ADMIN — FLUOXETINE 20 MG: 10 CAPSULE ORAL at 16:22

## 2024-02-22 RX ADMIN — LOSARTAN POTASSIUM 25 MG: 50 TABLET, FILM COATED ORAL at 10:05

## 2024-02-22 RX ADMIN — ACETAMINOPHEN 650 MG: 325 TABLET ORAL at 12:19

## 2024-02-22 RX ADMIN — INSULIN GLARGINE 15 UNITS: 100 INJECTION, SOLUTION SUBCUTANEOUS at 20:51

## 2024-02-22 RX ADMIN — EZETIMIBE 10 MG: 10 TABLET ORAL at 10:04

## 2024-02-22 RX ADMIN — BUPROPION HYDROCHLORIDE 150 MG: 150 TABLET, EXTENDED RELEASE ORAL at 10:05

## 2024-02-22 RX ADMIN — SODIUM CHLORIDE, PRESERVATIVE FREE 10 ML: 5 INJECTION INTRAVENOUS at 20:52

## 2024-02-22 ASSESSMENT — PAIN SCALES - GENERAL: PAINLEVEL_OUTOF10: 8

## 2024-02-22 ASSESSMENT — ENCOUNTER SYMPTOMS
EYES NEGATIVE: 1
RESPIRATORY NEGATIVE: 1
GASTROINTESTINAL NEGATIVE: 1

## 2024-02-22 NOTE — FLOWSHEET NOTE
Attending physician notified that pt's ECHO was done about an hour ago, but no results, yet.  He is on dialysis(M-W-F).  If he ends up spending the night, he will need a nephrology consult.

## 2024-02-22 NOTE — CONSULTS
Attestation signed by      Attending Physician Statement:    I have discussed the care of  Steven L Severance , including pertinent history and exam findings, with the Cardiology fellow/resident.     I have seen and examined the patient and the key elements of all parts of the encounter have been performed by me. I agree with the assessment, plan and orders as documented by the fellow/resident, after I modified exam findings and plan of treatments, and the final version is my approved version of the assessment.     Additional Comments:   The patient was seen and examined agree with the evaluation and plan documented below.  1.  Chest pain more atypical in nature.  Happened when he was on dialysis.  He has chronically elevated troponins.  Type II.  From end-stage renal disease.  Last coronary angiography in 2022 reviewed.  Will obtain an echocardiogram to look for any wall motion abnormalities.  If normal no further cardiac workup is needed.  2.  Moderate mitral regurgitation and history of pericardial effusion before.  Will obtain an echocardiogram.  3.  Essential hypertension.  Poorly controlled.  Will increase Coreg.             [facility]   Admission Note                 Patient's name:  Steven L Severance  Medical Record Number: [id number]  Patient's account/billing number: [id number]  Patient's YOB: 1975  Age: 48 y.o.  Date of Admission: 2/21/2024 10:26 AM  Date of History and Physical Examination: 2/22/2024  Primary Care Physician: Smith Andres MD    Code Status: Full Code    CHIEF COMPLAINT: Sharp chest pain during hemodialysis    CONSULT REASON: Evaluation of chest pain    HISTORY OF PRESENT ILLNESS:      History was obtained from chart review and the patient.      Steven L Severance is a 48 y.o. with past medical history significant for ESRD on MWF schedule, history of 2 strokes in the past with residual right-sided deficit, type II DM, essential hypertension who presented

## 2024-02-22 NOTE — PROGRESS NOTES
[facility]  PROGRESS NOTE    Shift date: 2/21/2024  Shift day: Wednesday   Shift # 2    Room # OBS 10/10-1   Name: Steven L Severance                Gnosticism:    Place of Druze:     Referral: Routine Visit    Admit Date & Time: 2/21/2024 10:26 AM    Assessment:  Steven L Severance is a 48 y.o. male in the hospital.  met grandparent in ED waiting room and escorted him to [facility].     Intervention:  Writer introduced self and title as . Grandparent appeared coping and hopeful when conversing about patient's health and hospital visit.  provided a supportive presence through active listening and words of affirmation.    Outcome:  Family member appeared receptive to a listening presence and to talk out concerns.    Plan:  Chaplains will remain available to offer spiritual and emotional support as needed.      Electronically signed by Chaplain Ab, on 2/21/2024 at 9:38 PM.  [facility]  [phone]

## 2024-02-22 NOTE — PLAN OF CARE
Problem: Chronic Conditions and Co-morbidities  Goal: Patient's chronic conditions and co-morbidity symptoms are monitored and maintained or improved  Outcome: Progressing     Problem: Discharge Planning  Goal: Discharge to home or other facility with appropriate resources  Outcome: Progressing     Problem: Safety - Adult  Goal: Free from fall injury  Outcome: Progressing     Problem: ABCDS Injury Assessment  Goal: Absence of physical injury  Outcome: Progressing     Problem: Pain  Goal: Verbalizes/displays adequate comfort level or baseline comfort level  Outcome: Progressing

## 2024-02-22 NOTE — PROGRESS NOTES
[facility]  Office: [phone]  Wander Gresham DO, Ok Antoine DO, Delmar Morgan DO, Esdras Emmanuel DO, Jelly Carrasco MD, Liliana Alvarez MD, Anastasiya Musa MD, Kyleigh Fine MD,  Brendon Jason MD, Madi Cooper MD, Shelley Zuniga MD,  Gloria Flores DO, Carla Hubbard MD, Rey Su MD, Adalid Gresham DO, Leticia Hansen MD,  Osbaldo Segovia DO, Enid Cai MD, Ayleen Morales MD, Rossi Mitchell MD, Jaylen Douglas MD,  Frantz Elam MD, Qasim Gutierrez MD, Nabil Armendariz MD, Flako Ponce MD, Stephen Corbett MD, Marge Jaime MD, Ed Wong DO, Rm Arroyo DO, Almas Carlin MD,  Kerwin Burnette MD, Shirley Waterhouse, CNP,  Cynthia Pickett, CNP, Zachary Yu, CNP,  Lou Quinteros, DNP, Ayana Canseco, CNP, Kathia Maria, CNP, Cherri Pérez CNP, Chani Romero, CNP, Tiffanie Valera, CNP, Jovita Colon, PA-C, Laura Donnelly, PA-C, Elba Jay, CNP, Ambika Perez, CNP, Radha Vines, CNP, Nunu Zacarias, CNS, Valeria Dong, CNP, Libia Richardson, CNP, Tracy Schwab, CNP         [facility]   IN-PATIENT SERVICE   [facility]    Progress Note    2/22/2024    10:23 AM    Name:   Steven L Severance  MRN:     [id number]     Acct:      [id number]   Room:   OBS 10/10-1  IP Day:  1  Admit Date:  2/21/2024 10:26 AM    PCP:   Smith Andres MD  Code Status:  Full Code    Subjective:     C/C:   Chief Complaint   Patient presents with    Chest Pain     Interval History Status: not changed.     Seen and examined, at time of my evaluation patient continued to deny chest pain difficulty breathing or any other complaint, but he reports lack of sleep , he said I just want to sleep  Cardiology on board recommended 2D echo will follow the result  lab vital signs were reviewed  Review of Systems:     Review of Systems   Constitutional: Negative.    Eyes: Negative.    Respiratory: Negative.     Cardiovascular: Negative.    Gastrointestinal: Negative.    Musculoskeletal:

## 2024-02-22 NOTE — CARE COORDINATION
Case Management Assessment  Initial Evaluation    Date/Time of Evaluation: 2/22/2024 11:41 AM  Assessment Completed by: YUNG GANDARA RN    If patient is discharged prior to next notation, then this note serves as note for discharge by case management.    Patient Name: Steven L Severance                   YOB: 1975  Diagnosis: Hypokalemia [E87.6]  Hypomagnesemia [E83.42]  Chest pain [R07.9]  Chest pain on breathing [R07.1]  Chest pain, unspecified type [R07.9]                   Date / Time: 2/21/2024 10:26 AM    Patient Admission Status: Inpatient   Readmission Risk (Low < 19, Mod (19-27), High > 27): Readmission Risk Score: 18.5    Current PCP: Bismark Andres MD  PCP verified by CM? Yes (bismark andres MD)    Chart Reviewed: Yes      History Provided by: Patient  Patient Orientation: Alert and Oriented    Patient Cognition: Alert    Hospitalization in the last 30 days (Readmission):  No    If yes, Readmission Assessment in  Navigator will be completed.    Advance Directives:      Code Status: Full Code   Patient's Primary Decision Maker is: Legal Next of Kin    Secondary Decision Maker: trinh gupta - Grandparent - [phone]    Discharge Planning:    Patient lives with: Family Members Type of Home: House  Primary Care Giver: Family  Patient Support Systems include: Family Members (grandparents)   Current Financial resources: Medicare  Current community resources: Other (Comment)  Current services prior to admission: Durable Medical Equipment, Other (Comment) (HD MWF @ 645)            Current DME: [employer]            Type of Home Care services:  None    ADLS  Prior functional level: Assistance with the following:, Mobility, Shopping, Cooking, Housework  Current functional level: Assistance with the following:, Mobility, Shopping, Cooking, Housework    PT AM-PAC:   /24  OT AM-PAC:   /24    Family can provide assistance at DC: Yes  Would you like Case Management to discuss the discharge plan  with any other family members/significant others, and if so, who?    Plans to Return to Present Housing: Yes  Other Identified Issues/Barriers to RETURNING to current housing: none  Potential Assistance needed at discharge: N/A             Patient expects to discharge to: House  Plan for transportation at discharge: Family    Financial    Payor: MEDICARE / Plan: MEDICARE PART A AND B / Product Type: *No Product type* /     Does insurance require precert for SNF: No    Potential assistance Purchasing Medications: No  Meds-to-Beds request: Yes      [facility] [id number] - [place], [place] - [address] - P [phone] - F [phone]  [address] [place] [address]  Phone: [phone] Fax: [phone]      Notes:    Factors facilitating achievement of predicted outcomes: Family support    Barriers to discharge:  clinical status    Additional Case Management Notes: return home with family lives with grandparents, VARGHESE santa MWF @ 6:45, has transportation      The Plan for Transition of Care is related to the following treatment goals of Hypokalemia [E87.6]  Hypomagnesemia [E83.42]  Chest pain [R07.9]  Chest pain on breathing [R07.1]  Chest pain, unspecified type [R07.9]    IF APPLICABLE: The Patient and/or patient representative Ernesto and his family were provided with a choice of provider and agrees with the discharge plan. Freedom of choice list with basic dialogue that supports the patient's individualized plan of care/goals and shares the quality data associated with the providers was provided to:     Patient     The Patient and/or Patient Representative Agree with the Discharge Plan?  yes    YUNG GANDARA RN  Case Management Department

## 2024-02-22 NOTE — ED NOTES
Report received from Columba ARRIAZA. All questions addressed. Patient resting comfortably on stretcher at this time, in no acute distress. Patient denying chest pain at this time.

## 2024-02-23 ENCOUNTER — HOSPITAL ENCOUNTER (OUTPATIENT)
Age: 49
Discharge: HOME | End: 2024-02-23

## 2024-02-23 ENCOUNTER — APPOINTMENT (OUTPATIENT)
Dept: NUCLEAR MEDICINE | Age: 49
End: 2024-02-23
Payer: MEDICARE

## 2024-02-23 VITALS
HEIGHT: 68 IN | BODY MASS INDEX: 28.57 KG/M2 | TEMPERATURE: 98 F | RESPIRATION RATE: 17 BRPM | HEART RATE: 66 BPM | OXYGEN SATURATION: 100 % | DIASTOLIC BLOOD PRESSURE: 90 MMHG | WEIGHT: 188.49 LBS | SYSTOLIC BLOOD PRESSURE: 202 MMHG

## 2024-02-23 PROBLEM — E87.20 METABOLIC ACIDOSIS: Status: ACTIVE | Noted: 2024-02-23

## 2024-02-23 PROBLEM — D63.8 ANEMIA OF CHRONIC DISEASE: Status: ACTIVE | Noted: 2022-05-30

## 2024-02-23 LAB
ANION GAP SERPL CALCULATED.3IONS-SCNC: 15 MMOL/L (ref 9–17)
BUN SERPL-MCNC: 58 MG/DL (ref 6–20)
CALCIUM SERPL-MCNC: 9 MG/DL (ref 8.6–10.4)
CHLORIDE SERPL-SCNC: 95 MMOL/L (ref 98–107)
CO2 SERPL-SCNC: 24 MMOL/L (ref 20–31)
CREAT SERPL-MCNC: 8.6 MG/DL (ref 0.7–1.2)
EKG ATRIAL RATE: 78 BPM
EKG P AXIS: 55 DEGREES
EKG P-R INTERVAL: 164 MS
EKG Q-T INTERVAL: 434 MS
EKG QRS DURATION: 114 MS
EKG QTC CALCULATION (BAZETT): 494 MS
EKG R AXIS: -22 DEGREES
EKG T AXIS: 15 DEGREES
EKG VENTRICULAR RATE: 78 BPM
ERYTHROCYTE [DISTWIDTH] IN BLOOD BY AUTOMATED COUNT: 13.2 % (ref 11.8–14.4)
GFR SERPL CREATININE-BSD FRML MDRD: 7 ML/MIN/1.73M2
GLUCOSE BLD-MCNC: 164 MG/DL (ref 75–110)
GLUCOSE SERPL-MCNC: 209 MG/DL (ref 70–99)
HCT VFR BLD AUTO: 31.9 % (ref 40.7–50.3)
HGB BLD-MCNC: 10.5 G/DL (ref 13–17)
MCH RBC QN AUTO: 31.3 PG (ref 25.2–33.5)
MCHC RBC AUTO-ENTMCNC: 32.9 G/DL (ref 28.4–34.8)
MCV RBC AUTO: 94.9 FL (ref 82.6–102.9)
NRBC BLD-RTO: 0 PER 100 WBC
PLATELET # BLD AUTO: 133 K/UL (ref 138–453)
PMV BLD AUTO: 10.4 FL (ref 8.1–13.5)
POTASSIUM SERPL-SCNC: 4.6 MMOL/L (ref 3.7–5.3)
RBC # BLD AUTO: 3.36 M/UL (ref 4.21–5.77)
SODIUM SERPL-SCNC: 134 MMOL/L (ref 135–144)
WBC OTHER # BLD: 7.4 K/UL (ref 3.5–11.3)

## 2024-02-23 PROCEDURE — 2580000003 HC RX 258: Performed by: STUDENT IN AN ORGANIZED HEALTH CARE EDUCATION/TRAINING PROGRAM

## 2024-02-23 PROCEDURE — 82947 ASSAY GLUCOSE BLOOD QUANT: CPT

## 2024-02-23 PROCEDURE — 99239 HOSP IP/OBS DSCHRG MGMT >30: CPT | Performed by: STUDENT IN AN ORGANIZED HEALTH CARE EDUCATION/TRAINING PROGRAM

## 2024-02-23 PROCEDURE — 90935 HEMODIALYSIS ONE EVALUATION: CPT

## 2024-02-23 PROCEDURE — 80048 BASIC METABOLIC PNL TOTAL CA: CPT

## 2024-02-23 PROCEDURE — 90935 HEMODIALYSIS ONE EVALUATION: CPT | Performed by: INTERNAL MEDICINE

## 2024-02-23 PROCEDURE — 99222 1ST HOSP IP/OBS MODERATE 55: CPT | Performed by: INTERNAL MEDICINE

## 2024-02-23 PROCEDURE — 99233 SBSQ HOSP IP/OBS HIGH 50: CPT | Performed by: NURSE PRACTITIONER

## 2024-02-23 PROCEDURE — 85027 COMPLETE CBC AUTOMATED: CPT

## 2024-02-23 PROCEDURE — 6370000000 HC RX 637 (ALT 250 FOR IP): Performed by: STUDENT IN AN ORGANIZED HEALTH CARE EDUCATION/TRAINING PROGRAM

## 2024-02-23 PROCEDURE — 6370000000 HC RX 637 (ALT 250 FOR IP): Performed by: NURSE PRACTITIONER

## 2024-02-23 PROCEDURE — 93010 ELECTROCARDIOGRAM REPORT: CPT | Performed by: INTERNAL MEDICINE

## 2024-02-23 PROCEDURE — 6360000002 HC RX W HCPCS: Performed by: STUDENT IN AN ORGANIZED HEALTH CARE EDUCATION/TRAINING PROGRAM

## 2024-02-23 PROCEDURE — 5A1D70Z PERFORMANCE OF URINARY FILTRATION, INTERMITTENT, LESS THAN 6 HOURS PER DAY: ICD-10-PCS | Performed by: INTERNAL MEDICINE

## 2024-02-23 RX ORDER — SODIUM CHLORIDE 0.9 % (FLUSH) 0.9 %
5-40 SYRINGE (ML) INJECTION PRN
Status: CANCELLED | OUTPATIENT
Start: 2024-02-23 | End: 2024-02-23

## 2024-02-23 RX ORDER — ATROPINE SULFATE 0.1 MG/ML
0.5 INJECTION INTRAVENOUS EVERY 5 MIN PRN
Status: CANCELLED | OUTPATIENT
Start: 2024-02-23 | End: 2024-02-23

## 2024-02-23 RX ORDER — CARVEDILOL 25 MG/1
25 TABLET ORAL 2 TIMES DAILY
Qty: 60 TABLET | Refills: 3 | Status: SHIPPED | OUTPATIENT
Start: 2024-02-23

## 2024-02-23 RX ORDER — LOSARTAN POTASSIUM 50 MG/1
50 TABLET ORAL DAILY
Qty: 30 TABLET | Refills: 3 | Status: SHIPPED | OUTPATIENT
Start: 2024-02-24

## 2024-02-23 RX ORDER — METOPROLOL TARTRATE 1 MG/ML
5 INJECTION, SOLUTION INTRAVENOUS EVERY 5 MIN PRN
Status: CANCELLED | OUTPATIENT
Start: 2024-02-23 | End: 2024-02-23

## 2024-02-23 RX ORDER — AMLODIPINE BESYLATE 10 MG/1
10 TABLET ORAL DAILY
Status: DISCONTINUED | OUTPATIENT
Start: 2024-02-23 | End: 2024-02-23 | Stop reason: HOSPADM

## 2024-02-23 RX ORDER — SODIUM CHLORIDE 9 MG/ML
500 INJECTION, SOLUTION INTRAVENOUS CONTINUOUS PRN
Status: CANCELLED | OUTPATIENT
Start: 2024-02-23 | End: 2024-02-23

## 2024-02-23 RX ORDER — NITROGLYCERIN 0.4 MG/1
0.4 TABLET SUBLINGUAL EVERY 5 MIN PRN
Status: CANCELLED | OUTPATIENT
Start: 2024-02-23 | End: 2024-02-23

## 2024-02-23 RX ORDER — AMINOPHYLLINE 25 MG/ML
50 INJECTION, SOLUTION INTRAVENOUS PRN
Status: CANCELLED | OUTPATIENT
Start: 2024-02-23 | End: 2024-02-23

## 2024-02-23 RX ORDER — REGADENOSON 0.08 MG/ML
0.4 INJECTION, SOLUTION INTRAVENOUS
Status: CANCELLED | OUTPATIENT
Start: 2024-02-23

## 2024-02-23 RX ORDER — AMLODIPINE BESYLATE 10 MG/1
10 TABLET ORAL DAILY
Qty: 30 TABLET | Refills: 3 | Status: SHIPPED | OUTPATIENT
Start: 2024-02-24

## 2024-02-23 RX ORDER — ALBUTEROL SULFATE 90 UG/1
2 AEROSOL, METERED RESPIRATORY (INHALATION) PRN
Status: CANCELLED | OUTPATIENT
Start: 2024-02-23 | End: 2024-02-23

## 2024-02-23 RX ADMIN — AMLODIPINE BESYLATE 10 MG: 10 TABLET ORAL at 14:50

## 2024-02-23 RX ADMIN — ROSUVASTATIN CALCIUM 20 MG: 20 TABLET, FILM COATED ORAL at 08:02

## 2024-02-23 RX ADMIN — PANTOPRAZOLE SODIUM 40 MG: 40 TABLET, DELAYED RELEASE ORAL at 08:02

## 2024-02-23 RX ADMIN — EZETIMIBE 10 MG: 10 TABLET ORAL at 08:02

## 2024-02-23 RX ADMIN — SODIUM CHLORIDE, PRESERVATIVE FREE 10 ML: 5 INJECTION INTRAVENOUS at 08:03

## 2024-02-23 RX ADMIN — ASPIRIN 81 MG: 81 TABLET, COATED ORAL at 08:02

## 2024-02-23 RX ADMIN — INSULIN GLARGINE 15 UNITS: 100 INJECTION, SOLUTION SUBCUTANEOUS at 08:15

## 2024-02-23 RX ADMIN — FLUOXETINE 20 MG: 10 CAPSULE ORAL at 08:03

## 2024-02-23 RX ADMIN — BUPROPION HYDROCHLORIDE 150 MG: 150 TABLET, EXTENDED RELEASE ORAL at 08:02

## 2024-02-23 RX ADMIN — ENOXAPARIN SODIUM 40 MG: 100 INJECTION SUBCUTANEOUS at 08:02

## 2024-02-23 NOTE — PROGRESS NOTES
[facility]   Progress Note                   Date:   2/23/2024  Patient name: Steven L Severance  Date of admission:  2/21/2024 10:26 AM  MRN:   [id number]  YOB: 1975  PCP: Smith Andres MD    Reason for Admission: Hypokalemia [E87.6]  Hypomagnesemia [E83.42]  Chest pain [R07.9]  Chest pain on breathing [R07.1]  Chest pain, unspecified type [R07.9]    Subjective:       Clinical Changes / Abnormalities: PT seen and examined in the HD.  Pt denies any CP or sob. Pt states that he is feeling much better today and is ready to go home       Medications:   Scheduled Meds:   carvedilol  25 mg Oral BID    losartan  50 mg Oral Daily    sodium chloride flush  5-40 mL IntraVENous 2 times per day    enoxaparin  40 mg SubCUTAneous Daily    aspirin  81 mg Oral Daily    buPROPion  150 mg Oral QAM    ezetimibe  10 mg Oral Daily    FLUoxetine  20 mg Oral BID    insulin glargine  15 Units SubCUTAneous BID    pantoprazole  40 mg Oral QAM AC    rosuvastatin  20 mg Oral Daily    insulin lispro  0-8 Units SubCUTAneous TID WC    insulin lispro  0-4 Units SubCUTAneous Nightly     Continuous Infusions:   sodium chloride       CBC:   Recent Labs     02/21/24  1039   WBC 5.1   HGB 8.4*   PLT See Reflexed IPF Result     BMP:    Recent Labs     02/21/24  1039 02/21/24  1933    135   K 2.7* 4.3   * 96*   CO2 16* 27   BUN 15 26*   CREATININE 2.8* 5.4*   GLUCOSE 230* 392*     Hepatic: No results for input(s): \"AST\", \"ALT\", \"ALB\", \"BILITOT\", \"ALKPHOS\" in the last 72 hours.  Troponin:   Recent Labs     02/21/24  1039 02/21/24  1140   TROPHS 149* 217*     BNP: No results for input(s): \"BNP\" in the last 72 hours.  Lipids: No results for input(s): \"CHOL\", \"HDL\" in the last 72 hours.    Invalid input(s): \"LDLCALCU\"  INR:   Recent Labs     02/21/24  1039   INR 1.0     ECHO 2/23/24      Left Ventricle: Mildly reduced left ventricular systolic function with a visually estimated EF of 40 - 45%. EF 3D is 38%. Left  ventricle size is normal. Normal wall thickness. Mild global hypokinesis present. Abnormal diastolic function.    Mitral Valve: Mild regurgitation.    Image quality is adequate.      Last EKG: YESTERDAY  Normal sinus rhythm  Voltage criteria for left ventricular hypertrophy  T wave abnormality, consider lateral ischemia     Last Echo: 8/7/2022    Left Ventricle: Systolic function is normal with an ejection fraction   of 55-60%. Grade III diastolic dysfunction (restrictive pattern) is   present. Lateral E' is 9.68 cm/s. Medial E' is 6.53 cm/s.     Left Atrium: Left atrium is mildly dilated. The left atrial volume   index is 37.1 mL/m2.     Mitral Valve: There is moderate to severe regurgitation. There is no   evidence of mitral valve stenosis.     Tricuspid Valve: The right ventricular systolic pressure is mildly   elevated. RVSP calculated at 37 mmHg. RVSP is based on RA pressure of 3   mmHg.    Echo 12/2023 EF 55%. Mild to moderate MR. No  Pericardial effusion.       Last Stress test/ [facility]: 6/6/2022  LMCA: Normal 0% stenosis.     LAD: Diffuse irregularities 30-40%.     LCx: Mild irregularities 30-40%.Very small vessel     RCA: Diffuse irregularities 30-40%.     Ramus: Mild irregularities 20-30%.      Coronary Tree      Dominance: Right          Objective:   Vitals: BP (!) 192/88   Pulse 66   Temp 98.1 °F (36.7 °C)   Resp 17   Ht 1.727 m (5' 8\")   Wt 87.9 kg (193 lb 12.6 oz)   SpO2 100%   BMI 29.46 kg/m²   General appearance: alert and cooperative with exam  HEENT: Head: Normocephalic, no lesions, without obvious abnormality.  Neck: no JVD, trachea midline, no adenopathy  Lungs: Clear to auscultation  Heart: Regular rate and rhythm, s1/s2 auscultated, no murmurs  Abdomen: soft, non-tender, bowel sounds active  Extremities: no edema  Neurologic: not done        Assessment / Acute Cardiac Problems:   Atypical chest pain-troponin 149> 217.  The last time patient was admitted into the hospital troponin was

## 2024-02-23 NOTE — PROGRESS NOTES
Patient was seen and examined on HD at bedside. Orders were confirmed with the HD nurse.   Tolerating the procedure well.  Patient normally gets dialysis as per MWF schedule under Dr. Lovell at [facility].    Adrien Carrasco MD  [facility]     This note is created with the assistance of a speech-recognition program. While intending to generate a document that actually reflects the content of the visit, no guarantees can be provided that every mistake has been identified and corrected by editing.

## 2024-02-23 NOTE — CONSULTS
Renal Consult Note    Patient :  Steven L Severance; 48 y.o. MRN# [id number]  Location:  OBS 10/10-1  Attending:  Flako Ponce*  Admit Date:  2/21/2024   Hospital Day: 2    Reason for Consult:     Asked by Flako Lee* to see for ESRD on HD.    History Obtained From:     patient, electronic medical record    HD Access:     previous  Left AV fistula    HD Unit:     [facility]    Nephrologist:     Dr. Lovell    Dry Weight:     84 kgs    History of Present Illness:     Steven L Severance; 48 y.o. male with past medical history as mentioned below presented to the hospital with the chief complaint of chest pain.  Patient mentioned that he had chest pain during her dialysis and came to the hospital for further evaluation.  He was seen by cardiology who thought the chest pain was atypical nature and does have chronically elevated troponins.  2D echo was done on 2/23/2024 which showed EF of 40 to 45%.  Abnormal diastolic function..  No further cardiac workup was recommended by cardiology as inpatient, they did recommend him to get outpatient stress test done.  Patient normally gets dialysis as per MWF schedule via left AV fistula at [facility] under Dr. Lovell.  Patient was seen and examined on HD.  Currently tolerating the procedure well.    Past History/Allergies?Social History:     Past Medical History:   Diagnosis Date    Cerebral artery occlusion with cerebral infarction (HCC)     Closed fracture of bone of right foot March - April 2020    Dialysis patient (HCC)     Hemodialysis patient (HCC)     Hyperlipidemia     Hypertension     Kidney disease     On Dialysis    Type 1 diabetes mellitus (HCC)        Past Surgical History:   Procedure Laterality Date    AV FISTULA CREATION Left     COLONOSCOPY N/A 8/3/2022    COLONOSCOPY DIAGNOSTIC performed by Rose Mary Richard MD at [facility] OR    UPPER GASTROINTESTINAL ENDOSCOPY N/A 8/3/2022    EGD ESOPHAGOGASTRODUODENOSCOPY performed by Rose Mary Richard  diet/TF.    Thank you for the consultation. Please do not hesitate to contact us for any further questions/concerns.     Adrien Carrasco MD  [facility]     This note is created with the assistance of a speech-recognition program. While intending to generate a document that actually reflects the content of the visit, no guarantees can be provided that every mistake has been identified and corrected by editing.

## 2024-02-23 NOTE — PROGRESS NOTES
Dialysis Post Treatment Note  Vitals:    02/23/24 1307   BP: (!) 202/90   Pulse: 66   Resp: 17   Temp: 98 °F (36.7 °C)   SpO2:      Pre-Weight = 87.9 kg  Post-weight = Weight - Scale: 85.5 kg (188 lb 7.9 oz)  Total Liters Processed = Blood Volume Processed (Liters): 80.19 L  Rinseback Volume (mL) = Rinseback Volume (ml): 280 ml  Net Removal (mL) =  2700  Patient's dry weight=84 kg  Type of access used=AVF  Length of treatment=210    Patient cramping at end of treatment. Subsided with blood return. Report called to floor RN.   1401: BP retake 169/101.

## 2024-02-23 NOTE — PROGRESS NOTES
Dialysis Time Out  To be done by RN and tech or 2 RNs  Staff Names Ebony OCDEMMA & Dionna RN    [x]  Identity of the patient using 2 patient identifiers  [x]  Consent for treatment  [x]  Equipment-proper machine and dialyzer  [x]  B-Hep B status  [x]  Orders- to include bath, blood flow, dialyzer, time and fluid removal  [x]  Access-Correct site and in working order  [x]  Time for patient to ask questions.

## 2024-02-23 NOTE — DISCHARGE INSTRUCTIONS
Please continue to take your medication as prescribed, continue to check your blood pressure regularly at home and follow-up with your primary doctor within 3 to 5 days of discharge to adjust your home medication if needed, and follow-up with cardiology for stress test as an outpatient

## 2024-02-23 NOTE — DISCHARGE SUMMARY
[facility]  Office: [phone]  Wander Gresham DO, Ok Antoine DO, Delmar Morgan DO, Esdras Emmanuel DO, Jelly Carrasco MD, Liliana Alvarez MD, Anastasiya Musa MD, Kyleigh Fine MD,  Brendon Jason MD, Madi Cooper MD, Shelley Zuniga MD,  Gloria Flores DO, Carla Hubbard MD, Rey Su MD, Adalid Gresham DO, Leticia Hansen MD,  Osbaldo Segovia DO, Enid Cai MD, Ayleen Morales MD, Rossi Mitchell MD, Jaylen Douglas MD,  Frantz Elam MD, Qasim Gutierrez MD, Nabil Armendariz MD, Flako Ponce MD, Stephen Corbett MD, Marge Jaime MD, Ed Wong DO, Rm Arroyo DO, Almas Carlin MD,  Kerwin Burnette MD, Shirley Waterhouse, CNP,  Cynthia Pickett, CNP, Zachary Yu, CNP,  Lou Quinteros, DNP, Ayana Canseco, CNP, Kathia Maria, CNP, Cherri Pérez CNP, Chani Romero, CNP, Tiffanie Valera, CNP, Jovita Colon, PA-C, Laura Donnelly, PA-C, Elba Jay, CNP, Ambika Perez, CNP, Radha Vines, CNP, Nunu Zacarias, CNS, Valeria Dong, CNP, Libia Richardson, CNP, Tracy Schwab, CNP         [facility]   IN-PATIENT SERVICE   [facility]    Discharge Summary     Patient ID: Steven L Severance  :  1975   MRN: [id number]     ACCOUNT:  [id number]   Patient's PCP: Smith Andres MD  Admit Date: 2024   Discharge Date: 2024   Length of Stay: 2  Code Status:  Full Code  Admitting Physician: Flako Ponce MD  Discharge Physician: Bayan Tayseer Al-Hurani, MD     Active Discharge Diagnoses:       1.Chest pain most likely atypical chest pain,resolved   2.End-stage renal disease on dialysis continue dialysis per schedule  3.History of stroke continue home medication  4.Diabetes continue Lantus, insulin sliding scale, diabetic diet and glucose check  5.Hypertension  uncontrolled will increase Coreg to 25 and losartan to 50 , add amlodipine  6.Hypokalemia resolved   7.hypomagnesemia resolved  8.GERD continue PPI  9. Bipolar disorder and depression

## 2024-02-25 LAB
EKG ATRIAL RATE: 72 BPM
EKG P AXIS: 43 DEGREES
EKG P-R INTERVAL: 164 MS
EKG Q-T INTERVAL: 534 MS
EKG QRS DURATION: 108 MS
EKG QTC CALCULATION (BAZETT): 584 MS
EKG R AXIS: -19 DEGREES
EKG T AXIS: 0 DEGREES
EKG VENTRICULAR RATE: 72 BPM

## 2024-03-05 NOTE — PROGRESS NOTES
Physician Progress Note      PATIENT:               SEVERANCE, STEVEN  CSN #:                  [id number]  :                       1975  ADMIT DATE:       2024 10:26 AM  DISCH DATE:        2024 3:43 PM  RESPONDING  PROVIDER #:        Flako Ponce MD          QUERY TEXT:    Pt admitted with chest pain. Pt noted to have documentation of patient   experiencing episode of chest pain at dialysis unit. Troponin level 149>217   described as atypical chest pain. documented history of CAD last Cath in    showing minimal non obstructive CAD, Gerd, and HTN. BP systolic range during   admission of 170-202. History of Gerd on protonix. If possible, please   document in progress notes and discharge summary if you are evaluating and/or   treating any of the following:    The medical record reflects the following:  Risk Factors: CHF, ESRD, HTN, Gerd  Clinical Indicators: noted to have documentation of patient experiencing   episode of chest pain at dialysis unit. Troponin level 149>217 described as   atypical chest pain. documented history of CAD last Cath in  showing   minimal non obstructive CAD, Gerd, and HTN. BP systolic range during admission   of 170-202. History of Gerd on protonix  Treatment: Norvasc, Cozaar, Cardiology consult, Tylenol, protonix      Thank You Leno ARRIAZA BSN CCDS  Email [email or web address]  Cell [phone]  office hours M-F 6am to 2:30p  Options provided:  -- Chest pain due to CAD with unstable angina  -- Chest pain due to GERD  -- Chest pain due to costochondritis  -- Chest pain due to HTN urgency  -- Other - I will add my own diagnosis  -- Disagree - Not applicable / Not valid  -- Disagree - Clinically unable to determine / Unknown  -- Refer to Clinical Documentation Reviewer    PROVIDER RESPONSE TEXT:    This patient has CAD with unstable angina.    Query created by: Massiel Kim on 2024 7:38 AM      Electronically signed by:  Flako Ponce MD 3/5/2024

## 2024-03-05 NOTE — PROGRESS NOTES
(Types 1   - 5 MI)      Thank You Leno ARRIAZA BSN CCDS  Email [email or web address]  Cell [phone]  office hours M-F 6am to 2:30p  Options provided:  -- MI type II due to hypokalemia and hypomagnesemia, present as evidenced by,   Please document indicators of myocardial infarction.  -- MI type II ruled out after study and demand ischemia due to hypokalemia and   hypomagnesemia, confirmed  -- MI type II ruled out after study and non-ischemic myocardial injury due to   hypokalemia and hypomagnesemia, confirmed  -- Other - I will add my own diagnosis  -- Disagree - Not applicable / Not valid  -- Disagree - Clinically unable to determine / Unknown  -- Refer to Clinical Documentation Reviewer    PROVIDER RESPONSE TEXT:    Type II MI was ruled out after study and demand ischemia confirmed.    Query created by: Massiel Kim on 2/23/2024 10:11 AM      Electronically signed by:  Flako Ponce MD 3/5/2024 3:18 PM

## 2024-03-12 ENCOUNTER — TELEPHONE (OUTPATIENT)
Dept: NEUROLOGY | Age: 49
End: 2024-03-12

## 2024-03-12 NOTE — TELEPHONE ENCOUNTER
[facility] clearance request from [facility] paperwork was received. Blank copy of form has been scanned.

## 2024-03-13 NOTE — TELEPHONE ENCOUNTER
[facility] clearance request signed by Dr. Noyola and faxed to [facility] Release of Info dept at [phone].

## 2024-05-01 ENCOUNTER — OFFICE VISIT (OUTPATIENT)
Dept: PODIATRY | Age: 49
End: 2024-05-01
Payer: MEDICARE

## 2024-05-01 VITALS — WEIGHT: 192 LBS | BODY MASS INDEX: 30.13 KG/M2 | HEIGHT: 67 IN

## 2024-05-01 DIAGNOSIS — B35.1 DERMATOPHYTOSIS OF NAIL: ICD-10-CM

## 2024-05-01 DIAGNOSIS — D23.72 BENIGN NEOPLASM OF SKIN OF LOWER LIMB, INCLUDING HIP, LEFT: ICD-10-CM

## 2024-05-01 DIAGNOSIS — E11.51 TYPE II DIABETES MELLITUS WITH PERIPHERAL CIRCULATORY DISORDER (HCC): Primary | ICD-10-CM

## 2024-05-01 DIAGNOSIS — D23.71 BENIGN NEOPLASM OF SKIN OF LOWER LIMB, INCLUDING HIP, RIGHT: ICD-10-CM

## 2024-05-01 DIAGNOSIS — M79.605 PAIN IN BOTH LOWER EXTREMITIES: ICD-10-CM

## 2024-05-01 DIAGNOSIS — M79.604 PAIN IN BOTH LOWER EXTREMITIES: ICD-10-CM

## 2024-05-01 PROCEDURE — G8427 DOCREV CUR MEDS BY ELIG CLIN: HCPCS | Performed by: PODIATRIST

## 2024-05-01 PROCEDURE — 2022F DILAT RTA XM EVC RTNOPTHY: CPT | Performed by: PODIATRIST

## 2024-05-01 PROCEDURE — 3046F HEMOGLOBIN A1C LEVEL >9.0%: CPT | Performed by: PODIATRIST

## 2024-05-01 PROCEDURE — 1036F TOBACCO NON-USER: CPT | Performed by: PODIATRIST

## 2024-05-01 PROCEDURE — 17110 DESTRUCTION B9 LES UP TO 14: CPT | Performed by: PODIATRIST

## 2024-05-01 PROCEDURE — 99213 OFFICE O/P EST LOW 20 MIN: CPT | Performed by: PODIATRIST

## 2024-05-01 PROCEDURE — 11721 DEBRIDE NAIL 6 OR MORE: CPT | Performed by: PODIATRIST

## 2024-05-01 PROCEDURE — G8417 CALC BMI ABV UP PARAM F/U: HCPCS | Performed by: PODIATRIST

## 2024-05-01 RX ORDER — FERROUS SULFATE 325(65) MG
TABLET ORAL
COMMUNITY
Start: 2023-03-13

## 2024-05-02 RX ORDER — AMMONIUM LACTATE 12 G/100G
LOTION TOPICAL
Qty: 225 G | Refills: 2 | Status: SHIPPED | OUTPATIENT
Start: 2024-05-02

## 2024-05-07 NOTE — PROGRESS NOTES
control.  Patient to check feet daily and contact the office with any questions/problems/concerns.   Other comorbidity noted and will be managed by PCP.  5/1/2024    Electronically signed by Kavitha Mary DPM on 5/7/2024 at 12:50 PM  5/1/2024

## 2024-05-17 ENCOUNTER — APPOINTMENT (OUTPATIENT)
Dept: CT IMAGING | Age: 49
End: 2024-05-17
Payer: MEDICARE

## 2024-05-17 ENCOUNTER — HOSPITAL ENCOUNTER (INPATIENT)
Age: 49
LOS: 1 days | Discharge: HOME | End: 2024-05-18
Attending: EMERGENCY MEDICINE | Admitting: INTERNAL MEDICINE
Payer: MEDICARE

## 2024-05-17 ENCOUNTER — APPOINTMENT (OUTPATIENT)
Dept: MRI IMAGING | Age: 49
End: 2024-05-17
Payer: MEDICARE

## 2024-05-17 DIAGNOSIS — I24.9 ACUTE CORONARY SYNDROME (HCC): ICD-10-CM

## 2024-05-17 DIAGNOSIS — R29.90 STROKE-LIKE EPISODE: Primary | ICD-10-CM

## 2024-05-17 DIAGNOSIS — I21.4 NSTEMI (NON-ST ELEVATED MYOCARDIAL INFARCTION) (HCC): ICD-10-CM

## 2024-05-17 PROBLEM — E83.42 HYPOMAGNESEMIA: Status: ACTIVE | Noted: 2024-05-17

## 2024-05-17 PROBLEM — E87.6 HYPOKALEMIA: Status: ACTIVE | Noted: 2024-05-17

## 2024-05-17 LAB
ANION GAP SERPL CALCULATED.3IONS-SCNC: 12 MMOL/L (ref 9–16)
ANTI-XA UNFRAC HEPARIN: <0.1 IU/L
BASOPHILS # BLD: 0.05 K/UL (ref 0–0.2)
BASOPHILS NFR BLD: 1 % (ref 0–2)
BUN BLD-MCNC: 6 MG/DL (ref 8–26)
BUN SERPL-MCNC: 7 MG/DL (ref 6–20)
CA-I BLD-SCNC: 1.05 MMOL/L (ref 1.15–1.33)
CALCIUM SERPL-MCNC: 7.6 MG/DL (ref 8.6–10.4)
CHLORIDE BLD-SCNC: 103 MMOL/L (ref 98–107)
CHLORIDE SERPL-SCNC: 106 MMOL/L (ref 98–107)
CK SERPL-CCNC: 123 U/L (ref 39–308)
CO2 BLD CALC-SCNC: 30 MMOL/L (ref 22–30)
CO2 SERPL-SCNC: 23 MMOL/L (ref 20–31)
CREAT SERPL-MCNC: 2.5 MG/DL (ref 0.7–1.2)
EGFR, POC: 22 ML/MIN/1.73M2
EOSINOPHIL # BLD: 0.19 K/UL (ref 0–0.44)
EOSINOPHILS RELATIVE PERCENT: 3 % (ref 1–4)
ERYTHROCYTE [DISTWIDTH] IN BLOOD BY AUTOMATED COUNT: 12.7 % (ref 11.8–14.4)
ERYTHROCYTE [DISTWIDTH] IN BLOOD BY AUTOMATED COUNT: 13 % (ref 11.8–14.4)
GFR, ESTIMATED: 30 ML/MIN/1.73M2
GLUCOSE BLD-MCNC: 182 MG/DL (ref 75–110)
GLUCOSE BLD-MCNC: 203 MG/DL (ref 75–110)
GLUCOSE BLD-MCNC: 222 MG/DL (ref 74–100)
GLUCOSE SERPL-MCNC: 169 MG/DL (ref 74–99)
HCO3 VENOUS: 30.8 MMOL/L (ref 22–29)
HCT VFR BLD AUTO: 30.5 % (ref 40.7–50.3)
HCT VFR BLD AUTO: 33.9 % (ref 40.7–50.3)
HCT VFR BLD AUTO: 40 % (ref 41–53)
HGB BLD-MCNC: 10.3 G/DL (ref 13–17)
HGB BLD-MCNC: 11.1 G/DL (ref 13–17)
IMM GRANULOCYTES # BLD AUTO: <0.03 K/UL (ref 0–0.3)
IMM GRANULOCYTES NFR BLD: 0 %
INR PPP: 1
INR PPP: 1.1
LYMPHOCYTES NFR BLD: 1.34 K/UL (ref 1.1–3.7)
LYMPHOCYTES RELATIVE PERCENT: 20 % (ref 24–43)
MAGNESIUM SERPL-MCNC: 1.5 MG/DL (ref 1.6–2.6)
MCH RBC QN AUTO: 31 PG (ref 25.2–33.5)
MCH RBC QN AUTO: 31.6 PG (ref 25.2–33.5)
MCHC RBC AUTO-ENTMCNC: 32.7 G/DL (ref 28.4–34.8)
MCHC RBC AUTO-ENTMCNC: 33.8 G/DL (ref 28.4–34.8)
MCV RBC AUTO: 93.6 FL (ref 82.6–102.9)
MCV RBC AUTO: 94.7 FL (ref 82.6–102.9)
MONOCYTES NFR BLD: 0.8 K/UL (ref 0.1–1.2)
MONOCYTES NFR BLD: 12 % (ref 3–12)
MYOGLOBIN SERPL-MCNC: 451 NG/ML (ref 28–72)
NEUTROPHILS NFR BLD: 64 % (ref 36–65)
NEUTS SEG NFR BLD: 4.44 K/UL (ref 1.5–8.1)
NRBC BLD-RTO: 0 PER 100 WBC
NRBC BLD-RTO: 0 PER 100 WBC
O2 SAT, VEN: 32.6 % (ref 60–85)
PARTIAL THROMBOPLASTIN TIME: 26.8 SEC (ref 23–36.5)
PARTIAL THROMBOPLASTIN TIME: 29.5 SEC (ref 23–36.5)
PCO2, VEN: 41.4 MM HG (ref 41–51)
PH VENOUS: 7.48 (ref 7.32–7.43)
PLATELET # BLD AUTO: 151 K/UL (ref 138–453)
PLATELET # BLD AUTO: 162 K/UL (ref 138–453)
PMV BLD AUTO: 10.1 FL (ref 8.1–13.5)
PMV BLD AUTO: 10.1 FL (ref 8.1–13.5)
PO2, VEN: 18.8 MM HG (ref 30–50)
POC ANION GAP: 12 MMOL/L (ref 7–16)
POC CREATININE: 3.3 MG/DL (ref 0.51–1.19)
POC HEMOGLOBIN (CALC): 13.5 G/DL (ref 13.5–17.5)
POC LACTIC ACID: 1.9 MMOL/L (ref 0.56–1.39)
POSITIVE BASE EXCESS, VEN: 6.6 MMOL/L (ref 0–3)
POTASSIUM BLD-SCNC: 3.7 MMOL/L (ref 3.5–4.5)
POTASSIUM SERPL-SCNC: 3.2 MMOL/L (ref 3.7–5.3)
POTASSIUM SERPL-SCNC: 3.2 MMOL/L (ref 3.7–5.3)
PROTHROMBIN TIME: 13.3 SEC (ref 11.7–14.9)
PROTHROMBIN TIME: 14.2 SEC (ref 11.7–14.9)
RBC # BLD AUTO: 3.26 M/UL (ref 4.21–5.77)
RBC # BLD AUTO: 3.58 M/UL (ref 4.21–5.77)
SODIUM BLD-SCNC: 144 MMOL/L (ref 138–146)
SODIUM SERPL-SCNC: 141 MMOL/L (ref 136–145)
TROPONIN I SERPL HS-MCNC: 225 NG/L (ref 0–22)
TROPONIN I SERPL HS-MCNC: 244 NG/L (ref 0–22)
TROPONIN I SERPL HS-MCNC: 288 NG/L (ref 0–22)
TROPONIN I SERPL HS-MCNC: 293 NG/L (ref 0–22)
TROPONIN I SERPL HS-MCNC: 8 NG/L (ref 0–22)
WBC OTHER # BLD: 6.6 K/UL (ref 3.5–11.3)
WBC OTHER # BLD: 6.8 K/UL (ref 3.5–11.3)

## 2024-05-17 PROCEDURE — 82803 BLOOD GASES ANY COMBINATION: CPT

## 2024-05-17 PROCEDURE — 85520 HEPARIN ASSAY: CPT

## 2024-05-17 PROCEDURE — 99223 1ST HOSP IP/OBS HIGH 75: CPT | Performed by: INTERNAL MEDICINE

## 2024-05-17 PROCEDURE — 82553 CREATINE MB FRACTION: CPT

## 2024-05-17 PROCEDURE — 6360000002 HC RX W HCPCS: Performed by: STUDENT IN AN ORGANIZED HEALTH CARE EDUCATION/TRAINING PROGRAM

## 2024-05-17 PROCEDURE — 82565 ASSAY OF CREATININE: CPT

## 2024-05-17 PROCEDURE — 85025 COMPLETE CBC W/AUTO DIFF WBC: CPT

## 2024-05-17 PROCEDURE — 6370000000 HC RX 637 (ALT 250 FOR IP): Performed by: STUDENT IN AN ORGANIZED HEALTH CARE EDUCATION/TRAINING PROGRAM

## 2024-05-17 PROCEDURE — 83874 ASSAY OF MYOGLOBIN: CPT

## 2024-05-17 PROCEDURE — 84132 ASSAY OF SERUM POTASSIUM: CPT

## 2024-05-17 PROCEDURE — 83605 ASSAY OF LACTIC ACID: CPT

## 2024-05-17 PROCEDURE — 6360000004 HC RX CONTRAST MEDICATION: Performed by: STUDENT IN AN ORGANIZED HEALTH CARE EDUCATION/TRAINING PROGRAM

## 2024-05-17 PROCEDURE — 1200000000 HC SEMI PRIVATE

## 2024-05-17 PROCEDURE — 85027 COMPLETE CBC AUTOMATED: CPT

## 2024-05-17 PROCEDURE — 70450 CT HEAD/BRAIN W/O DYE: CPT

## 2024-05-17 PROCEDURE — 82330 ASSAY OF CALCIUM: CPT

## 2024-05-17 PROCEDURE — 80051 ELECTROLYTE PANEL: CPT

## 2024-05-17 PROCEDURE — 2580000003 HC RX 258

## 2024-05-17 PROCEDURE — 36415 COLL VENOUS BLD VENIPUNCTURE: CPT

## 2024-05-17 PROCEDURE — 96365 THER/PROPH/DIAG IV INF INIT: CPT

## 2024-05-17 PROCEDURE — 85014 HEMATOCRIT: CPT

## 2024-05-17 PROCEDURE — 99222 1ST HOSP IP/OBS MODERATE 55: CPT | Performed by: INTERNAL MEDICINE

## 2024-05-17 PROCEDURE — 82947 ASSAY GLUCOSE BLOOD QUANT: CPT

## 2024-05-17 PROCEDURE — 84484 ASSAY OF TROPONIN QUANT: CPT

## 2024-05-17 PROCEDURE — 85730 THROMBOPLASTIN TIME PARTIAL: CPT

## 2024-05-17 PROCEDURE — 80048 BASIC METABOLIC PNL TOTAL CA: CPT

## 2024-05-17 PROCEDURE — 70496 CT ANGIOGRAPHY HEAD: CPT

## 2024-05-17 PROCEDURE — 83036 HEMOGLOBIN GLYCOSYLATED A1C: CPT

## 2024-05-17 PROCEDURE — 6360000002 HC RX W HCPCS

## 2024-05-17 PROCEDURE — 85610 PROTHROMBIN TIME: CPT

## 2024-05-17 PROCEDURE — 84520 ASSAY OF UREA NITROGEN: CPT

## 2024-05-17 PROCEDURE — 70551 MRI BRAIN STEM W/O DYE: CPT

## 2024-05-17 PROCEDURE — 99285 EMERGENCY DEPT VISIT HI MDM: CPT

## 2024-05-17 PROCEDURE — 82550 ASSAY OF CK (CPK): CPT

## 2024-05-17 PROCEDURE — 83735 ASSAY OF MAGNESIUM: CPT

## 2024-05-17 PROCEDURE — 99222 1ST HOSP IP/OBS MODERATE 55: CPT | Performed by: PSYCHIATRY & NEUROLOGY

## 2024-05-17 PROCEDURE — 99447 NTRPROF PH1/NTRNET/EHR 11-20: CPT | Performed by: PSYCHIATRY & NEUROLOGY

## 2024-05-17 PROCEDURE — 6370000000 HC RX 637 (ALT 250 FOR IP)

## 2024-05-17 RX ORDER — MAGNESIUM SULFATE IN WATER 40 MG/ML
2000 INJECTION, SOLUTION INTRAVENOUS PRN
Status: DISCONTINUED | OUTPATIENT
Start: 2024-05-17 | End: 2024-05-18 | Stop reason: HOSPADM

## 2024-05-17 RX ORDER — ACETAMINOPHEN 325 MG/1
650 TABLET ORAL EVERY 6 HOURS PRN
Status: DISCONTINUED | OUTPATIENT
Start: 2024-05-17 | End: 2024-05-18 | Stop reason: HOSPADM

## 2024-05-17 RX ORDER — ASPIRIN 81 MG/1
81 TABLET ORAL DAILY
Status: DISCONTINUED | OUTPATIENT
Start: 2024-05-18 | End: 2024-05-18 | Stop reason: HOSPADM

## 2024-05-17 RX ORDER — LABETALOL HYDROCHLORIDE 5 MG/ML
10 INJECTION, SOLUTION INTRAVENOUS EVERY 6 HOURS PRN
Status: DISCONTINUED | OUTPATIENT
Start: 2024-05-17 | End: 2024-05-18 | Stop reason: HOSPADM

## 2024-05-17 RX ORDER — CARVEDILOL 3.12 MG/1
3.12 TABLET ORAL 2 TIMES DAILY WITH MEALS
Status: DISCONTINUED | OUTPATIENT
Start: 2024-05-17 | End: 2024-05-18

## 2024-05-17 RX ORDER — HEPARIN SODIUM 10000 [USP'U]/100ML
5-30 INJECTION, SOLUTION INTRAVENOUS CONTINUOUS
Status: DISCONTINUED | OUTPATIENT
Start: 2024-05-17 | End: 2024-05-18

## 2024-05-17 RX ORDER — DEXTROSE MONOHYDRATE 100 MG/ML
INJECTION, SOLUTION INTRAVENOUS CONTINUOUS PRN
Status: DISCONTINUED | OUTPATIENT
Start: 2024-05-17 | End: 2024-05-18 | Stop reason: HOSPADM

## 2024-05-17 RX ORDER — SODIUM CHLORIDE 9 MG/ML
INJECTION, SOLUTION INTRAVENOUS PRN
Status: DISCONTINUED | OUTPATIENT
Start: 2024-05-17 | End: 2024-05-18 | Stop reason: HOSPADM

## 2024-05-17 RX ORDER — FUROSEMIDE 40 MG/1
40 TABLET ORAL DAILY
Status: DISCONTINUED | OUTPATIENT
Start: 2024-05-18 | End: 2024-05-18 | Stop reason: HOSPADM

## 2024-05-17 RX ORDER — HYDRALAZINE HYDROCHLORIDE 20 MG/ML
10 INJECTION INTRAMUSCULAR; INTRAVENOUS EVERY 6 HOURS PRN
Status: DISCONTINUED | OUTPATIENT
Start: 2024-05-17 | End: 2024-05-18 | Stop reason: HOSPADM

## 2024-05-17 RX ORDER — ACETAMINOPHEN 650 MG/1
650 SUPPOSITORY RECTAL EVERY 6 HOURS PRN
Status: DISCONTINUED | OUTPATIENT
Start: 2024-05-17 | End: 2024-05-18 | Stop reason: HOSPADM

## 2024-05-17 RX ORDER — SODIUM CHLORIDE 0.9 % (FLUSH) 0.9 %
5-40 SYRINGE (ML) INJECTION PRN
Status: DISCONTINUED | OUTPATIENT
Start: 2024-05-17 | End: 2024-05-18 | Stop reason: HOSPADM

## 2024-05-17 RX ORDER — INSULIN LISPRO 100 [IU]/ML
0-4 INJECTION, SOLUTION INTRAVENOUS; SUBCUTANEOUS NIGHTLY
Status: DISCONTINUED | OUTPATIENT
Start: 2024-05-17 | End: 2024-05-18 | Stop reason: HOSPADM

## 2024-05-17 RX ORDER — QUETIAPINE FUMARATE 25 MG/1
25 TABLET, FILM COATED ORAL NIGHTLY
Status: DISCONTINUED | OUTPATIENT
Start: 2024-05-17 | End: 2024-05-18 | Stop reason: HOSPADM

## 2024-05-17 RX ORDER — POTASSIUM CHLORIDE 20 MEQ/1
40 TABLET, EXTENDED RELEASE ORAL ONCE
Status: COMPLETED | OUTPATIENT
Start: 2024-05-17 | End: 2024-05-17

## 2024-05-17 RX ORDER — HEPARIN SODIUM 1000 [USP'U]/ML
4000 INJECTION, SOLUTION INTRAVENOUS; SUBCUTANEOUS PRN
Status: DISCONTINUED | OUTPATIENT
Start: 2024-05-17 | End: 2024-05-18

## 2024-05-17 RX ORDER — INSULIN GLARGINE 100 [IU]/ML
30 INJECTION, SOLUTION SUBCUTANEOUS NIGHTLY
Status: DISCONTINUED | OUTPATIENT
Start: 2024-05-17 | End: 2024-05-18

## 2024-05-17 RX ORDER — MAGNESIUM SULFATE IN WATER 40 MG/ML
2000 INJECTION, SOLUTION INTRAVENOUS ONCE
Status: COMPLETED | OUTPATIENT
Start: 2024-05-17 | End: 2024-05-17

## 2024-05-17 RX ORDER — AMLODIPINE BESYLATE 10 MG/1
10 TABLET ORAL DAILY
Status: DISCONTINUED | OUTPATIENT
Start: 2024-05-18 | End: 2024-05-18 | Stop reason: HOSPADM

## 2024-05-17 RX ORDER — CARVEDILOL 12.5 MG/1
25 TABLET ORAL ONCE
Status: COMPLETED | OUTPATIENT
Start: 2024-05-17 | End: 2024-05-17

## 2024-05-17 RX ORDER — SODIUM CHLORIDE 0.9 % (FLUSH) 0.9 %
5-40 SYRINGE (ML) INJECTION EVERY 12 HOURS SCHEDULED
Status: DISCONTINUED | OUTPATIENT
Start: 2024-05-17 | End: 2024-05-18 | Stop reason: HOSPADM

## 2024-05-17 RX ORDER — POTASSIUM CHLORIDE 7.45 MG/ML
10 INJECTION INTRAVENOUS PRN
Status: DISCONTINUED | OUTPATIENT
Start: 2024-05-17 | End: 2024-05-17

## 2024-05-17 RX ORDER — ONDANSETRON 2 MG/ML
4 INJECTION INTRAMUSCULAR; INTRAVENOUS EVERY 6 HOURS PRN
Status: DISCONTINUED | OUTPATIENT
Start: 2024-05-17 | End: 2024-05-18 | Stop reason: HOSPADM

## 2024-05-17 RX ORDER — POLYETHYLENE GLYCOL 3350 17 G/17G
17 POWDER, FOR SOLUTION ORAL DAILY PRN
Status: DISCONTINUED | OUTPATIENT
Start: 2024-05-17 | End: 2024-05-18 | Stop reason: HOSPADM

## 2024-05-17 RX ORDER — BUPROPION HYDROCHLORIDE 150 MG/1
150 TABLET ORAL EVERY MORNING
Status: DISCONTINUED | OUTPATIENT
Start: 2024-05-18 | End: 2024-05-18 | Stop reason: HOSPADM

## 2024-05-17 RX ORDER — POTASSIUM CHLORIDE 20 MEQ/1
40 TABLET, EXTENDED RELEASE ORAL PRN
Status: DISCONTINUED | OUTPATIENT
Start: 2024-05-17 | End: 2024-05-17

## 2024-05-17 RX ORDER — LOSARTAN POTASSIUM 25 MG/1
25 TABLET ORAL DAILY
Status: DISCONTINUED | OUTPATIENT
Start: 2024-05-18 | End: 2024-05-18

## 2024-05-17 RX ORDER — ONDANSETRON 4 MG/1
4 TABLET, ORALLY DISINTEGRATING ORAL EVERY 8 HOURS PRN
Status: DISCONTINUED | OUTPATIENT
Start: 2024-05-17 | End: 2024-05-18 | Stop reason: HOSPADM

## 2024-05-17 RX ORDER — ROSUVASTATIN CALCIUM 20 MG/1
40 TABLET, COATED ORAL NIGHTLY
Status: DISCONTINUED | OUTPATIENT
Start: 2024-05-17 | End: 2024-05-18 | Stop reason: HOSPADM

## 2024-05-17 RX ORDER — INSULIN GLARGINE 100 [IU]/ML
25 INJECTION, SOLUTION SUBCUTANEOUS NIGHTLY
Status: DISCONTINUED | OUTPATIENT
Start: 2024-05-17 | End: 2024-05-17

## 2024-05-17 RX ORDER — FLUOXETINE HYDROCHLORIDE 20 MG/1
40 CAPSULE ORAL DAILY
Status: DISCONTINUED | OUTPATIENT
Start: 2024-05-18 | End: 2024-05-18 | Stop reason: HOSPADM

## 2024-05-17 RX ORDER — HEPARIN SODIUM 5000 [USP'U]/ML
5000 INJECTION, SOLUTION INTRAVENOUS; SUBCUTANEOUS EVERY 8 HOURS SCHEDULED
Status: DISCONTINUED | OUTPATIENT
Start: 2024-05-17 | End: 2024-05-17

## 2024-05-17 RX ORDER — PANTOPRAZOLE SODIUM 40 MG/1
40 TABLET, DELAYED RELEASE ORAL
Status: DISCONTINUED | OUTPATIENT
Start: 2024-05-18 | End: 2024-05-18 | Stop reason: HOSPADM

## 2024-05-17 RX ORDER — LOSARTAN POTASSIUM 50 MG/1
50 TABLET ORAL ONCE
Status: COMPLETED | OUTPATIENT
Start: 2024-05-17 | End: 2024-05-17

## 2024-05-17 RX ORDER — AMMONIUM LACTATE 12 G/100G
LOTION TOPICAL PRN
Status: DISCONTINUED | OUTPATIENT
Start: 2024-05-17 | End: 2024-05-18 | Stop reason: HOSPADM

## 2024-05-17 RX ORDER — GLUCAGON 1 MG/ML
1 KIT INJECTION PRN
Status: DISCONTINUED | OUTPATIENT
Start: 2024-05-17 | End: 2024-05-18 | Stop reason: HOSPADM

## 2024-05-17 RX ORDER — INSULIN LISPRO 100 [IU]/ML
0-4 INJECTION, SOLUTION INTRAVENOUS; SUBCUTANEOUS
Status: DISCONTINUED | OUTPATIENT
Start: 2024-05-17 | End: 2024-05-18 | Stop reason: HOSPADM

## 2024-05-17 RX ORDER — EZETIMIBE 10 MG/1
10 TABLET ORAL NIGHTLY
Status: DISCONTINUED | OUTPATIENT
Start: 2024-05-17 | End: 2024-05-18 | Stop reason: HOSPADM

## 2024-05-17 RX ORDER — HEPARIN SODIUM 1000 [USP'U]/ML
2000 INJECTION, SOLUTION INTRAVENOUS; SUBCUTANEOUS PRN
Status: DISCONTINUED | OUTPATIENT
Start: 2024-05-17 | End: 2024-05-18

## 2024-05-17 RX ORDER — HEPARIN SODIUM 1000 [USP'U]/ML
4000 INJECTION, SOLUTION INTRAVENOUS; SUBCUTANEOUS ONCE
Status: COMPLETED | OUTPATIENT
Start: 2024-05-17 | End: 2024-05-17

## 2024-05-17 RX ADMIN — HEPARIN SODIUM 4000 UNITS: 1000 INJECTION INTRAVENOUS; SUBCUTANEOUS at 21:49

## 2024-05-17 RX ADMIN — IOPAMIDOL 90 ML: 755 INJECTION, SOLUTION INTRAVENOUS at 11:41

## 2024-05-17 RX ADMIN — EZETIMIBE 10 MG: 10 TABLET ORAL at 21:43

## 2024-05-17 RX ADMIN — INSULIN GLARGINE 30 UNITS: 100 INJECTION, SOLUTION SUBCUTANEOUS at 21:42

## 2024-05-17 RX ADMIN — HEPARIN SODIUM 11 UNITS/KG/HR: 10000 INJECTION, SOLUTION INTRAVENOUS at 21:53

## 2024-05-17 RX ADMIN — MAGNESIUM SULFATE HEPTAHYDRATE 2000 MG: 40 INJECTION, SOLUTION INTRAVENOUS at 13:52

## 2024-05-17 RX ADMIN — HYDRALAZINE HYDROCHLORIDE 10 MG: 20 INJECTION INTRAMUSCULAR; INTRAVENOUS at 21:42

## 2024-05-17 RX ADMIN — SODIUM CHLORIDE, PRESERVATIVE FREE 10 ML: 5 INJECTION INTRAVENOUS at 21:44

## 2024-05-17 RX ADMIN — CARVEDILOL 25 MG: 12.5 TABLET, FILM COATED ORAL at 14:15

## 2024-05-17 RX ADMIN — LOSARTAN POTASSIUM 50 MG: 50 TABLET, FILM COATED ORAL at 17:16

## 2024-05-17 RX ADMIN — QUETIAPINE FUMARATE 25 MG: 25 TABLET ORAL at 21:44

## 2024-05-17 RX ADMIN — ROSUVASTATIN CALCIUM 40 MG: 20 TABLET, FILM COATED ORAL at 21:44

## 2024-05-17 RX ADMIN — POTASSIUM CHLORIDE 40 MEQ: 1500 TABLET, EXTENDED RELEASE ORAL at 14:11

## 2024-05-17 ASSESSMENT — PAIN - FUNCTIONAL ASSESSMENT: PAIN_FUNCTIONAL_ASSESSMENT: NONE - DENIES PAIN

## 2024-05-17 ASSESSMENT — ENCOUNTER SYMPTOMS
RESPIRATORY NEGATIVE: 1
GASTROINTESTINAL NEGATIVE: 1

## 2024-05-17 NOTE — CONSULTS
Department of Endovascular Neurosurgery                                                                                                                      Resident Consult Note  Stroke Alert paged @ 1108am  ER Room # 25  Arrival to patient bedside @11:15 AM ETA 6 minutes        Reason for Consult: Right-sided weakness numbness and facial droop  Requesting Physician: ED resident  Endovascular Neurosurgeon:   []Dr. Parker  []Dr. Roman  []Dr. Pond   [x] Dr. Russell    History Obtained From:  patient, electronic medical record    CHIEF COMPLAINT:       Right-sided weakness numbness and facial droop    HISTORY OF PRESENT ILLNESS:       The patient is a 49 y.o. male who presents with past medical history of ESRD on dialysis Monday Wednesday Friday, hypertension, hyperlipidemia, type 1 diabetes mellitus, CHF, thyroid disease, history of bilateral basal ganglia stroke (2022) with residual right-sided weakness numbness, facial droop and dysarthria and past surgical history of AV fistula and is currently on medication aspirin 81 mg daily, Crestor 20 mg daily, Seroquel, losartan, carvedilol, amlodipine, furosemide, fluoxetine, Zetia, bupropion and insulin presented with complaint of worsening numbness over the right side more than the lower extremity and feeling heaviness.    As per initial report, patient was getting dialysis session and then after that he had worsening numbness in his right lower extremity and feeling generalized weak with more on the right side.  EMS arrived and his blood pressure was in 130s.  Patient was brought to the [facility] and on exam he had NIH of 1 (1 dysarthria, 1 facial asymmetry and 1 for upper and lower extremity numbness) but he was able to antigravity without any drift all 4 extremities.  As per patient, he follows with Dr. Noyola and has been compliant with his aspirin and Crestor and these are his  thyroid disease, history of bilateral basal ganglia stroke (2022) with residual right-sided weakness numbness, facial droop and dysarthria and past surgical history of AV fistula and is currently on medication aspirin 81 mg daily, Crestor 20 mg daily, Seroquel, losartan, carvedilol, amlodipine, furosemide, fluoxetine, Zetia, bupropion and insulin presented with complaint of worsening numbness over the right side more than the lower extremity and feeling heaviness.    Last Known Well (date and time): 1000-AM     2. Candidate for IV tPA therapy     Yes []     No  [x] due to the following exclusion criteria: Recurdscnese and baseline deficit worsening    3. Candidate for Thrombectomy    Yes []      No [x] due to the following exclusion criteria: No LVO or stenosis.    - Discussed with Dr. Russell.     Recommendations:    [] General Neurology Care Status - prefer 5th floor (5A/5C)   [x] Internal Medicine General Care Status   [] NICU Status - (5B)     [] MICU Status   [] Observation Status      Imaging   CT head without contrast was unremarkable for any new hypodensity or bleed but was positive for remote lacunar infarct in the basal ganglia bilaterally and also in the left thalamus.  CT head and neck with contrast was unremarkable for any LVO or stenosis.  - MRI Brain WO :negative for stroke    Medications   Continue home aspirin 81 mg, Crestor 20 mg and Zetia 10 mg nightly  - We recommend SBP normotensive.  - Avoid hypotension  - Blood glucose goal less than 180  - Please avoid dextrose containing solutions    Additional recommendations may follow    Please contact [facility] with any changes in patients neurologic status.     Thank you for your consult.       DANA MFCARLANE MD   Neurology Resident [facility]  5/17/2024 at 1:07 PM

## 2024-05-17 NOTE — CARE COORDINATION
Case Management Assessment  Initial Evaluation    Date/Time of Evaluation: 5/17/2024 4:30PM  Assessment Completed by: Bethanie Rodriguez RN    If patient is discharged prior to next notation, then this note serves as note for discharge by case management.    Patient Name: Steven L Severance                   YOB: 1975  Diagnosis: NSTEMI (non-ST elevated myocardial infarction) (HCC) [I21.4]  Stroke-like episode [R29.90]                   Date / Time: 5/17/2024 11:19 AM    Patient Admission Status: Inpatient   Readmission Risk (Low < 19, Mod (19-27), High > 27): Readmission Risk Score: 20    Current PCP: Smith Andres MD  PCP verified by CM? (P) Yes (Smith Andres MD)    Chart Reviewed: Yes      History Provided by: (P) Patient  Patient Orientation: (P) Alert and Oriented, Place, Person, Situation    Patient Cognition: (P) Alert    Hospitalization in the last 30 days (Readmission):  No    If yes, Readmission Assessment in CM Navigator will be completed.    Advance Directives:      Code Status: Prior   Patient's Primary Decision Maker is: (P) Legal Next of Kin    Secondary Decision Maker: trinh gupta - Grandparent - [phone]    Discharge Planning:    Patient lives with: (P) Family Members Type of Home: (P) House  Primary Care Giver: (P) Self  Patient Support Systems include: (P) Family Members   Current Financial resources: (P) Medicare  Current community resources: (P) None  Current services prior to admission: (P) Durable Medical Equipment            Current DME: (P) Walker, Wheelchair, Glucometer            Type of Home Care services:  (P) None    ADLS  Prior functional level: (P) Independent in ADLs/IADLs  Current functional level: (P) Independent in ADLs/IADLs    PT AM-PAC:   /24  OT AM-PAC:   /24    Family can provide assistance at DC: (P) Yes  Would you like Case Management to discuss the discharge plan with any other family members/significant others, and if so, who? (P) Yes (Grandfather

## 2024-05-17 NOTE — ED PROVIDER NOTES
[facility]     Emergency Department     Faculty Note/ Attestation      Pt Name: Steven L Severance                                       MRN: [id number]  Birthdate 1975  Date of evaluation: 5/17/2024  Note Started: 11:41 AM EDT    Patients PCP:    Smith Andres MD    Attestation  I performed a history and physical examination of the patient and discussed management with the resident. I reviewed the resident’s note and agree with the documented findings and plan of care. Any areas of disagreement are noted on the chart. I was personally present for the key portions of any procedures. I have documented in the chart those procedures where I was not present during the key portions. I have reviewed the emergency nurses triage note. I agree with the chief complaint, past medical history, past surgical history, allergies, medications, social and family history as documented unless otherwise noted below.    For Physician Assistant/ Nurse Practitioner cases/documentation I have personally evaluated this patient and have completed at least one if not all key elements of the E/M (history, physical exam, and MDM). Additional findings are as noted.    Initial Screens:        Elizabeth Coma Scale  Eye Opening: Spontaneous  Best Verbal Response: Oriented  Best Motor Response: Obeys commands  Elizabeth Coma Scale Score: 15    Vitals:    Vitals:    05/17/24 1142   BP: (!) 170/86   Pulse: 69   Resp: 15   Temp: 97.9 °F (36.6 °C)   TempSrc: Oral   SpO2: 100%   Weight: 86.2 kg (190 lb)       CHIEF COMPLAINT       Chief Complaint   Patient presents with    Numbness    Facial Droop     Patient 49-year-old male with multiple strokes patient was at dialysis today last seen normal at 10 AM following was having significant weakness new onset symptoms and concerns for stroke patient was brought immediately to CT scanner EMS had a blood glucose of 136.  Several residual deficits    EMERGENCY DEPARTMENT COURSE:

## 2024-05-17 NOTE — ED PROVIDER NOTES
[facility]A [facility]  Emergency Department Encounter  Emergency Medicine Resident     Pt Name:Steven L Severance  MRN: [id number]  Birthdate 1975  Date of evaluation: 5/17/24  PCP:  Smith Andres MD  Note Started: 11:36 AM EDT    CHIEF COMPLAINT       Chief Complaint   Patient presents with    Numbness    Facial Droop     HISTORY OF PRESENT ILLNESS  (Location/Symptom, Timing/Onset, Context/Setting, Quality, Duration, Modifying Factors, Severity.)      Steven L Severance is a 49 y.o. male who presents with right sided weakness. Patient was in the middle of a session of dialysis when dialysis nurses noted increased right sided weakness and facial droop. Stroke alert called prior to patient's arrival. LKW 10 am.  Patient has history of prior CVA with right-sided deficits, bilateral foot drop, ESRD on HD MWF.  Denies missing any sessions of dialysis.  On exam, patient endorses right-sided weakness.  Denies any new medications, changes in doses of medications, missed doses of medications, fevers, chills, chest pain, shortness of breath, abdominal pain, nausea, vomiting, diarrhea.    PAST MEDICAL / SURGICAL / SOCIAL / FAMILY HISTORY      has a past medical history of Cerebral artery occlusion with cerebral infarction (HCC), Closed fracture of bone of right foot, Dialysis patient (HCC), Hemodialysis patient (HCC), Hyperlipidemia, Hypertension, Kidney disease, and Type 1 diabetes mellitus (HCC).       has a past surgical history that includes AV fistula creation (Left); Wrist surgery (1995); Upper gastrointestinal endoscopy (N/A, 8/3/2022); and Colonoscopy (N/A, 8/3/2022).      Social History     Socioeconomic History    Marital status:      Spouse name: Not on file    Number of children: Not on file    Years of education: Not on file    Highest education level: Not on file   Occupational History    Not on file   Tobacco Use    Smoking status: Never     Passive exposure: Never    Smokeless tobacco: Never  6/3/22 8/2/22  Anastasiya Musa MD   Misc. Devices MISC Disp: custom molded shoes to accommodate for brace   DX: DM with history of stroke, foot drop  Duration: 1 year 5/11/22   Kavitha Mary DPM   insulin aspart (NOVOLOG) 100 UNIT/ML injection vial Inject 0-8 Units into the skin 3 times daily (before meals) SLIDING SCALE    Pete Rojas MD   FLUoxetine (PROZAC) 20 MG tablet Take 1 tablet by mouth in the morning and at bedtime    Pete Rojas MD   buPROPion (WELLBUTRIN XL) 150 MG extended release tablet Take 1 tablet by mouth every morning 3/25/21   Jovita Sanders DO   ezetimibe (ZETIA) 10 MG tablet Take 1 tablet by mouth daily    Pete Rojas MD   aspirin 81 MG EC tablet Take 1 tablet by mouth daily    Pete Rojas MD   Cyanocobalamin (VITAMIN B-12 PO) Take 2,500 mcg by mouth daily    Pete Rojas MD       REVIEW OF SYSTEMS       Review of Systems   HENT: Negative.     Respiratory: Negative.     Cardiovascular: Negative.    Gastrointestinal: Negative.    Musculoskeletal: Negative.    Neurological:  Positive for speech difficulty and weakness. Negative for dizziness, syncope, light-headedness, numbness and headaches.       PHYSICAL EXAM      INITIAL VITALS:   BP (!) 195/92   Pulse 72   Temp 97.9 °F (36.6 °C) (Oral)   Resp 22   Ht 1.702 m (5' 7\")   Wt 86.2 kg (190 lb 0.6 oz)   SpO2 99%   BMI 29.76 kg/m²     Physical Exam  Vitals reviewed.   Constitutional:       Comments: A&Ox4. Dysarthria.   Eyes:      Extraocular Movements: Extraocular movements intact.      Pupils: Pupils are equal, round, and reactive to light.   Cardiovascular:      Rate and Rhythm: Normal rate and regular rhythm.   Pulmonary:      Effort: Pulmonary effort is normal.      Breath sounds: Normal breath sounds. No stridor. No wheezing, rhonchi or rales.   Abdominal:      Palpations: Abdomen is soft.      Tenderness: There is no abdominal tenderness. There is no guarding or rebound.

## 2024-05-17 NOTE — ED NOTES
ED to inpatient nurses report      Chief Complaint:  Chief Complaint   Patient presents with    Numbness    Facial Droop     Present to ED from: dialysis via EMS    MOA:     LOC: alert and orientated to name, place, date  Mobility: Requires assistance * 2  Oxygen Baseline: RA    Current needs required: n/a   Pending ED orders: n/a  Present condition: resting on cot, RR even and NL, a/o x4, no distress noted     Why did the patient come to the ED?   Pt to ED via EMS due to slurred speech and right facial droop. Pt states he was at dialysis and started experiencing these symptoms midway through dialysis. Pt states he also was experiencing numbness at that time. Pt denies numbness upon arrival to ED. Pts LKW was 1000 this morning. Pt has hx of TIA and CVAs. Pt states his last stroke was last year and left him with some right sided weakness. Pt has both legs in braces due to foot drop. Pt ambulates at home with assistive devices   What is the plan? Nephro consult, possible;e dialysis   Any procedures or intervention occur? CT, EKG, labs, cardiac monitor, MRI  Any safety concerns??     Mental Status:  Level of Consciousness: Alert (0)    Psych Assessment:   Psychosocial  Psychosocial (WDL): Within Defined Limits  Vital signs   Vitals:    05/17/24 1232 05/17/24 1400 05/17/24 1405 05/17/24 1417   BP: (!) 156/85 (!) 204/90     Pulse: 68 73 72 71   Resp: 19 16 19 16   Temp:       TempSrc:       SpO2: 94% 97% 98% 94%   Weight:       Height:            Vitals:  Patient Vitals for the past 24 hrs:   BP Temp Temp src Pulse Resp SpO2 Height Weight   05/17/24 1417 -- -- -- 71 16 94 % -- --   05/17/24 1405 -- -- -- 72 19 98 % -- --   05/17/24 1400 (!) 204/90 -- -- 73 16 97 % -- --   05/17/24 1232 (!) 156/85 -- -- 68 19 94 % -- --   05/17/24 1155 -- -- -- -- -- -- 1.702 m (5' 7\") 86.2 kg (190 lb)   05/17/24 1142 (!) 170/86 97.9 °F (36.6 °C) Oral 69 15 100 % -- 86.2 kg (190 lb)   05/17/24 1119 (!) 166/80 -- -- 70 17 97 % -- --

## 2024-05-17 NOTE — H&P
Attending Supervising Physician’s Attestation Statement  I have seen and examined Steven L Severance and the key elements of all parts of the encounter have been performed by me.  I agree with the assessment, plan and orders as documented. I discussed the findings and plans with the resident physician and agree as documented in their note .    In addition to the pertinent positives and negatives as stated within HPI and the review of systems as documented in the notes, all other systems were reviewed when able to and are reported negative.    Additional Comments:   49 M presented with slurred speech and facial droop  Presented from HD, sent to ER  CVA alert called  Neurology consulted, imaging unremarkable, signed off  Per pt/family has baseline weakness/dysarthria from prior CVA but symptoms got slightly worse  Currently states symptoms improved   Troponin noted to be elevated  Admitted for further cardiac evaluation    Electronically signed by Brendon Jason MD on 5/17/2024 at 4:55 PM

## 2024-05-17 NOTE — ED NOTES
Pt to ED via EMS due to slurred speech and right facial droop. Pt states he was at dialysis and started experiencing these symptoms midway through dialysis. Pt states he also was experiencing numbness at that time. Pt denies numbness upon arrival to ED. Pts LKW was 1000 this morning. Pt has hx of TIA and CVAs. Pt states his last stroke was last year and left him with some right sided weakness. Pt has both legs in braces due to foot drop. Pt ambulates at home with assistive devices. Pt placed on continuous cardiac monitor, bp and pulse ox. EKG completed, IV established, blood work drawn. Pt alert and oriented x4, talking in complete sentences, respirations even and unlabored. Pt acting age appropriate. White board updated, will continue to plan of care

## 2024-05-17 NOTE — CONSULTS
NEUROLOGY CONSULT NOTE    History Obtained From:  patient, electronic medical record    CHIEF COMPLAINT:       Right-sided weakness numbness and facial droop    HISTORY OF PRESENT ILLNESS:       The patient is a 49 y.o. male who presents with past medical history of ESRD on dialysis Monday Wednesday Friday, hypertension, hyperlipidemia, type 1 diabetes mellitus, CHF, thyroid disease, history of bilateral basal ganglia stroke (2022) with residual right-sided weakness numbness, facial droop and dysarthria and past surgical history of AV fistula and is currently on medication aspirin 81 mg daily, Crestor 20 mg daily, Seroquel, losartan, carvedilol, amlodipine, furosemide, fluoxetine, Zetia, bupropion and insulin presented with complaint of worsening numbness over the right side more than the lower extremity and feeling heaviness.    As per initial report, patient was getting dialysis session and then after that he had worsening numbness in his right lower extremity and feeling generalized weak with more on the right side.  EMS arrived and his blood pressure was in 130s.  Patient was brought to the [facility] and on exam he had NIH of 1 (1 dysarthria, 1 facial asymmetry and 1 for upper and lower extremity numbness) but he was able to antigravity without any drift all 4 extremities.  As per patient, he follows with Dr. Noyola and has been compliant with his aspirin and Crestor and these are his baseline deficit except the numbness got worse and today.    CT head without contrast was unremarkable for any new hypodensity or bleed but was positive for remote lacunar infarct in the basal ganglia bilaterally and also in the left thalamus.  CT head and neck with contrast was unremarkable for any LVO or stenosis.    Lab wise patient magnesium was 1.5, potassium 3.2, troponin 225, glucose 222 and lactic acid 1.9 with creatinine 3.3 and GFR 22.    Given patient symptoms are

## 2024-05-17 NOTE — PROGRESS NOTES
[facility] [place] Note    PATIENT NAME: Steven L Severance    Shift date: 05/17/2024  Shift day: Friday   Shift # 1    Room # 25/25     Name: Steven L Severance            Age: 49 y.o.  Gender: male          Congregational: Buddhism  Place of Synagogue:     Trauma/Incident type: Stroke Alert  Admit Date & Time: 5/17/2024 11:19 AM  TRAUMA NAME: None    ADVANCE DIRECTIVES IN CHART?  No    NAME OF DECISION MAKER:     RELATIONSHIP OF DECISION MAKER TO PATIENT:     PATIENT/EVENT DESCRIPTION:  Steven L Severance is a 49 y.o. male who arrived ER as stroke alert. Patient was awake and alert when  entered his room. Patient to be admitted to 25/25.       SPIRITUAL ASSESSMENT-INTERVENTION-OUTCOME:  Patient was raised Buddhism and very receptive to spiritual care. Family was not present at the time. However, patient said his grandfather knew he came to [facility] and would notify the rest of the family.  provided ministry of presence, offered support, prayed with patient and reassured him that he was in good hands. Patient was appreciative of the spiritual and emotional support he received.      PATIENT BELONGINGS:  No belongings noted    ANY BELONGINGS OF SIGNIFICANT VALUE NOTED:  Unknown    REGISTRATION STAFF NOTIFIED?  Yes    WHAT IS YOUR SPIRITUAL CARE PLAN FOR THIS PATIENT?:  Follow up visits recommended for ongoing assessment of patient's condition and for more prayers and support.     Electronically signed by Chaplain Demarcus, on 5/17/2024 at 12:00 PM.  [facility]  [phone]

## 2024-05-17 NOTE — CONSULTS
Renal Consult Note    Patient :  Steven L Severance; 49 y.o. MRN# [id number]  Location:  0504/0504-01  Attending:  Brendon Jason MD  Admit Date:  5/17/2024   Hospital Day: 0    Reason for Consult:     Asked by Brendon Shields MD to see for ESRD on HD.    History Obtained From:     patient, electronic medical record, patient's HD unit nurse.    HD Access:     previous  Left AV fistula    HD Unit:     [facility]    Nephrologist:     Dr. Lovell    Dry Weight:     84 kgs.    History of Present Illness:     Steven L Severance; 49 y.o. male with past medical history as mentioned below presented to the hospital with the chief complaint of strokelike symptoms.  Patient was at his last visit, after finishing the dialysis he was found to have some left-sided weakness along with some confusion and was sent to the hospital for further management.  Neurology was consulted CT head without contrast was unremarkable.  CT head and neck with contrast was also unremarkable.  MRI brain done which was negative for CVA as well.  Patient was admitted under medicine service for further management.  BMP results did show sodium 141, potassium 3.2, chloride 106, bicarb 23, calcium 7.6, BUN 7, creatinine 2.5 mg/dl, magnesium 1.5.  Patient's labs were done today but patient did have a complete hemodialysis treatment in the morning.  I did call the dialysis unit and spoke to the charge nurse there.  Nephrology is consulted due to ESRD on HD.    Past History/Allergies?Social History:     Past Medical History:   Diagnosis Date    Cerebral artery occlusion with cerebral infarction (HCC)     Closed fracture of bone of right foot March - April 2020    Dialysis patient (HCC)     Hemodialysis patient (HCC)     Hyperlipidemia     Hypertension     Kidney disease     On Dialysis    Type 1 diabetes mellitus (HCC)        Past Surgical History:   Procedure Laterality Date    AV FISTULA CREATION Left     COLONOSCOPY N/A 8/3/2022    COLONOSCOPY  DIAGNOSTIC performed by Rose Mary Richard MD at [facility] OR    UPPER GASTROINTESTINAL ENDOSCOPY N/A 8/3/2022    EGD ESOPHAGOGASTRODUODENOSCOPY performed by Rose Mary Richard MD at [facility] OR    WRIST SURGERY  1995       No Known Allergies    Social History     Socioeconomic History    Marital status:      Spouse name: Not on file    Number of children: Not on file    Years of education: Not on file    Highest education level: Not on file   Occupational History    Not on file   Tobacco Use    Smoking status: Never     Passive exposure: Never    Smokeless tobacco: Never   Vaping Use    Vaping Use: Never used   Substance and Sexual Activity    Alcohol use: Never    Drug use: Never    Sexual activity: Not on file   Other Topics Concern    Not on file   Social History Narrative    Not on file     Social Determinants of Health     Financial Resource Strain: Low Risk  (7/29/2022)    Overall Financial Resource Strain (CARDIA)     Difficulty of Paying Living Expenses: Not hard at all   Food Insecurity: No Food Insecurity (2/21/2024)    Hunger Vital Sign     Worried About Running Out of Food in the Last Year: Never true     Ran Out of Food in the Last Year: Never true   Transportation Needs: No Transportation Needs (2/21/2024)    PRAPARE - Transportation     Lack of Transportation (Medical): No     Lack of Transportation (Non-Medical): No   Physical Activity: Not on file   Stress: Not on file   Social Connections: Not on file   Intimate Partner Violence: Not on file   Housing Stability: Low Risk  (2/21/2024)    Housing Stability Vital Sign     Unable to Pay for Housing in the Last Year: No     Number of Places Lived in the Last Year: 1     Unstable Housing in the Last Year: No       Family History:        Family History   Problem Relation Age of Onset    Diabetes Mother     Heart Disease Father     Diabetes Father     Neuropathy Maternal Grandmother     Neuropathy Maternal Grandfather     Cancer Paternal Grandmother     Stroke

## 2024-05-17 NOTE — CONSULTS
38%. Left ventricle size is normal. Normal wall thickness. Mild global hypokinesis present. Abnormal diastolic function.    Mitral Valve: Mild regurgitation.    Image quality is adequate.    Stress Test:  03/12/24    NM STRESS TEST WITH MYOCARDIAL PERFUSION 03/12/2024  4:12 PM (Final)    Interpretation Summary    Stress Combined Conclusion: The study is most consistent with myocardial infarction and is negative for myocardial ischemia. Findings suggest a moderate risk of cardiac events.  See bottom of report for risk stratification details.    Stress Function: Left ventricular function post-stress is abnormal. Post-stress ejection fraction is 49%. Inferior wall hypokinesia.    Perfusion Comments: There is no evidence of inducible ischemia.  Moderate size inferior nontransmural MI.    Perfusion Defect: There is a moderate severity left ventricular stress perfusion defect that is medium in size present in the inferior segment(s) that is fixed. The defect is consistent with abnormal perfusion in the RCA territory. There is abnormal wall motion in the defect area.    ECG: Resting ECG demonstrates normal sinus rhythm.    ECG: The ECG was negative for ischemia.    Stress Test: A pharmacological stress test was performed using lexiscan.    Signed by: Nancy Iglesias MD on 3/12/2024  4:12 PM       LAST CATH:   Procedure Summary      Minimal non-obstructive CAD.   LV was not done.     Angiographic Findings   Cardiac Arteries and Lesion Findings     LMCA: Normal 0% stenosis.  LAD: Diffuse irregularities 30-40%.  LCx: Mild irregularities 30-40%.Very small vessel  RCA: Diffuse irregularities 30-40%.  Ramus: Mild irregularities 20-30%.   Coronary Tree - Dominance: Right    ASSESSMENT:  Stroke recrudescence/TIA with previous hx of CVA with right-sided deficits, neurowork-up negative  NSTEMI, type I versus type II -chronically elevated troponin but uptrending -currently on IV heparin  Hypertensive urgency  Prolonged QTC  Ischemic

## 2024-05-18 VITALS
SYSTOLIC BLOOD PRESSURE: 157 MMHG | WEIGHT: 190.04 LBS | HEART RATE: 70 BPM | BODY MASS INDEX: 29.83 KG/M2 | TEMPERATURE: 97.7 F | HEIGHT: 67 IN | OXYGEN SATURATION: 99 % | RESPIRATION RATE: 17 BRPM | DIASTOLIC BLOOD PRESSURE: 75 MMHG

## 2024-05-18 PROBLEM — Z86.39 HX OF TYPE 2 DIABETES MELLITUS: Status: ACTIVE | Noted: 2024-05-18

## 2024-05-18 PROBLEM — Z86.73 HX OF COMPLETED STROKE: Status: ACTIVE | Noted: 2024-05-18

## 2024-05-18 LAB
ANION GAP SERPL CALCULATED.3IONS-SCNC: 13 MMOL/L (ref 9–16)
ANTI-XA UNFRAC HEPARIN: 0.5 IU/L
ANTI-XA UNFRAC HEPARIN: 0.55 IU/L
BUN SERPL-MCNC: 19 MG/DL (ref 6–20)
CALCIUM SERPL-MCNC: 9.5 MG/DL (ref 8.6–10.4)
CHLORIDE SERPL-SCNC: 96 MMOL/L (ref 98–107)
CO2 SERPL-SCNC: 25 MMOL/L (ref 20–31)
CREAT SERPL-MCNC: 5.8 MG/DL (ref 0.7–1.2)
EKG ATRIAL RATE: 68 BPM
EKG ATRIAL RATE: 74 BPM
EKG P AXIS: 59 DEGREES
EKG P AXIS: 64 DEGREES
EKG P-R INTERVAL: 150 MS
EKG P-R INTERVAL: 158 MS
EKG Q-T INTERVAL: 430 MS
EKG Q-T INTERVAL: 470 MS
EKG QRS DURATION: 104 MS
EKG QRS DURATION: 108 MS
EKG QTC CALCULATION (BAZETT): 477 MS
EKG QTC CALCULATION (BAZETT): 499 MS
EKG R AXIS: -20 DEGREES
EKG R AXIS: -5 DEGREES
EKG T AXIS: 5 DEGREES
EKG VENTRICULAR RATE: 68 BPM
EKG VENTRICULAR RATE: 74 BPM
EST. AVERAGE GLUCOSE BLD GHB EST-MCNC: 177 MG/DL
GFR, ESTIMATED: 11 ML/MIN/1.73M2
GLUCOSE BLD-MCNC: 163 MG/DL (ref 75–110)
GLUCOSE BLD-MCNC: 168 MG/DL (ref 75–110)
GLUCOSE BLD-MCNC: 225 MG/DL (ref 75–110)
GLUCOSE BLD-MCNC: 40 MG/DL (ref 75–110)
GLUCOSE SERPL-MCNC: 231 MG/DL (ref 74–99)
HBA1C MFR BLD: 7.8 % (ref 4–6)
POTASSIUM SERPL-SCNC: 4.4 MMOL/L (ref 3.7–5.3)
SODIUM SERPL-SCNC: 134 MMOL/L (ref 136–145)
TROPONIN I SERPL HS-MCNC: 306 NG/L (ref 0–22)
TROPONIN I SERPL HS-MCNC: 308 NG/L (ref 0–22)

## 2024-05-18 PROCEDURE — 6370000000 HC RX 637 (ALT 250 FOR IP)

## 2024-05-18 PROCEDURE — 80048 BASIC METABOLIC PNL TOTAL CA: CPT

## 2024-05-18 PROCEDURE — 99232 SBSQ HOSP IP/OBS MODERATE 35: CPT | Performed by: INTERNAL MEDICINE

## 2024-05-18 PROCEDURE — 6360000002 HC RX W HCPCS: Performed by: STUDENT IN AN ORGANIZED HEALTH CARE EDUCATION/TRAINING PROGRAM

## 2024-05-18 PROCEDURE — 6360000002 HC RX W HCPCS

## 2024-05-18 PROCEDURE — 85520 HEPARIN ASSAY: CPT

## 2024-05-18 PROCEDURE — 2580000003 HC RX 258

## 2024-05-18 PROCEDURE — 82947 ASSAY GLUCOSE BLOOD QUANT: CPT

## 2024-05-18 PROCEDURE — 84484 ASSAY OF TROPONIN QUANT: CPT

## 2024-05-18 PROCEDURE — 93010 ELECTROCARDIOGRAM REPORT: CPT | Performed by: INTERNAL MEDICINE

## 2024-05-18 PROCEDURE — 93005 ELECTROCARDIOGRAM TRACING: CPT | Performed by: INTERNAL MEDICINE

## 2024-05-18 PROCEDURE — 99223 1ST HOSP IP/OBS HIGH 75: CPT | Performed by: INTERNAL MEDICINE

## 2024-05-18 PROCEDURE — 36415 COLL VENOUS BLD VENIPUNCTURE: CPT

## 2024-05-18 RX ORDER — CARVEDILOL 25 MG/1
25 TABLET ORAL 2 TIMES DAILY WITH MEALS
Qty: 60 TABLET | Refills: 3 | Status: SHIPPED | OUTPATIENT
Start: 2024-05-18

## 2024-05-18 RX ORDER — HEPARIN SODIUM 5000 [USP'U]/ML
5000 INJECTION, SOLUTION INTRAVENOUS; SUBCUTANEOUS EVERY 8 HOURS SCHEDULED
Status: DISCONTINUED | OUTPATIENT
Start: 2024-05-18 | End: 2024-05-18 | Stop reason: HOSPADM

## 2024-05-18 RX ORDER — AMLODIPINE BESYLATE 10 MG/1
10 TABLET ORAL DAILY
Qty: 30 TABLET | Refills: 3 | Status: SHIPPED | OUTPATIENT
Start: 2024-05-18

## 2024-05-18 RX ORDER — CARVEDILOL 6.25 MG/1
6.25 TABLET ORAL 2 TIMES DAILY WITH MEALS
Status: DISCONTINUED | OUTPATIENT
Start: 2024-05-18 | End: 2024-05-18

## 2024-05-18 RX ORDER — LOSARTAN POTASSIUM 50 MG/1
50 TABLET ORAL DAILY
Status: DISCONTINUED | OUTPATIENT
Start: 2024-05-19 | End: 2024-05-18 | Stop reason: HOSPADM

## 2024-05-18 RX ORDER — CARVEDILOL 25 MG/1
25 TABLET ORAL 2 TIMES DAILY WITH MEALS
Status: DISCONTINUED | OUTPATIENT
Start: 2024-05-18 | End: 2024-05-18 | Stop reason: HOSPADM

## 2024-05-18 RX ORDER — SODIUM CHLORIDE 9 MG/ML
INJECTION, SOLUTION INTRAVENOUS CONTINUOUS
Status: DISCONTINUED | OUTPATIENT
Start: 2024-05-18 | End: 2024-05-18

## 2024-05-18 RX ORDER — INSULIN GLARGINE 100 [IU]/ML
15 INJECTION, SOLUTION SUBCUTANEOUS NIGHTLY
Status: DISCONTINUED | OUTPATIENT
Start: 2024-05-18 | End: 2024-05-18 | Stop reason: HOSPADM

## 2024-05-18 RX ORDER — ONDANSETRON 4 MG/1
4 TABLET, ORALLY DISINTEGRATING ORAL EVERY 8 HOURS PRN
Qty: 12 TABLET | Refills: 0 | Status: SHIPPED | OUTPATIENT
Start: 2024-05-18

## 2024-05-18 RX ADMIN — FUROSEMIDE 40 MG: 40 TABLET ORAL at 08:38

## 2024-05-18 RX ADMIN — SODIUM CHLORIDE, PRESERVATIVE FREE 10 ML: 5 INJECTION INTRAVENOUS at 08:40

## 2024-05-18 RX ADMIN — FLUOXETINE HYDROCHLORIDE 40 MG: 20 CAPSULE ORAL at 08:38

## 2024-05-18 RX ADMIN — BUPROPION HYDROCHLORIDE 150 MG: 150 TABLET, EXTENDED RELEASE ORAL at 08:38

## 2024-05-18 RX ADMIN — PANTOPRAZOLE SODIUM 40 MG: 40 TABLET, DELAYED RELEASE ORAL at 05:18

## 2024-05-18 RX ADMIN — HEPARIN SODIUM 5000 UNITS: 5000 INJECTION INTRAVENOUS; SUBCUTANEOUS at 13:03

## 2024-05-18 RX ADMIN — ONDANSETRON 4 MG: 2 INJECTION INTRAMUSCULAR; INTRAVENOUS at 13:03

## 2024-05-18 RX ADMIN — LABETALOL HYDROCHLORIDE 10 MG: 5 INJECTION, SOLUTION INTRAVENOUS at 05:17

## 2024-05-18 RX ADMIN — Medication 16 G: at 04:14

## 2024-05-18 RX ADMIN — INSULIN LISPRO 1 UNITS: 100 INJECTION, SOLUTION INTRAVENOUS; SUBCUTANEOUS at 12:29

## 2024-05-18 RX ADMIN — LOSARTAN POTASSIUM 25 MG: 25 TABLET, FILM COATED ORAL at 08:38

## 2024-05-18 RX ADMIN — ASPIRIN 81 MG: 81 TABLET, COATED ORAL at 08:38

## 2024-05-18 RX ADMIN — CARVEDILOL 6.25 MG: 6.25 TABLET, FILM COATED ORAL at 08:38

## 2024-05-18 NOTE — PROGRESS NOTES
Renal Progress Note    Patient :  Steven L Severance; 49 y.o. MRN# [id number]  Location:  0504/0504-01  Attending:  Brendon Jason MD  Admit Date:  5/17/2024   Hospital Day: 1    Subjective:       No acute overnight events.  Patient is hemodynamically stable.  Blood pressure is on the higher side.  Cardiology has made suggestions to further optimize blood pressure control.  He is on Coreg 25, Lasix 40 oral daily Cozaar 50.   Right-sided weakness being evaluated by neurology.    Outpatient Medications:     Medications Prior to Admission: ammonium lactate (LAC-HYDRIN) 12 % lotion, Apply topically to both feet as needed.  ferrous sulfate (FEROSUL) 325 (65 Fe) MG tablet, Take by mouth  losartan (COZAAR) 50 MG tablet, Take 1 tablet by mouth daily  carvedilol (COREG) 25 MG tablet, Take 1 tablet by mouth 2 times daily  amLODIPine (NORVASC) 10 MG tablet, Take 1 tablet by mouth daily (Patient not taking: Reported on 5/17/2024)  montelukast (SINGULAIR) 10 MG tablet, Take 1 tablet by mouth daily (Patient not taking: Reported on 5/17/2024)  omeprazole (PRILOSEC) 40 MG delayed release capsule, Take 1 capsule by mouth daily  ondansetron (ZOFRAN-ODT) 4 MG disintegrating tablet,   QUEtiapine (SEROQUEL) 25 MG tablet,   rosuvastatin (CRESTOR) 20 MG tablet, Take 1 tablet by mouth daily  epoetin pennie-epbx (RETACRIT) 2000 UNIT/ML injection, Inject 4 mLs into the skin once a week  furosemide (LASIX) 20 MG tablet, Take 1 tablet by mouth in the morning.  sennosides-docusate sodium (SENOKOT-S) 8.6-50 MG tablet, Take 1 tablet by mouth daily  loratadine (CLARITIN) 10 MG tablet, Take 1 tablet by mouth daily  calcium acetate 667 MG TABS, Take 1,334 mg by mouth 3 times daily (with meals)  insulin glargine (LANTUS SOLOSTAR) 100 UNIT/ML injection pen, Inject 25 Units into the skin in the morning and 25 Units before bedtime.  Insulin Pen Needle (MEIJER PEN NEEDLES) 31G X 8 MM MISC, 1 each by Does not apply route daily  glucose 4 g chewable tablet,

## 2024-05-18 NOTE — PLAN OF CARE
Problem: Discharge Planning  Goal: Discharge to home or other facility with appropriate resources  5/18/2024 0041 by Ellen Valencia RN  Outcome: Progressing  Flowsheets (Taken 5/17/2024 2000)  Discharge to home or other facility with appropriate resources:   Identify barriers to discharge with patient and caregiver   Arrange for needed discharge resources and transportation as appropriate   Identify discharge learning needs (meds, wound care, etc)   Arrange for interpreters to assist at discharge as needed   Refer to discharge planning if patient needs post-hospital services based on physician order or complex needs related to functional status, cognitive ability or social support system  5/17/2024 1839 by Cathy Terrell RN  Outcome: Progressing     Problem: Safety - Adult  Goal: Free from fall injury  5/18/2024 0041 by Ellen Valencia RN  Outcome: Progressing  Flowsheets (Taken 5/17/2024 2000)  Free From Fall Injury: Instruct family/caregiver on patient safety  5/17/2024 1839 by Cathy Terrell, RN  Outcome: Progressing     Problem: ABCDS Injury Assessment  Goal: Absence of physical injury  5/18/2024 0041 by Ellen Valencia, RN  Outcome: Progressing  5/17/2024 1839 by Cathy Terrell RN  Outcome: Progressing

## 2024-05-18 NOTE — PROGRESS NOTES
[facility]  Internal Medicine Teaching Residency Program  Inpatient Daily Progress Note  ______________________________________________________________________________    Patient: Steven L Severance  YOB: 1975   MRN:[id number]    Acct: [id number]     Room: 0504/0504-01  Admit date: 5/17/2024  Today's date: 05/18/24  Number of days in the hospital: 1    SUBJECTIVE   Admitting Diagnosis: NSTEMI (non-ST elevated myocardial infarction) (HCC)  CC: Right sided weakness  Pt examined at bedside. Chart & results reviewed.     - Patient was hypertensive overnight. Increased coreg to 6.25 mg BID.  - No acute concerns.  - Labs pending at time of evaluation  - Awaiting cardiology recs.    BRIEF HISTORY     The patient is a pleasant 49 y.o. male with h/o ESRD on HD MWF, h/o basal basal ganglia stroke in 2022 with residual right sideded hemiparesis, facial droop and dysarthria presented with worsening weakness on the right side of the body.     Patient was getting his scheduled dialysis today at a outlying facility.Suddenly, he felt worsening weakness on the right side of his body and slurred speech. He was brought to [facility] ED. Stroke alert was called. CT head, CTAP and MRI Brain did not reveal any acute abnormality. His symptoms resolved after 2 hours and he was back to his baseline.     He was also found to be hypertensive with SBP in 160's. Potassium was 3.2 which was promptly replaced. Troponins were elevated and uptrending. EKG did not reveal any acute ST segment changes. Patient had a stress test in March 2023 which did not reveal any ischemia but did reveal infarction defect in inferior segment( RCA territory). ECHO In Feb 2024 revealed EF 40-45% with abnormal diastolic dysfunction. Cardiac cath in 2022 revealed minimal obstructive CAD.     ECHO was ordered. Patient was started on heparin drip and cardiology was consulted.       OBJECTIVE     Vital Signs:  BP

## 2024-05-18 NOTE — H&P
[facility]     Department of Internal Medicine - Staff Internal Medicine Teaching Service          ADMISSION NOTE/HISTORY AND PHYSICAL EXAMINATION   Date: 5/18/2024  Patient Name: Steven L Severance  Date of admission: 5/17/2024 11:19 AM  YOB: 1975  PCP: Smith Andres MD  History Obtained From:  patient    CHIEF COMPLAINT     Chief complaint: Right sided weakness and slurred speech    HISTORY OF PRESENTING ILLNESS     The patient is a pleasant 49 y.o. male with h/o ESRD on HD MWF, h/o basal basal ganglia stroke in 2022 with residual right sideded hemiparesis, facial droop and dysarthria presented with worsening weakness on the right side of the body.    Patient was getting his scheduled dialysis today at a outlying facility.Suddenly, he felt worsening weakness on the right side of his body and slurred speech. He was brought to [facility] ED. Stroke alert was called. CT head, CTAP and MRI Brain did not reveal any acute abnormality. His symptoms resolved after 2 hours and he was back to his baseline.    He was also found to be hypertensive with SBP in 160's. Potassium was 3.2 which was promptly replaced. Troponins were elevated and uptrending. EKG did not reveal any acute ST segment changes. Patient had a stress test in March 2023 which did not reveal any ischemia but did reveal infarction defect in inferior segment( RCA territory). ECHO In Feb 2024 revealed EF 40-45% with abnormal diastolic dysfunction. Cardiac cath in 2022 revealed minimal obstructive CAD.    ECHO was ordered. Patient was started on heparin drip and cardiology was consulted.      PAST MEDICAL HISTORY     Past Medical History:   Diagnosis Date    Cerebral artery occlusion with cerebral infarction (HCC)     Closed fracture of bone of right foot March - April 2020    Dialysis patient (HCC)     Hemodialysis patient (HCC)     Hyperlipidemia     Hypertension     Kidney disease     On Dialysis    Type 1  Neutrophils Absolute 4.44 1.50 - 8.10 k/uL    Lymphocytes Absolute 1.34 1.10 - 3.70 k/uL    Monocytes Absolute 0.80 0.10 - 1.20 k/uL    Eosinophils Absolute 0.19 0.00 - 0.44 k/uL    Basophils Absolute 0.05 0.00 - 0.20 k/uL    Immature Granulocytes Absolute <0.03 0.00 - 0.30 k/uL    Myoglobin 451 (H) 28 - 72 ng/mL    Protime 13.3 11.7 - 14.9 sec    INR 1.0     APTT 26.8 23.0 - 36.5 sec    Troponin, High Sensitivity 225 (HH) 0 - 22 ng/L   Magnesium    Collection Time: 05/17/24 11:24 AM   Result Value Ref Range    Magnesium 1.5 (L) 1.6 - 2.6 mg/dL   Troponin    Collection Time: 05/17/24 12:35 PM   Result Value Ref Range    Troponin, High Sensitivity 244 (HH) 0 - 22 ng/L   Potassium    Collection Time: 05/17/24 12:35 PM   Result Value Ref Range    Potassium 3.2 (L) 3.7 - 5.3 mmol/L   POC Glucose Fingerstick    Collection Time: 05/17/24  2:41 PM   Result Value Ref Range    POC Glucose 182 (H) 75 - 110 mg/dL   Troponin    Collection Time: 05/17/24  6:22 PM   Result Value Ref Range    Troponin, High Sensitivity 288 (HH) 0 - 22 ng/L   Troponin    Collection Time: 05/17/24  7:10 PM   Result Value Ref Range    Troponin, High Sensitivity 8 0 - 22 ng/L   CBC    Collection Time: 05/17/24  8:08 PM   Result Value Ref Range    WBC 6.6 3.5 - 11.3 k/uL    RBC 3.26 (L) 4.21 - 5.77 m/uL    Hemoglobin 10.3 (L) 13.0 - 17.0 g/dL    Hematocrit 30.5 (L) 40.7 - 50.3 %    MCV 93.6 82.6 - 102.9 fL    MCH 31.6 25.2 - 33.5 pg    MCHC 33.8 28.4 - 34.8 g/dL    RDW 13.0 11.8 - 14.4 %    Platelets 151 138 - 453 k/uL    MPV 10.1 8.1 - 13.5 fL    NRBC Automated 0.0 0.0 per 100 WBC   APTT    Collection Time: 05/17/24  8:08 PM   Result Value Ref Range    APTT 29.5 23.0 - 36.5 sec   Protime-INR    Collection Time: 05/17/24  8:08 PM   Result Value Ref Range    Protime 14.2 11.7 - 14.9 sec    INR 1.1    Anti-Xa, Unfractionated Heparin    Collection Time: 05/17/24  8:08 PM   Result Value Ref Range    Anti-XA Unfrac Heparin <0.10 IU/L   POC Glucose

## 2024-05-18 NOTE — PLAN OF CARE
Problem: Discharge Planning  Goal: Discharge to home or other facility with appropriate resources  5/18/2024 0634 by Tanesha Moon RN  Outcome: Progressing  5/18/2024 0041 by Ellen Valencia RN  Outcome: Progressing  Flowsheets (Taken 5/17/2024 2000)  Discharge to home or other facility with appropriate resources:   Identify barriers to discharge with patient and caregiver   Arrange for needed discharge resources and transportation as appropriate   Identify discharge learning needs (meds, wound care, etc)   Arrange for interpreters to assist at discharge as needed   Refer to discharge planning if patient needs post-hospital services based on physician order or complex needs related to functional status, cognitive ability or social support system  5/17/2024 1839 by Cathy Terrell RN  Outcome: Progressing     Problem: Safety - Adult  Goal: Free from fall injury  5/18/2024 0634 by Tanesha Moon RN  Outcome: Progressing  5/18/2024 0041 by Ellen Valencia RN  Outcome: Progressing  Flowsheets (Taken 5/17/2024 2000)  Free From Fall Injury: Instruct family/caregiver on patient safety  5/17/2024 1839 by Cathy Terrell RN  Outcome: Progressing     Problem: ABCDS Injury Assessment  Goal: Absence of physical injury  5/18/2024 0634 by Tanesha Moon RN  Outcome: Progressing  5/18/2024 0041 by Ellen Valencia RN  Outcome: Progressing  5/17/2024 1839 by Cathy Terrell RN  Outcome: Progressing

## 2024-05-18 NOTE — DISCHARGE INSTRUCTIONS
Please take medications as written.  Please follow up with your nephrologist and continue your dialysis schedule  Please make an appointment with your PCP and follow up with them in one week or less  You were admitted for elevated troponin (cardiac enzymes)

## 2024-05-19 LAB
EKG ATRIAL RATE: 70 BPM
EKG P AXIS: 57 DEGREES
EKG P-R INTERVAL: 156 MS
EKG Q-T INTERVAL: 546 MS
EKG QRS DURATION: 108 MS
EKG QTC CALCULATION (BAZETT): 589 MS
EKG R AXIS: -15 DEGREES
EKG T AXIS: 31 DEGREES
EKG VENTRICULAR RATE: 70 BPM

## 2024-05-23 ENCOUNTER — APPOINTMENT (OUTPATIENT)
Dept: MRI IMAGING | Age: 49
End: 2024-05-23
Payer: MEDICARE

## 2024-05-23 ENCOUNTER — APPOINTMENT (OUTPATIENT)
Dept: CT IMAGING | Age: 49
End: 2024-05-23
Payer: MEDICARE

## 2024-05-23 ENCOUNTER — HOSPITAL ENCOUNTER (INPATIENT)
Age: 49
LOS: 2 days | Discharge: NURSING, REHAB OR LONG-TERM CARE | End: 2024-05-25
Attending: EMERGENCY MEDICINE | Admitting: INTERNAL MEDICINE
Payer: MEDICARE

## 2024-05-23 ENCOUNTER — APPOINTMENT (OUTPATIENT)
Dept: GENERAL RADIOLOGY | Age: 49
End: 2024-05-23
Payer: MEDICARE

## 2024-05-23 DIAGNOSIS — R47.9 SPEECH DISTURBANCE, UNSPECIFIED TYPE: Primary | ICD-10-CM

## 2024-05-23 DIAGNOSIS — I63.9 CEREBROVASCULAR ACCIDENT (CVA), UNSPECIFIED MECHANISM (HCC): ICD-10-CM

## 2024-05-23 PROBLEM — R29.90 STROKE-LIKE SYMPTOM: Status: ACTIVE | Noted: 2024-05-23

## 2024-05-23 LAB
ANION GAP SERPL CALCULATED.3IONS-SCNC: 15 MMOL/L (ref 9–17)
BASOPHILS # BLD: 0.05 K/UL (ref 0–0.2)
BASOPHILS NFR BLD: 1 % (ref 0–2)
BUN SERPL-MCNC: 25 MG/DL (ref 6–20)
BUN/CREAT SERPL: 4 (ref 9–20)
CALCIUM SERPL-MCNC: 10.2 MG/DL (ref 8.6–10.4)
CHLORIDE SERPL-SCNC: 96 MMOL/L (ref 98–107)
CO2 SERPL-SCNC: 27 MMOL/L (ref 20–31)
CREAT SERPL-MCNC: 6.1 MG/DL (ref 0.7–1.2)
EOSINOPHIL # BLD: 0.25 K/UL (ref 0–0.44)
EOSINOPHILS RELATIVE PERCENT: 4 % (ref 1–4)
ERYTHROCYTE [DISTWIDTH] IN BLOOD BY AUTOMATED COUNT: 13.1 % (ref 11.8–14.4)
GFR, ESTIMATED: 10 ML/MIN/1.73M2
GLUCOSE BLD-MCNC: 159 MG/DL (ref 75–110)
GLUCOSE BLD-MCNC: 179 MG/DL (ref 75–110)
GLUCOSE SERPL-MCNC: 173 MG/DL (ref 70–99)
HCT VFR BLD AUTO: 35.8 % (ref 40.7–50.3)
HGB BLD-MCNC: 11.8 G/DL (ref 13–17)
IMM GRANULOCYTES # BLD AUTO: 0.02 K/UL (ref 0–0.3)
IMM GRANULOCYTES NFR BLD: 0 %
LYMPHOCYTES NFR BLD: 1.32 K/UL (ref 1.1–3.7)
LYMPHOCYTES RELATIVE PERCENT: 18 % (ref 24–43)
MCH RBC QN AUTO: 31.1 PG (ref 25.2–33.5)
MCHC RBC AUTO-ENTMCNC: 33 G/DL (ref 28.4–34.8)
MCV RBC AUTO: 94.5 FL (ref 82.6–102.9)
MONOCYTES NFR BLD: 0.84 K/UL (ref 0.1–1.2)
MONOCYTES NFR BLD: 12 % (ref 3–12)
NEUTROPHILS NFR BLD: 65 % (ref 36–65)
NEUTS SEG NFR BLD: 4.7 K/UL (ref 1.5–8.1)
NRBC BLD-RTO: 0 PER 100 WBC
PLATELET # BLD AUTO: 174 K/UL (ref 138–453)
PMV BLD AUTO: 10.1 FL (ref 8.1–13.5)
POTASSIUM SERPL-SCNC: 4.6 MMOL/L (ref 3.7–5.3)
RBC # BLD AUTO: 3.79 M/UL (ref 4.21–5.77)
SODIUM SERPL-SCNC: 138 MMOL/L (ref 135–144)
TROPONIN I SERPL HS-MCNC: 272 NG/L (ref 0–22)
TROPONIN I SERPL HS-MCNC: 275 NG/L (ref 0–22)
TROPONIN I SERPL HS-MCNC: 279 NG/L (ref 0–22)
WBC OTHER # BLD: 7.2 K/UL (ref 3.5–11.3)

## 2024-05-23 PROCEDURE — 82947 ASSAY GLUCOSE BLOOD QUANT: CPT

## 2024-05-23 PROCEDURE — 2060000000 HC ICU INTERMEDIATE R&B

## 2024-05-23 PROCEDURE — 99285 EMERGENCY DEPT VISIT HI MDM: CPT

## 2024-05-23 PROCEDURE — 70551 MRI BRAIN STEM W/O DYE: CPT

## 2024-05-23 PROCEDURE — 71045 X-RAY EXAM CHEST 1 VIEW: CPT

## 2024-05-23 PROCEDURE — 70450 CT HEAD/BRAIN W/O DYE: CPT

## 2024-05-23 PROCEDURE — 84484 ASSAY OF TROPONIN QUANT: CPT

## 2024-05-23 PROCEDURE — 85025 COMPLETE CBC W/AUTO DIFF WBC: CPT

## 2024-05-23 PROCEDURE — 6370000000 HC RX 637 (ALT 250 FOR IP): Performed by: PSYCHIATRY & NEUROLOGY

## 2024-05-23 PROCEDURE — 6360000002 HC RX W HCPCS: Performed by: NURSE PRACTITIONER

## 2024-05-23 PROCEDURE — 2580000003 HC RX 258: Performed by: NURSE PRACTITIONER

## 2024-05-23 PROCEDURE — 99222 1ST HOSP IP/OBS MODERATE 55: CPT | Performed by: NURSE PRACTITIONER

## 2024-05-23 PROCEDURE — 80048 BASIC METABOLIC PNL TOTAL CA: CPT

## 2024-05-23 PROCEDURE — 6370000000 HC RX 637 (ALT 250 FOR IP): Performed by: NURSE PRACTITIONER

## 2024-05-23 PROCEDURE — 36415 COLL VENOUS BLD VENIPUNCTURE: CPT

## 2024-05-23 RX ORDER — CALCIUM ACETATE 667 MG/1
1334 CAPSULE ORAL 2 TIMES DAILY WITH MEALS
Status: DISCONTINUED | OUTPATIENT
Start: 2024-05-24 | End: 2024-05-25

## 2024-05-23 RX ORDER — FLUOXETINE HYDROCHLORIDE 20 MG/1
20 CAPSULE ORAL 2 TIMES DAILY
Status: DISCONTINUED | OUTPATIENT
Start: 2024-05-23 | End: 2024-05-25 | Stop reason: HOSPADM

## 2024-05-23 RX ORDER — BUPROPION HYDROCHLORIDE 150 MG/1
150 TABLET ORAL EVERY MORNING
Status: DISCONTINUED | OUTPATIENT
Start: 2024-05-24 | End: 2024-05-25 | Stop reason: HOSPADM

## 2024-05-23 RX ORDER — AMLODIPINE BESYLATE 10 MG/1
10 TABLET ORAL DAILY
Status: DISCONTINUED | OUTPATIENT
Start: 2024-05-23 | End: 2024-05-25 | Stop reason: HOSPADM

## 2024-05-23 RX ORDER — CLOPIDOGREL BISULFATE 75 MG/1
300 TABLET ORAL ONCE
Status: COMPLETED | OUTPATIENT
Start: 2024-05-23 | End: 2024-05-23

## 2024-05-23 RX ORDER — LANOLIN ALCOHOL/MO/W.PET/CERES
2500 CREAM (GRAM) TOPICAL DAILY
Status: DISCONTINUED | OUTPATIENT
Start: 2024-05-23 | End: 2024-05-25 | Stop reason: HOSPADM

## 2024-05-23 RX ORDER — LABETALOL HYDROCHLORIDE 5 MG/ML
10 INJECTION, SOLUTION INTRAVENOUS EVERY 10 MIN PRN
Status: DISCONTINUED | OUTPATIENT
Start: 2024-05-23 | End: 2024-05-25 | Stop reason: HOSPADM

## 2024-05-23 RX ORDER — INSULIN GLARGINE 100 [IU]/ML
25 INJECTION, SOLUTION SUBCUTANEOUS 2 TIMES DAILY
Status: DISCONTINUED | OUTPATIENT
Start: 2024-05-23 | End: 2024-05-25 | Stop reason: HOSPADM

## 2024-05-23 RX ORDER — ONDANSETRON 4 MG/1
4 TABLET, ORALLY DISINTEGRATING ORAL EVERY 8 HOURS PRN
Status: DISCONTINUED | OUTPATIENT
Start: 2024-05-23 | End: 2024-05-25 | Stop reason: HOSPADM

## 2024-05-23 RX ORDER — SODIUM CHLORIDE 0.9 % (FLUSH) 0.9 %
10 SYRINGE (ML) INJECTION EVERY 12 HOURS SCHEDULED
Status: DISCONTINUED | OUTPATIENT
Start: 2024-05-23 | End: 2024-05-25 | Stop reason: HOSPADM

## 2024-05-23 RX ORDER — CALCIUM ACETATE 667 MG/1
1334 TABLET ORAL
Status: DISCONTINUED | OUTPATIENT
Start: 2024-05-23 | End: 2024-05-23

## 2024-05-23 RX ORDER — CETIRIZINE HYDROCHLORIDE 5 MG/1
5 TABLET ORAL DAILY
Status: DISCONTINUED | OUTPATIENT
Start: 2024-05-24 | End: 2024-05-25 | Stop reason: HOSPADM

## 2024-05-23 RX ORDER — EZETIMIBE 10 MG/1
10 TABLET ORAL DAILY
Status: DISCONTINUED | OUTPATIENT
Start: 2024-05-23 | End: 2024-05-25 | Stop reason: HOSPADM

## 2024-05-23 RX ORDER — ONDANSETRON 2 MG/ML
4 INJECTION INTRAMUSCULAR; INTRAVENOUS EVERY 6 HOURS PRN
Status: DISCONTINUED | OUTPATIENT
Start: 2024-05-23 | End: 2024-05-25 | Stop reason: HOSPADM

## 2024-05-23 RX ORDER — SODIUM CHLORIDE 0.9 % (FLUSH) 0.9 %
10 SYRINGE (ML) INJECTION PRN
Status: DISCONTINUED | OUTPATIENT
Start: 2024-05-23 | End: 2024-05-25 | Stop reason: HOSPADM

## 2024-05-23 RX ORDER — CETIRIZINE HYDROCHLORIDE 10 MG/1
10 TABLET ORAL DAILY
Status: DISCONTINUED | OUTPATIENT
Start: 2024-05-23 | End: 2024-05-23

## 2024-05-23 RX ORDER — CARVEDILOL 25 MG/1
25 TABLET ORAL 2 TIMES DAILY WITH MEALS
Status: DISCONTINUED | OUTPATIENT
Start: 2024-05-23 | End: 2024-05-25 | Stop reason: HOSPADM

## 2024-05-23 RX ORDER — ASPIRIN 81 MG/1
81 TABLET ORAL DAILY
Status: DISCONTINUED | OUTPATIENT
Start: 2024-05-23 | End: 2024-05-25 | Stop reason: HOSPADM

## 2024-05-23 RX ORDER — FUROSEMIDE 20 MG/1
20 TABLET ORAL DAILY
Status: DISCONTINUED | OUTPATIENT
Start: 2024-05-24 | End: 2024-05-25 | Stop reason: HOSPADM

## 2024-05-23 RX ORDER — DEXTROSE MONOHYDRATE 100 MG/ML
INJECTION, SOLUTION INTRAVENOUS CONTINUOUS PRN
Status: DISCONTINUED | OUTPATIENT
Start: 2024-05-23 | End: 2024-05-25 | Stop reason: HOSPADM

## 2024-05-23 RX ORDER — PANTOPRAZOLE SODIUM 40 MG/1
40 TABLET, DELAYED RELEASE ORAL
Status: DISCONTINUED | OUTPATIENT
Start: 2024-05-24 | End: 2024-05-25 | Stop reason: HOSPADM

## 2024-05-23 RX ORDER — SODIUM CHLORIDE 9 MG/ML
INJECTION, SOLUTION INTRAVENOUS PRN
Status: DISCONTINUED | OUTPATIENT
Start: 2024-05-23 | End: 2024-05-25 | Stop reason: HOSPADM

## 2024-05-23 RX ORDER — HEPARIN SODIUM 5000 [USP'U]/ML
5000 INJECTION, SOLUTION INTRAVENOUS; SUBCUTANEOUS EVERY 8 HOURS SCHEDULED
Status: DISCONTINUED | OUTPATIENT
Start: 2024-05-23 | End: 2024-05-25 | Stop reason: HOSPADM

## 2024-05-23 RX ORDER — LOSARTAN POTASSIUM 50 MG/1
50 TABLET ORAL DAILY
Status: DISCONTINUED | OUTPATIENT
Start: 2024-05-23 | End: 2024-05-25 | Stop reason: HOSPADM

## 2024-05-23 RX ORDER — UREA 10 %
325 LOTION (ML) TOPICAL
Status: DISCONTINUED | OUTPATIENT
Start: 2024-05-24 | End: 2024-05-25 | Stop reason: HOSPADM

## 2024-05-23 RX ORDER — SENNA AND DOCUSATE SODIUM 50; 8.6 MG/1; MG/1
1 TABLET, FILM COATED ORAL DAILY
Status: DISCONTINUED | OUTPATIENT
Start: 2024-05-23 | End: 2024-05-25 | Stop reason: HOSPADM

## 2024-05-23 RX ORDER — ROSUVASTATIN CALCIUM 10 MG/1
20 TABLET, COATED ORAL DAILY
Status: DISCONTINUED | OUTPATIENT
Start: 2024-05-23 | End: 2024-05-24

## 2024-05-23 RX ADMIN — CLOPIDOGREL BISULFATE 300 MG: 75 TABLET ORAL at 22:24

## 2024-05-23 RX ADMIN — FLUOXETINE HYDROCHLORIDE 20 MG: 20 CAPSULE ORAL at 22:24

## 2024-05-23 RX ADMIN — HEPARIN SODIUM 5000 UNITS: 5000 INJECTION INTRAVENOUS; SUBCUTANEOUS at 22:25

## 2024-05-23 RX ADMIN — SODIUM CHLORIDE, PRESERVATIVE FREE 10 ML: 5 INJECTION INTRAVENOUS at 22:28

## 2024-05-23 RX ADMIN — ASPIRIN 81 MG: 81 TABLET, COATED ORAL at 22:25

## 2024-05-23 RX ADMIN — ROSUVASTATIN CALCIUM 20 MG: 10 TABLET, FILM COATED ORAL at 22:25

## 2024-05-23 ASSESSMENT — ENCOUNTER SYMPTOMS
BACK PAIN: 0
GASTROINTESTINAL NEGATIVE: 1
SHORTNESS OF BREATH: 0
ABDOMINAL PAIN: 0
ABDOMINAL DISTENTION: 0
CHEST TIGHTNESS: 0
FACIAL SWELLING: 0
EYE DISCHARGE: 0
EYE PAIN: 0
RESPIRATORY NEGATIVE: 1
EYES NEGATIVE: 1

## 2024-05-23 ASSESSMENT — PAIN - FUNCTIONAL ASSESSMENT: PAIN_FUNCTIONAL_ASSESSMENT: NONE - DENIES PAIN

## 2024-05-23 NOTE — H&P
[facility]  Office: [phone]  Wander Gresham DO, Ok Antoine DO, Delmar Morgan DO, Esdras Emmanuel DO, Jelly Carrasco MD, Liliana Alvarez MD, Anastasiya Musa MD, Kyleigh Fine MD,  Brendon Jason MD, Madi Cooper MD, Shelley Zuniga MD,  Gloria Flores DO, Carla Hubbard MD, Rey Su MD, Adalid Gresham DO, Leticia Hansen MD,  Osbaldo Segovia DO, Enid Cai MD, Ayleen Morales MD, Rossi Mitchell MD, Jaylen Douglas MD,  Frantz Elam MD, Qasim Gutierrez MD, Nabil Armendariz MD, Flako Ponce MD, Stephen Corbett MD, Marge Jaime MD, Ed Wong DO, Rm Arroyo DO, Almas Carlin MD,  Kerwin Burnette MD, Shirley Waterhouse, CNP,  Cynthia Pickett, CNP, Zachary Yu, CNP,  Lou Quinteros, DNP, Ayana Canseco, CNP, Kathia Maria, CNP, Chani Romero, CNP, Tiffanie Valera, CNP, Jovita Colon, PA-C, Laura Donnelly PA-C, Elba Jay, CNP, Ambika Perez, CNP, Huma Anguiano, CNP, Radha Vines, CNP, Cherri Pérez, CNP, Nunu Zacarias, CNS, Valeria Dong, CNP, Libia Richardson, CNP, Tracy Schwab, CNP         [facility]   IN-PATIENT SERVICE   [facility]    HISTORY AND PHYSICAL EXAMINATION            Date:   5/23/2024  Patient name:  Steven L Severance  Date of admission:  5/23/2024 12:34 PM  MRN:   [id number]  Account:  [id number]  YOB: 1975  PCP:    Smith Andres MD  Room:   [facility]9/1019-02  Code Status:    Full Code    Chief Complaint:     Chief Complaint   Patient presents with    Extremity Weakness     Right leg and hand weakness    Aphasia    Facial Droop     Right side. Symptoms since last Friday       History Obtained From:     Patient, EHR    History of Present Illness:     Steven L Severance is a 49 y.o. Non- / non  male who presents with Extremity Weakness (Right leg and hand weakness), Aphasia, and Facial Droop (Right side. Symptoms since last Friday)   and is admitted to the hospital for the management of CVA (cerebral  systolic.  Hold blood pressure medications for now.  Neurology consult.  Consult physical medicine.  Plavix, aspirin, statin  Elevated troponin: Monitor for chest pain.  Troponins chronically elevated.  Previous cardiology notes reviewed.  Telemetry  End-stage renal disease on dialysis: Consult nephrology  Hypertension: Monitor vital signs as ordered.  See #1.  Resume home BP medications once okay with neurology  Diabetes: Monitor blood glucose every 4 hours.  Avoid hypoglycemia.  Sliding scale insulin  GERD: Continue Protonix  Hyperlipidemia: Continue Zetia, continue statin    Consultations:   IP CONSULT TO NEUROLOGY  IP CONSULT TO NEPHROLOGY  IP CONSULT TO PHYSICAL MEDICINE REHAB     Patient is admitted as inpatient status because of co-morbidities listed above, severity of signs and symptoms as outlined, requirement for current medical therapies and most importantly because of direct risk to patient if care not provided in a hospital setting.  Expected length of stay > 48 hours.    DANIEL Nam NP  5/23/2024  8:39 PM    Copy sent to Dr. Andres, Smith CARTER MD

## 2024-05-23 NOTE — ED NOTES
ED TO INPATIENT SBAR HANDOFF    Patient Name: Steven L Severance   :  1975  49 y.o.   MRN:  [id number]  Preferred Name  mag    ED Room #:  [facility]07/  Family/Caregiver Present no   Restraints no   Sitter no   Sepsis Risk Score Sepsis Risk Score: 1.17    Situation  Code Status: Prior No additional code details.    Allergies: Patient has no known allergies.  Weight: Patient Vitals for the past 96 hrs (Last 3 readings):   Weight   24 1405 86.2 kg (190 lb)     Arrived from: home  Chief Complaint:   Chief Complaint   Patient presents with    Extremity Weakness     Right leg and hand weakness    Aphasia    Facial Droop     Right side. Symptoms since last Friday     Hospital Problem/Diagnosis:  Principal Problem:    Stroke-like symptom  Resolved Problems:    * No resolved hospital problems. *    Imaging:   XR CHEST PORTABLE   Final Result   No acute process.         CT Head W/O Contrast   Preliminary Result   No acute intracranial abnormality.  Chronic microvascular change with remote   lacunar and left pontine infarcts.  Atherosclerotic disease.  Little change   from prior exam.         MRI LIMITED BRAIN    (Results Pending)     Abnormal labs:   Abnormal Labs Reviewed   BASIC METABOLIC PANEL - Abnormal; Notable for the following components:       Result Value    Chloride 96 (*)     Glucose 173 (*)     BUN 25 (*)     Creatinine 6.1 (*)     Est, Glom Filt Rate 10 (*)     BUN/Creatinine Ratio 4 (*)     All other components within normal limits   CBC WITH AUTO DIFFERENTIAL - Abnormal; Notable for the following components:    RBC 3.79 (*)     Hemoglobin 11.8 (*)     Hematocrit 35.8 (*)     Lymphocytes % 18 (*)     All other components within normal limits   TROPONIN - Abnormal; Notable for the following components:    Troponin, High Sensitivity 279 (*)     All other components within normal limits   POC GLUCOSE FINGERSTICK - Abnormal; Notable for the following components:    POC Glucose 179 (*)     All other  Status: Nothing by Mouth  Pertinent or High Risk Medications/Drips: no   If Yes, please provide details: none    Pending Blood Product Administration: no       You may also review the ED PT Care Timeline found under the Summary Nursing Index tab.    Recommendation    Pending orders none    Plan for Discharge (if known):   Additional Comments: Diabetic type 2, Bilateral leg braces, drop foot both legs   If any further questions, please call Sending RN at [phone]      Electronically signed by: Electronically signed by GUILLERMO ORDONEZ RN on 5/23/2024 at 4:32 PM

## 2024-05-23 NOTE — ED PROVIDER NOTES
EMERGENCY DEPARTMENT ENCOUNTER    Pt Name: Steven L Severance  MRN: [id number]  Birthdate 1975  Date of evaluation: 5/23/24  CHIEF COMPLAINT       Chief Complaint   Patient presents with    Extremity Weakness     Right leg and hand weakness    Aphasia    Facial Droop     Right side. Symptoms since last Friday     HISTORY OF PRESENT ILLNESS   HPI   The patient is a 49-year-old male with a history of end-stage renal disease who presented to the emergency department secondary to weakness speech changes as well as facial droop.  Patient said he has had the symptoms since Friday.  He was in dialysis was noted to have slurred speech taken to [facility].  He states he was admitted for 2 days and told he did not have a stroke.  However he says symptoms have persisted as well as gotten worse.  He ambulates with assistance of a walker.  Patient had a previous history of stroke in the past.  Patient denies chest pain, shortness of breath, nausea, vomiting, fevers or chills    REVIEW OF SYSTEMS     Review of Systems   Constitutional:  Negative for chills, diaphoresis and fever.   HENT:  Negative for congestion, ear pain and facial swelling.    Eyes:  Negative for pain, discharge and visual disturbance.   Respiratory:  Negative for chest tightness and shortness of breath.    Cardiovascular:  Negative for chest pain and palpitations.   Gastrointestinal:  Negative for abdominal distention and abdominal pain.   Genitourinary:  Negative for difficulty urinating and flank pain.   Musculoskeletal:  Negative for back pain.   Skin:  Negative for wound.   Neurological:  Positive for speech difficulty. Negative for dizziness, light-headedness and headaches.     PASTMEDICAL HISTORY     Past Medical History:   Diagnosis Date    Cerebral artery occlusion with cerebral infarction (HCC)     Closed fracture of bone of right foot March - April 2020    Dialysis patient (HCC)     Hemodialysis patient (HCC)     Hyperlipidemia     Hypertension

## 2024-05-24 ENCOUNTER — APPOINTMENT (OUTPATIENT)
Age: 49
End: 2024-05-24
Attending: PSYCHIATRY & NEUROLOGY
Payer: MEDICARE

## 2024-05-24 PROBLEM — R47.9 SPEECH DISTURBANCE: Status: ACTIVE | Noted: 2024-05-24

## 2024-05-24 LAB
ANION GAP SERPL CALCULATED.3IONS-SCNC: 11 MMOL/L (ref 9–17)
BASOPHILS # BLD: 0.03 K/UL (ref 0–0.2)
BASOPHILS NFR BLD: 1 % (ref 0–2)
BUN SERPL-MCNC: 33 MG/DL (ref 6–20)
BUN/CREAT SERPL: 4 (ref 9–20)
CALCIUM SERPL-MCNC: 9.4 MG/DL (ref 8.6–10.4)
CHLORIDE SERPL-SCNC: 97 MMOL/L (ref 98–107)
CHOLEST SERPL-MCNC: 105 MG/DL (ref 0–199)
CHOLESTEROL/HDL RATIO: 2
CO2 SERPL-SCNC: 30 MMOL/L (ref 20–31)
CREAT SERPL-MCNC: 7.7 MG/DL (ref 0.7–1.2)
ECHO AO ROOT DIAM: 3.2 CM
ECHO AO ROOT INDEX: 1.63 CM/M2
ECHO AV PEAK GRADIENT: 10 MMHG
ECHO AV PEAK VELOCITY: 1.6 M/S
ECHO BSA: 2.02 M2
ECHO LA AREA 4C: 20.8 CM2
ECHO LA DIAMETER INDEX: 2.24 CM/M2
ECHO LA DIAMETER: 4.4 CM
ECHO LA MAJOR AXIS: 6.3 CM
ECHO LA TO AORTIC ROOT RATIO: 1.38
ECHO LA VOL MOD A4C: 56 ML (ref 18–58)
ECHO LA VOLUME INDEX MOD A4C: 29 ML/M2 (ref 16–34)
ECHO LV E' LATERAL VELOCITY: 9 CM/S
ECHO LV E' SEPTAL VELOCITY: 5 CM/S
ECHO LV FRACTIONAL SHORTENING: 29 % (ref 28–44)
ECHO LV INTERNAL DIMENSION DIASTOLE INDEX: 2.86 CM/M2
ECHO LV INTERNAL DIMENSION DIASTOLIC: 5.6 CM (ref 4.2–5.9)
ECHO LV INTERNAL DIMENSION SYSTOLIC INDEX: 2.04 CM/M2
ECHO LV INTERNAL DIMENSION SYSTOLIC: 4 CM
ECHO LV IVSD: 1.3 CM (ref 0.6–1)
ECHO LV MASS 2D: 296.6 G (ref 88–224)
ECHO LV MASS INDEX 2D: 151.4 G/M2 (ref 49–115)
ECHO LV POSTERIOR WALL DIASTOLIC: 1.2 CM (ref 0.6–1)
ECHO LV RELATIVE WALL THICKNESS RATIO: 0.43
ECHO MV A VELOCITY: 0.96 M/S
ECHO MV E DECELERATION TIME (DT): 208 MS
ECHO MV E VELOCITY: 0.69 M/S
ECHO MV E/A RATIO: 0.72
ECHO MV E/E' LATERAL: 7.67
ECHO MV E/E' RATIO (AVERAGED): 10.73
ECHO MV E/E' SEPTAL: 13.8
EOSINOPHIL # BLD: 0.2 K/UL (ref 0–0.44)
EOSINOPHILS RELATIVE PERCENT: 3 % (ref 1–4)
ERYTHROCYTE [DISTWIDTH] IN BLOOD BY AUTOMATED COUNT: 12.9 % (ref 11.8–14.4)
GFR, ESTIMATED: 8 ML/MIN/1.73M2
GLUCOSE BLD-MCNC: 108 MG/DL (ref 75–110)
GLUCOSE BLD-MCNC: 112 MG/DL (ref 75–110)
GLUCOSE BLD-MCNC: 185 MG/DL (ref 75–110)
GLUCOSE BLD-MCNC: 262 MG/DL (ref 75–110)
GLUCOSE BLD-MCNC: 63 MG/DL (ref 75–110)
GLUCOSE BLD-MCNC: 69 MG/DL (ref 75–110)
GLUCOSE BLD-MCNC: 97 MG/DL (ref 75–110)
GLUCOSE BLD-MCNC: 98 MG/DL (ref 75–110)
GLUCOSE SERPL-MCNC: 68 MG/DL (ref 70–99)
HCT VFR BLD AUTO: 32.9 % (ref 40.7–50.3)
HDLC SERPL-MCNC: 55 MG/DL
HGB BLD-MCNC: 10.9 G/DL (ref 13–17)
IMM GRANULOCYTES # BLD AUTO: 0.02 K/UL (ref 0–0.3)
IMM GRANULOCYTES NFR BLD: 0 %
LDLC SERPL CALC-MCNC: 33 MG/DL (ref 0–100)
LYMPHOCYTES NFR BLD: 1.39 K/UL (ref 1.1–3.7)
LYMPHOCYTES RELATIVE PERCENT: 23 % (ref 24–43)
MAGNESIUM SERPL-MCNC: 2 MG/DL (ref 1.6–2.6)
MCH RBC QN AUTO: 31.3 PG (ref 25.2–33.5)
MCHC RBC AUTO-ENTMCNC: 33.1 G/DL (ref 28.4–34.8)
MCV RBC AUTO: 94.5 FL (ref 82.6–102.9)
MONOCYTES NFR BLD: 0.79 K/UL (ref 0.1–1.2)
MONOCYTES NFR BLD: 13 % (ref 3–12)
NEUTROPHILS NFR BLD: 60 % (ref 36–65)
NEUTS SEG NFR BLD: 3.76 K/UL (ref 1.5–8.1)
NRBC BLD-RTO: 0 PER 100 WBC
PHOSPHATE SERPL-MCNC: 3.7 MG/DL (ref 2.5–4.5)
PLATELET # BLD AUTO: 146 K/UL (ref 138–453)
PMV BLD AUTO: 9.9 FL (ref 8.1–13.5)
POTASSIUM SERPL-SCNC: 4.4 MMOL/L (ref 3.7–5.3)
RBC # BLD AUTO: 3.48 M/UL (ref 4.21–5.77)
SODIUM SERPL-SCNC: 138 MMOL/L (ref 135–144)
TRIGL SERPL-MCNC: 87 MG/DL
TROPONIN I SERPL HS-MCNC: 273 NG/L (ref 0–22)
TROPONIN I SERPL HS-MCNC: 274 NG/L (ref 0–22)
VLDLC SERPL CALC-MCNC: 17 MG/DL
WBC OTHER # BLD: 6.2 K/UL (ref 3.5–11.3)

## 2024-05-24 PROCEDURE — 6370000000 HC RX 637 (ALT 250 FOR IP): Performed by: NURSE PRACTITIONER

## 2024-05-24 PROCEDURE — 82947 ASSAY GLUCOSE BLOOD QUANT: CPT

## 2024-05-24 PROCEDURE — 6370000000 HC RX 637 (ALT 250 FOR IP): Performed by: PSYCHIATRY & NEUROLOGY

## 2024-05-24 PROCEDURE — 2500000003 HC RX 250 WO HCPCS: Performed by: NURSE PRACTITIONER

## 2024-05-24 PROCEDURE — 6360000002 HC RX W HCPCS: Performed by: NURSE PRACTITIONER

## 2024-05-24 PROCEDURE — 92523 SPEECH SOUND LANG COMPREHEN: CPT

## 2024-05-24 PROCEDURE — 4A03X5D MEASUREMENT OF ARTERIAL FLOW, INTRACRANIAL, EXTERNAL APPROACH: ICD-10-PCS | Performed by: PSYCHIATRY & NEUROLOGY

## 2024-05-24 PROCEDURE — 83036 HEMOGLOBIN GLYCOSYLATED A1C: CPT

## 2024-05-24 PROCEDURE — 90935 HEMODIALYSIS ONE EVALUATION: CPT

## 2024-05-24 PROCEDURE — 83735 ASSAY OF MAGNESIUM: CPT

## 2024-05-24 PROCEDURE — 93306 TTE W/DOPPLER COMPLETE: CPT

## 2024-05-24 PROCEDURE — 84100 ASSAY OF PHOSPHORUS: CPT

## 2024-05-24 PROCEDURE — 2580000003 HC RX 258: Performed by: NURSE PRACTITIONER

## 2024-05-24 PROCEDURE — 97167 OT EVAL HIGH COMPLEX 60 MIN: CPT

## 2024-05-24 PROCEDURE — 6370000000 HC RX 637 (ALT 250 FOR IP): Performed by: INTERNAL MEDICINE

## 2024-05-24 PROCEDURE — 99232 SBSQ HOSP IP/OBS MODERATE 35: CPT | Performed by: NURSE PRACTITIONER

## 2024-05-24 PROCEDURE — 2060000000 HC ICU INTERMEDIATE R&B

## 2024-05-24 PROCEDURE — 99223 1ST HOSP IP/OBS HIGH 75: CPT | Performed by: PSYCHIATRY & NEUROLOGY

## 2024-05-24 PROCEDURE — 99223 1ST HOSP IP/OBS HIGH 75: CPT | Performed by: PHYSICAL MEDICINE & REHABILITATION

## 2024-05-24 PROCEDURE — 97163 PT EVAL HIGH COMPLEX 45 MIN: CPT

## 2024-05-24 PROCEDURE — 85025 COMPLETE CBC W/AUTO DIFF WBC: CPT

## 2024-05-24 PROCEDURE — 92610 EVALUATE SWALLOWING FUNCTION: CPT

## 2024-05-24 PROCEDURE — 97535 SELF CARE MNGMENT TRAINING: CPT

## 2024-05-24 PROCEDURE — 36415 COLL VENOUS BLD VENIPUNCTURE: CPT

## 2024-05-24 PROCEDURE — 97116 GAIT TRAINING THERAPY: CPT

## 2024-05-24 PROCEDURE — 5A1D70Z PERFORMANCE OF URINARY FILTRATION, INTERMITTENT, LESS THAN 6 HOURS PER DAY: ICD-10-PCS | Performed by: INTERNAL MEDICINE

## 2024-05-24 PROCEDURE — 97530 THERAPEUTIC ACTIVITIES: CPT

## 2024-05-24 PROCEDURE — 84484 ASSAY OF TROPONIN QUANT: CPT

## 2024-05-24 PROCEDURE — 80048 BASIC METABOLIC PNL TOTAL CA: CPT

## 2024-05-24 PROCEDURE — 80061 LIPID PANEL: CPT

## 2024-05-24 RX ORDER — CLOPIDOGREL BISULFATE 75 MG/1
75 TABLET ORAL DAILY
Status: DISCONTINUED | OUTPATIENT
Start: 2024-05-24 | End: 2024-05-25 | Stop reason: HOSPADM

## 2024-05-24 RX ORDER — ROSUVASTATIN CALCIUM 10 MG/1
10 TABLET, COATED ORAL DAILY
Status: DISCONTINUED | OUTPATIENT
Start: 2024-05-25 | End: 2024-05-25 | Stop reason: HOSPADM

## 2024-05-24 RX ORDER — TRAMADOL HYDROCHLORIDE 50 MG/1
50 TABLET ORAL EVERY 4 HOURS PRN
Status: DISCONTINUED | OUTPATIENT
Start: 2024-05-24 | End: 2024-05-25 | Stop reason: HOSPADM

## 2024-05-24 RX ADMIN — CALCIUM ACETATE 1334 MG: 667 CAPSULE ORAL at 08:53

## 2024-05-24 RX ADMIN — FUROSEMIDE 20 MG: 20 TABLET ORAL at 08:54

## 2024-05-24 RX ADMIN — PANTOPRAZOLE SODIUM 40 MG: 40 TABLET, DELAYED RELEASE ORAL at 06:12

## 2024-05-24 RX ADMIN — HEPARIN SODIUM 5000 UNITS: 5000 INJECTION INTRAVENOUS; SUBCUTANEOUS at 16:00

## 2024-05-24 RX ADMIN — FLUOXETINE HYDROCHLORIDE 20 MG: 20 CAPSULE ORAL at 09:00

## 2024-05-24 RX ADMIN — CARVEDILOL 25 MG: 25 TABLET, FILM COATED ORAL at 16:01

## 2024-05-24 RX ADMIN — SODIUM CHLORIDE, PRESERVATIVE FREE 10 ML: 5 INJECTION INTRAVENOUS at 20:00

## 2024-05-24 RX ADMIN — Medication 16 G: at 01:53

## 2024-05-24 RX ADMIN — BUPROPION HYDROCHLORIDE 150 MG: 150 TABLET, EXTENDED RELEASE ORAL at 08:53

## 2024-05-24 RX ADMIN — FLUOXETINE HYDROCHLORIDE 20 MG: 20 CAPSULE ORAL at 20:00

## 2024-05-24 RX ADMIN — CLOPIDOGREL BISULFATE 75 MG: 75 TABLET ORAL at 08:53

## 2024-05-24 RX ADMIN — CYANOCOBALAMIN TAB 1000 MCG 2500 MCG: 1000 TAB at 08:53

## 2024-05-24 RX ADMIN — HEPARIN SODIUM 5000 UNITS: 5000 INJECTION INTRAVENOUS; SUBCUTANEOUS at 06:12

## 2024-05-24 RX ADMIN — AMLODIPINE BESYLATE 10 MG: 10 TABLET ORAL at 16:01

## 2024-05-24 RX ADMIN — SENNOSIDES AND DOCUSATE SODIUM 1 TABLET: 50; 8.6 TABLET ORAL at 08:54

## 2024-05-24 RX ADMIN — Medication 16 G: at 07:00

## 2024-05-24 RX ADMIN — FERROUS SULFATE TAB EC 325 MG (65 MG FE EQUIVALENT) 325 MG: 325 (65 FE) TABLET DELAYED RESPONSE at 08:53

## 2024-05-24 RX ADMIN — SODIUM CHLORIDE, PRESERVATIVE FREE 10 ML: 5 INJECTION INTRAVENOUS at 08:54

## 2024-05-24 RX ADMIN — EZETIMIBE 10 MG: 10 TABLET ORAL at 08:54

## 2024-05-24 RX ADMIN — CALCIUM ACETATE 1334 MG: 667 CAPSULE ORAL at 18:01

## 2024-05-24 RX ADMIN — ASPIRIN 81 MG: 81 TABLET, COATED ORAL at 08:54

## 2024-05-24 RX ADMIN — LOSARTAN POTASSIUM 50 MG: 100 TABLET, FILM COATED ORAL at 16:02

## 2024-05-24 RX ADMIN — ROSUVASTATIN CALCIUM 20 MG: 10 TABLET, FILM COATED ORAL at 08:54

## 2024-05-24 RX ADMIN — CETIRIZINE HYDROCHLORIDE 5 MG: 5 TABLET ORAL at 08:53

## 2024-05-24 RX ADMIN — HEPARIN SODIUM 5000 UNITS: 5000 INJECTION INTRAVENOUS; SUBCUTANEOUS at 20:00

## 2024-05-24 ASSESSMENT — ENCOUNTER SYMPTOMS
BACK PAIN: 1
GASTROINTESTINAL NEGATIVE: 1
EYES NEGATIVE: 1
RESPIRATORY NEGATIVE: 1

## 2024-05-24 NOTE — PROGRESS NOTES
AM-PAC - Mobility    AM-PAC Basic Mobility - Inpatient   How much help is needed turning from your back to your side while in a flat bed without using bedrails?: None  How much help is needed moving from lying on your back to sitting on the side of a flat bed without using bedrails?: A Little  How much help is needed moving to and from a bed to a chair?: A Lot  How much help is needed standing up from a chair using your arms?: A Little  How much help is needed walking in hospital room?: A Little  How much help is needed climbing 3-5 steps with a railing?: A Lot  AM-PAC Inpatient Mobility Raw Score : 17  AM-PAC Inpatient T-Scale Score : 42.13  Mobility Inpatient CMS 0-100% Score: 50.57  Mobility Inpatient CMS G-Code Modifier : CK         Tinneti Score  Balance Score: 1 (05/24/24 1530)  Gait Score: 1 (05/24/24 1530)  Tinetti Total Score: 2 (05/24/24 1530)  Tinetti Disability Index: 80-99% (05/24/24 1530)    Goals  Short Term Goals  Time Frame for Short Term Goals: 12 visits  Short Term Goal 1: Patient will be indep with transfers.  Short Term Goal 2: Patient will amb 200 feet with with RW and bilateral AFOs with supervision.  Short Term Goal 3: Patient will demo fair+ dynamic standing balance.  Short Term Goal 4: Patient will demo Bilateral DF to neutral.  Short Term Goal 5: Patient will tolerate 30 minutes of ther-ex and ther-act.  Patient Goals   Patient Goals : Improve strength and walking       Education  Patient Education  Education Given To: Patient  Education Provided: Role of Therapy;Plan of Care;Transfer Training;Fall Prevention Strategies;Equipment  Education Method: Verbal;Demonstration  Barriers to Learning:  (decreased safety awareness)  Education Outcome: Verbalized understanding;Continued education needed      Therapy Time   Individual Concurrent Group Co-treatment   Time In 1413         Time Out 1502         Minutes 49         Timed Code Treatment Minutes: 20 Minutes

## 2024-05-24 NOTE — PLAN OF CARE
Pt admitted for CVA, MRI showed Lacunar infarct in left basal ganglia.   Q4 NIHSS, pt has right sided weakness with toungue deviating to the right, right facial droop.  A/O x4, on room air and NSR on monitor.  Bolus dose of plavix given, aspirin continued.   Permissive HTN up to SBP of 180. PRN labetalol order for SBP >180.   NPO except sips with meds due to pt stating that he has been coughing intermittently while swallowing, speech consulted.   Hx ESRD, Monday Wednesday and Friday dialysis.   He has remained free from falls.   All needs met at this time, safety maintained.    Problem: Discharge Planning  Goal: Discharge to home or other facility with appropriate resources  5/24/2024 0501 by Annette Pittman RN  Outcome: Progressing  5/24/2024 0500 by Annette Pittman RN  Outcome: Progressing  Flowsheets (Taken 5/23/2024 1942)  Discharge to home or other facility with appropriate resources: Identify barriers to discharge with patient and caregiver     Problem: Skin/Tissue Integrity  Goal: Absence of new skin breakdown  Description: 1.  Monitor for areas of redness and/or skin breakdown  2.  Assess vascular access sites hourly  3.  Every 4-6 hours minimum:  Change oxygen saturation probe site  4.  Every 4-6 hours:  If on nasal continuous positive airway pressure, respiratory therapy assess nares and determine need for appliance change or resting period.  5/24/2024 0501 by Annette Pittman RN  Outcome: Progressing  5/24/2024 0500 by Annette Pittman RN  Outcome: Progressing     Problem: Safety - Adult  Goal: Free from fall injury  5/24/2024 0501 by Annette Pittman RN  Outcome: Progressing  5/24/2024 0500 by Annette Pittman RN  Outcome: Progressing  Flowsheets (Taken 5/23/2024 1937)  Free From Fall Injury: Instruct family/caregiver on patient safety     Problem: Neurosensory - Adult  Goal: Achieves stable or improved neurological status  Outcome: Progressing     Problem: Musculoskeletal - Adult  Goal: Return mobility to safest level

## 2024-05-24 NOTE — CONSULTS
elevation                                               XI - symmetrical shoulder shrug                                                       XII - midline tongue without atrophy or fasciculation     Motor function  Strength: RUE 4+/5 except 4/5 at the hand, RLE 4/5 proximally. LUE 5/5 (has Dupuytren's contracture at the hand). LLE 5/5 proximally. Bilateral foot drop with 0/5 dorsiflexion, 3/5 plantar flexion on the left, 2/5 on the right  Tone increased on the right, noted with intrinsic hand muscle atrophy on the left  No tremors                      Sensory function Diminished to light touch and pinprick in stocking/glove pattern, absent vibration at the feet, diminished proprioception at the toes   Cerebellar Intact finger-nose-finger testing, limited on the right due to weakness     Reflex function 1/4 left upper extremity and patellar, 2/4 right.  Absent Achilles.  Equivocal plantar response bilaterally. (-)Villarreal's sign bilaterally    Gait                  Deferred             Diagnostics:      Laboratory Testing:  CBC:   Recent Labs     05/23/24  1256 05/24/24  0627   WBC 7.2 6.2   HGB 11.8* 10.9*    146     BMP:    Recent Labs     05/23/24  1256 05/24/24  0626    138   K 4.6 4.4   CL 96* 97*   CO2 27 30   BUN 25* 33*   CREATININE 6.1* 7.7*   GLUCOSE 173* 68*         Lab Results   Component Value Date    CHOL 105 05/24/2024    HDL 55 05/24/2024    TRIG 87 05/24/2024    ALT 9 04/07/2023    AST 12 04/07/2023    TSH 0.60 08/01/2022    INR 1.1 05/17/2024    LABA1C 7.8 (H) 05/17/2024    UZCFWTMZ20 >2000 (H) 08/01/2022    MG 2.0 05/24/2024    PHOS 3.7 05/24/2024         Imaging/Diagnostics:    Results for orders placed during the hospital encounter of 05/16/22    MRI CERVICAL SPINE WO CONTRAST    Narrative  EXAMINATION:  MRI OF THE CERVICAL SPINE WITHOUT CONTRAST 5/16/2022 1:53 pm    TECHNIQUE:  Multiplanar multisequence MRI of the cervical spine was performed without the  administration of  pm    TECHNIQUE:  CT of the head was performed without the administration of intravenous  contrast. Automated exposure control, iterative reconstruction, and/or weight  based adjustment of the mA/kV was utilized to reduce the radiation dose to as  low as reasonably achievable.    COMPARISON:  May 17, 2024    HISTORY:  ORDERING SYSTEM PROVIDED HISTORY: slurred speech rule out stroke  TECHNOLOGIST PROVIDED HISTORY:    slurred speech rule out stroke  Decision Support Exception - unselect if not a suspected or confirmed  emergency medical condition->Emergency Medical Condition (MA)  Reason for Exam: slurred speech. hx. of stroke    End-stage renal disease.    FINDINGS:  BRAIN/VENTRICLES: There is no acute intracranial hemorrhage, mass effect or  midline shift.  No abnormal extra-axial fluid collection.  The gray-white  differentiation is maintained without evidence of an acute infarct.  There is  no evidence of hydrocephalus. Cortical atrophy is noted more significant  frontal areas, slightly pronounced for age.  Scattered white matter  hypodensity is present consistent with chronic microvascular change.  Heavily  calcified cavernous carotid and vertebral arteries noted.  Remote multiple  basal ganglial infarcts.  Remote left pontine infarct.    ORBITS: The visualized portion of the orbits demonstrate no acute abnormality.    SINUSES: The visualized paranasal sinuses and mastoid air cells demonstrate  no acute abnormality.    SOFT TISSUES/SKULL:  No acute abnormality of the visualized skull or soft  tissues.    Impression  No acute intracranial abnormality.  Chronic microvascular change with remote  lacunar and left pontine infarcts.  Atherosclerotic disease.  Little change  from prior exam.        I personally reviewed all of the above medications, clinical laboratory, imaging and other diagnostic tests.         Impression:      49-year-old male with past medical history of hypertension, hyperlipidemia, diabetes with

## 2024-05-24 NOTE — PLAN OF CARE
Pt had dialysis today. Pt up with therapy 1-2 assist with walker. Pt nihhs score is 3. Pt safety maintained, bed alarm on for safety. Call light within reach. Pt blood sugars stable. All questions and concerns asked and addressed. Wcm. Pt safety maintained, bed in lowest position. Pt Aox4, VSS.   Problem: Discharge Planning  Goal: Discharge to home or other facility with appropriate resources  5/24/2024 1820 by Ed Blount RN  Outcome: Progressing     Problem: Skin/Tissue Integrity  Goal: Absence of new skin breakdown  Description: 1.  Monitor for areas of redness and/or skin breakdown  2.  Assess vascular access sites hourly  3.  Every 4-6 hours minimum:  Change oxygen saturation probe site  4.  Every 4-6 hours:  If on nasal continuous positive airway pressure, respiratory therapy assess nares and determine need for appliance change or resting period.  5/24/2024 1820 by Ed Blount RN  Outcome: Progressing     Problem: Safety - Adult  Goal: Free from fall injury  5/24/2024 1820 by Ed Blount RN  Outcome: Progressing     Problem: Neurosensory - Adult  Goal: Achieves stable or improved neurological status  5/24/2024 1820 by Ed Blount RN  Outcome: Progressing  5/24/2024 0501 by Annette Pittman RN  Outcome: Progressing     Problem: Neurosensory - Adult  Goal: Achieves stable or improved neurological status  5/24/2024 1820 by Ed Blount RN  Outcome: Progressing     Problem: Musculoskeletal - Adult  Goal: Return mobility to safest level of function  5/24/2024 1820 by Ed Blount RN  Outcome: Progressing     Problem: Chronic Conditions and Co-morbidities  Goal: Patient's chronic conditions and co-morbidity symptoms are monitored and maintained or improved  Outcome: Progressing

## 2024-05-24 NOTE — PROGRESS NOTES
SLP ALL NOTES  Facility/Department: [facility]  Initial Speech/Language/Cognitive Assessment    NAME: Steven L Severance  : 1975   MRN: [id number]  ADMISSION DATE: 2024  ADMITTING DIAGNOSIS: has ESRD (end stage renal disease) ([facility]); Essential hypertension; Hyperlipidemia; Acute pain of left knee; Bipolar affective disorder ([facility]); Atherosclerosis of aorta ([facility]); COVID-19; Cerebrovascular accident (CVA) ([facility]); Type 2 diabetes mellitus with diabetic neuropathy, with long-term current use of insulin ([facility]); Neuropathy of both feet; GERD (gastroesophageal reflux disease); Right sided weakness; Leg weakness, bilateral; Recurrent falls; Acute idiopathic pericarditis; Small kidney, bilateral; Umbilical hernia without obstruction or gangrene; Disorders of diaphragm; Atherosclerosis of other arteries; Hypotension; Elevated troponin; Hyponatremia; Anemia of chronic disease; Hyperkalemia; Left lower quadrant abdominal pain; Coronary artery disease involving native coronary artery of native heart without angina pectoris; Pneumonia of right lower lobe due to infectious organism; Generalized weakness; Stroke-like episode; Hyperglycemia; Sleep apnea; Chest pain; Non-STEMI (non-ST elevated myocardial infarction) ([facility]); Nonrheumatic mitral valve regurgitation; Metabolic acidosis; NSTEMI (non-ST elevated myocardial infarction) ([facility]); Hypokalemia; Hypomagnesemia; Hx of type 2 diabetes mellitus; Hx of completed stroke; and CVA (cerebral vascular accident) ([facility]) on their problem list.    Date of Eval: 2024   Evaluating Therapist: NICK JONES    Primary Complaint: Steven L Severance is a 49 y.o. Non- / non  male who presents with Extremity Weakness (Right leg and hand weakness), Aphasia, and Facial Droop (Right side. Symptoms since last Friday)   and is admitted to the hospital for the management of CVA (cerebral vascular accident) ([facility]).     Patient presented to the ED today after his

## 2024-05-24 NOTE — CONSULTS
rate rhythm. No murmurs, rubs, or gallops.  ABD: soft, non-distended, non-tender. BS+ and equal.  NEURO: A&O x 3. Sensation intact to light touch. Mild R facial droop, mild dysarthria, slow prosody of speech. R plantar flexor spasticity 1+ on MAS.   MSK: Functional ROM impaired RUE and RLE. Muscle tone and bulk are normal bilaterally. Strength 5/5 key muscles LUE and LLE. 4-/5 R  and 4/5 R elbow flexion/extension. 4/5 R hip flexion and knee extension. 3/5 R ankle dorsiflexion.   EXT: No calf tenderness to palpation or edema BLEs.  SKIN: Warm dry and intact with good turgor.  PSYCH: Mood WNL. Affect WNL. Appropriately interactive.    Impression:    Ischemic CVA with worsened R dominant hemiparesis: on ASA, Plavix  Dysarthria  HTN/HLD: on losartan. Ezetimibe, amlodipine, carvedilol, furosemide, rosuvastatin  ESRD: on HD MWF, follows with Dr. Lovell, on Phoslo  Anemia: on iron supplement  DM I: on Lantus  Depression: on bupropion, fluoxetine  Hx CVA: residual R hemiparesis - worsened now  Allergic Rhinitis: on Zyrtec     Recommendations:    Diagnosis:  Ischemic CVA with worsened R dominant hemiparesis  Therapy: Has SLP needs, await PT/OT evals  Medical Necessity: As above  Support: Lives with grandparents  Rehab Recommendation: Will follow for therapy evaluations. Anticipate he may benefit from acute rehab due to diagnosis and functional decline from baseline with new CVA.   DVT Prophylaxis: on heparin    It was my pleasure to evaluate Steven L Severance today.  Please call with questions.    LAI RAMOS MD        This note is created with the assistance of a speech recognition program.  While intending to generate a document that actually reflects the content of the visit, the document can still have some errors including those of syntax and sound a like substitutions which may escape proof reading.  In such instances, actual meaning can be extrapolated by contextual diversion.

## 2024-05-24 NOTE — CARE COORDINATION
Social work: Per patient request, referral to [facility] acute rehab.                        Post Acute Facility/Agency List     Provided patient with the following list, the list includes the overall star ratings obtained from CMS per the Medicare Web site (www.Medicare.gov):     [] Long Term Acute Care Facilities  [x] Acute Inpatient Rehabilitation Facilities  [] Skilled Nursing Facilities  [] Home Care    Provided verbal instructions on how to utilize the QR Code to obtain additional detailed star ratings from www.Medicare.gov     offered to print and provide the detailed list:    []Accepted   [x]Declined

## 2024-05-24 NOTE — PROGRESS NOTES
unspecified type. A diagnosis of Cerebrovascular accident (CVA), unspecified mechanism (HCC) was also pertinent to this visit.  Past Medical History:  has a past medical history of Cerebral artery occlusion with cerebral infarction (HCC), Closed fracture of bone of right foot, Dialysis patient (HCC), Hemodialysis patient (HCC), Hyperlipidemia, Hypertension, Kidney disease, and Type 1 diabetes mellitus (HCC).  Past Surgical History:  has a past surgical history that includes AV fistula creation (Left); Wrist surgery (1995); Upper gastrointestinal endoscopy (N/A, 8/3/2022); and Colonoscopy (N/A, 8/3/2022).    Assessment   Performance deficits / Impairments: Decreased functional mobility ;Decreased safe awareness;Decreased balance;Decreased ADL status;Decreased ROM;Decreased endurance;Decreased high-level IADLs;Decreased strength;Decreased fine motor control  Assessment: Pt needing SBA for bed mobility this date with HOB elevated and bed rails. Pt sitting EOB unsupported with noted posterior LOB needing min A to correct at EOB. Pt needing min A for sit to stand and mod Ax2 for stand to sit on first attempt as pt demo decreased safety, and plopped back on bed. Pt reporting noticing his mistakes and progressed to min A for second attempt with good safety. Pt ambulating with min Ax2 in room with RW d/t decreased balance, scissoring gait, and increased difficulty with turning. Pt needing max A to tonia/doff socks at EOB. Pt attempting figure 4 tech with RLE needing A to hold RLE up. Pt demo decreased FMC needing A to tonia socks after attempts. Pt needing A with donning AFO braces to BLE. Pt ROM in RUE decreased compared to LUE which pt stated was new. Pt with no significant deficits in strength in RUE compared to LUE. Pt 4-5th digits on B hands noted decreased extension in PIP joints. Pt would benefit from continued OT to increase indep with ADLs/IADLs for d/c.  Prognosis: Fair  Decision Making: High Complexity  REQUIRES OT  Exceptions  Arousal/Alertness: Appears intact  Following Commands: Follows multistep commands with repitition;Follows multistep commands with increased time  Attention Span: Appears intact  Memory: Decreased recall of precautions  Safety Judgement: Decreased awareness of need for assistance;Decreased awareness of need for safety  Problem Solving: Assistance required to generate solutions;Assistance required to correct errors made;Assistance required to implement solutions;Decreased awareness of errors  Insights: Decreased awareness of deficits  Initiation: Does not require cues  Sequencing: Requires cues for some  Cognition Comment: Patient able to verbalize that he knows he should back up to chair and reach back to sit safely, but acknowledges that he forgets to do it.  Orientation  Overall Orientation Status: Within Normal Limits  Orientation Level: Oriented X4     Education Given To: Patient;Family  Education Provided: Role of Therapy;ADL Adaptive Strategies;Fall Prevention Strategies;Plan of Care;Transfer Training;Equipment;Energy Conservation;Home Exercise Program;IADL Safety  Education Provided Comments: OT role, POC, safety, pacing, OOB activity, continued mobility, call light use, theraputty use and foam block use, AE/DME.  Education Method: Verbal;Demonstration  Barriers to Learning: None  Education Outcome: Verbalized understanding;Continued education needed    AM-PAC - ADL  AM-PAC Daily Activity - Inpatient   How much help is needed for putting on and taking off regular lower body clothing?: A Lot  How much help is needed for bathing (which includes washing, rinsing, drying)?: A Lot  How much help is needed for toileting (which includes using toilet, bedpan, or urinal)?: A Lot  How much help is needed for putting on and taking off regular upper body clothing?: A Little  How much help is needed for taking care of personal grooming?: A Little  How much help for eating meals?: A Little  AM-PAC Inpatient

## 2024-05-24 NOTE — PROGRESS NOTES
Pt admitted to room 1019 from ED, he is A/O x4, on room air and NSR on monitor. Vitals take and stable, he denies pain. Telemetry applied. Admission database and med rec completed. Bed alarm on with bed in lowest position, call light is within reach. All needs met at this time, safety maintained.

## 2024-05-24 NOTE — PROGRESS NOTES
Pt called out stating that he is starting to feel \"light headed\". Blood sugar checked and is 69. 16g of glucose tabs given, recheck is 98. All needs met at this time.

## 2024-05-24 NOTE — PROGRESS NOTES
[facility]  Office: [phone]  Wander Gresham DO, Ok Antoine DO, Delmar Morgan DO, Esdras Emmanuel DO, Jelly Carrasco MD, Liliana Alvarez MD, Anastasiya Musa MD, Kyleigh Fine MD,  Brendon Jason MD, Madi Cooper MD, Shelley Zuniga MD,  Gloria Flores DO, Carla Hubbard MD, Rey Su MD, Adalid rGesham DO, Leticia Hansen MD,  Osbaldo Segovia DO, Enid Cai MD, Ayleen Morales MD, Rossi Mitchell MD, Jaylen Douglas MD,  Frantz Elam MD, Qasim Gutierrez MD, Nabil Armendariz MD, Flako Ponce MD, Stephen Corbett MD, Marge Jaime MD, Ed Wong DO, Rm Arroyo DO, Almas Carlin MD,  Kerwin Burnette MD, Shirley Waterhouse, CNP,  Cynthia Pickett CNP, Zachary Yu, CNP,  Lou Quinteros, DNP, Ayana Canseco, CNP, Kathia Maria, CNP, Chani Romero, CNP, Tiffanie Valera, CNP, Jovita Colon, PA-C, Laura Donnelly PA-C, Elba Jay, CNP, Ambika Perez, CNP, Huma Anguiano, CNP, Radha Vines, CNP, Cherri Pérez, CNP, Nunu Zacarias, CNS, Valeria Dong, CNP, Libia Richardson CNP, Tracy Schwab, CNP         [facility]   IN-PATIENT SERVICE   [facility]    Progress Note    5/24/2024    4:11 PM    Name:   Steven L Severance  MRN:     [id number]     Acct:      [id number]   Room:   1019/1019-02   Day:  1  Admit Date:  5/23/2024 12:34 PM    PCP:   Smith Andres MD  Code Status:  Full Code    Subjective:     C/C: back pain  Chief Complaint   Patient presents with    Extremity Weakness     Right leg and hand weakness    Aphasia    Facial Droop     Right side. Symptoms since last Friday     Interval History Status: not changed.     Patient receiving HD. He complains of his right hand wanting to close and his right foot wanting to turn inward involuntarily. He also complains of lower back pain which is chronic. He says he takes Tramadol at home for this. He denies fever, chills, difficulty swallowing. No abdominal pain, nausea, vomiting, chest pain, shortness of  respiratory distress.      Breath sounds: Normal breath sounds. No wheezing, rhonchi or rales.   Abdominal:      General: There is no distension.      Palpations: Abdomen is soft.      Tenderness: There is no abdominal tenderness. There is no guarding.      Hernia: No hernia is present.      Comments: Hypoactive bowel sounds   Musculoskeletal:         General: Deformity present. Normal range of motion.      Cervical back: Normal range of motion and neck supple.      Right lower leg: No edema.      Left lower leg: No edema.   Skin:     General: Skin is warm and dry.      Coloration: Skin is not jaundiced.      Findings: No bruising, erythema, lesion or rash.   Neurological:      Mental Status: He is alert and oriented to person, place, and time.      Cranial Nerves: Cranial nerve deficit present.      Motor: Weakness present.      Coordination: Coordination abnormal.   Psychiatric:         Mood and Affect: Mood normal.         Behavior: Behavior normal.         Thought Content: Thought content normal.         Judgment: Judgment normal.         Assessment:        Hospital Problems             Last Modified POA    * (Principal) CVA (cerebral vascular accident) ([facility]) 5/23/2024 Yes    Elevated troponin 5/23/2024 Yes    ESRD (end stage renal disease) ([facility]) 5/23/2024 Yes    Essential hypertension 5/23/2024 Yes    Hyperlipidemia 5/23/2024 Yes    Type 2 diabetes mellitus with diabetic neuropathy, with long-term current use of insulin ([facility]) 5/23/2024 Yes    GERD (gastroesophageal reflux disease) 5/23/2024 Yes       Plan:        CVA: Speech, physical therapy, and Occupational Therapy following. Diet as ordered. BP meds resumed.  Neurology following- appreciate input. PM&R following.  Plavix, aspirin, statin  Elevated troponin: Labs reviewed. Trop trending down. Monitor for chest pain.  Troponins chronically elevated.  Previous cardiology notes reviewed.  Telemetry  End-stage renal disease on dialysis: Nephrology following-

## 2024-05-24 NOTE — PROGRESS NOTES
HEMODIALYSIS PRE-TREATMENT NOTE    Patient Identifiers prior to treatment: name, birthdate and band    Isolation Required: na                      Isolation Type: na       (please document if patient is being managed as a PUI/COVID-19 patient)        Hepatitis status:                           Date Drawn                             Result  Hepatitis B Surface Antigen 05/22/2024     neg                     Hepatitis B Surface Antibody 10/27/2023 pos     23.0   Hepatitis B Core Antibody            How was Hepatitis Status verified: labs and chart     Was a copy of the labs you documented provided to facility for the patient's chart: yes    Hemodialysis orders verified: yes    Access Within normal limits ( I.e. s/s of infection,...): yes     Pre-Assessment completed: yes By Cyndi VELEZ Rn    Pre-dialysis report received from: Leno ARRIAZA                    Time: 0830

## 2024-05-24 NOTE — PROGRESS NOTES
SLP ALL NOTES  Facility/Department: [facility]   CLINICAL BEDSIDE SWALLOW EVALUATION    NAME: Steven L Severance  : 1975  MRN: [id number]    ADMISSION DATE: 2024  ADMITTING DIAGNOSIS: has ESRD (end stage renal disease) ([facility]); Essential hypertension; Hyperlipidemia; Acute pain of left knee; Bipolar affective disorder ([facility]); Atherosclerosis of aorta ([facility]); COVID-19; Cerebrovascular accident (CVA) ([facility]); Type 2 diabetes mellitus with diabetic neuropathy, with long-term current use of insulin ([facility]); Neuropathy of both feet; GERD (gastroesophageal reflux disease); Right sided weakness; Leg weakness, bilateral; Recurrent falls; Acute idiopathic pericarditis; Small kidney, bilateral; Umbilical hernia without obstruction or gangrene; Disorders of diaphragm; Atherosclerosis of other arteries; Hypotension; Elevated troponin; Hyponatremia; Anemia of chronic disease; Hyperkalemia; Left lower quadrant abdominal pain; Coronary artery disease involving native coronary artery of native heart without angina pectoris; Pneumonia of right lower lobe due to infectious organism; Generalized weakness; Stroke-like episode; Hyperglycemia; Sleep apnea; Chest pain; Non-STEMI (non-ST elevated myocardial infarction) ([facility]); Nonrheumatic mitral valve regurgitation; Metabolic acidosis; NSTEMI (non-ST elevated myocardial infarction) ([facility]); Hypokalemia; Hypomagnesemia; Hx of type 2 diabetes mellitus; Hx of completed stroke; and CVA (cerebral vascular accident) ([facility]) on their problem list.    Date of Eval: 2024  Evaluating Therapist: NICK JONES    Current Diet level:  Current Diet : NPO  Current Liquid Diet : NPO    Primary Complaint  Steven L Severance is a 49 y.o. Non- / non  male who presents with Extremity Weakness (Right leg and hand weakness), Aphasia, and Facial Droop (Right side. Symptoms since last Friday)   and is admitted to the hospital for the management of CVA (cerebral vascular

## 2024-05-24 NOTE — PROGRESS NOTES
HEMODIALYSIS POST TREATMENT NOTE    Treatment time ordered: 210    Actual treatment time: 210    UltraFiltration Goal: 1000  UltraFiltration Removed: 1000      Pre Treatment weight: 85.0  Post Treatment weight: 84.0  Estimated Dry Weight: 84.0    Access used:     Central Venous Catheter:          Tunneled or Non-tunneled: na           Site: na          Access Flow: na      Internal Access:       AV Fistula or AV Graft: fistula         Site: left upper arm       Access Flow: good       Sign and symptoms of infection: no       If YES:     Medications or blood products given: na    Chronic outpatient schedule: [facility]    Chronic outpatient unit: [facility]    Summary of response to treatment: the pt tolerated treatment well    Explain if orders NOT met, was physician notified:valarie      ACES flowsheet faxed to patient unit/ placed in patient chart: yes    Post assessment completed: yes    Report given to: Leno Prather      * Intra-treatment documented Safety Checks include the followin) Access and face visible at all times.     2) All connections and blood lines are secure with no kinks.     3) NVL alarm engaged.     4) Hemosafe device applied (if applicable).     5) No collapse of Arterial or Venous blood chambers.     6) All blood lines / pump segments in the air detectors.

## 2024-05-24 NOTE — CARE COORDINATION
Case Management Assessment  Initial Evaluation    Date/Time of Evaluation: 5/24/2024 1:57 PM  Assessment Completed by: Shagufta Olivera RN    If patient is discharged prior to next notation, then this note serves as note for discharge by case management.    Patient Name: Steven L Severance                   YOB: 1975  Diagnosis: Stroke-like symptom [R29.90]  Speech disturbance, unspecified type [R47.9]                   Date / Time: 5/23/2024 12:34 PM    Patient Admission Status: Inpatient   Readmission Risk (Low < 19, Mod (19-27), High > 27): Readmission Risk Score: 22.4    Current PCP: Smith Andres MD  PCP verified by CM? Yes    Chart Reviewed: Yes      History Provided by: Patient, Child/Family  Patient Orientation: Alert and Oriented    Patient Cognition: Alert    Hospitalization in the last 30 days (Readmission):  Yes    If yes, Readmission Assessment in  Navigator will be completed.    Advance Directives:      Code Status: Full Code   Patient's Primary Decision Maker is: Legal Next of Kin    Secondary Decision Maker: trinh gupta - Grandparent - [phone]    Discharge Planning:    Patient lives with: Family Members (Grandparents) Type of Home: House  Primary Care Giver: Family  Patient Support Systems include: Family Members   Current Financial resources: Medicare  Current community resources: Other (Comment) ([facility] @ [phone])  Current services prior to admission: Durable Medical Equipment            Current DME: Walker, Wheelchair, Shower Chair (rollator, ramp)            Type of Home Care services:  None    ADLS  Prior functional level: Mobility, Shopping, Housework, Cooking  Current functional level: Assistance with the following:, Cooking, Housework, Shopping, Mobility, Bathing, Toileting    PT AM-PAC:   /24  OT AM-PAC:   /24    Family can provide assistance at DC: Yes  Would you like Case Management to discuss the discharge plan with any other family members/significant others, and

## 2024-05-24 NOTE — CARE COORDINATION
05/24/24 1354   Readmission Assessment   Number of Days since last admission? 1-7 days   Previous Disposition Home with Family   Who is being Interviewed Patient   What was the patient's/caregiver's perception as to why they think they needed to return back to the hospital? Other (Comment)  (stroke)   Did you visit your Primary Care Physician after you left the hospital, before you returned this time? No   Why weren't you able to visit your PCP? Other (Comment)  (has appt 6/24)   Did you see a specialist, such as Cardiac, Pulmonary, Orthopedic Physician, etc. after you left the hospital? No   Who advised the patient to return to the hospital? Self-referral   Does the patient report anything that got in the way of taking their medications? No   In our efforts to provide the best possible care to you and others like you, can you think of anything that we could have done to help you after you left the hospital the first time, so that you might not have needed to return so soon? Arrange for more help when leaving the hospital

## 2024-05-24 NOTE — PROGRESS NOTES
Occupational Therapy    DATE: 2024    NAME: Steven L Severance  MRN: [id number]   : 1975    Patient not seen this date for Occupational Therapy due to:      [] Cancel by RN or physician due to:    [x] Hemodialysis    [] Critical Lab Value Level     [] Blood transfusion in progress    [] Acute or unstable cardiovascular status   _MAP < 55 or more than >115  _HR < 40 or > 130    [] Acute or unstable pulmonary status   -FiO2 > 60%   _RR < 5 or >40    _O2 sats < 85%    [] Strict Bedrest    [] Off Unit for surgery or procedure    [] Off Unit for testing       [] Pending imaging to R/O fracture    [] Refusal by Patient      [] Intubated    [] Other      [] OT being discontinued at this time. Patient independent. No further needs.     [] OT being discontinued at this time as the patient has been transferred to hospice care. No further needs.    Breanne Souza OTR/L

## 2024-05-24 NOTE — CONSULTS
Date/Time     05/24/2024 06:26 AM    K 4.4 05/24/2024 06:26 AM    CL 97 05/24/2024 06:26 AM    CO2 30 05/24/2024 06:26 AM    BUN 33 05/24/2024 06:26 AM    CREATININE 7.7 05/24/2024 06:26 AM    GLUCOSE 68 05/24/2024 06:26 AM    CALCIUM 9.4 05/24/2024 06:26 AM    BILITOT 0.3 04/07/2023 12:50 AM    ALKPHOS 83 04/07/2023 12:50 AM    AST 12 04/07/2023 12:50 AM    ALT 9 04/07/2023 12:50 AM      Hepatic:   Lab Results   Component Value Date    AST 12 04/07/2023    AST 10 08/10/2022    AST 11 07/31/2022    ALT 9 04/07/2023    ALT 9 08/10/2022    ALT 10 07/31/2022    BILITOT 0.3 04/07/2023    BILITOT 0.27 (L) 08/10/2022    BILITOT 0.35 07/31/2022    ALKPHOS 83 04/07/2023    ALKPHOS 139 (H) 08/10/2022    ALKPHOS 134 (H) 07/31/2022     BNP: No results found for: \"BNP\"  Lipids:   Lab Results   Component Value Date    CHOL 105 05/24/2024    HDL 55 05/24/2024     INR:   Lab Results   Component Value Date    INR 1.1 05/17/2024    INR 1.0 05/17/2024    INR 1.0 02/21/2024     PTH: No results found for: \"PTH\"  Phosphorus:    Lab Results   Component Value Date/Time    PHOS 3.7 05/24/2024 06:26 AM     Ionized Calcium: No components found for: \"IONCA\"  Magnesium:   Lab Results   Component Value Date/Time    MG 2.0 05/24/2024 06:26 AM     Albumin: No results found for: \"LABALBU\"  Last 3 CK, CKMB, Troponin: @LABRCNT(CKTOTAL:3,CKMB:3,TROPONINI:3)       URINE:)No results found for: \"NAUR\", \"PROTUR\"    Radiology:   Reviewed.    Assessment:  End stage renal disease.  Hyperphosphatemia.  Hypertension.  Anemia of ESRD.    Plan:  We will follow phosphorus level and adjust binders as needed.  We will follow H&H and adjust erythropoeitin stimulating agent as needed.  Acid Base status stable and at target.  K and Phos at target.  On Coreg, Losartan and Amlodipine. Monitor BP.  Avoid Lovenox.  We will continue dialysis on MWF schedule.  On GERTRUDIS diet with 1200 ml oral fluid restriction.  We will follow up chemistries daily in AM.    Thank you

## 2024-05-25 VITALS
DIASTOLIC BLOOD PRESSURE: 73 MMHG | RESPIRATION RATE: 16 BRPM | SYSTOLIC BLOOD PRESSURE: 158 MMHG | BODY MASS INDEX: 29.07 KG/M2 | HEIGHT: 67 IN | OXYGEN SATURATION: 97 % | TEMPERATURE: 98.2 F | HEART RATE: 63 BPM | WEIGHT: 185.19 LBS

## 2024-05-25 LAB
ANION GAP SERPL CALCULATED.3IONS-SCNC: 10 MMOL/L (ref 9–17)
BASOPHILS # BLD: 0.04 K/UL (ref 0–0.2)
BASOPHILS NFR BLD: 1 % (ref 0–2)
BUN SERPL-MCNC: 17 MG/DL (ref 6–20)
BUN/CREAT SERPL: 3 (ref 9–20)
CALCIUM SERPL-MCNC: 9.4 MG/DL (ref 8.6–10.4)
CARDIOLIPIN IGA SER IA-ACNC: ABNORMAL APL
CARDIOLIPIN IGG SER IA-ACNC: ABNORMAL GPL
CARDIOLIPIN IGM SER IA-ACNC: ABNORMAL MPL
CHLORIDE SERPL-SCNC: 98 MMOL/L (ref 98–107)
CO2 SERPL-SCNC: 26 MMOL/L (ref 20–31)
CREAT SERPL-MCNC: 5.1 MG/DL (ref 0.7–1.2)
DILUTE RUSSELL VIPER VENOM TIME: ABNORMAL
EOSINOPHIL # BLD: 0.15 K/UL (ref 0–0.44)
EOSINOPHILS RELATIVE PERCENT: 3 % (ref 1–4)
ERYTHROCYTE [DISTWIDTH] IN BLOOD BY AUTOMATED COUNT: 12.8 % (ref 11.8–14.4)
GFR, ESTIMATED: 13 ML/MIN/1.73M2
GLUCOSE BLD-MCNC: 208 MG/DL (ref 75–110)
GLUCOSE BLD-MCNC: 249 MG/DL (ref 75–110)
GLUCOSE SERPL-MCNC: 220 MG/DL (ref 70–99)
HCT VFR BLD AUTO: 32.2 % (ref 40.7–50.3)
HGB BLD-MCNC: 10.5 G/DL (ref 13–17)
IMM GRANULOCYTES # BLD AUTO: 0.02 K/UL (ref 0–0.3)
IMM GRANULOCYTES NFR BLD: 0 %
INR PPP: 1
LUPUS ANTICOAG: ABNORMAL
LYMPHOCYTES NFR BLD: 1.14 K/UL (ref 1.1–3.7)
LYMPHOCYTES RELATIVE PERCENT: 22 % (ref 24–43)
MAGNESIUM SERPL-MCNC: 1.9 MG/DL (ref 1.6–2.6)
MCH RBC QN AUTO: 30.8 PG (ref 25.2–33.5)
MCHC RBC AUTO-ENTMCNC: 32.6 G/DL (ref 28.4–34.8)
MCV RBC AUTO: 94.4 FL (ref 82.6–102.9)
MONOCYTES NFR BLD: 0.76 K/UL (ref 0.1–1.2)
MONOCYTES NFR BLD: 15 % (ref 3–12)
NEUTROPHILS NFR BLD: 59 % (ref 36–65)
NEUTS SEG NFR BLD: 3.14 K/UL (ref 1.5–8.1)
NRBC BLD-RTO: 0 PER 100 WBC
PARTIAL THROMBOPLASTIN TIME: 35.8 SEC (ref 23.9–33.8)
PHOSPHATE SERPL-MCNC: 2.3 MG/DL (ref 2.5–4.5)
PLATELET # BLD AUTO: 152 K/UL (ref 138–453)
PMV BLD AUTO: 10.3 FL (ref 8.1–13.5)
POTASSIUM SERPL-SCNC: 5 MMOL/L (ref 3.7–5.3)
PROTHROMBIN TIME: 13.6 SEC (ref 11.5–14.2)
RBC # BLD AUTO: 3.41 M/UL (ref 4.21–5.77)
SODIUM SERPL-SCNC: 134 MMOL/L (ref 135–144)
WBC OTHER # BLD: 5.3 K/UL (ref 3.5–11.3)

## 2024-05-25 PROCEDURE — 97530 THERAPEUTIC ACTIVITIES: CPT

## 2024-05-25 PROCEDURE — 85613 RUSSELL VIPER VENOM DILUTED: CPT

## 2024-05-25 PROCEDURE — 85306 CLOT INHIBIT PROT S FREE: CPT

## 2024-05-25 PROCEDURE — 6370000000 HC RX 637 (ALT 250 FOR IP): Performed by: INTERNAL MEDICINE

## 2024-05-25 PROCEDURE — 97116 GAIT TRAINING THERAPY: CPT

## 2024-05-25 PROCEDURE — 6360000002 HC RX W HCPCS: Performed by: NURSE PRACTITIONER

## 2024-05-25 PROCEDURE — 6370000000 HC RX 637 (ALT 250 FOR IP): Performed by: PSYCHIATRY & NEUROLOGY

## 2024-05-25 PROCEDURE — 82947 ASSAY GLUCOSE BLOOD QUANT: CPT

## 2024-05-25 PROCEDURE — 86146 BETA-2 GLYCOPROTEIN ANTIBODY: CPT

## 2024-05-25 PROCEDURE — 85025 COMPLETE CBC W/AUTO DIFF WBC: CPT

## 2024-05-25 PROCEDURE — 36415 COLL VENOUS BLD VENIPUNCTURE: CPT

## 2024-05-25 PROCEDURE — 99232 SBSQ HOSP IP/OBS MODERATE 35: CPT | Performed by: PSYCHIATRY & NEUROLOGY

## 2024-05-25 PROCEDURE — 97110 THERAPEUTIC EXERCISES: CPT

## 2024-05-25 PROCEDURE — 85300 ANTITHROMBIN III ACTIVITY: CPT

## 2024-05-25 PROCEDURE — 6370000000 HC RX 637 (ALT 250 FOR IP): Performed by: NURSE PRACTITIONER

## 2024-05-25 PROCEDURE — 81241 F5 GENE: CPT

## 2024-05-25 PROCEDURE — 86147 CARDIOLIPIN ANTIBODY EA IG: CPT

## 2024-05-25 PROCEDURE — 84100 ASSAY OF PHOSPHORUS: CPT

## 2024-05-25 PROCEDURE — 85307 ASSAY ACTIVATED PROTEIN C: CPT

## 2024-05-25 PROCEDURE — 85730 THROMBOPLASTIN TIME PARTIAL: CPT

## 2024-05-25 PROCEDURE — 2580000003 HC RX 258: Performed by: NURSE PRACTITIONER

## 2024-05-25 PROCEDURE — 85303 CLOT INHIBIT PROT C ACTIVITY: CPT

## 2024-05-25 PROCEDURE — 83735 ASSAY OF MAGNESIUM: CPT

## 2024-05-25 PROCEDURE — 80048 BASIC METABOLIC PNL TOTAL CA: CPT

## 2024-05-25 PROCEDURE — 85610 PROTHROMBIN TIME: CPT

## 2024-05-25 PROCEDURE — 81240 F2 GENE: CPT

## 2024-05-25 PROCEDURE — 2500000003 HC RX 250 WO HCPCS: Performed by: NURSE PRACTITIONER

## 2024-05-25 RX ORDER — INSULIN LISPRO 100 [IU]/ML
0-4 INJECTION, SOLUTION INTRAVENOUS; SUBCUTANEOUS NIGHTLY
Status: DISCONTINUED | OUTPATIENT
Start: 2024-05-25 | End: 2024-05-25 | Stop reason: HOSPADM

## 2024-05-25 RX ORDER — ROSUVASTATIN CALCIUM 10 MG/1
10 TABLET, COATED ORAL DAILY
Qty: 30 TABLET | Refills: 3 | Status: SHIPPED | OUTPATIENT
Start: 2024-05-26

## 2024-05-25 RX ORDER — INSULIN LISPRO 100 [IU]/ML
0-4 INJECTION, SOLUTION INTRAVENOUS; SUBCUTANEOUS
Status: DISCONTINUED | OUTPATIENT
Start: 2024-05-25 | End: 2024-05-25 | Stop reason: HOSPADM

## 2024-05-25 RX ORDER — CLOPIDOGREL BISULFATE 75 MG/1
75 TABLET ORAL DAILY
Qty: 30 TABLET | Refills: 3 | Status: SHIPPED | OUTPATIENT
Start: 2024-05-26

## 2024-05-25 RX ADMIN — CALCIUM ACETATE 1334 MG: 667 CAPSULE ORAL at 08:43

## 2024-05-25 RX ADMIN — SENNOSIDES AND DOCUSATE SODIUM 1 TABLET: 50; 8.6 TABLET ORAL at 08:43

## 2024-05-25 RX ADMIN — FUROSEMIDE 20 MG: 20 TABLET ORAL at 08:43

## 2024-05-25 RX ADMIN — ROSUVASTATIN CALCIUM 10 MG: 10 TABLET, FILM COATED ORAL at 08:43

## 2024-05-25 RX ADMIN — PANTOPRAZOLE SODIUM 40 MG: 40 TABLET, DELAYED RELEASE ORAL at 05:25

## 2024-05-25 RX ADMIN — CARVEDILOL 25 MG: 25 TABLET, FILM COATED ORAL at 08:43

## 2024-05-25 RX ADMIN — LOSARTAN POTASSIUM 50 MG: 100 TABLET, FILM COATED ORAL at 08:44

## 2024-05-25 RX ADMIN — CETIRIZINE HYDROCHLORIDE 5 MG: 5 TABLET ORAL at 08:43

## 2024-05-25 RX ADMIN — SODIUM CHLORIDE, PRESERVATIVE FREE 10 ML: 5 INJECTION INTRAVENOUS at 08:45

## 2024-05-25 RX ADMIN — EZETIMIBE 10 MG: 10 TABLET ORAL at 08:43

## 2024-05-25 RX ADMIN — HEPARIN SODIUM 5000 UNITS: 5000 INJECTION INTRAVENOUS; SUBCUTANEOUS at 05:26

## 2024-05-25 RX ADMIN — AMLODIPINE BESYLATE 10 MG: 10 TABLET ORAL at 08:43

## 2024-05-25 RX ADMIN — FLUOXETINE HYDROCHLORIDE 20 MG: 20 CAPSULE ORAL at 08:43

## 2024-05-25 RX ADMIN — CLOPIDOGREL BISULFATE 75 MG: 75 TABLET ORAL at 08:44

## 2024-05-25 RX ADMIN — INSULIN LISPRO 1 UNITS: 100 INJECTION, SOLUTION INTRAVENOUS; SUBCUTANEOUS at 11:50

## 2024-05-25 RX ADMIN — INSULIN LISPRO 1 UNITS: 100 INJECTION, SOLUTION INTRAVENOUS; SUBCUTANEOUS at 08:42

## 2024-05-25 RX ADMIN — INSULIN GLARGINE 25 UNITS: 100 INJECTION, SOLUTION SUBCUTANEOUS at 08:42

## 2024-05-25 RX ADMIN — FERROUS SULFATE TAB EC 325 MG (65 MG FE EQUIVALENT) 325 MG: 325 (65 FE) TABLET DELAYED RESPONSE at 08:43

## 2024-05-25 RX ADMIN — BUPROPION HYDROCHLORIDE 150 MG: 150 TABLET, EXTENDED RELEASE ORAL at 08:43

## 2024-05-25 RX ADMIN — CYANOCOBALAMIN TAB 1000 MCG 2500 MCG: 1000 TAB at 08:43

## 2024-05-25 RX ADMIN — ASPIRIN 81 MG: 81 TABLET, COATED ORAL at 08:43

## 2024-05-25 ASSESSMENT — ENCOUNTER SYMPTOMS
RESPIRATORY NEGATIVE: 1
EYES NEGATIVE: 1
GASTROINTESTINAL NEGATIVE: 1
BACK PAIN: 0

## 2024-05-25 NOTE — DISCHARGE SUMMARY
[facility]  Office: [phone]  Wander Gresham DO, Ok Antoine DO, Delmar Morgan DO, Esdras Emmanuel DO, Jelly Carrasco MD, Liliana Alvarez MD, Anastasiya Musa MD, Kyleigh Fine MD,  Brendon Jason MD, Madi Cooper MD, Shelley Zuniga MD,  Gloria Flores DO, Carla Hubbard MD, Rey Su MD, Adalid Gresham DO, Leticia Hansen MD,  Osbaldo Segovia DO, Enid Cai MD, Ayleen Morales MD, Rossi Mitchell MD, Jaylen Douglas MD,  Frantz Elam MD, Qasim Gutierrez MD, Nabil Armendariz MD, Flako Ponce MD, Stephen Corbett MD, Marge Jaime MD, Ed Wong DO, Rm Arroyo DO, Almas Carlin MD,  Kerwin Burnette MD, Shirley Waterhouse, CNP,  Cynthia Pickett CNP, Zachary Yu, CNP,  Lou Quinteros, VASILE, Ayana Canseco, CNP, Kathia Maria, CNP, Chani Romero CNP, Tiffanie Valera, CNP, Jovita Colon, PA-C, Laura Donnelly PA-C, Elba Jay, CNP, Ambika Perez, CNP, Huma Anguiano CNP, Radha Vines CNP, Cherri Pérez CNP, Nunu Zacarias, CNS, Valeria Dong, CNP, Libia Richardson CNP, Tracy Schwab, CNP         [facility]   IN-PATIENT SERVICE   [facility]    Discharge Summary     Patient ID: Steven L Severance  :  1975   MRN: [id number]     ACCOUNT:  [id number]   Patient's PCP: Smith Andres MD  Admit Date: 2024   Discharge Date: 2024     Length of Stay: 2  Code Status:  Full Code  Admitting Physician: Jelly Carrasco MD  Discharge Physician: Nunu L Tobian, APRN - CNS     Active Discharge Diagnoses:     Hospital Problem Lists:  Principal Problem:    CVA (cerebral vascular accident) ([facility])  Active Problems:    Elevated troponin    ESRD (end stage renal disease) ([facility])    Essential hypertension    Hyperlipidemia    Type 2 diabetes mellitus with diabetic neuropathy, with long-term current use of insulin ([facility])    GERD (gastroesophageal reflux disease)    Hx of arterial ischemic stroke    Speech disturbance  Resolved Problems:    * No resolved    furosemide 20 MG tablet  Commonly known as: Lasix  Take 1 tablet by mouth in the morning.     glucose 4 g chewable tablet  Take 4 tablets by mouth as needed for Low blood sugar     insulin aspart 100 UNIT/ML injection vial  Commonly known as: NOVOLOG     Lantus SoloStar 100 UNIT/ML injection pen  Generic drug: insulin glargine  Inject 25 Units into the skin in the morning and 25 Units before bedtime.     loratadine 10 MG tablet  Commonly known as: CLARITIN     losartan 50 MG tablet  Commonly known as: COZAAR  Take 1 tablet by mouth daily     [employer] Pen Vevay 31G X 8 MM Misc  Generic drug: Insulin Pen Needle  1 each by Does not apply route daily     Misc. Devices Misc  Disp: custom molded shoes to accommodate for brace   DX: DM with history of stroke, foot drop  Duration: 1 year     omeprazole 40 MG delayed release capsule  Commonly known as: PRILOSEC     ondansetron 4 MG disintegrating tablet  Commonly known as: ZOFRAN-ODT  Place 1 tablet under the tongue every 8 hours as needed for Nausea or Vomiting     sennosides-docusate sodium 8.6-50 MG tablet  Commonly known as: SENOKOT-S     VITAMIN B-12 PO            STOP taking these medications      FeroSul 325 (65 Fe) MG tablet  Generic drug: ferrous sulfate     montelukast 10 MG tablet  Commonly known as: SINGULAIR     pantoprazole 40 MG tablet  Commonly known as: PROTONIX     QUEtiapine 25 MG tablet  Commonly known as: SEROQUEL     Retacrit 2000 UNIT/ML injection  Generic drug: epoetin pennie-epbx               Where to Get Your Medications        These medications were sent to [facility] [id number] [place] [address] [phone] - F [phone]  [address] [facility] [address]      Phone: [phone]   clopidogrel 75 MG tablet  rosuvastatin 10 MG tablet         No discharge procedures on file.    Time Spent on discharge is  34 mins in patient examination, evaluation, counseling as well as medication reconciliation, prescriptions for required

## 2024-05-25 NOTE — PROGRESS NOTES
Physical Therapy  Facility/Department: [facility] CARE  Rehabilitation Physical Therapy Treatment Note    NAME: Steven L Severance  : 1975 (49 y.o.)  MRN: [id number]  CODE STATUS: Full Code    Date of Service: 24     Pt is currently functional below baseline and recommend comprehensive and intensive skilled therapy by a multidisciplinary team. Would expect patient to be able to tolerate 3 hours of therapy per day and able to tolerate at least one hour up in chair.  Please refer to AM-PAC score for current mobility/adl level.      Restrictions:  Restrictions/Precautions: Fall Risk, General Precautions  Required Braces or Orthoses  Right Lower Extremity Brace: Ankle Foot Orthotics  Left Lower Extremity Brace: Yakelin Foot Orthotics  Position Activity Restriction  Other position/activity restrictions: Up as tolerated, telemetry.     SUBJECTIVE  Subjective  Subjective: Patient up in bed upon therapists arrival. RN reports patient is medically stable for therapy treatment this date.    Chart reviewed prior to treatment and patient is agreeable for therapy.  All lines intact and patient positioned comfortably at end of treatment. Chair alarm on and tested for patient safety.    OBJECTIVE  Cognition  Overall Cognitive Status: Exceptions  Arousal/Alertness: Appears intact  Following Commands: Follows multistep commands with repitition;Follows multistep commands with increased time  Attention Span: Appears intact  Memory: Decreased recall of precautions  Safety Judgement: Decreased awareness of need for assistance;Decreased awareness of need for safety  Problem Solving: Assistance required to generate solutions;Assistance required to correct errors made;Assistance required to implement solutions;Decreased awareness of errors  Insights: Decreased awareness of deficits  Initiation: Does not require cues  Sequencing: Requires cues for some  Orientation  Overall Orientation Status: Within Normal Limits    Functional  Mobility  Bed Mobility  Overall Assistance Level: Modified Independent  Additional Factors: Set-up;Verbal cues;Increased time to complete;Head of bed raised;With handrails  Roll Left  Assistance Level: Modified independent  Roll Right  Assistance Level: Modified independent  Sit to Supine  Skilled Clinical Factors: unable to assess as patient retires to recliner upon completion of PT treatment  Supine to Sit  Assistance Level: Modified independent  Skilled Clinical Factors: good promotion to upright posture.  Patient with noted weakness on R hemisphere and utilized L hemisphere for promotion tasks  Scooting  Assistance Level: Modified independent  Skilled Clinical Factors: good progression to seated EOB posture noted use of accessory muscles to assist in motions.  Balance  Sitting Balance: Supervision (Patient with good seated unsupport posture EOB Patient sits EOB x15 mintues working on various therapeutic activities including DON/DOFF of shoes and AFO's patinet requires assist for success)  Standing Balance: Moderate assistance (Patient is a MIN/MOD x2 for support and stability in stance.)  Standing Balance  Time: up to 2 mintues  Activity: Patient works on control and stability in stance working on core control and stability utilizing UE support on RW assitive device.  Transfers  Surface: To chair with arms;From bed  Additional Factors: Set-up;Verbal cues;Hand placement cues;Increased time to complete  Device: Walker  Sit to Stand  Assistance Level: Minimal assistance;Moderate assistance;Requires x 2 assistance  Skilled Clinical Factors: vc's for hand placement and progression into stance attempting WB through UE to promote neuroplsticity connections on R UE and R LE  Stand to Sit  Assistance Level: Minimal assistance;Moderate assistance;Requires x 2 assistance  Skilled Clinical Factors: vc's for slow controled descent into seated posture to maximize eccentric quad control  Bed To/From Chair  Technique: Stand

## 2024-05-25 NOTE — PROGRESS NOTES
Occupational Therapy  Facility/Department: [facility] PROGRESSIVE CARE  Rehabilitation Occupational Therapy Daily Treatment Note    Date: 24  Patient Name: Steven L Severance       Room: 1019/1019-02  MRN: [id number]  Account: [id number]   : 1975  (49 y.o.) Gender: male      RN Leno reports patient is medically stable for therapy treatment this date. Chart reviewed prior to treatment and patient is agreeable for therapy.  All lines intact and patient positioned comfortably at end of treatment.  All patient needs addressed prior to ending therapy session.      Pt is currently functional below baseline and recommend comprehensive and intensive skilled therapy by a multidisciplinary team. Would expect patient to be able to tolerate 3 hours of therapy per day and able to tolerate at least one hour up in chair.  Please refer to AM-PAC score for current mobility/adl level.                Past Medical History:  has a past medical history of Cerebral artery occlusion with cerebral infarction (HCC), Closed fracture of bone of right foot, Dialysis patient (HCC), Hemodialysis patient ([facility]), Hyperlipidemia, Hypertension, Kidney disease, and Type 1 diabetes mellitus (HCC).  Past Surgical History:   has a past surgical history that includes AV fistula creation (Left); Wrist surgery (); Upper gastrointestinal endoscopy (N/A, 8/3/2022); and Colonoscopy (N/A, 8/3/2022).    Restrictions  Restrictions/Precautions: Fall Risk, General Precautions  Other position/activity restrictions: Up as tolerated, telemetry.  Required Braces or Orthoses  Right Lower Extremity Brace: Ankle Foot Orthotics  Left Lower Extremity Brace: Yakelin Foot Orthotics  Required Braces or Orthoses?: Yes    Subjective  Subjective: Pt sitting EOB with PTA present in room upon arrival agreable to therapy.  Restrictions/Precautions: Fall Risk;General Precautions             Objective     Cognition  Overall Cognitive Status: Exceptions  Arousal/Alertness:  theraputty and foam block. Also gave and edu pt on HEP for theraputty use on own with pt also reporting he has all three levels of resistance at home.     Assessment  Assessment  Assessment: Pt progressing toward goals but still unsteady with mobility. Appeared receptive to edu given on fine motor exercises. Ptwould benefit from continued OT to maximize independence with functional mobility, balance, safety awareness & activity tolerance.  Activity Tolerance: Patient tolerated treatment well  Discharge Recommendations: Patient would benefit from continued therapy after discharge  Safety Devices  Safety Devices in place: Yes  Type of devices: Nurse notified;Left in chair;Chair alarm in place;Call light within reach;Patient at risk for falls;All fall risk precautions in place;Gait belt    Patient Education  Education  Education Given To: Patient;Family (pt's grandfather)  Education Provided: Role of Therapy;Safety;Energy Conservation;Transfer Training;Precautions;Home Exercise Program;Mobility Training;Fall Prevention Strategies  Education Method: Verbal;Demonstration;Printed Information/Hand-outs  Barriers to Learning: None  Education Outcome: Verbalized understanding;Continued education needed    Access Code: [email or web address]  URL: [email or web address]  Date: 05/25/2024  Prepared by: Frederic Hicks II    Exercises  - Putty Squeezes  - 1 x daily - 7 x weekly - 3 sets - 10 reps  - Tip Pinch with Putty  - 1 x daily - 7 x weekly - 3 sets - 10 reps  - Key Pinch with Putty  - 1 x daily - 7 x weekly - 3 sets - 10 reps  - Finger Lumbricals with Putty  - 1 x daily - 7 x weekly - 3 sets - 10 reps  - Seated Finger Extension with Putty  - 1 x daily - 7 x weekly - 3 sets - 10 reps  - Finger Adduction with Putty  - 1 x daily - 7 x weekly - 3 sets - 10 reps    Plan  Occupational Therapy Plan  Times Per Week: 5-7x/wk, 1-2x/day  Current Treatment Recommendations: Strengthening;ROM;Balance training;Functional mobility

## 2024-05-25 NOTE — PROGRESS NOTES
[facility]  Office: [phone]  Wander Gresham DO, Ok Antoine DO, Delmar Morgan DO, Esdras Emmanuel DO, Jelly Carrasco MD, Liliana Alvarez MD, Anastasiya Musa MD, Kyleigh Fine MD,  Brendon Jason MD, Madi Cooper MD, Shelley Zuniga MD,  Gloria Flores DO, Carla Hubbard MD, Rey Su MD, Adalid Gresham DO, Leticia Hansen MD,  Osbaldo Segovia DO, Enid Cai MD, Ayleen Morales MD, Rossi Mitchell MD, Jaylen Douglas MD,  Frantz Elam MD, Qasim Gutierrez MD, Nabil Armendariz MD, Flako Ponce MD, Stephen Corbett MD, Marge Jaime MD, Ed Wong DO, Rm Arroyo DO, Almas Carlin MD,  Kerwin Burnette MD, Shirley Waterhouse, CNP,  Cynthia Pickett CNP, Zachary Yu, CNP,  Lou Quinteros, DNP, Ayana Canseco, CNP, Kathia Maria, CNP, Chani Romero, CNP, Tiffanie Valera, CNP, Jovita Colon, PA-C, Laura Donnelly PA-C, Elba Jay, CNP, Ambika Perez, CNP, Huma Anguiano, CNP, Radha Vines, CNP, Cherri Pérez, CNP, Nunu Zacarias, CNS, Valeria Dong, CNP, Libia Richardson CNP, Tracy Schwab, CNP         [facility]   IN-PATIENT SERVICE   [facility]    Progress Note    5/25/2024    10:30 AM    Name:   Steven L Severance  MRN:     [id number]     Acct:      [id number]   Room:   1019/1019-02   Day:  2  Admit Date:  5/23/2024 12:34 PM    PCP:   Smith Andres MD  Code Status:  Full Code    Subjective:     C/C: right sided weakness  Chief Complaint   Patient presents with    Extremity Weakness     Right leg and hand weakness    Aphasia    Facial Droop     Right side. Symptoms since last Friday     Interval History Status: improved.     Patient up in chair.  His right hand numbness has improved.  Right upper extremity weakness continues along with left lower extremity weakness.  He feels his speech is not at baseline, however he no longer feels that he is biting the inside of his mouth on the right side and says he is able to chew on both sides of his mouth.  He

## 2024-05-25 NOTE — PLAN OF CARE
Problem: Discharge Planning  Goal: Discharge to home or other facility with appropriate resources  5/25/2024 0226 by Chani Zurita RN  Outcome: Progressing  Flowsheets (Taken 5/24/2024 2000)  Discharge to home or other facility with appropriate resources:   Identify barriers to discharge with patient and caregiver   Arrange for needed discharge resources and transportation as appropriate   Identify discharge learning needs (meds, wound care, etc)  5/24/2024 1820 by Ed Blount RN  Outcome: Progressing     Problem: Skin/Tissue Integrity  Goal: Absence of new skin breakdown  Description: 1.  Monitor for areas of redness and/or skin breakdown  2.  Assess vascular access sites hourly  3.  Every 4-6 hours minimum:  Change oxygen saturation probe site  4.  Every 4-6 hours:  If on nasal continuous positive airway pressure, respiratory therapy assess nares and determine need for appliance change or resting period.  5/25/2024 0226 by Chani Zurita RN  Outcome: Progressing  5/24/2024 1820 by Ed Blount RN  Outcome: Progressing     Problem: Safety - Adult  Goal: Free from fall injury  5/25/2024 0226 by Chani Zurita RN  Outcome: Progressing  Flowsheets (Taken 5/24/2024 2000)  Free From Fall Injury: Instruct family/caregiver on patient safety  5/24/2024 1820 by Ed Blount RN  Outcome: Progressing     Problem: Neurosensory - Adult  Goal: Achieves stable or improved neurological status  5/25/2024 0226 by Chani Zurita RN  Outcome: Progressing  Flowsheets (Taken 5/24/2024 2000)  Achieves stable or improved neurological status:   Assess for and report changes in neurological status   Initiate measures to prevent increased intracranial pressure   Maintain blood pressure and fluid volume within ordered parameters to optimize cerebral perfusion and minimize risk of hemorrhage  5/24/2024 1820 by Ed Blount RN  Outcome: Progressing     Problem: Musculoskeletal - Adult  Goal: Return

## 2024-05-25 NOTE — DISCHARGE INSTR - COC
Continuity of Care Form    Patient Name: Steven L Severance   :  1975  MRN:  [id number]    Admit date:  2024  Discharge date:  24    Code Status Order: Full Code   Advance Directives:     Admitting Physician:  Jelly Carrasco MD  PCP: Smith Andres MD    Discharging Nurse: [facility] Unit/Room#: 1019/1019-02  Discharging Unit Phone Number: [phone]    Emergency Contact:   Extended Emergency Contact Information  Primary Emergency Contact: trinh gupta  Home Phone: [phone]  Mobile Phone: [phone]  Relation: Grandparent    Past Surgical History:  Past Surgical History:   Procedure Laterality Date    AV FISTULA CREATION Left     COLONOSCOPY N/A 8/3/2022    COLONOSCOPY DIAGNOSTIC performed by Rose Mary Richard MD at [facility] OR    UPPER GASTROINTESTINAL ENDOSCOPY N/A 8/3/2022    EGD ESOPHAGOGASTRODUODENOSCOPY performed by Rose Mary Richard MD at [facility] OR    WRIST SURGERY         Immunization History:   Immunization History   Administered Date(s) Administered    COVID-19, MODERNA BLUE border, Primary or Immunocompromised, (age 12y+), IM, 100 mcg/0.5mL 2021, 2021, 2022    COVID-19, MODERNA Booster BLUE border, (age 18y+), IM, 50mcg/0.25mL 2022    COVID-19, PFIZER Bivalent, DO NOT Dilute, (age 12y+), IM, 30 mcg/0.3 mL 2022    Influenza Virus Vaccine 2016, 2017, 10/05/2020, 10/21/2020, 09/10/2021    Influenza, FLUCELVAX, (age 6 mo+), MDCK, PF, 0.5mL 09/10/2021    PPD Test 2021, 2021, 06/15/2021, 2022    Pneumococcal Vaccine 2020    TDaP, ADACEL (age 10y-64y), BOOSTRIX (age 10y+), IM, 0.5mL 10/21/2011, 2020       Active Problems:  Patient Active Problem List   Diagnosis Code    ESRD (end stage renal disease) (HCC) N18.6    Essential hypertension I10    Hyperlipidemia E78.5    Acute pain of left knee M25.562    Bipolar affective disorder ([facility]) F31.9    Atherosclerosis of aorta ([facility]) I70.0    COVID-19 U07.1     (Oral)   Resp 16   Ht 1.702 m (5' 7\")   Wt 84 kg (185 lb 3 oz)   SpO2 99%   BMI 29.00 kg/m²     Last documented pain score (0-10 scale):    Last Weight:   Wt Readings from Last 1 Encounters:   05/24/24 84 kg (185 lb 3 oz)     Mental Status:  oriented and alert    IV Access:  - None    Nursing Mobility/ADLs:  Walking   Assisted  Transfer  Assisted  Bathing  Assisted  Dressing  Assisted  Toileting  Assisted  Feeding  Assisted  Med Admin  Assisted  Med Delivery   whole    Wound Care Documentation and Therapy:  Wound 05/30/22 Foot Left;Lateral;Posterior (Active)   Number of days: 725       Wound 05/30/22 Ankle Left;Medial (Active)   Number of days: 725        Elimination:  Continence:   Bowel: Yes  Bladder: Yes  Urinary Catheter: None   Colostomy/Ileostomy/Ileal Conduit:        Date of Last BM: 5/25/24    Intake/Output Summary (Last 24 hours) at 5/25/2024 1006  Last data filed at 5/24/2024 2000  Gross per 24 hour   Intake 510 ml   Output 1500 ml   Net -990 ml     I/O last 3 completed shifts:  In: 510 [I.V.:10]  Out: 1500     Safety Concerns:     History of Falls (last 30 days) and At Risk for Falls    Impairments/Disabilities:      Speech and Contractures - ble    Nutrition Therapy:  Current Nutrition Therapy:   - Oral Diet:  General    Routes of Feeding: Oral  Liquids: Thin Liquids  Daily Fluid Restriction:   Last Modified Barium Swallow with Video (Video Swallowing Test):     Treatments at the Time of Hospital Discharge:   Respiratory Treatments:   Oxygen Therapy:    Ventilator:        Rehab Therapies: Physical Therapy and Occupational Therapy  Weight Bearing Status/Restrictions: No weight bearing restrictions  Other Medical Equipment (for information only, NOT a DME order):  braces ble  Other Treatments: dilaysis    Patient's personal belongings (please select all that are sent with patient):  None    RN SIGNATURE:  Electronically signed by Ed Blount RN on 5/25/24 at 10:19 AM EDT    CASE

## 2024-05-25 NOTE — CARE COORDINATION
Social [facility]. Will need amanda,Advised floor of [facility] acceptance.  Report [phone]  Fax [phone]  Daniel mendez    Social work: faxed amanda to [facility] and they are finding ut if they can get their doctor to see pt today and if so pt can come if not it can be set for the morning for sure. Ambulances are booking at 8 pm already. Will follow. If pt is still ok for wheelchair will be able to get him to ARU when ready. Lou mendez    Social work: Clarice to transport at 2 pm as [facility] wants pt there by 6 pm today or wait till tomorrow. Family in room per Rn she will advise of time and plan. Paperwork faxed to [facility] [place] they are having trouble with phones so try for report [phone]. Lou mendez

## 2024-05-25 NOTE — PROGRESS NOTES
All patient belongings collected. IV and telemetry removed. Discharge paperwork given and explained to patient. See discharge event for further details. Report called to [facility] rehab, all questions and concerns asked and addressed wcm. Pt discharged via transport to facility

## 2024-05-25 NOTE — PROGRESS NOTES
Reason for Follow up: ESRD.    HISTORY:    Weakness is slowly improving.    Review Of Systems:   Constitutional: No fever, chills, lethargy, weakness or wt loss.  Cardiac:  No chest pain, dyspnea, orthopnea or PND.  Pulmonary:  No cough, phlegm or wheezing.  Abdomen:  No abdominal pain, nausea, vomiting or diarrhea.  :   No hematuria, pyuria, dysuria or flank pain.  Extremities:  No swelling or joint pains.    Scheduled Meds:   insulin lispro  0-4 Units SubCUTAneous TID WC    insulin lispro  0-4 Units SubCUTAneous Nightly    sodium zirconium cyclosilicate  10 g Oral Once    clopidogrel  75 mg Oral Daily    rosuvastatin  10 mg Oral Daily    [START ON 5/27/2024] epoetin pennie-epbx  5,000 Units SubCUTAneous Once per day on Mon Wed Fri    amLODIPine  10 mg Oral Daily    aspirin  81 mg Oral Daily    buPROPion  150 mg Oral QAM    carvedilol  25 mg Oral BID     vitamin B-12  2,500 mcg Oral Daily    ezetimibe  10 mg Oral Daily    ferrous sulfate  325 mg Oral Daily with breakfast    FLUoxetine  20 mg Oral BID    furosemide  20 mg Oral Daily    insulin glargine  25 Units SubCUTAneous BID    losartan  50 mg Oral Daily    pantoprazole  40 mg Oral QAM AC    sennosides-docusate sodium  1 tablet Oral Daily    sodium chloride flush  10 mL IntraVENous 2 times per day    heparin (porcine)  5,000 Units SubCUTAneous 3 times per day    calcium acetate  1,334 mg Oral BID with meals    cetirizine  5 mg Oral Daily   Continuous Infusions:   sodium chloride      dextrose       PRN Meds:traMADol, sodium chloride flush, sodium chloride, ondansetron **OR** ondansetron, labetalol, glucose, dextrose bolus **OR** dextrose bolus, glucagon (rDNA), dextrose    No Known Allergies    Physical Exam:  Blood pressure (!) 158/73, pulse 63, temperature 98.2 °F (36.8 °C), temperature source Oral, resp. rate 16, height 1.702 m (5' 7\"), weight 84 kg (185 lb 3 oz), SpO2 97 %.  In: 510 [I.V.:10]  Out: 1500   In: 510   Out: 1500     General:  Awake, alert, not  in distress. Appears to be stated age.  HEENT: Atraumatic, normocephalic. Anicteric sclera. Pink and moist oral mucosa. No carotid bruit. No JVD.  Chest: Bilateral air entry, clear to auscultation, no wheezing, rhonchi or rales.  Cardiovascular: RRR, S1S2, no murmur, rub or gallop. Has lower extremity edema.    Abdomen: Soft, non tender to palpation. Active bowel sounds x 4 quadrants.  Musculoskeletal: Active ROM x 4 extremities. No cyanosis or clubbing.  Integumentary: Pink, warm and dry. Free from rash or lesions. Skin turgor normal.  CNS: Oriented to person, place and time. Speech clear. Face symmetrical. No tremor.     Data:  CBC:   Lab Results   Component Value Date    WBC 5.3 05/25/2024    HGB 10.5 (L) 05/25/2024    HCT 32.2 (L) 05/25/2024    MCV 94.4 05/25/2024     05/25/2024     BMP:    Lab Results   Component Value Date     (L) 05/25/2024    K 5.0 05/25/2024    CL 98 05/25/2024    CO2 26 05/25/2024    BUN 17 05/25/2024    CREATININE 5.1 (HH) 05/25/2024    GLUCOSE 220 (H) 05/25/2024     CMP:   Lab Results   Component Value Date     (L) 05/25/2024    K 5.0 05/25/2024    CL 98 05/25/2024    CO2 26 05/25/2024    BUN 17 05/25/2024    CREATININE 5.1 (HH) 05/25/2024    GLUCOSE 220 (H) 05/25/2024    CALCIUM 9.4 05/25/2024    BILITOT 0.3 04/07/2023    ALKPHOS 83 04/07/2023    AST 12 04/07/2023    ALT 9 04/07/2023      Hepatic:   Lab Results   Component Value Date    AST 12 04/07/2023    ALT 9 04/07/2023    BILITOT 0.3 04/07/2023    ALKPHOS 83 04/07/2023     BNP: No results found for: \"BNP\"  Lipids:   Lab Results   Component Value Date    CHOL 105 05/24/2024    HDL 55 05/24/2024     INR:   Lab Results   Component Value Date    INR 1.0 05/25/2024     PTH: No results found for: \"PTH\"  Phosphorus:    Lab Results   Component Value Date    PHOS 2.3 (L) 05/25/2024     Ionized Calcium: No components found for: \"IONCA\"  Magnesium:   Lab Results   Component Value Date    MG 1.9 05/25/2024     Albumin: No

## 2024-05-25 NOTE — PROGRESS NOTES
[facility]   IN-PATIENT SERVICE      NEUROLOGY PROGRESS  NOTE            Date:   5/25/2024  Patient name:  Steven L Severance  Date of admission:  5/23/2024  YOB: 1975      Interval History:     No acute events.  Reports right-sided weakness is mildly improved.  No new neurological changes.    History of Present Illness:     49-year-old male with past medical history of hypertension, hyperlipidemia, diabetes with diabetic neuropathy and retinopathy, ESRD on HD, basal ganglia and pontine stroke with residual right-sided weakness, who presented with worsening right-sided weakness, right facial droop, dysarthria.  History obtained from patient, dad at bedside, and chart review.     Patient has a history of basal ganglia and pontine stroke with residual right-sided weakness.  Last Friday, he states while in dialysis, he experienced worsening of his right-sided weakness, gait instability.  The nurse also noticed that his right side of the face was drooping more and speech was dysarthric.  He was subsequently taken to [facility].  Evaluated by neurology.  MRI brain was done-no acute findings.  His symptoms improved and he was discharged.  There was concern he had recrudescence of symptoms in setting of hemodynamic changes during dialysis.  After discharge, he states there was mild improvement in his weakness but then the next day, he continued to have worsening symptoms.  Due to persistent symptoms, he presented to the ED yesterday.  He denies any associated headache, visual changes, focal numbness, aphasia.     Patient has a complex past neurological history.  His initial stroke was in April 2022 involving left posterior basal ganglia.  At the time, he had right face, arm and leg weakness/numbness.  He was started on aspirin, Plavix along with Crestor.  Then, in August 2022, he presented to [facility] with worsening right-sided weakness.  He was only taking aspirin at the time,

## 2024-05-26 LAB
APCR PPP: 4.69 {RATIO}
EST. AVERAGE GLUCOSE BLD GHB EST-MCNC: 171 MG/DL
HBA1C MFR BLD: 7.6 % (ref 4–6)
PROT S FREE AG ACT/NOR PPP IA: 85 % (ref 74–147)

## 2024-05-27 LAB
F5 P.R506Q BLD/T QL: NEGATIVE
SPECIMEN SOURCE: NORMAL

## 2024-05-29 LAB
B2 GLYCOPROT1 IGA SERPL IA-ACNC: 0.6 ELISA U/ML (ref 0–7)
B2 GLYCOPROT1 IGG SERPL IA-ACNC: 1.1 ELISA U/ML (ref 0–7)
B2 GLYCOPROT1 IGM SERPL IA-ACNC: <0.9 ELISA U/ML (ref 0–7)

## 2024-05-30 LAB
AT III ACT/NOR PPP CHRO: 85 % (ref 83–122)
CARDIOLIPIN IGA SER IA-ACNC: 1.6 APL (ref 0–14)
CARDIOLIPIN IGG SER IA-ACNC: 1.1 GPL (ref 0–10)
CARDIOLIPIN IGM SER IA-ACNC: 1.3 MPL (ref 0–10)
DILUTE RUSSELL VIPER VENOM TIME: ABNORMAL
F2 C.20210G>A GENO BLD/T: NEGATIVE
INR PPP: 1
PARTIAL THROMBOPLASTIN TIME: 35.8 SEC (ref 23.9–33.8)
PROTEIN C ACTIVITY: 86 %
PROTHROMBIN TIME: 13.6 SEC (ref 11.5–14.2)
SPECIMEN SOURCE: NORMAL

## 2024-06-14 ENCOUNTER — HOSPITAL ENCOUNTER (EMERGENCY)
Age: 49
Discharge: HOME | End: 2024-06-15
Attending: EMERGENCY MEDICINE
Payer: MEDICARE

## 2024-06-14 DIAGNOSIS — K56.41 FECAL IMPACTION IN RECTUM (HCC): Primary | ICD-10-CM

## 2024-06-14 PROCEDURE — 80053 COMPREHEN METABOLIC PANEL: CPT

## 2024-06-14 PROCEDURE — 85025 COMPLETE CBC W/AUTO DIFF WBC: CPT

## 2024-06-14 PROCEDURE — 85610 PROTHROMBIN TIME: CPT

## 2024-06-14 PROCEDURE — 83605 ASSAY OF LACTIC ACID: CPT

## 2024-06-14 PROCEDURE — 99283 EMERGENCY DEPT VISIT LOW MDM: CPT

## 2024-06-14 ASSESSMENT — PAIN - FUNCTIONAL ASSESSMENT: PAIN_FUNCTIONAL_ASSESSMENT: 0-10

## 2024-06-14 ASSESSMENT — PAIN SCALES - GENERAL: PAINLEVEL_OUTOF10: 5

## 2024-06-14 ASSESSMENT — PAIN DESCRIPTION - LOCATION: LOCATION: RECTUM

## 2024-06-15 VITALS
RESPIRATION RATE: 16 BRPM | TEMPERATURE: 98.2 F | WEIGHT: 192 LBS | HEART RATE: 73 BPM | BODY MASS INDEX: 30.13 KG/M2 | SYSTOLIC BLOOD PRESSURE: 130 MMHG | DIASTOLIC BLOOD PRESSURE: 65 MMHG | OXYGEN SATURATION: 100 % | HEIGHT: 67 IN

## 2024-06-15 LAB
ALBUMIN SERPL-MCNC: 3.8 G/DL (ref 3.5–5.2)
ALP SERPL-CCNC: 105 U/L (ref 40–129)
ALT SERPL-CCNC: 27 U/L (ref 5–41)
ANION GAP SERPL CALCULATED.3IONS-SCNC: 12 MMOL/L (ref 9–17)
AST SERPL-CCNC: 22 U/L
BASOPHILS # BLD: 0.06 K/UL (ref 0–0.2)
BASOPHILS NFR BLD: 1 % (ref 0–2)
BILIRUB SERPL-MCNC: 0.4 MG/DL (ref 0.3–1.2)
BUN SERPL-MCNC: 26 MG/DL (ref 6–20)
BUN/CREAT SERPL: 5 (ref 9–20)
CALCIUM SERPL-MCNC: 9.7 MG/DL (ref 8.6–10.4)
CHLORIDE SERPL-SCNC: 98 MMOL/L (ref 98–107)
CO2 SERPL-SCNC: 26 MMOL/L (ref 20–31)
CREAT SERPL-MCNC: 5.6 MG/DL (ref 0.7–1.2)
EOSINOPHIL # BLD: 0.36 K/UL (ref 0–0.44)
EOSINOPHILS RELATIVE PERCENT: 5 % (ref 1–4)
ERYTHROCYTE [DISTWIDTH] IN BLOOD BY AUTOMATED COUNT: 13.2 % (ref 11.8–14.4)
GFR, ESTIMATED: 12 ML/MIN/1.73M2
GLUCOSE SERPL-MCNC: 208 MG/DL (ref 70–99)
HCT VFR BLD AUTO: 29.3 % (ref 40.7–50.3)
HGB BLD-MCNC: 9.6 G/DL (ref 13–17)
IMM GRANULOCYTES # BLD AUTO: 0.01 K/UL (ref 0–0.3)
IMM GRANULOCYTES NFR BLD: 0 %
INR PPP: 1
LACTATE BLDV-SCNC: 1.7 MMOL/L (ref 0.5–2.2)
LYMPHOCYTES NFR BLD: 0.97 K/UL (ref 1.1–3.7)
LYMPHOCYTES RELATIVE PERCENT: 14 % (ref 24–43)
MCH RBC QN AUTO: 31.4 PG (ref 25.2–33.5)
MCHC RBC AUTO-ENTMCNC: 32.8 G/DL (ref 28.4–34.8)
MCV RBC AUTO: 95.8 FL (ref 82.6–102.9)
MONOCYTES NFR BLD: 0.86 K/UL (ref 0.1–1.2)
MONOCYTES NFR BLD: 12 % (ref 3–12)
NEUTROPHILS NFR BLD: 68 % (ref 36–65)
NEUTS SEG NFR BLD: 4.71 K/UL (ref 1.5–8.1)
NRBC BLD-RTO: 0 PER 100 WBC
PLATELET # BLD AUTO: 161 K/UL (ref 138–453)
PMV BLD AUTO: 10.3 FL (ref 8.1–13.5)
POTASSIUM SERPL-SCNC: 4.2 MMOL/L (ref 3.7–5.3)
PROT SERPL-MCNC: 5.9 G/DL (ref 6.4–8.3)
PROTHROMBIN TIME: 12.9 SEC (ref 11.5–14.2)
RBC # BLD AUTO: 3.06 M/UL (ref 4.21–5.77)
SODIUM SERPL-SCNC: 136 MMOL/L (ref 135–144)
WBC OTHER # BLD: 7 K/UL (ref 3.5–11.3)

## 2024-06-15 ASSESSMENT — ENCOUNTER SYMPTOMS
ANAL BLEEDING: 1
CONSTIPATION: 1

## 2024-06-15 NOTE — ED PROVIDER NOTES
EMERGENCY DEPARTMENT ENCOUNTER    Pt Name: Steven L Severance  MRN: [id number]  Birthdate 1975  Date of evaluation: 6/15/24  CHIEF COMPLAINT       Chief Complaint   Patient presents with    Rectal Bleeding     STATES HAVING CONSTIPATION, TOOK MEDS TO GO BUT CAN'T AND ONLY HAVING BLOOD     HISTORY OF PRESENT ILLNESS   49-year-old male presents emergency room for constipation and rectal discomfort.  Patient reports that he has stool in his rectum.  It is painful.  Patient reported that constipation has been ongoing for 2 days.  He is not having abdominal pain but mostly just rectal pain.  Today when he was trying to have a bowel movement he had a little bit of blood come out of the rectum.             REVIEW OF SYSTEMS     Review of Systems   Gastrointestinal:  Positive for anal bleeding and constipation.     PASTMEDICAL HISTORY     Past Medical History:   Diagnosis Date    Cerebral artery occlusion with cerebral infarction ([facility])     Closed fracture of bone of right foot March - April 2020    Dialysis patient ([facility])     Hemodialysis patient ([facility])     Hyperlipidemia     Hypertension     Kidney disease     On Dialysis    Type 1 diabetes mellitus ([facility])      Past Problem List  Patient Active Problem List   Diagnosis Code    ESRD (end stage renal disease) ([facility]) N18.6    Essential hypertension I10    Hyperlipidemia E78.5    Acute pain of left knee M25.562    Bipolar affective disorder ([facility]) F31.9    Atherosclerosis of aorta ([facility]) I70.0    COVID-19 U07.1    Cerebrovascular accident (CVA) ([facility]) I63.9    Type 2 diabetes mellitus with diabetic neuropathy, with long-term current use of insulin ([facility]) E11.40, Z79.4    Neuropathy of both feet G57.93    GERD (gastroesophageal reflux disease) K21.9    Right sided weakness R53.1    Leg weakness, bilateral R29.898    Recurrent falls R29.6    Acute idiopathic pericarditis I30.0    Small kidney, bilateral N27.1    Umbilical hernia without obstruction or gangrene K42.9    Disorders of

## 2024-06-15 NOTE — ED PROVIDER NOTES
EMERGENCY DEPARTMENT ENCOUNTER    Pt Name: Steven L Severance  MRN: [id number]  Birthdate 1975  Date of evaluation: 6/15/24  CHIEF COMPLAINT       Chief Complaint   Patient presents with   • Rectal Bleeding     STATES HAVING CONSTIPATION, TOOK MEDS TO GO BUT CAN'T AND ONLY HAVING BLOOD     HISTORY OF PRESENT ILLNESS   49-year-old male presents emergency room for trouble with bowel movements and some blood from the rectum.  Patient has felt constipated for about 2 days.  He has a lot of firm stool in the rectum.             REVIEW OF SYSTEMS     Review of Systems  PASTMEDICAL HISTORY     Past Medical History:   Diagnosis Date   • Cerebral artery occlusion with cerebral infarction ([facility])    • Closed fracture of bone of right foot March - April 2020   • Dialysis patient ([facility])    • Hemodialysis patient ([facility])    • Hyperlipidemia    • Hypertension    • Kidney disease     On Dialysis   • Type 1 diabetes mellitus ([facility])      Past Problem List  Patient Active Problem List   Diagnosis Code   • ESRD (end stage renal disease) ([facility]) N18.6   • Essential hypertension I10   • Hyperlipidemia E78.5   • Acute pain of left knee M25.562   • Bipolar affective disorder ([facility]) F31.9   • Atherosclerosis of aorta ([facility]) I70.0   • COVID-19 U07.1   • Cerebrovascular accident (CVA) ([facility]) I63.9   • Type 2 diabetes mellitus with diabetic neuropathy, with long-term current use of insulin ([facility]) E11.40, Z79.4   • Neuropathy of both feet G57.93   • GERD (gastroesophageal reflux disease) K21.9   • Right sided weakness R53.1   • Leg weakness, bilateral R29.898   • Recurrent falls R29.6   • Acute idiopathic pericarditis I30.0   • Small kidney, bilateral N27.1   • Umbilical hernia without obstruction or gangrene K42.9   • 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   • Atherosclerosis of other arteries I70.8   • Hypotension I95.9   • Elevated troponin R79.89   • Hyponatremia E87.1   • Anemia of chronic disease D63.8   • Hyperkalemia E87.5   • Left lower quadrant  specified.    Erica B Goldberger, MD

## 2024-06-15 NOTE — DISCHARGE INSTRUCTIONS
I would recommend starting stool softener and/or laxative over the next couple of days to ensure softer stool in the future.

## 2024-06-22 PROBLEM — R79.89 ELEVATED TROPONIN: Status: RESOLVED | Noted: 2022-05-30 | Resolved: 2024-06-22

## 2024-07-03 ENCOUNTER — OFFICE VISIT (OUTPATIENT)
Dept: PODIATRY | Age: 49
End: 2024-07-03
Payer: MEDICARE

## 2024-07-03 VITALS — BODY MASS INDEX: 30.13 KG/M2 | HEIGHT: 67 IN | WEIGHT: 192 LBS

## 2024-07-03 DIAGNOSIS — D23.72 BENIGN NEOPLASM OF SKIN OF LOWER LIMB, INCLUDING HIP, LEFT: ICD-10-CM

## 2024-07-03 DIAGNOSIS — M79.604 PAIN IN BOTH LOWER EXTREMITIES: ICD-10-CM

## 2024-07-03 DIAGNOSIS — I73.9 PERIPHERAL VASCULAR DISORDER (HCC): ICD-10-CM

## 2024-07-03 DIAGNOSIS — E11.51 TYPE II DIABETES MELLITUS WITH PERIPHERAL CIRCULATORY DISORDER (HCC): Primary | ICD-10-CM

## 2024-07-03 DIAGNOSIS — M79.605 PAIN IN BOTH LOWER EXTREMITIES: ICD-10-CM

## 2024-07-03 DIAGNOSIS — B35.1 DERMATOPHYTOSIS OF NAIL: ICD-10-CM

## 2024-07-03 DIAGNOSIS — D23.71 BENIGN NEOPLASM OF SKIN OF LOWER LIMB, INCLUDING HIP, RIGHT: ICD-10-CM

## 2024-07-03 PROCEDURE — G8417 CALC BMI ABV UP PARAM F/U: HCPCS | Performed by: PODIATRIST

## 2024-07-03 PROCEDURE — 2022F DILAT RTA XM EVC RTNOPTHY: CPT | Performed by: PODIATRIST

## 2024-07-03 PROCEDURE — 11721 DEBRIDE NAIL 6 OR MORE: CPT | Performed by: PODIATRIST

## 2024-07-03 PROCEDURE — G8427 DOCREV CUR MEDS BY ELIG CLIN: HCPCS | Performed by: PODIATRIST

## 2024-07-03 PROCEDURE — 99213 OFFICE O/P EST LOW 20 MIN: CPT | Performed by: PODIATRIST

## 2024-07-03 PROCEDURE — 3017F COLORECTAL CA SCREEN DOC REV: CPT | Performed by: PODIATRIST

## 2024-07-03 PROCEDURE — 17110 DESTRUCTION B9 LES UP TO 14: CPT | Performed by: PODIATRIST

## 2024-07-03 PROCEDURE — 1036F TOBACCO NON-USER: CPT | Performed by: PODIATRIST

## 2024-07-03 PROCEDURE — 3051F HG A1C>EQUAL 7.0%<8.0%: CPT | Performed by: PODIATRIST

## 2024-07-03 RX ORDER — SEVELAMER CARBONATE 800 MG/1
800 TABLET, FILM COATED ORAL
COMMUNITY
Start: 2024-06-13

## 2024-07-03 RX ORDER — CYCLOBENZAPRINE HCL 5 MG
TABLET ORAL
COMMUNITY
Start: 2024-06-13

## 2024-07-03 RX ORDER — TRAMADOL HYDROCHLORIDE 50 MG/1
TABLET ORAL
COMMUNITY
Start: 2024-06-13

## 2024-07-03 RX ORDER — CALCIUM ACETATE 667 MG/1
CAPSULE ORAL
COMMUNITY
Start: 2024-05-17

## 2024-07-10 NOTE — PROGRESS NOTES
discussed with the pt and all of their questions were answered in detail. Proper foot hygiene and care was discussed with the pt. Informed patient on proper diabetic foot care and importance of tight glycemic control.  Patient to check feet daily and contact the office with any questions/problems/concerns.   Other comorbidity noted and will be managed by PCP.  7/3/2024    Electronically signed by Kavitha Mary DPM on 7/9/2024 at 9:03 PM  7/3/2024

## 2024-08-27 NOTE — ED PROVIDER NOTES
EMERGENCY DEPARTMENT ENCOUNTER    Pt Name: Meli Mcintyre  MRN: [id number]  [place] 1975  Date of evaluation: 4/12/22  CHIEF COMPLAINT       Chief Complaint   Patient presents with    Numbness     Right side leg and arm numbness since yesterday morning at [facility]   This is a 50-year-old male that presents with complaints of right leg numbness and tingling and weakness associated with some right arm numbness tingling and weakness. The patient states that his symptoms began yesterday morning. Patient describes going to bed normally on Sunday evening, woke up Monday morning with this clumsiness in his right foot and some clumsiness in his right hand. Patient describes his symptoms as moderate, without any aggravating relieving factors, denies any chest pain or shortness of breath, he did have dialysis this morning. REVIEW OF SYSTEMS     Review of Systems   Constitutional: Negative for chills and fever. HENT: Negative for rhinorrhea and sore throat. Eyes: Negative for discharge, redness and visual disturbance. Respiratory: Negative for cough and shortness of breath. Cardiovascular: Negative for chest pain, palpitations and leg swelling. Gastrointestinal: Negative for diarrhea, nausea and vomiting. Musculoskeletal: Negative for arthralgias, myalgias and neck pain. Skin: Negative for color change and rash. Neurological: Positive for weakness and numbness. Negative for seizures and headaches. Psychiatric/Behavioral: Negative for hallucinations, self-injury and suicidal ideas.      PASTMEDICAL HISTORY     Past Medical History:   Diagnosis Date    Closed fracture of bone of right foot March - April 2020    Dialysis patient [facility])     Kidney disease     On Dialysis    Type 1 diabetes mellitus ([facility] [address]      Past Problem List  Patient Active Problem List   Diagnosis Code    ESRD (end stage renal disease) on dialysis ([facility] [address] N18.6, Z99.2    Hypertension I10    Hyperlipidemia E78.5    Acute pain of left knee M25.562    Bipolar affective disorder (HCC) F31.9    Coronary atherosclerosis I25.10    COVID-19 U07.1     SURGICAL HISTORY     No past surgical history on file. CURRENT MEDICATIONS       Previous Medications    AMLODIPINE (NORVASC) 10 MG TABLET    Take 10 mg by mouth daily    AMMONIUM LACTATE (LAC-HYDRIN) 12 % LOTION    Apply topically daily.     ASPIRIN 81 MG EC TABLET    Take 81 mg by mouth daily    BUPROPION (WELLBUTRIN XL) 150 MG EXTENDED RELEASE TABLET    Take 1 tablet by mouth every morning    CALCIUM ACETATE 667 MG TABS    Take 667 mg by mouth 3 times daily (with meals)     DEXAMETHASONE (DECADRON) 2 MG TABLET    dexamethasone 2 mg tablet    EZETIMIBE (ZETIA) 10 MG TABLET    Take 10 mg by mouth daily    FLUOXETINE (PROZAC) 20 MG CAPSULE    Take 40 mg by mouth daily    FLUTICASONE (FLONASE) 50 MCG/ACT NASAL SPRAY    1 spray by Each Nostril route daily for 10 days    FUROSEMIDE (LASIX) 20 MG TABLET    Take 1 tablet by mouth daily    GABAPENTIN (NEURONTIN) 300 MG CAPSULE    gabapentin 300 mg capsule    INSULIN GLARGINE (LANTUS) 100 UNIT/ML INJECTION VIAL    Inject 45 units in the morning and 5 units SQ at  night    INSULIN LISPRO (HUMALOG) 100 UNIT/ML INJECTION VIAL    Inject into the skin 3 times daily (before meals) Per sliding scale, by 2s    LISINOPRIL (PRINIVIL;ZESTRIL) 20 MG TABLET    Take 10 mg by mouth daily     MAGNESIUM OXIDE (MAG-OX) 400 MG TABLET    Take 400 mg by mouth daily    METOPROLOL SUCCINATE (TOPROL XL) 200 MG EXTENDED RELEASE TABLET    Take 1 tablet by mouth daily    OMEPRAZOLE (PRILOSEC) 40 MG DELAYED RELEASE CAPSULE    Take 40 mg by mouth daily    PEG 3350-KCL-NABCB-NACL-NASULF (PEG-3350/ELECTROLYTES) 236 G SOLR        PROMETHAZINE (PHENERGAN) 25 MG TABLET    Take 1 tablet by mouth every 6 hours as needed for Nausea    ROSUVASTATIN CALCIUM 40 MG CPSP    Take 40 mg by mouth daily    TRAMADOL (ULTRAM) 50 MG TABLET    Take 50 mg by [FreeTextEntry6] : Seen in office yesterday for periorbital cellulitis of R eye. Started on Cefdinir.  Today, pt is opening his eye more but parents noted some discharge on eyelashes.  Eye is not red.  Some redness and slight swelling noted over L upper eyelid as well.  Not tender to touch.  mouth 2 times daily. VITAMIN B-12 (CYANOCOBALAMIN) 500 MCG TABLET    Take 500 mcg by mouth daily     ALLERGIES     has No Known Allergies. FAMILY HISTORY     He indicated that the status of his mother is unknown. He indicated that the status of his father is unknown. He indicated that the status of his maternal great grandfather is unknown. SOCIAL HISTORY       Social History     Tobacco Use    Smoking status: Never Smoker    Smokeless tobacco: Never Used   Vaping Use    Vaping Use: Never used   Substance Use Topics    Alcohol use: Never    Drug use: Never     PHYSICAL EXAM     INITIAL VITALS: BP (!) 161/70   Pulse 59   Temp 97.9 °F (36.6 °C) (Oral)   Resp 14   Ht 5' 6.5\" (1.689 m)   SpO2 100%   BMI 28.30 kg/m²    Physical Exam  Constitutional:       Appearance: Normal appearance. He is well-developed. He is not ill-appearing or toxic-appearing. HENT:      Head: Normocephalic and atraumatic. Eyes:      Conjunctiva/sclera: Conjunctivae normal.      Pupils: Pupils are equal, round, and reactive to light. Neck:      Trachea: Trachea normal.   Cardiovascular:      Rate and Rhythm: Normal rate and regular rhythm. Heart sounds: S1 normal and S2 normal. No murmur heard. Pulmonary:      Effort: Pulmonary effort is normal. No accessory muscle usage or respiratory distress. Breath sounds: Normal breath sounds. Chest:      Chest wall: No deformity or tenderness. Abdominal:      General: Bowel sounds are normal. There is no distension or abdominal bruit. Palpations: Abdomen is not rigid. Tenderness: There is no abdominal tenderness. There is no guarding or rebound. Musculoskeletal:      Cervical back: Normal range of motion and neck supple. Skin:     General: Skin is warm. Findings: No rash. Neurological:      Mental Status: He is alert and oriented to person, place, and time. GCS: GCS eye subscore is 4. GCS verbal subscore is 5. GCS motor subscore is 6. Cranial Nerves: Cranial nerves are intact. Sensory: Sensory deficit present. Motor: Weakness present. Comments: Examination the patient's bilateral upper extremities reveals some mild weakness in the right arm and hand compared to the left. Examination of the lower extremities bilaterally reveals weakness in the right leg compared to the left. Psychiatric:         Speech: Speech normal.         MEDICAL DECISION MAKIN-year-old presents with weakness in the right arm and right leg, concerning for stroke, while outside of the TPA window, symptoms for over 24 hours at this time. Plan is CT brain basic labs and reevaluation. 3:07 PM EDT  Patient was discussed with the stroke interventionalists, he recommended aspirin and Plavix, keep here in the hospital on ordering MRI and MRA. I discussed the case with the hospitalist who agrees with admission.          CRITICAL CARE:       PROCEDURES:    Critical Care  Performed by: Emily Chilel MD  Authorized by: Emily Chilel MD     Critical care provider statement:     Critical care time (minutes):  36    Critical care time was exclusive of:  Separately billable procedures and treating other patients and teaching time    Critical care was necessary to treat or prevent imminent or life-threatening deterioration of the following conditions:  CNS failure or compromise (Stroke)    Critical care was time spent personally by me on the following activities:  Discussions with consultants, evaluation of patient's response to treatment, examination of patient, pulse oximetry, re-evaluation of patient's condition, ordering and review of radiographic studies and ordering and review of laboratory studies        DIAGNOSTIC RESULTS   EKG:All EKG's are interpreted by the Emergency Department Physician who either signs or Co-signs this chart in the absence of a cardiologist.    Patient's EKG shows sinus bradycardia with rate of 58 IN QRS QTC normal is

## 2024-09-25 NOTE — CONSULTS
[] [facility]        Outpatient Physical                Therapy       [address]       Phone: [phone]       Fax: [phone] [x] [facility]       Promotion at [address] [facility]       Phone: [phone]       Fax: [phone] [] [facility]      for Health Promotion    [address]     Phone: [phone]     Fax:  [phone]     Physical Therapy Lower Extremity Evaluation    Date:  10/25/2021  Patient: Lucas Zuluaga  : 1975  MRN: [id number]  Physician: Dr. Sosa Almendarez: Medicare ([address] [place] [address])  Medical Diagnosis: Closed fracture of proximal end of left tibia, unspecified fracture morphology, sequela    Rehab Codes: M25.662, R53.1, R26.9, R27.9  Onset date: 21  Next Dr's appt.: 21        Subjective:   CC: Pt arrives for physical therapy evaluation of L knee pain, anteriorly per pt; onset ~1.5 yrs ago after a fracture after falling onto cement tae while in dialysis. Recent MRI reveals still poor healing and avulsed fracture of PCL with tibia. Pain with any weightbearing but worsening with ascending inclines and standing; laying and sitting improves the pain. Using rollator for ~1 year, then got B AFOs. Denies any falls since ~1.5 yrs ago, but admits to some stumbles in which he's able to catch himself. HPI: \"Half the muscle in each foot is gone\", numbness in B feet potentially secondary to DM I (pt unsure) - present for ~1 yr, wears B AFOs. Going to dialysis T/Th/Sat, for the past 3 years.        Prior Level of Function: driving, independence with all ADL/IADLs     Current Level of Function: limited to ~30 min standing/walking time, difficulty bending down to pick things up off the ground, floor transfers very difficult,  Grandparents have to help him get in/out of shower d/t pain/imbalance, difficulty with LE dressing and needs to sit, grandparents driving for him and having to get groceries      Red Flags:      Yes  No Comments   Fatigue [] [x]     Fever/chills/sweats [] [x]    Malaise  [] [x]    Mental status change [] [x]     Paresthesias/numbness/weakness [x] []  numbness in B feet   Unexplained weight changes [] [x]     Lightheaded/dizziness [x] [] \"randomly\" will get low blood pressure - 153/78 pt reports is around his normal   Shortness of breath [] [x]         Home Environment: Lives with grandparents in [place] with 2 ramps. 1st-floor bathroom with 1st-floor bedroom. Laundry done by grandparents but on first floor.     Available assistive devices: bilateral AFOs, rollator        PMHx: [] Unremarkable [x] Diabetes type I [] HTN  [] Pacemaker   [] MI/Heart Problems [] Cancer [] Arthritis [] Other:              [] Refer to full medical chart  In EPIC   Tests: [] X-Ray: [x] MRI:  7/30/21\"   FINDINGS:   MENISCI: Medial meniscus and the lateral meniscus are intact.  There is no   evidence of meniscal tear or parameniscal cyst.       CRUCIATE LIGAMENTS: The ACL and the PCL are intact.  The PCL is abnormally   thickened and intermediate in signal intensity.  There is a chronic   appearing, minimally displaced avulsion fracture involving the posterior   margin of the tibia, at the distal PCL insertion.       EXTENSOR MECHANISM: The extensor mechanism is intact.       LATERAL COLLATERAL LIGAMENT COMPLEX: The iliotibial band and lateral   collateral ligament complex are intact.       MEDIAL COLLATERAL LIGAMENT COMPLEX: The medial collateral ligament is normal.       KNEE JOINT: No significant joint effusion or Baker's cyst.  The joint   alignment and joint spaces are maintained.       The articular cartilage is well maintained.       BONE MARROW: There is no evidence of acute marrow edema.  As noted, there is   a minimally displaced and comminuted fracture involving the posterior margin   of the tibia.  The lack of associated marrow edema is consistent with a   chronic process.       No evidence of osteochondral lesion.           Impression   1. Chronic appearing, mildly displaced and comminuted avulsion fracture   involving the posterior margin of the tibia, at the distal PCL insertion.  No   associated marrow edema. 2. The PCL is intact but abnormally thickened and intermediate in signal   intensity compatible with chronic sprain.    3. No evidence of meniscal tear.      [] Other:     Comorbidities:   [x] Obesity [x] Dialysis  [] Other:   [] Asthma/COPD [] Dementia [] Other:   [] Stroke [] Sleep apnea [] Other:   [] Vascular disease [] Rheumatic disease [] Other:       Medications: [x] Refer to full medical record [] None [] Other:  Allergies:      [x] Refer to full medical record [] None [] Other:    Working:  N/A  Job/ADL Description: N/A      Pain:  [x] Yes  [] No Location: anterior L knee Pain Rating: (0-10 scale) 0/10 with sitting, up to 7/10 with standing  Pain altered Tx:  [] Yes  [x] No  Action:  Symptoms:  [] Improving [] Worsening [] Same  Better:  Laying, resting leg  Worse: any weightbearing, cold  Sleep: [] OK    [x] Disturbed on cold nights    Patient Goals: \"be able to do stuff like bending down, going up stairs\"        OBJECTIVE:    Observation:  OBSERVATION No Deficit Deficit Not Tested Comments   Posture    x   Mild genurecurvatum   Palpation [] [x] [] TTP inferomedial to patella, medial joint line   Sensation [] [x] [] For light touch:  - Complete sensory loss in distal med/lat L ankle and foot; near complete (~90%) in distal R foot, 100% absent in R ankle   Edema [x] [] []           Neurological [] [x] [] Drop foot bilat   Patellar Mobility [x] [] []     Patellar Orientation [x] [] []     Gait [] [x] []  Analysis: inconsistent stepping, ataxic, excessive trunk L rotation and arm swing for balance, decreased speed and step length B, narrow JASIEL   - Reflexes: Absent (0) at B achilles tendon; 2+ bilat patellar tendon          ROM  ° A/P STRENGTH    Left Right Left Right   Hip Flex Ext       ER       IR       ABD       ADD       Knee Flx 120* 130 5 5   Ext +3* +5 5- 5   Ankle DF   3+ 3+   PF   2+ 3+   INV   5- 3+   EVER   5- 3+          Hamstring SLR length: 40deg bilat, tight         Functional Tests:   10MWT:   - self selected: 17.09s = .56 m/s (limited community ambulator)  - fast: 13.37s = .75 m/s (limited community ambulator)    Squatting: Significantly decreased depth, needs UE assist, fearful of LLE giving out, RLE majority WB    SLS: Needs UE assist otherwise cannot hold, but for single UE assist:  - LLE: 2 sec  - RLE: 30 sec    Balance reactions:   - appropriate anterior perturbation ankle/hip strategies for low-moderate perturbations  - lack of ankle/hip strategies for low-mod posterior perturbations with inefficient stepping strategy      Functional Assessment    FUNCTION Normal Difficult Unable Comments   Sitting [x] [] []     Standing [] [x] []     Ambulation [] [x] []     Groom/Dress [] [x] []     Lift/Carry [] [x] []     Stairs [] [x] []     Bending [] [x] []     Squat [] [x] []     Kneel [] [x] []           Assessment: Jamel Rodriguez is a 52 yo male who presents with impaired gait mechanics, standing balance, and L knee pain secondary to non-healing tibal fracture from 1.5 yrs ago with comminuted avulsion fx and chronic PCL strain. Pt case complicated with bilat dropfoot d/t neuropathy. Neurological exam completed and noted to have all symptoms consistent with peripheral neuropathy and no other signs indicating other pathology, pt denies progressing symptoms. Pt will benefit from skilled physical therapy to address B quad/hip strength (L>R), gait train, and balance training to improve safety and access in home/community and less reliance on caregivers. Problems:    [x] ? Pain:     [x] ? ROM:    [x] ? Strength:    [x] ? Function:    [] ?  Balance  [] Edema:  [] Postural Deviations  [] Gait Deviations  [] Other:        STG: (to be met in 8 treatments)  1. ? Pain: No more than 5/10 max pain noted in L knee with prolonged standing, ADLs  2. ? ROM: R knee ROM to +5 - 130deg to indicate symmetrical ROM and restored joint mobility post chronic pain from injury  3. ? Strength: Pt to demo ability to complete 10x mini squat with even weightbearing demonstrated and no UE assist to indicate improving functional quad strength  4. ? Balance: Pt able to demo appropriate amplitude of stepping strategies in response to posterior perturbations with good balance noted. 5. Function:   a. Pt able to complete half kneel to stand transfer with unilat UE assist and CGA/min VCs  from therapist  b. Pt able to ambulate 150 ft on level ground with AFOs donned while demonstrating more typical JASIEL vs narrow, improved consistency of stepping, and more appropriate trunk/arm swing movement vs excessive for improved balance to indicate overall improved safety and efficiency of gait. 6. Independent with Home Exercise Programs  7. Demonstrate Knowledge of fall prevention    LTG: (to be met in 16 treatments)  1. 5/5 L knee extension strength to indicate improving joint stability and allow for improved control with walking, stair climbing  2. Able to ambulate 300 ft on level ground with AFOs donned while demonstrating consistently appropriate JASIEL, no more than 10% incidence inconsistent stepping or deviation from straight path to indicate improving gait mechanics and safety  3. Pt able to ascend/descend stairs reciprocally with unilat UE assist and demonstrating good speed/balance  4. Pt able to squat to  objects off of floor without difficulty  5. Pt able to complete full floor<>stand transfer without need for assist and safe technique.   6. At least 40% fxn reported per LEFI to indicate improvement in subjective LLE ability                   Patient goals:  \"be able to do stuff like bending down, going up stairs\"      Outcome Measure on Initial Eval:  LEFI: 20/80, 25% function     Additional Outcome Measures Used: 10MWT - see functional tests above        Rehab Potential:  [] Good  [x] Fair  [] Poor   Suggested Professional Referral:  [] No  [x] Yes: neurology to establish care - discussed benefits of this with pt, pt verbalized understanding of need for this referral  Barriers to Goal Achievement[de-identified]  [] No  [x] Yes: B neuropathy/drop foot  Domestic Concerns:  [x] No  [] Yes:    Pt. Education:  [x] Plans/Goals, Risks/Benefits discussed  [] Home exercise program    Method of Education: [x] Verbal  [] Demo  [] Written  Comprehension of Education:  [x] Verbalizes understanding. [x] Demonstrates understanding. [] Needs Review. [] Demonstrates/verbalizes understanding of HEP/Ed previously given. Treatment Plan:  [x] Therapeutic Exercise   85168  [] Iontophoresis: 4 mg/mL Dexamethasone Sodium Phosphate  mAmin  27936   [x] Therapeutic Activity  33878 [] Vasopneumatic cold with compression  53842    [x] Gait Training   98711 [] Ultrasound   55991   [x] Neuromuscular Re-education  22122 [] Electrical Stimulation Unattended  63099   [x] Manual Therapy  19172 [] Electrical Stimulation Attended  60561   [x] Instruction in HEP  [] Dry Needling   [] Aquatic Therapy   13256 [x] Cold/hotpack    [] Massage   96556      [] Lumbar/Cervical Traction  26384     []  Medication allergies reviewed for use of    Dexamethasone Sodium Phosphate 4mg/ml     with iontophoresis treatments. Pt is not allergic.     Frequency:  2 x/week for 16 visits        Todays Treatment:  Modalities:   Precautions:  Exercises:  Exercise Reps/ Time Weight/ Level Comments                                 Other: HELD treatment d/t time needed for thorough neurological assessment d/t presentation and gait deficits stemming from balance impairment    Specific Instructions for next treatment:   - seated recumbent bike, prone quad stretch, seated knee ext stretch, hamstring stretching  - CKC quad strengthening - LLE biased sit to stands from elevated mat table w/ 2\" block under RLE, Total gym DL/SL squats, leg press  - ankle DF strengthening - AROM to light resistance  - Balance training: SLS holds with UE assist, step taps to 4\" step w/ UE assist      Evaluation Complexity:  History (Personal factors, comorbidities) [] 0 [] 1-2 [] 3+   Exam (limitations, restrictions) [] 1-2 [] 3 [] 4+   Clinical presentation (progression) [] Stable [x] Evolving  [] Unstable   Decision Making [] Low [x] Moderate [] High    [] Low Complexity [x] Moderate Complexity [] High Complexity       Treatment Charges: Mins Units   [x] Evaluation       []  Low       [x]  Moderate       []  High 53 1   []  Modalities     []  Ther Exercise     []  Manual Therapy     []  Ther Activities     []  Aquatics     []  Vasocompression     []  Other       Estimated medicare cost as of 10/15: $98.01    TOTAL TREATMENT TIME: 53 min    Time in:9:07 am   Time Out:10:00 am    Electronically signed by: Ann Marie Novoa PT        Physician Signature:________________________________Date:__________________  By signing above or cosigning this note, I have reviewed this plan of care and certify a need for medically necessary rehabilitation services.      *PLEASE SIGN ABOVE AND FAX BACK ALL PAGES* normal...

## 2024-10-02 ENCOUNTER — OFFICE VISIT (OUTPATIENT)
Dept: PODIATRY | Age: 49
End: 2024-10-02
Payer: MEDICARE

## 2024-10-02 VITALS — HEIGHT: 62 IN | WEIGHT: 197 LBS | BODY MASS INDEX: 36.25 KG/M2

## 2024-10-02 DIAGNOSIS — B35.1 DERMATOPHYTOSIS OF NAIL: ICD-10-CM

## 2024-10-02 DIAGNOSIS — M79.605 PAIN IN BOTH LOWER EXTREMITIES: ICD-10-CM

## 2024-10-02 DIAGNOSIS — I73.9 PERIPHERAL VASCULAR DISORDER (HCC): ICD-10-CM

## 2024-10-02 DIAGNOSIS — D23.71 BENIGN NEOPLASM OF SKIN OF LOWER LIMB, INCLUDING HIP, RIGHT: ICD-10-CM

## 2024-10-02 DIAGNOSIS — E11.51 TYPE II DIABETES MELLITUS WITH PERIPHERAL CIRCULATORY DISORDER (HCC): Primary | ICD-10-CM

## 2024-10-02 DIAGNOSIS — D23.72 BENIGN NEOPLASM OF SKIN OF LOWER LIMB, INCLUDING HIP, LEFT: ICD-10-CM

## 2024-10-02 DIAGNOSIS — L85.3 XEROSIS CUTIS: ICD-10-CM

## 2024-10-02 DIAGNOSIS — M79.604 PAIN IN BOTH LOWER EXTREMITIES: ICD-10-CM

## 2024-10-02 PROCEDURE — 3051F HG A1C>EQUAL 7.0%<8.0%: CPT | Performed by: PODIATRIST

## 2024-10-02 PROCEDURE — 1036F TOBACCO NON-USER: CPT | Performed by: PODIATRIST

## 2024-10-02 PROCEDURE — G8484 FLU IMMUNIZE NO ADMIN: HCPCS | Performed by: PODIATRIST

## 2024-10-02 PROCEDURE — G8427 DOCREV CUR MEDS BY ELIG CLIN: HCPCS | Performed by: PODIATRIST

## 2024-10-02 PROCEDURE — 99213 OFFICE O/P EST LOW 20 MIN: CPT | Performed by: PODIATRIST

## 2024-10-02 PROCEDURE — 17110 DESTRUCTION B9 LES UP TO 14: CPT | Performed by: PODIATRIST

## 2024-10-02 PROCEDURE — 11721 DEBRIDE NAIL 6 OR MORE: CPT | Performed by: PODIATRIST

## 2024-10-02 PROCEDURE — G8417 CALC BMI ABV UP PARAM F/U: HCPCS | Performed by: PODIATRIST

## 2024-10-02 PROCEDURE — 3017F COLORECTAL CA SCREEN DOC REV: CPT | Performed by: PODIATRIST

## 2024-10-02 PROCEDURE — 2022F DILAT RTA XM EVC RTNOPTHY: CPT | Performed by: PODIATRIST

## 2024-10-02 NOTE — PROGRESS NOTES
[facility] PODIATRY [address]  [address]  [place] [id number]  Dept: [phone]  Dept Fax: [phone]    DIABETIC PROGRESS NOTE  Date of patient's visit: 10/2/2024  Patient's Name:  Steven L Severance YOB: 1975            Patient Care Team:  Smith Andres MD as PCP - General (Family Medicine)  Kavitha Mary DPM as Physician (Podiatry)          Chief Complaint   Patient presents with    Diabetes     Diabetic foot care    Peripheral Neuropathy     Bilateral foot        Subjective:   Steven L Severance comes to clinic for Diabetes (Diabetic foot care) and Peripheral Neuropathy (Bilateral foot )    he is a diabetic and states that needs toenails trimmed today.  Pt currently has complaint of thickened, elongated nails that they cannot manage by themselves.   Pt's primary care physician is Smith Andres MD last seen 6/7/24.   Pt's last blood sugar was patient states did not check today.    Pt has a new complaint of painful skin lesions to bilateral feet..         Lab Results   Component Value Date    LABA1C 7.6 (H) 05/24/2024      Complains of numbness in the feet bilat.  Past Medical History:   Diagnosis Date    Cerebral artery occlusion with cerebral infarction (HCC)     Closed fracture of bone of right foot March - April 2020    Dialysis patient (HCC)     Hemodialysis patient (HCC)     Hyperlipidemia     Hypertension     Kidney disease     On Dialysis    Type 1 diabetes mellitus (HCC)        No Known Allergies  Current Outpatient Medications on File Prior to Visit   Medication Sig Dispense Refill    calcium acetate (PHOSLO) 667 MG CAPS capsule       cyclobenzaprine (FLEXERIL) 5 MG tablet       sevelamer (RENVELA) 800 MG tablet Take 1 tablet by mouth 3 times daily (with meals)      traMADol (ULTRAM) 50 MG tablet       rosuvastatin (CRESTOR) 10 MG tablet Take 1 tablet by mouth daily 30 tablet 3    clopidogrel (PLAVIX) 75 MG tablet

## 2025-01-07 NOTE — FLOWSHEET NOTE
Called pt. Updated on results. Echo ordered to be completed prior to next office visit. Patient verbalizes understanding with no further questions or concerns at this time.      step  5' Level 5 No UE               Standing       Gastroc stretch on incline 3x1 min  BUE assist   Resisted swing  2x10 Lime  Tapping 4\" step   Step up 2x10  2x10 4\" Inc reps 11.29   Lateral step ups 2x10 4\" Step on L side   LLE marching 3x10 1 lb Focus on RLE stance control  Added wt 11.22   LLE step taps with resistance 3x10 4\" step Blue TB   LLE hip abd AROM 3x10 1 lb Added wt 11.22   Sit <> stand w/ RLE bias 8x  Difficult w/ modA   Resistive STS 8x Blue Added 10.20  Terminal extension  Mod UE pull   Resistive squats 10x blue Added 11.22   Resisted step over small round cone 2x10 Lime  With weight shift onto RLE   Heel taps to step  10x 4\"    Trunk/cervical rotation naming how many fingers clinician is holding  10xea  clinician standing behind pt    Picking up rings from steps  4x 4 rings;   6\" and 12\" step Wider JASIEL no UE; notes some irritation in LB; x2 leading with R UE, x2 leading with L UE and handing to clinician at multiple angles   Resisted swing phase fwd and retro amb on way back  5xea // bars;   Lime  Resisted fwd walk    Step over round cone  10xea // bars     TKE 20x Blue Added 10.20   Lunges  10x2  In // bars   Hip flexor stretch 5x10\"  Cues to hold stretch         Balance      Posterior perturbations 1x8  No UE assist  Step back  Hip strategies   Anterior perturbations 1x10     Fwd step outs 1x10  Added 10.25  Anterior lean w/ quick step out   Retro step outs 5 min  Started with slow step backs  Cues for quicker steps on L only   Hurricane sway 3X30\"  Ankle strategy skill  Pt self leaning in all planes -   DF/PF/EV/IV/diagonal   5 posterior LOB   LLE marches      AirEx 2x15  Unilat UE assist  L foot IV new     Squats 1x6  1x10   1 lb cane No UE assist  2nd set with bringing 1 lb cane fwd tapping on // bar  Limited with back pain   Trunk rotation with ball 2x10 ea Volleyball    Shoulder press with ball 10x2 7\" ball    Roll ball up on wall 10x yellow Good ankle strategy   NBOS 3x1 min     NBOS with EC 2x30\"     NBOS PNF patterns 2x10  Volleyball  W/ visual tracking with head movement   NBOS on foam  2x30\"  Cues for ankle strategies   NBOS w/ toes on incline EO/EC 2x30\"  Toes on incline    NBOS with ball raises, rotation 2x30\"ea Volley  ball     Tandem stance 2x20\"  Modified wider stance with R fwd   Cone  off ground 2x5 5 cones    Standing on foam with head turns  30\" ea  Up/down, rotation    Cone reach with LUE across body 2x5 5 cones    Cone reach with LUE to L side 2x5 5 cones    Heel taps 10x2 ea 4in With 1 hand for UE support  Poor tolerance of No UE supports w/ 2 LOB with lateral sway   Dynamic march with retro hamstring curl 3L  No UE support   Side stepping 3L // bars  No UE-1 light UE support   360 turn 2x  Clockwise more challenging   Marches  2x20\"  No UE   Alt toe taps to foam  2x30\"  No UE   Uneven stance 3x30\"  Patient placed 1 foot on 4\" box while balancing in [facility] for advancement into  SLS         Resistive gait 1x140 ft  Blue bands above knee  Cues to knee drive           FES modalities      Gait training with Bioness 50 min Lower and thigh cuff Initial set up  STS 2x10, LLE on 2\" step  Hip abd  Marches  Adjustments for thigh cuff more proximal  Gait analysis   Change in speed  4 sit down breaks   Xcite training 40 min R hams  R glutes  R quads  R PFs Set up in back room  STS program  STS w/ LLE on 2\" step: 2x10, min A    STS \"pause\" mode: Unilat UE assist  Step taps: 2x10, 4\" step  Hip abd/ext: 2x10 ea  Hip abd L sided - 10x   Balance without UE 2x30 sec  Reaching for ball touches 10x  Punching of pad 10x2  (Reaching&punches across body)      Xcite training  20 min  Set up in supine, head elevated  Ankle pump setting on Xcite      * Lateral LE for ant tib w/            peroneals      * Gastrocs  Ankle pumps 10x4      * PROM b/w sets  Heel slides w/ Exite DF 10x4                       PROM      Ankle DF PROM 5x1 min   By therapist between sets of xcite  Doffed AFO   Ankle pronation 10x2 AA    Resistive PF 10x3  Pt pumped foot into soft ball   Resistive supination 10x2  Rolled B feet into ball   DF/PF 10x2 A    Heel slides 20x2 A Required stabilization of ankle   SLR 10x2 A Bilaterally   SAQ 10x2 A    Hip add 10x3 A    Hip abd 10x3 A    Marches 10x2 A    Resistive marches 10x2 blue    Resistive hip and knee extension 10x3 blue Loop around ankle, wrapped laterally, under knee then up through legs for EV assist   Bridges 10x3     Other:     1) LTG assessment billed with neuro, gait as appropriate  2) HEP verbal and demonstration review with pt - billed with therex  3) Significant time in pt education re: remaining impairments, fall risk prevention, follow up with neurology needed re: lateral ankle weightbearing/hand muscle wasting, need for regular HEP to maintain gains made in therapy, safety concerns with R ankle requiring bracing ASAP - billed with theract    Treatment Charges: Mins Units   []  Modalities     []  Ther Exercise     []  Manual Therapy     [x]  Ther Activities 8 1   []  Aquatics     []  Vasocompression     [x]  Other: Neuro re-ed 15 1   [x]  Other: Gait 15 1   Total Treatment time 38 3       Assessment: [] Progressing toward goals. [] No change. [x] Other: Majority of session spent in LTG assessment and education of remaining impairments, lack of progress made since last goal assessment. Pt still with significant fall risk and decreased Hernandez score since last assessment (though different clinician assessing last time should be noted, may cause some score variability). Pt to finish last session with PTA to review appropriate HEP, discharge to HEP at this time. Emphasized utmost importance of obtaining braces to reduce falls immediately, as ankle needs to be controlled. Pt to see neurology in upcoming month for further assessment of B hand muscle wasting, lateral weightbearing on foot.    [x] Patient would continue to benefit from skilled physical therapy services in order to: address R hemibody weakness, improve gait mechanics and efficiency, and increase standing static/dynamic balance to allow improved household ADL/IADL completion and reduce fall risk. STG: (to be met in 8 treatments) - Assessed by Meenakshi Delgado PT on 7/20:  ? Pain: No more than 4/10 pain reported in low back or neck post therapy sessions to indicate improving tolerance to increased activity and exercise - MET for low back or neck, but L shoulder is 2/10 - newer pain over the past 3 weeks. ? ROM: at least 0deg DF PROM with knee extended to indicate improving mobility in ankles to increase heel strike and reduce toe drag with prolonged ambulation - NOT MET, lacking 10 deg from neutral in L ankle, lacking 3 deg from neutral in R ankle  ? Balance:   Pt able to fully turn to look behind himself without instability in either direction to indicate improving postural stability - NOT MET, LOB with full turn to R and L  Pt able to complete standing reaching tasks without UE assist and no more than mild instability to indicate improving dynamic balance for safety with household IADLs - NOT MET, primarily needs to be supported at trunk on counter or holding onto something  ? Strength: At least 4+/5 grossly in R hip to allow improved pelvic stability in standing and improved swing phase control/speed during prolonged gait - NOT MET, 4-/5 grossly in R hip  Pt able to utilize RUE for fine grasp and release tasks with no more than minimally slowed speed evident - Ongoing, better fine grasp but still limited with heavier objects, twisting lid  ?  Function: Pt able to ambulate 450 ft with least restrictive device with no evidence of lagging RLE swing or abnormal stance time noted, with no more than 11 on RPE scale - NOT MET, achieves ~400 ft before needing to rest d/t RLE instability and R knee buckling, rates 13 RPE  Patient to be independent with home exercise program as demonstrated by performance with correct form without cues. - Ongoing   Demonstrate Knowledge of fall prevention - MET     LTG: (to be met in 16 treatments) - 8/22: Will continue with current goals, adjusting at visit 16 as needed d/t recent CVA on 8/8, Assessed by Kenneth Copeland, PT on 10/4   ? Pain: No more than 2/10 pain reported in low back or neck post therapy sessions to indicate improving tolerance to increased activity and exercise MET  ? ROM: at least 5 deg DF PROM with knee extended to indicate improving mobility in ankles to increase heel strike and reduce toe drag with prolonged ambulation. MET  ? Balance: At least 30/56 on Hernandez to indicate significant improvement in standing static/dynamic balance and progress towards reduced fall risk. 37/56 MET  Able to negotiate gait obstacles using rollator without increased instability or excessively slowed gait MET  ? Strength: At least 5-/5 grossly in R hip to allow further improved pelvic stability in standing and improved swing phase control/speed during prolonged gait MET  At least 5/5 B knee ext to prevent excessive buckling with prolonged standing Progress, some buckling still reported   ? Function:   Pt able to ambulate 600 ft with least restrictive device with appropriate RLE swing/stance phase timing, no evidence of toe drag, and no more than minimal fatigue by end of ambulation Progress  Pt able to ambulate 100 ft with intermittent turning, stopping, etc without assistive device and demonstrating good stability and no evidence of B knee buckling to indicate improving functional strength and safety with household ambulation distances for ADL/IADLs Not Met  No more than 14% impaired on ABC scale to indicate improving subjective balance confidence. Not Met      ABC score 41% impairment.      New goals as of 10/6, to be met in next additional 12 visits, at total visit 28: - Assessed on 12/122 by Mercedes Narvaez, PT:        1. Hernandez score to at least 45/56 to indicate reduced fall risk, progressive balance improvement - not met, 30/56        2. Ability to complete RLE SLS tasks with no UE assist and no buckling/instability noted. - NOT MET        3. All bracing needs for RLE will have been met at this time to improve stability in ankle, reduce falls - NOT MET, pt missed appointment at orthotist to  braces, rescheduled for later December        4. No more than 20% impaired on ABC scale to indicate subjective improvement in balance confidence. - NOT MET, 63% confident, 37% impaired        5. At least 650 ft with least restrictive device with appropriate RLE swing/stance phase timing, no toe drag - NOT MET, limited to 456 ft with toe drag and instability on RLE starting at ~400 ft    10MWT: .79m/s gait speed (<.8m/s indicates limited community ambulator, <1.0m/s indicates risks for falls)        Patient goals: \"to strengthen right side back as much as I can\"     Pt. Education:  [x] Yes  [] No  [x] Reviewed Prior HEP/Ed  Method of Education: [x] Verbal  [x] Demo  [] Written   fall prevention 7/11/22  Comprehension of Education:  [x] Verbalizes understanding. [x] Demonstrates understanding. [] Needs review. [x] Demonstrates/verbalizes HEP/Ed previously given. Access Code: [email or web address]: [email or web address]: 11/15/2022Prepared by: Nereyda Meneses   Supine Knee and Hip Extension with Resistance - 1 x daily - 7 x weekly - 3 sets - 10 reps   Seated Hip Abduction with Resistance - 1 x daily - 7 x weekly - 3 sets - 10 reps   Hip add - 1 x daily - 7 x weekly - 3 sets - 10 reps   SLR - 1 x daily - 7 x weekly - 3 sets - 10 reps   Supine March with Resistance Band - 1 x daily - 7 x weekly - 3 sets - 10 reps    Plan: [x] Continue current frequency toward long and short term goals.     [x] Specific Instructions for subsequent treatments:  LE strengthening, ScitFit mod intensity, Excite        Time In: 9:05 am           Time Out: 9:47 am     Electronically signed by:  Sydnee Smith, PT

## 2025-01-13 ENCOUNTER — OFFICE VISIT (OUTPATIENT)
Dept: PODIATRY | Age: 50
End: 2025-01-13
Payer: MEDICARE

## 2025-01-13 VITALS — HEIGHT: 65 IN | BODY MASS INDEX: 31.82 KG/M2 | WEIGHT: 191 LBS

## 2025-01-13 DIAGNOSIS — I73.9 PERIPHERAL VASCULAR DISORDER (HCC): ICD-10-CM

## 2025-01-13 DIAGNOSIS — E11.51 TYPE II DIABETES MELLITUS WITH PERIPHERAL CIRCULATORY DISORDER (HCC): Primary | ICD-10-CM

## 2025-01-13 DIAGNOSIS — D23.72 BENIGN NEOPLASM OF SKIN OF LOWER LIMB, INCLUDING HIP, LEFT: ICD-10-CM

## 2025-01-13 DIAGNOSIS — M79.604 PAIN IN BOTH LOWER EXTREMITIES: ICD-10-CM

## 2025-01-13 DIAGNOSIS — D23.71 BENIGN NEOPLASM OF SKIN OF LOWER LIMB, INCLUDING HIP, RIGHT: ICD-10-CM

## 2025-01-13 DIAGNOSIS — L85.3 XEROSIS CUTIS: ICD-10-CM

## 2025-01-13 DIAGNOSIS — B35.1 DERMATOPHYTOSIS OF NAIL: ICD-10-CM

## 2025-01-13 DIAGNOSIS — M79.605 PAIN IN BOTH LOWER EXTREMITIES: ICD-10-CM

## 2025-01-13 PROCEDURE — 3046F HEMOGLOBIN A1C LEVEL >9.0%: CPT | Performed by: PODIATRIST

## 2025-01-13 PROCEDURE — 17110 DESTRUCTION B9 LES UP TO 14: CPT | Performed by: PODIATRIST

## 2025-01-13 PROCEDURE — 3017F COLORECTAL CA SCREEN DOC REV: CPT | Performed by: PODIATRIST

## 2025-01-13 PROCEDURE — 11721 DEBRIDE NAIL 6 OR MORE: CPT | Performed by: PODIATRIST

## 2025-01-13 PROCEDURE — 2022F DILAT RTA XM EVC RTNOPTHY: CPT | Performed by: PODIATRIST

## 2025-01-13 PROCEDURE — 99213 OFFICE O/P EST LOW 20 MIN: CPT | Performed by: PODIATRIST

## 2025-01-13 PROCEDURE — G8417 CALC BMI ABV UP PARAM F/U: HCPCS | Performed by: PODIATRIST

## 2025-01-13 PROCEDURE — G8427 DOCREV CUR MEDS BY ELIG CLIN: HCPCS | Performed by: PODIATRIST

## 2025-01-13 PROCEDURE — 1036F TOBACCO NON-USER: CPT | Performed by: PODIATRIST

## 2025-01-20 NOTE — PROGRESS NOTES
Bilateral.  Nails 1-5 left and 1-5 right thickened > 3.0 mm, dystrophic and crumbly, discolored with subungual debris.  Fissures absent, Bilateral.   General: AAO x 3 in NAD.    Derm  Toenail Description  Sites of Onychomycosis Involvement (Check affected area)  [x] [x] [x] [x] [x] [x] [x] [x] [x] [x]  5 4 3 2 1 1 2 3 4 5                          Right                                        Left    Thickness  [x] [x] [x] [x] [x] [x] [x] [x] [x] [x]  5 4 3 2 1 1 2 3 4 5                         Right                                        Left    Dystrophic Changes   [x] [x] [x] [x] [x] [x] [x] [x] [x] [x]  5 4 3 2 1 1 2 3 4 5                         Right                                        Left    Color   [x] [x] [x] [x] [x] [x] [x] [x] [x] [x]  5 4 3 2 1 1 2 3 4 5                          Right                                        Left    Incurvation/Ingrowin   [] [] [] [] [] [] [] [] [] []  5 4 3 2 1 1 2 3 4 5                         Right                                        Left    Inflammation/Pain   [x] [x] [x] [x] [x] [x] [x] [x] [x] [x]  5 4 3 2 1 1 2 3 4 5                         Right                                        Left        Visual inspection:  Deformity: hammertoe deformity danielle feet  amputation: absent  Skin lesions: As above  Edema: right- 2+ pitting edema, left- 2+ pitting edema    Sensory exam:  Monofilament sensation: abnormal - 6/10 via SW 5.07/10g monofilament to the plantar foot bilateral feet    Pulses: abnormal - 1/4 dorsalis pedis pulse and 0/4 Posterior tibial pulse,   Pinprick: Impaired  Proprioception: Impaired  Vibration (128 Hz): Impaired       DM with PVD       [x]Yes    []No      Assessment:  49 y.o. male with:   Diagnosis Orders   1. Type II diabetes mellitus with peripheral circulatory disorder (HCC)  DEBRIDEMENT OF NAILS, 6 OR MORE    HM DIABETES FOOT EXAM      2. Pain in both lower extremities  DEBRIDEMENT OF NAILS, 6 OR MORE    DESTRUC BENIGN LESION, UP TO 14 LESIONS

## 2025-02-17 ENCOUNTER — APPOINTMENT (OUTPATIENT)
Dept: GENERAL RADIOLOGY | Age: 50
End: 2025-02-17
Payer: MEDICARE

## 2025-02-17 ENCOUNTER — HOSPITAL ENCOUNTER (EMERGENCY)
Age: 50
Discharge: HOME | End: 2025-02-17
Attending: EMERGENCY MEDICINE
Payer: MEDICARE

## 2025-02-17 ENCOUNTER — OFFICE VISIT (OUTPATIENT)
Dept: PODIATRY | Age: 50
End: 2025-02-17
Payer: MEDICARE

## 2025-02-17 VITALS — BODY MASS INDEX: 31.82 KG/M2 | HEIGHT: 65 IN | WEIGHT: 191 LBS

## 2025-02-17 VITALS
HEART RATE: 70 BPM | SYSTOLIC BLOOD PRESSURE: 148 MMHG | BODY MASS INDEX: 31.82 KG/M2 | DIASTOLIC BLOOD PRESSURE: 82 MMHG | OXYGEN SATURATION: 96 % | RESPIRATION RATE: 18 BRPM | WEIGHT: 191 LBS | TEMPERATURE: 98.2 F | HEIGHT: 65 IN

## 2025-02-17 DIAGNOSIS — D23.72 BENIGN NEOPLASM OF SKIN OF LOWER LIMB, INCLUDING HIP, LEFT: ICD-10-CM

## 2025-02-17 DIAGNOSIS — M79.604 PAIN IN BOTH LOWER EXTREMITIES: ICD-10-CM

## 2025-02-17 DIAGNOSIS — S63.502A SPRAIN OF LEFT WRIST, INITIAL ENCOUNTER: Primary | ICD-10-CM

## 2025-02-17 DIAGNOSIS — M79.605 PAIN IN BOTH LOWER EXTREMITIES: ICD-10-CM

## 2025-02-17 DIAGNOSIS — S90.424A BLISTER (NONTHERMAL), RIGHT LESSER TOE(S), INITIAL ENCOUNTER: ICD-10-CM

## 2025-02-17 DIAGNOSIS — M20.41 HAMMER TOES OF BOTH FEET: ICD-10-CM

## 2025-02-17 DIAGNOSIS — M20.42 HAMMER TOES OF BOTH FEET: ICD-10-CM

## 2025-02-17 DIAGNOSIS — L85.3 XEROSIS CUTIS: ICD-10-CM

## 2025-02-17 DIAGNOSIS — S16.1XXA STRAIN OF NECK MUSCLE, INITIAL ENCOUNTER: ICD-10-CM

## 2025-02-17 DIAGNOSIS — I73.9 PERIPHERAL VASCULAR DISORDER: ICD-10-CM

## 2025-02-17 DIAGNOSIS — E11.51 TYPE II DIABETES MELLITUS WITH PERIPHERAL CIRCULATORY DISORDER (HCC): ICD-10-CM

## 2025-02-17 DIAGNOSIS — B35.1 DERMATOPHYTOSIS OF NAIL: Primary | ICD-10-CM

## 2025-02-17 PROCEDURE — 73110 X-RAY EXAM OF WRIST: CPT

## 2025-02-17 PROCEDURE — 72040 X-RAY EXAM NECK SPINE 2-3 VW: CPT

## 2025-02-17 PROCEDURE — 1036F TOBACCO NON-USER: CPT | Performed by: PODIATRIST

## 2025-02-17 PROCEDURE — 3017F COLORECTAL CA SCREEN DOC REV: CPT | Performed by: PODIATRIST

## 2025-02-17 PROCEDURE — 3046F HEMOGLOBIN A1C LEVEL >9.0%: CPT | Performed by: PODIATRIST

## 2025-02-17 PROCEDURE — 99283 EMERGENCY DEPT VISIT LOW MDM: CPT

## 2025-02-17 PROCEDURE — 2022F DILAT RTA XM EVC RTNOPTHY: CPT | Performed by: PODIATRIST

## 2025-02-17 PROCEDURE — 99213 OFFICE O/P EST LOW 20 MIN: CPT | Performed by: PODIATRIST

## 2025-02-17 PROCEDURE — 11721 DEBRIDE NAIL 6 OR MORE: CPT | Performed by: PODIATRIST

## 2025-02-17 PROCEDURE — G8427 DOCREV CUR MEDS BY ELIG CLIN: HCPCS | Performed by: PODIATRIST

## 2025-02-17 PROCEDURE — G8417 CALC BMI ABV UP PARAM F/U: HCPCS | Performed by: PODIATRIST

## 2025-02-17 PROCEDURE — 6370000000 HC RX 637 (ALT 250 FOR IP): Performed by: EMERGENCY MEDICINE

## 2025-02-17 PROCEDURE — 17110 DESTRUCTION B9 LES UP TO 14: CPT | Performed by: PODIATRIST

## 2025-02-17 RX ORDER — OXYCODONE AND ACETAMINOPHEN 5; 325 MG/1; MG/1
1 TABLET ORAL ONCE
Status: COMPLETED | OUTPATIENT
Start: 2025-02-17 | End: 2025-02-17

## 2025-02-17 RX ADMIN — OXYCODONE HYDROCHLORIDE AND ACETAMINOPHEN 1 TABLET: 5; 325 TABLET ORAL at 17:54

## 2025-02-17 ASSESSMENT — PAIN DESCRIPTION - DESCRIPTORS: DESCRIPTORS: ACHING

## 2025-02-17 ASSESSMENT — PAIN SCALES - GENERAL
PAINLEVEL_OUTOF10: 8

## 2025-02-17 ASSESSMENT — LIFESTYLE VARIABLES
HOW OFTEN DO YOU HAVE A DRINK CONTAINING ALCOHOL: NEVER
HOW MANY STANDARD DRINKS CONTAINING ALCOHOL DO YOU HAVE ON A TYPICAL DAY: PATIENT DOES NOT DRINK

## 2025-02-17 ASSESSMENT — PAIN DESCRIPTION - FREQUENCY: FREQUENCY: CONTINUOUS

## 2025-02-17 ASSESSMENT — PAIN DESCRIPTION - ORIENTATION: ORIENTATION: LEFT

## 2025-02-17 ASSESSMENT — PAIN - FUNCTIONAL ASSESSMENT: PAIN_FUNCTIONAL_ASSESSMENT: 0-10

## 2025-02-17 NOTE — ED NOTES
Pt to ed c/o fall last Saturday. Pt denies LOC. Pt c/o left arm pain and neck pain. Pt rates pain 8/10. Pt a/o x4. Respirations equal and non labored. Pt speaking in full and complete sentences.

## 2025-02-17 NOTE — PROGRESS NOTES
[facility] [address]  [address]  [place] [id number]  Dept: [phone]  Dept Fax: [phone]    DIABETIC PROGRESS NOTE  Date of patient's visit: 2/17/2025  Patient's Name:  Steven L Severance YOB: 1975            Patient Care Team:  Smith Andres MD as PCP - General (Family Medicine)  Kavitha Mary DPM as Physician (Podiatry)          Chief Complaint   Patient presents with    Diabetes     Blood Sugar 77    Peripheral Neuropathy     Bilateral foot    Foot Pain     Blisters       Subjective:   Steven L Severance comes to clinic for Diabetes (Blood Sugar 77), Peripheral Neuropathy (Bilateral foot), and Foot Pain (Blisters)    he is a diabetic and states that needs foot care today.  Pt currently has complaint of thickened, elongated nails that they cannot manage by themselves.   Pt's primary care physician is Smith Andres MD last seen 6/7/24.      Pt has a new complaint of painful skin lesions to bilateral feet..         Lab Results   Component Value Date    LABA1C 7.6 (H) 05/24/2024      Complains of numbness in the feet bilat.  Past Medical History:   Diagnosis Date    Cerebral artery occlusion with cerebral infarction (HCC)     Closed fracture of bone of right foot March - April 2020    Dialysis patient (HCC)     Hemodialysis patient (HCC)     Hyperlipidemia     Hypertension     Kidney disease     On Dialysis    Type 1 diabetes mellitus (HCC)        No Known Allergies  Current Outpatient Medications on File Prior to Visit   Medication Sig Dispense Refill    calcium acetate (PHOSLO) 667 MG CAPS capsule       cyclobenzaprine (FLEXERIL) 5 MG tablet       sevelamer (RENVELA) 800 MG tablet Take 1 tablet by mouth 3 times daily (with meals)      traMADol (ULTRAM) 50 MG tablet       rosuvastatin (CRESTOR) 10 MG tablet Take 1 tablet by mouth daily 30 tablet 3    clopidogrel (PLAVIX) 75 MG tablet Take 1 tablet by mouth daily

## 2025-02-18 NOTE — ED PROVIDER NOTES
EMERGENCY DEPARTMENT ENCOUNTER    Pt Name: Steven L Severance  MRN: [id number]  Birthdate 1975  Date of evaluation: 2/17/25  CHIEF COMPLAINT       Chief Complaint   Patient presents with    Head Injury     Onset last Sat early am, denies LOC, takes daily yi asa, dialysis today    Arm Pain     Left feel on arm/wrist    Neck Pain     HISTORY OF PRESENT ILLNESS   HPI  49-year-old male with a history of CVA with residual left-sided weakness, diabetes, hypertension, ESRD on hemodialysis presents to the ED for neck and left wrist pain since a fall 2 days ago.  Patient states that he tripped while walking, fell on outstretched left hand.  He states that since the fall he has had pain and bruising to his left wrist, some pain in his neck.  He denies head trauma or loss of consciousness, denies headache, vision changes, new weakness or numbness, back pain, chest pain, shortness of breath or any other acute complaints.    REVIEW OF SYSTEMS     Review of Systems   All other systems reviewed and are negative.    PASTMEDICAL HISTORY     Past Medical History:   Diagnosis Date    Cerebral artery occlusion with cerebral infarction (HCC)     Closed fracture of bone of right foot March - April 2020    Dialysis patient (HCC)     Hemodialysis patient (HCC)     Hyperlipidemia     Hypertension     Kidney disease     On Dialysis    Type 1 diabetes mellitus (HCC)      SURGICAL HISTORY       Past Surgical History:   Procedure Laterality Date    AV FISTULA CREATION Left     COLONOSCOPY N/A 8/3/2022    COLONOSCOPY DIAGNOSTIC performed by Rose Mary Richard MD at [facility] OR    UPPER GASTROINTESTINAL ENDOSCOPY N/A 8/3/2022    EGD ESOPHAGOGASTRODUODENOSCOPY performed by Rose Mary Richard MD at [facility] OR    WRIST SURGERY  1995     CURRENT MEDICATIONS       Discharge Medication List as of 2/17/2025  7:28 PM        CONTINUE these medications which have NOT CHANGED    Details   calcium acetate (PHOSLO) 667 MG CAPS capsule Historical Med      cyclobenzaprine

## 2025-03-05 ENCOUNTER — APPOINTMENT (OUTPATIENT)
Dept: CT IMAGING | Age: 50
End: 2025-03-05
Payer: MEDICARE

## 2025-03-05 ENCOUNTER — APPOINTMENT (OUTPATIENT)
Dept: GENERAL RADIOLOGY | Age: 50
End: 2025-03-05
Payer: MEDICARE

## 2025-03-05 ENCOUNTER — HOSPITAL ENCOUNTER (EMERGENCY)
Age: 50
Discharge: HOME | End: 2025-03-05
Payer: MEDICARE

## 2025-03-05 VITALS
RESPIRATION RATE: 22 BRPM | TEMPERATURE: 98.4 F | OXYGEN SATURATION: 100 % | HEIGHT: 65 IN | SYSTOLIC BLOOD PRESSURE: 176 MMHG | BODY MASS INDEX: 31.65 KG/M2 | HEART RATE: 70 BPM | WEIGHT: 190 LBS | DIASTOLIC BLOOD PRESSURE: 87 MMHG

## 2025-03-05 DIAGNOSIS — S09.90XA CLOSED HEAD INJURY, INITIAL ENCOUNTER: Primary | ICD-10-CM
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
## 2025-03-05 LAB
ANION GAP SERPL CALCULATED.3IONS-SCNC: 18 MMOL/L (ref 9–16)
BUN SERPL-MCNC: 54 MG/DL (ref 6–20)
CALCIUM SERPL-MCNC: 10 MG/DL (ref 8.6–10.4)
CHLORIDE SERPL-SCNC: 98 MMOL/L (ref 98–107)
CO2 SERPL-SCNC: 24 MMOL/L (ref 20–31)
CREAT SERPL-MCNC: 8.1 MG/DL (ref 0.7–1.2)
GFR, ESTIMATED: 8 ML/MIN/1.73M2
GLUCOSE SERPL-MCNC: 144 MG/DL (ref 74–99)
POTASSIUM SERPL-SCNC: 5 MMOL/L (ref 3.7–5.3)
SODIUM SERPL-SCNC: 139 MMOL/L (ref 136–145)

## 2025-03-05 PROCEDURE — 80048 BASIC METABOLIC PNL TOTAL CA: CPT

## 2025-03-05 PROCEDURE — 99284 EMERGENCY DEPT VISIT MOD MDM: CPT

## 2025-03-05 PROCEDURE — 70450 CT HEAD/BRAIN W/O DYE: CPT

## 2025-03-05 PROCEDURE — 73502 X-RAY EXAM HIP UNI 2-3 VIEWS: CPT

## 2025-03-05 PROCEDURE — 72125 CT NECK SPINE W/O DYE: CPT

## 2025-03-05 ASSESSMENT — PAIN DESCRIPTION - LOCATION: LOCATION: NECK

## 2025-03-05 ASSESSMENT — ENCOUNTER SYMPTOMS
NAUSEA: 0
WHEEZING: 0
CHEST TIGHTNESS: 0
ABDOMINAL PAIN: 0
COLOR CHANGE: 0
SHORTNESS OF BREATH: 0
BACK PAIN: 0
VOMITING: 0

## 2025-03-05 ASSESSMENT — PAIN - FUNCTIONAL ASSESSMENT: PAIN_FUNCTIONAL_ASSESSMENT: 0-10

## 2025-03-05 NOTE — ED PROVIDER NOTES
BASIC METABOLIC PANEL - Abnormal; Notable for the following components:       Result Value    Anion Gap 18 (*)     Glucose 144 (*)     BUN 54 (*)     Creatinine 8.1 (*)     Est, Glom Filt Rate 8 (*)     All other components within normal limits       PROCEDURES:    Procedures    CONSULTS:  None        FINAL IMPRESSION      1. Closed head injury, initial encounter    2. Fall, initial encounter          DISPOSITION / PLAN     DISPOSITION Decision To Discharge 03/05/2025 10:05:25 AM   DISPOSITION CONDITION Stable           PATIENT REFERRED TO:  Smith Andres MD  [address] [facility] [address] [phone]    Schedule an appointment as soon as possible for a visit   To follow-up on this visit      DISCHARGE MEDICATIONS:  New Prescriptions    No medications on file       Kit Daigle DO  Emergency Medicine Physician     (Please note that portions of thisnote were completed with a voice recognition program.  Efforts were made to edit the dictations but occasionally words are mis-transcribed.)        Kit Daigle DO  03/05/25 [id number]

## 2025-03-05 NOTE — ED NOTES
Pt arrived to the ED via EMS with c/o a fall. EMS states it was a mechanical fall with no LOC. Pt did hit the back of his head on a dressed. EMS states he has a bump/lump on the back of his head. C-collar is in place. Pt is A&O x4, vitals are stable other than hypertensive and breathing is even and non-labored. Pt denies needs at this time, call light is within reach and family is at bedside.

## 2025-04-03 ENCOUNTER — OFFICE VISIT (OUTPATIENT)
Dept: NEUROLOGY | Age: 50
End: 2025-04-03
Payer: MEDICARE

## 2025-04-03 VITALS
HEIGHT: 68 IN | HEART RATE: 70 BPM | DIASTOLIC BLOOD PRESSURE: 81 MMHG | SYSTOLIC BLOOD PRESSURE: 155 MMHG | BODY MASS INDEX: 28.79 KG/M2 | WEIGHT: 190 LBS

## 2025-04-03 DIAGNOSIS — Z86.73 HISTORY OF STROKE: Primary | ICD-10-CM

## 2025-04-03 DIAGNOSIS — I69.351 HEMIPARESIS AFFECTING RIGHT SIDE AS LATE EFFECT OF CEREBROVASCULAR ACCIDENT (CVA) (HCC): ICD-10-CM

## 2025-04-03 DIAGNOSIS — M21.372 BILATERAL FOOT-DROP: ICD-10-CM

## 2025-04-03 DIAGNOSIS — M54.2 CERVICALGIA: ICD-10-CM

## 2025-04-03 DIAGNOSIS — M21.371 BILATERAL FOOT-DROP: ICD-10-CM

## 2025-04-03 DIAGNOSIS — M62.549 ATROPHY OF MUSCLE OF HAND, UNSPECIFIED LATERALITY: ICD-10-CM

## 2025-04-03 PROCEDURE — 1036F TOBACCO NON-USER: CPT | Performed by: PHYSICIAN ASSISTANT

## 2025-04-03 PROCEDURE — 3017F COLORECTAL CA SCREEN DOC REV: CPT | Performed by: PHYSICIAN ASSISTANT

## 2025-04-03 PROCEDURE — G8427 DOCREV CUR MEDS BY ELIG CLIN: HCPCS | Performed by: PHYSICIAN ASSISTANT

## 2025-04-03 PROCEDURE — 99214 OFFICE O/P EST MOD 30 MIN: CPT | Performed by: PHYSICIAN ASSISTANT

## 2025-04-03 PROCEDURE — 3077F SYST BP >= 140 MM HG: CPT | Performed by: PHYSICIAN ASSISTANT

## 2025-04-03 PROCEDURE — G8417 CALC BMI ABV UP PARAM F/U: HCPCS | Performed by: PHYSICIAN ASSISTANT

## 2025-04-03 PROCEDURE — 3078F DIAST BP <80 MM HG: CPT | Performed by: PHYSICIAN ASSISTANT

## 2025-04-03 RX ORDER — AMLODIPINE BESYLATE 2.5 MG/1
2.5 TABLET ORAL DAILY
COMMUNITY

## 2025-04-03 NOTE — PROGRESS NOTES
aspirin 81 MG EC tablet Take 1 tablet by mouth daily      Cyanocobalamin (VITAMIN B-12 PO) Take 2,500 mcg by mouth daily      cyclobenzaprine (FLEXERIL) 5 MG tablet  (Patient not taking: Reported on 4/3/2025)      sevelamer (RENVELA) 800 MG tablet Take 1 tablet by mouth 3 times daily (with meals) (Patient not taking: Reported on 4/3/2025)      amLODIPine (NORVASC) 10 MG tablet Take 1 tablet by mouth daily (Patient not taking: Reported on 4/3/2025) 30 tablet 3     No current facility-administered medications for this visit.        No Known Allergies     REVIEW OF SYSTEMS:       All items selected indicate a positive finding.    Those items not selected are negative.  Constitutional [] Weight loss/gain   [] Fatigue  [] Fever/Chills   HEENT [] Hearing Loss  [] Visual Disturbance  [] Tinnitus  [] Eye pain  [] Vertigo   Respiratory [] Shortness of Breath  [] Cough  [] Snoring   Cardiovascular [] Chest Pain  [] Palpitations  [] Lightheaded   GI [] Constipation  [] Diarrhea  [] Swallowing change  [] Nausea/vomiting    [] Urinary Frequency  [] Urinary Urgency   Musculoskeletal [] Neck pain  [] Back pain  [] Muscle pain  [] Restless legs  [] Joint pain   Dermatologic [] Skin changes   Neurologic [] Memory loss/confusion  [] Seizures  [x] Trouble walking or imbalance  [] Dizziness  [] Sleep disturbance  [x] Weakness  [x] Numbness  [] Tremors  [] Speech Difficulty  [] Headaches  [] Light Sensitivity  [] Sound Sensitivity   Endocrinology []Excessive thirst  []Excessive hunger   Psychiatric [] Anxiety/Depression  [] Hallucination   Allergy/immunology []Hives/environmental allergies   Hematologic/lymph [] Abnormal bleeding  [] Abnormal bruising         NEUROLOGICAL EXAMINATION:   VITALS  BP (!) 155/81 (BP Site: Right Upper Arm, Patient Position: Sitting) Comment: repeat  Pulse 70   Ht 1.715 m (5' 7.5\")   Wt 86.2 kg (190 lb)   BMI 29.32 kg/m²      General Appearance:  Alert, cooperative, no signs of distress, appears stated

## 2025-04-14 ENCOUNTER — TELEPHONE (OUTPATIENT)
Dept: NEUROLOGY | Age: 50
End: 2025-04-14

## 2025-04-14 NOTE — TELEPHONE ENCOUNTER
Telephone call from the patient, they need to have the EMG done at a facility other than [facility] due to their dialysis schedule.    Faxed to Dr Carvalho's office.  KS

## 2025-04-18 ENCOUNTER — TELEPHONE (OUTPATIENT)
Dept: NEUROLOGY | Age: 50
End: 2025-04-18

## 2025-04-18 NOTE — TELEPHONE ENCOUNTER
Telephone call from the patient's grandfather, he would like the EMG orders faxed to [facility] and [facility].  They are having a hard time getting this scheduled due to the patient's needs.    I faxed the referral to both places.  KS

## 2025-04-19 ENCOUNTER — HOSPITAL ENCOUNTER (OUTPATIENT)
Dept: MRI IMAGING | Age: 50
Discharge: HOME | End: 2025-04-21
Payer: MEDICARE

## 2025-04-19 DIAGNOSIS — M21.371 BILATERAL FOOT-DROP: ICD-10-CM

## 2025-04-19 DIAGNOSIS — M62.549 ATROPHY OF MUSCLE OF HAND, UNSPECIFIED LATERALITY: ICD-10-CM

## 2025-04-19 DIAGNOSIS — M54.2 CERVICALGIA: ICD-10-CM

## 2025-04-19 DIAGNOSIS — M21.372 BILATERAL FOOT-DROP: ICD-10-CM

## 2025-04-19 PROCEDURE — 72141 MRI NECK SPINE W/O DYE: CPT

## 2025-05-12 ENCOUNTER — APPOINTMENT (OUTPATIENT)
Dept: CT IMAGING | Age: 50
DRG: 682 | End: 2025-05-12
Payer: MEDICARE

## 2025-05-12 ENCOUNTER — HOSPITAL ENCOUNTER (INPATIENT)
Age: 50
LOS: 1 days | Discharge: HOME HEALTH | DRG: 682 | End: 2025-05-13
Attending: EMERGENCY MEDICINE | Admitting: INTERNAL MEDICINE
Payer: MEDICARE

## 2025-05-12 DIAGNOSIS — R11.10 VOMITING, UNSPECIFIED VOMITING TYPE, UNSPECIFIED WHETHER NAUSEA PRESENT: Primary | ICD-10-CM

## 2025-05-12 DIAGNOSIS — E87.5 HYPERKALEMIA: ICD-10-CM

## 2025-05-12 PROBLEM — Z99.2 ESRD NEEDING DIALYSIS (HCC): Status: ACTIVE | Noted: 2025-05-12

## 2025-05-12 PROBLEM — N18.6 ESRD NEEDING DIALYSIS (HCC): Status: ACTIVE | Noted: 2025-05-12

## 2025-05-12 LAB
ALBUMIN SERPL-MCNC: 3.8 G/DL (ref 3.5–5.2)
ALBUMIN/GLOB SERPL: 1.5 {RATIO} (ref 1–2.5)
ALP SERPL-CCNC: 124 U/L (ref 40–129)
ALT SERPL-CCNC: 40 U/L (ref 10–50)
ANION GAP SERPL CALCULATED.3IONS-SCNC: 16 MMOL/L (ref 9–16)
AST SERPL-CCNC: 19 U/L (ref 10–50)
BASOPHILS # BLD: 0.04 K/UL (ref 0–0.2)
BASOPHILS NFR BLD: 1 % (ref 0–2)
BILIRUB SERPL-MCNC: 0.6 MG/DL (ref 0–1.2)
BUN SERPL-MCNC: 42 MG/DL (ref 6–20)
CALCIUM SERPL-MCNC: 10 MG/DL (ref 8.6–10.4)
CHLORIDE SERPL-SCNC: 94 MMOL/L (ref 98–107)
CO2 SERPL-SCNC: 25 MMOL/L (ref 20–31)
CREAT SERPL-MCNC: 8.4 MG/DL (ref 0.7–1.2)
EKG ATRIAL RATE: 75 BPM
EKG P AXIS: 74 DEGREES
EKG P-R INTERVAL: 158 MS
EKG Q-T INTERVAL: 426 MS
EKG QRS DURATION: 110 MS
EKG QTC CALCULATION (BAZETT): 475 MS
EKG R AXIS: 63 DEGREES
EKG T AXIS: 13 DEGREES
EKG VENTRICULAR RATE: 75 BPM
EOSINOPHIL # BLD: 0.23 K/UL (ref 0–0.44)
EOSINOPHILS RELATIVE PERCENT: 4 % (ref 1–4)
ERYTHROCYTE [DISTWIDTH] IN BLOOD BY AUTOMATED COUNT: 13.2 % (ref 11.8–14.4)
GFR, ESTIMATED: 7 ML/MIN/1.73M2
GLUCOSE BLD-MCNC: 145 MG/DL (ref 75–110)
GLUCOSE BLD-MCNC: 168 MG/DL (ref 75–110)
GLUCOSE BLD-MCNC: 86 MG/DL (ref 75–110)
GLUCOSE SERPL-MCNC: 140 MG/DL (ref 74–99)
HCT VFR BLD AUTO: 38.7 % (ref 40.7–50.3)
HGB BLD-MCNC: 13.3 G/DL (ref 13–17)
IMM GRANULOCYTES # BLD AUTO: 0.02 K/UL (ref 0–0.3)
IMM GRANULOCYTES NFR BLD: 0 %
LACTATE BLDV-SCNC: 1 MMOL/L (ref 0.5–2.2)
LIPASE SERPL-CCNC: 12 U/L (ref 13–60)
LYMPHOCYTES NFR BLD: 1 K/UL (ref 1.1–3.7)
LYMPHOCYTES RELATIVE PERCENT: 17 % (ref 24–43)
MCH RBC QN AUTO: 32.4 PG (ref 25.2–33.5)
MCHC RBC AUTO-ENTMCNC: 34.4 G/DL (ref 28.4–34.8)
MCV RBC AUTO: 94.2 FL (ref 82.6–102.9)
MONOCYTES NFR BLD: 0.84 K/UL (ref 0.1–1.2)
MONOCYTES NFR BLD: 14 % (ref 3–12)
NEUTROPHILS NFR BLD: 65 % (ref 36–65)
NEUTS SEG NFR BLD: 3.86 K/UL (ref 1.5–8.1)
NRBC BLD-RTO: 0 PER 100 WBC
PLATELET # BLD AUTO: 148 K/UL (ref 138–453)
PMV BLD AUTO: 9.9 FL (ref 8.1–13.5)
POTASSIUM SERPL-SCNC: 5.8 MMOL/L (ref 3.7–5.3)
PROT SERPL-MCNC: 6.3 G/DL (ref 6.6–8.7)
RBC # BLD AUTO: 4.11 M/UL (ref 4.21–5.77)
SODIUM SERPL-SCNC: 135 MMOL/L (ref 136–145)
WBC OTHER # BLD: 6 K/UL (ref 3.5–11.3)

## 2025-05-12 PROCEDURE — 5A1D70Z PERFORMANCE OF URINARY FILTRATION, INTERMITTENT, LESS THAN 6 HOURS PER DAY: ICD-10-PCS | Performed by: EMERGENCY MEDICINE

## 2025-05-12 PROCEDURE — 2060000000 HC ICU INTERMEDIATE R&B

## 2025-05-12 PROCEDURE — 99223 1ST HOSP IP/OBS HIGH 75: CPT | Performed by: NURSE PRACTITIONER

## 2025-05-12 PROCEDURE — 90935 HEMODIALYSIS ONE EVALUATION: CPT

## 2025-05-12 PROCEDURE — 93010 ELECTROCARDIOGRAM REPORT: CPT | Performed by: INTERNAL MEDICINE

## 2025-05-12 PROCEDURE — 2500000003 HC RX 250 WO HCPCS: Performed by: NURSE PRACTITIONER

## 2025-05-12 PROCEDURE — 85025 COMPLETE CBC W/AUTO DIFF WBC: CPT

## 2025-05-12 PROCEDURE — 6370000000 HC RX 637 (ALT 250 FOR IP): Performed by: EMERGENCY MEDICINE

## 2025-05-12 PROCEDURE — 80053 COMPREHEN METABOLIC PANEL: CPT

## 2025-05-12 PROCEDURE — 83605 ASSAY OF LACTIC ACID: CPT

## 2025-05-12 PROCEDURE — 97166 OT EVAL MOD COMPLEX 45 MIN: CPT

## 2025-05-12 PROCEDURE — 6360000002 HC RX W HCPCS: Performed by: NURSE PRACTITIONER

## 2025-05-12 PROCEDURE — 83690 ASSAY OF LIPASE: CPT

## 2025-05-12 PROCEDURE — 99285 EMERGENCY DEPT VISIT HI MDM: CPT

## 2025-05-12 PROCEDURE — 97535 SELF CARE MNGMENT TRAINING: CPT

## 2025-05-12 PROCEDURE — 96361 HYDRATE IV INFUSION ADD-ON: CPT

## 2025-05-12 PROCEDURE — 74176 CT ABD & PELVIS W/O CONTRAST: CPT

## 2025-05-12 PROCEDURE — 82947 ASSAY GLUCOSE BLOOD QUANT: CPT

## 2025-05-12 PROCEDURE — 2580000003 HC RX 258: Performed by: EMERGENCY MEDICINE

## 2025-05-12 PROCEDURE — 97162 PT EVAL MOD COMPLEX 30 MIN: CPT

## 2025-05-12 PROCEDURE — 6370000000 HC RX 637 (ALT 250 FOR IP): Performed by: NURSE PRACTITIONER

## 2025-05-12 PROCEDURE — 96360 HYDRATION IV INFUSION INIT: CPT

## 2025-05-12 PROCEDURE — 97530 THERAPEUTIC ACTIVITIES: CPT

## 2025-05-12 PROCEDURE — 93005 ELECTROCARDIOGRAM TRACING: CPT | Performed by: EMERGENCY MEDICINE

## 2025-05-12 RX ORDER — 0.9 % SODIUM CHLORIDE 0.9 %
250 INTRAVENOUS SOLUTION INTRAVENOUS ONCE
Status: COMPLETED | OUTPATIENT
Start: 2025-05-12 | End: 2025-05-12

## 2025-05-12 RX ORDER — ONDANSETRON 4 MG/1
4 TABLET, ORALLY DISINTEGRATING ORAL EVERY 8 HOURS PRN
Status: DISCONTINUED | OUTPATIENT
Start: 2025-05-12 | End: 2025-05-13 | Stop reason: HOSPADM

## 2025-05-12 RX ORDER — HYDRALAZINE HYDROCHLORIDE 50 MG/1
50 TABLET, FILM COATED ORAL EVERY 6 HOURS SCHEDULED
Status: DISCONTINUED | OUTPATIENT
Start: 2025-05-12 | End: 2025-05-13 | Stop reason: HOSPADM

## 2025-05-12 RX ORDER — LIDOCAINE 4 G/G
1 PATCH TOPICAL DAILY
Status: DISCONTINUED | OUTPATIENT
Start: 2025-05-12 | End: 2025-05-13 | Stop reason: HOSPADM

## 2025-05-12 RX ORDER — ASPIRIN 81 MG/1
81 TABLET ORAL DAILY
Status: DISCONTINUED | OUTPATIENT
Start: 2025-05-12 | End: 2025-05-13 | Stop reason: HOSPADM

## 2025-05-12 RX ORDER — AMLODIPINE BESYLATE 10 MG/1
10 TABLET ORAL DAILY
Status: DISCONTINUED | OUTPATIENT
Start: 2025-05-12 | End: 2025-05-13 | Stop reason: HOSPADM

## 2025-05-12 RX ORDER — METOCLOPRAMIDE HYDROCHLORIDE 5 MG/ML
10 INJECTION INTRAMUSCULAR; INTRAVENOUS
Status: DISCONTINUED | OUTPATIENT
Start: 2025-05-12 | End: 2025-05-13

## 2025-05-12 RX ORDER — HEPARIN SODIUM 5000 [USP'U]/ML
5000 INJECTION, SOLUTION INTRAVENOUS; SUBCUTANEOUS EVERY 8 HOURS SCHEDULED
Status: DISCONTINUED | OUTPATIENT
Start: 2025-05-12 | End: 2025-05-13 | Stop reason: HOSPADM

## 2025-05-12 RX ORDER — DEXTROSE MONOHYDRATE 100 MG/ML
INJECTION, SOLUTION INTRAVENOUS CONTINUOUS PRN
Status: DISCONTINUED | OUTPATIENT
Start: 2025-05-12 | End: 2025-05-13 | Stop reason: HOSPADM

## 2025-05-12 RX ORDER — BUPROPION HYDROCHLORIDE 150 MG/1
150 TABLET ORAL EVERY MORNING
Status: DISCONTINUED | OUTPATIENT
Start: 2025-05-12 | End: 2025-05-13 | Stop reason: HOSPADM

## 2025-05-12 RX ORDER — CARVEDILOL 12.5 MG/1
12.5 TABLET ORAL 2 TIMES DAILY WITH MEALS
COMMUNITY

## 2025-05-12 RX ORDER — LORATADINE 10 MG/1
10 TABLET ORAL DAILY
COMMUNITY

## 2025-05-12 RX ORDER — AMLODIPINE BESYLATE 2.5 MG/1
5 TABLET ORAL NIGHTLY
Status: ON HOLD | COMMUNITY
End: 2025-05-28 | Stop reason: HOSPADM

## 2025-05-12 RX ORDER — ROSUVASTATIN CALCIUM 40 MG/1
40 TABLET, COATED ORAL EVERY EVENING
Status: ON HOLD | COMMUNITY
End: 2025-05-28 | Stop reason: HOSPADM

## 2025-05-12 RX ORDER — ONDANSETRON 4 MG/1
4 TABLET, ORALLY DISINTEGRATING ORAL EVERY 8 HOURS PRN
Status: DISCONTINUED | OUTPATIENT
Start: 2025-05-12 | End: 2025-05-12 | Stop reason: SDUPTHER

## 2025-05-12 RX ORDER — ROSUVASTATIN CALCIUM 10 MG/1
40 TABLET, COATED ORAL EVERY EVENING
Status: DISCONTINUED | OUTPATIENT
Start: 2025-05-12 | End: 2025-05-13 | Stop reason: HOSPADM

## 2025-05-12 RX ORDER — SODIUM CHLORIDE 0.9 % (FLUSH) 0.9 %
5-40 SYRINGE (ML) INJECTION EVERY 12 HOURS SCHEDULED
Status: DISCONTINUED | OUTPATIENT
Start: 2025-05-12 | End: 2025-05-13 | Stop reason: HOSPADM

## 2025-05-12 RX ORDER — EZETIMIBE 10 MG/1
10 TABLET ORAL DAILY
Status: DISCONTINUED | OUTPATIENT
Start: 2025-05-12 | End: 2025-05-13 | Stop reason: HOSPADM

## 2025-05-12 RX ORDER — INSULIN LISPRO 100 [IU]/ML
0-8 INJECTION, SOLUTION INTRAVENOUS; SUBCUTANEOUS
Status: DISCONTINUED | OUTPATIENT
Start: 2025-05-12 | End: 2025-05-13 | Stop reason: HOSPADM

## 2025-05-12 RX ORDER — ONDANSETRON 8 MG/1
8 TABLET, FILM COATED ORAL EVERY 12 HOURS PRN
COMMUNITY

## 2025-05-12 RX ORDER — SEVELAMER CARBONATE 800 MG/1
800 TABLET, FILM COATED ORAL
Status: DISCONTINUED | OUTPATIENT
Start: 2025-05-12 | End: 2025-05-13 | Stop reason: HOSPADM

## 2025-05-12 RX ORDER — TRAMADOL HYDROCHLORIDE 50 MG/1
50 TABLET ORAL EVERY 6 HOURS PRN
Status: DISCONTINUED | OUTPATIENT
Start: 2025-05-12 | End: 2025-05-13 | Stop reason: HOSPADM

## 2025-05-12 RX ORDER — ONDANSETRON 2 MG/ML
4 INJECTION INTRAMUSCULAR; INTRAVENOUS ONCE
Status: DISCONTINUED | OUTPATIENT
Start: 2025-05-12 | End: 2025-05-13 | Stop reason: HOSPADM

## 2025-05-12 RX ORDER — ACETAMINOPHEN 325 MG/1
650 TABLET ORAL EVERY 6 HOURS PRN
Status: DISCONTINUED | OUTPATIENT
Start: 2025-05-12 | End: 2025-05-13 | Stop reason: HOSPADM

## 2025-05-12 RX ORDER — SODIUM CHLORIDE 0.9 % (FLUSH) 0.9 %
10 SYRINGE (ML) INJECTION PRN
Status: DISCONTINUED | OUTPATIENT
Start: 2025-05-12 | End: 2025-05-13 | Stop reason: HOSPADM

## 2025-05-12 RX ORDER — MULTIVITAMIN
1 TABLET ORAL DAILY
COMMUNITY

## 2025-05-12 RX ORDER — CARVEDILOL 12.5 MG/1
12.5 TABLET ORAL 2 TIMES DAILY WITH MEALS
Status: ON HOLD | COMMUNITY
End: 2025-05-12

## 2025-05-12 RX ORDER — INSULIN GLARGINE 100 [IU]/ML
25 INJECTION, SOLUTION SUBCUTANEOUS 2 TIMES DAILY
Status: DISCONTINUED | OUTPATIENT
Start: 2025-05-12 | End: 2025-05-13 | Stop reason: HOSPADM

## 2025-05-12 RX ORDER — CARVEDILOL 25 MG/1
25 TABLET ORAL 2 TIMES DAILY WITH MEALS
Status: DISCONTINUED | OUTPATIENT
Start: 2025-05-12 | End: 2025-05-13 | Stop reason: HOSPADM

## 2025-05-12 RX ORDER — POLYETHYLENE GLYCOL 3350 17 G/17G
17 POWDER, FOR SOLUTION ORAL DAILY PRN
Status: DISCONTINUED | OUTPATIENT
Start: 2025-05-12 | End: 2025-05-13 | Stop reason: HOSPADM

## 2025-05-12 RX ORDER — SENNA AND DOCUSATE SODIUM 50; 8.6 MG/1; MG/1
4 TABLET, FILM COATED ORAL DAILY
Status: DISCONTINUED | OUTPATIENT
Start: 2025-05-12 | End: 2025-05-13 | Stop reason: HOSPADM

## 2025-05-12 RX ORDER — TRAMADOL HYDROCHLORIDE 50 MG/1
50 TABLET ORAL 2 TIMES DAILY PRN
Status: DISCONTINUED | OUTPATIENT
Start: 2025-05-12 | End: 2025-05-12

## 2025-05-12 RX ORDER — ONDANSETRON 2 MG/ML
4 INJECTION INTRAMUSCULAR; INTRAVENOUS EVERY 6 HOURS PRN
Status: DISCONTINUED | OUTPATIENT
Start: 2025-05-12 | End: 2025-05-13 | Stop reason: HOSPADM

## 2025-05-12 RX ORDER — FUROSEMIDE 20 MG/1
20 TABLET ORAL DAILY
Status: DISCONTINUED | OUTPATIENT
Start: 2025-05-12 | End: 2025-05-13

## 2025-05-12 RX ORDER — LOSARTAN POTASSIUM 50 MG/1
50 TABLET ORAL DAILY
Status: DISCONTINUED | OUTPATIENT
Start: 2025-05-12 | End: 2025-05-13 | Stop reason: HOSPADM

## 2025-05-12 RX ORDER — ACETAMINOPHEN 650 MG/1
650 SUPPOSITORY RECTAL EVERY 6 HOURS PRN
Status: DISCONTINUED | OUTPATIENT
Start: 2025-05-12 | End: 2025-05-13 | Stop reason: HOSPADM

## 2025-05-12 RX ORDER — SODIUM CHLORIDE 9 MG/ML
INJECTION, SOLUTION INTRAVENOUS PRN
Status: DISCONTINUED | OUTPATIENT
Start: 2025-05-12 | End: 2025-05-13 | Stop reason: HOSPADM

## 2025-05-12 RX ORDER — PANTOPRAZOLE SODIUM 40 MG/1
40 TABLET, DELAYED RELEASE ORAL
Status: DISCONTINUED | OUTPATIENT
Start: 2025-05-13 | End: 2025-05-13 | Stop reason: HOSPADM

## 2025-05-12 RX ORDER — ACETAMINOPHEN 500 MG
500 TABLET ORAL EVERY 6 HOURS PRN
COMMUNITY

## 2025-05-12 RX ORDER — CLOPIDOGREL BISULFATE 75 MG/1
75 TABLET ORAL DAILY
Status: DISCONTINUED | OUTPATIENT
Start: 2025-05-12 | End: 2025-05-13 | Stop reason: HOSPADM

## 2025-05-12 RX ORDER — ROSUVASTATIN CALCIUM 10 MG/1
10 TABLET, COATED ORAL DAILY
Status: DISCONTINUED | OUTPATIENT
Start: 2025-05-12 | End: 2025-05-12

## 2025-05-12 RX ADMIN — SODIUM CHLORIDE, PRESERVATIVE FREE 10 ML: 5 INJECTION INTRAVENOUS at 11:49

## 2025-05-12 RX ADMIN — FLUOXETINE HYDROCHLORIDE 20 MG: 20 CAPSULE ORAL at 21:26

## 2025-05-12 RX ADMIN — FUROSEMIDE 20 MG: 20 TABLET ORAL at 12:11

## 2025-05-12 RX ADMIN — METOCLOPRAMIDE 10 MG: 5 INJECTION, SOLUTION INTRAMUSCULAR; INTRAVENOUS at 11:45

## 2025-05-12 RX ADMIN — HYDRALAZINE HYDROCHLORIDE 50 MG: 50 TABLET ORAL at 11:44

## 2025-05-12 RX ADMIN — ACETAMINOPHEN 650 MG: 325 TABLET ORAL at 13:07

## 2025-05-12 RX ADMIN — CARVEDILOL 25 MG: 25 TABLET, FILM COATED ORAL at 11:44

## 2025-05-12 RX ADMIN — ROSUVASTATIN CALCIUM 40 MG: 10 TABLET, FILM COATED ORAL at 21:26

## 2025-05-12 RX ADMIN — HEPARIN SODIUM 5000 UNITS: 5000 INJECTION INTRAVENOUS; SUBCUTANEOUS at 14:08

## 2025-05-12 RX ADMIN — FLUOXETINE HYDROCHLORIDE 20 MG: 20 CAPSULE ORAL at 11:44

## 2025-05-12 RX ADMIN — SODIUM ZIRCONIUM CYCLOSILICATE 10 G: 10 POWDER, FOR SUSPENSION ORAL at 06:47

## 2025-05-12 RX ADMIN — TRAMADOL HYDROCHLORIDE 50 MG: 50 TABLET, COATED ORAL at 22:54

## 2025-05-12 RX ADMIN — METOCLOPRAMIDE 10 MG: 5 INJECTION, SOLUTION INTRAMUSCULAR; INTRAVENOUS at 21:29

## 2025-05-12 RX ADMIN — SENNOSIDES AND DOCUSATE SODIUM 4 TABLET: 50; 8.6 TABLET ORAL at 12:13

## 2025-05-12 RX ADMIN — BUPROPION HYDROCHLORIDE 150 MG: 150 TABLET, EXTENDED RELEASE ORAL at 11:44

## 2025-05-12 RX ADMIN — HYDRALAZINE HYDROCHLORIDE 50 MG: 50 TABLET ORAL at 23:30

## 2025-05-12 RX ADMIN — TRAMADOL HYDROCHLORIDE 50 MG: 50 TABLET, COATED ORAL at 11:44

## 2025-05-12 RX ADMIN — ACETAMINOPHEN 650 MG: 325 TABLET ORAL at 21:26

## 2025-05-12 RX ADMIN — CLOPIDOGREL BISULFATE 75 MG: 75 TABLET, FILM COATED ORAL at 11:44

## 2025-05-12 RX ADMIN — SODIUM CHLORIDE 250 ML: 0.9 INJECTION, SOLUTION INTRAVENOUS at 05:44

## 2025-05-12 RX ADMIN — LOSARTAN POTASSIUM 50 MG: 50 TABLET, FILM COATED ORAL at 12:11

## 2025-05-12 RX ADMIN — EZETIMIBE 10 MG: 10 TABLET ORAL at 11:44

## 2025-05-12 RX ADMIN — HEPARIN SODIUM 5000 UNITS: 5000 INJECTION INTRAVENOUS; SUBCUTANEOUS at 21:27

## 2025-05-12 RX ADMIN — SODIUM CHLORIDE, PRESERVATIVE FREE 10 ML: 5 INJECTION INTRAVENOUS at 21:27

## 2025-05-12 RX ADMIN — CARVEDILOL 25 MG: 25 TABLET, FILM COATED ORAL at 21:26

## 2025-05-12 RX ADMIN — AMLODIPINE BESYLATE 10 MG: 10 TABLET ORAL at 12:11

## 2025-05-12 ASSESSMENT — ENCOUNTER SYMPTOMS
CONSTIPATION: 1
VOMITING: 1
COUGH: 0
ABDOMINAL PAIN: 1
WHEEZING: 0
DIARRHEA: 0
SINUS PRESSURE: 0
SHORTNESS OF BREATH: 0
NAUSEA: 1
ABDOMINAL DISTENTION: 1
SINUS PAIN: 0
PHOTOPHOBIA: 0

## 2025-05-12 ASSESSMENT — PAIN SCALES - GENERAL
PAINLEVEL_OUTOF10: 2
PAINLEVEL_OUTOF10: 8
PAINLEVEL_OUTOF10: 8
PAINLEVEL_OUTOF10: 6
PAINLEVEL_OUTOF10: 2
PAINLEVEL_OUTOF10: 6
PAINLEVEL_OUTOF10: 8
PAINLEVEL_OUTOF10: 6
PAINLEVEL_OUTOF10: 8
PAINLEVEL_OUTOF10: 4

## 2025-05-12 ASSESSMENT — PAIN DESCRIPTION - ORIENTATION
ORIENTATION: POSTERIOR
ORIENTATION: LEFT

## 2025-05-12 ASSESSMENT — PAIN DESCRIPTION - FREQUENCY
FREQUENCY: CONTINUOUS
FREQUENCY: INTERMITTENT
FREQUENCY: CONTINUOUS
FREQUENCY: INTERMITTENT

## 2025-05-12 ASSESSMENT — PAIN DESCRIPTION - LOCATION
LOCATION: WRIST;SHOULDER
LOCATION: HEAD
LOCATION: WRIST;SHOULDER
LOCATION: WRIST

## 2025-05-12 ASSESSMENT — PAIN DESCRIPTION - DESCRIPTORS
DESCRIPTORS: ACHING
DESCRIPTORS: ACHING
DESCRIPTORS: ACHING;TIGHTNESS
DESCRIPTORS: ACHING;SHARP

## 2025-05-12 ASSESSMENT — PAIN DESCRIPTION - ONSET
ONSET: ON-GOING

## 2025-05-12 ASSESSMENT — PAIN - FUNCTIONAL ASSESSMENT
PAIN_FUNCTIONAL_ASSESSMENT: ACTIVITIES ARE NOT PREVENTED
PAIN_FUNCTIONAL_ASSESSMENT: 0-10

## 2025-05-12 ASSESSMENT — PAIN DESCRIPTION - PAIN TYPE
TYPE: ACUTE PAIN;CHRONIC PAIN
TYPE: ACUTE PAIN;CHRONIC PAIN
TYPE: ACUTE PAIN
TYPE: ACUTE PAIN

## 2025-05-12 NOTE — PROGRESS NOTES
Pt arrived to floor via STRETCHER, room 1018   Writer at bedside, orders released, VSS, assessment completed. Pt oriented to call light and safety protocols, telemtry applied mx 40 pt verbalized understanding.       Pt follows 32 oz fluid restrictions at home along with renal diet.

## 2025-05-12 NOTE — ED PROVIDER NOTES
EMERGENCY DEPARTMENT ENCOUNTER    Pt Name: Steven L Severance  MRN: [id number]  Birthdate 1975  Date of evaluation: 5/12/25      Patient sign out from Dr. Goldberger    Treatment and Disposition    Patient repeat assessment: The patient is hemodynamically stable.  Labs remarkable for potassium 5.8, creatinine 8.4.  Patient unable to receive dialysis today at his dialysis center.  Admitting to hospital for further management of hyperkalemia and nausea and vomiting.    Case discussed with nephrology, Dr. Rubio, who will see patient in the hospital today.    Discussed case with hospitalist team, Elliott, who cussed patient for admission to hospital.    Social determinants of health impacting treatment or disposition:  None    Shared Decision Making completed with patient regarding risks and benefits of admission versus discharge.  Patient decides to be admitted to the hospital.    Code Status Discussion:  Not discussed    MIPS:  N/A    \"ED Course\" Notes From Epic Narrator:       CRITICAL CARE:   N/A    PROCEDURES:  Procedures      DATA FOR LAB AND RADIOLOGY TESTS ORDERED BELOW ARE REVIEWED BY THE ED CLINICIAN:    RADIOLOGY: All x-rays, CT, MRI, and formal ultrasound images (except ED bedside ultrasound) are read by the radiologist, see reports below, unless otherwise noted in MDM or here.  Reports below are reviewed by myself.  CT ABDOMEN PELVIS WO CONTRAST Additional Contrast? None   Final Result   1. No acute intra-abdominal or pelvic process.   2. Small hiatal hernia.             LABS: Lab orders shown below, the results are reviewed by myself, and all abnormals are listed below.  Labs Reviewed   CBC WITH AUTO DIFFERENTIAL - Abnormal; Notable for the following components:       Result Value    RBC 4.11 (*)     Hematocrit 38.7 (*)     Lymphocytes % 17 (*)     Monocytes % 14 (*)     Lymphocytes Absolute 1.00 (*)     All other components within normal limits   COMPREHENSIVE METABOLIC PANEL - Abnormal; Notable for the

## 2025-05-12 NOTE — PROGRESS NOTES
Physical Therapy  Facility/Department: [facility] CARE   Physical Therapy Initial Evaluation    Patient Name: Steven L Severance        MRN: [id number]    : 1975    Date of Service: 2025    Chief Complaint   Patient presents with    Abdominal Pain    Vomiting     Past Medical History:  has a past medical history of Cerebral artery occlusion with cerebral infarction (HCC), Closed fracture of bone of right foot, Dialysis patient, Hemodialysis patient, Hyperlipidemia, Hypertension, Kidney disease, and Type 1 diabetes mellitus (HCC).  Past Surgical History:  has a past surgical history that includes AV fistula creation (Left); Wrist surgery (); Upper gastrointestinal endoscopy (N/A, 8/3/2022); and Colonoscopy (N/A, 8/3/2022).    Discharge Recommendations  Discharge Recommendations: Patient would benefit from continued therapy after discharge. Due to recent hospitalization and medical condition, pt would benefit from additional intermittent skilled therapy at time of discharge.  Please refer to the AM-PAC score for current functional status.         Assessment  Body Structures, Functions, Activity Limitations Requiring Skilled Therapeutic Intervention: Decreased functional mobility , Decreased endurance, Decreased sensation, Decreased balance, Decreased posture, Decreased coordination, Decreased strength, Decreased ROM  Assessment: Patient appears mildly declined close to his baseline. Quality of gait diminished by decreased balance, decreased sensation, and decreased ankle ROM. Patient will benefit from skilled PT to improve quality of gait and balanace.  Therapy Prognosis: Good  Decision Making: Medium Complexity  Requires PT Follow-Up: Yes  Activity Tolerance  Activity Tolerance: Patient tolerated treatment well  Safety Devices  Type of Devices: All fall risk precautions in place, Bed alarm in place, Call light within reach, Nurse notified, Gait belt, Left in bed    AM-PAC  AM-PAC Basic Mobility -

## 2025-05-12 NOTE — H&P
[facility]  Office: [phone]  Wander Gresham DO, Ok Antoine DO, Delmar Morgan DO, Esdras Emmanuel DO, Jelly Carrasco MD, Liliana Alvarez MD, Anastasiya Musa MD, Kyleigh Fine MD,  Brendon Jason MD, Madi Cooper MD, Shelley uZniga MD,  Gloria Flores DO, Carla Hubbard MD, Rey Su MD, Adalid Gresham DO, Leticia Hansen MD,  Osbaldo Segovia DO, Ayleen Morales MD, Rossi Mitchell MD, Jaylen Douglas MD,  Frantz Elam MD, Qasim Gutierrez MD, Nabil Armendariz MD, Flako Ponce MD, Stephen Corbett MD, Marge Jaime MD, Zachary Blanco DO, Mariluz Larsen MD, Kerwin Burnette MD, Gloria Llamas MD, Mohsin Reza, MD, Melisa Siu MD, Shirley Waterhouse, CNP,  Cynthia Pickett, CNP, Zachary Yu, CNP,  Lou Quinteros, VASILE, Ayana Canseco, CNP, Kathia Maria, CNP, Chani Romero, CNP, Tiffanie Valera, CNP, Jovita Colon, PA-C, Elba Jay, CNP, Huma Anguiano, CNP,  Sejal Galvez, CNP, Bethanie Taylor, CNP, Nicanor Clayton, PA-C, Radha Vines, CNP,  Nunu Zacarias, CNS, Cherri Pérez, CNP, Libia Richardson, CNP,   Ludy Gee, CNP         [facility]   IN-PATIENT SERVICE   [facility]    HISTORY AND PHYSICAL EXAMINATION            Date:   5/12/2025  Patient name:  Steven L Severance  Date of admission:  5/12/2025  4:56 AM  MRN:   [id number]  Account:  [id number]  YOB: 1975  PCP:    Christofer Rodriguez APRN - SHIM[facility]  Room:   [address] [place] [address]  Code Status:    Full Code    Chief Complaint:     Chief Complaint   Patient presents with    Abdominal Pain    Vomiting       History Obtained From:     patient    History of Present Illness:     Steven L Severance is a 50 y.o. Non- / non  male who presents with Abdominal Pain and Vomiting   and is admitted to the hospital for the management of ESRD needing dialysis (HCC).    Patient has a known history of prior CVAs as well as ESRD on dialysis.  These chronic conditions have resulted in debility and caring for

## 2025-05-12 NOTE — CONSULTS
[facility]    Shaukat Rashid MD Zafar Magsi MD Danial Rashid MD John O. Ranker MD Sembria Ligibel, CNP       Nephrology Consultation Note    Reason for Consult:    Date of Service: 05/12/25   PCP: Christofer Rodriguez, DANIEL - CNP       Reason for Consult       Assessment and Plan     50-year-old male with a past medical history significant for ESRD on hemodialysis, type 1 diabetes mellitus, hypertension, coronary artery disease, and prior cerebrovascular accidents presented to the emergency department with nausea, vomiting, and abdominal pain. The patient reported 3 days of progressive gastrointestinal symptoms, including postprandial vomiting and constipation, though he had continued attending dialysis. He was referred to the ED by his dialysis team after being unable to receive dialysis due to illness and was found to be hypertensive with a potassium of 5.8 and creatinine of 8.4 (baseline unknown but likely ESRD). Initial management included ondansetron, sodium chloride bolus, and Lokelma. CT abdomen/pelvis showed no acute intra-abdominal pathology. EKG showed QTc 475 ms without arrhythmia. Nephrology consultation was requested for evaluation and planning for dialysis resumption, hyperkalemia management, and volume assessment.    Assessment  ESRD on Hemodialysis Missed scheduled dialysis, presenting with uremic symptoms (nausea/vomiting), volume concerns, and hyperkalemia. No overt signs of fluid overload, no hydronephrosis on imaging. Cr 8.4, GFR 7.  Hyperkalemia Potassium 5.8 on presentation. Lokelma administered in ED.  Acid-Base Disorder No current acidosis; CO2 25.  Volume Status Likely near euvolemic; no pulmonary edema, no lower extremity edema; dry mucous membranes.  Hypertension /95. Multiple home antihypertensives; inpatient plan includes increase in amlodipine and carvedilol, continuation of furosemide, reinitiation of losartan (held previously), and  in the last 72 hours.  Magnesium: No results for input(s): \"MG\" in the last 72 hours.  Albumin: No results for input(s): \"LABALBU\" in the last 72 hours.  PTH:  No results for input(s): \"PTH\" in the last 72 hours.    Urine Studies:  Urine Sodium:  No components found for: \"ALPHONSO\"  Urine Potassium:  No results found for: \"KUR\"  Urine Chloride:  No results found for: \"CLUR\"  Urine Osmolarity:  No results found for: \"OSMOU\"  Urine Creatinine:  No results found for: \"LABCREA\"  Urine Eosinophils: No components found for: \"UEOS\"  Urine Protein:  No results found for: \"TPU\"      Urine:  No results found for: \"PROTEINU\"      Serology:  ECTOR: No results found for: \"ECTOR\"  C3:No results found for: \"C3\"  C4:No results found for: \"C4\"  MPO ANCA:  No results found for: \"MPO\"  PR3 ANCA:  No results found for: \"PR3\"  hepatitis serologies  ANTIGBM:No results found for: \"GBMABIGG\"  HEPATITIS B SURFACE AG:   Lab Results   Component Value Date/Time    HEPBSAG NONREACTIVE 08/05/2022 05:45 AM     HEPATITIS C AB: No results found for: \"HEPCAB\"    Electrophoresis:  SPEP:No results found for: \"ALBCAL\", \"ALBPCT\", \"LABALPH\", \"A1PCT\", \"A2PCT\", \"LABBETA\", \"BETAPCT\", \"GAMGLOB\", \"GGPCT\", \"PATH\"  UPEP:No results found for: \"LABPE\"         Thank you for the consultation. Please do not hesitate to contact us for any further questions/concerns. We will continue to follow along with you.        Electronically signed by Sujit Rubio MD  on 5/12/2025 at 12:04 PM

## 2025-05-12 NOTE — PROGRESS NOTES
[] The Home Med List was verified for accuracy and marked COMPLETED. The following sources were used to assist with Medication Reconciliation:    [] Med Rec Pharmacy tech has already completed home med list in the ED and note reviewed   [] Patient med list was transcribed into the EHR and verified for accuracy.(Note method of verification)  [] Patient provided bottles of their medications for validation  [x] Medications reviewed and confirmed with Yves grandfather with pt list copy provided in chart (Please list name and relationship to patient)  [] Contacted patient's pharmacy to confirm home medications (Please list the pharmacy)  [] Home medications reviewed using Dispense Report   [] Contacted physician office to confirm home medications  [] Medical Records received from another facility (list facility and place copy placed in chart)  [] Dr Elliott NP was notified regarding changes to home med list via [facility] on 5/12/2025 at the following time:        [] There are one or more home medications that need clarification before Medication Reconciliation can be marked completed. The Med List Status has been marked as In Progress.   To assist with Home Medication Reconciliation the following actions have been taken:    [] Family requested to bring medications in their original bottles to the hospital for verification  [] Family requested to call hospital with list of medications  [] Call to physicians off and left message (list physician and who they spoke to, date and time)  [] Request for medical records made to n/a  [] MAR from facility requested to be faxed over  [] Unable to complete due to patient condition  [] Oncoming nurse SOFIYA Garcia notified of incomplete med list for follow up  [] Other n/a      *Effective communication is essential throughout this process. It is important for the nurse to document the progress of the med rec to keep team members informed. If any changes are made to the home medication

## 2025-05-12 NOTE — PROGRESS NOTES
Occupational Therapy  Occupational Therapy Initial Evaluation  Facility/Department: [facility]   Patient Name: Steven L Severance        MRN: [id number]    : 1975    Date of Service: 2025    Discharge Recommendations  Discharge Recommendations: Patient would benefit from continued therapy after discharge  OT Equipment Recommendations  Other: Due to recent hospitalization and medical condition, pt would benefit from additional intermittent skilled therapy at time of discharge.  Please refer to the AM-PAC score for current functional status.    Chief Complaint   Patient presents with    Abdominal Pain    Vomiting     Past Medical History:  has a past medical history of Cerebral artery occlusion with cerebral infarction (HCC), Closed fracture of bone of right foot, Dialysis patient, Hemodialysis patient, Hyperlipidemia, Hypertension, Kidney disease, and Type 1 diabetes mellitus (HCC).  Past Surgical History:  has a past surgical history that includes AV fistula creation (Left); Wrist surgery (); Upper gastrointestinal endoscopy (N/A, 8/3/2022); and Colonoscopy (N/A, 8/3/2022).    Assessment  Performance deficits / Impairments: Decreased ADL status;Decreased functional mobility ;Decreased strength;Decreased safe awareness;Decreased endurance;Decreased balance;Decreased posture  Assessment: With current deficits, pt would benefit from skilled OTtreatment in order to increase overall independence with ADLs, ROM, coordination, strength, balance, act alejandro as well as I/safety awareness in functional mob to return home with assist as needed.  Prognosis: Good  Decision Making: Medium Complexity  REQUIRES OT FOLLOW-UP: Yes  Activity Tolerance  Activity Tolerance: Patient Tolerated treatment well  Safety Devices  Type of Devices: All fall risk precautions in place;Bed alarm in place;Call light within reach;Nurse notified;Gait belt;Left in

## 2025-05-12 NOTE — PLAN OF CARE
Pt has been resting in bed most of the day  Pt's grandpa at bedside  Pt complains of no N/V during the shift  Pt did complain of 8/10 pain to the left shoulder and wrist, see mar.  Lidocaine patch applied left shoulder  Pt getting dialysis at end of shift  All questions and concerns were addressed  Safety maintained  Problem: Chronic Conditions and Co-morbidities  Goal: Patient's chronic conditions and co-morbidity symptoms are monitored and maintained or improved  5/12/2025 1926 by Radha Bolden RN  Outcome: Progressing     Problem: Discharge Planning  Goal: Discharge to home or other facility with appropriate resources  5/12/2025 1926 by Radha Bolden RN  Outcome: Progressing     Problem: Pain  Goal: Verbalizes/displays adequate comfort level or baseline comfort level  5/12/2025 1926 by Radha Bolden RN  Outcome: Progressing     Problem: Safety - Adult  Goal: Free from fall injury  5/12/2025 1926 by Radha Bolden RN  Outcome: Progressing     Problem: Skin/Tissue Integrity  Goal: Skin integrity remains intact  Description: 1.  Monitor for areas of redness and/or skin breakdown2.  Assess vascular access sites hourly3.  Every 4-6 hours minimum:  Change oxygen saturation probe site4.  Every 4-6 hours:  If on nasal continuous positive airway pressure, respiratory therapy assess nares and determine need for appliance change or resting period  5/12/2025 1926 by Radha Bolden RN  Outcome: Progressing     Problem: Gastrointestinal - Adult  Goal: Minimal or absence of nausea and vomiting  5/12/2025 1926 by Radha Bolden RN  Outcome: Progressing     Problem: Gastrointestinal - Adult  Goal: Maintains or returns to baseline bowel function  5/12/2025 1926 by Radha Bolden RN  Outcome: Progressing     Problem: Gastrointestinal - Adult  Goal: Maintains adequate nutritional intake  5/12/2025 1926 by Radha Bolden RN  Outcome: Progressing

## 2025-05-12 NOTE — PROGRESS NOTES
Pharmacy Medication Review    The patient's list of current home medications has been reviewed.     PHYSICIANS AND NURSE PRACTITIONERS: please note there is no Transitions of Care/Med Rec Pharmacist available to address any discrepancies on inpatient orders. It is the responsibility of the attending provider to review the updates made to the home med list and adjust inpatient orders as appropriate.        Source(s) of information:family, Care Everywhere, Surescripts refill report and home mediation list        Based on information provided by the above source(s), I have updated the patient's home med list as described below.     Removed   aspirin 81 MG EC tablet  glucose 4 g chewable tablet  rosuvastatin (CRESTOR) 10 MG tablet   amLODIPine (NORVASC) 10 MG tablet   carvedilol (COREG) 25 MG tablet   ondansetron (ZOFRAN-ODT) 4 MG disintegrating tablet   ammonium lactate (LAC-HYDRIN) 12 % lotion   losartan (COZAAR) 50 MG tablet   cyclobenzaprine (FLEXERIL) 5 MG tablet  sevelamer (RENVELA) 800 MG tablet  vitamin D (CHOLECALCIFEROL) 125 MCG (5000 UT) CAPS capsule     Added rosuvastatin (CRESTOR) 40 MG tablet   Multiple Vitamin (DAILY JARET) TABS   carvedilol (COREG) 12.5 MG tablet  ondansetron (ZOFRAN) 8 MG tablet   loratadine (CLARITIN) 10 MG tablet      Adjusted   insulin glargine (LANTUS SOLOSTAR) 100 UNIT/ML injection pen  calcium acetate (PHOSLO) 667 MG CAPS capsule     Other notes:   None    Are any of the medications noted above considered a 'high alert' medication? YES      Is the patient on warfarin at home? No          Please feel free to call me with any questions about this encounter. Thank you.      Nilton Terrazas, pharmacy technician  [facility]  Phone:  [phone]      Electronically signed by Nilton Terrazas on 5/12/2025 at 4:55 PM   Note: co-signature by the pharmacist only acknowledges that I have performed a medication review and does not attest to an evaluation of this medication

## 2025-05-12 NOTE — CARE COORDINATION
Case Management Assessment  Initial Evaluation    Date/Time of Evaluation: 5/12/2025 3:47 PM  Assessment Completed by: JAYESH BUCK RN    If patient is discharged prior to next notation, then this note serves as note for discharge by case management.    Patient Name: Steven L Severance                   YOB: 1975  Diagnosis: Hyperkalemia [E87.5]  ESRD needing dialysis (HCC) [N18.6, Z99.2]  Vomiting, unspecified vomiting type, unspecified whether nausea present [R11.10]                   Date / Time: 5/12/2025  4:56 AM    Patient Admission Status: Inpatient   Readmission Risk (Low < 19, Mod (19-27), High > 27): Readmission Risk Score: 21.6    Current PCP: Christofer Rodriguez APRN - CNP  PCP verified by CM? Yes    Chart Reviewed: Yes      History Provided by: Patient  Patient Orientation: Alert and Oriented    Patient Cognition: Alert    Hospitalization in the last 30 days (Readmission):  No    If yes, Readmission Assessment in CM Navigator will be completed.    Advance Directives:      Code Status: Full Code   Patient's Primary Decision Maker is: Legal Next of Kin    Secondary Decision Maker: trinh gupta - Grandparent - [phone]    Discharge Planning:    Patient lives with: Parent Type of Home: House  Primary Care Giver: Self  Patient Support Systems include: Parent   Current Financial resources: Medicare  Current community resources: ECF/Home Care  Current services prior to admission: Durable Medical Equipment, Home Care (current with home care.)            Current DME: Shower Chair, Walker, Wheelchair            Type of Home Care services:  Nursing Services, OT, PT    ADLS  Prior functional level: Assistance with the following:, Shopping, Housework, Cooking  Current functional level: Assistance with the following:, Cooking, Housework, Shopping    PT AM-PAC:   /24  OT AM-PAC:   /24    Family can provide assistance at DC: Yes  Would you like Case Management to discuss the discharge plan

## 2025-05-12 NOTE — PROGRESS NOTES
Patient having pain on their wrists and shoulders and rates it a 8. Pain interventions includecorrect body alignment/body mechanics, relaxation techniques, medication, pillow support/positioning, and regular rest periods. Patients goal for pain relief is 1. The need for pain and symptom management will be considered in the discharge planning process to ensure patients comfort.

## 2025-05-13 ENCOUNTER — APPOINTMENT (OUTPATIENT)
Dept: CT IMAGING | Age: 50
DRG: 682 | End: 2025-05-13
Payer: MEDICARE

## 2025-05-13 ENCOUNTER — APPOINTMENT (OUTPATIENT)
Dept: GENERAL RADIOLOGY | Age: 50
DRG: 682 | End: 2025-05-13
Payer: MEDICARE

## 2025-05-13 VITALS
TEMPERATURE: 97.7 F | HEIGHT: 67 IN | WEIGHT: 184 LBS | RESPIRATION RATE: 20 BRPM | DIASTOLIC BLOOD PRESSURE: 77 MMHG | SYSTOLIC BLOOD PRESSURE: 157 MMHG | HEART RATE: 72 BPM | OXYGEN SATURATION: 100 % | BODY MASS INDEX: 28.88 KG/M2

## 2025-05-13 LAB
ANION GAP SERPL CALCULATED.3IONS-SCNC: 11 MMOL/L (ref 9–16)
BASOPHILS # BLD: 0.03 K/UL (ref 0–0.2)
BASOPHILS NFR BLD: 1 % (ref 0–2)
BUN SERPL-MCNC: 25 MG/DL (ref 6–20)
CALCIUM SERPL-MCNC: 9.2 MG/DL (ref 8.6–10.4)
CHLORIDE SERPL-SCNC: 94 MMOL/L (ref 98–107)
CO2 SERPL-SCNC: 27 MMOL/L (ref 20–31)
CREAT SERPL-MCNC: 6.3 MG/DL (ref 0.7–1.2)
EOSINOPHIL # BLD: 0.19 K/UL (ref 0–0.44)
EOSINOPHILS RELATIVE PERCENT: 3 % (ref 1–4)
ERYTHROCYTE [DISTWIDTH] IN BLOOD BY AUTOMATED COUNT: 13.2 % (ref 11.8–14.4)
GFR, ESTIMATED: 10 ML/MIN/1.73M2
GLUCOSE BLD-MCNC: 172 MG/DL (ref 75–110)
GLUCOSE BLD-MCNC: 274 MG/DL (ref 75–110)
GLUCOSE SERPL-MCNC: 338 MG/DL (ref 74–99)
HCT VFR BLD AUTO: 38.8 % (ref 40.7–50.3)
HGB BLD-MCNC: 12.6 G/DL (ref 13–17)
IMM GRANULOCYTES # BLD AUTO: 0.01 K/UL (ref 0–0.3)
IMM GRANULOCYTES NFR BLD: 0 %
LYMPHOCYTES NFR BLD: 1.14 K/UL (ref 1.1–3.7)
LYMPHOCYTES RELATIVE PERCENT: 20 % (ref 24–43)
MCH RBC QN AUTO: 31.5 PG (ref 25.2–33.5)
MCHC RBC AUTO-ENTMCNC: 32.5 G/DL (ref 28.4–34.8)
MCV RBC AUTO: 97 FL (ref 82.6–102.9)
MONOCYTES NFR BLD: 1.18 K/UL (ref 0.1–1.2)
MONOCYTES NFR BLD: 21 % (ref 3–12)
NEUTROPHILS NFR BLD: 54 % (ref 36–65)
NEUTS SEG NFR BLD: 3.05 K/UL (ref 1.5–8.1)
NRBC BLD-RTO: 0 PER 100 WBC
PLATELET # BLD AUTO: 134 K/UL (ref 138–453)
PMV BLD AUTO: 10.5 FL (ref 8.1–13.5)
POTASSIUM SERPL-SCNC: 5.2 MMOL/L (ref 3.7–5.3)
RBC # BLD AUTO: 4 M/UL (ref 4.21–5.77)
SODIUM SERPL-SCNC: 132 MMOL/L (ref 136–145)
WBC OTHER # BLD: 5.6 K/UL (ref 3.5–11.3)

## 2025-05-13 PROCEDURE — 99239 HOSP IP/OBS DSCHRG MGMT >30: CPT | Performed by: NURSE PRACTITIONER

## 2025-05-13 PROCEDURE — 82947 ASSAY GLUCOSE BLOOD QUANT: CPT

## 2025-05-13 PROCEDURE — 36415 COLL VENOUS BLD VENIPUNCTURE: CPT

## 2025-05-13 PROCEDURE — 73110 X-RAY EXAM OF WRIST: CPT

## 2025-05-13 PROCEDURE — 85025 COMPLETE CBC W/AUTO DIFF WBC: CPT

## 2025-05-13 PROCEDURE — 73200 CT UPPER EXTREMITY W/O DYE: CPT

## 2025-05-13 PROCEDURE — 80048 BASIC METABOLIC PNL TOTAL CA: CPT

## 2025-05-13 PROCEDURE — 6360000002 HC RX W HCPCS: Performed by: NURSE PRACTITIONER

## 2025-05-13 PROCEDURE — 6370000000 HC RX 637 (ALT 250 FOR IP): Performed by: NURSE PRACTITIONER

## 2025-05-13 PROCEDURE — 2500000003 HC RX 250 WO HCPCS: Performed by: NURSE PRACTITIONER

## 2025-05-13 RX ORDER — METOCLOPRAMIDE HYDROCHLORIDE 5 MG/ML
5 INJECTION INTRAMUSCULAR; INTRAVENOUS
Status: DISCONTINUED | OUTPATIENT
Start: 2025-05-13 | End: 2025-05-13

## 2025-05-13 RX ORDER — INSULIN GLARGINE 100 [IU]/ML
45-50 INJECTION, SOLUTION SUBCUTANEOUS DAILY
Qty: 15 ML | Refills: 0 | Status: ON HOLD | OUTPATIENT
Start: 2025-05-13 | End: 2025-05-28

## 2025-05-13 RX ORDER — METOCLOPRAMIDE 10 MG/1
10 TABLET ORAL
Qty: 120 TABLET | Refills: 3 | Status: SHIPPED | OUTPATIENT
Start: 2025-05-13

## 2025-05-13 RX ORDER — SENNA AND DOCUSATE SODIUM 50; 8.6 MG/1; MG/1
4 TABLET, FILM COATED ORAL DAILY PRN
Qty: 60 TABLET | Refills: 0 | Status: SHIPPED | OUTPATIENT
Start: 2025-05-13

## 2025-05-13 RX ORDER — FUROSEMIDE 80 MG/1
80 TABLET ORAL DAILY
Status: DISCONTINUED | OUTPATIENT
Start: 2025-05-14 | End: 2025-05-13 | Stop reason: HOSPADM

## 2025-05-13 RX ORDER — METOCLOPRAMIDE 5 MG/1
5 TABLET ORAL
Status: DISCONTINUED | OUTPATIENT
Start: 2025-05-13 | End: 2025-05-13 | Stop reason: HOSPADM

## 2025-05-13 RX ORDER — METOCLOPRAMIDE 10 MG/1
10 TABLET ORAL
Status: DISCONTINUED | OUTPATIENT
Start: 2025-05-13 | End: 2025-05-13

## 2025-05-13 RX ADMIN — METOCLOPRAMIDE 5 MG: 5 INJECTION, SOLUTION INTRAMUSCULAR; INTRAVENOUS at 12:04

## 2025-05-13 RX ADMIN — METOCLOPRAMIDE 10 MG: 5 INJECTION, SOLUTION INTRAMUSCULAR; INTRAVENOUS at 05:52

## 2025-05-13 RX ADMIN — HYDRALAZINE HYDROCHLORIDE 50 MG: 50 TABLET ORAL at 12:04

## 2025-05-13 RX ADMIN — HYDRALAZINE HYDROCHLORIDE 50 MG: 50 TABLET ORAL at 05:52

## 2025-05-13 RX ADMIN — HEPARIN SODIUM 5000 UNITS: 5000 INJECTION INTRAVENOUS; SUBCUTANEOUS at 05:52

## 2025-05-13 RX ADMIN — INSULIN LISPRO 4 UNITS: 100 INJECTION, SOLUTION INTRAVENOUS; SUBCUTANEOUS at 08:21

## 2025-05-13 RX ADMIN — PANTOPRAZOLE SODIUM 40 MG: 40 TABLET, DELAYED RELEASE ORAL at 05:52

## 2025-05-13 RX ADMIN — LOSARTAN POTASSIUM 50 MG: 50 TABLET, FILM COATED ORAL at 08:20

## 2025-05-13 RX ADMIN — BUPROPION HYDROCHLORIDE 150 MG: 150 TABLET, EXTENDED RELEASE ORAL at 08:20

## 2025-05-13 RX ADMIN — CARVEDILOL 25 MG: 25 TABLET, FILM COATED ORAL at 08:20

## 2025-05-13 RX ADMIN — FUROSEMIDE 20 MG: 20 TABLET ORAL at 08:20

## 2025-05-13 RX ADMIN — SENNOSIDES AND DOCUSATE SODIUM 4 TABLET: 50; 8.6 TABLET ORAL at 08:20

## 2025-05-13 RX ADMIN — CLOPIDOGREL BISULFATE 75 MG: 75 TABLET, FILM COATED ORAL at 08:20

## 2025-05-13 RX ADMIN — INSULIN GLARGINE 25 UNITS: 100 INJECTION, SOLUTION SUBCUTANEOUS at 08:21

## 2025-05-13 RX ADMIN — FLUOXETINE HYDROCHLORIDE 20 MG: 20 CAPSULE ORAL at 08:20

## 2025-05-13 RX ADMIN — TRAMADOL HYDROCHLORIDE 50 MG: 50 TABLET, COATED ORAL at 05:56

## 2025-05-13 RX ADMIN — EZETIMIBE 10 MG: 10 TABLET ORAL at 08:19

## 2025-05-13 RX ADMIN — SODIUM CHLORIDE, PRESERVATIVE FREE 10 ML: 5 INJECTION INTRAVENOUS at 08:21

## 2025-05-13 RX ADMIN — AMLODIPINE BESYLATE 10 MG: 10 TABLET ORAL at 08:20

## 2025-05-13 ASSESSMENT — PAIN DESCRIPTION - FREQUENCY: FREQUENCY: INTERMITTENT

## 2025-05-13 ASSESSMENT — PAIN DESCRIPTION - ORIENTATION: ORIENTATION: LEFT

## 2025-05-13 ASSESSMENT — PAIN DESCRIPTION - LOCATION: LOCATION: WRIST

## 2025-05-13 ASSESSMENT — PAIN SCALES - GENERAL
PAINLEVEL_OUTOF10: 6
PAINLEVEL_OUTOF10: 8

## 2025-05-13 ASSESSMENT — PAIN DESCRIPTION - DESCRIPTORS: DESCRIPTORS: STABBING

## 2025-05-13 ASSESSMENT — PAIN DESCRIPTION - PAIN TYPE: TYPE: ACUTE PAIN

## 2025-05-13 ASSESSMENT — PAIN DESCRIPTION - ONSET: ONSET: ON-GOING

## 2025-05-13 ASSESSMENT — PAIN - FUNCTIONAL ASSESSMENT: PAIN_FUNCTIONAL_ASSESSMENT: ACTIVITIES ARE NOT PREVENTED

## 2025-05-13 NOTE — DISCHARGE SUMMARY
[facility]  Office: [phone]  Wander Gresham DO, Ok Antoine DO, Delmar Morgan DO, Esdras Emmanuel DO, Jelly Carrasco MD, Liliana Alvarez MD, Anastasiya Musa MD, Kyleigh Fine MD,  Brendon Jason MD, Madi Cooper MD, Shelley Zuniga MD,  Gloria Flores DO, Carla Hubbard MD, Rey Su MD, Adalid Gresham DO, Leticia Hansen MD,  Osbaldo Segovia DO, Ayleen Morales MD, Rossi Mitchell MD, Jaylen Douglas MD,  Frantz Elam MD, Qasim Gutierrez MD, Nabil Armendariz MD, Flako Ponce MD, Stephen Corbett MD, Marge Jiame MD, Zachary Blanco DO, Mariluz Larsen MD, Kerwin Burnette MD, Gloria Llamas MD, Mohsin Reza, MD, Melisa Siu MD, Shirley Waterhouse, CNP,  Cynthia Pickett, CNP, Zachary Yu, CNP,  Lou Quinteros, VASILE, Ayana Canseco, CNP, Kathia Maria, CNP, Chani Romero, CNP, Tiffanie Valera, CNP, Jovita Colon, PA-C, Elba Jay, CNP, Huma Anguiano, CNP,  Sejal Galvez, CNP, Bethanie Taylor, CNP, Nicanor Clayton, PA-C, Radha Vines, CNP,  Nunu Zacarias, CNS, Cherri Pérez, CNP, Libia Richardson, CNP,   Ludy Gee, CNP         [facility]   IN-PATIENT SERVICE   [facility]    Discharge Summary     Patient ID: Steven L Severance  :  1975   MRN: [id number]     ACCOUNT:  [id number]   Patient's PCP: Christofer Rodriguez APRN - CNP  Admit Date: 2025   Discharge Date: 2025     Length of Stay: 1  Code Status:  Full Code  Admitting Physician: Delmar Morgan DO  Discharge Physician: DANIEL Almanza NP     Active Discharge Diagnoses:     Hospital Problem Lists:  Principal Problem:    ESRD needing dialysis ([facility])  Active Problems:    Coronary artery disease involving native coronary artery of native heart without angina pectoris    ESRD (end stage renal disease) (HCC)    Essential hypertension    Hyperlipidemia    Type 2 diabetes mellitus with diabetic neuropathy, with long-term current use of insulin (HCC)    GERD (gastroesophageal reflux

## 2025-05-13 NOTE — PLAN OF CARE
No change overnight  Pt SBA with braces and rollator  BM overnight, free of nausea  Pain in left wrist and shoulder managed with ultram, pt states he slept on it wrong  Dialysis completed 5/12- 2480 off  HA managed with tylenol  No left arm use- fistula  Plan for nephro eval, HD needs     Problem: Chronic Conditions and Co-morbidities  Goal: Patient's chronic conditions and co-morbidity symptoms are monitored and maintained or improved  5/13/2025 0405 by Rob Balbuena, RN  Outcome: Progressing  Flowsheets (Taken 5/12/2025 2000)  Care Plan - Patient's Chronic Conditions and Co-Morbidity Symptoms are Monitored and Maintained or Improved:   Monitor and assess patient's chronic conditions and comorbid symptoms for stability, deterioration, or improvement   Collaborate with multidisciplinary team to address chronic and comorbid conditions and prevent exacerbation or deterioration   Update acute care plan with appropriate goals if chronic or comorbid symptoms are exacerbated and prevent overall improvement and discharge     Problem: Discharge Planning  Goal: Discharge to home or other facility with appropriate resources  5/13/2025 0405 by Rob Balbuena, RN  Outcome: Progressing  Flowsheets (Taken 5/12/2025 2000)  Discharge to home or other facility with appropriate resources:   Identify barriers to discharge with patient and caregiver   Arrange for needed discharge resources and transportation as appropriate   Identify discharge learning needs (meds, wound care, etc)   Refer to discharge planning if patient needs post-hospital services based on physician order or complex needs related to functional status, cognitive ability or social support system     Problem: Pain  Goal: Verbalizes/displays adequate comfort level or baseline comfort level  5/13/2025 0405 by Rob Balbuena, RN  Outcome: Progressing     Problem: Safety - Adult  Goal: Free from fall injury  5/13/2025 0405 by Rob Balbuena, RN  Outcome:

## 2025-05-13 NOTE — CONSULTS
[facility]    Shaukat Rashid MD Zafar Magsi MD Danial Rashid MD John O. Ranker MD Sembria Ligibel, CNP       Nephrology follow-up Note    Reason for Consult:    Date of Service: 05/13/25   PCP: Christofer Rodriguez, DANIEL - CNP       Reason for follow-up     End-stage renal disease, electrolyte, acid-base management, volume status management, high blood pressure management.    Assessment and Plan     50-year-old male with a past medical history significant for ESRD on hemodialysis, type 1 diabetes mellitus, hypertension, coronary artery disease, and prior cerebrovascular accidents presented to the emergency department with nausea, vomiting, and abdominal pain. The patient reported 3 days of progressive gastrointestinal symptoms, including postprandial vomiting and constipation, though he had continued attending dialysis. He was referred to the ED by his dialysis team after being unable to receive dialysis due to illness and was found to be hypertensive with a potassium of 5.8 and creatinine of 8.4 (baseline unknown but likely ESRD). Initial management included ondansetron, sodium chloride bolus, and Lokelma. CT abdomen/pelvis showed no acute intra-abdominal pathology. EKG showed QTc 475 ms without arrhythmia. Nephrology consultation was requested for evaluation and planning for dialysis resumption, hyperkalemia management, and volume assessment.    Assessment  ESRD on Hemodialysis Missed scheduled dialysis, presenting with uremic symptoms (nausea/vomiting), volume concerns, and hyperkalemia. No overt signs of fluid overload, no hydronephrosis on imaging. Cr 8.4, GFR 7.  Hyperkalemia -corrected with hemodialysis   Hyponatremia  Acid-Base disturbance at goal -serum bicarb is at goal .  Volume Status Likely near euvolemic; no pulmonary edema, no lower extremity edema; dry mucous membranes.  Hypertension /95. Multiple home antihypertensives; inpatient plan includes  dialysis. He was referred to the ED by his dialysis team after being unable to receive dialysis due to illness and was found to be hypertensive with a potassium of 5.8 and creatinine of 8.4 (baseline unknown but likely ESRD). Initial management included ondansetron, sodium chloride bolus, and Lokelma. CT abdomen/pelvis showed no acute intra-abdominal pathology. EKG showed QTc 475 ms without arrhythmia. Nephrology consultation was requested for evaluation and planning for dialysis resumption, hyperkalemia management, and volume assessment.    Past Medical History:    Past Medical History:   Diagnosis Date    Cerebral artery occlusion with cerebral infarction (HCC)     Closed fracture of bone of right foot March - April 2020    Dialysis patient     Hemodialysis patient     Hyperlipidemia     Hypertension     Kidney disease     On Dialysis    Type 1 diabetes mellitus (HCC)         Past Surgical History:    Past Surgical History:   Procedure Laterality Date    AV FISTULA CREATION Left     COLONOSCOPY N/A 8/3/2022    COLONOSCOPY DIAGNOSTIC performed by Rose Mary Richard MD at [facility] OR    UPPER GASTROINTESTINAL ENDOSCOPY N/A 8/3/2022    EGD ESOPHAGOGASTRODUODENOSCOPY performed by Rose Mary Richard MD at [facility] OR    WRIST SURGERY  1995        Family History:   Family History   Problem Relation Age of Onset    Diabetes Mother     Heart Disease Father     Diabetes Father     Neuropathy Maternal Grandmother     Neuropathy Maternal Grandfather     Cancer Paternal Grandmother     Stroke Paternal Grandfather     Heart Disease Maternal Great Grandfather     Alzheimer's Disease Neg Hx     Cerebral Aneurysm Neg Hx     Dementia Neg Hx     Epilepsy Neg Hx     Mult Sclerosis Neg Hx     Seizures Neg Hx     Parkinsonism Neg Hx        Social History:   Tobacco:    reports that he has never smoked. He has never been exposed to tobacco smoke. He has never used smokeless tobacco.  Alcohol:      reports no history of alcohol use.  Drug Use:  reports no

## 2025-05-13 NOTE — PROGRESS NOTES
CLINICAL PHARMACY NOTE: MEDS TO BEDS    Total # of Prescriptions Filled: 1   The following medications were delivered to the patient:  Metoclopramide 10mg    Additional Documentation:

## 2025-05-13 NOTE — DISCHARGE INSTR - COC
Continuity of Care Form    Patient Name: Steven L Severance   :  1975  MRN:  [id number]    Admit date:  2025  Discharge date:      Code Status Order: Full Code   Advance Directives:     Admitting Physician:  Delmar Morgan DO  PCP: Christofer Rodriguez, APRN - CNP    Discharging Nurse: Radha Reese RN  Discharging Hospital Unit/Room#: 1018/1018-02  Discharging Unit Phone Number: [phone]    Emergency Contact:   Extended Emergency Contact Information  Primary Emergency Contact: trinh gupta  Home Phone: [phone]  Mobile Phone: [phone]  Relation: Grandparent    Past Surgical History:  Past Surgical History:   Procedure Laterality Date    AV FISTULA CREATION Left     COLONOSCOPY N/A 8/3/2022    COLONOSCOPY DIAGNOSTIC performed by Rose Mary Richard MD at [facility] OR    UPPER GASTROINTESTINAL ENDOSCOPY N/A 8/3/2022    EGD ESOPHAGOGASTRODUODENOSCOPY performed by Rose Mary Richard MD at [facility] OR    WRIST SURGERY         Immunization History:   Immunization History   Administered Date(s) Administered    COVID-19, MODERNA BLUE border, Primary or Immunocompromised, (age 12y+), IM, 100 mcg/0.5mL 2021, 2021, 2022    COVID-19, MODERNA Booster BLUE border, (age 18y+), IM, 50mcg/0.25mL 2022    COVID-19, PFIZER Bivalent, DO NOT Dilute, (age 12y+), IM, 30 mcg/0.3 mL 2022    Influenza Virus Vaccine 2016, 2017, 10/05/2020, 10/21/2020, 09/10/2021    Influenza, FLUCELVAX, (age 6 mo+), MDCK, Quadv PF, 0.5mL 09/10/2021    PPD Test 2021, 2021, 06/15/2021, 2022    Pneumococcal Vaccine 2020    TDaP, ADACEL (age 10y-64y), BOOSTRIX (age 10y+), IM, 0.5mL 10/21/2011, 2020       Active Problems:  Patient Active Problem List   Diagnosis Code    ESRD (end stage renal disease) ([facility]) N18.6    Essential hypertension I10    Hyperlipidemia E78.5    Acute pain of left knee M25.562    Bipolar affective disorder ([facility]) F31.9    Atherosclerosis of aorta I70.0    COVID-19  Electronically signed by Radha Bolden RN on 5/13/25 at 12:34 PM EDT    CASE MANAGEMENT/SOCIAL WORK SECTION    Inpatient Status Date: 5/12    Readmission Risk Assessment Score:  [facility] RISK OF UNPLANNED READMISSION 2.0             22.7 Total Score        Discharging to Facility/ Agency   Name: [facility]   Phone:  [phone]   Fax: [phone]     Dialysis Facility (if applicable)   Name:  Address:  Dialysis Schedule:  Phone:  Fax:    / signature: Electronically signed by JAYESH BUCK RN on 5/13/25 at 11:43 AM EDT    PHYSICIAN SECTION    Prognosis: Fair    Condition at Discharge: Stable    Rehab Potential (if transferring to Rehab): Fair    Recommended Labs or Other Treatments After Discharge: home care    Physician Certification: I certify the above information and transfer of Steven L Severance  is necessary for the continuing treatment of the diagnosis listed and that he requires Home Care for greater 30 days.     Update Admission H&P: No change in H&P    PHYSICIAN SIGNATURE:  Electronically signed by Dr Esdras Emmanuel DO / DANIEL Almanza NP on 5/13/25 at 11:48 AM EDT

## 2025-05-13 NOTE — PLAN OF CARE
Pt has been resting in bed most of the day  Pt complains of pain to the left wrist and shoulder  Ct of shoulder complete  Xray of wrist complete  No N/V  Room air  Discharge home expected today  All questions and concerns were addressed   Safety maintained    Problem: Chronic Conditions and Co-morbidities  Goal: Patient's chronic conditions and co-morbidity symptoms are monitored and maintained or improved  5/13/2025 1353 by Radha Bolden RN  Outcome: Progressing  Flowsheets (Taken 5/13/2025 0800)  Care Plan - Patient's Chronic Conditions and Co-Morbidity Symptoms are Monitored and Maintained or Improved: Monitor and assess patient's chronic conditions and comorbid symptoms for stability, deterioration, or improvement     Problem: Discharge Planning  Goal: Discharge to home or other facility with appropriate resources  5/13/2025 1353 by Radha Bolden RN  Outcome: Progressing  Flowsheets (Taken 5/13/2025 0800)  Discharge to home or other facility with appropriate resources: Identify barriers to discharge with patient and caregiver     Problem: Pain  Goal: Verbalizes/displays adequate comfort level or baseline comfort level  5/13/2025 1353 by Radha Bolden RN  Outcome: Progressing     Problem: Safety - Adult  Goal: Free from fall injury  5/13/2025 1353 by Radha Bolden RN  Outcome: Progressing     Problem: Skin/Tissue Integrity  Goal: Skin integrity remains intact  Description: 1.  Monitor for areas of redness and/or skin breakdown2.  Assess vascular access sites hourly3.  Every 4-6 hours minimum:  Change oxygen saturation probe site4.  Every 4-6 hours:  If on nasal continuous positive airway pressure, respiratory therapy assess nares and determine need for appliance change or resting period  5/13/2025 1353 by Radha Bolden RN  Outcome: Progressing  Flowsheets (Taken 5/13/2025 0800)  Skin Integrity Remains Intact: Monitor for areas of redness and/or skin breakdown     Problem: Gastrointestinal - Adult  Goal: Minimal or

## 2025-05-13 NOTE — PROGRESS NOTES
HEMODIALYSIS POST TREATMENT NOTE    Treatment time ordered: 3 hrs    Actual treatment time: 3 hrs    UltraFiltration Goal: To dry weight  UltraFiltration Removed: 2480 mL      Pre Treatment weight: 82.9 kgs  Post Treatment weight: 82.9 kgs  Estimated Dry Weight: 84.5 kgs    Access used:     Central Venous Catheter:          Tunneled or Non-tunneled: NA           Site: NA          Access Flow: NA      Internal Access:       AV Fistula or AV Graft: AV Fistula         Site: Left arm       Access Flow: Good       Sign and symptoms of infection: None       If YES: NA    Medications or blood products given: See MAR    Chronic outpatient schedule: [facility]    Chronic outpatient unit: [facility]    Summary of response to treatment: Patient tolerated treatment well. Total of 2480 mL of fluid removal.     Explain if orders NOT met, was physician notified:Yes      ACES flowsheet faxed to patient unit/ placed in patient chart: Yes    Post assessment completed: Yes    Report given to: Rob Balbuena RN      * Intra-treatment documented Safety Checks include the followin) Access and face visible at all times.     2) All connections and blood lines are secure with no kinks.     3) NVL alarm engaged.     4) Hemosafe device applied (if applicable).     5) No collapse of Arterial or Venous blood chambers.     6) All blood lines / pump segments in the air detectors.

## 2025-05-14 ENCOUNTER — TELEPHONE (OUTPATIENT)
Dept: FAMILY MEDICINE CLINIC | Age: 50
End: 2025-05-14

## 2025-05-14 NOTE — TELEPHONE ENCOUNTER
Care Transitions Initial Follow Up Call    Outreach made within 2 business days of discharge: Yes    Patient: Steven L Severance Patient : 1975   MRN: [id number]  Reason for Admission: Nausea, GERD  Discharge Date: 25       Spoke with: Patient is not established at our office- Dr. Smith Andres added as PCP due to recent office visits.     Discharge department/facility: [facility] Interactive Patient Contact:  Was patient able to fill all prescriptions:   Was patient instructed to bring all medications to the follow-up visit:   Is patient taking all medications as directed in the discharge summary?   Does patient understand their discharge instructions:   Does patient have questions or concerns that need addressed prior to 7-14 day follow up office visit:     Additional needs identified to be addressed with provider  Patient is not established at our office- Dr. Smith Andres added as PCP due to recent office visits.              Scheduled appointment with PCP within 7-14 days    Follow Up  Future Appointments   Date Time Provider Department Center   2025  1:00 PM Ebony Reza PA [facility] -   2025  1:00 PM Kavitha Mary DPM [facility]       Jenise Gomes MA

## 2025-05-19 ENCOUNTER — OFFICE VISIT (OUTPATIENT)
Dept: ORTHOPEDIC SURGERY | Age: 50
End: 2025-05-19

## 2025-05-19 VITALS — BODY MASS INDEX: 28.88 KG/M2 | RESPIRATION RATE: 14 BRPM | HEIGHT: 67 IN | WEIGHT: 184 LBS

## 2025-05-19 DIAGNOSIS — M25.532 LEFT WRIST PAIN: Primary | ICD-10-CM

## 2025-05-19 DIAGNOSIS — M19.012 OSTEOARTHRITIS OF GLENOHUMERAL JOINT, LEFT: ICD-10-CM

## 2025-05-19 RX ORDER — LIDOCAINE HYDROCHLORIDE 10 MG/ML
5 INJECTION, SOLUTION INFILTRATION; PERINEURAL ONCE
Status: COMPLETED | OUTPATIENT
Start: 2025-05-19 | End: 2025-05-19

## 2025-05-19 RX ORDER — TRIAMCINOLONE ACETONIDE 40 MG/ML
40 INJECTION, SUSPENSION INTRA-ARTICULAR; INTRAMUSCULAR ONCE
Status: COMPLETED | OUTPATIENT
Start: 2025-05-19 | End: 2025-05-19

## 2025-05-19 RX ADMIN — LIDOCAINE HYDROCHLORIDE 5 ML: 10 INJECTION, SOLUTION INFILTRATION; PERINEURAL at 14:43

## 2025-05-19 RX ADMIN — TRIAMCINOLONE ACETONIDE 40 MG: 40 INJECTION, SUSPENSION INTRA-ARTICULAR; INTRAMUSCULAR at 14:44

## 2025-05-19 NOTE — PROGRESS NOTES
and does not use drugs.    Family History  Ernesto's family history includes Cancer in his paternal grandmother; Diabetes in his father and mother; Heart Disease in his father and maternal great grandfather; Neuropathy in his maternal grandfather and maternal grandmother; Stroke in his paternal grandfather.      Review of Systems   History obtained from the patient.   REVIEW OF SYSTEMS:   Constitution: negative for fever, chills, weight loss or malaise   Musculoskeletal: As noted in the HPI   Neurologic: As noted in the HPI    Physical Exam  Resp 14   Ht 1.702 m (5' 7.01\")   Wt 83.5 kg (184 lb)   BMI 28.81 kg/m²    General Appearance: alert, well appearing, and in no distress  Mental Status: alert, oriented to person, place, and time  Evaluation of the patient's left shoulder and upper extremity demonstrates intact skin without warmth erythema notable swelling.  He has approximately 135 degrees of active shoulder elevation 140 degrees of abduction and 35 degrees of external rotation.  He has a painful arc of motion.  Nontender to palpation diffusely about the shoulder.  He has 5/5 muscle strength with resisted deltoid, supraspinatus, external rotation antirotation testing.  Negative Neer's but positive Zhang impingement sign.  Evaluation of his left wrist and hand demonstrates him to have intrinsic muscle wasting including first dorsal interosseous space.  Flexed posture of his small and ring fingers (clawlike deformity).  Sensation is grossly intact light touch diffusely.  2+ radial pulse with brisk cap refill in his fingers.  He is tender to palpation along the dorsum of the wrist.  No instability at the DRUJ.  Negative Finkelstein's test and negative first CMC grind test.    Imaging Studies  3 x-ray views of the left wrist completed on 5/13/2025 reviewed independently demonstrating increased sclerosis and cyst cystic changes involving the lunate and scaphoid bones.  No obvious fracture, dislocation or

## 2025-05-21 ENCOUNTER — OFFICE VISIT (OUTPATIENT)
Dept: PODIATRY | Age: 50
End: 2025-05-21
Payer: MEDICARE

## 2025-05-21 VITALS — BODY MASS INDEX: 28.88 KG/M2 | WEIGHT: 184 LBS | HEIGHT: 67 IN

## 2025-05-21 DIAGNOSIS — I73.9 PERIPHERAL VASCULAR DISORDER: ICD-10-CM

## 2025-05-21 DIAGNOSIS — M79.605 PAIN IN BOTH LOWER EXTREMITIES: ICD-10-CM

## 2025-05-21 DIAGNOSIS — M20.42 HAMMER TOES OF BOTH FEET: ICD-10-CM

## 2025-05-21 DIAGNOSIS — D23.72 BENIGN NEOPLASM OF SKIN OF LOWER LIMB, INCLUDING HIP, LEFT: ICD-10-CM

## 2025-05-21 DIAGNOSIS — B35.1 DERMATOPHYTOSIS OF NAIL: ICD-10-CM

## 2025-05-21 DIAGNOSIS — E11.51 TYPE II DIABETES MELLITUS WITH PERIPHERAL CIRCULATORY DISORDER (HCC): Primary | ICD-10-CM

## 2025-05-21 DIAGNOSIS — D23.71 BENIGN NEOPLASM OF SKIN OF LOWER LIMB, INCLUDING HIP, RIGHT: ICD-10-CM

## 2025-05-21 DIAGNOSIS — M79.604 PAIN IN BOTH LOWER EXTREMITIES: ICD-10-CM

## 2025-05-21 DIAGNOSIS — M20.41 HAMMER TOES OF BOTH FEET: ICD-10-CM

## 2025-05-21 PROCEDURE — 1036F TOBACCO NON-USER: CPT | Performed by: PODIATRIST

## 2025-05-21 PROCEDURE — 11721 DEBRIDE NAIL 6 OR MORE: CPT | Performed by: PODIATRIST

## 2025-05-21 PROCEDURE — 17110 DESTRUCTION B9 LES UP TO 14: CPT | Performed by: PODIATRIST

## 2025-05-21 PROCEDURE — 99213 OFFICE O/P EST LOW 20 MIN: CPT | Performed by: PODIATRIST

## 2025-05-21 PROCEDURE — 2022F DILAT RTA XM EVC RTNOPTHY: CPT | Performed by: PODIATRIST

## 2025-05-21 PROCEDURE — 3017F COLORECTAL CA SCREEN DOC REV: CPT | Performed by: PODIATRIST

## 2025-05-21 PROCEDURE — 1111F DSCHRG MED/CURRENT MED MERGE: CPT | Performed by: PODIATRIST

## 2025-05-21 PROCEDURE — 3046F HEMOGLOBIN A1C LEVEL >9.0%: CPT | Performed by: PODIATRIST

## 2025-05-21 PROCEDURE — G8417 CALC BMI ABV UP PARAM F/U: HCPCS | Performed by: PODIATRIST

## 2025-05-21 PROCEDURE — G8427 DOCREV CUR MEDS BY ELIG CLIN: HCPCS | Performed by: PODIATRIST

## 2025-05-26 ENCOUNTER — HOSPITAL ENCOUNTER (EMERGENCY)
Age: 50
Discharge: HOME | End: 2025-05-26
Attending: STUDENT IN AN ORGANIZED HEALTH CARE EDUCATION/TRAINING PROGRAM
Payer: MEDICARE

## 2025-05-26 ENCOUNTER — APPOINTMENT (OUTPATIENT)
Dept: CT IMAGING | Age: 50
End: 2025-05-26
Payer: MEDICARE

## 2025-05-26 VITALS
DIASTOLIC BLOOD PRESSURE: 93 MMHG | SYSTOLIC BLOOD PRESSURE: 202 MMHG | OXYGEN SATURATION: 100 % | RESPIRATION RATE: 18 BRPM | HEART RATE: 71 BPM | TEMPERATURE: 98.1 F

## 2025-05-26 DIAGNOSIS — S20.211A CONTUSION OF RIB ON RIGHT SIDE, INITIAL ENCOUNTER: Primary | ICD-10-CM

## 2025-05-26 LAB
ANION GAP SERPL CALCULATED.3IONS-SCNC: 15 MMOL/L (ref 9–16)
BASOPHILS # BLD: 0.04 K/UL (ref 0–0.2)
BASOPHILS NFR BLD: 0 % (ref 0–2)
BUN SERPL-MCNC: 50 MG/DL (ref 6–20)
CALCIUM SERPL-MCNC: 9.7 MG/DL (ref 8.6–10.4)
CHLORIDE SERPL-SCNC: 93 MMOL/L (ref 98–107)
CO2 SERPL-SCNC: 27 MMOL/L (ref 20–31)
CREAT SERPL-MCNC: 8.4 MG/DL (ref 0.7–1.2)
EOSINOPHIL # BLD: 0.08 K/UL (ref 0–0.44)
EOSINOPHILS RELATIVE PERCENT: 1 % (ref 1–4)
ERYTHROCYTE [DISTWIDTH] IN BLOOD BY AUTOMATED COUNT: 12.6 % (ref 11.8–14.4)
GFR, ESTIMATED: 7 ML/MIN/1.73M2
GLUCOSE SERPL-MCNC: 93 MG/DL (ref 74–99)
HCT VFR BLD AUTO: 36.2 % (ref 40.7–50.3)
HGB BLD-MCNC: 12.3 G/DL (ref 13–17)
IMM GRANULOCYTES # BLD AUTO: 0.03 K/UL (ref 0–0.3)
IMM GRANULOCYTES NFR BLD: 0 %
LYMPHOCYTES NFR BLD: 1.31 K/UL (ref 1.1–3.7)
LYMPHOCYTES RELATIVE PERCENT: 15 % (ref 24–43)
MCH RBC QN AUTO: 32 PG (ref 25.2–33.5)
MCHC RBC AUTO-ENTMCNC: 34 G/DL (ref 28.4–34.8)
MCV RBC AUTO: 94.3 FL (ref 82.6–102.9)
MONOCYTES NFR BLD: 1.16 K/UL (ref 0.1–1.2)
MONOCYTES NFR BLD: 13 % (ref 3–12)
NEUTROPHILS NFR BLD: 71 % (ref 36–65)
NEUTS SEG NFR BLD: 6.3 K/UL (ref 1.5–8.1)
NRBC BLD-RTO: 0 PER 100 WBC
PLATELET # BLD AUTO: 212 K/UL (ref 138–453)
PMV BLD AUTO: 9.4 FL (ref 8.1–13.5)
POTASSIUM SERPL-SCNC: 4.9 MMOL/L (ref 3.7–5.3)
RBC # BLD AUTO: 3.84 M/UL (ref 4.21–5.77)
SODIUM SERPL-SCNC: 136 MMOL/L (ref 136–145)
WBC OTHER # BLD: 8.9 K/UL (ref 3.5–11.3)

## 2025-05-26 PROCEDURE — 74177 CT ABD & PELVIS W/CONTRAST: CPT

## 2025-05-26 PROCEDURE — 2500000003 HC RX 250 WO HCPCS: Performed by: STUDENT IN AN ORGANIZED HEALTH CARE EDUCATION/TRAINING PROGRAM

## 2025-05-26 PROCEDURE — 6370000000 HC RX 637 (ALT 250 FOR IP): Performed by: STUDENT IN AN ORGANIZED HEALTH CARE EDUCATION/TRAINING PROGRAM

## 2025-05-26 PROCEDURE — 80048 BASIC METABOLIC PNL TOTAL CA: CPT

## 2025-05-26 PROCEDURE — 71260 CT THORAX DX C+: CPT

## 2025-05-26 PROCEDURE — 2580000003 HC RX 258: Performed by: STUDENT IN AN ORGANIZED HEALTH CARE EDUCATION/TRAINING PROGRAM

## 2025-05-26 PROCEDURE — 85025 COMPLETE CBC W/AUTO DIFF WBC: CPT

## 2025-05-26 PROCEDURE — 6360000004 HC RX CONTRAST MEDICATION: Performed by: STUDENT IN AN ORGANIZED HEALTH CARE EDUCATION/TRAINING PROGRAM

## 2025-05-26 PROCEDURE — 99285 EMERGENCY DEPT VISIT HI MDM: CPT

## 2025-05-26 RX ORDER — 0.9 % SODIUM CHLORIDE 0.9 %
80 INTRAVENOUS SOLUTION INTRAVENOUS ONCE
Status: COMPLETED | OUTPATIENT
Start: 2025-05-26 | End: 2025-05-26

## 2025-05-26 RX ORDER — SODIUM CHLORIDE 0.9 % (FLUSH) 0.9 %
10 SYRINGE (ML) INJECTION PRN
Status: DISCONTINUED | OUTPATIENT
Start: 2025-05-26 | End: 2025-05-26 | Stop reason: HOSPADM

## 2025-05-26 RX ORDER — TRAMADOL HYDROCHLORIDE 50 MG/1
50 TABLET ORAL ONCE
Status: COMPLETED | OUTPATIENT
Start: 2025-05-26 | End: 2025-05-26

## 2025-05-26 RX ORDER — IOPAMIDOL 755 MG/ML
75 INJECTION, SOLUTION INTRAVASCULAR
Status: COMPLETED | OUTPATIENT
Start: 2025-05-26 | End: 2025-05-26

## 2025-05-26 RX ADMIN — SODIUM CHLORIDE, PRESERVATIVE FREE 10 ML: 5 INJECTION INTRAVENOUS at 05:26

## 2025-05-26 RX ADMIN — SODIUM CHLORIDE 80 ML: 9 INJECTION, SOLUTION INTRAVENOUS at 05:26

## 2025-05-26 RX ADMIN — IOPAMIDOL 75 ML: 755 INJECTION, SOLUTION INTRAVENOUS at 05:26

## 2025-05-26 RX ADMIN — TRAMADOL HYDROCHLORIDE 50 MG: 50 TABLET, COATED ORAL at 05:11

## 2025-05-26 ASSESSMENT — PAIN SCALES - GENERAL
PAINLEVEL_OUTOF10: 6
PAINLEVEL_OUTOF10: 7

## 2025-05-26 ASSESSMENT — PAIN DESCRIPTION - ORIENTATION: ORIENTATION: RIGHT

## 2025-05-26 ASSESSMENT — PAIN DESCRIPTION - LOCATION: LOCATION: RIB CAGE

## 2025-05-26 ASSESSMENT — PAIN DESCRIPTION - PAIN TYPE: TYPE: ACUTE PAIN

## 2025-05-26 ASSESSMENT — PAIN - FUNCTIONAL ASSESSMENT: PAIN_FUNCTIONAL_ASSESSMENT: 0-10

## 2025-05-26 NOTE — ED NOTES
Pt arrived to the ED via EMS for a c/o of a fall. Pt states he was transferring with his walker and fell, hitting his right side/ribs on a wooden chair. PT has hx of 3 previous strokes and has deficits on both sides. Pt rates pain 7/10. Pt is A&O x4, vitals are stable and breathing is even and non-labored. Pt denies needs at this time and call light is within reach.

## 2025-05-26 NOTE — PROGRESS NOTES
Physician Progress Note      PATIENT:               SEVERANCE, STEVEN  CSN #:                  [id number]  :                       1975  ADMIT DATE:       2025 4:56 AM  DISCH DATE:        2025 2:58 PM  RESPONDING  PROVIDER #:        TAMMI Campos NP          QUERY TEXT:    Please clarify in documentation the etiology of the Nausea/vomiting and   abdominal pain:    The clinical indicators include:  -pt. admitted with c/o abdominal pain, n/v  - Hx of ESRD on dialysis, DM, HTN, HLD    -H&P (): \" is admitted to the hospital for the management of ESRD needing   dialysis...He was still reporting to dialysis and was attempting to be   evaluated for his nausea before his dialysis today.\"    -H&P ():  \"Chronic constipation with gastroparesis and recurrent nausea  Start Reglan    Insulin-dependent diabetes with obesity, GERD, and hyperlipidemia  Continue statin, diabetic diet, PPI\"    -Nephrology (): \"ESRD on Hemodialysis Missed scheduled dialysis,   presenting with uremic symptoms (nausea/vomiting), volume concerns, and   hyperkalemia.\"    -Treatment: Hemodialysis, IV Reglan, Diabetic diet, Nephrology c/s, lab   monitoring  Options provided:  -- DM1 with Gastroparesis  -- ESRD needing dialysis  -- DM1 with gastroparesis and ESRD needing dialysis  -- Other - I will add my own diagnosis  -- Disagree - Not applicable / Not valid  -- Disagree - Clinically unable to determine / Unknown  -- Refer to Clinical Documentation Reviewer    PROVIDER RESPONSE TEXT:    DM1 with gastroparesis and ESRD needing dialysis.    Query created by: Maritza Moon on 2025 12:10 AM      Electronically signed by:  TAMMI Campos NP 2025 7:32 AM

## 2025-05-27 ENCOUNTER — HOSPITAL ENCOUNTER (OUTPATIENT)
Age: 50
Setting detail: OBSERVATION
Discharge: NURSING, REHAB OR LONG-TERM CARE | DRG: 368 | End: 2025-05-28
Attending: EMERGENCY MEDICINE | Admitting: INTERNAL MEDICINE
Payer: MEDICARE

## 2025-05-27 DIAGNOSIS — R53.1 GENERALIZED WEAKNESS: ICD-10-CM

## 2025-05-27 DIAGNOSIS — R29.6 FREQUENT FALLS: ICD-10-CM

## 2025-05-27 DIAGNOSIS — E16.2 HYPOGLYCEMIA: Primary | ICD-10-CM

## 2025-05-27 PROBLEM — E10.649: Status: ACTIVE | Noted: 2025-05-27

## 2025-05-27 LAB
ANION GAP SERPL CALCULATED.3IONS-SCNC: 15 MMOL/L (ref 9–16)
BASOPHILS # BLD: 0.04 K/UL (ref 0–0.2)
BASOPHILS NFR BLD: 0 % (ref 0–2)
BUN SERPL-MCNC: 37 MG/DL (ref 6–20)
CALCIUM SERPL-MCNC: 9.6 MG/DL (ref 8.6–10.4)
CHLORIDE SERPL-SCNC: 94 MMOL/L (ref 98–107)
CO2 SERPL-SCNC: 23 MMOL/L (ref 20–31)
CREAT SERPL-MCNC: 6.9 MG/DL (ref 0.7–1.2)
EOSINOPHIL # BLD: 0.07 K/UL (ref 0–0.44)
EOSINOPHILS RELATIVE PERCENT: 1 % (ref 1–4)
ERYTHROCYTE [DISTWIDTH] IN BLOOD BY AUTOMATED COUNT: 12.7 % (ref 11.8–14.4)
GFR, ESTIMATED: 9 ML/MIN/1.73M2
GLUCOSE BLD-MCNC: 124 MG/DL (ref 75–110)
GLUCOSE BLD-MCNC: 198 MG/DL (ref 75–110)
GLUCOSE BLD-MCNC: 215 MG/DL (ref 75–110)
GLUCOSE SERPL-MCNC: 167 MG/DL (ref 74–99)
HCT VFR BLD AUTO: 39.5 % (ref 40.7–50.3)
HGB BLD-MCNC: 13.1 G/DL (ref 13–17)
IMM GRANULOCYTES # BLD AUTO: 0.03 K/UL (ref 0–0.3)
IMM GRANULOCYTES NFR BLD: 0 %
LYMPHOCYTES NFR BLD: 1.01 K/UL (ref 1.1–3.7)
LYMPHOCYTES RELATIVE PERCENT: 10 % (ref 24–43)
MCH RBC QN AUTO: 32 PG (ref 25.2–33.5)
MCHC RBC AUTO-ENTMCNC: 33.2 G/DL (ref 28.4–34.8)
MCV RBC AUTO: 96.6 FL (ref 82.6–102.9)
MONOCYTES NFR BLD: 1.37 K/UL (ref 0.1–1.2)
MONOCYTES NFR BLD: 14 % (ref 3–12)
NEUTROPHILS NFR BLD: 75 % (ref 36–65)
NEUTS SEG NFR BLD: 7.55 K/UL (ref 1.5–8.1)
NRBC BLD-RTO: 0 PER 100 WBC
PLATELET # BLD AUTO: 187 K/UL (ref 138–453)
PMV BLD AUTO: 9.8 FL (ref 8.1–13.5)
POTASSIUM SERPL-SCNC: 5.1 MMOL/L (ref 3.7–5.3)
RBC # BLD AUTO: 4.09 M/UL (ref 4.21–5.77)
SODIUM SERPL-SCNC: 132 MMOL/L (ref 136–145)
WBC OTHER # BLD: 10.1 K/UL (ref 3.5–11.3)

## 2025-05-27 PROCEDURE — 6360000002 HC RX W HCPCS: Performed by: NURSE PRACTITIONER

## 2025-05-27 PROCEDURE — 96372 THER/PROPH/DIAG INJ SC/IM: CPT

## 2025-05-27 PROCEDURE — G0378 HOSPITAL OBSERVATION PER HR: HCPCS

## 2025-05-27 PROCEDURE — 85025 COMPLETE CBC W/AUTO DIFF WBC: CPT

## 2025-05-27 PROCEDURE — 99285 EMERGENCY DEPT VISIT HI MDM: CPT

## 2025-05-27 PROCEDURE — 96374 THER/PROPH/DIAG INJ IV PUSH: CPT

## 2025-05-27 PROCEDURE — 6360000002 HC RX W HCPCS: Performed by: INTERNAL MEDICINE

## 2025-05-27 PROCEDURE — 6370000000 HC RX 637 (ALT 250 FOR IP): Performed by: INTERNAL MEDICINE

## 2025-05-27 PROCEDURE — 99222 1ST HOSP IP/OBS MODERATE 55: CPT | Performed by: INTERNAL MEDICINE

## 2025-05-27 PROCEDURE — 82947 ASSAY GLUCOSE BLOOD QUANT: CPT

## 2025-05-27 PROCEDURE — 2500000003 HC RX 250 WO HCPCS: Performed by: INTERNAL MEDICINE

## 2025-05-27 PROCEDURE — 6370000000 HC RX 637 (ALT 250 FOR IP): Performed by: NURSE PRACTITIONER

## 2025-05-27 PROCEDURE — 80048 BASIC METABOLIC PNL TOTAL CA: CPT

## 2025-05-27 RX ORDER — EZETIMIBE 10 MG/1
10 TABLET ORAL DAILY
Status: DISCONTINUED | OUTPATIENT
Start: 2025-05-27 | End: 2025-05-28 | Stop reason: HOSPADM

## 2025-05-27 RX ORDER — CALCIUM ACETATE 667 MG/1
1 CAPSULE ORAL 2 TIMES DAILY WITH MEALS
Status: DISCONTINUED | OUTPATIENT
Start: 2025-05-28 | End: 2025-05-28 | Stop reason: HOSPADM

## 2025-05-27 RX ORDER — METOCLOPRAMIDE 10 MG/1
10 TABLET ORAL
Status: DISCONTINUED | OUTPATIENT
Start: 2025-05-27 | End: 2025-05-28 | Stop reason: HOSPADM

## 2025-05-27 RX ORDER — DEXTROSE MONOHYDRATE 100 MG/ML
INJECTION, SOLUTION INTRAVENOUS CONTINUOUS PRN
Status: DISCONTINUED | OUTPATIENT
Start: 2025-05-27 | End: 2025-05-28 | Stop reason: HOSPADM

## 2025-05-27 RX ORDER — ACETAMINOPHEN 650 MG/1
650 SUPPOSITORY RECTAL EVERY 6 HOURS PRN
Status: DISCONTINUED | OUTPATIENT
Start: 2025-05-27 | End: 2025-05-28 | Stop reason: HOSPADM

## 2025-05-27 RX ORDER — CLOPIDOGREL BISULFATE 75 MG/1
75 TABLET ORAL DAILY
Status: DISCONTINUED | OUTPATIENT
Start: 2025-05-27 | End: 2025-05-28 | Stop reason: HOSPADM

## 2025-05-27 RX ORDER — TRAMADOL HYDROCHLORIDE 50 MG/1
50 TABLET ORAL EVERY 8 HOURS PRN
Status: DISCONTINUED | OUTPATIENT
Start: 2025-05-27 | End: 2025-05-28 | Stop reason: HOSPADM

## 2025-05-27 RX ORDER — CETIRIZINE HYDROCHLORIDE 5 MG/1
5 TABLET ORAL DAILY
Status: DISCONTINUED | OUTPATIENT
Start: 2025-05-27 | End: 2025-05-28 | Stop reason: HOSPADM

## 2025-05-27 RX ORDER — PANTOPRAZOLE SODIUM 40 MG/1
40 TABLET, DELAYED RELEASE ORAL
Status: DISCONTINUED | OUTPATIENT
Start: 2025-05-28 | End: 2025-05-28 | Stop reason: HOSPADM

## 2025-05-27 RX ORDER — SODIUM CHLORIDE 9 MG/ML
INJECTION, SOLUTION INTRAVENOUS PRN
Status: DISCONTINUED | OUTPATIENT
Start: 2025-05-27 | End: 2025-05-28 | Stop reason: HOSPADM

## 2025-05-27 RX ORDER — MULTIVITAMIN WITH IRON
1 TABLET ORAL DAILY
Status: DISCONTINUED | OUTPATIENT
Start: 2025-05-27 | End: 2025-05-28 | Stop reason: HOSPADM

## 2025-05-27 RX ORDER — FUROSEMIDE 20 MG/1
20 TABLET ORAL DAILY
Status: DISCONTINUED | OUTPATIENT
Start: 2025-05-27 | End: 2025-05-28 | Stop reason: HOSPADM

## 2025-05-27 RX ORDER — AMLODIPINE BESYLATE 2.5 MG/1
2.5 TABLET ORAL DAILY
Status: DISCONTINUED | OUTPATIENT
Start: 2025-05-27 | End: 2025-05-28

## 2025-05-27 RX ORDER — SODIUM CHLORIDE 0.9 % (FLUSH) 0.9 %
10 SYRINGE (ML) INJECTION PRN
Status: DISCONTINUED | OUTPATIENT
Start: 2025-05-27 | End: 2025-05-28 | Stop reason: HOSPADM

## 2025-05-27 RX ORDER — CALCIUM ACETATE 667 MG/1
1 CAPSULE ORAL 2 TIMES DAILY
Status: DISCONTINUED | OUTPATIENT
Start: 2025-05-27 | End: 2025-05-27 | Stop reason: SDUPTHER

## 2025-05-27 RX ORDER — LABETALOL HYDROCHLORIDE 5 MG/ML
10 INJECTION, SOLUTION INTRAVENOUS ONCE
Status: COMPLETED | OUTPATIENT
Start: 2025-05-27 | End: 2025-05-27

## 2025-05-27 RX ORDER — OXYCODONE HYDROCHLORIDE 5 MG/1
5 TABLET ORAL ONCE
Status: COMPLETED | OUTPATIENT
Start: 2025-05-27 | End: 2025-05-27

## 2025-05-27 RX ORDER — SENNA AND DOCUSATE SODIUM 50; 8.6 MG/1; MG/1
4 TABLET, FILM COATED ORAL DAILY PRN
Status: DISCONTINUED | OUTPATIENT
Start: 2025-05-27 | End: 2025-05-28 | Stop reason: HOSPADM

## 2025-05-27 RX ORDER — ACETAMINOPHEN 325 MG/1
650 TABLET ORAL EVERY 6 HOURS PRN
Status: DISCONTINUED | OUTPATIENT
Start: 2025-05-27 | End: 2025-05-28 | Stop reason: HOSPADM

## 2025-05-27 RX ORDER — ONDANSETRON 2 MG/ML
4 INJECTION INTRAMUSCULAR; INTRAVENOUS EVERY 6 HOURS PRN
Status: DISCONTINUED | OUTPATIENT
Start: 2025-05-27 | End: 2025-05-28 | Stop reason: HOSPADM

## 2025-05-27 RX ORDER — SODIUM CHLORIDE 0.9 % (FLUSH) 0.9 %
5-40 SYRINGE (ML) INJECTION EVERY 12 HOURS SCHEDULED
Status: DISCONTINUED | OUTPATIENT
Start: 2025-05-27 | End: 2025-05-28 | Stop reason: HOSPADM

## 2025-05-27 RX ORDER — ROSUVASTATIN CALCIUM 40 MG/1
40 TABLET, COATED ORAL EVERY EVENING
Status: DISCONTINUED | OUTPATIENT
Start: 2025-05-27 | End: 2025-05-28

## 2025-05-27 RX ORDER — BUPROPION HYDROCHLORIDE 150 MG/1
150 TABLET ORAL EVERY MORNING
Status: DISCONTINUED | OUTPATIENT
Start: 2025-05-28 | End: 2025-05-28 | Stop reason: HOSPADM

## 2025-05-27 RX ORDER — LANOLIN ALCOHOL/MO/W.PET/CERES
1000 CREAM (GRAM) TOPICAL DAILY
Status: DISCONTINUED | OUTPATIENT
Start: 2025-05-27 | End: 2025-05-28 | Stop reason: HOSPADM

## 2025-05-27 RX ORDER — BISACODYL 5 MG/1
5 TABLET, DELAYED RELEASE ORAL ONCE
Status: COMPLETED | OUTPATIENT
Start: 2025-05-27 | End: 2025-05-27

## 2025-05-27 RX ORDER — HEPARIN SODIUM 5000 [USP'U]/ML
5000 INJECTION, SOLUTION INTRAVENOUS; SUBCUTANEOUS EVERY 8 HOURS SCHEDULED
Status: DISCONTINUED | OUTPATIENT
Start: 2025-05-27 | End: 2025-05-28 | Stop reason: HOSPADM

## 2025-05-27 RX ORDER — CARVEDILOL 12.5 MG/1
12.5 TABLET ORAL 2 TIMES DAILY WITH MEALS
Status: DISCONTINUED | OUTPATIENT
Start: 2025-05-27 | End: 2025-05-28 | Stop reason: HOSPADM

## 2025-05-27 RX ORDER — ONDANSETRON 4 MG/1
4 TABLET, ORALLY DISINTEGRATING ORAL EVERY 8 HOURS PRN
Status: DISCONTINUED | OUTPATIENT
Start: 2025-05-27 | End: 2025-05-28 | Stop reason: HOSPADM

## 2025-05-27 RX ADMIN — OXYCODONE HYDROCHLORIDE 5 MG: 5 TABLET ORAL at 22:29

## 2025-05-27 RX ADMIN — CARVEDILOL 12.5 MG: 12.5 TABLET, FILM COATED ORAL at 18:29

## 2025-05-27 RX ADMIN — SODIUM CHLORIDE, PRESERVATIVE FREE 10 ML: 5 INJECTION INTRAVENOUS at 22:33

## 2025-05-27 RX ADMIN — FLUOXETINE HYDROCHLORIDE 20 MG: 20 CAPSULE ORAL at 21:30

## 2025-05-27 RX ADMIN — METOCLOPRAMIDE 10 MG: 10 TABLET ORAL at 18:32

## 2025-05-27 RX ADMIN — CYANOCOBALAMIN TAB 1000 MCG 1000 MCG: 1000 TAB at 21:30

## 2025-05-27 RX ADMIN — TRAMADOL HYDROCHLORIDE 50 MG: 50 TABLET, COATED ORAL at 18:32

## 2025-05-27 RX ADMIN — HEPARIN SODIUM 5000 UNITS: 5000 INJECTION INTRAVENOUS; SUBCUTANEOUS at 21:30

## 2025-05-27 RX ADMIN — BISACODYL 5 MG: 5 TABLET, COATED ORAL at 18:29

## 2025-05-27 RX ADMIN — SODIUM CHLORIDE, PRESERVATIVE FREE 10 ML: 5 INJECTION INTRAVENOUS at 21:30

## 2025-05-27 RX ADMIN — MULTIVITAMIN TABLET 1 TABLET: TABLET at 21:30

## 2025-05-27 RX ADMIN — ROSUVASTATIN CALCIUM 40 MG: 40 TABLET, FILM COATED ORAL at 18:32

## 2025-05-27 RX ADMIN — LABETALOL HYDROCHLORIDE 10 MG: 5 INJECTION, SOLUTION INTRAVENOUS at 22:32

## 2025-05-27 ASSESSMENT — PAIN DESCRIPTION - DESCRIPTORS
DESCRIPTORS: ACHING;DISCOMFORT;DULL;SORE
DESCRIPTORS: THROBBING

## 2025-05-27 ASSESSMENT — PAIN DESCRIPTION - PAIN TYPE: TYPE: CHRONIC PAIN

## 2025-05-27 ASSESSMENT — PAIN SCALES - GENERAL
PAINLEVEL_OUTOF10: 10
PAINLEVEL_OUTOF10: 5
PAINLEVEL_OUTOF10: 10
PAINLEVEL_OUTOF10: 8
PAINLEVEL_OUTOF10: 9

## 2025-05-27 ASSESSMENT — PAIN - FUNCTIONAL ASSESSMENT
PAIN_FUNCTIONAL_ASSESSMENT: 0-10
PAIN_FUNCTIONAL_ASSESSMENT: ACTIVITIES ARE NOT PREVENTED

## 2025-05-27 ASSESSMENT — PAIN DESCRIPTION - LOCATION
LOCATION: WRIST
LOCATION: WRIST

## 2025-05-27 ASSESSMENT — PAIN DESCRIPTION - ORIENTATION
ORIENTATION: LEFT
ORIENTATION: LEFT

## 2025-05-27 NOTE — ED PROVIDER NOTES
below are reviewed by myself.  CT ABDOMEN PELVIS W IV CONTRAST Additional Contrast? None   Final Result   1. Minimal contusion is seen in the right upper quadrant subcutaneous tissues   without evidence of underlying rib fracture.   2. Moderate colonic stool burden.             LABS: Lab orders shown below, the results are reviewed by myself, and all abnormals are listed below.  Labs Reviewed   CBC WITH AUTO DIFFERENTIAL - Abnormal; Notable for the following components:       Result Value    RBC 3.84 (*)     Hemoglobin 12.3 (*)     Hematocrit 36.2 (*)     Neutrophils % 71 (*)     Lymphocytes % 15 (*)     Monocytes % 13 (*)     All other components within normal limits   BASIC METABOLIC PANEL - Abnormal; Notable for the following components:    Chloride 93 (*)     BUN 50 (*)     Creatinine 8.4 (*)     Est, Glom Filt Rate 7 (*)     All other components within normal limits       Vitals Reviewed:    Vitals:    05/26/25 0449   BP: (!) 202/93   Pulse: 71   Resp: 18   Temp: 98.1 °F (36.7 °C)   TempSrc: Oral   SpO2: 100%     MEDICATIONS GIVEN TO PATIENT THIS ENCOUNTER:  Orders Placed This Encounter   Medications    traMADol (ULTRAM) tablet 50 mg    sodium chloride 0.9 % bolus 80 mL    DISCONTD: sodium chloride flush 0.9 % injection 10 mL    iopamidol (ISOVUE-370) 76 % injection 75 mL     DISCHARGE PRESCRIPTIONS:  Discharge Medication List as of 5/26/2025  6:21 AM        PHYSICIAN CONSULTS ORDERED THIS ENCOUNTER:  None    ED Course Notes From Epic Narrator:         CRITICAL CARE:   0    PROCEDURES:  none    FINAL IMPRESSION      1. Contusion of rib on right side, initial encounter          DISPOSITION/PLAN   DISPOSITION Decision To Discharge 05/26/2025 06:21:02 AM   DISPOSITION CONDITION Stable           OUTPATIENT FOLLOW UP THE PATIENT:  No follow-up provider specified.    DISCHARGE MEDICATIONS:  Discharge Medication List as of 5/26/2025  6:21 AM          Christofer Fournier DO  EmergencyMedicine Attending    (Please note

## 2025-05-27 NOTE — ED NOTES
ED to inpatient nurses report     Admitting Diagnosis: Hypoglycemia; Generalized weakness; Frequent falls  Chief Complaint   Patient presents with    Hypoglycemia     Pt BS in 40's for EMS. Pt took insulin and did not eat as much as he normally would    Fatigue     Pt was unable to get up. Family called EMS.       Present to ED from home.  LOC: alert and orientated to name, place, date  Patient confused: no  Vital signs   Vitals:    05/27/25 1501   BP: (!) 185/87   Pulse: 67   Resp: 14   Temp: 97.7 °F (36.5 °C)   TempSrc: Oral   SpO2: 99%      Oxygen Baseline none    Current needs required none       LDAs:   Peripheral IV 05/27/25 Right Antecubital (Active)   Site Assessment Clean, dry & intact 05/27/25 1610   Line Status Brisk blood return;Flushed;Specimen collected;Normal saline locked 05/27/25 1610     Mobility: uses a walker  Fall Risk: [place] 1 Fall Risk  Presents to emergency department  because of falls (Syncope, seizure, or loss of consciousness): No (05/27/25 1501)  Age > 70: No (05/27/25 1501)  Altered Mental Status, Intoxication with alcohol or substance confusion (Disorientation, impaired judgment, poor safety awaremess, or inability to follow instructions): No (05/27/25 1501)  Impaired Mobility: Ambulates or transfers with assistive devices or assistance; Unable to ambulate or transer.: Yes (05/27/25 1501)  Nursing Judgement: Yes (05/27/25 1501)  Outstanding ED orders: none    Consults: IP CONSULT TO INTERNAL MEDICINE  IP CONSULT TO NEPHROLOGY  [x]  Hospitalist  Completed  [x] yes [] no Who: Intermed  []  Medicine  Completed  [] yes [] No Who:   []  Cardiology  Completed  [] yes [] No Who:   []  GI   Completed  [] yes [] No Who:   []  Neurology  Completed  [] yes [] No Who:   []  Nephrology Completed  [] yes [] No Who:    []  Vascular  Completed  [] yes [] No Who:   []  Ortho  Completed  [] yes [] No Who:     []  Surgery  Completed  [] yes [] No Who:    []  Urology  Completed  [] yes [] No Who:    []

## 2025-05-27 NOTE — CARE COORDINATION
Social work:  Call received from RN stated per EMS, home PT had been working on placement for patient as he is very weak.   SW called [facility] and was informed they had not started any referral process as patient had been declining rehab.   If patient is interested in rehab this admission, will need PT/OT evals/recommendations.     Update 1618- Spoke to patient who would consider [facility] or [facility] again.  Referrals faxed.  Need PT/OT evals.

## 2025-05-27 NOTE — PROGRESS NOTES
[x] There are one or more home medications that need clarification before Medication Reconciliation can be completed. The Med List Status has been marked as In Progress. To assist with Home Medication Reconciliation the following actions have been taken:    [] Pharmacy medication reconciliation service requested. (Note: This can be done by sending a Perfect Serve message to [facility] Rec Pharmacist or by phoning [phone].)    [x] Family requested to bring medications into the hospital    [x] Family requested to call hospital with medication list    [] Message left with physician office    [] Request for medical records made to N/A    [] Other

## 2025-05-27 NOTE — H&P
[facility]  Office: [phone]  Wander Gresham DO, Ok Antoine, DO, Delmar Morgan DO, Esdras Emmanuel DO, Jelly Carrasco MD, Liliana Alvarez MD, Anastasiya Musa MD, Kyleigh Fine MD,  Brendon Jason MD, Madi Cooper MD, Shelley Zuniga MD,  Gloria Flores DO, Carla Hubbard MD, Rey Su MD, Adalid Gresham DO, Leticia Hansen MD,  Osbaldo Segovia DO, Ayleen Morales MD, Rossi Mitchell MD, Jaylen Douglas MD,  Frantz Elam MD, Qasim Gutierrez MD, Nabil Armendariz MD, Flako Ponce MD, Stephen Corbett MD, Marge Jaime MD, Zachary Blanco, DO, Mariluz Larsen MD, Margaret De Oliveira DO, Kerwin Burnette MD, Gloria Llamas MD, Mohsin Reza, MD, Melisa Siu MD, Shirley Waterhouse, CNP,  Cynthia Pickett, CNP, Zachary Yu, CNP,  Lou Quinteros, DNP, Ayana Canseco, CNP, Kathia Maria, CNP, Chani Romero, CNP, Tiffanie Valera, CNP, Jovita Colon, PA-C, Elba Jay, CNP, Huma Anguiano, CNP,  Sejal Galvez, CNP, Bethanie Taylor, CNP, Nicanor Clayton, PA-C, Radha Vines, CNP,  Nunu Zacarias, CNS, Cherri Pérez, CNP, Liiba Richardson, CNP,   Ludy Gee, CNP         [facility]   IN-PATIENT SERVICE   [facility]    HISTORY AND PHYSICAL EXAMINATION            Date:   5/27/2025  Patient name:  Steven L Severance  Date of admission:  5/27/2025  2:57 PM  MRN:   [id number]  Account:  [id number]  YOB: 1975  PCP:    Smith Andres MD  Room:   [place] [address]  Code Status:    Prior    Chief Complaint:     Chief Complaint   Patient presents with    Hypoglycemia     Pt BS in 40's for EMS. Pt took insulin and did not eat as much as he normally would    Fatigue     Pt was unable to get up. Family called EMS.        History Obtained From:     patient, electronic medical record    History of Present Illness:     Steven L Severance is a 50 y.o. Non- / non  male who presents with Hypoglycemia (Pt BS in 40's for EMS. Pt took insulin and did not eat as much as he normally would)  by mouth every 12 hours as needed for Nausea or Vomiting    Pete Rojas MD   loratadine (CLARITIN) 10 MG tablet Take 1 tablet by mouth daily    Pete Rojas MD   acetaminophen (TYLENOL) 500 MG tablet Take 1 tablet by mouth every 6 hours as needed for Pain    Pete Rojas MD   amLODIPine (NORVASC) 2.5 MG tablet Take 1 tablet by mouth daily Except for the nights before dialysis MWF    Pete Rojas MD   carvedilol (COREG) 12.5 MG tablet Take 1 tablet by mouth 2 times daily (with meals)    Pete Rojas MD   Multiple Vitamin (DAILY JARET) TABS Take 1 tablet by mouth daily    Pete Rojas MD   calcium acetate (PHOSLO) 667 MG CAPS capsule Take 1 capsule by mouth 2 times daily 5/17/24   Pete Rojas MD   traMADol (ULTRAM) 50 MG tablet  6/13/24   Pete Rojas MD   clopidogrel (PLAVIX) 75 MG tablet Take 1 tablet by mouth daily 5/26/24   Ayana Canseco APRN - NP   omeprazole (PRILOSEC) 40 MG delayed release capsule Take 1 capsule by mouth daily 7/7/23   Pete Rojas MD   furosemide (LASIX) 20 MG tablet Take 1 tablet by mouth in the morning. 8/2/22   Jelly Carrasco MD   Insulin Pen Needle (MEIJER PEN NEEDLES) 31G X 8 MM MISC 1 each by Does not apply route daily 7/15/22   Anastasiya Musa MD   pantoprazole (PROTONIX) 40 MG tablet Take 1 tablet by mouth every morning (before breakfast) 6/3/22 8/2/22  Anastasiya Musa MD   Misc. Devices MISC Disp: custom molded shoes to accommodate for brace   DX: DM with history of stroke, foot drop  Duration: 1 year 5/11/22   Kavitha Mary DPM   insulin aspart (NOVOLOG) 100 UNIT/ML injection vial Inject 0-8 Units into the skin 3 times daily (before meals) SLIDING SCALE    Pete Rojas MD   FLUoxetine (PROZAC) 20 MG tablet Take 1 tablet by mouth in the morning and at bedtime    Pete Rojas MD   buPROPion (WELLBUTRIN XL) 150 MG extended release tablet Take 1 tablet by mouth every morning 3/25/21    NEPHROLOGY        Esdras AGUILA Blood, DO  5/27/2025  5:41 PM    Copy sent to Dr. Andres, Smith CARTER MD

## 2025-05-27 NOTE — ED NOTES
Pt arrives to ED via EMS c/o hypoglycemia and fatigue. EMS reports that pt had taken his insulin today and did not eat as much as he normally does and EMS states that pt went to the bathroom and was too weak to get up on his own. Upon EMS arrival, pt's FBS was in the 40's and pt ate cookies to bring FBS up in to the 80's. Pt has hx of DM. Pt is alert and able to speak in full and complete sentences without difficulty. Pt has hx of 3 strokes and has deficits on both sides of body. Pt also c/o constipation for the last 3 days. Pt states that it \"feels like he has to go, but nothing is there\". Pt is on Dialysis and goes MWF, pt states that he went yesterday. Pt has fistula in L arm. Pt placed on full cardiac monitor and is hypertensive. Pt has hx of hypertension. Pt given blanket and has call light within reach.     EMS states that PT was at pt's home today and stated that pt is unable to live at home and needs placed at a facility d/t generalized weakness. Family is in agreement as well as pt.

## 2025-05-27 NOTE — ED PROVIDER NOTES
EMERGENCY DEPARTMENT ENCOUNTER    Pt Name: Steven L Severance  MRN: [id number]  Birthdate 1975  Date of evaluation: 5/27/25  CHIEF COMPLAINT       Chief Complaint   Patient presents with    Hypoglycemia     Pt BS in 40's for EMS. Pt took insulin and did not eat as much as he normally would    Fatigue     Pt was unable to get up. Family called EMS.      HISTORY OF PRESENT ILLNESS   The history is provided by the patient and medical records.  The patient is a 50-year-old male with history of CVA, ESRD on dialysis M/W/F, hypertension and hyperlipidemia who is brought in by ambulance for hypoglycemia and weakness.  Per report, patient's FSBG was 40s.  They report that the patient took his insulin however did not have his usual p.o. intake.  He lives with his grandparents who state that he was too weak to get out of bed today.  He had physical therapy, however, weakness was noted by therapist and 911 was called.  He was discharged yesterday from the emergency room for right rib contusion after falling secondary to weakness and gait difficulties.  There was discussion about placement in an acute rehab facility however up until now the patient had declined the services.  However, upon arrival in the ED, patient understands that he is not getting stronger and that he would benefit from placement in a rehab facility.  Other than weakness, patient has no specific complaints at this time.  Last dialysis was yesterday.    REVIEW OF SYSTEMS     Review of Systems  All other systems reviewed and are negative.    PASTMEDICAL HISTORY     Past Medical History:   Diagnosis Date    Cerebral artery occlusion with cerebral infarction (HCC)     Closed fracture of bone of right foot March - April 2020    Dialysis patient     Hemodialysis patient     Hyperlipidemia     Hypertension     Kidney disease     On Dialysis    Type 1 diabetes mellitus (HCC)      Past Problem List  Patient Active Problem List   Diagnosis Code    ESRD (end stage

## 2025-05-28 ENCOUNTER — APPOINTMENT (OUTPATIENT)
Dept: GENERAL RADIOLOGY | Age: 50
DRG: 368 | End: 2025-05-28
Payer: MEDICARE

## 2025-05-28 VITALS
BODY MASS INDEX: 28.89 KG/M2 | TEMPERATURE: 98.1 F | HEIGHT: 67 IN | DIASTOLIC BLOOD PRESSURE: 93 MMHG | SYSTOLIC BLOOD PRESSURE: 107 MMHG | HEART RATE: 80 BPM | OXYGEN SATURATION: 96 % | WEIGHT: 184.08 LBS | RESPIRATION RATE: 16 BRPM

## 2025-05-28 LAB
ANION GAP SERPL CALCULATED.3IONS-SCNC: 15 MMOL/L (ref 9–16)
BUN SERPL-MCNC: 42 MG/DL (ref 6–20)
CALCIUM SERPL-MCNC: 9.8 MG/DL (ref 8.6–10.4)
CHLORIDE SERPL-SCNC: 96 MMOL/L (ref 98–107)
CO2 SERPL-SCNC: 24 MMOL/L (ref 20–31)
CREAT SERPL-MCNC: 8 MG/DL (ref 0.7–1.2)
GFR, ESTIMATED: 8 ML/MIN/1.73M2
GLUCOSE BLD-MCNC: 105 MG/DL (ref 75–110)
GLUCOSE BLD-MCNC: 133 MG/DL (ref 75–110)
GLUCOSE BLD-MCNC: 153 MG/DL (ref 75–110)
GLUCOSE BLD-MCNC: 175 MG/DL (ref 75–110)
GLUCOSE BLD-MCNC: 193 MG/DL (ref 75–110)
GLUCOSE BLD-MCNC: 60 MG/DL (ref 75–110)
GLUCOSE BLD-MCNC: 96 MG/DL (ref 75–110)
GLUCOSE SERPL-MCNC: 63 MG/DL (ref 74–99)
HBV CORE AB SER QL: NONREACTIVE
HBV SURFACE AB SERPL IA-ACNC: 730 MIU/ML
HBV SURFACE AG SERPL QL IA: NONREACTIVE
INR PPP: 1
POTASSIUM SERPL-SCNC: 4.8 MMOL/L (ref 3.7–5.3)
PROTHROMBIN TIME: 13.9 SEC (ref 11.5–14.2)
SODIUM SERPL-SCNC: 136 MMOL/L (ref 136–145)

## 2025-05-28 PROCEDURE — 90935 HEMODIALYSIS ONE EVALUATION: CPT

## 2025-05-28 PROCEDURE — G0378 HOSPITAL OBSERVATION PER HR: HCPCS

## 2025-05-28 PROCEDURE — 96376 TX/PRO/DX INJ SAME DRUG ADON: CPT

## 2025-05-28 PROCEDURE — 86317 IMMUNOASSAY INFECTIOUS AGENT: CPT

## 2025-05-28 PROCEDURE — 99239 HOSP IP/OBS DSCHRG MGMT >30: CPT | Performed by: INTERNAL MEDICINE

## 2025-05-28 PROCEDURE — 6360000002 HC RX W HCPCS: Performed by: INTERNAL MEDICINE

## 2025-05-28 PROCEDURE — 87340 HEPATITIS B SURFACE AG IA: CPT

## 2025-05-28 PROCEDURE — 96375 TX/PRO/DX INJ NEW DRUG ADDON: CPT

## 2025-05-28 PROCEDURE — 6360000002 HC RX W HCPCS: Performed by: NURSE PRACTITIONER

## 2025-05-28 PROCEDURE — 97535 SELF CARE MNGMENT TRAINING: CPT

## 2025-05-28 PROCEDURE — 96372 THER/PROPH/DIAG INJ SC/IM: CPT

## 2025-05-28 PROCEDURE — 6370000000 HC RX 637 (ALT 250 FOR IP): Performed by: INTERNAL MEDICINE

## 2025-05-28 PROCEDURE — 2500000003 HC RX 250 WO HCPCS: Performed by: INTERNAL MEDICINE

## 2025-05-28 PROCEDURE — 36415 COLL VENOUS BLD VENIPUNCTURE: CPT

## 2025-05-28 PROCEDURE — 6370000000 HC RX 637 (ALT 250 FOR IP): Performed by: NURSE PRACTITIONER

## 2025-05-28 PROCEDURE — 73610 X-RAY EXAM OF ANKLE: CPT

## 2025-05-28 PROCEDURE — 97167 OT EVAL HIGH COMPLEX 60 MIN: CPT

## 2025-05-28 PROCEDURE — 85610 PROTHROMBIN TIME: CPT

## 2025-05-28 PROCEDURE — 97163 PT EVAL HIGH COMPLEX 45 MIN: CPT

## 2025-05-28 PROCEDURE — 82947 ASSAY GLUCOSE BLOOD QUANT: CPT

## 2025-05-28 PROCEDURE — 86704 HEP B CORE ANTIBODY TOTAL: CPT

## 2025-05-28 PROCEDURE — 80048 BASIC METABOLIC PNL TOTAL CA: CPT

## 2025-05-28 PROCEDURE — 97530 THERAPEUTIC ACTIVITIES: CPT

## 2025-05-28 RX ORDER — INSULIN GLARGINE 100 [IU]/ML
20 INJECTION, SOLUTION SUBCUTANEOUS DAILY
DISCHARGE
Start: 2025-05-28 | End: 2025-06-27

## 2025-05-28 RX ORDER — AMLODIPINE BESYLATE 10 MG/1
10 TABLET ORAL DAILY
Status: DISCONTINUED | OUTPATIENT
Start: 2025-05-29 | End: 2025-05-28 | Stop reason: HOSPADM

## 2025-05-28 RX ORDER — POLYETHYLENE GLYCOL 3350 17 G/17G
17 POWDER, FOR SOLUTION ORAL 2 TIMES DAILY
DISCHARGE
Start: 2025-05-28 | End: 2025-06-27

## 2025-05-28 RX ORDER — POLYETHYLENE GLYCOL 3350 17 G/17G
17 POWDER, FOR SOLUTION ORAL 2 TIMES DAILY
Status: DISCONTINUED | OUTPATIENT
Start: 2025-05-28 | End: 2025-05-28 | Stop reason: HOSPADM

## 2025-05-28 RX ORDER — ROSUVASTATIN CALCIUM 10 MG/1
10 TABLET, COATED ORAL EVERY EVENING
Status: DISCONTINUED | OUTPATIENT
Start: 2025-05-28 | End: 2025-05-28 | Stop reason: HOSPADM

## 2025-05-28 RX ORDER — AMLODIPINE BESYLATE 5 MG/1
5 TABLET ORAL ONCE
Status: COMPLETED | OUTPATIENT
Start: 2025-05-28 | End: 2025-05-28

## 2025-05-28 RX ORDER — MIDODRINE HYDROCHLORIDE 10 MG/1
10 TABLET ORAL AS NEEDED
Status: DISCONTINUED | OUTPATIENT
Start: 2025-05-28 | End: 2025-05-28 | Stop reason: HOSPADM

## 2025-05-28 RX ORDER — ALBUMIN (HUMAN) 12.5 G/50ML
25 SOLUTION INTRAVENOUS AS NEEDED
Status: DISCONTINUED | OUTPATIENT
Start: 2025-05-28 | End: 2025-05-28 | Stop reason: HOSPADM

## 2025-05-28 RX ORDER — AMLODIPINE BESYLATE 10 MG/1
10 TABLET ORAL DAILY
DISCHARGE
Start: 2025-05-29

## 2025-05-28 RX ORDER — HYDRALAZINE HYDROCHLORIDE 20 MG/ML
10 INJECTION INTRAMUSCULAR; INTRAVENOUS EVERY 6 HOURS PRN
Status: DISCONTINUED | OUTPATIENT
Start: 2025-05-28 | End: 2025-05-28 | Stop reason: HOSPADM

## 2025-05-28 RX ORDER — LACTULOSE 10 G/15ML
20 SOLUTION ORAL 3 TIMES DAILY PRN
Status: DISCONTINUED | OUTPATIENT
Start: 2025-05-28 | End: 2025-05-28

## 2025-05-28 RX ORDER — LACTULOSE 10 G/15ML
20 SOLUTION ORAL 3 TIMES DAILY
Status: DISCONTINUED | OUTPATIENT
Start: 2025-05-28 | End: 2025-05-28 | Stop reason: HOSPADM

## 2025-05-28 RX ORDER — TRAMADOL HYDROCHLORIDE 50 MG/1
50 TABLET ORAL EVERY 8 HOURS PRN
Qty: 10 TABLET | Refills: 0 | Status: CANCELLED | OUTPATIENT
Start: 2025-05-28 | End: 2025-06-27

## 2025-05-28 RX ORDER — ROSUVASTATIN CALCIUM 10 MG/1
10 TABLET, COATED ORAL EVERY EVENING
DISCHARGE
Start: 2025-05-28

## 2025-05-28 RX ORDER — LACTULOSE 10 G/15ML
20 SOLUTION ORAL 3 TIMES DAILY
DISCHARGE
Start: 2025-05-28

## 2025-05-28 RX ORDER — HYDRALAZINE HYDROCHLORIDE 20 MG/ML
10 INJECTION INTRAMUSCULAR; INTRAVENOUS ONCE
Status: COMPLETED | OUTPATIENT
Start: 2025-05-28 | End: 2025-05-28

## 2025-05-28 RX ORDER — AMLODIPINE BESYLATE 5 MG/1
5 TABLET ORAL DAILY
Status: DISCONTINUED | OUTPATIENT
Start: 2025-05-28 | End: 2025-05-28

## 2025-05-28 RX ORDER — POLYETHYLENE GLYCOL 3350 17 G/17G
17 POWDER, FOR SOLUTION ORAL 2 TIMES DAILY PRN
Status: DISCONTINUED | OUTPATIENT
Start: 2025-05-28 | End: 2025-05-28

## 2025-05-28 RX ADMIN — HYDRALAZINE HYDROCHLORIDE 10 MG: 20 INJECTION INTRAMUSCULAR; INTRAVENOUS at 18:57

## 2025-05-28 RX ADMIN — CALCIUM ACETATE 667 MG: 667 CAPSULE ORAL at 16:56

## 2025-05-28 RX ADMIN — FLUOXETINE HYDROCHLORIDE 20 MG: 20 CAPSULE ORAL at 07:51

## 2025-05-28 RX ADMIN — TRAMADOL HYDROCHLORIDE 50 MG: 50 TABLET, COATED ORAL at 07:50

## 2025-05-28 RX ADMIN — HEPARIN SODIUM 5000 UNITS: 5000 INJECTION INTRAVENOUS; SUBCUTANEOUS at 14:32

## 2025-05-28 RX ADMIN — CARVEDILOL 12.5 MG: 12.5 TABLET, FILM COATED ORAL at 16:56

## 2025-05-28 RX ADMIN — CARVEDILOL 12.5 MG: 12.5 TABLET, FILM COATED ORAL at 07:49

## 2025-05-28 RX ADMIN — EZETIMIBE 10 MG: 10 TABLET ORAL at 07:50

## 2025-05-28 RX ADMIN — ONDANSETRON 4 MG: 2 INJECTION, SOLUTION INTRAMUSCULAR; INTRAVENOUS at 16:57

## 2025-05-28 RX ADMIN — FUROSEMIDE 20 MG: 20 TABLET ORAL at 07:49

## 2025-05-28 RX ADMIN — HYDRALAZINE HYDROCHLORIDE 10 MG: 20 INJECTION INTRAMUSCULAR; INTRAVENOUS at 12:42

## 2025-05-28 RX ADMIN — Medication 16 G: at 05:59

## 2025-05-28 RX ADMIN — METOCLOPRAMIDE 10 MG: 10 TABLET ORAL at 16:56

## 2025-05-28 RX ADMIN — CALCIUM ACETATE 667 MG: 667 CAPSULE ORAL at 07:50

## 2025-05-28 RX ADMIN — METOCLOPRAMIDE 10 MG: 10 TABLET ORAL at 12:42

## 2025-05-28 RX ADMIN — ROSUVASTATIN CALCIUM 10 MG: 10 TABLET, FILM COATED ORAL at 16:56

## 2025-05-28 RX ADMIN — SODIUM CHLORIDE, PRESERVATIVE FREE 10 ML: 5 INJECTION INTRAVENOUS at 07:55

## 2025-05-28 RX ADMIN — CETIRIZINE HYDROCHLORIDE 5 MG: 5 TABLET ORAL at 07:50

## 2025-05-28 RX ADMIN — AMLODIPINE BESYLATE 5 MG: 5 TABLET ORAL at 03:51

## 2025-05-28 RX ADMIN — CLOPIDOGREL BISULFATE 75 MG: 75 TABLET, FILM COATED ORAL at 07:50

## 2025-05-28 RX ADMIN — AMLODIPINE BESYLATE 5 MG: 5 TABLET ORAL at 12:42

## 2025-05-28 RX ADMIN — POLYETHYLENE GLYCOL 3350 17 G: 17 POWDER, FOR SOLUTION ORAL at 06:09

## 2025-05-28 RX ADMIN — PANTOPRAZOLE SODIUM 40 MG: 40 TABLET, DELAYED RELEASE ORAL at 05:47

## 2025-05-28 RX ADMIN — HEPARIN SODIUM 5000 UNITS: 5000 INJECTION INTRAVENOUS; SUBCUTANEOUS at 05:47

## 2025-05-28 RX ADMIN — HYDRALAZINE HYDROCHLORIDE 10 MG: 20 INJECTION INTRAMUSCULAR; INTRAVENOUS at 00:43

## 2025-05-28 RX ADMIN — SODIUM CHLORIDE, PRESERVATIVE FREE 10 ML: 5 INJECTION INTRAVENOUS at 00:44

## 2025-05-28 RX ADMIN — METOCLOPRAMIDE 10 MG: 10 TABLET ORAL at 07:50

## 2025-05-28 RX ADMIN — BUPROPION HYDROCHLORIDE 150 MG: 150 TABLET, EXTENDED RELEASE ORAL at 07:50

## 2025-05-28 RX ADMIN — SENNOSIDES AND DOCUSATE SODIUM 4 TABLET: 50; 8.6 TABLET ORAL at 00:00

## 2025-05-28 ASSESSMENT — PAIN SCALES - GENERAL
PAINLEVEL_OUTOF10: 8
PAINLEVEL_OUTOF10: 8

## 2025-05-28 ASSESSMENT — PAIN DESCRIPTION - ORIENTATION: ORIENTATION: LOWER

## 2025-05-28 ASSESSMENT — PAIN DESCRIPTION - DESCRIPTORS: DESCRIPTORS: ACHING;DISCOMFORT;DULL;SORE

## 2025-05-28 ASSESSMENT — PAIN DESCRIPTION - LOCATION: LOCATION: BACK

## 2025-05-28 NOTE — PROGRESS NOTES
Occupational Therapy  Occupational Therapy Initial Evaluation  Facility/Department: [facility] MED SURG   Patient Name: Steven L Severance        MRN: [id number]    : 1975    Date of Service: 2025    Discharge Recommendations  Discharge Recommendations: Patient would benefit from continued therapy after discharge  OT Equipment Recommendations  Other: Pt currently functioning below baseline.  Recommend daily inpatient skilled therapy at time of discharge to maximize long term outcomes and prevent re-admission. Please refer to AM-PAC score for current level of function.    Chief Complaint   Patient presents with    Hypoglycemia     Pt BS in 40's for EMS. Pt took insulin and did not eat as much as he normally would    Fatigue     Pt was unable to get up. Family called EMS.      Past Medical History:  has a past medical history of Cerebral artery occlusion with cerebral infarction (HCC), Closed fracture of bone of right foot, Dialysis patient, Hemodialysis patient, Hyperlipidemia, Hypertension, Kidney disease, and Type 1 diabetes mellitus (HCC).  Past Surgical History:  has a past surgical history that includes AV fistula creation (Left); Wrist surgery (); Upper gastrointestinal endoscopy (N/A, 8/3/2022); and Colonoscopy (N/A, 8/3/2022).    Assessment  Performance deficits / Impairments: Decreased functional mobility ;Decreased ADL status;Decreased strength;Decreased safe awareness;Decreased cognition;Decreased endurance;Decreased balance;Decreased posture  Assessment: Pt with deficits of strength, bed mobility, transfers,  balance, safety awareness and endurance this session,  & required 2 assist for sit to stand transfers this session and pt not able to tolerate ambulation this session d/t pain and weakness.   With current deficits, Pt HIGH risk for falls & requires continued OT to maximize independence with all ADLs, functional mobility, balance, safety awareness & activity tolerance.  Prognosis:

## 2025-05-28 NOTE — PLAN OF CARE
Problem: Chronic Conditions and Co-morbidities  Goal: Patient's chronic conditions and co-morbidity symptoms are monitored and maintained or improved  Outcome: Progressing     Problem: Discharge Planning  Goal: Discharge to home or other facility with appropriate resources  Outcome: Progressing     Problem: Pain  Goal: Verbalizes/displays adequate comfort level or baseline comfort level  Outcome: Progressing     Problem: Skin/Tissue Integrity  Goal: Skin integrity remains intact  Description: 1.  Monitor for areas of redness and/or skin breakdown2.  Assess vascular access sites hourly3.  Every 4-6 hours minimum:  Change oxygen saturation probe site4.  Every 4-6 hours:  If on nasal continuous positive airway pressure, respiratory therapy assess nares and determine need for appliance change or resting period  Outcome: Progressing  Flowsheets (Taken 5/28/2025 4245)  Skin Integrity Remains Intact:   Monitor for areas of redness and/or skin breakdown   Assess vascular access sites hourly   Turn and reposition as indicated   Check visual cues for pain   Monitor skin under medical devices     Problem: ABCDS Injury Assessment  Goal: Absence of physical injury  Outcome: Progressing     Problem: Safety - Adult  Goal: Free from fall injury  Outcome: Progressing     Problem: Neurosensory - Adult  Goal: Achieves stable or improved neurological status  Outcome: Progressing     Problem: Neurosensory - Adult  Goal: Achieves maximal functionality and self care  Outcome: Progressing     Problem: Musculoskeletal - Adult  Goal: Return mobility to safest level of function  Outcome: Progressing     Problem: Musculoskeletal - Adult  Goal: Return ADL status to a safe level of function  Outcome: Progressing     Problem: Gastrointestinal - Adult  Goal: Maintains or returns to baseline bowel function  Outcome: Progressing     Problem: Metabolic/Fluid and Electrolytes - Adult  Goal: Electrolytes maintained within normal limits  Outcome:

## 2025-05-28 NOTE — PROGRESS NOTES
Patient's grandfather, Levar Marroquin, called for update on patient. Mr. Marroquin states he is the patient's caretaker and wanted to know if the plan was still for him to go to a rehab facility upon discharge. This writer informed grandfather that this was the plan I was told in report and saw in 's note. Grandfather was also asked to bring in patient's braces for his ankles and patient's medications. Grandfather stated he would be up in the a.m. with the above items.

## 2025-05-28 NOTE — DISCHARGE SUMMARY
[facility]  Office: [phone]  Wander Gresham DO, Ok Antoine DO, Delmar Morgan DO, Esdras Emmanuel DO, Jelly Carrasco MD, Liliana Alvarez MD, Anastasiya Musa MD, Kyleigh Fine MD,  Brendon Jason MD, Madi Cooper MD, Shelley Zuniga MD,  Gloria Flores DO, Carla Hubbard MD, Rey Su MD, Adalid Gresham DO, Leticia Hansen MD,  Osbaldo Segovia DO, Ayleen Morales MD, Rossi Mitchell MD, Jaylen Douglas MD,  Frantz Elam MD, Qasim Gutierrez MD, Nabil Armendariz MD, Flako Ponce MD, Stephen Corbett MD, Marge Jaime MD, Zachary Blanco DO, Mariluz Larsen MD, Margaret De Oliveira DO, Kerwin Burnette MD, Gloria Llamas MD, Mohsin Reza, MD, Melisa Siu MD, Shirley Waterhouse, CNP,  Cynthia Pickett, CNP, Zachary Yu, CNP,  Lou Quinteros, VASILE, Ayana Canseco CNP, Kathia Maria, CNP, Chani Romero, CNP, Tiffanie Valera, CNP, Jovita Colon, PA-C, Elba Jay, CNP, Huma Anguiano, CNP,  Sejal Galvez, CNP, Bethanie Taylor, CNP, Nicanor Clayton, PA-C, Radha Vines, CNP,  Nunu Zacarias, CNS, Cherri Pérez, CNP, Libia Richardson, CNP,   Ludy Gee, CNP         [facility]   IN-PATIENT SERVICE   [facility]    Discharge Summary     Patient ID: Steven L Severance  :  1975   MRN: [id number]     ACCOUNT:  [id number]   Patient's PCP: Smith Andres MD  Admit Date: 2025   Discharge Date: 2025     Length of Stay: 0  Code Status:  Full Code  Admitting Physician: Esdras P Blood, DO  Discharge Physician: Esdras Emmanuel DO     Active Discharge Diagnoses:     Hospital Problem Lists:  Principal Problem:    Hypoglycemic insulin reaction in type 1 diabetes mellitus (HCC)  Active Problems:    ESRD (end stage renal disease) (HCC)    Essential hypertension  Resolved Problems:    * No resolved hospital problems. *      Admission Condition:  fair     Discharged Condition: stable    Hospital Stay:     Hospital Course:  Steven L Severance is a 50 y.o. male who was admitted for  known as: PHOSLO     carvedilol 12.5 MG tablet  Commonly known as: COREG     clopidogrel 75 MG tablet  Commonly known as: PLAVIX  Take 1 tablet by mouth daily     Daily Lazaro Tabs     ezetimibe 10 MG tablet  Commonly known as: ZETIA     FLUoxetine 20 MG tablet  Commonly known as: PROZAC     furosemide 20 MG tablet  Commonly known as: Lasix  Take 1 tablet by mouth in the morning.     insulin aspart 100 UNIT/ML injection vial  Commonly known as: NOVOLOG     loratadine 10 MG tablet  Commonly known as: CLARITIN     [employer] Pen Needles 31G X 8 MM Misc  Generic drug: Insulin Pen Needle  1 each by Does not apply route daily     metoclopramide 10 MG tablet  Commonly known as: REGLAN  Take 1 tablet by mouth 3 times daily (with meals)     Misc. Devices Misc  Disp: custom molded shoes to accommodate for brace   DX: DM with history of stroke, foot drop  Duration: 1 year     omeprazole 40 MG delayed release capsule  Commonly known as: PRILOSEC     ondansetron 8 MG tablet  Commonly known as: ZOFRAN     sennosides-docusate sodium 8.6-50 MG tablet  Commonly known as: SENOKOT-S  Take 4 tablets by mouth daily as needed for Constipation     vitamin B-12 1000 MCG tablet  Commonly known as: CYANOCOBALAMIN            STOP taking these medications      pantoprazole 40 MG tablet  Commonly known as: PROTONIX               Where to Get Your Medications        Information about where to get these medications is not yet available    Ask your nurse or doctor about these medications  amLODIPine 10 MG tablet  lactulose 10 GM/15ML solution  Lantus SoloStar 100 UNIT/ML injection pen  polyethylene glycol 17 g packet  rosuvastatin 10 MG tablet         No discharge procedures on file.    Time Spent on discharge is  32 mins in patient examination, evaluation, counseling as well as medication reconciliation, prescriptions for required medications, discharge plan and follow up.    Electronically signed by   Esdras Emmanuel DO  5/28/2025  6:28

## 2025-05-28 NOTE — PROGRESS NOTES
HEMODIALYSIS POST TREATMENT NOTE    Treatment time ordered: 3 hrs    Actual treatment time: 2 hrs    UltraFiltration Goal: 500ml- 1000ml to dry wt  UltraFiltration Removed: 500ml      Pre Treatment weight: 85.4kg  Post Treatment weight: 83.5kg  Estimated Dry Weight: 84.5kg    Access used:     Central Venous Catheter:          Tunneled or Non-tunneled:            Site:           Access Flow:       Internal Access:       AV Fistula or AV Graft: AVF         Site: UPPER LEFT ARM       Access Flow: good  as ordered       Sign and symptoms of infection: no       If YES: na    Medications or blood products given: see mar    Chronic outpatient schedule: [facility]    Chronic outpatient unit: [facility]    Summary of response to treatment: pt started tx with high bp. He c/o needing to have a BM. He only produce a smidge of BM. He did not want to stay on for his full tx. He said he felt sick. He asked staff to stop the tx 1 hour early. UF goal met. No issues with his access. Dr Rubio notified .    Explain if orders NOT met, was physician notified:Dr Rubio notified via perfect serve      ACES flowsheet faxed to patient unit/ placed in patient chart: yes    Post assessment completed: yes    Report given to: Gisele Flores RN      * Intra-treatment documented Safety Checks include the followin) Access and face visible at all times.     2) All connections and blood lines are secure with no kinks.     3) NVL alarm engaged.     4) Hemosafe device applied (if applicable).     5) No collapse of Arterial or Venous blood chambers.     6) All blood lines / pump segments in the air detectors.

## 2025-05-28 NOTE — CONSULTS
[facility]    Shaukat Rashid MD Zafar Magsi MD Danial Rashid MD John O. Ranker MD Sembria Ligibel, SHIMA       Reason for Consult:  End stage renal disease.    Requesting Physician:  Esdras Emmanuel DO    Assessment:  End stage renal disease.  Hyponatremia.  Mild hyperkalemia.  Hyperphosphatemia.  Hypertension.    Plan:  We will plan dialysis today and then continue dialysis on MWF schedule.  BP should improve as excess fluid is removed.  We will follow phosphorus level and adjust binders as needed.  We will follow H&H and adjust erythropoeitin stimulating agent as needed.  Acid Base status stable and at target.  K at target.  On Coreg and Amlodipine. Monitor BP.  Avoid Lovenox.  Renal diet with 1200 ml oral fluid restriction.  Adjust medications for low eGFR.  We will follow up chemistries daily in AM while inpatient.    Thank you for the consultation. Please do not hesitate to contact us for any further questions/concerns. We will continue to follow along with you.     HISTORY OF PRESENT ILLNESS:    The patient is a 50 y.o. male who normally undergoes dialysis at [facility] dialysis on MWF under our care admitted with hypoglycemia of 40s because of decreased appetite. His last outpatient hemodialysis was on Monday. Today his potassium level is at 4.8.  His BP has been elevated in the 180-190s/80-90s.    Review Of Systems:   Constitutional: No fever, chills, lethargy, weakness or wt loss.  HEENT:  No headache, nasal discharge or sore throat.  Cardiac:  No chest pain, dyspnea, orthopnea or PND.  Chest:   No cough, phlegm or wheezing.  Abdomen:  No abdominal pain, nausea, vomiting or diarrhea.  Neuro:  No gross focal weakness, numbness, abnormal movements or seizure like activity.  Skin:   No rashes or itching.  :   No hematuria, dysuria or flank pain.  Extremities:  No swelling or joint pains.  Endocrine: No polyuria, polydypsia, or thyroid problems.  Hematology:  metoclopramide  10 mg Oral TID WC    multivitamin  1 tablet Oral Daily    pantoprazole  40 mg Oral QAM AC    rosuvastatin  40 mg Oral QPM    vitamin B-12  1,000 mcg Oral Daily    sodium chloride flush  5-40 mL IntraVENous 2 times per day    heparin (porcine)  5,000 Units SubCUTAneous 3 times per day    calcium acetate  1 capsule Oral BID WC     Continuous Infusions:   sodium chloride      dextrose       PRN Meds:polyethylene glycol, hydrALAZINE, lactulose, albumin human 25%, sennosides-docusate sodium, traMADol, sodium chloride flush, sodium chloride, ondansetron **OR** ondansetron, acetaminophen **OR** acetaminophen, glucose, dextrose bolus **OR** dextrose bolus, glucagon (rDNA), dextrose    No Known Allergies    Social History     Socioeconomic History    Marital status:      Spouse name: Not on file    Number of children: Not on file    Years of education: Not on file    Highest education level: Not on file   Occupational History    Not on file   Tobacco Use    Smoking status: Never     Passive exposure: Never    Smokeless tobacco: Never   Vaping Use    Vaping status: Never Used   Substance and Sexual Activity    Alcohol use: Never    Drug use: Never    Sexual activity: Not on file   Other Topics Concern    Not on file   Social History Narrative    Not on file     Social Drivers of Health     Financial Resource Strain: Low Risk  (5/25/2024)    [facility]    Overall Financial Resource Strain (CARDIA)     Difficulty of Paying Living Expenses: Not very hard   Food Insecurity: No Food Insecurity (5/27/2025)    Hunger Vital Sign     Worried About Running Out of Food in the Last Year: Never true     Ran Out of Food in the Last Year: Never true   Transportation Needs: No Transportation Needs (5/27/2025)    PRAPARE - Transportation     Lack of Transportation (Medical): No     Lack of Transportation (Non-Medical): No   Physical Activity: Inactive (5/25/2024)     (4) History of Falls or Infant-Toddler Placed in Bed

## 2025-05-28 NOTE — CARE COORDINATION
Social work:   Noted PT/OT recommendation of ARU.   Met with patient, confirmed his first choice is [facility] and they are able to accept.   Patient to dc to [facility] via [employer]  at 20:00.  # for RN report: [phone]. Completed CARISSA faxed to [phone].  Informed RN, pt, and facility of dc time, agreeable to plan.

## 2025-05-28 NOTE — PROGRESS NOTES
were answered in detail. Proper foot hygiene and care was discussed with the pt. Informed patient on proper diabetic foot care and importance of tight glycemic control.  Patient to check feet daily and contact the office with any questions/problems/concerns.   Other comorbidity noted and will be managed by PCP.  5/21/2025    Electronically signed by Kavitha Mary DPM on 5/28/2025 at 3:35 PM  5/21/2025

## 2025-05-28 NOTE — PROGRESS NOTES
[x] Medication Reconciliation was completed and the patient's home medication list was verified. The Med List Status has been marked \"Complete\". The following sources were used to assist with Medication Reconciliation:    [] Patient had a list of medications which was transcribed into the EHR.    [] Patient provided bottles of their medications    [x] Home medications reviewed and confirmed with - Patients grandfather Levar.     [] Contacted patient's pharmacy to confirm home medications    [] Contacted patient's physician office to confirm home medications    [] Medical Records from another facility and/or Care Everywhere were reviewed

## 2025-05-28 NOTE — CARE COORDINATION
Case Management Assessment  Initial Evaluation    Date/Time of Evaluation: 5/28/2025 8:52 AM  Assessment Completed by: Marilee Noguera    If patient is discharged prior to next notation, then this note serves as note for discharge by case management.    Patient Name: Steven L Severance                   YOB: 1975  Diagnosis: Hypoglycemia [E16.2]  Generalized weakness [R53.1]  Frequent falls [R29.6]  Hypoglycemic insulin reaction in type 1 diabetes mellitus (HCC) [E10.649]                   Date / Time: 5/27/2025  2:57 PM    Patient Admission Status: Observation   Readmission Risk (Low < 19, Mod (19-27), High > 27): Readmission Risk Score: 22.6    Current PCP: Smith Andres MD  PCP verified by CM? (P) Yes    Chart Reviewed: Yes      History Provided by: (P) Patient  Patient Orientation: (P) Alert and Oriented    Patient Cognition: (P) Alert    Hospitalization in the last 30 days (Readmission):  Yes    If yes, Readmission Assessment in CM Navigator will be completed.    Advance Directives:      Code Status: Full Code   Patient's Primary Decision Maker is: (P) Legal Next of Kin    Secondary Decision Maker: trinh gupta - Grandparent - [phone]    Discharge Planning:    Patient lives with: (P) Family Members Type of Home: (P) House  Primary Care Giver: (P) Self  Patient Support Systems include: (P) Family Members   Current Financial resources: (P) Medicare  Current community resources:    Current services prior to admission: (P) Home Care ([facility])            Current DME: (P) Walker, Shower Chair, Other (Comment) (Ramp; B/L leg brace for foot drop)            Type of Home Care services:  (P) PT, OT    ADLS  Prior functional level: (P) Assistance with the following:, Cooking, Housework, Shopping  Current functional level: (P) Assistance with the following:, Cooking, Housework, Shopping    PT AM-PAC:   /24  OT AM-PAC:   /24    Family can provide assistance at DC: (P) Yes  Would you like Case Management to

## 2025-05-28 NOTE — PROGRESS NOTES
Physical Therapy  Facility/Department: [facility] MED SURG  Physical Therapy Initial Assessment    Name: Steven L Severance  : 1975  MRN: [id number]  Date of Service: 2025    Discharge Recommendations:    Pt is currently functional below baseline and recommend comprehensive and intensive skilled therapy by a multidisciplinary team. Would expect patient to be able to tolerate 3 hours of therapy per day and able to tolerate at least one hour up in chair.  Please refer to AM-PAC score for current mobility/adl level.        Chief Complaint   Patient presents with    Hypoglycemia       Pt BS in 40's for EMS. Pt took insulin and did not eat as much as he normally would    Fatigue       Pt was unable to get up. Family called EMS         Patient Diagnosis(es): The primary encounter diagnosis was Hypoglycemia. Diagnoses of Generalized weakness and Frequent falls were also pertinent to this visit.  Past Medical History:  has a past medical history of Cerebral artery occlusion with cerebral infarction (HCC), Closed fracture of bone of right foot, Dialysis patient, Hemodialysis patient, Hyperlipidemia, Hypertension, Kidney disease, and Type 1 diabetes mellitus (HCC).  Past Surgical History:  has a past surgical history that includes AV fistula creation (Left); Wrist surgery (); Upper gastrointestinal endoscopy (N/A, 8/3/2022); and Colonoscopy (N/A, 8/3/2022).    Assessment  Body Structures, Functions, Activity Limitations Requiring Skilled Therapeutic Intervention: Decreased functional mobility ;Decreased endurance;Decreased balance;Decreased strength;Decreased safe awareness;Decreased posture;Decreased ROM;Decreased cognition  Assessment: Pt. able to stand at bedside with PT/OT however was fearful to maintain static standing / take steps as Rt. ankle felt as though it could roll. Stood only ~30 sec. twice (once with manual support at ankle) and could not proceed with standing/ taking steps. States several weeks ago his  Term Goal 2: Pt. to require min A for sit to stand transfers with RW with correct hand placement (bilat. LE AFO)  Short Term Goal 3: Pt. to require min A to transfer bed to chair with bilat AFO and RW. Progress gait from here.  Short Term Goal 4: Pt. to tolerate 25+ min. of PT daily for ther ex/ gait/ balance training/ functional mob. training  Short Term Goal 5: Pt. to have good+  dynamic sitting balance  Additional Goals?: Yes  Patient Goals   Patient Goals : ambulate       Education  Patient Education  Education Given To: Patient  Education Provided: Role of Therapy;Plan of Care;Home Exercise Program  Education Provided Comments: impt. of OOB, walker safety, see EX section  Education Method: Demonstration;Verbal  Barriers to Learning: None  Education Outcome: Verbalized understanding;Demonstrated understanding      Therapy Time   Individual Concurrent Group Co-treatment   Time In 1347         Time Out 1435         Minutes 48             Treatment time:  38min.   Co-treatment with OT warranted secondary to decreased safety and independence requiring 2 skilled therapy professionals to address individual discipline's goals.     MERCEDES WILLIAM, PT

## 2025-05-28 NOTE — PROGRESS NOTES
Patient stated he felt like his blood sugar was low. This writer performed POCT and BS was 60. Glucose tablets were given per standing order as patient was alert and oriented. Will recheck BS in 15 minutes per protocol.     Recheck BS: 105    Next Recheck: 135

## 2025-05-28 NOTE — PROGRESS NOTES
Comprehensive Nutrition Assessment    Type and Reason for Visit:  Initial, Positive nutrition screen    Nutrition Recommendations/Plan:   Continue ADULT DIET; Regular; 4 carb choices (60 gm/meal); Low Potassium (Less than 3000 mg/day); Low Phosphorus (Less than 1000 mg); 60 to 80 gm; 1200 ml.  Monitor po intakes, labs and weights.     Malnutrition Assessment:  Malnutrition Status:  No malnutrition (05/28/25 1455)      Nutrition Assessment:    Patient present via EMS with hypoglycermia (BG in the 40s) and generalized weakness. PMH significant for T1DM, ESRD, HTN, HLD, and 3 strokes with residual weakness. Per EMS report, patient took his insulin but did not have his usual PO intake yesterday. Patient receives dialysis treatment MW. Patient lives with his grandparents and his grandfather is his caretaker. Per grandfather, patient was too weak to get out of bed. Patient was at the ER on 5/26 for right rib contusion after falling secondary to weakness and gait difficulities. Patient reports he has been experiencing constipation over the past couple days. Patient received dialysis treatment today and had 500 ml fluid removed per dialysis RN. During the dialysis tx, patient c/o he need to have a BM. He only produced a smidge of BM. Patient stated he felt sick and asked staff to stop the tx 1 hour early. Visit was prompted for positive nutrition screen. Patient states he is unsure of the amount of weight loss but his UBW is 190#. Weight history aligns with what patient reported. Patient has good apetite and ate 100% of his lunch. Patient reports his constipation has resolved but he felt nauseous this morning before breakfast. Patient usually drinks Nepro at home. Continue current diet. Monitor po intakes, labs and weights.    Nutrition Related Findings:    Hypoactive bowel sounds; no edema; HD on MWF Wound Type: None       Current Nutrition Intake & Therapies:    Average Meal Intake: %  Average Supplements Intake:

## 2025-05-28 NOTE — CARE COORDINATION

## 2025-05-28 NOTE — PLAN OF CARE
Patient was admitted yesterday from ED where he presented with hypoglycemia and increased weakness. Patient has type 1 diabetes.   On-call NP was notified overnight of patient's ongoing high blood pressure. Additional doses of medication were ordered. See MAR. Patient was asymptomatic.   Patient with ESRD. Receives dialysis M/W/F.   Patient states he normally ambulates with walker and braces on feet; however, he is too weak to ambulate at this time. History of 3 strokes with residual weakness.   Problem: Discharge Planning  Goal: Discharge to home or other facility with appropriate resources  Outcome: Progressing  Flowsheets (Taken 5/28/2025 0034)  Discharge to home or other facility with appropriate resources:   Identify barriers to discharge with patient and caregiver   Arrange for needed discharge resources and transportation as appropriate   Identify discharge learning needs (meds, wound care, etc)     Problem: Chronic Conditions and Co-morbidities  Goal: Patient's chronic conditions and co-morbidity symptoms are monitored and maintained or improved  Outcome: Progressing  Flowsheets (Taken 5/28/2025 0034)  Care Plan - Patient's Chronic Conditions and Co-Morbidity Symptoms are Monitored and Maintained or Improved:   Monitor and assess patient's chronic conditions and comorbid symptoms for stability, deterioration, or improvement   Collaborate with multidisciplinary team to address chronic and comorbid conditions and prevent exacerbation or deterioration     Problem: Pain  Goal: Verbalizes/displays adequate comfort level or baseline comfort level  Outcome: Progressing  Flowsheets (Taken 5/28/2025 0034)  Verbalizes/displays adequate comfort level or baseline comfort level:   Encourage patient to monitor pain and request assistance   Assess pain using appropriate pain scale   Administer analgesics based on type and severity of pain and evaluate response   Implement non-pharmacological measures as appropriate and  evaluate response     Problem: Safety - Adult  Goal: Free from fall injury  Outcome: Progressing  Flowsheets (Taken 5/28/2025 0034)  Free From Fall Injury: Instruct family/caregiver on patient safety     Problem: Neurosensory - Adult  Goal: Achieves stable or improved neurological status  Outcome: Progressing  Flowsheets (Taken 5/28/2025 0034)  Achieves stable or improved neurological status:   Assess for and report changes in neurological status   Maintain blood pressure and fluid volume within ordered parameters to optimize cerebral perfusion and minimize risk of hemorrhage   Monitor temperature, glucose, and sodium. Initiate appropriate interventions as ordered     Problem: Neurosensory - Adult  Goal: Achieves maximal functionality and self care  Outcome: Progressing  Flowsheets (Taken 5/28/2025 0034)  Achieves maximal functionality and self care: Encourage and assist patient to increase activity and self care with guidance from physical therapy/occupational therapy     Problem: Musculoskeletal - Adult  Goal: Return mobility to safest level of function  Outcome: Progressing  Flowsheets (Taken 5/28/2025 0034)  Return Mobility to Safest Level of Function:   Assess patient stability and activity tolerance for standing, transferring and ambulating with or without assistive devices   Assist with transfers and ambulation using safe patient handling equipment as needed   Obtain physical therapy/occupational therapy consults as needed     Problem: Gastrointestinal - Adult  Goal: Maintains or returns to baseline bowel function  Outcome: Progressing  Flowsheets (Taken 5/28/2025 0034)  Maintains or returns to baseline bowel function:   Assess bowel function   Encourage mobilization and activity   Encourage oral fluids to ensure adequate hydration   Administer ordered medications as needed     Problem: Metabolic/Fluid and Electrolytes - Adult  Goal: Electrolytes maintained within normal limits  Outcome:

## 2025-05-28 NOTE — PROGRESS NOTES
[facility]  Office: [phone]  Wander Gresham DO, Ok Antoine, DO, Delmar Morgan DO, Esdras Emmanuel DO, Jelly Carrasco MD, Liliana Alvarez MD, Anastasiya Musa MD, Kyleigh Fine MD,  Brendon Jason MD, Madi Cooper MD, Shelley Zuniga MD,  Gloria Flores DO, Carla Hubbard MD, Rey Su MD, Adalid Gresham DO, Leticia Hansen MD,  Osbaldo Segovia DO, Ayleen Morales MD, Rossi Mitchell MD, Jaylen Douglas MD,  Frantz Elam MD, Qasim Gutierrez MD, Nabil Armendariz MD, Flako Ponce MD, Stephen Corbett MD, Marge Jaime MD, Zachary lBanco, DO, Mariluz Larsen MD, Margaret De Oliveira DO, Kerwin Burnette MD, Gloria Llamas MD, Mohsin Reza, MD, Melisa Siu MD, Shirley Waterhouse, CNP,  Cynthia Pickett, CNP, Zachary Yu, CNP,  Lou Quinteros, DNP, Ayana Canseco, CNP, Kathia Maria, CNP, Chani Romero, CNP, Tiffanie Valera, CNP, Jovita Colon, PA-C, Elba Jay, CNP, Huma Anguiano, CNP,  Sejal Galvez, CNP, Bethanie Taylor, CNP, Nicanor Clayton, PA-C, Radha Vines, CNP,  Nunu Zacarias, CNS, Cherri Pérez, CNP, Libia Richardson, CNP,   Ludy Gee, CNP         [facility]   IN-PATIENT SERVICE   [facility]    Progress Note    5/28/2025    9:26 AM    Name:   Steven L Severance  MRN:     [id number]     Acct:      [id number]   Room:   2009/2009-02  IP Day:  0  Admit Date:  5/27/2025  2:57 PM    PCP:   Simth Andres MD  Code Status:  Full Code    Subjective:     C/C:   Chief Complaint   Patient presents with    Hypoglycemia     Pt BS in 40's for EMS. Pt took insulin and did not eat as much as he normally would    Fatigue     Pt was unable to get up. Family called EMS.      Interval History Status: not changed.     Feels about the same  Having difficulty bearing weight     Denies cp/sob    C/o ongoing constipation, as well as n/v    Brief History:     Steven L Severance is a 50 y.o. Non- / non  male who presents with Hypoglycemia (Pt BS in 40's for EMS. Pt took insulin  glucagon (rDNA), dextrose    Data:     Past Medical History:   has a past medical history of Cerebral artery occlusion with cerebral infarction (HCC), Closed fracture of bone of right foot, Dialysis patient, Hemodialysis patient, Hyperlipidemia, Hypertension, Kidney disease, and Type 1 diabetes mellitus (HCC).    Social History:   reports that he has never smoked. He has never been exposed to tobacco smoke. He has never used smokeless tobacco. He reports that he does not drink alcohol and does not use drugs.     Family History:   Family History   Problem Relation Age of Onset    Diabetes Mother     Heart Disease Father     Diabetes Father     Neuropathy Maternal Grandmother     Neuropathy Maternal Grandfather     Cancer Paternal Grandmother     Stroke Paternal Grandfather     Heart Disease Maternal Great Grandfather     Alzheimer's Disease Neg Hx     Cerebral Aneurysm Neg Hx     Dementia Neg Hx     Epilepsy Neg Hx     Mult Sclerosis Neg Hx     Seizures Neg Hx     Parkinsonism Neg Hx        Vitals:  BP (!) 191/90   Pulse 82   Temp 98.4 °F (36.9 °C) (Oral)   Resp 16   Ht 1.702 m (5' 7\")   Wt 85.4 kg (188 lb 4.8 oz)   SpO2 98%   BMI 29.49 kg/m²   Temp (24hrs), Av °F (36.7 °C), Min:97.3 °F (36.3 °C), Max:98.6 °F (37 °C)    Recent Labs     25  0205 25  0555 25  0623 25  0638   POCGLU 96 60* 105 133*       I/O (24Hr):    Intake/Output Summary (Last 24 hours) at 2025 0926  Last data filed at 2025 0610  Gross per 24 hour   Intake 810 ml   Output --   Net 810 ml       Labs:  Hematology:  Recent Labs     25  0505 25  1550 25  0509   WBC 8.9 10.1  --    RBC 3.84* 4.09*  --    HGB 12.3* 13.1  --    HCT 36.2* 39.5*  --    MCV 94.3 96.6  --    MCH 32.0 32.0  --    MCHC 34.0 33.2  --    RDW 12.6 12.7  --     187  --    MPV 9.4 9.8  --    INR  --   --  1.0     Chemistry:  Recent Labs     25  0505 25  1609 25  0509    132* 136   K 4.9 5.1

## 2025-05-28 NOTE — DISCHARGE INSTR - COC
Continuity of Care Form    Patient Name: Steven L Severance   :  1975  MRN:  [id number]    Admit date:  2025  Discharge date:  25    Code Status Order: Full Code   Advance Directives:     Admitting Physician:  Esdras Emmanuel DO  PCP: Smith Andres MD    Discharging Nurse: Gisele GILBERT   Discharging Hospital Unit/Room#:   Discharging Unit Phone Number: [phone]    Emergency Contact:   Extended Emergency Contact Information  Primary Emergency Contact: trinh gupta  Home Phone: [phone]  Mobile Phone: [phone]  Relation: Grandparent    Past Surgical History:  Past Surgical History:   Procedure Laterality Date    AV FISTULA CREATION Left     COLONOSCOPY N/A 8/3/2022    COLONOSCOPY DIAGNOSTIC [facility]    UPPER GASTROINTESTINAL ENDOSCOPY N/A 8/3/2022    EGD ESOPHAGOGASTRODUODENOSCOPY performed by Rose [facility] Richard [facility] at [facility] OR    WRIST SURGERY         Immunization History:   Immunization History   Administered Date(s) Administered    COVID-19, MODERNA BLUE border, Primary or Immunocompromised, (age 12y+), IM, 100 mcg/0.5mL 2021, 2021, 2022    COVID-19, MODERNA Booster BLUE border, (age 18y+), IM, 50mcg/0.25mL 2022    COVID-19, PFIZER Bivalent, DO NOT Dilute, (age 12y+), IM, 30 mcg/0.3 mL 2022    Influenza Virus Vaccine 2016, 2017, 10/05/2020, 10/21/2020, 09/10/2021    Influenza, FLUCELVAX, (age 6 mo+), MDCK, Quadv PF, 0.5mL 09/10/2021    PPD Test 2021, 2021, 06/15/2021, 2022    Pneumococcal Vaccine 2020    TDaP, ADACEL (age 10y-64y), BOOSTRIX (age 10y+), IM, 0.5mL 10/21/2011, 2020       Active Problems:  Patient Active Problem List   Diagnosis Code    ESRD (end stage renal disease) ([facility]) N18.6    Essential hypertension I10    Hyperlipidemia E78.5    Acute pain of left knee M25.562    Bipolar affective disorder ([facility]) F31.9    Atherosclerosis of aorta I70.0    COVID-19 U07.1

## 2025-05-29 ENCOUNTER — APPOINTMENT (OUTPATIENT)
Dept: CT IMAGING | Age: 50
DRG: 368 | End: 2025-05-29
Payer: MEDICARE

## 2025-05-29 ENCOUNTER — APPOINTMENT (OUTPATIENT)
Dept: GENERAL RADIOLOGY | Age: 50
DRG: 368 | End: 2025-05-29
Payer: MEDICARE

## 2025-05-29 ENCOUNTER — HOSPITAL ENCOUNTER (INPATIENT)
Age: 50
LOS: 1 days | Discharge: NURSING, REHAB OR LONG-TERM CARE | DRG: 368 | End: 2025-05-31
Attending: EMERGENCY MEDICINE | Admitting: INTERNAL MEDICINE
Payer: MEDICARE

## 2025-05-29 ENCOUNTER — HOSPITAL ENCOUNTER (OUTPATIENT)
Age: 50
Setting detail: SPECIMEN
Discharge: HOME | End: 2025-05-29

## 2025-05-29 DIAGNOSIS — Z99.2 DIALYSIS PATIENT: ICD-10-CM

## 2025-05-29 DIAGNOSIS — I10 ESSENTIAL HYPERTENSION: ICD-10-CM

## 2025-05-29 DIAGNOSIS — K92.0 HEMATEMESIS WITH NAUSEA: Primary | ICD-10-CM

## 2025-05-29 DIAGNOSIS — N18.9 CHRONIC KIDNEY DISEASE, UNSPECIFIED CKD STAGE: ICD-10-CM

## 2025-05-29 DIAGNOSIS — E87.1 HYPONATREMIA: ICD-10-CM

## 2025-05-29 DIAGNOSIS — K92.2 UPPER GI BLEED: ICD-10-CM

## 2025-05-29 LAB
ALBUMIN SERPL-MCNC: 3.6 G/DL (ref 3.5–5.2)
ALBUMIN/GLOB SERPL: 1.4 {RATIO} (ref 1–2.5)
ALP SERPL-CCNC: 121 U/L (ref 40–129)
ALT SERPL-CCNC: 24 U/L (ref 10–50)
ANION GAP SERPL CALCULATED.3IONS-SCNC: 15 MMOL/L (ref 9–16)
AST SERPL-CCNC: 23 U/L (ref 10–50)
BASOPHILS # BLD: <0.03 K/UL (ref 0–0.2)
BASOPHILS NFR BLD: 0 % (ref 0–2)
BILIRUB DIRECT SERPL-MCNC: 0.2 MG/DL (ref 0–0.2)
BILIRUB INDIRECT SERPL-MCNC: 0.3 MG/DL
BILIRUB SERPL-MCNC: 0.6 MG/DL (ref 0–1.2)
BUN SERPL-MCNC: 37 MG/DL (ref 6–20)
CALCIUM SERPL-MCNC: 9.7 MG/DL (ref 8.6–10.4)
CHLORIDE SERPL-SCNC: 88 MMOL/L (ref 98–107)
CO2 SERPL-SCNC: 24 MMOL/L (ref 20–31)
CREAT SERPL-MCNC: 7 MG/DL (ref 0.7–1.2)
EOSINOPHIL # BLD: <0.03 K/UL (ref 0–0.44)
EOSINOPHILS RELATIVE PERCENT: 0 % (ref 1–4)
ERYTHROCYTE [DISTWIDTH] IN BLOOD BY AUTOMATED COUNT: 12.5 % (ref 11.8–14.4)
GFR, ESTIMATED: 9 ML/MIN/1.73M2
GLUCOSE BLD-MCNC: 128 MG/DL (ref 75–110)
GLUCOSE BLD-MCNC: 89 MG/DL (ref 75–110)
GLUCOSE SERPL-MCNC: 169 MG/DL (ref 74–99)
HCT VFR BLD AUTO: 34.4 % (ref 40.7–50.3)
HCT VFR BLD AUTO: 36.6 % (ref 40.7–50.3)
HGB BLD-MCNC: 12.1 G/DL (ref 13–17)
HGB BLD-MCNC: 12.6 G/DL (ref 13–17)
IMM GRANULOCYTES # BLD AUTO: 0.04 K/UL (ref 0–0.3)
IMM GRANULOCYTES NFR BLD: 0 %
LIPASE SERPL-CCNC: 10 U/L (ref 13–60)
LYMPHOCYTES NFR BLD: 1.3 K/UL (ref 1.1–3.7)
LYMPHOCYTES RELATIVE PERCENT: 11 % (ref 24–43)
MCH RBC QN AUTO: 31.3 PG (ref 25.2–33.5)
MCHC RBC AUTO-ENTMCNC: 34.4 G/DL (ref 28.4–34.8)
MCV RBC AUTO: 91 FL (ref 82.6–102.9)
MONOCYTES NFR BLD: 1.43 K/UL (ref 0.1–1.2)
MONOCYTES NFR BLD: 13 % (ref 3–12)
NEUTROPHILS NFR BLD: 75 % (ref 36–65)
NEUTS SEG NFR BLD: 8.61 K/UL (ref 1.5–8.1)
NRBC BLD-RTO: 0 PER 100 WBC
PLATELET # BLD AUTO: 228 K/UL (ref 138–453)
PMV BLD AUTO: 9.5 FL (ref 8.1–13.5)
POTASSIUM SERPL-SCNC: 5.4 MMOL/L (ref 3.7–5.3)
PROT SERPL-MCNC: 6.3 G/DL (ref 6.6–8.7)
RBC # BLD AUTO: 4.02 M/UL (ref 4.21–5.77)
SODIUM SERPL-SCNC: 127 MMOL/L (ref 136–145)
WBC OTHER # BLD: 11.4 K/UL (ref 3.5–11.3)

## 2025-05-29 PROCEDURE — 2580000003 HC RX 258: Performed by: PHYSICIAN ASSISTANT

## 2025-05-29 PROCEDURE — 93005 ELECTROCARDIOGRAM TRACING: CPT | Performed by: PHYSICIAN ASSISTANT

## 2025-05-29 PROCEDURE — 6370000000 HC RX 637 (ALT 250 FOR IP): Performed by: NURSE PRACTITIONER

## 2025-05-29 PROCEDURE — G0378 HOSPITAL OBSERVATION PER HR: HCPCS

## 2025-05-29 PROCEDURE — 96374 THER/PROPH/DIAG INJ IV PUSH: CPT

## 2025-05-29 PROCEDURE — 96375 TX/PRO/DX INJ NEW DRUG ADDON: CPT

## 2025-05-29 PROCEDURE — 85018 HEMOGLOBIN: CPT

## 2025-05-29 PROCEDURE — 99222 1ST HOSP IP/OBS MODERATE 55: CPT | Performed by: NURSE PRACTITIONER

## 2025-05-29 PROCEDURE — 85014 HEMATOCRIT: CPT

## 2025-05-29 PROCEDURE — 90935 HEMODIALYSIS ONE EVALUATION: CPT

## 2025-05-29 PROCEDURE — 80076 HEPATIC FUNCTION PANEL: CPT

## 2025-05-29 PROCEDURE — 2580000003 HC RX 258: Performed by: NURSE PRACTITIONER

## 2025-05-29 PROCEDURE — G0378 HOSPITAL OBSERVATION PER HR: HCPCS | Performed by: INTERNAL MEDICINE

## 2025-05-29 PROCEDURE — 6360000002 HC RX W HCPCS: Performed by: PHYSICIAN ASSISTANT

## 2025-05-29 PROCEDURE — 6360000002 HC RX W HCPCS: Performed by: INTERNAL MEDICINE

## 2025-05-29 PROCEDURE — 2500000003 HC RX 250 WO HCPCS: Performed by: NURSE PRACTITIONER

## 2025-05-29 PROCEDURE — 80048 BASIC METABOLIC PNL TOTAL CA: CPT

## 2025-05-29 PROCEDURE — 71045 X-RAY EXAM CHEST 1 VIEW: CPT

## 2025-05-29 PROCEDURE — 85025 COMPLETE CBC W/AUTO DIFF WBC: CPT

## 2025-05-29 PROCEDURE — 5A1D70Z PERFORMANCE OF URINARY FILTRATION, INTERMITTENT, LESS THAN 6 HOURS PER DAY: ICD-10-PCS | Performed by: INTERNAL MEDICINE

## 2025-05-29 PROCEDURE — 6360000002 HC RX W HCPCS: Performed by: NURSE PRACTITIONER

## 2025-05-29 PROCEDURE — 74176 CT ABD & PELVIS W/O CONTRAST: CPT

## 2025-05-29 PROCEDURE — 96376 TX/PRO/DX INJ SAME DRUG ADON: CPT

## 2025-05-29 PROCEDURE — 99285 EMERGENCY DEPT VISIT HI MDM: CPT

## 2025-05-29 PROCEDURE — 36415 COLL VENOUS BLD VENIPUNCTURE: CPT

## 2025-05-29 PROCEDURE — 83690 ASSAY OF LIPASE: CPT

## 2025-05-29 PROCEDURE — 82947 ASSAY GLUCOSE BLOOD QUANT: CPT

## 2025-05-29 PROCEDURE — 2580000003 HC RX 258: Performed by: INTERNAL MEDICINE

## 2025-05-29 RX ORDER — ONDANSETRON 4 MG/1
4 TABLET, ORALLY DISINTEGRATING ORAL EVERY 8 HOURS PRN
Status: DISCONTINUED | OUTPATIENT
Start: 2025-05-29 | End: 2025-05-31 | Stop reason: HOSPADM

## 2025-05-29 RX ORDER — ACETAMINOPHEN 650 MG/1
650 SUPPOSITORY RECTAL EVERY 6 HOURS PRN
Status: DISCONTINUED | OUTPATIENT
Start: 2025-05-29 | End: 2025-05-31 | Stop reason: HOSPADM

## 2025-05-29 RX ORDER — DEXTROSE MONOHYDRATE 100 MG/ML
INJECTION, SOLUTION INTRAVENOUS CONTINUOUS PRN
Status: DISCONTINUED | OUTPATIENT
Start: 2025-05-29 | End: 2025-05-31 | Stop reason: HOSPADM

## 2025-05-29 RX ORDER — FUROSEMIDE 20 MG/1
20 TABLET ORAL DAILY
Status: DISCONTINUED | OUTPATIENT
Start: 2025-05-29 | End: 2025-05-31 | Stop reason: HOSPADM

## 2025-05-29 RX ORDER — ACETAMINOPHEN 325 MG/1
650 TABLET ORAL EVERY 6 HOURS PRN
Status: DISCONTINUED | OUTPATIENT
Start: 2025-05-29 | End: 2025-05-31 | Stop reason: HOSPADM

## 2025-05-29 RX ORDER — SODIUM CHLORIDE 0.9 % (FLUSH) 0.9 %
5-40 SYRINGE (ML) INJECTION EVERY 12 HOURS SCHEDULED
Status: DISCONTINUED | OUTPATIENT
Start: 2025-05-29 | End: 2025-05-31 | Stop reason: HOSPADM

## 2025-05-29 RX ORDER — LACTULOSE 10 G/15ML
20 SOLUTION ORAL 3 TIMES DAILY
Status: DISCONTINUED | OUTPATIENT
Start: 2025-05-29 | End: 2025-05-31 | Stop reason: HOSPADM

## 2025-05-29 RX ORDER — INSULIN GLARGINE 100 [IU]/ML
20 INJECTION, SOLUTION SUBCUTANEOUS DAILY
Status: DISCONTINUED | OUTPATIENT
Start: 2025-05-30 | End: 2025-05-31

## 2025-05-29 RX ORDER — TRAMADOL HYDROCHLORIDE 50 MG/1
50 TABLET ORAL EVERY 8 HOURS PRN
Status: DISCONTINUED | OUTPATIENT
Start: 2025-05-29 | End: 2025-05-31 | Stop reason: HOSPADM

## 2025-05-29 RX ORDER — ROSUVASTATIN CALCIUM 10 MG/1
10 TABLET, COATED ORAL EVERY EVENING
Status: DISCONTINUED | OUTPATIENT
Start: 2025-05-29 | End: 2025-05-31 | Stop reason: HOSPADM

## 2025-05-29 RX ORDER — METOCLOPRAMIDE 10 MG/1
10 TABLET ORAL
Status: DISCONTINUED | OUTPATIENT
Start: 2025-05-29 | End: 2025-05-31 | Stop reason: HOSPADM

## 2025-05-29 RX ORDER — SODIUM CHLORIDE 9 MG/ML
INJECTION, SOLUTION INTRAVENOUS CONTINUOUS
Status: DISCONTINUED | OUTPATIENT
Start: 2025-05-29 | End: 2025-05-29

## 2025-05-29 RX ORDER — ONDANSETRON 2 MG/ML
4 INJECTION INTRAMUSCULAR; INTRAVENOUS EVERY 6 HOURS PRN
Status: DISCONTINUED | OUTPATIENT
Start: 2025-05-29 | End: 2025-05-31 | Stop reason: HOSPADM

## 2025-05-29 RX ORDER — EZETIMIBE 10 MG/1
10 TABLET ORAL DAILY
Status: DISCONTINUED | OUTPATIENT
Start: 2025-05-29 | End: 2025-05-31 | Stop reason: HOSPADM

## 2025-05-29 RX ORDER — SODIUM CHLORIDE 0.9 % (FLUSH) 0.9 %
5-40 SYRINGE (ML) INJECTION PRN
Status: DISCONTINUED | OUTPATIENT
Start: 2025-05-29 | End: 2025-05-31 | Stop reason: HOSPADM

## 2025-05-29 RX ORDER — BUPROPION HYDROCHLORIDE 150 MG/1
150 TABLET ORAL EVERY MORNING
Status: DISCONTINUED | OUTPATIENT
Start: 2025-05-30 | End: 2025-05-31 | Stop reason: HOSPADM

## 2025-05-29 RX ORDER — AMLODIPINE BESYLATE 10 MG/1
10 TABLET ORAL DAILY
Status: DISCONTINUED | OUTPATIENT
Start: 2025-05-29 | End: 2025-05-31 | Stop reason: HOSPADM

## 2025-05-29 RX ORDER — HYDRALAZINE HYDROCHLORIDE 20 MG/ML
10 INJECTION INTRAMUSCULAR; INTRAVENOUS ONCE
Status: COMPLETED | OUTPATIENT
Start: 2025-05-29 | End: 2025-05-29

## 2025-05-29 RX ORDER — MORPHINE SULFATE 4 MG/ML
4 INJECTION, SOLUTION INTRAMUSCULAR; INTRAVENOUS ONCE
Status: COMPLETED | OUTPATIENT
Start: 2025-05-29 | End: 2025-05-29

## 2025-05-29 RX ORDER — SODIUM CHLORIDE 9 MG/ML
INJECTION, SOLUTION INTRAVENOUS PRN
Status: DISCONTINUED | OUTPATIENT
Start: 2025-05-29 | End: 2025-05-31 | Stop reason: HOSPADM

## 2025-05-29 RX ORDER — ONDANSETRON 2 MG/ML
4 INJECTION INTRAMUSCULAR; INTRAVENOUS ONCE
Status: COMPLETED | OUTPATIENT
Start: 2025-05-29 | End: 2025-05-29

## 2025-05-29 RX ORDER — CARVEDILOL 12.5 MG/1
12.5 TABLET ORAL 2 TIMES DAILY WITH MEALS
Status: DISCONTINUED | OUTPATIENT
Start: 2025-05-29 | End: 2025-05-31 | Stop reason: HOSPADM

## 2025-05-29 RX ADMIN — EZETIMIBE 10 MG: 10 TABLET ORAL at 20:18

## 2025-05-29 RX ADMIN — CARVEDILOL 12.5 MG: 12.5 TABLET, FILM COATED ORAL at 16:34

## 2025-05-29 RX ADMIN — FLUOXETINE HYDROCHLORIDE 20 MG: 20 CAPSULE ORAL at 20:18

## 2025-05-29 RX ADMIN — SODIUM CHLORIDE, PRESERVATIVE FREE 10 ML: 5 INJECTION INTRAVENOUS at 16:19

## 2025-05-29 RX ADMIN — SODIUM CHLORIDE, PRESERVATIVE FREE 10 ML: 5 INJECTION INTRAVENOUS at 20:19

## 2025-05-29 RX ADMIN — AMLODIPINE BESYLATE 10 MG: 10 TABLET ORAL at 20:18

## 2025-05-29 RX ADMIN — METOCLOPRAMIDE 10 MG: 10 TABLET ORAL at 20:18

## 2025-05-29 RX ADMIN — TRAMADOL HYDROCHLORIDE 50 MG: 50 TABLET, COATED ORAL at 20:19

## 2025-05-29 RX ADMIN — ROSUVASTATIN CALCIUM 10 MG: 10 TABLET, FILM COATED ORAL at 20:18

## 2025-05-29 RX ADMIN — SODIUM CHLORIDE 40 MG: 9 INJECTION, SOLUTION INTRAMUSCULAR; INTRAVENOUS; SUBCUTANEOUS at 13:43

## 2025-05-29 RX ADMIN — ONDANSETRON 4 MG: 2 INJECTION INTRAMUSCULAR; INTRAVENOUS at 10:39

## 2025-05-29 RX ADMIN — HYDRALAZINE HYDROCHLORIDE 10 MG: 20 INJECTION INTRAMUSCULAR; INTRAVENOUS at 10:36

## 2025-05-29 RX ADMIN — SODIUM CHLORIDE 8 MG/HR: 9 INJECTION, SOLUTION INTRAVENOUS at 20:36

## 2025-05-29 RX ADMIN — MORPHINE SULFATE 4 MG: 4 INJECTION, SOLUTION INTRAMUSCULAR; INTRAVENOUS at 12:22

## 2025-05-29 RX ADMIN — FUROSEMIDE 20 MG: 20 TABLET ORAL at 20:19

## 2025-05-29 RX ADMIN — SODIUM CHLORIDE 40 MG: 9 INJECTION, SOLUTION INTRAMUSCULAR; INTRAVENOUS; SUBCUTANEOUS at 10:36

## 2025-05-29 ASSESSMENT — ENCOUNTER SYMPTOMS
VOMITING: 1
ABDOMINAL PAIN: 1
NAUSEA: 1
COLOR CHANGE: 0
SINUS PAIN: 0
SINUS PRESSURE: 0
TROUBLE SWALLOWING: 0
WHEEZING: 0
CHEST TIGHTNESS: 0
SHORTNESS OF BREATH: 0
PHOTOPHOBIA: 0
DIARRHEA: 0
BACK PAIN: 1
CONSTIPATION: 0

## 2025-05-29 ASSESSMENT — PAIN DESCRIPTION - ORIENTATION
ORIENTATION: UPPER

## 2025-05-29 ASSESSMENT — PAIN DESCRIPTION - DESCRIPTORS
DESCRIPTORS: ACHING;DISCOMFORT
DESCRIPTORS: ACHING
DESCRIPTORS: ACHING;CRUSHING

## 2025-05-29 ASSESSMENT — PAIN DESCRIPTION - PAIN TYPE: TYPE: CHRONIC PAIN;ACUTE PAIN

## 2025-05-29 ASSESSMENT — PAIN DESCRIPTION - FREQUENCY: FREQUENCY: CONTINUOUS

## 2025-05-29 ASSESSMENT — PAIN - FUNCTIONAL ASSESSMENT
PAIN_FUNCTIONAL_ASSESSMENT: 0-10
PAIN_FUNCTIONAL_ASSESSMENT: PREVENTS OR INTERFERES SOME ACTIVE ACTIVITIES AND ADLS

## 2025-05-29 ASSESSMENT — PAIN SCALES - GENERAL
PAINLEVEL_OUTOF10: 6
PAINLEVEL_OUTOF10: 0
PAINLEVEL_OUTOF10: 6
PAINLEVEL_OUTOF10: 5
PAINLEVEL_OUTOF10: 7
PAINLEVEL_OUTOF10: 10

## 2025-05-29 ASSESSMENT — PAIN DESCRIPTION - LOCATION
LOCATION: BACK

## 2025-05-29 NOTE — ED NOTES
ED TO INPATIENT SBAR HANDOFF    Patient Name: Steven L Severance   :  1975  50 y.o.   MRN:  [id number]  Preferred Name  [facility]  ED Room #:  STA23/23  Family/Caregiver Present no   Restraints no   Sitter no   Sepsis Risk Score      Situation  Code Status: Prior No additional code details.    Allergies: Patient has no known allergies.  Weight: Patient Vitals for the past 96 hrs (Last 3 readings):   Weight   25 0934 83 kg (182 lb 15.7 oz)     Arrived from: nursing home  Chief Complaint:   Chief Complaint   Patient presents with    Hypertension    Nausea    Vomiting    Headache     Hospital Problem/Diagnosis:  Principal Problem:    Hematemesis  Resolved Problems:    * No resolved hospital problems. *    Imaging:   CT ABDOMEN PELVIS WO CONTRAST Additional Contrast? None   Final Result   1. Tiny hiatal hernia with increased circumferential thickening of the distal   esophagus, which may represent esophagitis.   2. Otherwise, no acute abnormality in the abdomen or pelvis.         XR CHEST PORTABLE   Final Result   No acute cardiopulmonary process.           Abnormal labs:   Abnormal Labs Reviewed   CBC WITH AUTO DIFFERENTIAL - Abnormal; Notable for the following components:       Result Value    WBC 11.4 (*)     RBC 4.02 (*)     Hemoglobin 12.6 (*)     Hematocrit 36.6 (*)     Neutrophils % 75 (*)     Lymphocytes % 11 (*)     Monocytes % 13 (*)     Eosinophils % 0 (*)     Neutrophils Absolute 8.61 (*)     Monocytes Absolute 1.43 (*)     All other components within normal limits   BASIC METABOLIC PANEL - Abnormal; Notable for the following components:    Sodium 127 (*)     Potassium 5.4 (*)     Chloride 88 (*)     Glucose 169 (*)     BUN 37 (*)     Creatinine 7.0 (*)     Est, Glom Filt Rate 9 (*)     All other components within normal limits   LIPASE - Abnormal; Notable for the following components:    Lipase 10 (*)     All other components within normal limits   HEPATIC FUNCTION PANEL - Abnormal; Notable for the  following components:    Total Protein 6.3 (*)     All other components within normal limits     Critical values: yes     Abnormal Assessment Findings: drop foot right, uses walker, right foot turned inward, right side very weak from strokes, total of 3 strokes, L FA Fistula,     Background  History:   Past Medical History:   Diagnosis Date    Cerebral artery occlusion with cerebral infarction (HCC)     Closed fracture of bone of right foot March - April 2020    Dialysis patient     Hemodialysis patient     Hyperlipidemia     Hypertension     Kidney disease     On Dialysis    Type 1 diabetes mellitus (HCC)        Medication Review:  [] Via patient  [] From medication list  [] Pharmacy Med Rec  [] Unable to assess based on patient condition  [] Rn not able to complete      Assessment    Vitals/MEWS: MEWS Score: 1  Level of Consciousness: Alert (0)   Vitals:    05/29/25 1200 05/29/25 1215 05/29/25 1222 05/29/25 1230   BP: (!) 174/79 (!) 176/81  (!) 164/77   Pulse: 81 80 81 78   Resp: 23 21 20 19   Temp:       TempSrc:       SpO2: 100% 99% 99% 98%   Weight:       Height:         FiO2 (%): room air  O2 Flow Rate: O2 Device: None (Room air)    Cardiac Rhythm: Cardiac Rhythm: Sinus rhythm  Pain Assessment: 5 [x] Verbal [] Jason Santana Scale  Pain Scale: Pain Assessment  Pain Assessment: 0-10  Pain Level: 10  Patient's Stated Pain Goal: 0 - No pain  Pain Location: Back  Pain Orientation: Upper  Pain Descriptors: Aching, Crushing  Pain Type: Chronic pain, Acute pain  Pain Frequency: Continuous  Last documented pain score (0-10 scale) Pain Level: 10  Last documented pain medication administered: none  Mental Status: oriented and alert  Orientation Level:    NIH Score:    C-SSRS: Risk of Suicide: No Risk  Bedside swallow:    Elizabeth Coma Scale (GCS): Elizabeth Coma Scale  Eye Opening: Spontaneous  Best Verbal Response: Oriented  Best Motor Response: Obeys commands  Elizabeth Coma Scale Score: 15  Active LDA's:   Peripheral IV

## 2025-05-29 NOTE — PROGRESS NOTES
[facility]    Shaukat Rashid MD Zafar Magsi MD Danial Rashid MD John O. Ranker MD Sembria Ligibel, CNP       Nephrology Progress Note     Patient : Steven L Severance 50 y.o.  male   Patient :  1975  MRN: [id number]   Requesting Physician: Goldberger, Erica B, MD  PCP: Smith Andres MD   Date of Service: 25     Chief Complaint      had concerns including Hypertension, Nausea, Vomiting, and Headache.    Reason for Consult   50-year-old male with a past medical history significant for ESRD on maintenance hemodialysis, type 1 diabetes mellitus, hypertension, prior CVA, and GERD presented to the emergency department from a nursing home with complaints of nausea, vomiting, and headache. The patient had a recent hospitalization for hypoglycemia and was discharged on 2025. He receives dialysis on Monday, Wednesday, and Friday, with the last session completed without difficulty on Wednesday (2025), one day prior to presentation. Today, the patient experienced an episode of coffee-ground emesis prompting EMS transfer. Labs in the ED revealed hyponatremia (Na 127), hyperkalemia (K 5.4), elevated BUN (37), and creatinine (7.0), with a calculated GFR of 9. GI consultation was initiated for presumed upper GI bleed. Nephrology was consulted for Maintenance hemodialysis    Assessment:  End stage renal disease.  Hyponatremia.  Mild hyperkalemia.  Hyperphosphatemia.  Hypertension.    Plan:  Assuming the patient will be NPO and we probably can not use interventions like Lokelma we will proceed with 2 hours of hemodialysis today   NEDA Q weekly   GI bleeding management per your plan   We will resume antihypertensive medication as able   Hemodialysis diet  Renal chemistry panel monitoring in a.m..    Thank you for the consultation. Please do not hesitate to contact us for any further questions/concerns. We will continue to follow along with you.     Subjective    Lack of Transportation (Medical): No     Lack of Transportation (Non-Medical): No   Physical Activity: Inactive (5/25/2024)    [facility]    Exercise Vital Sign     Days of Exercise per Week: 0 days     Minutes of Exercise per Session: 0 min   Stress: No Stress Concern Present (5/25/2024)    [facility]    [employer] [place] [employer] - Occupational Stress Questionnaire     Feeling of Stress : Not at all   Social Connections: Socially Isolated (5/25/2024)    Received from [facility]    Social Connection and Isolation Panel [NHANES]     Frequency of Communication with Friends and Family: Never     Frequency of Social Gatherings with Friends and Family: More than three times a week     Attends [employer] Services: Never     Active Member of Clubs or Organizations: No     Attends Club or Organization Meetings: Never     Marital Status: Never    Intimate Partner Violence: Unknown (2/22/2024)    Received from [facility]    [place] Safety & Environment     Fear of Current or Ex-Partner: Not on file     Emotionally Abused: Not on file     Physically Abused: Not on file     Sexually Abused: Not on file     Physically or Sexually Abused: Not on file   Housing Stability: Low Risk  (5/27/2025)    Housing Stability Vital Sign     Unable to Pay for Housing in the Last Year: No     Number of Times Moved in the Last Year: 0     Homeless in the Last Year: No   Recent Concern: Housing Stability - High Risk (5/12/2025)    Housing Stability Vital Sign     Unable to Pay for Housing in the Last Year: No     Number of Times Moved in the Last Year: 3     Homeless in the Last Year: No       Family History   Problem Relation Age of Onset    Diabetes Mother     Heart Disease Father     Diabetes Father     Neuropathy Maternal Grandmother     Neuropathy Maternal Grandfather      05/28/2025    GLUCOSE 167 (H) 05/27/2025     CMP:   Lab Results   Component Value Date/Time     05/29/2025 10:02 AM    K 5.4 05/29/2025 10:02 AM    CL 88 05/29/2025 10:02 AM    CO2 24 05/29/2025 10:02 AM    BUN 37 05/29/2025 10:02 AM    CREATININE 7.0 05/29/2025 10:02 AM    GLUCOSE 169 05/29/2025 10:02 AM    CALCIUM 9.7 05/29/2025 10:02 AM    BILITOT 0.6 05/29/2025 10:02 AM    ALKPHOS 121 05/29/2025 10:02 AM    AST 23 05/29/2025 10:02 AM    ALT 24 05/29/2025 10:02 AM      Hepatic:   Lab Results   Component Value Date    AST 23 05/29/2025    AST 19 05/12/2025    AST 22 06/14/2024    ALT 24 05/29/2025    ALT 40 05/12/2025    ALT 27 06/14/2024    BILITOT 0.6 05/29/2025    BILITOT 0.6 05/12/2025    BILITOT 0.4 06/14/2024    ALKPHOS 121 05/29/2025    ALKPHOS 124 05/12/2025    ALKPHOS 105 06/14/2024     BNP: No results found for: \"BNP\"  Lipids:   Lab Results   Component Value Date    CHOL 105 05/24/2024    HDL 55 05/24/2024     INR:   Lab Results   Component Value Date    INR 1.0 05/28/2025    INR 1.0 06/14/2024    INR 1.0 05/25/2024     PTH: No results found for: \"PTH\"  Phosphorus:    Lab Results   Component Value Date/Time    PHOS 2.3 05/25/2024 06:08 AM     Ionized Calcium: No components found for: \"IONCA\"  Magnesium:   Lab Results   Component Value Date/Time    MG 1.9 05/25/2024 06:08 AM     Albumin: No results found for: \"LABALBU\"  Last 3 CK, CKMB, Troponin: @LABRCNT(CKTOTAL:3,CKMB:3,TROPONINI:3)       URINE:)No results found for: \"NAUR\", \"PROTUR\"    Radiology:   Reviewed.    Electronically signed by Sujit Rubio MD  on 5/29/2025 at 1:32 PM  [facility].  Ph: [phone]

## 2025-05-29 NOTE — PROGRESS NOTES
According to GIs note at 1:30pm patient had order for protonix 40mg IV every 5 hours. NP Elliott Yu discontinued order and ordered a drip. Order was discontinued and put on protonix 40mg every 12 hours. Writer messaged GI. Per Dr. Vince Reed to order Protonix drip 80mg, 8mg/hr. Writer put in order at this time.

## 2025-05-29 NOTE — PROGRESS NOTES
HEMODIALYSIS POST TREATMENT NOTE    Treatment time ordered: 2 hrs    Actual treatment time: 2 hrs    UltraFiltration Goal: 0  UltraFiltration Removed: 200ml      Pre Treatment weight: 8.kg  Post Treatment weight: 83kg  Estimated Dry Weight: 84.5kg    Access used:     Central Venous Catheter:          Tunneled or Non-tunneled:            Site:           Access Flow:       Internal Access:       AV Fistula or AV Graft: AVF         Site: UPPER LEFT ARM       Access Flow: good  as ordered       Sign and symptoms of infection: no       If YES: na    Medications or blood products given: na    Chronic outpatient schedule: [facility]    Chronic outpatient unit: [facility]    Summary of response to treatmentPAtient treatment completed, blood returned and bleed time less than 10 minutes, bruit and thrill present. a total of 700ml removed including rinse back .     Explain if orders NOT met, was physician notified:valarie    ACES flowsheet faxed to patient unit/ placed in patient chart: yes    Post assessment completed: yes    Report given to: sergei ARRIAZA      * Intra-treatment documented Safety Checks include the followin) Access and face visible at all times.     2) All connections and blood lines are secure with no kinks.     3) NVL alarm engaged.     4) Hemosafe device applied (if applicable).     5) No collapse of Arterial or Venous blood chambers.     6) All blood lines / pump segments in the air detectors.

## 2025-05-29 NOTE — ED PROVIDER NOTES
Former[facility] EMERGENCY DEPARTMENT  eMERGENCY dEPARTMENT eNCOUnter      Pt Name: Steven L Severance  MRN: [id number]  Birthdate 1975  Date of evaluation: 5/29/2025  Provider: Gisele Bardales PA-C    CHIEF COMPLAINT       Chief Complaint   Patient presents with    Hypertension    Nausea    Vomiting    Headache         HISTORY OF PRESENT ILLNESS  (Location/Symptom, Timing/Onset, Context/Setting, Quality, Duration, Modifying Factors, Severity.)   Steven L Severance is a 50 y.o. male who presents to the emergency department.  50-year-old male brought in by EMS from nursing home with a history of type 1 diabetes, CKD, currently on dialysis, history of stroke, and hypertension presents with vomiting \"coffee grounds\" and high blood since yesterday.  Patient states the nurse at the nursing home was concerned and called an ambulance today that brought him here for  coffee-ground emesis.  Patient receives dialysis on Monday, Wednesday, and Friday.  Patient was able to get dialysis on Wednesday.  Patient was recently admitted to the hospital for hypoglycemia, and released on 5/27/2025 to the nursing home.  Patient noted headache in triage but did not report a headache when being evaluated.  Denies chest pain, shortness of breath, visual changes, fever, chills, trouble swallowing, pain with urination.  Patient rates the pain a 10/10.      Nursing Notes were reviewed.    ALLERGIES     Patient has no known allergies.    CURRENT MEDICATIONS       Previous Medications    ACETAMINOPHEN (TYLENOL) 500 MG TABLET    Take 1 tablet by mouth every 6 hours as needed for Pain    AMLODIPINE (NORVASC) 10 MG TABLET    Take 1 tablet by mouth daily    BUPROPION (WELLBUTRIN XL) 150 MG EXTENDED RELEASE TABLET    Take 1 tablet by mouth every morning    CALCIUM ACETATE (PHOSLO) 667 MG CAPS CAPSULE    Take 2 capsules by mouth 3 times daily    CARVEDILOL (COREG) 12.5 MG TABLET    Take 1 tablet by mouth 2 times daily (with meals)     I21.4    Nonrheumatic mitral valve regurgitation I34.0    Metabolic acidosis E87.20    NSTEMI (non-ST elevated myocardial infarction) ([facility]) I21.4    Hypokalemia E87.6    Hypomagnesemia E83.42    Hx of type 2 diabetes mellitus Z86.39    Hx of arterial ischemic stroke Z86.73    CVA (cerebral vascular accident) ([facility]) I63.9    Speech disturbance R47.9    ESRD needing dialysis ([facility]) N18.6, Z99.2    Hypoglycemic insulin reaction in type 1 diabetes mellitus ([facility]) E10.649         DISPOSITION/PLAN   DISPOSITION Decision To Admit 05/29/2025 01:00:08 PM   DISPOSITION CONDITION Stable           PATIENT REFERRED TO:   No follow-up provider specified.    DISCHARGE MEDICATIONS:     New Prescriptions    No medications on file           (Please note that portions of this note were completed with a voice recognition program.  Efforts were made to edit the dictations but occasionally words are mis-transcribed.)    ZULEIKA King Hannah, PA-C  05/29/25 1501

## 2025-05-29 NOTE — CONSULTS
GASTROENTEROLOGY CONSULT       REASON FOR CONSULT:  coffee ground emesis    REQUESTING PHYSICIAN:  Goldberger, Erica B, MD    HISTORY OF PRESENT ILLNESS:    The patient is a 50 y.o. male who presents from [facility] with development of n/v this am and coffee ground emesis, also seen in ED, I can see dried blood on his t-shirt. He is hemodynamically stable, does complain of some recent sensation in his esophagus which is new. Patient has not had a bowel movement since CGE started. Denies abdominal pain.   Patient with ESRD MWF, going for tx due to correct lytes, patients feels better being here in ED. No longer vomiting. Last EGD was August of 2022 by Dr. Richard. Also had colonoscopy at that time. Both done for TARA. Patient maintained on antiplatelet agents, but not anticoagulation.     Anesthesia: MAC  PREOPERATIVE DIAGNOSIS:   Anemia  Iron deficiency     Diagnosis:  Irregular Z-line biopsies were taken to rule out Eden     In the cardia there is a second gastric fold, biopsies were taken gently     Gastric polyps we sampled one of the most likely fundic gland polyps there in the body and the fundus they are all less than 1 cm in size     Gastritis biopsies were taken     Small bowel biopsies were taken      MEDICAL HISTORY:   Past Medical History:   Diagnosis Date    Cerebral artery occlusion with cerebral infarction (HCC)     Closed fracture of bone of right foot March - April 2020    Dialysis patient     Hemodialysis patient     Hyperlipidemia     Hypertension     Kidney disease     On Dialysis    Type 1 diabetes mellitus (HCC)        SURGICAL HISTORY:  Past Surgical History:   Procedure Laterality Date    AV FISTULA CREATION Left     COLONOSCOPY N/A 8/3/2022    COLONOSCOPY DIAGNOSTIC performed by Rose Mary Richard MD at [facility] OR    UPPER GASTROINTESTINAL ENDOSCOPY N/A 8/3/2022    EGD ESOPHAGOGASTRODUODENOSCOPY performed by Rose Mary Richard MD at [facility] OR    WRIST SURGERY  1995       MEDS:  Prior to Admission  medications    Medication Sig Start Date End Date Taking? Authorizing Provider   insulin glargine (LANTUS SOLOSTAR) 100 UNIT/ML injection pen Inject 20 Units into the skin daily 5/28/25 6/27/25  Esdras Emmanuel DO   amLODIPine (NORVASC) 10 MG tablet Take 1 tablet by mouth daily 5/29/25   Esdras Emmanuel DO   lactulose (CHRONULAC) 10 GM/15ML solution Take 30 mLs by mouth 3 times daily 5/28/25   Esdras Emmanuel DO   polyethylene glycol (GLYCOLAX) 17 g packet Take 1 packet by mouth 2 times daily 5/28/25 6/27/25  Esdras Emmanuel DO   rosuvastatin (CRESTOR) 10 MG tablet Take 1 tablet by mouth every evening 5/28/25   Esdras Emmanuel DO   sennosides-docusate sodium (SENOKOT-S) 8.6-50 MG tablet Take 4 tablets by mouth daily as needed for Constipation 5/13/25   Zachary Yu APRN - NP   metoclopramide (REGLAN) 10 MG tablet Take 1 tablet by mouth 3 times daily (with meals) 5/13/25   Zachary Yu APRN - NP   ondansetron (ZOFRAN) 8 MG tablet Take 1 tablet by mouth every 12 hours as needed for Nausea or Vomiting    Pete Rojas MD   loratadine (CLARITIN) 10 MG tablet Take 1 tablet by mouth daily    Pete Rojas MD   acetaminophen (TYLENOL) 500 MG tablet Take 1 tablet by mouth every 6 hours as needed for Pain    Pete Rojas MD   carvedilol (COREG) 12.5 MG tablet Take 1 tablet by mouth 2 times daily (with meals)    Pete Rojas MD   Multiple Vitamin (DAILY JARET) TABS Take 1 tablet by mouth daily    Pete Rojas MD   calcium acetate (PHOSLO) 667 MG CAPS capsule Take 2 capsules by mouth 3 times daily 5/17/24   Pete Rojas MD   traMADol (ULTRAM) 50 MG tablet  6/13/24   Pete Rojas MD   clopidogrel (PLAVIX) 75 MG tablet Take 1 tablet by mouth daily 5/26/24   Ayana Canseco APRN - NP   omeprazole (PRILOSEC) 40 MG delayed release capsule Take 1 capsule by mouth daily 7/7/23   Pete Rojas MD   furosemide (LASIX) 20 MG tablet Take 1 tablet by  OF SYSTEMS:  10 out of 14 systems otherwise negative according to patient.    PHYSICAL EXAM:    BP (!) 164/77   Pulse 78   Temp 99.1 °F (37.3 °C) (Oral)   Resp 19   Ht 1.702 m (5' 7\")   Wt 83 kg (182 lb 15.7 oz)   SpO2 98%   BMI 28.66 kg/m²     General:  Alert and oriented x 3.  No acute distress.       HEENT:  Oropharynx moist and pink without thrush, neck supple     Chest: no crackles no wheezes    Cardiovascular: s1s2, no s3.     Abdomen:  soft, NT, ND present bs    Skin/Musculoskeltal/Ext:  No jaundice. No clubbing, cyanosis trace BLE, right sided deficits No liver stigmata.      Lab and Imaging Review   Recent Labs     05/27/25  1550 05/27/25  1609 05/28/25  0509 05/29/25  1002   WBC 10.1  --   --  11.4*   HGB 13.1  --   --  12.6*   MCV 96.6  --   --  91.0     --   --  228   INR  --   --  1.0  --    NA  --  132* 136 127*   K  --  5.1 4.8 5.4*   CL  --  94* 96* 88*   CO2  --  23 24 24   BUN  --  37* 42* 37*   CREATININE  --  6.9* 8.0* 7.0*   GLUCOSE  --  167* 63* 169*   CALCIUM  --  9.6 9.8 9.7   AST  --   --   --  23   ALT  --   --   --  24   ALKPHOS  --   --   --  121   BILITOT  --   --   --  0.6   BILIDIR  --   --   --  0.2   LIPASE  --   --   --  10*     Recent Labs     05/28/25  0509   INR 1.0   PROTIME 13.9       IMPRESSION:  Chronically ill 49 yo male with recent admission for hypoglycemia, with hx ESRD, admitted to [facility] this am due to acute coffee ground emesis.   R/o esophagitis, Aspen Muhammad Tear, PUD.     Patient is hemodynamically stable.       RECOMMENDATIONS:   Clears today, NPO at [facility]  EGD Friday.   Protonix 40mg IV every 5 hours       Massiel Pérez CNP  Discussed with Dr. Reed

## 2025-05-29 NOTE — PROGRESS NOTES
Patient discharged to [facility] via [facility] transport. Discharge instructions given to transport team. All patient's belongings packed and taken with patient at time of discharge. Patient stable for discharge.

## 2025-05-29 NOTE — PROGRESS NOTES
Pharmacy Medication Review    The patient's list of current home medications has been reviewed.     PHYSICIANS AND NURSE PRACTITIONERS: please note there is no Transitions of Care/Med Rec Pharmacist available to address any discrepancies on inpatient orders. It is the responsibility of the attending provider to review the updates made to the home med list and adjust inpatient orders as appropriate.        Source(s) of information:Care Everywhere, Surescripts refill report, nursing facility: [facility] Medication list         Based on information provided by the above source(s), I have updated the patient's home med list as described below.       Removed   None      Added None      Adjusted   None      Other notes:   Patient was admitted to [facility] rehab on 5/28/25 and did not receive any medication. Patient was discharge on  5/28/25, he was instructed to start taking amLODIPine (NORVASC) 10 MG tablet  on 5/29/25.     Are any of the medications noted above considered a 'high alert' medication? no      Is the patient on warfarin at home? No          Please feel free to call me with any questions about this encounter. Thank you.      Nilton Terrazas, pharmacy technician  [facility]  Phone:  [phone]      Electronically signed by Nilton Terrazas on 5/29/2025 at 5:30 PM   Note: co-signature by the pharmacist only acknowledges that I have performed a medication review and does not attest to an evaluation of this medication review.    Prior to Admission medications    Medication Sig   insulin glargine (LANTUS SOLOSTAR) 100 UNIT/ML injection pen Inject 20 Units into the skin daily   amLODIPine (NORVASC) 10 MG tablet Take 1 tablet by mouth daily   lactulose (CHRONULAC) 10 GM/15ML solution Take 30 mLs by mouth 3 times daily   polyethylene glycol (GLYCOLAX) 17 g packet Take 1 packet by mouth 2 times daily   rosuvastatin (CRESTOR) 10 MG tablet Take 1 tablet by mouth every evening   sennosides-docusate  sodium (SENOKOT-S) 8.6-50 MG tablet Take 4 tablets by mouth daily as needed for Constipation   metoclopramide (REGLAN) 10 MG tablet Take 1 tablet by mouth 3 times daily (with meals)   ondansetron (ZOFRAN) 8 MG tablet Take 1 tablet by mouth every 12 hours as needed for Nausea or Vomiting   loratadine (CLARITIN) 10 MG tablet Take 1 tablet by mouth daily   acetaminophen (TYLENOL) 500 MG tablet Take 1 tablet by mouth every 6 hours as needed for Pain   carvedilol (COREG) 12.5 MG tablet Take 1 tablet by mouth 2 times daily (with meals)   Multiple Vitamin (DAILY JARET) TABS Take 1 tablet by mouth daily   calcium acetate (PHOSLO) 667 MG CAPS capsule Take 2 capsules by mouth 3 times daily   traMADol (ULTRAM) 50 MG tablet    clopidogrel (PLAVIX) 75 MG tablet Take 1 tablet by mouth daily   omeprazole (PRILOSEC) 40 MG delayed release capsule Take 1 capsule by mouth daily   furosemide (LASIX) 20 MG tablet Take 1 tablet by mouth in the morning.   Insulin Pen Needle (MEIJER PEN NEEDLES) 31G X 8 MM MISC 1 each by Does not apply route daily   pantoprazole (PROTONIX) 40 MG tablet Take 1 tablet by mouth every morning (before breakfast)   Misc. Devices MISC Disp: custom molded shoes to accommodate for brace   DX: DM with history of stroke, foot drop  Duration: 1 year   insulin aspart (NOVOLOG) 100 UNIT/ML injection vial Inject 0-8 Units into the skin 3 times daily (before meals) SLIDING SCALE   FLUoxetine (PROZAC) 20 MG tablet Take 1 tablet by mouth in the morning and at bedtime   buPROPion (WELLBUTRIN XL) 150 MG extended release tablet Take 1 tablet by mouth every morning   ezetimibe (ZETIA) 10 MG tablet Take 1 tablet by mouth daily   vitamin B-12 (CYANOCOBALAMIN) 1000 MCG tablet Take 1 tablet by mouth daily

## 2025-05-29 NOTE — PROGRESS NOTES
Pt admitted to room 2020 from ER  Patient alert and oriented x4  Oriented to room and call light/tv controls.  Bed in lowest position, wheels locked, 2/4 side rails up  Call light in reach, room free of clutter, adequate lighting provided.

## 2025-05-29 NOTE — ED PROVIDER NOTES
EMERGENCY DEPARTMENT ENCOUNTER   ATTENDING ATTESTATION     Pt Name: Steven L Severance  MRN: [id number]  Birthdate 1975  Date of evaluation: 5/29/25   Steven L Severance is a 50 y.o. male with CC: Hypertension, Nausea, Vomiting, and Headache    MDM:   I performed a substantive part of the MDM during the patient's E/M visit. I personally evaluated and examined the patient. I personally made or approved the documented management plan and acknowledge its risk of complications.      ED Course as of 05/29/25 1849   Thu May 29, 2025   1215 Patient reports he is now experiencing back pain as well.  Will give a dose of morphine. [HW]      ED Course User Index  [HW] Gisele Bardales PA-C       CRITICAL CARE:       EKG: All EKG's are interpreted by the Emergency Department Physician who either signs or Co-signs this chart in the absence of a cardiologist.    Sinus rhythm ventricular rate 76  Incomplete left bundle branch block  Nonspecific ST depression V5 V6  T wave inversion lead III nonspecific    QTc 510    RADIOLOGY:All plain film, CT, MRI, and formal ultrasound images (except ED bedside ultrasound) are read by the radiologist, see reports below, unless otherwise noted in MDM or here.  CT ABDOMEN PELVIS WO CONTRAST Additional Contrast? None   Final Result   1. Tiny hiatal hernia with increased circumferential thickening of the distal   esophagus, which may represent esophagitis.   2. Otherwise, no acute abnormality in the abdomen or pelvis.         XR CHEST PORTABLE   Final Result   No acute cardiopulmonary process.           LABS: All lab results were reviewed by myself, and all abnormals are listed below.  Labs Reviewed   CBC WITH AUTO DIFFERENTIAL - Abnormal; Notable for the following components:       Result Value    WBC 11.4 (*)     RBC 4.02 (*)     Hemoglobin 12.6 (*)     Hematocrit 36.6 (*)     Neutrophils % 75 (*)     Lymphocytes % 11 (*)     Monocytes % 13 (*)     Eosinophils % 0 (*)     Neutrophils

## 2025-05-29 NOTE — H&P
[facility]  Office: [phone]  Wander Gresham DO, Ok Antoine, DO, Delmar Morgan DO, Esdras Emmanuel DO, Jelly Carrasco MD, Liliana Alvarez MD, Anastasiya Musa MD, Kyleigh Fine MD,  Brendon Jason MD, Madi Cooper MD, Shelley Zuniga MD,  Gloria Flores DO, Carla Hubbard MD, Rey Su MD, Adalid Gresham DO, Leticia Hansen MD,  Osbaldo Segovia DO, Ayleen Morales MD, Rossi Mitchell MD, Jaylen Douglas MD,  Frantz Elam MD, Qasim Gutierrez MD, Nabil Armendariz MD, Flako Ponce MD, Stephen Corbett MD, Marge Jaime MD, Zachary Blanco, DO, Mariluz Larsen MD, Margaret De Oliveira DO, Kerwin Burnette MD, Gloria Llamas MD, Mohsin Reza, MD, Melisa Siu MD, Shirley Waterhouse, CNP,  Cynthia Pickett, CNP, Zachary Yu, CNP,  Lou Quinteros, VASILE, Ayana Canseco, CNP, Kathia Maria, CNP, Chani Romero, CNP, Tiffanie Valera, CNP, Jovita Colon, PA-C, Elba Jay, CNP, Huma Anguiano, CNP,  Sejal Galvez, CNP, Bethanie Taylor, CNP, Nicanor Clayton, PA-C, Radha Vines, CNP,  Nunu Zacarias, CNS, Cehrri Pérez, CNP, Libia Richardson, CNP,   Ludy Gee, CNP         [facility]   IN-PATIENT SERVICE   [facility]    HISTORY AND PHYSICAL EXAMINATION            Date:   5/29/2025  Patient name:  Steven L Severance  Date of admission:  5/29/2025  9:43 AM  MRN:   [id number]  Account:  [id number]  YOB: 1975  PCP:    Smith Andres MD  Room:   [place] [address]  Code Status:    Prior    Chief Complaint:     Chief Complaint   Patient presents with    Hypertension    Nausea    Vomiting    Headache       History Obtained From:     patient    History of Present Illness:     Steven L Severance is a 50 y.o. Non- / non  male who presents with Hypertension, Nausea, Vomiting, and Headache   and is admitted to the hospital for the management of Hematemesis.    Was discharged from our facility 24 hours ago for overnight observation due to hypoglycemia from not eating after taking his  insulin.  Patient advised he has a long history of gastroparesis, poor appetite, and slow transit chronic constipation.  He advised that shortly after his discharge he developed nausea with vomiting.  He was discharged to acute rehab where today he continued to have vomiting which has developed into coffee-ground emesis.  He was sent to the emergency department for further evaluation.  Hemoglobin stable, GI has evaluated the patient and recommended PPI drip and plans for EGD tomorrow.    Past Medical History:     Past Medical History:   Diagnosis Date    Cerebral artery occlusion with cerebral infarction (HCC)     Closed fracture of bone of right foot March - April 2020    Dialysis patient     Hemodialysis patient     Hyperlipidemia     Hypertension     Kidney disease     On Dialysis    Type 1 diabetes mellitus (HCC)         Past Surgical History:     Past Surgical History:   Procedure Laterality Date    AV FISTULA CREATION Left     COLONOSCOPY N/A 8/3/2022    COLONOSCOPY DIAGNOSTIC performed by Rose Mary Richard MD at [facility] OR    UPPER GASTROINTESTINAL ENDOSCOPY N/A 8/3/2022    EGD ESOPHAGOGASTRODUODENOSCOPY performed by Rose Mary Richard MD at [facility] OR    WRIST SURGERY  1995        Medications Prior to Admission:     Prior to Admission medications    Medication Sig Start Date End Date Taking? Authorizing Provider   insulin glargine (LANTUS SOLOSTAR) 100 UNIT/ML injection pen Inject 20 Units into the skin daily 5/28/25 6/27/25  Esdras Emmanuel, DO   amLODIPine (NORVASC) 10 MG tablet Take 1 tablet by mouth daily 5/29/25   Esdras Emmanuel, DO   lactulose (CHRONULAC) 10 GM/15ML solution Take 30 mLs by mouth 3 times daily 5/28/25   Esdras Emmanuel, DO   polyethylene glycol (GLYCOLAX) 17 g packet Take 1 packet by mouth 2 times daily 5/28/25 6/27/25  Esdras Emmanuel, DO   rosuvastatin (CRESTOR) 10 MG tablet Take 1 tablet by mouth every evening 5/28/25   Esdras Emmanuel, DO   sennosides-docusate sodium (SENOKOT-S) 8.6-50 MG  Hemoglobin 12.6 (L) 13.0 - 17.0 g/dL    Hematocrit 36.6 (L) 40.7 - 50.3 %    MCV 91.0 82.6 - 102.9 fL    MCH 31.3 25.2 - 33.5 pg    MCHC 34.4 28.4 - 34.8 g/dL    RDW 12.5 11.8 - 14.4 %    Platelets 228 138 - 453 k/uL    MPV 9.5 8.1 - 13.5 fL    NRBC Automated 0.0 0.0 per 100 WBC    Neutrophils % 75 (H) 36 - 65 %    Lymphocytes % 11 (L) 24 - 43 %    Monocytes % 13 (H) 3 - 12 %    Eosinophils % 0 (L) 1 - 4 %    Basophils % 0 0 - 2 %    Immature Granulocytes % 0 0 %    Neutrophils Absolute 8.61 (H) 1.50 - 8.10 k/uL    Lymphocytes Absolute 1.30 1.10 - 3.70 k/uL    Monocytes Absolute 1.43 (H) 0.10 - 1.20 k/uL    Eosinophils Absolute <0.03 0.00 - 0.44 k/uL    Basophils Absolute <0.03 0.00 - 0.20 k/uL    Immature Granulocytes Absolute 0.04 0.00 - 0.30 k/uL   BMP    Collection Time: 05/29/25 10:02 AM   Result Value Ref Range    Sodium 127 (L) 136 - 145 mmol/L    Potassium 5.4 (H) 3.7 - 5.3 mmol/L    Chloride 88 (L) 98 - 107 mmol/L    CO2 24 20 - 31 mmol/L    Anion Gap 15 9 - 16 mmol/L    Glucose 169 (H) 74 - 99 mg/dL    BUN 37 (H) 6 - 20 mg/dL    Creatinine 7.0 (HH) 0.70 - 1.20 mg/dL    Est, Glom Filt Rate 9 (L) >60 mL/min/1.73m2    Calcium 9.7 8.6 - 10.4 mg/dL   Lipase    Collection Time: 05/29/25 10:02 AM   Result Value Ref Range    Lipase 10 (L) 13 - 60 U/L   Hepatic Function Panel    Collection Time: 05/29/25 10:02 AM   Result Value Ref Range    Albumin 3.6 3.5 - 5.2 g/dL    Alkaline Phosphatase 121 40 - 129 U/L    ALT 24 10 - 50 U/L    AST 23 10 - 50 U/L    Total Bilirubin 0.6 0.00 - 1.20 mg/dL    Bilirubin, Direct 0.2 0.00 - 0.20 mg/dL    Bilirubin, Indirect 0.3 mg/dL    Total Protein 6.3 (L) 6.6 - 8.7 g/dL    Albumin/Globulin Ratio 1.4 1.0 - 2.5       Imaging/Diagnostics:  CT ABDOMEN PELVIS WO CONTRAST Additional Contrast? None  Result Date: 5/29/2025  1. Tiny hiatal hernia with increased circumferential thickening of the distal esophagus, which may represent esophagitis. 2. Otherwise, no acute abnormality in the

## 2025-05-30 ENCOUNTER — ANESTHESIA EVENT (OUTPATIENT)
Dept: OPERATING ROOM | Age: 50
End: 2025-05-30
Payer: MEDICARE

## 2025-05-30 ENCOUNTER — ANESTHESIA (OUTPATIENT)
Dept: OPERATING ROOM | Age: 50
End: 2025-05-30
Payer: MEDICARE

## 2025-05-30 LAB
ANION GAP SERPL CALCULATED.3IONS-SCNC: 16 MMOL/L (ref 9–16)
BUN SERPL-MCNC: 31 MG/DL (ref 6–20)
CALCIUM SERPL-MCNC: 9.2 MG/DL (ref 8.6–10.4)
CHLORIDE SERPL-SCNC: 92 MMOL/L (ref 98–107)
CO2 SERPL-SCNC: 24 MMOL/L (ref 20–31)
CREAT SERPL-MCNC: 6.1 MG/DL (ref 0.7–1.2)
EKG ATRIAL RATE: 76 BPM
EKG P AXIS: 43 DEGREES
EKG P-R INTERVAL: 168 MS
EKG Q-T INTERVAL: 454 MS
EKG QRS DURATION: 112 MS
EKG QTC CALCULATION (BAZETT): 510 MS
EKG R AXIS: -27 DEGREES
EKG T AXIS: -5 DEGREES
EKG VENTRICULAR RATE: 76 BPM
GFR, ESTIMATED: 10 ML/MIN/1.73M2
GLUCOSE BLD-MCNC: 137 MG/DL (ref 75–110)
GLUCOSE BLD-MCNC: 153 MG/DL (ref 75–110)
GLUCOSE BLD-MCNC: 223 MG/DL (ref 75–110)
GLUCOSE BLD-MCNC: 62 MG/DL (ref 75–110)
GLUCOSE BLD-MCNC: 73 MG/DL (ref 75–110)
GLUCOSE BLD-MCNC: 75 MG/DL (ref 75–110)
GLUCOSE BLD-MCNC: 83 MG/DL (ref 75–110)
GLUCOSE BLD-MCNC: 85 MG/DL (ref 75–110)
GLUCOSE BLD-MCNC: 86 MG/DL (ref 75–110)
GLUCOSE BLD-MCNC: 99 MG/DL (ref 75–110)
GLUCOSE SERPL-MCNC: 64 MG/DL (ref 74–99)
HCT VFR BLD AUTO: 34.5 % (ref 40.7–50.3)
HCT VFR BLD AUTO: 34.6 % (ref 40.7–50.3)
HCT VFR BLD AUTO: 35.5 % (ref 40.7–50.3)
HCT VFR BLD AUTO: 38.9 % (ref 40.7–50.3)
HGB BLD-MCNC: 11.6 G/DL (ref 13–17)
HGB BLD-MCNC: 11.8 G/DL (ref 13–17)
HGB BLD-MCNC: 12.2 G/DL (ref 13–17)
HGB BLD-MCNC: 13 G/DL (ref 13–17)
INR PPP: 1
PARTIAL THROMBOPLASTIN TIME: 30 SEC (ref 23.9–33.8)
POTASSIUM SERPL-SCNC: 4.9 MMOL/L (ref 3.7–5.3)
PROTHROMBIN TIME: 13.2 SEC (ref 11.5–14.2)
SODIUM SERPL-SCNC: 133 MMOL/L (ref 136–145)

## 2025-05-30 PROCEDURE — G0378 HOSPITAL OBSERVATION PER HR: HCPCS

## 2025-05-30 PROCEDURE — 90935 HEMODIALYSIS ONE EVALUATION: CPT

## 2025-05-30 PROCEDURE — 2580000003 HC RX 258: Performed by: NURSE ANESTHETIST, CERTIFIED REGISTERED

## 2025-05-30 PROCEDURE — 6370000000 HC RX 637 (ALT 250 FOR IP): Performed by: INTERNAL MEDICINE

## 2025-05-30 PROCEDURE — 82947 ASSAY GLUCOSE BLOOD QUANT: CPT

## 2025-05-30 PROCEDURE — 3700000000 HC ANESTHESIA ATTENDED CARE: Performed by: INTERNAL MEDICINE

## 2025-05-30 PROCEDURE — 85018 HEMOGLOBIN: CPT

## 2025-05-30 PROCEDURE — 2500000003 HC RX 250 WO HCPCS: Performed by: NURSE PRACTITIONER

## 2025-05-30 PROCEDURE — 80048 BASIC METABOLIC PNL TOTAL CA: CPT

## 2025-05-30 PROCEDURE — 6370000000 HC RX 637 (ALT 250 FOR IP): Performed by: NURSE PRACTITIONER

## 2025-05-30 PROCEDURE — 6360000002 HC RX W HCPCS: Performed by: ANESTHESIOLOGY

## 2025-05-30 PROCEDURE — 36415 COLL VENOUS BLD VENIPUNCTURE: CPT

## 2025-05-30 PROCEDURE — 2580000003 HC RX 258: Performed by: NURSE PRACTITIONER

## 2025-05-30 PROCEDURE — 96365 THER/PROPH/DIAG IV INF INIT: CPT

## 2025-05-30 PROCEDURE — 96366 THER/PROPH/DIAG IV INF ADDON: CPT

## 2025-05-30 PROCEDURE — 7100000000 HC PACU RECOVERY - FIRST 15 MIN: Performed by: INTERNAL MEDICINE

## 2025-05-30 PROCEDURE — 85014 HEMATOCRIT: CPT

## 2025-05-30 PROCEDURE — 3609012400 HC EGD TRANSORAL BIOPSY SINGLE/MULTIPLE: Performed by: INTERNAL MEDICINE

## 2025-05-30 PROCEDURE — 3700000001 HC ADD 15 MINUTES (ANESTHESIA): Performed by: INTERNAL MEDICINE

## 2025-05-30 PROCEDURE — 2709999900 HC NON-CHARGEABLE SUPPLY: Performed by: INTERNAL MEDICINE

## 2025-05-30 PROCEDURE — 7100000001 HC PACU RECOVERY - ADDTL 15 MIN: Performed by: INTERNAL MEDICINE

## 2025-05-30 PROCEDURE — 2500000003 HC RX 250 WO HCPCS: Performed by: INTERNAL MEDICINE

## 2025-05-30 PROCEDURE — 99232 SBSQ HOSP IP/OBS MODERATE 35: CPT | Performed by: NURSE PRACTITIONER

## 2025-05-30 PROCEDURE — 85610 PROTHROMBIN TIME: CPT

## 2025-05-30 PROCEDURE — 88305 TISSUE EXAM BY PATHOLOGIST: CPT

## 2025-05-30 PROCEDURE — 0DB68ZX EXCISION OF STOMACH, VIA NATURAL OR ARTIFICIAL OPENING ENDOSCOPIC, DIAGNOSTIC: ICD-10-PCS | Performed by: INTERNAL MEDICINE

## 2025-05-30 PROCEDURE — 85730 THROMBOPLASTIN TIME PARTIAL: CPT

## 2025-05-30 RX ORDER — NALOXONE HYDROCHLORIDE 0.4 MG/ML
INJECTION, SOLUTION INTRAMUSCULAR; INTRAVENOUS; SUBCUTANEOUS PRN
Status: CANCELLED | OUTPATIENT
Start: 2025-05-30

## 2025-05-30 RX ORDER — LIDOCAINE HYDROCHLORIDE 20 MG/ML
INJECTION, SOLUTION EPIDURAL; INFILTRATION; INTRACAUDAL; PERINEURAL
Status: DISCONTINUED | OUTPATIENT
Start: 2025-05-30 | End: 2025-05-30 | Stop reason: SDUPTHER

## 2025-05-30 RX ORDER — PANTOPRAZOLE SODIUM 40 MG/1
40 TABLET, DELAYED RELEASE ORAL
Status: DISCONTINUED | OUTPATIENT
Start: 2025-05-31 | End: 2025-05-31 | Stop reason: HOSPADM

## 2025-05-30 RX ORDER — PROPOFOL 10 MG/ML
INJECTION, EMULSION INTRAVENOUS
Status: DISCONTINUED | OUTPATIENT
Start: 2025-05-30 | End: 2025-05-30 | Stop reason: SDUPTHER

## 2025-05-30 RX ORDER — SODIUM CHLORIDE 9 MG/ML
INJECTION, SOLUTION INTRAVENOUS
Status: DISCONTINUED | OUTPATIENT
Start: 2025-05-30 | End: 2025-05-30 | Stop reason: SDUPTHER

## 2025-05-30 RX ORDER — SODIUM CHLORIDE 9 MG/ML
INJECTION, SOLUTION INTRAVENOUS PRN
Status: CANCELLED | OUTPATIENT
Start: 2025-05-30

## 2025-05-30 RX ORDER — ONDANSETRON 2 MG/ML
4 INJECTION INTRAMUSCULAR; INTRAVENOUS
Status: CANCELLED | OUTPATIENT
Start: 2025-05-30

## 2025-05-30 RX ORDER — HYDRALAZINE HYDROCHLORIDE 25 MG/1
25 TABLET, FILM COATED ORAL ONCE
Status: COMPLETED | OUTPATIENT
Start: 2025-05-30 | End: 2025-05-30

## 2025-05-30 RX ORDER — SODIUM CHLORIDE 0.9 % (FLUSH) 0.9 %
5-40 SYRINGE (ML) INJECTION EVERY 12 HOURS SCHEDULED
Status: CANCELLED | OUTPATIENT
Start: 2025-05-30

## 2025-05-30 RX ORDER — SODIUM CHLORIDE 0.9 % (FLUSH) 0.9 %
5-40 SYRINGE (ML) INJECTION PRN
Status: CANCELLED | OUTPATIENT
Start: 2025-05-30

## 2025-05-30 RX ADMIN — HYDRALAZINE HYDROCHLORIDE 25 MG: 25 TABLET ORAL at 02:27

## 2025-05-30 RX ADMIN — FLUOXETINE HYDROCHLORIDE 20 MG: 20 CAPSULE ORAL at 20:51

## 2025-05-30 RX ADMIN — PROPOFOL 75 MG: 10 INJECTION, EMULSION INTRAVENOUS at 15:35

## 2025-05-30 RX ADMIN — METOCLOPRAMIDE 10 MG: 10 TABLET ORAL at 17:16

## 2025-05-30 RX ADMIN — SODIUM CHLORIDE, PRESERVATIVE FREE 10 ML: 5 INJECTION INTRAVENOUS at 13:36

## 2025-05-30 RX ADMIN — SODIUM CHLORIDE, PRESERVATIVE FREE 10 ML: 5 INJECTION INTRAVENOUS at 20:51

## 2025-05-30 RX ADMIN — LIDOCAINE HYDROCHLORIDE 100 MG: 20 INJECTION, SOLUTION EPIDURAL; INFILTRATION; INTRACAUDAL; PERINEURAL at 15:35

## 2025-05-30 RX ADMIN — ROSUVASTATIN CALCIUM 10 MG: 10 TABLET, FILM COATED ORAL at 17:16

## 2025-05-30 RX ADMIN — SODIUM CHLORIDE: 9 INJECTION, SOLUTION INTRAVENOUS at 15:27

## 2025-05-30 RX ADMIN — Medication 3 MG: at 20:51

## 2025-05-30 RX ADMIN — PROPOFOL 25 MG: 10 INJECTION, EMULSION INTRAVENOUS at 15:37

## 2025-05-30 RX ADMIN — CARVEDILOL 12.5 MG: 12.5 TABLET, FILM COATED ORAL at 17:15

## 2025-05-30 RX ADMIN — BUPROPION HYDROCHLORIDE 150 MG: 150 TABLET, EXTENDED RELEASE ORAL at 17:15

## 2025-05-30 RX ADMIN — DEXTROSE MONOHYDRATE 125 ML: 100 INJECTION, SOLUTION INTRAVENOUS at 08:24

## 2025-05-30 RX ADMIN — EZETIMIBE 10 MG: 10 TABLET ORAL at 17:16

## 2025-05-30 ASSESSMENT — ENCOUNTER SYMPTOMS
CONSTIPATION: 0
ABDOMINAL PAIN: 1
PHOTOPHOBIA: 0
VOMITING: 1
SINUS PRESSURE: 0
DIARRHEA: 0
WHEEZING: 0
COUGH: 0
NAUSEA: 1
SINUS PAIN: 0
SHORTNESS OF BREATH: 0

## 2025-05-30 NOTE — PROGRESS NOTES
Occupational Therapy  DATE: 2025    NAME: Steven L Severance  MRN: [id number]   : 1975    Patient not seen this date for Occupational Therapy due to:      [] Cancel by RN or physician due to:    [x] Hemodialysis: Off unit for dialysis. OT will continue to follow/ck back as time allows.     [] Critical Lab Value Level     [] Blood transfusion in progress    [] Acute or unstable cardiovascular status   _MAP < 55 or more than >115  _HR < 40 or > 130    [] Acute or unstable pulmonary status   -FiO2 > 60%   _RR < 5 or >40    _O2 sats < 85%    [] Strict Bedrest    [] Off Unit for surgery or procedure    [] Off Unit for testing       [] Pending imaging to R/O fracture    [] Refusal by Patient      [] Other      [] OT being discontinued at this time. Patient independent. No further needs.     [] OT being discontinued at this time as the patient has been transferred to hospice care. No further needs.      Katie Robledo, OT

## 2025-05-30 NOTE — PROGRESS NOTES
The patient was transferred to the OR in patient bed at this time; Grandfather at bedside.    Skyrizi Pregnancy And Lactation Text: The risk during pregnancy and breastfeeding is uncertain with this medication.

## 2025-05-30 NOTE — PROGRESS NOTES
Physical Therapy  DATE: 2025    NAME: Steven L Severance  MRN: [id number]   : 1975    Patient not seen this date for Physical Therapy due to:      [] Cancel by RN or physician due to:    [x] Hemodialysis    [] Critical Lab Value Level     [] Blood transfusion in progress    [] Acute or unstable cardiovascular status   _MAP < 55 or more than >115  _HR < 40 or > 130    [] Acute or unstable pulmonary status   -FiO2 > 60%   _RR < 5 or >40    _O2 sats < 85%    [] Strict Bedrest    [] Off Unit for surgery or procedure    [] Off Unit for testing       [] Pending imaging to R/O fracture    [] Refusal by Patient      [] Other      [] PT being discontinued at this time. Patient independent. No further needs.     [] PT being discontinued at this time as the patient has been transferred to hospice care. No further needs.      Carmen Mendoza, PT

## 2025-05-30 NOTE — ANESTHESIA PRE PROCEDURE
therapy:..                 Abdominal:             Vascular:          Other Findings:             Anesthesia Plan      MAC     ASA 4     (Last dialysis this am)  Induction: intravenous.    MIPS: prophylactic pharmacologic antiemetic agents not administered perioperatively for documented reasons.  Anesthetic plan and risks discussed with patient.      Plan discussed with CRNA.    Attending anesthesiologist reviewed and agrees with Preprocedure content                ELIUD ENCARNACION MD   5/30/2025

## 2025-05-30 NOTE — CARE COORDINATION
Discharge planning     [facility]. Sent referral in careport to follow.     5/28 assessment  Case Management Assessment  Initial Evaluation     Date/Time of Evaluation: 5/28/2025 8:52 AM  Assessment Completed by: Marilee Noguera     If patient is discharged prior to next notation, then this note serves as note for discharge by case management.     Patient Name: Steven L Severance                   YOB: 1975  Diagnosis: Hypoglycemia [E16.2]  Generalized weakness [R53.1]  Frequent falls [R29.6]  Hypoglycemic insulin reaction in type 1 diabetes mellitus (HCC) [E10.649]                   Date / Time: 5/27/2025  2:57 PM     Patient Admission Status: Observation   Readmission Risk (Low < 19, Mod (19-27), High > 27): Readmission Risk Score: 22.6     Current PCP: Smith Andres MD  PCP verified by CM? (P) Yes     Chart Reviewed: Yes      History Provided by: (P) Patient  Patient Orientation: (P) Alert and Oriented    Patient Cognition: (P) Alert     Hospitalization in the last 30 days (Readmission):  Yes    If yes, Readmission Assessment in  Navigator will be completed.     Advance Directives:       Code Status: Full Code   Patient's Primary Decision Maker is: (P) Legal Next of Kin    Secondary Decision Maker: trinh gupta - Grandparent - [phone]     Discharge Planning:     Patient lives with: (P) Family Members Type of Home: (P) House  Primary Care Giver: (P) Self  Patient Support Systems include: (P) Family Members   Current Financial resources: (P) Medicare  Current community resources:    Current services prior to admission: (P) Home Care ([facility])            Current DME: (P) Walker, Shower Chair, Other (Comment) (Ramp; B/L leg brace for foot drop)            Type of Home Care services:  (P) PT, OT     ADLS  Prior functional level: (P) Assistance with the following:, Cooking, Housework, Shopping  Current functional level: (P) Assistance with the following:, Cooking,  Housework, Shopping     PT AM-PAC:   /24  OT AM-PAC:   /24     Family can provide assistance at DC: (P) Yes  Would you like Case Management to discuss the discharge plan with any other family members/significant others, and if so, who? (P) No  Plans to Return to Present Housing: (P) Yes  Other Identified Issues/Barriers to RETURNING to current housing: medical complications  Potential Assistance needed at discharge: Skilled Nursing Facility            Potential DME:    Patient expects to discharge to: (P) House  Plan for transportation at discharge: (P) Family     Financial     Payor: MEDICARE / Plan: MEDICARE PART A AND B / Product Type: *No Product type* /      Does insurance require precert for SNF: No     Potential assistance Purchasing Medications: (P) No  Meds-to-Beds request:          [facility] [id number] - [place], [place] - [address] [phone] - F [phone]  [address]  [place] [address]  Phone: [phone] Fax: [phone]        Notes:     Factors facilitating achievement of predicted outcomes: Family support, Cooperative, and Pleasant     Barriers to discharge: Medical complications     Additional Case Management Notes:   Plan to return home with Iain Milton.  .  Grandfather assists as needed and transports patient to Dialysis in [place] at [facility] on MWF.  Arrives at 0545.

## 2025-05-30 NOTE — PLAN OF CARE
Problem: Chronic Conditions and Co-morbidities  Goal: Patient's chronic conditions and co-morbidity symptoms are monitored and maintained or improved  Outcome: Progressing     Problem: Discharge Planning  Goal: Discharge to home or other facility with appropriate resources  Outcome: Progressing    Problem: Pain  Goal: Verbalizes/displays adequate comfort level or baseline comfort level  Outcome: Progressing     Problem: Safety - Adult  Goal: Free from fall injury  Outcome: Progressing     Problem: ABCDS Injury Assessment  Goal: Absence of physical injury       Problem: Skin/Tissue Integrity  Goal: Skin integrity remains intact  Description: 1.  Monitor for areas of redness and/or skin breakdown2.  Assess vascular access sites hourly3.  Every 4-6 hours minimum:  Change oxygen saturation probe site4.  Every 4-6 hours:  If on nasal continuous positive airway pressure, respiratory therapy assess nares and determine need for appliance change or resting period  Outcome: Progressing     Problem: Neurosensory - Adult  Goal: Achieves stable or improved neurological status  Outcome: Progressing  Flowsheets (Taken 5/29/2025 2000)  Achieves stable or improved neurological status: Assess for and report changes in neurological status     Problem: Skin/Tissue Integrity - Adult  Goal: Skin integrity remains intact  Description: 1.  Monitor for areas of redness and/or skin breakdown2.  Assess vascular access sites hourly3.  Every 4-6 hours minimum:  Change oxygen saturation probe site4.  Every 4-6 hours:  If on nasal continuous positive airway pressure, respiratory therapy assess nares and determine need for appliance change or resting period  Outcome: Progressing     Problem: Skin/Tissue Integrity - Adult  Goal: Incisions, wounds, or drain sites healing without S/S of infection  Outcome: Progressing     Problem: Musculoskeletal - Adult  Goal: Return mobility to safest level of function  Outcome: Progressing     Problem:  Musculoskeletal - Adult  Goal: Maintain proper alignment of affected body part  Outcome: Progressing     Problem: Musculoskeletal - Adult  Goal: Return ADL status to a safe level of function  Outcome: Progressing     Problem: Gastrointestinal - Adult  Goal: Minimal or absence of nausea and vomiting  Outcome: Progressing     Problem: Gastrointestinal - Adult  Goal: Maintains or returns to baseline bowel function  Outcome: Progressing     Problem: Gastrointestinal - Adult  Goal: Maintains adequate nutritional intake  Outcome: Progressing     Problem: Gastrointestinal - Adult  Goal: Establish and maintain optimal ostomy function  Outcome: Progressing     Problem: Genitourinary - Adult  Goal: Absence of urinary retention  Outcome: Progressing     Problem: Metabolic/Fluid and Electrolytes - Adult  Goal: Electrolytes maintained within normal limits  Outcome: Progressing     Problem: Metabolic/Fluid and Electrolytes - Adult  Goal: Hemodynamic stability and optimal renal function maintained  Outcome: Progressing     Problem: Metabolic/Fluid and Electrolytes - Adult  Goal: Glucose maintained within prescribed range  Outcome: Progressing

## 2025-05-30 NOTE — PROGRESS NOTES
Comprehensive Nutrition Assessment    Type and Reason for Visit:  Initial, Positive nutrition screen    Nutrition Recommendations/Plan:   Continue diet NPO.   Monitor diet start, labs, and weights.     Malnutrition Assessment:  Malnutrition Status:  No malnutrition (05/30/25 6305)        Nutrition Assessment:    Visit prompted for positive nutrition screen for weight loss. Patient was recently admitted here on 5/27 for overnight observation d/t hypoglycemia r/t not eating after taking insulin.Shortly after his discharge to acute rehab, patient developed nausea with vomiting, which eventually became coffee ground emesis. Patient has long h/o gastroparesis, poor appetite and slow transit chronic constipation. Patient receives dialysis treatment [facility]. Patient received dialysis today with 178 mL fluid removed per dialysis RN. EGD is scheduled today. Patient was last seen by writer on 5/28. Patient stated he is unsure of the amount of weight loss but his UBW is 190#. Weight history indicated 3.1% weight loss in 1 yr. During today's visit, patient reports having sores on his tongue and is causing pain. He states he is experiencing blurred vision on the left side, which just started today. He has experienced this in the past when his BG and BP is high. His glucose is 64 this morning and SBP is elevated (148/58). Patient stated his emesis and nausea has resolved today. Continue diet NPO. Monitor diet start, labs, and weights.    Nutrition Related Findings:    Hypoactive bowel sounds; no edema; HD on MWF Wound Type: None       Current Nutrition Intake & Therapies:    Average Meal Intake: NPO  Average Supplements Intake: NPO  Diet NPO Exceptions are: Ice Chips, Sips of Water with Meds    Anthropometric Measures:  Height: 170.2 cm (5' 7\")  Ideal Body Weight (IBW): 148 lbs (67 kg)    Admission Body Weight: 83 kg (183 lb)  Current Body Weight: 83 kg (183 lb),   IBW. Weight Source: Bed scale  Current BMI (kg/m2): 28.7  Usual Body  Weight: 86.2 kg (190 lb)     % Weight Change (Calculated): -3.7  Weight Adjustment For: No Adjustment                 BMI Categories: Overweight (BMI 25.0-29.9)    Estimated Daily Nutrient Needs:  Energy Requirements Based On: Kcal/kg  Weight Used for Energy Requirements: Current  Energy (kcal/day): 1210-5539 kcal (20-25 kcal/kg)  Weight Used for Protein Requirements: Current  Protein (g/day):  g (1.0-1.2 g/kg)  Method Used for Fluid Requirements: Defer to provider  Fluid (ml/day):      Nutrition Diagnosis:   Inadequate oral intake related to nausea/vomiting/diarrhea as evidenced by NPO or clear liquid status due to medical condition    Nutrition Interventions:   Food and/or Nutrient Delivery: Continue NPO  Nutrition Education/Counseling: Education/Counseling not indicated  Coordination of Nutrition Care: Continue to monitor while inpatient       Goals:  Goals: PO intake 50% or greater, Initiate PO diet  Type of Goal: New goal  Previous Goal Met: New Goal    Nutrition Monitoring and Evaluation:      Food/Nutrient Intake Outcomes: Progression of Nutrition  Physical Signs/Symptoms Outcomes: Biochemical Data, Nausea or Vomiting, Fluid Status or Edema, Hemodynamic Status, Nutrition Focused Physical Findings, Weight    Discharge Planning:    No discharge needs at this time     Jesika Panchal  Dietetic Intern

## 2025-05-30 NOTE — PROGRESS NOTES
[facility]  Office: [phone]  Wander Gresham DO, Ok Antoine, DO, Delmar Morgan DO, Esdras Emmanuel, DO, Jelly Carrasco MD, Liliana Alvarez MD, Anastasiya Musa MD, Kyleigh Fine MD,  Brendon Jason MD, Madi Cooper MD, Shelley Zuniga MD,  Gloria Flores DO, Carla Hubbard MD, Rey Su MD, Adalid Gresham DO, Leticia Hansen MD,  Osbaldo Segovia DO, Ayleen Morales MD, Rossi Mitchell MD, Jaylen Douglas MD,  Frantz Elam MD, Qasim Gutierrez MD, Nabil Armendariz MD, Flako Ponce MD, Stephen Corbett MD, Marge Jaime MD, Zachary Blanco, DO, Mariluz Larsen MD, Margaret De Oliveira DO, Kerwin Burnette MD, Gloria Llamas MD, Mohsin Reza, MD, Melisa Siu MD, Shirley Waterhouse, CNP,  Cynthia Pickett, CNP, Zachary Yu, CNP,  Lou Quinteros, DNP, Ayana Canseco, CNP, Kathia Maria, CNP, Chani Romero, CNP, Tiffanie Valera, CNP, Jovita Colon, PA-C, Elba Jay, CNP, Huma Anguiano, CNP,  Sejal Galvez, CNP, Bethanie Taylor, CNP, Nicanor Clayton, PA-C, Radha Vines, CNP,  Nunu Zacarias, CNS, Cherri Pérez, CNP, Libia Richardson, CNP,   Ludy Gee, CNP         [facility]   IN-PATIENT SERVICE   [facility]    Progress Note    5/30/2025    10:18 AM    Name:   Steven L Severance  MRN:     [id number]     Acct:      [id number]   Room:   2020/2020-01  IP Day:  0  Admit Date:  5/29/2025  9:43 AM    PCP:   Smith Andres MD  Code Status:  Full Code    Subjective:     C/C:   Chief Complaint   Patient presents with    Hypertension    Nausea    Vomiting    Headache     Interval History Status: not changed.     Condition unchanged, hemoglobin stable.  Plan for EGD later today after dialysis.    Brief History:     5/29 - Was discharged from our facility 24 hours ago for overnight observation due to hypoglycemia from not eating after taking his insulin. Patient advised he has a long history of gastroparesis, poor appetite, and slow transit chronic constipation. He advised that shortly after

## 2025-05-30 NOTE — CARE COORDINATION
05/30/25 0623   Readmission Assessment   Number of Days since last admission? 1-7 days   Previous Disposition Acute Rehab   Who is being Interviewed Patient  (per chart)   What was the patient's/caregiver's perception as to why they think they needed to return back to the hospital? Other (Comment)  (sent from ARU for gi bleed)   Did you visit your Primary Care Physician after you left the hospital, before you returned this time? No   Why weren't you able to visit your PCP? Did not have an appointment;Other (Comment)  (went to ARU)   Did you see a specialist, such as Cardiac, Pulmonary, Orthopedic Physician, etc. after you left the hospital? No   Who advised the patient to return to the hospital? Self-referral   Does the patient report anything that got in the way of taking their medications? No   In our efforts to provide the best possible care to you and others like you, can you think of anything that we could have done to help you after you left the hospital the first time, so that you might not have needed to return so soon? Other (Comment)  (nothing)

## 2025-05-30 NOTE — PROGRESS NOTES
HEMODIALYSIS POST TREATMENT NOTE    Treatment time ordered: 3 hrs    Actual treatment time: 3 hrs    UltraFiltration Goal: To dry weight  UltraFiltration Removed: 178 mL      Pre Treatment weight: 83.2 kgs  Post Treatment weight: 83.2 kgs  Estimated Dry Weight: 84.5 kgs    Access used:     Central Venous Catheter:          Tunneled or Non-tunneled: NA           Site: NA          Access Flow: NA      Internal Access:       AV Fistula or AV Graft: AV Fistula         Site: Left upper arm       Access Flow: Good       Sign and symptoms of infection: None       If YES: NA    Medications or blood products given: See MAR    Chronic outpatient schedule: [facility]    Chronic outpatient unit: [facility]    Summary of response to treatment: Patient tolerated treatment well. Total of 178 mL of fluid removal.    Explain if orders NOT met, was physician notified:Yes      ACES flowsheet faxed to patient unit/ placed in patient chart: Yes    Post assessment completed: Yes    Report given to: Yana Blackwood RN      * Intra-treatment documented Safety Checks include the followin) Access and face visible at all times.     2) All connections and blood lines are secure with no kinks.     3) NVL alarm engaged.     4) Hemosafe device applied (if applicable).     5) No collapse of Arterial or Venous blood chambers.     6) All blood lines / pump segments in the air detectors.

## 2025-05-30 NOTE — ANESTHESIA POSTPROCEDURE EVALUATION
Department of Anesthesiology  Postprocedure Note    Patient: Steven L Severance  MRN: [id number]  YOB: 1975  Date of evaluation: 5/30/2025    Procedure Summary       Date: 05/30/25 Room / Location: [address]    Anesthesia Start: 1527 Anesthesia Stop: 1553    Procedure: ESOPHAGOGASTRODUODENOSCOPY BIOPSY (Esophagus) Diagnosis:       Upper GI bleed      (Upper GI bleed [K92.2])    Surgeons: Adalid Murphy MD Responsible Provider: Noemi Braga MD    Anesthesia Type: MAC ASA Status: 4            Anesthesia Type: MAC    Malorie Phase I: Malorie Score: 8    Malorie Phase II:      Anesthesia Post Evaluation    Patient location during evaluation: PACU  Patient participation: complete - patient participated  Level of consciousness: awake  Airway patency: patent  Nausea & Vomiting: no nausea  Cardiovascular status: blood pressure returned to baseline  Respiratory status: acceptable  Hydration status: euvolemic  Comments: Multimodal analgesia pain management as indicated by procedure  Multimodal analgesia pain management approach  Pain management: adequate    No notable events documented.

## 2025-05-30 NOTE — PROGRESS NOTES
Pt IV infiltrated at 2110 after hanging the prontonix drip at 2036, protonix drip stopped at 2114. Writer, house supervisor, and an ER nurse made multiple IV attempts and were unsuccessful. In house NP made a successful ultrasound IV attempt at 0141. Writer restarted protonix drip at 0151.

## 2025-05-30 NOTE — PROGRESS NOTES
[facility]    Shaukat Rashid MD Zafar Magsi MD Danial Rashid MD John O. Ranker MD Sembria Ligibel, CNP       Nephrology Progress Note     Patient : Steven L Severance 50 y.o.  male   Patient :  1975  MRN: [id number]   Requesting Physician: Esdras Emmanuel DO  PCP: Smith Andres MD   Date of Service: 25     Chief Complaint      had concerns including Hypertension, Nausea, Vomiting, and Headache.    Reason for Consult   50-year-old male with a past medical history significant for ESRD on maintenance hemodialysis, type 1 diabetes mellitus, hypertension, prior CVA, and GERD presented to the emergency department from a nursing home with complaints of nausea, vomiting, and headache. The patient had a recent hospitalization for hypoglycemia and was discharged on 2025. He receives dialysis on Monday, Wednesday, and Friday, with the last session completed without difficulty on Wednesday (2025), one day prior to presentation. Today, the patient experienced an episode of coffee-ground emesis prompting EMS transfer. Labs in the ED revealed hyponatremia (Na 127), hyperkalemia (K 5.4), elevated BUN (37), and creatinine (7.0), with a calculated GFR of 9. GI consultation was initiated for presumed upper GI bleed. Nephrology was consulted for Maintenance hemodialysis    Assessment:  End stage renal disease.  Schedule   Hyponatremia.  Mild hyperkalemia.  Hyperphosphatemia.  Hypertension.  Hemodynamic: Stable  Anemia of ESRD  CKD MBD  Patient admitted for GI bleeding  Acute blood loss    Plan:  Plan for hemodialysis today per patient schedule,  y   NEDA Q weekly   GI bleeding management per your plan , noted plan for EGD today   We will resume antihypertensive medication as able   Hemodialysis diet  Renal chemistry panel monitoring in a.m..    Thank you for the consultation. Please do not hesitate to contact us for any further  questions/concerns. We will continue to follow along with you.     Subjective      Patient evaluated at bedside, at this point no acute distress.  Patient reports no chest pain no worsening of dyspnea.  No new complaint all question answered in detail.  Appropriate counseling done.  Plan discussed with nursing staff.        Review Of Systems:   Constitutional: No fever, chills, lethargy, weakness or wt loss.  HEENT:  No headache, nasal discharge or sore throat.  Cardiac:  No chest pain, dyspnea, orthopnea or PND.  Chest:   No cough, phlegm or wheezing.  Abdomen:  No abdominal pain, nausea, vomiting or diarrhea.  Neuro:  No gross focal weakness, numbness, abnormal movements or seizure like activity.  Skin:   No rashes or itching.  :   No hematuria, dysuria or flank pain.  Extremities:  No swelling or joint pains.  Endocrine: No polyuria, polydypsia, or thyroid problems.  Hematology:    No bleeding disorders or bruising.  All other ROS is negative.       Past Medical History:   Diagnosis Date    Cerebral artery occlusion with cerebral infarction (HCC)     Closed fracture of bone of right foot March - April 2020    Dialysis patient     Hemodialysis patient     Hyperlipidemia     Hypertension     Kidney disease     On Dialysis    Type 1 diabetes mellitus (HCC)        Past Surgical History:   Procedure Laterality Date    AV FISTULA CREATION Left     COLONOSCOPY N/A 8/3/2022    COLONOSCOPY DIAGNOSTIC performed by Rose Mary Richard MD at [facility] OR    UPPER GASTROINTESTINAL ENDOSCOPY N/A 8/3/2022    EGD ESOPHAGOGASTRODUODENOSCOPY performed by Rose Mary Richard MD at [facility] OR    WRIST SURGERY  1995       Prior to Admission medications    Medication Sig Start Date End Date Taking? Authorizing Provider   insulin glargine (LANTUS SOLOSTAR) 100 UNIT/ML injection pen Inject 20 Units into the skin daily 5/28/25 6/27/25  Esdras Emmanuel DO   amLODIPine (NORVASC) 10 MG tablet Take 1 tablet by mouth daily 5/29/25   Esdras Emmanuel, DO  Sexual Activity    Alcohol use: Never    Drug use: Never    Sexual activity: Not on file   Other Topics Concern    Not on file   Social History Narrative    Not on file     Social Drivers of Health     Financial Resource Strain: Low Risk  (5/25/2024)    Received from [facility]    Overall Financial Resource Strain (CARDIA)     Difficulty of Paying Living Expenses: Not very hard   Food Insecurity: No Food Insecurity (5/29/2025)    Hunger Vital Sign     Worried About Running Out of Food in the Last Year: Never true     Ran Out of Food in the Last Year: Never true   Transportation Needs: No Transportation Needs (5/29/2025)    PRAPARE - Transportation     Lack of Transportation (Medical): No     Lack of Transportation (Non-Medical): No   Physical Activity: Inactive (5/25/2024)    Received from [facility]    Exercise Vital Sign     Days of Exercise per Week: 0 days     Minutes of Exercise per Session: 0 min   Stress: No Stress Concern Present (5/25/2024)    Received from [facility]    [employer] - Occupational Stress Questionnaire     Feeling of Stress : Not at all   Social Connections: Socially Isolated (5/25/2024)    Received from [facility]    Social Connection and Isolation Panel [NHANES]     Frequency of Communication with Friends and Family: Never     Frequency of Social Gatherings with Friends and Family: More than three times a week     Attends Jain Services: Never     Active Member of Clubs or Organizations: No     Attends Club or Organization Meetings: Never     Marital Status: Never    Intimate Partner Violence: Unknown (2/22/2024)    Received from [facility]     Fear of Current or Ex-Partner: Not on file     Emotionally Abused: Not on file     Physically Abused: Not on file      clear. Face symmetrical. No tremor.     Data:  CBC:   Lab Results   Component Value Date    WBC 11.4 (H) 05/29/2025    HGB 12.2 (L) 05/30/2025    HCT 35.5 (L) 05/30/2025    MCV 91.0 05/29/2025     05/29/2025     BMP:    Lab Results   Component Value Date     (L) 05/30/2025     (L) 05/29/2025     05/28/2025    K 4.9 05/30/2025    K 5.4 (H) 05/29/2025    K 4.8 05/28/2025    CL 92 (L) 05/30/2025    CL 88 (L) 05/29/2025    CL 96 (L) 05/28/2025    CO2 24 05/30/2025    CO2 24 05/29/2025    CO2 24 05/28/2025    BUN 31 (H) 05/30/2025    BUN 37 (H) 05/29/2025    BUN 42 (H) 05/28/2025    CREATININE 6.1 (HH) 05/30/2025    CREATININE 7.0 (HH) 05/29/2025    CREATININE 8.0 (HH) 05/28/2025    GLUCOSE 64 (L) 05/30/2025    GLUCOSE 169 (H) 05/29/2025    GLUCOSE 63 (L) 05/28/2025     CMP:   Lab Results   Component Value Date/Time     05/30/2025 08:16 AM    K 4.9 05/30/2025 08:16 AM    CL 92 05/30/2025 08:16 AM    CO2 24 05/30/2025 08:16 AM    BUN 31 05/30/2025 08:16 AM    CREATININE 6.1 05/30/2025 08:16 AM    GLUCOSE 64 05/30/2025 08:16 AM    CALCIUM 9.2 05/30/2025 08:16 AM    BILITOT 0.6 05/29/2025 10:02 AM    ALKPHOS 121 05/29/2025 10:02 AM    AST 23 05/29/2025 10:02 AM    ALT 24 05/29/2025 10:02 AM      Hepatic:   Lab Results   Component Value Date    AST 23 05/29/2025    AST 19 05/12/2025    AST 22 06/14/2024    ALT 24 05/29/2025    ALT 40 05/12/2025    ALT 27 06/14/2024    BILITOT 0.6 05/29/2025    BILITOT 0.6 05/12/2025    BILITOT 0.4 06/14/2024    ALKPHOS 121 05/29/2025    ALKPHOS 124 05/12/2025    ALKPHOS 105 06/14/2024     BNP: No results found for: \"BNP\"  Lipids:   Lab Results   Component Value Date    CHOL 105 05/24/2024    HDL 55 05/24/2024     INR:   Lab Results   Component Value Date    INR 1.0 05/30/2025    INR 1.0 05/28/2025    INR 1.0 06/14/2024     PTH: No results found for: \"PTH\"  Phosphorus:    Lab Results   Component Value Date/Time    PHOS 2.3 05/25/2024 06:08 AM     Ionized

## 2025-05-30 NOTE — CARE COORDINATION
Social work:  pt's grandfather requested to speak with writer regarding dc plans- per grandfather, patient states he doesn't feel \"comfortable\" at [facility] and wants to explore other options.   Writer explained d/t medicare and obs status, patient will not qualify for SNF; could look into other ARU's.   List reviewed, choice given of [facility] ARU(referral had been sent there earlier in the week as well).   Referral faxed to [facility]- they will need to see PT/OT ashanti to determine if they can accept.   Asked grandpa if Faith denies what is plan, he states he would need to return to [facility] as he cannot take care of him in current state.     Update 1403-  Met with patient in dialysis and confirmed if he cannot get into [facility] ARU, he is agreeable to return to [facility].  Faith needs to see PT/OT ashanti before reviewing.

## 2025-05-30 NOTE — PLAN OF CARE
Problem: Chronic Conditions and Co-morbidities  Goal: Patient's chronic conditions and co-morbidity symptoms are monitored and maintained or improved  Outcome: Progressing     Problem: Discharge Planning  Goal: Discharge to home or other facility with appropriate resources  Outcome: Progressing     Problem: Pain  Goal: Verbalizes/displays adequate comfort level or baseline comfort level  Outcome: Progressing     Problem: Safety - Adult  Goal: Free from fall injury  Outcome: Progressing     Problem: ABCDS Injury Assessment  Goal: Absence of physical injury  Outcome: Progressing     Problem: Skin/Tissue Integrity  Goal: Skin integrity remains intact  Description: 1.  Monitor for areas of redness and/or skin breakdown2.  Assess vascular access sites hourly3.  Every 4-6 hours minimum:  Change oxygen saturation probe site4.  Every 4-6 hours:  If on nasal continuous positive airway pressure, respiratory therapy assess nares and determine need for appliance change or resting period  Outcome: Progressing     Problem: Neurosensory - Adult  Goal: Achieves stable or improved neurological status  Outcome: Progressing     Problem: Skin/Tissue Integrity - Adult  Goal: Skin integrity remains intact  Description: 1.  Monitor for areas of redness and/or skin breakdown2.  Assess vascular access sites hourly3.  Every 4-6 hours minimum:  Change oxygen saturation probe site4.  Every 4-6 hours:  If on nasal continuous positive airway pressure, respiratory therapy assess nares and determine need for appliance change or resting period  Outcome: Progressing  Goal: Incisions, wounds, or drain sites healing without S/S of infection  Outcome: Progressing     Problem: Musculoskeletal - Adult  Goal: Return mobility to safest level of function  Outcome: Progressing  Goal: Maintain proper alignment of affected body part  Outcome: Progressing  Goal: Return ADL status to a safe level of function  Outcome: Progressing     Problem: Gastrointestinal  - Adult  Goal: Minimal or absence of nausea and vomiting  Outcome: Progressing  Goal: Maintains or returns to baseline bowel function  Outcome: Progressing  Goal: Maintains adequate nutritional intake  Outcome: Progressing  Goal: Establish and maintain optimal ostomy function  Outcome: Progressing     Problem: Genitourinary - Adult  Goal: Absence of urinary retention  Outcome: Progressing     Problem: Metabolic/Fluid and Electrolytes - Adult  Goal: Electrolytes maintained within normal limits  Outcome: Progressing  Goal: Hemodynamic stability and optimal renal function maintained  Outcome: Progressing  Goal: Glucose maintained within prescribed range  Outcome: Progressing     Problem: Nutrition Deficit:  Goal: Optimize nutritional status  Outcome: Progressing  Flowsheets (Taken 5/30/2025 1308 by Jesika Panchal)  Nutrient intake appropriate for improving, restoring, or maintaining nutritional needs:   Assess nutritional status and recommend course of action   Monitor oral intake, labs, and treatment plans

## 2025-05-31 VITALS
BODY MASS INDEX: 29.85 KG/M2 | HEIGHT: 67 IN | WEIGHT: 190.2 LBS | RESPIRATION RATE: 16 BRPM | OXYGEN SATURATION: 96 % | HEART RATE: 70 BPM | SYSTOLIC BLOOD PRESSURE: 165 MMHG | TEMPERATURE: 98.2 F | DIASTOLIC BLOOD PRESSURE: 57 MMHG

## 2025-05-31 LAB
ANION GAP SERPL CALCULATED.3IONS-SCNC: 14 MMOL/L (ref 9–16)
ANION GAP SERPL CALCULATED.3IONS-SCNC: 14 MMOL/L (ref 9–16)
ANION GAP SERPL CALCULATED.3IONS-SCNC: 17 MMOL/L (ref 9–16)
B-OH-BUTYR SERPL-MCNC: 0.14 MMOL/L (ref 0.02–0.27)
B-OH-BUTYR SERPL-MCNC: 0.92 MMOL/L (ref 0.02–0.27)
BASOPHILS # BLD: 0 K/UL (ref 0–0.2)
BASOPHILS NFR BLD: 0 %
BUN SERPL-MCNC: 28 MG/DL (ref 6–20)
BUN SERPL-MCNC: 34 MG/DL (ref 6–20)
BUN SERPL-MCNC: 36 MG/DL (ref 6–20)
CALCIUM SERPL-MCNC: 8.6 MG/DL (ref 8.6–10.4)
CALCIUM SERPL-MCNC: 8.7 MG/DL (ref 8.6–10.4)
CALCIUM SERPL-MCNC: 8.7 MG/DL (ref 8.6–10.4)
CHLORIDE SERPL-SCNC: 91 MMOL/L (ref 98–107)
CHLORIDE SERPL-SCNC: 92 MMOL/L (ref 98–107)
CHLORIDE SERPL-SCNC: 93 MMOL/L (ref 98–107)
CO2 SERPL-SCNC: 20 MMOL/L (ref 20–31)
CO2 SERPL-SCNC: 22 MMOL/L (ref 20–31)
CO2 SERPL-SCNC: 24 MMOL/L (ref 20–31)
CREAT SERPL-MCNC: 4.7 MG/DL (ref 0.7–1.2)
CREAT SERPL-MCNC: 5.1 MG/DL (ref 0.7–1.2)
CREAT SERPL-MCNC: 5.4 MG/DL (ref 0.7–1.2)
EOSINOPHIL # BLD: 0 K/UL (ref 0–0.4)
EOSINOPHILS RELATIVE PERCENT: 0 % (ref 1–4)
ERYTHROCYTE [DISTWIDTH] IN BLOOD BY AUTOMATED COUNT: 12.6 % (ref 11.8–14.4)
GFR, ESTIMATED: 12 ML/MIN/1.73M2
GFR, ESTIMATED: 13 ML/MIN/1.73M2
GFR, ESTIMATED: 14 ML/MIN/1.73M2
GLUCOSE BLD-MCNC: 262 MG/DL (ref 75–110)
GLUCOSE BLD-MCNC: 272 MG/DL (ref 75–110)
GLUCOSE BLD-MCNC: 419 MG/DL (ref 75–110)
GLUCOSE BLD-MCNC: 534 MG/DL (ref 75–110)
GLUCOSE BLD-MCNC: 553 MG/DL (ref 75–110)
GLUCOSE BLD-MCNC: 582 MG/DL (ref 75–110)
GLUCOSE SERPL-MCNC: 266 MG/DL (ref 74–99)
GLUCOSE SERPL-MCNC: 502 MG/DL (ref 74–99)
GLUCOSE SERPL-MCNC: 651 MG/DL (ref 74–99)
GLUCOSE SERPL-MCNC: 665 MG/DL (ref 74–99)
HCO3 VENOUS: 33 MMOL/L (ref 22–29)
HCT VFR BLD AUTO: 30.4 % (ref 40.7–50.3)
HCT VFR BLD AUTO: 31.6 % (ref 40.7–50.3)
HCT VFR BLD AUTO: 35.9 % (ref 40.7–50.3)
HGB BLD-MCNC: 10.7 G/DL (ref 13–17)
HGB BLD-MCNC: 10.8 G/DL (ref 13–17)
HGB BLD-MCNC: 12 G/DL (ref 13–17)
IMM GRANULOCYTES # BLD AUTO: 0 K/UL (ref 0–0.3)
IMM GRANULOCYTES NFR BLD: 0 %
LYMPHOCYTES NFR BLD: 0.65 K/UL (ref 1–4.8)
LYMPHOCYTES RELATIVE PERCENT: 11 % (ref 24–44)
MCH RBC QN AUTO: 31.8 PG (ref 25.2–33.5)
MCHC RBC AUTO-ENTMCNC: 33.4 G/DL (ref 28.4–34.8)
MCV RBC AUTO: 95.2 FL (ref 82.6–102.9)
MONOCYTES NFR BLD: 1 K/UL (ref 0.2–0.8)
MONOCYTES NFR BLD: 17 % (ref 1–7)
NEUTROPHILS NFR BLD: 72 % (ref 36–66)
NEUTS SEG NFR BLD: 4.25 K/UL (ref 1.8–7.7)
NRBC BLD-RTO: 0 PER 100 WBC
O2 SAT, VEN: 93.6 % (ref 60–85)
PCO2 VENOUS: 37.1 MM HG (ref 41–51)
PH VENOUS: 7.56 (ref 7.32–7.43)
PLATELET # BLD AUTO: 177 K/UL (ref 138–453)
PMV BLD AUTO: 9.7 FL (ref 8.1–13.5)
PO2 VENOUS: 59.6 MM HG (ref 30–50)
POSITIVE BASE EXCESS, VEN: 10.1 MMOL/L (ref 0–3)
POTASSIUM SERPL-SCNC: 4.2 MMOL/L (ref 3.7–5.3)
POTASSIUM SERPL-SCNC: 4.7 MMOL/L (ref 3.7–5.3)
POTASSIUM SERPL-SCNC: 5.1 MMOL/L (ref 3.7–5.3)
RBC # BLD AUTO: 3.77 M/UL (ref 4.21–5.77)
SODIUM SERPL-SCNC: 128 MMOL/L (ref 136–145)
SODIUM SERPL-SCNC: 128 MMOL/L (ref 136–145)
SODIUM SERPL-SCNC: 130 MMOL/L (ref 136–145)
WBC OTHER # BLD: 5.9 K/UL (ref 3.5–11.3)

## 2025-05-31 PROCEDURE — 6370000000 HC RX 637 (ALT 250 FOR IP): Performed by: NURSE PRACTITIONER

## 2025-05-31 PROCEDURE — 6370000000 HC RX 637 (ALT 250 FOR IP): Performed by: INTERNAL MEDICINE

## 2025-05-31 PROCEDURE — 82805 BLOOD GASES W/O2 SATURATION: CPT

## 2025-05-31 PROCEDURE — 97166 OT EVAL MOD COMPLEX 45 MIN: CPT

## 2025-05-31 PROCEDURE — 85025 COMPLETE CBC W/AUTO DIFF WBC: CPT

## 2025-05-31 PROCEDURE — 96376 TX/PRO/DX INJ SAME DRUG ADON: CPT

## 2025-05-31 PROCEDURE — 82947 ASSAY GLUCOSE BLOOD QUANT: CPT

## 2025-05-31 PROCEDURE — 36415 COLL VENOUS BLD VENIPUNCTURE: CPT

## 2025-05-31 PROCEDURE — 97162 PT EVAL MOD COMPLEX 30 MIN: CPT

## 2025-05-31 PROCEDURE — 97530 THERAPEUTIC ACTIVITIES: CPT

## 2025-05-31 PROCEDURE — 85018 HEMOGLOBIN: CPT

## 2025-05-31 PROCEDURE — 82010 KETONE BODYS QUAN: CPT

## 2025-05-31 PROCEDURE — 97116 GAIT TRAINING THERAPY: CPT

## 2025-05-31 PROCEDURE — 82803 BLOOD GASES ANY COMBINATION: CPT

## 2025-05-31 PROCEDURE — 2500000003 HC RX 250 WO HCPCS: Performed by: INTERNAL MEDICINE

## 2025-05-31 PROCEDURE — 80048 BASIC METABOLIC PNL TOTAL CA: CPT

## 2025-05-31 PROCEDURE — 6370000000 HC RX 637 (ALT 250 FOR IP)

## 2025-05-31 PROCEDURE — 96375 TX/PRO/DX INJ NEW DRUG ADDON: CPT

## 2025-05-31 PROCEDURE — 1200000000 HC SEMI PRIVATE

## 2025-05-31 PROCEDURE — G0378 HOSPITAL OBSERVATION PER HR: HCPCS

## 2025-05-31 PROCEDURE — 97535 SELF CARE MNGMENT TRAINING: CPT

## 2025-05-31 PROCEDURE — 99239 HOSP IP/OBS DSCHRG MGMT >30: CPT | Performed by: NURSE PRACTITIONER

## 2025-05-31 PROCEDURE — 85014 HEMATOCRIT: CPT

## 2025-05-31 RX ORDER — INSULIN GLARGINE 100 [IU]/ML
20 INJECTION, SOLUTION SUBCUTANEOUS DAILY
Status: DISCONTINUED | OUTPATIENT
Start: 2025-05-31 | End: 2025-05-31 | Stop reason: HOSPADM

## 2025-05-31 RX ORDER — INSULIN LISPRO 100 [IU]/ML
0-4 INJECTION, SOLUTION INTRAVENOUS; SUBCUTANEOUS
Status: DISCONTINUED | OUTPATIENT
Start: 2025-05-31 | End: 2025-05-31 | Stop reason: HOSPADM

## 2025-05-31 RX ADMIN — SODIUM CHLORIDE, PRESERVATIVE FREE 10 ML: 5 INJECTION INTRAVENOUS at 09:38

## 2025-05-31 RX ADMIN — INSULIN LISPRO 2 UNITS: 100 INJECTION, SOLUTION INTRAVENOUS; SUBCUTANEOUS at 13:48

## 2025-05-31 RX ADMIN — INSULIN LISPRO 2 UNITS: 100 INJECTION, SOLUTION INTRAVENOUS; SUBCUTANEOUS at 09:35

## 2025-05-31 RX ADMIN — INSULIN HUMAN 10 UNITS: 100 INJECTION, SOLUTION PARENTERAL at 06:19

## 2025-05-31 RX ADMIN — LACTULOSE 20 G: 20 SOLUTION ORAL at 09:38

## 2025-05-31 RX ADMIN — AMLODIPINE BESYLATE 10 MG: 10 TABLET ORAL at 09:37

## 2025-05-31 RX ADMIN — METOCLOPRAMIDE 10 MG: 10 TABLET ORAL at 13:49

## 2025-05-31 RX ADMIN — INSULIN GLARGINE 20 UNITS: 100 INJECTION, SOLUTION SUBCUTANEOUS at 02:17

## 2025-05-31 RX ADMIN — METOCLOPRAMIDE 10 MG: 10 TABLET ORAL at 09:37

## 2025-05-31 RX ADMIN — FLUOXETINE HYDROCHLORIDE 20 MG: 20 CAPSULE ORAL at 09:36

## 2025-05-31 RX ADMIN — INSULIN HUMAN 20 UNITS: 100 INJECTION, SOLUTION PARENTERAL at 04:05

## 2025-05-31 RX ADMIN — PANTOPRAZOLE SODIUM 40 MG: 40 TABLET, DELAYED RELEASE ORAL at 09:41

## 2025-05-31 RX ADMIN — EZETIMIBE 10 MG: 10 TABLET ORAL at 09:37

## 2025-05-31 RX ADMIN — BUPROPION HYDROCHLORIDE 150 MG: 150 TABLET, EXTENDED RELEASE ORAL at 09:37

## 2025-05-31 RX ADMIN — CARVEDILOL 12.5 MG: 12.5 TABLET, FILM COATED ORAL at 09:37

## 2025-05-31 RX ADMIN — INSULIN HUMAN 10 UNITS: 100 INJECTION, SOLUTION PARENTERAL at 01:31

## 2025-05-31 RX ADMIN — FUROSEMIDE 20 MG: 20 TABLET ORAL at 09:37

## 2025-05-31 ASSESSMENT — ENCOUNTER SYMPTOMS
VOMITING: 0
NAUSEA: 0
DIARRHEA: 0
WHEEZING: 0
CONSTIPATION: 0
PHOTOPHOBIA: 0
SINUS PAIN: 0
ABDOMINAL PAIN: 0
SHORTNESS OF BREATH: 0
COUGH: 0
SINUS PRESSURE: 0

## 2025-05-31 NOTE — PROGRESS NOTES
Occupational Therapy Initial Evaluation  Facility/Department: [facility]   Patient Name: Steven L Severance        MRN: [id number]    : 1975    Date of Service: 2025    SOFIYA Acosta reports patient is medically stable for therapy treatment this date.    Chart reviewed prior to treatment and patient is agreeable for therapy.  All lines intact and patient positioned comfortably at end of treatment.  All patient needs addressed prior to ending therapy session.       Pt is currently functional below baseline and recommend comprehensive and intensive skilled therapy by a multidisciplinary team. Would expect patient to be able to tolerate 3 hours of therapy per day and able to tolerate at least one hour up in chair.  Please refer to AM-PAC score for current mobility/adl level.      Discharge Recommendations  Discharge Recommendations: Patient would benefit from continued therapy after discharge  OT Equipment Recommendations  Equipment Needed:  (CTA)    Chief Complaint   Patient presents with    Hypertension    Nausea    Vomiting    Headache       Per H&P: Steven L Severance is a 50 y.o. Non- / non  male who presents with Hypertension, Nausea, Vomiting, and Headache and is admitted to the hospital for the management of Hematemesis.     Was discharged from our facility 24 hours ago for overnight observation due to hypoglycemia from not eating after taking his insulin.  Patient advised he has a long history of gastroparesis, poor appetite, and slow transit chronic constipation.  He advised that shortly after his discharge he developed nausea with vomiting.  He was discharged to acute rehab where today he continued to have vomiting which has developed into coffee-ground emesis.  He was sent to the emergency department for further evaluation.  Hemoglobin stable, GI has evaluated the patient and recommended PPI drip and plans for EGD tomorrow.    Past Medical History:  has a past medical history of  Restriction  Other Position/Activity Restrictions: Telemetry, LUE IV, RUE dialysis fistula, 1500 mL fluid restriction       Subjective  General  Chart Reviewed: Yes  Patient assessed for rehabilitation services?: Yes  Family / Caregiver Present: Yes (Grandfather present residential through session)  Subjective  Subjective: Pt resting in bed upon entry to room, very pleasant and motivated to participate in therapy evaluation. Pt denies pain at rest, c/o chronic back pain once standing at the EOB. Pt very motivated to sit up and to eat breakfast this session.          Home Setup/Prior Level of Function  Social/Functional History  Lives With: Family (Grandfather)  Type of Home: House  Home Layout: One level  Home Access: Ramped entrance  Bathroom Shower/Tub: Walk-in shower  Bathroom Toilet: Standard  Bathroom Equipment: Shower chair;3-in-1 Commode;Grab bars in shower  Home Equipment: Rollator;Wheelchair - Manual;Walker - Rolling;Sock aid;Reacher (Gait belt)  Has the patient had two or more falls in the past year or any fall with injury in the past year?: Yes (\"Too many to even remember\"; states the falls have been happening when walking, R foot supinates)  Prior Level of Assist for ADLs: Independent (typically IND with dressing/bathing/toileting; only needs assist with donning R sock/shoe, SBA for transfers into shower)  Prior Level of Assist for Homemaking: Needs assistance  Prior Level of Assist for Ambulation: Independent household ambulator, with or without device (Uses a rollator in home; will use a w/c in community as needed, if R LE feels numb)  Prior Level of Assist for Transfers: Independent  Active : No  Occupation: On disability  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  Leisure & Hobbies: Playing video games    Vision/Hearing  Vision  Vision: Impaired (L eye can see a \"blood spot\" that is obscuring vision; states with B eyes open, the spot isn't as noticeable)  Hearing  Hearing:  bedpan, or urinal)?: A Lot  How much help is needed for putting on and taking off regular upper body clothing?: A Lot  How much help is needed for taking care of personal grooming?: A Little  How much help for eating meals?: None  AM-PAC Inpatient Daily Activity Raw Score: 15  AM-PAC Inpatient ADL T-Scale Score : 34.69  ADL Inpatient CMS 0-100% Score: 56.46  ADL Inpatient CMS G-Code Modifier : CK    Minutes  OT Individual Minutes  Time In: 0750  Time Out: 0845  Minutes: 55  Tx Time: 40 minutes    Co-treatment with PT warranted secondary to decreased safety and independence requiring 2 skilled therapy professionals to address individual discipline's goals. OT addressing preparation for ADL transfer, sitting balance for increased ADL performance, sitting/activity tolerance, fall prevention, functional mobility for ADL transfers, ability to sequence and follow directions, and bed mobility tech.     Electronically signed by Katie Robledo OT on 5/31/25 at 10:37 AM EDT

## 2025-05-31 NOTE — PLAN OF CARE
Problem: Chronic Conditions and Co-morbidities  Goal: Patient's chronic conditions and co-morbidity symptoms are monitored and maintained or improved  5/31/2025 1208 by Darius Michaud RN  Outcome: Progressing     Problem: Discharge Planning  Goal: Discharge to home or other facility with appropriate resources  5/31/2025 1208 by Darius Michaud, RN  Outcome: Progressing     Problem: Pain  Goal: Verbalizes/displays adequate comfort level or baseline comfort level  5/31/2025 1208 by Darius Michaud, RN  Outcome: Progressing     Problem: Safety - Adult  Goal: Free from fall injury  5/31/2025 1208 by Darius Michaud, RN  Outcome: Progressing

## 2025-05-31 NOTE — PROGRESS NOTES
Patient discharged to [facility] rehab per [facility]. Patients family member took his belongings and will bring to rehab. Report given to Nurse Montenegro at [facility].

## 2025-05-31 NOTE — DISCHARGE INSTR - COC
Continuity of Care Form    Patient Name: Steven L Severance   :  1975  MRN:  [id number]    Admit date:  2025  Discharge date:  25    Code Status Order: Full Code   Advance Directives:     Admitting Physician:  Esdras Emmanuel DO  PCP: Smith Andres MD    Discharging Nurse: vita  Discharging Hospital Unit/Room#:   Discharging Unit Phone Number: [phone]    Emergency Contact:   Extended Emergency Contact Information  Primary Emergency Contact: trinh gupta  Home Phone: [phone]  Mobile Phone: [phone]  Relation: Grandparent    Past Surgical History:  Past Surgical History:   Procedure Laterality Date    AV FISTULA CREATION Left     COLONOSCOPY N/A 8/3/2022    COLONOSCOPY DIAGNOSTIC performed by Rose Mary Richard MD at [facility] OR    UPPER GASTROINTESTINAL ENDOSCOPY N/A 8/3/2022    EGD ESOPHAGOGASTRODUODENOSCOPY performed by Rose Mary Richard MD at [facility] OR    WRIST SURGERY         Immunization History:   Immunization History   Administered Date(s) Administered    COVID-19, MODERNA BLUE border, Primary or Immunocompromised, (age 12y+), IM, 100 mcg/0.5mL 2021, 2021, 2022    COVID-19, MODERNA Booster BLUE border, (age 18y+), IM, 50mcg/0.25mL 2022    COVID-19, PFIZER Bivalent, DO NOT Dilute, (age 12y+), IM, 30 mcg/0.3 mL 2022    Influenza Virus Vaccine 2016, 2017, 10/05/2020, 10/21/2020, 09/10/2021    Influenza, FLUCELVAX, (age 6 mo+), MDCK, Quadv PF, 0.5mL 09/10/2021    PPD Test 2021, 2021, 06/15/2021, 2022    Pneumococcal Vaccine 2020    TDaP, ADACEL (age 10y-64y), BOOSTRIX (age 10y+), IM, 0.5mL 10/21/2011, 2020       Active Problems:  Patient Active Problem List   Diagnosis Code    ESRD (end stage renal disease) (HCC) N18.6    Essential hypertension I10    Hyperlipidemia E78.5    Acute pain of left knee M25.562    Bipolar affective disorder ([facility]) F31.9    Atherosclerosis of aorta I70.0    COVID-19 U07.1     Cerebrovascular accident (CVA) ([facility]) I63.9    Type 2 diabetes mellitus with diabetic neuropathy, with long-term current use of insulin ([facility]) E11.40, Z79.4    Neuropathy of both feet G57.93    GERD (gastroesophageal reflux disease) K21.9    Right sided weakness R53.1    Leg weakness, bilateral R29.898    Recurrent falls R29.6    Acute idiopathic pericarditis I30.0    Small kidney, bilateral N27.1    Umbilical hernia without obstruction or gangrene K42.9    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    Atherosclerosis of other arteries I70.8    Hypotension I95.9    Hyponatremia E87.1    Anemia of chronic disease D63.8    Hyperkalemia E87.5    Left lower quadrant abdominal pain R10.32    Coronary artery disease involving native coronary artery of native heart without angina pectoris I25.10    Pneumonia of right lower lobe due to infectious organism J18.9    Generalized weakness R53.1    Stroke-like episode R29.90    Hyperglycemia R73.9    Sleep apnea G47.30    Chest pain R07.9    Non-STEMI (non-ST elevated myocardial infarction) ([facility]) I21.4    Nonrheumatic mitral valve regurgitation I34.0    Metabolic acidosis E87.20    NSTEMI (non-ST elevated myocardial infarction) ([facility]) I21.4    Hypokalemia E87.6    Hypomagnesemia E83.42    Hx of type 2 diabetes mellitus Z86.39    Hx of arterial ischemic stroke Z86.73    CVA (cerebral vascular accident) ([facility]) I63.9    Speech disturbance R47.9    ESRD needing dialysis ([facility]) N18.6, Z99.2    Hypoglycemic insulin reaction in type 1 diabetes mellitus ([facility]) E10.649    Hematemesis K92.0       Isolation/Infection:   Isolation            No Isolation          Patient Infection Status    No active infections.   Resolved       Infection Onset Added Last Indicated Last Indicated By Resolved Resolved By    COVID-19 23 COVID-19 & Influenza Combo 24 Infection     COVID-19 22 COVID-19 Rapid 22 Infection     S/s 1/                    (please select all that are sent with patient):  {JESSICA [facility] Belongings:[id number]}    RN SIGNATURE:  Electronically signed by Karla Benton RN on 5/31/25 at 12:24 PM EDT    CASE MANAGEMENT/SOCIAL WORK SECTION    Inpatient Status Date: ***    Readmission Risk Assessment Score:  [facility] RISK OF UNPLANNED READMISSION 2.0             24.2 Total Score        Discharging to Facility/ Agency   Name: [facility] [address]  Address: [place] [address]  Phone: report [phone]  Fax:[phone]    Dialysis Facility (if applicable)   Name:  Address:  Dialysis Schedule:  Phone:  Fax:    / signature: Electronically signed by Lou Mehta MSW, LSW on 5/31/25 at 12:32 PM EDT    PHYSICIAN SECTION    Prognosis: Fair    Condition at Discharge: Stable    Rehab Potential (if transferring to Rehab): Fair    Recommended Labs or Other Treatments After Discharge: ***    Physician Certification: I certify the above information and transfer of Steven L Severance  is necessary for the continuing treatment of the diagnosis listed and that he requires Acute Rehab for less 30 days.     Update Admission H&P: No change in H&P    PHYSICIAN SIGNATURE:  Electronically signed by Esdras Emmanuel DO on 5/31/25 at 12:16 PM EDT

## 2025-05-31 NOTE — PROGRESS NOTES
0759: POCT blood glucose 62, scheduled lantus 20units was not given.     1941: POCT blood glucose 223    0016: POCT blood glucose resulted 534, writer rechecked glucose again at 0026 which resulted 582. SAMANTHA gregory was notified at 0039. NP ordered to give lantus 20un which was given at 0217.     0101: In house SAMANTHA viveros came to bedside. NP ordered 10units Iv insulin which was given at 0131.     0055: SAMANTHA viveros ordered bmp and cbc labs. Labs resulted at 0106, blood glucose being 665.     0249: writer put in blood draw order for glucose d/t POCT glucose result with glucometer was reading HIGH >600. Blood draw Glucose resulted at 0312 being 651.    0344: SAMANTHA viveros ordered 20units IV insulin which was given at 0405.     0411: SAMANTHA viveros put in order to transfer pt to ICU. - update 0520: NP canceled transfer d/t poct result at 0435 being 553. Per NP wait for morning labs to result then care will be determined farther.     0435: POCT check resulted 553     0515: CMP labs resulted blood glucose 502. SAMANTHA viveros notified.     0606: POCT glucose done, resulted 419. SAMANTHA viveros notified. NP ordered 10 units IV insulin     0619: writer gave 10 units of IV insulin at this time.     0750: POCT glucose check resulted 272.

## 2025-05-31 NOTE — PROGRESS NOTES
[facility]    Shaukat Rashid MD Zafar Magsi MD Danial Rashid MD John O. Ranker MD Aws Aljanabi, MD Sembria Ligibel, CNP       Nephrology Progress Note     Patient : Steven L Severance 50 y.o.  male   Patient :  1975  MRN: [id number]   Requesting Physician: Esdras Emmanuel DO  PCP: Smith Andres MD   Date of Service: 25     Chief Complaint      had concerns including Hypertension, Nausea, Vomiting, and Headache.    Reason for Consult   50-year-old male with a past medical history significant for ESRD on maintenance hemodialysis, type 1 diabetes mellitus, hypertension, prior CVA, and GERD presented to the emergency department from a nursing home with complaints of nausea, vomiting, and headache. The patient had a recent hospitalization for hypoglycemia and was discharged on 2025. He receives dialysis on Monday, Wednesday, and Friday, with the last session completed without difficulty on Wednesday (2025), one day prior to presentation. Today, the patient experienced an episode of coffee-ground emesis prompting EMS transfer. Labs in the ED revealed hyponatremia (Na 127), hyperkalemia (K 5.4), elevated BUN (37), and creatinine (7.0), with a calculated GFR of 9. GI consultation was initiated for presumed upper GI bleed. Nephrology was consulted for Maintenance hemodialysis    Assessment:  End stage renal disease.  Schedule   Hyponatremia.  Mild hyperkalemia.  Hyperphosphatemia.  Hypertension.  Hemodynamic: Stable  Anemia of ESRD  CKD MBD  Patient admitted for GI bleeding  Acute blood loss    Plan:  Plan for hemodialysis today per patient schedule,   NEDA Q weekly   GI bleeding management per your plan , noted plan for EGD today   We will resume antihypertensive medication as able   Hemodialysis diet  Renal chemistry panel monitoring in a.m..    Thank you for the consultation. Please do not hesitate to contact us  or lesions. Skin turgor normal.  CNS: Oriented to person, place and time. Speech clear. Face symmetrical. No tremor.     Data:  CBC:   Lab Results   Component Value Date    WBC 5.9 05/31/2025    HGB 10.7 (L) 05/31/2025    HCT 30.4 (L) 05/31/2025    MCV 95.2 05/31/2025     05/31/2025     BMP:    Lab Results   Component Value Date     (L) 05/31/2025     (L) 05/31/2025     (L) 05/31/2025    K 4.7 05/31/2025    K 4.2 05/31/2025    K 5.1 05/31/2025    CL 93 (L) 05/31/2025    CL 91 (L) 05/31/2025    CL 92 (L) 05/31/2025    CO2 24 05/31/2025    CO2 22 05/31/2025    CO2 20 05/31/2025    BUN 36 (H) 05/31/2025    BUN 34 (H) 05/31/2025    BUN 28 (H) 05/31/2025    CREATININE 5.4 (HH) 05/31/2025    CREATININE 5.1 (HH) 05/31/2025    CREATININE 4.7 (H) 05/31/2025    GLUCOSE 266 (H) 05/31/2025    GLUCOSE 502 (HH) 05/31/2025    GLUCOSE 651 (HH) 05/31/2025     CMP:   Lab Results   Component Value Date/Time     05/31/2025 09:46 AM    K 4.7 05/31/2025 09:46 AM    CL 93 05/31/2025 09:46 AM    CO2 24 05/31/2025 09:46 AM    BUN 36 05/31/2025 09:46 AM    CREATININE 5.4 05/31/2025 09:46 AM    GLUCOSE 266 05/31/2025 09:46 AM    CALCIUM 8.7 05/31/2025 09:46 AM    BILITOT 0.6 05/29/2025 10:02 AM    ALKPHOS 121 05/29/2025 10:02 AM    AST 23 05/29/2025 10:02 AM    ALT 24 05/29/2025 10:02 AM      Hepatic:   Lab Results   Component Value Date    AST 23 05/29/2025    AST 19 05/12/2025    AST 22 06/14/2024    ALT 24 05/29/2025    ALT 40 05/12/2025    ALT 27 06/14/2024    BILITOT 0.6 05/29/2025    BILITOT 0.6 05/12/2025    BILITOT 0.4 06/14/2024    ALKPHOS 121 05/29/2025    ALKPHOS 124 05/12/2025    ALKPHOS 105 06/14/2024     BNP: No results found for: \"BNP\"  Lipids:   Lab Results   Component Value Date    CHOL 105 05/24/2024    HDL 55 05/24/2024     INR:   Lab Results   Component Value Date    INR 1.0 05/30/2025    INR 1.0 05/28/2025    INR 1.0 06/14/2024     PTH: No results found for: \"PTH\"  Phosphorus:    Lab Results    Component Value Date/Time    PHOS 2.3 05/25/2024 06:08 AM     Ionized Calcium: No components found for: \"IONCA\"  Magnesium:   Lab Results   Component Value Date/Time    MG 1.9 05/25/2024 06:08 AM     Albumin: No results found for: \"LABALBU\"  Last 3 CK, CKMB, Troponin: @LABRCNT(CKTOTAL:3,CKMB:3,TROPONINI:3)       URINE:)No results found for: \"NAUR\", \"PROTUR\"    Radiology:   Reviewed.    Electronically signed by Michael Gamez MD  on 5/31/2025 at 4:12 PM  [facility].  Ph: [phone]

## 2025-05-31 NOTE — PROGRESS NOTES
[facility]  Office: [phone]  Wander Gresham DO, Ok Antoine, DO, Delmar Morgan DO, Esdras Emmanuel, DO, Jelly Carrasco MD, Liliana Alvarez MD, Anastasiya Musa MD, Kyleigh Fine MD,  Brendon Jason MD, Madi Cooper MD, Shelley Zuniga MD,  Gloria Flores DO, Carla Hubbard MD, eRy Su MD, Adalid Gresham DO, Leticia Hansen MD,  Osbaldo Segovia DO, Ayleen Morales MD, Rossi Mitchell MD, Jaylen Douglas MD,  Frantz Elam MD, Qasim Gutierrez MD, Nabil Armendariz MD, Flako Ponce MD, Stephen Corbett MD, Marge Jaime MD, Zachary Blanco, DO, Mariluz Larsen MD, Margaret De Oliveira DO, Kerwin Burnette MD, Gloria Llamas MD, Mohsin Reza, MD, Melisa Siu MD, Shirley Waterhouse, CNP,  Cytnhia Pickett, CNP, Zachary Yu, CNP,  Lou Quinteros, VASILE, Ayana Canseco, CNP, Kathia Maria, CNP, Chani Romero, CNP, Tiffanie Valera, CNP, Jovita Colon, PA-C, Elba Jay, CNP, Huma Anguiano, CNP,  Sejal Galvez, CNP, Bethanie Taylor, CNP, Nicanor Clayton, PA-C, Radha Vines, CNP,  Nunu Zacarias, CNS, Cherri Pérez, CNP, Libia Richardson, CNP,   Ludy Gee, CNP         [facility]   IN-PATIENT SERVICE   [facility]    Progress Note    5/31/2025    11:09 AM    Name:   Steven L Severance  MRN:     [id number]     Acct:      [id number]   Room:   2020/2020-01  IP Day:  0  Admit Date:  5/29/2025  9:43 AM    PCP:   Smith Andres MD  Code Status:  Full Code    Subjective:     C/C:   Chief Complaint   Patient presents with    Hypertension    Nausea    Vomiting    Headache     Interval History Status: not changed.     EGD unremarkable.  Blood sugars better controlled today.  Stable for discharge.    Brief History:     5/29 - Was discharged from our facility 24 hours ago for overnight observation due to hypoglycemia from not eating after taking his insulin. Patient advised he has a long history of gastroparesis, poor appetite, and slow transit chronic constipation. He advised that shortly after his  --   --  5.9  --    RBC 4.02*  --   --   --   --  3.77*  --    HGB 12.6*   < > 12.2*   < > 11.6* 12.0* 10.8*   HCT 36.6*   < > 35.5*   < > 34.6* 35.9* 31.6*   MCV 91.0  --   --   --   --  95.2  --    MCH 31.3  --   --   --   --  31.8  --    MCHC 34.4  --   --   --   --  33.4  --    RDW 12.5  --   --   --   --  12.6  --      --   --   --   --  177  --    MPV 9.5  --   --   --   --  9.7  --    INR  --   --  1.0  --   --   --   --     < > = values in this interval not displayed.     Chemistry:  Recent Labs     05/31/25  0106 05/31/25  0312 05/31/25  0516 05/31/25  0946   *  --  128* 130*   K 5.1  --  4.2 4.7   CL 92*  --  91* 93*   CO2 20  --  22 24   GLUCOSE 665* 651* 502* 266*   BUN 28*  --  34* 36*   CREATININE 4.7*  --  5.1* 5.4*   ANIONGAP 17*  --  14 14   LABGLOM 14*  --  13* 12*   CALCIUM 8.6  --  8.7 8.7     Recent Labs     05/29/25  1002 05/29/25  1619 05/30/25  1941 05/31/25  0016 05/31/25  0026 05/31/25  0434 05/31/25  0605 05/31/25  0749   AST 23  --   --   --   --   --   --   --    ALT 24  --   --   --   --   --   --   --    ALKPHOS 121  --   --   --   --   --   --   --    BILITOT 0.6  --   --   --   --   --   --   --    BILIDIR 0.2  --   --   --   --   --   --   --    LIPASE 10*  --   --   --   --   --   --   --    POCGLU  --    < > 223* 534* 582* 553* 419* 272*    < > = values in this interval not displayed.     ABG:  Lab Results   Component Value Date/Time    FIO2 NOT REPORTED 01/04/2022 11:49 AM     Lab Results   Component Value Date/Time    SPECIAL RT F A 20ML 07/14/2022 06:05 AM     Lab Results   Component Value Date/Time    CULTURE NO GROWTH 5 DAYS 07/14/2022 06:05 AM       Radiology:  CT ABDOMEN PELVIS WO CONTRAST Additional Contrast? None  Result Date: 5/29/2025  1. Tiny hiatal hernia with increased circumferential thickening of the distal esophagus, which may represent esophagitis. 2. Otherwise, no acute abnormality in the abdomen or pelvis.     XR CHEST PORTABLE  Result Date:

## 2025-05-31 NOTE — FLOWSHEET NOTE
05/30/25 2037   Treatment Team Notification   Reason for Communication Patient/Family request  (pt requesting medication to help sleep)   Name of Team Member Notified calfee   Treatment Team Role Advanced Practice Nurse   Method of Communication Secure Message   Response See orders  (NP ordered prn melatonin 3mg)   Notification Time 2037

## 2025-05-31 NOTE — CARE COORDINATION
Social work: Per family request will send PT/OT NEW notes from today to [facility] to review. Pt is ok with return to [facility] if [facility] does not accept or is full. Will need amanda at discharge. Lou mendez    Social work sent PT/OT notes to [facility] and left message for them to call to advise if they can accept. Will need amanda at discharge.   If pt accepted at [facility] ARU report would be [phone] and admissions [phone]  Fax [phone]    If returning to [facility] report: [phone] and fax [phone]  They did agree to pt being ok to return. Waiting on [facility] to decide if they can accept.   Lou mendez

## 2025-05-31 NOTE — PROGRESS NOTES
Physical Therapy  Facility/Department: [facility] MED SURG  Physical Therapy Initial Assessment    Name: Steven L Severance  : 1975  MRN: [id number]  Date of Service: 2025    Discharge Recommendations:  Patient would benefit from continued therapy after discharge    Due to recent hospitalization and medical condition, pt would benefit from additional intermittent skilled therapy at time of discharge.  Please refer to the AM-PAC score for current functional status.         Patient Diagnosis(es): The primary encounter diagnosis was Hematemesis with nausea. Diagnoses of Hyponatremia, Dialysis patient, Chronic kidney disease, unspecified CKD stage, Essential hypertension, and Upper GI bleed were also pertinent to this visit.  Past Medical History:  has a past medical history of Cerebral artery occlusion with cerebral infarction (HCC), Closed fracture of bone of right foot, Dialysis patient, Hemodialysis patient, Hyperlipidemia, Hypertension, Kidney disease, and Type 1 diabetes mellitus (HCC).  Past Surgical History:  has a past surgical history that includes AV fistula creation (Left); Wrist surgery (); Upper gastrointestinal endoscopy (N/A, 8/3/2022); and Colonoscopy (N/A, 8/3/2022).    Assessment  Body Structures, Functions, Activity Limitations Requiring Skilled Therapeutic Intervention: Decreased functional mobility ;Decreased ROM;Decreased body mechanics;Decreased tolerance to work activity;Decreased strength;Decreased cognition;Decreased endurance;Decreased sensation;Decreased safe awareness;Decreased fine motor control;Decreased coordination;Increased pain;Decreased balance;Decreased posture  Assessment: Pt is a 50 year old male being evaluated by physical therapy while admitted to the hospital. Pt shows considerable improvement from his last hospital stay a few days ago. Pt was able to sit EOB while performing dynamic tasks showing good postural and core control. Pt was able to stand with MaxA x 2 with a  Standard  Bathroom Equipment: Shower chair, 3-in-1 Commode, Grab bars in shower  Home Equipment: Rollator, Wheelchair - Manual, Walker - Rolling, Sock aid, Reacher (Gait belt)  Has the patient had two or more falls in the past year or any fall with injury in the past year?: Yes (\"Too many to even remember\"; states the falls have been happening when walking, R foot supinates)  Prior Level of Assist for ADLs: Independent (typically IND with dressing/bathing/toileting; only needs assist with donning R sock/shoe, SBA for transfers into shower)  Prior Level of Assist for Homemaking: Needs assistance  Prior Level of Assist for Ambulation: Independent household ambulator, with or without device (Uses a rollator in home; will use a w/c in community as needed, if R LE feels numb)  Prior Level of Assist for Transfers: Independent  Active : No  Occupation: On disability  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  Leisure & Hobbies: Playing video games  Vision/Hearing  Vision  Vision: Within Functional Limits  Hearing  Hearing: Within functional limits    Cognition   Orientation  Overall Orientation Status: Within Normal Limits  Orientation Level: Oriented X4  Cognition  Overall Cognitive Status: WNL    Objective  Temp: 97.9 °F (36.6 °C)  Pulse: 78  Heart Rate Source: Monitor  Respirations: 18  SpO2: 97 %  BP: (!) 143/58  MAP (Calculated): 86  BP Location: Right lower arm  Patient Position: Supine     Observation/Palpation  Posture: Good  Observation: Pt in bed upon arrival, no lines or drains present at this time. Pt demonstrates excessive bilateral foot supination and inversion.  Gross Assessment  AROM: Generally decreased, functional  PROM: Generally decreased, functional  Strength: Generally decreased, functional  Coordination: Generally decreased, functional  Tone: Normal  Sensation: Impaired (Numbness to BLEs & feet & numbness/tingling to digits 3-5 on R hand)       AROM RLE (degrees)  RLE

## 2025-05-31 NOTE — CARE COORDINATION
Social work both [facility] and [facility] accepted pt. Grandpa and pt selected [facility]. Setting up transporation. For their first choice [facility] to room [facility] today. [facility] ok with plan. Await for time from [facility].  Report [phone]  Admissions [phone]  Fax [phone]  Lou mendez    Social work: called to advise [facility] intake of time of 4 pm  by Julio Young to room 225. RN to notify pt os same. Called  [facility] to advise not to hold a bed any longer. Pt to call own family. Lou Mehta javier

## 2025-05-31 NOTE — PLAN OF CARE
Problem: Chronic Conditions and Co-morbidities  Goal: Patient's chronic conditions and co-morbidity symptoms are monitored and maintained or improved  5/31/2025 0533 by Roddy Olivas RN  Outcome: Progressing  Flowsheets (Taken 5/30/2025 1915)  Care Plan - Patient's Chronic Conditions and Co-Morbidity Symptoms are Monitored and Maintained or Improved:   Monitor and assess patient's chronic conditions and comorbid symptoms for stability, deterioration, or improvement   Collaborate with multidisciplinary team to address chronic and comorbid conditions and prevent exacerbation or deterioration   Update acute care plan with appropriate goals if chronic or comorbid symptoms are exacerbated and prevent overall improvement and discharge     Problem: Discharge Planning  Goal: Discharge to home or other facility with appropriate resources  5/31/2025 0533 by Roddy Olivas RN  Outcome: Progressing  Flowsheets (Taken 5/30/2025 1915)  Discharge to home or other facility with appropriate resources:   Identify barriers to discharge with patient and caregiver   Arrange for needed discharge resources and transportation as appropriate   Identify discharge learning needs (meds, wound care, etc)   Refer to discharge planning if patient needs post-hospital services based on physician order or complex needs related to functional status, cognitive ability or social support system     Problem: Pain  Goal: Verbalizes/displays adequate comfort level or baseline comfort level  5/31/2025 0533 by Roddy Olivas RN  Outcome: Progressing     Problem: Safety - Adult  Goal: Free from fall injury  5/31/2025 0533 by Roddy Olivas RN  Outcome: Progressing     Problem: ABCDS Injury Assessment  Goal: Absence of physical injury  5/31/2025 0533 by Roddy Olivas RN  Outcome: Progressing     Problem: Skin/Tissue Integrity  Goal: Skin integrity remains intact  Description: 1.  Monitor for areas of redness and/or skin breakdown2.   provider or physical therapy orders to increase flexion toward goal   Instruct patient/family in ordered activity level     Problem: Musculoskeletal - Adult  Goal: Maintain proper alignment of affected body part  5/31/2025 0533 by Roddy Olivas RN  Outcome: Progressing  Flowsheets (Taken 5/30/2025 1915)  Maintain proper alignment of affected body part: Support and protect limb and body alignment per provider's orders     Problem: Musculoskeletal - Adult  Goal: Return ADL status to a safe level of function  5/31/2025 0533 by Roddy Olivas RN  Outcome: Progressing  Flowsheets (Taken 5/30/2025 1915)  Return ADL Status to a Safe Level of Function:   Administer medication as ordered   Assess activities of daily living deficits and provide assistive devices as needed   Obtain physical therapy/occupational therapy consults as needed   Assist and instruct patient to increase activity and self care as tolerated     Problem: Gastrointestinal - Adult  Goal: Minimal or absence of nausea and vomiting  5/31/2025 0533 by Roddy Olivas RN  Outcome: Progressing  Flowsheets (Taken 5/30/2025 1915)  Minimal or absence of nausea and vomiting:   Provide nonpharmacologic comfort measures as appropriate   Administer ordered antiemetic medications as needed     Problem: Gastrointestinal - Adult  Goal: Maintains or returns to baseline bowel function  5/31/2025 0533 by Roddy Olivas RN  Outcome: Progressing  Flowsheets (Taken 5/30/2025 1915)  Maintains or returns to baseline bowel function:   Assess bowel function   Encourage oral fluids to ensure adequate hydration   Administer ordered medications as needed   Encourage mobilization and activity     Problem: Gastrointestinal - Adult  Goal: Maintains adequate nutritional intake  5/31/2025 0533 by Roddy Olivas RN  Outcome: Progressing  Flowsheets (Taken 5/30/2025 1915)  Maintains adequate nutritional intake:   Identify factors contributing to decreased intake, treat as  appropriate   Monitor percentage of each meal consumed     Problem: Gastrointestinal - Adult  Goal: Establish and maintain optimal ostomy function  5/31/2025 0533 by Roddy Olivas RN  Outcome: Progressing  Flowsheets (Taken 5/30/2025 1915)  Establish and maintain optimal ostomy function: Monitor output from ostomies     Problem: Genitourinary - Adult  Goal: Absence of urinary retention  5/31/2025 0533 by Roddy Olivas RN  Outcome: Progressing     Problem: Metabolic/Fluid and Electrolytes - Adult  Goal: Electrolytes maintained within normal limits  5/31/2025 0533 by Roddy Olivas RN  Outcome: Progressing  Flowsheets (Taken 5/30/2025 1915)  Electrolytes maintained within normal limits:   Monitor labs and assess patient for signs and symptoms of electrolyte imbalances   Administer electrolyte replacement as ordered   Monitor response to electrolyte replacements, including repeat lab results as appropriate     Problem: Metabolic/Fluid and Electrolytes - Adult  Goal: Hemodynamic stability and optimal renal function maintained  5/31/2025 0533 by Roddy Olivas RN  Outcome: Progressing  Flowsheets (Taken 5/30/2025 1915)  Hemodynamic stability and optimal renal function maintained:   Monitor labs and assess for signs and symptoms of volume excess or deficit   Monitor intake, output and patient weight     Problem: Metabolic/Fluid and Electrolytes - Adult  Goal: Glucose maintained within prescribed range  5/31/2025 0533 by Roddy Olivas RN  Outcome: Progressing  Flowsheets (Taken 5/30/2025 1915)  Glucose maintained within prescribed range:   Assess for signs and symptoms of hyperglycemia and hypoglycemia   Monitor blood glucose as ordered   Administer ordered medications to maintain glucose within target range     Problem: Nutrition Deficit:  Goal: Optimize nutritional status  5/31/2025 0533 by Roddy Olivas RN  Outcome: Progressing

## 2025-05-31 NOTE — DISCHARGE SUMMARY
[facility]  Office: [phone]  Wander Gresham DO, Ok Antoine DO, Delmar Morgan DO, Esdras Emmanuel DO, Jelly Carrasco MD, Liliana Alvarez MD, Anastasiya Musa MD, Kyleigh Fine MD,  Brendon Jason MD, Madi Cooper MD, Shelley Zuniga MD,  Gloria Flores DO, Carla Hubbard MD, Rey Su MD, Adalid Gresham DO, Leticia Hansen MD,  Osbaldo Segovia DO, Ayleen Morales MD, Rossi Mitchell MD, Jaylen Douglas MD,  Frantz Elam MD, Qasim Gutierrez MD, Nabil Armendariz MD, Flako Ponce MD, Stephen Corbett MD, Marge Jaime MD, Zachary Blanco DO, Mariluz Larsen MD, Margaret De Oliveira DO, Kerwin Burnette MD, Gloria Llamas MD, Mohsin Reza, MD, Melisa Siu MD, Shirley Waterhouse, CNP,  Cynthia Pickett, CNP, Zachary Yu, SHIMA,  Lou Quinteros, VASILE, Ayana Canseco, CNP, Kathia Maria, CNP, Chani Romero, CNP, Tiffanie Valera, CNP, Jovita Colon, PA-C, Elba Jay, CNP, Huma Anguiano, CNP,  Sejal Galvez, CNP, Bethanie Taylor, CNP, Nicanor Clayton, PA-C, Radha Vines, CNP,  Nunu Zacarias, CNS, Cherri Pérez, CNP, Libia Richardson, CNP,   Ludy Gee, CNP         [facility]   IN-PATIENT SERVICE   [facility]    Discharge Summary     Patient ID: Steven L Severance  :  1975   MRN: [id number]     ACCOUNT:  [id number]   Patient's PCP: Smith Andres MD  Admit Date: 2025   Discharge Date: 2025     Length of Stay: 0  Code Status:  Full Code  Admitting Physician: Esdras P Blood, DO  Discharge Physician: DANIEL Almanza NP     Active Discharge Diagnoses:     Hospital Problem Lists:  Principal Problem:    Hematemesis  Active Problems:    Anemia of chronic disease    Coronary artery disease involving native coronary artery of native heart without angina pectoris    Essential hypertension    Bipolar affective disorder (HCC)    Cerebrovascular accident (CVA) (HCC)    GERD (gastroesophageal reflux disease)    Hx of type 2 diabetes mellitus    ESRD needing dialysis  (HCC)  Resolved Problems:    * No resolved hospital problems. *      Admission Condition:  fair     Discharged Condition: stable    Hospital Stay:     Hospital Course:  Steven L Severance is a 50 y.o. male who was admitted for the management of  Hematemesis , presented to ER with Hypertension, Nausea, Vomiting, and Headache      5/29 - Was discharged from our facility 24 hours ago for overnight observation due to hypoglycemia from not eating after taking his insulin. Patient advised he has a long history of gastroparesis, poor appetite, and slow transit chronic constipation. He advised that shortly after his discharge he developed nausea with vomiting. He was discharged to acute rehab where today he continued to have vomiting which has developed into coffee-ground emesis. He was sent to the emergency department for further evaluation. Hemoglobin stable, GI has evaluated the patient and recommended PPI drip and plans for EGD tomorrow.     5/30 -EGD completed without significant abnormality    5/31 -condition stable.  Stable for discharge    Significant therapeutic interventions: As above    Significant Diagnostic Studies:   Labs / Micro:  CBC:   Lab Results   Component Value Date/Time    WBC 5.9 05/31/2025 01:06 AM    RBC 3.77 05/31/2025 01:06 AM    HGB 10.8 05/31/2025 05:16 AM    HCT 31.6 05/31/2025 05:16 AM    MCV 95.2 05/31/2025 01:06 AM    MCH 31.8 05/31/2025 01:06 AM    MCHC 33.4 05/31/2025 01:06 AM    RDW 12.6 05/31/2025 01:06 AM     05/31/2025 01:06 AM     BMP:    Lab Results   Component Value Date/Time    GLUCOSE 266 05/31/2025 09:46 AM     05/31/2025 09:46 AM    K 4.7 05/31/2025 09:46 AM    CL 93 05/31/2025 09:46 AM    CO2 24 05/31/2025 09:46 AM    ANIONGAP 14 05/31/2025 09:46 AM    BUN 36 05/31/2025 09:46 AM    CREATININE 5.4 05/31/2025 09:46 AM    CALCIUM 8.7 05/31/2025 09:46 AM    LABGLOM 12 05/31/2025 09:46 AM    LABGLOM 7 02/23/2024 10:12 AM    GFRAA 8 08/15/2022 05:13 AM    GFR       08/15/2022 05:13 AM        Radiology:  CT ABDOMEN PELVIS WO CONTRAST Additional Contrast? None  Result Date: 5/29/2025  1. Tiny hiatal hernia with increased circumferential thickening of the distal esophagus, which may represent esophagitis. 2. Otherwise, no acute abnormality in the abdomen or pelvis.     XR CHEST PORTABLE  Result Date: 5/29/2025  No acute cardiopulmonary process.     XR ANKLE RIGHT (MIN 3 VIEWS)  Result Date: 5/28/2025  1. No acute osseous abnormality. 2. No joint effusion.     CT ABDOMEN PELVIS W IV CONTRAST Additional Contrast? None  Result Date: 5/26/2025  1. Minimal contusion is seen in the right upper quadrant subcutaneous tissues without evidence of underlying rib fracture. 2. Moderate colonic stool burden.       Consultations:    Consults:     Final Specialist Recommendations/Findings:   IP CONSULT TO GI  IP CONSULT TO NEPHROLOGY  IP CONSULT TO INTERNAL MEDICINE  IP CONSULT TO GI      The patient was seen and examined on day of discharge and this discharge summary is in conjunction with any daily progress note from day of discharge.    Discharge plan:     Disposition: Home    Physician Follow Up:     Smith Andres MD  [address] [facility] [address] [phone]    Follow up         Requiring Further Evaluation/Follow Up POST HOSPITALIZATION/Incidental Findings: Continue dialysis    Diet: diabetic diet and renal diet    Activity: As tolerated    Instructions to Patient: Take your diabetic medications as prescribed.  Take the Protonix as prescribed.  Engage with therapy as directed.  Follow-up with your various specialists at your earliest convenience.    Discharge Medications:      Medication List        CHANGE how you take these medications      traMADol 50 MG tablet  Commonly known as: ULTRAM  What changed: See the new instructions.            CONTINUE taking these medications      acetaminophen 500 MG tablet  Commonly known as: TYLENOL     amLODIPine 10 MG tablet  Commonly

## 2025-06-02 ENCOUNTER — HOSPITAL ENCOUNTER (OUTPATIENT)
Age: 50
Setting detail: SPECIMEN
Discharge: HOME | End: 2025-06-02

## 2025-06-03 LAB — SURGICAL PATHOLOGY REPORT: NORMAL

## 2025-06-18 ASSESSMENT — ENCOUNTER SYMPTOMS
CONSTIPATION: 1
ABDOMINAL PAIN: 1
NAUSEA: 1
VOMITING: 1

## 2025-07-23 LAB — HBA1C MFR BLD: 8.4 %

## 2025-07-24 ENCOUNTER — OFFICE VISIT (OUTPATIENT)
Dept: FAMILY MEDICINE CLINIC | Age: 50
End: 2025-07-24
Payer: MEDICARE

## 2025-07-24 VITALS
HEIGHT: 67 IN | BODY MASS INDEX: 29.82 KG/M2 | DIASTOLIC BLOOD PRESSURE: 84 MMHG | TEMPERATURE: 97.1 F | HEART RATE: 65 BPM | SYSTOLIC BLOOD PRESSURE: 106 MMHG | OXYGEN SATURATION: 100 % | WEIGHT: 190 LBS

## 2025-07-24 DIAGNOSIS — I10 ESSENTIAL HYPERTENSION: Primary | ICD-10-CM

## 2025-07-24 DIAGNOSIS — I63.9 CEREBROVASCULAR ACCIDENT (CVA), UNSPECIFIED MECHANISM (HCC): ICD-10-CM

## 2025-07-24 DIAGNOSIS — L97.529 ULCER OF LEFT FOOT, UNSPECIFIED ULCER STAGE (HCC): ICD-10-CM

## 2025-07-24 DIAGNOSIS — K21.9 GASTROESOPHAGEAL REFLUX DISEASE, UNSPECIFIED WHETHER ESOPHAGITIS PRESENT: ICD-10-CM

## 2025-07-24 DIAGNOSIS — E78.5 HYPERLIPIDEMIA, UNSPECIFIED HYPERLIPIDEMIA TYPE: ICD-10-CM

## 2025-07-24 DIAGNOSIS — N18.6 ESRD (END STAGE RENAL DISEASE) (HCC): ICD-10-CM

## 2025-07-24 DIAGNOSIS — G47.9 SLEEP DISTURBANCE: ICD-10-CM

## 2025-07-24 DIAGNOSIS — I25.10 CORONARY ARTERY DISEASE INVOLVING NATIVE CORONARY ARTERY OF NATIVE HEART WITHOUT ANGINA PECTORIS: ICD-10-CM

## 2025-07-24 DIAGNOSIS — R29.90 STROKE-LIKE EPISODE: ICD-10-CM

## 2025-07-24 DIAGNOSIS — R53.1 GENERALIZED WEAKNESS: ICD-10-CM

## 2025-07-24 DIAGNOSIS — G57.93 NEUROPATHY OF BOTH FEET: ICD-10-CM

## 2025-07-24 DIAGNOSIS — Z12.5 PROSTATE CANCER SCREENING: ICD-10-CM

## 2025-07-24 DIAGNOSIS — D64.9 ANEMIA, UNSPECIFIED TYPE: ICD-10-CM

## 2025-07-24 PROCEDURE — G8427 DOCREV CUR MEDS BY ELIG CLIN: HCPCS | Performed by: NURSE PRACTITIONER

## 2025-07-24 PROCEDURE — 99214 OFFICE O/P EST MOD 30 MIN: CPT | Performed by: NURSE PRACTITIONER

## 2025-07-24 PROCEDURE — 3079F DIAST BP 80-89 MM HG: CPT | Performed by: NURSE PRACTITIONER

## 2025-07-24 PROCEDURE — 3017F COLORECTAL CA SCREEN DOC REV: CPT | Performed by: NURSE PRACTITIONER

## 2025-07-24 PROCEDURE — 1036F TOBACCO NON-USER: CPT | Performed by: NURSE PRACTITIONER

## 2025-07-24 PROCEDURE — 3074F SYST BP LT 130 MM HG: CPT | Performed by: NURSE PRACTITIONER

## 2025-07-24 PROCEDURE — G8417 CALC BMI ABV UP PARAM F/U: HCPCS | Performed by: NURSE PRACTITIONER

## 2025-07-24 RX ORDER — QUETIAPINE FUMARATE 25 MG/1
25 TABLET, FILM COATED ORAL NIGHTLY PRN
Qty: 90 TABLET | Refills: 1 | Status: SHIPPED | OUTPATIENT
Start: 2025-07-24

## 2025-07-24 RX ORDER — LOSARTAN POTASSIUM 25 MG/1
TABLET ORAL
COMMUNITY
Start: 2025-07-20

## 2025-07-24 RX ORDER — HYDRALAZINE HYDROCHLORIDE 25 MG/1
25 TABLET, FILM COATED ORAL
COMMUNITY
Start: 2025-06-26

## 2025-07-24 RX ORDER — ACYCLOVIR 400 MG/1
TABLET ORAL
COMMUNITY

## 2025-07-24 RX ORDER — ASCORBIC ACID, THIAMINE, RIBOFLAVIN, NIACINAMIDE, PYRIDOXINE, FOLIC ACID, COBALAMIN, BIOTIN, PANTOTHENIC ACID 100; 1.5; 1.7; 20; 10; 1; 6; 300; 1 MG/1; MG/1; MG/1; MG/1; MG/1; MG/1; UG/1; UG/1; MG/1
1 TABLET, COATED ORAL
COMMUNITY

## 2025-07-24 ASSESSMENT — PATIENT HEALTH QUESTIONNAIRE - PHQ9
SUM OF ALL RESPONSES TO PHQ QUESTIONS 1-9: 11
SUM OF ALL RESPONSES TO PHQ QUESTIONS 1-9: 11
6. FEELING BAD ABOUT YOURSELF - OR THAT YOU ARE A FAILURE OR HAVE LET YOURSELF OR YOUR FAMILY DOWN: SEVERAL DAYS
4. FEELING TIRED OR HAVING LITTLE ENERGY: NEARLY EVERY DAY
2. FEELING DOWN, DEPRESSED OR HOPELESS: NOT AT ALL
9. THOUGHTS THAT YOU WOULD BE BETTER OFF DEAD, OR OF HURTING YOURSELF: NOT AT ALL
3. TROUBLE FALLING OR STAYING ASLEEP: NEARLY EVERY DAY
8. MOVING OR SPEAKING SO SLOWLY THAT OTHER PEOPLE COULD HAVE NOTICED. OR THE OPPOSITE, BEING SO FIGETY OR RESTLESS THAT YOU HAVE BEEN MOVING AROUND A LOT MORE THAN USUAL: SEVERAL DAYS
SUM OF ALL RESPONSES TO PHQ QUESTIONS 1-9: 11
5. POOR APPETITE OR OVEREATING: MORE THAN HALF THE DAYS
10. IF YOU CHECKED OFF ANY PROBLEMS, HOW DIFFICULT HAVE THESE PROBLEMS MADE IT FOR YOU TO DO YOUR WORK, TAKE CARE OF THINGS AT HOME, OR GET ALONG WITH OTHER PEOPLE: SOMEWHAT DIFFICULT
1. LITTLE INTEREST OR PLEASURE IN DOING THINGS: NOT AT ALL
7. TROUBLE CONCENTRATING ON THINGS, SUCH AS READING THE NEWSPAPER OR WATCHING TELEVISION: SEVERAL DAYS
SUM OF ALL RESPONSES TO PHQ QUESTIONS 1-9: 11

## 2025-07-24 ASSESSMENT — ENCOUNTER SYMPTOMS
SINUS PAIN: 0
SORE THROAT: 0
VOMITING: 0
NAUSEA: 0
ABDOMINAL PAIN: 0
EYE PAIN: 0
SHORTNESS OF BREATH: 0
BACK PAIN: 0
DIARRHEA: 0
COUGH: 0

## 2025-07-24 NOTE — PROGRESS NOTES
ESOPHAGOGASTRODUODENOSCOPY   se         Source of Specimen  A: ABNORMAL EDEMETOUS TISSUE IN CARDIA      Gross Description  \"STEVEN SEVERANCE, ABNORMAL EDEMATOUS TISSUE IN CARDIA\" Received in  formalin is one tan-white tissue fragment, 0.5 x 0.3 x 0.2 cm.   Entirely 1cs.  jj randall Russell/se:6/2/2025  Microscopic Description  Microscopic examination performed.    Processing Lab:  [address] [address]  Interpretation Performed at [address] [address]    SURGICAL PATHOLOGY CONSULTATION       Patient Name: SEVERANCE, STEVEN L.  [facility] Rec: [id number]  [facility]  [facility]  ANATOMIC PATHOLOGY  [facility] [place], [place] [address] [phone]  Fax: [phone]      POC Glucose 05/30/2025 85     Hemoglobin 05/30/2025 13.0     Hematocrit 05/30/2025 38.9 (L)     Hemoglobin 05/31/2025 10.8 (L)     Hematocrit 05/31/2025 31.6 (L)     POC Glucose 05/30/2025 223 (H)     POC Glucose 05/31/2025 534 (HH)     POC Glucose 05/31/2025 582 (HH)     Sodium 05/31/2025 128 (L)     Potassium 05/31/2025 5.1     Chloride 05/31/2025 92 (L)     CO2 05/31/2025 20     Anion Gap 05/31/2025 17 (H)     Glucose 05/31/2025 665 (HH)     BUN 05/31/2025 28 (H)     Creatinine 05/31/2025 4.7 (H)     Est, Glom Filt Rate 05/31/2025 14 (L)     Calcium 05/31/2025 8.6     WBC 05/31/2025 5.9     RBC 05/31/2025 3.77 (L)     Hemoglobin 05/31/2025 12.0 (L)     Hematocrit 05/31/2025 35.9 (L)     MCV 05/31/2025 95.2     MCH 05/31/2025 31.8     MCHC 05/31/2025 33.4     RDW 05/31/2025 12.6     Platelets 05/31/2025 177     MPV 05/31/2025 9.7     NRBC Automated 05/31/2025 0.0     Neutrophils % 05/31/2025 72 (H)     Lymphocytes % 05/31/2025 11 (L)     Monocytes % 05/31/2025 17 (H)     Eosinophils % 05/31/2025 0 (L)     Basophils % 05/31/2025 0     Immature Granulocytes % 05/31/2025 0

## 2025-08-14 ENCOUNTER — TELEPHONE (OUTPATIENT)
Dept: FAMILY MEDICINE CLINIC | Age: 50
End: 2025-08-14

## 2025-08-14 DIAGNOSIS — B37.0 THRUSH: Primary | ICD-10-CM

## 2025-08-14 RX ORDER — NYSTATIN 100000 [USP'U]/ML
500000 SUSPENSION ORAL 4 TIMES DAILY
Qty: 200 ML | Refills: 0 | Status: SHIPPED | OUTPATIENT
Start: 2025-08-14 | End: 2025-08-24

## 2025-08-25 ENCOUNTER — OFFICE VISIT (OUTPATIENT)
Dept: PODIATRY | Age: 50
End: 2025-08-25
Payer: MEDICARE

## 2025-08-25 VITALS — HEIGHT: 67 IN | BODY MASS INDEX: 29.82 KG/M2 | WEIGHT: 190 LBS

## 2025-08-25 DIAGNOSIS — L85.3 XEROSIS CUTIS: ICD-10-CM

## 2025-08-25 DIAGNOSIS — D23.72 BENIGN NEOPLASM OF SKIN OF LOWER LIMB, INCLUDING HIP, LEFT: ICD-10-CM

## 2025-08-25 DIAGNOSIS — B35.1 DERMATOPHYTOSIS OF NAIL: ICD-10-CM

## 2025-08-25 DIAGNOSIS — M20.42 HAMMER TOES OF BOTH FEET: ICD-10-CM

## 2025-08-25 DIAGNOSIS — E11.51 TYPE II DIABETES MELLITUS WITH PERIPHERAL CIRCULATORY DISORDER (HCC): Primary | ICD-10-CM

## 2025-08-25 DIAGNOSIS — M79.604 PAIN IN BOTH LOWER EXTREMITIES: ICD-10-CM

## 2025-08-25 DIAGNOSIS — M20.41 HAMMER TOES OF BOTH FEET: ICD-10-CM

## 2025-08-25 DIAGNOSIS — M79.605 PAIN IN BOTH LOWER EXTREMITIES: ICD-10-CM

## 2025-08-25 DIAGNOSIS — I73.9 PERIPHERAL VASCULAR DISORDER: ICD-10-CM

## 2025-08-25 DIAGNOSIS — D23.71 BENIGN NEOPLASM OF SKIN OF LOWER LIMB, INCLUDING HIP, RIGHT: ICD-10-CM

## 2025-08-25 PROCEDURE — 17110 DESTRUCTION B9 LES UP TO 14: CPT | Performed by: PODIATRIST

## 2025-08-25 PROCEDURE — 99213 OFFICE O/P EST LOW 20 MIN: CPT | Performed by: PODIATRIST

## 2025-08-25 PROCEDURE — 3046F HEMOGLOBIN A1C LEVEL >9.0%: CPT | Performed by: PODIATRIST

## 2025-08-25 PROCEDURE — 3017F COLORECTAL CA SCREEN DOC REV: CPT | Performed by: PODIATRIST

## 2025-08-25 PROCEDURE — 11721 DEBRIDE NAIL 6 OR MORE: CPT | Performed by: PODIATRIST

## 2025-08-25 PROCEDURE — G8417 CALC BMI ABV UP PARAM F/U: HCPCS | Performed by: PODIATRIST

## 2025-08-25 PROCEDURE — 2022F DILAT RTA XM EVC RTNOPTHY: CPT | Performed by: PODIATRIST

## 2025-08-25 PROCEDURE — G8427 DOCREV CUR MEDS BY ELIG CLIN: HCPCS | Performed by: PODIATRIST

## 2025-08-25 PROCEDURE — 1036F TOBACCO NON-USER: CPT | Performed by: PODIATRIST

## (undated) DEVICE — MEDICINE CUP, GRADUATED, STER: Brand: MEDLINE

## (undated) DEVICE — BLOCK BITE 60FR RUBBER ADLT DENTAL

## (undated) DEVICE — BITE BLOCK ENDOSCP AD 60 FR W/ ADJ STRP PLAS GRN BLOX

## (undated) DEVICE — GAUZE,SPONGE,4"X4",16PLY,STRL,LF,10/TRAY: Brand: MEDLINE

## (undated) DEVICE — CO2 CANNULA,SUPERSOFT, ADLT,7'O2,7'CO2: Brand: MEDLINE

## (undated) DEVICE — SYRINGE MED 50ML LUERLOCK TIP

## (undated) DEVICE — ENDOSCOPIC KIT 1.1+ 10 FT DE 2 GWN AAMI LEVEL 3

## (undated) DEVICE — ADAPTER TBNG LUER STUB 15 GA INTMED

## (undated) DEVICE — 4-PORT MANIFOLD: Brand: NEPTUNE 2

## (undated) DEVICE — JELLY,LUBE,STERILE,FLIP TOP,TUBE,2-OZ: Brand: MEDLINE

## (undated) DEVICE — FORCEPS BX L240CM JAW DIA2.8MM L CAP W/ NDL MIC MESH TOOTH

## (undated) DEVICE — UNDERPAD HOSP W30XL36IN GRN POLYPR HI ABSRB DISP

## (undated) DEVICE — Device: Brand: DEFENDO VALVE AND CONNECTOR KIT

## (undated) DEVICE — BASIN EMSIS 700ML GRAPHITE PLAS KID SHP GRAD

## (undated) DEVICE — YANKAUER,FLEXIBLE HANDLE,REGLR CAPACITY: Brand: MEDLINE INDUSTRIES, INC.

## (undated) DEVICE — TUBING, SUCTION, 1/4" X 12', STRAIGHT: Brand: MEDLINE

## (undated) DEVICE — GOWN,AURORA,NONREINFORCED,LARGE: Brand: MEDLINE